# Patient Record
Sex: FEMALE | Race: WHITE | NOT HISPANIC OR LATINO | Employment: OTHER | URBAN - METROPOLITAN AREA
[De-identification: names, ages, dates, MRNs, and addresses within clinical notes are randomized per-mention and may not be internally consistent; named-entity substitution may affect disease eponyms.]

---

## 2017-07-17 ENCOUNTER — TRANSFERRED RECORDS (OUTPATIENT)
Dept: HEALTH INFORMATION MANAGEMENT | Facility: CLINIC | Age: 77
End: 2017-07-17

## 2017-07-24 ENCOUNTER — TRANSFERRED RECORDS (OUTPATIENT)
Dept: HEALTH INFORMATION MANAGEMENT | Facility: CLINIC | Age: 77
End: 2017-07-24

## 2019-11-08 ENCOUNTER — TRANSFERRED RECORDS (OUTPATIENT)
Dept: HEALTH INFORMATION MANAGEMENT | Facility: CLINIC | Age: 79
End: 2019-11-08

## 2019-12-09 ENCOUNTER — TRANSFERRED RECORDS (OUTPATIENT)
Dept: HEALTH INFORMATION MANAGEMENT | Facility: CLINIC | Age: 79
End: 2019-12-09

## 2019-12-09 ENCOUNTER — MEDICAL CORRESPONDENCE (OUTPATIENT)
Dept: HEALTH INFORMATION MANAGEMENT | Facility: CLINIC | Age: 79
End: 2019-12-09

## 2019-12-17 ENCOUNTER — TRANSFERRED RECORDS (OUTPATIENT)
Dept: HEALTH INFORMATION MANAGEMENT | Facility: CLINIC | Age: 79
End: 2019-12-17

## 2019-12-19 ENCOUNTER — MEDICAL CORRESPONDENCE (OUTPATIENT)
Dept: HEALTH INFORMATION MANAGEMENT | Facility: CLINIC | Age: 79
End: 2019-12-19

## 2020-01-07 ENCOUNTER — DOCUMENTATION ONLY (OUTPATIENT)
Dept: CARE COORDINATION | Facility: CLINIC | Age: 80
End: 2020-01-07

## 2020-02-27 ENCOUNTER — PRE VISIT (OUTPATIENT)
Dept: CARDIOLOGY | Facility: CLINIC | Age: 80
End: 2020-02-27

## 2020-02-27 NOTE — TELEPHONE ENCOUNTER
New Pulmonary Hypertension Patient Form   Patient Name: Karen Anderson   Age: 79     Referring Provider: Dr. Forde   MRN: [id number]     Date Test Epic Media/Scan Care Everywhere    12/17/19 RHC ?  ?   ?      Angio/Stress ?  ?   ?     11/8/19 Echo ?  ?   ?     12/9/19 EKG ?   ?   ?      6MWT ?   ?   ?     10/21/19 PFT ?   ?   ?      Sleep Study ?  ?   ?     4/24/17 Chest CT ?  ?   ?      V/Q Scan ?   ?   ?      Abd/Liver US ?   ?   ?      Misc:  ?   ?   ?         ?   ?   ?         ?   ?   ?      RHC  Pressures      Phase:Baseline      AO :  129.0/65.0(90.0)  @ 9:09:48 AM      RA :  a wave =v wave =mean =12  @ 9:07:22 AM      RV :  101/6/25  @ 9:06:56 AM      PA :  98/23(51)  @ 9:01:35 AM      PCW :  a wave =v wave =mean =5  @ 9:00:52 AM             a wave =v wave =mean =6  @ 9:01:08 AM

## 2020-03-01 ASSESSMENT — ENCOUNTER SYMPTOMS
SMELL DISTURBANCE: 1
LOSS OF CONSCIOUSNESS: 0
HYPERTENSION: 1
LIGHT-HEADEDNESS: 0
WHEEZING: 0
TREMORS: 0
POLYPHAGIA: 0
SPEECH CHANGE: 0
CHILLS: 1
BOWEL INCONTINENCE: 0
LEG PAIN: 1
DYSPNEA ON EXERTION: 1
FLANK PAIN: 0
DECREASED APPETITE: 0
MEMORY LOSS: 0
EYE REDNESS: 1
SHORTNESS OF BREATH: 1
EYE IRRITATION: 1
FATIGUE: 1
INCREASED ENERGY: 1
DYSURIA: 0
HEARTBURN: 0
TROUBLE SWALLOWING: 0
WEIGHT GAIN: 1
NAUSEA: 0
SINUS PAIN: 1
BLOATING: 1
SYNCOPE: 0
ORTHOPNEA: 1
JAUNDICE: 0
NIGHT SWEATS: 0
EXERCISE INTOLERANCE: 1
DIZZINESS: 0
NUMBNESS: 1
ABDOMINAL PAIN: 0
ALTERED TEMPERATURE REGULATION: 1
WEIGHT LOSS: 0
PARALYSIS: 0
BLOOD IN STOOL: 0
COUGH: 1
HEMOPTYSIS: 0
RECTAL PAIN: 0
SPUTUM PRODUCTION: 0
NECK MASS: 0
EYE PAIN: 0
HEMATURIA: 0
POSTURAL DYSPNEA: 1
FEVER: 0
SLEEP DISTURBANCES DUE TO BREATHING: 1
TASTE DISTURBANCE: 1
VOMITING: 0
CONSTIPATION: 1
SORE THROAT: 0
POLYDIPSIA: 0
DOUBLE VISION: 0
HALLUCINATIONS: 0
SEIZURES: 0
DIFFICULTY URINATING: 1
DIARRHEA: 0
EYE WATERING: 1
WEAKNESS: 1
COUGH DISTURBING SLEEP: 1
SNORES LOUDLY: 1
HOARSE VOICE: 0
SINUS CONGESTION: 1
TINGLING: 1
HYPOTENSION: 0
PALPITATIONS: 0
HEADACHES: 0
DISTURBANCES IN COORDINATION: 0

## 2020-03-01 NOTE — PROGRESS NOTES
Service Date: 2020    Shaneka Forde MD  [address]  [place] [address]    Clint Abernathy MD  [address]  [place] [address]    Noam [place] MD  [address]  [place] [address]    Carlos Carter MD  [address]  [place] [address]      RE:  Karen Anderson   MRN:  [id number]  :  1940      Dear Josemanuel Ha, Karen, and Raul:    We had the pleasure of seeing Karen Anderson at the [facility].  As you know, Ms. Anderson is a 79-year-old female with the following past medical history:    1.  Asthma.  2.  Pulmonary MAC.  3.  Breast cancer status post chemotherapy with Adriamycin, Cytoxan, Taxol, and tamoxifen and status post left mastectomy.  4.  Extensive bilateral pulmonary emboli and DVT in  thought to be secondary to tamoxifen.  5.  Chemotherapy-induced cardiomyopathy.  6.  Rheumatoid arthritis.  7.  Hypertension.  8.  Scoliosis.    She presents to establish care with us for further evaluation and management of severe pulmonary hypertension.    She notes that for a long time, possibly years, she has been having worsening dyspnea on exertion.  She thought that her dyspnea was secondary to her asthma.  Last summer, approximately 9 months ago, she noticed substantial worsening in her dyspnea.  [place] dyspnea when ambulating 20 to 30 yards.  During the summer , she was able to ambulate 20 to 30 yards without dyspnea.  Since 2018, she notes having bilateral lower extremity swelling that has been worsening despite use of low dose lasix and compression stockings.  Over the last 2 weeks, she notes that her dyspnea and overall energy level has worsened.  She notes overall feeling poorly.  She has developed a nonproductive cough.  She has had issues with lightheadedness that worsens with activity.  She has not had any loss of  consciousness or fainting.  She has shortness of breath when grocery shopping and when dressing herself.  She has been sleeping on a wedge that elevates her head for years.  Over the last 2 weeks, she notes that she has occasional episodes of PND.  She notes per her home scale, over the last week, she has gained 5 pounds.  She has not had any hospitalizations or emergency room visits.    Her prior evaluation has included a chest x-ray in September 2019 that showed no focal acute infiltrates.  She had a low-dose CT chest for follow-up of her MAC in April 2017 that showed irregular nodular opacities that were stable and no new areas of airspace disease or nodularity.  Her PFTs in October 2019 showed FEV1 of 66%, FVC 67%, FEV1 over FVC consistent with restriction, normal DLCO, normal total lung capacity, and ended up having a nonconclusive study and possibly a mixed obstructive and restrictive picture.  Her asthma is stable.  She follows with Dr. Abernathy.  She had a transthoracic echocardiogram at [facility] on 11/8/2019 that showed LVEF 60 to 65%, grade 1 diastolic dysfunction, diastolic flattening of the interventricular septum consistent with RV pressure overload, moderate RV enlargement, severely reduced RV systolic function, moderate TR, RVSP 92 mmHg, and small circumferential pericardial effusion.  She had a right heart catheterization and coronary angiogram in December 2019 which showed normal coronaries, RA 12, /25, PA 98/23/51, PCW 5, thermodilution cardiac output 2.37 L/min, PVR 19.4 and a vasodilator study was not completed.  She was also found to have an elevated BNP of 832 in September 2019.      PAST MEDICAL HISTORY:  Past Medical History:   Diagnosis Date     Asthma      Breast cancer (H)      Hypertension      Mycobacterium avium complex (H)      Pulmonary embolism (H)      Pulmonary hypertension (H)      Rheumatoid arthritis (H)      Scoliosis        PAST SURGICAL HISTORY:  Past Surgical  History:   Procedure Laterality Date     MASTECTOMY Left        FAMILY HISTORY:  Family History   Problem Relation Age of Onset     Heart Failure Mother      Kidney Disease Mother      Coronary Artery Disease Maternal Grandfather 50     LUNG DISEASE No family hx of      Clotting Disorder No family hx of        SOCIAL HISTORY:  Social History     Socioeconomic History     Marital status:      Spouse name: Not on file     Number of children: Not on file     Years of education: Not on file     Highest education level: Not on file   Occupational History     Not on file   Social Needs     Financial resource strain: Not on file     Food insecurity:     Worry: Not on file     Inability: Not on file     Transportation needs:     Medical: Not on file     Non-medical: Not on file   Tobacco Use     Smoking status: Never Smoker     Smokeless tobacco: Never Used   Substance and Sexual Activity     Alcohol use: Yes     Comment: occasionally      Drug use: Never     Sexual activity: Not on file   Lifestyle     Physical activity:     Days per week: Not on file     Minutes per session: Not on file     Stress: Not on file   Relationships     Social connections:     Talks on phone: Not on file     Gets together: Not on file     Attends Caodaism service: Not on file     Active member of club or organization: Not on file     Attends meetings of clubs or organizations: Not on file     Relationship status: Not on file     Intimate partner violence:     Fear of current or ex partner: Not on file     Emotionally abused: Not on file     Physically abused: Not on file     Forced sexual activity: Not on file   Other Topics Concern     Not on file   Social History Narrative    Is . Has 1 adult daughter who is in her 50s. Has 2 grandkids. 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       CURRENT MEDICATIONS:  Current Outpatient Medications   Medication Sig     albuterol (PROAIR HFA/PROVENTIL HFA/VENTOLIN HFA) 108  "(90 Base) MCG/ACT inhaler Inhale 2 puffs into the lungs     Ascorbic Acid (VITAMIN C) 500 MG CAPS      aspirin (ASA) 81 MG tablet      beclomethasone (QVAR) 80 MCG/ACT AERS IS A DISCONTINUED MEDICATION Inhale 1 puff into the lungs     Calcium Citrate-Vitamin D (CALCIUM CITRATE + D3) 200-250 MG-UNIT TABS Take 600 mg by mouth     cetirizine (ZYRTEC) 10 MG tablet Take 10 mg by mouth daily     furosemide (LASIX) 20 MG tablet Take 2 tablets (40 mg) by mouth daily     gabapentin (NEURONTIN) 300 MG capsule TK 1 C PO TID PRN     hydroxychloroquine (PLAQUENIL) 200 MG tablet 200 mg     lisinopril (ZESTRIL) 20 MG tablet      polyethylene glycol (MIRALAX) packet Take 17 g by mouth     Probiotic Product (PROBIOTIC ACIDOPHILUS BIOBEADS) CAPS      No current facility-administered medications for this visit.        ROS:   10 point ROS negative except as discussed in above HPI.    EXAM:  BP (!) 143/88   Pulse 83   Ht 1.473 m (4' 10\")   Wt 57.6 kg (126 lb 14.4 oz)   SpO2 91%   BMI 26.52 kg/m    General: appears comfortable, alert and articulate, has kyphosis  Head: normocephalic, atraumatic  Eyes: anicteric sclera  Neck: no adenopathy  Orophyarynx: moist mucosa, no lesions, dentition intact  Heart: regular, normal S1, loud P2, 2/6 NIKIA at LLSB, estimated JVP 20 cm, 2+ bilateral radial pulses  Lungs: clear to auscultation bilaterally, no rales or wheezing, dyspneic on exertion  Abdomen: soft, non-tender, bowel sounds present, no hepatosplenomegaly  Extremities: no clubbing, 3+ pitting bilateral lower extremity edema to proximal tibia, bilateral ulnar drift of hands  Neurological: normal speech and affect, no gross motor deficits    Labs:  Recent Results (from the past 24 hour(s))   6 minute walk test    Collection Time: 03/02/20 12:00 AM   Result Value Ref Range    6 min walk (FT) 540 ft    6 Min Walk (M) 165 m   EKG 12-lead, tracing only (Same Day)    Collection Time: 03/02/20 10:50 AM   Result Value Ref Range    Interpretation " ECG Click View Image link to view waveform and result    Iron and iron binding capacity    Collection Time: 03/02/20 12:35 PM   Result Value Ref Range    Iron 63 35 - 180 ug/dL    Iron Binding Cap 331 240 - 430 ug/dL    Iron Saturation Index 19 15 - 46 %   CRP inflammation    Collection Time: 03/02/20 12:35 PM   Result Value Ref Range    CRP Inflammation 19.4 (H) 0.0 - 8.0 mg/L   HIV Antigen Antibody Combo    Collection Time: 03/02/20 12:35 PM   Result Value Ref Range    HIV Antigen Antibody Combo Nonreactive NR^Nonreactive       Hepatitis C antibody    Collection Time: 03/02/20 12:35 PM   Result Value Ref Range    Hepatitis C Antibody Nonreactive NR^Nonreactive   Hepatitis B surface antigen    Collection Time: 03/02/20 12:35 PM   Result Value Ref Range    Hep B Surface Agn Nonreactive NR^Nonreactive   Hepatitis B Surface Antibody    Collection Time: 03/02/20 12:35 PM   Result Value Ref Range    Hepatitis B Surface Antibody 0.14 <8.00 m[IU]/mL   Hepatitis B core antibody    Collection Time: 03/02/20 12:35 PM   Result Value Ref Range    Hepatitis B Core Yaneth Nonreactive NR^Nonreactive   TSH    Collection Time: 03/02/20 12:35 PM   Result Value Ref Range    TSH 1.31 0.40 - 4.00 mU/L   N terminal pro BNP outpatient    Collection Time: 03/02/20 12:35 PM   Result Value Ref Range    N-Terminal Pro Bnp 12,874 (H) 0 - 450 pg/mL   INR    Collection Time: 03/02/20 12:35 PM   Result Value Ref Range    INR 1.07 0.86 - 1.14   CBC with platelets    Collection Time: 03/02/20 12:35 PM   Result Value Ref Range    WBC 6.2 4.0 - 11.0 10e9/L    RBC Count 4.36 3.8 - 5.2 10e12/L    Hemoglobin 14.1 11.7 - 15.7 g/dL    Hematocrit 44.4 35.0 - 47.0 %     (H) 78 - 100 fl    MCH 32.3 26.5 - 33.0 pg    MCHC 31.8 31.5 - 36.5 g/dL    RDW 13.2 10.0 - 15.0 %    Platelet Count 228 150 - 450 10e9/L   Lipid Profile    Collection Time: 03/02/20 12:35 PM   Result Value Ref Range    Cholesterol 146 <200 mg/dL    Triglycerides 83 <150 mg/dL    HDL  Cholesterol 80 >49 mg/dL    LDL Cholesterol Calculated 49 <100 mg/dL    Non HDL Cholesterol 66 <130 mg/dL   Hepatic panel    Collection Time: 03/02/20 12:35 PM   Result Value Ref Range    Bilirubin Direct 0.2 0.0 - 0.2 mg/dL    Bilirubin Total 0.6 0.2 - 1.3 mg/dL    Albumin 3.4 3.4 - 5.0 g/dL    Protein Total 7.3 6.8 - 8.8 g/dL    Alkaline Phosphatase 112 40 - 150 U/L    ALT 41 0 - 50 U/L    AST 53 (H) 0 - 45 U/L   Basic metabolic panel    Collection Time: 03/02/20 12:35 PM   Result Value Ref Range    Sodium 139 133 - 144 mmol/L    Potassium 4.0 3.4 - 5.3 mmol/L    Chloride 104 94 - 109 mmol/L    Carbon Dioxide 28 20 - 32 mmol/L    Anion Gap 7 3 - 14 mmol/L    Glucose 56 (L) 70 - 99 mg/dL    Urea Nitrogen 36 (H) 7 - 30 mg/dL    Creatinine 1.31 (H) 0.52 - 1.04 mg/dL    GFR Estimate 39 (L) >60 mL/min/[1.73_m2]    GFR Estimate If Black 45 (L) >60 mL/min/[1.73_m2]    Calcium 10.4 (H) 8.5 - 10.1 mg/dL       EKG: NSR, RAD, ST segment depression in inferior and anterolateral leads    Ech[facility] 11/8/19 at [facility]:  Interpretation Summary    * The left ventricular systolic function is normal, estimated LVEF 60-65%.    * Diastolic flattening of the interventricular septum, consistent with RV  pressure overload. Otherwise, no wall motion abnormalities present.    * Moderate right ventricular enlargement and severely reduced RV systolic  function.    * There is moderate tricuspid regurgitation.    * There is a small circumferential pericardial effusion.    * Severe pulmonary hypertension, estimated right ventricular systolic  pressure is 92 mmHg.    * Compared to prior study dated 1/21/1999, RV enlargement and pulmonary  hypertension are new. LV function remains normal.    Echocardiogram 3/2/2020 at [facility]:  Interpretation Summary  Left ventricular function, chamber size, wall motion, and wall thickness are  normal.The EF is 60-65%.  Right ventricle is moderately dilated and at least moderately dysfunctional.  Mild  RVH.  Mild to moderate tricuspid insufficiency.  Dilated main pulmonary artery.  Right ventricular systolic pressure is 97mmHg above the right atrial pressure.  IVC diameter >2.1 cm collapsing <50% with sniff suggests a high RA pressure  estimated at 15 mmHg or greater.  Severe pulmonary hypertension is present.  Small circumferential pericardial effusion (<1 cm). No hemodynamic  significance.     There is no prior study for direct comparison.     RHC 12/17/19 at [facility]:  RA: 12  RV: 101/25  PA: 98/23/51  PCWP: 5  TD CO/CI: 2.37/1.54  PVR: 19.4    6MWT 3/2/2020: Walked for 119 m and desaturated to 86% on room air.  She was initiated on 2 lpm supplemental O2 and ambulated 165 m.    Assessment and Plan:     Karen Anderson is a 79-year-old female with a history of extensive bilateral PE in 1998, asthma, rheumatoid arthritis, breast cancer status post chemotherapy and mastectomy, pulmonary MAC, and hypertension who is establishing care with us for further evaluation and management of severe precapillary pulmonary hypertension.    The etiology of her severe precapillary pulmonary hypertension is likely secondary distal small vessel pulmonary arterial hypertension in the setting of chronic thromboembolic disease given her past medical history of extensive bilateral PE.  Although she has rheumatoid arthritis, she is unlikely to have pulmonary arterial hypertension due to her late presentation.  Since her pulmonary capillary wedge pressure is normal, she is less likely to have group 2 pulmonary hypertension, otherwise known as pulmonary hypertension secondary to left heart disease.  Since her chest x-ray last fall and her CT chest in 2017 did not show any new parenchymal lung disease (other than lung nodules which were stable) and her asthma is well controlled, suspicion for pulmonary hypertension secondary to lung process is less likely.    Patient has severely elevated pulmonary pressures and moderate to  severe RV dysfunction.  She has reduced cardiac output on her right heart catheterization in December 2019.  Discussed with patient that she will need expedited evaluation due to the severity of her condition.  She will likely need IV epoprostenol and initiation of digoxin.  She appears to be in decompensated right heart failure.  Advised her to increase her home Lasix from 20 to 40 mg daily.  Offered hospitalization to expedite the evaluation process, initiate IV diuresis, and initiate therapy which she is not interested in at this time due to her concern about acquiring an infection while inpatient.    To expedite the evaluation, we obtained a transthoracic echocardiogram today and pulmonary hypertension serologic labs. Her labs were notable for elevated NT-proBNP of 12,874 and elevated Cr 1.31 which is at her baseline.  We also obtained a 6-minute walk test today where she walked for 119 m and desaturated to 86% on room air.  She was initiated on 2 lpm supplemental O2 and ambulated 165 m. She is functional class III.    We will obtain a VQ scan tomorrow and high-resolution CT chest.  We will schedule a right heart catheterization for 3/4/2020.  If her VQ scan shows pulmonary emboli, we will likely change the right heart catheterization to a pulmonary angiogram.  We will plan to admit the patient to the hospital on 3/4/2020 after her right heart catheterization or pulmonary angiogram for IV diuresis and initiation of IV prostacyclin.  Patient currently has an appointment with Dr. Abernathy tomorrow with plan for surveillance chest x-ray for her history of MAC.  We contacted Dr. Abernathy to inform him that we do not think that it is necessary for the patient to obtain a CXR since we are obtaining a high-resolution CT scan.  Since we would like to expedite her evaluation due to the severity of her PH, we discussed with Dr. Abernathy about cancelling her appointment with him tomorrow.     Follow-up will be determined  after the above evaluation and hospitalization.  In the meantime, we advised patient to increase her home Lasix from 20 to 40 mg and to not drive due to having frequent lightheadedness and concern for having a syncopal event.    It was a pleasure seeing Karen Anderson at the [facility].  Please contact us with any questions or concerns that you may have.      Patient was seen and discussed with staff attending, Dr. Turcios.      Sincerely,      Eden Mckeon MD, PhD  Cardiology Fellow    I have personally seen and examined the patient, and then discussed with Dr. Mckeon, and agree with the findings and plan in this note. I have reviewed today's vital signs, medications, labs, and imaging.     Sincerely,    Karolina Turcios MD   Center for Pulmonary Hypertension  Section of Advanced Heart Failure   Cardiovascular Division  [facility]   [phone]

## 2020-03-02 ENCOUNTER — ANCILLARY PROCEDURE (OUTPATIENT)
Dept: CARDIOLOGY | Facility: CLINIC | Age: 80
End: 2020-03-02
Attending: INTERNAL MEDICINE
Payer: COMMERCIAL

## 2020-03-02 ENCOUNTER — OFFICE VISIT (OUTPATIENT)
Dept: CARDIOLOGY | Facility: CLINIC | Age: 80
DRG: 287 | End: 2020-03-02
Attending: INTERNAL MEDICINE
Payer: COMMERCIAL

## 2020-03-02 VITALS
BODY MASS INDEX: 26.64 KG/M2 | HEART RATE: 83 BPM | HEIGHT: 58 IN | DIASTOLIC BLOOD PRESSURE: 88 MMHG | SYSTOLIC BLOOD PRESSURE: 143 MMHG | OXYGEN SATURATION: 91 % | WEIGHT: 126.9 LBS

## 2020-03-02 DIAGNOSIS — Z11.59 NEED FOR HEPATITIS B SCREENING TEST: ICD-10-CM

## 2020-03-02 DIAGNOSIS — E78.5 DYSLIPIDEMIA: ICD-10-CM

## 2020-03-02 DIAGNOSIS — R06.02 SOB (SHORTNESS OF BREATH): ICD-10-CM

## 2020-03-02 DIAGNOSIS — I27.20 PULMONARY HYPERTENSION (H): ICD-10-CM

## 2020-03-02 DIAGNOSIS — R06.09 DYSPNEA ON EXERTION: ICD-10-CM

## 2020-03-02 DIAGNOSIS — E61.1 IRON DEFICIENCY: ICD-10-CM

## 2020-03-02 DIAGNOSIS — I27.20 PULMONARY HYPERTENSION (H): Primary | ICD-10-CM

## 2020-03-02 DIAGNOSIS — R60.0 LOCALIZED EDEMA: ICD-10-CM

## 2020-03-02 LAB
6 MIN WALK (FT): 540 FT
6 MIN WALK (M): 165 M
ALBUMIN SERPL-MCNC: 3.4 G/DL (ref 3.4–5)
ALP SERPL-CCNC: 112 U/L (ref 40–150)
ALT SERPL W P-5'-P-CCNC: 41 U/L (ref 0–50)
ANION GAP SERPL CALCULATED.3IONS-SCNC: 7 MMOL/L (ref 3–14)
AST SERPL W P-5'-P-CCNC: 53 U/L (ref 0–45)
BILIRUB DIRECT SERPL-MCNC: 0.2 MG/DL (ref 0–0.2)
BILIRUB SERPL-MCNC: 0.6 MG/DL (ref 0.2–1.3)
BUN SERPL-MCNC: 36 MG/DL (ref 7–30)
CALCIUM SERPL-MCNC: 10.4 MG/DL (ref 8.5–10.1)
CHLORIDE SERPL-SCNC: 104 MMOL/L (ref 94–109)
CHOLEST SERPL-MCNC: 146 MG/DL
CO2 SERPL-SCNC: 28 MMOL/L (ref 20–32)
CREAT SERPL-MCNC: 1.31 MG/DL (ref 0.52–1.04)
CRP SERPL-MCNC: 19.4 MG/L (ref 0–8)
ERYTHROCYTE [DISTWIDTH] IN BLOOD BY AUTOMATED COUNT: 13.2 % (ref 10–15)
GFR SERPL CREATININE-BSD FRML MDRD: 39 ML/MIN/{1.73_M2}
GLUCOSE SERPL-MCNC: 56 MG/DL (ref 70–99)
HBV CORE AB SERPL QL IA: NONREACTIVE
HBV SURFACE AB SERPL IA-ACNC: 0.14 M[IU]/ML
HBV SURFACE AG SERPL QL IA: NONREACTIVE
HCT VFR BLD AUTO: 44.4 % (ref 35–47)
HCV AB SERPL QL IA: NONREACTIVE
HDLC SERPL-MCNC: 80 MG/DL
HGB BLD-MCNC: 14.1 G/DL (ref 11.7–15.7)
HIV 1+2 AB+HIV1 P24 AG SERPL QL IA: NONREACTIVE
INR PPP: 1.07 (ref 0.86–1.14)
IRON SATN MFR SERPL: 19 % (ref 15–46)
IRON SERPL-MCNC: 63 UG/DL (ref 35–180)
LDLC SERPL CALC-MCNC: 49 MG/DL
MCH RBC QN AUTO: 32.3 PG (ref 26.5–33)
MCHC RBC AUTO-ENTMCNC: 31.8 G/DL (ref 31.5–36.5)
MCV RBC AUTO: 102 FL (ref 78–100)
NONHDLC SERPL-MCNC: 66 MG/DL
NT-PROBNP SERPL-MCNC: ABNORMAL PG/ML (ref 0–450)
PLATELET # BLD AUTO: 228 10E9/L (ref 150–450)
POTASSIUM SERPL-SCNC: 4 MMOL/L (ref 3.4–5.3)
PROT SERPL-MCNC: 7.3 G/DL (ref 6.8–8.8)
RBC # BLD AUTO: 4.36 10E12/L (ref 3.8–5.2)
SODIUM SERPL-SCNC: 139 MMOL/L (ref 133–144)
TIBC SERPL-MCNC: 331 UG/DL (ref 240–430)
TRIGL SERPL-MCNC: 83 MG/DL
TSH SERPL DL<=0.005 MIU/L-ACNC: 1.31 MU/L (ref 0.4–4)
WBC # BLD AUTO: 6.2 10E9/L (ref 4–11)

## 2020-03-02 PROCEDURE — 80048 BASIC METABOLIC PNL TOTAL CA: CPT | Performed by: INTERNAL MEDICINE

## 2020-03-02 PROCEDURE — 85610 PROTHROMBIN TIME: CPT | Performed by: INTERNAL MEDICINE

## 2020-03-02 PROCEDURE — 83550 IRON BINDING TEST: CPT | Performed by: INTERNAL MEDICINE

## 2020-03-02 PROCEDURE — 80061 LIPID PANEL: CPT | Performed by: INTERNAL MEDICINE

## 2020-03-02 PROCEDURE — 85730 THROMBOPLASTIN TIME PARTIAL: CPT | Performed by: INTERNAL MEDICINE

## 2020-03-02 PROCEDURE — 84443 ASSAY THYROID STIM HORMONE: CPT | Performed by: INTERNAL MEDICINE

## 2020-03-02 PROCEDURE — 86706 HEP B SURFACE ANTIBODY: CPT | Performed by: INTERNAL MEDICINE

## 2020-03-02 PROCEDURE — 84238 ASSAY NONENDOCRINE RECEPTOR: CPT | Performed by: INTERNAL MEDICINE

## 2020-03-02 PROCEDURE — 86140 C-REACTIVE PROTEIN: CPT | Performed by: INTERNAL MEDICINE

## 2020-03-02 PROCEDURE — 86038 ANTINUCLEAR ANTIBODIES: CPT | Performed by: INTERNAL MEDICINE

## 2020-03-02 PROCEDURE — 86803 HEPATITIS C AB TEST: CPT | Performed by: INTERNAL MEDICINE

## 2020-03-02 PROCEDURE — 85027 COMPLETE CBC AUTOMATED: CPT | Performed by: INTERNAL MEDICINE

## 2020-03-02 PROCEDURE — 86704 HEP B CORE ANTIBODY TOTAL: CPT | Performed by: INTERNAL MEDICINE

## 2020-03-02 PROCEDURE — 85613 RUSSELL VIPER VENOM DILUTED: CPT | Performed by: INTERNAL MEDICINE

## 2020-03-02 PROCEDURE — 99205 OFFICE O/P NEW HI 60 MIN: CPT | Mod: 25 | Performed by: INTERNAL MEDICINE

## 2020-03-02 PROCEDURE — 80076 HEPATIC FUNCTION PANEL: CPT | Performed by: INTERNAL MEDICINE

## 2020-03-02 PROCEDURE — 83880 ASSAY OF NATRIURETIC PEPTIDE: CPT | Performed by: INTERNAL MEDICINE

## 2020-03-02 PROCEDURE — 36415 COLL VENOUS BLD VENIPUNCTURE: CPT | Performed by: INTERNAL MEDICINE

## 2020-03-02 PROCEDURE — 86431 RHEUMATOID FACTOR QUANT: CPT | Performed by: INTERNAL MEDICINE

## 2020-03-02 PROCEDURE — 83540 ASSAY OF IRON: CPT | Performed by: INTERNAL MEDICINE

## 2020-03-02 PROCEDURE — 83520 IMMUNOASSAY QUANT NOS NONAB: CPT | Performed by: INTERNAL MEDICINE

## 2020-03-02 PROCEDURE — 93010 ELECTROCARDIOGRAM REPORT: CPT | Mod: ZP | Performed by: INTERNAL MEDICINE

## 2020-03-02 PROCEDURE — G0463 HOSPITAL OUTPT CLINIC VISIT: HCPCS | Mod: ZF

## 2020-03-02 PROCEDURE — 87389 HIV-1 AG W/HIV-1&-2 AB AG IA: CPT | Performed by: INTERNAL MEDICINE

## 2020-03-02 PROCEDURE — 87340 HEPATITIS B SURFACE AG IA: CPT | Performed by: INTERNAL MEDICINE

## 2020-03-02 PROCEDURE — 00000401 ZZHCL STATISTIC THROMBIN TIME NC: Performed by: INTERNAL MEDICINE

## 2020-03-02 RX ORDER — ALBUTEROL SULFATE 90 UG/1
2 AEROSOL, METERED RESPIRATORY (INHALATION) EVERY 4 HOURS PRN
COMMUNITY
Start: 2014-07-21

## 2020-03-02 RX ORDER — FUROSEMIDE 20 MG
40 TABLET ORAL DAILY
Qty: 180 TABLET | Refills: 3 | Status: SHIPPED | OUTPATIENT
Start: 2020-03-02 | End: 2020-04-13

## 2020-03-02 RX ORDER — CETIRIZINE HYDROCHLORIDE 10 MG/1
10 TABLET ORAL DAILY
Status: ON HOLD | COMMUNITY
End: 2021-05-27

## 2020-03-02 RX ORDER — GABAPENTIN 300 MG/1
300 CAPSULE ORAL 3 TIMES DAILY
COMMUNITY
Start: 2020-01-21 | End: 2020-09-22

## 2020-03-02 RX ORDER — MULTIVIT-MIN/IRON/FOLIC ACID/K 18-600-40
500 CAPSULE ORAL EVERY MORNING
COMMUNITY
Start: 2014-07-21

## 2020-03-02 RX ORDER — POLYETHYLENE GLYCOL 3350 17 G/17G
17 POWDER, FOR SOLUTION ORAL DAILY PRN
COMMUNITY
End: 2020-09-22

## 2020-03-02 RX ORDER — HYDROXYCHLOROQUINE SULFATE 200 MG/1
200 TABLET, FILM COATED ORAL DAILY
COMMUNITY
Start: 2010-06-08

## 2020-03-02 RX ORDER — FUROSEMIDE 20 MG
TABLET ORAL
COMMUNITY
Start: 2019-07-25 | End: 2020-03-02

## 2020-03-02 RX ORDER — OMEGA-3/DHA/EPA/FISH OIL 1000 MG
1 CAPSULE ORAL 2 TIMES DAILY
COMMUNITY
Start: 2014-07-21 | End: 2021-02-10

## 2020-03-02 RX ORDER — LIDOCAINE 40 MG/G
CREAM TOPICAL
Status: CANCELLED | OUTPATIENT
Start: 2020-03-02

## 2020-03-02 RX ORDER — LISINOPRIL 20 MG/1
20 TABLET ORAL DAILY
COMMUNITY
Start: 2020-02-14 | End: 2020-09-22

## 2020-03-02 ASSESSMENT — PAIN SCALES - GENERAL: PAINLEVEL: NO PAIN (0)

## 2020-03-02 ASSESSMENT — MIFFLIN-ST. JEOR: SCORE: 950.36

## 2020-03-02 NOTE — PATIENT INSTRUCTIONS
Medication Changes:  -Increase Lasix to 40mg once daily    Patient Instructions:  1. Continue staying active and eat a heart healthy diet.    2. Please keep current list of medications with you at all times.    3. Remember to weigh yourself daily after voiding and before you consume any food or beverages and log the numbers.  If you have gained 2 pounds overnight or 5 pounds in a week contact us immediately for medication adjustments or further instructions.    4. **Please call us immediately if you have any syncope (fainting or passing out), chest pain, edema (swelling or weight gain), or decline in your functional status (general decline in how you are feeling).    Check-In  Time Check-In Location Estimated Length Procedure   Name        [facility]   waiting room 60 minutes VQ/Lung Perfusion Scan**   Procedure Preparations & Instructions     This is a non-invasive procedure and does NOT require any preparation         Check-In  Time Check-In Location Estimated Length Procedure   Name         20 minutes CT Scan**   Procedure Preparations & Instructions     This is a non-invasive procedure and does NOT require any preparation       Check-In  Time Check-In Location Estimated Length Procedure   Name         60 minutes Echocardiogram (Echo) w/Bubble study**   Procedure Preparations & Instructions     This is a non-invasive procedure and does NOT require any preparation         Check-In  Time Check-In Location Estimated Length Procedure   Name        [facility]   3rd Floor 30 minutes Six Minute Walk Test W/ Oxygen qualifier**   Procedure Preparations & Instructions     This is a non-invasive procedure and does NOT require any preparation         Check-In  Time Check-In Location Estimated Length Procedure   Name     3/4/20 @ 7am   [facility]  waiting room 60-90 minutes Right Heart Catheterization**     Procedure Preparations & Instructions     This is an invasive procedure that DOES require preparation:    - Nothing to eat for 6 hours    - You may have clear liquids up until the time of your procedure  - A ride should be arranged for you in the instance you are unable to drive home, however you should be able to function as you normally would after the procedure     Follow up Appointment Information:  *Labs today on 1st floor    *We would like you to be admitted under the cardiovascular service by the Pulmonary Hypertension Clinic at the [facility], for Heart Failure.  We would like to admit you on March 04, 2020 immediately after your RHC.      The Hospitals address is:   [address] [place] [address]    Please check in to the admissions window in the main lobby of the hospital entrance to the [facility].  The admissions counselor will have your information and should inform you what floor/unit you will be admitted to and instruct you how to proceed.      Please plan to be admitted for approximately 4-5 days under in-patient care.  You are welcome to bring personal items from home, however please do not bring items of value.      Your clinic follow up will be scheduled upon pre-discharge planning for approximately 1 week after you leave the hospital; you should be informed of this appointment date and time prior to leaving the [facility].  In the event you are not given a date and time for clinic follow up, please contact me at [phone] upon discharging from the [facility].         We are located on the third floor of the [facility] on the [facility].  Our address is     [address] [place] [address] on [address]rd [place] [address]    Thank you for allowing us to be a part of your care here at the [facility]    If you have questions or concerns please contact us at:    Elan Arnett, RN, BSN   Lexus Balbuena (Schedule,Prior Auth)  Nurse Coordinator     Clinic    Pulmonary Hypertension   Pulmonary Hypertension  [facility]  [facility]  (Phone)[phone]    (Phone) [phone]        (Fax) [phone]    ** Please note that you will NOT receive a reminder call regarding your scheduled testing, reminder calls are for provider appointments only.  If you are scheduled for testing within the [facility] system you may receive a call regarding pre-registration for billing purposes only.**     Remember to weigh yourself daily after voiding and before you consume any food or beverages and log the numbers.  If you have gained/lost 2 pounds overnight or 5 pounds in a week contact us immediately for medication adjustments or further instructions.   **Please call us immediately if you have any syncope, chest pain, edema, or decline in your functional status.    Support Group:  Pulmonary Hypertension Association  Https://www.phassociation.org/  **Look at the Events Tab** They even have Support Groups that you can call into    [facility] PH Support Group  Second Saturday of the Month from 1-3 PM   Location: [address] [place] [address]  Leader: Jessenia Moeller and Marielos Loo  Phone: [phone] or [phone]  Email: [email or web address]

## 2020-03-02 NOTE — LETTER
3/2/2020      RE: Karen Anderson  [address]  [facility] [address]       Service Date: 2020    Shaneka Forde MD  [address]  [place] [address]    Clint Abernathy MD  [address]  [place] [address]    Noam Jones MD  [address]  [facility] [place] [address]    Carlos Carter MD  [address]  [place] [address]      RE:  Karen Anderson   MRN:  [id number]  :  1940      Dear Josemanuel Ha Skemp, and Raul:    We had the pleasure of seeing Karen Anderson at the [facility].  As you know, Ms. Anderson is a 79-year-old female with the following past medical history:    1.  Asthma.  2.  Pulmonary MAC.  3.  Breast cancer status post chemotherapy with Adriamycin, Cytoxan, Taxol, and tamoxifen and status post left mastectomy.  4.  Extensive bilateral pulmonary emboli and DVT in  thought to be secondary to tamoxifen.  5.  Chemotherapy-induced cardiomyopathy.  6.  Rheumatoid arthritis.  7.  Hypertension.  8.  Scoliosis.    She presents to establish care with us for further evaluation and management of severe pulmonary hypertension.    She notes that for a long time, possibly years, she has been having worsening dyspnea on exertion.  She thought that her dyspnea was secondary to her asthma.  Last summer, approximately 9 months ago, she noticed substantial worsening in her dyspnea.  She was camping up [place] and developed dyspnea when ambulating 20 to 30 yards.  During the summer 2018, she was able to ambulate 20 to 30 yards without dyspnea.  Since 2018, she notes having bilateral lower extremity swelling that has been worsening despite use of low dose lasix and compression stockings.  Over the last 2 weeks, she notes that her dyspnea and overall energy level has worsened.  She notes overall feeling poorly.  She has developed a nonproductive cough.  She has had issues  with lightheadedness that worsens with activity.  She has not had any loss of consciousness or fainting.  She has shortness of breath when grocery shopping and when dressing herself.  She has been sleeping on a wedge that elevates her head for years.  Over the last 2 weeks, she notes that she has occasional episodes of PND.  She notes per her home scale, over the last week, she has gained 5 pounds.  She has not had any hospitalizations or emergency room visits.    Her prior evaluation has included a chest x-ray in September 2019 that showed no focal acute infiltrates.  She had a low-dose CT chest for follow-up of her MAC in April 2017 that showed irregular nodular opacities that were stable and no new areas of airspace disease or nodularity.  Her PFTs in October 2019 showed FEV1 of 66%, FVC 67%, FEV1 over FVC consistent with restriction, normal DLCO, normal total lung capacity, and ended up having a nonconclusive study and possibly a mixed obstructive and restrictive picture.  Her asthma is stable.  She follows with Dr. Abernathy.  She had a transthoracic echocardiogram at [facility] on 11/8/2019 that showed LVEF 60 to 65%, grade 1 diastolic dysfunction, diastolic flattening of the interventricular septum consistent with RV pressure overload, moderate RV enlargement, severely reduced RV systolic function, moderate TR, RVSP 92 mmHg, and small circumferential pericardial effusion.  She had a right heart catheterization and coronary angiogram in December 2019 which showed normal coronaries, RA 12, /25, PA 98/23/51, PCW 5, thermodilution cardiac output 2.37 L/min, PVR 19.4 and a vasodilator study was not completed.  She was also found to have an elevated BNP of 832 in September 2019.      PAST MEDICAL HISTORY:  Past Medical History:   Diagnosis Date     Asthma      Breast cancer (H)      Hypertension      Mycobacterium avium complex (H)      Pulmonary embolism (H)      Pulmonary hypertension (H)      Rheumatoid  arthritis (H)      Scoliosis        PAST SURGICAL HISTORY:  Past Surgical History:   Procedure Laterality Date     MASTECTOMY Left        FAMILY HISTORY:  Family History   Problem Relation Age of Onset     Heart Failure Mother      Kidney Disease Mother      Coronary Artery Disease Maternal Grandfather 50     LUNG DISEASE No family hx of      Clotting Disorder No family hx of        SOCIAL HISTORY:  Social History     Socioeconomic History     Marital status:      Spouse name: Not on file     Number of children: Not on file     Years of education: Not on file     Highest education level: Not on file   Occupational History     Not on file   Social Needs     Financial resource strain: Not on file     Food insecurity:     Worry: Not on file     Inability: Not on file     Transportation needs:     Medical: Not on file     Non-medical: Not on file   Tobacco Use     Smoking status: Never Smoker     Smokeless tobacco: Never Used   Substance and Sexual Activity     Alcohol use: Yes     Comment: occasionally      Drug use: Never     Sexual activity: Not on file   Lifestyle     Physical activity:     Days per week: Not on file     Minutes per session: Not on file     Stress: Not on file   Relationships     Social connections:     Talks on phone: Not on file     Gets together: Not on file     Attends Religion service: Not on file     Active member of club or organization: Not on file     Attends meetings of clubs or organizations: Not on file     Relationship status: Not on file     Intimate partner violence:     Fear of current or ex partner: Not on file     Emotionally abused: Not on file     Physically abused: Not on file     Forced sexual activity: Not on file   Other Topics Concern     Not on file   Social History Narrative    Is . Has 1 adult daughter who is in her 50s. Has 2 grandkids. 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       CURRENT MEDICATIONS:  Current Outpatient Medications  "  Medication Sig     albuterol (PROAIR HFA/PROVENTIL HFA/VENTOLIN HFA) 108 (90 Base) MCG/ACT inhaler Inhale 2 puffs into the lungs     Ascorbic Acid (VITAMIN C) 500 MG CAPS      aspirin (ASA) 81 MG tablet      beclomethasone (QVAR) 80 MCG/ACT AERS IS A DISCONTINUED MEDICATION Inhale 1 puff into the lungs     Calcium Citrate-Vitamin D (CALCIUM CITRATE + D3) 200-250 MG-UNIT TABS Take 600 mg by mouth     cetirizine (ZYRTEC) 10 MG tablet Take 10 mg by mouth daily     furosemide (LASIX) 20 MG tablet Take 2 tablets (40 mg) by mouth daily     gabapentin (NEURONTIN) 300 MG capsule TK 1 C PO TID PRN     hydroxychloroquine (PLAQUENIL) 200 MG tablet 200 mg     lisinopril (ZESTRIL) 20 MG tablet      polyethylene glycol (MIRALAX) packet Take 17 g by mouth     Probiotic Product (PROBIOTIC ACIDOPHILUS BIOBEADS) CAPS      No current facility-administered medications for this visit.        ROS:   10 point ROS negative except as discussed in above HPI.    EXAM:  BP (!) 143/88   Pulse 83   Ht 1.473 m (4' 10\")   Wt 57.6 kg (126 lb 14.4 oz)   SpO2 91%   BMI 26.52 kg/m    General: appears comfortable, alert and articulate, has kyphosis  Head: normocephalic, atraumatic  Eyes: anicteric sclera  Neck: no adenopathy  Orophyarynx: moist mucosa, no lesions, dentition intact  Heart: regular, normal S1, loud P2, 2/6 NIKIA at LLSB, estimated JVP 20 cm, 2+ bilateral radial pulses  Lungs: clear to auscultation bilaterally, no rales or wheezing, dyspneic on exertion  Abdomen: soft, non-tender, bowel sounds present, no hepatosplenomegaly  Extremities: no clubbing, 3+ pitting bilateral lower extremity edema to proximal tibia, bilateral ulnar drift of hands  Neurological: normal speech and affect, no gross motor deficits    Labs:  Recent Results (from the past 24 hour(s))   6 minute walk test    Collection Time: 03/02/20 12:00 AM   Result Value Ref Range    6 min walk (FT) 540 ft    6 Min Walk (M) 165 m   EKG 12-lead, tracing only (Same Day)    " Collection Time: 03/02/20 10:50 AM   Result Value Ref Range    Interpretation ECG Click View Image link to view waveform and result    Iron and iron binding capacity    Collection Time: 03/02/20 12:35 PM   Result Value Ref Range    Iron 63 35 - 180 ug/dL    Iron Binding Cap 331 240 - 430 ug/dL    Iron Saturation Index 19 15 - 46 %   CRP inflammation    Collection Time: 03/02/20 12:35 PM   Result Value Ref Range    CRP Inflammation 19.4 (H) 0.0 - 8.0 mg/L   HIV Antigen Antibody Combo    Collection Time: 03/02/20 12:35 PM   Result Value Ref Range    HIV Antigen Antibody Combo Nonreactive NR^Nonreactive       Hepatitis C antibody    Collection Time: 03/02/20 12:35 PM   Result Value Ref Range    Hepatitis C Antibody Nonreactive NR^Nonreactive   Hepatitis B surface antigen    Collection Time: 03/02/20 12:35 PM   Result Value Ref Range    Hep B Surface Agn Nonreactive NR^Nonreactive   Hepatitis B Surface Antibody    Collection Time: 03/02/20 12:35 PM   Result Value Ref Range    Hepatitis B Surface Antibody 0.14 <8.00 m[IU]/mL   Hepatitis B core antibody    Collection Time: 03/02/20 12:35 PM   Result Value Ref Range    Hepatitis B Core Yaneth Nonreactive NR^Nonreactive   TSH    Collection Time: 03/02/20 12:35 PM   Result Value Ref Range    TSH 1.31 0.40 - 4.00 mU/L   N terminal pro BNP outpatient    Collection Time: 03/02/20 12:35 PM   Result Value Ref Range    N-Terminal Pro Bnp 12,874 (H) 0 - 450 pg/mL   INR    Collection Time: 03/02/20 12:35 PM   Result Value Ref Range    INR 1.07 0.86 - 1.14   CBC with platelets    Collection Time: 03/02/20 12:35 PM   Result Value Ref Range    WBC 6.2 4.0 - 11.0 10e9/L    RBC Count 4.36 3.8 - 5.2 10e12/L    Hemoglobin 14.1 11.7 - 15.7 g/dL    Hematocrit 44.4 35.0 - 47.0 %     (H) 78 - 100 fl    MCH 32.3 26.5 - 33.0 pg    MCHC 31.8 31.5 - 36.5 g/dL    RDW 13.2 10.0 - 15.0 %    Platelet Count 228 150 - 450 10e9/L   Lipid Profile    Collection Time: 03/02/20 12:35 PM   Result Value  Ref Range    Cholesterol 146 <200 mg/dL    Triglycerides 83 <150 mg/dL    HDL Cholesterol 80 >49 mg/dL    LDL Cholesterol Calculated 49 <100 mg/dL    Non HDL Cholesterol 66 <130 mg/dL   Hepatic panel    Collection Time: 03/02/20 12:35 PM   Result Value Ref Range    Bilirubin Direct 0.2 0.0 - 0.2 mg/dL    Bilirubin Total 0.6 0.2 - 1.3 mg/dL    Albumin 3.4 3.4 - 5.0 g/dL    Protein Total 7.3 6.8 - 8.8 g/dL    Alkaline Phosphatase 112 40 - 150 U/L    ALT 41 0 - 50 U/L    AST 53 (H) 0 - 45 U/L   Basic metabolic panel    Collection Time: 03/02/20 12:35 PM   Result Value Ref Range    Sodium 139 133 - 144 mmol/L    Potassium 4.0 3.4 - 5.3 mmol/L    Chloride 104 94 - 109 mmol/L    Carbon Dioxide 28 20 - 32 mmol/L    Anion Gap 7 3 - 14 mmol/L    Glucose 56 (L) 70 - 99 mg/dL    Urea Nitrogen 36 (H) 7 - 30 mg/dL    Creatinine 1.31 (H) 0.52 - 1.04 mg/dL    GFR Estimate 39 (L) >60 mL/min/[1.73_m2]    GFR Estimate If Black 45 (L) >60 mL/min/[1.73_m2]    Calcium 10.4 (H) 8.5 - 10.1 mg/dL       EKG: NSR, RAD, ST segment depression in inferior and anterolateral leads    Echocardiogram 11/8/19 at [facility]:  Interpretation Summary    * The left ventricular systolic function is normal, estimated LVEF 60-65%.    * Diastolic flattening of the interventricular septum, consistent with RV  pressure overload. Otherwise, no wall motion abnormalities present.    * Moderate right ventricular enlargement and severely reduced RV systolic  function.    * There is moderate tricuspid regurgitation.    * There is a small circumferential pericardial effusion.    * Severe pulmonary hypertension, estimated right ventricular systolic  pressure is 92 mmHg.    * Compared to prior study dated 1/21/1999, RV enlargement and pulmonary  hypertension are new. LV function remains normal.    Ech[facility]ogram 3/2/2020 at [facility]:  Interpretation Summary  Left ventricular function, chamber size, wall motion, and wall thickness are  normal.The EF is 60-65%.  Right  ventricle is moderately dilated and at least moderately dysfunctional.  Mild RVH.  Mild to moderate tricuspid insufficiency.  Dilated main pulmonary artery.  Right ventricular systolic pressure is 97mmHg above the right atrial pressure.  IVC diameter >2.1 cm collapsing <50% with sniff suggests a high RA pressure  estimated at 15 mmHg or greater.  Severe pulmonary hypertension is present.  Small circumferential pericardial effusion (<1 cm). No hemodynamic  significance.     There is no prior study for direct comparison.     RHC 12/17/19 at [facility]:  RA: 12  RV: 101/25  PA: 98/23/51  PCWP: 5  TD CO/CI: 2.37/1.54  PVR: 19.4    6MWT 3/2/2020: Walked for 119 m and desaturated to 86% on room air.  She was initiated on 2 lpm supplemental O2 and ambulated 165 m.    Assessment and Plan:     Karen Anderson is a 79-year-old female with a history of extensive bilateral PE in 1998, asthma, rheumatoid arthritis, breast cancer status post chemotherapy and mastectomy, pulmonary MAC, and hypertension who is establishing care with us for further evaluation and management of severe precapillary pulmonary hypertension.    The etiology of her severe precapillary pulmonary hypertension is likely secondary distal small vessel pulmonary arterial hypertension in the setting of chronic thromboembolic disease given her past medical history of extensive bilateral PE.  Although she has rheumatoid arthritis, she is unlikely to have pulmonary arterial hypertension due to her late presentation.  Since her pulmonary capillary wedge pressure is normal, she is less likely to have group 2 pulmonary hypertension, otherwise known as pulmonary hypertension secondary to left heart disease.  Since her chest x-ray last fall and her CT chest in 2017 did not show any new parenchymal lung disease (other than lung nodules which were stable) and her asthma is well controlled, suspicion for pulmonary hypertension secondary to lung process is less  likely.    Patient has severely elevated pulmonary pressures and moderate to severe RV dysfunction.  She has reduced cardiac output on her right heart catheterization in December 2019.  Discussed with patient that she will need expedited evaluation due to the severity of her condition.  She will likely need IV epoprostenol and initiation of digoxin.  She appears to be in decompensated right heart failure.  Advised her to increase her home Lasix from 20 to 40 mg daily.  Offered hospitalization to expedite the evaluation process, initiate IV diuresis, and initiate therapy which she is not interested in at this time due to her concern about acquiring an infection while inpatient.    To expedite the evaluation, we obtained a transthoracic echocardiogram today and pulmonary hypertension serologic labs. Her labs were notable for elevated NT-proBNP of 12,874 and elevated Cr 1.31 which is at her baseline.  We also obtained a 6-minute walk test today where she walked for 119 m and desaturated to 86% on room air.  She was initiated on 2 lpm supplemental O2 and ambulated 165 m. She is functional class III.    We will obtain a VQ scan tomorrow and high-resolution CT chest.  We will schedule a right heart catheterization for 3/4/2020.  If her VQ scan shows pulmonary emboli, we will likely change the right heart catheterization to a pulmonary angiogram.  We will plan to admit the patient to the hospital on 3/4/2020 after her right heart catheterization or pulmonary angiogram for IV diuresis and initiation of IV prostacyclin.  Patient currently has an appointment with Dr. Abernathy tomorrow with plan for surveillance chest x-ray for her history of MAC.  We contacted Dr. Abernathy to inform him that we do not think that it is necessary for the patient to obtain a CXR since we are obtaining a high-resolution CT scan.  Since we would like to expedite her evaluation due to the severity of her PH, we discussed with Dr. Abernathy about  cancelling her appointment with him tomorrow.     Follow-up will be determined after the above evaluation and hospitalization.  In the meantime, we advised patient to increase her home Lasix from 20 to 40 mg and to not drive due to having frequent lightheadedness and concern for having a syncopal event.    It was a pleasure seeing Karen Anderson at the [facility].  Please contact us with any questions or concerns that you may have.      Patient was seen and discussed with staff attending, Dr. Turcios.      Sincerely,      Eden Mckeon MD, PhD  Cardiology Fellow    I have personally seen and examined the patient, and then discussed with Dr. Mckeon, and agree with the findings and plan in this note. I have reviewed today's vital signs, medications, labs, and imaging.     Sincerely,    Karolina Turcios MD   [facility]  Section of Advanced Heart Failure   Cardiovascular Division  [facility]   [phone]                  Karolina Turcios MD

## 2020-03-02 NOTE — LETTER
3/2/2020      RE: Karen Anderson  [address]  [facility] [address]       Dear Colleague,    Thank you for the opportunity to participate in the care of your patient, Karen Anderson, at the [facility] HEART [facility]. Please see a copy of my visit note below.    Service Date: 2020    Shaneka Forde MD  [address]  [place] [address]    Clint Abernathy MD  [address]  [place] [address]    Noam Jones MD  [address]  [place] [address]    Carlos Carter MD  [address]  [place] [address]      RE:  Karen Anderson   MRN:  [id number]  :  1940      Dear Josemanuel Ha, Karen, and Raul:    We had the pleasure of seeing Karen Anderson at the [facility].  As you know, Ms. Anderson is a 79-year-old female with the following past medical history:    1.  Asthma.  2.  Pulmonary MAC.  3.  Breast cancer status post chemotherapy with Adriamycin, Cytoxan, Taxol, and tamoxifen and status post left mastectomy.  4.  Extensive bilateral pulmonary emboli and DVT in  thought to be secondary to tamoxifen.  5.  Chemotherapy-induced cardiomyopathy.  6.  Rheumatoid arthritis.  7.  Hypertension.  8.  Scoliosis.    She presents to establish care with us for further evaluation and management of severe pulmonary hypertension.    She notes that for a long time, possibly years, she has been having worsening dyspnea on exertion.  She thought that her dyspnea was secondary to her asthma.  Last summer, approximately 9 months ago, she noticed substantial worsening in her dyspnea.  [place] dyspnea when ambulating 20 to 30 yards.  During the summer , she was able to ambulate 20 to 30 yards without dyspnea.  Since 2018, she notes having bilateral lower extremity swelling that has been worsening  despite use of low dose lasix and compression stockings.  Over the last 2 weeks, she notes that her dyspnea and overall energy level has worsened.  She notes overall feeling poorly.  She has developed a nonproductive cough.  She has had issues with lightheadedness that worsens with activity.  She has not had any loss of consciousness or fainting.  She has shortness of breath when grocery shopping and when dressing herself.  She has been sleeping on a wedge that elevates her head for years.  Over the last 2 weeks, she notes that she has occasional episodes of PND.  She notes per her home scale, over the last week, she has gained 5 pounds.  She has not had any hospitalizations or emergency room visits.    Her prior evaluation has included a chest x-ray in September 2019 that showed no focal acute infiltrates.  She had a low-dose CT chest for follow-up of her MAC in April 2017 that showed irregular nodular opacities that were stable and no new areas of airspace disease or nodularity.  Her PFTs in October 2019 showed FEV1 of 66%, FVC 67%, FEV1 over FVC consistent with restriction, normal DLCO, normal total lung capacity, and ended up having a nonconclusive study and possibly a mixed obstructive and restrictive picture.  Her asthma is stable.  She follows with Dr. Abernathy.  She had a transthoracic echocardiogram at [facility] on 11/8/2019 that showed LVEF 60 to 65%, grade 1 diastolic dysfunction, diastolic flattening of the interventricular septum consistent with RV pressure overload, moderate RV enlargement, severely reduced RV systolic function, moderate TR, RVSP 92 mmHg, and small circumferential pericardial effusion.  She had a right heart catheterization and coronary angiogram in December 2019 which showed normal coronaries, RA 12, /25, PA 98/23/51, PCW 5, thermodilution cardiac output 2.37 L/min, PVR 19.4 and a vasodilator study was not completed.  She was also found to have an elevated BNP of 832 in  September 2019.      PAST MEDICAL HISTORY:  Past Medical History:   Diagnosis Date     Asthma      Breast cancer (H)      Hypertension      Mycobacterium avium complex (H)      Pulmonary embolism (H)      Pulmonary hypertension (H)      Rheumatoid arthritis (H)      Scoliosis        PAST SURGICAL HISTORY:  Past Surgical History:   Procedure Laterality Date     MASTECTOMY Left        FAMILY HISTORY:  Family History   Problem Relation Age of Onset     Heart Failure Mother      Kidney Disease Mother      Coronary Artery Disease Maternal Grandfather 50     LUNG DISEASE No family hx of      Clotting Disorder No family hx of        SOCIAL HISTORY:  Social History     Socioeconomic History     Marital status:      Spouse name: Not on file     Number of children: Not on file     Years of education: Not on file     Highest education level: Not on file   Occupational History     Not on file   Social Needs     Financial resource strain: Not on file     Food insecurity:     Worry: Not on file     Inability: Not on file     Transportation needs:     Medical: Not on file     Non-medical: Not on file   Tobacco Use     Smoking status: Never Smoker     Smokeless tobacco: Never Used   Substance and Sexual Activity     Alcohol use: Yes     Comment: occasionally      Drug use: Never     Sexual activity: Not on file   Lifestyle     Physical activity:     Days per week: Not on file     Minutes per session: Not on file     Stress: Not on file   Relationships     Social connections:     Talks on phone: Not on file     Gets together: Not on file     Attends Cheondoism service: Not on file     Active member of club or organization: Not on file     Attends meetings of clubs or organizations: Not on file     Relationship status: Not on file     Intimate partner violence:     Fear of current or ex partner: Not on file     Emotionally abused: Not on file     Physically abused: Not on file     Forced sexual activity: Not on file   Other  "Topics Concern     Not on file   Social History Narrative    Is . Has 1 adult daughter who is in her 50s. Has 2 grandkids. 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       CURRENT MEDICATIONS:  Current Outpatient Medications   Medication Sig     albuterol (PROAIR HFA/PROVENTIL HFA/VENTOLIN HFA) 108 (90 Base) MCG/ACT inhaler Inhale 2 puffs into the lungs     Ascorbic Acid (VITAMIN C) 500 MG CAPS      aspirin (ASA) 81 MG tablet      beclomethasone (QVAR) 80 MCG/ACT AERS IS A DISCONTINUED MEDICATION Inhale 1 puff into the lungs     Calcium Citrate-Vitamin D (CALCIUM CITRATE + D3) 200-250 MG-UNIT TABS Take 600 mg by mouth     cetirizine (ZYRTEC) 10 MG tablet Take 10 mg by mouth daily     furosemide (LASIX) 20 MG tablet Take 2 tablets (40 mg) by mouth daily     gabapentin (NEURONTIN) 300 MG capsule TK 1 C PO TID PRN     hydroxychloroquine (PLAQUENIL) 200 MG tablet 200 mg     lisinopril (ZESTRIL) 20 MG tablet      polyethylene glycol (MIRALAX) packet Take 17 g by mouth     Probiotic Product (PROBIOTIC ACIDOPHILUS [employer]) CAPS      No current facility-administered medications for this visit.        ROS:   10 point ROS negative except as discussed in above HPI.    EXAM:  BP (!) 143/88   Pulse 83   Ht 1.473 m (4' 10\")   Wt 57.6 kg (126 lb 14.4 oz)   SpO2 91%   BMI 26.52 kg/m    General: appears comfortable, alert and articulate, has kyphosis  Head: normocephalic, atraumatic  Eyes: anicteric sclera  Neck: no adenopathy  Orophyarynx: moist mucosa, no lesions, dentition intact  Heart: regular, normal S1, loud P2, 2/6 NIKIA at LLSB, estimated JVP 20 cm, 2+ bilateral radial pulses  Lungs: clear to auscultation bilaterally, no rales or wheezing, dyspneic on exertion  Abdomen: soft, non-tender, bowel sounds present, no hepatosplenomegaly  Extremities: no clubbing, 3+ pitting bilateral lower extremity edema to proximal tibia, bilateral ulnar drift of hands  Neurological: normal speech and affect, no " gross motor deficits    Labs:  Recent Results (from the past 24 hour(s))   6 minute walk test    Collection Time: 03/02/20 12:00 AM   Result Value Ref Range    6 min walk (FT) 540 ft    6 Min Walk (M) 165 m   EKG 12-lead, tracing only (Same Day)    Collection Time: 03/02/20 10:50 AM   Result Value Ref Range    Interpretation ECG Click View Image link to view waveform and result    Iron and iron binding capacity    Collection Time: 03/02/20 12:35 PM   Result Value Ref Range    Iron 63 35 - 180 ug/dL    Iron Binding Cap 331 240 - 430 ug/dL    Iron Saturation Index 19 15 - 46 %   CRP inflammation    Collection Time: 03/02/20 12:35 PM   Result Value Ref Range    CRP Inflammation 19.4 (H) 0.0 - 8.0 mg/L   HIV Antigen Antibody Combo    Collection Time: 03/02/20 12:35 PM   Result Value Ref Range    HIV Antigen Antibody Combo Nonreactive NR^Nonreactive       Hepatitis C antibody    Collection Time: 03/02/20 12:35 PM   Result Value Ref Range    Hepatitis C Antibody Nonreactive NR^Nonreactive   Hepatitis B surface antigen    Collection Time: 03/02/20 12:35 PM   Result Value Ref Range    Hep B Surface Agn Nonreactive NR^Nonreactive   Hepatitis B Surface Antibody    Collection Time: 03/02/20 12:35 PM   Result Value Ref Range    Hepatitis B Surface Antibody 0.14 <8.00 m[IU]/mL   Hepatitis B core antibody    Collection Time: 03/02/20 12:35 PM   Result Value Ref Range    Hepatitis B Core Yaneth Nonreactive NR^Nonreactive   TSH    Collection Time: 03/02/20 12:35 PM   Result Value Ref Range    TSH 1.31 0.40 - 4.00 mU/L   N terminal pro BNP outpatient    Collection Time: 03/02/20 12:35 PM   Result Value Ref Range    N-Terminal Pro Bnp 12,874 (H) 0 - 450 pg/mL   INR    Collection Time: 03/02/20 12:35 PM   Result Value Ref Range    INR 1.07 0.86 - 1.14   CBC with platelets    Collection Time: 03/02/20 12:35 PM   Result Value Ref Range    WBC 6.2 4.0 - 11.0 10e9/L    RBC Count 4.36 3.8 - 5.2 10e12/L    Hemoglobin 14.1 11.7 - 15.7 g/dL     Hematocrit 44.4 35.0 - 47.0 %     (H) 78 - 100 fl    MCH 32.3 26.5 - 33.0 pg    MCHC 31.8 31.5 - 36.5 g/dL    RDW 13.2 10.0 - 15.0 %    Platelet Count 228 150 - 450 10e9/L   Lipid Profile    Collection Time: 03/02/20 12:35 PM   Result Value Ref Range    Cholesterol 146 <200 mg/dL    Triglycerides 83 <150 mg/dL    HDL Cholesterol 80 >49 mg/dL    LDL Cholesterol Calculated 49 <100 mg/dL    Non HDL Cholesterol 66 <130 mg/dL   Hepatic panel    Collection Time: 03/02/20 12:35 PM   Result Value Ref Range    Bilirubin Direct 0.2 0.0 - 0.2 mg/dL    Bilirubin Total 0.6 0.2 - 1.3 mg/dL    Albumin 3.4 3.4 - 5.0 g/dL    Protein Total 7.3 6.8 - 8.8 g/dL    Alkaline Phosphatase 112 40 - 150 U/L    ALT 41 0 - 50 U/L    AST 53 (H) 0 - 45 U/L   Basic metabolic panel    Collection Time: 03/02/20 12:35 PM   Result Value Ref Range    Sodium 139 133 - 144 mmol/L    Potassium 4.0 3.4 - 5.3 mmol/L    Chloride 104 94 - 109 mmol/L    Carbon Dioxide 28 20 - 32 mmol/L    Anion Gap 7 3 - 14 mmol/L    Glucose 56 (L) 70 - 99 mg/dL    Urea Nitrogen 36 (H) 7 - 30 mg/dL    Creatinine 1.31 (H) 0.52 - 1.04 mg/dL    GFR Estimate 39 (L) >60 mL/min/[1.73_m2]    GFR Estimate If Black 45 (L) >60 mL/min/[1.73_m2]    Calcium 10.4 (H) 8.5 - 10.1 mg/dL       EKG: NSR, RAD, ST segment depression in inferior and anterolateral leads    Echocardiogram 11/8/19 at [facility]:  Interpretation Summary    * The left ventricular systolic function is normal, estimated LVEF 60-65%.    * Diastolic flattening of the interventricular septum, consistent with RV  pressure overload. Otherwise, no wall motion abnormalities present.    * Moderate right ventricular enlargement and severely reduced RV systolic  function.    * There is moderate tricuspid regurgitation.    * There is a small circumferential pericardial effusion.    * Severe pulmonary hypertension, estimated right ventricular systolic  pressure is 92 mmHg.    * Compared to prior study dated 1/21/1999, RV  enlargement and pulmonary  hypertension are new. LV function remains normal.    Echocardiogram 3/2/2020 at [facility]:  Interpretation Summary  Left ventricular function, chamber size, wall motion, and wall thickness are  normal.The EF is 60-65%.  Right ventricle is moderately dilated and at least moderately dysfunctional.  Mild RVH.  Mild to moderate tricuspid insufficiency.  Dilated main pulmonary artery.  Right ventricular systolic pressure is 97mmHg above the right atrial pressure.  IVC diameter >2.1 cm collapsing <50% with sniff suggests a high RA pressure  estimated at 15 mmHg or greater.  Severe pulmonary hypertension is present.  Small circumferential pericardial effusion (<1 cm). No hemodynamic  significance.     There is no prior study for direct comparison.     RHC 12/17/19 at [facility]:  RA: 12  RV: 101/25  PA: 98/23/51  PCWP: 5  TD CO/CI: 2.37/1.54  PVR: 19.4    6MWT 3/2/2020: Walked for 119 m and desaturated to 86% on room air.  She was initiated on 2 lpm supplemental O2 and ambulated 165 m.    Assessment and Plan:     Karen Anderson is a 79-year-old female with a history of extensive bilateral PE in 1998, asthma, rheumatoid arthritis, breast cancer status post chemotherapy and mastectomy, pulmonary MAC, and hypertension who is establishing care with us for further evaluation and management of severe precapillary pulmonary hypertension.    The etiology of her severe precapillary pulmonary hypertension is likely secondary distal small vessel pulmonary arterial hypertension in the setting of chronic thromboembolic disease given her past medical history of extensive bilateral PE.  Although she has rheumatoid arthritis, she is unlikely to have pulmonary arterial hypertension due to her late presentation.  Since her pulmonary capillary wedge pressure is normal, she is less likely to have group 2 pulmonary hypertension, otherwise known as pulmonary hypertension secondary to left heart disease.  Since her  chest x-ray last fall and her CT chest in 2017 did not show any new parenchymal lung disease (other than lung nodules which were stable) and her asthma is well controlled, suspicion for pulmonary hypertension secondary to lung process is less likely.    Patient has severely elevated pulmonary pressures and moderate to severe RV dysfunction.  She has reduced cardiac output on her right heart catheterization in December 2019.  Discussed with patient that she will need expedited evaluation due to the severity of her condition.  She will likely need IV epoprostenol and initiation of digoxin.  She appears to be in decompensated right heart failure.  Advised her to increase her home Lasix from 20 to 40 mg daily.  Offered hospitalization to expedite the evaluation process, initiate IV diuresis, and initiate therapy which she is not interested in at this time due to her concern about acquiring an infection while inpatient.    To expedite the evaluation, we obtained a transthoracic echocardiogram today and pulmonary hypertension serologic labs. Her labs were notable for elevated NT-proBNP of 12,874 and elevated Cr 1.31 which is at her baseline.  We also obtained a 6-minute walk test today where she walked for 119 m and desaturated to 86% on room air.  She was initiated on 2 lpm supplemental O2 and ambulated 165 m. She is functional class III.    We will obtain a VQ scan tomorrow and high-resolution CT chest.  We will schedule a right heart catheterization for 3/4/2020.  If her VQ scan shows pulmonary emboli, we will likely change the right heart catheterization to a pulmonary angiogram.  We will plan to admit the patient to the hospital on 3/4/2020 after her right heart catheterization or pulmonary angiogram for IV diuresis and initiation of IV prostacyclin.  Patient currently has an appointment with Dr. Abernathy tomorrow with plan for surveillance chest x-ray for her history of MAC.  We contacted Dr. Abernathy to inform him  that we do not think that it is necessary for the patient to obtain a CXR since we are obtaining a high-resolution CT scan.  Since we would like to expedite her evaluation due to the severity of her PH, we discussed with Dr. Abernathy about cancelling her appointment with him tomorrow.     Follow-up will be determined after the above evaluation and hospitalization.  In the meantime, we advised patient to increase her home Lasix from 20 to 40 mg and to not drive due to having frequent lightheadedness and concern for having a syncopal event.    It was a pleasure seeing Karen Anderson at the [facility].  Please contact us with any questions or concerns that you may have.      Patient was seen and discussed with staff attending, Dr. Turcios.      Sincerely,      Eden Mckeon MD, PhD  Cardiology Fellow    I have personally seen and examined the patient, and then discussed with Dr. Mckeon, and agree with the findings and plan in this note. I have reviewed today's vital signs, medications, labs, and imaging.     Sincerely,    Karolina Turcios MD   Center for Pulmonary Hypertension  Section of Advanced Heart Failure   Cardiovascular Division  [facility]   [phone]

## 2020-03-02 NOTE — NURSING NOTE
Procedures and/or Testing: Patient given instructions regarding  Echocardiogram with Bubble Study,  6 minute walk test,  Chest CT, V/Q Scan,. Discussed purpose, preparation, procedure and when to expect results reported back to the patient. Patient demonstrated understanding of this information and agreed to call with further questions or concerns.    Right Heart Catheterization: Patient was instructed regarding right heart catheterization, including discussion of the procedure, preparation, intra-procedural steps, and recovery at home. Patient demonstrated understanding of this information and agreed to call with further questions or concerns.    Med Reconcile: Reviewed and verified all current medications with the patient. The updated medication list was printed and given to the patient.    Medication Change: Patient was educated regarding prescribed medication change, including discussion of the indication, administration, side effects, and when to report to MD or RN. Patient demonstrated understanding of this information and agreed to call with further questions or concerns.    Patient was instructed to return for the next laboratory testing today before she leaves.     Diet: Patient instructed regarding a heart healthy diet, including discussion of reduced fat and sodium intake. Patient demonstrated understanding of this information and agreed to call with further questions or concerns.    Return Appointment: Patient given instructions regarding scheduling next clinic visit. Patient demonstrated understanding of this information and agreed to call with further questions or concerns.    Patient stated she understood all health information given and agreed to call with further questions or concerns.    Medication Changes:  -Increase Lasix to 40mg once daily    Patient Instructions:  1. Continue staying active and eat a heart healthy diet.    2. Please keep current list of medications with you at all times.    3.  Remember to weigh yourself daily after voiding and before you consume any food or beverages and log the numbers.  If you have gained 2 pounds overnight or 5 pounds in a week contact us immediately for medication adjustments or further instructions.    4. **Please call us immediately if you have any syncope (fainting or passing out), chest pain, edema (swelling or weight gain), or decline in your functional status (general decline in how you are feeling).    Check-In  Time Check-In Location Estimated Length Procedure   Name        [facility]   waiting room 60 minutes VQ/Lung Perfusion Scan**   Procedure Preparations & Instructions     This is a non-invasive procedure and does NOT require any preparation         Check-In  Time Check-In Location Estimated Length Procedure   Name         20 minutes CT Scan**   Procedure Preparations & Instructions     This is a non-invasive procedure and does NOT require any preparation       Check-In  Time Check-In Location Estimated Length Procedure   Name         60 minutes Echocardiogram (Echo) w/Bubble study**   Procedure Preparations & Instructions     This is a non-invasive procedure and does NOT require any preparation         Check-In  Time Check-In Location Estimated Length Procedure   Name        [facility]   3rd Floor 30 minutes Six Minute Walk Test W/ Oxygen qualifier**   Procedure Preparations & Instructions     This is a non-invasive procedure and does NOT require any preparation         Check-In  Time Check-In Location Estimated Length Procedure   Name     3/4/20 @ 7am   [facility]  waiting room 60-90 minutes Right Heart Catheterization**     Procedure Preparations & Instructions     This is an invasive procedure that DOES require preparation:    - Nothing to eat for 6 hours   - You may have clear liquids up until the time of your procedure  - A ride should be arranged for you in the instance you are unable to drive home, however you should be able to function as you normally would after  the procedure     Follow up Appointment Information:  *Labs today on 1st floor    *We would like you to be admitted under the cardiovascular service by the Pulmonary Hypertension Clinic at the [facility], for Heart Failure.  We would like to admit you on March 04, 2020 immediately after your RHC.      The Hospitals address is:   [address] [place] [id number]    Please check in to the admissions window in the main lobby of the hospital entrance to the [facility].  The admissions counselor will have your information and should inform you what floor/unit you will be admitted to and instruct you how to proceed.      Please plan to be admitted for approximately 4-5 days under in-patient care.  You are welcome to bring personal items from home, however please do not bring items of value.      Your clinic follow up will be scheduled upon pre-discharge planning for approximately 1 week after you leave the hospital; you should be informed of this appointment date and time prior to leaving the [facility].  In the event you are not given a date and time for clinic follow up, please contact me at [phone] upon discharging from the [facility].         *Sent staff msg to RN's and asked Elan to  tup admission for Wednesday after RHC.

## 2020-03-02 NOTE — NURSING NOTE
Chief Complaint   Patient presents with     New Patient     New PH referral from Dr. Forde     Vitals were taken and medications were reconciled.     Kaylee Taylor RMA  8:51 AM

## 2020-03-03 ENCOUNTER — HOSPITAL ENCOUNTER (OUTPATIENT)
Dept: CT IMAGING | Facility: CLINIC | Age: 80
DRG: 287 | End: 2020-03-03
Attending: INTERNAL MEDICINE
Payer: COMMERCIAL

## 2020-03-03 ENCOUNTER — HOSPITAL ENCOUNTER (OUTPATIENT)
Dept: CT IMAGING | Facility: CLINIC | Age: 80
Discharge: HOME | DRG: 287 | End: 2020-03-03
Attending: INTERNAL MEDICINE | Admitting: INTERNAL MEDICINE
Payer: COMMERCIAL

## 2020-03-03 ENCOUNTER — HOSPITAL ENCOUNTER (OUTPATIENT)
Dept: NUCLEAR MEDICINE | Facility: CLINIC | Age: 80
Setting detail: NUCLEAR MEDICINE
Discharge: HOME | DRG: 287 | End: 2020-03-03
Attending: INTERNAL MEDICINE | Admitting: INTERNAL MEDICINE
Payer: COMMERCIAL

## 2020-03-03 DIAGNOSIS — R06.02 SOB (SHORTNESS OF BREATH): ICD-10-CM

## 2020-03-03 DIAGNOSIS — Z11.59 NEED FOR HEPATITIS B SCREENING TEST: ICD-10-CM

## 2020-03-03 DIAGNOSIS — I27.24 CTEPH (CHRONIC THROMBOEMBOLIC PULMONARY HYPERTENSION) (H): ICD-10-CM

## 2020-03-03 DIAGNOSIS — E78.5 DYSLIPIDEMIA: ICD-10-CM

## 2020-03-03 DIAGNOSIS — E61.1 IRON DEFICIENCY: ICD-10-CM

## 2020-03-03 DIAGNOSIS — I27.20 PULMONARY HYPERTENSION (H): ICD-10-CM

## 2020-03-03 DIAGNOSIS — R60.0 LOCALIZED EDEMA: ICD-10-CM

## 2020-03-03 DIAGNOSIS — I27.24 CTEPH (CHRONIC THROMBOEMBOLIC PULMONARY HYPERTENSION) (H): Primary | ICD-10-CM

## 2020-03-03 LAB
ANA SER QL IF: NEGATIVE
INTERPRETATION ECG - MUSE: NORMAL
RADIOLOGIST FLAGS: ABNORMAL
RHEUMATOID FACT SER NEPH-ACNC: <20 IU/ML (ref 0–20)

## 2020-03-03 PROCEDURE — 71250 CT THORAX DX C-: CPT

## 2020-03-03 PROCEDURE — 34300033 ZZH RX 343: Performed by: INTERNAL MEDICINE

## 2020-03-03 PROCEDURE — 78582 LUNG VENTILAT&PERFUS IMAGING: CPT

## 2020-03-03 PROCEDURE — 40000141 ZZH STATISTIC PERIPHERAL IV START W/O US GUIDANCE

## 2020-03-03 PROCEDURE — 71275 CT ANGIOGRAPHY CHEST: CPT

## 2020-03-03 PROCEDURE — 25000128 H RX IP 250 OP 636: Performed by: STUDENT IN AN ORGANIZED HEALTH CARE EDUCATION/TRAINING PROGRAM

## 2020-03-03 PROCEDURE — A9567 TECHNETIUM TC-99M AEROSOL: HCPCS | Performed by: INTERNAL MEDICINE

## 2020-03-03 PROCEDURE — A9540 TC99M MAA: HCPCS | Performed by: INTERNAL MEDICINE

## 2020-03-03 RX ORDER — IOPAMIDOL 755 MG/ML
52 INJECTION, SOLUTION INTRAVASCULAR ONCE
Status: COMPLETED | OUTPATIENT
Start: 2020-03-03 | End: 2020-03-03

## 2020-03-03 RX ADMIN — IOPAMIDOL 52 ML: 755 INJECTION, SOLUTION INTRAVENOUS at 11:56

## 2020-03-03 RX ADMIN — KIT FOR THE PREPARATION OF TECHNETIUM TC 99M ALBUMIN AGGREGATED 7 MCI.: 2.5 INJECTION, POWDER, FOR SOLUTION INTRAVENOUS at 09:15

## 2020-03-03 RX ADMIN — KIT FOR THE PREPARATION OF TECHNETIUM TC 99M PENTETATE 2 MCI.: 20 INJECTION, POWDER, LYOPHILIZED, FOR SOLUTION INTRAVENOUS; RESPIRATORY (INHALATION) at 09:15

## 2020-03-03 NOTE — PROGRESS NOTES
Dr. Turcios called me and advised me that patient just had her VQ and HR Ct finished.  They confirmed her scans were very abnormal so he would like her to stay and have a Chest CT PE Protocol with [employer] ordered now as STAT. Patient is already going to be admitted tomorrow. Verbalized understanding.    Orders placed. Fabby Seymour RN on 3/3/2020 at 10:55 AM

## 2020-03-03 NOTE — NURSING NOTE
Patient gave us a copy of all her other MD names and contact info.  I scanned this information into chart. Fabby Seymour RN on 3/3/2020 at 3:34 PM

## 2020-03-04 ENCOUNTER — APPOINTMENT (OUTPATIENT)
Dept: MEDSURG UNIT | Facility: CLINIC | Age: 80
DRG: 287 | End: 2020-03-04
Attending: INTERNAL MEDICINE
Payer: COMMERCIAL

## 2020-03-04 ENCOUNTER — TRANSFERRED RECORDS (OUTPATIENT)
Dept: HEALTH INFORMATION MANAGEMENT | Facility: CLINIC | Age: 80
End: 2020-03-04

## 2020-03-04 ENCOUNTER — APPOINTMENT (OUTPATIENT)
Dept: GENERAL RADIOLOGY | Facility: CLINIC | Age: 80
DRG: 287 | End: 2020-03-04
Attending: STUDENT IN AN ORGANIZED HEALTH CARE EDUCATION/TRAINING PROGRAM
Payer: COMMERCIAL

## 2020-03-04 ENCOUNTER — HOSPITAL ENCOUNTER (INPATIENT)
Facility: CLINIC | Age: 80
LOS: 16 days | Discharge: HOME HEALTH | DRG: 287 | End: 2020-03-20
Attending: INTERNAL MEDICINE | Admitting: INTERNAL MEDICINE
Payer: COMMERCIAL

## 2020-03-04 DIAGNOSIS — R60.0 LOCALIZED EDEMA: ICD-10-CM

## 2020-03-04 DIAGNOSIS — Z11.59 NEED FOR HEPATITIS B SCREENING TEST: ICD-10-CM

## 2020-03-04 DIAGNOSIS — I27.20 PULMONARY HYPERTENSION (H): ICD-10-CM

## 2020-03-04 DIAGNOSIS — R06.02 SOB (SHORTNESS OF BREATH): ICD-10-CM

## 2020-03-04 DIAGNOSIS — E78.5 DYSLIPIDEMIA: ICD-10-CM

## 2020-03-04 DIAGNOSIS — E61.1 IRON DEFICIENCY: ICD-10-CM

## 2020-03-04 LAB
ERYTHROCYTE [DISTWIDTH] IN BLOOD BY AUTOMATED COUNT: 13.1 % (ref 10–15)
HCT VFR BLD AUTO: 41.5 % (ref 35–47)
HGB BLD-MCNC: 13.2 G/DL (ref 11.7–15.7)
LA PPP-IMP: NEGATIVE
MCH RBC QN AUTO: 32.3 PG (ref 26.5–33)
MCHC RBC AUTO-ENTMCNC: 31.8 G/DL (ref 31.5–36.5)
MCV RBC AUTO: 102 FL (ref 78–100)
PLATELET # BLD AUTO: 244 10E9/L (ref 150–450)
RBC # BLD AUTO: 4.09 10E12/L (ref 3.8–5.2)
STFR SERPL-SCNC: 3.8 MG/L (ref 1.9–4.4)
WBC # BLD AUTO: 5.4 10E9/L (ref 4–11)

## 2020-03-04 PROCEDURE — 36569 INSJ PICC 5 YR+ W/O IMAGING: CPT

## 2020-03-04 PROCEDURE — 40000166 ZZH STATISTIC PP CARE STAGE 1

## 2020-03-04 PROCEDURE — 21400000 ZZH R&B CCU UMMC

## 2020-03-04 PROCEDURE — 25000132 ZZH RX MED GY IP 250 OP 250 PS 637: Performed by: INTERNAL MEDICINE

## 2020-03-04 PROCEDURE — 85027 COMPLETE CBC AUTOMATED: CPT | Performed by: STUDENT IN AN ORGANIZED HEALTH CARE EDUCATION/TRAINING PROGRAM

## 2020-03-04 PROCEDURE — C1751 CATH, INF, PER/CENT/MIDLINE: HCPCS

## 2020-03-04 PROCEDURE — 27211417 ZZ H KIT, VPS RHYTHM STYLET

## 2020-03-04 PROCEDURE — 99221 1ST HOSP IP/OBS SF/LOW 40: CPT | Mod: 25 | Performed by: INTERNAL MEDICINE

## 2020-03-04 PROCEDURE — 27210788 ZZH MANIFOLD CR3: Performed by: INTERNAL MEDICINE

## 2020-03-04 PROCEDURE — 25000128 H RX IP 250 OP 636: Performed by: STUDENT IN AN ORGANIZED HEALTH CARE EDUCATION/TRAINING PROGRAM

## 2020-03-04 PROCEDURE — 40000141 ZZH STATISTIC PERIPHERAL IV START W/O US GUIDANCE

## 2020-03-04 PROCEDURE — 27210794 ZZH OR GENERAL SUPPLY STERILE: Performed by: INTERNAL MEDICINE

## 2020-03-04 PROCEDURE — 93451 RIGHT HEART CATH: CPT | Performed by: INTERNAL MEDICINE

## 2020-03-04 PROCEDURE — 40000986 XR CHEST PORT 1 VW

## 2020-03-04 PROCEDURE — 25000132 ZZH RX MED GY IP 250 OP 250 PS 637: Performed by: STUDENT IN AN ORGANIZED HEALTH CARE EDUCATION/TRAINING PROGRAM

## 2020-03-04 PROCEDURE — 25000125 ZZHC RX 250: Performed by: INTERNAL MEDICINE

## 2020-03-04 PROCEDURE — 25000125 ZZHC RX 250: Performed by: STUDENT IN AN ORGANIZED HEALTH CARE EDUCATION/TRAINING PROGRAM

## 2020-03-04 PROCEDURE — 27210577 ZZ H INTRODUCER MICRO SET

## 2020-03-04 PROCEDURE — 4A023N6 MEASUREMENT OF CARDIAC SAMPLING AND PRESSURE, RIGHT HEART, PERCUTANEOUS APPROACH: ICD-10-PCS | Performed by: INTERNAL MEDICINE

## 2020-03-04 PROCEDURE — C1894 INTRO/SHEATH, NON-LASER: HCPCS | Performed by: INTERNAL MEDICINE

## 2020-03-04 RX ORDER — ASPIRIN 81 MG/1
81 TABLET, CHEWABLE ORAL DAILY
Status: DISCONTINUED | OUTPATIENT
Start: 2020-03-04 | End: 2020-03-04

## 2020-03-04 RX ORDER — POLYETHYLENE GLYCOL 3350 17 G/17G
17 POWDER, FOR SOLUTION ORAL DAILY PRN
Status: DISCONTINUED | OUTPATIENT
Start: 2020-03-04 | End: 2020-03-20 | Stop reason: HOSPADM

## 2020-03-04 RX ORDER — DIGOXIN 0.06 MG/1
62.5 TABLET ORAL DAILY
Status: DISCONTINUED | OUTPATIENT
Start: 2020-03-04 | End: 2020-03-20 | Stop reason: HOSPADM

## 2020-03-04 RX ORDER — HEPARIN SODIUM 10000 [USP'U]/100ML
0-3500 INJECTION, SOLUTION INTRAVENOUS CONTINUOUS
Status: DISCONTINUED | OUTPATIENT
Start: 2020-03-04 | End: 2020-03-14

## 2020-03-04 RX ORDER — CETIRIZINE HYDROCHLORIDE 10 MG/1
10 TABLET ORAL EVERY EVENING
Status: DISCONTINUED | OUTPATIENT
Start: 2020-03-04 | End: 2020-03-20 | Stop reason: HOSPADM

## 2020-03-04 RX ORDER — CHOLECALCIFEROL (VITAMIN D3) 50 MCG
1 TABLET ORAL DAILY
COMMUNITY
End: 2020-09-22

## 2020-03-04 RX ORDER — LIDOCAINE 40 MG/G
CREAM TOPICAL
Status: COMPLETED | OUTPATIENT
Start: 2020-03-04 | End: 2020-03-04

## 2020-03-04 RX ORDER — GRANISETRON HYDROCHLORIDE 1 MG/ML
10 INJECTION INTRAVENOUS 2 TIMES DAILY PRN
Status: DISCONTINUED | OUTPATIENT
Start: 2020-03-04 | End: 2020-03-04

## 2020-03-04 RX ORDER — ASPIRIN 81 MG/1
81 TABLET, CHEWABLE ORAL EVERY EVENING
Status: DISCONTINUED | OUTPATIENT
Start: 2020-03-04 | End: 2020-03-20 | Stop reason: HOSPADM

## 2020-03-04 RX ORDER — ACETAMINOPHEN 325 MG/1
650 TABLET ORAL EVERY 4 HOURS PRN
Status: DISCONTINUED | OUTPATIENT
Start: 2020-03-04 | End: 2020-03-04 | Stop reason: DRUGHIGH

## 2020-03-04 RX ORDER — ACETAMINOPHEN 325 MG/1
325 TABLET ORAL EVERY 4 HOURS PRN
Status: DISCONTINUED | OUTPATIENT
Start: 2020-03-04 | End: 2020-03-20 | Stop reason: HOSPADM

## 2020-03-04 RX ORDER — GRANISETRON HYDROCHLORIDE 1 MG/ML
0.5 INJECTION INTRAVENOUS EVERY 12 HOURS
Status: DISCONTINUED | OUTPATIENT
Start: 2020-03-04 | End: 2020-03-04

## 2020-03-04 RX ORDER — ALBUTEROL SULFATE 90 UG/1
2 AEROSOL, METERED RESPIRATORY (INHALATION) EVERY 4 HOURS PRN
Status: DISCONTINUED | OUTPATIENT
Start: 2020-03-04 | End: 2020-03-20 | Stop reason: HOSPADM

## 2020-03-04 RX ORDER — HYDROXYCHLOROQUINE SULFATE 200 MG/1
200 TABLET, FILM COATED ORAL DAILY
Status: DISCONTINUED | OUTPATIENT
Start: 2020-03-04 | End: 2020-03-20 | Stop reason: HOSPADM

## 2020-03-04 RX ORDER — LIDOCAINE 40 MG/G
CREAM TOPICAL
Status: DISCONTINUED | OUTPATIENT
Start: 2020-03-04 | End: 2020-03-20 | Stop reason: HOSPADM

## 2020-03-04 RX ORDER — LISINOPRIL 20 MG/1
20 TABLET ORAL DAILY
Status: DISCONTINUED | OUTPATIENT
Start: 2020-03-04 | End: 2020-03-20 | Stop reason: HOSPADM

## 2020-03-04 RX ORDER — FUROSEMIDE 10 MG/ML
40 INJECTION INTRAMUSCULAR; INTRAVENOUS
Status: DISCONTINUED | OUTPATIENT
Start: 2020-03-04 | End: 2020-03-05

## 2020-03-04 RX ORDER — IPRATROPIUM BROMIDE 42 UG/1
2 SPRAY, METERED NASAL 3 TIMES DAILY
COMMUNITY

## 2020-03-04 RX ORDER — DOCUSATE SODIUM 100 MG/1
100 CAPSULE, LIQUID FILLED ORAL 2 TIMES DAILY
Status: DISCONTINUED | OUTPATIENT
Start: 2020-03-04 | End: 2020-03-06

## 2020-03-04 RX ORDER — HEPARIN SODIUM,PORCINE 10 UNIT/ML
2-5 VIAL (ML) INTRAVENOUS
Status: DISCONTINUED | OUTPATIENT
Start: 2020-03-04 | End: 2020-03-09

## 2020-03-04 RX ORDER — LIDOCAINE 40 MG/G
CREAM TOPICAL
Status: DISCONTINUED | OUTPATIENT
Start: 2020-03-04 | End: 2020-03-09

## 2020-03-04 RX ORDER — CETIRIZINE HYDROCHLORIDE 10 MG/1
10 TABLET ORAL DAILY
Status: DISCONTINUED | OUTPATIENT
Start: 2020-03-04 | End: 2020-03-04

## 2020-03-04 RX ORDER — ACETAMINOPHEN 650 MG/1
650 SUPPOSITORY RECTAL EVERY 4 HOURS PRN
Status: DISCONTINUED | OUTPATIENT
Start: 2020-03-04 | End: 2020-03-20 | Stop reason: HOSPADM

## 2020-03-04 RX ORDER — GABAPENTIN 300 MG/1
300 CAPSULE ORAL 3 TIMES DAILY
Status: DISCONTINUED | OUTPATIENT
Start: 2020-03-04 | End: 2020-03-20 | Stop reason: HOSPADM

## 2020-03-04 RX ORDER — IPRATROPIUM BROMIDE 42 UG/1
2 SPRAY, METERED NASAL 3 TIMES DAILY
Status: DISCONTINUED | OUTPATIENT
Start: 2020-03-04 | End: 2020-03-11

## 2020-03-04 RX ADMIN — HEPARIN SODIUM 1000 UNITS/HR: 10000 INJECTION, SOLUTION INTRAVENOUS at 18:45

## 2020-03-04 RX ADMIN — GABAPENTIN 300 MG: 300 CAPSULE ORAL at 19:52

## 2020-03-04 RX ADMIN — DIGOXIN 62.5 MCG: 0.06 TABLET ORAL at 13:32

## 2020-03-04 RX ADMIN — LIDOCAINE: 40 CREAM TOPICAL at 07:31

## 2020-03-04 RX ADMIN — CETIRIZINE HYDROCHLORIDE 10 MG: 10 TABLET, FILM COATED ORAL at 19:54

## 2020-03-04 RX ADMIN — FUROSEMIDE 40 MG: 10 INJECTION, SOLUTION INTRAMUSCULAR; INTRAVENOUS at 10:52

## 2020-03-04 RX ADMIN — FUROSEMIDE 40 MG: 10 INJECTION, SOLUTION INTRAMUSCULAR; INTRAVENOUS at 16:59

## 2020-03-04 RX ADMIN — DOCUSATE SODIUM 100 MG: 100 CAPSULE, LIQUID FILLED ORAL at 10:50

## 2020-03-04 RX ADMIN — ASPIRIN 81 MG CHEWABLE TABLET 81 MG: 81 TABLET CHEWABLE at 19:53

## 2020-03-04 RX ADMIN — LIDOCAINE HYDROCHLORIDE 1 ML: 10 INJECTION, SOLUTION EPIDURAL; INFILTRATION; INTRACAUDAL; PERINEURAL at 11:37

## 2020-03-04 RX ADMIN — LISINOPRIL 20 MG: 20 TABLET ORAL at 10:50

## 2020-03-04 RX ADMIN — IPRATROPIUM BROMIDE 2 SPRAY: 42 SPRAY NASAL at 19:54

## 2020-03-04 RX ADMIN — IPRATROPIUM BROMIDE 2 SPRAY: 42 SPRAY NASAL at 17:10

## 2020-03-04 ASSESSMENT — MIFFLIN-ST. JEOR
SCORE: 922.24
SCORE: 937.66
SCORE: 943.88

## 2020-03-04 ASSESSMENT — ACTIVITIES OF DAILY LIVING (ADL)
ADLS_ACUITY_SCORE: 32
ADLS_ACUITY_SCORE: 32
ADLS_ACUITY_SCORE: 19

## 2020-03-04 ASSESSMENT — PAIN DESCRIPTION - DESCRIPTORS: DESCRIPTORS: HEADACHE

## 2020-03-04 NOTE — PHARMACY-CONSULT NOTE
Parenteral Prostacyclin Therapy Initiation     This patient has been initiated on a continuous IV  Treprostinil (Remodulin) infusion for the treatment of CTEPH.  The patient's current prostacyclin dosing parameters are as follows:    1.  Treprostinil (Remodulin) Dosing Weight = 56.3 kg.  (Please note: the prostacyclin dosing weight for therapy initiation is the most recent actual body weight).  This weight will remain the dosing weight for the duration of therapy.    2.  Prostacyclin Concentration = 4 Micrograms/mL  3.  Prostacyclin Dose = start at 2 Nanograms/kg/min and titrate by 1 Nanograms/kg/min every 12 hours to a goal of 10 Nanograms/kg/min  4. Line access for prostacyclin administration: dedicated single lumen PICC    Gaston MccarthyD

## 2020-03-04 NOTE — PROGRESS NOTES
Transfer  Transferred to: 6B  Via: Wheelchair  Reason for transfer:Pt inappropriate for  6C/D  Family: Aware of transfer,  with pt   Belongings: Sent with pt  Chart: Sent with pt  Report called to: Celina BOLDEN RN notified that pt had PICC placed earlier; however, it is not yet been verified.    Per Alexandro Cards 2 Pharmacist - the Fluticasone inhaler is a substitute for the Qvar inhaler pt brought/takes at home. 6B updated that pt  can take pt inhaler and nasal solution home.

## 2020-03-04 NOTE — PROGRESS NOTES
Arrived from home for a [facility].  Very anxious.  BP elevated due to anxiety.  Patient wants to make sure that all of her medications are double checked against her home medication list for inpatient medications...if she is admitted to hospital.   at bedside.  Resting in bed.

## 2020-03-04 NOTE — PHARMACY-ADMISSION MEDICATION HISTORY
Admission medication history interview status for the 3/4/2020 admission is complete. See Epic admission navigator for allergy information, pharmacy, prior to admission medications and immunization status.     Medication history interview sources:  patient's own med list and [employer] dispensing report    Changes made to PTA medication list (reason)  Added: Atrovent nasal spray, Citracal, vitamin D3    Deleted: None    Changed: Lasix  - Lasix dose changed from 40 mg PO daily to 20 mg PO daily PRN    Additional medication history information (including reliability of information, actions taken by pharmacist):  - Patient is a reliable historian of her medications.  - Patient expressed a desire for using her own Qvar Redi inhaler and Atrovent nasal spray while she is inpatient. If possible, may have her inhalers send to central pharmacy to be labelled as patient's own meds. Patient wondered if there is a chance that she would be accidentally charged for using her own inhalers and was assured that she shouldn't be charged for using her own inhalers.      Prior to Admission medications    Medication Sig Last Dose Taking? Auth Provider   albuterol (PROAIR HFA/PROVENTIL HFA/VENTOLIN HFA) 108 (90 Base) MCG/ACT inhaler Inhale 2 puffs into the lungs every 4 hours as needed for shortness of breath / dyspnea or wheezing  3/4/2020 at 0530 Yes Reported, Patient   Ascorbic Acid (VITAMIN C) 500 MG CAPS Take 500 mg by mouth every morning  3/4/2020 at AM Yes Reported, Patient   aspirin (ASA) 81 MG tablet Take 81 mg by mouth daily  3/3/2020 at Unknown time Yes Reported, Patient   beclomethasone (QVAR) 80 MCG/ACT AERS IS A DISCONTINUED MEDICATION Inhale 2 puffs into the lungs 2 times daily Gargle and rinse mouth with water after each dose. 3/4/2020 at Unknown time Yes Reported, Patient   calcium citrate-vitamin D (CALCIUM CITRATE + D3) 315-250 MG-UNIT TABS per tablet Take 315 mg by mouth 3 times daily  3/3/2020 at Unknown time Yes  Reported, Patient   cetirizine (ZYRTEC) 10 MG tablet Take 10 mg by mouth daily 3/3/2020 at Unknown time Yes Reported, Patient   furosemide (LASIX) 20 MG tablet Take 2 tablets (40 mg) by mouth daily  Patient taking differently: Take 20 mg by mouth daily as needed  3/3/2020 at Unknown time Yes Karolina Turcios MD   gabapentin (NEURONTIN) 300 MG capsule Take 300 mg by mouth 3 times daily  3/4/2020 at Unknown time Yes Reported, Patient   hydroxychloroquine (PLAQUENIL) 200 MG tablet Take 200 mg by mouth daily  3/4/2020 at 0530 Yes Reported, Patient   ipratropium (ATROVENT) 0.06 % nasal spray Spray 2 sprays into both nostrils 3 times daily  3/4/2020 at Unknown time Yes Unknown, Entered By History   lisinopril (ZESTRIL) 20 MG tablet Take 20 mg by mouth daily  3/3/2020 at Unknown time Yes Reported, Patient   polyethylene glycol (MIRALAX) packet Take 17 g by mouth daily as needed  3/3/2020 at Unknown time Yes Reported, Patient   Probiotic Product (PROBIOTIC ACIDOPHILUS BIOBEADS) CAPS Take 1 capsule by mouth 2 times daily  3/4/2020 at Unknown time Yes Reported, Patient   vitamin D3 (CHOLECALCIFEROL) 2000 units (50 mcg) tablet Take 1 tablet by mouth daily 3/3/2020 at Unknown time Yes Unknown, Entered By History     Medication history completed by:     Jitendra Mclean  PharmD IV student  March 4, 2020

## 2020-03-04 NOTE — PROCEDURES
[facility], [place]    Single Lumen PICC Placement  Date/Time: 3/4/2020 11:35 AM  Performed by: Xenia Elena RN  Authorized by: Ramiro Quiroz MD   Indications: vascular access    UNIVERSAL PROTOCOL   Site Marked: Yes  Prior Images Obtained and Reviewed:  Yes  Required items: Required blood products, implants, devices and special equipment available    Patient identity confirmed:  Verbally with patient, arm band, hospital-assigned identification number and provided demographic data  NA - No sedation, light sedation, or local anesthesia  Confirmation Checklist:  Patient's identity using two indicators, relevant allergies, procedure was appropriate and matched the consent or emergent situation and correct equipment/implants were available  Time out: Immediately prior to the procedure a time out was called    Universal Protocol: the Joint Commission Universal Protocol was followed    Preparation: Patient was prepped and draped in usual sterile fashion           ANESTHESIA    Anesthesia: See MAR for details  Local Anesthetic:  Lidocaine 1% without epinephrine  Anesthetic Total (mL):  4      SEDATION    Patient Sedated: No        Preparation: skin prepped with ChloraPrep  Skin prep agent: skin prep agent completely dried prior to procedure  Sterile barriers: maximum sterile barriers were used: cap, mask, sterile gown, sterile gloves, and large sterile sheet  Hand hygiene: hand hygiene performed prior to central venous catheter insertion  Type of line used: Power PICC  Catheter type: single lumen  Lumen type: valved  Catheter size: 4 Fr  Brand: other (see comment) (Frevvo)  Lot number: [id number]  Placement method: venipuncture, MST, ultrasound and tip confirmation system  Number of attempts: 1  Successful placement: yes  Orientation: right  Location: brachial vein (medial) (vein diameter - 0.33 cm)  Site rationale: Mastectomy (left)  Arm circumference: adults 10 cm  Extremity  circumference: 23.5  Visible catheter length: 2  Total catheter length: 41  Dressing and securement: chlorhexidine disc applied, securement device, site cleaned, statlock, sterile dressing applied and glue  Post procedure assessment: blood return through all ports, free fluid flow and placement verified by x-ray  PROCEDURE   Patient Tolerance:  Patient tolerated the procedure well with no immediate complications

## 2020-03-04 NOTE — H&P
Cardiology History and Physical  Karen Anderson MRN: [id number]  Age: 79 year old, : 1940  Primary care provider: Carlos Carter            Assessment and Plan:     Ms. Wilson is a 80 y/o F w/ h/o extensive bilateral PE and DVT  (thought to be 2/2 tamoxifen for breast cancer treatment) pulmonary MAC, rheumatoid arthritis presenting as an admission for initiation of CTEPH treatment.    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 Patient by exam and RHC is volume up.   Plan:  -Try an obtain single lumen PICC, then will start IV Remodulin 2 ng/kg/min  -Start digoxin 62.5 mcg PO every day  -Diuresis with lasix 40 mg IV BID; goal negative 1.5L over next 24 hrs  -Apixiban 5 mg PO BID    #Rheumatoid arthritis:  -Continue plaquenil    FEN: Low Na diet  PPX: Apixiban PO BID    Code Status: FULL CODE     Patient discussed with staff attending, Dr. Rod.    Ramiro Quiroz MD  Cardiology Fellow  Pager: [phone]            Chief Complaint:     Admission for initiation of CTEPH treatment          History of Present Illness:     Ms. Wilson is a 80 y/o F w/ h/o extensive bilateral PE and DVT  (thought to be 2/2 tamoxifen for breast cancer treatment) pulmonary MAC, rheumatoid arthritis presenting as an admission for initiation of CTEPH treatment.    Per chart review, patient has had RIVERA for many years. Over past 2-3 years she has been developing BLE edema despite diuretics. She also has lightheadedness, orthopnea, PND.       Her prior evaluation has included a chest x-ray in 2019 that showed no focal acute infiltrates.  She had a low-dose CT chest for follow-up of her MAC in 2017 that showed irregular nodular opacities that were stable and no new areas of airspace disease or nodularity.  She PFTs in 2019 showed FEV1 of 66%, FVC 67%, FEV1 over FVC consistent with restriction, normal DLCO, normal total lung capacity,  and ended up having a nonconclusive study and possibly a mixed obstructive and restrictive picture.  Her asthma is stable.  She follows with Dr. Abernathy.  She had a transthoracic echocardiogram at [facility] on 11/8/2019 that showed LVEF 60 to 65%, grade 1 diastolic dysfunction, diastolic flattening of the interventricular septum consistent with RV pressure overload, moderate RV enlargement, severely reduced RV systolic function, moderate TR, RVSP 92 mmHg, and small circumferential pericardial effusion.  She required catheterization and coronary angiogram in December 2019 which showed normal coronaries, RA 12, /25, PA 98/23/51, PCW 5, thermodilution cardiac output 2.37 L/min, PVR 19.4 and a vasodilator study was not completed.  She was also found to have an elevated BNP of 832 in September 2019.          Past Medical History:     Past Medical History:   Diagnosis Date     Asthma      Breast cancer (H)      Hypertension      Mycobacterium avium complex (H)      Pulmonary embolism (H)      Pulmonary hypertension (H)      Rheumatoid arthritis (H)      Scoliosis               Past Surgical History:      Past Surgical History:   Procedure Laterality Date     MASTECTOMY Left               Social History:     Social History     Socioeconomic History     Marital status:      Spouse name: Not on file     Number of children: Not on file     Years of education: Not on file     Highest education level: Not on file   Occupational History     Not on file   Social Needs     Financial resource strain: Not on file     Food insecurity:     Worry: Not on file     Inability: Not on file     Transportation needs:     Medical: Not on file     Non-medical: Not on file   Tobacco Use     Smoking status: Never Smoker     Smokeless tobacco: Never Used   Substance and Sexual Activity     Alcohol use: Yes     Comment: occasionally      Drug use: Never     Sexual activity: Not on file   Lifestyle     Physical activity:     Days  per week: Not on file     Minutes per session: Not on file     Stress: Not on file   Relationships     Social connections:     Talks on phone: Not on file     Gets together: Not on file     Attends Yarsani service: Not on file     Active member of club or organization: Not on file     Attends meetings of clubs or organizations: Not on file     Relationship status: Not on file     Intimate partner violence:     Fear of current or ex partner: Not on file     Emotionally abused: Not on file     Physically abused: Not on file     Forced sexual activity: Not on file   Other Topics Concern     Not on file   Social History Narrative    Is . Has 1 adult daughter who is in her 50s. Has 2 grandkids. 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              Family History:     Family History   Problem Relation Age of Onset     Heart Failure Mother      Kidney Disease Mother      Coronary Artery Disease Maternal Grandfather 50     LUNG DISEASE No family hx of      Clotting Disorder No family hx of      Family history reviewed and updated in EPIC          Allergies:     Allergies   Allergen Reactions     Sulfa Drugs Anaphylaxis and Hives              Medications:     Medications Prior to Admission   Medication Sig Dispense Refill Last Dose     albuterol (PROAIR HFA/PROVENTIL HFA/VENTOLIN HFA) 108 (90 Base) MCG/ACT inhaler Inhale 2 puffs into the lungs   3/4/2020 at 0530     Ascorbic Acid (VITAMIN C) 500 MG CAPS    3/4/2020 at Unknown time     aspirin (ASA) 81 MG tablet    3/3/2020 at Unknown time     beclomethasone (QVAR) 80 MCG/ACT AERS IS A DISCONTINUED MEDICATION Inhale 1 puff into the lungs 2 times daily    3/4/2020 at Unknown time     Calcium Citrate-Vitamin D (CALCIUM CITRATE + D3) 200-250 MG-UNIT TABS Take 600 mg by mouth 3 times daily    3/3/2020 at Unknown time     cetirizine (ZYRTEC) 10 MG tablet Take 10 mg by mouth daily   3/3/2020 at Unknown time     furosemide (LASIX) 20 MG tablet Take 2  tablets (40 mg) by mouth daily 180 tablet 3 3/3/2020 at Unknown time     gabapentin (NEURONTIN) 300 MG capsule TK 1 C PO TID PRN   3/4/2020 at Unknown time     hydroxychloroquine (PLAQUENIL) 200 MG tablet 200 mg   3/4/2020 at 0530     lisinopril (ZESTRIL) 20 MG tablet 20 mg daily    3/3/2020 at Unknown time     polyethylene glycol (MIRALAX) packet Take 17 g by mouth   3/3/2020 at Unknown time     Probiotic Product (PROBIOTIC ACIDOPHILUS BIOBEADS) CAPS    3/4/2020 at Unknown time                    Physical Exam:     B/P: 170/88, T: Data Unavailable, P: 83, R: 16    Wt Readings from Last 4 Encounters:   03/04/20 56.9 kg (125 lb 8 oz)   03/02/20 57.6 kg (126 lb 14.4 oz)       No intake or output data in the 24 hours ending 03/04/20 0936    Gen: No acute distress  HEENT: NC/AT, PERRL, EOM intact, MMM, OP without exudates  PULM/THORAX: Clear to auscultation bilaterally, no rales/rhonchi/wheezes  CV: normal rate, regular rhythm, normal S1 and S2, no murmurs or rubs. Elevated JVP  ABD: Soft, NTND, bowel sounds present, no masses  EXT: WWP. 3+ pitting edema in BLE  NEURO: CN II-XII grossly intact. A&Ox3          Data:     Labs Reviewed on Admission  Pertinent for:  No results found for: TROPI, TROPONIN, TROPR, TROPN            Most Recent Imaging:           Echo: (3/2/20)  Interpretation Summary  Left ventricular function, chamber size, wall motion, and wall thickness are  normal.The EF is 60-65%.  Right ventricle is moderately dilated and at least moderately dysfunctional.  Mild RVH.  Mild to moderate tricuspid insufficiency.  Dilated main pulmonary artery.  Right ventricular systolic pressure is 97mmHg above the right atrial pressure.  IVC diameter >2.1 cm collapsing <50% with sniff suggests a high RA pressure  estimated at 15 mmHg or greater.  Severe pulmonary hypertension is present.  Small circumferential pericardial effusion (<1 cm). No hemodynamic  significance.    Cath: (3/4/20)  RA 10  PA 98/30/52  PCWP 15  PA sat  55%  Concepcion 1.92 / 1.29  TD 2.3 / 1.54

## 2020-03-04 NOTE — PROCEDURES
[facility], [place]    Double Lumen PICC Placement  Date/Time: 3/4/2020 1:34 PM  Performed by: Mac Rogers RN  Authorized by: Dawit Duque MD   Indications: antibiotics.    UNIVERSAL PROTOCOL   Site Marked: Yes  Prior Images Obtained and Reviewed:  Yes  Required items: Required blood products, implants, devices and special equipment available    Patient identity confirmed:  Verbally with patient and arm band  NA - No sedation, light sedation, or local anesthesia  Confirmation Checklist:  Patient's identity using two indicators, relevant allergies, procedure was appropriate and matched the consent or emergent situation and correct equipment/implants were available  Time out: Immediately prior to the procedure a time out was called    Universal Protocol: the Joint Commission Universal Protocol was followed    Preparation: Patient was prepped and draped in usual sterile fashion           ANESTHESIA    Anesthesia: Local infiltration  Local Anesthetic:  Lidocaine 1% without epinephrine      SEDATION    Patient Sedated: No        Preparation: skin prepped with ChloraPrep  Skin prep agent: skin prep agent completely dried prior to procedure  Sterile barriers: maximum sterile barriers were used: cap, mask, sterile gown, sterile gloves, and large sterile sheet  Hand hygiene: hand hygiene performed prior to central venous catheter insertion  Type of line used: Power PICC  Catheter type: double lumen  Lumen type: valved  Catheter size: 5 Fr  : Bioflo.  Placement method: venipuncture, MST, ultrasound and tip confirmation system  Orientation: right  Location: brachial vein (medial) (vein diameter- 0.54cm)  Arm circumference: adults 10 cm  Extremity circumference: 31  Total catheter length: 34  Dressing and securement: chlorhexidine disc applied, site cleaned, statlock and sterile dressing applied  PROCEDURE   Patient Tolerance:  Patient tolerated the procedure well with no immediate  complications

## 2020-03-04 NOTE — PROGRESS NOTES
Admission    Admitted from: Cath lab  Via: Stretcher   Accompanied by: Pt   Belongings: With pt in room  Admission Profile: In progress  Teaching: orientation to unit, call don't fall, use of console, meal times, visiting hours, when to call for the RN (angina/sob/dizziness, etc.), and enforced importance of safety   Access: PIV  Telemetry: Placed on patient  Height/Weight: Complete    Pt arrived with BPs elevated, Cards 2 MD updated. Pt given Lisinopril and IV Lasix. Pt transported to vascular access at ~1110 for PICC placement.

## 2020-03-04 NOTE — PROGRESS NOTES
Talked with Dr. Mahendra Tobar. Verified if single lumen or double lumen is needed, he said single lumen would be fine.

## 2020-03-04 NOTE — PROGRESS NOTES
"SPIRITUAL HEALTH SERVICES  SPIRITUAL ASSESSMENT Progress Note  [facility] ([place]) 6B     REFERRAL SOURCE: Patient Request for Spiritual Care.    Karen shared her life, matty and illness narrative we me and asked for prayer.  She resources for support in her , as well as in her matty tradition.  She said, \"God is really all I have right now.\"  I prayed with Karen and spoke a blessing over her.      PLAN: I don't plan to follow up with Karen as she talked of possibly being discharged in the next few days.    Aashish Townsend  Chaplain Resident  Pager [phone]  "

## 2020-03-05 ENCOUNTER — MEDICAL CORRESPONDENCE (OUTPATIENT)
Dept: HEALTH INFORMATION MANAGEMENT | Facility: CLINIC | Age: 80
End: 2020-03-05

## 2020-03-05 LAB
ANION GAP SERPL CALCULATED.3IONS-SCNC: 5 MMOL/L (ref 3–14)
BUN SERPL-MCNC: 30 MG/DL (ref 7–30)
CALCIUM SERPL-MCNC: 9.4 MG/DL (ref 8.5–10.1)
CHLORIDE SERPL-SCNC: 97 MMOL/L (ref 94–109)
CO2 SERPL-SCNC: 31 MMOL/L (ref 20–32)
CREAT SERPL-MCNC: 1.19 MG/DL (ref 0.52–1.04)
ERYTHROCYTE [DISTWIDTH] IN BLOOD BY AUTOMATED COUNT: 12.9 % (ref 10–15)
GFR SERPL CREATININE-BSD FRML MDRD: 44 ML/MIN/{1.73_M2}
GLUCOSE SERPL-MCNC: 85 MG/DL (ref 70–99)
HCT VFR BLD AUTO: 38.5 % (ref 35–47)
HGB BLD-MCNC: 12.5 G/DL (ref 11.7–15.7)
LMWH PPP CHRO-ACNC: 0.73 IU/ML
LMWH PPP CHRO-ACNC: 0.73 IU/ML
LMWH PPP CHRO-ACNC: 0.94 IU/ML
LMWH PPP CHRO-ACNC: 0.98 IU/ML
MAGNESIUM SERPL-MCNC: 1.7 MG/DL (ref 1.6–2.3)
MCH RBC QN AUTO: 32.6 PG (ref 26.5–33)
MCHC RBC AUTO-ENTMCNC: 32.5 G/DL (ref 31.5–36.5)
MCV RBC AUTO: 101 FL (ref 78–100)
PLATELET # BLD AUTO: 229 10E9/L (ref 150–450)
POTASSIUM SERPL-SCNC: 3.5 MMOL/L (ref 3.4–5.3)
RBC # BLD AUTO: 3.83 10E12/L (ref 3.8–5.2)
SODIUM SERPL-SCNC: 133 MMOL/L (ref 133–144)
WBC # BLD AUTO: 4.7 10E9/L (ref 4–11)

## 2020-03-05 PROCEDURE — 80048 BASIC METABOLIC PNL TOTAL CA: CPT | Performed by: STUDENT IN AN ORGANIZED HEALTH CARE EDUCATION/TRAINING PROGRAM

## 2020-03-05 PROCEDURE — 85520 HEPARIN ASSAY: CPT | Performed by: INTERNAL MEDICINE

## 2020-03-05 PROCEDURE — 83735 ASSAY OF MAGNESIUM: CPT | Performed by: STUDENT IN AN ORGANIZED HEALTH CARE EDUCATION/TRAINING PROGRAM

## 2020-03-05 PROCEDURE — 25000132 ZZH RX MED GY IP 250 OP 250 PS 637: Performed by: INTERNAL MEDICINE

## 2020-03-05 PROCEDURE — 25000128 H RX IP 250 OP 636: Performed by: STUDENT IN AN ORGANIZED HEALTH CARE EDUCATION/TRAINING PROGRAM

## 2020-03-05 PROCEDURE — 25000132 ZZH RX MED GY IP 250 OP 250 PS 637: Performed by: STUDENT IN AN ORGANIZED HEALTH CARE EDUCATION/TRAINING PROGRAM

## 2020-03-05 PROCEDURE — 85520 HEPARIN ASSAY: CPT | Performed by: STUDENT IN AN ORGANIZED HEALTH CARE EDUCATION/TRAINING PROGRAM

## 2020-03-05 PROCEDURE — 36592 COLLECT BLOOD FROM PICC: CPT | Performed by: STUDENT IN AN ORGANIZED HEALTH CARE EDUCATION/TRAINING PROGRAM

## 2020-03-05 PROCEDURE — 36415 COLL VENOUS BLD VENIPUNCTURE: CPT | Performed by: INTERNAL MEDICINE

## 2020-03-05 PROCEDURE — 99232 SBSQ HOSP IP/OBS MODERATE 35: CPT | Mod: GC | Performed by: INTERNAL MEDICINE

## 2020-03-05 PROCEDURE — 21400000 ZZH R&B CCU UMMC

## 2020-03-05 PROCEDURE — 85027 COMPLETE CBC AUTOMATED: CPT | Performed by: STUDENT IN AN ORGANIZED HEALTH CARE EDUCATION/TRAINING PROGRAM

## 2020-03-05 RX ORDER — POTASSIUM CHLORIDE 750 MG/1
20-40 TABLET, EXTENDED RELEASE ORAL
Status: DISCONTINUED | OUTPATIENT
Start: 2020-03-05 | End: 2020-03-20 | Stop reason: HOSPADM

## 2020-03-05 RX ORDER — MAGNESIUM SULFATE HEPTAHYDRATE 40 MG/ML
4 INJECTION, SOLUTION INTRAVENOUS EVERY 4 HOURS PRN
Status: DISCONTINUED | OUTPATIENT
Start: 2020-03-05 | End: 2020-03-20 | Stop reason: HOSPADM

## 2020-03-05 RX ORDER — GRANISETRON HYDROCHLORIDE 1 MG/ML
10 INJECTION INTRAVENOUS 2 TIMES DAILY PRN
Status: DISCONTINUED | OUTPATIENT
Start: 2020-03-05 | End: 2020-03-05

## 2020-03-05 RX ORDER — POTASSIUM CL/LIDO/0.9 % NACL 10MEQ/0.1L
10 INTRAVENOUS SOLUTION, PIGGYBACK (ML) INTRAVENOUS
Status: DISCONTINUED | OUTPATIENT
Start: 2020-03-05 | End: 2020-03-20 | Stop reason: HOSPADM

## 2020-03-05 RX ORDER — GRANISETRON HYDROCHLORIDE 1 MG/ML
0.5 INJECTION INTRAVENOUS EVERY 12 HOURS
Status: DISCONTINUED | OUTPATIENT
Start: 2020-03-05 | End: 2020-03-12

## 2020-03-05 RX ORDER — MAGNESIUM SULFATE HEPTAHYDRATE 40 MG/ML
2 INJECTION, SOLUTION INTRAVENOUS DAILY PRN
Status: DISCONTINUED | OUTPATIENT
Start: 2020-03-05 | End: 2020-03-20 | Stop reason: HOSPADM

## 2020-03-05 RX ORDER — POTASSIUM CHLORIDE 1.5 G/1.58G
20-40 POWDER, FOR SOLUTION ORAL
Status: DISCONTINUED | OUTPATIENT
Start: 2020-03-05 | End: 2020-03-20 | Stop reason: HOSPADM

## 2020-03-05 RX ORDER — FUROSEMIDE 10 MG/ML
40 INJECTION INTRAMUSCULAR; INTRAVENOUS DAILY
Status: DISCONTINUED | OUTPATIENT
Start: 2020-03-05 | End: 2020-03-07

## 2020-03-05 RX ORDER — POTASSIUM CHLORIDE 29.8 MG/ML
20 INJECTION INTRAVENOUS
Status: DISCONTINUED | OUTPATIENT
Start: 2020-03-05 | End: 2020-03-20 | Stop reason: HOSPADM

## 2020-03-05 RX ORDER — POTASSIUM CHLORIDE 7.45 MG/ML
10 INJECTION INTRAVENOUS
Status: DISCONTINUED | OUTPATIENT
Start: 2020-03-05 | End: 2020-03-20 | Stop reason: HOSPADM

## 2020-03-05 RX ADMIN — GABAPENTIN 300 MG: 300 CAPSULE ORAL at 19:37

## 2020-03-05 RX ADMIN — HYDROXYCHLOROQUINE SULFATE 200 MG: 200 TABLET, FILM COATED ORAL at 08:11

## 2020-03-05 RX ADMIN — IPRATROPIUM BROMIDE 2 SPRAY: 42 SPRAY NASAL at 08:15

## 2020-03-05 RX ADMIN — ASPIRIN 81 MG CHEWABLE TABLET 81 MG: 81 TABLET CHEWABLE at 19:37

## 2020-03-05 RX ADMIN — DIGOXIN 62.5 MCG: 0.06 TABLET ORAL at 08:10

## 2020-03-05 RX ADMIN — HEPARIN SODIUM 700 UNITS/HR: 10000 INJECTION, SOLUTION INTRAVENOUS at 19:46

## 2020-03-05 RX ADMIN — DOCUSATE SODIUM 100 MG: 100 CAPSULE, LIQUID FILLED ORAL at 19:37

## 2020-03-05 RX ADMIN — CETIRIZINE HYDROCHLORIDE 10 MG: 10 TABLET, FILM COATED ORAL at 19:37

## 2020-03-05 RX ADMIN — ACETAMINOPHEN 325 MG: 325 TABLET, FILM COATED ORAL at 16:01

## 2020-03-05 RX ADMIN — POTASSIUM CHLORIDE 20 MEQ: 750 TABLET, EXTENDED RELEASE ORAL at 11:16

## 2020-03-05 RX ADMIN — LISINOPRIL 20 MG: 20 TABLET ORAL at 08:11

## 2020-03-05 RX ADMIN — TREPROSTINIL 2 NG/KG/MIN: 20 INJECTION, SOLUTION INTRAVENOUS; SUBCUTANEOUS at 08:51

## 2020-03-05 RX ADMIN — GRANISETRON HYDROCHLORIDE 0.5 MG: 1 INJECTION INTRAVENOUS at 08:04

## 2020-03-05 RX ADMIN — FUROSEMIDE 40 MG: 10 INJECTION, SOLUTION INTRAVENOUS at 09:03

## 2020-03-05 RX ADMIN — GRANISETRON HYDROCHLORIDE 0.5 MG: 1 INJECTION INTRAVENOUS at 19:37

## 2020-03-05 RX ADMIN — GABAPENTIN 300 MG: 300 CAPSULE ORAL at 14:00

## 2020-03-05 RX ADMIN — ACETAMINOPHEN 325 MG: 325 TABLET, FILM COATED ORAL at 22:49

## 2020-03-05 RX ADMIN — MAGNESIUM SULFATE 2 G: 2 INJECTION INTRAVENOUS at 11:16

## 2020-03-05 RX ADMIN — GABAPENTIN 300 MG: 300 CAPSULE ORAL at 08:11

## 2020-03-05 ASSESSMENT — ACTIVITIES OF DAILY LIVING (ADL)
ADLS_ACUITY_SCORE: 34

## 2020-03-05 ASSESSMENT — MIFFLIN-ST. JEOR: SCORE: 920.88

## 2020-03-05 ASSESSMENT — PAIN DESCRIPTION - DESCRIPTORS
DESCRIPTORS: HEADACHE

## 2020-03-05 NOTE — PLAN OF CARE
Transfer  Transferred from: 6B  Via: wheelchair  Reason for transfer: Pt inappropriate for unit  Family: Per pt, she will notify family of transfer  Belongings:Sent with pt  Chart:Sent with pt  Medications: Meds from bin sent with pt  Report called from: PRIYA Ritchie   ______    Pt alert and oriented x4. Pt sinus rhythm with HRs 70-80s. On RA with O2 sats >90%. Denies pain, lightheaded or dizziness this evening. Pt denies nausea. Last BM today. Pt voiding. Up with SBA in room. Hep gtt running at 1000 units/hr through R PIV. R PICC clean/dry/intact.    PLAN: Next Hep 10A check at 0100 tomorrow. Change Hep gtt as needed. Pt to start on Remodulin tomorrow. Continue to monitor and assess pt with every encounter. Notify Cards 2 with any changes or concerns.

## 2020-03-05 NOTE — PLAN OF CARE
D: Pt admitted 03/04 for admission for initiation of CTEPH treatment. History of breast CA (L mastectomy), bilateral PE and DVT (1998, thought to be 2/2 tamoxifen for breast cancer treatment) pulmonary MAC, and RA.    I/A: VSS on room air, SR on tele. Up SBA/independently in room. Remodulin gtt initiated at 2ng/kg/min (w/ orders to increase by 1ng/kg/min Q 12 hrs- goal of 10ng/kg/min). Pt tolerating, c/o HA- but changed from HA yesterday. Aromatherapy given, pt declined tylenol/other interventions. IV lasix with fair UOP, bedside commode d/t urgency per pt request. Mg and K replaced per protocol. Heparin gtt at 800 units/hr.     P: CVTS consult for assessment of possible pulm thrombectomy. Continue to titrate remodulin to goal. Notify Cards 2 with changes/concerns.

## 2020-03-05 NOTE — PROGRESS NOTES
"                              Cardiology Progress Note  Karen Anderson MRN: [id number]  Age: 79 year old, : 1940  Date: 3/5/2020      Impression:  1,. Severe pulmonary hypertension  2. Right ventricular failure  3. Rheumatoid arthiritis  4. Kyphoscoliosis  5. Initiation of prostacyclin    I personally saw and examined this patient and agree with observations and plan below.  If she has not had VQ lung or CT pulmonary angiogram would consider it.            Assessment and Plan:     Ms. Wilson is a 80 y/o F w/ h/o extensive bilateral PE and DVT  (thought to be 2/2 tamoxifen for breast cancer treatment) pulmonary MAC, rheumatoid arthritis presenting as an admission for initiation of CTEPH treatment.     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 Patient by exam and RHC is volume up.   Plan:  -Try an obtain single lumen PICC, then will start IV Remodulin 2 ng/kg/min, increase by 1 every 12 hours  -Start digoxin 62.5 mcg PO every day  -Diuresis with lasix 40 mg IV QD; goal negative 1.0L over next 24 hrs  -On heparin ggt for AC     #Rheumatoid arthritis:  -Continue plaquenil     FEN: Low Na diet  PPX: Heparin     Code Status: FULL CODE     Patient discussed with staff attending, Dr. Rivers.     Ramiro Quiroz MD  Cardiology Fellow  Pager: [phone]            Subjective/Interval Events     No acute overnight events. This AM, no nausea, vomiting, SOB, lightheadedness.          Objective     /78 (BP Location: Left leg)   Pulse 68   Temp 97.8  F (36.6  C) (Oral)   Resp 15   Ht 1.473 m (4' 10\")   Wt 54.6 kg (120 lb 6.4 oz)   SpO2 94%   BMI 25.16 kg/m    Temp:  [97.2  F (36.2  C)-98.4  F (36.9  C)] 97.8  F (36.6  C)  Pulse:  [67-84] 68  Heart Rate:  [50-83] 75  Resp:  [15-18] 15  BP: ()/(39-97) 110/78  SpO2:  [90 %-98 %] 94 %  Wt Readings from Last 2 Encounters:   20 54.6 kg (120 lb 6.4 oz)   20 57.6 kg (126 lb 14.4 oz) "     I/O last 3 completed shifts:  In: 1777.16 [P.O.:1640; I.V.:137.16]  Out: 3975 [Urine:3975]      Gen: No acute distress  HEENT: NC/AT, PERRL, EOM intact, MMM, OP without exudates  PULM/THORAX: Clear to auscultation bilaterally, no rales/rhonchi/wheezes  CV: normal rate, regular rhythm, normal S1 and S2, no murmurs or rubs. Elevated JVP  ABD: Soft, NTND, bowel sounds present, no masses  EXT: WWP. 3+ pitting edema in BLE  NEURO: CN II-XII grossly intact. A&Ox3                Data:     Recent Results (from the past 24 hour(s))   CBC with platelets    Collection Time: 03/04/20  6:30 PM   Result Value Ref Range    WBC 5.4 4.0 - 11.0 10e9/L    RBC Count 4.09 3.8 - 5.2 10e12/L    Hemoglobin 13.2 11.7 - 15.7 g/dL    Hematocrit 41.5 35.0 - 47.0 %     (H) 78 - 100 fl    MCH 32.3 26.5 - 33.0 pg    MCHC 31.8 31.5 - 36.5 g/dL    RDW 13.1 10.0 - 15.0 %    Platelet Count 244 150 - 450 10e9/L   Heparin 10a Level    Collection Time: 03/05/20 12:54 AM   Result Value Ref Range    Heparin 10A Level 0.73 IU/mL   Basic metabolic panel    Collection Time: 03/05/20  6:11 AM   Result Value Ref Range    Sodium 133 133 - 144 mmol/L    Potassium 3.5 3.4 - 5.3 mmol/L    Chloride 97 94 - 109 mmol/L    Carbon Dioxide 31 20 - 32 mmol/L    Anion Gap 5 3 - 14 mmol/L    Glucose 85 70 - 99 mg/dL    Urea Nitrogen 30 7 - 30 mg/dL    Creatinine 1.19 (H) 0.52 - 1.04 mg/dL    GFR Estimate 44 (L) >60 mL/min/[1.73_m2]    GFR Estimate If Black 51 (L) >60 mL/min/[1.73_m2]    Calcium 9.4 8.5 - 10.1 mg/dL   CBC with platelets    Collection Time: 03/05/20  6:11 AM   Result Value Ref Range    WBC 4.7 4.0 - 11.0 10e9/L    RBC Count 3.83 3.8 - 5.2 10e12/L    Hemoglobin 12.5 11.7 - 15.7 g/dL    Hematocrit 38.5 35.0 - 47.0 %     (H) 78 - 100 fl    MCH 32.6 26.5 - 33.0 pg    MCHC 32.5 31.5 - 36.5 g/dL    RDW 12.9 10.0 - 15.0 %    Platelet Count 229 150 - 450 10e9/L   Heparin 10a Level    Collection Time: 03/05/20  6:11 AM   Result Value Ref Range     Heparin 10A Level 0.94 IU/mL   Magnesium    Collection Time: 03/05/20  6:11 AM   Result Value Ref Range    Magnesium 1.7 1.6 - 2.3 mg/dL       Recent Results (from the past 24 hour(s))   XR Chest Port 1 View    Narrative    EXAM: XR CHEST PORT 1 VW  3/4/2020 4:45 PM     HISTORY:  picc       COMPARISON:  CT chest 3/3/2020    FINDINGS:   Portable semiupright view of the chest. Right upper extremity PICC  line tip projects over the right atrium. The cardiomediastinal  silhouette is mildly enlarged. Low lung volumes. No focal airspace  opacities. The previously seen right middle lobe nodule is not well  visualized. A rounded right perihilar opacity corresponds to the right  lower lobe pulmonary artery en face. No pleural effusion or  pneumothorax. Scoliotic curvature of the thoracic spine. No acute  osseous abnormalities.      Impression    IMPRESSION:   The new right arm PICC tip projects over the high right atrium. No  pneumothorax.    I have personally reviewed the examination and initial interpretation  and I agree with the findings.    MOOK SLOAN MD             Medications     Current Facility-Administered Medications   Medication     acetaminophen (TYLENOL) Suppository 650 mg     acetaminophen (TYLENOL) tablet 325 mg     albuterol (PROAIR HFA/PROVENTIL HFA/VENTOLIN HFA) 108 (90 Base) MCG/ACT inhaler 2 puff     aspirin (ASA) chewable tablet 81 mg     beclomethasone HFA (QVAR REDIHALER) 80 MCG/ACT inhaler 2 puff     cetirizine (zyrTEC) tablet 10 mg     digoxin (LANOXIN) tablet 62.5 mcg     docusate sodium (COLACE) capsule 100 mg     furosemide (LASIX) injection 40 mg     gabapentin (NEURONTIN) capsule 300 mg     granisetron (KYTRIL) injection 0.5 mg     heparin  drip 25,000 units in 0.45% NaCl 250 mL  ANTICOAGULANT  (see additional administration details for dose)     heparin bolus from infusion pump     heparin lock flush 10 UNIT/ML injection 2-5 mL     hydroxychloroquine (PLAQUENIL) tablet 200 mg      ipratropium (ATROVENT) 0.06 % spray 2 spray     lidocaine (LMX4) cream     lidocaine (LMX4) cream     lidocaine 1 % 0.1-1 mL     lidocaine 1 % 0.1-1 mL     lisinopril (ZESTRIL) tablet 20 mg     magnesium sulfate 2 g in water intermittent infusion     magnesium sulfate 4 g in 100 mL sterile water (premade)     medication instruction     medication instruction     Patient is already receiving anticoagulation with heparin, enoxaparin (LOVENOX), warfarin (COUMADIN)  or other anticoagulant medication     polyethylene glycol (MIRALAX) Packet 17 g     potassium chloride (KLOR-CON) Packet 20-40 mEq     potassium chloride 10 mEq in 100 mL intermittent infusion with 10 mg lidocaine     potassium chloride 10 mEq in 100 mL sterile water intermittent infusion (premix)     potassium chloride 20 mEq in 50 mL intermittent infusion     potassium chloride ER (KLOR-CON M) CR tablet 20-40 mEq     sodium chloride (PF) 0.9% PF flush 10 mL     sodium chloride (PF) 0.9% PF flush 10-20 mL     sodium chloride (PF) 0.9% PF flush 3 mL     sodium chloride (PF) 0.9% PF flush 3 mL     treprostinil (REMODULIN) intravenous infusion

## 2020-03-05 NOTE — PLAN OF CARE
Temp: 97.6  F (36.4  C) Temp src: Oral BP: 137/78 Pulse: 73 Heart Rate: 50 Resp: 18 SpO2: 93 % O2 Device: None (Room air) Oxygen Delivery: 2 LPM    D: Initiation of remodulin for pulmonary HTN. Hx breast CA, extensive bilateral PE and DVT 2/2 tamoxifen, pulmonary MAC, RA.    I/A: Monitored vitals and assessed pt status. A&O x4. VSS see flowsheet. SB/SR. RA-1L via NC. Denies pain, SOB, nausea. Heparin gtt at 900 units/hr for PEs, 10A recheck 0800. Voiding adequate amount up in BR. Up SBA. Sleeping between cares.    P: Plan to initiate remodulin gtt today. Continue to monitor and follow POC. Notify Cards 2 with changes.

## 2020-03-05 NOTE — PLAN OF CARE
Neuro: A&Ox4.   Cardiac: SR. HR 60-70s. SBP initially elevated 160-170, decreased to 120-130s. Digoxin started. Afebrile.   Respiratory: Sating>90% on RA.  GI/: Adequate urine output. IV lasix given x1. BM X1  Diet/appetite: Tolerating diet. Eating well.  Activity:  Assist of independent, up to chair and in halls.  Pain: At acceptable level on current regimen.   Skin: No new deficits noted.  LDA's: R PICC placed, verified by PICC RN. R PIV.    Remodulin ordered to start this evening, however, Cards 2 team decided to wait until tomorrow morning to start. Med D/Cd for now. Heparin gtt started @ 1000 units/hr, Hep 10A at 0100. Will continue to monitor.     Plan: Continue with POC. Notify primary team with changes.

## 2020-03-05 NOTE — PROGRESS NOTES
Transfer  Transferred to: 6C  Via:bed  Reason for transfer:Pt no longer appropriate for 6B- improved patient condition  Family: Aware of transfer  Belongings: Packed and sent with pt  Chart: Delivered with pt to next unit  Medications: Meds sent to new unit with pt  Report given to: Kayli POWERS  Pt status: A/O x4. VSS on RA. Heparin gtt infusing at 1000 units/hr via PIV. PICC saline locked.

## 2020-03-05 NOTE — PLAN OF CARE
Transfer  Transferred from: 6C   Via:bed  Reason for transfer: Pt appropriate for 6B  Family: Aware of transfer  Belongings: Received with pt  Chart: Received with pt  Medications: Meds received from old unit with pt  2 RN Skin Assessment Completed By: Chio DUARTE and Zoe MCGOWAN  Report received from: Mikel SHAIKH RN  Pt status: A&Ox4. RA. R heart cath site WDL.

## 2020-03-06 ENCOUNTER — TELEPHONE (OUTPATIENT)
Dept: CARDIOLOGY | Facility: CLINIC | Age: 80
End: 2020-03-06

## 2020-03-06 LAB
ANION GAP SERPL CALCULATED.3IONS-SCNC: 6 MMOL/L (ref 3–14)
BUN SERPL-MCNC: 41 MG/DL (ref 7–30)
CALCIUM SERPL-MCNC: 8.8 MG/DL (ref 8.5–10.1)
CHLORIDE SERPL-SCNC: 94 MMOL/L (ref 94–109)
CO2 SERPL-SCNC: 29 MMOL/L (ref 20–32)
CREAT SERPL-MCNC: 1.22 MG/DL (ref 0.52–1.04)
ERYTHROCYTE [DISTWIDTH] IN BLOOD BY AUTOMATED COUNT: 12.8 % (ref 10–15)
GFR SERPL CREATININE-BSD FRML MDRD: 43 ML/MIN/{1.73_M2}
GLUCOSE SERPL-MCNC: 87 MG/DL (ref 70–99)
HCT VFR BLD AUTO: 38 % (ref 35–47)
HGB BLD-MCNC: 12.2 G/DL (ref 11.7–15.7)
IL6 SERPL-MCNC: 28.39 PG/ML
LMWH PPP CHRO-ACNC: 0.36 IU/ML
MAGNESIUM SERPL-MCNC: 2.3 MG/DL (ref 1.6–2.3)
MCH RBC QN AUTO: 32 PG (ref 26.5–33)
MCHC RBC AUTO-ENTMCNC: 32.1 G/DL (ref 31.5–36.5)
MCV RBC AUTO: 100 FL (ref 78–100)
PLATELET # BLD AUTO: 228 10E9/L (ref 150–450)
POTASSIUM SERPL-SCNC: 4.4 MMOL/L (ref 3.4–5.3)
RBC # BLD AUTO: 3.81 10E12/L (ref 3.8–5.2)
SODIUM SERPL-SCNC: 128 MMOL/L (ref 133–144)
WBC # BLD AUTO: 5.3 10E9/L (ref 4–11)

## 2020-03-06 PROCEDURE — 27210995 ZZH RX 272: Performed by: INTERNAL MEDICINE

## 2020-03-06 PROCEDURE — 25000132 ZZH RX MED GY IP 250 OP 250 PS 637: Performed by: STUDENT IN AN ORGANIZED HEALTH CARE EDUCATION/TRAINING PROGRAM

## 2020-03-06 PROCEDURE — 80048 BASIC METABOLIC PNL TOTAL CA: CPT | Performed by: STUDENT IN AN ORGANIZED HEALTH CARE EDUCATION/TRAINING PROGRAM

## 2020-03-06 PROCEDURE — 85027 COMPLETE CBC AUTOMATED: CPT | Performed by: STUDENT IN AN ORGANIZED HEALTH CARE EDUCATION/TRAINING PROGRAM

## 2020-03-06 PROCEDURE — 36415 COLL VENOUS BLD VENIPUNCTURE: CPT | Performed by: STUDENT IN AN ORGANIZED HEALTH CARE EDUCATION/TRAINING PROGRAM

## 2020-03-06 PROCEDURE — 85520 HEPARIN ASSAY: CPT | Performed by: STUDENT IN AN ORGANIZED HEALTH CARE EDUCATION/TRAINING PROGRAM

## 2020-03-06 PROCEDURE — 25000132 ZZH RX MED GY IP 250 OP 250 PS 637: Performed by: INTERNAL MEDICINE

## 2020-03-06 PROCEDURE — 83735 ASSAY OF MAGNESIUM: CPT | Performed by: STUDENT IN AN ORGANIZED HEALTH CARE EDUCATION/TRAINING PROGRAM

## 2020-03-06 PROCEDURE — 99232 SBSQ HOSP IP/OBS MODERATE 35: CPT | Mod: GC | Performed by: INTERNAL MEDICINE

## 2020-03-06 PROCEDURE — 21400000 ZZH R&B CCU UMMC

## 2020-03-06 PROCEDURE — 25000128 H RX IP 250 OP 636: Performed by: INTERNAL MEDICINE

## 2020-03-06 PROCEDURE — 25000128 H RX IP 250 OP 636: Performed by: STUDENT IN AN ORGANIZED HEALTH CARE EDUCATION/TRAINING PROGRAM

## 2020-03-06 RX ORDER — AMOXICILLIN 250 MG
2 CAPSULE ORAL 2 TIMES DAILY
Status: DISCONTINUED | OUTPATIENT
Start: 2020-03-06 | End: 2020-03-20 | Stop reason: HOSPADM

## 2020-03-06 RX ADMIN — TREPROSTINIL 3 NG/KG/MIN: 20 INJECTION, SOLUTION INTRAVENOUS; SUBCUTANEOUS at 07:04

## 2020-03-06 RX ADMIN — GABAPENTIN 300 MG: 300 CAPSULE ORAL at 08:20

## 2020-03-06 RX ADMIN — LISINOPRIL 20 MG: 20 TABLET ORAL at 08:20

## 2020-03-06 RX ADMIN — CETIRIZINE HYDROCHLORIDE 10 MG: 10 TABLET, FILM COATED ORAL at 20:01

## 2020-03-06 RX ADMIN — ACETAMINOPHEN 325 MG: 325 TABLET, FILM COATED ORAL at 07:08

## 2020-03-06 RX ADMIN — GABAPENTIN 300 MG: 300 CAPSULE ORAL at 20:01

## 2020-03-06 RX ADMIN — GABAPENTIN 300 MG: 300 CAPSULE ORAL at 14:07

## 2020-03-06 RX ADMIN — HYDROXYCHLOROQUINE SULFATE 200 MG: 200 TABLET, FILM COATED ORAL at 08:20

## 2020-03-06 RX ADMIN — IPRATROPIUM BROMIDE 2 SPRAY: 42 SPRAY NASAL at 20:00

## 2020-03-06 RX ADMIN — SENNOSIDES AND DOCUSATE SODIUM 2 TABLET: 8.6; 5 TABLET ORAL at 22:16

## 2020-03-06 RX ADMIN — FUROSEMIDE 40 MG: 10 INJECTION, SOLUTION INTRAVENOUS at 09:49

## 2020-03-06 RX ADMIN — DIGOXIN 62.5 MCG: 0.06 TABLET ORAL at 08:20

## 2020-03-06 RX ADMIN — GRANISETRON HYDROCHLORIDE 0.5 MG: 1 INJECTION INTRAVENOUS at 07:44

## 2020-03-06 RX ADMIN — TREPROSTINIL 5 NG/KG/MIN: 20 INJECTION, SOLUTION INTRAVENOUS; SUBCUTANEOUS at 21:45

## 2020-03-06 RX ADMIN — DOCUSATE SODIUM 100 MG: 100 CAPSULE, LIQUID FILLED ORAL at 08:20

## 2020-03-06 RX ADMIN — ASPIRIN 81 MG CHEWABLE TABLET 81 MG: 81 TABLET CHEWABLE at 20:01

## 2020-03-06 RX ADMIN — GRANISETRON HYDROCHLORIDE 0.5 MG: 1 INJECTION INTRAVENOUS at 20:01

## 2020-03-06 RX ADMIN — DOCUSATE SODIUM 100 MG: 100 CAPSULE, LIQUID FILLED ORAL at 20:01

## 2020-03-06 ASSESSMENT — ACTIVITIES OF DAILY LIVING (ADL)
ADLS_ACUITY_SCORE: 34

## 2020-03-06 ASSESSMENT — PAIN DESCRIPTION - DESCRIPTORS: DESCRIPTORS: HEADACHE

## 2020-03-06 ASSESSMENT — MIFFLIN-ST. JEOR: SCORE: 934.04

## 2020-03-06 NOTE — PROGRESS NOTES
"                              Cardiology Progress Note  Karen Anderson MRN: [id number]  Age: 79 year old, : 1940  Date: 3/5/2020              Assessment and Plan:     Ms. Wilson is a 78 y/o F w/ h/o extensive bilateral PE and DVT  (thought to be 2/2 tamoxifen for breast cancer treatment) pulmonary MAC, rheumatoid arthritis presenting as an admission for initiation of CTEPH treatment.     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 Patient by exam and RHC is volume up.   Plan:  -IV Remodulin started at 2 ng/kg/min, increase by 1 every 12 hours (at 4 mcg/kg/min as of 3/6 AM; goal 8-12)  -Started digoxin 62.5 mcg PO every day  -Diuresis with lasix 40 mg IV QD; goal negative 1.0L over next 24 hrs  -On heparin ggt for AC; plan for warfarin prior to discharge  -NPO at 00:15 on Monday, 3/9 for Goldberg placement by IR     #Rheumatoid arthritis:  -Continue plaquenil     FEN: Low Na diet  PPX: Heparin     Code Status: FULL CODE     Patient discussed with staff attending, Dr. Turcios.     Ramiro Quiroz MD  Cardiology Fellow  Pager: [phone]            Subjective/Interval Events     No acute overnight events. This AM, no nausea, vomiting, SOB, lightheadedness.          Objective     /79 (BP Location: Left leg)   Pulse 60   Temp 97.2  F (36.2  C) (Oral)   Resp 18   Ht 1.473 m (4' 10\")   Wt 55.9 kg (123 lb 4.8 oz)   SpO2 92%   BMI 25.77 kg/m    Temp:  [97.2  F (36.2  C)-98.1  F (36.7  C)] 97.2  F (36.2  C)  Pulse:  [60-67] 60  Heart Rate:  [54-70] 67  Resp:  [16-18] 18  BP: (101-142)/(42-82) 124/79  SpO2:  [85 %-99 %] 92 %  Wt Readings from Last 2 Encounters:   20 55.9 kg (123 lb 4.8 oz)   20 57.6 kg (126 lb 14.4 oz)     I/O last 3 completed shifts:  In: 1334.31 [P.O.:1140; I.V.:194.31]  Out:  [Urine:]      Gen: No acute distress  HEENT: NC/AT, PERRL, EOM intact, MMM, OP without exudates  PULM/THORAX: Clear to auscultation " bilaterally, no rales/rhonchi/wheezes  CV: normal rate, regular rhythm, normal S1 and S2, no murmurs or rubs. Elevated JVP  ABD: Soft, NTND, bowel sounds present, no masses  EXT: WWP. 3+ pitting edema in BLE  NEURO: CN II-XII grossly intact. A&Ox3                Data:     Recent Results (from the past 24 hour(s))   Heparin 10a Level    Collection Time: 03/05/20  2:02 PM   Result Value Ref Range    Heparin 10A Level 0.98 IU/mL   Heparin 10a Level    Collection Time: 03/05/20  9:50 PM   Result Value Ref Range    Heparin 10A Level 0.73 IU/mL   Heparin Xa (10a) Level    Collection Time: 03/06/20  5:16 AM   Result Value Ref Range    Heparin 10A Level 0.36 IU/mL   Basic metabolic panel    Collection Time: 03/06/20  5:16 AM   Result Value Ref Range    Sodium 128 (L) 133 - 144 mmol/L    Potassium 4.4 3.4 - 5.3 mmol/L    Chloride 94 94 - 109 mmol/L    Carbon Dioxide 29 20 - 32 mmol/L    Anion Gap 6 3 - 14 mmol/L    Glucose 87 70 - 99 mg/dL    Urea Nitrogen 41 (H) 7 - 30 mg/dL    Creatinine 1.22 (H) 0.52 - 1.04 mg/dL    GFR Estimate 43 (L) >60 mL/min/[1.73_m2]    GFR Estimate If Black 49 (L) >60 mL/min/[1.73_m2]    Calcium 8.8 8.5 - 10.1 mg/dL   CBC with platelets    Collection Time: 03/06/20  5:16 AM   Result Value Ref Range    WBC 5.3 4.0 - 11.0 10e9/L    RBC Count 3.81 3.8 - 5.2 10e12/L    Hemoglobin 12.2 11.7 - 15.7 g/dL    Hematocrit 38.0 35.0 - 47.0 %     78 - 100 fl    MCH 32.0 26.5 - 33.0 pg    MCHC 32.1 31.5 - 36.5 g/dL    RDW 12.8 10.0 - 15.0 %    Platelet Count 228 150 - 450 10e9/L   Magnesium    Collection Time: 03/06/20  5:16 AM   Result Value Ref Range    Magnesium 2.3 1.6 - 2.3 mg/dL       Recent Results (from the past 24 hour(s))   XR Chest Port 1 View    Narrative    EXAM: XR CHEST PORT 1 VW  3/4/2020 4:45 PM     HISTORY:  picc       COMPARISON:  CT chest 3/3/2020    FINDINGS:   Portable semiupright view of the chest. Right upper extremity PICC  line tip projects over the right atrium. The  cardiomediastinal  silhouette is mildly enlarged. Low lung volumes. No focal airspace  opacities. The previously seen right middle lobe nodule is not well  visualized. A rounded right perihilar opacity corresponds to the right  lower lobe pulmonary artery en face. No pleural effusion or  pneumothorax. Scoliotic curvature of the thoracic spine. No acute  osseous abnormalities.      Impression    IMPRESSION:   The new right arm PICC tip projects over the high right atrium. No  pneumothorax.    I have personally reviewed the examination and initial interpretation  and I agree with the findings.    MOOK SLOAN MD             Medications     Current Facility-Administered Medications   Medication     acetaminophen (TYLENOL) Suppository 650 mg     acetaminophen (TYLENOL) tablet 325 mg     albuterol (PROAIR HFA/PROVENTIL HFA/VENTOLIN HFA) 108 (90 Base) MCG/ACT inhaler 2 puff     aspirin (ASA) chewable tablet 81 mg     beclomethasone HFA (QVAR REDIHALER) 80 MCG/ACT inhaler 2 puff     cetirizine (zyrTEC) tablet 10 mg     digoxin (LANOXIN) tablet 62.5 mcg     docusate sodium (COLACE) capsule 100 mg     furosemide (LASIX) injection 40 mg     gabapentin (NEURONTIN) capsule 300 mg     granisetron (KYTRIL) injection 0.5 mg     heparin  drip 25,000 units in 0.45% NaCl 250 mL  ANTICOAGULANT  (see additional administration details for dose)     heparin bolus from infusion pump     heparin lock flush 10 UNIT/ML injection 2-5 mL     hydroxychloroquine (PLAQUENIL) tablet 200 mg     ipratropium (ATROVENT) 0.06 % spray 2 spray     lidocaine (LMX4) cream     lidocaine (LMX4) cream     lidocaine 1 % 0.1-1 mL     lidocaine 1 % 0.1-1 mL     lisinopril (ZESTRIL) tablet 20 mg     magnesium sulfate 2 g in water intermittent infusion     magnesium sulfate 4 g in 100 mL sterile water (premade)     medication instruction     medication instruction     Patient is already receiving anticoagulation with heparin, enoxaparin (LOVENOX), warfarin  (COUMADIN)  or other anticoagulant medication     polyethylene glycol (MIRALAX) Packet 17 g     potassium chloride (KLOR-CON) Packet 20-40 mEq     potassium chloride 10 mEq in 100 mL intermittent infusion with 10 mg lidocaine     potassium chloride 10 mEq in 100 mL sterile water intermittent infusion (premix)     potassium chloride 20 mEq in 50 mL intermittent infusion     potassium chloride ER (KLOR-CON M) CR tablet 20-40 mEq     sodium chloride (PF) 0.9% PF flush 10 mL     sodium chloride (PF) 0.9% PF flush 10-20 mL     sodium chloride (PF) 0.9% PF flush 3 mL     sodium chloride (PF) 0.9% PF flush 3 mL     treprostinil (REMODULIN) intravenous infusion

## 2020-03-06 NOTE — PLAN OF CARE
Temp: 97.4  F (36.3  C) Temp src: Oral BP: (!) 142/82 Pulse: 67 Heart Rate: 65 Resp: 16 SpO2: 97 % O2 Device: Nasal cannula with humidification Oxygen Delivery: 7 LPM     D: Pulmonary HTN remodulin initiation. Hx breast cancer s/p left mastectomy, bilateral PE and DVT, pulmonary MAC, RA.    I/A: Monitored vitals and assessed pt status. A&O x4. VSS see flowsheet. SB/SR. RA-2L at hs. Reports headache, prn tylenol given.Remodulin gtt at 3 ng/kg/min Heparin gtt at 600 units/hr, Xa recheck thi fernando. Voiding adequate amount. x1 small BM. Up SBA. Sleeping intermittently between cares.     P: Plan for continued remodulin titration to goal of 10 ng/kg/min. Continue with titration of Remodulin, goal rate of 10 ng/kg/min. Continue to monitor and assess pt with every encounter. Notify Cards 2 with any changes or concerns.

## 2020-03-06 NOTE — TELEPHONE ENCOUNTER
PA Initiation    Medication: Remodulin  Pharmacy Filling the Rx: [facility] - [place], [place] - [address] [place] [address]  Start Date: 3/6/2020    *Remodulin referral form completed and faxed to [facility] along with right heart catheterization report, clinic note, echocardiogram report, and 6 minute walk test, for expedited review and assistance with obtaining the pt's prior authorization.    **Reached out to Tyra with [facility] to request if a pre-teach can be done on Monday, March 9. Currently awaiting response.    Lexus Balbuena  Clinic   Pulmonary Hypertension  [facility]  (P) [phone]

## 2020-03-06 NOTE — TELEPHONE ENCOUNTER
"Per [facility] @ 1:52pm:    \"Thank you for the heads up on the referral for Ms. Anderson.  I will send a note  to our team to expect it and also advise nursing is being requested for Monday.  I will try to get this confirmed with our RN  and send you a confirmation.\"    Lexus Balbuena  Clinic   Pulmonary Hypertension  [facility]  (P) [phone]  "

## 2020-03-06 NOTE — PLAN OF CARE
D: Pt admitted on 3/4/20 for initiation of CTEPH treatment (Remodulin). Hx of breast CA (L mastectomy), bilateral PE and DVT (1998, thought to be 2/2 tamoxifen for breast cancer treatment) pulmonary MAC, and RA.     I/A: Pt alert and oriented x4. Pt sinus rhythm with HRs 60-80s. On RA with O2 sats >90%. Pt reports a constant headache, PRN tylenol given 2x with some relief. Pt appetite good, denies nausea. Last BM today. Per pt, still feels constipated, scheduled colace given. Pt voiding. Up with SBA in room and hallways. Hep gtt currently running at 600 units/hr. Next Hep10A recheck with AM labs around 0400/0500 tomorrow. Remodulin increased to 3 ng/kg/min tonight. Kytril given prior to dose titration to prevent nausea.      P: Continue with titration of Remodulin, goal rate of 10 ng/kg/min. Continue to monitor and assess pt with every encounter. Notify Cards 2 with any changes or concerns.

## 2020-03-07 LAB
ANION GAP SERPL CALCULATED.3IONS-SCNC: 7 MMOL/L (ref 3–14)
BUN SERPL-MCNC: 39 MG/DL (ref 7–30)
CALCIUM SERPL-MCNC: 8.9 MG/DL (ref 8.5–10.1)
CHLORIDE SERPL-SCNC: 89 MMOL/L (ref 94–109)
CO2 SERPL-SCNC: 27 MMOL/L (ref 20–32)
CREAT SERPL-MCNC: 1.24 MG/DL (ref 0.52–1.04)
ERYTHROCYTE [DISTWIDTH] IN BLOOD BY AUTOMATED COUNT: 12.8 % (ref 10–15)
GFR SERPL CREATININE-BSD FRML MDRD: 42 ML/MIN/{1.73_M2}
GLUCOSE SERPL-MCNC: 83 MG/DL (ref 70–99)
HCT VFR BLD AUTO: 37.5 % (ref 35–47)
HGB BLD-MCNC: 12.3 G/DL (ref 11.7–15.7)
LMWH PPP CHRO-ACNC: 0.38 IU/ML
MCH RBC QN AUTO: 32.5 PG (ref 26.5–33)
MCHC RBC AUTO-ENTMCNC: 32.8 G/DL (ref 31.5–36.5)
MCV RBC AUTO: 99 FL (ref 78–100)
PLATELET # BLD AUTO: 234 10E9/L (ref 150–450)
POTASSIUM SERPL-SCNC: 4.8 MMOL/L (ref 3.4–5.3)
RBC # BLD AUTO: 3.78 10E12/L (ref 3.8–5.2)
SODIUM SERPL-SCNC: 123 MMOL/L (ref 133–144)
WBC # BLD AUTO: 5.3 10E9/L (ref 4–11)

## 2020-03-07 PROCEDURE — 27210995 ZZH RX 272: Performed by: INTERNAL MEDICINE

## 2020-03-07 PROCEDURE — 25000128 H RX IP 250 OP 636: Performed by: STUDENT IN AN ORGANIZED HEALTH CARE EDUCATION/TRAINING PROGRAM

## 2020-03-07 PROCEDURE — 25000132 ZZH RX MED GY IP 250 OP 250 PS 637: Performed by: STUDENT IN AN ORGANIZED HEALTH CARE EDUCATION/TRAINING PROGRAM

## 2020-03-07 PROCEDURE — 36415 COLL VENOUS BLD VENIPUNCTURE: CPT | Performed by: STUDENT IN AN ORGANIZED HEALTH CARE EDUCATION/TRAINING PROGRAM

## 2020-03-07 PROCEDURE — 25000132 ZZH RX MED GY IP 250 OP 250 PS 637: Performed by: INTERNAL MEDICINE

## 2020-03-07 PROCEDURE — 99232 SBSQ HOSP IP/OBS MODERATE 35: CPT | Mod: GC | Performed by: INTERNAL MEDICINE

## 2020-03-07 PROCEDURE — 85027 COMPLETE CBC AUTOMATED: CPT | Performed by: STUDENT IN AN ORGANIZED HEALTH CARE EDUCATION/TRAINING PROGRAM

## 2020-03-07 PROCEDURE — 21400000 ZZH R&B CCU UMMC

## 2020-03-07 PROCEDURE — 80048 BASIC METABOLIC PNL TOTAL CA: CPT | Performed by: STUDENT IN AN ORGANIZED HEALTH CARE EDUCATION/TRAINING PROGRAM

## 2020-03-07 PROCEDURE — 25000132 ZZH RX MED GY IP 250 OP 250 PS 637: Performed by: SURGERY

## 2020-03-07 PROCEDURE — 85520 HEPARIN ASSAY: CPT | Performed by: STUDENT IN AN ORGANIZED HEALTH CARE EDUCATION/TRAINING PROGRAM

## 2020-03-07 PROCEDURE — 27210995 ZZH RX 272: Performed by: STUDENT IN AN ORGANIZED HEALTH CARE EDUCATION/TRAINING PROGRAM

## 2020-03-07 PROCEDURE — 25000128 H RX IP 250 OP 636: Performed by: INTERNAL MEDICINE

## 2020-03-07 RX ORDER — METOCLOPRAMIDE 5 MG/1
5 TABLET ORAL ONCE
Status: COMPLETED | OUTPATIENT
Start: 2020-03-07 | End: 2020-03-07

## 2020-03-07 RX ORDER — ACETAMINOPHEN 325 MG/1
650 TABLET ORAL EVERY 6 HOURS
Status: DISCONTINUED | OUTPATIENT
Start: 2020-03-07 | End: 2020-03-20 | Stop reason: HOSPADM

## 2020-03-07 RX ORDER — ACETAMINOPHEN 325 MG/1
975 TABLET ORAL ONCE
Status: COMPLETED | OUTPATIENT
Start: 2020-03-07 | End: 2020-03-07

## 2020-03-07 RX ORDER — FUROSEMIDE 40 MG
40 TABLET ORAL DAILY
Status: DISCONTINUED | OUTPATIENT
Start: 2020-03-08 | End: 2020-03-08

## 2020-03-07 RX ORDER — LOPERAMIDE HCL 2 MG
2 CAPSULE ORAL 3 TIMES DAILY PRN
Status: DISCONTINUED | OUTPATIENT
Start: 2020-03-07 | End: 2020-03-10

## 2020-03-07 RX ADMIN — TREPROSTINIL 6 NG/KG/MIN: 20 INJECTION, SOLUTION INTRAVENOUS; SUBCUTANEOUS at 18:02

## 2020-03-07 RX ADMIN — FUROSEMIDE 40 MG: 10 INJECTION, SOLUTION INTRAVENOUS at 08:26

## 2020-03-07 RX ADMIN — METOCLOPRAMIDE 5 MG: 5 TABLET ORAL at 03:09

## 2020-03-07 RX ADMIN — GRANISETRON HYDROCHLORIDE 0.5 MG: 1 INJECTION INTRAVENOUS at 08:14

## 2020-03-07 RX ADMIN — HYDROXYCHLOROQUINE SULFATE 200 MG: 200 TABLET, FILM COATED ORAL at 08:26

## 2020-03-07 RX ADMIN — LISINOPRIL 20 MG: 20 TABLET ORAL at 08:26

## 2020-03-07 RX ADMIN — ACETAMINOPHEN 975 MG: 325 TABLET, FILM COATED ORAL at 00:21

## 2020-03-07 RX ADMIN — SALINE NASAL SPRAY 1 SPRAY: 1.5 SOLUTION NASAL at 03:13

## 2020-03-07 RX ADMIN — SENNOSIDES AND DOCUSATE SODIUM 2 TABLET: 8.6; 5 TABLET ORAL at 08:41

## 2020-03-07 RX ADMIN — ASPIRIN 81 MG CHEWABLE TABLET 81 MG: 81 TABLET CHEWABLE at 19:48

## 2020-03-07 RX ADMIN — HEPARIN SODIUM 600 UNITS/HR: 10000 INJECTION, SOLUTION INTRAVENOUS at 12:46

## 2020-03-07 RX ADMIN — TREPROSTINIL 6 NG/KG/MIN: 20 INJECTION, SOLUTION INTRAVENOUS; SUBCUTANEOUS at 08:47

## 2020-03-07 RX ADMIN — ACETAMINOPHEN 650 MG: 325 TABLET, FILM COATED ORAL at 17:18

## 2020-03-07 RX ADMIN — SALINE NASAL SPRAY 1 SPRAY: 1.5 SOLUTION NASAL at 06:40

## 2020-03-07 RX ADMIN — GABAPENTIN 300 MG: 300 CAPSULE ORAL at 08:25

## 2020-03-07 RX ADMIN — GABAPENTIN 300 MG: 300 CAPSULE ORAL at 14:19

## 2020-03-07 RX ADMIN — ACETAMINOPHEN 650 MG: 325 TABLET, FILM COATED ORAL at 23:17

## 2020-03-07 RX ADMIN — DIGOXIN 62.5 MCG: 0.06 TABLET ORAL at 08:25

## 2020-03-07 RX ADMIN — CETIRIZINE HYDROCHLORIDE 10 MG: 10 TABLET, FILM COATED ORAL at 19:48

## 2020-03-07 RX ADMIN — GRANISETRON HYDROCHLORIDE 0.5 MG: 1 INJECTION INTRAVENOUS at 19:49

## 2020-03-07 RX ADMIN — ACETAMINOPHEN 650 MG: 325 TABLET, FILM COATED ORAL at 12:26

## 2020-03-07 RX ADMIN — GABAPENTIN 300 MG: 300 CAPSULE ORAL at 19:48

## 2020-03-07 ASSESSMENT — PAIN DESCRIPTION - DESCRIPTORS
DESCRIPTORS: ACHING;HEADACHE
DESCRIPTORS: HEADACHE

## 2020-03-07 ASSESSMENT — ACTIVITIES OF DAILY LIVING (ADL)
ADLS_ACUITY_SCORE: 36

## 2020-03-07 ASSESSMENT — MIFFLIN-ST. JEOR: SCORE: 945.83

## 2020-03-07 NOTE — PROGRESS NOTES
Cardiology Progress Note  Karen Anderson MRN: [id number]  Age: 79 year old, : 1940  Date: 3/5/2020              Assessment and Plan:     Ms. Wilson is a 78 y/o F w/ h/o extensive bilateral PE and DVT  (thought to be 2/2 tamoxifen for breast cancer treatment) pulmonary MAC, rheumatoid arthritis presenting as an admission for initiation of CTEPH treatment.     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 2019 coronary angiogram and right heart catheterization showed normal coronaries, RA 12, /25, PA 98/23/51, PCW 5, thermodilution cardiac output 2.37 L/min, PVR 19.4. Echo on admission with normal EF, mild RVH, moderately dilated/dysfunctional RV, dilated pulmonary artery. RHC on admission with RA of 10, PA 98/30/52m reduce CO with high normal right sided filling pressures. CTEPH based on V/Q scan and CT pulmonary angiogram. Functional class III.  Plan:  -IV Remodulin started at 2 ng/kg/min, increase by 1 every 12 hours (at 6 mcg/kg/min as of 3/7 AM; goal 8-12)  -Digoxin 62.5 mcg PO every day  -Diuresis with lasix 40 mg PO QD  -On heparin ggt for AC; plan for warfarin prior to discharge  -NPO at 00:15 on Monday, 3/9 for Goldberg placement by IR    #hyponatremia  Likely multifactorial. Element of hypervolemia with R sided heart failure. Without significant weight gain and net negative fluids daily since admission - It is also possible that the lasix is causing natruresis.   - decreased lasix dose today from 40 mg IV to 40 mg PO.   - monitor     #Rheumatoid arthritis:  -Continue plaquenil     FEN: Low Na diet  PPX: Heparin     Code Status: FULL CODE     Patient discussed with staff attending, Dr. Turcios.     Mahendra Lopez MD  Internal Medicine, PGY-1            Subjective/Interval Events     No acute overnight events. This AM, no  "nausea, vomiting, SOB, lightheadedness.          Objective     /59 (BP Location: Left leg)   Pulse 66   Temp 95.8  F (35.4  C) (Axillary)   Resp 18   Ht 1.473 m (4' 10\")   Wt 57.1 kg (125 lb 14.4 oz)   SpO2 92%   BMI 26.31 kg/m    Temp:  [95.8  F (35.4  C)-97.8  F (36.6  C)] 95.8  F (35.4  C)  Pulse:  [63-67] 66  Heart Rate:  [62-69] 68  Resp:  [18] 18  BP: ()/(59-97) 103/59  SpO2:  [91 %-95 %] 92 %  Wt Readings from Last 2 Encounters:   03/07/20 57.1 kg (125 lb 14.4 oz)   03/02/20 57.6 kg (126 lb 14.4 oz)     I/O last 3 completed shifts:  In: 1881.12 [P.O.:1660; I.V.:221.12]  Out: 1600 [Urine:1600]      Gen: No acute distress  HEENT: NC/AT, PERRL, EOM intact, MMM, OP without exudates  PULM/THORAX: Clear to auscultation bilaterally, no rales/rhonchi/wheezes  CV: normal rate, regular rhythm, normal S1 and S2, no murmurs or rubs. Elevated JVP  ABD: Soft, NTND, bowel sounds present, no masses  EXT: WWP. 2+ pitting edema in BLE  NEURO: CN II-XII grossly intact. A&Ox3                Data:     Recent Results (from the past 24 hour(s))   Heparin Xa (10a) Level    Collection Time: 03/07/20  6:38 AM   Result Value Ref Range    Heparin 10A Level 0.38 IU/mL   Basic metabolic panel    Collection Time: 03/07/20  6:38 AM   Result Value Ref Range    Sodium 123 (L) 133 - 144 mmol/L    Potassium 4.8 3.4 - 5.3 mmol/L    Chloride 89 (L) 94 - 109 mmol/L    Carbon Dioxide 27 20 - 32 mmol/L    Anion Gap 7 3 - 14 mmol/L    Glucose 83 70 - 99 mg/dL    Urea Nitrogen 39 (H) 7 - 30 mg/dL    Creatinine 1.24 (H) 0.52 - 1.04 mg/dL    GFR Estimate 42 (L) >60 mL/min/[1.73_m2]    GFR Estimate If Black 48 (L) >60 mL/min/[1.73_m2]    Calcium 8.9 8.5 - 10.1 mg/dL   CBC with platelets    Collection Time: 03/07/20  6:38 AM   Result Value Ref Range    WBC 5.3 4.0 - 11.0 10e9/L    RBC Count 3.78 (L) 3.8 - 5.2 10e12/L    Hemoglobin 12.3 11.7 - 15.7 g/dL    Hematocrit 37.5 35.0 - 47.0 %    MCV 99 78 - 100 fl    MCH 32.5 26.5 - 33.0 pg    " MCHC 32.8 31.5 - 36.5 g/dL    RDW 12.8 10.0 - 15.0 %    Platelet Count 234 150 - 450 10e9/L       Recent Results (from the past 24 hour(s))   XR Chest Port 1 View    Narrative    EXAM: XR CHEST PORT 1 VW  3/4/2020 4:45 PM     HISTORY:  picc       COMPARISON:  CT chest 3/3/2020    FINDINGS:   Portable semiupright view of the chest. Right upper extremity PICC  line tip projects over the right atrium. The cardiomediastinal  silhouette is mildly enlarged. Low lung volumes. No focal airspace  opacities. The previously seen right middle lobe nodule is not well  visualized. A rounded right perihilar opacity corresponds to the right  lower lobe pulmonary artery en face. No pleural effusion or  pneumothorax. Scoliotic curvature of the thoracic spine. No acute  osseous abnormalities.      Impression    IMPRESSION:   The new right arm PICC tip projects over the high right atrium. No  pneumothorax.    I have personally reviewed the examination and initial interpretation  and I agree with the findings.    MOOK SLOAN MD             Medications     Current Facility-Administered Medications   Medication     acetaminophen (TYLENOL) Suppository 650 mg     acetaminophen (TYLENOL) tablet 325 mg     acetaminophen (TYLENOL) tablet 650 mg     albuterol (PROAIR HFA/PROVENTIL HFA/VENTOLIN HFA) 108 (90 Base) MCG/ACT inhaler 2 puff     aspirin (ASA) chewable tablet 81 mg     beclomethasone HFA (QVAR REDIHALER) 80 MCG/ACT inhaler 2 puff     butalbital-acetaminophen half-tablet .5 mg     cetirizine (zyrTEC) tablet 10 mg     digoxin (LANOXIN) tablet 62.5 mcg     [START ON 3/8/2020] furosemide (LASIX) tablet 40 mg     gabapentin (NEURONTIN) capsule 300 mg     granisetron (KYTRIL) injection 0.5 mg     heparin  drip 25,000 units in 0.45% NaCl 250 mL  ANTICOAGULANT  (see additional administration details for dose)     heparin bolus from infusion pump     heparin lock flush 10 UNIT/ML injection 2-5 mL     hydroxychloroquine (PLAQUENIL)  tablet 200 mg     ipratropium (ATROVENT) 0.06 % spray 2 spray     lidocaine (LMX4) cream     lidocaine (LMX4) cream     lidocaine 1 % 0.1-1 mL     lidocaine 1 % 0.1-1 mL     lisinopril (ZESTRIL) tablet 20 mg     magnesium sulfate 2 g in water intermittent infusion     magnesium sulfate 4 g in 100 mL sterile water (premade)     medication instruction     medication instruction     Patient is already receiving anticoagulation with heparin, enoxaparin (LOVENOX), warfarin (COUMADIN)  or other anticoagulant medication     polyethylene glycol (MIRALAX) Packet 17 g     potassium chloride (KLOR-CON) Packet 20-40 mEq     potassium chloride 10 mEq in 100 mL intermittent infusion with 10 mg lidocaine     potassium chloride 10 mEq in 100 mL sterile water intermittent infusion (premix)     potassium chloride 20 mEq in 50 mL intermittent infusion     potassium chloride ER (KLOR-CON M) CR tablet 20-40 mEq     senna-docusate (SENOKOT-S/PERICOLACE) 8.6-50 MG per tablet 2 tablet     sodium chloride (OCEAN) 0.65 % nasal spray 1 spray     sodium chloride (PF) 0.9% PF flush 10 mL     sodium chloride (PF) 0.9% PF flush 10-20 mL     sodium chloride (PF) 0.9% PF flush 3 mL     sodium chloride (PF) 0.9% PF flush 3 mL     treprostinil (REMODULIN) intravenous infusion

## 2020-03-07 NOTE — PLAN OF CARE
D: Pt admitted 3/4 for initiation of remodulin for CTEPH. Hx extensive bilat PE and DVT, breast CA s/p L mastectomy, pulmonary MAC, rheumatoid arthritis.  I: Monitored vitals and assessed pt status. Remodulin @ 5ng/kg/min. PRN tylenol, reglan, saline nasal spray.  A: A0x4. VSS on RA. SB/SR on tele. Afebrile. Headache pain addressed with PRN meds with slight relief; discussed with Dr. Chery cards cross-cover and additional PRN med ordered. Voiding adequately. Up SBA/I to bedside commode. Pt slept between cares, making needs known.  P: Continue to monitor and report changes/concerns to Cards 2.

## 2020-03-07 NOTE — PLAN OF CARE
"/71 (BP Location: Left leg)   Pulse 66   Temp 97.6  F (36.4  C) (Oral)   Resp 18   Ht 1.473 m (4' 10\")   Wt 57.1 kg (125 lb 14.4 oz)   SpO2 92%   BMI 26.31 kg/m      Pt tolerating increase on Remodulin, next increase at 8pm.  Diuresis and bowel regiment have been exercised today, voiding well - bilateral lower leg edema remains.   Bowel have been regular with each void and very firm, gave two senna today consider softener.    Tylenol given for headache midday, team notified and they have scheduled tylenol.  Pt feeling allergy med contributing to head ache has refused it today and opts for tissues.    VSS alert on room air,  bedside during the day.      Cards II pt continue to monitor and notify of changes.  "

## 2020-03-07 NOTE — PLAN OF CARE
D: Pt admitted 03/04 for admission for initiation of CTEPH treatment. History of breast CA (L mastectomy), bilateral PE and DVT (1998, thought to be 2/2 tamoxifen for breast cancer treatment) pulmonary MAC, and RA.     I/A: VSS on room air, does require 1-2L NC with exertion, SB/SR on tele. Up SBA/independently in room. Remodulin gtt increased to 5ng/kg/min (orders to increase by 1ng/kg/min Q 12 hrs- goal of 10ng/kg/min). Heparin gtt at 600 units/hr into R PIV. Order for senna obtained as pt is constipated. Pt given the option to try enema but wanted to wait and see if senna helped.     P: Continue to titrate remodulin to goal. Notify Cards 2 with changes/concerns. NPO Sunday PM for Goldberg placement Monday in IR (03/09) per MD note.     POC: [id number]  Mary Diaz RN on 3/6/2020 at 11:22 PM

## 2020-03-07 NOTE — PLAN OF CARE
D: Pt admitted 03/04 for admission for initiation of CTEPH treatment. History of breast CA (L mastectomy), bilateral PE and DVT (1998, thought to be 2/2 tamoxifen for breast cancer treatment) pulmonary MAC, and RA.     I/A: VSS on room air, does require 1-2L NC with exertion, SR on tele. Up SBA/independently in room. Remodulin gtt at 4ng/kg/min (w/ orders to increase by 1ng/kg/min Q 12 hrs- goal of 10ng/kg/min). Heparin gtt at 600 units/hr.      P: Continue to titrate remodulin to goal. Notify Cards 2 with changes/concerns. NPO Sunday PM for Goldberg placement Monday in IR (03/09) per MD note.

## 2020-03-08 LAB
ANION GAP SERPL CALCULATED.3IONS-SCNC: 7 MMOL/L (ref 3–14)
BUN SERPL-MCNC: 37 MG/DL (ref 7–30)
CALCIUM SERPL-MCNC: 8.6 MG/DL (ref 8.5–10.1)
CHLORIDE SERPL-SCNC: 89 MMOL/L (ref 94–109)
CO2 SERPL-SCNC: 26 MMOL/L (ref 20–32)
CREAT SERPL-MCNC: 1.08 MG/DL (ref 0.52–1.04)
ERYTHROCYTE [DISTWIDTH] IN BLOOD BY AUTOMATED COUNT: 12.7 % (ref 10–15)
GFR SERPL CREATININE-BSD FRML MDRD: 49 ML/MIN/{1.73_M2}
GLUCOSE SERPL-MCNC: 89 MG/DL (ref 70–99)
HCT VFR BLD AUTO: 37.2 % (ref 35–47)
HGB BLD-MCNC: 12.4 G/DL (ref 11.7–15.7)
LMWH PPP CHRO-ACNC: 0.14 IU/ML
LMWH PPP CHRO-ACNC: 0.84 IU/ML
LMWH PPP CHRO-ACNC: 1.98 IU/ML
MCH RBC QN AUTO: 32.9 PG (ref 26.5–33)
MCHC RBC AUTO-ENTMCNC: 33.3 G/DL (ref 31.5–36.5)
MCV RBC AUTO: 99 FL (ref 78–100)
PLATELET # BLD AUTO: 218 10E9/L (ref 150–450)
POTASSIUM SERPL-SCNC: 4 MMOL/L (ref 3.4–5.3)
RBC # BLD AUTO: 3.77 10E12/L (ref 3.8–5.2)
SODIUM SERPL-SCNC: 122 MMOL/L (ref 133–144)
WBC # BLD AUTO: 4.5 10E9/L (ref 4–11)

## 2020-03-08 PROCEDURE — 25000132 ZZH RX MED GY IP 250 OP 250 PS 637: Performed by: STUDENT IN AN ORGANIZED HEALTH CARE EDUCATION/TRAINING PROGRAM

## 2020-03-08 PROCEDURE — 27210995 ZZH RX 272: Performed by: STUDENT IN AN ORGANIZED HEALTH CARE EDUCATION/TRAINING PROGRAM

## 2020-03-08 PROCEDURE — 40000802 ZZH SITE CHECK

## 2020-03-08 PROCEDURE — 85520 HEPARIN ASSAY: CPT | Performed by: INTERNAL MEDICINE

## 2020-03-08 PROCEDURE — 21400000 ZZH R&B CCU UMMC

## 2020-03-08 PROCEDURE — 99232 SBSQ HOSP IP/OBS MODERATE 35: CPT | Mod: GC | Performed by: INTERNAL MEDICINE

## 2020-03-08 PROCEDURE — 25000128 H RX IP 250 OP 636: Performed by: STUDENT IN AN ORGANIZED HEALTH CARE EDUCATION/TRAINING PROGRAM

## 2020-03-08 PROCEDURE — 25000132 ZZH RX MED GY IP 250 OP 250 PS 637: Performed by: INTERNAL MEDICINE

## 2020-03-08 PROCEDURE — 36415 COLL VENOUS BLD VENIPUNCTURE: CPT | Performed by: STUDENT IN AN ORGANIZED HEALTH CARE EDUCATION/TRAINING PROGRAM

## 2020-03-08 PROCEDURE — 80048 BASIC METABOLIC PNL TOTAL CA: CPT | Performed by: STUDENT IN AN ORGANIZED HEALTH CARE EDUCATION/TRAINING PROGRAM

## 2020-03-08 PROCEDURE — 85520 HEPARIN ASSAY: CPT | Performed by: STUDENT IN AN ORGANIZED HEALTH CARE EDUCATION/TRAINING PROGRAM

## 2020-03-08 PROCEDURE — 36415 COLL VENOUS BLD VENIPUNCTURE: CPT

## 2020-03-08 PROCEDURE — 36415 COLL VENOUS BLD VENIPUNCTURE: CPT | Performed by: INTERNAL MEDICINE

## 2020-03-08 PROCEDURE — 85027 COMPLETE CBC AUTOMATED: CPT | Performed by: STUDENT IN AN ORGANIZED HEALTH CARE EDUCATION/TRAINING PROGRAM

## 2020-03-08 RX ORDER — METOCLOPRAMIDE HYDROCHLORIDE 5 MG/ML
5 INJECTION INTRAMUSCULAR; INTRAVENOUS ONCE
Status: COMPLETED | OUTPATIENT
Start: 2020-03-08 | End: 2020-03-08

## 2020-03-08 RX ORDER — FUROSEMIDE 20 MG
20 TABLET ORAL DAILY
Status: DISCONTINUED | OUTPATIENT
Start: 2020-03-08 | End: 2020-03-20 | Stop reason: HOSPADM

## 2020-03-08 RX ADMIN — LISINOPRIL 20 MG: 20 TABLET ORAL at 08:16

## 2020-03-08 RX ADMIN — ACETAMINOPHEN 650 MG: 325 TABLET, FILM COATED ORAL at 18:28

## 2020-03-08 RX ADMIN — DIGOXIN 62.5 MCG: 0.06 TABLET ORAL at 08:15

## 2020-03-08 RX ADMIN — GRANISETRON HYDROCHLORIDE 0.5 MG: 1 INJECTION INTRAVENOUS at 20:27

## 2020-03-08 RX ADMIN — POTASSIUM CHLORIDE 20 MEQ: 750 TABLET, EXTENDED RELEASE ORAL at 13:33

## 2020-03-08 RX ADMIN — HYDROXYCHLOROQUINE SULFATE 200 MG: 200 TABLET, FILM COATED ORAL at 08:16

## 2020-03-08 RX ADMIN — ACETAMINOPHEN 650 MG: 325 TABLET, FILM COATED ORAL at 13:13

## 2020-03-08 RX ADMIN — FUROSEMIDE 20 MG: 20 TABLET ORAL at 13:11

## 2020-03-08 RX ADMIN — ACETAMINOPHEN 650 MG: 325 TABLET, FILM COATED ORAL at 05:18

## 2020-03-08 RX ADMIN — GABAPENTIN 300 MG: 300 CAPSULE ORAL at 20:27

## 2020-03-08 RX ADMIN — TREPROSTINIL 6 NG/KG/MIN: 20 INJECTION, SOLUTION INTRAVENOUS; SUBCUTANEOUS at 13:57

## 2020-03-08 RX ADMIN — GABAPENTIN 300 MG: 300 CAPSULE ORAL at 08:15

## 2020-03-08 RX ADMIN — TREPROSTINIL 6 NG/KG/MIN: 20 INJECTION, SOLUTION INTRAVENOUS; SUBCUTANEOUS at 04:56

## 2020-03-08 RX ADMIN — HEPARIN SODIUM 900 UNITS/HR: 10000 INJECTION, SOLUTION INTRAVENOUS at 14:57

## 2020-03-08 RX ADMIN — ASPIRIN 81 MG CHEWABLE TABLET 81 MG: 81 TABLET CHEWABLE at 20:27

## 2020-03-08 RX ADMIN — TREPROSTINIL 6 NG/KG/MIN: 20 INJECTION, SOLUTION INTRAVENOUS; SUBCUTANEOUS at 23:45

## 2020-03-08 RX ADMIN — METOCLOPRAMIDE 5 MG: 5 INJECTION, SOLUTION INTRAMUSCULAR; INTRAVENOUS at 10:48

## 2020-03-08 RX ADMIN — GABAPENTIN 300 MG: 300 CAPSULE ORAL at 13:11

## 2020-03-08 RX ADMIN — METOCLOPRAMIDE 5 MG: 5 INJECTION, SOLUTION INTRAMUSCULAR; INTRAVENOUS at 22:23

## 2020-03-08 ASSESSMENT — ACTIVITIES OF DAILY LIVING (ADL)
ADLS_ACUITY_SCORE: 36

## 2020-03-08 ASSESSMENT — MIFFLIN-ST. JEOR: SCORE: 939.48

## 2020-03-08 ASSESSMENT — PAIN DESCRIPTION - DESCRIPTORS
DESCRIPTORS: HEADACHE
DESCRIPTORS: HEADACHE

## 2020-03-08 NOTE — PLAN OF CARE
D: Pt admitted 03/04 for admission for initiation of CTEPH treatment. History of breast CA (L mastectomy), bilateral PE and DVT (1998, thought to be 2/2 tamoxifen for breast cancer treatment) pulmonary MAC, and RA.     I: Monitored vitals and assessed pt status.   Changed:  - pt now experiencing diarrhea. Pt had 3 loose stools in one hour. Has since slowed.   Running: Remodulin @ 6ng/kg/min into R SL PICC and Heparin 600 units/hr     A: A0x4. VSS on RA/2L NC at night (pt was sating at 88 RA). Tele shows SB/SR. Afebrile. Urinating adequately. Up to bedside commode independently. Pt continues on titration of remodulin to goal (10ng/kg/min), now increasing by 1ng/kg/min every 24 hours. Pt continues to endorse headache and experienced diarrhea beginning of this shift. Pt states she also had some jaw pain while eating dinner yesterday evening which has since gone away and headache is improving with scheduled tylenol.      P: Continue to monitor Pt status and report changes to Cards 2. NPO at 00:15 on Monday (3/9) for Goldberg placement by IR, per MD note.         POC: [id number]  Mary Diaz RN on 3/8/2020 at 7:37 AM

## 2020-03-08 NOTE — PROGRESS NOTES
Cardiology Progress Note  Karen Anderson MRN: [id number]  Age: 79 year old, : 1940  Date: 3/5/2020              Assessment and Plan:     Ms. Wilson is a 80 y/o F w/ h/o extensive bilateral PE and DVT  (thought to be 2/2 tamoxifen for breast cancer treatment) pulmonary MAC, rheumatoid arthritis presenting as an admission for initiation of CTEPH treatment.     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 2019 coronary angiogram and right heart catheterization showed normal coronaries, RA 12, /25, PA 98/23/51, PCW 5, thermodilution cardiac output 2.37 L/min, PVR 19.4. Echo on admission with normal EF, mild RVH, moderately dilated/dysfunctional RV, dilated pulmonary artery. RHC on admission with RA of 10, PA 98/30/52m reduce CO with high normal right sided filling pressures. CTEPH based on V/Q scan and CT pulmonary angiogram. Functional class III.  Plan:  -IV Remodulin at 6 mcg/kg/min as of 3/8 AM; goal 8-12          - Hold off on increasing dose today, given patient's side effects          - Increase 1 mcg/kg/min q24h given patient's side effects, can up titrate at home if necessary.  -Digoxin 62.5 mcg PO every day  -Diuresis with lasix 20 mg PO  -On heparin ggt for AC; plan for warfarin prior to discharge  -NPO at 00:15 on Monday, 3/9 for Goldberg placement by IR  -maintain PICC for blood draws - patient has been difficult    #hyponatremia  Likely multifactorial. Element of hypervolemia with R sided heart failure. Without significant weight gain and net negative fluids daily since admission - It is also possible that the lasix is causing natruresis. She remains volume-up on exam, so we will continue gentle diuresis.   - decreased lasix dose today to 20 mg PO lasix.  - fluid restrict 1500 mL   - monitor     #Rheumatoid arthritis:  -Continue  "plaquenil     FEN: Low Na diet  PPX: Heparin     Code Status: FULL CODE     Patient discussed with staff attending, Dr. Turcios.     Mahendra Lopez MD  Internal Medicine, PGY-1            Subjective/Interval Events     No acute overnight events. This AM, no nausea, vomiting, SOB, lightheadedness. Continues to have mild HA more associated with R muna-orbital/maxillary region. She cannot characterize it, but says it is tolerable.          Objective     /48 (BP Location: Right leg)   Pulse 67   Temp 97.9  F (36.6  C) (Oral)   Resp 18   Ht 1.473 m (4' 10\")   Wt 56.5 kg (124 lb 8 oz)   SpO2 91%   BMI 26.02 kg/m    Temp:  [97.4  F (36.3  C)-97.9  F (36.6  C)] 97.9  F (36.6  C)  Pulse:  [67] 67  Heart Rate:  [49-78] 66  Resp:  [16-18] 18  BP: ()/(48-71) 123/48  SpO2:  [88 %-100 %] 91 %  Wt Readings from Last 2 Encounters:   03/08/20 56.5 kg (124 lb 8 oz)   03/02/20 57.6 kg (126 lb 14.4 oz)     I/O last 3 completed shifts:  In: 1465.69 [P.O.:1130; I.V.:335.69]  Out: 3125 [Urine:3125]      Gen: No acute distress  HEENT: NC/AT, PERRL, EOM intact, MMM, OP without exudates. Nontender maxillary sinus bilaterally.  PULM/THORAX: Clear to auscultation bilaterally, no rales/rhonchi/wheezes  CV: normal rate, regular rhythm, normal S1 and S2, no murmurs or rubs. Elevated JVP  ABD: Soft, NTND, bowel sounds present, no masses  EXT: WWP. 2+ pitting edema in BLE  NEURO: CN II-XII grossly intact. A&Ox3                Data:     Recent Results (from the past 24 hour(s))   Basic metabolic panel    Collection Time: 03/08/20  6:13 AM   Result Value Ref Range    Sodium 122 (L) 133 - 144 mmol/L    Potassium 4.0 3.4 - 5.3 mmol/L    Chloride 89 (L) 94 - 109 mmol/L    Carbon Dioxide 26 20 - 32 mmol/L    Anion Gap 7 3 - 14 mmol/L    Glucose 89 70 - 99 mg/dL    Urea Nitrogen 37 (H) 7 - 30 mg/dL    Creatinine 1.08 (H) 0.52 - 1.04 mg/dL    GFR Estimate 49 (L) >60 mL/min/[1.73_m2]    GFR Estimate If Black 57 (L) >60 " mL/min/[1.73_m2]    Calcium 8.6 8.5 - 10.1 mg/dL   CBC with platelets    Collection Time: 03/08/20  6:13 AM   Result Value Ref Range    WBC 4.5 4.0 - 11.0 10e9/L    RBC Count 3.77 (L) 3.8 - 5.2 10e12/L    Hemoglobin 12.4 11.7 - 15.7 g/dL    Hematocrit 37.2 35.0 - 47.0 %    MCV 99 78 - 100 fl    MCH 32.9 26.5 - 33.0 pg    MCHC 33.3 31.5 - 36.5 g/dL    RDW 12.7 10.0 - 15.0 %    Platelet Count 218 150 - 450 10e9/L   Heparin Xa (10a) Level    Collection Time: 03/08/20 11:48 AM   Result Value Ref Range    Heparin 10A Level 1.98 (HH) IU/mL       Recent Results (from the past 24 hour(s))   XR Chest Port 1 View    Narrative    EXAM: XR CHEST PORT 1 VW  3/4/2020 4:45 PM     HISTORY:  picc       COMPARISON:  CT chest 3/3/2020    FINDINGS:   Portable semiupright view of the chest. Right upper extremity PICC  line tip projects over the right atrium. The cardiomediastinal  silhouette is mildly enlarged. Low lung volumes. No focal airspace  opacities. The previously seen right middle lobe nodule is not well  visualized. A rounded right perihilar opacity corresponds to the right  lower lobe pulmonary artery en face. No pleural effusion or  pneumothorax. Scoliotic curvature of the thoracic spine. No acute  osseous abnormalities.      Impression    IMPRESSION:   The new right arm PICC tip projects over the high right atrium. No  pneumothorax.    I have personally reviewed the examination and initial interpretation  and I agree with the findings.    MOOK SLOAN MD             Medications     Current Facility-Administered Medications   Medication     acetaminophen (TYLENOL) Suppository 650 mg     acetaminophen (TYLENOL) tablet 325 mg     acetaminophen (TYLENOL) tablet 650 mg     albuterol (PROAIR HFA/PROVENTIL HFA/VENTOLIN HFA) 108 (90 Base) MCG/ACT inhaler 2 puff     aspirin (ASA) chewable tablet 81 mg     beclomethasone HFA (QVAR REDIHALER) 80 MCG/ACT inhaler 2 puff     butalbital-acetaminophen half-tablet .5 mg      cetirizine (zyrTEC) tablet 10 mg     digoxin (LANOXIN) tablet 62.5 mcg     furosemide (LASIX) tablet 20 mg     gabapentin (NEURONTIN) capsule 300 mg     granisetron (KYTRIL) injection 0.5 mg     heparin  drip 25,000 units in 0.45% NaCl 250 mL  ANTICOAGULANT  (see additional administration details for dose)     heparin bolus from infusion pump     heparin lock flush 10 UNIT/ML injection 2-5 mL     hydroxychloroquine (PLAQUENIL) tablet 200 mg     ipratropium (ATROVENT) 0.06 % spray 2 spray     lidocaine (LMX4) cream     lidocaine (LMX4) cream     lidocaine 1 % 0.1-1 mL     lidocaine 1 % 0.1-1 mL     lisinopril (ZESTRIL) tablet 20 mg     loperamide (IMODIUM) capsule 2 mg     magnesium sulfate 2 g in water intermittent infusion     magnesium sulfate 4 g in 100 mL sterile water (premade)     medication instruction     medication instruction     Patient is already receiving anticoagulation with heparin, enoxaparin (LOVENOX), warfarin (COUMADIN)  or other anticoagulant medication     polyethylene glycol (MIRALAX) Packet 17 g     potassium chloride (KLOR-CON) Packet 20-40 mEq     potassium chloride 10 mEq in 100 mL intermittent infusion with 10 mg lidocaine     potassium chloride 10 mEq in 100 mL sterile water intermittent infusion (premix)     potassium chloride 20 mEq in 50 mL intermittent infusion     potassium chloride ER (KLOR-CON M) CR tablet 20-40 mEq     senna-docusate (SENOKOT-S/PERICOLACE) 8.6-50 MG per tablet 2 tablet     sodium chloride (OCEAN) 0.65 % nasal spray 1 spray     sodium chloride (PF) 0.9% PF flush 10 mL     sodium chloride (PF) 0.9% PF flush 10-20 mL     sodium chloride (PF) 0.9% PF flush 3 mL     sodium chloride (PF) 0.9% PF flush 3 mL     treprostinil (REMODULIN) intravenous infusion

## 2020-03-08 NOTE — PROGRESS NOTES
VASCULAR NURSE HERE FOR LAB DRAW REQUESTED BY CENTRAL LAB. LABS DRAWN WITH US GUIDANCE ON THE FIRST ATTEMPT IN PARTNERSHIP WITH .  ARIADNE Greenfield, [facility]-BC

## 2020-03-09 ENCOUNTER — APPOINTMENT (OUTPATIENT)
Dept: OCCUPATIONAL THERAPY | Facility: CLINIC | Age: 80
DRG: 287 | End: 2020-03-09
Attending: STUDENT IN AN ORGANIZED HEALTH CARE EDUCATION/TRAINING PROGRAM
Payer: COMMERCIAL

## 2020-03-09 ENCOUNTER — APPOINTMENT (OUTPATIENT)
Dept: INTERVENTIONAL RADIOLOGY/VASCULAR | Facility: CLINIC | Age: 80
DRG: 287 | End: 2020-03-09
Attending: NURSE PRACTITIONER
Payer: COMMERCIAL

## 2020-03-09 LAB
ANION GAP SERPL CALCULATED.3IONS-SCNC: 6 MMOL/L (ref 3–14)
BUN SERPL-MCNC: 24 MG/DL (ref 7–30)
CALCIUM SERPL-MCNC: 9.1 MG/DL (ref 8.5–10.1)
CHLORIDE SERPL-SCNC: 92 MMOL/L (ref 94–109)
CO2 SERPL-SCNC: 27 MMOL/L (ref 20–32)
CREAT SERPL-MCNC: 0.92 MG/DL (ref 0.52–1.04)
ERYTHROCYTE [DISTWIDTH] IN BLOOD BY AUTOMATED COUNT: 12.6 % (ref 10–15)
GFR SERPL CREATININE-BSD FRML MDRD: 60 ML/MIN/{1.73_M2}
GLUCOSE SERPL-MCNC: 90 MG/DL (ref 70–99)
HCT VFR BLD AUTO: 39.2 % (ref 35–47)
HGB BLD-MCNC: 12.7 G/DL (ref 11.7–15.7)
LMWH PPP CHRO-ACNC: 0.52 IU/ML
LMWH PPP CHRO-ACNC: 0.54 IU/ML
MCH RBC QN AUTO: 32.2 PG (ref 26.5–33)
MCHC RBC AUTO-ENTMCNC: 32.4 G/DL (ref 31.5–36.5)
MCV RBC AUTO: 100 FL (ref 78–100)
PLATELET # BLD AUTO: 233 10E9/L (ref 150–450)
POTASSIUM SERPL-SCNC: 5 MMOL/L (ref 3.4–5.3)
RBC # BLD AUTO: 3.94 10E12/L (ref 3.8–5.2)
SODIUM SERPL-SCNC: 126 MMOL/L (ref 133–144)
WBC # BLD AUTO: 4.1 10E9/L (ref 4–11)

## 2020-03-09 PROCEDURE — 99232 SBSQ HOSP IP/OBS MODERATE 35: CPT | Mod: GC | Performed by: INTERNAL MEDICINE

## 2020-03-09 PROCEDURE — 40000802 ZZH SITE CHECK

## 2020-03-09 PROCEDURE — 0JH63XZ INSERTION OF TUNNELED VASCULAR ACCESS DEVICE INTO CHEST SUBCUTANEOUS TISSUE AND FASCIA, PERCUTANEOUS APPROACH: ICD-10-PCS | Performed by: PHYSICIAN ASSISTANT

## 2020-03-09 PROCEDURE — 27210995 ZZH RX 272: Performed by: STUDENT IN AN ORGANIZED HEALTH CARE EDUCATION/TRAINING PROGRAM

## 2020-03-09 PROCEDURE — 25000128 H RX IP 250 OP 636: Performed by: NURSE PRACTITIONER

## 2020-03-09 PROCEDURE — 27211193 ZZ H WOUND GLUE CR1

## 2020-03-09 PROCEDURE — 27210908 ZZH NEEDLE CR4

## 2020-03-09 PROCEDURE — 97530 THERAPEUTIC ACTIVITIES: CPT | Mod: GO

## 2020-03-09 PROCEDURE — 25000132 ZZH RX MED GY IP 250 OP 250 PS 637: Performed by: STUDENT IN AN ORGANIZED HEALTH CARE EDUCATION/TRAINING PROGRAM

## 2020-03-09 PROCEDURE — C1751 CATH, INF, PER/CENT/MIDLINE: HCPCS

## 2020-03-09 PROCEDURE — 27210732 ZZH ACCESSORY CR1

## 2020-03-09 PROCEDURE — 85027 COMPLETE CBC AUTOMATED: CPT | Performed by: STUDENT IN AN ORGANIZED HEALTH CARE EDUCATION/TRAINING PROGRAM

## 2020-03-09 PROCEDURE — 25000128 H RX IP 250 OP 636: Performed by: STUDENT IN AN ORGANIZED HEALTH CARE EDUCATION/TRAINING PROGRAM

## 2020-03-09 PROCEDURE — 36415 COLL VENOUS BLD VENIPUNCTURE: CPT | Performed by: INTERNAL MEDICINE

## 2020-03-09 PROCEDURE — 99152 MOD SED SAME PHYS/QHP 5/>YRS: CPT

## 2020-03-09 PROCEDURE — 85520 HEPARIN ASSAY: CPT | Performed by: STUDENT IN AN ORGANIZED HEALTH CARE EDUCATION/TRAINING PROGRAM

## 2020-03-09 PROCEDURE — 25000125 ZZHC RX 250: Performed by: STUDENT IN AN ORGANIZED HEALTH CARE EDUCATION/TRAINING PROGRAM

## 2020-03-09 PROCEDURE — 80048 BASIC METABOLIC PNL TOTAL CA: CPT | Performed by: STUDENT IN AN ORGANIZED HEALTH CARE EDUCATION/TRAINING PROGRAM

## 2020-03-09 PROCEDURE — 85520 HEPARIN ASSAY: CPT | Performed by: INTERNAL MEDICINE

## 2020-03-09 PROCEDURE — C1769 GUIDE WIRE: HCPCS

## 2020-03-09 PROCEDURE — 36415 COLL VENOUS BLD VENIPUNCTURE: CPT | Performed by: STUDENT IN AN ORGANIZED HEALTH CARE EDUCATION/TRAINING PROGRAM

## 2020-03-09 PROCEDURE — 40000893 ZZH STATISTIC PT IP EVAL DEFER

## 2020-03-09 PROCEDURE — 99153 MOD SED SAME PHYS/QHP EA: CPT

## 2020-03-09 PROCEDURE — 97110 THERAPEUTIC EXERCISES: CPT | Mod: GO

## 2020-03-09 PROCEDURE — 97165 OT EVAL LOW COMPLEX 30 MIN: CPT | Mod: GO

## 2020-03-09 PROCEDURE — 02H633Z INSERTION OF INFUSION DEVICE INTO RIGHT ATRIUM, PERCUTANEOUS APPROACH: ICD-10-PCS | Performed by: PHYSICIAN ASSISTANT

## 2020-03-09 PROCEDURE — 25000132 ZZH RX MED GY IP 250 OP 250 PS 637: Performed by: INTERNAL MEDICINE

## 2020-03-09 PROCEDURE — 77001 FLUOROGUIDE FOR VEIN DEVICE: CPT

## 2020-03-09 PROCEDURE — 76937 US GUIDE VASCULAR ACCESS: CPT

## 2020-03-09 PROCEDURE — 21400000 ZZH R&B CCU UMMC

## 2020-03-09 RX ORDER — FUROSEMIDE 10 MG/ML
20 INJECTION INTRAMUSCULAR; INTRAVENOUS ONCE
Status: COMPLETED | OUTPATIENT
Start: 2020-03-09 | End: 2020-03-09

## 2020-03-09 RX ORDER — IBUPROFEN 200 MG
400 TABLET ORAL
Status: COMPLETED | OUTPATIENT
Start: 2020-03-09 | End: 2020-03-09

## 2020-03-09 RX ORDER — METOCLOPRAMIDE HYDROCHLORIDE 5 MG/ML
5 INJECTION INTRAMUSCULAR; INTRAVENOUS EVERY 6 HOURS PRN
Status: DISCONTINUED | OUTPATIENT
Start: 2020-03-09 | End: 2020-03-20 | Stop reason: HOSPADM

## 2020-03-09 RX ORDER — HEPARIN SODIUM,PORCINE 10 UNIT/ML
5-10 VIAL (ML) INTRAVENOUS
Status: CANCELLED | OUTPATIENT
Start: 2020-03-09

## 2020-03-09 RX ORDER — WARFARIN SODIUM 5 MG/1
5 TABLET ORAL
Status: COMPLETED | OUTPATIENT
Start: 2020-03-09 | End: 2020-03-09

## 2020-03-09 RX ORDER — FENTANYL CITRATE 50 UG/ML
25-50 INJECTION, SOLUTION INTRAMUSCULAR; INTRAVENOUS EVERY 5 MIN PRN
Status: DISCONTINUED | OUTPATIENT
Start: 2020-03-09 | End: 2020-03-09 | Stop reason: HOSPADM

## 2020-03-09 RX ORDER — CEFAZOLIN SODIUM 2 G/100ML
2 INJECTION, SOLUTION INTRAVENOUS
Status: COMPLETED | OUTPATIENT
Start: 2020-03-09 | End: 2020-03-09

## 2020-03-09 RX ORDER — NALOXONE HYDROCHLORIDE 0.4 MG/ML
.1-.4 INJECTION, SOLUTION INTRAMUSCULAR; INTRAVENOUS; SUBCUTANEOUS
Status: DISCONTINUED | OUTPATIENT
Start: 2020-03-09 | End: 2020-03-09 | Stop reason: HOSPADM

## 2020-03-09 RX ORDER — FLUMAZENIL 0.1 MG/ML
0.2 INJECTION, SOLUTION INTRAVENOUS
Status: DISCONTINUED | OUTPATIENT
Start: 2020-03-09 | End: 2020-03-09 | Stop reason: HOSPADM

## 2020-03-09 RX ORDER — HEPARIN SODIUM,PORCINE 10 UNIT/ML
5 VIAL (ML) INTRAVENOUS EVERY 24 HOURS
Status: DISCONTINUED | OUTPATIENT
Start: 2020-03-09 | End: 2020-03-20 | Stop reason: HOSPADM

## 2020-03-09 RX ADMIN — ASPIRIN 81 MG CHEWABLE TABLET 81 MG: 81 TABLET CHEWABLE at 20:38

## 2020-03-09 RX ADMIN — FENTANYL CITRATE 50 MCG: 50 INJECTION, SOLUTION INTRAMUSCULAR; INTRAVENOUS at 14:32

## 2020-03-09 RX ADMIN — ACETAMINOPHEN 650 MG: 325 TABLET, FILM COATED ORAL at 11:56

## 2020-03-09 RX ADMIN — CETIRIZINE HYDROCHLORIDE 10 MG: 10 TABLET, FILM COATED ORAL at 20:38

## 2020-03-09 RX ADMIN — NALOXONE HYDROCHLORIDE 0.1 MG: 0.4 INJECTION, SOLUTION INTRAMUSCULAR; INTRAVENOUS; SUBCUTANEOUS at 14:40

## 2020-03-09 RX ADMIN — HEPARIN SODIUM 800 UNITS/HR: 10000 INJECTION, SOLUTION INTRAVENOUS at 05:23

## 2020-03-09 RX ADMIN — TREPROSTINIL 6 NG/KG/MIN: 20 INJECTION, SOLUTION INTRAVENOUS; SUBCUTANEOUS at 09:28

## 2020-03-09 RX ADMIN — LISINOPRIL 20 MG: 20 TABLET ORAL at 09:10

## 2020-03-09 RX ADMIN — HYDROXYCHLOROQUINE SULFATE 200 MG: 200 TABLET, FILM COATED ORAL at 09:10

## 2020-03-09 RX ADMIN — FUROSEMIDE 20 MG: 20 TABLET ORAL at 08:59

## 2020-03-09 RX ADMIN — METOCLOPRAMIDE 5 MG: 5 INJECTION, SOLUTION INTRAMUSCULAR; INTRAVENOUS at 15:40

## 2020-03-09 RX ADMIN — WARFARIN SODIUM 5 MG: 5 TABLET ORAL at 18:29

## 2020-03-09 RX ADMIN — GABAPENTIN 300 MG: 300 CAPSULE ORAL at 16:04

## 2020-03-09 RX ADMIN — ACETAMINOPHEN 650 MG: 325 TABLET, FILM COATED ORAL at 05:24

## 2020-03-09 RX ADMIN — ACETAMINOPHEN 650 MG: 325 TABLET, FILM COATED ORAL at 17:59

## 2020-03-09 RX ADMIN — GABAPENTIN 300 MG: 300 CAPSULE ORAL at 08:59

## 2020-03-09 RX ADMIN — TREPROSTINIL 7 NG/KG/MIN: 20 INJECTION, SOLUTION INTRAVENOUS; SUBCUTANEOUS at 18:35

## 2020-03-09 RX ADMIN — CEFAZOLIN SODIUM 2 G: 2 INJECTION, SOLUTION INTRAVENOUS at 14:15

## 2020-03-09 RX ADMIN — FUROSEMIDE 20 MG: 10 INJECTION, SOLUTION INTRAVENOUS at 11:33

## 2020-03-09 RX ADMIN — GABAPENTIN 300 MG: 300 CAPSULE ORAL at 20:38

## 2020-03-09 RX ADMIN — MIDAZOLAM 2 MG: 1 INJECTION INTRAMUSCULAR; INTRAVENOUS at 14:32

## 2020-03-09 RX ADMIN — IBUPROFEN 400 MG: 200 TABLET, FILM COATED ORAL at 21:00

## 2020-03-09 RX ADMIN — LIDOCAINE HYDROCHLORIDE 10 ML: 10 INJECTION, SOLUTION EPIDURAL; INFILTRATION; INTRACAUDAL; PERINEURAL at 14:45

## 2020-03-09 RX ADMIN — ACETAMINOPHEN 650 MG: 325 TABLET, FILM COATED ORAL at 00:33

## 2020-03-09 RX ADMIN — ACETAMINOPHEN 325 MG: 325 TABLET, FILM COATED ORAL at 16:05

## 2020-03-09 RX ADMIN — DIGOXIN 62.5 MCG: 0.06 TABLET ORAL at 08:59

## 2020-03-09 RX ADMIN — SENNOSIDES AND DOCUSATE SODIUM 1 TABLET: 8.6; 5 TABLET ORAL at 20:45

## 2020-03-09 RX ADMIN — GRANISETRON HYDROCHLORIDE 0.5 MG: 1 INJECTION INTRAVENOUS at 15:34

## 2020-03-09 ASSESSMENT — ACTIVITIES OF DAILY LIVING (ADL)
ADLS_ACUITY_SCORE: 36
ADLS_ACUITY_SCORE: 36
ADLS_ACUITY_SCORE: 17
ADLS_ACUITY_SCORE: 17
ADLS_ACUITY_SCORE: 36

## 2020-03-09 ASSESSMENT — PAIN DESCRIPTION - DESCRIPTORS
DESCRIPTORS: HEADACHE

## 2020-03-09 ASSESSMENT — MIFFLIN-ST. JEOR: SCORE: 924.06

## 2020-03-09 NOTE — TELEPHONE ENCOUNTER
"Per [facility] today @ 11:13am:    \"The initial RN teach visit is still pending to be scheduled.\"    Lexus Balbuena  Clinic   Pulmonary Hypertension  [facility]  (P) [phone]  "

## 2020-03-09 NOTE — PLAN OF CARE
6C PT deferred  Discharge Planner PT   Patient plan for discharge: Home with assist  Current status: PT orders acknowledged and appreciated. Pt demonstrates all mobility with SBA, no overt balance concerns. No acute PT needs identified as safe discharge plan in place, will complete orders.  Barriers to return to prior living situation: None per PT  Recommendations for discharge: Home with assist  Rationale for recommendations: See above       Entered by: Marichuy Tejeda 03/09/2020 2:27 PM

## 2020-03-09 NOTE — PLAN OF CARE
D/I/A: IR placed tunneled line. Flolan switched to 7 at 1530. Pt denying HA/nausea for most of shift.  P: Continue to monitor.

## 2020-03-09 NOTE — PROCEDURES
[facility], [place]    Procedure: IR Procedure Note    Date/Time: 3/9/2020 3:11 PM  Performed by: Dion Bhardwaj PA-C  Authorized by: Dion Bhardwaj PA-C     UNIVERSAL PROTOCOL   Site Marked: NA  Prior Images Obtained and Reviewed:  Yes  Required items: Required blood products, implants, devices and special equipment available    Patient identity confirmed:  Verbally with patient, arm band, provided demographic data and hospital-assigned identification number  Patient was reevaluated immediately before administering moderate or deep sedation or anesthesia  Confirmation Checklist:  Patient's identity using two indicators, relevant allergies, procedure was appropriate and matched the consent or emergent situation and correct equipment/implants were available  Time out: Immediately prior to the procedure a time out was called    Universal Protocol: the Joint Commission Universal Protocol was followed    Preparation: Patient was prepped and draped in usual sterile fashion           ANESTHESIA    Anesthesia: Local infiltration  Local Anesthetic:  Lidocaine 1% without epinephrine  Anesthetic Total (mL):  6      SEDATION    Patient Sedated: Yes    Sedation Type:  Moderate (conscious) sedation  Sedation:  Fentanyl and midazolam  Vital signs: Vital signs monitored during sedation    Fluoroscopy Time: 2 minute(s)  See dictated procedure note for full details.  Findings: Sedation started by RN at the direction of IR staff prior to the start of the procedure due to patient discomfort. Patient was given 2 mg of midazolam and 50 mcg of fentanyl and promptly desaturated requiring a rapid response, ambu-bag and 0.1 mg of narcan. Patient recovered quickly and no additional sedation was given during the procedure.    Specimens: none    Complications: None, Other respiratory / pulmonary and Hypoxia    Condition: Stable    Plan: 1 hour bed rest.     PROCEDURE Describe Procedure: Completed  image-guided placement of 5 Lithuanian, 20.5 cm single lumen tunneled central venous catheter via right IJ. Aspirates and flushes freely, heparin locked and ready for immediate use. Tip in high right atrium. Catheter priming volume is 0.42 ml.   Patient complications:  Apnea and significant or prolonged oxygen desaturation  Respiratory Interventions:  Required use of naloxone or flumazenil  Length of time physician/provider present for 1:1 monitoring during sedation: 0 (40 minutes)

## 2020-03-09 NOTE — PROGRESS NOTES
Patient Name: Karen Anderson  Medical Record Number: [id number]  Today's Date: 3/9/2020    Procedure: tunnel line placement   Proceduralist:     Sedation start time: 1432  Sedation end time: 1508  Sedation medications administered: 2mg versed 50mcg fentanyl 0.1mg narcan        Procedure Start:1432  Procedure end time:1500    Report given to: 6C RN     Pt arrived to IR room 5 from . Consent obtained/reviewed. Pt denies any questions or concerns regarding procedure. Pt positioned supine HOB upright pt c/o  and monitored per protocol. Pt tolerated procedure without any noted complications. Pt transferred back to

## 2020-03-09 NOTE — PLAN OF CARE
Discharge Planner OT   Patient plan for discharge: home with assist  Current status: Pt SBA for functional transfers and mobility. Pt ambulating ~300 ft with SBA. Pt min A for LB dressing. Pt educated on and completing UE HEP with demonstrating. Pt fatiguing easily. All VSS throughout session on RA.  Barriers to return to prior living situation: deconditioning, medical status, SOB  Recommendations for discharge: home with assist   Rationale for recommendations: Pt has good support at home for assist as needed for IADLs as needed 2/2 activity and SOB.        Entered by: Octavia Underwood 03/09/2020 9:27 AM

## 2020-03-09 NOTE — CONSULTS
Patient is on IR schedule today Monday 3/9/2020 for a Goldberg catheter placement.   -Labs WNL for procedure.    -Orders for NPO and antibiotics have been entered.  -Consent will be done prior to procedure.     Patient is on a Heparin drip which will be held 2 hours prior to procedure that will be coordinated by the IR RN charge.     Please contact the IR charge RN at [phone] for estimated time of procedure.     Ms. Wilson is a 80 y/o F w/ h/o extensive bilateral PE and DVT 1998 (thought to be 2/2 tamoxifen for breast cancer treatment) pulmonary MAC, rheumatoid arthritis presenting as an admission for initiation of CTEPH treatment. 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 Request for a Goldberg placement today.     Discussed with resident Mahendra, Cards 2 [phone] today.    [facility] CNP Interventional Radiology ([phone])

## 2020-03-09 NOTE — PROGRESS NOTES
Cardiology Progress Note  Karen Anderson MRN: [id number]  Age: 79 year old, : 1940  Date: 3/5/2020              Assessment and Plan:     Ms. Wilson is a 80 y/o F w/ h/o extensive bilateral PE and DVT  (thought to be 2/2 tamoxifen for breast cancer treatment) pulmonary MAC, rheumatoid arthritis presenting as an admission for initiation of CTEPH treatment.     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 2019 coronary angiogram and right heart catheterization showed normal coronaries, RA 12, /25, PA 98/23/51, PCW 5, thermodilution cardiac output 2.37 L/min, PVR 19.4. Echo on admission with normal EF, mild RVH, moderately dilated/dysfunctional RV, dilated pulmonary artery. RHC on admission with RA of 10, PA 98/30/52m reduce CO with high normal right sided filling pressures. CTEPH based on V/Q scan and CT pulmonary angiogram. Functional class III.  Plan:  -IV Remodulin at 7 mcg/kg/min as of 3/8 AM; goal 8-12          - Increase 1 mcg/kg/min q24h given patient's side effects, can up titrate at home if necessary.          - reglan 5 mg q6h for headache and nausea  -Digoxin 62.5 mcg PO every day  -Diuresis with lasix 20 mg PO, with additional one time dose lasix 20 mg IV  -Bridging to warfarin, on heparin gtt  -maintain PICC for blood draws - patient has been difficult to get lab draws    #hyponatremia  Likely multifactorial. Element of hypervolemia with R sided heart failure. Without significant weight gain and net negative fluids daily since admission - It is also possible that the lasix is causing natruresis. Possible patient has hepar She remains volume-up on exam, so we will continue gentle diuresis.   - additional 20 mg IV lasix  - 20 mg PO lasix.  - fluid restrict 1500 mL   - monitor     #Rheumatoid arthritis:  -Continue  "plaquenil     FEN: Low Na diet  PPX: Heparin     Code Status: FULL CODE     Patient discussed with staff attending, Dr. Luong.     Mahendra Lopez MD  Internal Medicine, PGY-1            Subjective/Interval Events     No acute overnight events. This AM, no nausea, vomiting, SOB, lightheadedness. Ambulating with PT around floor this AM, anxious about deconditioning in the hospital.           Objective     /86 (BP Location: Right leg)   Pulse 67   Temp 98  F (36.7  C) (Oral)   Resp 18   Ht 1.473 m (4' 10\")   Wt 56.5 kg (124 lb 8 oz)   SpO2 92%   BMI 26.02 kg/m    Temp:  [97.7  F (36.5  C)-98  F (36.7  C)] 98  F (36.7  C)  Pulse:  [67] 67  Heart Rate:  [67-74] 67  Resp:  [17-18] 18  BP: ()/(46-86) 119/86  SpO2:  [86 %-94 %] 92 %  Wt Readings from Last 2 Encounters:   03/08/20 56.5 kg (124 lb 8 oz)   03/02/20 57.6 kg (126 lb 14.4 oz)     I/O last 3 completed shifts:  In: 1421.93 [P.O.:1160; I.V.:261.93]  Out: 2050 [Urine:2050]      Gen: No acute distress  HEENT: NC/AT, PERRL, EOM intact, MMM, OP without exudates. Nontender maxillary sinus bilaterally.  PULM/THORAX: normal respiratory effort on RA, mild crackles at bases. Otherwise clear to auscultation.  CV: normal rate, regular rhythm, normal S1 and S2, no murmurs or rubs. JVP 6-8 cm  ABD: Soft, NTND, bowel sounds present, no masses  EXT: WWP. 1+ pitting edema in BLE  NEURO: CN II-XII grossly intact. A&Ox3                Data:     Recent Results (from the past 24 hour(s))   Heparin Xa (10a) Level    Collection Time: 03/08/20 11:48 AM   Result Value Ref Range    Heparin 10A Level 1.98 (HH) IU/mL   Heparin 10a Level    Collection Time: 03/08/20  1:57 PM   Result Value Ref Range    Heparin 10A Level 0.14 IU/mL   Heparin 10a Level    Collection Time: 03/08/20 10:35 PM   Result Value Ref Range    Heparin 10A Level 0.84 IU/mL   Basic metabolic panel    Collection Time: 03/09/20  4:56 AM   Result Value Ref Range    Sodium 126 (L) 133 - 144 mmol/L    " Potassium 5.0 3.4 - 5.3 mmol/L    Chloride 92 (L) 94 - 109 mmol/L    Carbon Dioxide 27 20 - 32 mmol/L    Anion Gap 6 3 - 14 mmol/L    Glucose 90 70 - 99 mg/dL    Urea Nitrogen 24 7 - 30 mg/dL    Creatinine 0.92 0.52 - 1.04 mg/dL    GFR Estimate 60 (L) >60 mL/min/[1.73_m2]    GFR Estimate If Black 70 >60 mL/min/[1.73_m2]    Calcium 9.1 8.5 - 10.1 mg/dL   CBC with platelets    Collection Time: 03/09/20  4:56 AM   Result Value Ref Range    WBC 4.1 4.0 - 11.0 10e9/L    RBC Count 3.94 3.8 - 5.2 10e12/L    Hemoglobin 12.7 11.7 - 15.7 g/dL    Hematocrit 39.2 35.0 - 47.0 %     78 - 100 fl    MCH 32.2 26.5 - 33.0 pg    MCHC 32.4 31.5 - 36.5 g/dL    RDW 12.6 10.0 - 15.0 %    Platelet Count 233 150 - 450 10e9/L   Heparin 10a Level    Collection Time: 03/09/20  4:56 AM   Result Value Ref Range    Heparin 10A Level 0.54 IU/mL       Recent Results (from the past 24 hour(s))   XR Chest Port 1 View    Narrative    EXAM: XR CHEST PORT 1 VW  3/4/2020 4:45 PM     HISTORY:  picc       COMPARISON:  CT chest 3/3/2020    FINDINGS:   Portable semiupright view of the chest. Right upper extremity PICC  line tip projects over the right atrium. The cardiomediastinal  silhouette is mildly enlarged. Low lung volumes. No focal airspace  opacities. The previously seen right middle lobe nodule is not well  visualized. A rounded right perihilar opacity corresponds to the right  lower lobe pulmonary artery en face. No pleural effusion or  pneumothorax. Scoliotic curvature of the thoracic spine. No acute  osseous abnormalities.      Impression    IMPRESSION:   The new right arm PICC tip projects over the high right atrium. No  pneumothorax.    I have personally reviewed the examination and initial interpretation  and I agree with the findings.    MOOK SLOAN MD             Medications     Current Facility-Administered Medications   Medication     acetaminophen (TYLENOL) Suppository 650 mg     acetaminophen (TYLENOL) tablet 325 mg      acetaminophen (TYLENOL) tablet 650 mg     albuterol (PROAIR HFA/PROVENTIL HFA/VENTOLIN HFA) 108 (90 Base) MCG/ACT inhaler 2 puff     aspirin (ASA) chewable tablet 81 mg     beclomethasone HFA (QVAR REDIHALER) 80 MCG/ACT inhaler 2 puff     butalbital-acetaminophen half-tablet .5 mg     cetirizine (zyrTEC) tablet 10 mg     digoxin (LANOXIN) tablet 62.5 mcg     furosemide (LASIX) injection 20 mg     furosemide (LASIX) tablet 20 mg     gabapentin (NEURONTIN) capsule 300 mg     granisetron (KYTRIL) injection 0.5 mg     heparin  drip 25,000 units in 0.45% NaCl 250 mL  ANTICOAGULANT  (see additional administration details for dose)     heparin bolus from infusion pump     heparin lock flush 10 UNIT/ML injection 2-5 mL     hydroxychloroquine (PLAQUENIL) tablet 200 mg     ipratropium (ATROVENT) 0.06 % spray 2 spray     lidocaine (LMX4) cream     lidocaine (LMX4) cream     lidocaine 1 % 0.1-1 mL     lidocaine 1 % 0.1-1 mL     lisinopril (ZESTRIL) tablet 20 mg     loperamide (IMODIUM) capsule 2 mg     magnesium sulfate 2 g in water intermittent infusion     magnesium sulfate 4 g in 100 mL sterile water (premade)     medication instruction     medication instruction     metoclopramide (REGLAN) injection 5 mg     Patient is already receiving anticoagulation with heparin, enoxaparin (LOVENOX), warfarin (COUMADIN)  or other anticoagulant medication     polyethylene glycol (MIRALAX) Packet 17 g     potassium chloride (KLOR-CON) Packet 20-40 mEq     potassium chloride 10 mEq in 100 mL intermittent infusion with 10 mg lidocaine     potassium chloride 10 mEq in 100 mL sterile water intermittent infusion (premix)     potassium chloride 20 mEq in 50 mL intermittent infusion     potassium chloride ER (KLOR-CON M) CR tablet 20-40 mEq     senna-docusate (SENOKOT-S/PERICOLACE) 8.6-50 MG per tablet 2 tablet     sodium chloride (OCEAN) 0.65 % nasal spray 1 spray     sodium chloride (PF) 0.9% PF flush 10 mL     sodium chloride (PF) 0.9%  PF flush 10-20 mL     sodium chloride (PF) 0.9% PF flush 3 mL     sodium chloride (PF) 0.9% PF flush 3 mL     treprostinil (REMODULIN) intravenous infusion

## 2020-03-09 NOTE — PROGRESS NOTES
03/09/20 0739   Quick Adds   Type of Visit Initial Occupational Therapy Evaluation   Living Environment   Lives With spouse   Living Arrangements house   Home Accessibility stairs within home;stairs to enter home   Number of Stairs, Main Entrance 2  (does not have to use)   Stair Railings, Main Entrance railing on right side (ascending)   Number of Stairs, Within Home, Primary   (6 stairs up, 6 stairs down - split entry)   Stair Railings, Within Home, Primary   (both sides to upper level, right side descending to lower )   Transportation Anticipated car, drives self;family or friend will provide   Living Environment Comment Pt lives with spouse in house, both walk in and tub shower present on mainlevel. Laundry downstairs.    Self-Care   Usual Activity Tolerance moderate   Regular Exercise No   Equipment Currently Used at Home walker, rolling   Functional Level   Ambulation 0-->independent   Transferring 0-->independent   Toileting 0-->independent   Bathing 0-->independent   Dressing 0-->independent   Eating 0-->independent   Communication 0-->understands/communicates without difficulty   Cognition 1 - attention or memory deficits   Fall history within last six months no   Which of the above functional risks had a recent onset or change? transferring;ambulation;bathing;toileting;cognition   Prior Functional Level Comment Pt alert and oriented, at times tangential in conversation. Will contiue to monitor cognition.   General Information   Onset of Illness/Injury or Date of Surgery - Date 03/04/20   Referring Physician Karolina Turcios MD   Patient/Family Goals Statement to increase endurance    Additional Occupational Profile Info/Pertinent History of Current Problem Ms. Wilson is a 78 y/o F w/ h/o extensive bilateral PE and DVT 1998 (thought to be 2/2 tamoxifen for breast cancer treatment) pulmonary MAC, rheumatoid arthritis presenting as an admission for initiation of CTEPH treatment.    Precautions/Limitations no known precautions/limitations   Weight-Bearing Status - LUE full weight-bearing   Weight-Bearing Status - RUE full weight-bearing   Weight-Bearing Status - LLE full weight-bearing   Weight-Bearing Status - RLE full weight-bearing   Heart Disease Risk Factors Age;Medical history;Lack of physical activity   General Observations Pt pleasant and motivated for therapy   General Info Comments Activity: ambulate   Cognitive Status Examination   Orientation orientation to person, place and time   Level of Consciousness alert   Follows Commands (Cognition) follows one step commands;75-90% accuracy;repetition of directions required   Cognitive Comment Pt alert and oriented x 4, tangential at times with conversation. Will continue to monitor cognition   Visual Perception   Visual Perception No deficits were identified   Visual Perception Comments Pt wears reading glasses   Sensory Examination   Sensory Comments Pt reports numbness/tinging and burning sensation in B feet, as well as numbness in finger tips, which pt attributes to CTS    Pain Assessment   Patient Currently in Pain No   Integumentary/Edema   Integumentary/Edema Comments swelling in b LEs   Posture   Posture forward head position   Range of Motion (ROM)   ROM Comment WFL   Strength   Strength Comments B UEs 4/5   Hand Strength   Hand Strength Comments bilateral  WNL   Instrumental Activities of Daily Living (IADL)   Previous Responsibilities medication management;housekeeping;driving;finances   IADL Comments Pt receiving assist with heavy IADLs such as carrying laundry baskets and shopping bags, sweeping, vaccuuming, etc   Activities of Daily Living Analysis   Impairments Contributing to Impaired Activities of Daily Living strength decreased;pain;fear and anxiety   General Therapy Interventions   Planned Therapy Interventions IADL retraining;cognition;home program guidelines;progressive activity/exercise;risk factor  "education;strengthening   Clinical Impression   Criteria for Skilled Therapeutic Interventions Met yes, treatment indicated   OT Diagnosis decreased IND with ADLs   Influenced by the following impairments deconditioning, SOB, pain   Assessment of Occupational Performance 3-5 Performance Deficits   Identified Performance Deficits dressing, home management, bathing, leisure   Clinical Decision Making (Complexity) Low complexity   Therapy Frequency 4x/week   Predicted Duration of Therapy Intervention (days/wks) 1 week   Anticipated Equipment Needs at Discharge   (TBD)   Anticipated Discharge Disposition Home with Assist   Risks and Benefits of Treatment have been explained. Yes   Patient, Family & other staff in agreement with plan of care Yes   [facility] TM \"6 Clicks\"   2016, Trustees of [facility], under license to [employer].  All rights reserved.   6 Clicks Short Forms Daily Activity Inpatient Short Form   [email or web address]  \"6 Clicks\" Daily Activity Inpatient Short Form   1. Putting on and taking off regular lower body clothing? 3 - A Little   2. Bathing (including washing, rinsing, drying)? 3 - A Little   3. Toileting, which includes using toilet, bedpan or urinal? 3 - A Little   4. Putting on and taking off regular upper body clothing? 4 - None   5. Taking care of personal grooming such as brushing teeth? 4 - None   6. Eating meals? 4 - None   Daily Activity Raw Score (Score out of 24.Lower scores equate to lower levels of function) 21   Total Evaluation Time   Total Evaluation Time (Minutes) 8     "

## 2020-03-09 NOTE — PRE-PROCEDURE
GENERAL PRE-PROCEDURE:   Procedure:  Tunneled central venous catheter placement     Verbal consent obtained?: Yes    Written consent obtained?: Yes    Risks and benefits: Risks, benefits and alternatives were discussed    Consent given by:  Patient  Patient states understanding of procedure being performed: Yes    Patient's understanding of procedure matches consent: Yes    Procedure consent matches procedure scheduled: Yes    Expected level of sedation:  Moderate  Appropriately NPO:  Yes  ASA Class:  Class 3- Severe systemic disease, definite functional limitations  Mallampati  :  Grade 2- soft palate, base of uvula, tonsillar pillars, and portion of posterior pharyngeal wall visible  Lungs:  Lungs clear with good breath sounds bilaterally  Heart:  Normal heart sounds and rate  History & Physical reviewed:  History and physical reviewed and no updates needed  Statement of review:  I have reviewed the lab findings, diagnostic data, medications, and the plan for sedation

## 2020-03-09 NOTE — PLAN OF CARE
D: Pt admitted for pulmonary hypertension eval and for initiation of remodulin. PMHx breast cancer (L mastectomy), bilateral PE and DVT (1998), pulm MAC, Rheumatoid Arthritis.     I/A: AVSS, on RA while awake. 2L NC on and off while sleeping. Pt desating to 84-86% while on RA but refusing to wear O2 after 0400 d/t fear of worsening partially controlled headache. Scheduled tylenol for headache-mild relief. Kytril given last evening - decrease in headache intensity. One time dose Reglan given for nausea and headache - nausea relieved and headache unchanged. Warm and cold packs also provided for pain. AOx4. Lips are purple. NPO for procedure today. Loose BMx1. Passing lots of gas. Voiding via bedside commode. PIV infusing hep gtt therapeutically at 800 units/hr. Remodulin gtt running at 6ng/kg/min. Up SBA.     P: Plan is for Goldberg placement today. Continue to monitor and follow POC. Notify team of any changes.

## 2020-03-09 NOTE — PHARMACY-ANTICOAGULATION SERVICE
Clinical Pharmacy - Warfarin Dosing Consult     Pharmacy has been consulted to manage this patient s warfarin therapy.  Indication: DVT/ PE Treatment  Therapy Goal: INR 2-3  Provider/Team: Cards 2  Warfarin Prior to Admission: No. (Pt was last on in 1998.)  Significant drug interactions: aspirin    INR   Date Value Ref Range Status   03/02/2020 1.07 0.86 - 1.14 Final       Recommend warfarin 5 mg today.  Pharmacy will monitor Karen Anderson daily and order warfarin doses to achieve specified goal.      Please contact pharmacy as soon as possible if the warfarin needs to be held for a procedure or if the warfarin goals change.

## 2020-03-10 ENCOUNTER — ANTICOAGULATION THERAPY VISIT (OUTPATIENT)
Dept: ANTICOAGULATION | Facility: CLINIC | Age: 80
End: 2020-03-10

## 2020-03-10 DIAGNOSIS — I27.20 PULMONARY HYPERTENSION (H): ICD-10-CM

## 2020-03-10 DIAGNOSIS — I27.24 CHRONIC THROMBOEMBOLIC PULMONARY HYPERTENSION (H): Primary | ICD-10-CM

## 2020-03-10 LAB
ANION GAP SERPL CALCULATED.3IONS-SCNC: 5 MMOL/L (ref 3–14)
BUN SERPL-MCNC: 24 MG/DL (ref 7–30)
CALCIUM SERPL-MCNC: 9.4 MG/DL (ref 8.5–10.1)
CHLORIDE SERPL-SCNC: 92 MMOL/L (ref 94–109)
CO2 SERPL-SCNC: 30 MMOL/L (ref 20–32)
CREAT SERPL-MCNC: 1.17 MG/DL (ref 0.52–1.04)
DIGOXIN SERPL-MCNC: 0.7 UG/L (ref 0.5–2)
ERYTHROCYTE [DISTWIDTH] IN BLOOD BY AUTOMATED COUNT: 12.9 % (ref 10–15)
GFR SERPL CREATININE-BSD FRML MDRD: 45 ML/MIN/{1.73_M2}
GLUCOSE SERPL-MCNC: 83 MG/DL (ref 70–99)
HCT VFR BLD AUTO: 37.5 % (ref 35–47)
HGB BLD-MCNC: 12.2 G/DL (ref 11.7–15.7)
INR PPP: 1.08 (ref 0.86–1.14)
LMWH PPP CHRO-ACNC: 0.67 IU/ML
MCH RBC QN AUTO: 32.6 PG (ref 26.5–33)
MCHC RBC AUTO-ENTMCNC: 32.5 G/DL (ref 31.5–36.5)
MCV RBC AUTO: 100 FL (ref 78–100)
PLATELET # BLD AUTO: 236 10E9/L (ref 150–450)
POTASSIUM SERPL-SCNC: 4.6 MMOL/L (ref 3.4–5.3)
RBC # BLD AUTO: 3.74 10E12/L (ref 3.8–5.2)
SODIUM SERPL-SCNC: 127 MMOL/L (ref 133–144)
WBC # BLD AUTO: 5.3 10E9/L (ref 4–11)

## 2020-03-10 PROCEDURE — 25000128 H RX IP 250 OP 636: Performed by: STUDENT IN AN ORGANIZED HEALTH CARE EDUCATION/TRAINING PROGRAM

## 2020-03-10 PROCEDURE — 85610 PROTHROMBIN TIME: CPT | Performed by: STUDENT IN AN ORGANIZED HEALTH CARE EDUCATION/TRAINING PROGRAM

## 2020-03-10 PROCEDURE — 80162 ASSAY OF DIGOXIN TOTAL: CPT | Performed by: STUDENT IN AN ORGANIZED HEALTH CARE EDUCATION/TRAINING PROGRAM

## 2020-03-10 PROCEDURE — 25000132 ZZH RX MED GY IP 250 OP 250 PS 637: Performed by: STUDENT IN AN ORGANIZED HEALTH CARE EDUCATION/TRAINING PROGRAM

## 2020-03-10 PROCEDURE — 99232 SBSQ HOSP IP/OBS MODERATE 35: CPT | Mod: GC | Performed by: INTERNAL MEDICINE

## 2020-03-10 PROCEDURE — 40000802 ZZH SITE CHECK

## 2020-03-10 PROCEDURE — 85520 HEPARIN ASSAY: CPT | Performed by: STUDENT IN AN ORGANIZED HEALTH CARE EDUCATION/TRAINING PROGRAM

## 2020-03-10 PROCEDURE — 21400000 ZZH R&B CCU UMMC

## 2020-03-10 PROCEDURE — 27210995 ZZH RX 272: Performed by: STUDENT IN AN ORGANIZED HEALTH CARE EDUCATION/TRAINING PROGRAM

## 2020-03-10 PROCEDURE — 85027 COMPLETE CBC AUTOMATED: CPT | Performed by: STUDENT IN AN ORGANIZED HEALTH CARE EDUCATION/TRAINING PROGRAM

## 2020-03-10 PROCEDURE — 36592 COLLECT BLOOD FROM PICC: CPT | Performed by: STUDENT IN AN ORGANIZED HEALTH CARE EDUCATION/TRAINING PROGRAM

## 2020-03-10 PROCEDURE — 80048 BASIC METABOLIC PNL TOTAL CA: CPT | Performed by: STUDENT IN AN ORGANIZED HEALTH CARE EDUCATION/TRAINING PROGRAM

## 2020-03-10 PROCEDURE — 25000132 ZZH RX MED GY IP 250 OP 250 PS 637: Performed by: INTERNAL MEDICINE

## 2020-03-10 PROCEDURE — 25000132 ZZH RX MED GY IP 250 OP 250 PS 637: Performed by: SURGERY

## 2020-03-10 RX ORDER — LANOLIN ALCOHOL/MO/W.PET/CERES
3 CREAM (GRAM) TOPICAL
Status: DISCONTINUED | OUTPATIENT
Start: 2020-03-10 | End: 2020-03-20 | Stop reason: HOSPADM

## 2020-03-10 RX ORDER — WARFARIN SODIUM 5 MG/1
5 TABLET ORAL
Status: COMPLETED | OUTPATIENT
Start: 2020-03-10 | End: 2020-03-10

## 2020-03-10 RX ADMIN — ACETAMINOPHEN 325 MG: 325 TABLET, FILM COATED ORAL at 08:40

## 2020-03-10 RX ADMIN — ACETAMINOPHEN 650 MG: 325 TABLET, FILM COATED ORAL at 17:35

## 2020-03-10 RX ADMIN — METOCLOPRAMIDE 5 MG: 5 INJECTION, SOLUTION INTRAMUSCULAR; INTRAVENOUS at 02:21

## 2020-03-10 RX ADMIN — WARFARIN SODIUM 5 MG: 5 TABLET ORAL at 17:34

## 2020-03-10 RX ADMIN — FUROSEMIDE 20 MG: 20 TABLET ORAL at 08:39

## 2020-03-10 RX ADMIN — HEPARIN SODIUM 800 UNITS/HR: 10000 INJECTION, SOLUTION INTRAVENOUS at 14:34

## 2020-03-10 RX ADMIN — TREPROSTINIL 7 NG/KG/MIN: 20 INJECTION, SOLUTION INTRAVENOUS; SUBCUTANEOUS at 02:13

## 2020-03-10 RX ADMIN — ASPIRIN 81 MG CHEWABLE TABLET 81 MG: 81 TABLET CHEWABLE at 19:51

## 2020-03-10 RX ADMIN — MELATONIN TAB 3 MG 3 MG: 3 TAB at 20:11

## 2020-03-10 RX ADMIN — GRANISETRON HYDROCHLORIDE 0.5 MG: 1 INJECTION INTRAVENOUS at 19:48

## 2020-03-10 RX ADMIN — CETIRIZINE HYDROCHLORIDE 10 MG: 10 TABLET, FILM COATED ORAL at 19:52

## 2020-03-10 RX ADMIN — DIGOXIN 62.5 MCG: 0.06 TABLET ORAL at 08:39

## 2020-03-10 RX ADMIN — GABAPENTIN 300 MG: 300 CAPSULE ORAL at 08:40

## 2020-03-10 RX ADMIN — GABAPENTIN 300 MG: 300 CAPSULE ORAL at 13:59

## 2020-03-10 RX ADMIN — METOCLOPRAMIDE 5 MG: 5 INJECTION, SOLUTION INTRAMUSCULAR; INTRAVENOUS at 20:16

## 2020-03-10 RX ADMIN — ACETAMINOPHEN 325 MG: 325 TABLET, FILM COATED ORAL at 14:20

## 2020-03-10 RX ADMIN — ACETAMINOPHEN 650 MG: 325 TABLET, FILM COATED ORAL at 12:06

## 2020-03-10 RX ADMIN — SALINE NASAL SPRAY 1 SPRAY: 1.5 SOLUTION NASAL at 20:03

## 2020-03-10 RX ADMIN — ACETAMINOPHEN 650 MG: 325 TABLET, FILM COATED ORAL at 00:18

## 2020-03-10 RX ADMIN — METOCLOPRAMIDE 5 MG: 5 INJECTION, SOLUTION INTRAMUSCULAR; INTRAVENOUS at 14:20

## 2020-03-10 RX ADMIN — SENNOSIDES AND DOCUSATE SODIUM 2 TABLET: 8.6; 5 TABLET ORAL at 08:39

## 2020-03-10 RX ADMIN — GABAPENTIN 300 MG: 300 CAPSULE ORAL at 19:51

## 2020-03-10 RX ADMIN — ACETAMINOPHEN 650 MG: 325 TABLET, FILM COATED ORAL at 06:16

## 2020-03-10 RX ADMIN — GRANISETRON HYDROCHLORIDE 0.5 MG: 1 INJECTION INTRAVENOUS at 08:41

## 2020-03-10 RX ADMIN — HYDROXYCHLOROQUINE SULFATE 200 MG: 200 TABLET, FILM COATED ORAL at 08:37

## 2020-03-10 RX ADMIN — METOCLOPRAMIDE 5 MG: 5 INJECTION, SOLUTION INTRAMUSCULAR; INTRAVENOUS at 08:45

## 2020-03-10 RX ADMIN — Medication 1 HALF-TAB: at 16:07

## 2020-03-10 RX ADMIN — LISINOPRIL 20 MG: 20 TABLET ORAL at 08:39

## 2020-03-10 RX ADMIN — TREPROSTINIL 7 NG/KG/MIN: 20 INJECTION, SOLUTION INTRAVENOUS; SUBCUTANEOUS at 10:43

## 2020-03-10 RX ADMIN — TREPROSTINIL 6 NG/KG/MIN: 20 INJECTION, SOLUTION INTRAVENOUS; SUBCUTANEOUS at 19:36

## 2020-03-10 ASSESSMENT — ACTIVITIES OF DAILY LIVING (ADL)
ADLS_ACUITY_SCORE: 17

## 2020-03-10 ASSESSMENT — PAIN DESCRIPTION - DESCRIPTORS
DESCRIPTORS: HEADACHE

## 2020-03-10 ASSESSMENT — MIFFLIN-ST. JEOR: SCORE: 921.33

## 2020-03-10 NOTE — PROGRESS NOTES
Cardiology Progress Note  Karen Anderson MRN: [id number]  Age: 79 year old, : 1940  Date: 3/10/2020              Assessment and Plan:     Ms. Wilson is a 78 y/o F w/ h/o extensive bilateral PE and DVT  (thought to be 2/2 tamoxifen for breast cancer treatment) pulmonary MAC, rheumatoid arthritis presenting as an admission for initiation of CTEPH treatment.     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 2019 coronary angiogram and right heart catheterization showed normal coronaries, RA 12, /25, PA 98/23/51, PCW 5, thermodilution cardiac output 2.37 L/min, PVR 19.4. Echo on admission with normal EF, mild RVH, moderately dilated/dysfunctional RV, dilated pulmonary artery. RHC on admission with RA of 10, PA 98/30/52m reduce CO with high normal right sided filling pressures. CTEPH based on V/Q scan and CT pulmonary angiogram. Functional class III.  Plan:  -IV Remodulin at 7 mcg/kg/min as of 03/10 AM; goal 8-12          - Given patient's side effects will decrease to 6ng/kg/min           - reglan 5 mg q6h for headache and nausea          - Will continue to monitor  -Digoxin 62.5 mcg PO every day  -Diuresis with lasix 20 mg PO, with additional one time dose lasix 20 mg IV  -Bridging to warfarin, on heparin gtt   > Goal INR: 2-3; Latest INR: 1.08 as of 03/10/2020   > Will repeat INR tomorrow  - Maintain PICC for blood draws - patient has been difficult to get lab draws  - Will receive Remodulin teaching points some time this week (scheduled by our nurse coordinator)    #Hyponatremia   > Multifactorial; main potential culprit hypervolemia    Likely multifactorial. Element of hypervolemia with R sided heart failure. Without significant weight gain and net negative fluids daily since admission - It  "is also possible that the lasix is causing natruresis. Possible patient has hepar She remains volume-up on exam, so we will continue gentle diuresis.      Most recent sodium levels showing some degree of improvement (127 as of 03/10/2020). This confirms mean mechanism of action could be hypervolemia.   - 20 mg PO lasix.  - fluid restrict 1500 mL   - Continue monitoring.      #Rheumatoid arthritis:  -Continue plaquenil     FEN: Low Na diet  PPX: Heparin     Code Status: FULL CODE     Patient discussed with staff attending, Dr. Luong.     Jake Mathew  Internal Medicine Residency, PGY-1  [facility]            Subjective/Interval Events     No acute overnight events. However, patient addressing persistent/intermittent headaches (at times excruciating) which seem to be modestly improved with the use of PRN Reglan and acetaminophen. Patient continues on Remodulin 7mng/kg/min. However, no reports of nausea/vomiting, SOB, chest pain, light headedness, orthopnea, PND or worsening lower extremity edema. Has been ambulating as able with PT.          Objective     /52 (BP Location: Right leg)   Pulse 64   Temp 98.5  F (36.9  C) (Oral)   Resp 18   Ht 1.473 m (4' 10\")   Wt 54.7 kg (120 lb 8 oz)   SpO2 (!) 89%   BMI 25.18 kg/m    Temp:  [97.6  F (36.4  C)-98.5  F (36.9  C)] 98.5  F (36.9  C)  Pulse:  [64-70] 64  Heart Rate:  [58-74] 74  Resp:  [12-18] 18  BP: ()/(37-93) 107/52  SpO2:  [89 %-98 %] 89 %  Wt Readings from Last 2 Encounters:   03/10/20 54.7 kg (120 lb 8 oz)   03/02/20 57.6 kg (126 lb 14.4 oz)     I/O last 3 completed shifts:  In: 1248.34 [P.O.:900; I.V.:348.34]  Out: 2150 [Urine:2150]      Gen: No acute distress  HEENT: NC/AT, PERRL, EOM intact, MMM, OP without exudates. Nontender maxillary sinus bilaterally.  PULM/THORAX: normal respiratory effort on RA, mild crackles at bases. Otherwise clear to auscultation.  CV: normal rate, regular rhythm, normal S1 and S2, no " murmurs or rubs. JVP 6-8 cm  ABD: Soft, NTND, bowel sounds present, no masses  EXT: WWP. 1+ pitting edema in BLE  NEURO: CN II-XII grossly intact. A&Ox3                Data:     Recent Results (from the past 24 hour(s))   Heparin 10a Level    Collection Time: 03/09/20  9:28 PM   Result Value Ref Range    Heparin 10A Level 0.52 IU/mL   Basic metabolic panel    Collection Time: 03/10/20  7:06 AM   Result Value Ref Range    Sodium 127 (L) 133 - 144 mmol/L    Potassium 4.6 3.4 - 5.3 mmol/L    Chloride 92 (L) 94 - 109 mmol/L    Carbon Dioxide 30 20 - 32 mmol/L    Anion Gap 5 3 - 14 mmol/L    Glucose 83 70 - 99 mg/dL    Urea Nitrogen 24 7 - 30 mg/dL    Creatinine 1.17 (H) 0.52 - 1.04 mg/dL    GFR Estimate 45 (L) >60 mL/min/[1.73_m2]    GFR Estimate If Black 52 (L) >60 mL/min/[1.73_m2]    Calcium 9.4 8.5 - 10.1 mg/dL   CBC with platelets    Collection Time: 03/10/20  7:06 AM   Result Value Ref Range    WBC 5.3 4.0 - 11.0 10e9/L    RBC Count 3.74 (L) 3.8 - 5.2 10e12/L    Hemoglobin 12.2 11.7 - 15.7 g/dL    Hematocrit 37.5 35.0 - 47.0 %     78 - 100 fl    MCH 32.6 26.5 - 33.0 pg    MCHC 32.5 31.5 - 36.5 g/dL    RDW 12.9 10.0 - 15.0 %    Platelet Count 236 150 - 450 10e9/L   INR    Collection Time: 03/10/20  7:06 AM   Result Value Ref Range    INR 1.08 0.86 - 1.14   Digoxin level    Collection Time: 03/10/20  7:06 AM   Result Value Ref Range    Digoxin Level 0.7 0.5 - 2.0 ug/L   Heparin 10a Level    Collection Time: 03/10/20  7:06 AM   Result Value Ref Range    Heparin 10A Level 0.67 IU/mL       Recent Results (from the past 24 hour(s))   XR Chest Port 1 View    Narrative    EXAM: XR CHEST PORT 1 VW  3/4/2020 4:45 PM     HISTORY:  picc       COMPARISON:  CT chest 3/3/2020    FINDINGS:   Portable semiupright view of the chest. Right upper extremity PICC  line tip projects over the right atrium. The cardiomediastinal  silhouette is mildly enlarged. Low lung volumes. No focal airspace  opacities. The previously seen right  middle lobe nodule is not well  visualized. A rounded right perihilar opacity corresponds to the right  lower lobe pulmonary artery en face. No pleural effusion or  pneumothorax. Scoliotic curvature of the thoracic spine. No acute  osseous abnormalities.      Impression    IMPRESSION:   The new right arm PICC tip projects over the high right atrium. No  pneumothorax.    I have personally reviewed the examination and initial interpretation  and I agree with the findings.    MOOK SLOAN MD             Medications     Current Facility-Administered Medications   Medication     acetaminophen (TYLENOL) Suppository 650 mg     acetaminophen (TYLENOL) tablet 325 mg     acetaminophen (TYLENOL) tablet 650 mg     albuterol (PROAIR HFA/PROVENTIL HFA/VENTOLIN HFA) 108 (90 Base) MCG/ACT inhaler 2 puff     aspirin (ASA) chewable tablet 81 mg     beclomethasone HFA (QVAR REDIHALER) 80 MCG/ACT inhaler 2 puff     butalbital-acetaminophen half-tablet .5 mg     cetirizine (zyrTEC) tablet 10 mg     digoxin (LANOXIN) tablet 62.5 mcg     furosemide (LASIX) tablet 20 mg     gabapentin (NEURONTIN) capsule 300 mg     granisetron (KYTRIL) injection 0.5 mg     heparin  drip 25,000 units in 0.45% NaCl 250 mL  ANTICOAGULANT  (see additional administration details for dose)     heparin bolus from infusion pump     heparin lock flush 10 UNIT/ML injection 5 mL     hydroxychloroquine (PLAQUENIL) tablet 200 mg     ipratropium (ATROVENT) 0.06 % spray 2 spray     lidocaine (LMX4) cream     lidocaine 1 % 0.1-1 mL     lisinopril (ZESTRIL) tablet 20 mg     loperamide (IMODIUM) capsule 2 mg     magnesium sulfate 2 g in water intermittent infusion     magnesium sulfate 4 g in 100 mL sterile water (premade)     medication instruction     medication instruction     metoclopramide (REGLAN) injection 5 mg     Patient is already receiving anticoagulation with heparin, enoxaparin (LOVENOX), warfarin (COUMADIN)  or other anticoagulant medication      polyethylene glycol (MIRALAX) Packet 17 g     potassium chloride (KLOR-CON) Packet 20-40 mEq     potassium chloride 10 mEq in 100 mL intermittent infusion with 10 mg lidocaine     potassium chloride 10 mEq in 100 mL sterile water intermittent infusion (premix)     potassium chloride 20 mEq in 50 mL intermittent infusion     potassium chloride ER (KLOR-CON M) CR tablet 20-40 mEq     senna-docusate (SENOKOT-S/PERICOLACE) 8.6-50 MG per tablet 2 tablet     sodium chloride (OCEAN) 0.65 % nasal spray 1 spray     sodium chloride (PF) 0.9% PF flush 10 mL     sodium chloride (PF) 0.9% PF flush 10-20 mL     sodium chloride (PF) 0.9% PF flush 10-20 mL     sodium chloride (PF) 0.9% PF flush 3 mL     sodium chloride (PF) 0.9% PF flush 3 mL     treprostinil (REMODULIN) intravenous infusion     Warfarin Therapy Reminder (Check START DATE - warfarin may be starting in the FUTURE)

## 2020-03-10 NOTE — PROGRESS NOTES
Patient is currently admitted to the hospital after a RHC on 3/4.  Pt was diagnosed with chronic thromboembolic pulmonary hypertension.  Patient is currently being bridged with Heparin to warfarin. Currently awaiting discharge.

## 2020-03-10 NOTE — PLAN OF CARE
Alert and oriented x4. VSs on RA. SB/SR. Headache managed with Tylenol and Reglan. On 2 gram Na diet with 1.5L FR. Intermittent nausea. Small hard BM overnight. Oliguric. PIV infusing Heparin at 800 units/hr. Next 10A with morning labs. Goldberg infusing Remodulin at 7ng/kg/min. Up with standby assist. Pt slept between cares. Plan for [facility] teaching this afternoon. Will continue to monitor and notify team accordingly.

## 2020-03-10 NOTE — PLAN OF CARE
D: Pt admitted on 3/4/20 for initiation of CTEPH treatment (Remodulin). Hx of breast CA (L mastectomy), bilateral PE and DVT (1998, thought to be 2/2 tamoxifen for breast cancer treatment) pulmonary MAC, and RA.     I/A: Pt alert and oriented x4. Pt sinus rhythm with HRs 60-80s. On RA with O2 sats >90%. Remodulin increased to 7 ng/kg/min this afternoon. Pt reports a constant headache; scheduled and PRN tylenol given, ibuprofen ordered 1x and given. Pt appetite poor, reports upset stomach/nausea, reglan and kytril given. Last BM today. Pt voiding. Up with SBA in room. Hep gtt continued therapeutically at 800 units/hr. Pt started on warfarin this evening, goal INR 2-3. Right Goldberg clean/dry/intact, now infusing remodulin. Right PICC clean/dry/intact.      P: Pt scheduled for Remodulin home infusion teaching tomorrow afternoon. Continue with titration of Remodulin, goal rate of 8-12 ng/kg/min. Continue to monitor and assess pt with every encounter. Notify Cards 2 with any changes or concerns.

## 2020-03-10 NOTE — PROGRESS NOTES
Care Coordinator Progress Note    Admission Date/Time:  3/4/2020  Attending MD:  Karolina Turcios MD    Data  Chart reviewed, discussed with interdisciplinary team.   Patient was admitted for:    Localized edema  SOB (shortness of breath)  Iron deficiency  Dyslipidemia  Pulmonary hypertension (H)  Need for hepatitis B screening test  Localized edema  SOB (shortness of breath)  Iron deficiency  Dyslipidemia  Pulmonary hypertension (H)  Need for hepatitis B screening test.    Concerns with insurance coverage for discharge needs: None.  Current Living Situation: Patient lives with spouse.  Support System: Supportive and Involved  Services Involved: none  Transportation at Discharge: Family or friend will provide  Transportation to Medical Appointments:   - spouse  Barriers to Discharge: medical stability, remodulin set up    Coordination of Care    Per team patient will discharge home when medically ready with NEW remodulin, NEW jade and NEW warfarin.     Spoke with PRIYA Landers Manager [facility] [phone] who confirmed teaching will start today around 2 or 3PM with patient and . Prior Auth is still pending.     Met with patient to introduce RNCC role and discuss warfarin management at discharge. She would like referral sent to [facility] medication monitoring clinic and plans to have labs drawn at [facility]. Referral sent, RNCC will schedule first lab appt once discharge date determined.     Plan  Anticipated Discharge Date:  TBD, later this week  Anticipated Discharge Plan:  Home with remodulin, warfarin    Tania Arora RN, [place]  Float Care Coordinator  Pager: [phone]    For Weekend RNCC  dial * * [phone] from [employer] phone only  enter code [phone] at prompt.

## 2020-03-11 ENCOUNTER — APPOINTMENT (OUTPATIENT)
Dept: OCCUPATIONAL THERAPY | Facility: CLINIC | Age: 80
DRG: 287 | End: 2020-03-11
Attending: INTERNAL MEDICINE
Payer: COMMERCIAL

## 2020-03-11 LAB
ANION GAP SERPL CALCULATED.3IONS-SCNC: 7 MMOL/L (ref 3–14)
BUN SERPL-MCNC: 18 MG/DL (ref 7–30)
CALCIUM SERPL-MCNC: 9.6 MG/DL (ref 8.5–10.1)
CHLORIDE SERPL-SCNC: 95 MMOL/L (ref 94–109)
CO2 SERPL-SCNC: 28 MMOL/L (ref 20–32)
CREAT SERPL-MCNC: 1.03 MG/DL (ref 0.52–1.04)
ERYTHROCYTE [DISTWIDTH] IN BLOOD BY AUTOMATED COUNT: 13 % (ref 10–15)
GFR SERPL CREATININE-BSD FRML MDRD: 52 ML/MIN/{1.73_M2}
GLUCOSE SERPL-MCNC: 80 MG/DL (ref 70–99)
HCT VFR BLD AUTO: 39.4 % (ref 35–47)
HGB BLD-MCNC: 12.8 G/DL (ref 11.7–15.7)
INR PPP: 1.35 (ref 0.86–1.14)
LMWH PPP CHRO-ACNC: 0.65 IU/ML
LMWH PPP CHRO-ACNC: 0.75 IU/ML
MAGNESIUM SERPL-MCNC: 1.9 MG/DL (ref 1.6–2.3)
MCH RBC QN AUTO: 32.7 PG (ref 26.5–33)
MCHC RBC AUTO-ENTMCNC: 32.5 G/DL (ref 31.5–36.5)
MCV RBC AUTO: 101 FL (ref 78–100)
PLATELET # BLD AUTO: 228 10E9/L (ref 150–450)
POTASSIUM SERPL-SCNC: 4.9 MMOL/L (ref 3.4–5.3)
RBC # BLD AUTO: 3.92 10E12/L (ref 3.8–5.2)
SODIUM SERPL-SCNC: 129 MMOL/L (ref 133–144)
WBC # BLD AUTO: 5.4 10E9/L (ref 4–11)

## 2020-03-11 PROCEDURE — 25000132 ZZH RX MED GY IP 250 OP 250 PS 637: Performed by: STUDENT IN AN ORGANIZED HEALTH CARE EDUCATION/TRAINING PROGRAM

## 2020-03-11 PROCEDURE — 36592 COLLECT BLOOD FROM PICC: CPT | Performed by: INTERNAL MEDICINE

## 2020-03-11 PROCEDURE — 40000802 ZZH SITE CHECK

## 2020-03-11 PROCEDURE — 36592 COLLECT BLOOD FROM PICC: CPT | Performed by: STUDENT IN AN ORGANIZED HEALTH CARE EDUCATION/TRAINING PROGRAM

## 2020-03-11 PROCEDURE — 25000128 H RX IP 250 OP 636: Performed by: STUDENT IN AN ORGANIZED HEALTH CARE EDUCATION/TRAINING PROGRAM

## 2020-03-11 PROCEDURE — 40000558 ZZH STATISTIC CVC DRESSING CHANGE

## 2020-03-11 PROCEDURE — 85027 COMPLETE CBC AUTOMATED: CPT | Performed by: STUDENT IN AN ORGANIZED HEALTH CARE EDUCATION/TRAINING PROGRAM

## 2020-03-11 PROCEDURE — 97110 THERAPEUTIC EXERCISES: CPT | Mod: GO

## 2020-03-11 PROCEDURE — 85520 HEPARIN ASSAY: CPT | Performed by: STUDENT IN AN ORGANIZED HEALTH CARE EDUCATION/TRAINING PROGRAM

## 2020-03-11 PROCEDURE — 85610 PROTHROMBIN TIME: CPT | Performed by: STUDENT IN AN ORGANIZED HEALTH CARE EDUCATION/TRAINING PROGRAM

## 2020-03-11 PROCEDURE — 21400000 ZZH R&B CCU UMMC

## 2020-03-11 PROCEDURE — 25000132 ZZH RX MED GY IP 250 OP 250 PS 637: Performed by: INTERNAL MEDICINE

## 2020-03-11 PROCEDURE — 85520 HEPARIN ASSAY: CPT | Performed by: INTERNAL MEDICINE

## 2020-03-11 PROCEDURE — 83735 ASSAY OF MAGNESIUM: CPT | Performed by: STUDENT IN AN ORGANIZED HEALTH CARE EDUCATION/TRAINING PROGRAM

## 2020-03-11 PROCEDURE — 99232 SBSQ HOSP IP/OBS MODERATE 35: CPT | Mod: GC | Performed by: INTERNAL MEDICINE

## 2020-03-11 PROCEDURE — 80048 BASIC METABOLIC PNL TOTAL CA: CPT | Performed by: STUDENT IN AN ORGANIZED HEALTH CARE EDUCATION/TRAINING PROGRAM

## 2020-03-11 PROCEDURE — 27210995 ZZH RX 272: Performed by: STUDENT IN AN ORGANIZED HEALTH CARE EDUCATION/TRAINING PROGRAM

## 2020-03-11 RX ORDER — IPRATROPIUM BROMIDE 42 UG/1
2 SPRAY, METERED NASAL 3 TIMES DAILY PRN
Status: DISCONTINUED | OUTPATIENT
Start: 2020-03-11 | End: 2020-03-20 | Stop reason: HOSPADM

## 2020-03-11 RX ORDER — WARFARIN SODIUM 2.5 MG/1
2.5 TABLET ORAL
Status: COMPLETED | OUTPATIENT
Start: 2020-03-11 | End: 2020-03-11

## 2020-03-11 RX ADMIN — GABAPENTIN 300 MG: 300 CAPSULE ORAL at 21:12

## 2020-03-11 RX ADMIN — MAGNESIUM SULFATE 2 G: 2 INJECTION INTRAVENOUS at 10:31

## 2020-03-11 RX ADMIN — FUROSEMIDE 20 MG: 20 TABLET ORAL at 10:27

## 2020-03-11 RX ADMIN — GABAPENTIN 300 MG: 300 CAPSULE ORAL at 10:27

## 2020-03-11 RX ADMIN — LISINOPRIL 20 MG: 20 TABLET ORAL at 10:30

## 2020-03-11 RX ADMIN — SALINE NASAL SPRAY 1 SPRAY: 1.5 SOLUTION NASAL at 17:40

## 2020-03-11 RX ADMIN — CETIRIZINE HYDROCHLORIDE 10 MG: 10 TABLET, FILM COATED ORAL at 21:12

## 2020-03-11 RX ADMIN — WARFARIN SODIUM 2.5 MG: 2.5 TABLET ORAL at 17:39

## 2020-03-11 RX ADMIN — METOCLOPRAMIDE 5 MG: 5 INJECTION, SOLUTION INTRAMUSCULAR; INTRAVENOUS at 14:26

## 2020-03-11 RX ADMIN — ACETAMINOPHEN 650 MG: 325 TABLET, FILM COATED ORAL at 12:03

## 2020-03-11 RX ADMIN — HEPARIN SODIUM 700 UNITS/HR: 10000 INJECTION, SOLUTION INTRAVENOUS at 23:45

## 2020-03-11 RX ADMIN — SENNOSIDES AND DOCUSATE SODIUM 2 TABLET: 8.6; 5 TABLET ORAL at 10:53

## 2020-03-11 RX ADMIN — ACETAMINOPHEN 650 MG: 325 TABLET, FILM COATED ORAL at 23:45

## 2020-03-11 RX ADMIN — GRANISETRON HYDROCHLORIDE 0.5 MG: 1 INJECTION INTRAVENOUS at 10:26

## 2020-03-11 RX ADMIN — GABAPENTIN 300 MG: 300 CAPSULE ORAL at 14:21

## 2020-03-11 RX ADMIN — METOCLOPRAMIDE 5 MG: 5 INJECTION, SOLUTION INTRAMUSCULAR; INTRAVENOUS at 21:13

## 2020-03-11 RX ADMIN — HYDROXYCHLOROQUINE SULFATE 200 MG: 200 TABLET, FILM COATED ORAL at 10:30

## 2020-03-11 RX ADMIN — ACETAMINOPHEN 650 MG: 325 TABLET, FILM COATED ORAL at 17:39

## 2020-03-11 RX ADMIN — ASPIRIN 81 MG CHEWABLE TABLET 81 MG: 81 TABLET CHEWABLE at 21:12

## 2020-03-11 RX ADMIN — ACETAMINOPHEN 650 MG: 325 TABLET, FILM COATED ORAL at 00:00

## 2020-03-11 RX ADMIN — TREPROSTINIL 6 NG/KG/MIN: 20 INJECTION, SOLUTION INTRAVENOUS; SUBCUTANEOUS at 05:56

## 2020-03-11 RX ADMIN — DIGOXIN 62.5 MCG: 0.06 TABLET ORAL at 10:29

## 2020-03-11 RX ADMIN — TREPROSTINIL 6 NG/KG/MIN: 20 INJECTION, SOLUTION INTRAVENOUS; SUBCUTANEOUS at 15:22

## 2020-03-11 RX ADMIN — ACETAMINOPHEN 650 MG: 325 TABLET, FILM COATED ORAL at 05:59

## 2020-03-11 RX ADMIN — GRANISETRON HYDROCHLORIDE 0.5 MG: 1 INJECTION INTRAVENOUS at 21:13

## 2020-03-11 ASSESSMENT — ACTIVITIES OF DAILY LIVING (ADL)
ADLS_ACUITY_SCORE: 17

## 2020-03-11 ASSESSMENT — PAIN DESCRIPTION - DESCRIPTORS
DESCRIPTORS: HEADACHE
DESCRIPTORS: HEADACHE

## 2020-03-11 ASSESSMENT — MIFFLIN-ST. JEOR: SCORE: 923.6

## 2020-03-11 NOTE — PLAN OF CARE
[id number]  D: Patient with history of bilateral PE and DVT admitted 3/4/20 for treatment of CTEPH.    I/A: Monitored vitals and assessed patient status. A0x4. VSS. Afebrile. Urinating adequately via commode. Patient reports intermittent nausea and consistent headache despite scheduled Tylenol, IV Kytril and PRN meds. Cards 2 notified and decision made to decrease Remodulin drip to 6 ng. Ambulating with SB assist in hallway.     Completed: IV infusion education with [facility]  Running: Remodulin at 6 ng/kg/min        Heparin at 800 U/hr  PRN: Reglan, Tylenol    P: Continue to monitor patient status and report changes to treatment team. Plan for Coumadin teaching with [facility] (date and time to be scheduled) and discharge when stable.

## 2020-03-11 NOTE — PLAN OF CARE
OT 6C  Discharge Planner OT   Patient plan for discharge: home   Current status: Pt tolerating ~700 feet functional mobility SBA with 1 seated rest break. Completing 3 stairs x 2 sets CGA and use of unilateral hand railing. Instructed pt in LE seated exercises and modifications and additions made to UE HEP. SpO2 upper 80s with activity but quick to recover with rest break.  Barriers to return to prior living situation: medical status   Recommendations for discharge: home with assist   Rationale for recommendations: Pt mobilizing well and anticipate no concerns for discharge home.        Entered by: Maria Moon 03/11/2020 1:44 PM

## 2020-03-11 NOTE — PROGRESS NOTES
Alert and oriented x4. VSS on RA. SB/SR. Headache managed with scheduled  Tylenol. On 2 gram Na diet with 1.5L FR. Intermittent nausea. No BM. Voiding adequate amounts. PIV infusing Heparin at 800 units/hr. Goldberg infusing Remodulin at 6ng/kg/min. Up with standby assist. Pt slept between cares. Will continue to monitor and notify team accordingly.

## 2020-03-11 NOTE — PLAN OF CARE
D/A/I:  Patient A&O x4, denied palpitations, difficulty breathing, SOB, dizziness, and nausea.  Lung sounds clear to auscultation, no abnormal heart sounds noted.  Had 1-2+ edema in legs and feet, was given scheduled furosemide.  Patient had good urine output, see I&O flowsheet.  Reported constipation, bowel sounds hypoactive.  Was given scheduled stool softener, had moderate amount of stool during shift.  In sinus rhythm with occasional to frequent PACs, HR 60s-80s, SBP 120s-160s, SaO2 92-94% on room air.  Treprostinil infusion continued at 6 ng/kg/min (5.07 ml/hr), heparin gtt decreased to 700 units/hr, 10a level therapeutic.  Magnesium replaced per protocol.  Reported a headache that was managed with scheduled acetaminophen and prn metoclopramide.  Ambulated in room with standby assist, was steady on her feet.   attended [facility] education regarding home infusion of treprostinil.    P:  Continue to monitor pain, VS, heart rhythm, skin integrity, fluid status, bowel status, cardiac and respiratory status.  Notify care team of changes in patient condition or other concerns.  Expected to discharge to home when medically appropriate.

## 2020-03-12 ENCOUNTER — APPOINTMENT (OUTPATIENT)
Dept: OCCUPATIONAL THERAPY | Facility: CLINIC | Age: 80
DRG: 287 | End: 2020-03-12
Attending: INTERNAL MEDICINE
Payer: COMMERCIAL

## 2020-03-12 LAB
ANION GAP SERPL CALCULATED.3IONS-SCNC: 5 MMOL/L (ref 3–14)
BUN SERPL-MCNC: 18 MG/DL (ref 7–30)
CALCIUM SERPL-MCNC: 9.3 MG/DL (ref 8.5–10.1)
CHLORIDE SERPL-SCNC: 96 MMOL/L (ref 94–109)
CO2 SERPL-SCNC: 30 MMOL/L (ref 20–32)
CREAT SERPL-MCNC: 0.93 MG/DL (ref 0.52–1.04)
ERYTHROCYTE [DISTWIDTH] IN BLOOD BY AUTOMATED COUNT: 13 % (ref 10–15)
GFR SERPL CREATININE-BSD FRML MDRD: 59 ML/MIN/{1.73_M2}
GLUCOSE SERPL-MCNC: 86 MG/DL (ref 70–99)
HCT VFR BLD AUTO: 37.7 % (ref 35–47)
HGB BLD-MCNC: 12.4 G/DL (ref 11.7–15.7)
INR PPP: 1.63 (ref 0.86–1.14)
LMWH PPP CHRO-ACNC: 0.5 IU/ML
MAGNESIUM SERPL-MCNC: 2.2 MG/DL (ref 1.6–2.3)
MCH RBC QN AUTO: 33.1 PG (ref 26.5–33)
MCHC RBC AUTO-ENTMCNC: 32.9 G/DL (ref 31.5–36.5)
MCV RBC AUTO: 101 FL (ref 78–100)
PLATELET # BLD AUTO: 224 10E9/L (ref 150–450)
POTASSIUM SERPL-SCNC: 4.6 MMOL/L (ref 3.4–5.3)
RBC # BLD AUTO: 3.75 10E12/L (ref 3.8–5.2)
SODIUM SERPL-SCNC: 130 MMOL/L (ref 133–144)
WBC # BLD AUTO: 5.5 10E9/L (ref 4–11)

## 2020-03-12 PROCEDURE — 25000132 ZZH RX MED GY IP 250 OP 250 PS 637: Performed by: STUDENT IN AN ORGANIZED HEALTH CARE EDUCATION/TRAINING PROGRAM

## 2020-03-12 PROCEDURE — 25000128 H RX IP 250 OP 636: Performed by: INTERNAL MEDICINE

## 2020-03-12 PROCEDURE — 25000128 H RX IP 250 OP 636: Performed by: STUDENT IN AN ORGANIZED HEALTH CARE EDUCATION/TRAINING PROGRAM

## 2020-03-12 PROCEDURE — 85520 HEPARIN ASSAY: CPT | Performed by: STUDENT IN AN ORGANIZED HEALTH CARE EDUCATION/TRAINING PROGRAM

## 2020-03-12 PROCEDURE — 21400000 ZZH R&B CCU UMMC

## 2020-03-12 PROCEDURE — 97110 THERAPEUTIC EXERCISES: CPT | Mod: GO | Performed by: OCCUPATIONAL THERAPIST

## 2020-03-12 PROCEDURE — 80048 BASIC METABOLIC PNL TOTAL CA: CPT | Performed by: STUDENT IN AN ORGANIZED HEALTH CARE EDUCATION/TRAINING PROGRAM

## 2020-03-12 PROCEDURE — 85027 COMPLETE CBC AUTOMATED: CPT | Performed by: STUDENT IN AN ORGANIZED HEALTH CARE EDUCATION/TRAINING PROGRAM

## 2020-03-12 PROCEDURE — 99232 SBSQ HOSP IP/OBS MODERATE 35: CPT | Mod: GC | Performed by: INTERNAL MEDICINE

## 2020-03-12 PROCEDURE — 83735 ASSAY OF MAGNESIUM: CPT | Performed by: STUDENT IN AN ORGANIZED HEALTH CARE EDUCATION/TRAINING PROGRAM

## 2020-03-12 PROCEDURE — 83735 ASSAY OF MAGNESIUM: CPT | Performed by: INTERNAL MEDICINE

## 2020-03-12 PROCEDURE — 36592 COLLECT BLOOD FROM PICC: CPT | Performed by: STUDENT IN AN ORGANIZED HEALTH CARE EDUCATION/TRAINING PROGRAM

## 2020-03-12 PROCEDURE — 85610 PROTHROMBIN TIME: CPT | Performed by: STUDENT IN AN ORGANIZED HEALTH CARE EDUCATION/TRAINING PROGRAM

## 2020-03-12 PROCEDURE — 40000802 ZZH SITE CHECK

## 2020-03-12 PROCEDURE — 27210995 ZZH RX 272: Performed by: INTERNAL MEDICINE

## 2020-03-12 PROCEDURE — 25000132 ZZH RX MED GY IP 250 OP 250 PS 637: Performed by: INTERNAL MEDICINE

## 2020-03-12 RX ORDER — GRANISETRON HYDROCHLORIDE 1 MG/ML
0.5 INJECTION INTRAVENOUS EVERY 12 HOURS PRN
Status: DISCONTINUED | OUTPATIENT
Start: 2020-03-12 | End: 2020-03-20 | Stop reason: HOSPADM

## 2020-03-12 RX ORDER — WARFARIN SODIUM 5 MG/1
5 TABLET ORAL
Status: COMPLETED | OUTPATIENT
Start: 2020-03-12 | End: 2020-03-12

## 2020-03-12 RX ADMIN — HYDROXYCHLOROQUINE SULFATE 200 MG: 200 TABLET, FILM COATED ORAL at 09:44

## 2020-03-12 RX ADMIN — TREPROSTINIL 6 NG/KG/MIN: 20 INJECTION, SOLUTION INTRAVENOUS; SUBCUTANEOUS at 10:08

## 2020-03-12 RX ADMIN — ACETAMINOPHEN 650 MG: 325 TABLET, FILM COATED ORAL at 12:38

## 2020-03-12 RX ADMIN — TREPROSTINIL 6 NG/KG/MIN: 20 INJECTION, SOLUTION INTRAVENOUS; SUBCUTANEOUS at 19:28

## 2020-03-12 RX ADMIN — GRANISETRON HYDROCHLORIDE 0.5 MG: 1 INJECTION INTRAVENOUS at 09:44

## 2020-03-12 RX ADMIN — GABAPENTIN 300 MG: 300 CAPSULE ORAL at 19:48

## 2020-03-12 RX ADMIN — LISINOPRIL 20 MG: 20 TABLET ORAL at 09:44

## 2020-03-12 RX ADMIN — GABAPENTIN 300 MG: 300 CAPSULE ORAL at 17:09

## 2020-03-12 RX ADMIN — DIGOXIN 62.5 MCG: 0.06 TABLET ORAL at 09:44

## 2020-03-12 RX ADMIN — GABAPENTIN 300 MG: 300 CAPSULE ORAL at 09:45

## 2020-03-12 RX ADMIN — WARFARIN SODIUM 5 MG: 5 TABLET ORAL at 18:53

## 2020-03-12 RX ADMIN — METOCLOPRAMIDE 5 MG: 5 INJECTION, SOLUTION INTRAMUSCULAR; INTRAVENOUS at 09:42

## 2020-03-12 RX ADMIN — CETIRIZINE HYDROCHLORIDE 10 MG: 10 TABLET, FILM COATED ORAL at 19:48

## 2020-03-12 RX ADMIN — ACETAMINOPHEN 650 MG: 325 TABLET, FILM COATED ORAL at 18:53

## 2020-03-12 RX ADMIN — ASPIRIN 81 MG CHEWABLE TABLET 81 MG: 81 TABLET CHEWABLE at 19:48

## 2020-03-12 RX ADMIN — FUROSEMIDE 20 MG: 20 TABLET ORAL at 09:44

## 2020-03-12 RX ADMIN — TREPROSTINIL 6 NG/KG/MIN: 20 INJECTION, SOLUTION INTRAVENOUS; SUBCUTANEOUS at 00:39

## 2020-03-12 RX ADMIN — ACETAMINOPHEN 650 MG: 325 TABLET, FILM COATED ORAL at 06:10

## 2020-03-12 ASSESSMENT — ACTIVITIES OF DAILY LIVING (ADL)
ADLS_ACUITY_SCORE: 17

## 2020-03-12 ASSESSMENT — MIFFLIN-ST. JEOR: SCORE: 920.88

## 2020-03-12 ASSESSMENT — PAIN DESCRIPTION - DESCRIPTORS
DESCRIPTORS: HEADACHE

## 2020-03-12 NOTE — PLAN OF CARE
OT/CR 6C:  Discharge Planner OT   Patient plan for discharge: Home  Current status: Pt demonstrates SBA with all STS transfers. Pt performed toileting task with SBA. Pt able to ambulate approx 350 + 250ft with SBA and NuStep exercise between bouts. Pt tolerates 15 min on NuStep. Pt BP: 190/100 after exercise. Pt asymptomatic, reporting it has been this high before. Took BP when back in the room, pt /85. RN notified. Pt anxious about mobility with IV line as she knows it is important. Educated pt on activity with IV line in place. Pt VSS on RA.   Barriers to return to prior living situation: acute medical needs, medication teaching  Recommendations for discharge: Home with assist  Rationale for recommendations: Pt mobilizing well and is motivated to perform activities with therapy. Anticipate pt will be able to return home to her PLOF with  to assist prn.        Entered by: Jacey Zelaya 03/12/2020 3:41 PM

## 2020-03-12 NOTE — PLAN OF CARE
D: Pt admitted for treatment of CTEPH. PMHx breast cancer (L mastectomy), bilateral PE and DVT (1998), pulm MAC, Rheumatoid Arthritis.      I/A: AVSS, on RA. SR. Headache managed w/ scheduled tylenol, and Reglan prn. Warm and cold packs also provided for pain. AOx4. 2g Na diet and 1.5L FR. Voiding via bedside commode. Heparin gtt at 700 units/hr. Awaiting this am hep10A results. Remodulin gtt running at 6ng/kg/min. Up SBA.      P: Continue to monitor and follow POC. Notify team of any changes.

## 2020-03-12 NOTE — PROGRESS NOTES
Cardiology Progress Note  Karen Anderson MRN: [id number]  Age: 79 year old, : 1940  Date: 3/10/2020              Assessment and Plan:     Ms. Wilson is a 78 y/o F w/ h/o extensive bilateral PE and DVT  (thought to be 2/2 tamoxifen for breast cancer treatment) pulmonary MAC, rheumatoid arthritis presenting as an admission for initiation of CTEPH treatment.     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 2019 coronary angiogram and right heart catheterization showed normal coronaries, RA 12, /25, PA 98/23/51, PCW 5, thermodilution cardiac output 2.37 L/min, PVR 19.4. Echo on admission with normal EF, mild RVH, moderately dilated/dysfunctional RV, dilated pulmonary artery. RHC on admission with RA of 10, PA 98/30/52m reduce CO with high normal right sided filling pressures. CTEPH based on V/Q scan and CT pulmonary angiogram. Functional class III.  Plan:  -IV Remodulin at 6 mcg/kg/min as of 0310 AM; assessing overall tolerance as main parameter   for successful therapeutic intervention           - reglan 5 mg q6h for headache and nausea          - Will continue to monitor  -Continue Digoxin 62.5 mcg PO every day  -Continue diuresis with lasix 20 mg PO  -Bridging to warfarin, on heparin gtt   > Goal INR: 2-3; Latest INR: 1.35 (2020)   - Maintain PICC for blood draws (patient has been difficult to get lab draws)  - Will receive Remodulin teaching points some time this week (scheduled by our nurse coordinator    > Also requires medication pre-authorization (insurance standpoint)    #Hyponatremia   > Multifactorial; main potential culprit hypervolemia    Likely multifactorial. Element of hypervolemia with R sided heart failure. Without significant weight gain and net negative fluids daily  "since admission - It is also possible that the lasix is causing natruresis. Possible patient has hepar She remains volume-up on exam, so we will continue gentle diuresis.      Most recent sodium levels showing some degree of improvement (129 as of 03/11/2020). This confirms mean mechanism of action could be hypervolemia.   - 20 mg PO lasix.  - fluid restrict 1500 mL   - Continue monitoring.      #Rheumatoid arthritis:  -Continue plaquenil     FEN: Low Na diet  PPX: Heparin     Code Status: FULL CODE     Patient discussed with staff attending, Dr. Luong.     Jake Mathew  Internal Medicine Residency, PGY-1  [facility]            Subjective/Interval Events     Reports of mild intermittent nausea overnight. However, significantly improved headaches and appetite after reduction Remodulin rate of infusion. In terms of exercise, patient felt somewhat more fatigued when engaging in PT today. Reported having to catch her breath after walking shorter distances.           Objective     /73 (BP Location: Left leg)   Pulse 75   Temp 97.7  F (36.5  C) (Oral)   Resp 16   Ht 1.473 m (4' 10\")   Wt 54.9 kg (121 lb)   SpO2 92%   BMI 25.29 kg/m    Temp:  [97.7  F (36.5  C)-98  F (36.7  C)] 97.8  F (36.6  C)  Pulse:  [75] 75  Heart Rate:  [68-84] 74  Resp:  [16] 16  BP: (102-163)/(52-90) 102/52  SpO2:  [88 %-94 %] 91 %  Wt Readings from Last 2 Encounters:   03/11/20 54.9 kg (121 lb)   03/02/20 57.6 kg (126 lb 14.4 oz)     I/O last 3 completed shifts:  In: 1152.5 [P.O.:780; I.V.:372.5]  Out: 1800 [Urine:1800]      Gen: No acute distress  HEENT: NC/AT, PERRL, EOM intact, MMM, OP without exudates. Nontender maxillary sinus bilaterally.  PULM/THORAX: normal respiratory effort on RA, mild crackles at bases. Otherwise clear to auscultation.  CV: normal rate, regular rhythm, normal S1 and S2, no murmurs or rubs. JVP 8 cm  ABD: Soft, NTND, bowel sounds present, no masses  EXT: WWP. 2+ pitting edema " in BLE  NEURO: CN II-XII grossly intact. A&Ox3                Data:     Recent Results (from the past 24 hour(s))   Basic metabolic panel    Collection Time: 03/11/20  5:40 AM   Result Value Ref Range    Sodium 129 (L) 133 - 144 mmol/L    Potassium 4.9 3.4 - 5.3 mmol/L    Chloride 95 94 - 109 mmol/L    Carbon Dioxide 28 20 - 32 mmol/L    Anion Gap 7 3 - 14 mmol/L    Glucose 80 70 - 99 mg/dL    Urea Nitrogen 18 7 - 30 mg/dL    Creatinine 1.03 0.52 - 1.04 mg/dL    GFR Estimate 52 (L) >60 mL/min/[1.73_m2]    GFR Estimate If Black 61 >60 mL/min/[1.73_m2]    Calcium 9.6 8.5 - 10.1 mg/dL   CBC with platelets    Collection Time: 03/11/20  5:40 AM   Result Value Ref Range    WBC 5.4 4.0 - 11.0 10e9/L    RBC Count 3.92 3.8 - 5.2 10e12/L    Hemoglobin 12.8 11.7 - 15.7 g/dL    Hematocrit 39.4 35.0 - 47.0 %     (H) 78 - 100 fl    MCH 32.7 26.5 - 33.0 pg    MCHC 32.5 31.5 - 36.5 g/dL    RDW 13.0 10.0 - 15.0 %    Platelet Count 228 150 - 450 10e9/L   INR    Collection Time: 03/11/20  5:40 AM   Result Value Ref Range    INR 1.35 (H) 0.86 - 1.14   Magnesium    Collection Time: 03/11/20  5:40 AM   Result Value Ref Range    Magnesium 1.9 1.6 - 2.3 mg/dL   Heparin 10a Level    Collection Time: 03/11/20  5:40 AM   Result Value Ref Range    Heparin 10A Level 0.75 IU/mL   Heparin 10a Level    Collection Time: 03/11/20  4:56 PM   Result Value Ref Range    Heparin 10A Level 0.65 IU/mL       Recent Results (from the past 24 hour(s))   XR Chest Port 1 View    Narrative    EXAM: XR CHEST PORT 1 VW  3/4/2020 4:45 PM     HISTORY:  picc       COMPARISON:  CT chest 3/3/2020    FINDINGS:   Portable semiupright view of the chest. Right upper extremity PICC  line tip projects over the right atrium. The cardiomediastinal  silhouette is mildly enlarged. Low lung volumes. No focal airspace  opacities. The previously seen right middle lobe nodule is not well  visualized. A rounded right perihilar opacity corresponds to the right  lower lobe  pulmonary artery en face. No pleural effusion or  pneumothorax. Scoliotic curvature of the thoracic spine. No acute  osseous abnormalities.      Impression    IMPRESSION:   The new right arm PICC tip projects over the high right atrium. No  pneumothorax.    I have personally reviewed the examination and initial interpretation  and I agree with the findings.    MOOK SLOAN MD             Medications     Current Facility-Administered Medications   Medication     acetaminophen (TYLENOL) Suppository 650 mg     acetaminophen (TYLENOL) tablet 325 mg     acetaminophen (TYLENOL) tablet 650 mg     albuterol (PROAIR HFA/PROVENTIL HFA/VENTOLIN HFA) 108 (90 Base) MCG/ACT inhaler 2 puff     aspirin (ASA) chewable tablet 81 mg     beclomethasone HFA (QVAR REDIHALER) 80 MCG/ACT inhaler 2 puff     cetirizine (zyrTEC) tablet 10 mg     digoxin (LANOXIN) tablet 62.5 mcg     furosemide (LASIX) tablet 20 mg     gabapentin (NEURONTIN) capsule 300 mg     granisetron (KYTRIL) injection 0.5 mg     heparin  drip 25,000 units in 0.45% NaCl 250 mL  ANTICOAGULANT  (see additional administration details for dose)     heparin bolus from infusion pump     heparin lock flush 10 UNIT/ML injection 5 mL     hydroxychloroquine (PLAQUENIL) tablet 200 mg     ipratropium (ATROVENT) 0.06 % spray 2 spray     lidocaine (LMX4) cream     lidocaine 1 % 0.1-1 mL     lisinopril (ZESTRIL) tablet 20 mg     magnesium sulfate 2 g in water intermittent infusion     magnesium sulfate 4 g in 100 mL sterile water (premade)     medication instruction     medication instruction     melatonin tablet 3 mg     metoclopramide (REGLAN) injection 5 mg     Patient is already receiving anticoagulation with heparin, enoxaparin (LOVENOX), warfarin (COUMADIN)  or other anticoagulant medication     polyethylene glycol (MIRALAX) Packet 17 g     potassium chloride (KLOR-CON) Packet 20-40 mEq     potassium chloride 10 mEq in 100 mL intermittent infusion with 10 mg lidocaine      potassium chloride 10 mEq in 100 mL sterile water intermittent infusion (premix)     potassium chloride 20 mEq in 50 mL intermittent infusion     potassium chloride ER (KLOR-CON M) CR tablet 20-40 mEq     senna-docusate (SENOKOT-S/PERICOLACE) 8.6-50 MG per tablet 2 tablet     sodium chloride (OCEAN) 0.65 % nasal spray 1 spray     sodium chloride (PF) 0.9% PF flush 10 mL     sodium chloride (PF) 0.9% PF flush 10-20 mL     sodium chloride (PF) 0.9% PF flush 10-20 mL     sodium chloride (PF) 0.9% PF flush 3 mL     sodium chloride (PF) 0.9% PF flush 3 mL     treprostinil (REMODULIN) intravenous infusion     Warfarin Therapy Reminder (Check START DATE - warfarin may be starting in the FUTURE)

## 2020-03-12 NOTE — PROGRESS NOTES
"CLINICAL NUTRITION SERVICES - ASSESSMENT NOTE     Nutrition Prescription    RECOMMENDATIONS FOR MDs/PROVIDERS TO ORDER:  If oral intake is not improving, order kcal counts.     Malnutrition Status:    Non-severe malnutrition in the context of acute illness/injury    Recommendations already ordered by Registered Dietitian (RD):  Prn snack/supplement order    Future/Additional Recommendations:  1. Continue current diet, as ordered. Continue fluid restriction as per team.  2. Order a multivitamin with minerals to help ensure micronutrient needs are met.   3. Monitor need for iron supplementation. Pt with h/o iron deficiency.   4. Consider checking folic acid and vitamin B12 labs. Supplement if low.   5. Check phos. Monitor lytes (Phos, Mg++, and K+) for refeeding syndrome. If lytes trend low, aggressively replace.    6. Consider scheduling antiemetics/antinauseants ~20 minutes prior to meals to help optimize nausea control.        REASON FOR ASSESSMENT  Karen Anderson is a/an 79 year old female assessed by the dietitian for LOS    NUTRITION/ADDITIONAL HISTORY  Pt not known to this service PTA.   Per H & P, \"H/o extensive bilateral PE and DVT 1998 (thought to be 2/2 tamoxifen for breast cancer treatment) pulmonary MAC, rheumatoid arthritis presenting as an admission for initiation of CTEPH treatment.\" Iron deficiency hx. Pt was ordered to take, noting, vitamin C, calcium/vitamin D three times daily, lasix, miralax, probiotic (acidophilus), miralax, and vitamin D3 PTA.   Unable to obtain nutrition history. Pt was busy with an RN when attempting to see.     CURRENT NUTRITION ORDERS  Diet: 2 g Sodium and 1500 mL Fluid Restriction  Intake/Tolerance: Good diet tolerance. Flowsheets indicate pt consuming 100% of meals with a good appetite 3/4-3/11. [employer] (meal ordering system) indicates food/beverages sent 3/9-3/11 totaled 608 kcals and 18 g protein daily on average. Pt is ordering one to two meals daily. Possible " "factors that may affect oral intake include headache, mild intermittent nausea, and decreased appetite with remodulin uptiration.    LABS  Labs reviewed    MEDICATIONS  Medications reviewed    ANTHROPOMETRICS  Height: 147.3 cm (4' 10\")  Most Recent Weight: 54.6 kg (120 lb 6.4 oz)    IBW: 43.2 kg  BMI: Overweight BMI 25-29.9  Weight History: 52.5 kg (6/11/2009), 59.4 kg (9/12/18), 57.2 kg (4/16/19), 56.7 kg (9/30/19), 54.6 kg (12/9/19), 54 kg (1/10/20) - Pt has lost 4.5% of her body wt over the past 11 months. Wt changes may also be due to changes in fluid status and diuresis.  Dosing Weight: 46 kg (adjusted, based on lowest wt so far this admission of 54.6 kg on 3/12)    ASSESSED NUTRITION NEEDS  Estimated Energy Needs: 6646-6195 kcals/day (25 - 30 kcals/kg)  Justification: Maintenance needs  Estimated Protein Needs: 51-64 grams protein/day (1.1 - 1.4 grams of pro/kg)  Justification: Increased needs  Estimated Fluid Needs: 1500 mL/day   Justification: On a fluid restriction    PHYSICAL FINDINGS/OTHER FINDINGS  See malnutrition section below.    MALNUTRITION  % Intake: < 75% for > 7 days (non-severe)  % Weight Loss: Difficult to assess wt changes with changes in fluid status and diuresis  Subcutaneous Fat Loss: Unable to assess. Pt was busy with an RN when attempting to see.   Muscle Loss: Unable to assess. Pt was busy with an RN when attempting to see.   Fluid Accumulation/Edema: Mild  Malnutrition Diagnosis: Non-severe malnutrition in the context of acute illness/injury    NUTRITION DIAGNOSIS  Inadequate oral intake related to headache, mild intermittent nausea, and decreased appetite with remodulin uptiration as evidenced by [employer] (meal ordering system) indicates food/beverages sent 3/9-3/11 totaled 608 kcals and 18 g protein daily on average which does not meet estimated needs of 5177-8796 kcals/day (25 - 30 kcals/kg) and 51-64 grams protein/day (1.1 - 1.4 grams of " pro/kg)    INTERVENTIONS  Implementation  Nutrition Education: Per provider order if indicated.  Medical food supplement therapy: Placed a prn snack/supplement order. Pt may request snacks/supplements prn.      Goals  Patient to consume % of nutritionally adequate meal trays TID, or the equivalent with supplements/snacks.     Monitoring/Evaluation  Progress toward goals will be monitored and evaluated per protocol.       Nutrition will continue to follow.     Mirta Stinson, MS, RD, LD, [facility]   6C Pgr: [phone]

## 2020-03-12 NOTE — PROGRESS NOTES
"                              Cardiology Progress Note  Karen Anderson MRN: [id number]  Age: 79 year old, : 1940  Date: 3/10/2020              Assessment and Plan:     80 y/o F w/CTEPH w/acute RV failure in setting of bilat PE & DVTs in  d/t breast CA tx w/tamoxifen, RA, and pulm MAC, now on stable remoduline dose awaiting therapeutic INR.    - Will need eval for home INR testing     # SEVER CTEPH c/b RV FAILURE   CTEPH based on V/Q scan and CTA. Functional class III. On admission, Echo ow/nl  EF, mild RVH, mod dilated/dysfunctional RV, dilated pulmonary artery; RHC w/RA of 10, PA 98/30/52m reduce CO with high normal right sided filling pressures. Remodulin dose limited by h/a, now stable and tolerating dose.   - Remodulin: 6 mcg/kg/min   - Reglan 5 mg q6h for headache and nausea  - Digoxin 62.5 mcg PO every day  - Diuresis with lasix 20 mg PO  - Bridging to warfarin, on heparin gtt: > Goal INR: 2-3  - Remodulin teaching & pre-authorization      # HYPONATREMIA   Likely d/t hypervolemia. Improving.   - diuresis  - fluid restrict 1500 mL         # RA  - plaquenil     FEN: Low Na diet  PPX: Heparin  COADE: FULL     Patient discussed with staff attending, Dr. Luong.      Les Carter IV, MD, MSc  Internal Medicine Resident, PGY3  [facility]   Pager [phone]             Subjective/Interval Events     NAEO  Did have some headaches this morning, better than previous, resolved           Objective     /85 (BP Location: Left leg)   Pulse 74   Temp 98  F (36.7  C) (Oral)   Resp 16   Ht 1.473 m (4' 10\")   Wt 54.6 kg (120 lb 6.4 oz)   SpO2 96%   BMI 25.16 kg/m    Temp:  [97.8  F (36.6  C)-98.3  F (36.8  C)] 98  F (36.7  C)  Pulse:  [74] 74  Heart Rate:  [64-81] 81  Resp:  [14-16] 16  BP: (102-153)/(52-85) 134/85  SpO2:  [91 %-96 %] 96 %  Wt Readings from Last 2 Encounters:   20 54.6 kg (120 lb 6.4 oz)   20 57.6 kg (126 lb 14.4 oz)     I/O last 3 completed " shifts:  In: 1242.42 [P.O.:1040; I.V.:202.42]  Out: 1400 [Urine:1400]    Gen: A&Ox3, in NAD, pleasant  HEENT: NC/AT, anicteric   PULM/: CTAB  CV: RRR, S1 and S2, no m/r/g. JVP 6-8 cm  ABD: +BS, soft, NT/ND  EXT: ww;. +1 edema in BLE3             Data:     Recent Results (from the past 24 hour(s))   Basic metabolic panel    Collection Time: 03/12/20  6:12 AM   Result Value Ref Range    Sodium 130 (L) 133 - 144 mmol/L    Potassium 4.6 3.4 - 5.3 mmol/L    Chloride 96 94 - 109 mmol/L    Carbon Dioxide 30 20 - 32 mmol/L    Anion Gap 5 3 - 14 mmol/L    Glucose 86 70 - 99 mg/dL    Urea Nitrogen 18 7 - 30 mg/dL    Creatinine 0.93 0.52 - 1.04 mg/dL    GFR Estimate 59 (L) >60 mL/min/[1.73_m2]    GFR Estimate If Black 69 >60 mL/min/[1.73_m2]    Calcium 9.3 8.5 - 10.1 mg/dL   CBC with platelets    Collection Time: 03/12/20  6:12 AM   Result Value Ref Range    WBC 5.5 4.0 - 11.0 10e9/L    RBC Count 3.75 (L) 3.8 - 5.2 10e12/L    Hemoglobin 12.4 11.7 - 15.7 g/dL    Hematocrit 37.7 35.0 - 47.0 %     (H) 78 - 100 fl    MCH 33.1 (H) 26.5 - 33.0 pg    MCHC 32.9 31.5 - 36.5 g/dL    RDW 13.0 10.0 - 15.0 %    Platelet Count 224 150 - 450 10e9/L   INR    Collection Time: 03/12/20  6:12 AM   Result Value Ref Range    INR 1.63 (H) 0.86 - 1.14   Heparin 10a Level    Collection Time: 03/12/20  6:12 AM   Result Value Ref Range    Heparin 10A Level 0.50 IU/mL   Magnesium    Collection Time: 03/12/20  6:12 AM   Result Value Ref Range    Magnesium 2.2 1.6 - 2.3 mg/dL       Recent Results (from the past 24 hour(s))   XR Chest Port 1 View    Narrative    EXAM: XR CHEST PORT 1 VW  3/4/2020 4:45 PM     HISTORY:  picc       COMPARISON:  CT chest 3/3/2020    FINDINGS:   Portable semiupright view of the chest. Right upper extremity PICC  line tip projects over the right atrium. The cardiomediastinal  silhouette is mildly enlarged. Low lung volumes. No focal airspace  opacities. The previously seen right middle lobe nodule is not well  visualized.  A rounded right perihilar opacity corresponds to the right  lower lobe pulmonary artery en face. No pleural effusion or  pneumothorax. Scoliotic curvature of the thoracic spine. No acute  osseous abnormalities.      Impression    IMPRESSION:   The new right arm PICC tip projects over the high right atrium. No  pneumothorax.    I have personally reviewed the examination and initial interpretation  and I agree with the findings.    MOOK SLOAN MD             Medications     Current Facility-Administered Medications   Medication     acetaminophen (TYLENOL) Suppository 650 mg     acetaminophen (TYLENOL) tablet 325 mg     acetaminophen (TYLENOL) tablet 650 mg     albuterol (PROAIR HFA/PROVENTIL HFA/VENTOLIN HFA) 108 (90 Base) MCG/ACT inhaler 2 puff     aspirin (ASA) chewable tablet 81 mg     beclomethasone HFA (QVAR REDIHALER) 80 MCG/ACT inhaler 2 puff     cetirizine (zyrTEC) tablet 10 mg     digoxin (LANOXIN) tablet 62.5 mcg     furosemide (LASIX) tablet 20 mg     gabapentin (NEURONTIN) capsule 300 mg     granisetron (KYTRIL) injection 0.5 mg     heparin  drip 25,000 units in 0.45% NaCl 250 mL  ANTICOAGULANT  (see additional administration details for dose)     heparin bolus from infusion pump     heparin lock flush 10 UNIT/ML injection 5 mL     hydroxychloroquine (PLAQUENIL) tablet 200 mg     ipratropium (ATROVENT) 0.06 % spray 2 spray     lidocaine (LMX4) cream     lidocaine 1 % 0.1-1 mL     lisinopril (ZESTRIL) tablet 20 mg     magnesium sulfate 2 g in water intermittent infusion     magnesium sulfate 4 g in 100 mL sterile water (premade)     medication instruction     medication instruction     melatonin tablet 3 mg     metoclopramide (REGLAN) injection 5 mg     Patient is already receiving anticoagulation with heparin, enoxaparin (LOVENOX), warfarin (COUMADIN)  or other anticoagulant medication     polyethylene glycol (MIRALAX) Packet 17 g     potassium chloride (KLOR-CON) Packet 20-40 mEq     potassium  chloride 10 mEq in 100 mL intermittent infusion with 10 mg lidocaine     potassium chloride 10 mEq in 100 mL sterile water intermittent infusion (premix)     potassium chloride 20 mEq in 50 mL intermittent infusion     potassium chloride ER (KLOR-CON M) CR tablet 20-40 mEq     senna-docusate (SENOKOT-S/PERICOLACE) 8.6-50 MG per tablet 2 tablet     sodium chloride (OCEAN) 0.65 % nasal spray 1 spray     sodium chloride (PF) 0.9% PF flush 10 mL     sodium chloride (PF) 0.9% PF flush 10-20 mL     sodium chloride (PF) 0.9% PF flush 10-20 mL     sodium chloride (PF) 0.9% PF flush 3 mL     sodium chloride (PF) 0.9% PF flush 3 mL     treprostinil (REMODULIN) intravenous infusion     warfarin ANTICOAGULANT (COUMADIN) tablet 5 mg     Warfarin Therapy Reminder (Check START DATE - warfarin may be starting in the FUTURE)

## 2020-03-13 ENCOUNTER — TELEPHONE (OUTPATIENT)
Dept: CARDIOLOGY | Facility: CLINIC | Age: 80
End: 2020-03-13

## 2020-03-13 LAB
ANION GAP SERPL CALCULATED.3IONS-SCNC: 7 MMOL/L (ref 3–14)
BUN SERPL-MCNC: 18 MG/DL (ref 7–30)
CALCIUM SERPL-MCNC: 9.3 MG/DL (ref 8.5–10.1)
CHLORIDE SERPL-SCNC: 96 MMOL/L (ref 94–109)
CO2 SERPL-SCNC: 26 MMOL/L (ref 20–32)
CREAT SERPL-MCNC: 0.94 MG/DL (ref 0.52–1.04)
ERYTHROCYTE [DISTWIDTH] IN BLOOD BY AUTOMATED COUNT: 13.2 % (ref 10–15)
GFR SERPL CREATININE-BSD FRML MDRD: 58 ML/MIN/{1.73_M2}
GLUCOSE SERPL-MCNC: 84 MG/DL (ref 70–99)
HCT VFR BLD AUTO: 37.8 % (ref 35–47)
HGB BLD-MCNC: 12.2 G/DL (ref 11.7–15.7)
INR PPP: 1.58 (ref 0.86–1.14)
LMWH PPP CHRO-ACNC: 0.43 IU/ML
MCH RBC QN AUTO: 32.5 PG (ref 26.5–33)
MCHC RBC AUTO-ENTMCNC: 32.3 G/DL (ref 31.5–36.5)
MCV RBC AUTO: 101 FL (ref 78–100)
PLATELET # BLD AUTO: 229 10E9/L (ref 150–450)
POTASSIUM SERPL-SCNC: 5 MMOL/L (ref 3.4–5.3)
RBC # BLD AUTO: 3.75 10E12/L (ref 3.8–5.2)
SODIUM SERPL-SCNC: 129 MMOL/L (ref 133–144)
WBC # BLD AUTO: 5.8 10E9/L (ref 4–11)

## 2020-03-13 PROCEDURE — 25000132 ZZH RX MED GY IP 250 OP 250 PS 637: Performed by: STUDENT IN AN ORGANIZED HEALTH CARE EDUCATION/TRAINING PROGRAM

## 2020-03-13 PROCEDURE — 25000128 H RX IP 250 OP 636: Performed by: PHYSICIAN ASSISTANT

## 2020-03-13 PROCEDURE — 85027 COMPLETE CBC AUTOMATED: CPT | Performed by: STUDENT IN AN ORGANIZED HEALTH CARE EDUCATION/TRAINING PROGRAM

## 2020-03-13 PROCEDURE — 99232 SBSQ HOSP IP/OBS MODERATE 35: CPT | Mod: GC | Performed by: INTERNAL MEDICINE

## 2020-03-13 PROCEDURE — 80048 BASIC METABOLIC PNL TOTAL CA: CPT | Performed by: STUDENT IN AN ORGANIZED HEALTH CARE EDUCATION/TRAINING PROGRAM

## 2020-03-13 PROCEDURE — 25000132 ZZH RX MED GY IP 250 OP 250 PS 637: Performed by: INTERNAL MEDICINE

## 2020-03-13 PROCEDURE — 85520 HEPARIN ASSAY: CPT | Performed by: STUDENT IN AN ORGANIZED HEALTH CARE EDUCATION/TRAINING PROGRAM

## 2020-03-13 PROCEDURE — 21400000 ZZH R&B CCU UMMC

## 2020-03-13 PROCEDURE — 25000128 H RX IP 250 OP 636: Performed by: INTERNAL MEDICINE

## 2020-03-13 PROCEDURE — 25000128 H RX IP 250 OP 636: Performed by: STUDENT IN AN ORGANIZED HEALTH CARE EDUCATION/TRAINING PROGRAM

## 2020-03-13 PROCEDURE — 27210995 ZZH RX 272: Performed by: INTERNAL MEDICINE

## 2020-03-13 PROCEDURE — 85610 PROTHROMBIN TIME: CPT | Performed by: STUDENT IN AN ORGANIZED HEALTH CARE EDUCATION/TRAINING PROGRAM

## 2020-03-13 PROCEDURE — 36415 COLL VENOUS BLD VENIPUNCTURE: CPT | Performed by: STUDENT IN AN ORGANIZED HEALTH CARE EDUCATION/TRAINING PROGRAM

## 2020-03-13 RX ORDER — WARFARIN SODIUM 5 MG/1
5 TABLET ORAL
Status: COMPLETED | OUTPATIENT
Start: 2020-03-13 | End: 2020-03-13

## 2020-03-13 RX ADMIN — GABAPENTIN 300 MG: 300 CAPSULE ORAL at 20:33

## 2020-03-13 RX ADMIN — DIGOXIN 62.5 MCG: 0.06 TABLET ORAL at 08:42

## 2020-03-13 RX ADMIN — WARFARIN SODIUM 5 MG: 5 TABLET ORAL at 17:21

## 2020-03-13 RX ADMIN — ACETAMINOPHEN 650 MG: 325 TABLET, FILM COATED ORAL at 05:25

## 2020-03-13 RX ADMIN — ACETAMINOPHEN 650 MG: 325 TABLET, FILM COATED ORAL at 17:21

## 2020-03-13 RX ADMIN — METOCLOPRAMIDE 5 MG: 5 INJECTION, SOLUTION INTRAMUSCULAR; INTRAVENOUS at 08:54

## 2020-03-13 RX ADMIN — HEPARIN SODIUM 700 UNITS/HR: 10000 INJECTION, SOLUTION INTRAVENOUS at 12:38

## 2020-03-13 RX ADMIN — ACETAMINOPHEN 650 MG: 325 TABLET, FILM COATED ORAL at 00:05

## 2020-03-13 RX ADMIN — CETIRIZINE HYDROCHLORIDE 10 MG: 10 TABLET, FILM COATED ORAL at 20:33

## 2020-03-13 RX ADMIN — GABAPENTIN 300 MG: 300 CAPSULE ORAL at 08:41

## 2020-03-13 RX ADMIN — METOCLOPRAMIDE 5 MG: 5 INJECTION, SOLUTION INTRAMUSCULAR; INTRAVENOUS at 00:05

## 2020-03-13 RX ADMIN — FUROSEMIDE 20 MG: 20 TABLET ORAL at 08:41

## 2020-03-13 RX ADMIN — METOCLOPRAMIDE 5 MG: 5 INJECTION, SOLUTION INTRAMUSCULAR; INTRAVENOUS at 17:20

## 2020-03-13 RX ADMIN — MELATONIN TAB 3 MG 3 MG: 3 TAB at 20:51

## 2020-03-13 RX ADMIN — TREPROSTINIL 6 NG/KG/MIN: 20 INJECTION, SOLUTION INTRAVENOUS; SUBCUTANEOUS at 14:52

## 2020-03-13 RX ADMIN — TREPROSTINIL 6 NG/KG/MIN: 20 INJECTION, SOLUTION INTRAVENOUS; SUBCUTANEOUS at 05:23

## 2020-03-13 RX ADMIN — IPRATROPIUM BROMIDE 2 SPRAY: 42 SPRAY, METERED NASAL at 17:08

## 2020-03-13 RX ADMIN — LISINOPRIL 20 MG: 20 TABLET ORAL at 08:41

## 2020-03-13 RX ADMIN — GABAPENTIN 300 MG: 300 CAPSULE ORAL at 14:06

## 2020-03-13 RX ADMIN — HYDROXYCHLOROQUINE SULFATE 200 MG: 200 TABLET, FILM COATED ORAL at 08:41

## 2020-03-13 RX ADMIN — ACETAMINOPHEN 650 MG: 325 TABLET, FILM COATED ORAL at 12:38

## 2020-03-13 RX ADMIN — Medication 5 ML: at 17:02

## 2020-03-13 RX ADMIN — ASPIRIN 81 MG CHEWABLE TABLET 81 MG: 81 TABLET CHEWABLE at 20:33

## 2020-03-13 ASSESSMENT — ACTIVITIES OF DAILY LIVING (ADL)
ADLS_ACUITY_SCORE: 17

## 2020-03-13 ASSESSMENT — PAIN DESCRIPTION - DESCRIPTORS: DESCRIPTORS: HEADACHE

## 2020-03-13 ASSESSMENT — MIFFLIN-ST. JEOR: SCORE: 925.87

## 2020-03-13 NOTE — PLAN OF CARE
D/A/I:  Patient A&O x4, denied palpitations, difficulty breathing, SOB, dizziness, and nausea.  Lung sounds clear to auscultation, no abnormal heart sounds noted.  Had 1-2+ edema in legs and feet, was given scheduled furosemide.  Patient had good urine output, see I&O flowsheet.  Had a loose stool yesterday, declined bowel medications, ordered foods high in fiber to aid in regular bowel movements.  In sinus rhythm with HR 70s-80s, SBP 110s-150s, SaO2 90-96% on room air.  Heparin gtt continued at 700 units/hr, 10a level therapeutic.  Treprostinil infusion continued at 6 ng/kg/min (5.07 m/hr).  Patient reported a headache that was managed with scheduled acetaminophen and prn metoclopramide.  Ambulated in hallway with standby assist, was steady on her feet.   attended [facility] education regarding home treprostinil infusion.  Patient and  notified of restricted visitor policy starting tomorrow.    P:  Continue to monitor pain, VS, heart rhythm, skin integrity, fluid status, bowel status, cardiac and respiratory status.  Notify care team of changes in patient condition or other concerns.  Expected to discharge to home when medically appropriate.

## 2020-03-13 NOTE — TELEPHONE ENCOUNTER
Received a call directly from Marielos, RN with [facility]. Marielos expressed some concerns with the patient mixing the medication. Marielos stated that her  Santana, does an excellent job, but if something were to happen to Don then the patient would be left alone to mix.     Marielos stated that the pt required a lot of direction and guidance during the mixing process. Marielos stated it took about 1 hour and 4 minutes for the pt to mix the cassette due to her arthritis. Marielos stated the pt did take a bathroom break during the mixing process. Marielos also expressed concerns with the pt's strength to complete the mixing also. Marielos further stated that the pt required a lot of direction when mixing the medication also.    Marielos stated that the pt did not want her daughter involved in her care as her daughter has her family to worry about. Pt stated she would rely on her , Santana. Marielos expressed concerns that if something were to happen to Don, then the pt would be alone. The pt stated to Marielos that they would like to stay on IV therapy as it's what is best for them.     Marielos voiced concerns for the pt's ability to stay on IV therapy should something happen to Don. Stated to Marielos that the concerns would be relayed to Dr. Turcios.  ----------------------  Marielos did state that right now the pt is currently on Heparin and the hospital is trying to get the pt's INR under control.     Lexus Balbuena  Clinic   Pulmonary Hypertension  [facility]  (P) [phone]

## 2020-03-13 NOTE — PROGRESS NOTES
Cardiology Progress Note  Karen Anderson MRN: [id number]  Age: 79 year old, : 1940  Date: 3/10/2020              Assessment and Plan:     78 y/o F w/CTEPH w/acute RV failure in setting of bilat PE & DVTs in  d/t breast CA tx w/tamoxifen, RA, and pulm MAC, now on stable remoduline dose awaiting therapeutic INR.    Today:  - Scheduled for Remodulin teaching services around 10:00am.  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 Functional class III. On admission, Echo ow/nl  EF, mild RVH, mod dilated/dysfunctional RV, dilated pulmonary artery; RHC w/RA of 10, PA 98/30/52m reduce CO with high normal right sided filling pressures. Remodulin dose limited by h/a, now stable and tolerating dose.   - Remodulin: 6 mcg/kg/min     > Will continue on this dose  - Reglan 5 mg q6h for headache and nausea  - Digoxin 62.5 mcg PO every day  - Diuresis with lasix 20 mg PO  - Bridging to warfarin, on heparin gtt: > Goal INR: 2-3  - Remodulin teaching & pre-authorization      #Hyponatremia   > Multifactorial; main potential culprit hypervolemia  Likely d/t hypervolemia. Improving.   - diuresis  - fluid restrict 1500 mL   - Continue monitoring.         # RA  - plaquenil     FEN: Low Na diet  PPX: Heparin  COADE: FULL     Patient discussed with staff attending, Brad Ahmadi MD PhD     Jake Mathew  Internal Medicine Residency, PGY-1  Pager: [phone]  [facility]              Subjective/Interval Events     No acute events overnight. Referred some lower back pain (referred to as sciatica), which she addreses having flares on occasions whenever she remains inactive for prolonged periods. Other wise no  "other pertinent findings on review of systems.          Objective     BP (!) 145/75 (BP Location: Right leg)   Pulse 74   Temp 97.5  F (36.4  C) (Oral)   Resp 16   Ht 1.473 m (4' 10\")   Wt 55.1 kg (121 lb 8 oz)   SpO2 91%   BMI 25.39 kg/m    Temp:  [97.5  F (36.4  C)-98.3  F (36.8  C)] 97.5  F (36.4  C)  Pulse:  [74] 74  Heart Rate:  [64-81] 65  Resp:  [14-16] 16  BP: (118-153)/(69-85) 145/75  SpO2:  [90 %-96 %] 91 %  Wt Readings from Last 2 Encounters:   03/13/20 55.1 kg (121 lb 8 oz)   03/02/20 57.6 kg (126 lb 14.4 oz)     I/O last 3 completed shifts:  In: 1754.17 [P.O.:1380; I.V.:374.17]  Out: 2100 [Urine:2100]    Gen: A&Ox3, in NAD, pleasant  HEENT: NC/AT, anicteric, EOMI, MOM, JVP: 6-8cm   PULM/: normal respiratory effort on RA, mild crackles at bases. Otherwise clear to auscultation  CV: RRR, S1 and S2, no m/r/g.  ABD: +BS, soft, NT/ND, no guarding  EXT: ww;. +1 edema in BLE             Data:     Recent Results (from the past 24 hour(s))   Basic metabolic panel    Collection Time: 03/13/20  5:32 AM   Result Value Ref Range    Sodium 129 (L) 133 - 144 mmol/L    Potassium 5.0 3.4 - 5.3 mmol/L    Chloride 96 94 - 109 mmol/L    Carbon Dioxide 26 20 - 32 mmol/L    Anion Gap 7 3 - 14 mmol/L    Glucose 84 70 - 99 mg/dL    Urea Nitrogen 18 7 - 30 mg/dL    Creatinine 0.94 0.52 - 1.04 mg/dL    GFR Estimate 58 (L) >60 mL/min/[1.73_m2]    GFR Estimate If Black 68 >60 mL/min/[1.73_m2]    Calcium 9.3 8.5 - 10.1 mg/dL   INR    Collection Time: 03/13/20  5:32 AM   Result Value Ref Range    INR 1.58 (H) 0.86 - 1.14   Heparin 10a Level    Collection Time: 03/13/20  5:32 AM   Result Value Ref Range    Heparin 10A Level 0.43 IU/mL   CBC with platelets    Collection Time: 03/13/20  5:32 AM   Result Value Ref Range    WBC 5.8 4.0 - 11.0 10e9/L    RBC Count 3.75 (L) 3.8 - 5.2 10e12/L    Hemoglobin 12.2 11.7 - 15.7 g/dL    Hematocrit 37.8 35.0 - 47.0 %     (H) 78 - 100 fl    MCH 32.5 26.5 - 33.0 pg    MCHC 32.3 31.5 - " 36.5 g/dL    RDW 13.2 10.0 - 15.0 %    Platelet Count 229 150 - 450 10e9/L       Recent Results (from the past 24 hour(s))   XR Chest Port 1 View    Narrative    EXAM: XR CHEST PORT 1 VW  3/4/2020 4:45 PM     HISTORY:  picc       COMPARISON:  CT chest 3/3/2020    FINDINGS:   Portable semiupright view of the chest. Right upper extremity PICC  line tip projects over the right atrium. The cardiomediastinal  silhouette is mildly enlarged. Low lung volumes. No focal airspace  opacities. The previously seen right middle lobe nodule is not well  visualized. A rounded right perihilar opacity corresponds to the right  lower lobe pulmonary artery en face. No pleural effusion or  pneumothorax. Scoliotic curvature of the thoracic spine. No acute  osseous abnormalities.      Impression    IMPRESSION:   The new right arm PICC tip projects over the high right atrium. No  pneumothorax.    I have personally reviewed the examination and initial interpretation  and I agree with the findings.    MOOK SLOAN MD             Medications     Current Facility-Administered Medications   Medication     acetaminophen (TYLENOL) Suppository 650 mg     acetaminophen (TYLENOL) tablet 325 mg     acetaminophen (TYLENOL) tablet 650 mg     albuterol (PROAIR HFA/PROVENTIL HFA/VENTOLIN HFA) 108 (90 Base) MCG/ACT inhaler 2 puff     aspirin (ASA) chewable tablet 81 mg     beclomethasone HFA (QVAR REDIHALER) 80 MCG/ACT inhaler 2 puff     cetirizine (zyrTEC) tablet 10 mg     digoxin (LANOXIN) tablet 62.5 mcg     furosemide (LASIX) tablet 20 mg     gabapentin (NEURONTIN) capsule 300 mg     granisetron (KYTRIL) injection 0.5 mg     heparin  drip 25,000 units in 0.45% NaCl 250 mL  ANTICOAGULANT  (see additional administration details for dose)     heparin bolus from infusion pump     heparin lock flush 10 UNIT/ML injection 5 mL     hydroxychloroquine (PLAQUENIL) tablet 200 mg     ipratropium (ATROVENT) 0.06 % spray 2 spray     lidocaine (LMX4) cream      lidocaine 1 % 0.1-1 mL     lisinopril (ZESTRIL) tablet 20 mg     magnesium sulfate 2 g in water intermittent infusion     magnesium sulfate 4 g in 100 mL sterile water (premade)     medication instruction     medication instruction     melatonin tablet 3 mg     metoclopramide (REGLAN) injection 5 mg     Patient is already receiving anticoagulation with heparin, enoxaparin (LOVENOX), warfarin (COUMADIN)  or other anticoagulant medication     polyethylene glycol (MIRALAX) Packet 17 g     potassium chloride (KLOR-CON) Packet 20-40 mEq     potassium chloride 10 mEq in 100 mL intermittent infusion with 10 mg lidocaine     potassium chloride 10 mEq in 100 mL sterile water intermittent infusion (premix)     potassium chloride 20 mEq in 50 mL intermittent infusion     potassium chloride ER (KLOR-CON M) CR tablet 20-40 mEq     senna-docusate (SENOKOT-S/PERICOLACE) 8.6-50 MG per tablet 2 tablet     sodium chloride (OCEAN) 0.65 % nasal spray 1 spray     sodium chloride (PF) 0.9% PF flush 10 mL     sodium chloride (PF) 0.9% PF flush 10-20 mL     sodium chloride (PF) 0.9% PF flush 10-20 mL     sodium chloride (PF) 0.9% PF flush 3 mL     sodium chloride (PF) 0.9% PF flush 3 mL     treprostinil (REMODULIN) intravenous infusion     Warfarin Therapy Reminder (Check START DATE - warfarin may be starting in the FUTURE)

## 2020-03-13 NOTE — TELEPHONE ENCOUNTER
[facility] requested a diagnosis statement be completed. Completed and faxed over diagnosis statement to [facility] along with updated signed office visit on 03/02/2020.    Lexus Balbuena  Clinic   Pulmonary Hypertension  [facility]  (P) [phone]

## 2020-03-13 NOTE — PROGRESS NOTES
"SPIRITUAL HEALTH SERVICES  SPIRITUAL ASSESSMENT Progress Note  [facility] 6C     Follow-up 6C unit  visit with pt, who was seen previously by Chaplain Resident Aashish Tonwsend during this admission. Pt welcomed  support today; she shared regarding her own health issues and requested prayer, but mostly pt wanted to talk about her gratitude for all her caregivers here (\"of course I'm grateful for the doctors and nurses, but just as much for everyone else too. For example, the lady who just came in to clean the room - what she does is so important too. I try to thank everyone for their help and what they do...\") Pt is active member of [facility] [employer] - her matty is central to coping.    PLAN: continue to follow, visiting at least weekly while pt on unit.    Daniel Harmon) Satinder Castro M.Div., [facility]  Staff   Pager [phone]      "

## 2020-03-13 NOTE — PLAN OF CARE
Temp: 97.5  F (36.4  C) Temp src: Oral BP: (!) 145/75 Pulse: 74 Heart Rate: 65 Resp: 16 SpO2: 91 % O2 Device: None (Room air)       D: Admitted with CTEPH and acute RV failure. Hx of bilat PE & DVTs in 1998, breast cancer (L masectomy), and pulm MAC    I/A: Monitored vitals and assessed status; A&O x4. Tele in place, SR. VSS on RA. Goldberg in place infusing Remodulin at 6ng/kg/min; PIV infusing heparin at 700u/hr (10a therapeutic); R PICC SL. Tylenol and PRN metoclopramide given for headache with relief. 1500ml fluid restriction in place. Up ad jamie. Slept poorly overnight    P: Continue to monitor and follow POC. Will discharge home on IV Remodulin, waiting on therapeutic INR. Notify Cards 2 with changes

## 2020-03-13 NOTE — TELEPHONE ENCOUNTER
Per Marielos Garcia RN:    Karen Anderson     44      Discharge date:  unknown.  Teaching has been initiated on 3/10/2020 and is very slow going as she has rheumatoid arthritis.      Where to ship:  Not sure yet      Date of discharge:  Not sure yet.  She is on Heparin IV and her INR today was 1.7 and needs to be between 2 to 3.  Also, she needs more training.    Dosing Wt:  56.3 kg    Dose current:  6 ng (she had side effects so went from 7 to 6).    Expected dc dose?   6 ng    Type of Catheter:   Goldberg (B[employer] Goldberg)    Diluent being used:   Not sure    Diluent for home:    Titration schedule:  not sure    Additional supplies    Date of next visit:  3/13/2020 at 10 am      Today I spent time training her  Santana.  This is his second time with hands on and he did well.  I will work with Karen tomorrow as she has issues with strength and her arthritic hands.      Marielos Garcia RN BSN  Cardiopulmonary Nurse Specialist  C: [phone]/ [email or web address]     44      Discharge date:  unknown.  Teaching has been initiated on 3/10/2020 and is very slow going as she has rheumatoid arthritis.      Where to ship:  Not sure yet      Date of discharge:  Not sure yet.  She is on Heparin IV and her INR today was 1.7 and needs to be between 2 to 3.  Also, she needs more training.    Dosing Wt:  56.3 kg    Dose current:  6 ng (she had side effects so went from 7 to 6).    Expected dc dose?   6 ng    Type of Catheter:   Goldberg (Bard Power Goldberg)    Diluent being used:   Not sure    Diluent for home:    Titration schedule:  not sure    Additional supplies    Date of next visit:  3/13/2020 at 10 am      Today I spent time training her  Santana.  This is his second time with hands on and he did well.  I will work with Karen tomorrow as she has issues with strength and her arthritic hands.      Marielos Garcia RN BSN  Cardiopulmonary Nurse  Specialist  C: [phone]/ [email or web address]

## 2020-03-14 LAB
ANION GAP SERPL CALCULATED.3IONS-SCNC: 5 MMOL/L (ref 3–14)
BUN SERPL-MCNC: 12 MG/DL (ref 7–30)
CALCIUM SERPL-MCNC: 9.3 MG/DL (ref 8.5–10.1)
CHLORIDE SERPL-SCNC: 96 MMOL/L (ref 94–109)
CO2 SERPL-SCNC: 28 MMOL/L (ref 20–32)
CREAT SERPL-MCNC: 0.88 MG/DL (ref 0.52–1.04)
ERYTHROCYTE [DISTWIDTH] IN BLOOD BY AUTOMATED COUNT: 13.2 % (ref 10–15)
GFR SERPL CREATININE-BSD FRML MDRD: 63 ML/MIN/{1.73_M2}
GLUCOSE SERPL-MCNC: 77 MG/DL (ref 70–99)
HCT VFR BLD AUTO: 35.9 % (ref 35–47)
HGB BLD-MCNC: 11.8 G/DL (ref 11.7–15.7)
INR PPP: 2.27 (ref 0.86–1.14)
LMWH PPP CHRO-ACNC: 0.66 IU/ML
MAGNESIUM SERPL-MCNC: 1.8 MG/DL (ref 1.6–2.3)
MCH RBC QN AUTO: 33.1 PG (ref 26.5–33)
MCHC RBC AUTO-ENTMCNC: 32.9 G/DL (ref 31.5–36.5)
MCV RBC AUTO: 101 FL (ref 78–100)
PLATELET # BLD AUTO: 179 10E9/L (ref 150–450)
POTASSIUM SERPL-SCNC: 5.2 MMOL/L (ref 3.4–5.3)
RBC # BLD AUTO: 3.57 10E12/L (ref 3.8–5.2)
SODIUM SERPL-SCNC: 130 MMOL/L (ref 133–144)
WBC # BLD AUTO: 5.4 10E9/L (ref 4–11)

## 2020-03-14 PROCEDURE — 25000132 ZZH RX MED GY IP 250 OP 250 PS 637: Performed by: STUDENT IN AN ORGANIZED HEALTH CARE EDUCATION/TRAINING PROGRAM

## 2020-03-14 PROCEDURE — 85027 COMPLETE CBC AUTOMATED: CPT | Performed by: STUDENT IN AN ORGANIZED HEALTH CARE EDUCATION/TRAINING PROGRAM

## 2020-03-14 PROCEDURE — 25000128 H RX IP 250 OP 636: Performed by: STUDENT IN AN ORGANIZED HEALTH CARE EDUCATION/TRAINING PROGRAM

## 2020-03-14 PROCEDURE — 83735 ASSAY OF MAGNESIUM: CPT | Performed by: STUDENT IN AN ORGANIZED HEALTH CARE EDUCATION/TRAINING PROGRAM

## 2020-03-14 PROCEDURE — 36592 COLLECT BLOOD FROM PICC: CPT | Performed by: STUDENT IN AN ORGANIZED HEALTH CARE EDUCATION/TRAINING PROGRAM

## 2020-03-14 PROCEDURE — 85520 HEPARIN ASSAY: CPT | Performed by: STUDENT IN AN ORGANIZED HEALTH CARE EDUCATION/TRAINING PROGRAM

## 2020-03-14 PROCEDURE — 80048 BASIC METABOLIC PNL TOTAL CA: CPT | Performed by: STUDENT IN AN ORGANIZED HEALTH CARE EDUCATION/TRAINING PROGRAM

## 2020-03-14 PROCEDURE — 21400000 ZZH R&B CCU UMMC

## 2020-03-14 PROCEDURE — 99232 SBSQ HOSP IP/OBS MODERATE 35: CPT | Mod: GC | Performed by: INTERNAL MEDICINE

## 2020-03-14 PROCEDURE — 25000132 ZZH RX MED GY IP 250 OP 250 PS 637: Performed by: INTERNAL MEDICINE

## 2020-03-14 PROCEDURE — 27210995 ZZH RX 272: Performed by: INTERNAL MEDICINE

## 2020-03-14 PROCEDURE — 85610 PROTHROMBIN TIME: CPT | Performed by: STUDENT IN AN ORGANIZED HEALTH CARE EDUCATION/TRAINING PROGRAM

## 2020-03-14 PROCEDURE — 25000128 H RX IP 250 OP 636: Performed by: INTERNAL MEDICINE

## 2020-03-14 RX ORDER — WARFARIN SODIUM 2.5 MG/1
2.5 TABLET ORAL
Status: COMPLETED | OUTPATIENT
Start: 2020-03-14 | End: 2020-03-14

## 2020-03-14 RX ADMIN — ACETAMINOPHEN 650 MG: 325 TABLET, FILM COATED ORAL at 00:09

## 2020-03-14 RX ADMIN — METOCLOPRAMIDE 5 MG: 5 INJECTION, SOLUTION INTRAMUSCULAR; INTRAVENOUS at 05:53

## 2020-03-14 RX ADMIN — CETIRIZINE HYDROCHLORIDE 10 MG: 10 TABLET, FILM COATED ORAL at 19:51

## 2020-03-14 RX ADMIN — TREPROSTINIL 6 NG/KG/MIN: 20 INJECTION, SOLUTION INTRAVENOUS; SUBCUTANEOUS at 00:16

## 2020-03-14 RX ADMIN — IPRATROPIUM BROMIDE 2 SPRAY: 42 SPRAY, METERED NASAL at 23:52

## 2020-03-14 RX ADMIN — GABAPENTIN 300 MG: 300 CAPSULE ORAL at 19:51

## 2020-03-14 RX ADMIN — METOCLOPRAMIDE 5 MG: 5 INJECTION, SOLUTION INTRAMUSCULAR; INTRAVENOUS at 12:04

## 2020-03-14 RX ADMIN — SENNOSIDES AND DOCUSATE SODIUM 1 TABLET: 8.6; 5 TABLET ORAL at 19:51

## 2020-03-14 RX ADMIN — FUROSEMIDE 20 MG: 20 TABLET ORAL at 08:31

## 2020-03-14 RX ADMIN — ACETAMINOPHEN 650 MG: 325 TABLET, FILM COATED ORAL at 18:19

## 2020-03-14 RX ADMIN — MAGNESIUM SULFATE 2 G: 2 INJECTION INTRAVENOUS at 12:03

## 2020-03-14 RX ADMIN — GABAPENTIN 300 MG: 300 CAPSULE ORAL at 08:33

## 2020-03-14 RX ADMIN — ACETAMINOPHEN 650 MG: 325 TABLET, FILM COATED ORAL at 23:55

## 2020-03-14 RX ADMIN — SENNOSIDES AND DOCUSATE SODIUM 2 TABLET: 8.6; 5 TABLET ORAL at 08:32

## 2020-03-14 RX ADMIN — GABAPENTIN 300 MG: 300 CAPSULE ORAL at 14:01

## 2020-03-14 RX ADMIN — IPRATROPIUM BROMIDE 2 SPRAY: 42 SPRAY, METERED NASAL at 09:10

## 2020-03-14 RX ADMIN — ASPIRIN 81 MG CHEWABLE TABLET 81 MG: 81 TABLET CHEWABLE at 19:50

## 2020-03-14 RX ADMIN — WARFARIN SODIUM 2.5 MG: 2.5 TABLET ORAL at 18:19

## 2020-03-14 RX ADMIN — MELATONIN TAB 3 MG 3 MG: 3 TAB at 22:03

## 2020-03-14 RX ADMIN — DIGOXIN 62.5 MCG: 0.06 TABLET ORAL at 08:31

## 2020-03-14 RX ADMIN — METOCLOPRAMIDE 5 MG: 5 INJECTION, SOLUTION INTRAMUSCULAR; INTRAVENOUS at 18:19

## 2020-03-14 RX ADMIN — TREPROSTINIL 6 NG/KG/MIN: 20 INJECTION, SOLUTION INTRAVENOUS; SUBCUTANEOUS at 10:24

## 2020-03-14 RX ADMIN — ACETAMINOPHEN 650 MG: 325 TABLET, FILM COATED ORAL at 12:04

## 2020-03-14 RX ADMIN — HYDROXYCHLOROQUINE SULFATE 200 MG: 200 TABLET, FILM COATED ORAL at 08:33

## 2020-03-14 RX ADMIN — METOCLOPRAMIDE 5 MG: 5 INJECTION, SOLUTION INTRAMUSCULAR; INTRAVENOUS at 00:35

## 2020-03-14 RX ADMIN — LISINOPRIL 20 MG: 20 TABLET ORAL at 08:32

## 2020-03-14 RX ADMIN — TREPROSTINIL 6 NG/KG/MIN: 20 INJECTION, SOLUTION INTRAVENOUS; SUBCUTANEOUS at 20:01

## 2020-03-14 RX ADMIN — ACETAMINOPHEN 650 MG: 325 TABLET, FILM COATED ORAL at 05:52

## 2020-03-14 RX ADMIN — METOCLOPRAMIDE 5 MG: 5 INJECTION, SOLUTION INTRAMUSCULAR; INTRAVENOUS at 23:52

## 2020-03-14 ASSESSMENT — ACTIVITIES OF DAILY LIVING (ADL)
ADLS_ACUITY_SCORE: 17

## 2020-03-14 ASSESSMENT — MIFFLIN-ST. JEOR
SCORE: 924.06
SCORE: 916.8

## 2020-03-14 NOTE — PLAN OF CARE
Pt admitted 3/4 with CTEPH tx.  SR, VSS (BP on LLE) on RA with headache and medicated with scheduled Tylenol and heat packs per pt request.  Intermittent nausea and medicated with prn Reglan.  Remodulin gtt continues at 6 ng/kg/min (5.07 ml/hr).  Hep gtt continues at 700 units/hr and awaiting on am 10a.  Warfarin goal 2-3 r/t PE and DVT hx.  Otherwise, pt slept well and up SBA.  Continue to monitor and with POC.

## 2020-03-14 NOTE — PLAN OF CARE
D: Patient admitted 3/4 for CTEPH treatment. Hx. CTEPH w/acute RV failure in setting of bilat PE & DVTs in 1998 d/t breast CA tx w/tamoxifen, RA, and pulm MAC.  I/A: A&Ox4. VSS on RA. Mild RIVERA. Afebrile. SR 80s-90s. Denies pain. Remodulin infusing at 6 ng/kg/min. Heparin gtt discontinued per orders. Sched tylenol given or HA prevention. PRN reglan for nausea prevention. Mg replaced per protocol. Adequate uop. SBA. Ambulated in [place] x3 today.  at bedside.    P: Continue to monitor and notify Cards 2 with questions/concerns.

## 2020-03-14 NOTE — PLAN OF CARE
D- CTEPH  I/A- SR 70-90 with occ PVC. RA. Remodulin gtt at 6ng/kg/min. Heparin therapeutic at 700 units/hr. Tylenol and Reglan for headache/nausea. Independent in room. Ambulated in halls with nursing staff x2.  P- Continue to monitor and notify team of changes.

## 2020-03-14 NOTE — PROGRESS NOTES
Cardiology Progress Note  Karen Anderson MRN: [id number]  Age: 79 year old, : 1940  Date: 3/14/2020              Assessment and Plan:     78 y/o F w/CTEPH w/acute RV failure in setting of bilat PE & DVTs in  d/t breast CA tx w/tamoxifen, RA, and pulm MAC, now on stable remoduline dose awaiting therapeutic INR.    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 Functional class III. On admission, Echo ow/nl  EF, mild RVH, mod dilated/dysfunctional RV, dilated pulmonary artery; RHC w/RA of 10, PA 98/30/52m reduce CO with high normal right sided filling pressures. Remodulin dose limited by h/a, now stable and tolerating dose.   - Remodulin: 6 mcg/kg/min     > Will continue on this dose  - Reglan 5 mg q6h for headache and nausea  - Digoxin 62.5 mcg PO every day  - Diuresis with lasix 20 mg PO  - Bridging to warfarin, on heparin gtt: > Goal INR: 2-3    > Discontinued Heparin gtt (2020)      >> As of 2020: INR: 2.27  - Remodulin teaching & pre-authorization      #Hyponatremia   > Multifactorial; main potential culprit hypervolemia  Likely d/t hypervolemia. Improving.   - diuresis  - fluid restrict 1500 mL   - Continue monitoring.         # RA  - Continue PTA Plaquenil     FEN: Low Na diet  PPX: Heparin  COADE: FULL     Patient discussed with staff attending, Brad Ahmadi MD PhD     Jake Mathew  Internal Medicine Residency, PGY-1  Pager: [phone]  [facility]              Subjective/Interval Events     No acute events overnight.           Objective     /61 (BP Location: Right arm)   Pulse 73   Temp 97.7  F (36.5  C)  "(Oral)   Resp 16   Ht 1.473 m (4' 10\")   Wt 54.9 kg (121 lb 1.6 oz)   SpO2 91%   BMI 25.31 kg/m    Temp:  [97.6  F (36.4  C)-98.7  F (37.1  C)] 97.7  F (36.5  C)  Pulse:  [73-85] 73  Heart Rate:  [61-80] 80  Resp:  [16-18] 16  BP: (127-153)/(61-89) 127/61  SpO2:  [91 %-96 %] 91 %  Wt Readings from Last 2 Encounters:   03/14/20 54.9 kg (121 lb 1.6 oz)   03/02/20 57.6 kg (126 lb 14.4 oz)     I/O last 3 completed shifts:  In: 621.68 [P.O.:500; I.V.:121.68]  Out: 2225 [Urine:2225]    Gen: A&Ox3, in NAD, pleasant  HEENT: NC/AT, anicteric, EOMI, MOM, JVP: 6-8cm   PULM/: normal respiratory effort on RA, mild crackles at bases. Otherwise clear to auscultation  CV: RRR, S1 and S2, no m/r/g.  ABD: +BS, soft, NT/ND, no guarding  EXT: ww;. +1 edema in BLE             Data:     Recent Results (from the past 24 hour(s))   Basic metabolic panel    Collection Time: 03/14/20  7:00 AM   Result Value Ref Range    Sodium 130 (L) 133 - 144 mmol/L    Potassium 5.2 3.4 - 5.3 mmol/L    Chloride 96 94 - 109 mmol/L    Carbon Dioxide 28 20 - 32 mmol/L    Anion Gap 5 3 - 14 mmol/L    Glucose 77 70 - 99 mg/dL    Urea Nitrogen 12 7 - 30 mg/dL    Creatinine 0.88 0.52 - 1.04 mg/dL    GFR Estimate 63 >60 mL/min/[1.73_m2]    GFR Estimate If Black 73 >60 mL/min/[1.73_m2]    Calcium 9.3 8.5 - 10.1 mg/dL   INR    Collection Time: 03/14/20  7:00 AM   Result Value Ref Range    INR 2.27 (H) 0.86 - 1.14   CBC with platelets    Collection Time: 03/14/20  7:00 AM   Result Value Ref Range    WBC 5.4 4.0 - 11.0 10e9/L    RBC Count 3.57 (L) 3.8 - 5.2 10e12/L    Hemoglobin 11.8 11.7 - 15.7 g/dL    Hematocrit 35.9 35.0 - 47.0 %     (H) 78 - 100 fl    MCH 33.1 (H) 26.5 - 33.0 pg    MCHC 32.9 31.5 - 36.5 g/dL    RDW 13.2 10.0 - 15.0 %    Platelet Count 179 150 - 450 10e9/L   Magnesium    Collection Time: 03/14/20  7:00 AM   Result Value Ref Range    Magnesium 1.8 1.6 - 2.3 mg/dL   Heparin 10a Level    Collection Time: 03/14/20  7:00 AM   Result Value " Ref Range    Heparin 10A Level 0.66 IU/mL       Recent Results (from the past 24 hour(s))   XR Chest Port 1 View    Narrative    EXAM: XR CHEST PORT 1 VW  3/4/2020 4:45 PM     HISTORY:  picc       COMPARISON:  CT chest 3/3/2020    FINDINGS:   Portable semiupright view of the chest. Right upper extremity PICC  line tip projects over the right atrium. The cardiomediastinal  silhouette is mildly enlarged. Low lung volumes. No focal airspace  opacities. The previously seen right middle lobe nodule is not well  visualized. A rounded right perihilar opacity corresponds to the right  lower lobe pulmonary artery en face. No pleural effusion or  pneumothorax. Scoliotic curvature of the thoracic spine. No acute  osseous abnormalities.      Impression    IMPRESSION:   The new right arm PICC tip projects over the high right atrium. No  pneumothorax.    I have personally reviewed the examination and initial interpretation  and I agree with the findings.    MOOK SLOAN MD             Medications     Current Facility-Administered Medications   Medication     acetaminophen (TYLENOL) Suppository 650 mg     acetaminophen (TYLENOL) tablet 325 mg     acetaminophen (TYLENOL) tablet 650 mg     albuterol (PROAIR HFA/PROVENTIL HFA/VENTOLIN HFA) 108 (90 Base) MCG/ACT inhaler 2 puff     aspirin (ASA) chewable tablet 81 mg     beclomethasone HFA (QVAR REDIHALER) 80 MCG/ACT inhaler 2 puff     cetirizine (zyrTEC) tablet 10 mg     digoxin (LANOXIN) tablet 62.5 mcg     furosemide (LASIX) tablet 20 mg     gabapentin (NEURONTIN) capsule 300 mg     granisetron (KYTRIL) injection 0.5 mg     heparin  drip 25,000 units in 0.45% NaCl 250 mL  ANTICOAGULANT  (see additional administration details for dose)     heparin bolus from infusion pump     heparin lock flush 10 UNIT/ML injection 5 mL     hydroxychloroquine (PLAQUENIL) tablet 200 mg     ipratropium (ATROVENT) 0.06 % spray 2 spray     lidocaine (LMX4) cream     lidocaine 1 % 0.1-1 mL      lisinopril (ZESTRIL) tablet 20 mg     magnesium sulfate 2 g in water intermittent infusion     magnesium sulfate 4 g in 100 mL sterile water (premade)     medication instruction     medication instruction     melatonin tablet 3 mg     metoclopramide (REGLAN) injection 5 mg     Patient is already receiving anticoagulation with heparin, enoxaparin (LOVENOX), warfarin (COUMADIN)  or other anticoagulant medication     polyethylene glycol (MIRALAX) Packet 17 g     potassium chloride (KLOR-CON) Packet 20-40 mEq     potassium chloride 10 mEq in 100 mL intermittent infusion with 10 mg lidocaine     potassium chloride 10 mEq in 100 mL sterile water intermittent infusion (premix)     potassium chloride 20 mEq in 50 mL intermittent infusion     potassium chloride ER (KLOR-CON M) CR tablet 20-40 mEq     senna-docusate (SENOKOT-S/PERICOLACE) 8.6-50 MG per tablet 2 tablet     sodium chloride (OCEAN) 0.65 % nasal spray 1 spray     sodium chloride (PF) 0.9% PF flush 10 mL     sodium chloride (PF) 0.9% PF flush 10-20 mL     sodium chloride (PF) 0.9% PF flush 10-20 mL     sodium chloride (PF) 0.9% PF flush 3 mL     sodium chloride (PF) 0.9% PF flush 3 mL     treprostinil (REMODULIN) intravenous infusion     warfarin ANTICOAGULANT (COUMADIN) tablet 2.5 mg     Warfarin Therapy Reminder (Check START DATE - warfarin may be starting in the FUTURE)

## 2020-03-15 LAB
ANION GAP SERPL CALCULATED.3IONS-SCNC: 6 MMOL/L (ref 3–14)
BUN SERPL-MCNC: 15 MG/DL (ref 7–30)
CALCIUM SERPL-MCNC: 9.4 MG/DL (ref 8.5–10.1)
CHLORIDE SERPL-SCNC: 96 MMOL/L (ref 94–109)
CO2 SERPL-SCNC: 28 MMOL/L (ref 20–32)
CREAT SERPL-MCNC: 0.95 MG/DL (ref 0.52–1.04)
DIGOXIN SERPL-MCNC: 0.8 UG/L (ref 0.5–2)
ERYTHROCYTE [DISTWIDTH] IN BLOOD BY AUTOMATED COUNT: 13.2 % (ref 10–15)
GFR SERPL CREATININE-BSD FRML MDRD: 57 ML/MIN/{1.73_M2}
GLUCOSE SERPL-MCNC: 76 MG/DL (ref 70–99)
HCT VFR BLD AUTO: 36.9 % (ref 35–47)
HGB BLD-MCNC: 11.6 G/DL (ref 11.7–15.7)
INR PPP: 1.96 (ref 0.86–1.14)
MAGNESIUM SERPL-MCNC: 2.1 MG/DL (ref 1.6–2.3)
MCH RBC QN AUTO: 32 PG (ref 26.5–33)
MCHC RBC AUTO-ENTMCNC: 31.4 G/DL (ref 31.5–36.5)
MCV RBC AUTO: 102 FL (ref 78–100)
PLATELET # BLD AUTO: 309 10E9/L (ref 150–450)
POTASSIUM SERPL-SCNC: 5 MMOL/L (ref 3.4–5.3)
RBC # BLD AUTO: 3.63 10E12/L (ref 3.8–5.2)
SODIUM SERPL-SCNC: 130 MMOL/L (ref 133–144)
WBC # BLD AUTO: 5.2 10E9/L (ref 4–11)

## 2020-03-15 PROCEDURE — 99232 SBSQ HOSP IP/OBS MODERATE 35: CPT | Mod: GC | Performed by: INTERNAL MEDICINE

## 2020-03-15 PROCEDURE — 25000132 ZZH RX MED GY IP 250 OP 250 PS 637: Performed by: STUDENT IN AN ORGANIZED HEALTH CARE EDUCATION/TRAINING PROGRAM

## 2020-03-15 PROCEDURE — 85027 COMPLETE CBC AUTOMATED: CPT | Performed by: STUDENT IN AN ORGANIZED HEALTH CARE EDUCATION/TRAINING PROGRAM

## 2020-03-15 PROCEDURE — 21400000 ZZH R&B CCU UMMC

## 2020-03-15 PROCEDURE — 25000128 H RX IP 250 OP 636: Performed by: STUDENT IN AN ORGANIZED HEALTH CARE EDUCATION/TRAINING PROGRAM

## 2020-03-15 PROCEDURE — 25000128 H RX IP 250 OP 636: Performed by: PHYSICIAN ASSISTANT

## 2020-03-15 PROCEDURE — 36592 COLLECT BLOOD FROM PICC: CPT | Performed by: STUDENT IN AN ORGANIZED HEALTH CARE EDUCATION/TRAINING PROGRAM

## 2020-03-15 PROCEDURE — 25000132 ZZH RX MED GY IP 250 OP 250 PS 637: Performed by: INTERNAL MEDICINE

## 2020-03-15 PROCEDURE — 80162 ASSAY OF DIGOXIN TOTAL: CPT | Performed by: STUDENT IN AN ORGANIZED HEALTH CARE EDUCATION/TRAINING PROGRAM

## 2020-03-15 PROCEDURE — 83735 ASSAY OF MAGNESIUM: CPT | Performed by: STUDENT IN AN ORGANIZED HEALTH CARE EDUCATION/TRAINING PROGRAM

## 2020-03-15 PROCEDURE — 27210995 ZZH RX 272: Performed by: INTERNAL MEDICINE

## 2020-03-15 PROCEDURE — 85610 PROTHROMBIN TIME: CPT | Performed by: STUDENT IN AN ORGANIZED HEALTH CARE EDUCATION/TRAINING PROGRAM

## 2020-03-15 PROCEDURE — 25000128 H RX IP 250 OP 636: Performed by: INTERNAL MEDICINE

## 2020-03-15 PROCEDURE — 80048 BASIC METABOLIC PNL TOTAL CA: CPT | Performed by: STUDENT IN AN ORGANIZED HEALTH CARE EDUCATION/TRAINING PROGRAM

## 2020-03-15 RX ORDER — WARFARIN SODIUM 4 MG/1
4 TABLET ORAL
Status: COMPLETED | OUTPATIENT
Start: 2020-03-15 | End: 2020-03-15

## 2020-03-15 RX ADMIN — CETIRIZINE HYDROCHLORIDE 10 MG: 10 TABLET, FILM COATED ORAL at 19:45

## 2020-03-15 RX ADMIN — METOCLOPRAMIDE 5 MG: 5 INJECTION, SOLUTION INTRAMUSCULAR; INTRAVENOUS at 12:05

## 2020-03-15 RX ADMIN — SENNOSIDES AND DOCUSATE SODIUM 1 TABLET: 8.6; 5 TABLET ORAL at 19:45

## 2020-03-15 RX ADMIN — METOCLOPRAMIDE 5 MG: 5 INJECTION, SOLUTION INTRAMUSCULAR; INTRAVENOUS at 18:15

## 2020-03-15 RX ADMIN — GABAPENTIN 300 MG: 300 CAPSULE ORAL at 19:45

## 2020-03-15 RX ADMIN — TREPROSTINIL 6 NG/KG/MIN: 20 INJECTION, SOLUTION INTRAVENOUS; SUBCUTANEOUS at 15:48

## 2020-03-15 RX ADMIN — ACETAMINOPHEN 650 MG: 325 TABLET, FILM COATED ORAL at 12:05

## 2020-03-15 RX ADMIN — IPRATROPIUM BROMIDE 2 SPRAY: 42 SPRAY, METERED NASAL at 08:35

## 2020-03-15 RX ADMIN — WARFARIN SODIUM 4 MG: 4 TABLET ORAL at 18:15

## 2020-03-15 RX ADMIN — MELATONIN TAB 3 MG 3 MG: 3 TAB at 21:32

## 2020-03-15 RX ADMIN — FUROSEMIDE 20 MG: 20 TABLET ORAL at 08:36

## 2020-03-15 RX ADMIN — GABAPENTIN 300 MG: 300 CAPSULE ORAL at 13:16

## 2020-03-15 RX ADMIN — SENNOSIDES AND DOCUSATE SODIUM 2 TABLET: 8.6; 5 TABLET ORAL at 08:37

## 2020-03-15 RX ADMIN — TREPROSTINIL 6 NG/KG/MIN: 20 INJECTION, SOLUTION INTRAVENOUS; SUBCUTANEOUS at 05:52

## 2020-03-15 RX ADMIN — LISINOPRIL 20 MG: 20 TABLET ORAL at 08:36

## 2020-03-15 RX ADMIN — METOCLOPRAMIDE 5 MG: 5 INJECTION, SOLUTION INTRAMUSCULAR; INTRAVENOUS at 05:41

## 2020-03-15 RX ADMIN — ACETAMINOPHEN 650 MG: 325 TABLET, FILM COATED ORAL at 18:15

## 2020-03-15 RX ADMIN — GABAPENTIN 300 MG: 300 CAPSULE ORAL at 08:36

## 2020-03-15 RX ADMIN — DIGOXIN 62.5 MCG: 0.06 TABLET ORAL at 08:36

## 2020-03-15 RX ADMIN — HYDROXYCHLOROQUINE SULFATE 200 MG: 200 TABLET, FILM COATED ORAL at 08:36

## 2020-03-15 RX ADMIN — ACETAMINOPHEN 650 MG: 325 TABLET, FILM COATED ORAL at 05:41

## 2020-03-15 RX ADMIN — ASPIRIN 81 MG CHEWABLE TABLET 81 MG: 81 TABLET CHEWABLE at 19:45

## 2020-03-15 RX ADMIN — Medication 5 ML: at 18:14

## 2020-03-15 ASSESSMENT — ACTIVITIES OF DAILY LIVING (ADL)
ADLS_ACUITY_SCORE: 17

## 2020-03-15 ASSESSMENT — MIFFLIN-ST. JEOR: SCORE: 916.8

## 2020-03-15 NOTE — PLAN OF CARE
D: Patient admitted 3/4 for CTEPH treatment. Hx. CTEPH w/acute RV failure in setting of bilat PE & DVTs in 1998 d/t breast CA tx w/tamoxifen, RA, and pulm MAC.  I/A: A&Ox4. VSS on RA. Mild RIVERA. Afebrile. SR 80s-90s. Denies pain. Remodulin infusing at 6 ng/kg/min. Sched tylenol given for HA prevention. Intermittent nausea relieved by PRN reglan. Adequate uop. SBA. Ambulated in [place] x2 today.  at bedside practicing home IV med set up.  P: Continue to monitor and notify Cards 2 with questions/concerns.

## 2020-03-15 NOTE — PROGRESS NOTES
"                              Cardiology Progress Note  Karen Anderson MRN: [id number]  Age: 79 year old, : 1940  Date: 3/14/2020              Assessment and Plan:     78 y/o F w/CTEPH w/acute RV failure in setting of bilat PE & DVTs in  d/t breast CA tx w/tamoxifen, RA, and pulm MAC, now on stable remoduline dose awaiting therapeutic INR.      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 Functional class III. On admission, Echo ow/nl  EF, mild RVH, mod dilated/dysfunctional RV, dilated pulmonary artery; RHC w/RA of 10, PA 98/30/52m reduce CO with high normal right sided filling pressures. Remodulin dose limited by h/a, now stable and tolerating dose.   - Remodulin: 6 mcg/kg/min     > Will continue on this dose  - Reglan 5 mg q6h for headache and nausea  - Digoxin 62.5 mcg PO every day  - Diuresis with lasix 20 mg PO  - Bridging to warfarin, on heparin gtt: > Goal INR: 2-3    > Discontinued Heparin gtt (2020)      >> As of 03/15/2020: INR: 1.81  - Remodulin teaching & pre-authorization    > Teaching was received on 2020.  was present.     #Hyponatremia   > Multifactorial; main potential culprit hypervolemia  Likely d/t hypervolemia. Improving.   - diuresis  - fluid restrict 1500 mL   - Continue monitoring.         # RA  - Continue PTA Plaquenil     FEN: Low Na diet  PPX: Heparin  COADE: FULL     Patient discussed with staff attending, Brad Ahmadi MD PhD     Jake Mathew  Internal Medicine Residency, PGY-1  Pager: [phone]  [facility]              Subjective/Interval Events     No acute events overnight.           Objective     /65 (BP Location: Right leg)   Pulse 74   Temp 98  F (36.7  C) (Oral)   Resp 16   Ht 1.473 m (4' 10\")   Wt 54.2 kg (119 lb 8 oz)   SpO2 92%   BMI 24.98 kg/m    Temp:  [97.5  F (36.4  C)-98.2  F (36.8  C)] 98  F " (36.7  C)  Pulse:  [74-81] 74  Heart Rate:  [70-79] 79  Resp:  [16-18] 16  BP: (106-151)/(63-96) 119/65  SpO2:  [90 %-98 %] 92 %  Wt Readings from Last 2 Encounters:   03/15/20 54.2 kg (119 lb 8 oz)   03/02/20 57.6 kg (126 lb 14.4 oz)     I/O last 3 completed shifts:  In: 1476.39 [P.O.:1300; I.V.:176.39]  Out: 2125 [Urine:2125]    Gen: A&Ox3, in NAD, pleasant  HEENT: NC/AT, anicteric, EOMI, MOM, JVP: 6-8cm   PULM/: normal respiratory effort on RA, mild crackles at bases. Otherwise clear to auscultation  CV: RRR, S1 and S2, no m/r/g.  ABD: +BS, soft, NT/ND, no guarding  EXT: ww;. +1 edema in BLE             Data:     Recent Results (from the past 24 hour(s))   Basic metabolic panel    Collection Time: 03/15/20  7:03 AM   Result Value Ref Range    Sodium 130 (L) 133 - 144 mmol/L    Potassium 5.0 3.4 - 5.3 mmol/L    Chloride 96 94 - 109 mmol/L    Carbon Dioxide 28 20 - 32 mmol/L    Anion Gap 6 3 - 14 mmol/L    Glucose 76 70 - 99 mg/dL    Urea Nitrogen 15 7 - 30 mg/dL    Creatinine 0.95 0.52 - 1.04 mg/dL    GFR Estimate 57 (L) >60 mL/min/[1.73_m2]    GFR Estimate If Black 67 >60 mL/min/[1.73_m2]    Calcium 9.4 8.5 - 10.1 mg/dL   INR    Collection Time: 03/15/20  7:03 AM   Result Value Ref Range    INR 1.96 (H) 0.86 - 1.14   Digoxin level    Collection Time: 03/15/20  7:03 AM   Result Value Ref Range    Digoxin Level 0.8 0.5 - 2.0 ug/L   CBC with platelets    Collection Time: 03/15/20  7:03 AM   Result Value Ref Range    WBC 5.2 4.0 - 11.0 10e9/L    RBC Count 3.63 (L) 3.8 - 5.2 10e12/L    Hemoglobin 11.6 (L) 11.7 - 15.7 g/dL    Hematocrit 36.9 35.0 - 47.0 %     (H) 78 - 100 fl    MCH 32.0 26.5 - 33.0 pg    MCHC 31.4 (L) 31.5 - 36.5 g/dL    RDW 13.2 10.0 - 15.0 %    Platelet Count 309 150 - 450 10e9/L   Magnesium    Collection Time: 03/15/20  7:03 AM   Result Value Ref Range    Magnesium 2.1 1.6 - 2.3 mg/dL       Recent Results (from the past 24 hour(s))   XR Chest Port 1 View    Narrative    EXAM: XR CHEST PORT 1  VW  3/4/2020 4:45 PM     HISTORY:  picc       COMPARISON:  CT chest 3/3/2020    FINDINGS:   Portable semiupright view of the chest. Right upper extremity PICC  line tip projects over the right atrium. The cardiomediastinal  silhouette is mildly enlarged. Low lung volumes. No focal airspace  opacities. The previously seen right middle lobe nodule is not well  visualized. A rounded right perihilar opacity corresponds to the right  lower lobe pulmonary artery en face. No pleural effusion or  pneumothorax. Scoliotic curvature of the thoracic spine. No acute  osseous abnormalities.      Impression    IMPRESSION:   The new right arm PICC tip projects over the high right atrium. No  pneumothorax.    I have personally reviewed the examination and initial interpretation  and I agree with the findings.    MOOK SLOAN MD             Medications     Current Facility-Administered Medications   Medication     acetaminophen (TYLENOL) Suppository 650 mg     acetaminophen (TYLENOL) tablet 325 mg     acetaminophen (TYLENOL) tablet 650 mg     albuterol (PROAIR HFA/PROVENTIL HFA/VENTOLIN HFA) 108 (90 Base) MCG/ACT inhaler 2 puff     aspirin (ASA) chewable tablet 81 mg     beclomethasone HFA (QVAR REDIHALER) 80 MCG/ACT inhaler 2 puff     cetirizine (zyrTEC) tablet 10 mg     digoxin (LANOXIN) tablet 62.5 mcg     furosemide (LASIX) tablet 20 mg     gabapentin (NEURONTIN) capsule 300 mg     granisetron (KYTRIL) injection 0.5 mg     heparin lock flush 10 UNIT/ML injection 5 mL     hydroxychloroquine (PLAQUENIL) tablet 200 mg     ipratropium (ATROVENT) 0.06 % spray 2 spray     lidocaine (LMX4) cream     lidocaine 1 % 0.1-1 mL     lisinopril (ZESTRIL) tablet 20 mg     magnesium sulfate 2 g in water intermittent infusion     magnesium sulfate 4 g in 100 mL sterile water (premade)     medication instruction     medication instruction     melatonin tablet 3 mg     metoclopramide (REGLAN) injection 5 mg     Patient is already receiving  anticoagulation with heparin, enoxaparin (LOVENOX), warfarin (COUMADIN)  or other anticoagulant medication     polyethylene glycol (MIRALAX) Packet 17 g     potassium chloride (KLOR-CON) Packet 20-40 mEq     potassium chloride 10 mEq in 100 mL intermittent infusion with 10 mg lidocaine     potassium chloride 10 mEq in 100 mL sterile water intermittent infusion (premix)     potassium chloride 20 mEq in 50 mL intermittent infusion     potassium chloride ER (KLOR-CON M) CR tablet 20-40 mEq     senna-docusate (SENOKOT-S/PERICOLACE) 8.6-50 MG per tablet 2 tablet     sodium chloride (OCEAN) 0.65 % nasal spray 1 spray     sodium chloride (PF) 0.9% PF flush 10 mL     sodium chloride (PF) 0.9% PF flush 10-20 mL     sodium chloride (PF) 0.9% PF flush 10-20 mL     sodium chloride (PF) 0.9% PF flush 3 mL     sodium chloride (PF) 0.9% PF flush 3 mL     treprostinil (REMODULIN) intravenous infusion     warfarin ANTICOAGULANT (COUMADIN) tablet 4 mg     Warfarin Therapy Reminder (Check START DATE - warfarin may be starting in the FUTURE)

## 2020-03-15 NOTE — PLAN OF CARE
Pt admitted 3/4 with CTEPH tx.  SR, VS'S (BP running higher and to be taken on b/l LE) on RA with headache and medicated with scheduled Tylenol and heat packs per pt request.  Intermittent nausea and medicated with prn Reglan.  Remodulin gtt continues at 6 ng/kg/min (5.07 ml/hr).   Warfarin goal 2-3 r/t PE and DVT hx.  Pt worried about masses in legs and could be clots.  Otherwise, pt slept well and up SBA.  Continue to monitor and with POC.

## 2020-03-16 ENCOUNTER — APPOINTMENT (OUTPATIENT)
Dept: EDUCATION SERVICES | Facility: CLINIC | Age: 80
End: 2020-03-16
Attending: INTERNAL MEDICINE
Payer: COMMERCIAL

## 2020-03-16 ENCOUNTER — AMBULATORY - HEALTHEAST (OUTPATIENT)
Dept: OTHER | Facility: CLINIC | Age: 80
End: 2020-03-16

## 2020-03-16 ENCOUNTER — DOCUMENTATION ONLY (OUTPATIENT)
Dept: OTHER | Facility: CLINIC | Age: 80
End: 2020-03-16

## 2020-03-16 LAB
ANION GAP SERPL CALCULATED.3IONS-SCNC: 5 MMOL/L (ref 3–14)
BUN SERPL-MCNC: 18 MG/DL (ref 7–30)
CALCIUM SERPL-MCNC: 9.1 MG/DL (ref 8.5–10.1)
CHLORIDE SERPL-SCNC: 95 MMOL/L (ref 94–109)
CO2 SERPL-SCNC: 29 MMOL/L (ref 20–32)
CREAT SERPL-MCNC: 0.97 MG/DL (ref 0.52–1.04)
ERYTHROCYTE [DISTWIDTH] IN BLOOD BY AUTOMATED COUNT: 13.2 % (ref 10–15)
ERYTHROCYTE [DISTWIDTH] IN BLOOD BY AUTOMATED COUNT: 13.2 % (ref 10–15)
GFR SERPL CREATININE-BSD FRML MDRD: 56 ML/MIN/{1.73_M2}
GLUCOSE SERPL-MCNC: 77 MG/DL (ref 70–99)
HCT VFR BLD AUTO: 37.8 % (ref 35–47)
HCT VFR BLD AUTO: 40.3 % (ref 35–47)
HGB BLD-MCNC: 12.2 G/DL (ref 11.7–15.7)
HGB BLD-MCNC: 13 G/DL (ref 11.7–15.7)
INR PPP: 1.75 (ref 0.86–1.14)
MAGNESIUM SERPL-MCNC: 1.8 MG/DL (ref 1.6–2.3)
MCH RBC QN AUTO: 32.2 PG (ref 26.5–33)
MCH RBC QN AUTO: 32.3 PG (ref 26.5–33)
MCHC RBC AUTO-ENTMCNC: 32.3 G/DL (ref 31.5–36.5)
MCHC RBC AUTO-ENTMCNC: 32.3 G/DL (ref 31.5–36.5)
MCV RBC AUTO: 100 FL (ref 78–100)
MCV RBC AUTO: 100 FL (ref 78–100)
PLATELET # BLD AUTO: 231 10E9/L (ref 150–450)
PLATELET # BLD AUTO: 266 10E9/L (ref 150–450)
POTASSIUM SERPL-SCNC: 4.8 MMOL/L (ref 3.4–5.3)
RBC # BLD AUTO: 3.79 10E12/L (ref 3.8–5.2)
RBC # BLD AUTO: 4.02 10E12/L (ref 3.8–5.2)
SODIUM SERPL-SCNC: 129 MMOL/L (ref 133–144)
WBC # BLD AUTO: 5.3 10E9/L (ref 4–11)
WBC # BLD AUTO: 7.3 10E9/L (ref 4–11)

## 2020-03-16 PROCEDURE — 21400000 ZZH R&B CCU UMMC

## 2020-03-16 PROCEDURE — 25000128 H RX IP 250 OP 636: Performed by: STUDENT IN AN ORGANIZED HEALTH CARE EDUCATION/TRAINING PROGRAM

## 2020-03-16 PROCEDURE — 25000132 ZZH RX MED GY IP 250 OP 250 PS 637: Performed by: STUDENT IN AN ORGANIZED HEALTH CARE EDUCATION/TRAINING PROGRAM

## 2020-03-16 PROCEDURE — 85027 COMPLETE CBC AUTOMATED: CPT | Performed by: STUDENT IN AN ORGANIZED HEALTH CARE EDUCATION/TRAINING PROGRAM

## 2020-03-16 PROCEDURE — 25000132 ZZH RX MED GY IP 250 OP 250 PS 637: Performed by: INTERNAL MEDICINE

## 2020-03-16 PROCEDURE — 80048 BASIC METABOLIC PNL TOTAL CA: CPT | Performed by: STUDENT IN AN ORGANIZED HEALTH CARE EDUCATION/TRAINING PROGRAM

## 2020-03-16 PROCEDURE — 83735 ASSAY OF MAGNESIUM: CPT | Performed by: STUDENT IN AN ORGANIZED HEALTH CARE EDUCATION/TRAINING PROGRAM

## 2020-03-16 PROCEDURE — 25000128 H RX IP 250 OP 636: Performed by: INTERNAL MEDICINE

## 2020-03-16 PROCEDURE — 36592 COLLECT BLOOD FROM PICC: CPT | Performed by: STUDENT IN AN ORGANIZED HEALTH CARE EDUCATION/TRAINING PROGRAM

## 2020-03-16 PROCEDURE — 40000556 ZZH STATISTIC PERIPHERAL IV START W US GUIDANCE

## 2020-03-16 PROCEDURE — 99232 SBSQ HOSP IP/OBS MODERATE 35: CPT | Mod: GC | Performed by: INTERNAL MEDICINE

## 2020-03-16 PROCEDURE — 27210995 ZZH RX 272: Performed by: INTERNAL MEDICINE

## 2020-03-16 PROCEDURE — 85610 PROTHROMBIN TIME: CPT | Performed by: STUDENT IN AN ORGANIZED HEALTH CARE EDUCATION/TRAINING PROGRAM

## 2020-03-16 RX ORDER — WARFARIN SODIUM 3 MG/1
6 TABLET ORAL
Status: COMPLETED | OUTPATIENT
Start: 2020-03-16 | End: 2020-03-16

## 2020-03-16 RX ORDER — WARFARIN SODIUM 4 MG/1
4 TABLET ORAL
Status: DISCONTINUED | OUTPATIENT
Start: 2020-03-16 | End: 2020-03-16

## 2020-03-16 RX ORDER — HEPARIN SODIUM 10000 [USP'U]/100ML
0-3500 INJECTION, SOLUTION INTRAVENOUS CONTINUOUS
Status: DISCONTINUED | OUTPATIENT
Start: 2020-03-16 | End: 2020-03-18

## 2020-03-16 RX ADMIN — LISINOPRIL 20 MG: 20 TABLET ORAL at 08:02

## 2020-03-16 RX ADMIN — DIGOXIN 62.5 MCG: 0.06 TABLET ORAL at 08:02

## 2020-03-16 RX ADMIN — ACETAMINOPHEN 650 MG: 325 TABLET, FILM COATED ORAL at 09:25

## 2020-03-16 RX ADMIN — METOCLOPRAMIDE 5 MG: 5 INJECTION, SOLUTION INTRAMUSCULAR; INTRAVENOUS at 09:26

## 2020-03-16 RX ADMIN — WARFARIN SODIUM 6 MG: 3 TABLET ORAL at 17:16

## 2020-03-16 RX ADMIN — METOCLOPRAMIDE 5 MG: 5 INJECTION, SOLUTION INTRAMUSCULAR; INTRAVENOUS at 02:23

## 2020-03-16 RX ADMIN — HYDROXYCHLOROQUINE SULFATE 200 MG: 200 TABLET, FILM COATED ORAL at 08:02

## 2020-03-16 RX ADMIN — SENNOSIDES AND DOCUSATE SODIUM 1 TABLET: 8.6; 5 TABLET ORAL at 20:00

## 2020-03-16 RX ADMIN — SENNOSIDES AND DOCUSATE SODIUM 1 TABLET: 8.6; 5 TABLET ORAL at 08:02

## 2020-03-16 RX ADMIN — HEPARIN SODIUM 650 UNITS/HR: 10000 INJECTION, SOLUTION INTRAVENOUS at 18:06

## 2020-03-16 RX ADMIN — MAGNESIUM SULFATE 2 G: 2 INJECTION INTRAVENOUS at 12:03

## 2020-03-16 RX ADMIN — TREPROSTINIL 6 NG/KG/MIN: 20 INJECTION, SOLUTION INTRAVENOUS; SUBCUTANEOUS at 12:10

## 2020-03-16 RX ADMIN — GABAPENTIN 300 MG: 300 CAPSULE ORAL at 14:43

## 2020-03-16 RX ADMIN — TREPROSTINIL 6 NG/KG/MIN: 20 INJECTION, SOLUTION INTRAVENOUS; SUBCUTANEOUS at 21:50

## 2020-03-16 RX ADMIN — ACETAMINOPHEN 650 MG: 325 TABLET, FILM COATED ORAL at 01:52

## 2020-03-16 RX ADMIN — ACETAMINOPHEN 650 MG: 325 TABLET, FILM COATED ORAL at 19:59

## 2020-03-16 RX ADMIN — ASPIRIN 81 MG CHEWABLE TABLET 81 MG: 81 TABLET CHEWABLE at 19:59

## 2020-03-16 RX ADMIN — ACETAMINOPHEN 650 MG: 325 TABLET, FILM COATED ORAL at 14:43

## 2020-03-16 RX ADMIN — IPRATROPIUM BROMIDE 2 SPRAY: 42 SPRAY, METERED NASAL at 14:38

## 2020-03-16 RX ADMIN — GABAPENTIN 300 MG: 300 CAPSULE ORAL at 19:59

## 2020-03-16 RX ADMIN — GABAPENTIN 300 MG: 300 CAPSULE ORAL at 08:02

## 2020-03-16 RX ADMIN — CETIRIZINE HYDROCHLORIDE 10 MG: 10 TABLET, FILM COATED ORAL at 19:59

## 2020-03-16 RX ADMIN — FUROSEMIDE 20 MG: 20 TABLET ORAL at 08:02

## 2020-03-16 RX ADMIN — TREPROSTINIL 6 NG/KG/MIN: 20 INJECTION, SOLUTION INTRAVENOUS; SUBCUTANEOUS at 01:46

## 2020-03-16 ASSESSMENT — ACTIVITIES OF DAILY LIVING (ADL)
ADLS_ACUITY_SCORE: 17

## 2020-03-16 ASSESSMENT — MIFFLIN-ST. JEOR: SCORE: 910.9

## 2020-03-16 ASSESSMENT — PAIN DESCRIPTION - DESCRIPTORS: DESCRIPTORS: HEADACHE

## 2020-03-16 NOTE — PLAN OF CARE
D: Patient admitted 3/4 for CTEPH treatment. Hx. CTEPH w/acute RV failure in setting of bilat PE & DVTs in 1998 d/t breast CA tx w/tamoxifen, RA, and pulm MAC.    I: Monitored vitals and assessed pt status.   Running: Remodulin gtt @ 6ng/kg/min (5.07ml/hr) (Ashlyn), PICC SL, PIV SL  PRN: reglan    A: A0x4. VSS. Afebrile. NSR. C/o mild headache- treating with scheduled tylenol. Intermittent nausea- PRN reglan. 2g Na diet, 1.5L FR- eating fair. Urinating adequately. Small BM this evening, but per pt still feels constipated- may need miralax in the AM. Pt up ind in room, otherwise SBA. Pt very pleasant, cooperative.     P: Pt and  still yet to complete education/training on home remodulin. Continue to monitor Pt status and report changes to treatment team.

## 2020-03-16 NOTE — CONSULTS
Met with Karen and her  Don at bedside for Warfarin education. Patient and family were able to teach back why she is taking Warfarin, how it works, importance of blood tests, problems to watch for, consistent vit K intake, and other drugs interations with Warfarin.       Literature given: Guide to Warfarin Therapy and Warfarin Therapy Calendar

## 2020-03-16 NOTE — PROGRESS NOTES
Cardiology Progress Note  Karen Anderson MRN: [id number]  Age: 79 year old, : 1940  Date: 3/16/2020              Assessment and Plan:     78 y/o F w/CTEPH w/acute RV failure in setting of bilat PE & DVTs in  d/t breast CA tx w/tamoxifen, RA, and pulm MAC, now on stable remoduline dose awaiting therapeutic INR.      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 Functional class III. On admission, Echo ow/nl  EF, mild RVH, mod dilated/dysfunctional RV, dilated pulmonary artery; RHC w/RA of 10, PA 98/30/52m reduce CO with high normal right sided filling pressures. Remodulin dose limited by h/a, now stable and tolerating dose.   - Remodulin: 6 mcg/kg/min     > Will continue on this dose  - Reglan 5 mg q6h for headache and nausea  - Digoxin 62.5 mcg PO every day  - Diuresis with lasix 20 mg PO  - Bridging to warfarin, on heparin gtt: > Goal INR: 2-3    > Heparin gtt had been discontinued on (2020) but reinitiated on       2020 due to subsequent sub-therapeutic INR's        >> As of 2020: INR: 1.75  - Remodulin teaching & pre-authorization    > Teaching was received on 2020.  was present    > Pending pre-authorization    #Hyponatremia   > Multifactorial; main potential culprit hypervolemia  Likely d/t hypervolemia. Improving.   - diuresis  - fluid restrict 1500 mL   - Continue monitoring.         # RA  - Continue PTA Plaquenil     FEN: Low Na diet  PPX: Heparin  CODE: FULL     Patient discussed with staff attending, Brad Ahmadi MD PhD     Jake Mathew  Internal Medicine Residency, PGY-1  Pager: [phone]  [facility]              Subjective/Interval Events     No acute events overnight.           Objective     /76 (BP Location: Left leg)   Pulse 77   Temp 98.2  F (36.8  C) (Oral)   Resp 18   Ht 1.473 m (4'  "10\")   Wt 53.6 kg (118 lb 3.2 oz)   SpO2 95%   BMI 24.70 kg/m    Temp:  [98  F (36.7  C)-98.5  F (36.9  C)] 98.2  F (36.8  C)  Pulse:  [65-77] 77  Heart Rate:  [74] 74  Resp:  [16-18] 18  BP: (119-132)/(44-76) 124/76  SpO2:  [92 %-97 %] 95 %  Wt Readings from Last 2 Encounters:   03/16/20 53.6 kg (118 lb 3.2 oz)   03/02/20 57.6 kg (126 lb 14.4 oz)     I/O last 3 completed shifts:  In: 1051.68 [P.O.:930; I.V.:121.68]  Out: 2375 [Urine:2375]    Gen: A&Ox3, in NAD, pleasant  HEENT: NC/AT, anicteric, EOMI, MOM, JVP: 6-8cm   PULM/: normal respiratory effort on RA, mild crackles at bases. Otherwise clear to auscultation  CV: RRR, S1 and S2, no m/r/g.  ABD: +BS, soft, NT/ND, no guarding  EXT: ww;. +1 edema in BLE             Data:     Recent Results (from the past 24 hour(s))   Basic metabolic panel    Collection Time: 03/16/20  5:24 AM   Result Value Ref Range    Sodium 129 (L) 133 - 144 mmol/L    Potassium 4.8 3.4 - 5.3 mmol/L    Chloride 95 94 - 109 mmol/L    Carbon Dioxide 29 20 - 32 mmol/L    Anion Gap 5 3 - 14 mmol/L    Glucose 77 70 - 99 mg/dL    Urea Nitrogen 18 7 - 30 mg/dL    Creatinine 0.97 0.52 - 1.04 mg/dL    GFR Estimate 56 (L) >60 mL/min/[1.73_m2]    GFR Estimate If Black 65 >60 mL/min/[1.73_m2]    Calcium 9.1 8.5 - 10.1 mg/dL   INR    Collection Time: 03/16/20  5:24 AM   Result Value Ref Range    INR 1.75 (H) 0.86 - 1.14   CBC with platelets    Collection Time: 03/16/20  5:24 AM   Result Value Ref Range    WBC 5.3 4.0 - 11.0 10e9/L    RBC Count 3.79 (L) 3.8 - 5.2 10e12/L    Hemoglobin 12.2 11.7 - 15.7 g/dL    Hematocrit 37.8 35.0 - 47.0 %     78 - 100 fl    MCH 32.2 26.5 - 33.0 pg    MCHC 32.3 31.5 - 36.5 g/dL    RDW 13.2 10.0 - 15.0 %    Platelet Count 231 150 - 450 10e9/L   Magnesium    Collection Time: 03/16/20  5:24 AM   Result Value Ref Range    Magnesium 1.8 1.6 - 2.3 mg/dL       Recent Results (from the past 24 hour(s))   XR Chest Port 1 View    Narrative    EXAM: XR CHEST PORT 1 VW  " 3/4/2020 4:45 PM     HISTORY:  picc       COMPARISON:  CT chest 3/3/2020    FINDINGS:   Portable semiupright view of the chest. Right upper extremity PICC  line tip projects over the right atrium. The cardiomediastinal  silhouette is mildly enlarged. Low lung volumes. No focal airspace  opacities. The previously seen right middle lobe nodule is not well  visualized. A rounded right perihilar opacity corresponds to the right  lower lobe pulmonary artery en face. No pleural effusion or  pneumothorax. Scoliotic curvature of the thoracic spine. No acute  osseous abnormalities.      Impression    IMPRESSION:   The new right arm PICC tip projects over the high right atrium. No  pneumothorax.    I have personally reviewed the examination and initial interpretation  and I agree with the findings.    MOOK SLOAN MD             Medications     Current Facility-Administered Medications   Medication     acetaminophen (TYLENOL) Suppository 650 mg     acetaminophen (TYLENOL) tablet 325 mg     acetaminophen (TYLENOL) tablet 650 mg     albuterol (PROAIR HFA/PROVENTIL HFA/VENTOLIN HFA) 108 (90 Base) MCG/ACT inhaler 2 puff     aspirin (ASA) chewable tablet 81 mg     beclomethasone HFA (QVAR REDIHALER) 80 MCG/ACT inhaler 2 puff     cetirizine (zyrTEC) tablet 10 mg     digoxin (LANOXIN) tablet 62.5 mcg     furosemide (LASIX) tablet 20 mg     gabapentin (NEURONTIN) capsule 300 mg     granisetron (KYTRIL) injection 0.5 mg     heparin lock flush 10 UNIT/ML injection 5 mL     hydroxychloroquine (PLAQUENIL) tablet 200 mg     ipratropium (ATROVENT) 0.06 % spray 2 spray     lidocaine (LMX4) cream     lidocaine 1 % 0.1-1 mL     lisinopril (ZESTRIL) tablet 20 mg     magnesium sulfate 2 g in water intermittent infusion     magnesium sulfate 4 g in 100 mL sterile water (premade)     medication instruction     medication instruction     melatonin tablet 3 mg     metoclopramide (REGLAN) injection 5 mg     Patient is already receiving  anticoagulation with heparin, enoxaparin (LOVENOX), warfarin (COUMADIN)  or other anticoagulant medication     polyethylene glycol (MIRALAX) Packet 17 g     potassium chloride (KLOR-CON) Packet 20-40 mEq     potassium chloride 10 mEq in 100 mL intermittent infusion with 10 mg lidocaine     potassium chloride 10 mEq in 100 mL sterile water intermittent infusion (premix)     potassium chloride 20 mEq in 50 mL intermittent infusion     potassium chloride ER (KLOR-CON M) CR tablet 20-40 mEq     senna-docusate (SENOKOT-S/PERICOLACE) 8.6-50 MG per tablet 2 tablet     sodium chloride (OCEAN) 0.65 % nasal spray 1 spray     sodium chloride (PF) 0.9% PF flush 10 mL     sodium chloride (PF) 0.9% PF flush 10-20 mL     sodium chloride (PF) 0.9% PF flush 10-20 mL     sodium chloride (PF) 0.9% PF flush 3 mL     sodium chloride (PF) 0.9% PF flush 3 mL     treprostinil (REMODULIN) intravenous infusion     warfarin ANTICOAGULANT (COUMADIN) tablet 6 mg     Warfarin Therapy Reminder (Check START DATE - warfarin may be starting in the FUTURE)

## 2020-03-16 NOTE — TELEPHONE ENCOUNTER
"Per [facility]:    \"We are reaching out to patient to clear and get consent her for therapy.  She has 80/20 coverage so we will likely need to send to PAP for assistance.\"    Information was relayed to 6C.     Tyra acosta/ [facility] did confirm that pt has been approved for Brand.   ------------------------------  Contacted pt's , Santana, to see if [facility] has reached out to the pt. Santana stated they have, and discussed co-pay assistance options with Santana. Santana stated that they have already maxed out their deductible, but [facility] still requested they complete a PAP just in case the medication is not covered. Santana stated he provided them his tax return information, and the benefits rep with [facility], Maggie, stated they will probably qualify for assistance. Stated to pt that if they are not approved for PAP and [facility] cannot provide any co-pay assistance options, to contact the office. Santana verbalized understanding and had no further questions.    Santana did state that anything pertaining to her care should be directed to him as the pt will provide consent over the phone and have the call transferred to Santana.     Lexus Balbuena  Clinic   Pulmonary Hypertension  [facility]  (P) [phone]  "

## 2020-03-16 NOTE — PLAN OF CARE
D: Patient admitted 3/4 for CTEPH treatment. Hx. CTEPH w/acute RV failure in setting of bilat PE & DVTs,1998 d/t breast CA tx w/tamoxifen, RA, and pulm MAC.     I: Monitored vitals and assessed pt status.   Running: Remodulin gtt @ 6ng/kg/min (5.07ml/hr) (Ashlyn), PICC SL, PIV SL  PRN: reglan     A: A0x4. VSS. Afebrile. NSR. C/o mild headache- treating with scheduled tylenol. Intermittent nausea- PRN reglan. 2g Na diet, 1.5L FR- eating fair. Urinating adequately. Small BM this evening, but per pt still feels constipated- may need miralax in the AM. Pt up ind in room, otherwise SBA. Pt very pleasant, cooperative.      P: Pt and  still yet to complete education/training on home remodulin. Continue to monitor Pt status and report changes to treatment team.

## 2020-03-16 NOTE — PLAN OF CARE
Pt admit 3/4 for CTEPH treatment. VSS. NSR. RA. Taking scheduled Tylenol - pt also requesting PRN Reglan with Tylenol this morning. Remodulin gtt continues @ 6ng/kg/min. Several BMs. Adequate UOP. Mg (1.8) replaced per protocol. Up independently to commode. Makes needs known. Pt  supportive at bedside - pt/ currently completing Coumadin teaching. Continue with plan of care and report changes to treatment team. Plan for pt to have Remodulin teaching tomorrow (3/17).

## 2020-03-16 NOTE — PROGRESS NOTES
D: Patient admitted 3/4 for CTEPH treatment. Hx. CTEPH w/acute RV failure in setting of bilat PE & DVTs in 1998 d/t breast CA tx w/tamoxifen, RA, and pulm MAC    I: Monitored vitals and assessed pt status.   Changed:  -restarted heparin gtt  -pt continues to practice remodulin syringe changes with . Expresses anxiety. Questions answered, pt reassured.  Running: Remodulin gtt @ 6 ng/kg/min into Goldberg; Heparin gtt @ *650 units/hr into RPIV. R PICC remains for lab draws.    A: A0x4. VSS on RA. Afebrile. SR on tele. Urinating adequately into bedside commode.     P: Continue to monitor Pt status and report changes to Cards 2 team.    Geovanna Mcdermott RN on 3/16/2020 at 7:15 PM  POC: [phone]

## 2020-03-16 NOTE — PROGRESS NOTES
Care Coordinator - Discharge Planning    Admission Date/Time:  3/4/2020  Attending MD:  Karolina Turcios MD   Data  Date of initial CC assessment:  3/10/2020  Chart reviewed, discussed with interdisciplinary team.   Patient was admitted for:   1. Localized edema    2. SOB (shortness of breath)    3. Iron deficiency    4. Dyslipidemia    5. Pulmonary hypertension (H)    6. Need for hepatitis B screening test    7. Localized edema    8. SOB (shortness of breath)    9. Iron deficiency    10. Dyslipidemia    11. Pulmonary hypertension (H)    12. Need for hepatitis B screening test    Assessment   Full assessment completed in previous note   Concerns with insurance coverage for discharge needs: None.  Current Living Situation: Patient lives with spouse.  Support System: Supportive and Involved  Services Involved: none  Transportation at Discharge: Family or friend will provide  Transportation to Medical Appointments:   - spouse  Barriers to Discharge: medical stability, remodulin set up    Coordination of Care and Referrals: Previous RN CC provided patient/family with options for home infusion and new Warfarin. Referral sent to [facility] medication monitoring clinic and plans to have labs drawn at [facility].       This writer spoke with Deisy Landers Mgr (Ph: [phone] ext. [phone]) this morning and was told that their RN's from [facility] have called in sick today so there are no RN's available to do a teaching session with pt and pt's roommate today. But Marielos GARLAND RN (cell:[phone]) will be available tomorrow to provide pt with a teaching session.  Also, awaiting insurance coverage approval.   Intervention:   Arrangements made with [facility] (Ph: [phone] Fax: [phone]) for Remodulin drip and line care.     Plan  Anticipated Discharge Date:  TBD  Anticipated Discharge Plan:  Discharge to home with home infusion.     JR COLLADO RN BSN  Care Coordinator Unit 6C  [phone]

## 2020-03-17 ENCOUNTER — APPOINTMENT (OUTPATIENT)
Dept: OCCUPATIONAL THERAPY | Facility: CLINIC | Age: 80
DRG: 287 | End: 2020-03-17
Attending: INTERNAL MEDICINE
Payer: COMMERCIAL

## 2020-03-17 LAB
ANION GAP SERPL CALCULATED.3IONS-SCNC: 5 MMOL/L (ref 3–14)
BUN SERPL-MCNC: 18 MG/DL (ref 7–30)
CALCIUM SERPL-MCNC: 9.3 MG/DL (ref 8.5–10.1)
CHLORIDE SERPL-SCNC: 94 MMOL/L (ref 94–109)
CO2 SERPL-SCNC: 28 MMOL/L (ref 20–32)
CREAT SERPL-MCNC: 0.99 MG/DL (ref 0.52–1.04)
ERYTHROCYTE [DISTWIDTH] IN BLOOD BY AUTOMATED COUNT: 13.2 % (ref 10–15)
GFR SERPL CREATININE-BSD FRML MDRD: 55 ML/MIN/{1.73_M2}
GLUCOSE SERPL-MCNC: 78 MG/DL (ref 70–99)
HCT VFR BLD AUTO: 40.2 % (ref 35–47)
HGB BLD-MCNC: 13 G/DL (ref 11.7–15.7)
INR PPP: 1.85 (ref 0.86–1.14)
INR PPP: NORMAL (ref 0.86–1.14)
LMWH PPP CHRO-ACNC: 0.11 IU/ML
LMWH PPP CHRO-ACNC: 1.58 IU/ML
LMWH PPP CHRO-ACNC: <0.1 IU/ML
LMWH PPP CHRO-ACNC: NORMAL IU/ML
LMWH PPP CHRO-ACNC: NORMAL IU/ML
MAGNESIUM SERPL-MCNC: 2 MG/DL (ref 1.6–2.3)
MCH RBC QN AUTO: 32.4 PG (ref 26.5–33)
MCHC RBC AUTO-ENTMCNC: 32.3 G/DL (ref 31.5–36.5)
MCV RBC AUTO: 100 FL (ref 78–100)
PLATELET # BLD AUTO: 256 10E9/L (ref 150–450)
POTASSIUM SERPL-SCNC: 4.6 MMOL/L (ref 3.4–5.3)
RBC # BLD AUTO: 4.01 10E12/L (ref 3.8–5.2)
SODIUM SERPL-SCNC: 127 MMOL/L (ref 133–144)
WBC # BLD AUTO: 5.4 10E9/L (ref 4–11)

## 2020-03-17 PROCEDURE — 85520 HEPARIN ASSAY: CPT | Performed by: INTERNAL MEDICINE

## 2020-03-17 PROCEDURE — 40000802 ZZH SITE CHECK

## 2020-03-17 PROCEDURE — 36592 COLLECT BLOOD FROM PICC: CPT | Performed by: STUDENT IN AN ORGANIZED HEALTH CARE EDUCATION/TRAINING PROGRAM

## 2020-03-17 PROCEDURE — 25000132 ZZH RX MED GY IP 250 OP 250 PS 637: Performed by: STUDENT IN AN ORGANIZED HEALTH CARE EDUCATION/TRAINING PROGRAM

## 2020-03-17 PROCEDURE — 85610 PROTHROMBIN TIME: CPT | Performed by: INTERNAL MEDICINE

## 2020-03-17 PROCEDURE — 25000132 ZZH RX MED GY IP 250 OP 250 PS 637: Performed by: INTERNAL MEDICINE

## 2020-03-17 PROCEDURE — 21400000 ZZH R&B CCU UMMC

## 2020-03-17 PROCEDURE — 83735 ASSAY OF MAGNESIUM: CPT | Performed by: STUDENT IN AN ORGANIZED HEALTH CARE EDUCATION/TRAINING PROGRAM

## 2020-03-17 PROCEDURE — 97110 THERAPEUTIC EXERCISES: CPT | Mod: GO | Performed by: OCCUPATIONAL THERAPIST

## 2020-03-17 PROCEDURE — 36592 COLLECT BLOOD FROM PICC: CPT | Performed by: INTERNAL MEDICINE

## 2020-03-17 PROCEDURE — 99232 SBSQ HOSP IP/OBS MODERATE 35: CPT | Mod: GC | Performed by: INTERNAL MEDICINE

## 2020-03-17 PROCEDURE — 25000128 H RX IP 250 OP 636: Performed by: INTERNAL MEDICINE

## 2020-03-17 PROCEDURE — 25000128 H RX IP 250 OP 636: Performed by: STUDENT IN AN ORGANIZED HEALTH CARE EDUCATION/TRAINING PROGRAM

## 2020-03-17 PROCEDURE — 36415 COLL VENOUS BLD VENIPUNCTURE: CPT | Performed by: INTERNAL MEDICINE

## 2020-03-17 PROCEDURE — 27210995 ZZH RX 272: Performed by: INTERNAL MEDICINE

## 2020-03-17 PROCEDURE — 85027 COMPLETE CBC AUTOMATED: CPT | Performed by: STUDENT IN AN ORGANIZED HEALTH CARE EDUCATION/TRAINING PROGRAM

## 2020-03-17 PROCEDURE — 40000141 ZZH STATISTIC PERIPHERAL IV START W/O US GUIDANCE

## 2020-03-17 PROCEDURE — 80048 BASIC METABOLIC PNL TOTAL CA: CPT | Performed by: STUDENT IN AN ORGANIZED HEALTH CARE EDUCATION/TRAINING PROGRAM

## 2020-03-17 RX ORDER — WARFARIN SODIUM 4 MG/1
4 TABLET ORAL
Status: COMPLETED | OUTPATIENT
Start: 2020-03-17 | End: 2020-03-17

## 2020-03-17 RX ADMIN — TREPROSTINIL 6 NG/KG/MIN: 20 INJECTION, SOLUTION INTRAVENOUS; SUBCUTANEOUS at 17:53

## 2020-03-17 RX ADMIN — HYDROXYCHLOROQUINE SULFATE 200 MG: 200 TABLET, FILM COATED ORAL at 08:14

## 2020-03-17 RX ADMIN — ACETAMINOPHEN 650 MG: 325 TABLET, FILM COATED ORAL at 14:10

## 2020-03-17 RX ADMIN — ASPIRIN 81 MG CHEWABLE TABLET 81 MG: 81 TABLET CHEWABLE at 20:18

## 2020-03-17 RX ADMIN — TREPROSTINIL 6 NG/KG/MIN: 20 INJECTION, SOLUTION INTRAVENOUS; SUBCUTANEOUS at 07:54

## 2020-03-17 RX ADMIN — DIGOXIN 62.5 MCG: 0.06 TABLET ORAL at 08:13

## 2020-03-17 RX ADMIN — FUROSEMIDE 20 MG: 20 TABLET ORAL at 08:13

## 2020-03-17 RX ADMIN — CETIRIZINE HYDROCHLORIDE 10 MG: 10 TABLET, FILM COATED ORAL at 20:19

## 2020-03-17 RX ADMIN — HEPARIN SODIUM 750 UNITS/HR: 10000 INJECTION, SOLUTION INTRAVENOUS at 20:45

## 2020-03-17 RX ADMIN — WARFARIN SODIUM 4 MG: 4 TABLET ORAL at 18:15

## 2020-03-17 RX ADMIN — ACETAMINOPHEN 650 MG: 325 TABLET, FILM COATED ORAL at 20:18

## 2020-03-17 RX ADMIN — SENNOSIDES AND DOCUSATE SODIUM 2 TABLET: 8.6; 5 TABLET ORAL at 08:14

## 2020-03-17 RX ADMIN — ACETAMINOPHEN 650 MG: 325 TABLET, FILM COATED ORAL at 01:34

## 2020-03-17 RX ADMIN — GABAPENTIN 300 MG: 300 CAPSULE ORAL at 20:19

## 2020-03-17 RX ADMIN — GABAPENTIN 300 MG: 300 CAPSULE ORAL at 14:09

## 2020-03-17 RX ADMIN — GABAPENTIN 300 MG: 300 CAPSULE ORAL at 08:15

## 2020-03-17 RX ADMIN — SENNOSIDES AND DOCUSATE SODIUM 1 TABLET: 8.6; 5 TABLET ORAL at 20:20

## 2020-03-17 RX ADMIN — ACETAMINOPHEN 650 MG: 325 TABLET, FILM COATED ORAL at 08:13

## 2020-03-17 RX ADMIN — LISINOPRIL 20 MG: 20 TABLET ORAL at 08:13

## 2020-03-17 RX ADMIN — IPRATROPIUM BROMIDE 2 SPRAY: 42 SPRAY, METERED NASAL at 20:23

## 2020-03-17 ASSESSMENT — ACTIVITIES OF DAILY LIVING (ADL)
ADLS_ACUITY_SCORE: 15

## 2020-03-17 ASSESSMENT — MIFFLIN-ST. JEOR: SCORE: 910.9

## 2020-03-17 NOTE — PLAN OF CARE
OT/CR 6C: Discharge Planner OT   Patient plan for discharge: Home with assist  Current status: Pt motivated to progress activity.  Able to ambulate >700ft with SBA and no UE support, mild SOB though VSS on RA, issued /pinch strengthening exercises for improved manipulation of home medical supplies.  Barriers to return to prior living situation: SOB, deconditioning, medication management/acute medical needs  Recommendations for discharge: Home with assist  Rationale for recommendations: Pt is mobilizing safely and has good support at home.  Pt will benefit from daily independent exercise to maximize functional capacity and strength for daily activity.         Entered by: Chandrika Dang 03/17/2020 4:15 PM

## 2020-03-17 NOTE — PROGRESS NOTES
Cardiology Progress Note  Karen Anderson MRN: [id number]  Age: 79 year old, : 1940  Date: 3/16/2020              Assessment and Plan:     80 y/o F w/CTEPH w/acute RV failure in setting of bilat PE & DVTs in  d/t breast CA tx w/tamoxifen, RA, and pulm MAC, now on stable remoduline dose awaiting therapeutic INR.      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 Functional class III. On admission, Echo ow/nl  EF, mild RVH, mod dilated/dysfunctional RV, dilated pulmonary artery; RHC w/RA of 10, PA 98/30/52m reduce CO with high normal right sided filling pressures. Remodulin dose limited by h/a, now stable and tolerating dose.   - Remodulin: 6 mcg/kg/min     > Will continue on this dose  - Reglan 5 mg q6h for headache and nausea  - Digoxin 62.5 mcg PO every day  - Diuresis with lasix 20 mg PO  - Bridging to warfarin, on heparin gtt: > Goal INR: 2-3    > Heparin gtt had been discontinued on (2020) but reinitiated on       2020 due to subsequent sub-therapeutic INR's        >> As of 2020: INR: 1.85  - Remodulin teaching & pre-authorization    > Teaching was received on 2020.  was present       > Requires additional session prior to discharge to assess for independence/competency of               cares    > Financial authorization approve; family deciding wether assume co-pay.     #Hyponatremia   > Multifactorial; main potential culprit hypervolemia  Likely d/t hypervolemia. Improving.   - diuresis  - fluid restrict 1500 mL   - Continue monitoring.         # RA  - Continue PTA Plaquenil     FEN: Low Na diet  PPX: Heparin  CODE: FULL     Patient discussed with staff attending, Brad Ahmadi MD PhD     Jake Mathew  Internal Medicine Residency, PGY-1  Pager: [phone]  [facility]              Subjective/Interval Events  "    Patient referred no acute medical complaints overnight. Concerned about requiring further teaching on behalf of [facility] for IV dressing changes/cares.    Continues on Heparin gtt bridging therapy to Warfarin  for INR goal 2-3. Latest INR: 1.85          Objective     BP (!) 142/63 (BP Location: Left arm)   Pulse 77   Temp 97.9  F (36.6  C) (Oral)   Resp 20   Ht 1.473 m (4' 10\")   Wt 53.6 kg (118 lb 3.2 oz)   SpO2 97%   BMI 24.70 kg/m    Temp:  [97.8  F (36.6  C)-98.3  F (36.8  C)] 97.9  F (36.6  C)  Heart Rate:  [60-82] 82  Resp:  [16-20] 20  BP: (112-145)/(58-84) 142/63  SpO2:  [91 %-97 %] 97 %  Wt Readings from Last 2 Encounters:   03/17/20 53.6 kg (118 lb 3.2 oz)   03/02/20 57.6 kg (126 lb 14.4 oz)     I/O last 3 completed shifts:  In: 790.1 [P.O.:540; I.V.:250.1]  Out: 1575 [Urine:1575]    Gen: A&Ox3, in NAD, pleasant  HEENT: NC/AT, anicteric, EOMI, MOM, JVP: 6-8cm   PULM/: normal respiratory effort on RA, mild crackles at bases. Otherwise clear to auscultation  CV: RRR, S1 and S2, no m/r/g.  ABD: +BS, soft, NT/ND, no guarding  EXT: ww;. +1 edema in BLE             Data:     Recent Results (from the past 24 hour(s))   CBC with platelets    Collection Time: 03/16/20  6:26 PM   Result Value Ref Range    WBC 7.3 4.0 - 11.0 10e9/L    RBC Count 4.02 3.8 - 5.2 10e12/L    Hemoglobin 13.0 11.7 - 15.7 g/dL    Hematocrit 40.3 35.0 - 47.0 %     78 - 100 fl    MCH 32.3 26.5 - 33.0 pg    MCHC 32.3 31.5 - 36.5 g/dL    RDW 13.2 10.0 - 15.0 %    Platelet Count 266 150 - 450 10e9/L   Heparin 10a Level    Collection Time: 03/17/20 12:14 AM   Result Value Ref Range    Heparin 10A Level 0.11 IU/mL   Basic metabolic panel    Collection Time: 03/17/20  7:42 AM   Result Value Ref Range    Sodium 127 (L) 133 - 144 mmol/L    Potassium 4.6 3.4 - 5.3 mmol/L    Chloride 94 94 - 109 mmol/L    Carbon Dioxide 28 20 - 32 mmol/L    Anion Gap 5 3 - 14 mmol/L    Glucose 78 70 - 99 mg/dL    Urea Nitrogen 18 7 - 30 mg/dL    " Creatinine 0.99 0.52 - 1.04 mg/dL    GFR Estimate 55 (L) >60 mL/min/[1.73_m2]    GFR Estimate If Black 63 >60 mL/min/[1.73_m2]    Calcium 9.3 8.5 - 10.1 mg/dL   INR    Collection Time: 03/17/20  7:42 AM   Result Value Ref Range    INR Canceled, Test credited 0.86 - 1.14   Heparin Xa (10a) Level    Collection Time: 03/17/20  7:42 AM   Result Value Ref Range    Heparin 10A Level Canceled, Test credited IU/mL   CBC with platelets    Collection Time: 03/17/20  7:42 AM   Result Value Ref Range    WBC 5.4 4.0 - 11.0 10e9/L    RBC Count 4.01 3.8 - 5.2 10e12/L    Hemoglobin 13.0 11.7 - 15.7 g/dL    Hematocrit 40.2 35.0 - 47.0 %     78 - 100 fl    MCH 32.4 26.5 - 33.0 pg    MCHC 32.3 31.5 - 36.5 g/dL    RDW 13.2 10.0 - 15.0 %    Platelet Count 256 150 - 450 10e9/L   Magnesium    Collection Time: 03/17/20  7:42 AM   Result Value Ref Range    Magnesium 2.0 1.6 - 2.3 mg/dL   Heparin 10a Level    Collection Time: 03/17/20  8:35 AM   Result Value Ref Range    Heparin 10A Level <0.10 IU/mL   INR    Collection Time: 03/17/20  8:35 AM   Result Value Ref Range    INR 1.85 (H) 0.86 - 1.14   Heparin 10a Level    Collection Time: 03/17/20  4:07 PM   Result Value Ref Range    Heparin 10A Level Canceled, Test credited IU/mL       Recent Results (from the past 24 hour(s))   XR Chest Port 1 View    Narrative    EXAM: XR CHEST PORT 1 VW  3/4/2020 4:45 PM     HISTORY:  picc       COMPARISON:  CT chest 3/3/2020    FINDINGS:   Portable semiupright view of the chest. Right upper extremity PICC  line tip projects over the right atrium. The cardiomediastinal  silhouette is mildly enlarged. Low lung volumes. No focal airspace  opacities. The previously seen right middle lobe nodule is not well  visualized. A rounded right perihilar opacity corresponds to the right  lower lobe pulmonary artery en face. No pleural effusion or  pneumothorax. Scoliotic curvature of the thoracic spine. No acute  osseous abnormalities.      Impression     IMPRESSION:   The new right arm PICC tip projects over the high right atrium. No  pneumothorax.    I have personally reviewed the examination and initial interpretation  and I agree with the findings.    MOOK SLOAN MD             Medications     Current Facility-Administered Medications   Medication     acetaminophen (TYLENOL) Suppository 650 mg     acetaminophen (TYLENOL) tablet 325 mg     acetaminophen (TYLENOL) tablet 650 mg     albuterol (PROAIR HFA/PROVENTIL HFA/VENTOLIN HFA) 108 (90 Base) MCG/ACT inhaler 2 puff     aspirin (ASA) chewable tablet 81 mg     beclomethasone HFA (QVAR REDIHALER) 80 MCG/ACT inhaler 2 puff     cetirizine (zyrTEC) tablet 10 mg     digoxin (LANOXIN) tablet 62.5 mcg     furosemide (LASIX) tablet 20 mg     gabapentin (NEURONTIN) capsule 300 mg     granisetron (KYTRIL) injection 0.5 mg     heparin  drip 25,000 units in 0.45% NaCl 250 mL  ANTICOAGULANT  (see additional administration details for dose)     heparin bolus from infusion pump     heparin lock flush 10 UNIT/ML injection 5 mL     hydroxychloroquine (PLAQUENIL) tablet 200 mg     ipratropium (ATROVENT) 0.06 % spray 2 spray     lidocaine (LMX4) cream     lidocaine 1 % 0.1-1 mL     lisinopril (ZESTRIL) tablet 20 mg     magnesium sulfate 2 g in water intermittent infusion     magnesium sulfate 4 g in 100 mL sterile water (premade)     medication instruction     medication instruction     melatonin tablet 3 mg     metoclopramide (REGLAN) injection 5 mg     Patient is already receiving anticoagulation with heparin, enoxaparin (LOVENOX), warfarin (COUMADIN)  or other anticoagulant medication     polyethylene glycol (MIRALAX) Packet 17 g     potassium chloride (KLOR-CON) Packet 20-40 mEq     potassium chloride 10 mEq in 100 mL intermittent infusion with 10 mg lidocaine     potassium chloride 10 mEq in 100 mL sterile water intermittent infusion (premix)     potassium chloride 20 mEq in 50 mL intermittent infusion     potassium chloride  ER (KLOR-CON M) CR tablet 20-40 mEq     senna-docusate (SENOKOT-S/PERICOLACE) 8.6-50 MG per tablet 2 tablet     sodium chloride (OCEAN) 0.65 % nasal spray 1 spray     sodium chloride (PF) 0.9% PF flush 10 mL     sodium chloride (PF) 0.9% PF flush 10-20 mL     sodium chloride (PF) 0.9% PF flush 10-20 mL     sodium chloride (PF) 0.9% PF flush 3 mL     sodium chloride (PF) 0.9% PF flush 3 mL     treprostinil (REMODULIN) intravenous infusion     warfarin ANTICOAGULANT (COUMADIN) tablet 4 mg     Warfarin Therapy Reminder (Check START DATE - warfarin may be starting in the FUTURE)

## 2020-03-17 NOTE — PROGRESS NOTES
Status: Admit 3/4 for CTEPH treatment  VS: VSS on RA  Neuros: A/Ox4   Cardiovascular: SR  GI/: Voiding w/ frequency in commode; no BM overnight  IV: R. PICC SL. R PIV running heparin @ 800 units/hr. 10A recheck at 0730. Goldberg running remodulin @ 6ng/kg/min  Activity: SBA, independent in room  Pain: Scheduled tylenol for headache + heat packs  Diet: 2g Na diet, 1.5L FR  Plan of Care: Notify Cards 2 w/ changes concerns.

## 2020-03-17 NOTE — PROGRESS NOTES
SPIRITUAL HEALTH SERVICES  SPIRITUAL ASSESSMENT Progress Note  [facility] 6C     Supportive visit with pt, who talked especially about the challenge of being educated regarding her self-care at home after discharge. We reflected on the different ways pt gains strength and wisdom from her matty in God - prayer was welcomed.  PLAN: continue to follow, visiting again this week if pt still on unit.    Daniel Harmon) Satinder Castro M.Div., [facility]  Staff   Pager [phone]

## 2020-03-18 ENCOUNTER — APPOINTMENT (OUTPATIENT)
Dept: OCCUPATIONAL THERAPY | Facility: CLINIC | Age: 80
DRG: 287 | End: 2020-03-18
Attending: INTERNAL MEDICINE
Payer: COMMERCIAL

## 2020-03-18 LAB
ANION GAP SERPL CALCULATED.3IONS-SCNC: 6 MMOL/L (ref 3–14)
BUN SERPL-MCNC: 18 MG/DL (ref 7–30)
CALCIUM SERPL-MCNC: 8.8 MG/DL (ref 8.5–10.1)
CHLORIDE SERPL-SCNC: 98 MMOL/L (ref 94–109)
CO2 SERPL-SCNC: 27 MMOL/L (ref 20–32)
CREAT SERPL-MCNC: 0.86 MG/DL (ref 0.52–1.04)
ERYTHROCYTE [DISTWIDTH] IN BLOOD BY AUTOMATED COUNT: 13.2 % (ref 10–15)
GFR SERPL CREATININE-BSD FRML MDRD: 65 ML/MIN/{1.73_M2}
GLUCOSE SERPL-MCNC: 82 MG/DL (ref 70–99)
HCT VFR BLD AUTO: 36.6 % (ref 35–47)
HGB BLD-MCNC: 11.6 G/DL (ref 11.7–15.7)
INR PPP: 2.19 (ref 0.86–1.14)
LMWH PPP CHRO-ACNC: 0.38 IU/ML
LMWH PPP CHRO-ACNC: 0.45 IU/ML
LMWH PPP CHRO-ACNC: 0.95 IU/ML
MAGNESIUM SERPL-MCNC: 1.9 MG/DL (ref 1.6–2.3)
MCH RBC QN AUTO: 32.1 PG (ref 26.5–33)
MCHC RBC AUTO-ENTMCNC: 31.7 G/DL (ref 31.5–36.5)
MCV RBC AUTO: 101 FL (ref 78–100)
PLATELET # BLD AUTO: 247 10E9/L (ref 150–450)
POTASSIUM SERPL-SCNC: 4.7 MMOL/L (ref 3.4–5.3)
RBC # BLD AUTO: 3.61 10E12/L (ref 3.8–5.2)
SODIUM SERPL-SCNC: 131 MMOL/L (ref 133–144)
WBC # BLD AUTO: 4.9 10E9/L (ref 4–11)

## 2020-03-18 PROCEDURE — 25000132 ZZH RX MED GY IP 250 OP 250 PS 637: Performed by: STUDENT IN AN ORGANIZED HEALTH CARE EDUCATION/TRAINING PROGRAM

## 2020-03-18 PROCEDURE — 85520 HEPARIN ASSAY: CPT | Performed by: INTERNAL MEDICINE

## 2020-03-18 PROCEDURE — 40000558 ZZH STATISTIC CVC DRESSING CHANGE

## 2020-03-18 PROCEDURE — 97110 THERAPEUTIC EXERCISES: CPT | Mod: GO | Performed by: OCCUPATIONAL THERAPIST

## 2020-03-18 PROCEDURE — 97535 SELF CARE MNGMENT TRAINING: CPT | Mod: GO | Performed by: OCCUPATIONAL THERAPIST

## 2020-03-18 PROCEDURE — 36592 COLLECT BLOOD FROM PICC: CPT | Performed by: INTERNAL MEDICINE

## 2020-03-18 PROCEDURE — 36415 COLL VENOUS BLD VENIPUNCTURE: CPT | Performed by: INTERNAL MEDICINE

## 2020-03-18 PROCEDURE — 27210995 ZZH RX 272: Performed by: INTERNAL MEDICINE

## 2020-03-18 PROCEDURE — 40000802 ZZH SITE CHECK

## 2020-03-18 PROCEDURE — 99232 SBSQ HOSP IP/OBS MODERATE 35: CPT | Mod: GC | Performed by: INTERNAL MEDICINE

## 2020-03-18 PROCEDURE — 80048 BASIC METABOLIC PNL TOTAL CA: CPT | Performed by: STUDENT IN AN ORGANIZED HEALTH CARE EDUCATION/TRAINING PROGRAM

## 2020-03-18 PROCEDURE — 85610 PROTHROMBIN TIME: CPT | Performed by: STUDENT IN AN ORGANIZED HEALTH CARE EDUCATION/TRAINING PROGRAM

## 2020-03-18 PROCEDURE — 21400000 ZZH R&B CCU UMMC

## 2020-03-18 PROCEDURE — 36415 COLL VENOUS BLD VENIPUNCTURE: CPT | Performed by: STUDENT IN AN ORGANIZED HEALTH CARE EDUCATION/TRAINING PROGRAM

## 2020-03-18 PROCEDURE — 25000128 H RX IP 250 OP 636: Performed by: INTERNAL MEDICINE

## 2020-03-18 PROCEDURE — 25000132 ZZH RX MED GY IP 250 OP 250 PS 637: Performed by: INTERNAL MEDICINE

## 2020-03-18 PROCEDURE — 83735 ASSAY OF MAGNESIUM: CPT | Performed by: STUDENT IN AN ORGANIZED HEALTH CARE EDUCATION/TRAINING PROGRAM

## 2020-03-18 PROCEDURE — 85027 COMPLETE CBC AUTOMATED: CPT | Performed by: STUDENT IN AN ORGANIZED HEALTH CARE EDUCATION/TRAINING PROGRAM

## 2020-03-18 PROCEDURE — 85520 HEPARIN ASSAY: CPT | Performed by: STUDENT IN AN ORGANIZED HEALTH CARE EDUCATION/TRAINING PROGRAM

## 2020-03-18 RX ORDER — WARFARIN SODIUM 5 MG/1
5 TABLET ORAL
Status: COMPLETED | OUTPATIENT
Start: 2020-03-18 | End: 2020-03-18

## 2020-03-18 RX ADMIN — LISINOPRIL 20 MG: 20 TABLET ORAL at 08:57

## 2020-03-18 RX ADMIN — SENNOSIDES AND DOCUSATE SODIUM 2 TABLET: 8.6; 5 TABLET ORAL at 20:05

## 2020-03-18 RX ADMIN — GABAPENTIN 300 MG: 300 CAPSULE ORAL at 20:05

## 2020-03-18 RX ADMIN — TREPROSTINIL 6 NG/KG/MIN: 20 INJECTION, SOLUTION INTRAVENOUS; SUBCUTANEOUS at 03:38

## 2020-03-18 RX ADMIN — ACETAMINOPHEN 650 MG: 325 TABLET, FILM COATED ORAL at 20:05

## 2020-03-18 RX ADMIN — HYDROXYCHLOROQUINE SULFATE 200 MG: 200 TABLET, FILM COATED ORAL at 08:56

## 2020-03-18 RX ADMIN — GABAPENTIN 300 MG: 300 CAPSULE ORAL at 14:51

## 2020-03-18 RX ADMIN — TREPROSTINIL 6 NG/KG/MIN: 20 INJECTION, SOLUTION INTRAVENOUS; SUBCUTANEOUS at 12:54

## 2020-03-18 RX ADMIN — SENNOSIDES AND DOCUSATE SODIUM 1 TABLET: 8.6; 5 TABLET ORAL at 08:57

## 2020-03-18 RX ADMIN — ACETAMINOPHEN 650 MG: 325 TABLET, FILM COATED ORAL at 08:56

## 2020-03-18 RX ADMIN — IPRATROPIUM BROMIDE 2 SPRAY: 42 SPRAY, METERED NASAL at 09:04

## 2020-03-18 RX ADMIN — GABAPENTIN 300 MG: 300 CAPSULE ORAL at 08:57

## 2020-03-18 RX ADMIN — TREPROSTINIL 6 NG/KG/MIN: 20 INJECTION, SOLUTION INTRAVENOUS; SUBCUTANEOUS at 22:37

## 2020-03-18 RX ADMIN — FUROSEMIDE 20 MG: 20 TABLET ORAL at 08:56

## 2020-03-18 RX ADMIN — CETIRIZINE HYDROCHLORIDE 10 MG: 10 TABLET, FILM COATED ORAL at 20:05

## 2020-03-18 RX ADMIN — DIGOXIN 62.5 MCG: 0.06 TABLET ORAL at 08:56

## 2020-03-18 RX ADMIN — ACETAMINOPHEN 650 MG: 325 TABLET, FILM COATED ORAL at 14:51

## 2020-03-18 RX ADMIN — ASPIRIN 81 MG CHEWABLE TABLET 81 MG: 81 TABLET CHEWABLE at 20:05

## 2020-03-18 RX ADMIN — WARFARIN SODIUM 5 MG: 5 TABLET ORAL at 19:16

## 2020-03-18 RX ADMIN — IPRATROPIUM BROMIDE 2 SPRAY: 42 SPRAY, METERED NASAL at 20:04

## 2020-03-18 ASSESSMENT — ACTIVITIES OF DAILY LIVING (ADL)
ADLS_ACUITY_SCORE: 15
ADLS_ACUITY_SCORE: 16
ADLS_ACUITY_SCORE: 15

## 2020-03-18 ASSESSMENT — MIFFLIN-ST. JEOR: SCORE: 944.75

## 2020-03-18 NOTE — PROGRESS NOTES
Care Coordinator - Discharge Planning    Admission Date/Time:  3/4/2020  Attending MD:  Karolina Turcios MD   Data  Date of initial CC assessment:  3/10/2020, 316/2020  Chart reviewed, discussed with interdisciplinary team.   Patient was admitted for:   1. Localized edema    2. SOB (shortness of breath)    3. Iron deficiency    4. Dyslipidemia    5. Pulmonary hypertension (H)    6. Need for hepatitis B screening test    7. Localized edema    8. SOB (shortness of breath)    9. Iron deficiency    10. Dyslipidemia    11. Pulmonary hypertension (H)    12. Need for hepatitis B screening test    Assessment   Full assessment completed in previous note  Concerns with insurance coverage for discharge needs: None.  Current Living Situation: Patient lives with spouse.  Support System: Supportive and Involved  Services Involved: none  Transportation at Discharge: Family or friend will provide  Transportation to Medical Appointments:   - spouse  Barriers to Discharge: medical stability, remodulin set up    Coordination of Care and Referrals: Previous RN CC provided patient/family with options for home infusion and new Warfarin. Referral sent to [facility] medication monitoring clinic and plans to have labs drawn at [facility].     Marielos GARLAND RN from [facility] (cell: [phone]) here today to provide teaching to pt and pt's , Santana.   Intervention:      Arrangements made with [facility] (Ph: [phone] Fax: [phone]) for Remodulin drip and line care.  Financial authorization approved; awaiting patient assistance program approval for co-pay.      Arrangements made with the [facility] tracx Monitoring Clinic (Ph: [phone] Fax: [phone]) for INR and Warfarin monitoring (Goal=2-3). Indication for Anticoagulation: pHTN. Dr. Osiris Luong to follow. Appointment made at the [facility] on 3/20/2020 at 10 AM for INR lab draw; with results to the [facility] [employer] Monitoring [facility].    Plan  Anticipated Discharge Date:  TBD  Anticipated Discharge Plan:  Discharge to home with home infusion.     JR COLLADO RN BSN  Care Coordinator Unit   [phone]

## 2020-03-18 NOTE — PLAN OF CARE
D: Admitted 3/4 for CTEPH treatment. Hx of acute RV failure in setting of bilat PE & DVTs in 1998 d/t breast CA tx w/tamoxifen, RA, and pulm MAC.    I/A: Pt A/Ox4. VSS on RA. Soft BPs overnight. SR/SB. Denied pain. R. PICC SL for lab draws. R PIV running heparin @ 450 units/hr. 10A recheck at 0800. Goldberg running remodulin @ 6ng/kg/min. 2g Na diet, 1.5L FR. Voiding adequately in commode. SBA, independent in room.    P: Continue monitoring & notify Cards 2 with changes/concerns.

## 2020-03-18 NOTE — PLAN OF CARE
78 y/o F w/CTEPH w/acute RV failure in setting of bilat PE & DVTs in 1998 d/t breast CA tx w/tamoxifen, RA, and pulm MAC, now on stable remoduline dose awaiting therapeutic INR. Pt continues on a heparin drip at 750 unit/hr, recheck xa at 00:30 AM. Remodulin drip at 6 mcgs/kg/min, goal dose. Frequent voids on the commode.VSS, SR 60s-70s. Sats high 90s on RA, c/o some RIVERA. Expressing some anxiety.

## 2020-03-18 NOTE — TELEPHONE ENCOUNTER
"Per [facility] today:    \"[facility] is still needing to speak with patient and  Don.  When they called Karen yesterday, she wasn t able to help them.  I just got off the  phone with Santana Anderson.  Verified his call back number for today and we are providing that to [facility] to connect with him.  So at this time, PAP is pending.\"    Lexus Balbuena  Clinic   Pulmonary Hypertension  [facility]  (P) [phone]  "

## 2020-03-18 NOTE — PROGRESS NOTES
Cardiology Progress Note  Kraen Anderson MRN: [id number]  Age: 79 year old, : 1940  Date: 3/16/2020              Assessment and Plan:     78 y/o F w/CTEPH w/acute RV failure in setting of bilat PE & DVTs in  d/t breast CA tx w/tamoxifen, RA, and pulm MAC, now on stable remoduline dose awaiting therapeutic INR.      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 Functional class III. On admission, Echo ow/nl  EF, mild RVH, mod dilated/dysfunctional RV, dilated pulmonary artery; RHC w/RA of 10, PA 98/30/52m reduce CO with high normal right sided filling pressures. Remodulin dose limited by h/a, now stable and tolerating dose.   - Remodulin: 6 mcg/kg/min     > Will continue on this dose  - Reglan 5 mg q6h for headache and nausea  - Digoxin 62.5 mcg PO every day  - Diuresis with lasix 20 mg PO  - Bridging to warfarin, on heparin gtt: > Goal INR: 2-3    > Heparin gtt discontinued 3/18 after patient was successfully bridged to warfarin  - Remodulin teaching & pre-authorization    > Teaching was received on 2020.  was present   > Patient to be discharged after her last teaching session, which is scheduled for 3/18/2020    #Hyponatremia   > Multifactorial; main potential culprit hypervolemia  Likely d/t hypervolemia. Improving.   - diuresis  - fluid restrict 1500 mL   - Continue monitoring.         # RA  - Continue PTA Plaquenil     FEN: Low Na diet  PPX: warfarin  CODE: FULL     Patient discussed with staff attending, Brad Ahmadi MD PhD     Santhosh Merida MD  IM PGY-2  Pager: [phone]  [facility]              Subjective/Interval Events     Patient referred no acute medical complaints overnight. Waiting for [facility] teachings with her caregiver later this afternoon.     No fever, chills, CP, SOB, cough, abd pain, n/v/d, dysuria, hematuria, numbness,  "tingling or HA.          Objective     /64   Pulse 67   Temp 98  F (36.7  C) (Oral)   Resp 18   Ht 1.473 m (4' 10\")   Wt 57 kg (125 lb 10.6 oz)   SpO2 92%   BMI 26.26 kg/m    Temp:  [97.5  F (36.4  C)-98.1  F (36.7  C)] 98  F (36.7  C)  Pulse:  [62-67] 67  Heart Rate:  [57-82] 57  Resp:  [16-20] 18  BP: ()/(54-72) 123/64  SpO2:  [92 %-97 %] 92 %  Wt Readings from Last 2 Encounters:   03/18/20 57 kg (125 lb 10.6 oz)   03/02/20 57.6 kg (126 lb 14.4 oz)     I/O last 3 completed shifts:  In: 860.38 [P.O.:480; I.V.:380.38]  Out: 2400 [Urine:2400]    Gen: A&Ox3, in NAD, pleasant  HEENT: NC/AT, anicteric, EOMI, MOM, JVP: 6-8cm   PULM/: normal respiratory effort on RA, mild crackles at bases. Otherwise clear to auscultation  CV: RRR, S1 and S2, no m/r/g.  ABD: +BS, soft, NT/ND, no guarding  EXT: ww;. +1 edema in BLE             Data:     Recent Results (from the past 24 hour(s))   Heparin 10a Level    Collection Time: 03/17/20  4:07 PM   Result Value Ref Range    Heparin 10A Level Canceled, Test credited IU/mL   Heparin 10a Level    Collection Time: 03/17/20  5:50 PM   Result Value Ref Range    Heparin 10A Level 1.58 (HH) IU/mL   Heparin 10a Level    Collection Time: 03/18/20 12:44 AM   Result Value Ref Range    Heparin 10A Level 0.95 IU/mL   Basic metabolic panel    Collection Time: 03/18/20  6:12 AM   Result Value Ref Range    Sodium 131 (L) 133 - 144 mmol/L    Potassium 4.7 3.4 - 5.3 mmol/L    Chloride 98 94 - 109 mmol/L    Carbon Dioxide 27 20 - 32 mmol/L    Anion Gap 6 3 - 14 mmol/L    Glucose 82 70 - 99 mg/dL    Urea Nitrogen 18 7 - 30 mg/dL    Creatinine 0.86 0.52 - 1.04 mg/dL    GFR Estimate 65 >60 mL/min/[1.73_m2]    GFR Estimate If Black 75 >60 mL/min/[1.73_m2]    Calcium 8.8 8.5 - 10.1 mg/dL   INR    Collection Time: 03/18/20  6:12 AM   Result Value Ref Range    INR 2.19 (H) 0.86 - 1.14   Heparin Xa (10a) Level    Collection Time: 03/18/20  6:12 AM   Result Value Ref Range    Heparin 10A Level " 0.38 IU/mL   CBC with platelets    Collection Time: 03/18/20  6:12 AM   Result Value Ref Range    WBC 4.9 4.0 - 11.0 10e9/L    RBC Count 3.61 (L) 3.8 - 5.2 10e12/L    Hemoglobin 11.6 (L) 11.7 - 15.7 g/dL    Hematocrit 36.6 35.0 - 47.0 %     (H) 78 - 100 fl    MCH 32.1 26.5 - 33.0 pg    MCHC 31.7 31.5 - 36.5 g/dL    RDW 13.2 10.0 - 15.0 %    Platelet Count 247 150 - 450 10e9/L   Magnesium    Collection Time: 03/18/20  6:12 AM   Result Value Ref Range    Magnesium 1.9 1.6 - 2.3 mg/dL   Heparin 10a Level    Collection Time: 03/18/20  6:12 AM   Result Value Ref Range    Heparin 10A Level 0.45 IU/mL       Recent Results (from the past 24 hour(s))   XR Chest Port 1 View    Narrative    EXAM: XR CHEST PORT 1 VW  3/4/2020 4:45 PM     HISTORY:  picc       COMPARISON:  CT chest 3/3/2020    FINDINGS:   Portable semiupright view of the chest. Right upper extremity PICC  line tip projects over the right atrium. The cardiomediastinal  silhouette is mildly enlarged. Low lung volumes. No focal airspace  opacities. The previously seen right middle lobe nodule is not well  visualized. A rounded right perihilar opacity corresponds to the right  lower lobe pulmonary artery en face. No pleural effusion or  pneumothorax. Scoliotic curvature of the thoracic spine. No acute  osseous abnormalities.      Impression    IMPRESSION:   The new right arm PICC tip projects over the high right atrium. No  pneumothorax.    I have personally reviewed the examination and initial interpretation  and I agree with the findings.    MOOK SLOAN MD             Medications     Current Facility-Administered Medications   Medication     acetaminophen (TYLENOL) Suppository 650 mg     acetaminophen (TYLENOL) tablet 325 mg     acetaminophen (TYLENOL) tablet 650 mg     albuterol (PROAIR HFA/PROVENTIL HFA/VENTOLIN HFA) 108 (90 Base) MCG/ACT inhaler 2 puff     aspirin (ASA) chewable tablet 81 mg     beclomethasone HFA (QVAR REDIHALER) 80 MCG/ACT inhaler 2  puff     cetirizine (zyrTEC) tablet 10 mg     digoxin (LANOXIN) tablet 62.5 mcg     furosemide (LASIX) tablet 20 mg     gabapentin (NEURONTIN) capsule 300 mg     granisetron (KYTRIL) injection 0.5 mg     heparin lock flush 10 UNIT/ML injection 5 mL     hydroxychloroquine (PLAQUENIL) tablet 200 mg     ipratropium (ATROVENT) 0.06 % spray 2 spray     lidocaine (LMX4) cream     lidocaine 1 % 0.1-1 mL     lisinopril (ZESTRIL) tablet 20 mg     magnesium sulfate 2 g in water intermittent infusion     magnesium sulfate 4 g in 100 mL sterile water (premade)     medication instruction     medication instruction     melatonin tablet 3 mg     metoclopramide (REGLAN) injection 5 mg     Patient is already receiving anticoagulation with heparin, enoxaparin (LOVENOX), warfarin (COUMADIN)  or other anticoagulant medication     polyethylene glycol (MIRALAX) Packet 17 g     potassium chloride (KLOR-CON) Packet 20-40 mEq     potassium chloride 10 mEq in 100 mL intermittent infusion with 10 mg lidocaine     potassium chloride 10 mEq in 100 mL sterile water intermittent infusion (premix)     potassium chloride 20 mEq in 50 mL intermittent infusion     potassium chloride ER (KLOR-CON M) CR tablet 20-40 mEq     senna-docusate (SENOKOT-S/PERICOLACE) 8.6-50 MG per tablet 2 tablet     sodium chloride (OCEAN) 0.65 % nasal spray 1 spray     sodium chloride (PF) 0.9% PF flush 10 mL     sodium chloride (PF) 0.9% PF flush 10-20 mL     sodium chloride (PF) 0.9% PF flush 10-20 mL     sodium chloride (PF) 0.9% PF flush 3 mL     sodium chloride (PF) 0.9% PF flush 3 mL     treprostinil (REMODULIN) intravenous infusion     Warfarin Therapy Reminder (Check START DATE - warfarin may be starting in the FUTURE)

## 2020-03-18 NOTE — PLAN OF CARE
OT/CR 6C: Discharge Planner OT   Patient plan for discharge: Home with assist  Current status: Pt demonstrates safe, independent ambulation (approx 2 x 250ft), able to tolerate 16 minutes on Nustep.  VSS with exception of elevated BP to 170s systolic, RN notified  Barriers to return to prior living situation: acute medical needs, medication management, impaired FMC and /pinch strength  Recommendations for discharge: Home with assist  Rationale for recommendations: Pt progressing well with therapy, will be safe to discharge to home once medically appropriate.         Entered by: Chandrika Dang 03/18/2020 2:02 PM

## 2020-03-18 NOTE — TELEPHONE ENCOUNTER
Completed and faxed [employer] Patient Assistance Program request forms for Remodulin to [employer] for review.    Lexus Balbuena  Clinic   Pulmonary Hypertension  [facility]  (P) [phone]

## 2020-03-18 NOTE — DISCHARGE SUMMARY
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  Indication for Anticoagulation: Pulmonary Hypertension.   Dr. Osiris Luong to follow.     Appointment made at the [facility] on 3/23/2020 at 10:15 AM for INR lab draw; with results to the [facility].   ______________________________________________________    Unresulted Labs Ordered in the Past 30 Days of this Admission     No orders found from 2/3/2020 to 3/5/2020.      These results will be followed up by  and at the [facility].     Discharge Disposition   Discharged to home  Condition at discharge: Stable    Hospital Course   Karen Anderson was admitted on 3/4/2020 for acute on chronic right heart failure exacerbation in the setting of worsening severe Pulmonary Hypertension (WHO Class IV in the context of CTEPH).  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 Functional class III. On admission, Echo ow/nl  EF, mild RVH, mod dilated/dysfunctional RV, dilated pulmonary artery; RHC w/RA of 10, PA  98/30/52m reduce CO with high normal right sided filling pressures. Patient was started on IV infusion therapy with Prostacyclin analogue (Remodulin). Initial goal was to titrate medication to 8-12ng/kg/min. However, major limitations during hospitalization included severe/excruciating headaches and flushing for which patient was placed on a 6ng/kg/min dose. She remained stable over the last days on such.         As part of her treatment, she was initiated on Heparin drip bridging therapy to Warfarin for INR goal 2-3. Prior to discharge her latest INR was 2.27 (03/20/2020). Patient was discharge on Warfarin 6mg tablets (to take 6mg on 03/20, 3mg on 03/21 and 6mg on 03/22) for an INR check on 03/23 and potential dose modification.         Additional medications included initiation of Digoxin 62.5mcg per oral daily and home dose of furosemide (20mg per oral daily) to aid with right sided heart load.        As part of the discharge disposition, patient received PLC sessions for both Warfarin therapy and Remodulin (including IV dressing/changes and cares). Due to limited competencies and questionable independence,  was actively involved in the process of home care disposition to further aid with therapeutic plans.       # Hyponatremia:     > Multifactorial; main potential culprit hypervolemia        Most likely related to volume overload which responded to diuresis and volume restriction. Patient remained on home dose furosemide and daily volume limit of 1.5L.       # Rheumatoid arthritis:    Remained stable during hospitalization. Patient was kept on her PTA Plaquenil dose (200mg daily).    Consultations This Hospital Stay   VASCULAR ACCESS ADULT IP CONSULT  MEDICATION HISTORY IP PHARMACY CONSULT  PHARMACY IP CONSULT  PHARMACY IP CONSULT  CARDIOTHORACIC SURGERY ADULT IP CONSULT  PHYSICAL THERAPY ADULT IP CONSULT  OCCUPATIONAL THERAPY ADULT IP CONSULT  INTERVENTIONAL RADIOLOGY ADULT/PEDS IP CONSULT  PHARMACY TO  DOSE WARFARIN  PATIENT LEARNING CENTER IP CONSULT  VASCULAR ACCESS CARE ADULT IP CONSULT  VASCULAR ACCESS CARE ADULT IP CONSULT  VASCULAR ACCESS CARE ADULT IP CONSULT  VASCULAR ACCESS CARE ADULT IP CONSULT  SMOKING CESSATION PROGRAM IP CONSULT    Code Status   Full Code       Case was discussed with attending physician, Brad Ahmadi MD PhD    Jake Muller MD  Internal Medicine Service  [facility], [place]  Pager: [phone]  ______________________________________________________________________    Physical Exam   Vital Signs: Temp: 98.1  F (36.7  C) Temp src: Oral BP: (!) 142/63   Heart Rate: 82 Resp: 20 SpO2: 97 % O2 Device: None (Room air)    Weight: 118 lbs 3.2 oz  Gen: A&Ox3, in NAD, pleasant  HEENT: NC/AT, anicteric, EOMI, MOM, JVP: 6-8cm   PULM/: normal respiratory effort on RA, mild crackles at bases. Otherwise clear to auscultation  CV: RRR, S1 and S2, no m/r/g.  ABD: +BS, soft, NT/ND, no guarding  EXT: ww;. +1 edema in BLE      Primary Care Physician   JEANIE MCKEON    Discharge Orders      INR follow-up on 03/23/2020.      Significant Results and Procedures   Most Recent 3 CBC's:  Recent Labs   Lab Test 03/18/20  0612 03/17/20  0742 03/16/20  1826   WBC 4.9 5.4 7.3   HGB 11.6* 13.0 13.0   * 100 100    256 266     Most Recent 3 BMP's:  Recent Labs   Lab Test 03/20/20  0514 03/19/20  0533 03/18/20  0612   * 127* 131*   POTASSIUM 5.0 4.7 4.7   CHLORIDE 96 96 98   CO2 28 25 27   BUN 20 18 18   CR 0.87 0.88 0.86   ANIONGAP 5 7 6   ESTEFANÍA 9.0 9.0 8.8   GLC 82 73 82     Most Recent 3 INR's:  Recent Labs   Lab Test 03/20/20  0514 03/19/20  0533 03/18/20  0612   INR 2.27* 2.02* 2.19*   ,   Results for orders placed or performed during the hospital encounter of 03/04/20   XR Chest Port 1 View    Narrative    EXAM: XR CHEST PORT 1 VW  3/4/2020 4:45 PM     HISTORY:  picc       COMPARISON:  CT chest 3/3/2020    FINDINGS:   Portable semiupright view of the  chest. Right upper extremity PICC  line tip projects over the right atrium. The cardiomediastinal  silhouette is mildly enlarged. Low lung volumes. No focal airspace  opacities. The previously seen right middle lobe nodule is not well  visualized. A rounded right perihilar opacity corresponds to the right  lower lobe pulmonary artery en face. No pleural effusion or  pneumothorax. Scoliotic curvature of the thoracic spine. No acute  osseous abnormalities.      Impression    IMPRESSION:   The new right arm PICC tip projects over the high right atrium. No  pneumothorax.    I have personally reviewed the examination and initial interpretation  and I agree with the findings.    MOOK SLOAN MD   IR CVC Tunnel Placement > 5 Yrs of Age    Narrative    PRE-PROCEDURE DIAGNOSIS: Pulmonary hypertension    POST-PROCEDURE DIAGNOSIS: Same    PROCEDURE: Tunneled central venous catheter placement    Impression    IMPRESSION: Completed image-guided placement of 5 Lao, 20.5 cm  single lumen tunneled central venous catheter via right internal  jugular vein. 5 Lao catheter has a priming volume of 0.42 mL.  Catheter tip in right atrium. Aspirates and flushes freely, heparin  locked and ready for immediate use. Patient had a significant amount  of pain upon arrival to the room and transferred to the procedure.  Sedation was started at the direction of an IR provider to help  alleviate patient's discomfort. Patient was given 2 mg of Versed and  50 mcg of fentanyl and promptly had an episode of apnea, oxygen  desaturation and became unresponsive. Patient's airway was stabilized  and Ambu bag was utilized. Patient was given 0.1 mg of Narcan and  quickly became responsive and began breathing on her own and oxygen  saturations improved.    ----------    CLINICAL HISTORY: Patient requires central venous access for therapy.  Tunneled central venous catheter placement requested.    PERFORMED BY: Dion Bhardwaj PA-C    CONSENT: Written  informed consent was obtained and is documented in  the patient record.    SEDATION CONSENT: It was explained to the patient that medications  used for sedation and pain relief may be administered, if needed, to  reduce anxiety and discomfort that may be associated with the  procedure. Serious side effects from the medications are rare but may  include prolonged drowsiness and difficulty breathing, possibly  requiring placement of a breathing tube to ventilate the lungs.    MEDICATIONS: Intravenous sedation was administered with 2 mg midazolam  and 50 mcg fentanyl. Patient had an episode of apnea and oxygen  desaturation after administration of sedation. 0.1 mg of Narcan were  given to reverse the effects of fentanyl. A 10:1 volume mixture of 1%  lidocaine without epinephrine buffered with 8.4% bicarbonate solution  was available for local anesthesia. The catheter was heparin locked  upon completion of placement.    NURSING: The patient was placed on continuous vital signs monitoring.  Vital signs and sedation were monitored by nursing staff under IR  physician staff supervision.      SEDATION TIME: 40 minutes face-to-face    FLUOROSCOPY TIME: 2.1 minutes    DESCRIPTION: The right neck and upper chest were prepped and draped in  the usual sterile fashion.      Under ultrasound guidance, the right internal jugular vein was  identified and the overlying skin was anesthetized and skin  dermatotomy was made. Under ultrasound guidance, right internal  jugular venipuncture was made with needle. Image saved documenting  venipuncture and patency.    Needle was exchanged over guidewire for a dilator under fluoroscopic  guidance. Length to right atrium was measured with guidewire.  Guidewire and inner dilator were removed. Wire was advanced into  inferior vena cava under fluoroscopic guidance and secured.     The anterior chest skin was anesthetized and incision was made after  measurements were taken. A cuffed catheter was  subcutaneously tunneled  from the anterior chest incision to the internal jugular venipuncture  site after path of tunnel was anesthetized. The dilator was exchanged  over guidewire for a peel-away sheath. Guidewire was removed. Under  fluoroscopic guidance, the catheter was placed through the peel-away  sheath. Peel-away sheath was removed.      Final catheter position saved. Both catheter lumens adequately  aspirated and flushed. Each lumen was heparin locked. A catheter  retaining suture and sterile dressing were applied. The skin  dermatotomy site overlying the internal jugular venipuncture was  closed with topical adhesive.    COMPLICATIONS: Patient had an episode of apnea and oxygen desaturation  prior the start of the procedure after sedation was administered.  Patient recovered quickly after a dose of 0.1 mg of Narcan.     ESTIMATED BLOOD LOSS: Minimal    SPECIMENS: None    MELINA LEE PA-C   Cardiac Catheterization    Narrative      Severe Pre-capillary PH    Severely reduced cardiac output with high normal right sided filling   pressures    Normal left sided filling pressures    CTEPH based on abnormal V/Q scan and CT pulmonary angiogram            Discharge Medications   Current Discharge Medication List      START taking these medications    Details   digoxin (LANOXIN) 62.5 MCG tablet Take 1 tablet (62.5 mcg) by mouth daily  Qty: 30 tablet, Refills: 1    Associated Diagnoses: Pulmonary hypertension (H)      granisetron (KYTRIL) 1 MG/ML injection Inject 0.5 mLs (0.5 mg) into the vein every 12 hours as needed for nausea  Qty: 1 mL, Refills: 1    Associated Diagnoses: Pulmonary hypertension (H)      lidocaine (LMX4) 4 % external cream Apply topically every hour as needed for moderate pain (with VAD insertion or accessing implanted port)  Qty: 45 g, Refills: 1    Associated Diagnoses: Pulmonary hypertension (H)      treprostinil (REMODULIN) 4 mcg/mL in glycine diluent 50 mL infusion Inject 337.8 ng/min  into the vein continuous  Qty: 30 days, Refills: 0    Associated Diagnoses: Pulmonary hypertension (H)      warfarin ANTICOAGULANT (COUMADIN) 6 MG tablet Take 1 tablet (6 mg) by mouth daily Please take 6mg today (03/20). Then take 3mg on (03/21), and take an additional 6mg on (03/22)  Qty: 5 tablet, Refills: 0    Comments: Please dispense 3mg tablets. Patient should take 6mg on 03/20/2020, 3mg on 03/21/2020, and 6mg on 03/22/2020.  Associated Diagnoses: Pulmonary hypertension (H)         CONTINUE these medications which have NOT CHANGED    Details   albuterol (PROAIR HFA/PROVENTIL HFA/VENTOLIN HFA) 108 (90 Base) MCG/ACT inhaler Inhale 2 puffs into the lungs every 4 hours as needed for shortness of breath / dyspnea or wheezing     Comments: Pharmacy may dispense brand covered by insurance (Proair, or proventil or ventolin or generic albuterol inhaler)      Ascorbic Acid (VITAMIN C) 500 MG CAPS Take 500 mg by mouth every morning       aspirin (ASA) 81 MG tablet Take 81 mg by mouth daily       beclomethasone (QVAR) 80 MCG/ACT AERS IS A DISCONTINUED MEDICATION Inhale 2 puffs into the lungs 2 times daily Gargle and rinse mouth with water after each dose.      calcium citrate-vitamin D (CALCIUM CITRATE + D3) 315-250 MG-UNIT TABS per tablet Take 315 mg by mouth 3 times daily       cetirizine (ZYRTEC) 10 MG tablet Take 10 mg by mouth daily      furosemide (LASIX) 20 MG tablet Take 2 tablets (40 mg) by mouth daily  Qty: 180 tablet, Refills: 3    Comments: Dose increase  Associated Diagnoses: Localized edema      gabapentin (NEURONTIN) 300 MG capsule Take 300 mg by mouth 3 times daily       hydroxychloroquine (PLAQUENIL) 200 MG tablet Take 200 mg by mouth daily       ipratropium (ATROVENT) 0.06 % nasal spray Spray 2 sprays into both nostrils 3 times daily       lisinopril (ZESTRIL) 20 MG tablet Take 20 mg by mouth daily       polyethylene glycol (MIRALAX) packet Take 17 g by mouth daily as needed       Probiotic Product  (PROBIOTIC ACIDOPHILUS BIOBEADS) CAPS Take 1 capsule by mouth 2 times daily       vitamin D3 (CHOLECALCIFEROL) 2000 units (50 mcg) tablet Take 1 tablet by mouth daily           Allergies   Allergies   Allergen Reactions     Sulfa Drugs Anaphylaxis and Hives

## 2020-03-18 NOTE — PLAN OF CARE
NSR. RA. VSS. Heparin gtt stopped, INR 2.19. Remodulin gtt at 6ng/kg/min. 1.5L FR Tylenol scheduled for headache, pt denies n/v today, declines need for Reglan today. Independent in room. Ambulated in halls x1 with RN, tolerated well. Home infusion working with patient and  on home Remodulin.

## 2020-03-19 LAB
ANION GAP SERPL CALCULATED.3IONS-SCNC: 7 MMOL/L (ref 3–14)
BUN SERPL-MCNC: 18 MG/DL (ref 7–30)
CALCIUM SERPL-MCNC: 9 MG/DL (ref 8.5–10.1)
CHLORIDE SERPL-SCNC: 96 MMOL/L (ref 94–109)
CO2 SERPL-SCNC: 25 MMOL/L (ref 20–32)
CREAT SERPL-MCNC: 0.88 MG/DL (ref 0.52–1.04)
GFR SERPL CREATININE-BSD FRML MDRD: 63 ML/MIN/{1.73_M2}
GLUCOSE SERPL-MCNC: 73 MG/DL (ref 70–99)
INR PPP: 2.02 (ref 0.86–1.14)
POTASSIUM SERPL-SCNC: 4.7 MMOL/L (ref 3.4–5.3)
SODIUM SERPL-SCNC: 127 MMOL/L (ref 133–144)

## 2020-03-19 PROCEDURE — 25000132 ZZH RX MED GY IP 250 OP 250 PS 637: Performed by: STUDENT IN AN ORGANIZED HEALTH CARE EDUCATION/TRAINING PROGRAM

## 2020-03-19 PROCEDURE — 85610 PROTHROMBIN TIME: CPT | Performed by: STUDENT IN AN ORGANIZED HEALTH CARE EDUCATION/TRAINING PROGRAM

## 2020-03-19 PROCEDURE — 36415 COLL VENOUS BLD VENIPUNCTURE: CPT | Performed by: STUDENT IN AN ORGANIZED HEALTH CARE EDUCATION/TRAINING PROGRAM

## 2020-03-19 PROCEDURE — 25000132 ZZH RX MED GY IP 250 OP 250 PS 637: Performed by: INTERNAL MEDICINE

## 2020-03-19 PROCEDURE — 99232 SBSQ HOSP IP/OBS MODERATE 35: CPT | Mod: GC | Performed by: INTERNAL MEDICINE

## 2020-03-19 PROCEDURE — 21400000 ZZH R&B CCU UMMC

## 2020-03-19 PROCEDURE — 27210995 ZZH RX 272: Performed by: INTERNAL MEDICINE

## 2020-03-19 PROCEDURE — 25000128 H RX IP 250 OP 636: Performed by: INTERNAL MEDICINE

## 2020-03-19 PROCEDURE — 80048 BASIC METABOLIC PNL TOTAL CA: CPT | Performed by: STUDENT IN AN ORGANIZED HEALTH CARE EDUCATION/TRAINING PROGRAM

## 2020-03-19 RX ORDER — WARFARIN SODIUM 3 MG/1
6 TABLET ORAL
Status: COMPLETED | OUTPATIENT
Start: 2020-03-19 | End: 2020-03-19

## 2020-03-19 RX ADMIN — ASPIRIN 81 MG CHEWABLE TABLET 81 MG: 81 TABLET CHEWABLE at 20:02

## 2020-03-19 RX ADMIN — FUROSEMIDE 20 MG: 20 TABLET ORAL at 08:38

## 2020-03-19 RX ADMIN — LISINOPRIL 20 MG: 20 TABLET ORAL at 08:37

## 2020-03-19 RX ADMIN — DIGOXIN 62.5 MCG: 0.06 TABLET ORAL at 08:37

## 2020-03-19 RX ADMIN — GABAPENTIN 300 MG: 300 CAPSULE ORAL at 14:33

## 2020-03-19 RX ADMIN — ACETAMINOPHEN 650 MG: 325 TABLET, FILM COATED ORAL at 11:56

## 2020-03-19 RX ADMIN — TREPROSTINIL 6 NG/KG/MIN: 20 INJECTION, SOLUTION INTRAVENOUS; SUBCUTANEOUS at 17:21

## 2020-03-19 RX ADMIN — SENNOSIDES AND DOCUSATE SODIUM 1 TABLET: 8.6; 5 TABLET ORAL at 20:05

## 2020-03-19 RX ADMIN — ACETAMINOPHEN 650 MG: 325 TABLET, FILM COATED ORAL at 03:59

## 2020-03-19 RX ADMIN — ACETAMINOPHEN 650 MG: 325 TABLET, FILM COATED ORAL at 23:28

## 2020-03-19 RX ADMIN — WARFARIN SODIUM 6 MG: 3 TABLET ORAL at 17:20

## 2020-03-19 RX ADMIN — HYDROXYCHLOROQUINE SULFATE 200 MG: 200 TABLET, FILM COATED ORAL at 08:37

## 2020-03-19 RX ADMIN — TREPROSTINIL 6 NG/KG/MIN: 20 INJECTION, SOLUTION INTRAVENOUS; SUBCUTANEOUS at 08:27

## 2020-03-19 RX ADMIN — IPRATROPIUM BROMIDE 2 SPRAY: 42 SPRAY, METERED NASAL at 08:37

## 2020-03-19 RX ADMIN — SENNOSIDES AND DOCUSATE SODIUM 1 TABLET: 8.6; 5 TABLET ORAL at 20:03

## 2020-03-19 RX ADMIN — GABAPENTIN 300 MG: 300 CAPSULE ORAL at 20:02

## 2020-03-19 RX ADMIN — ACETAMINOPHEN 650 MG: 325 TABLET, FILM COATED ORAL at 17:20

## 2020-03-19 RX ADMIN — GABAPENTIN 300 MG: 300 CAPSULE ORAL at 08:37

## 2020-03-19 RX ADMIN — CETIRIZINE HYDROCHLORIDE 10 MG: 10 TABLET, FILM COATED ORAL at 20:03

## 2020-03-19 RX ADMIN — SENNOSIDES AND DOCUSATE SODIUM 1 TABLET: 8.6; 5 TABLET ORAL at 08:38

## 2020-03-19 RX ADMIN — IPRATROPIUM BROMIDE 2 SPRAY: 42 SPRAY, METERED NASAL at 17:29

## 2020-03-19 ASSESSMENT — ACTIVITIES OF DAILY LIVING (ADL)
ADLS_ACUITY_SCORE: 16

## 2020-03-19 ASSESSMENT — MIFFLIN-ST. JEOR: SCORE: 914.53

## 2020-03-19 NOTE — TELEPHONE ENCOUNTER
"Per [facility] @ 10:59am:    \"Ms. Karen Anderson ( 44) is now cleared for her Remodulin and PAP in place.  I will update our clinical team here as well.\"    Stated to [facility] that pt is expecting to discharge today.    Relayed the information to inpatient team regarding the approval of the PAP and to confirm discharge. Dionna is going to follow-up with [facility] regarding the nursing visit.     No further follow-up completed.    Lexus Balbuena  Clinic   Pulmonary Hypertension  [facility]  (P) [phone]  "

## 2020-03-19 NOTE — PLAN OF CARE
D: Patient admitted 3/4 for CTEPH treatment. Hx. CTEPH w/acute RV failure in setting of bilat PE & DVTs in 1998 d/t breast CA tx w/tamoxifen, RA, and pulm MAC.     I: Monitored vitals and assessed pt status.   Running: Remodulin gtt @ 6ng/kg/min (5.07ml/hr) (Ashlyn), PICC SL, PIV SL  PRN:      A: A0x4. VSS. BP check on R leg. Afebrile. NSR. Scheduled tylenol for mild headache. 2g Na diet, 1.5L FR- eating fair. Urinating adequately. BM x2 today. Pt up ind in room, otherwise SBA, ambulating in halls. Goldberg dressing changed today. Pt very pleasant, cooperative.      P: INR check in AM. [facility] will come tomorrow for discharge with remodulin. Continue to monitor Pt status and report changes to treatment team.

## 2020-03-19 NOTE — PLAN OF CARE
Pt with hx of CTEPH w/acute RV failure in setting of bilat PE & DVTs in 1998 d/t breast CA tx w/tamoxifen, RA, and pulm MAC, now on stable remoduline dose awaiting therapeutic INR. She continues on remodulin at 6 ng/kg/min, heparin drip discontinued. [facility] did some home remodulin teaching and her  was able to come to the bedside and learn with her. Discharge to home soon if INR is within a good range. Pt ambulated well around the [place] with SBA.

## 2020-03-19 NOTE — PLAN OF CARE
Temp: 97.5  F (36.4  C) Temp src: Oral BP: 109/62 Pulse: 61 Heart Rate: 61 Resp: 16 SpO2: 91 % O2 Device: None (Room air)       D: Admitted with CTEPH and acute RV failure. Hx of bilat PE & DVTs in 1998, breast cancer (L masectomy), and pulm MAC     I/A: Monitored vitals and assessed status; A&O x4. Tele in place, SR; had 11 beats of SVT (HR 140s) at 0350, MD notified. VSS on RA. Goldberg in place infusing Remodulin at 6ng/kg/min; PIV and R PICC SL. Pain controlled with scheduled Tylenol. 1500ml fluid restriction in place. Up ad jamie. Slept intermittently overnight     P: Continue to monitor and follow POC. Will discharge home on IV Remodulin, waiting on goal INR. Notify Cards 2 with changes

## 2020-03-19 NOTE — PROGRESS NOTES
Cardiology Progress Note  Karen Anderson MRN: [id number]  Age: 79 year old, : 1940  Date: 3/16/2020              Assessment and Plan:     78 y/o F w/CTEPH w/acute RV failure in setting of bilat PE & DVTs in  d/t breast CA tx w/tamoxifen, RA, and pulm MAC, now on stable remoduline dose awaiting therapeutic INR.      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 Functional class III. On admission, Echo ow/nl  EF, mild RVH, mod dilated/dysfunctional RV, dilated pulmonary artery; RHC w/RA of 10, PA 98/30/52m reduce CO with high normal right sided filling pressures. Remodulin dose limited by h/a, now stable and tolerating dose.   - Remodulin: 6 mcg/kg/min     > Will continue on this dose  - Reglan 5 mg q6h for headache and nausea  - Digoxin 62.5 mcg PO every day  - Diuresis with lasix 20 mg PO  - Bridging to warfarin, on heparin gtt: > Goal INR: 2-3    > Heparin gtt discontinued 3/18 after patient was successfully bridged to warfarin  - Remodulin teaching & pre-authorization    > Teaching was received on 2020.  was present   > Patient to be discharged after her last teaching session, which is scheduled for 3/18/2020    #Hyponatremia   > Multifactorial; main potential culprit hypervolemia  Likely d/t hypervolemia. Improving.   - diuresis  - fluid restrict 1500 mL   - Continue monitoring.         # RA  - Continue PTA Plaquenil     FEN: Low Na diet  PPX: warfarin  CODE: FULL     Patient discussed with staff attending, Brad Ahmadi MD PhD     Jake Mathew  Internal Medicine Residency, PGY-1  [facility]              Subjective/Interval Events     Patient referred no acute medical complaints overnight. Final teaching by [facility] will be done on  prior to discharge. Patient's  was updated by our care coordinator (Radha  "Good).    No fever, chills, CP, SOB, cough, abd pain, n/v/d, dysuria, hematuria, numbness, tingling or HA.          Objective     /63   Pulse 61   Temp 98.3  F (36.8  C) (Oral)   Resp 16   Ht 1.473 m (4' 10\")   Wt 54 kg (119 lb)   SpO2 94%   BMI 24.87 kg/m    Temp:  [97.5  F (36.4  C)-98.3  F (36.8  C)] 98.3  F (36.8  C)  Pulse:  [61-76] 61  Heart Rate:  [60-80] 80  Resp:  [16] 16  BP: (104-159)/(62-88) 129/63  SpO2:  [91 %-98 %] 94 %  Wt Readings from Last 2 Encounters:   03/19/20 54 kg (119 lb)   03/02/20 57.6 kg (126 lb 14.4 oz)     I/O last 3 completed shifts:  In: 596.69 [P.O.:480; I.V.:116.69]  Out: 2400 [Urine:2400]    Gen: A&Ox3, in NAD, pleasant  HEENT: NC/AT, anicteric, EOMI, MOM, JVP: 6-8cm   PULM/: normal respiratory effort on RA, mild crackles at bases. Otherwise clear to auscultation  CV: RRR, S1 and S2, no m/r/g.  ABD: +BS, soft, NT/ND, no guarding  EXT: ww;. +1 edema in BLE             Data:     Recent Results (from the past 24 hour(s))   Basic metabolic panel    Collection Time: 03/19/20  5:33 AM   Result Value Ref Range    Sodium 127 (L) 133 - 144 mmol/L    Potassium 4.7 3.4 - 5.3 mmol/L    Chloride 96 94 - 109 mmol/L    Carbon Dioxide 25 20 - 32 mmol/L    Anion Gap 7 3 - 14 mmol/L    Glucose 73 70 - 99 mg/dL    Urea Nitrogen 18 7 - 30 mg/dL    Creatinine 0.88 0.52 - 1.04 mg/dL    GFR Estimate 63 >60 mL/min/[1.73_m2]    GFR Estimate If Black 73 >60 mL/min/[1.73_m2]    Calcium 9.0 8.5 - 10.1 mg/dL   INR    Collection Time: 03/19/20  5:33 AM   Result Value Ref Range    INR 2.02 (H) 0.86 - 1.14       Recent Results (from the past 24 hour(s))   XR Chest Port 1 View    Narrative    EXAM: XR CHEST PORT 1 VW  3/4/2020 4:45 PM     HISTORY:  picc       COMPARISON:  CT chest 3/3/2020    FINDINGS:   Portable semiupright view of the chest. Right upper extremity PICC  line tip projects over the right atrium. The cardiomediastinal  silhouette is mildly enlarged. Low lung volumes. No focal " airspace  opacities. The previously seen right middle lobe nodule is not well  visualized. A rounded right perihilar opacity corresponds to the right  lower lobe pulmonary artery en face. No pleural effusion or  pneumothorax. Scoliotic curvature of the thoracic spine. No acute  osseous abnormalities.      Impression    IMPRESSION:   The new right arm PICC tip projects over the high right atrium. No  pneumothorax.    I have personally reviewed the examination and initial interpretation  and I agree with the findings.    MOOK SLOAN MD             Medications     Current Facility-Administered Medications   Medication     acetaminophen (TYLENOL) Suppository 650 mg     acetaminophen (TYLENOL) tablet 325 mg     acetaminophen (TYLENOL) tablet 650 mg     albuterol (PROAIR HFA/PROVENTIL HFA/VENTOLIN HFA) 108 (90 Base) MCG/ACT inhaler 2 puff     aspirin (ASA) chewable tablet 81 mg     beclomethasone HFA (QVAR REDIHALER) 80 MCG/ACT inhaler 2 puff     cetirizine (zyrTEC) tablet 10 mg     digoxin (LANOXIN) tablet 62.5 mcg     furosemide (LASIX) tablet 20 mg     gabapentin (NEURONTIN) capsule 300 mg     granisetron (KYTRIL) injection 0.5 mg     heparin lock flush 10 UNIT/ML injection 5 mL     hydroxychloroquine (PLAQUENIL) tablet 200 mg     ipratropium (ATROVENT) 0.06 % spray 2 spray     lidocaine (LMX4) cream     lidocaine 1 % 0.1-1 mL     lisinopril (ZESTRIL) tablet 20 mg     magnesium sulfate 2 g in water intermittent infusion     magnesium sulfate 4 g in 100 mL sterile water (premade)     medication instruction     medication instruction     melatonin tablet 3 mg     metoclopramide (REGLAN) injection 5 mg     Patient is already receiving anticoagulation with heparin, enoxaparin (LOVENOX), warfarin (COUMADIN)  or other anticoagulant medication     polyethylene glycol (MIRALAX) Packet 17 g     potassium chloride (KLOR-CON) Packet 20-40 mEq     potassium chloride 10 mEq in 100 mL intermittent infusion with 10 mg lidocaine      potassium chloride 10 mEq in 100 mL sterile water intermittent infusion (premix)     potassium chloride 20 mEq in 50 mL intermittent infusion     potassium chloride ER (KLOR-CON M) CR tablet 20-40 mEq     senna-docusate (SENOKOT-S/PERICOLACE) 8.6-50 MG per tablet 2 tablet     sodium chloride (OCEAN) 0.65 % nasal spray 1 spray     sodium chloride (PF) 0.9% PF flush 10 mL     sodium chloride (PF) 0.9% PF flush 10-20 mL     sodium chloride (PF) 0.9% PF flush 10-20 mL     sodium chloride (PF) 0.9% PF flush 3 mL     sodium chloride (PF) 0.9% PF flush 3 mL     treprostinil (REMODULIN) intravenous infusion     Warfarin Therapy Reminder (Check START DATE - warfarin may be starting in the FUTURE)

## 2020-03-19 NOTE — PROGRESS NOTES
Care Coordinator - Discharge Planning    Admission Date/Time:  3/4/2020  Attending MD:  Karolina Turcios MD     Data  Chart reviewed, discussed with interdisciplinary team.   Patient with history of bilateral PE and DVT, pulmonary MAC and rheumatoid arthritis admitted for initiation of CTEPH treatment.      Assessment   Full assessment completed in previous note  Concerns with insurance coverage for discharge needs: None.  Current Living Situation: Patient lives with spouse.  Support System: Supportive and Involved  Services Involved: none  Transportation at Discharge: Family or friend will provide  Transportation to Medical Appointments:   - spouse  Barriers to Discharge: medical stability, remodulin set up     Coordination of Care and Referrals: Previous RN CC provided patient/family with options for home infusion and new Warfarin. Referral sent to [facility] medication monitoring clinic and plans to have labs drawn at [facility].      Marielos GARLAND RN from [email or web address] (cell: [phone]) here today to provide teaching to pt and pt's , Santana.   Intervention:   Arrangements made with [email or web address] (Ph: [phone] Fax: [phone]) for Remodulin drip and line care.  Financial authorization approved; .   Arrangements made with the [facility] (Ph: [phone] Fax: [phone]) for INR and Warfarin monitoring (Goal=2-3). Indication for Anticoagulation: pHTN. Dr. Osiris Luong to follow. Appointment made at the [facility] on  for INR lab draw; with results to the [facility]. Rescheduled for 3/23/2020 at 10:15am.       Plan  Anticipated Discharge Date: 3/20/2020  Anticipated Discharge Plan: Discharge to home with home infusion   CC will continue to monitor patient's medical condition and progress towards discharge.  Radha Templeton RN BSN  6C Unit Care Coordinator  Phone number: [phone]  Pager: [phone]    Addendum 3/19 at 1309:  This  writer received update from Leesa at [facility] that pt's patient assistance program was approved. Leesa stated that they do not have a nurse available today to do the final teaching and remodulin delivery, but Marielos S their teaching RN can do the discharge tomorrow, 3/20 in the early afternoon. Pt and spouse updated.   INR lab draw rescheduled for 3/23/2020 at 10:15am.

## 2020-03-19 NOTE — PROGRESS NOTES
CLINICAL NUTRITION SERVICES - REASSESSMENT NOTE     Nutrition Prescription    RECOMMENDATIONS FOR MDs/PROVIDERS TO ORDER:  None at this time.     Malnutrition Status:    Patient does not meet two of the established criteria necessary for diagnosing malnutrition    Recommendations already ordered by Registered Dietitian (RD):  None additionally at this time.     Future/Additional Recommendations:  1. Continue current diet and fluid restriction, as ordered.   2. If oral intake decreases, then rec order a multivitamin with minerals to help ensure micronutrient needs are met.   3. Monitor need for iron supplementation. Pt with h/o iron deficiency.   4. Consider checking folic acid and vitamin B12 labs. Rec to supplement if low.   5. Check phos lab.      EVALUATION OF THE PROGRESS TOWARD GOALS   Diet: 2 g Sodium and 1500 mL Fluid Restriction. Has a prn snack/supplement order.   Intake: Good diet tolerance and consuming 100% of meals with a good appetite. However, pt did consume 50% of a meal on 3/18. Per pt, eating adequately. States she is not allowed to order certain foods, and is a little surprised as she does not have the sodium nutritionals for the menu. Potential factors affecting oral intake include restrictive diet order and previously decreased oral intake. Per provider on 3/18, no N/V, diarrhea, or constipation.       NEW FINDINGS   N/A    MALNUTRITION  % Intake: Not meeting this criteria.     % Weight Loss: Difficult to assess wt changes with changes in fluid status and diuresis  Subcutaneous Fat Loss: None observed per visualization.  Muscle Loss: Age-related muscle loss per visualization. Does not meet this criteria.   Fluid Accumulation/Edema: Does not meet criteria  Malnutrition Diagnosis: Patient does not meet two of the established criteria necessary for diagnosing malnutrition    Previous Goals   Patient to consume % of nutritionally adequate meal trays TID, or the equivalent with  supplements/snacks.  Evaluation: Meeting.     Previous Nutrition Diagnosis  Inadequate oral intake related to headache, mild intermittent nausea, and decreased appetite with remodulin uptiration as evidenced by [employer] (meal ordering system) indicates food/beverages sent 3/9-3/11 totaled 608 kcals and 18 g protein daily on average which does not meet estimated needs of 4784-3330 kcals/day (25 - 30 kcals/kg) and 51-64 grams protein/day (1.1 - 1.4 grams of pro/kg)  Evaluation: Improved. Updated below.     CURRENT NUTRITION DIAGNOSIS  Predicted inadequate nutrient intake (protein-energy) related to restrictive diet order and previously decreased oral intake.     INTERVENTIONS  Implementation  Nutrition education for nutrition relationship to health/disease: Encouraged adequate oral intake. Provided verbal instruction on low-sodium meal planning. Gave recs of foods/beverages to choose and those to limit. Discussed current diet and fluid restriction. Pt states she reads labels at home. Answered questions on low-sodium vs high-sodium parameters. Provided the following handouts: How to Read Nutrition Labels, Low-Sodium Foods and Drinks, Tips for a Low-Sodium Diet, Seasoning Your Foods Without Adding Salt, and Managing Fluid Restriction. Gave room service handout depicting amounts of sodium in various foods/beverages.     Goals  Patient to consume % of nutritionally adequate meal trays TID, or the equivalent with supplements/snacks.    Monitoring/Evaluation  Progress toward goals will be monitored and evaluated per protocol.     Nutrition will continue to follow.     Mirta Stinson, MS, RD, LD, [facility]   6C Pgr: [phone]

## 2020-03-20 ENCOUNTER — APPOINTMENT (OUTPATIENT)
Dept: OCCUPATIONAL THERAPY | Facility: CLINIC | Age: 80
DRG: 287 | End: 2020-03-20
Attending: INTERNAL MEDICINE
Payer: COMMERCIAL

## 2020-03-20 VITALS
TEMPERATURE: 98.1 F | RESPIRATION RATE: 16 BRPM | BODY MASS INDEX: 24.92 KG/M2 | WEIGHT: 118.7 LBS | HEIGHT: 58 IN | HEART RATE: 70 BPM | OXYGEN SATURATION: 97 % | SYSTOLIC BLOOD PRESSURE: 118 MMHG | DIASTOLIC BLOOD PRESSURE: 45 MMHG

## 2020-03-20 LAB
ANION GAP SERPL CALCULATED.3IONS-SCNC: 5 MMOL/L (ref 3–14)
BUN SERPL-MCNC: 20 MG/DL (ref 7–30)
CALCIUM SERPL-MCNC: 9 MG/DL (ref 8.5–10.1)
CHLORIDE SERPL-SCNC: 96 MMOL/L (ref 94–109)
CO2 SERPL-SCNC: 28 MMOL/L (ref 20–32)
CREAT SERPL-MCNC: 0.87 MG/DL (ref 0.52–1.04)
GFR SERPL CREATININE-BSD FRML MDRD: 64 ML/MIN/{1.73_M2}
GLUCOSE SERPL-MCNC: 82 MG/DL (ref 70–99)
INR PPP: 2.27 (ref 0.86–1.14)
POTASSIUM SERPL-SCNC: 5 MMOL/L (ref 3.4–5.3)
SODIUM SERPL-SCNC: 130 MMOL/L (ref 133–144)

## 2020-03-20 PROCEDURE — 25000132 ZZH RX MED GY IP 250 OP 250 PS 637: Performed by: STUDENT IN AN ORGANIZED HEALTH CARE EDUCATION/TRAINING PROGRAM

## 2020-03-20 PROCEDURE — 25000128 H RX IP 250 OP 636: Performed by: INTERNAL MEDICINE

## 2020-03-20 PROCEDURE — 99239 HOSP IP/OBS DSCHRG MGMT >30: CPT | Mod: GC | Performed by: INTERNAL MEDICINE

## 2020-03-20 PROCEDURE — 27210995 ZZH RX 272: Performed by: INTERNAL MEDICINE

## 2020-03-20 PROCEDURE — 85610 PROTHROMBIN TIME: CPT | Performed by: STUDENT IN AN ORGANIZED HEALTH CARE EDUCATION/TRAINING PROGRAM

## 2020-03-20 PROCEDURE — 36415 COLL VENOUS BLD VENIPUNCTURE: CPT | Performed by: STUDENT IN AN ORGANIZED HEALTH CARE EDUCATION/TRAINING PROGRAM

## 2020-03-20 PROCEDURE — 97110 THERAPEUTIC EXERCISES: CPT | Mod: GO

## 2020-03-20 PROCEDURE — 25000132 ZZH RX MED GY IP 250 OP 250 PS 637: Performed by: INTERNAL MEDICINE

## 2020-03-20 PROCEDURE — 80048 BASIC METABOLIC PNL TOTAL CA: CPT | Performed by: STUDENT IN AN ORGANIZED HEALTH CARE EDUCATION/TRAINING PROGRAM

## 2020-03-20 RX ORDER — LIDOCAINE 40 MG/G
CREAM TOPICAL
Qty: 45 G | Refills: 1 | Status: SHIPPED | OUTPATIENT
Start: 2020-03-20 | End: 2020-03-20

## 2020-03-20 RX ORDER — WARFARIN SODIUM 6 MG/1
6 TABLET ORAL DAILY
Qty: 5 TABLET | Refills: 0 | Status: ON HOLD | OUTPATIENT
Start: 2020-03-20 | End: 2020-09-30

## 2020-03-20 RX ORDER — GRANISETRON HYDROCHLORIDE 1 MG/ML
0.5 INJECTION INTRAVENOUS EVERY 12 HOURS PRN
Qty: 1 ML | Refills: 1 | Status: SHIPPED | OUTPATIENT
Start: 2020-03-20 | End: 2020-03-20

## 2020-03-20 RX ORDER — WARFARIN SODIUM 3 MG/1
6 TABLET ORAL
Status: COMPLETED | OUTPATIENT
Start: 2020-03-20 | End: 2020-03-20

## 2020-03-20 RX ORDER — DIGOXIN 0.06 MG/1
62.5 TABLET ORAL DAILY
Qty: 30 TABLET | Refills: 1 | Status: SHIPPED | OUTPATIENT
Start: 2020-03-20 | End: 2020-04-13

## 2020-03-20 RX ADMIN — GABAPENTIN 300 MG: 300 CAPSULE ORAL at 08:08

## 2020-03-20 RX ADMIN — DIGOXIN 62.5 MCG: 0.06 TABLET ORAL at 08:08

## 2020-03-20 RX ADMIN — FUROSEMIDE 20 MG: 20 TABLET ORAL at 08:07

## 2020-03-20 RX ADMIN — ACETAMINOPHEN 650 MG: 325 TABLET, FILM COATED ORAL at 12:07

## 2020-03-20 RX ADMIN — LISINOPRIL 20 MG: 20 TABLET ORAL at 08:07

## 2020-03-20 RX ADMIN — SENNOSIDES AND DOCUSATE SODIUM 1 TABLET: 8.6; 5 TABLET ORAL at 08:08

## 2020-03-20 RX ADMIN — HYDROXYCHLOROQUINE SULFATE 200 MG: 200 TABLET, FILM COATED ORAL at 08:08

## 2020-03-20 RX ADMIN — ACETAMINOPHEN 650 MG: 325 TABLET, FILM COATED ORAL at 17:16

## 2020-03-20 RX ADMIN — WARFARIN SODIUM 6 MG: 3 TABLET ORAL at 17:16

## 2020-03-20 RX ADMIN — TREPROSTINIL 6 NG/KG/MIN: 20 INJECTION, SOLUTION INTRAVENOUS; SUBCUTANEOUS at 03:06

## 2020-03-20 RX ADMIN — ACETAMINOPHEN 650 MG: 325 TABLET, FILM COATED ORAL at 05:08

## 2020-03-20 RX ADMIN — TREPROSTINIL 6 NG/KG/MIN: 20 INJECTION, SOLUTION INTRAVENOUS; SUBCUTANEOUS at 13:07

## 2020-03-20 RX ADMIN — IPRATROPIUM BROMIDE 2 SPRAY: 42 SPRAY, METERED NASAL at 17:30

## 2020-03-20 RX ADMIN — GABAPENTIN 300 MG: 300 CAPSULE ORAL at 15:29

## 2020-03-20 ASSESSMENT — ACTIVITIES OF DAILY LIVING (ADL)
ADLS_ACUITY_SCORE: 16

## 2020-03-20 ASSESSMENT — MIFFLIN-ST. JEOR: SCORE: 913.17

## 2020-03-20 NOTE — PLAN OF CARE
OT 6C  Discharge Planner OT   Patient plan for discharge: home   Current status: Pt instructed in FMC, standing LE exercises, and  and strength strengthening. Pt tolerating well; no concerns for discharge home. HR 100s with activity and post /75  Barriers to return to prior living situation: medical needs   Recommendations for discharge: home with HEP  Rationale for recommendations: Pt mobilizing well and no concerns from activity standpoint. Has good family support       Entered by: Maria Moon 03/20/2020 9:51 AM

## 2020-03-20 NOTE — PLAN OF CARE
Temp: 97.9  F (36.6  C) Temp src: Oral BP: 127/65 Pulse: 65 Heart Rate: 61 Resp: 14 SpO2: 92 % O2 Device: None (Room air)       D: Admitted with CTEPH and acute RV failure. Hx of bilat PE & DVTs in 1998, breast cancer (L masectomy), and pulm MAC     I/A: Monitored vitals and assessed status; A&O x4. Tele in place, SR. VSS on RA. Goldberg in place infusing Remodulin at 6ng/kg/min; PIV and R PICC SL. Pain controlled with scheduled Tylenol. 1500ml fluid restriction in place. Up ad jamie. Slept intermittently overnight     P: Continue to monitor and follow POC. Will discharge home on IV Remodulin, waiting on goal INR. Notify Cards 2 with changes

## 2020-03-20 NOTE — PLAN OF CARE
Neuro: ALOx4. Pain free.   Cardiac: SR.   Respiratory: on RA.   GI/: Good appetite.  +BM today  Activity: Up ad jamie in room/commode  Pain: Denied  Skin: Intact   LDA's: PICC. Goldberg infusing Remodulin at 6ng/kg/min.   Plan:  Possible discharge to home tomorrow.

## 2020-03-20 NOTE — PLAN OF CARE
DISCHARGE                         3/20/2020  6:11 PM  ----------------------------------------------------------------------------  Discharged to: Home  Via: Automobile  Accompanied by: Spouse  Discharge Instructions: diet, activity, medications, follow up appointments, when to call the MD, aftercare instructions, and what to watchout for (i.e. s/s of infection, increasing SOB, palpitations, chest pain,)  Prescriptions: To be filled by [facility] [place] [facility] per pt's request; medication list reviewed & sent with pt  Follow Up Appointments: arranged; information given  Belongings: All sent with pt  IV: out  Telemetry: off  Pt exhibits understanding of above discharge instructions; all questions answered.    Discharge Paperwork: Signed, copied, and sent home with patient.

## 2020-03-22 ENCOUNTER — MEDICAL CORRESPONDENCE (OUTPATIENT)
Dept: HEALTH INFORMATION MANAGEMENT | Facility: CLINIC | Age: 80
End: 2020-03-22

## 2020-03-23 ENCOUNTER — TELEPHONE (OUTPATIENT)
Dept: CARDIOLOGY | Facility: CLINIC | Age: 80
End: 2020-03-23

## 2020-03-23 ENCOUNTER — PATIENT OUTREACH (OUTPATIENT)
Dept: CARE COORDINATION | Facility: CLINIC | Age: 80
End: 2020-03-23

## 2020-03-23 ENCOUNTER — ANTICOAGULATION THERAPY VISIT (OUTPATIENT)
Dept: ANTICOAGULATION | Facility: CLINIC | Age: 80
End: 2020-03-23

## 2020-03-23 DIAGNOSIS — I27.20 PULMONARY HYPERTENSION (H): ICD-10-CM

## 2020-03-23 LAB
CAPILLARY BLOOD COLLECTION: NORMAL
INR PPP: 3.7 (ref 0.86–1.14)

## 2020-03-23 PROCEDURE — 36416 COLLJ CAPILLARY BLOOD SPEC: CPT | Performed by: STUDENT IN AN ORGANIZED HEALTH CARE EDUCATION/TRAINING PROGRAM

## 2020-03-23 PROCEDURE — 85610 PROTHROMBIN TIME: CPT | Performed by: STUDENT IN AN ORGANIZED HEALTH CARE EDUCATION/TRAINING PROGRAM

## 2020-03-23 RX ORDER — WARFARIN SODIUM 3 MG/1
TABLET ORAL
Qty: 170 TABLET | Refills: 1 | Status: SHIPPED | OUTPATIENT
Start: 2020-03-23 | End: 2020-11-30

## 2020-03-23 NOTE — PROGRESS NOTES
ANTICOAGULATION FOLLOW-UP CLINIC VISIT    Patient Name:  Karen Anderson  Date:  3/23/2020  Contact Type:  Telephone    SUBJECTIVE:  Patient Findings     Comments:   Spoke with Karen.  She does not have any signs of bleeding.         Clinical Outcomes     Comments:   Spoke with Karen.  She does not have any signs of bleeding.            OBJECTIVE    INR   Date Value Ref Range Status   03/23/2020 3.70 (H) 0.86 - 1.14 Final     Comment:     This test is intended for monitoring Coumadin therapy.  Results are not   accurate in patients with prolonged INR due to factor deficiency.         ASSESSMENT / PLAN  INR assessment SUPRA    Recheck INR In: 3 DAYS    INR Location Clinic      Anticoagulation Summary  As of 3/23/2020    INR goal:   2.0-3.0   TTR:   --   INR used for dosing:   3.70! (3/23/2020)   Warfarin maintenance plan:   No maintenance plan   Full warfarin instructions:   3/23: 3 mg; 3/24: 3 mg; 3/25: 4.5 mg; Otherwise No maintenance plan   Next INR check:   3/26/2020   Priority:   High   Target end date:   Indefinite    Indications    Pulmonary hypertension (H) [I27.20]             Anticoagulation Episode Summary     INR check location:       Preferred lab:       Send INR reminders to:   [facility]    Comments:   call home first, ok to call cell.  OK to leave message on either phone.  OK to speak with spouse, Don.  will use [facility]  3/23/2020: HIPPA form sent      Anticoagulation Care Providers     Provider Role Specialty Phone number    Osiris Luong MD Referring Cardiology [phone]            See the Encounter Report to view Anticoagulation Flowsheet and Dosing Calendar (Go to Encounters tab in chart review, and find the Anticoagulation Therapy Visit)    Spoke with Karen.  Introduced the [facility] to her and gave her the direct phone number to clinic.  Answered her questions--she has been on coumadin in the past.  HIPPA form and educational packet mailed to patient.    RX for  warfarin 3 mg tablets sent to [facility].  Advised Karen to call the [facility] with any questions.  Discussed with Pharmacist Pedrito [facility] for patient's new Anticoagulation recommendation.      Laure Enriquez RN

## 2020-03-23 NOTE — TELEPHONE ENCOUNTER
"Reason for Admission  PAH    How are you feeling since you got home?  \"Very good.\" Since being discharged, patient doesn't notice as much SOB when walking up and down the stairs. She does notice some fluid retention today but has not taken her water pill this AM. Advised patient to lay down for 30 minutes and elevate her legs after taking her diuretic to help move the fluid off.     And questions about medication? (I.E. Taking, received, new medication)  No    Do you have any questions about the discharge instructions?  No    Do you have any other questions?  No    Has a follow up appointment been made?   YES - Dr. Turcios, Tuesday March 31st (Telephone Visit)  " 33 y/o F presents to ED c/o cough and subjective fever x 1 day.  Pt's reports phlegm as well. Had sick contact at work. Pt is on vitamins and aspirin. Pt is 16 weeks pregnant. Denies N/V/D, smoking, chills, SOB, CP, difficulty breathing, HA, diaphoresis, leg swelling, blurry vision or abd pain.   GYN: Kavon 35 y/o F presents to ED c/o cough and subjective fever x 1 day.  Pt's reports phlegm as well. Had sick contact at work. Pt is on vitamins and aspirin. Pt is 16 weeks pregnant. Denies N/V/D, smoking, chills, SOB, CP, difficulty breathing, HA, diaphoresis, leg swelling, blurry vision or abd pain.   GYN: Ladonna

## 2020-03-23 NOTE — PLAN OF CARE
Occupational Therapy Discharge Summary    Reason for therapy discharge:    Discharged to home.    Progress towards therapy goal(s). See goals on Care Plan in [facility] electronic health record for goal details.  Goals partially met.  Barriers to achieving goals:   discharge from facility.    Therapy recommendation(s):    Continue home exercise program.

## 2020-03-23 NOTE — PROGRESS NOTES
ANTICOAGULATION  MANAGEMENT: Discharge Review    Karen Anderson chart reviewed for anticoagulation continuity of care    Hospital Admission on 3/4 to 3/20/20 for Pulmonary Hypertension.    Discharge disposition: Home    Results:    Recent Labs   Lab Test 20  0514 20  0533 20  0612   INR 2.27* 2.02* 2.19*       Anticoagulation inpatient management:     New Start    Anticoagulation discharge instructions:     Warfarin dosinmgs daily   Bridging: No   INR goal change: No      Medication changes affecting anticoagulation: Yes: Aspirin daily,Plaquenil    Additional factors affecting anticoagulation: No    Plan     Agree with discharge plan for follow up on 3/23/20    Patient not contacted    Anticoagulation Calendar updated     [facility] [phone]    Itz Edwards RP

## 2020-03-23 NOTE — TELEPHONE ENCOUNTER
Sam Arceo RN:    Karen Anderson    : date of birth 1940    Karen was discharged Friday for home.  I met with her and her  Nando 3/22 at her home to watch her prepare Remodulin IV.  Her  is independent but he wants her to learn.  She is doing much better but she still has problems understanding steps and the flow, but [facility] has great directions to follow.  I will see them again this week in the hopes they will be independent by week end.    Temp:  96.6, blood pressure 97/73, hr 78, rr 16, her lungs are clear.  Wt 117.5 #.

## 2020-03-26 ENCOUNTER — ANTICOAGULATION THERAPY VISIT (OUTPATIENT)
Dept: ANTICOAGULATION | Facility: CLINIC | Age: 80
End: 2020-03-26

## 2020-03-26 DIAGNOSIS — I27.24 CHRONIC THROMBOEMBOLIC PULMONARY HYPERTENSION (H): ICD-10-CM

## 2020-03-26 DIAGNOSIS — I27.20 PULMONARY HYPERTENSION (H): ICD-10-CM

## 2020-03-26 LAB
CAPILLARY BLOOD COLLECTION: NORMAL
INR PPP: 3.3 (ref 0.86–1.14)

## 2020-03-26 PROCEDURE — 85610 PROTHROMBIN TIME: CPT | Performed by: INTERNAL MEDICINE

## 2020-03-26 PROCEDURE — 36416 COLLJ CAPILLARY BLOOD SPEC: CPT | Performed by: INTERNAL MEDICINE

## 2020-03-26 NOTE — PROGRESS NOTES
"ANTICOAGULATION FOLLOW-UP CLINIC VISIT    Patient Name:  Karen Anderson  Date:  3/26/2020  Contact Type:  Telephone    SUBJECTIVE:  Patient Findings     Positives:   Signs/symptoms of bleeding (At venipuncture site, stated she used \"Kwik clot\" to stop the bleeding.)    Comments:   Patient stated she took a lot of Senna for constipation in the hospital, and today had loose stool.  Adjusted one time today, 3mg of Coumadin, then 4.5mg all other days.  This writer made an appt for lab draw in one week.         Clinical Outcomes     Comments:   Patient stated she took a lot of Senna for constipation in the hospital, and today had loose stool.  Adjusted one time today, 3mg of Coumadin, then 4.5mg all other days.  This writer made an appt for lab draw in one week.            OBJECTIVE    INR   Date Value Ref Range Status   03/26/2020 3.30 (H) 0.86 - 1.14 Final     Comment:     This test is intended for monitoring Coumadin therapy.  Results are not   accurate in patients with prolonged INR due to factor deficiency.         ASSESSMENT / PLAN  INR assessment SUPRA    Recheck INR In: 1 WEEK    INR Location Clinic      Anticoagulation Summary  As of 3/26/2020    INR goal:   2.0-3.0   TTR:   0.0 % (2 d)   INR used for dosing:   3.30! (3/26/2020)   Warfarin maintenance plan:   No maintenance plan   Full warfarin instructions:   3/26: 3 mg; 3/27: 4.5 mg; 3/28: 4.5 mg; 3/29: 4.5 mg; 3/30: 4.5 mg; 3/31: 4.5 mg; 4/1: 4.5 mg; Otherwise No maintenance plan   Next INR check:   4/2/2020   Priority:   High   Target end date:   Indefinite    Indications    Pulmonary hypertension (H) [I27.20]             Anticoagulation Episode Summary     INR check location:       Preferred lab:       Send INR reminders to:   [facility]    Comments:   call home first, ok to call cell.  OK to leave message on either phone.  OK to speak with spouse, Don.  Will use [facility]- call [phone] to make appt for pt.  3/23/2020: HIPPA form " sent      Anticoagulation Care Providers     Provider Role Specialty Phone number    AgOsiris hayes MD Referring Cardiology [phone]            See the Encounter Report to view Anticoagulation Flowsheet and Dosing Calendar (Go to Encounters tab in chart review, and find the Anticoagulation Therapy Visit)    INR/CFX/F2 RESULT:INR result is 3.30    ASSESSMENT:See patient findings.  Bleeding at site of blood draw today, patient stated resolved.  Karen spoke of diarrhea today, seems to be explained due to recent medication for constipation.    DOSING ADJUSTMENT: Recommended 3mg on Thurs, 4.5mg all other days.  Will adjust, may suggest as maintenance dose.    NEXT INR/FACTOR X OR FACTOR II: 4/2    PROTOCOL FOLLOWED: goal 2-3    Jaylan Jacobo RN

## 2020-03-27 ENCOUNTER — TELEPHONE (OUTPATIENT)
Dept: CARDIOLOGY | Facility: CLINIC | Age: 80
End: 2020-03-27

## 2020-03-27 NOTE — TELEPHONE ENCOUNTER
Called to clarify that appointment is for Monday at noon. Asked how patient was feeling and she stated that overall she's ok but is experiencing some diarrhea. I asked if she has tried Imodium or would like a Rx for it and she refused at this time. I asked that she call me over the weekend if it gets worse and I will reach out. Patient verbalized understanding and did not have any further questions. Kezia Arnett RN on 3/27/2020 at 1:44 PM

## 2020-03-30 ENCOUNTER — VIRTUAL VISIT (OUTPATIENT)
Dept: CARDIOLOGY | Facility: CLINIC | Age: 80
End: 2020-03-30
Attending: INTERNAL MEDICINE
Payer: COMMERCIAL

## 2020-03-30 ENCOUNTER — TELEPHONE (OUTPATIENT)
Dept: CARDIOLOGY | Facility: CLINIC | Age: 80
End: 2020-03-30

## 2020-03-30 DIAGNOSIS — R06.02 SOB (SHORTNESS OF BREATH): Primary | ICD-10-CM

## 2020-03-30 DIAGNOSIS — I27.20 PULMONARY HYPERTENSION (H): ICD-10-CM

## 2020-03-30 DIAGNOSIS — I27.24 CTEPH (CHRONIC THROMBOEMBOLIC PULMONARY HYPERTENSION) (H): Primary | ICD-10-CM

## 2020-03-30 PROCEDURE — 99214 OFFICE O/P EST MOD 30 MIN: CPT | Mod: GT | Performed by: INTERNAL MEDICINE

## 2020-03-30 NOTE — PROGRESS NOTES
"Karen Anderson is a 79 year old female who is being evaluated via a billable video visit.      The patient has been notified of following:     \"This video visit will be conducted via a call between you and your physician/provider. We have found that certain health care needs can be provided without the need for an in-person physical exam.  This service lets us provide the care you need with a video conversation.  If a prescription is necessary we can send it directly to your pharmacy.  If lab work is needed we can place an order for that and you can then stop by our lab to have the test done at a later time.    If during the course of the call the physician/provider feels a video visit is not appropriate, you will not be charged for this service.\"     Physician has received verbal consent for a Video Visit from the patient? Yes    Patient would like the video invitation sent by: Send to e-mail at: [email or web address]    Video Start Time: 2.20 Pm    Karen Anderson complains of    Chief Complaint   Patient presents with     RECHECK     follow up        I have reviewed and updated the patient's Past Medical History, Social History, Family History and Medication List.    ALLERGIES  Sulfa drugs    Additional provider notes: See Dictated Notes    Assessment/Plan:  See Dictated Notes      Video-Visit Details    Type of service:  Video Visit    Video End Time (time video stopped): 2.47 pm    Originating Location (pt. Location): [id number] - [place]    Distant Location (provider location):  [facility]     Mode of Communication:  Video Conference via Shy Turcios MD   [facility]  Heart Failure, Transplant, and Mechanical Circulatory Support Cardiology   Cardiovascular Division  [facility]   [phone]          "

## 2020-03-30 NOTE — LETTER
Service Date: 2020    Shaneka Forde MD  [address]  [place] [address]    Clint Abernathy MD  [address]  [place] [address]    Noam Jones MD  [address]  [place] [address]    Carlos Carter MD  [address]  [place] [address]      RE:  Karen Anderson   MRN:  [id number]  :  1940      Dear Josemanuel Ha, Karen, and Raul:    We had the pleasure of seeing Karen Anderson at the [facility] for follow-up via video virtual visit..  We could not see her in person due to the ongoing COVID-19 pandemic.  As you know, Ms. Anderson is a 79-year-old female with the following past medical history:    1.  Asthma.  2.  Pulmonary MAC.  3.  Breast cancer status post chemotherapy with Adriamycin, Cytoxan, Taxol, and tamoxifen and status post left mastectomy.  4.  Extensive bilateral pulmonary emboli and DVT in  thought to be secondary to tamoxifen.  5.  Chemotherapy-induced cardiomyopathy.  6.  Rheumatoid arthritis.  7.  Hypertension.  8.  Scoliosis.    She was referred to us in early March for further evaluation and treatment of pulmonary hypertension.  After a complete and thorough work-up she was diagnosed to have chronic thromboembolic pulmonary hypertension.     On the most recent labs her VQ scan showed multiple segmental areas of ventilation/perfusion mismatch in the right lung including basal and apical segments suggestive of high probability for pulmonary hypertension.    She had a CT pulmonary angiogram that showed partially occlusive pulmonary embolus in the right lower lobe lobar pulmonary artery and branch segmental pulmonary artery and several smaller chronic appearing thrombus in the left lower lobe with right middle and upper lobe arteries.  She also had a right lower lobe half [place] hump consistent with focal pulmonary infarct.    High resolution CT  scan of the chest showed no evidence of parenchymal lung disease.  She had evidence of groundglass opacities consistent with chronic thromboembolic disease.    Her repeat echocardiogram showed moderately dilated right ventricle with moderately reduced function.  Her interventricular septum was flattened consistent with right ventricular pressure and volume overload.  She had moderate tricuspid regurgitation with an estimated right ventricular systolic pressure of 97 mmHg.  Her right atrium was severely enlarged.  Her IVC was dilated and collapsing less than 50% suggestive of elevated right-sided filling pressure.  Her left ventricle was small and underfilled.  Left atrium was normal.  She had no other significant valvular abnormalities.  She had small circumferential pericardial effusion.    He had a 6-minute walk test where she walked only 164 m.  On 2 L of oxygen she dropped to 77%.    Right heart catheterization revealed an RA pressure of 10 mmHg, RV of 98 over 11 mmHg, PA of 98/30 with a mean of 52 mmHg, pulmonary capillary wedge pressure of 15 mmHg, PA saturation of 54.7%, thermodilution cardiac output of 2.3 with an index of 1.4, and PVR 16.08 MARIN.    Her LEXA was negative.  Rheumatoid factor was negative.  Her hepatitis and HIV serology were negative.  Her NT proBNP was significantly elevated at 12,000 874.  She had renal dysfunction with a GFR of 39 and a creatinine of 1.31.    Although she had proximal disease that is amenable for surgical pulmonary thromboendarterectomy, she was considered a high risk for thromboendarterectomy given her age, frailty, and D severity of her pulmonary hypertension.  In light of this, given her severely right ventricular dysfunction with severely reduced cardiac output, we admitted her to the hospital and started her on IV prostacyclin therapy.  She was also started on long-term anticoagulation with Coumadin.  She underwent intravenous diuresis.  Although she had initial  difficulties in preparing her parenteral prostacyclin due to rheumatoid arthritis, she was slowly able to do it during the hospital stay.  Her  was very professional and was able to help her.    She has been doing okay since hospital discharge.  She is currently on 6 ng/kg/min of IV Remodulin.  She was not able to uptitrated any further in the hospital due to significant side effects.  She is currently not on supplemental oxygen therapy.  She states that her lower extremity swelling has improved but she continues to still have some.  Her shortness of breath has improved but still she is limited.  She has no exertional presyncope or syncope.  No chest pain or chest pressure.  She denies having any site pain, redness or discharge.    PAST MEDICAL HISTORY:  Past Medical History:   Diagnosis Date     Asthma      Breast cancer (H)      Hypertension      Mycobacterium avium complex (H)      Pulmonary embolism (H)      Pulmonary hypertension (H)      Rheumatoid arthritis (H)      Scoliosis        PAST SURGICAL HISTORY:  Past Surgical History:   Procedure Laterality Date     CV RIGHT HEART CATH N/A 3/4/2020     IR CVC TUNNEL PLACEMENT > 5 YRS OF AGE  3/9/2020     MASTECTOMY Left      PICC SINGLE LUMEN PLACEMENT Right 03/04/2020       CURRENT MEDICATIONS:  Current Outpatient Medications   Medication Sig     albuterol (PROAIR HFA/PROVENTIL HFA/VENTOLIN HFA) 108 (90 Base) MCG/ACT inhaler Inhale 2 puffs into the lungs every 4 hours as needed for shortness of breath / dyspnea or wheezing      Ascorbic Acid (VITAMIN C) 500 MG CAPS Take 500 mg by mouth every morning      aspirin (ASA) 81 MG tablet Take 81 mg by mouth daily      beclomethasone (QVAR) 80 MCG/ACT AERS IS A DISCONTINUED MEDICATION Inhale 2 puffs into the lungs 2 times daily Gargle and rinse mouth with water after each dose.     calcium citrate-vitamin D (CALCIUM CITRATE + D3) 315-250 MG-UNIT TABS per tablet Take 315 mg by mouth 3 times daily      cetirizine  (ZYRTEC) 10 MG tablet Take 10 mg by mouth daily     digoxin (LANOXIN) 62.5 MCG tablet Take 1 tablet (62.5 mcg) by mouth daily     furosemide (LASIX) 20 MG tablet Take 2 tablets (40 mg) by mouth daily (Patient taking differently: Take 20 mg by mouth daily as needed )     gabapentin (NEURONTIN) 300 MG capsule Take 300 mg by mouth 3 times daily      hydroxychloroquine (PLAQUENIL) 200 MG tablet Take 200 mg by mouth daily      ipratropium (ATROVENT) 0.06 % nasal spray Spray 2 sprays into both nostrils 3 times daily      lisinopril (ZESTRIL) 20 MG tablet Take 20 mg by mouth daily      polyethylene glycol (MIRALAX) packet Take 17 g by mouth daily as needed      Probiotic Product (PROBIOTIC ACIDOPHILUS [place]) CAPS Take 1 capsule by mouth 2 times daily      treprostinil (REMODULIN) 4 mcg/mL in glycine diluent 50 mL infusion Inject 337.8 ng/min into the vein continuous (Patient taking differently: Inject 6 ng/kg/min into the vein continuous Increase by 1 ng/kg/min twice a week to a new goal of 30 ng/kg/min)     vitamin D3 (CHOLECALCIFEROL) 2000 units (50 mcg) tablet Take 1 tablet by mouth daily     warfarin ANTICOAGULANT (COUMADIN) 3 MG tablet Take 1 to 2 tablets daily as directed by the Anticoagulation Clinic.     warfarin ANTICOAGULANT (COUMADIN) 6 MG tablet Take 1 tablet (6 mg) by mouth daily Please take 6mg today (03/20). Then take 3mg on (03/21), and take an additional 6mg on (03/22)     No current facility-administered medications for this visit.        ROS:   10 point ROS negative except as discussed in above HPI.    EXAM:  There were no vitals taken for this visit.  General: appears comfortable, alert and articulate, has kyphosis  Head: normocephalic, atraumatic  Eyes: anicteric sclera  GEN: well appearing, no acute distress   HEENT: No pallor, cyanosis, or jaundice.   NECK: appears supple. No obvious JVD.  CV: appears warm and well-perfused   PULM: easy work of breathing   NEURO: alert, conversant   PSYCH: affect  normal  SKIN: no rash or lesion on visualized skin       Labs:  CBC RESULTS:   Recent Labs   Lab Test 03/18/20  0612   WBC 4.9   RBC 3.61*   HGB 11.6*   HCT 36.6   *   MCH 32.1   MCHC 31.7   RDW 13.2        Recent Labs   Lab Test 03/20/20  0514 03/19/20  0533   * 127*   POTASSIUM 5.0 4.7   CHLORIDE 96 96   CO2 28 25   ANIONGAP 5 7   GLC 82 73   BUN 20 18   CR 0.87 0.88   ESTEFANÍA 9.0 9.0     No results found for: NTBNPI  Lab Results   Component Value Date    NTBNP 12,874 (H) 03/02/2020     Assessment and Plan:     Ms. Karen Anderson is a 79-year-old female with chronic thromboembolic pulmonary hypertension who was recently started on IV Remodulin therapy.  She returns today for follow-up.    Although she has proximal thromboembolic disease that is amenable for surgical thromboendarterectomy, she is not a good candidate for pulmonary thromboendarterectomy.  This is because of her age, comorbidity, frailty, and severely elevated PVR.  In general, PVR greater than 1000 dynes/cm-5/sec is associated with high perioperative mortality due to coexisting small distal disease.  Thus, we recommended medical therapy with parenteral prostacyclin given her severe RV dysfunction with severely reduced cardiac output.      He is currently on 6 ng/kg/min of IV Remodulin therapy.  I have recommended her now to gradually uptitrated by 1 ng/kg/min twice a week.  We will treat her symptomatically for side effect with Tylenol, Imodium and Zofran for headache, diarrhea and nausea and vomiting respectively.  We will try to achieve a target goal of 30 ng/kg/min.  She is on Lasix 40 mg daily.  Her lower extremity edema has improved but she still continues to have some.  We will increase her Lasix dose if her repeat labs are stable.  We will repeat her labs with the next INR visit.  She is on digoxin for right ventricular inotropic support as well.  We have stopped her beta-blockers during this hospitalization.  She still  continues to remind on lisinopril for her systemic hypertension.  If her blood pressure back to drop, we will consider stopping her lisinopril.      She is on long-term anticoagulation with Coumadin.  We will maintain an INR goal of 2-3.  She has been weaned off supplemental oxygen during this hospitalization.  Unfortunately, I am not able to assess her O2 needs now with a video visit.  We will repeat a 6-minute walk test once we are able to see her in person.      I have recommended her to return to clinic in 2 weeks to make sure that she is tolerating the up titration of prostacyclin without any issues.  She will call us in the interim if you have any further worsening symptoms.    It was a pleasure seeing Karen Anderson at the [facility].  Please contact us with any questions or concerns that you may have.    Sincerely,    Karolina Turcios MD   Center for Pulmonary Hypertension  Section of Advanced Heart Failure   Cardiovascular Division  [facility]   [phone]

## 2020-03-30 NOTE — TELEPHONE ENCOUNTER
[facility] called and updated on new goal dose. Orders placed for labs. Kezia Arnett RN on 3/30/2020 at 2:58 PM  __________________________________________    ----- Message from Karolina Turcios MD sent at 3/30/2020  2:47 PM CDT -----  Team:    1. Increase Remodulin by 1 ng/kg/min twice a week - goal of 30 ng/kg/min. Please call [facility]  2. Ask her to take Tylenol 350 mg before the dose increase. Imodium prn for diarrhea. Give her a prescription for Zofran prn if she has nausea/vomiting.  3. Labs - CBC, BMP, NT proBNP with her next INR check.   4. Virtual visit with me in 2 weeks.     Please call with questions.     Thank you    TT

## 2020-03-31 ENCOUNTER — TELEPHONE (OUTPATIENT)
Dept: CARDIOLOGY | Facility: CLINIC | Age: 80
End: 2020-03-31

## 2020-03-31 NOTE — PROGRESS NOTES
Service Date: 2020    Shaneka Forde MD  [address]  [place] [address]    Clint Abernathy MD  [address]  [place] [address]    Noam Jones MD  [address]  [place] [address]    Carlos Carter MD  [address]  [place] [address]      RE:  Karen Anderson   MRN:  [id number]  :  1940      Dear Josemanuel Ha, Karen, and Raul:    We had the pleasure of seeing Karen Anderson at the [facility] for follow-up via video virtual visit..  We could not see her in person due to the ongoing COVID-19 pandemic.  As you know, Ms. Anderson is a 79-year-old female with the following past medical history:    1.  Asthma.  2.  Pulmonary MAC.  3.  Breast cancer status post chemotherapy with Adriamycin, Cytoxan, Taxol, and tamoxifen and status post left mastectomy.  4.  Extensive bilateral pulmonary emboli and DVT in  thought to be secondary to tamoxifen.  5.  Chemotherapy-induced cardiomyopathy.  6.  Rheumatoid arthritis.  7.  Hypertension.  8.  Scoliosis.    She was referred to us in early March for further evaluation and treatment of pulmonary hypertension.  After a complete and thorough work-up she was diagnosed to have chronic thromboembolic pulmonary hypertension.     On the most recent labs her VQ scan showed multiple segmental areas of ventilation/perfusion mismatch in the right lung including basal and apical segments suggestive of high probability for pulmonary hypertension.    She had a CT pulmonary angiogram that showed partially occlusive pulmonary embolus in the right lower lobe lobar pulmonary artery and branch segmental pulmonary artery and several smaller chronic appearing thrombus in the left lower lobe with right middle and upper lobe arteries.  She also had a right lower lobe half [place] hump consistent with focal pulmonary infarct.    High resolution CT  scan of the chest showed no evidence of parenchymal lung disease.  She had evidence of groundglass opacities consistent with chronic thromboembolic disease.    Her repeat echocardiogram showed moderately dilated right ventricle with moderately reduced function.  Her interventricular septum was flattened consistent with right ventricular pressure and volume overload.  She had moderate tricuspid regurgitation with an estimated right ventricular systolic pressure of 97 mmHg.  Her right atrium was severely enlarged.  Her IVC was dilated and collapsing less than 50% suggestive of elevated right-sided filling pressure.  Her left ventricle was small and underfilled.  Left atrium was normal.  She had no other significant valvular abnormalities.  She had small circumferential pericardial effusion.    He had a 6-minute walk test where she walked only 164 m.  On 2 L of oxygen she dropped to 77%.    Right heart catheterization revealed an RA pressure of 10 mmHg, RV of 98 over 11 mmHg, PA of 98/30 with a mean of 52 mmHg, pulmonary capillary wedge pressure of 15 mmHg, PA saturation of 54.7%, thermodilution cardiac output of 2.3 with an index of 1.4, and PVR 16.08 MARIN.    Her LEXA was negative.  Rheumatoid factor was negative.  Her hepatitis and HIV serology were negative.  Her NT proBNP was significantly elevated at 12,000 874.  She had renal dysfunction with a GFR of 39 and a creatinine of 1.31.    Although she had proximal disease that is amenable for surgical pulmonary thromboendarterectomy, she was considered a high risk for thromboendarterectomy given her age, frailty, and D severity of her pulmonary hypertension.  In light of this, given her severely right ventricular dysfunction with severely reduced cardiac output, we admitted her to the hospital and started her on IV prostacyclin therapy.  She was also started on long-term anticoagulation with Coumadin.  She underwent intravenous diuresis.  Although she had initial  difficulties in preparing her parenteral prostacyclin due to rheumatoid arthritis, she was slowly able to do it during the hospital stay.  Her  was very professional and was able to help her.    She has been doing okay since hospital discharge.  She is currently on 6 ng/kg/min of IV Remodulin.  She was not able to uptitrated any further in the hospital due to significant side effects.  She is currently not on supplemental oxygen therapy.  She states that her lower extremity swelling has improved but she continues to still have some.  Her shortness of breath has improved but still she is limited.  She has no exertional presyncope or syncope.  No chest pain or chest pressure.  She denies having any site pain, redness or discharge.    PAST MEDICAL HISTORY:  Past Medical History:   Diagnosis Date     Asthma      Breast cancer (H)      Hypertension      Mycobacterium avium complex (H)      Pulmonary embolism (H)      Pulmonary hypertension (H)      Rheumatoid arthritis (H)      Scoliosis        PAST SURGICAL HISTORY:  Past Surgical History:   Procedure Laterality Date     CV RIGHT HEART CATH N/A 3/4/2020     IR CVC TUNNEL PLACEMENT > 5 YRS OF AGE  3/9/2020     MASTECTOMY Left      PICC SINGLE LUMEN PLACEMENT Right 03/04/2020       CURRENT MEDICATIONS:  Current Outpatient Medications   Medication Sig     albuterol (PROAIR HFA/PROVENTIL HFA/VENTOLIN HFA) 108 (90 Base) MCG/ACT inhaler Inhale 2 puffs into the lungs every 4 hours as needed for shortness of breath / dyspnea or wheezing      Ascorbic Acid (VITAMIN C) 500 MG CAPS Take 500 mg by mouth every morning      aspirin (ASA) 81 MG tablet Take 81 mg by mouth daily      beclomethasone (QVAR) 80 MCG/ACT AERS IS A DISCONTINUED MEDICATION Inhale 2 puffs into the lungs 2 times daily Gargle and rinse mouth with water after each dose.     calcium citrate-vitamin D (CALCIUM CITRATE + D3) 315-250 MG-UNIT TABS per tablet Take 315 mg by mouth 3 times daily      cetirizine  (ZYRTEC) 10 MG tablet Take 10 mg by mouth daily     digoxin (LANOXIN) 62.5 MCG tablet Take 1 tablet (62.5 mcg) by mouth daily     furosemide (LASIX) 20 MG tablet Take 2 tablets (40 mg) by mouth daily (Patient taking differently: Take 20 mg by mouth daily as needed )     gabapentin (NEURONTIN) 300 MG capsule Take 300 mg by mouth 3 times daily      hydroxychloroquine (PLAQUENIL) 200 MG tablet Take 200 mg by mouth daily      ipratropium (ATROVENT) 0.06 % nasal spray Spray 2 sprays into both nostrils 3 times daily      lisinopril (ZESTRIL) 20 MG tablet Take 20 mg by mouth daily      polyethylene glycol (MIRALAX) packet Take 17 g by mouth daily as needed      Probiotic Product (PROBIOTIC ACIDOPHILUS [email or web address]) CAPS Take 1 capsule by mouth 2 times daily      treprostinil (REMODULIN) 4 mcg/mL in glycine diluent 50 mL infusion Inject 337.8 ng/min into the vein continuous (Patient taking differently: Inject 6 ng/kg/min into the vein continuous Increase by 1 ng/kg/min twice a week to a new goal of 30 ng/kg/min)     vitamin D3 (CHOLECALCIFEROL) 2000 units (50 mcg) tablet Take 1 tablet by mouth daily     warfarin ANTICOAGULANT (COUMADIN) 3 MG tablet Take 1 to 2 tablets daily as directed by the Anticoagulation Clinic.     warfarin ANTICOAGULANT (COUMADIN) 6 MG tablet Take 1 tablet (6 mg) by mouth daily Please take 6mg today (03/20). Then take 3mg on (03/21), and take an additional 6mg on (03/22)     No current facility-administered medications for this visit.        ROS:   10 point ROS negative except as discussed in above HPI.    EXAM:  There were no vitals taken for this visit.  General: appears comfortable, alert and articulate, has kyphosis  Head: normocephalic, atraumatic  Eyes: anicteric sclera  GEN: well appearing, no acute distress   HEENT: No pallor, cyanosis, or jaundice.   NECK: appears supple. No obvious JVD.  CV: appears warm and well-perfused   PULM: easy work of breathing   NEURO: alert, conversant   PSYCH: affect  normal  SKIN: no rash or lesion on visualized skin       Labs:  CBC RESULTS:   Recent Labs   Lab Test 03/18/20  0612   WBC 4.9   RBC 3.61*   HGB 11.6*   HCT 36.6   *   MCH 32.1   MCHC 31.7   RDW 13.2        Recent Labs   Lab Test 03/20/20  0514 03/19/20  0533   * 127*   POTASSIUM 5.0 4.7   CHLORIDE 96 96   CO2 28 25   ANIONGAP 5 7   GLC 82 73   BUN 20 18   CR 0.87 0.88   ESTEFANÍA 9.0 9.0     No results found for: NTBNPI  Lab Results   Component Value Date    NTBNP 12,874 (H) 03/02/2020     Assessment and Plan:     Ms. Karen Anderson is a 79-year-old female with chronic thromboembolic pulmonary hypertension who was recently started on IV Remodulin therapy.  She returns today for follow-up.    Although she has proximal thromboembolic disease that is amenable for surgical thromboendarterectomy, she is not a good candidate for pulmonary thromboendarterectomy.  This is because of her age, comorbidity, frailty, and severely elevated PVR.  In general, PVR greater than 1000 dynes/cm-5/sec is associated with high perioperative mortality due to coexisting small distal disease.  Thus, we recommended medical therapy with parenteral prostacyclin given her severe RV dysfunction with severely reduced cardiac output.      He is currently on 6 ng/kg/min of IV Remodulin therapy.  I have recommended her now to gradually uptitrated by 1 ng/kg/min twice a week.  We will treat her symptomatically for side effect with Tylenol, Imodium and Zofran for headache, diarrhea and nausea and vomiting respectively.  We will try to achieve a target goal of 30 ng/kg/min.  She is on Lasix 40 mg daily.  Her lower extremity edema has improved but she still continues to have some.  We will increase her Lasix dose if her repeat labs are stable.  We will repeat her labs with the next INR visit.  She is on digoxin for right ventricular inotropic support as well.  We have stopped her beta-blockers during this hospitalization.  She still  continues to remind on lisinopril for her systemic hypertension.  If her blood pressure back to drop, we will consider stopping her lisinopril.      She is on long-term anticoagulation with Coumadin.  We will maintain an INR goal of 2-3.  She has been weaned off supplemental oxygen during this hospitalization.  Unfortunately, I am not able to assess her O2 needs now with a video visit.  We will repeat a 6-minute walk test once we are able to see her in person.      I have recommended her to return to clinic in 2 weeks to make sure that she is tolerating the up titration of prostacyclin without any issues.  She will call us in the interim if you have any further worsening symptoms.    It was a pleasure seeing Karen Anderson at the [facility].  Please contact us with any questions or concerns that you may have.    Sincerely,    Karolina Turcios MD   Center for Pulmonary Hypertension  Section of Advanced Heart Failure   Cardiovascular Division  [facility]   [phone]

## 2020-03-31 NOTE — TELEPHONE ENCOUNTER
Called and spoke with patient regarding her medication changes and follow up appointment. Advised that Marielos will be coming out Wednesday to go over the titration schedule and for the patient to take a Tylenol before each titration. Patient verbalized understanding and stated she would call me right away if her migraines are too much to handle. Patient will have INR drawn 4/2. Follow up virtual visit for April 13th; Lexus messaged to schedule. Kezia Arnett RN on 3/31/2020 at 10:02 AM  _________________________________________    ----- Message from Missy Perez RN sent at 3/30/2020  3:07 PM CDT -----    ----- Message -----  From: Karolina Turcios MD  Sent: 3/30/2020   2:47 PM CDT  To: Lexus Balbuena, Cardiology Ph Nurse-    Team:    1. Increase Remodulin by 1 ng/kg/min twice a week - goal of 30 ng/kg/min. Please call [facility]  2. Ask her to take Tylenol 350 mg before the dose increase. Imodium prn for diarrhea. Give her a prescription for Zofran prn if she has nausea/vomiting.  3. Labs - CBC, BMP, NT proBNP with her next INR check.   4. Virtual visit with me in 2 weeks.     Please call with questions.     Thank you    TT

## 2020-04-02 ENCOUNTER — ANTICOAGULATION THERAPY VISIT (OUTPATIENT)
Dept: ANTICOAGULATION | Facility: CLINIC | Age: 80
End: 2020-04-02

## 2020-04-02 ENCOUNTER — TELEPHONE (OUTPATIENT)
Dept: CARDIOLOGY | Facility: CLINIC | Age: 80
End: 2020-04-02

## 2020-04-02 DIAGNOSIS — R06.02 SOB (SHORTNESS OF BREATH): ICD-10-CM

## 2020-04-02 DIAGNOSIS — I27.24 CHRONIC THROMBOEMBOLIC PULMONARY HYPERTENSION (H): ICD-10-CM

## 2020-04-02 DIAGNOSIS — I27.20 PULMONARY HYPERTENSION (H): ICD-10-CM

## 2020-04-02 LAB
ERYTHROCYTE [DISTWIDTH] IN BLOOD BY AUTOMATED COUNT: 13.6 % (ref 10–15)
HCT VFR BLD AUTO: 43.2 % (ref 35–47)
HGB BLD-MCNC: 13.9 G/DL (ref 11.7–15.7)
INR PPP: 3.2 (ref 0.86–1.14)
MCH RBC QN AUTO: 32.3 PG (ref 26.5–33)
MCHC RBC AUTO-ENTMCNC: 32.2 G/DL (ref 31.5–36.5)
MCV RBC AUTO: 100 FL (ref 78–100)
NT-PROBNP SERPL-MCNC: 4652 PG/ML (ref 0–450)
PLATELET # BLD AUTO: 268 10E9/L (ref 150–450)
RBC # BLD AUTO: 4.31 10E12/L (ref 3.8–5.2)
WBC # BLD AUTO: 4.4 10E9/L (ref 4–11)

## 2020-04-02 PROCEDURE — 85610 PROTHROMBIN TIME: CPT | Performed by: INTERNAL MEDICINE

## 2020-04-02 PROCEDURE — 85027 COMPLETE CBC AUTOMATED: CPT | Performed by: INTERNAL MEDICINE

## 2020-04-02 PROCEDURE — 80053 COMPREHEN METABOLIC PANEL: CPT | Performed by: INTERNAL MEDICINE

## 2020-04-02 PROCEDURE — 83880 ASSAY OF NATRIURETIC PEPTIDE: CPT | Performed by: INTERNAL MEDICINE

## 2020-04-02 PROCEDURE — 36415 COLL VENOUS BLD VENIPUNCTURE: CPT | Performed by: INTERNAL MEDICINE

## 2020-04-02 NOTE — PROGRESS NOTES
ANTICOAGULATION FOLLOW-UP CLINIC VISIT    Patient Name:  Karen Anderson  Date:  4/2/2020  Contact Type:  Telephone    SUBJECTIVE:         OBJECTIVE    INR   Date Value Ref Range Status   04/02/2020 3.20 (H) 0.86 - 1.14 Final     Comment:     This test is intended for monitoring Coumadin therapy.  Results are not   accurate in patients with prolonged INR due to factor deficiency.         ASSESSMENT / PLAN  INR assessment SUPRA    Recheck INR In: 1 WEEK    INR Location Clinic      Anticoagulation Summary  As of 4/2/2020    INR goal:   2.0-3.0   TTR:   0.0 % (1.4 wk)   INR used for dosing:   3.20! (4/2/2020)   Warfarin maintenance plan:   No maintenance plan   Full warfarin instructions:   4/2: 3 mg; 4/3: 4.5 mg; 4/4: 4.5 mg; 4/5: 4.5 mg; 4/6: 4.5 mg; 4/7: 3 mg; 4/8: 4.5 mg; Otherwise No maintenance plan   Next INR check:   4/9/2020   Priority:   High   Target end date:   Indefinite    Indications    Pulmonary hypertension (H) [I27.20]             Anticoagulation Episode Summary     INR check location:       Preferred lab:       Send INR reminders to:   [facility]    Comments:   call home first, ok to call cell.  OK to leave message on either phone.  OK to speak with spouse, Don.  Will use [facility]- call [phone] to make appt for pt.  3/23/2020: HIPPA form sent      Anticoagulation Care Providers     Provider Role Specialty Phone number    AgOsiris hayes MD Referring Cardiology [phone]            See the Encounter Report to view Anticoagulation Flowsheet and Dosing Calendar (Go to Encounters tab in chart review, and find the Anticoagulation Therapy Visit)    Left message for patient with results and dosing recommendations. Asked patient to call back to report any missed doses, falls, signs and symptoms of bleeding or clotting, any changes in health, medication, or diet. Asked patient to call back with any questions or concerns.     Tanisha Kimbrough RN

## 2020-04-02 NOTE — TELEPHONE ENCOUNTER
Karen Anderson  1940    I saw Karen and her  Santana today .  I watched Karen prepare her Remodulin.  Santana is independent but Karen still needs some direction but is mixing on her own with Santana's help.  Today Karen demonstrated Care of her central line.  Because of her arthritis this can be difficult but Santana can assist.    Karen increased to 7 ng per kg per minute today.  I will see her again next Tuesday to watch her CVC care again and help with another dose increase.    Temperature 96.8, bp 123/73, hr 73, rr 16.  Lungs are clear.    Please call with any questions!      Emerita Garcia RN BSN  Cardiopulmonary Nurse Specialist  C: [phone]/ [email or web address]  Sent with Black[employer] Work  [email or web address]>)

## 2020-04-03 ENCOUNTER — DOCUMENTATION ONLY (OUTPATIENT)
Dept: OTHER | Facility: CLINIC | Age: 80
End: 2020-04-03

## 2020-04-03 LAB
ALBUMIN SERPL-MCNC: 3.5 G/DL (ref 3.4–5)
ALP SERPL-CCNC: 103 U/L (ref 40–150)
ALT SERPL W P-5'-P-CCNC: 26 U/L (ref 0–50)
ANION GAP SERPL CALCULATED.3IONS-SCNC: 6 MMOL/L (ref 3–14)
AST SERPL W P-5'-P-CCNC: 39 U/L (ref 0–45)
BILIRUB SERPL-MCNC: 0.5 MG/DL (ref 0.2–1.3)
BUN SERPL-MCNC: 40 MG/DL (ref 7–30)
CALCIUM SERPL-MCNC: 10.1 MG/DL (ref 8.5–10.1)
CHLORIDE SERPL-SCNC: 100 MMOL/L (ref 94–109)
CO2 SERPL-SCNC: 29 MMOL/L (ref 20–32)
CREAT SERPL-MCNC: 1.05 MG/DL (ref 0.52–1.04)
GFR SERPL CREATININE-BSD FRML MDRD: 51 ML/MIN/{1.73_M2}
GLUCOSE SERPL-MCNC: 78 MG/DL (ref 70–99)
POTASSIUM SERPL-SCNC: 4.1 MMOL/L (ref 3.4–5.3)
PROT SERPL-MCNC: 7.5 G/DL (ref 6.8–8.8)
SODIUM SERPL-SCNC: 135 MMOL/L (ref 133–144)

## 2020-04-09 ENCOUNTER — ANTICOAGULATION THERAPY VISIT (OUTPATIENT)
Dept: ANTICOAGULATION | Facility: CLINIC | Age: 80
End: 2020-04-09

## 2020-04-09 DIAGNOSIS — I27.24 CHRONIC THROMBOEMBOLIC PULMONARY HYPERTENSION (H): ICD-10-CM

## 2020-04-09 DIAGNOSIS — I27.20 PULMONARY HYPERTENSION (H): ICD-10-CM

## 2020-04-09 LAB
CAPILLARY BLOOD COLLECTION: NORMAL
INR PPP: 4 (ref 0.86–1.14)

## 2020-04-09 PROCEDURE — 36416 COLLJ CAPILLARY BLOOD SPEC: CPT | Performed by: FAMILY MEDICINE

## 2020-04-09 PROCEDURE — 85610 PROTHROMBIN TIME: CPT | Performed by: FAMILY MEDICINE

## 2020-04-09 NOTE — PROGRESS NOTES
ANTICOAGULATION FOLLOW-UP CLINIC VISIT    Patient Name:  Karen Anderson  Date:  2020  Contact Type:  Telephone    SUBJECTIVE:  Patient Findings     Positives:   Change in diet/appetite    Comments:   Pt reports that she eats leaf lettuce and writer explained that this doesn't have a lot of Vitamin K in it, but pt still thinks that this lettuce should be helping decrease her INr. Pt also reports eating some mojgan greens, but is out of this. Writer tried to educate pt on the dark leafy greens, broccoli, cabbage has the high amounts of Vitamin K in it and to choose this to help with her high INR's. Pt reports she will make some coleslaw for tonight. Consulted with Pedrito Edwards RPH with dosing.         Clinical Outcomes     Comments:   Pt reports that she eats leaf lettuce and writer explained that this doesn't have a lot of Vitamin K in it, but pt still thinks that this lettuce should be helping decrease her INr. Pt also reports eating some mojgan greens, but is out of this. Writer tried to educate pt on the dark leafy greens, broccoli, cabbage has the high amounts of Vitamin K in it and to choose this to help with her high INR's. Pt reports she will make some coleslaw for tonight. Consulted with Pedrito Edwards RPH with dosing.            OBJECTIVE    INR   Date Value Ref Range Status   2020 4.00 (H) 0.86 - 1.14 Final       ASSESSMENT / PLAN  INR assessment SUPRA    Recheck INR In: 1 WEEK    INR Location Clinic      Anticoagulation Summary  As of 2020    INR goal:   2.0-3.0   TTR:   0.0 % (2.3 wk)   INR used for dosin.00! (2020)   Warfarin maintenance plan:   No maintenance plan   Full warfarin instructions:   : Hold; 4/10: 4.5 mg; : 3 mg; : 4.5 mg; : 4.5 mg; : 3 mg; 4/15: 4.5 mg; Otherwise No maintenance plan   Next INR check:   2020   Priority:   High   Target end date:   Indefinite    Indications    Pulmonary hypertension (H) [I27.20]              Anticoagulation Episode Summary     INR check location:       Preferred lab:       Send INR reminders to:   [facility]    Comments:   call home first, ok to call cell.  OK to leave message on either phone.  OK to speak with spouse, Don.  Will use [facility]- call [phone] to make appt for pt. SPEAK IN TABLETS (Has 3mg Tabs)  3/23/2020: HIPPA form sent      Anticoagulation Care Providers     Provider Role Specialty Phone number    Osiris Luong MD Referring Cardiology [phone]            See the Encounter Report to view Anticoagulation Flowsheet and Dosing Calendar (Go to Encounters tab in chart review, and find the Anticoagulation Therapy Visit)    Spoke with patient. Gave them their lab results and new warfarin recommendation.  No changes in health, medication, or diet. No missed doses, no falls. No signs or symptoms of bleed or clotting.     Tanisha Kimbrough RN

## 2020-04-12 NOTE — PROGRESS NOTES
"Karen Anderson is a 79 year old female who is being evaluated via a billable video visit.      The patient has been notified of following:     \"This video visit will be conducted via a call between you and your physician/provider. We have found that certain health care needs can be provided without the need for an in-person physical exam.  This service lets us provide the care you need with a video conversation.  If a prescription is necessary we can send it directly to your pharmacy.  If lab work is needed we can place an order for that and you can then stop by our lab to have the test done at a later time.    Video visits are billed at different rates depending on your insurance coverage.  Please reach out to your insurance provider with any questions.    If during the course of the call the physician/provider feels a video visit is not appropriate, you will not be charged for this service.\"    Patient has given verbal consent for Video visit? Yes    How would you like to obtain your AVS? [id number]    Patient would like the video invitation sent by: Send to e-mail at: [email or web address]      Video Start Time: 12.15 pm    Additional provider notes: see dictated notes below      Video-Visit Details    Type of service:  Video Visit    Video End Time (time video stopped): 12.51    Originating Location (pt. Location): Home    Distant Location (provider location):  [facility] HEART CARE     Mode of Communication:  Video Conference via Shy Turcios MD   [facility]  Heart Failure, Transplant, and Mechanical Circulatory Support Cardiology   Cardiovascular Division  [facility]   [phone]      Service Date: April 13th, 2020    Shaneka Forde MD  [address]  [place] [address]    Clint Abernathy MD  [address]  [place] [address]    Noam Jones MD  [address]  [place] " [id number]    Carlos Carter MD  [address]  [place] [address]      RE:  Karen Anderson   MRN:  [id number]  :  1940      Dear Josemanuel Ha, Karen, and Raul:    We had the pleasure of seeing Karen Anderson at the [facility] for follow-up via video virtual visit..  We could not see her in person due to the ongoing COVID-19 pandemic.  As you know, Ms. Anderson is a 79-year-old female with the following past medical history:    1.  Asthma.  2.  Pulmonary MAC.  3.  Breast cancer status post chemotherapy with Adriamycin, Cytoxan, Taxol, and tamoxifen and status post left mastectomy.  4.  Extensive bilateral pulmonary emboli and DVT in  thought to be secondary to tamoxifen.  5.  Chemotherapy-induced cardiomyopathy.  6.  Rheumatoid arthritis.  7.  Hypertension.  8.  Scoliosis.    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 Although she had proximal disease, she was too high risk for surgical PTE. She is currently on IV Remodulin therapy.     She was referred to us in early March for further evaluation and treatment of pulmonary hypertension.  After a complete and thorough work-up she was diagnosed to have chronic thromboembolic pulmonary hypertension.    Although she had proximal disease that is amenable for surgical pulmonary thromboendarterectomy, she was considered a high risk for thromboendarterectomy given her age, frailty, and D severity of her pulmonary hypertension.  In light of this, given her severely right ventricular dysfunction with severely reduced cardiac output, we admitted her to the hospital and started her on IV prostacyclin therapy.  She was also started on long-term anticoagulation with Coumadin.  She underwent intravenous diuresis.  Although she had initial difficulties in preparing her parenteral prostacyclin due to rheumatoid arthritis, she was slowly able to do it during the hospital  stay.  Her  was very professional and was able to help her.    She is doing okay.  She is tolerating the slow up titration of Remodulin.  She has no significant side effects.  She has occasional headache, diarrhea that responds to loperamide, and occasional leg cramps.  She is currently at 8 ng/kg/min.  Her shortness of breath has improved.  She is independent for activities of daily living.  She is functional class III.  She has not had any further exertional presyncope or syncope.  No exertional chest pain or chest pressure.  Her lower extremity swelling has improved but still has still some fullness in her ankle.  Her catheter site is clean and dry with no pain, erythema or discharge.    PAST MEDICAL HISTORY:  Past Medical History:   Diagnosis Date     Asthma      Breast cancer (H)      Hypertension      Mycobacterium avium complex (H)      Pulmonary embolism (H)      Pulmonary hypertension (H)      Rheumatoid arthritis (H)      Scoliosis        PAST SURGICAL HISTORY:  Past Surgical History:   Procedure Laterality Date     CV RIGHT HEART CATH N/A 3/4/2020     IR CVC TUNNEL PLACEMENT > 5 YRS OF AGE  3/9/2020     MASTECTOMY Left      PICC SINGLE LUMEN PLACEMENT Right 03/04/2020       CURRENT MEDICATIONS:  Current Outpatient Medications   Medication Sig     albuterol (PROAIR HFA/PROVENTIL HFA/VENTOLIN HFA) 108 (90 Base) MCG/ACT inhaler Inhale 2 puffs into the lungs every 4 hours as needed for shortness of breath / dyspnea or wheezing      Ascorbic Acid (VITAMIN C) 500 MG CAPS Take 500 mg by mouth every morning      aspirin (ASA) 81 MG tablet Take 81 mg by mouth daily      beclomethasone (QVAR) 80 MCG/ACT AERS IS A DISCONTINUED MEDICATION Inhale 2 puffs into the lungs 2 times daily Gargle and rinse mouth with water after each dose.     calcium citrate-vitamin D (CALCIUM CITRATE + D3) 315-250 MG-UNIT TABS per tablet Take 315 mg by mouth 3 times daily      cetirizine (ZYRTEC) 10 MG tablet Take 10 mg by mouth  daily     digoxin (LANOXIN) 62.5 MCG tablet Take 1 tablet (62.5 mcg) by mouth daily     furosemide (LASIX) 20 MG tablet Take 2 tablets (40 mg) by mouth daily (Patient taking differently: Take 20 mg by mouth daily as needed )     gabapentin (NEURONTIN) 300 MG capsule Take 300 mg by mouth 3 times daily      hydroxychloroquine (PLAQUENIL) 200 MG tablet Take 200 mg by mouth daily      ipratropium (ATROVENT) 0.06 % nasal spray Spray 2 sprays into both nostrils 3 times daily      lisinopril (ZESTRIL) 20 MG tablet Take 20 mg by mouth daily      polyethylene glycol (MIRALAX) packet Take 17 g by mouth daily as needed      Probiotic Product (PROBIOTIC ACIDOPHILUS BIOBLiberator Medical Supply) CAPS Take 1 capsule by mouth 2 times daily      treprostinil (REMODULIN) 4 mcg/mL in glycine diluent 50 mL infusion Inject 337.8 ng/min into the vein continuous (Patient taking differently: Inject 6 ng/kg/min into the vein continuous Increase by 1 ng/kg/min twice a week to a new goal of 30 ng/kg/min)     vitamin D3 (CHOLECALCIFEROL) 2000 units (50 mcg) tablet Take 1 tablet by mouth daily     warfarin ANTICOAGULANT (COUMADIN) 3 MG tablet Take 1 to 2 tablets daily as directed by the Anticoagulation Clinic.     warfarin ANTICOAGULANT (COUMADIN) 6 MG tablet Take 1 tablet (6 mg) by mouth daily Please take 6mg today (03/20). Then take 3mg on (03/21), and take an additional 6mg on (03/22)     No current facility-administered medications for this visit.        ROS:   10 point ROS negative except as discussed in above HPI.    EXAM:  She was in no apparent distress.  She was comfortable.    She was awake, alert, oriented x3.  She had no pallor, cyanosis or jaundice.  Her Goldberg catheter site was clean and dry.  Her legs showed minimal to trace edema in her ankles.  She was in no respiratory distress.  She was breathing normal.  She was warm and well-perfused.  Skin had no rashes.  Her mood was normal.       Labs:  CBC RESULTS:   Recent Labs   Lab Test 04/02/20  1019    WBC 4.4   RBC 4.31   HGB 13.9   HCT 43.2      MCH 32.3   MCHC 32.2   RDW 13.6          Recent Labs   Lab Test 04/02/20  1019 03/20/20  0514    130*   POTASSIUM 4.1 5.0   CHLORIDE 100 96   CO2 29 28   ANIONGAP 6 5   GLC 78 82   BUN 40* 20   CR 1.05* 0.87   ESTEFANÍA 10.1 9.0       No results found for: NTBNPI  Lab Results   Component Value Date    NTBNP 4,652 (H) 04/02/2020       On the most recent labs her VQ scan showed multiple segmental areas of ventilation/perfusion mismatch in the right lung including basal and apical segments suggestive of high probability for pulmonary hypertension.    She had a CT pulmonary angiogram that showed partially occlusive pulmonary embolus in the right lower lobe lobar pulmonary artery and branch segmental pulmonary artery and several smaller chronic appearing thrombus in the left lower lobe with right middle and upper lobe arteries.  She also had a right lower lobe half [place] hump consistent with focal pulmonary infarct.    High resolution CT scan of the chest showed no evidence of parenchymal lung disease.  She had evidence of groundglass opacities consistent with chronic thromboembolic disease.    Echo (03/2020)  Her repeat echocardiogram showed moderately dilated right ventricle with moderately reduced function.  Her interventricular septum was flattened consistent with right ventricular pressure and volume overload.  She had moderate tricuspid regurgitation with an estimated right ventricular systolic pressure of 97 mmHg.  Her right atrium was severely enlarged.  Her IVC was dilated and collapsing less than 50% suggestive of elevated right-sided filling pressure.  Her left ventricle was small and underfilled.  Left atrium was normal.  She had no other significant valvular abnormalities.  She had small circumferential pericardial effusion.    He had a 6-minute walk test where she walked only 164 m.  On 2 L of oxygen she dropped to 77%.    RHC (03/2020)  Right  heart catheterization revealed an RA pressure of 10 mmHg, RV of 98 over 11 mmHg, PA of 98/30 with a mean of 52 mmHg, pulmonary capillary wedge pressure of 15 mmHg, PA saturation of 54.7%, thermodilution cardiac output of 2.3 with an index of 1.4, and PVR 16.08 MARIN.      Assessment and Plan:     Ms. Karen Anderson is a 79-year-old female with chronic thromboembolic pulmonary hypertension who is currently on IV Remodulin therapy.  She returns today for follow-up.    She is currently on 8 ng/kg/min of IV Remodulin therapy.  Unfortunately, she has not been increasing it twice a week due to communication.  I have recommended her now to continue to increase it by 1 ng/kg/min twice a week.  I recommend her to call me if she were to have any side effects with the twice a week up titration.  Her BUN and creatinine were elevated on her most recent chemistry.  Although she has some minimal edema in her ankles overall her weight has been stable and her lower extremity swelling has improved.  Thus I have recommended her to decrease her Lasix to 20 mg daily.  She will also continue on digoxin for her right ventricle inotropic support.  She is on Coumadin for long-term anticoagulation therapy.  Her INR is being managed with Coumadin clinic.    We will repeat chemistry to follow-up on her renal function with the next INR check this week.    She will return to clinic in 2 weeks to make sure that she she is tolerating the up titration of Remodulin okay.  She will call us in the interim if you have any further worsening symptoms.    It was a pleasure seeing Karen Anderson at the [facility].  Please contact us with any questions or concerns that you may have.    Sincerely,    Karolina Turcios MD   [facility] for Pulmonary [facility]pertension  Section of Advanced Heart Failure   Cardiovascular Division  [facility]   [phone]

## 2020-04-13 ENCOUNTER — TELEPHONE (OUTPATIENT)
Dept: CARDIOLOGY | Facility: CLINIC | Age: 80
End: 2020-04-13

## 2020-04-13 ENCOUNTER — VIRTUAL VISIT (OUTPATIENT)
Dept: CARDIOLOGY | Facility: CLINIC | Age: 80
End: 2020-04-13
Attending: INTERNAL MEDICINE
Payer: COMMERCIAL

## 2020-04-13 DIAGNOSIS — I27.24 CTEPH (CHRONIC THROMBOEMBOLIC PULMONARY HYPERTENSION) (H): Primary | ICD-10-CM

## 2020-04-13 DIAGNOSIS — R60.0 LOCALIZED EDEMA: ICD-10-CM

## 2020-04-13 DIAGNOSIS — I27.20 PULMONARY HYPERTENSION (H): ICD-10-CM

## 2020-04-13 DIAGNOSIS — E61.1 IRON DEFICIENCY: Primary | ICD-10-CM

## 2020-04-13 PROCEDURE — 99215 OFFICE O/P EST HI 40 MIN: CPT | Mod: 95 | Performed by: INTERNAL MEDICINE

## 2020-04-13 RX ORDER — PNV NO.95/FERROUS FUM/FOLIC AC 28MG-0.8MG
1 TABLET ORAL 2 TIMES DAILY
Qty: 180 TABLET | Refills: 3 | Status: SHIPPED | OUTPATIENT
Start: 2020-04-13 | End: 2020-05-04 | Stop reason: SINTOL

## 2020-04-13 RX ORDER — FUROSEMIDE 20 MG
20 TABLET ORAL DAILY
Qty: 90 TABLET | Refills: 3 | Status: SHIPPED | OUTPATIENT
Start: 2020-04-13 | End: 2020-07-07

## 2020-04-13 RX ORDER — DIGOXIN 0.06 MG/1
62.5 TABLET ORAL DAILY
Qty: 90 TABLET | Refills: 3 | Status: SHIPPED | OUTPATIENT
Start: 2020-04-13 | End: 2021-01-21

## 2020-04-13 NOTE — TELEPHONE ENCOUNTER
Prescriptions ordered and sent to local pharmacy. BMP recheck ordered and appt notes changed. Staff message sent to Lexus to schedule F/U visit. Kezia Arnett RN on 4/13/2020 at 1:00 PM    ----- Message from Kezia Arnett RN sent at 4/13/2020 12:53 PM CDT -----    ----- Message -----  From: Karolina Turcios MD  Sent: 4/13/2020  12:42 PM CDT  To: Lexus Balbuena, Cardiology Ph Nurse-      Plan:    1. Increase Remodulin by 1 ng twice a week - she has been doing only once a week.   2. Decrease lasix to 20 mg daily  3. FESO4 325 mg twice a day - please send in a prescription  4. Labs - BMP this week when she goes for her INR check to follow up on her renal function  5. Change digoxin prescription to 90 days  6. RTC in 2 weeks - phone visit is fine    Thank you    Tt

## 2020-04-16 ENCOUNTER — ANTICOAGULATION THERAPY VISIT (OUTPATIENT)
Dept: ANTICOAGULATION | Facility: CLINIC | Age: 80
End: 2020-04-16

## 2020-04-16 DIAGNOSIS — I27.20 PULMONARY HYPERTENSION (H): ICD-10-CM

## 2020-04-16 DIAGNOSIS — I27.24 CHRONIC THROMBOEMBOLIC PULMONARY HYPERTENSION (H): ICD-10-CM

## 2020-04-16 DIAGNOSIS — R60.0 LOCALIZED EDEMA: ICD-10-CM

## 2020-04-16 LAB
ANION GAP SERPL CALCULATED.3IONS-SCNC: 4 MMOL/L (ref 3–14)
BUN SERPL-MCNC: 25 MG/DL (ref 7–30)
CALCIUM SERPL-MCNC: 9.7 MG/DL (ref 8.5–10.1)
CAPILLARY BLOOD COLLECTION: NORMAL
CHLORIDE SERPL-SCNC: 106 MMOL/L (ref 94–109)
CO2 SERPL-SCNC: 29 MMOL/L (ref 20–32)
CREAT SERPL-MCNC: 0.98 MG/DL (ref 0.52–1.04)
GFR SERPL CREATININE-BSD FRML MDRD: 55 ML/MIN/{1.73_M2}
GLUCOSE SERPL-MCNC: 78 MG/DL (ref 70–99)
INR PPP: 2.3 (ref 0.86–1.14)
POTASSIUM SERPL-SCNC: 4.3 MMOL/L (ref 3.4–5.3)
SODIUM SERPL-SCNC: 139 MMOL/L (ref 133–144)

## 2020-04-16 PROCEDURE — 36415 COLL VENOUS BLD VENIPUNCTURE: CPT | Performed by: INTERNAL MEDICINE

## 2020-04-16 PROCEDURE — 80048 BASIC METABOLIC PNL TOTAL CA: CPT | Performed by: INTERNAL MEDICINE

## 2020-04-16 PROCEDURE — 85610 PROTHROMBIN TIME: CPT | Performed by: INTERNAL MEDICINE

## 2020-04-16 NOTE — PROGRESS NOTES
ANTICOAGULATION FOLLOW-UP CLINIC VISIT    Patient Name:  Karen Anderson  Date:  2020  Contact Type:  Telephone    SUBJECTIVE:  Patient Findings     Positives:   Change in medications (Will call if Lasix dose changes, 20mg daily currently)    Comments:   Spoke to Karen.        Clinical Outcomes     Comments:   Spoke to Karen.           OBJECTIVE    INR   Date Value Ref Range Status   2020 2.30 (H) 0.86 - 1.14 Final     Comment:     INRFD       ASSESSMENT / PLAN  INR assessment THER    Recheck INR In: 1 WEEK    INR Location Clinic      Anticoagulation Summary  As of 2020    INR goal:   2.0-3.0   TTR:   12.0 % (3.4 wk)   INR used for dosin.30 (2020)   Warfarin maintenance plan:   3 mg (3 mg x 1) every Tue, Sat; 4.5 mg (3 mg x 1.5) all other days   Full warfarin instructions:   3 mg every Tue, Sat; 4.5 mg all other days   Weekly warfarin total:   28.5 mg   Plan last modified:   Jaylan Jacobo RN (2020)   Next INR check:   2020   Priority:   High   Target end date:   Indefinite    Indications    Pulmonary hypertension (H) [I27.20]             Anticoagulation Episode Summary     INR check location:       Preferred lab:       Send INR reminders to:   [facility]    Comments:   call home first, ok to call cell.  OK to leave message on either phone.  OK to speak with spouse, Don.  Will use [facility]- call [phone] to make appt for pt. SPEAK IN TABLETS (Has 3mg Tabs)  3/23/2020: HIPPA form sent      Anticoagulation Care Providers     Provider Role Specialty Phone number    Osiris Luong MD Referring Cardiology [phone]            See the Encounter Report to view Anticoagulation Flowsheet and Dosing Calendar (Go to Encounters tab in chart review, and find the Anticoagulation Therapy Visit)    INR/CFX/F2 RESULT: INR result is 2.30    ASSESSMENT:No Problems found. No Changes in Health, Medications, or diet. No Signs of bruising, bleeding or  clotting.    DOSING ADJUSTMENT: Started new maintenance dose today.  3mg Tu, Sat, 4.5mg all other days    NEXT INR/FACTOR X OR FACTOR II:4/23 at [facility]    PROTOCOL FOLLOWED:goal 2-3    Jaylan Jacobo RN

## 2020-04-23 ENCOUNTER — ANTICOAGULATION THERAPY VISIT (OUTPATIENT)
Dept: ANTICOAGULATION | Facility: CLINIC | Age: 80
End: 2020-04-23

## 2020-04-23 DIAGNOSIS — I27.20 PULMONARY HYPERTENSION (H): ICD-10-CM

## 2020-04-23 DIAGNOSIS — I27.24 CHRONIC THROMBOEMBOLIC PULMONARY HYPERTENSION (H): ICD-10-CM

## 2020-04-23 LAB
CAPILLARY BLOOD COLLECTION: NORMAL
INR PPP: 2.5 (ref 0.86–1.14)

## 2020-04-23 PROCEDURE — 85610 PROTHROMBIN TIME: CPT | Performed by: FAMILY MEDICINE

## 2020-04-23 PROCEDURE — 36416 COLLJ CAPILLARY BLOOD SPEC: CPT | Performed by: FAMILY MEDICINE

## 2020-04-23 NOTE — PROGRESS NOTES
ANTICOAGULATION FOLLOW-UP CLINIC VISIT    Patient Name:  Karen Anderson  Date:  2020  Contact Type:  Telephone    SUBJECTIVE:         OBJECTIVE    INR   Date Value Ref Range Status   2020 2.50 (H) 0.86 - 1.14 Final     Comment:     This test is intended for monitoring Coumadin therapy.  Results are not   accurate in patients with prolonged INR due to factor deficiency.         ASSESSMENT / PLAN  No question data found.  Anticoagulation Summary  As of 2020    INR goal:   2.0-3.0   TTR:   31.9 % (1 mo)   INR used for dosin.50 (2020)   Warfarin maintenance plan:   3 mg (3 mg x 1) every Tue, Sat; 4.5 mg (3 mg x 1.5) all other days   Full warfarin instructions:   3 mg every Tue, Sat; 4.5 mg all other days   Weekly warfarin total:   28.5 mg   No change documented:   Brittany Kirk RN   Plan last modified:   Jaylan Jacobo RN (2020)   Next INR check:   2020   Priority:   High   Target end date:   Indefinite    Indications    Pulmonary hypertension (H) [I27.20]             Anticoagulation Episode Summary     INR check location:       Preferred lab:       Send INR reminders to:   [facility]    Comments:   call home first, ok to call cell.  OK to leave message on either phone.  OK to speak with spouse, Don.  Will use [facility]- call [phone] to make appt for pt. SPEAK IN TABLETS (Has 3mg Tabs)  3/23/2020: HIPPA form sent      Anticoagulation Care Providers     Provider Role Specialty Phone number    Osiris Luong MD Referring Cardiology [phone]            See the Encounter Report to view Anticoagulation Flowsheet and Dosing Calendar (Go to Encounters tab in chart review, and find the Anticoagulation Therapy Visit)    Spoke with Karen.     Brittany Kirk RN

## 2020-04-30 ENCOUNTER — TELEPHONE (OUTPATIENT)
Dept: CARDIOLOGY | Facility: CLINIC | Age: 80
End: 2020-04-30

## 2020-04-30 NOTE — TELEPHONE ENCOUNTER
Patient stated she's been having frequent loose stool with iron supplements. Has been taking Imodium PRN multiple times a day without much relief. Tuesday and Wednesday has only taken 1 oral iron with some relief. Today she held her doses and hasn't had a BM. I advised patient continue to hold over the weekend and address her GI symptoms with Dr. Turcios Monday. Suggested we may have to start IV iron. Patient verbalized understanding and did not have any further questions. Kezia Arnett RN on 4/30/2020 at 3:59 PM

## 2020-05-04 ENCOUNTER — VIRTUAL VISIT (OUTPATIENT)
Dept: CARDIOLOGY | Facility: CLINIC | Age: 80
End: 2020-05-04
Attending: INTERNAL MEDICINE
Payer: COMMERCIAL

## 2020-05-04 ENCOUNTER — TELEPHONE (OUTPATIENT)
Dept: CARDIOLOGY | Facility: CLINIC | Age: 80
End: 2020-05-04

## 2020-05-04 DIAGNOSIS — I27.20 PULMONARY HYPERTENSION (H): Primary | ICD-10-CM

## 2020-05-04 DIAGNOSIS — R06.02 SOB (SHORTNESS OF BREATH): ICD-10-CM

## 2020-05-04 DIAGNOSIS — I27.24 CTEPH (CHRONIC THROMBOEMBOLIC PULMONARY HYPERTENSION) (H): Primary | ICD-10-CM

## 2020-05-04 PROCEDURE — 99215 OFFICE O/P EST HI 40 MIN: CPT | Mod: 95 | Performed by: INTERNAL MEDICINE

## 2020-05-04 NOTE — PROGRESS NOTES
"Karen Anderson is a 79 year old female who is being evaluated via a billable video visit.      The patient has been notified of following:     \"This video visit will be conducted via a call between you and your physician/provider. We have found that certain health care needs can be provided without the need for an in-person physical exam.  This service lets us provide the care you need with a video conversation.  If a prescription is necessary we can send it directly to your pharmacy.  If lab work is needed we can place an order for that and you can then stop by our lab to have the test done at a later time.    Video visits are billed at different rates depending on your insurance coverage.  Please reach out to your insurance provider with any questions.    If during the course of the call the physician/provider feels a video visit is not appropriate, you will not be charged for this service.\"    Patient has given verbal consent for Video visit? Yes    How would you like to obtain your AVS?     Patient would like the video invitation sent by: Send to e-mail at: [email or web address]    Will anyone else be joining your video visit?         Video-Visit Details    Type of service:  Video Visit    Video Start Time: 12.01 PM  Video End Time: 12.38 PM    Originating Location (pt. Location): Home    Distant Location (provider location):  [facility]     Platform used for Video Visit: Maggie Turcios MD   [facility]  Heart Failure, Transplant, and Mechanical Circulatory Support Cardiology   Cardiovascular Division  [facility]   [phone]            "

## 2020-05-04 NOTE — TELEPHONE ENCOUNTER
Orders placed for lab draw and appt notes updated.     ----- Message from Kezia Arnett RN sent at 5/4/2020 12:41 PM CDT -----    ----- Message -----  From: Karolina Turcios MD  Sent: 5/4/2020  12:32 PM CDT  To: Lexus Balbuena, Cardiology Ph Nurse-    Team:    1. Continue to increase Remodulin by 1 ng/kg/min twice a week to a goal of 30 ng/kg/min  2. Ok to stop oral iron supplementation  3. Check CBC and BMP on May 7th along with her INR checks.   4. RTC in a month with me or Shawna ROUSE

## 2020-05-04 NOTE — LETTER
"5/4/2020      RE: Karen Anderson  [address]  [facility] [address]       Dear Colleague,    Thank you for the opportunity to participate in the care of your patient, Karen Anderson, at the [facility] at [facility]. Please see a copy of my visit note below.    Karen Anderson is a 79 year old female who is being evaluated via a billable video visit.      The patient has been notified of following:     \"This video visit will be conducted via a call between you and your physician/provider. We have found that certain health care needs can be provided without the need for an in-person physical exam.  This service lets us provide the care you need with a video conversation.  If a prescription is necessary we can send it directly to your pharmacy.  If lab work is needed we can place an order for that and you can then stop by our lab to have the test done at a later time.    Video visits are billed at different rates depending on your insurance coverage.  Please reach out to your insurance provider with any questions.    If during the course of the call the physician/provider feels a video visit is not appropriate, you will not be charged for this service.\"    Patient has given verbal consent for Video visit? Yes    How would you like to obtain your AVS?     Patient would like the video invitation sent by: Send to e-mail at: [email or web address]    Will anyone else be joining your video visit?         Video-Visit Details    Type of service:  Video Visit    Video Start Time: 12.01 PM  Video End Time: 12.38 PM    Originating Location (pt. Location): Home    Distant Location (provider location):  [facility]     Platform used for Video Visit: Maggie Turcios MD   [facility]  Heart Failure, Transplant, and Mechanical Circulatory Support Cardiology   Cardiovascular Division  [facility]   [phone]              Service Date: May 04 th, 2020    Shaneka Forde MD  [address]  [place] [address]    Clint Abernathy MD  [address]  [place] [address]    Noam Jones MD  [address]  [facility] [place] [address]    Carlos Carter MD  [address]  [place] [address]      RE:  Karen Anderson   MRN:  [id number]  :  1940      Dear Josemanuel Ha, Karen, and Raul:    We had the pleasure of seeing Karen Anderson at the [facility] for follow-up via video virtual visit..  We could not see her in person due to the ongoing COVID-19 pandemic.  As you know, Ms. Anderson is a 79-year-old female with the following past medical history:    1.  Asthma.  2.  Pulmonary MAC.  3.  Breast cancer status post chemotherapy with Adriamycin, Cytoxan, Taxol, and tamoxifen and status post left mastectomy.  4.  Extensive bilateral pulmonary emboli and DVT in  thought to be secondary to tamoxifen.  5.  Chemotherapy-induced cardiomyopathy.  6.  Rheumatoid arthritis.  7.  Hypertension.  8.  Scoliosis.    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 Although she had proximal disease, she was too high risk for surgical PTE. She is currently on IV Remodulin therapy.     She is doing okay.  She is tolerating the slow up titration of Remodulin.  She has no significant side effects.  She has occasional headache, diarrhea that responds to loperamide, and occasional leg cramps.  She is currently at 14 ng/kg/min.  Her shortness of breath has improved.  She is independent for activities of daily living.  She is functional class III.  She has not had any further exertional presyncope or syncope.  No exertional chest pain or chest pressure.  Her lower extremity swelling has improved but still has still some fullness in her ankle.  She has noticed some crust around her IV  catheter site but denies having pain, redness, purulent discharge, fever or chills.    PAST MEDICAL HISTORY:  Past Medical History:   Diagnosis Date     Asthma      Breast cancer (H)      Hypertension      Mycobacterium avium complex (H)      Pulmonary embolism (H)      Pulmonary hypertension (H)      Rheumatoid arthritis (H)      Scoliosis        PAST SURGICAL HISTORY:  Past Surgical History:   Procedure Laterality Date     CV RIGHT HEART CATH N/A 3/4/2020     IR CVC TUNNEL PLACEMENT > 5 YRS OF AGE  3/9/2020     MASTECTOMY Left      PICC SINGLE LUMEN PLACEMENT Right 03/04/2020       CURRENT MEDICATIONS:  Current Outpatient Medications   Medication Sig     albuterol (PROAIR HFA/PROVENTIL HFA/VENTOLIN HFA) 108 (90 Base) MCG/ACT inhaler Inhale 2 puffs into the lungs every 4 hours as needed for shortness of breath / dyspnea or wheezing      Ascorbic Acid (VITAMIN C) 500 MG CAPS Take 500 mg by mouth every morning      aspirin (ASA) 81 MG tablet Take 81 mg by mouth daily      beclomethasone (QVAR) 80 MCG/ACT AERS IS A DISCONTINUED MEDICATION Inhale 2 puffs into the lungs 2 times daily Gargle and rinse mouth with water after each dose.     calcium citrate-vitamin D (CALCIUM CITRATE + D3) 315-250 MG-UNIT TABS per tablet Take 315 mg by mouth 3 times daily      cetirizine (ZYRTEC) 10 MG tablet Take 10 mg by mouth daily     digoxin (LANOXIN) 62.5 MCG tablet Take 1 tablet (62.5 mcg) by mouth daily     furosemide (LASIX) 20 MG tablet Take 1 tablet (20 mg) by mouth daily     gabapentin (NEURONTIN) 300 MG capsule Take 300 mg by mouth 3 times daily      hydroxychloroquine (PLAQUENIL) 200 MG tablet Take 200 mg by mouth daily      ipratropium (ATROVENT) 0.06 % nasal spray Spray 2 sprays into both nostrils 3 times daily      lisinopril (ZESTRIL) 20 MG tablet Take 20 mg by mouth daily      polyethylene glycol (MIRALAX) packet Take 17 g by mouth daily as needed      Probiotic Product (PROBIOTIC ACIDOPHILUS BIOBEASwingShot) CAPS Take 1 capsule  by mouth 2 times daily      vitamin D3 (CHOLECALCIFEROL) 2000 units (50 mcg) tablet Take 1 tablet by mouth daily     warfarin ANTICOAGULANT (COUMADIN) 3 MG tablet Take 1 to 2 tablets daily as directed by the Anticoagulation Clinic.     warfarin ANTICOAGULANT (COUMADIN) 6 MG tablet Take 1 tablet (6 mg) by mouth daily Please take 6mg today (03/20). Then take 3mg on (03/21), and take an additional 6mg on (03/22)     Ferrous Sulfate (IRON) 325 (65 Fe) MG tablet Take 1 tablet by mouth 2 times daily (Patient not taking: Reported on 5/4/2020)     No current facility-administered medications for this visit.        ROS:   10 point ROS negative except as discussed in above HPI.    EXAM:  She was in no apparent distress.  She was comfortable.    She was awake, alert, oriented x3.  She had no pallor, cyanosis or jaundice.  Her Goldberg catheter site was clean and dry.  Her legs showed minimal to trace edema in her ankles.  She was in no respiratory distress.  She was breathing normal.  She was warm and well-perfused.  Skin had no rashes.  Her mood was normal.       Labs:  CBC RESULTS:   Recent Labs   Lab Test 05/07/20  1026   WBC 5.7   RBC 4.07   HGB 13.0   HCT 41.1   *   MCH 31.9   MCHC 31.6   RDW 14.2        Recent Labs   Lab Test 05/07/20  1026 04/16/20  1042    139   POTASSIUM Unsatisfactory specimen - hemolyzed 4.3   CHLORIDE 106 106   CO2 26 29   ANIONGAP 4 4   GLC 76 78   BUN 27 25   CR 0.94 0.98   ESTEFANÍA 9.7 9.7     Liver Function Studies -   Recent Labs   Lab Test 05/07/20  1026   PROTTOTAL 7.4   ALBUMIN 3.3*   BILITOTAL 0.7   ALKPHOS 93   AST Unsatisfactory specimen - hemolyzed   ALT 42     No results found for: NTBNPI  Lab Results   Component Value Date    NTBNP 3,320 (H) 05/07/2020       No results found for: NTBNPI  Lab Results   Component Value Date    NTBNP 4,652 (H) 04/02/2020       On the most recent labs her VQ scan showed multiple segmental areas of ventilation/perfusion mismatch in the right  lung including basal and apical segments suggestive of high probability for pulmonary hypertension.    She had a CT pulmonary angiogram that showed partially occlusive pulmonary embolus in the right lower lobe lobar pulmonary artery and branch segmental pulmonary artery and several smaller chronic appearing thrombus in the left lower lobe with right middle and upper lobe arteries.  She also had a right lower lobe half Hamptons hump consistent with focal pulmonary infarct.    High resolution CT scan of the chest showed no evidence of parenchymal lung disease.  She had evidence of groundglass opacities consistent with chronic thromboembolic disease.    Echo (03/2020)  Her repeat echocardiogram showed moderately dilated right ventricle with moderately reduced function.  Her interventricular septum was flattened consistent with right ventricular pressure and volume overload.  She had moderate tricuspid regurgitation with an estimated right ventricular systolic pressure of 97 mmHg.  Her right atrium was severely enlarged.  Her IVC was dilated and collapsing less than 50% suggestive of elevated right-sided filling pressure.  Her left ventricle was small and underfilled.  Left atrium was normal.  She had no other significant valvular abnormalities.  She had small circumferential pericardial effusion.    He had a 6-minute walk test where she walked only 164 m.  On 2 L of oxygen she dropped to 77%.    RHC (03/2020)  Right heart catheterization revealed an RA pressure of 10 mmHg, RV of 98 over 11 mmHg, PA of 98/30 with a mean of 52 mmHg, pulmonary capillary wedge pressure of 15 mmHg, PA saturation of 54.7%, thermodilution cardiac output of 2.3 with an index of 1.4, and PVR 16.08 MARIN.      Assessment and Plan:     Ms. Karen Anderson is a 79-year-old female with chronic thromboembolic pulmonary hypertension who is currently on IV Remodulin therapy.  She returns today for follow-up. She is currently on 14 ng/kg/min of IV  Remodulin therapy.      She is tolerating the slow up titration of Remodulin okay.  She has not had any significant remodeling related side effects.  We will continue to uptitrate her slowly at 1 ng twice a week to a goal of 30 ng/kg/min.  Her renal function has improved on the lower dose of Lasix 20 mg once daily which she will continue.  She is on digoxin for right ventricular support.  She is currently not requiring supplemental oxygen therapy.  She is on Coumadin for long-term anticoagulation and has been tolerating it without any significant bleeding.      Her catheter site related crest does not appear to be too concerning for infection.  I have recommended her to watch this closely and call us if she were to have worsening discharge, pain or redness around the site.  She has no leukocytosis on her CBC which is encouraging.      We will plan to repeat right heart catheterization, echocardiogram and 6-minute walk test when she is at goal of 30 ng/kg/min.  I recommended her to return to clinic in a month.    She will call us in the interim to have any further worsening symptoms.  It was a pleasure seeing Karen Anderson at the [facility].  Please contact us with any questions or concerns that you may have.    Sincerely,    Karolina Turcios MD   [facility]  Section of Advanced Heart Failure   Cardiovascular Division  [facility]   [phone]                Please do not hesitate to contact me if you have any questions/concerns.     Sincerely,     Karolina Turcios MD

## 2020-05-04 NOTE — PROGRESS NOTES
Service Date: May 04 th, 2020    Shaneka Forde MD  [address]  [place] [address]    Clint Abernathy MD  [address]  [place] [address]    Noam Jones MD  [address]  [place] [address]    Carlos Carter MD  [address]  [place] [address]      RE:  Karen Anderson   MRN:  [id number]  :  1940      Dear Josemanuel Ha, Karen, and Raul:    We had the pleasure of seeing Karen Anderson at the [facility] for follow-up via video virtual visit..  We could not see her in person due to the ongoing COVID-19 pandemic.  As you know, Ms. Anderson is a 79-year-old female with the following past medical history:    1.  Asthma.  2.  Pulmonary MAC.  3.  Breast cancer status post chemotherapy with Adriamycin, Cytoxan, Taxol, and tamoxifen and status post left mastectomy.  4.  Extensive bilateral pulmonary emboli and DVT in  thought to be secondary to tamoxifen.  5.  Chemotherapy-induced cardiomyopathy.  6.  Rheumatoid arthritis.  7.  Hypertension.  8.  Scoliosis.    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 Although she had proximal disease, she was too high risk for surgical PTE. She is currently on IV Remodulin therapy.     She is doing okay.  She is tolerating the slow up titration of Remodulin.  She has no significant side effects.  She has occasional headache, diarrhea that responds to loperamide, and occasional leg cramps.  She is currently at 14 ng/kg/min.  Her shortness of breath has improved.  She is independent for activities of daily living.  She is functional class III.  She has not had any further exertional presyncope or syncope.  No exertional chest pain or chest pressure.  Her lower extremity swelling has improved but still has still some fullness in her ankle.  She has noticed some crust around her IV catheter site but denies having pain, redness,  purulent discharge, fever or chills.    PAST MEDICAL HISTORY:  Past Medical History:   Diagnosis Date     Asthma      Breast cancer (H)      Hypertension      Mycobacterium avium complex (H)      Pulmonary embolism (H)      Pulmonary hypertension (H)      Rheumatoid arthritis (H)      Scoliosis        PAST SURGICAL HISTORY:  Past Surgical History:   Procedure Laterality Date     CV RIGHT HEART CATH N/A 3/4/2020     IR CVC TUNNEL PLACEMENT > 5 YRS OF AGE  3/9/2020     MASTECTOMY Left      PICC SINGLE LUMEN PLACEMENT Right 03/04/2020       CURRENT MEDICATIONS:  Current Outpatient Medications   Medication Sig     albuterol (PROAIR HFA/PROVENTIL HFA/VENTOLIN HFA) 108 (90 Base) MCG/ACT inhaler Inhale 2 puffs into the lungs every 4 hours as needed for shortness of breath / dyspnea or wheezing      Ascorbic Acid (VITAMIN C) 500 MG CAPS Take 500 mg by mouth every morning      aspirin (ASA) 81 MG tablet Take 81 mg by mouth daily      beclomethasone (QVAR) 80 MCG/ACT AERS IS A DISCONTINUED MEDICATION Inhale 2 puffs into the lungs 2 times daily Gargle and rinse mouth with water after each dose.     calcium citrate-vitamin D (CALCIUM CITRATE + D3) 315-250 MG-UNIT TABS per tablet Take 315 mg by mouth 3 times daily      cetirizine (ZYRTEC) 10 MG tablet Take 10 mg by mouth daily     digoxin (LANOXIN) 62.5 MCG tablet Take 1 tablet (62.5 mcg) by mouth daily     furosemide (LASIX) 20 MG tablet Take 1 tablet (20 mg) by mouth daily     gabapentin (NEURONTIN) 300 MG capsule Take 300 mg by mouth 3 times daily      hydroxychloroquine (PLAQUENIL) 200 MG tablet Take 200 mg by mouth daily      ipratropium (ATROVENT) 0.06 % nasal spray Spray 2 sprays into both nostrils 3 times daily      lisinopril (ZESTRIL) 20 MG tablet Take 20 mg by mouth daily      polyethylene glycol (MIRALAX) packet Take 17 g by mouth daily as needed      Probiotic Product (PROBIOTIC ACIDOPHILUS BIOBEADS) CAPS Take 1 capsule by mouth 2 times daily      vitamin D3  (CHOLECALCIFEROL) 2000 units (50 mcg) tablet Take 1 tablet by mouth daily     warfarin ANTICOAGULANT (COUMADIN) 3 MG tablet Take 1 to 2 tablets daily as directed by the Anticoagulation Clinic.     warfarin ANTICOAGULANT (COUMADIN) 6 MG tablet Take 1 tablet (6 mg) by mouth daily Please take 6mg today (03/20). Then take 3mg on (03/21), and take an additional 6mg on (03/22)     Ferrous Sulfate (IRON) 325 (65 Fe) MG tablet Take 1 tablet by mouth 2 times daily (Patient not taking: Reported on 5/4/2020)     No current facility-administered medications for this visit.        ROS:   10 point ROS negative except as discussed in above HPI.    EXAM:  She was in no apparent distress.  She was comfortable.    She was awake, alert, oriented x3.  She had no pallor, cyanosis or jaundice.  Her Goldberg catheter site was clean and dry.  Her legs showed minimal to trace edema in her ankles.  She was in no respiratory distress.  She was breathing normal.  She was warm and well-perfused.  Skin had no rashes.  Her mood was normal.       Labs:  CBC RESULTS:   Recent Labs   Lab Test 05/07/20  1026   WBC 5.7   RBC 4.07   HGB 13.0   HCT 41.1   *   MCH 31.9   MCHC 31.6   RDW 14.2        Recent Labs   Lab Test 05/07/20  1026 04/16/20  1042    139   POTASSIUM Unsatisfactory specimen - hemolyzed 4.3   CHLORIDE 106 106   CO2 26 29   ANIONGAP 4 4   GLC 76 78   BUN 27 25   CR 0.94 0.98   ESTEFANÍA 9.7 9.7     Liver Function Studies -   Recent Labs   Lab Test 05/07/20  1026   PROTTOTAL 7.4   ALBUMIN 3.3*   BILITOTAL 0.7   ALKPHOS 93   AST Unsatisfactory specimen - hemolyzed   ALT 42     No results found for: NTBNPI  Lab Results   Component Value Date    NTBNP 3,320 (H) 05/07/2020       No results found for: NTBNPI  Lab Results   Component Value Date    NTBNP 4,652 (H) 04/02/2020       On the most recent labs her VQ scan showed multiple segmental areas of ventilation/perfusion mismatch in the right lung including basal and apical  segments suggestive of high probability for pulmonary hypertension.    She had a CT pulmonary angiogram that showed partially occlusive pulmonary embolus in the right lower lobe lobar pulmonary artery and branch segmental pulmonary artery and several smaller chronic appearing thrombus in the left lower lobe with right middle and upper lobe arteries.  She also had a right lower lobe half Hamptons hump consistent with focal pulmonary infarct.    High resolution CT scan of the chest showed no evidence of parenchymal lung disease.  She had evidence of groundglass opacities consistent with chronic thromboembolic disease.    Echo (03/2020)  Her repeat echocardiogram showed moderately dilated right ventricle with moderately reduced function.  Her interventricular septum was flattened consistent with right ventricular pressure and volume overload.  She had moderate tricuspid regurgitation with an estimated right ventricular systolic pressure of 97 mmHg.  Her right atrium was severely enlarged.  Her IVC was dilated and collapsing less than 50% suggestive of elevated right-sided filling pressure.  Her left ventricle was small and underfilled.  Left atrium was normal.  She had no other significant valvular abnormalities.  She had small circumferential pericardial effusion.    He had a 6-minute walk test where she walked only 164 m.  On 2 L of oxygen she dropped to 77%.    RHC (03/2020)  Right heart catheterization revealed an RA pressure of 10 mmHg, RV of 98 over 11 mmHg, PA of 98/30 with a mean of 52 mmHg, pulmonary capillary wedge pressure of 15 mmHg, PA saturation of 54.7%, thermodilution cardiac output of 2.3 with an index of 1.4, and PVR 16.08 MARIN.      Assessment and Plan:     Ms. Karen Anderson is a 79-year-old female with chronic thromboembolic pulmonary hypertension who is currently on IV Remodulin therapy.  She returns today for follow-up. She is currently on 14 ng/kg/min of IV Remodulin therapy.      She is  tolerating the slow up titration of Remodulin okay.  She has not had any significant remodeling related side effects.  We will continue to uptitrate her slowly at 1 ng twice a week to a goal of 30 ng/kg/min.  Her renal function has improved on the lower dose of Lasix 20 mg once daily which she will continue.  She is on digoxin for right ventricular support.  She is currently not requiring supplemental oxygen therapy.  She is on Coumadin for long-term anticoagulation and has been tolerating it without any significant bleeding.      Her catheter site related crest does not appear to be too concerning for infection.  I have recommended her to watch this closely and call us if she were to have worsening discharge, pain or redness around the site.  She has no leukocytosis on her CBC which is encouraging.      We will plan to repeat right heart catheterization, echocardiogram and 6-minute walk test when she is at goal of 30 ng/kg/min.  I recommended her to return to clinic in a month.    She will call us in the interim to have any further worsening symptoms.  It was a pleasure seeing Karen Anderson at the [facility].  Please contact us with any questions or concerns that you may have.    Sincerely,    Karolina Turcios MD   Center for Pulmonary Hypertension  Section of Advanced Heart Failure   Cardiovascular Division  [facility]   [phone]

## 2020-05-07 ENCOUNTER — ANTICOAGULATION THERAPY VISIT (OUTPATIENT)
Dept: ANTICOAGULATION | Facility: CLINIC | Age: 80
End: 2020-05-07

## 2020-05-07 DIAGNOSIS — I27.24 CHRONIC THROMBOEMBOLIC PULMONARY HYPERTENSION (H): ICD-10-CM

## 2020-05-07 DIAGNOSIS — I27.20 PULMONARY HYPERTENSION (H): ICD-10-CM

## 2020-05-07 DIAGNOSIS — R06.02 SOB (SHORTNESS OF BREATH): ICD-10-CM

## 2020-05-07 LAB
ALBUMIN SERPL-MCNC: 3.3 G/DL (ref 3.4–5)
ALP SERPL-CCNC: 93 U/L (ref 40–150)
ALT SERPL W P-5'-P-CCNC: 42 U/L (ref 0–50)
ANION GAP SERPL CALCULATED.3IONS-SCNC: 4 MMOL/L (ref 3–14)
AST SERPL W P-5'-P-CCNC: ABNORMAL U/L (ref 0–45)
BILIRUB SERPL-MCNC: 0.7 MG/DL (ref 0.2–1.3)
BUN SERPL-MCNC: 27 MG/DL (ref 7–30)
CALCIUM SERPL-MCNC: 9.7 MG/DL (ref 8.5–10.1)
CHLORIDE SERPL-SCNC: 106 MMOL/L (ref 94–109)
CO2 SERPL-SCNC: 26 MMOL/L (ref 20–32)
CREAT SERPL-MCNC: 0.94 MG/DL (ref 0.52–1.04)
ERYTHROCYTE [DISTWIDTH] IN BLOOD BY AUTOMATED COUNT: 14.2 % (ref 10–15)
GFR SERPL CREATININE-BSD FRML MDRD: 58 ML/MIN/{1.73_M2}
GLUCOSE SERPL-MCNC: 76 MG/DL (ref 70–99)
HCT VFR BLD AUTO: 41.1 % (ref 35–47)
HGB BLD-MCNC: 13 G/DL (ref 11.7–15.7)
INR PPP: 2.4 (ref 0.86–1.14)
MCH RBC QN AUTO: 31.9 PG (ref 26.5–33)
MCHC RBC AUTO-ENTMCNC: 31.6 G/DL (ref 31.5–36.5)
MCV RBC AUTO: 101 FL (ref 78–100)
NT-PROBNP SERPL-MCNC: 3320 PG/ML (ref 0–450)
PLATELET # BLD AUTO: 231 10E9/L (ref 150–450)
POTASSIUM SERPL-SCNC: ABNORMAL MMOL/L (ref 3.4–5.3)
PROT SERPL-MCNC: 7.4 G/DL (ref 6.8–8.8)
RBC # BLD AUTO: 4.07 10E12/L (ref 3.8–5.2)
SODIUM SERPL-SCNC: 136 MMOL/L (ref 133–144)
WBC # BLD AUTO: 5.7 10E9/L (ref 4–11)

## 2020-05-07 PROCEDURE — 36415 COLL VENOUS BLD VENIPUNCTURE: CPT | Performed by: INTERNAL MEDICINE

## 2020-05-07 PROCEDURE — 80053 COMPREHEN METABOLIC PANEL: CPT | Performed by: INTERNAL MEDICINE

## 2020-05-07 PROCEDURE — 85027 COMPLETE CBC AUTOMATED: CPT | Performed by: INTERNAL MEDICINE

## 2020-05-07 PROCEDURE — 85610 PROTHROMBIN TIME: CPT | Performed by: INTERNAL MEDICINE

## 2020-05-07 PROCEDURE — 83880 ASSAY OF NATRIURETIC PEPTIDE: CPT | Performed by: INTERNAL MEDICINE

## 2020-05-07 NOTE — PROGRESS NOTES
ANTICOAGULATION FOLLOW-UP CLINIC VISIT    Patient Name:  Karen Anderson  Date:  2020  Contact Type:  Telephone    SUBJECTIVE:  Patient Findings     Positives:   Change in health, Change in medications    Comments:   Pt reports she stopped takin goral Iron due to this upsetting her GI system, but her Hemoglobin is pending right now and pt doesn't want to get an infusion so she is thinking maybe it might be better to take oral iron. Pt will let us know if this restarts.        Clinical Outcomes     Comments:   Pt reports she stopped takin goral Iron due to this upsetting her GI system, but her Hemoglobin is pending right now and pt doesn't want to get an infusion so she is thinking maybe it might be better to take oral iron. Pt will let us know if this restarts.           OBJECTIVE    INR   Date Value Ref Range Status   2020 2.40 (H) 0.86 - 1.14 Final       ASSESSMENT / PLAN  INR assessment THER    Recheck INR In: 2 WEEKS    INR Location Clinic      Anticoagulation Summary  As of 2020    INR goal:   2.0-3.0   TTR:   53.1 % (1.5 mo)   INR used for dosin.40 (2020)   Warfarin maintenance plan:   3 mg (3 mg x 1) every Tue, Sat; 4.5 mg (3 mg x 1.5) all other days   Full warfarin instructions:   3 mg every Tue, Sat; 4.5 mg all other days   Weekly warfarin total:   28.5 mg   Plan last modified:   Jaylan Jacobo RN (2020)   Next INR check:   2020   Priority:   High   Target end date:   Indefinite    Indications    Pulmonary hypertension (H) [I27.20]             Anticoagulation Episode Summary     INR check location:       Preferred lab:       Send INR reminders to:   [facility]    Comments:   call home first, ok to call cell.  OK to leave message on either phone.  OK to speak with spouse, Don.  Will use [facility]- call [phone] to make appt for pt. SPEAK IN TABLETS (Has 3mg Tabs)  3/23/2020: HIPPA form sent      Anticoagulation Care Providers     Provider Role  Specialty Phone number    Osiris Luong MD Referring Cardiology [phone]            See the Encounter Report to view Anticoagulation Flowsheet and Dosing Calendar (Go to Encounters tab in chart review, and find the Anticoagulation Therapy Visit)    Spoke with patient. Gave them their lab results and new warfarin recommendation.  No changes in health, medication, or diet. No missed doses, no falls. No signs or symptoms of bleed or clotting.     Tanisha Kimbrough RN

## 2020-05-21 ENCOUNTER — ANTICOAGULATION THERAPY VISIT (OUTPATIENT)
Dept: ANTICOAGULATION | Facility: CLINIC | Age: 80
End: 2020-05-21

## 2020-05-21 DIAGNOSIS — I27.20 PULMONARY HYPERTENSION (H): ICD-10-CM

## 2020-05-21 DIAGNOSIS — I27.24 CHRONIC THROMBOEMBOLIC PULMONARY HYPERTENSION (H): ICD-10-CM

## 2020-05-21 LAB
CAPILLARY BLOOD COLLECTION: NORMAL
INR PPP: 2.1 (ref 0.86–1.14)

## 2020-05-21 PROCEDURE — 36416 COLLJ CAPILLARY BLOOD SPEC: CPT | Performed by: INTERNAL MEDICINE

## 2020-05-21 PROCEDURE — 85610 PROTHROMBIN TIME: CPT | Performed by: INTERNAL MEDICINE

## 2020-05-21 NOTE — PROGRESS NOTES
Addendum 5/28/20 Pt called reporting the week of 6/1 pt will be in [place], [place] and there is a clinic she can go get labs drawn, but pt will be back on 6/8. Writer is ok with pushing out next INR until 6/11. Pt reports that there will be times that she may be up in [place], [place] for long periods of time and if that happens we can call [facility] [phone] to fax orders for her to get an INR done there. Tanisha Kimbrough RN

## 2020-05-21 NOTE — PROGRESS NOTES
ANTICOAGULATION FOLLOW-UP CLINIC VISIT    Patient Name:  Karen Anderson  Date:  2020  Contact Type:  Telephone    SUBJECTIVE:  Patient Findings         Clinical Outcomes     Negatives:   Major bleeding event, Thromboembolic event, Anticoagulation-related hospital admission, Anticoagulation-related ED visit, Anticoagulation-related fatality           OBJECTIVE    Recent labs: (last 7 days)     20  1027   INR 2.10*       ASSESSMENT / PLAN  INR assessment THER    Recheck INR In: 2 WEEKS    INR Location Clinic      Anticoagulation Summary  As of 2020    INR goal:   2.0-3.0   TTR:   64.2 % (2 mo)   INR used for dosin.10 (2020)   Warfarin maintenance plan:   3 mg (3 mg x 1) every Tue, Sat; 4.5 mg (3 mg x 1.5) all other days   Full warfarin instructions:   3 mg every Tue, Sat; 4.5 mg all other days   Weekly warfarin total:   28.5 mg   No change documented:   Tanisha Kimbrough, RN   Plan last modified:   Jaylan Jacobo RN (2020)   Next INR check:   2020   Priority:   Maintenance   Target end date:   Indefinite    Indications    Pulmonary hypertension (H) [I27.20]             Anticoagulation Episode Summary     INR check location:       Preferred lab:       Send INR reminders to:   [facility]    Comments:   call home first, ok to call cell.  OK to leave message on either phone.  OK to speak with spouse, Don.  Will use [facility]- call [phone] to make appt for pt. SPEAK IN TABLETS (Has 3mg Tabs)  3/23/2020: HIPPA form sent      Anticoagulation Care Providers     Provider Role Specialty Phone number    Osiris Luong MD Referring Cardiology [phone]            See the Encounter Report to view Anticoagulation Flowsheet and Dosing Calendar (Go to Encounters tab in chart review, and find the Anticoagulation Therapy Visit)    Spoke with patient. Gave them their lab results and new warfarin recommendation.  No changes in health, medication, or diet. No missed  doses, no falls. No signs or symptoms of bleed or clotting.     Tanisha Kimbrough, RN

## 2020-06-03 NOTE — PROGRESS NOTES
CARDIOLOGY CLINIC VIDEO VISIT    Karen Anderson is a 79 year old female who is being evaluated via a billable video visit.      The patient has been notified of following:     This video visit will be conducted via a call between you and your physician/provider. We have found that certain health care needs can be provided without the need for an in-person physical exam.  This service lets us provide the care you need with a video conversation.  If a prescription is necessary we can send it directly to your pharmacy.  If lab work is needed we can place an order for that and you can then stop by our lab to have the test done at a later time.    Virtual visits are billed at different rates depending on your insurance coverage. During this emergency period, for some insurers they may be billed the same as an in-person visit.  Please reach out to your insurance provider with any questions.    If during the course of the call the physician/provider feels a video visit is not appropriate, you will not be charged for this service.      Physician has received verbal consent for a Video Visit from the patient? Yes    Patient would like the video invitation sent by: Text to cell phone: [phone]       I have reviewed and updated the patient's Past Medical History, Social History, Family History and Medication List.    MEDICATIONS:  Current Outpatient Medications   Medication Sig Dispense Refill     albuterol (PROAIR HFA/PROVENTIL HFA/VENTOLIN HFA) 108 (90 Base) MCG/ACT inhaler Inhale 2 puffs into the lungs every 4 hours as needed for shortness of breath / dyspnea or wheezing        Ascorbic Acid (VITAMIN C) 500 MG CAPS Take 500 mg by mouth every morning        aspirin (ASA) 81 MG tablet Take 81 mg by mouth daily        beclomethasone (QVAR) 80 MCG/ACT AERS IS A DISCONTINUED MEDICATION Inhale 2 puffs into the lungs 2 times daily Gargle and rinse mouth with water after each dose.       calcium citrate-vitamin D (CALCIUM  "CITRATE + D3) 315-250 MG-UNIT TABS per tablet Take 315 mg by mouth 3 times daily        cetirizine (ZYRTEC) 10 MG tablet Take 10 mg by mouth daily       digoxin (LANOXIN) 62.5 MCG tablet Take 1 tablet (62.5 mcg) by mouth daily 90 tablet 3     furosemide (LASIX) 20 MG tablet Take 1 tablet (20 mg) by mouth daily 90 tablet 3     gabapentin (NEURONTIN) 300 MG capsule Take 300 mg by mouth 3 times daily        hydroxychloroquine (PLAQUENIL) 200 MG tablet Take 200 mg by mouth daily        ipratropium (ATROVENT) 0.06 % nasal spray Spray 2 sprays into both nostrils 3 times daily        lisinopril (ZESTRIL) 20 MG tablet Take 20 mg by mouth daily        polyethylene glycol (MIRALAX) packet Take 17 g by mouth daily as needed        Probiotic Product (PROBIOTIC ACIDOPHILUS BIOBEADS) CAPS Take 1 capsule by mouth 2 times daily        vitamin D3 (CHOLECALCIFEROL) 2000 units (50 mcg) tablet Take 1 tablet by mouth daily       warfarin ANTICOAGULANT (COUMADIN) 3 MG tablet Take 1 to 2 tablets daily as directed by the Anticoagulation Clinic. 170 tablet 1     warfarin ANTICOAGULANT (COUMADIN) 6 MG tablet Take 1 tablet (6 mg) by mouth daily Please take 6mg today (03/20). Then take 3mg on (03/21), and take an additional 6mg on (03/22) 5 tablet 0       ALLERGIES  Sulfa drugs    Review Of Systems:   Respiratory: Denies worsening RIVERA, cough.  Cardiovascular: Denies chest pain. Has occasional palpitation, but no more than usual.   Gastrointestinal: Pos for diarrhea. Neg N/V.    Self reported vitals:  Weight: 118 lb  BP not reported--[place] cuff [place]    Brief physical exam:  General: In no acute distress, upright and calm.  Eyes: No apparent redness or discharge.   Chest: No labored breathing, no cough during exam or audible wheezing.   Neuro: No obvious focal defects or tremors.   Psych: Alert and oriented. Does not appear anxious.     The rest of a comprehensive physical examination is deferred due to public health " emergency video visit restrictions.     Most recent labs:   Results for ELAINA ANDERSON (MRN [id number]) as of 6/4/2020 14:32   Ref. Range 5/7/2020 10:26   Sodium Latest Ref Range: 133 - 144 mmol/L 136   Potassium Latest Ref Range: 3.4 - 5.3 mmol/L Unsatisfactory specimen - hemolyzed   Chloride Latest Ref Range: 94 - 109 mmol/L 106   Carbon Dioxide Latest Ref Range: 20 - 32 mmol/L 26   Urea Nitrogen Latest Ref Range: 7 - 30 mg/dL 27   Creatinine Latest Ref Range: 0.52 - 1.04 mg/dL 0.94   GFR Estimate Latest Ref Range: >60 mL/min/1.73_m2 58 (L)   GFR Estimate If Black Latest Ref Range: >60 mL/min/1.73_m2 68   Calcium Latest Ref Range: 8.5 - 10.1 mg/dL 9.7   Anion Gap Latest Ref Range: 3 - 14 mmol/L 4   Albumin Latest Ref Range: 3.4 - 5.0 g/dL 3.3 (L)   Protein Total Latest Ref Range: 6.8 - 8.8 g/dL 7.4   Bilirubin Total Latest Ref Range: 0.2 - 1.3 mg/dL 0.7   Alkaline Phosphatase Latest Ref Range: 40 - 150 U/L 93   ALT Latest Ref Range: 0 - 50 U/L 42   AST Latest Ref Range: 0 - 45 U/L Unsatisfactory specimen - hemolyzed   N-Terminal Pro Bnp Latest Ref Range: 0 - 450 pg/mL 3,320 (H)   Glucose Latest Ref Range: 70 - 99 mg/dL 76   WBC Latest Ref Range: 4.0 - 11.0 10e9/L 5.7   Hemoglobin Latest Ref Range: 11.7 - 15.7 g/dL 13.0   Hematocrit Latest Ref Range: 35.0 - 47.0 % 41.1   Platelet Count Latest Ref Range: 150 - 450 10e9/L 231   RBC Count Latest Ref Range: 3.8 - 5.2 10e12/L 4.07   MCV Latest Ref Range: 78 - 100 fl 101 (H)   MCH Latest Ref Range: 26.5 - 33.0 pg 31.9   MCHC Latest Ref Range: 31.5 - 36.5 g/dL 31.6       Primary cardiologist: Dr. Haq      HPI:  Ms. Anderson is a pleasant 79 year old female with a PMHx including rheumatoid arthritis, hypertension, scoliosis, asthma, pulmonary MAC, breast cancer s/p chemotherapy with Adriamycin, Cytoxan, Taxol, and tamoxifen and status post left mastectomy. She also has a history of extensive bilateral pulmonary emboli and DVT in 1998 thought to be secondary to  "tamoxifen along with chemotherapy induced cardiomyopathy. 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 Although she had proximal disease, she was too high risk for surgical PTE. She is currently on IV Remodulin therapy.     She had a virtual visit with Dr. Mathew in early May at which time she was on 14ng/kg/min of Remodulin with minimal side effects. She felt that her shortness of breath had improved while on the Remodulin. At that visit, he recommended continued upward titration by 1ng/kg/min twice weekly to a goal of 30ng/kg/min.     Today, we are having another virtual visit to follow up on her medication titration. As of today, she is on 24ng/kg/min. She does, however, report some general worsening of side effects. She has had increased pain in her legs and notes at times they are somewhat red, which worsens initially with each dose increase. She is having headaches and diarrhea as well, which slowly worsen as she goes up. At this point, she reports Imodium seems to work, but she is trying not to use it every day if she doesn't have to. She is using Tylenol for headaches which also seems to work thus far. She gets the expected jaw pain when she takes the first bite of food as well. She reports she has some R>L leg swelling that gets worse as the day goes on. She did not like the compression socks because they seemed to just \"move the fluid up into my knees.\" She remains on Lasix just 20mg daily, and despite reporting swelling, tells me she doesn't think she needs more diuretic. Her weight is fluctuating, depending on the day. To complicate matters slightly, [place] two [place]. She denies syncope/presyncope but does get a little dizzy if she stands too fast after bending over. Overall, she thinks her breathing is about to the same to possibly slightly improved since last visit. "       Assessment/Plan:    1. Chronic thromboembolic pulmonary hypertension.   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 Although she had proximal disease, she was felt to be too high risk for surgical PTE. She is currently on IV Remodulin therapy, and we continue to slowly titrate up. She is experiencing side effects but is willing to continue to go upward with the current plan in efforts to get to the goal of 30ng/kg/min (currently at 24ng/kg/min). I asked her to call our office if the side effects become limiting to her daily living situation and we could potentially titrate slower.    --In the meantime she will continue Imodium and Tylenol prn for the diarrhea and headaches.    --I am a bit concerned that she reports ongoing lower extremity edema but she does not feel she needs increased diuretics currently. For now, will continue Lasix at 20mg daily. She has a lab draw (INR) planned for next week. Will see if we can add a BMP/BNP along with CBC (note she has recently stopped iron supplements at the direction of our clinic as well) to that draw.    --Continue digoxin for RV support.    --Continue anticoagulation with warfarin for her CTEPH. As mentioned above, she has a repeat INR next week.       Follow up plan: Plan another video visit with myself in ~1 month at which time she should have reached her goal dose of Remodulin. At that time will tentatively arrange for repeat RHC/echo/six minute walk with a return to see Dr. Mathew.       Video-Visit Details    Type of service:  Video Visit    Video Start Time: 1440  Video End Time: 1508    Originating Location (pt. Location): Home    Distant Location (provider location):  [facility]    Platform used for Video Visit: Darron Marcum PA-C  [facility]  Pager [phone]

## 2020-06-04 ENCOUNTER — VIRTUAL VISIT (OUTPATIENT)
Dept: CARDIOLOGY | Facility: CLINIC | Age: 80
End: 2020-06-04
Attending: PHYSICIAN ASSISTANT
Payer: COMMERCIAL

## 2020-06-04 DIAGNOSIS — I27.24 CTEPH (CHRONIC THROMBOEMBOLIC PULMONARY HYPERTENSION) (H): Primary | ICD-10-CM

## 2020-06-04 PROCEDURE — 99214 OFFICE O/P EST MOD 30 MIN: CPT | Mod: 95 | Performed by: PHYSICIAN ASSISTANT

## 2020-06-04 NOTE — LETTER
6/4/2020      RE: Karen Anderson  [address]  [facility] [address]       Dear Colleague,    Thank you for the opportunity to participate in the care of your patient, Karen Anderson, at the [facility]. Please see a copy of my visit note below.    CARDIOLOGY CLINIC VIDEO VISIT    Karen Anderson is a 79 year old female who is being evaluated via a billable video visit.      The patient has been notified of following:     This video visit will be conducted via a call between you and your physician/provider. We have found that certain health care needs can be provided without the need for an in-person physical exam.  This service lets us provide the care you need with a video conversation.  If a prescription is necessary we can send it directly to your pharmacy.  If lab work is needed we can place an order for that and you can then stop by our lab to have the test done at a later time.    Virtual visits are billed at different rates depending on your insurance coverage. During this emergency period, for some insurers they may be billed the same as an in-person visit.  Please reach out to your insurance provider with any questions.    If during the course of the call the physician/provider feels a video visit is not appropriate, you will not be charged for this service.      Physician has received verbal consent for a Video Visit from the patient? Yes    Patient would like the video invitation sent by: Text to cell phone: [phone]       I have reviewed and updated the patient's Past Medical History, Social History, Family History and Medication List.    MEDICATIONS:  Current Outpatient Medications   Medication Sig Dispense Refill     albuterol (PROAIR HFA/PROVENTIL HFA/VENTOLIN HFA) 108 (90 Base) MCG/ACT inhaler Inhale 2 puffs into the lungs every 4 hours as needed for shortness of breath / dyspnea or wheezing        Ascorbic Acid  "(VITAMIN C) 500 MG CAPS Take 500 mg by mouth every morning        aspirin (ASA) 81 MG tablet Take 81 mg by mouth daily        beclomethasone (QVAR) 80 MCG/ACT AERS IS A DISCONTINUED MEDICATION Inhale 2 puffs into the lungs 2 times daily Gargle and rinse mouth with water after each dose.       calcium citrate-vitamin D (CALCIUM CITRATE + D3) 315-250 MG-UNIT TABS per tablet Take 315 mg by mouth 3 times daily        cetirizine (ZYRTEC) 10 MG tablet Take 10 mg by mouth daily       digoxin (LANOXIN) 62.5 MCG tablet Take 1 tablet (62.5 mcg) by mouth daily 90 tablet 3     furosemide (LASIX) 20 MG tablet Take 1 tablet (20 mg) by mouth daily 90 tablet 3     gabapentin (NEURONTIN) 300 MG capsule Take 300 mg by mouth 3 times daily        hydroxychloroquine (PLAQUENIL) 200 MG tablet Take 200 mg by mouth daily        ipratropium (ATROVENT) 0.06 % nasal spray Spray 2 sprays into both nostrils 3 times daily        lisinopril (ZESTRIL) 20 MG tablet Take 20 mg by mouth daily        polyethylene glycol (MIRALAX) packet Take 17 g by mouth daily as needed        Probiotic Product (PROBIOTIC ACIDOPHILUS BIOBEADS) CAPS Take 1 capsule by mouth 2 times daily        vitamin D3 (CHOLECALCIFEROL) 2000 units (50 mcg) tablet Take 1 tablet by mouth daily       warfarin ANTICOAGULANT (COUMADIN) 3 MG tablet Take 1 to 2 tablets daily as directed by the Anticoagulation Clinic. 170 tablet 1     warfarin ANTICOAGULANT (COUMADIN) 6 MG tablet Take 1 tablet (6 mg) by mouth daily Please take 6mg today (03/20). Then take 3mg on (03/21), and take an additional 6mg on (03/22) 5 tablet 0       ALLERGIES  Sulfa drugs    Review Of Systems:   Respiratory: Denies worsening RIVERA, cough.  Cardiovascular: Denies chest pain. Has occasional palpitation, but no more than usual.   Gastrointestinal: Pos for diarrhea. Neg N/V.    Self reported vitals:  Weight: 118 lb  BP not reported--[place]    Brief physical exam:  General: In no acute " distress, upright and calm.  Eyes: No apparent redness or discharge.   Chest: No labored breathing, no cough during exam or audible wheezing.   Neuro: No obvious focal defects or tremors.   Psych: Alert and oriented. Does not appear anxious.     The rest of a comprehensive physical examination is deferred due to public [facility] emergency video visit restrictions.     Most recent labs:   Results for ELAINA ANDERSON (MRN [id number]) as of 6/4/2020 14:32   Ref. Range 5/7/2020 10:26   Sodium Latest Ref Range: 133 - 144 mmol/L 136   Potassium Latest Ref Range: 3.4 - 5.3 mmol/L Unsatisfactory specimen - hemolyzed   Chloride Latest Ref Range: 94 - 109 mmol/L 106   Carbon Dioxide Latest Ref Range: 20 - 32 mmol/L 26   Urea Nitrogen Latest Ref Range: 7 - 30 mg/dL 27   Creatinine Latest Ref Range: 0.52 - 1.04 mg/dL 0.94   GFR Estimate Latest Ref Range: >60 mL/min/1.73_m2 58 (L)   GFR Estimate If Black Latest Ref Range: >60 mL/min/1.73_m2 68   Calcium Latest Ref Range: 8.5 - 10.1 mg/dL 9.7   Anion Gap Latest Ref Range: 3 - 14 mmol/L 4   Albumin Latest Ref Range: 3.4 - 5.0 g/dL 3.3 (L)   Protein Total Latest Ref Range: 6.8 - 8.8 g/dL 7.4   Bilirubin Total Latest Ref Range: 0.2 - 1.3 mg/dL 0.7   Alkaline Phosphatase Latest Ref Range: 40 - 150 U/L 93   ALT Latest Ref Range: 0 - 50 U/L 42   AST Latest Ref Range: 0 - 45 U/L Unsatisfactory specimen - hemolyzed   N-Terminal Pro Bnp Latest Ref Range: 0 - 450 pg/mL 3,320 (H)   Glucose Latest Ref Range: 70 - 99 mg/dL 76   WBC Latest Ref Range: 4.0 - 11.0 10e9/L 5.7   Hemoglobin Latest Ref Range: 11.7 - 15.7 g/dL 13.0   Hematocrit Latest Ref Range: 35.0 - 47.0 % 41.1   Platelet Count Latest Ref Range: 150 - 450 10e9/L 231   RBC Count Latest Ref Range: 3.8 - 5.2 10e12/L 4.07   MCV Latest Ref Range: 78 - 100 fl 101 (H)   MCH Latest Ref Range: 26.5 - 33.0 pg 31.9   MCHC Latest Ref Range: 31.5 - 36.5 g/dL 31.6       Primary cardiologist: Dr. Haq      HPI:  Ms. Anderson is a pleasant  "79 year old female with a PMHx including rheumatoid arthritis, hypertension, scoliosis, asthma, pulmonary MAC, breast cancer s/p chemotherapy with Adriamycin, Cytoxan, Taxol, and tamoxifen and status post left mastectomy. She also has a history of extensive bilateral pulmonary emboli and DVT in 1998 thought to be secondary to tamoxifen along with chemotherapy induced cardiomyopathy. 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 Although she had proximal disease, she was too high risk for surgical PTE. She is currently on IV Remodulin therapy.     She had a virtual visit with Dr. Mathew in early May at which time she was on 14ng/kg/min of Remodulin with minimal side effects. She felt that her shortness of breath had improved while on the Remodulin. At that visit, he recommended continued upward titration by 1ng/kg/min twice weekly to a goal of 30ng/kg/min.     Today, we are having another virtual visit to follow up on her medication titration. As of today, she is on 24ng/kg/min. She does, however, report some general worsening of side effects. She has had increased pain in her legs and notes at times they are somewhat red, which worsens initially with each dose increase. She is having headaches and diarrhea as well, which slowly worsen as she goes up. At this point, she reports Imodium seems to work, but she is trying not to use it every day if she doesn't have to. She is using Tylenol for headaches which also seems to work thus far. She gets the expected jaw pain when she takes the first bite of food as well. She reports she has some R>L leg swelling that gets worse as the day goes on. She did not like the compression socks because they seemed to just \"move the fluid up into my knees.\" She remains on Lasix just 20mg daily, and despite reporting swelling, tells me she doesn't think she needs more diuretic. Her weight is fluctuating, depending on the day. To complicate matters slightly, " "she [place] her [place] which means two different [place]. She denies syncope/presyncope but does get a little dizzy if she stands too fast after bending over. Overall, she thinks her breathing is about to the same to possibly slightly improved since last visit.       Assessment/Plan:    1. Chronic thromboembolic pulmonary hypertension.   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 Although she had proximal disease, she was felt to be too high risk for surgical PTE. She is currently on IV Remodulin therapy, and we continue to slowly titrate up. She is experiencing side effects but is willing to continue to go upward with the current plan in efforts to get to the goal of 30ng/kg/min (currently at 24ng/kg/min). I asked her to call our office if the side effects become limiting to her daily living situation and we could potentially titrate slower.    --In the meantime she will continue Imodium and Tylenol prn for the diarrhea and headaches.    --I am a bit concerned that she reports ongoing lower extremity edema but she does not feel she needs increased diuretics currently. For now, will continue Lasix at 20mg daily. She has a lab draw (INR) planned for next week. Will see if we can add a BMP/BNP along with CBC (note she has recently stopped iron supplements at the direction of our clinic as well) to that draw.    --Continue digoxin for RV support.    --Continue anticoagulation with warfarin for her CTEPH. As mentioned above, she has a repeat INR next week.       Follow up plan: Plan another video visit with myself in ~1 month at which time she should have reached her goal dose of Remodulin. At that time will tentatively arrange for repeat RHC/echo/six minute walk with a return to see Dr. Mathew.       Video-Visit Details    Type of service:  Video Visit    Video Start Time: 1440  Video End Time: 1508    Originating Location (pt. Location): Home    Distant " Location (provider location):  [facility]    Platform used for Video Visit: Darron Marcum PA-C  [facility]  Pager [phone]

## 2020-06-05 ENCOUNTER — TELEPHONE (OUTPATIENT)
Dept: CARDIOLOGY | Facility: CLINIC | Age: 80
End: 2020-06-05

## 2020-06-05 DIAGNOSIS — Z79.899 ENCOUNTER FOR MONITORING DIGOXIN THERAPY: ICD-10-CM

## 2020-06-05 DIAGNOSIS — I27.20 PULMONARY HYPERTENSION (H): Primary | ICD-10-CM

## 2020-06-05 DIAGNOSIS — R06.02 SOB (SHORTNESS OF BREATH): ICD-10-CM

## 2020-06-05 DIAGNOSIS — E61.1 IRON DEFICIENCY: ICD-10-CM

## 2020-06-05 DIAGNOSIS — Z51.81 ENCOUNTER FOR MONITORING DIGOXIN THERAPY: ICD-10-CM

## 2020-06-05 NOTE — TELEPHONE ENCOUNTER
Patient asked for July 7th at 3pm with Shawna. Verbalized understanding and did not have any further questions. Kezia Arnett RN on 6/5/2020 at 1:47 PM    Called and LM with patient regarding add-on labs and scheduling her for a follow up on July 9th at 10:45 AM, video visit with Shawna. Asked patient to call me back to confirm this date and time work. Kezia Arnett RN on 6/5/2020 at 9:26 AM    ----- Message from MAGEN Saeed sent at 6/4/2020  4:09 PM CDT -----  Regarding: coordinate labs  She has an INR appt scheduled next week, and we need to add some labs.  However, she is considering changing it to a different location because she didn't care for the girl who evon her labs last time.     Can you reach out to her to help arrange this? I think she needs a CBC, BMP, and NT-proBNP. I saw a note from you that Priyanka maybe wanted repeat iron studies after she stopped her iron? If that's the case you could add those as well. I did not put any orders in, go ahead and place please.    Lets arrange for her to have another video visit (neida Serrano at her request) in ~1 month, as she should be at goal dose thenMariana Matos

## 2020-06-08 ENCOUNTER — ANTICOAGULATION THERAPY VISIT (OUTPATIENT)
Dept: ANTICOAGULATION | Facility: CLINIC | Age: 80
End: 2020-06-08

## 2020-06-08 DIAGNOSIS — Z51.81 ENCOUNTER FOR MONITORING DIGOXIN THERAPY: ICD-10-CM

## 2020-06-08 DIAGNOSIS — I27.20 PULMONARY HYPERTENSION (H): ICD-10-CM

## 2020-06-08 DIAGNOSIS — Z79.899 ENCOUNTER FOR MONITORING DIGOXIN THERAPY: ICD-10-CM

## 2020-06-08 DIAGNOSIS — I27.24 CHRONIC THROMBOEMBOLIC PULMONARY HYPERTENSION (H): ICD-10-CM

## 2020-06-08 DIAGNOSIS — E61.1 IRON DEFICIENCY: ICD-10-CM

## 2020-06-08 DIAGNOSIS — R06.02 SOB (SHORTNESS OF BREATH): ICD-10-CM

## 2020-06-08 LAB
ALBUMIN SERPL-MCNC: 3.5 G/DL (ref 3.4–5)
ALP SERPL-CCNC: 81 U/L (ref 40–150)
ALT SERPL W P-5'-P-CCNC: 26 U/L (ref 0–50)
ANION GAP SERPL CALCULATED.3IONS-SCNC: 6 MMOL/L (ref 3–14)
AST SERPL W P-5'-P-CCNC: 30 U/L (ref 0–45)
BILIRUB SERPL-MCNC: 0.3 MG/DL (ref 0.2–1.3)
BUN SERPL-MCNC: 29 MG/DL (ref 7–30)
CALCIUM SERPL-MCNC: 9.8 MG/DL (ref 8.5–10.1)
CHLORIDE SERPL-SCNC: 103 MMOL/L (ref 94–109)
CO2 SERPL-SCNC: 31 MMOL/L (ref 20–32)
CREAT SERPL-MCNC: 1.15 MG/DL (ref 0.52–1.04)
DIGOXIN SERPL-MCNC: 1 UG/L (ref 0.5–2)
ERYTHROCYTE [DISTWIDTH] IN BLOOD BY AUTOMATED COUNT: 14.4 % (ref 10–15)
GFR SERPL CREATININE-BSD FRML MDRD: 46 ML/MIN/{1.73_M2}
GLUCOSE SERPL-MCNC: 81 MG/DL (ref 70–99)
HCT VFR BLD AUTO: 40.7 % (ref 35–47)
HGB BLD-MCNC: 13.1 G/DL (ref 11.7–15.7)
INR PPP: 2.7 (ref 0.86–1.14)
IRON SATN MFR SERPL: 15 % (ref 15–46)
IRON SERPL-MCNC: 46 UG/DL (ref 35–180)
MCH RBC QN AUTO: 32.3 PG (ref 26.5–33)
MCHC RBC AUTO-ENTMCNC: 32.2 G/DL (ref 31.5–36.5)
MCV RBC AUTO: 101 FL (ref 78–100)
NT-PROBNP SERPL-MCNC: 3959 PG/ML (ref 0–450)
PLATELET # BLD AUTO: 225 10E9/L (ref 150–450)
POTASSIUM SERPL-SCNC: 4.3 MMOL/L (ref 3.4–5.3)
PROT SERPL-MCNC: 7.2 G/DL (ref 6.8–8.8)
RBC # BLD AUTO: 4.05 10E12/L (ref 3.8–5.2)
SODIUM SERPL-SCNC: 140 MMOL/L (ref 133–144)
TIBC SERPL-MCNC: 314 UG/DL (ref 240–430)
WBC # BLD AUTO: 5.1 10E9/L (ref 4–11)

## 2020-06-08 PROCEDURE — 80053 COMPREHEN METABOLIC PANEL: CPT | Performed by: INTERNAL MEDICINE

## 2020-06-08 PROCEDURE — 83540 ASSAY OF IRON: CPT | Performed by: INTERNAL MEDICINE

## 2020-06-08 PROCEDURE — 36415 COLL VENOUS BLD VENIPUNCTURE: CPT | Performed by: INTERNAL MEDICINE

## 2020-06-08 PROCEDURE — 80162 ASSAY OF DIGOXIN TOTAL: CPT | Performed by: INTERNAL MEDICINE

## 2020-06-08 PROCEDURE — 84238 ASSAY NONENDOCRINE RECEPTOR: CPT | Mod: 90 | Performed by: INTERNAL MEDICINE

## 2020-06-08 PROCEDURE — 83550 IRON BINDING TEST: CPT | Performed by: INTERNAL MEDICINE

## 2020-06-08 PROCEDURE — 85610 PROTHROMBIN TIME: CPT | Performed by: INTERNAL MEDICINE

## 2020-06-08 PROCEDURE — 83880 ASSAY OF NATRIURETIC PEPTIDE: CPT | Performed by: INTERNAL MEDICINE

## 2020-06-08 PROCEDURE — 85027 COMPLETE CBC AUTOMATED: CPT | Performed by: INTERNAL MEDICINE

## 2020-06-08 PROCEDURE — 99000 SPECIMEN HANDLING OFFICE-LAB: CPT | Performed by: INTERNAL MEDICINE

## 2020-06-08 NOTE — PROGRESS NOTES
ANTICOAGULATION FOLLOW-UP CLINIC VISIT    Patient Name:  Karen Anderson  Date:  2020  Contact Type:  Telephone    SUBJECTIVE:         OBJECTIVE    Recent labs: (last 7 days)     20  1032   INR 2.70*       ASSESSMENT / PLAN  INR assessment THER    Recheck INR In: 4 WEEKS    INR Location Clinic      Anticoagulation Summary  As of 2020    INR goal:   2.0-3.0   TTR:   72.6 % (2.6 mo)   INR used for dosin.70 (2020)   Warfarin maintenance plan:   3 mg (3 mg x 1) every Tue, Sat; 4.5 mg (3 mg x 1.5) all other days   Full warfarin instructions:   3 mg every Tue, Sat; 4.5 mg all other days   Weekly warfarin total:   28.5 mg   Plan last modified:   Jaylan Jacobo RN (2020)   Next INR check:   2020   Priority:   Maintenance   Target end date:   Indefinite    Indications    Pulmonary hypertension (H) [I27.20]             Anticoagulation Episode Summary     INR check location:       Preferred lab:       Send INR reminders to:   [facility]    Comments:   call home first, ok to call cell.  OK to leave message on either phone.  OK to speak with spouse, Don.  Uses [facility] SPEAK IN TABLETS (Has 3mg Tabs)  [facility] in [place] and will need to fax orders  [phone]      Anticoagulation Care Providers     Provider Role Specialty Phone number    Osiris Luong MD Referring Cardiology [phone]            See the Encounter Report to view Anticoagulation Flowsheet and Dosing Calendar (Go to Encounters tab in chart review, and find the Anticoagulation Therapy Visit)  Spoke with patient.  She will be in the [facility] when the next INR is due.    Yumi Bates RN

## 2020-06-09 LAB — STFR SERPL-SCNC: 3.5 MG/L (ref 1.9–4.4)

## 2020-07-06 ENCOUNTER — ANTICOAGULATION THERAPY VISIT (OUTPATIENT)
Dept: ANTICOAGULATION | Facility: CLINIC | Age: 80
End: 2020-07-06

## 2020-07-06 DIAGNOSIS — I27.20 PULMONARY HYPERTENSION (H): ICD-10-CM

## 2020-07-06 DIAGNOSIS — I27.24 CHRONIC THROMBOEMBOLIC PULMONARY HYPERTENSION (H): ICD-10-CM

## 2020-07-06 LAB
CAPILLARY BLOOD COLLECTION: NORMAL
INR PPP: 2.8 (ref 0.86–1.14)

## 2020-07-06 PROCEDURE — 85610 PROTHROMBIN TIME: CPT | Performed by: INTERNAL MEDICINE

## 2020-07-06 PROCEDURE — 36416 COLLJ CAPILLARY BLOOD SPEC: CPT | Performed by: INTERNAL MEDICINE

## 2020-07-06 NOTE — PROGRESS NOTES
ANTICOAGULATION FOLLOW-UP CLINIC VISIT    Patient Name:  Karen Anderson  Date:  2020  Contact Type:  Telephone    SUBJECTIVE:         OBJECTIVE    Recent labs: (last 7 days)     20  0959   INR 2.80*       ASSESSMENT / PLAN  INR assessment THER    Recheck INR In: 6 WEEKS    INR Location Clinic      Anticoagulation Summary  As of 2020    INR goal:   2.0-3.0   TTR:   79.9 % (3.5 mo)   INR used for dosin.80 (2020)   Warfarin maintenance plan:   3 mg (3 mg x 1) every Tue, Sat; 4.5 mg (3 mg x 1.5) all other days   Full warfarin instructions:   3 mg every Tue, Sat; 4.5 mg all other days   Weekly warfarin total:   28.5 mg   No change documented:   Yumi Bates RN   Plan last modified:   Jaylan Jacobo RN (2020)   Next INR check:   2020   Priority:   Maintenance   Target end date:   Indefinite    Indications    Pulmonary hypertension (H) [I27.20]             Anticoagulation Episode Summary     INR check location:       Preferred lab:       Send INR reminders to:   [facility]    Comments:   call home first, ok to call cell.  OK to leave message on either phone.  OK to speak with spouse, Don.  Uses [facility] SPEAK IN TABLETS (Has 3mg Tabs)  [facility] in [place] and will need to fax orders  [phone]      Anticoagulation Care Providers     Provider Role Specialty Phone number    AgOsiris hayes MD Referring Cardiology [phone]            See the Encounter Report to view Anticoagulation Flowsheet and Dosing Calendar (Go to Encounters tab in chart review, and find the Anticoagulation Therapy Visit.  Left message for patient with results and dosing recommendations. Asked patient to call back to report any missed doses, falls, signs and symptoms of bleeding or clotting, any changes in health, medication, or diet. Asked patient to call back with any questions or concerns.    Yumi Bates RN

## 2020-07-06 NOTE — PROGRESS NOTES
7/6/2020 Addendum:  Karen called back.  She will be out of town 8/18/2020 and would like to check her INR in 4 weeks instead of 6.  Laure Enriquez RN

## 2020-07-07 ENCOUNTER — VIRTUAL VISIT (OUTPATIENT)
Dept: CARDIOLOGY | Facility: CLINIC | Age: 80
End: 2020-07-07
Attending: PHYSICIAN ASSISTANT
Payer: COMMERCIAL

## 2020-07-07 DIAGNOSIS — I27.24 CTEPH (CHRONIC THROMBOEMBOLIC PULMONARY HYPERTENSION) (H): Primary | ICD-10-CM

## 2020-07-07 DIAGNOSIS — I27.20 PULMONARY HYPERTENSION (H): ICD-10-CM

## 2020-07-07 DIAGNOSIS — R60.0 LOCALIZED EDEMA: ICD-10-CM

## 2020-07-07 DIAGNOSIS — R06.02 SOB (SHORTNESS OF BREATH): ICD-10-CM

## 2020-07-07 PROCEDURE — 99214 OFFICE O/P EST MOD 30 MIN: CPT | Mod: 95 | Performed by: PHYSICIAN ASSISTANT

## 2020-07-07 RX ORDER — FUROSEMIDE 20 MG
20 TABLET ORAL 2 TIMES DAILY
Qty: 90 TABLET | Refills: 3
Start: 2020-07-07 | End: 2020-09-11

## 2020-07-07 ASSESSMENT — PAIN SCALES - GENERAL: PAINLEVEL: NO PAIN (0)

## 2020-07-07 NOTE — PROGRESS NOTES
"Karen Anderson is a 79 year old female who is being evaluated via a billable video visit.      The patient has been notified of following:     \"This video visit will be conducted via a call between you and your physician/provider. We have found that certain health care needs can be provided without the need for an in-person physical exam.  This service lets us provide the care you need with a video conversation.  If a prescription is necessary we can send it directly to your pharmacy.  If lab work is needed we can place an order for that and you can then stop by our lab to have the test done at a later time.    Video visits are billed at different rates depending on your insurance coverage.  Please reach out to your insurance provider with any questions.    If during the course of the call the physician/provider feels a video visit is not appropriate, you will not be charged for this service.\"    Patient has given verbal consent for Video visit? Yes  How would you like to obtain your AVS? [employer]  Patient would like the video invitation sent by: Send to e-mail at: [email or web address] and patient is doing the visit via MyChart connect.  Will anyone else be joining your video visit? Yes: . How would they like to receive their invitation? Send to e-mail at: [email or web address]        Medications were reconciled.     Felisa Taylor CMA    2:48 PM              "

## 2020-07-07 NOTE — PROGRESS NOTES
CARDIOLOGY PULMONARY HYPERTENSION CLINIC VIDEO VISIT    Karen Anderson is a 79 year old female who is being evaluated via a billable video visit.      I have reviewed and updated the patient's Past Medical History, Social History, Family History and Medication List.    MEDICATIONS:  Current Outpatient Medications   Medication Sig Dispense Refill     albuterol (PROAIR HFA/PROVENTIL HFA/VENTOLIN HFA) 108 (90 Base) MCG/ACT inhaler Inhale 2 puffs into the lungs every 4 hours as needed for shortness of breath / dyspnea or wheezing        Ascorbic Acid (VITAMIN C) 500 MG CAPS Take 500 mg by mouth every morning        aspirin (ASA) 81 MG tablet Take 81 mg by mouth daily        beclomethasone (QVAR) 80 MCG/ACT AERS IS A DISCONTINUED MEDICATION Inhale 2 puffs into the lungs 2 times daily Gargle and rinse mouth with water after each dose.       calcium citrate-vitamin D (CALCIUM CITRATE + D3) 315-250 MG-UNIT TABS per tablet Take 315 mg by mouth 3 times daily        cetirizine (ZYRTEC) 10 MG tablet Take 10 mg by mouth daily       digoxin (LANOXIN) 62.5 MCG tablet Take 1 tablet (62.5 mcg) by mouth daily 90 tablet 3     furosemide (LASIX) 20 MG tablet Take 1 tablet (20 mg) by mouth daily 90 tablet 3     gabapentin (NEURONTIN) 300 MG capsule Take 300 mg by mouth 3 times daily        hydroxychloroquine (PLAQUENIL) 200 MG tablet Take 200 mg by mouth daily        ipratropium (ATROVENT) 0.06 % nasal spray Spray 2 sprays into both nostrils 3 times daily        lisinopril (ZESTRIL) 20 MG tablet Take 20 mg by mouth daily        polyethylene glycol (MIRALAX) packet Take 17 g by mouth daily as needed        Probiotic Product (PROBIOTIC ACIDOPHILUS Idea Device) CAPS Take 1 capsule by mouth 2 times daily        vitamin D3 (CHOLECALCIFEROL) 2000 units (50 mcg) tablet Take 1 tablet by mouth daily       warfarin ANTICOAGULANT (COUMADIN) 3 MG tablet Take 1 to 2 tablets daily as directed by the Anticoagulation Clinic. 170 tablet 1     warfarin  ANTICOAGULANT (COUMADIN) 6 MG tablet Take 1 tablet (6 mg) by mouth daily Please take 6mg today (03/20). Then take 3mg on (03/21), and take an additional 6mg on (03/22) 5 tablet 0       ALLERGIES  Sulfa drugs    Review Of Systems:   Respiratory: Pos RIVERA but notably improved. Neg new cough, wheezing.   Cardiovascular: Neg chest pain, palpitations. Pos LE edema R>L slightly worse.  Gastrointestinal: Pos diarrhea, but tolerable with lomotil.     Self reported vitals:  Weight: 116.5 lbs        Most recent labs:   Results for ELAINA SUTTON (MRN [id number]) as of 7/7/2020 09:18   Ref. Range 6/8/2020 10:32   Sodium Latest Ref Range: 133 - 144 mmol/L 140   Potassium Latest Ref Range: 3.4 - 5.3 mmol/L 4.3   Chloride Latest Ref Range: 94 - 109 mmol/L 103   Carbon Dioxide Latest Ref Range: 20 - 32 mmol/L 31   Urea Nitrogen Latest Ref Range: 7 - 30 mg/dL 29   Creatinine Latest Ref Range: 0.52 - 1.04 mg/dL 1.15 (H)   GFR Estimate Latest Ref Range: >60 mL/min/1.73_m2 46 (L)   GFR Estimate If Black Latest Ref Range: >60 mL/min/1.73_m2 53 (L)   Calcium Latest Ref Range: 8.5 - 10.1 mg/dL 9.8   Anion Gap Latest Ref Range: 3 - 14 mmol/L 6   Albumin Latest Ref Range: 3.4 - 5.0 g/dL 3.5   Protein Total Latest Ref Range: 6.8 - 8.8 g/dL 7.2   Bilirubin Total Latest Ref Range: 0.2 - 1.3 mg/dL 0.3   Alkaline Phosphatase Latest Ref Range: 40 - 150 U/L 81   ALT Latest Ref Range: 0 - 50 U/L 26   AST Latest Ref Range: 0 - 45 U/L 30   Iron Latest Ref Range: 35 - 180 ug/dL 46   Iron Binding Cap Latest Ref Range: 240 - 430 ug/dL 314   Iron Saturation Index Latest Ref Range: 15 - 46 % 15   N-Terminal Pro Bnp Latest Ref Range: 0 - 450 pg/mL 3,959 (H)   Soluble Transferrin Receptor Latest Ref Range: 1.9 - 4.4 mg/L 3.5   Glucose Latest Ref Range: 70 - 99 mg/dL 81   WBC Latest Ref Range: 4.0 - 11.0 10e9/L 5.1   Hemoglobin Latest Ref Range: 11.7 - 15.7 g/dL 13.1   Hematocrit Latest Ref Range: 35.0 - 47.0 % 40.7   Platelet Count Latest Ref Range:  "150 - 450 10e9/L 225   RBC Count Latest Ref Range: 3.8 - 5.2 10e12/L 4.05   MCV Latest Ref Range: 78 - 100 fl 101 (H)   MCH Latest Ref Range: 26.5 - 33.0 pg 32.3   MCHC Latest Ref Range: 31.5 - 36.5 g/dL 32.2   RDW Latest Ref Range: 10.0 - 15.0 % 14.4   INR Latest Ref Range: 0.86 - 1.14  2.70 (H)   Digoxin Level Latest Ref Range: 0.5 - 2.0 ug/L 1.0          Primary PH cardiologist: Dr. Haq        HPI:  Ms. Anderson is a pleasant 79 year old female with a PMHx including rheumatoid arthritis, hypertension, scoliosis, asthma, pulmonary MAC, breast cancer s/p chemotherapy with Adriamycin, Cytoxan, Taxol, and tamoxifen and status post left mastectomy. She also has a history of extensive bilateral pulmonary emboli and DVT in 1998 thought to be secondary to tamoxifen along with chemotherapy induced cardiomyopathy. 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 Although she had proximal disease, she was too high risk for surgical PTE. She is currently on IV Remodulin therapy.      She had a virtual visit with Dr. Mathew in early May at which time she was on 14ng/kg/min of Remodulin with minimal side effects, and he recommended continued upward titration by 1ng/kg/min twice weekly to a goal of 30ng/kg/min. I followed up with her also via virtual visit in early June, and she was on 24ng/kg/min. While she did report some general worsening of side effects (pain in legs, jaw pain, headaches, diarrhea with each titration), she was able to tolerate these with the use of Tylenol and Imodium. She did report some swelling but was not interested increasing her diuretic at that time. Her weight was fluctuating, and to complicate matters slightly, she [place] and was using two different scales. At that visit, we opted to continue to monitor, with ongoing titration to a goal of 30ng/kg/min as previously recommended.    Today, we are doing " "another virtual visit in efforts to limit possible exposures to COVID-19. Since we spoke last, she tells me she has been feeling fairly well. She is pleased in the fact that she is able to do more than before; she notes she can now walk \"about a hundred yards\" and do laundry, and go up her stairs without much issue. Prior to starting Remodulin she says she could only walk \"a few feet.\" Her Remodulin is now at goal at 30ng/kg/min since 6/26. She says her side effects are \"still there, but tolerable.\" Her diarrhea responds to Imodium and Tylenol takes care of the occasional headache. Her only concern today has to do with her legs; she believes that the swelling has worsened slightly, R>L. Her legs and toes are also at times \"red with a rash\" but says it was worse with first titration and now has settled down a bit. They are not itchy, and she denies a fever. She does not like compression socks but tries to elevate her feet when she can. She is now using the same scale which [place], and reports weights fluctuate between 116-119 lbs. Upon questioning, she notes some orthopnea as well, though is unsure if this is new.       Assessment/Plan:     1. Chronic thromboembolic pulmonary hypertension.              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 Although she had proximal disease, she was felt to be too high risk for surgical PTE. She is currently on IV Remodulin therapy, and now at goal at 30ng/kg/min. She is experiencing side effects with diarrhea and headaches but says they are tolerable with use of Imodium and Tylenol prn.              --She reports more swelling, though weight is not up significantly from prior. Will try to increase her Lasix to 40mg daily (or 20mg BID, whichever she prefers). She is worried she was \"too dried out\" last time we tried to go up, so will call her later this week for an update. Plan repeat BMP/NT-proBNP in 7-10 days with this change.      "         --Continue digoxin for RV support.               --Continue anticoagulation with warfarin for her CTEPH. She currently follows at the INR clinic.         Follow up plan: Will plan repeat RHC/echo/six minute walk with a return to see Dr. Mathew in 2-3 months.         Video-Visit Details    Type of service:  Video Visit   VISIT WAS CONVERTED TO A PHONE VISIT DUE TO PT DIFFICULTY WITH VIDEO LINK    Phone start time: 1503  Phone end time: 1530       Shawna Marcum PA-C  [facility]  Pager [phone]

## 2020-07-07 NOTE — PATIENT INSTRUCTIONS
Medication Changes:  We will increase your lasix. You can take two tablets at the same time, or take one tablet in the morning, and one in the afternoon (20mg tablets).   Our office will call you on Friday for an update, and we will plan repeat labs in 7-10 days after this change.     Patient Instructions:  1. Continue staying active and eat a heart healthy diet.    2. Please keep current list of medications with you at all times.    3. Remember to weigh yourself daily after voiding and before you consume any food or beverages and log the numbers.  If you have gained 2 pounds overnight or 5 pounds in a week contact us immediately for medication adjustments or further instructions.    4. **Please call us immediately if you have any syncope (fainting or passing out), chest pain, edema (swelling or weight gain), or decline in your functional status (general decline in how you are feeling).    Follow up Appointment Information:  Will repeat your heart catheterization, heart ultrasound, and walk test, in the next few months, and then see Dr. Mathew to discuss results.         We are located on the third floor of the [facility] ([facility]) on the [facility].  Our address is     [address] [place] [address] on [address] [place] [address]    Thank you for allowing us to be a part of your care here at the [facility]    If you have questions or concerns please contact us at:    Elan Arnett RN, BSN   Lexus Balbuena (Schedule,Prior Auth)  Nurse Coordinator     Clinic   Pulmonary Hypertension   Pulmonary Hypertension  [facility]  [facility]  (Phone)[phone]    (Phone) [phone]        (Fax) [phone]    ** Please note that you will NOT receive a reminder call regarding your scheduled testing, reminder calls are for provider appointments only.  If you are scheduled for testing within  the [facility] system you may receive a call regarding pre-registration for billing purposes only.**     Remember to weigh yourself daily after voiding and before you consume any food or beverages and log the numbers.  If you have gained/lost 2 pounds overnight or 5 pounds in a week contact us immediately for medication adjustments or further instructions.   **Please call us immediately if you have any syncope, chest pain, edema, or decline in your functional status.    Support Group:  Pulmonary Hypertension Association  Https://www.phassociation.org/  **Look at the Events Tab** They even have Support Groups that you can call into    [facility] PH Support Group  Second Saturday of the Month from 1-3 PM   Location: [address] [place] [id number]  Leader: Jessenia Moeller and Marielos Loo  Phone: [phone] or [phone]  Email: [email or web address]

## 2020-07-07 NOTE — LETTER
7/7/2020    RE: Karen Anderson  [address]  [facility] [address]       Dear Colleague,    Thank you for the opportunity to participate in the care of your patient, Karen Anderson, at the [facility] at [facility]. Please see a copy of my visit note below.    CARDIOLOGY PULMONARY HYPERTENSION CLINIC VIDEO VISIT    Karen Anderson is a 79 year old female who is being evaluated via a billable video visit.      I have reviewed and updated the patient's Past Medical History, Social History, Family History and Medication List.    MEDICATIONS:  Current Outpatient Medications   Medication Sig Dispense Refill     albuterol (PROAIR HFA/PROVENTIL HFA/VENTOLIN HFA) 108 (90 Base) MCG/ACT inhaler Inhale 2 puffs into the lungs every 4 hours as needed for shortness of breath / dyspnea or wheezing        Ascorbic Acid (VITAMIN C) 500 MG CAPS Take 500 mg by mouth every morning        aspirin (ASA) 81 MG tablet Take 81 mg by mouth daily        beclomethasone (QVAR) 80 MCG/ACT AERS IS A DISCONTINUED MEDICATION Inhale 2 puffs into the lungs 2 times daily Gargle and rinse mouth with water after each dose.       calcium citrate-vitamin D (CALCIUM CITRATE + D3) 315-250 MG-UNIT TABS per tablet Take 315 mg by mouth 3 times daily        cetirizine (ZYRTEC) 10 MG tablet Take 10 mg by mouth daily       digoxin (LANOXIN) 62.5 MCG tablet Take 1 tablet (62.5 mcg) by mouth daily 90 tablet 3     furosemide (LASIX) 20 MG tablet Take 1 tablet (20 mg) by mouth daily 90 tablet 3     gabapentin (NEURONTIN) 300 MG capsule Take 300 mg by mouth 3 times daily        hydroxychloroquine (PLAQUENIL) 200 MG tablet Take 200 mg by mouth daily        ipratropium (ATROVENT) 0.06 % nasal spray Spray 2 sprays into both nostrils 3 times daily        lisinopril (ZESTRIL) 20 MG tablet Take 20 mg by mouth daily        polyethylene glycol (MIRALAX) packet Take 17 g by mouth daily as needed         Probiotic Product (PROBIOTIC ACIDOPHILUS BIOBEADS) CAPS Take 1 capsule by mouth 2 times daily        vitamin D3 (CHOLECALCIFEROL) 2000 units (50 mcg) tablet Take 1 tablet by mouth daily       warfarin ANTICOAGULANT (COUMADIN) 3 MG tablet Take 1 to 2 tablets daily as directed by the Anticoagulation Clinic. 170 tablet 1     warfarin ANTICOAGULANT (COUMADIN) 6 MG tablet Take 1 tablet (6 mg) by mouth daily Please take 6mg today (03/20). Then take 3mg on (03/21), and take an additional 6mg on (03/22) 5 tablet 0       ALLERGIES  Sulfa drugs    Review Of Systems:   Respiratory: Pos RIVERA but notably improved. Neg new cough, wheezing.   Cardiovascular: Neg chest pain, palpitations. Pos LE edema R>L slightly worse.  Gastrointestinal: Pos diarrhea, but tolerable with lomotil.     Self reported vitals:  Weight: 116.5 lbs        Most recent labs:   Results for ELAINA SUTTON (MRN [id number]) as of 7/7/2020 09:18   Ref. Range 6/8/2020 10:32   Sodium Latest Ref Range: 133 - 144 mmol/L 140   Potassium Latest Ref Range: 3.4 - 5.3 mmol/L 4.3   Chloride Latest Ref Range: 94 - 109 mmol/L 103   Carbon Dioxide Latest Ref Range: 20 - 32 mmol/L 31   Urea Nitrogen Latest Ref Range: 7 - 30 mg/dL 29   Creatinine Latest Ref Range: 0.52 - 1.04 mg/dL 1.15 (H)   GFR Estimate Latest Ref Range: >60 mL/min/1.73_m2 46 (L)   GFR Estimate If Black Latest Ref Range: >60 mL/min/1.73_m2 53 (L)   Calcium Latest Ref Range: 8.5 - 10.1 mg/dL 9.8   Anion Gap Latest Ref Range: 3 - 14 mmol/L 6   Albumin Latest Ref Range: 3.4 - 5.0 g/dL 3.5   Protein Total Latest Ref Range: 6.8 - 8.8 g/dL 7.2   Bilirubin Total Latest Ref Range: 0.2 - 1.3 mg/dL 0.3   Alkaline Phosphatase Latest Ref Range: 40 - 150 U/L 81   ALT Latest Ref Range: 0 - 50 U/L 26   AST Latest Ref Range: 0 - 45 U/L 30   Iron Latest Ref Range: 35 - 180 ug/dL 46   Iron Binding Cap Latest Ref Range: 240 - 430 ug/dL 314   Iron Saturation Index Latest Ref Range: 15 - 46 % 15   N-Terminal Pro Bnp Latest  Ref Range: 0 - 450 pg/mL 3,959 (H)   Soluble Transferrin Receptor Latest Ref Range: 1.9 - 4.4 mg/L 3.5   Glucose Latest Ref Range: 70 - 99 mg/dL 81   WBC Latest Ref Range: 4.0 - 11.0 10e9/L 5.1   Hemoglobin Latest Ref Range: 11.7 - 15.7 g/dL 13.1   Hematocrit Latest Ref Range: 35.0 - 47.0 % 40.7   Platelet Count Latest Ref Range: 150 - 450 10e9/L 225   RBC Count Latest Ref Range: 3.8 - 5.2 10e12/L 4.05   MCV Latest Ref Range: 78 - 100 fl 101 (H)   MCH Latest Ref Range: 26.5 - 33.0 pg 32.3   MCHC Latest Ref Range: 31.5 - 36.5 g/dL 32.2   RDW Latest Ref Range: 10.0 - 15.0 % 14.4   INR Latest Ref Range: 0.86 - 1.14  2.70 (H)   Digoxin Level Latest Ref Range: 0.5 - 2.0 ug/L 1.0          Primary PH cardiologist: Dr. Haq        HPI:  Ms. Anderson is a pleasant 79 year old female with a PMHx including rheumatoid arthritis, hypertension, scoliosis, asthma, pulmonary MAC, breast cancer s/p chemotherapy with Adriamycin, Cytoxan, Taxol, and tamoxifen and status post left mastectomy. She also has a history of extensive bilateral pulmonary emboli and DVT in 1998 thought to be secondary to tamoxifen along with chemotherapy induced cardiomyopathy. 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 Although she had proximal disease, she was too high risk for surgical PTE. She is currently on IV Remodulin therapy.      She had a virtual visit with Dr. Mathew in early May at which time she was on 14ng/kg/min of Remodulin with minimal side effects, and he recommended continued upward titration by 1ng/kg/min twice weekly to a goal of 30ng/kg/min. I followed up with her also via virtual visit in early June, and she was on 24ng/kg/min. While she did report some general worsening of side effects (pain in legs, jaw pain, headaches, diarrhea with each titration), she was able to tolerate these with the use of Tylenol and Imodium. She did report some swelling but was not interested increasing her diuretic at  "that time. Her weight was fluctuating, and to complicate matters slightly, she [place] her [place] and was using two different scales. At that visit, we opted to continue to monitor, with ongoing titration to a goal of 30ng/kg/min as previously recommended.    Today, we are doing another virtual visit in efforts to limit possible exposures to COVID-19. Since we spoke last, she tells me she has been feeling fairly well. She is pleased in the fact that she is able to do more than before; she notes she can now walk \"about a hundred yards\" and do laundry, and go up her stairs without much issue. Prior to starting Remodulin she says she could only walk \"a few feet.\" Her Remodulin is now at goal at 30ng/kg/min since 6/26. She says her side effects are \"still there, but tolerable.\" Her diarrhea responds to Imodium and Tylenol takes care of the occasional headache. Her only concern today has to do with her legs; she believes that the swelling has worsened slightly, R>L. Her legs and toes are also at times \"red with a rash\" but says it was worse with first titration and now has settled down a bit. They are not itchy, and she denies a fever. She does not like compression socks but tries to elevate her feet when she can. She is now using the same scale which [place], and reports weights fluctuate between 116-119 lbs. Upon questioning, she notes some orthopnea as well, though is unsure if this is new.       Assessment/Plan:     1. Chronic thromboembolic pulmonary hypertension.              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 Although she had proximal disease, she was felt to be too high risk for surgical PTE. She is currently on IV Remodulin therapy, and now at goal at 30ng/kg/min. She is experiencing side effects with diarrhea and headaches but says they are tolerable with use of Imodium and Tylenol prn.              --She reports " "more swelling, though weight is not up significantly from prior. Will try to increase her Lasix to 40mg daily (or 20mg BID, whichever she prefers). She is worried she was \"too dried out\" last time we tried to go up, so will call her later this week for an update. Plan repeat BMP/NT-proBNP in 7-10 days with this change.              --Continue digoxin for RV support.               --Continue anticoagulation with warfarin for her CTEPH. She currently follows at the INR clinic.         Follow up plan: Will plan repeat RHC/echo/six minute walk with a return to see Dr. Mathew in 2-3 months.         Video-Visit Details  Type of service:  Video Visit   VISIT WAS CONVERTED TO A PHONE VISIT DUE TO PT DIFFICULTY WITH VIDEO LINK  Phone start time: 1503  Phone end time: 1530       Shawna Marcum PA-C  [facility]  Pager [phone]    Karen Anderson is a 79 year old female who is being evaluated via a billable video visit.      Medications were reconciled.   Felisa Taylor CMA  2:48 PM    "

## 2020-07-08 ENCOUNTER — TELEPHONE (OUTPATIENT)
Dept: CARDIOLOGY | Facility: CLINIC | Age: 80
End: 2020-07-08

## 2020-07-08 RX ORDER — LIDOCAINE 40 MG/G
CREAM TOPICAL
Status: CANCELLED | OUTPATIENT
Start: 2020-07-08

## 2020-07-08 NOTE — TELEPHONE ENCOUNTER
"Patient called asking if she has a RHC tomorrow; signed on to [facility] to see it \"scheduled.\" Advised that I had placed the orders and it auto-populates for the next day but is not really scheduled. I stated that I will follow up with the patient on Friday and get her scheduled then. Patient verbalized understanding and did not have any further questions. Kezia Arnett RN on 7/8/2020 at 9:25 AM    "

## 2020-07-10 ENCOUNTER — TELEPHONE (OUTPATIENT)
Dept: CARDIOLOGY | Facility: CLINIC | Age: 80
End: 2020-07-10

## 2020-07-10 NOTE — TELEPHONE ENCOUNTER
"Called and spoke with patient regarding medication changes. Patient verbalized that she was on the road yesterday and did not take her increased Lasix dose as prescribed. Patient states she is feeling well but will start taking the increased doses today; patient asked me to follow up with her on Tuesday. Kezia Arnett RN on 7/10/2020 at 9:33 AM    Called and LM with patient regarding weight and increased lasix dose. Asked patient to call me back when able. Kezia Arnett RN on 7/14/2020 at 10:38 AM    Called and spoke with patient regarding increased Lasix dose; patient advised that she had just started the higher dose yesterday and has not noticed much of a difference yet. She advised that the past 2 days, she was splitting the doses but will start taking Lasix 40 mg in the AM. Patient asked lab orders to be faxed to [facility] in [place] at (f) [phone]. Patient will get them drawn next Thursday or Friday. Kezia Arnett RN on 7/15/2020 at 1:20 PM    RHC all of September available     ----- Message from MAGEN Saeed sent at 7/7/2020  3:37 PM CDT -----  Regarding: med changes, testing  Sounds like she is doing ok overall, up to 30ng/kg/min goal dose on her remodulin.    She reported more swelling even though weight is stable. I'm going to try to have her take her lasix 40mg daily (was on 20) but told her she could either take them both at the same time 40 qday or split to 20mg BID. Please call her Friday and get an update to see if it helped.   After the change, see if you can help arrange repeat BMP/BNP in 7-10 days please? It might be a [place] there.    She will then need a repeat RHC, echo, and six min walk, but I will message Dr garrett separately (and CC you) to find out exact timing.    Thanks!  Shawna       "

## 2020-07-23 ENCOUNTER — TRANSFERRED RECORDS (OUTPATIENT)
Dept: HEALTH INFORMATION MANAGEMENT | Facility: CLINIC | Age: 80
End: 2020-07-23

## 2020-07-24 ENCOUNTER — TELEPHONE (OUTPATIENT)
Dept: CARDIOLOGY | Facility: CLINIC | Age: 80
End: 2020-07-24

## 2020-07-24 NOTE — TELEPHONE ENCOUNTER
Fax with results received. Forwarded to Shawna Marcum and awaiting further instructions Kezia Arnett RN on 7/28/2020 at 8:38 AM    ----- Message from Kezia Arnett RN sent at 7/23/2020  9:58 AM CDT -----  Regarding: Lab F/U  Good morning ladies!    Can you please follow up on the patient's labs? She was going to have them drawn late yesterday and should be in Care Everywhere this morning.     Thank you!Elan  ----------------------------------  I am unable to see any recent lab results.  LM for patient that I was following up on the labs that were being drawn today.  Asked her to call us back and let us know when and where she had them drawn. Left my direct dial number for call back.  Fabby Seymour RN on 7/24/2020 at 2:36 PM  -------------------------------

## 2020-07-29 ENCOUNTER — TELEPHONE (OUTPATIENT)
Dept: CARDIOLOGY | Facility: CLINIC | Age: 80
End: 2020-07-29

## 2020-07-29 NOTE — TELEPHONE ENCOUNTER
"Called and LM on both home and cell phone asking patient to call me back when able. Kezia Arnett RN on 7/29/2020 at 10:11 AM    Spoke with patient who states that she is feeling well on the current dose of Lasix; she has not noticed any increased swelling or water retention. Patient advised that she spaces the doses around and seems to be \"working as best as it can.\" Advised patient to call me immediately if she notices a weight gain of 2 lb overnight or 5 lb in a week. Patient verbalized understanding and did not have any further questions. Kezia Arnett RN on 7/29/2020 at 11:13 AM    ----- Message from MAGEN Saeed sent at 7/29/2020  7:58 AM CDT -----  Regarding: labs  Just saw the email with her labs.  Can you call and get an update if she notes any clinical improvement with the increase in diuretics?  Her BUN/Scr are up a little from prior but if she's having any clinical improvement I may keep as is.  If she notes no better or any worse in how she feels, then return back to 20mg daily on the Lasix.    Pelon Matos      "

## 2020-08-04 ENCOUNTER — ANTICOAGULATION THERAPY VISIT (OUTPATIENT)
Dept: ANTICOAGULATION | Facility: CLINIC | Age: 80
End: 2020-08-04

## 2020-08-04 DIAGNOSIS — I27.24 CHRONIC THROMBOEMBOLIC PULMONARY HYPERTENSION (H): ICD-10-CM

## 2020-08-04 DIAGNOSIS — I27.20 PULMONARY HYPERTENSION (H): ICD-10-CM

## 2020-08-04 LAB
CAPILLARY BLOOD COLLECTION: NORMAL
INR PPP: 2 (ref 0.86–1.14)

## 2020-08-04 PROCEDURE — 36416 COLLJ CAPILLARY BLOOD SPEC: CPT | Performed by: INTERNAL MEDICINE

## 2020-08-04 PROCEDURE — 85610 PROTHROMBIN TIME: CPT | Performed by: INTERNAL MEDICINE

## 2020-08-04 NOTE — PROGRESS NOTES
ANTICOAGULATION FOLLOW-UP CLINIC VISIT    Patient Name:  Karen Anderson  Date:  2020  Contact Type:  Telephone    SUBJECTIVE:         OBJECTIVE    Recent labs: (last 7 days)     20  0945   INR 2.00*       ASSESSMENT / PLAN  INR assessment THER    Recheck INR In: 8 WEEKS    INR Location Clinic      Anticoagulation Summary  As of 2020    INR goal:   2.0-3.0   TTR:   84.2 % (4.4 mo)   INR used for dosin.00 (2020)   Warfarin maintenance plan:   3 mg (3 mg x 1) every Tue, Sat; 4.5 mg (3 mg x 1.5) all other days   Full warfarin instructions:   3 mg every Tue, Sat; 4.5 mg all other days   Weekly warfarin total:   28.5 mg   No change documented:   Yumi Bates RN   Plan last modified:   Jaylan Jacobo RN (2020)   Next INR check:   2020   Priority:   Maintenance   Target end date:   Indefinite    Indications    Pulmonary hypertension (H) [I27.20]             Anticoagulation Episode Summary     INR check location:       Preferred lab:       Send INR reminders to:   [facility]    Comments:   call home first, ok to call cell.  OK to leave message on either phone.  OK to speak with spouse, Don.  Uses [facility] SPEAK IN TABLETS (Has 3mg Tabs)  Summer's in [place] and will need to fax orders  [phone]      Anticoagulation Care Providers     Provider Role Specialty Phone number    Osiris Luong MD Referring Cardiology [phone]            See the Encounter Report to view Anticoagulation Flowsheet and Dosing Calendar (Go to Encounters tab in chart review, and find the Anticoagulation Therapy Visit)  Spoke with patient.    Yumi Bates, RN

## 2020-08-19 ENCOUNTER — TELEPHONE (OUTPATIENT)
Dept: CARDIOLOGY | Facility: CLINIC | Age: 80
End: 2020-08-19

## 2020-08-19 RX ORDER — ALENDRONATE SODIUM 70 MG/1
70 TABLET ORAL WEEKLY
COMMUNITY
Start: 2020-08-03

## 2020-08-19 NOTE — TELEPHONE ENCOUNTER
Patient called and advised me she has been started on alendronate 70 mg weekly for osteopetrosis, per her rheumatologist. She stated that her first bite jaw pain has been exacerbated after starting the Flosmax; she states she takes Tylenol to help with the pain but it is definitely worse after starting this new medication. I advised that she contact her rheumatologist to see if there is an alternative medication he can put her on or if she can stop it for a few weeks, to see if the pain improves. Patient verbalized understanding and did not have any further questions. Kezia Arnett RN on 8/19/2020 at 2:26 PM

## 2020-09-01 ENCOUNTER — TELEPHONE (OUTPATIENT)
Dept: ANTICOAGULATION | Facility: CLINIC | Age: 80
End: 2020-09-01

## 2020-09-01 DIAGNOSIS — I27.20 PULMONARY HYPERTENSION (H): ICD-10-CM

## 2020-09-01 NOTE — TELEPHONE ENCOUNTER
Pt called requesting that we send INR standing lab orders to [facility] Fax#[phone]. Writer faxed orders

## 2020-09-10 ENCOUNTER — TRANSFERRED RECORDS (OUTPATIENT)
Dept: HEALTH INFORMATION MANAGEMENT | Facility: CLINIC | Age: 80
End: 2020-09-10

## 2020-09-11 ENCOUNTER — TELEPHONE (OUTPATIENT)
Dept: CARDIOLOGY | Facility: CLINIC | Age: 80
End: 2020-09-11

## 2020-09-11 ENCOUNTER — ANTICOAGULATION THERAPY VISIT (OUTPATIENT)
Dept: ANTICOAGULATION | Facility: CLINIC | Age: 80
End: 2020-09-11

## 2020-09-11 DIAGNOSIS — I27.20 PULMONARY HYPERTENSION (H): ICD-10-CM

## 2020-09-11 DIAGNOSIS — R60.0 LOCALIZED EDEMA: ICD-10-CM

## 2020-09-11 LAB
ALBUMIN SERPL-MCNC: 3.5 G/DL
ALP SERPL-CCNC: 76 U/L
ALT SERPL-CCNC: 18 U/L
ANION GAP SERPL CALCULATED.3IONS-SCNC: 10 MMOL/L
AST SERPL-CCNC: 28 U/L
BILIRUB SERPL-MCNC: 0.4 MG/DL
BUN SERPL-MCNC: 34 MG/DL
CALCIUM SERPL-MCNC: 9.3 MG/DL
CHLORIDE SERPLBLD-SCNC: 103 MMOL/L
CO2 SERPL-SCNC: 27 MMOL/L
CREAT SERPL-MCNC: 1.43 MG/DL
GFR SERPL CREATININE-BSD FRML MDRD: 35 ML/MIN/1.73M2
GLUCOSE SERPL-MCNC: 100 MG/DL (ref 70–99)
INR PPP: 2.2 (ref 0.9–1.1)
POTASSIUM SERPL-SCNC: 4.9 MMOL/L
PROT SERPL-MCNC: 6.5 G/DL
SODIUM SERPL-SCNC: 140 MMOL/L

## 2020-09-11 RX ORDER — FUROSEMIDE 20 MG
20 TABLET ORAL DAILY
Qty: 90 TABLET | Refills: 3 | Status: ON HOLD | OUTPATIENT
Start: 2020-09-11 | End: 2020-10-06

## 2020-09-11 NOTE — TELEPHONE ENCOUNTER
Spoke with Karen and her  Santana, regarding a recent injury. Patient injured her L knee; US showed the Achilles tendon torn but without surgery. Hematoma behind knee cap. Patient has a follow up Monday F/U to see if it needs to be drained. Patient advised that she received my message regarding her Lasix dosing and did decrease back down to 20 mg daily. Patient states she will be back at the clinic Friday and Monday, and if labs are drawn, they will be faxed to our clinic. Kezia Arnett RN on 9/17/2020 at 10:46 AM    Called and LM with patient requesting a call back today. Kezia Arnett RN on 9/15/2020 at 9:20 AM    Called and was unable to reach patient; asked patient to call me when able. Kezia Arnett RN on 9/14/2020 at 9:44 AM    Called and LM with patient regarding recent lab draw. Advised that I spoke with Shawna and she would like the pt to go back down to Lasix 20 mg daily. Asked patient to call me back when able. Kezia Arnett RN on 9/11/2020 at 1:27 PM    ----- Message from MAGEN Saeed sent at 9/11/2020 12:25 PM CDT -----  Regarding: lasix  After looking at her chart, yes go ahead and have her come back down to 20mg once daily on the Lasix.     Thx  Shawna

## 2020-09-11 NOTE — PROGRESS NOTES
ANTICOAGULATION FOLLOW-UP CLINIC VISIT    Patient Name:  Karen Anderson  Date:  2020  Contact Type:  Telephone    SUBJECTIVE:         OBJECTIVE    Recent labs: (last 7 days)     09/10/20   INR 2.2*       ASSESSMENT / PLAN  INR assessment THER    Recheck INR In: 8 WEEKS    INR Location Outside lab      Anticoagulation Summary  As of 2020    INR goal:   2.0-3.0   TTR:   87.6 % (5.7 mo)   INR used for dosin.2 (9/10/2020)   Warfarin maintenance plan:   3 mg (3 mg x 1) every Tue, Sat; 4.5 mg (3 mg x 1.5) all other days   Full warfarin instructions:   3 mg every Tue, Sat; 4.5 mg all other days   Weekly warfarin total:   28.5 mg   Plan last modified:   Jaylan Jacobo, RN (2020)   Next INR check:   2020   Priority:   Maintenance   Target end date:   Indefinite    Indications    Pulmonary hypertension (H) [I27.20]             Anticoagulation Episode Summary     INR check location:       Preferred lab:       Send INR reminders to:   [facility]    Comments:   call home first, ok to call cell.  OK to leave message on either phone.  OK to speak with spouse, Don.  Uses [facility] SPEAK IN TABLETS (Has 3mg Tabs)  Summer's in [place] and will need to fax orders P [phone] F [phone]      Anticoagulation Care Providers     Provider Role Specialty Phone number    Osiris Luong MD Referring Cardiology [phone]            See the Encounter Report to view Anticoagulation Flowsheet and Dosing Calendar (Go to Encounters tab in chart review, and find the Anticoagulation Therapy Visit)    Left message for patient with results and dosing recommendations. Asked patient to call back to report any missed doses, falls, signs and symptoms of bleeding or clotting, any changes in health, medication, or diet. Asked patient to call back with any questions or concerns.      Itz Edwards [facility]

## 2020-09-15 ENCOUNTER — MEDICAL CORRESPONDENCE (OUTPATIENT)
Dept: HEALTH INFORMATION MANAGEMENT | Facility: CLINIC | Age: 80
End: 2020-09-15

## 2020-09-17 ENCOUNTER — TELEPHONE (OUTPATIENT)
Dept: CARDIOLOGY | Facility: CLINIC | Age: 80
End: 2020-09-17

## 2020-09-17 NOTE — TELEPHONE ENCOUNTER
Completed and faxed [employer] request forms for Remodulin to Assist for review.    Lexus Balbuena  Clinic   Pulmonary Hypertension  [facility]  (P) [phone]

## 2020-09-18 ENCOUNTER — TELEPHONE (OUTPATIENT)
Dept: CARDIOLOGY | Facility: CLINIC | Age: 80
End: 2020-09-18

## 2020-09-18 ENCOUNTER — TRANSFERRED RECORDS (OUTPATIENT)
Dept: HEALTH INFORMATION MANAGEMENT | Facility: CLINIC | Age: 80
End: 2020-09-18

## 2020-09-18 NOTE — TELEPHONE ENCOUNTER
CMP orders faxed to [facility] lab at (F) . Called patient to schedule lab draw for Monday with her follow up knee appt. Kezia Arnett RN on 9/18/2020 at 8:22 AM    ----- Message from MAGEN Saeed sent at 9/17/2020 12:24 PM CDT -----  Regarding: RE: labs/diuretics?  Ok, thanks.Can we repeat another BMP in a week or so then to make sure it doesn't keep going up?  ----- Message -----  From: Kezia Arnett RN  Sent: 9/17/2020  10:46 AM CDT  To: MAGEN Saeed  Subject: RE: labs/diuretics?                              Yes, so that was before she decreased on her Lasix dose. I actually just finished speaking with her and her , the poor woman tripped over a tent stake last week and injured her L knee. She was taken to the clinic and that's why labs were drawn/faxed over to us.  She sees a specialty MD next week to determine if the hematoma that has developed behind her knee needs to be drained.    She decreased her dosing on Monday and is now taking Lasix 20 mg daily.  ----- Message -----  From: Shawna Marcum PA  Sent: 9/17/2020  10:34 AM CDT  To: Kezia Arnett RN  Subject: labs/diuretics?                                  Her SCr was up on labs, I thought I sent a message a few days ago but maybe not?Do you know if this was BEFORE we came back down on the diuretics?

## 2020-09-21 ENCOUNTER — TELEPHONE (OUTPATIENT)
Dept: CARDIOLOGY | Facility: CLINIC | Age: 80
End: 2020-09-21

## 2020-09-21 ENCOUNTER — TRANSFERRED RECORDS (OUTPATIENT)
Dept: HEALTH INFORMATION MANAGEMENT | Facility: CLINIC | Age: 80
End: 2020-09-21

## 2020-09-21 LAB
ALBUMIN SERPL-MCNC: 3.3 G/DL
ALP SERPL-CCNC: 0.7 U/L
ALT SERPL-CCNC: 19 U/L
ANION GAP SERPL CALCULATED.3IONS-SCNC: 9 MMOL/L
AST SERPL-CCNC: 30 U/L
BILIRUB SERPL-MCNC: 0 MG/DL
BUN SERPL-MCNC: 41 MG/DL
CALCIUM SERPL-MCNC: 9.6 MG/DL
CHLORIDE SERPLBLD-SCNC: 104 MMOL/L
CO2 SERPL-SCNC: 24 MMOL/L
CREAT SERPL-MCNC: 1.46 MG/DL
GFR SERPL CREATININE-BSD FRML MDRD: 35 ML/MIN/1.73M2
GLUCOSE SERPL-MCNC: 78 MG/DL (ref 70–99)
POTASSIUM SERPL-SCNC: 5.2 MMOL/L
PROT SERPL-MCNC: 6.5 G/DL
SODIUM SERPL-SCNC: 137 MMOL/L

## 2020-09-21 NOTE — TELEPHONE ENCOUNTER
Called and spoke with patient and  about elevated Cr and K. Stated that Shawna would like to do a follow up visit tomorrow; Don verbalized that a virtual visit at 2:30 PM would work. Staff message sent to Lexus to schedule. Kezia Arnett RN on 9/21/2020 at 12:10 PM    ----- Message from MAEGN Saeed sent at 9/21/2020 10:18 AM CDT -----  Regarding: RE: Lab Recheck  I was literally just looking at this when your message popped up.   Her SCr is up farther and now she is hyperkalemic? That is a bit concerning.    Can we maybe set her up with a virtual visit with me tomorrow or Thursday to clarify things? (how her diarrhea is, confirm back down on lasix, weight, BP, symptoms etc)  If she is unable to do that then please see if you can get a detailed phone update and we can try to adjust things that way.    Thanks  Shawna  ----- Message -----  From: Kezia Arnett RN  Sent: 9/21/2020  10:17 AM CDT  To: MAGEN Saeed  Subject: Lab Recheck                                      Lab recheck came back after Lasix was decreased to once daily. I have also sent you an email with the results as well.    Let me know what you want to do. Thank you!Elan

## 2020-09-22 ENCOUNTER — VIRTUAL VISIT (OUTPATIENT)
Dept: CARDIOLOGY | Facility: CLINIC | Age: 80
End: 2020-09-22
Attending: PHYSICIAN ASSISTANT
Payer: COMMERCIAL

## 2020-09-22 DIAGNOSIS — R06.09 DYSPNEA ON EXERTION: ICD-10-CM

## 2020-09-22 DIAGNOSIS — I27.20 PULMONARY HYPERTENSION (H): Primary | ICD-10-CM

## 2020-09-22 PROCEDURE — 99214 OFFICE O/P EST MOD 30 MIN: CPT | Mod: 95 | Performed by: PHYSICIAN ASSISTANT

## 2020-09-22 RX ORDER — HYDROCODONE BITARTRATE AND ACETAMINOPHEN 5; 325 MG/1; MG/1
TABLET ORAL PRN
Status: ON HOLD | COMMUNITY
Start: 2020-09-11 | End: 2020-09-30

## 2020-09-22 RX ORDER — GABAPENTIN 300 MG/1
300 CAPSULE ORAL 3 TIMES DAILY
COMMUNITY
Start: 2020-09-22

## 2020-09-22 RX ORDER — CEPHALEXIN 500 MG/1
500 CAPSULE ORAL 4 TIMES DAILY
Status: ON HOLD | COMMUNITY
End: 2020-09-30

## 2020-09-22 RX ORDER — LISINOPRIL 20 MG/1
10 TABLET ORAL DAILY
Start: 2020-09-22 | End: 2020-11-09

## 2020-09-22 RX ORDER — LOPERAMIDE HCL 2 MG
2 CAPSULE ORAL PRN
COMMUNITY
End: 2020-12-14

## 2020-09-22 ASSESSMENT — PAIN SCALES - GENERAL: PAINLEVEL: NO PAIN (0)

## 2020-09-22 NOTE — LETTER
9/22/2020      RE: Karen Anderson  [address]  [place] [address]       Dear Colleague,    Thank you for the opportunity to participate in the care of your patient, Karen Anderson, at the [facility]. Please see a copy of my visit note below.    CARDIOLOGY  CLINIC VIDEO VISIT    Karen Anderson is a 80 year old female who is being evaluated via a billable video visit.      The patient has been notified of following:     This video visit will be conducted via a call between you and your physician/provider. We have found that certain health care needs can be provided without the need for an in-person physical exam.  This service lets us provide the care you need with a video conversation.  If a prescription is necessary we can send it directly to your pharmacy.  If lab work is needed we can place an order for that and you can then stop by our lab to have the test done at a later time.    Virtual visits are billed at different rates depending on your insurance coverage. During this emergency period, for some insurers they may be billed the same as an in-person visit.  Please reach out to your insurance provider with any questions.    If during the course of the call the physician/provider feels a video visit is not appropriate, you will not be charged for this service.      Physician has received verbal consent for a Video Visit from the patient? Yes    Patient would like the video invitation sent by: Send to e-mail at: [email or web address]      I have reviewed and updated the patient's Past Medical History, Social History, Family History and Medication List.    MEDICATIONS:  Current Outpatient Medications   Medication Sig Dispense Refill     ACETAMINOPHEN ER PO Take by mouth as needed       albuterol (PROAIR HFA/PROVENTIL HFA/VENTOLIN HFA) 108 (90 Base) MCG/ACT inhaler Inhale 2 puffs into the lungs every 4 hours as needed for  shortness of breath / dyspnea or wheezing        alendronate (FOSAMAX) 70 MG tablet Take 70 mg by mouth once a week       Ascorbic Acid (VITAMIN C) 500 MG CAPS Take 500 mg by mouth every morning        aspirin (ASA) 81 MG tablet Take 81 mg by mouth daily        beclomethasone (QVAR) 80 MCG/ACT AERS IS A DISCONTINUED MEDICATION Inhale 2 puffs into the lungs 2 times daily Gargle and rinse mouth with water after each dose.       calcium citrate-vitamin D (CALCIUM CITRATE + D3) 315-250 MG-UNIT TABS per tablet Take 315 mg by mouth 3 times daily        cephALEXin (KEFLEX) 500 MG capsule Take 500 mg by mouth 4 times daily       cetirizine (ZYRTEC) 10 MG tablet Take 10 mg by mouth daily       digoxin (LANOXIN) 62.5 MCG tablet Take 1 tablet (62.5 mcg) by mouth daily 90 tablet 3     furosemide (LASIX) 20 MG tablet Take 1 tablet (20 mg) by mouth daily 90 tablet 3     gabapentin (NEURONTIN) 300 MG capsule Take 300 mg by mouth 3 times daily        HYDROcodone-acetaminophen (NORCO) 5-325 MG tablet as needed       hydroxychloroquine (PLAQUENIL) 200 MG tablet Take 200 mg by mouth daily        ipratropium (ATROVENT) 0.06 % nasal spray Spray 2 sprays into both nostrils 3 times daily        lisinopril (ZESTRIL) 20 MG tablet Take 20 mg by mouth daily        loperamide (IMODIUM) 2 MG capsule Take 2 mg by mouth as needed for diarrhea       Probiotic Product (PROBIOTIC ACIDOPHILUS BIOBEADS) CAPS Take 1 capsule by mouth 2 times daily        Treprostinil (REMODULIN IJ) Inject 6 mLs as directed       warfarin ANTICOAGULANT (COUMADIN) 3 MG tablet Take 1 to 2 tablets daily as directed by the Anticoagulation Clinic. 170 tablet 1     warfarin ANTICOAGULANT (COUMADIN) 6 MG tablet Take 1 tablet (6 mg) by mouth daily Please take 6mg today (03/20). Then take 3mg on (03/21), and take an additional 6mg on (03/22) 5 tablet 0       ALLERGIES  Sulfa drugs      Self reported vitals:  Weight: has not weighed last few days  /65 (recent MD  "visit)    Brief physical exam:  General: In no acute distress, upright and calm.  Eyes: No apparent redness or discharge.   Chest: No labored breathing, no cough during exam or audible wheezing.   Neuro: No obvious focal defects or tremors.   Psych: Alert and oriented. Does not appear anxious.    The rest of a comprehensive physical examination is deferred due to public health emergency video visit restrictions.       Most recent labs:   Outside labs:  9/10/2020  9/19/2020  Na 140   137  K 4.9   5.2  BUN 34  41  Cr 1.43  1.46      Primary PH cardiologist: Dr. Eun Haq      HPI:  Ms. Anderson is a pleasant 79 year old female with a PMHx including rheumatoid arthritis, hypertension, scoliosis, asthma, pulmonary MAC, breast cancer s/p chemotherapy with Adriamycin, Cytoxan, Taxol, and tamoxifen and status post left mastectomy. She also has a history of extensive bilateral pulmonary emboli and DVT in 1998 thought to be secondary to tamoxifen along with chemotherapy induced cardiomyopathy. 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 Although she had proximal disease, she was too high risk for surgical PTE. She is now on IV Remodulin therapy.      She had a virtual visit with Dr. Mathew in early May at which time she was on 14ng/kg/min of Remodulin with minimal side effects, and he recommended continued upward titration to a goal of 30ng/kg/min. I followed up with her also via virtual visit in early June, and she was on 24ng/kg/min. While she did report some general worsening of side effects (pain in legs, jaw pain, headaches, diarrhea with each titration), she was able to tolerate these with the use of Tylenol and Imodium. She did report some swelling but was not interested increasing her diuretic at that time. Her weight was fluctuating, and to complicate matters slightly, she [place] and was using two different scales. " At that visit, we opted to continue to monitor, with ongoing titration to a goal of 30ng/kg/min as previously recommended.    When we saw each other last (also virtually) in July, she noted some improvement in her overall breathing, and was at goal dose of 30ng/kg/min. Her diarrhea responds to Imodium and Tylenol takes care of the occasional headache. Her only concern was that she felt the swelling had worsened slightly. Upon questioning, she also noted some orthopnea, though was unsure if this was new.Thus, I asked her to try increasing her Lasix to 40mg daily. Unfortunately, follow up labs showed some worsening. We opted to decrease her lasix back down to 20mg daily. Interestingly, despite reduction in her diuretic, her SCr went up a bit further, and she is now hyperkalemic at 5.2.     Today we are doing another virtual visit in efforts to sort this out further. Unfortunately, Karen tripped and fell last week and injured her left knee. She tells me that she tore a tendon, and this is now very swollen. It is quite painful and she also tells me that due to the coumadin she has significant bruising throughout the leg as well. She is following with a provider locally for this. She received some hydrocodone/apap but says she only took 2 doses and it made her dizzy so she stopped. Her neurontin was increased to 300/300/600mg but this was only yesterday. The labs were drawn a few days prior. She also tells me that she was now placed on Keflex earlier today for what may be a developing cellulitis. She is having difficulty ambulating and isn't moving around much. Thus, she denies any worsening RIVERA. Her Remodulin is still at 30ng/kg/min unchanged.       CURRENT PULMONARY HYPERTENSION REGIMEN:    PAH Rx: Remodulin 30ng/kg/min    Diuretics: Lasix 20mg daily     Oxygen: None    Anticoagulation: warfarin  Indication: CTEPH      Assessment/Plan:     1. Chronic thromboembolic pulmonary hypertension.              --Ms. Anderson  has CTEPH with severe RV dysfunction. Although she had proximal disease, she was felt to be too high risk for surgical PTE. She is currently on IV Remodulin therapy, and remains at goal at 30ng/kg/min. Her diarrhea and headaches now appear to be under reasonable control with use of Imodium and Tylenol prn.              --More recent concerns revolve around diuretics and renal function. We tried to increase Lasix to 40mg daily due to some edema a few weeks back. However, despite reducing back to 20mg, her SCr has trended up and she is now mildly hyperkalemic. I am hesitant to reduce her diuretics further. As she reports a bit lower BP than usual at her recent doctor visits, will reduce her lisinopril to 10mg once daily. Recheck BMP in 1 week. If renal issues worsen, we may consider nephrology referral.               --Continue digoxin for RV support.               --Continue anticoagulation with warfarin for her CTEPH, though continue to monitor with her recent fall/leg injury. She currently follows at the INR clinic.        Follow up plan: Labs in 1 week, plan return to see Dr. Mathew in ~8 weeks with repeat RHC, echo, and labs. She will be unlikely to be able to do a 6 min walk with her leg injury.       Video-Visit Details    Type of service:  Video Visit    Video Start Time: 1408  Video End Time: 1439    Originating Location (pt. Location): Home    Distant Location (provider location):  [facility]    Platform used for Video Visit: Darron Marcum PA-C  [facility]  Pager [phone]      Please do not hesitate to contact me if you have any questions/concerns.     Sincerely,     MAGEN Saeed

## 2020-09-22 NOTE — PATIENT INSTRUCTIONS
Thank you for visiting the Pulmonary Hypertension Clinic today.      Today we discussed:   Your kidney function has not returned to baseline despite going back down on Lasix to 20mg.   Today we will DECREASE your lisinopril to 10mg (half tablet) once daily. If you have trouble splitting them please call and we will send in a new prescription for smaller dose.    We will plan to recheck labs in ~1 week locally.       Additional Instructions:    1. Continue staying active and eat a heart healthy, low sodium diet.     2. Please keep current list of medications with you at all times.     3. Remember to weigh yourself daily after voiding and write it down on a log. If you have gained/lost 2 pounds overnight or 5 pounds in a week contact us for medication adjustments or further instructions.    4. Please call us immediately if you have syncope (fainting or passing out), chest pain, worsening edema (swelling or weight gain), or general worsening in how you are feeling.       Follow up Appointment Information:  Return to see Dr. Mathew in 8 weeks with repeat echo, labs, and heart catheterization.     --------------------------------------------------------------------------------------------------------------    If you have questions or concerns please contact us at:    Elan Arnett, RN, BSN   Lexus Balbuena (Schedule,Prior Auth)  Nurse Coordinator     Clinic   Pulmonary Hypertension   Pulmonary Hypertension  [facility]  [facility]  (Phone)[phone]    (Phone) [phone]        (Fax) [phone]      ** Please note that you will NOT receive a reminder call regarding your scheduled testing, reminder calls are for provider appointments only.  If you are scheduled for testing within the [employer] system you may receive a call regarding pre-registration for billing purposes only.**      --------------------------------------------------------------------------------------------------------------    Interested in joining a support group?    Pulmonary Hypertension Association  Https://www.phassociation.org/  **Look at the Events Tab** They even have Support Groups that you can call into    [facility] Support Group  Second Saturday of the Month from 1-3 PM   Location: [address] [place] [address]  Leader: Jessenia Loo  Phone: [phone] or [phone]  Email: [email or web address]

## 2020-09-22 NOTE — PROGRESS NOTES
CARDIOLOGY PH CLINIC VIDEO VISIT    Karen Anderson is a 80 year old female who is being evaluated via a billable video visit.      The patient has been notified of following:     This video visit will be conducted via a call between you and your physician/provider. We have found that certain health care needs can be provided without the need for an in-person physical exam.  This service lets us provide the care you need with a video conversation.  If a prescription is necessary we can send it directly to your pharmacy.  If lab work is needed we can place an order for that and you can then stop by our lab to have the test done at a later time.    Virtual visits are billed at different rates depending on your insurance coverage. During this emergency period, for some insurers they may be billed the same as an in-person visit.  Please reach out to your insurance provider with any questions.    If during the course of the call the physician/provider feels a video visit is not appropriate, you will not be charged for this service.      Physician has received verbal consent for a Video Visit from the patient? Yes    Patient would like the video invitation sent by: Send to e-mail at: [email or web address]      I have reviewed and updated the patient's Past Medical History, Social History, Family History and Medication List.    MEDICATIONS:  Current Outpatient Medications   Medication Sig Dispense Refill     ACETAMINOPHEN ER PO Take by mouth as needed       albuterol (PROAIR HFA/PROVENTIL HFA/VENTOLIN HFA) 108 (90 Base) MCG/ACT inhaler Inhale 2 puffs into the lungs every 4 hours as needed for shortness of breath / dyspnea or wheezing        alendronate (FOSAMAX) 70 MG tablet Take 70 mg by mouth once a week       Ascorbic Acid (VITAMIN C) 500 MG CAPS Take 500 mg by mouth every morning        aspirin (ASA) 81 MG tablet Take 81 mg by mouth daily        beclomethasone (QVAR) 80 MCG/ACT AERS IS A DISCONTINUED  MEDICATION Inhale 2 puffs into the lungs 2 times daily Gargle and rinse mouth with water after each dose.       calcium citrate-vitamin D (CALCIUM CITRATE + D3) 315-250 MG-UNIT TABS per tablet Take 315 mg by mouth 3 times daily        cephALEXin (KEFLEX) 500 MG capsule Take 500 mg by mouth 4 times daily       cetirizine (ZYRTEC) 10 MG tablet Take 10 mg by mouth daily       digoxin (LANOXIN) 62.5 MCG tablet Take 1 tablet (62.5 mcg) by mouth daily 90 tablet 3     furosemide (LASIX) 20 MG tablet Take 1 tablet (20 mg) by mouth daily 90 tablet 3     gabapentin (NEURONTIN) 300 MG capsule Take 300 mg by mouth 3 times daily        HYDROcodone-acetaminophen (NORCO) 5-325 MG tablet as needed       hydroxychloroquine (PLAQUENIL) 200 MG tablet Take 200 mg by mouth daily        ipratropium (ATROVENT) 0.06 % nasal spray Spray 2 sprays into both nostrils 3 times daily        lisinopril (ZESTRIL) 20 MG tablet Take 20 mg by mouth daily        loperamide (IMODIUM) 2 MG capsule Take 2 mg by mouth as needed for diarrhea       Probiotic Product (PROBIOTIC ACIDOPHILUS [employer]) CAPS Take 1 capsule by mouth 2 times daily        Treprostinil (REMODULIN IJ) Inject 6 mLs as directed       warfarin ANTICOAGULANT (COUMADIN) 3 MG tablet Take 1 to 2 tablets daily as directed by the Anticoagulation Clinic. 170 tablet 1     warfarin ANTICOAGULANT (COUMADIN) 6 MG tablet Take 1 tablet (6 mg) by mouth daily Please take 6mg today (03/20). Then take 3mg on (03/21), and take an additional 6mg on (03/22) 5 tablet 0       ALLERGIES  Sulfa drugs      Self reported vitals:  Weight: has not weighed last few days  /65 (recent MD visit)    Brief physical exam:  General: In no acute distress, upright and calm.  Eyes: No apparent redness or discharge.   Chest: No labored breathing, no cough during exam or audible wheezing.   Neuro: No obvious focal defects or tremors.   Psych: Alert and oriented. Does not appear anxious.    The rest of a comprehensive  "physical examination is deferred due to public health emergency video visit restrictions.       Most recent labs:   Outside labs:  9/10/2020  9/19/2020  Na 140   137  K 4.9   5.2  BUN 34  41  Cr 1.43  1.46      Primary PH cardiologist: Dr. Eun Haq      HPI:  Ms. Anderson is a pleasant 79 year old female with a PMHx including rheumatoid arthritis, hypertension, scoliosis, asthma, pulmonary MAC, breast cancer s/p chemotherapy with Adriamycin, Cytoxan, Taxol, and tamoxifen and status post left mastectomy. She also has a history of extensive bilateral pulmonary emboli and DVT in 1998 thought to be secondary to tamoxifen along with chemotherapy induced cardiomyopathy. 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 Although she had proximal disease, she was too high risk for surgical PTE. She is now on IV Remodulin therapy.      She had a virtual visit with Dr. Mathew in early May at which time she was on 14ng/kg/min of Remodulin with minimal side effects, and he recommended continued upward titration to a goal of 30ng/kg/min. I followed up with her also via virtual visit in early June, and she was on 24ng/kg/min. While she did report some general worsening of side effects (pain in legs, jaw pain, headaches, diarrhea with each titration), she was able to tolerate these with the use of Tylenol and Imodium. She did report some swelling but was not interested increasing her diuretic at that time. Her weight was fluctuating, and to complicate matters slightly, [place] and was using two different scales. At that visit, we opted to continue to monitor, with ongoing titration to a goal of 30ng/kg/min as previously recommended.    When we saw each other last (also virtually) in July, she noted some improvement in her overall breathing, and was at goal dose of 30ng/kg/min. Her diarrhea responds to Imodium and Tylenol takes care " of the occasional headache. Her only concern was that she felt the swelling had worsened slightly. Upon questioning, she also noted some orthopnea, though was unsure if this was new.Thus, I asked her to try increasing her Lasix to 40mg daily. Unfortunately, follow up labs showed some worsening. We opted to decrease her lasix back down to 20mg daily. Interestingly, despite reduction in her diuretic, her SCr went up a bit further, and she is now hyperkalemic at 5.2.     Today we are doing another virtual visit in efforts to sort this out further. Unfortunately, Karen tripped and fell last week and injured her left knee. She tells me that she tore a tendon, and this is now very swollen. It is quite painful and she also tells me that due to the coumadin she has significant bruising throughout the leg as well. She is following with a provider locally for this. She received some hydrocodone/apap but says she only took 2 doses and it made her dizzy so she stopped. Her neurontin was increased to 300/300/600mg but this was only yesterday. The labs were drawn a few days prior. She also tells me that she was now placed on Keflex earlier today for what may be a developing cellulitis. She is having difficulty ambulating and isn't moving around much. Thus, she denies any worsening RIVERA. Her Remodulin is still at 30ng/kg/min unchanged.       CURRENT PULMONARY HYPERTENSION REGIMEN:    PAH Rx: Remodulin 30ng/kg/min    Diuretics: Lasix 20mg daily     Oxygen: None    Anticoagulation: warfarin  Indication: CTEPH      Assessment/Plan:     1. Chronic thromboembolic pulmonary hypertension.              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 Although she had proximal disease, she was felt to be too high risk for surgical PTE. She is currently on IV Remodulin therapy, and remains at goal at 30ng/kg/min. Her diarrhea and headaches now appear to be under reasonable control with use of Imodium and Tylenol  prn.              --More recent concerns revolve around diuretics and renal function. We tried to increase Lasix to 40mg daily due to some edema a few weeks back. However, despite reducing back to 20mg, her SCr has trended up and she is now mildly hyperkalemic. I am hesitant to reduce her diuretics further. As she reports a bit lower BP than usual at her recent doctor visits, will reduce her lisinopril to 10mg once daily. Recheck BMP in 1 week. If renal issues worsen, we may consider nephrology referral.               --Continue digoxin for RV support.               --Continue anticoagulation with warfarin for her CTEPH, though continue to monitor with her recent fall/leg injury. She currently follows at the [facility].        Follow up plan: Labs in 1 week, plan return to see Dr. Mathew in ~8 weeks with repeat RHC, echo, and labs. She will be unlikely to be able to do a 6 min walk with her leg injury.       Video-Visit Details    Type of service:  Video Visit    Video Start Time: 1408  Video End Time: 1439    Originating Location (pt. Location): Home    Distant Location (provider location):  [facility]    Platform used for Video Visit: Darron Marcum PA-C  [facility]  Pager [phone]

## 2020-09-23 NOTE — NURSING NOTE
Orders placed for BMP recheck and faxed to [facility] at (f) [phone]. LM with patient, advising patient to schedule lab appt for next week. We will follow up with the results then. Kezia Arnett RN on 9/23/2020 at 9:10 AM

## 2020-09-28 ENCOUNTER — TELEPHONE (OUTPATIENT)
Dept: CARDIOLOGY | Facility: CLINIC | Age: 80
End: 2020-09-28

## 2020-09-28 ENCOUNTER — TRANSFERRED RECORDS (OUTPATIENT)
Dept: HEALTH INFORMATION MANAGEMENT | Facility: CLINIC | Age: 80
End: 2020-09-28

## 2020-09-28 NOTE — TELEPHONE ENCOUNTER
Returned Don's call, unable to reach but LM. Patient advised that Karen had gained 20 lbs in the matter of 3-4 days. I advised patient needs to go to the ED immediately because this cannot be addressed with PO diuretics. Asked patient to call me back immediately. Kezia Arnett RN on 9/28/2020 at 3:46 PM    Called and spoke with Santana, letting him know the patient got to . [place], I advised he call the hospital for visitor policies regarding his arrival. Patient verbalized understanding and did not have any further questions. Kezia Arnett RN on 9/29/2020 at 11:18 AM

## 2020-09-29 ENCOUNTER — APPOINTMENT (OUTPATIENT)
Dept: GENERAL RADIOLOGY | Facility: CLINIC | Age: 80
DRG: 286 | End: 2020-09-29
Attending: INTERNAL MEDICINE
Payer: COMMERCIAL

## 2020-09-29 ENCOUNTER — APPOINTMENT (OUTPATIENT)
Dept: CARDIOLOGY | Facility: CLINIC | Age: 80
DRG: 286 | End: 2020-09-29
Attending: INTERNAL MEDICINE
Payer: COMMERCIAL

## 2020-09-29 ENCOUNTER — HOSPITAL ENCOUNTER (INPATIENT)
Facility: CLINIC | Age: 80
LOS: 7 days | Discharge: HOME | DRG: 286 | End: 2020-10-06
Attending: INTERNAL MEDICINE | Admitting: INTERNAL MEDICINE
Payer: COMMERCIAL

## 2020-09-29 ENCOUNTER — APPOINTMENT (OUTPATIENT)
Dept: ULTRASOUND IMAGING | Facility: CLINIC | Age: 80
DRG: 286 | End: 2020-09-29
Attending: STUDENT IN AN ORGANIZED HEALTH CARE EDUCATION/TRAINING PROGRAM
Payer: COMMERCIAL

## 2020-09-29 DIAGNOSIS — R60.0 LOCALIZED EDEMA: ICD-10-CM

## 2020-09-29 DIAGNOSIS — I27.24 CTEPH (CHRONIC THROMBOEMBOLIC PULMONARY HYPERTENSION) (H): ICD-10-CM

## 2020-09-29 DIAGNOSIS — I50.810 RVF (RIGHT VENTRICULAR FAILURE) (H): Primary | ICD-10-CM

## 2020-09-29 DIAGNOSIS — S80.02XA HEMATOMA OF LEFT KNEE REGION: ICD-10-CM

## 2020-09-29 DIAGNOSIS — I27.20 PULMONARY HYPERTENSION (H): ICD-10-CM

## 2020-09-29 LAB
ALBUMIN SERPL-MCNC: 2.7 G/DL (ref 3.4–5)
ALP SERPL-CCNC: 93 U/L (ref 40–150)
ALT SERPL W P-5'-P-CCNC: 21 U/L (ref 0–50)
ANION GAP SERPL CALCULATED.3IONS-SCNC: 3 MMOL/L (ref 3–14)
ANION GAP SERPL CALCULATED.3IONS-SCNC: 6 MMOL/L (ref 3–14)
ANION GAP SERPL CALCULATED.3IONS-SCNC: 6 MMOL/L (ref 3–14)
AST SERPL W P-5'-P-CCNC: 30 U/L (ref 0–45)
BASOPHILS # BLD AUTO: 0 10E9/L (ref 0–0.2)
BASOPHILS NFR BLD AUTO: 0.8 %
BILIRUB SERPL-MCNC: 0.5 MG/DL (ref 0.2–1.3)
BUN SERPL-MCNC: 39 MG/DL (ref 7–30)
BUN SERPL-MCNC: 40 MG/DL (ref 7–30)
BUN SERPL-MCNC: 40 MG/DL (ref 7–30)
CALCIUM SERPL-MCNC: 8.7 MG/DL (ref 8.5–10.1)
CALCIUM SERPL-MCNC: 8.8 MG/DL (ref 8.5–10.1)
CALCIUM SERPL-MCNC: 9 MG/DL (ref 8.5–10.1)
CHLORIDE SERPL-SCNC: 105 MMOL/L (ref 94–109)
CHLORIDE SERPL-SCNC: 106 MMOL/L (ref 94–109)
CHLORIDE SERPL-SCNC: 106 MMOL/L (ref 94–109)
CO2 SERPL-SCNC: 25 MMOL/L (ref 20–32)
CO2 SERPL-SCNC: 27 MMOL/L (ref 20–32)
CO2 SERPL-SCNC: 28 MMOL/L (ref 20–32)
CREAT SERPL-MCNC: 1.32 MG/DL (ref 0.52–1.04)
CREAT SERPL-MCNC: 1.42 MG/DL (ref 0.52–1.04)
CREAT SERPL-MCNC: 1.42 MG/DL (ref 0.52–1.04)
CRP SERPL-MCNC: 62 MG/L (ref 0–8)
DIFFERENTIAL METHOD BLD: ABNORMAL
DIGOXIN SERPL-MCNC: 0.8 UG/L (ref 0.5–2)
EOSINOPHIL # BLD AUTO: 0.2 10E9/L (ref 0–0.7)
EOSINOPHIL NFR BLD AUTO: 4.3 %
ERYTHROCYTE [DISTWIDTH] IN BLOOD BY AUTOMATED COUNT: 14.5 % (ref 10–15)
GFR SERPL CREATININE-BSD FRML MDRD: 35 ML/MIN/{1.73_M2}
GFR SERPL CREATININE-BSD FRML MDRD: 35 ML/MIN/{1.73_M2}
GFR SERPL CREATININE-BSD FRML MDRD: 39 ML/MIN/{1.73_M2}
GLUCOSE SERPL-MCNC: 144 MG/DL (ref 70–99)
GLUCOSE SERPL-MCNC: 73 MG/DL (ref 70–99)
GLUCOSE SERPL-MCNC: 74 MG/DL (ref 70–99)
HCT VFR BLD AUTO: 31.5 % (ref 35–47)
HGB BLD-MCNC: 10.1 G/DL (ref 11.7–15.7)
IMM GRANULOCYTES # BLD: 0 10E9/L (ref 0–0.4)
IMM GRANULOCYTES NFR BLD: 0.4 %
INR PPP: 2.5 (ref 0.86–1.14)
LYMPHOCYTES # BLD AUTO: 0.5 10E9/L (ref 0.8–5.3)
LYMPHOCYTES NFR BLD AUTO: 9.8 %
MAGNESIUM SERPL-MCNC: 2 MG/DL (ref 1.6–2.3)
MAGNESIUM SERPL-MCNC: 2 MG/DL (ref 1.6–2.3)
MAGNESIUM SERPL-MCNC: 2.8 MG/DL (ref 1.6–2.3)
MCH RBC QN AUTO: 31.6 PG (ref 26.5–33)
MCHC RBC AUTO-ENTMCNC: 32.1 G/DL (ref 31.5–36.5)
MCV RBC AUTO: 98 FL (ref 78–100)
MONOCYTES # BLD AUTO: 0.7 10E9/L (ref 0–1.3)
MONOCYTES NFR BLD AUTO: 14.5 %
NEUTROPHILS # BLD AUTO: 3.6 10E9/L (ref 1.6–8.3)
NEUTROPHILS NFR BLD AUTO: 70.2 %
NRBC # BLD AUTO: 0 10*3/UL
NRBC BLD AUTO-RTO: 0 /100
PLATELET # BLD AUTO: 326 10E9/L (ref 150–450)
POTASSIUM SERPL-SCNC: 4 MMOL/L (ref 3.4–5.3)
POTASSIUM SERPL-SCNC: 4 MMOL/L (ref 3.4–5.3)
POTASSIUM SERPL-SCNC: 4.1 MMOL/L (ref 3.4–5.3)
PROCALCITONIN SERPL-MCNC: 0.05 NG/ML
PROT SERPL-MCNC: 6.6 G/DL (ref 6.8–8.8)
RBC # BLD AUTO: 3.2 10E12/L (ref 3.8–5.2)
SODIUM SERPL-SCNC: 137 MMOL/L (ref 133–144)
SODIUM SERPL-SCNC: 137 MMOL/L (ref 133–144)
SODIUM SERPL-SCNC: 138 MMOL/L (ref 133–144)
URATE SERPL-MCNC: 10.3 MG/DL (ref 2.6–6)
WBC # BLD AUTO: 5.1 10E9/L (ref 4–11)

## 2020-09-29 PROCEDURE — 71045 X-RAY EXAM CHEST 1 VIEW: CPT

## 2020-09-29 PROCEDURE — 36415 COLL VENOUS BLD VENIPUNCTURE: CPT | Performed by: INTERNAL MEDICINE

## 2020-09-29 PROCEDURE — 85610 PROTHROMBIN TIME: CPT | Performed by: INTERNAL MEDICINE

## 2020-09-29 PROCEDURE — 80048 BASIC METABOLIC PNL TOTAL CA: CPT | Performed by: STUDENT IN AN ORGANIZED HEALTH CARE EDUCATION/TRAINING PROGRAM

## 2020-09-29 PROCEDURE — 25000128 H RX IP 250 OP 636: Performed by: STUDENT IN AN ORGANIZED HEALTH CARE EDUCATION/TRAINING PROGRAM

## 2020-09-29 PROCEDURE — 93970 EXTREMITY STUDY: CPT

## 2020-09-29 PROCEDURE — 93010 ELECTROCARDIOGRAM REPORT: CPT | Performed by: INTERNAL MEDICINE

## 2020-09-29 PROCEDURE — 84550 ASSAY OF BLOOD/URIC ACID: CPT | Performed by: STUDENT IN AN ORGANIZED HEALTH CARE EDUCATION/TRAINING PROGRAM

## 2020-09-29 PROCEDURE — 27210995 ZZH RX 272: Performed by: INTERNAL MEDICINE

## 2020-09-29 PROCEDURE — 80162 ASSAY OF DIGOXIN TOTAL: CPT | Performed by: STUDENT IN AN ORGANIZED HEALTH CARE EDUCATION/TRAINING PROGRAM

## 2020-09-29 PROCEDURE — 36415 COLL VENOUS BLD VENIPUNCTURE: CPT | Performed by: STUDENT IN AN ORGANIZED HEALTH CARE EDUCATION/TRAINING PROGRAM

## 2020-09-29 PROCEDURE — 93005 ELECTROCARDIOGRAM TRACING: CPT

## 2020-09-29 PROCEDURE — 86140 C-REACTIVE PROTEIN: CPT | Performed by: STUDENT IN AN ORGANIZED HEALTH CARE EDUCATION/TRAINING PROGRAM

## 2020-09-29 PROCEDURE — 25000132 ZZH RX MED GY IP 250 OP 250 PS 637: Performed by: STUDENT IN AN ORGANIZED HEALTH CARE EDUCATION/TRAINING PROGRAM

## 2020-09-29 PROCEDURE — 99223 1ST HOSP IP/OBS HIGH 75: CPT | Mod: 25 | Performed by: INTERNAL MEDICINE

## 2020-09-29 PROCEDURE — 83735 ASSAY OF MAGNESIUM: CPT | Performed by: INTERNAL MEDICINE

## 2020-09-29 PROCEDURE — 21400000 ZZH R&B CCU UMMC

## 2020-09-29 PROCEDURE — 93306 TTE W/DOPPLER COMPLETE: CPT | Mod: 26 | Performed by: INTERNAL MEDICINE

## 2020-09-29 PROCEDURE — 93306 TTE W/DOPPLER COMPLETE: CPT

## 2020-09-29 PROCEDURE — 25000128 H RX IP 250 OP 636: Performed by: INTERNAL MEDICINE

## 2020-09-29 PROCEDURE — 85025 COMPLETE CBC W/AUTO DIFF WBC: CPT | Performed by: INTERNAL MEDICINE

## 2020-09-29 PROCEDURE — 84145 PROCALCITONIN (PCT): CPT | Performed by: STUDENT IN AN ORGANIZED HEALTH CARE EDUCATION/TRAINING PROGRAM

## 2020-09-29 PROCEDURE — 80053 COMPREHEN METABOLIC PANEL: CPT | Performed by: INTERNAL MEDICINE

## 2020-09-29 PROCEDURE — 25000132 ZZH RX MED GY IP 250 OP 250 PS 637: Performed by: INTERNAL MEDICINE

## 2020-09-29 PROCEDURE — 83735 ASSAY OF MAGNESIUM: CPT | Performed by: STUDENT IN AN ORGANIZED HEALTH CARE EDUCATION/TRAINING PROGRAM

## 2020-09-29 RX ORDER — POLYETHYLENE GLYCOL 3350 17 G/17G
17 POWDER, FOR SOLUTION ORAL DAILY PRN
Status: DISCONTINUED | OUTPATIENT
Start: 2020-09-29 | End: 2020-10-06 | Stop reason: HOSPADM

## 2020-09-29 RX ORDER — MAGNESIUM SULFATE HEPTAHYDRATE 40 MG/ML
4 INJECTION, SOLUTION INTRAVENOUS EVERY 4 HOURS PRN
Status: DISCONTINUED | OUTPATIENT
Start: 2020-09-29 | End: 2020-10-06 | Stop reason: HOSPADM

## 2020-09-29 RX ORDER — POTASSIUM CHLORIDE 750 MG/1
20-40 TABLET, EXTENDED RELEASE ORAL
Status: DISCONTINUED | OUTPATIENT
Start: 2020-09-29 | End: 2020-10-06 | Stop reason: HOSPADM

## 2020-09-29 RX ORDER — LIDOCAINE 40 MG/G
CREAM TOPICAL
Status: CANCELLED | OUTPATIENT
Start: 2020-09-29

## 2020-09-29 RX ORDER — POLYETHYLENE GLYCOL 3350 17 G/17G
17 POWDER, FOR SOLUTION ORAL DAILY PRN
Status: CANCELLED | OUTPATIENT
Start: 2020-09-29

## 2020-09-29 RX ORDER — ASCORBIC ACID 500 MG
500 TABLET ORAL EVERY MORNING
Status: DISCONTINUED | OUTPATIENT
Start: 2020-09-30 | End: 2020-10-06 | Stop reason: HOSPADM

## 2020-09-29 RX ORDER — ALBUTEROL SULFATE 90 UG/1
2 AEROSOL, METERED RESPIRATORY (INHALATION) EVERY 4 HOURS PRN
Status: DISCONTINUED | OUTPATIENT
Start: 2020-09-29 | End: 2020-10-06 | Stop reason: HOSPADM

## 2020-09-29 RX ORDER — MULTIVIT-MIN/IRON/FOLIC ACID/K 18-600-40
500 CAPSULE ORAL EVERY MORNING
Status: CANCELLED | OUTPATIENT
Start: 2020-09-29

## 2020-09-29 RX ORDER — IPRATROPIUM BROMIDE 42 UG/1
2 SPRAY, METERED NASAL 3 TIMES DAILY
Status: CANCELLED | OUTPATIENT
Start: 2020-09-29

## 2020-09-29 RX ORDER — IPRATROPIUM BROMIDE 42 UG/1
2 SPRAY, METERED NASAL 3 TIMES DAILY
Status: DISCONTINUED | OUTPATIENT
Start: 2020-09-29 | End: 2020-10-06 | Stop reason: HOSPADM

## 2020-09-29 RX ORDER — GABAPENTIN 300 MG/1
300 CAPSULE ORAL 3 TIMES DAILY
Status: DISCONTINUED | OUTPATIENT
Start: 2020-09-29 | End: 2020-10-06 | Stop reason: HOSPADM

## 2020-09-29 RX ORDER — ALBUTEROL SULFATE 90 UG/1
2 AEROSOL, METERED RESPIRATORY (INHALATION) EVERY 4 HOURS PRN
Status: CANCELLED | OUTPATIENT
Start: 2020-09-29

## 2020-09-29 RX ORDER — ACETAMINOPHEN 325 MG/1
650 TABLET ORAL EVERY 4 HOURS PRN
Status: CANCELLED | OUTPATIENT
Start: 2020-09-29

## 2020-09-29 RX ORDER — POTASSIUM CL/LIDO/0.9 % NACL 10MEQ/0.1L
10 INTRAVENOUS SOLUTION, PIGGYBACK (ML) INTRAVENOUS
Status: DISCONTINUED | OUTPATIENT
Start: 2020-09-29 | End: 2020-10-06 | Stop reason: HOSPADM

## 2020-09-29 RX ORDER — CETIRIZINE HYDROCHLORIDE 10 MG/1
10 TABLET ORAL DAILY
Status: CANCELLED | OUTPATIENT
Start: 2020-09-29

## 2020-09-29 RX ORDER — LOPERAMIDE HCL 2 MG
2 CAPSULE ORAL 4 TIMES DAILY PRN
Status: DISCONTINUED | OUTPATIENT
Start: 2020-09-29 | End: 2020-10-06 | Stop reason: HOSPADM

## 2020-09-29 RX ORDER — POTASSIUM CHLORIDE 29.8 MG/ML
20 INJECTION INTRAVENOUS
Status: DISCONTINUED | OUTPATIENT
Start: 2020-09-29 | End: 2020-10-06 | Stop reason: HOSPADM

## 2020-09-29 RX ORDER — POTASSIUM CHLORIDE 1.5 G/1.58G
20-40 POWDER, FOR SOLUTION ORAL
Status: DISCONTINUED | OUTPATIENT
Start: 2020-09-29 | End: 2020-10-06 | Stop reason: HOSPADM

## 2020-09-29 RX ORDER — CETIRIZINE HYDROCHLORIDE 5 MG/1
5 TABLET ORAL DAILY
Status: DISCONTINUED | OUTPATIENT
Start: 2020-09-30 | End: 2020-10-06 | Stop reason: HOSPADM

## 2020-09-29 RX ORDER — NALOXONE HYDROCHLORIDE 0.4 MG/ML
.1-.4 INJECTION, SOLUTION INTRAMUSCULAR; INTRAVENOUS; SUBCUTANEOUS
Status: CANCELLED | OUTPATIENT
Start: 2020-09-29

## 2020-09-29 RX ORDER — WARFARIN SODIUM 3 MG/1
3 TABLET ORAL
Status: COMPLETED | OUTPATIENT
Start: 2020-09-29 | End: 2020-09-29

## 2020-09-29 RX ORDER — HYDROXYCHLOROQUINE SULFATE 200 MG/1
200 TABLET, FILM COATED ORAL DAILY
Status: CANCELLED | OUTPATIENT
Start: 2020-09-29

## 2020-09-29 RX ORDER — DIGOXIN 0.06 MG/1
62.5 TABLET ORAL DAILY
Status: DISCONTINUED | OUTPATIENT
Start: 2020-09-29 | End: 2020-10-06 | Stop reason: HOSPADM

## 2020-09-29 RX ORDER — FUROSEMIDE 10 MG/ML
80 INJECTION INTRAMUSCULAR; INTRAVENOUS ONCE
Status: COMPLETED | OUTPATIENT
Start: 2020-09-29 | End: 2020-09-29

## 2020-09-29 RX ORDER — MAGNESIUM SULFATE HEPTAHYDRATE 40 MG/ML
2 INJECTION, SOLUTION INTRAVENOUS DAILY PRN
Status: DISCONTINUED | OUTPATIENT
Start: 2020-09-29 | End: 2020-10-06 | Stop reason: HOSPADM

## 2020-09-29 RX ORDER — MULTIVIT-MIN/IRON/FOLIC ACID/K 18-600-40
500 CAPSULE ORAL EVERY MORNING
Status: DISCONTINUED | OUTPATIENT
Start: 2020-09-30 | End: 2020-09-29

## 2020-09-29 RX ORDER — DIGOXIN 0.06 MG/1
62.5 TABLET ORAL DAILY
Status: CANCELLED | OUTPATIENT
Start: 2020-09-29

## 2020-09-29 RX ORDER — HYDROXYCHLOROQUINE SULFATE 200 MG/1
200 TABLET, FILM COATED ORAL DAILY
Status: DISCONTINUED | OUTPATIENT
Start: 2020-09-29 | End: 2020-10-06 | Stop reason: HOSPADM

## 2020-09-29 RX ORDER — POTASSIUM CHLORIDE 7.45 MG/ML
10 INJECTION INTRAVENOUS
Status: DISCONTINUED | OUTPATIENT
Start: 2020-09-29 | End: 2020-10-06 | Stop reason: HOSPADM

## 2020-09-29 RX ADMIN — MAGNESIUM SULFATE 2 G: 2 INJECTION INTRAVENOUS at 14:47

## 2020-09-29 RX ADMIN — WARFARIN SODIUM 3 MG: 3 TABLET ORAL at 21:08

## 2020-09-29 RX ADMIN — IPRATROPIUM BROMIDE 2 SPRAY: 42 SPRAY NASAL at 21:08

## 2020-09-29 RX ADMIN — POTASSIUM CHLORIDE 20 MEQ: 1500 TABLET, EXTENDED RELEASE ORAL at 14:46

## 2020-09-29 RX ADMIN — FLUTICASONE FUROATE 1 PUFF: 100 POWDER RESPIRATORY (INHALATION) at 21:08

## 2020-09-29 RX ADMIN — DIGOXIN 62.5 MCG: 0.06 TABLET ORAL at 17:21

## 2020-09-29 RX ADMIN — FUROSEMIDE 80 MG: 10 INJECTION, SOLUTION INTRAVENOUS at 17:20

## 2020-09-29 RX ADMIN — HYDROXYCHLOROQUINE SULFATE 200 MG: 200 TABLET, FILM COATED ORAL at 17:21

## 2020-09-29 RX ADMIN — TREPROSTINIL 30 NG/KG/MIN: 20 INJECTION, SOLUTION INTRAVENOUS; SUBCUTANEOUS at 18:11

## 2020-09-29 RX ADMIN — GABAPENTIN 300 MG: 300 CAPSULE ORAL at 21:07

## 2020-09-29 ASSESSMENT — PAIN DESCRIPTION - DESCRIPTORS
DESCRIPTORS: SHARP;SORE
DESCRIPTORS: SORE
DESCRIPTORS: SORE

## 2020-09-29 ASSESSMENT — ACTIVITIES OF DAILY LIVING (ADL)
ADLS_ACUITY_SCORE: 15

## 2020-09-29 NOTE — PROGRESS NOTES
Admission   Diagnosis: pulmonary hypertension  Admitted from: Located within [facility]   Via: stretcher   Accompanied by: self   Belongings: Placed in closet  Admission paperwork: complete   Teaching: Call don't fall, use of console, meal times, visiting hours, orientation to unit, when to call for the RN (angina/sob/dizzyness, etc.), and the importance of safety.   Access: PIV in R hand, Goldberg in R chest infusing remodulin  Telemetry:Placed on pt   Ht./Wt.: complete     2 RN skin assessment performed by Jaimie Marlow and Deborah Montano.  All skin issues noted in PCS.

## 2020-09-29 NOTE — SUMMARY OF CARE
Pt has a blue bag filled with her personal items; Hugie wipes, disinfect wipes, make up removal pads, cell phone w/ . Bilateral hearing aids. Eye glasses,black  Medical emergency bag for pain pump. Light blue jacket. White tennis shoe and shoe boot for left foot.

## 2020-09-29 NOTE — H&P
Cardiology History and Physical - Cards 2         Date of Admission: 09/29/20    Assessment & Plan: [facility]    Karen Anderson is a 80 y.o. F w/ history of CTEPH, severe RV dysfunction, hx DVT on warfarin, pulmonary MAC, CKD, breast cancer s/p chemotherapy, hormone therapy, and mastectomy, HTN, RA, and asthma who presents as a transfer from outside hospital for shortness of breath concerning for acute heart failure exacerbation.    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 Recently had poorly tolerated Lasix increase due to worsening renal function, so it was decreased back down to 20 mg daily. Would also consider PE with her extensive history of this; however she is therapeutic on warfarin currently with INR 2.2 (goal 2-3). Received 40 mg IV Lasix at OSH ED prior to transfer.  - diuresis plan: IV lasix 80 mg daily.  - daily standing weights  - strict intake/output monitoring  - 2L fluid,2g Na restriction    #Chronic thromboembolic pulmonary hypertension (CTEPH)  #Severe RV dysfunction  #Hx DVT 1998  Hx extensive b/l pulmonary emboli and DVT in 1998, thought to be 2/2 tamoxifen for breast CA. Deemed too high risk to undergo surgical PTE. 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 RHC (March 2020) showed PA of 98/30. Echo (also March 2020) showed severe hypertension and RV dilation and dysfunction with normal LVEF 60-65%. Mild RVH with mild-moderate TI also seen. Uptitrated Remodulin to goal of 30 ng/kg/min in June/July 2020. Side effects include headaches, leg pain, jaw pain and diarrhea with titrations up on Remodulin. See diuretic plan as above. Plan is to return to see Dr. Mathew in ~7 weeks for repeat RHC and echo.  - continuous Remodulin  at 30 ng/kg/min  - pharmacy to dose warfarin  - ASA 81 mg qday  - continue PTA digoxin 62.5 mcg qday      #CKD  #Hyperkalemia  Baseline in April-May 2020 was Cr 0.9-1.0. Elevated to 1.6 at outside hospital. K also 5.4, likely secondary to poor renal function.  - daily BMP  - Lasix as above  - holding pta lisinopril    #HTN  - hold PTA lisinopril considering worsening renal function    #RA  - hydroxychloroquine 200 mg qday    #Breast CA  S/p adriamycin, Cytoxan, Taxol and tamoxifen. S/p mastectomy    #Asthma  - albuterol 2 puffs QID PRN  - beclomethasone 2 puff BID PRN     #Recent fall  #Vastus medialis oblique tear?  Had painful hematoma near the L knee. Took 2 doses of Norco but stopped 2/2 dizziness.  - consider Norco 5-325 mg q4h PRN    #Diarrhea  2/2 Remodulin, worse when increasing dose.  - Imodium PRN diarrhea  - Miralax PRN constipation    #Scoliosis  - hold PTA alendronate 70 mg tablet q7d, per patient, last dose yesterday.  - c/t Vit C 500 mg qday  - c/t Calcium carbonate-VitD 1200-100 mg qday  - c/t Vit D3 1000U qday  - c/t cetirizine 5 mg qday  - gabapentin 300mg am, 300mg noon, 600 mg evening      #Rhinorhea  - ipratropium nasal spray BID  - ocean spray nasal spray    Labs at OSH:  - UA wnl  - CMP: Na 133, K 5.4, CO2 24, BUN 41, Cr 1.58, LFTs wnl  - Trop 0.045 (limit of normal is < 0.028)  - CBC: WBC 5.9, Hgb 10.1, MCV 99.7, Plt 310  - INR 2.2         Diet: 2 gram sodium diet  DVT Prophylaxis: Warfarin  Dominguez Catheter: in place, indication: Strict 1-2 Hour I&O  Code Status: Full code.  Fluids: none  Lines: PIV     Disposition Plan   Expected discharge: 4 - 7 days, recommended to prior living arrangement once fluid volume status optimized on oral medication.    Entered: Saad Huerta MD 09/29/2020, 2:02 PM     Patient staffed with Dr. Osiris Luogn.    Saad Huerta MD   PGY-1, Internal Medicine Resident  [facility]  ______________________________________________________________________    Chief Complaint   Shortness of breath    History is obtained from the patient    History of Present  "Illness   Karen Anderson is a 80 y.o. F w/ history of CTEPH, severe RV dysfunction, hx DVT on warfarin, pulmonary MAC, CKD, breast cancer s/p chemotherapy, hormone therapy, and mastectomy, HTN, RA, and asthma who presents as a transfer from outside hospital for CHF.    Patient states that she has had progressively worsening RIVERA over the past 3 weeks, she denies cough or chest pain but endorses occasional \"flutters\". She endorses occasional episodes of lightheadedness, she denies syncopal episodes. Prior to worsening of her RIVERA she reports having a fall after tripping on a \"tied-down\", she subsequently developed swelling of her left knee w/ associated sharp pain, which she states radiates down her leg. She reports reduced mobility d/t knee pain prior to when she noticed the worsening RIVERA. She saw sports medicine and had \"removal of colored fluid from her knee\", she was also started on antibiotics for possible cellulitis. She endorses severe left leg pain for which she takes Norco.    She presented to outside ED yesterday with tachypnea to 24 resps/minute and tachycardia to 101 beats/minute. /54, SpO2 94%. Labs significant for K 5.4, Cr 1.58, trop 0.045, Hgb 10.1. WBC normal. INR 2.2. Gave 2 doses of 40 mg IV Lasix, last dose at 5:00 am. Per chart, total UO in ED 1600 ml.    Review of Systems    The 10 point Review of Systems is negative other than noted in the HPI or here.    Past Medical History    I have reviewed this patient's medical history and updated it with pertinent information if needed.   Past Medical History:   Diagnosis Date     Asthma      Breast cancer (H)      Hypertension      Mycobacterium avium complex (H)      Pulmonary embolism (H)      Pulmonary hypertension (H)      Rheumatoid arthritis (H)      Scoliosis        Past Surgical History   I have reviewed this patient's surgical history and updated it with pertinent information if needed.  Past Surgical History:   Procedure Laterality " Date     CV RIGHT HEART CATH N/A 3/4/2020    Procedure: CV RIGHT HEART CATH;  Surgeon: Karolina Turcios MD;  Location:  HEART CARDIAC CATH LAB     IR CVC TUNNEL PLACEMENT > 5 YRS OF AGE  3/9/2020     MASTECTOMY Left      PICC SINGLE LUMEN PLACEMENT Right 03/04/2020    4Fr - 41cm, Medial brachial vein. Left mastectomy       Social History   I have reviewed this patient's social history and updated it with pertinent information if needed.  Social History     Tobacco Use     Smoking status: Never Smoker     Smokeless tobacco: Never Used   Substance Use Topics     Alcohol use: Yes     Comment: occasionally      Drug use: Never     Family History   I have reviewed this patient's family history and updated it with pertinent information if needed.   I have reviewed this patient's family history and updated it with pertinent information if needed.  Family History   Problem Relation Age of Onset     Heart Failure Mother      Kidney Disease Mother      Coronary Artery Disease Maternal Grandfather 50     LUNG DISEASE No family hx of      Clotting Disorder No family hx of        Prior to Admission Medications   Prior to Admission Medications   Prescriptions Last Dose Informant Patient Reported? Taking?   ACETAMINOPHEN ER PO   Yes No   Sig: Take by mouth as needed   Ascorbic Acid (VITAMIN C) 500 MG CAPS 9/28/2020 at 0800  Yes Yes   Sig: Take 500 mg by mouth every morning    HYDROcodone-acetaminophen (NORCO) 5-325 MG tablet 9/28/2020 at 1335  Yes Yes   Sig: as needed   Probiotic Product (PROBIOTIC ACIDOPHILUS BIOBEADS) CAPS 9/28/2020 at 1530  Yes Yes   Sig: Take 1 capsule by mouth 2 times daily    Treprostinil (REMODULIN IJ)   Yes No   Sig: Inject 6 mLs as directed   albuterol (PROAIR HFA/PROVENTIL HFA/VENTOLIN HFA) 108 (90 Base) MCG/ACT inhaler   Yes No   Sig: Inhale 2 puffs into the lungs every 4 hours as needed for shortness of breath / dyspnea or wheezing    alendronate (FOSAMAX) 70 MG tablet   Yes No   Sig: Take 70 mg  by mouth once a week   aspirin (ASA) 81 MG tablet 2020 at   Yes Yes   Sig: Take 81 mg by mouth daily    beclomethasone (QVAR) 80 MCG/ACT AERS IS A DISCONTINUED MEDICATION 2020 at   Yes Yes   Sig: Inhale 2 puffs into the lungs 2 times daily Gargle and rinse mouth with water after each dose.   calcium citrate-vitamin D (CALCIUM CITRATE + D3) 315-250 MG-UNIT TABS per tablet 2020 at 0800  Yes Yes   Sig: Take 315 mg by mouth 3 times daily    cephALEXin (KEFLEX) 500 MG capsule   Yes No   Sig: Take 500 mg by mouth 4 times daily   cetirizine (ZYRTEC) 10 MG tablet 2020 at   Yes Yes   Sig: Take 10 mg by mouth daily   digoxin (LANOXIN) 62.5 MCG tablet 2020 at 0800  No Yes   Sig: Take 1 tablet (62.5 mcg) by mouth daily   furosemide (LASIX) 20 MG tablet 2020 at 0800  No Yes   Sig: Take 1 tablet (20 mg) by mouth daily   gabapentin (NEURONTIN) 300 MG capsule 2020 at   Yes Yes   Simg Am, 300mg noon, 600mg evening   hydroxychloroquine (PLAQUENIL) 200 MG tablet 2020 at 0800  Yes Yes   Sig: Take 200 mg by mouth daily    ipratropium (ATROVENT) 0.06 % nasal spray 2020 at 2000  Yes Yes   Sig: Spray 2 sprays into both nostrils 3 times daily    lisinopril (ZESTRIL) 20 MG tablet 2020 at   No Yes   Sig: Take 0.5 tablets (10 mg) by mouth daily   loperamide (IMODIUM) 2 MG capsule 2020 at 1900  Yes Yes   Sig: Take 2 mg by mouth as needed for diarrhea   warfarin ANTICOAGULANT (COUMADIN) 3 MG tablet 2020 at 1840  No Yes   Sig: Take 1 to 2 tablets daily as directed by the Anticoagulation Clinic.   warfarin ANTICOAGULANT (COUMADIN) 6 MG tablet   No No   Sig: Take 1 tablet (6 mg) by mouth daily Please take 6mg today (). Then take 3mg on (), and take an additional 6mg on ()      Facility-Administered Medications: None     Allergies   Allergies   Allergen Reactions     Sulfa Drugs Anaphylaxis and Hives       Physical Exam   Vital Signs: Temp: 97.6  F  (36.4  C) Temp src: Oral BP: 139/80 Pulse: 103   Resp: 18 SpO2: 93 % O2 Device: None (Room air)    Weight: 127 lbs 1.6 oz    Physical Exam  Constitutional:       General: She is awake. She is not in acute distress.     Interventions: Nasal cannula in place.   HENT:      Head: Normocephalic and atraumatic.      Mouth/Throat:      Mouth: Mucous membranes are moist.      Pharynx: No oropharyngeal exudate or posterior oropharyngeal erythema.   Eyes:      General: No scleral icterus.     Extraocular Movements: Extraocular movements intact.      Conjunctiva/sclera:      Left eye: Hemorrhage present.      Pupils: Pupils are equal, round, and reactive to light.      Comments: Left eye : subconjunctival hemorrhage.   Neck:      Vascular: JVD present.   Cardiovascular:      Rate and Rhythm: Normal rate and regular rhythm.      Pulses: Normal pulses.      Heart sounds: Normal heart sounds. No murmur. No gallop.       Comments: JVP ~15 cm  Pulmonary:      Effort: Pulmonary effort is normal. No respiratory distress.      Breath sounds: Normal breath sounds. No wheezing, rhonchi or rales.   Abdominal:      Palpations: Abdomen is soft.      Tenderness: There is no abdominal tenderness. There is no guarding.   Musculoskeletal:      Right lower leg: Edema present.      Left lower leg: Edema present.      Comments: Edema up to the thighs.  Left knee swollen, tender ++   Skin:     Findings: Bruising present.   Neurological:      General: No focal deficit present.      Mental Status: She is alert and oriented to person, place, and time.   Psychiatric:         Mood and Affect: Mood normal.         Behavior: Behavior normal. Behavior is cooperative.         Thought Content: Thought content normal.           Data   Data reviewed today: I reviewed all medications, new labs and imaging results over the last 24 hours. I personally reviewed Cardiology specific data review: the EKG tracing showing sinus rhythm.     Recent Labs   Lab  20  1239 20  1127   WBC  --  5.1   HGB  --  10.1*   MCV  --  98   PLT  --  326   INR  --  2.50*    137   POTASSIUM 4.0 4.0   CHLORIDE 106 106   CO2 27 25   BUN 40* 40*   CR 1.42* 1.42*   ANIONGAP 6 6   ESTEFANÍA 8.7 9.0   GLC 74 73   ALBUMIN  --  2.7*   PROTTOTAL  --  6.6*   BILITOTAL  --  0.5   ALKPHOS  --  93   ALT  --  21   AST  --  30       Recent Results (from the past 24 hour(s))   Echocardiogram Complete    Narrative    [id number]  [id number]  [id number]  [id number]^NHI^FRANKLIN^D           [facility],[place]  Echocardiography Laboratory  [address] [place] [id number]     Name: ELAINA SUTTON  MRN: [id number]  : 1940  Study Date: 2020 11:24 AM  Age: 80 yrs  Gender: Female  [place] [facility] [place] For Study: CHF  Ordering Physician: FRANKLIN HANKINS  Referring Physician: NICO SAMSON  Performed By: Steven Gutierres     BSA: 1.5 m2  Height: 58 in  Weight: 127 lb  HR: 88  BP: 139/80 mmHg  _____________________________________________________________________________  __        Procedure  Echocardiogram with two-dimensional, color and spectral Doppler performed.  _____________________________________________________________________________  __        Interpretation Summary  Global and regional left ventricular function is normal with an EF of 60-65%.  Moderate right ventricular dilation is present.  Global right ventricular function is moderately reduced.  Right ventricular systolic pressure is 82mmHg above the right atrial pressure.  Ascending aorta 3.8 cm.  Ascending aortic index 25.3mm/m2,severe dilation for BSA of 1.5m2.  IVC diameter >2.1 cm collapsing <50% with sniff suggests a high RA pressure  estimated at 15 mmHg or greater.  Small circumferential pericardial effusion is present without any hemodynamic  significance.  There has been no change.  _____________________________________________________________________________  __        Left  Ventricle  Global and regional left ventricular function is normal with an EF of 60-65%.  Left ventricular wall thickness is normal. Left ventricular size is normal.  Thickening of the anterobasal septum is present. Left ventricular diastolic  function is indeterminate. Diastolic Doppler findings (E/E' ratio and/or other  parameters) suggest left ventricular filling pressures are normal. Paradoxical  septal motion consistent with right ventricular pressure and volume overload  is present.     Right Ventricle  Moderate right ventricular dilation is present. Global right ventricular  function is moderately reduced.     Atria  The left atrium appears normal. Severe right atrial enlargement is present.     Mitral Valve  The mitral valve is normal.        Aortic Valve  Aortic valve is normal in structure and function.     Tricuspid Valve  Mild to moderate tricuspid insufficiency is present. Right ventricular  systolic pressure is 82mmHg above the right atrial pressure.     Pulmonic Valve  The pulmonic valve is normal.     Vessels  The pulmonary artery and bifurcation cannot be assessed. Sinuses of Valsalva  3.4 cm. Ascending aorta 3.8 cm. Ascending aortic index 25.3mm/m2,severe  dilation for BSA of 1.5m2. IVC diameter >2.1 cm collapsing <50% with sniff  suggests a high RA pressure estimated at 15 mmHg or greater.     Pericardium  Small circumferential pericardial effusion is present without any hemodynamic  significance.        Compared to Previous Study  There has been no change.  _____________________________________________________________________________  __  MMode/2D Measurements & Calculations  IVSd: 1.2 cm     LVIDd: 4.8 cm  LVIDs: 2.9 cm  LVPWd: 0.69 cm  FS: 39.4 %  LV mass(C)d: 151.7 grams  LV mass(C)dI: 101.0 grams/m2  Ao root diam: 3.4 cm  asc Aorta Diam: 3.8 cm  LVOT diam: 2.1 cm  LVOT area: 3.5 cm2  RWT: 0.29        Doppler Measurements & Calculations  MV E max yola: 66.1 cm/sec  MV A max yola: 111.0  cm/sec  MV E/A: 0.60  Ao V2 max: 156.0 cm/sec  Ao max PG: 10.0 mmHg  Ao V2 mean: 113.0 cm/sec  Ao mean P.0 mmHg  Ao V2 VTI: 31.0 cm  LAURA(I,D): 2.6 cm2  LAURA(V,D): 2.5 cm2  LV V1 max P.2 mmHg  LV V1 max: 114.0 cm/sec  LV V1 VTI: 23.4 cm  SV(LVOT): 81.0 ml  SI(LVOT): 54.0 ml/m2  PA V2 max: 95.6 cm/sec  PA max PG: 3.7 mmHg  PA acc time: 0.03 sec  PI end-d ivan: 149.0 cm/sec  TR max ivan: 453.5 cm/sec  TR max P.3 mmHg     AV Ivan Ratio (DI): 0.73  LAURA Index (cm2/m2): 1.7  E/E' avg: 10.7  Lateral E/e': 8.4  Medial E/e': 12.9     _____________________________________________________________________________  __           Report approved by: Aramis Stacy 2020 12:23 PM           CXR at [facility], 20:  IMPRESSION:   Patchy right-sided airspace opacifications. May reflect atelectasis versus early infiltrates.    Echocardiogram 3/2/20:  Interpretation Summary  Left ventricular function, chamber size, wall motion, and wall thickness are  normal.The EF is 60-65%.  Right ventricle is moderately dilated and at least moderately dysfunctional.  Mild RVH.  Mild to moderate tricuspid insufficiency.  Dilated main pulmonary artery.  Right ventricular systolic pressure is 97mmHg above the right atrial pressure.  IVC diameter >2.1 cm collapsing <50% with sniff suggests a high RA pressure  estimated at 15 mmHg or greater.  Severe pulmonary hypertension is present.  Small circumferential pericardial effusion (<1 cm). No hemodynamic  significance.     There is no prior study for direct comparison.    Echocardiogram 2020:  Interpretation Summary  Global and regional left ventricular function is normal with an EF of 60-65%.  Moderate right ventricular dilation is present.  Global right ventricular function is moderately reduced.  Right ventricular systolic pressure is 82mmHg above the right atrial pressure.  Ascending aorta 3.8 cm.  Ascending aortic index 25.3mm/m2,severe dilation for BSA of 1.5m2.  IVC  diameter >2.1 cm collapsing <50% with sniff suggests a high RA pressure  estimated at 15 mmHg or greater.  Small circumferential pericardial effusion is present without any hemodynamic  significance.  There has been no change    RHC 3/4/20:    Severe Pre-capillary PH    Severely reduced cardiac output with high normal right sided filling pressures    Normal left sided filling pressures    CTEPH based on abnormal V/Q scan and CT pulmonary angiogram

## 2020-09-29 NOTE — PROGRESS NOTES
"CLINICAL NUTRITION SERVICES    Reason for Assessment:  Low-sodium (2 g/day) nutrition education, received consult.     Diet History:  Pt reports receiving low-sodium nutrition education in the past.    Per RD note 3/19/20: \"Provided verbal instruction on low-sodium meal planning. Gave recs of foods/beverages to choose and those to limit. Discussed current diet and fluid restriction. Pt states she reads labels at home. Answered questions on low-sodium vs high-sodium parameters. Provided the following handouts: How to Read Nutrition Labels, Low-Sodium Foods and Drinks, Tips for a Low-Sodium Diet, Seasoning Your Foods Without Adding Salt, and Managing Fluid Restriction. Gave room service handout depicting amounts of sodium in various foods/beverages.\"     Nutrition Diagnosis:  No nutrition education dx.    Nutrition Prescription/Recs:  Continue low-sodium diet and fluid restriction as per team.      Interventions:  Nutrition Education: Offered to provide low-sodium meal planning. Pt declined need for nutrition education as she has received nutrition education in the past. Pt was aware of her low-sodium diet restriction but explained her fluid restriction. Provided the following handouts on first attempt when pt was busy: How to Read Nutrition Labels, Managing Fluid Restriction, and Nutrition Care Manual handout on Low Sodium Nutrition Therapy.      Goals:    Pt will verbalize at least five high sodium foods and the importance of avoiding added salt to foods for cooking or seasoning foods.     Follow-up:   Patient to ask any further nutrition-related questions before discharge. In addition, pt may request outpatient RD appointment.    Mirta Stinson, MS, RD, LD, [facility]   6C Pgr: [phone]   "

## 2020-09-29 NOTE — PHARMACY-PHARMACOTHERAPY NOTE
Parenteral Prostacyclin Therapy Continuation     This patient has been admitted to the hospital while receiving outpatient Treprostinil (Remodulin) therapy for the treatment of pulmonary hypertension.      [employer] has been contacted at [phone] to verify outpatient prostacyclin regimen. Based on the latest records, the following applies to this patient's  prostacyclin therapy.    1. Type of ambulatory pump used:  CADD-Legacy (100 mL cassette)  2.  Treprostinil (Remodulin)  Dosing Weight= 56.3 kg  3.  Prostacyclin Concentration= 60,000 nanograms/mL if CADD Legacy  4.  Prostacyclin Dose = 30 nanograms/kg/min  5. Ambulatory Pump Rate = 41 mL/day if CADD Legacy    Plan:  The patient will be switched over to our hospital syringe pump for the duration of their admission.  We have entered the order into [facility] based on the patient s most recent dose.      Maria Tubbs, Pharmacy Resident

## 2020-09-30 ENCOUNTER — APPOINTMENT (OUTPATIENT)
Dept: OCCUPATIONAL THERAPY | Facility: CLINIC | Age: 80
DRG: 286 | End: 2020-09-30
Attending: INTERNAL MEDICINE
Payer: COMMERCIAL

## 2020-09-30 LAB
ALBUMIN SERPL-MCNC: 2.3 G/DL (ref 3.4–5)
ALP SERPL-CCNC: 89 U/L (ref 40–150)
ALT SERPL W P-5'-P-CCNC: 20 U/L (ref 0–50)
ANION GAP SERPL CALCULATED.3IONS-SCNC: 4 MMOL/L (ref 3–14)
ANION GAP SERPL CALCULATED.3IONS-SCNC: 4 MMOL/L (ref 3–14)
AST SERPL W P-5'-P-CCNC: 30 U/L (ref 0–45)
BASOPHILS # BLD AUTO: 0 10E9/L (ref 0–0.2)
BASOPHILS NFR BLD AUTO: 0.7 %
BILIRUB SERPL-MCNC: 0.5 MG/DL (ref 0.2–1.3)
BUN SERPL-MCNC: 30 MG/DL (ref 7–30)
BUN SERPL-MCNC: 34 MG/DL (ref 7–30)
CALCIUM SERPL-MCNC: 8.5 MG/DL (ref 8.5–10.1)
CALCIUM SERPL-MCNC: 8.9 MG/DL (ref 8.5–10.1)
CHLORIDE SERPL-SCNC: 102 MMOL/L (ref 94–109)
CHLORIDE SERPL-SCNC: 104 MMOL/L (ref 94–109)
CHOLEST SERPL-MCNC: 117 MG/DL
CO2 SERPL-SCNC: 29 MMOL/L (ref 20–32)
CO2 SERPL-SCNC: 30 MMOL/L (ref 20–32)
CREAT SERPL-MCNC: 1.14 MG/DL (ref 0.52–1.04)
CREAT SERPL-MCNC: 1.3 MG/DL (ref 0.52–1.04)
DIFFERENTIAL METHOD BLD: ABNORMAL
EOSINOPHIL # BLD AUTO: 0.2 10E9/L (ref 0–0.7)
EOSINOPHIL NFR BLD AUTO: 3.9 %
ERYTHROCYTE [DISTWIDTH] IN BLOOD BY AUTOMATED COUNT: 14.5 % (ref 10–15)
GFR SERPL CREATININE-BSD FRML MDRD: 39 ML/MIN/{1.73_M2}
GFR SERPL CREATININE-BSD FRML MDRD: 46 ML/MIN/{1.73_M2}
GLUCOSE SERPL-MCNC: 72 MG/DL (ref 70–99)
GLUCOSE SERPL-MCNC: 92 MG/DL (ref 70–99)
HCT VFR BLD AUTO: 30 % (ref 35–47)
HDLC SERPL-MCNC: 60 MG/DL
HGB BLD-MCNC: 9.4 G/DL (ref 11.7–15.7)
IMM GRANULOCYTES # BLD: 0 10E9/L (ref 0–0.4)
IMM GRANULOCYTES NFR BLD: 0.4 %
INR PPP: 2.8 (ref 0.86–1.14)
INTERPRETATION ECG - MUSE: NORMAL
LDLC SERPL CALC-MCNC: 47 MG/DL
LYMPHOCYTES # BLD AUTO: 0.6 10E9/L (ref 0.8–5.3)
LYMPHOCYTES NFR BLD AUTO: 13.9 %
MAGNESIUM SERPL-MCNC: 2.2 MG/DL (ref 1.6–2.3)
MAGNESIUM SERPL-MCNC: 2.2 MG/DL (ref 1.6–2.3)
MCH RBC QN AUTO: 31.4 PG (ref 26.5–33)
MCHC RBC AUTO-ENTMCNC: 31.3 G/DL (ref 31.5–36.5)
MCV RBC AUTO: 100 FL (ref 78–100)
MONOCYTES # BLD AUTO: 0.7 10E9/L (ref 0–1.3)
MONOCYTES NFR BLD AUTO: 14.4 %
NEUTROPHILS # BLD AUTO: 3.1 10E9/L (ref 1.6–8.3)
NEUTROPHILS NFR BLD AUTO: 66.7 %
NONHDLC SERPL-MCNC: 57 MG/DL
NRBC # BLD AUTO: 0 10*3/UL
NRBC BLD AUTO-RTO: 0 /100
PLATELET # BLD AUTO: 278 10E9/L (ref 150–450)
POTASSIUM SERPL-SCNC: 4.3 MMOL/L (ref 3.4–5.3)
POTASSIUM SERPL-SCNC: 4.4 MMOL/L (ref 3.4–5.3)
PROT SERPL-MCNC: 6.1 G/DL (ref 6.8–8.8)
RBC # BLD AUTO: 2.99 10E12/L (ref 3.8–5.2)
SODIUM SERPL-SCNC: 135 MMOL/L (ref 133–144)
SODIUM SERPL-SCNC: 137 MMOL/L (ref 133–144)
TRIGL SERPL-MCNC: 48 MG/DL
WBC # BLD AUTO: 4.6 10E9/L (ref 4–11)

## 2020-09-30 PROCEDURE — 80061 LIPID PANEL: CPT | Performed by: INTERNAL MEDICINE

## 2020-09-30 PROCEDURE — 25000132 ZZH RX MED GY IP 250 OP 250 PS 637: Performed by: STUDENT IN AN ORGANIZED HEALTH CARE EDUCATION/TRAINING PROGRAM

## 2020-09-30 PROCEDURE — 99233 SBSQ HOSP IP/OBS HIGH 50: CPT | Mod: GC | Performed by: INTERNAL MEDICINE

## 2020-09-30 PROCEDURE — 36415 COLL VENOUS BLD VENIPUNCTURE: CPT | Performed by: STUDENT IN AN ORGANIZED HEALTH CARE EDUCATION/TRAINING PROGRAM

## 2020-09-30 PROCEDURE — 80053 COMPREHEN METABOLIC PANEL: CPT | Performed by: INTERNAL MEDICINE

## 2020-09-30 PROCEDURE — 25000128 H RX IP 250 OP 636: Performed by: STUDENT IN AN ORGANIZED HEALTH CARE EDUCATION/TRAINING PROGRAM

## 2020-09-30 PROCEDURE — 83735 ASSAY OF MAGNESIUM: CPT | Performed by: INTERNAL MEDICINE

## 2020-09-30 PROCEDURE — 97535 SELF CARE MNGMENT TRAINING: CPT | Mod: GO | Performed by: OCCUPATIONAL THERAPIST

## 2020-09-30 PROCEDURE — 25000132 ZZH RX MED GY IP 250 OP 250 PS 637: Performed by: INTERNAL MEDICINE

## 2020-09-30 PROCEDURE — 85025 COMPLETE CBC W/AUTO DIFF WBC: CPT | Performed by: INTERNAL MEDICINE

## 2020-09-30 PROCEDURE — 36415 COLL VENOUS BLD VENIPUNCTURE: CPT | Performed by: INTERNAL MEDICINE

## 2020-09-30 PROCEDURE — 27210995 ZZH RX 272: Performed by: INTERNAL MEDICINE

## 2020-09-30 PROCEDURE — 80048 BASIC METABOLIC PNL TOTAL CA: CPT | Performed by: STUDENT IN AN ORGANIZED HEALTH CARE EDUCATION/TRAINING PROGRAM

## 2020-09-30 PROCEDURE — 97530 THERAPEUTIC ACTIVITIES: CPT | Mod: GO | Performed by: OCCUPATIONAL THERAPIST

## 2020-09-30 PROCEDURE — 21400000 ZZH R&B CCU UMMC

## 2020-09-30 PROCEDURE — 85610 PROTHROMBIN TIME: CPT | Performed by: INTERNAL MEDICINE

## 2020-09-30 PROCEDURE — 25000128 H RX IP 250 OP 636: Performed by: INTERNAL MEDICINE

## 2020-09-30 PROCEDURE — 97165 OT EVAL LOW COMPLEX 30 MIN: CPT | Mod: GO | Performed by: OCCUPATIONAL THERAPIST

## 2020-09-30 PROCEDURE — 83735 ASSAY OF MAGNESIUM: CPT | Performed by: STUDENT IN AN ORGANIZED HEALTH CARE EDUCATION/TRAINING PROGRAM

## 2020-09-30 RX ORDER — NALOXONE HYDROCHLORIDE 0.4 MG/ML
.1-.4 INJECTION, SOLUTION INTRAMUSCULAR; INTRAVENOUS; SUBCUTANEOUS
Status: DISCONTINUED | OUTPATIENT
Start: 2020-09-30 | End: 2020-10-06 | Stop reason: HOSPADM

## 2020-09-30 RX ORDER — ACETAMINOPHEN 325 MG/1
975 TABLET ORAL EVERY 8 HOURS PRN
Status: DISCONTINUED | OUTPATIENT
Start: 2020-09-30 | End: 2020-09-30

## 2020-09-30 RX ORDER — FUROSEMIDE 10 MG/ML
80 INJECTION INTRAMUSCULAR; INTRAVENOUS ONCE
Status: COMPLETED | OUTPATIENT
Start: 2020-09-30 | End: 2020-09-30

## 2020-09-30 RX ORDER — ACETAMINOPHEN 325 MG/1
325 TABLET ORAL EVERY 6 HOURS PRN
COMMUNITY

## 2020-09-30 RX ORDER — WARFARIN SODIUM 3 MG/1
3 TABLET ORAL
Status: COMPLETED | OUTPATIENT
Start: 2020-09-30 | End: 2020-09-30

## 2020-09-30 RX ORDER — ACETAMINOPHEN 325 MG/1
650 TABLET ORAL EVERY 4 HOURS PRN
Status: DISCONTINUED | OUTPATIENT
Start: 2020-09-30 | End: 2020-10-06 | Stop reason: HOSPADM

## 2020-09-30 RX ORDER — FAMOTIDINE 20 MG/1
20 TABLET, FILM COATED ORAL DAILY
Status: DISCONTINUED | OUTPATIENT
Start: 2020-10-01 | End: 2020-10-06 | Stop reason: HOSPADM

## 2020-09-30 RX ORDER — OXYCODONE HYDROCHLORIDE 5 MG/1
5 TABLET ORAL EVERY 4 HOURS PRN
Status: DISCONTINUED | OUTPATIENT
Start: 2020-09-30 | End: 2020-09-30

## 2020-09-30 RX ADMIN — Medication 2 TABLET: at 08:44

## 2020-09-30 RX ADMIN — CETIRIZINE HYDROCHLORIDE 5 MG: 5 TABLET ORAL at 08:45

## 2020-09-30 RX ADMIN — IPRATROPIUM BROMIDE 2 SPRAY: 42 SPRAY NASAL at 08:45

## 2020-09-30 RX ADMIN — GABAPENTIN 300 MG: 300 CAPSULE ORAL at 20:52

## 2020-09-30 RX ADMIN — DIGOXIN 62.5 MCG: 0.06 TABLET ORAL at 08:44

## 2020-09-30 RX ADMIN — ACETAMINOPHEN 975 MG: 325 TABLET, FILM COATED ORAL at 00:51

## 2020-09-30 RX ADMIN — Medication 2.5 MG: at 18:02

## 2020-09-30 RX ADMIN — OXYCODONE HYDROCHLORIDE 5 MG: 5 TABLET ORAL at 05:13

## 2020-09-30 RX ADMIN — FLUTICASONE FUROATE 1 PUFF: 100 POWDER RESPIRATORY (INHALATION) at 08:45

## 2020-09-30 RX ADMIN — GABAPENTIN 300 MG: 300 CAPSULE ORAL at 14:38

## 2020-09-30 RX ADMIN — OXYCODONE HYDROCHLORIDE AND ACETAMINOPHEN 500 MG: 500 TABLET ORAL at 08:45

## 2020-09-30 RX ADMIN — FUROSEMIDE 80 MG: 10 INJECTION, SOLUTION INTRAVENOUS at 14:38

## 2020-09-30 RX ADMIN — HYDROXYCHLOROQUINE SULFATE 200 MG: 200 TABLET, FILM COATED ORAL at 08:45

## 2020-09-30 RX ADMIN — ACETAMINOPHEN 650 MG: 325 TABLET, FILM COATED ORAL at 23:25

## 2020-09-30 RX ADMIN — ACETAMINOPHEN 650 MG: 325 TABLET, FILM COATED ORAL at 19:06

## 2020-09-30 RX ADMIN — TREPROSTINIL 30 NG/KG/MIN: 20 INJECTION, SOLUTION INTRAVENOUS; SUBCUTANEOUS at 22:43

## 2020-09-30 RX ADMIN — WARFARIN SODIUM 3 MG: 3 TABLET ORAL at 18:02

## 2020-09-30 RX ADMIN — IPRATROPIUM BROMIDE 2 SPRAY: 42 SPRAY NASAL at 20:51

## 2020-09-30 RX ADMIN — Medication 2.5 MG: at 00:51

## 2020-09-30 RX ADMIN — GABAPENTIN 300 MG: 300 CAPSULE ORAL at 08:44

## 2020-09-30 RX ADMIN — ACETAMINOPHEN 650 MG: 325 TABLET, FILM COATED ORAL at 08:51

## 2020-09-30 RX ADMIN — Medication 2.5 MG: at 08:51

## 2020-09-30 ASSESSMENT — PAIN DESCRIPTION - DESCRIPTORS
DESCRIPTORS: SORE
DESCRIPTORS: DISCOMFORT
DESCRIPTORS: DISCOMFORT;SHOOTING
DESCRIPTORS: SORE
DESCRIPTORS: DISCOMFORT
DESCRIPTORS: SHOOTING
DESCRIPTORS: SORE;ACHING;SHARP

## 2020-09-30 ASSESSMENT — ACTIVITIES OF DAILY LIVING (ADL)
PREVIOUS_RESPONSIBILITIES: MEAL PREP;HOUSEKEEPING;LAUNDRY;SHOPPING
ADLS_ACUITY_SCORE: 17
ADLS_ACUITY_SCORE: 16
ADLS_ACUITY_SCORE: 15
ADLS_ACUITY_SCORE: 17
ADLS_ACUITY_SCORE: 17
ADLS_ACUITY_SCORE: 16

## 2020-09-30 ASSESSMENT — MIFFLIN-ST. JEOR: SCORE: 951.27

## 2020-09-30 NOTE — PLAN OF CARE
D/A/I:  Patient A&O x4, denied palpitations, dizziness, and nausea.  Had orthopnea, fine crackles auscultated in lung bases, split S1 noted.  Had 2-3+ edema in legs and feet, was given scheduled IV furosemide.  Patient had good urine output via wilkins catheter, see I&O flowsheet.  Left medial knee hematoma marked to measure swelling/spread of ecchymosis.  In sinus rhythm with intermittent tachycardia, HR 80s-100s, SBP 110s-130s, SaO2 93-98% on 1L O2 via nasal cannula.  Treprostinil infusion continued at 30 ng/kg/min (1.69 ml/hr).  Potassium and magnesium replaced per protocol.  Reported bilateral lower leg tenderness with palpation, legs elevated on pillow.  Echocardiogram, EKG, bilateral leg US, and chest X-ray performed during shift, see Chart Review for results.  Transferred to bedside commode with 1-person assist.    P:  Continue to monitor pain, VS, heart rhythm, LVAD function, drive line site integrity, fluid status, bowel status, cardiac and respiratory status.  Notify care team of changes in patient condition or other concerns.  Continue diuresis.

## 2020-09-30 NOTE — PLAN OF CARE
Discharge Planner OT   Patient plan for discharge: Home  Current status: Eval completed. Pt completing bed mobility with SBA. Pt sitting at EOB with good sitting balance. Pt requiring max A to don shoes. Pt completing STS with min A and FWW. Pt ambulating ~5 steps forward. Pt turning to sit on bedside commode, sitting with FWW, grab bars and CGA. Pt completing STS from bedside commode with FWW, grab bars and CGA. Pt ambulating ~25ft in room with FWW and CGA. Pt completing stand to EOB with CGA. Pt doffing shoes IND. Pt completing EOB to supine with VC for positioning in bed.  Barriers to return to prior living situation: Pain, deconditioning  Recommendations for discharge: Home and assist and OP CR  Rationale for recommendations: Pt will likely benefit from continuing skilled CR services to increase endurance and strength for increased independence in ADL and IADL.       Entered by: Migdalia Miles 09/30/2020 3:08 PM

## 2020-09-30 NOTE — PROGRESS NOTES
Care Coordinator Progress Note    Admission Date/Time:  9/29/2020  Attending MD:  Osiris Luong MD    Data  Chart reviewed, discussed with interdisciplinary team.   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    Assessment  Concerns with insurance coverage for discharge needs: None.  Current Living Situation: Patient lives with spouse, Don. Pt states she is independent with her cares.   Support System: Supportive and Involved spouse  Services Involved: [facility] (home IV remodulin), [facility].   Transportation at Discharge: Family or friend will provide  Transportation to Medical Appointments: Spouse or self  Barriers to Discharge: Medical plan of care, IV diuresis    Coordination of Care and Referrals: Provided patient/family with options for resumption of home infusion.  Pt states she lives at her fifth wheel in [place], [place] during the summer and then at their home in [place] for the winter. Pt stated that her  is in the process of transitioning to home now. Pt confirmed that her home IV remodulin is supplied through [employer] and that her  just arranged new shipment of medication/supplies and that he will be able to bring medication/pump when pt ready for discharge.  Pt currently on oxygen inpatient, but states she is not on home O2 at this time.   OT eval pending. Pt states that her breathing has been more difficult in the past few weeks with activity and is hoping that with diuresis that her mobility will improve. Pt also had a recent fall and has L knee pain.     Intervention  Orders placed for [facility] (Ph: [phone], F: [phone]) for resumption of home IV remodulin and line care supplies.     Plan  Anticipated Discharge Date: TBD  Anticipated Discharge Plan: Discharge to home with  resumption of home infusion  CC will continue to monitor patient's medical condition and progress towards discharge.  Radha Templeton RN BSN  6C Unit Care Coordinator  Phone number: [phone]  Pager: [phone]

## 2020-09-30 NOTE — PROGRESS NOTES
09/30/20 1300   Quick Adds   Type of Visit Initial Occupational Therapy Evaluation   Living Environment   Lives With spouse   Living Arrangements house   Home Accessibility stairs within home;stairs to enter home   Number of Stairs, Main Entrance 7   Stair Railings, Main Entrance railings safe and in good condition   Number of Stairs, Within Home, Primary 7   Stair Railings, Within Home, Primary railings safe and in good condition   Transportation Anticipated family or friend will provide   Living Environment Comment Pt reports having flight of stairs into house, has split level, both walk in and tub shower, laundry downstairs   Self-Care   Usual Activity Tolerance moderate   Current Activity Tolerance poor   Regular Exercise No   Equipment Currently Used at Home cane, straight;shower chair;walker, rolling   Activity/Exercise/Self-Care Comment Pt reports being active but limited recently due to fall and knee paiin, has equipment at home if needed from family   Functional Level   Ambulation 0-->independent   Transferring 0-->independent   Toileting 0-->independent   Bathing 0-->independent   Dressing 0-->independent   Eating 0-->independent   Communication 0-->understands/communicates without difficulty   Swallowing 0-->swallows foods/liquids without difficulty   Cognition 0 - no cognition issues reported   Fall history within last six months yes   Number of times patient has fallen within last six months 1   Which of the above functional risks had a recent onset or change? ambulation;transferring;toileting;bathing;dressing   General Information   Onset of Illness/Injury or Date of Surgery - Date 09/29/20   Referring Physician Caden Weeks   Patient/Family Goals Statement To get home, move better   Additional Occupational Profile Info/Pertinent History of Current Problem Karen Anderson is a 80 y.o. F w/ history of CTEPH, severe RV dysfunction, hx DVT on warfarin, pulmonary MAC, CKD, breast cancer s/p  chemotherapy, hormone therapy, and mastectomy, HTN, RA, and asthma who presents as a transfer from outside hospital for shortness of breath concerning for acute heart failure exacerbation.   Precautions/Limitations no known precautions/limitations   Weight-Bearing Status - LUE full weight-bearing   Weight-Bearing Status - RUE full weight-bearing   Weight-Bearing Status - LLE weight-bearing as tolerated   Weight-Bearing Status - RLE full weight-bearing   Visual Perception   Visual Perception No deficits were identified   Pain Assessment   Patient Currently in Pain Yes, see Vital Sign flowsheet   Transfer Skill: Toilet Transfer   Level of [place]: Toilet minimum assist (75% patients effort)   Physical Assist/Nonphysical Assist: Toilet verbal cues;1 person assist   Assistive Device standard walker;grab bars   Instrumental Activities of Daily Living (IADL)   Previous Responsibilities meal prep;housekeeping;laundry;shopping   Activities of Daily Living Analysis   Impairments Contributing to Impaired Activities of Daily Living pain;strength decreased   ADL Comments Pt IND in ADL at baseline   General Therapy Interventions   Planned Therapy Interventions ADL retraining;IADL retraining;ROM;stretching;strengthening;transfer training;home program guidelines;progressive activity/exercise   Clinical Impression   Criteria for Skilled Therapeutic Interventions Met yes, treatment indicated   OT Diagnosis Decreased ADL independence   Influenced by the following impairments Pain, deconditioning   Assessment of Occupational Performance 3-5 Performance Deficits   Identified Performance Deficits Dressing, bathing, g/h, toileting   Clinical Decision Making (Complexity) Moderate complexity   Therapy Frequency 5x/week   Predicted Duration of Therapy Intervention (days/wks) 1 week   Anticipated Discharge Disposition Home with Assist;Home with Outpatient Therapy   Risks and Benefits of Treatment have been explained. Yes   Patient,  "Family & other staff in agreement with plan of care Yes   Clinical Impression Comments Pt presents with deconditioning and L knee pain. Pt will likely benefit from skilled OT/CR services to increase activity tolerance,  functional endurance and strength.   [facility] AM-PAC  \"6 Clicks\" Daily Activity Inpatient Short Form   1. Putting on and taking off regular lower body clothing? 3 - A Little   2. Bathing (including washing, rinsing, drying)? 3 - A Little   3. Toileting, which includes using toilet, bedpan or urinal? 3 - A Little   4. Putting on and taking off regular upper body clothing? 4 - None   5. Taking care of personal grooming such as brushing teeth? 4 - None   6. Eating meals? 4 - None   Daily Activity Raw Score (Score out of 24.Lower scores equate to lower levels of function) 21   Total Evaluation Time   Total Evaluation Time (Minutes) 10     "

## 2020-09-30 NOTE — PROGRESS NOTES
"SPIRITUAL HEALTH SERVICES  SPIRITUAL ASSESSMENT Progress Note  [facility] ([place] 6C   ON-CALL VISIT    REFERRAL SOURCE: admission request     Pt did not recall asking for SHS at admission, but welcomed my visit. Pt shared that she relies on prayer to help her cope with own illness  -- \"what else is there?\" and welcomed me praying with her. Asked for prayer for her own hospital stay, family, friends, and \"the whole world.\" After I closed my prayer, she prayed for me and my ministry here at the hospital.    PLAN: Notify unit  of care provided. SHS remains available for patient/family/staff care.    Negin Brizuela  Chaplain Resident  Pager: [phone]    "

## 2020-09-30 NOTE — PLAN OF CARE
D: Pt admit 8/29/20 from OSH for SOB c/f acute HF exacerbation. 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   VS: Pt BP's soft this AM 87/50, oral intake of fluids encouraged, Pt asymptomatic, MD notified. BP WNL on recheck. Will continue to monitor.1L NC.   Tele: SR  Pain: Pt had inadequate pain control throughout most of night. Pain located in pt L knee following fall. Pt very hesitant/unsure about taking medications for pain control. Pt had PRN tylenol x1, PRN oxycodone x2 w/some relief. Frequent repositioning provided. Heat/Ice packs offered but declined.   GI/: Urinating adequately into wilkins. No BM this shift.  Diet: 2g Na w/2L FR  IV/Drips: R CVC single lumen internal jugular infusing remodulin gtt 30 ng/hg/min. R PIV SL.   Activity: Ax1 w/gait belt and walker. Pt must be wearing shoes before OOB.   Skin/drains: large hematoma on L knee s/p fall. Pt turned q2hr or as allowed by pt for skin protection.     P: Plan to Continue to monitor pt status and report changes to Cards 2.     Simona Medina RN

## 2020-09-30 NOTE — PHARMACY-ADMISSION MEDICATION HISTORY
Admission medication history interview status for the 9/29/2020 admission is complete. See Epic admission navigator for allergy information, pharmacy, prior to admission medications and immunization status.     Medication history interview sources    Patient, fill history and anticoagulation note    Changes made to PTA medication list (reason)  Added: None  Deleted:   Cephalexin (patient finished 6 day course on 9/28/20)   Hydrocodone-acetaminophen (old rx per patient)  Changed:   Gabapentin from 300 mg morning, 300 mg noon and 600 evening to 300 mg three times daily (per patient and fill history)   Treprostinil dosing added (per patient and [facility])    Additional medication history information  Patient is a good historian and knows her medications and doses well. She completed a 6 day course of cephalexin 500 mg four times daily from 9/22/20 to 9/28/20. Anticoagulation note from 9/11/2020 stated patient is taking warfarin 3 mg on Tuesdays and Saturdays, 4.5 mg all other days and patient also endorsed this. Patient has up to date fills for all of her daily prescription medications per [facility].    Prior to Admission medications    Medication Sig Last Dose Taking? Auth Provider   acetaminophen (TYLENOL) 325 MG tablet Take 325 mg by mouth every 6 hours as needed for mild pain Past Week at Unknown time Yes Unknown, Entered By History   albuterol (PROAIR HFA/PROVENTIL HFA/VENTOLIN HFA) 108 (90 Base) MCG/ACT inhaler Inhale 2 puffs into the lungs every 4 hours as needed for shortness of breath / dyspnea or wheezing  Past Month at Unknown time Yes Reported, Patient   alendronate (FOSAMAX) 70 MG tablet Take 70 mg by mouth once a week 9/28/2020 Yes Reported, Patient   Ascorbic Acid (VITAMIN C) 500 MG CAPS Take 500 mg by mouth every morning  9/28/2020 at 0800 Yes Reported, Patient   aspirin (ASA) 81 MG tablet Take 81 mg by mouth daily  9/28/2020 at 2000 Yes Reported, Patient   beclomethasone HFA (QVAR REDIHALER) 80  MCG/ACT inhaler Inhale 2 puffs into the lungs 2 times daily 9/28/2020 Yes Unknown, Entered By History   calcium citrate-vitamin D (CALCIUM CITRATE + D3) 315-250 MG-UNIT TABS per tablet Take 2 tablets by mouth daily  9/28/2020 at 0800 Yes Reported, Patient   cetirizine (ZYRTEC) 10 MG tablet Take 10 mg by mouth daily 9/28/2020 at 2000 Yes Reported, Patient   digoxin (LANOXIN) 62.5 MCG tablet Take 1 tablet (62.5 mcg) by mouth daily 9/28/2020 at 0800 Yes Karolina Turcios MD   furosemide (LASIX) 20 MG tablet Take 1 tablet (20 mg) by mouth daily 9/28/2020 at 0800 Yes Shawna Marcum PA   gabapentin (NEURONTIN) 300 MG capsule Take 300 mg by mouth 3 times daily  9/28/2020 at 2000 Yes Shawna Marcum PA   hydroxychloroquine (PLAQUENIL) 200 MG tablet Take 200 mg by mouth daily  9/28/2020 at 0800 Yes Reported, Patient   ipratropium (ATROVENT) 0.06 % nasal spray Spray 2 sprays into both nostrils 3 times daily  9/28/2020 at 2000 Yes Unknown, Entered By History   lisinopril (ZESTRIL) 20 MG tablet Take 0.5 tablets (10 mg) by mouth daily 9/28/2020 at 2000 Yes Shawna Marcum PA   loperamide (IMODIUM) 2 MG capsule Take 2 mg by mouth as needed for diarrhea 9/28/2020 at 1900 Yes Reported, Patient   Probiotic Product (PROBIOTIC ACIDOPHILUS BIOBEADS) CAPS Take 1 capsule by mouth 2 times daily  9/28/2020 at 1530 Yes Reported, Patient   Treprostinil (REMODULIN IJ) Dose: 30 ng/kg/min  Vial concentration: 1 mg/mL  Final concentration: 60,000 ng/mL  Dosing weight: 56.3 kg   Pump rate: 41 mL/day  Per patient on 9/29/2020 9/29/2020 at Unknown time Yes Reported, Patient   warfarin ANTICOAGULANT (COUMADIN) 3 MG tablet Take 1 to 2 tablets daily as directed by the Anticoagulation Clinic.  Patient taking differently: Take 3 mg PO on Tues and Sat, and 4.5 mg all other days of the week 9/28/2020 at 1840 Yes Osiris Luong MD     Medication history completed by: Asha Heard, PharmD Student

## 2020-09-30 NOTE — PHARMACY-ANTICOAGULATION SERVICE
Clinical Pharmacy - Warfarin Dosing Consult     Pharmacy has been consulted to manage this patient s warfarin therapy.  Indication: Other - specify in comments(pulmonary HTN)  Therapy Goal: INR 2-3  Provider/Team: Adryan Durant Clinic: [facility]  Warfarin Prior to Admission: Yes  Warfarin PTA Regimen: 3 mg TUE, SAT and 4.5 mg ROW  Significant drug interactions: aspirin    INR   Date Value Ref Range Status   09/29/2020 2.50 (H) 0.86 - 1.14 Final   09/10/2020 2.2 (A) 0.90 - 1.10 Final       Recommend warfarin 3 mg today.  Pharmacy will monitor Karen JACKY Moralescharbel daily and order warfarin doses to achieve specified goal.      Please contact pharmacy as soon as possible if the warfarin needs to be held for a procedure or if the warfarin goals change.

## 2020-10-01 ENCOUNTER — APPOINTMENT (OUTPATIENT)
Dept: OCCUPATIONAL THERAPY | Facility: CLINIC | Age: 80
DRG: 286 | End: 2020-10-01
Attending: INTERNAL MEDICINE
Payer: COMMERCIAL

## 2020-10-01 ENCOUNTER — APPOINTMENT (OUTPATIENT)
Dept: PHYSICAL THERAPY | Facility: CLINIC | Age: 80
DRG: 286 | End: 2020-10-01
Attending: STUDENT IN AN ORGANIZED HEALTH CARE EDUCATION/TRAINING PROGRAM
Payer: COMMERCIAL

## 2020-10-01 LAB
ANION GAP SERPL CALCULATED.3IONS-SCNC: 4 MMOL/L (ref 3–14)
ANION GAP SERPL CALCULATED.3IONS-SCNC: 4 MMOL/L (ref 3–14)
BASOPHILS # BLD AUTO: 0 10E9/L (ref 0–0.2)
BASOPHILS NFR BLD AUTO: 0.7 %
BUN SERPL-MCNC: 24 MG/DL (ref 7–30)
BUN SERPL-MCNC: 28 MG/DL (ref 7–30)
CALCIUM SERPL-MCNC: 8.8 MG/DL (ref 8.5–10.1)
CALCIUM SERPL-MCNC: 9.1 MG/DL (ref 8.5–10.1)
CHLORIDE SERPL-SCNC: 100 MMOL/L (ref 94–109)
CHLORIDE SERPL-SCNC: 101 MMOL/L (ref 94–109)
CO2 SERPL-SCNC: 30 MMOL/L (ref 20–32)
CO2 SERPL-SCNC: 32 MMOL/L (ref 20–32)
CREAT SERPL-MCNC: 0.97 MG/DL (ref 0.52–1.04)
CREAT SERPL-MCNC: 1.02 MG/DL (ref 0.52–1.04)
DIFFERENTIAL METHOD BLD: ABNORMAL
EOSINOPHIL # BLD AUTO: 0.2 10E9/L (ref 0–0.7)
EOSINOPHIL NFR BLD AUTO: 5.2 %
ERYTHROCYTE [DISTWIDTH] IN BLOOD BY AUTOMATED COUNT: 14.2 % (ref 10–15)
GFR SERPL CREATININE-BSD FRML MDRD: 53 ML/MIN/{1.73_M2}
GFR SERPL CREATININE-BSD FRML MDRD: 56 ML/MIN/{1.73_M2}
GLUCOSE SERPL-MCNC: 88 MG/DL (ref 70–99)
GLUCOSE SERPL-MCNC: 91 MG/DL (ref 70–99)
HCT VFR BLD AUTO: 34.8 % (ref 35–47)
HGB BLD-MCNC: 10.6 G/DL (ref 11.7–15.7)
IMM GRANULOCYTES # BLD: 0 10E9/L (ref 0–0.4)
IMM GRANULOCYTES NFR BLD: 0.5 %
INR PPP: 3.13 (ref 0.86–1.14)
LYMPHOCYTES # BLD AUTO: 0.6 10E9/L (ref 0.8–5.3)
LYMPHOCYTES NFR BLD AUTO: 13.3 %
MAGNESIUM SERPL-MCNC: 2 MG/DL (ref 1.6–2.3)
MAGNESIUM SERPL-MCNC: 2.5 MG/DL (ref 1.6–2.3)
MCH RBC QN AUTO: 30.8 PG (ref 26.5–33)
MCHC RBC AUTO-ENTMCNC: 30.5 G/DL (ref 31.5–36.5)
MCV RBC AUTO: 101 FL (ref 78–100)
MONOCYTES # BLD AUTO: 0.5 10E9/L (ref 0–1.3)
MONOCYTES NFR BLD AUTO: 12.6 %
NEUTROPHILS # BLD AUTO: 2.9 10E9/L (ref 1.6–8.3)
NEUTROPHILS NFR BLD AUTO: 67.7 %
NRBC # BLD AUTO: 0 10*3/UL
NRBC BLD AUTO-RTO: 0 /100
PLATELET # BLD AUTO: 292 10E9/L (ref 150–450)
POTASSIUM SERPL-SCNC: 4.1 MMOL/L (ref 3.4–5.3)
POTASSIUM SERPL-SCNC: 4.3 MMOL/L (ref 3.4–5.3)
RBC # BLD AUTO: 3.44 10E12/L (ref 3.8–5.2)
SODIUM SERPL-SCNC: 134 MMOL/L (ref 133–144)
SODIUM SERPL-SCNC: 137 MMOL/L (ref 133–144)
WBC # BLD AUTO: 4.3 10E9/L (ref 4–11)

## 2020-10-01 PROCEDURE — 97110 THERAPEUTIC EXERCISES: CPT | Mod: GO | Performed by: OCCUPATIONAL THERAPIST

## 2020-10-01 PROCEDURE — 80048 BASIC METABOLIC PNL TOTAL CA: CPT | Performed by: INTERNAL MEDICINE

## 2020-10-01 PROCEDURE — 85610 PROTHROMBIN TIME: CPT | Performed by: INTERNAL MEDICINE

## 2020-10-01 PROCEDURE — 250N000013 HC RX MED GY IP 250 OP 250 PS 637: Performed by: STUDENT IN AN ORGANIZED HEALTH CARE EDUCATION/TRAINING PROGRAM

## 2020-10-01 PROCEDURE — 250N000011 HC RX IP 250 OP 636: Performed by: STUDENT IN AN ORGANIZED HEALTH CARE EDUCATION/TRAINING PROGRAM

## 2020-10-01 PROCEDURE — 99233 SBSQ HOSP IP/OBS HIGH 50: CPT | Mod: GC | Performed by: INTERNAL MEDICINE

## 2020-10-01 PROCEDURE — 97535 SELF CARE MNGMENT TRAINING: CPT | Mod: GO | Performed by: OCCUPATIONAL THERAPIST

## 2020-10-01 PROCEDURE — 85025 COMPLETE CBC W/AUTO DIFF WBC: CPT | Performed by: INTERNAL MEDICINE

## 2020-10-01 PROCEDURE — 83735 ASSAY OF MAGNESIUM: CPT | Performed by: INTERNAL MEDICINE

## 2020-10-01 PROCEDURE — 214N000001 HC R&B CCU UMMC

## 2020-10-01 PROCEDURE — 83735 ASSAY OF MAGNESIUM: CPT | Performed by: STUDENT IN AN ORGANIZED HEALTH CARE EDUCATION/TRAINING PROGRAM

## 2020-10-01 PROCEDURE — 36415 COLL VENOUS BLD VENIPUNCTURE: CPT | Performed by: INTERNAL MEDICINE

## 2020-10-01 PROCEDURE — 97162 PT EVAL MOD COMPLEX 30 MIN: CPT | Mod: GP

## 2020-10-01 PROCEDURE — 258N000003 HC RX IP 258 OP 636: Performed by: INTERNAL MEDICINE

## 2020-10-01 PROCEDURE — 36415 COLL VENOUS BLD VENIPUNCTURE: CPT | Performed by: STUDENT IN AN ORGANIZED HEALTH CARE EDUCATION/TRAINING PROGRAM

## 2020-10-01 PROCEDURE — 999N000128 HC STATISTIC PERIPHERAL IV START W/O US GUIDANCE

## 2020-10-01 PROCEDURE — 80048 BASIC METABOLIC PNL TOTAL CA: CPT | Performed by: STUDENT IN AN ORGANIZED HEALTH CARE EDUCATION/TRAINING PROGRAM

## 2020-10-01 PROCEDURE — 97116 GAIT TRAINING THERAPY: CPT | Mod: GP

## 2020-10-01 PROCEDURE — 97530 THERAPEUTIC ACTIVITIES: CPT | Mod: GP

## 2020-10-01 PROCEDURE — 250N000011 HC RX IP 250 OP 636: Performed by: INTERNAL MEDICINE

## 2020-10-01 RX ORDER — FUROSEMIDE 10 MG/ML
80 INJECTION INTRAMUSCULAR; INTRAVENOUS ONCE
Status: COMPLETED | OUTPATIENT
Start: 2020-10-01 | End: 2020-10-01

## 2020-10-01 RX ORDER — OXYCODONE HYDROCHLORIDE 5 MG/1
5 TABLET ORAL EVERY 6 HOURS PRN
Status: DISCONTINUED | OUTPATIENT
Start: 2020-10-01 | End: 2020-10-06 | Stop reason: HOSPADM

## 2020-10-01 RX ORDER — HEPARIN SODIUM,PORCINE 10 UNIT/ML
2-5 VIAL (ML) INTRAVENOUS
Status: ACTIVE | OUTPATIENT
Start: 2020-10-01 | End: 2020-10-04

## 2020-10-01 RX ORDER — LIDOCAINE 40 MG/G
CREAM TOPICAL
Status: ACTIVE | OUTPATIENT
Start: 2020-10-01 | End: 2020-10-04

## 2020-10-01 RX ADMIN — OXYCODONE HYDROCHLORIDE AND ACETAMINOPHEN 500 MG: 500 TABLET ORAL at 09:47

## 2020-10-01 RX ADMIN — OXYCODONE HYDROCHLORIDE 5 MG: 5 TABLET ORAL at 18:49

## 2020-10-01 RX ADMIN — FUROSEMIDE 80 MG: 10 INJECTION, SOLUTION INTRAVENOUS at 18:28

## 2020-10-01 RX ADMIN — GABAPENTIN 300 MG: 300 CAPSULE ORAL at 20:16

## 2020-10-01 RX ADMIN — Medication 2.5 MG: at 05:40

## 2020-10-01 RX ADMIN — ACETAMINOPHEN 650 MG: 325 TABLET, FILM COATED ORAL at 15:49

## 2020-10-01 RX ADMIN — FAMOTIDINE 20 MG: 20 TABLET ORAL at 09:47

## 2020-10-01 RX ADMIN — CETIRIZINE HYDROCHLORIDE 5 MG: 5 TABLET ORAL at 09:48

## 2020-10-01 RX ADMIN — ACETAMINOPHEN 650 MG: 325 TABLET, FILM COATED ORAL at 10:08

## 2020-10-01 RX ADMIN — IPRATROPIUM BROMIDE 2 SPRAY: 42 SPRAY NASAL at 09:46

## 2020-10-01 RX ADMIN — Medication 2 TABLET: at 09:47

## 2020-10-01 RX ADMIN — GABAPENTIN 300 MG: 300 CAPSULE ORAL at 09:47

## 2020-10-01 RX ADMIN — DIGOXIN 62.5 MCG: 0.06 TABLET ORAL at 09:47

## 2020-10-01 RX ADMIN — ACETAMINOPHEN 650 MG: 325 TABLET, FILM COATED ORAL at 23:03

## 2020-10-01 RX ADMIN — HYDROXYCHLOROQUINE SULFATE 200 MG: 200 TABLET, FILM COATED ORAL at 09:48

## 2020-10-01 RX ADMIN — IPRATROPIUM BROMIDE 2 SPRAY: 42 SPRAY NASAL at 20:16

## 2020-10-01 RX ADMIN — FUROSEMIDE 10 MG/HR: 10 INJECTION, SOLUTION INTRAVENOUS at 18:58

## 2020-10-01 RX ADMIN — GABAPENTIN 300 MG: 300 CAPSULE ORAL at 15:49

## 2020-10-01 RX ADMIN — ACETAMINOPHEN 650 MG: 325 TABLET, FILM COATED ORAL at 04:58

## 2020-10-01 RX ADMIN — FLUTICASONE FUROATE 1 PUFF: 100 POWDER RESPIRATORY (INHALATION) at 09:46

## 2020-10-01 RX ADMIN — MAGNESIUM SULFATE 2 G: 2 INJECTION INTRAVENOUS at 09:45

## 2020-10-01 RX ADMIN — Medication 2.5 MG: at 11:36

## 2020-10-01 ASSESSMENT — PAIN DESCRIPTION - DESCRIPTORS
DESCRIPTORS: SHARP
DESCRIPTORS: DISCOMFORT
DESCRIPTORS: SHARP
DESCRIPTORS: DISCOMFORT

## 2020-10-01 ASSESSMENT — ACTIVITIES OF DAILY LIVING (ADL)
ADLS_ACUITY_SCORE: 16

## 2020-10-01 ASSESSMENT — MIFFLIN-ST. JEOR: SCORE: 940.38

## 2020-10-01 NOTE — PROGRESS NOTES
10/01/20 1100   Quick Adds   Type of Visit Initial PT Evaluation   Living Environment   People in home spouse   Current Living Arrangements house   Home Accessibility stairs within home   Number of Stairs, Main Entrance none   Number of Stairs, Within Home, Primary other (see comments)  (7+7)   Stair Railings, Within Home, Primary railing on left side (ascending);railings on both sides of stairs  (7 steps (with L HR) + landing + 7 ([place] HR))   Transportation Anticipated family or friend will provide;car, drives self   Self-Care   Usual Activity Tolerance good   Current Activity Tolerance fair   Regular Exercise No   Equipment Currently Used at Home cane, straight;crutches  (owns 4WW but does not use)   Activity/Exercise/Self-Care Comment Patient is IND at baseline, has had more difficulty moving around since fall on 9/6, now has pain in LLE, using crutches for mobility.   Disability/Function   Hearing Difficulty or Deaf yes   Use of hearing assistive devices bilateral hearing aids   Wear Glasses or Blind yes   Vision Management glasses   Walking or Climbing Stairs Difficulty yes   Walking or Climbing Stairs ambulation difficulty, requires equipment;stair climbing difficulty, requires equipment   Fall history within last six months yes   Number of times patient has fallen within last six months 1   General Information   Onset of Illness/Injury or Date of Surgery 09/29/20   Referring Physician Nicholas Flores MD   Patient/Family Therapy Goals Statement (PT) pain management, to return home   Pertinent History of Current Problem (include personal factors and/or comorbidities that impact the POC) Karen Anderson is a 80 y.o. F w/ history of CTEPH, severe RV dysfunction, hx DVT on warfarin, pulmonary MAC, CKD, breast cancer s/p chemotherapy, hormone therapy, and mastectomy, HTN, RA, and asthma who presents as a transfer from outside hospital for shortness of breath concerning for acute heart failure exacerbation.    Existing Precautions/Restrictions fall;brace worn when out of bed  (orthotic shoe on Left)   Cognition   Orientation Status (Cognition) oriented x 4   Pain Assessment   Patient Currently in Pain Yes, see Vital Sign flowsheet   Integumentary/Edema   Integumentary/Edema Comments BLE edema, bruising/edema to L knee, skin discoloration from L knee distally   Posture    Posture Kyphosis;Scoliosis;Forward head position;Protracted shoulders   Range of Motion (ROM)   ROM Quick Adds ROM deficits secondary to swelling;ROM deficits secondary to pain;Knee, Left;Ankle, Left   Strength   Manual Muscle Testing Quick Adds Strength WFL   Strength Comments generalized weakness due to deconditioning/pain,    Bed Mobility   Bed Mobility supine-sit   Supine-Sit [place] (Bed Mobility) contact guard   Bed Mobility Limitations decreased ability to use legs for bridging/pushing   Impairments Contributing to Impaired Bed Mobility pain;decreased ROM  (left LE)   Assistive Device (Bed Mobility) bed rails   Transfers   Transfers sit-stand transfer   Impairments Contributing to Impaired Transfers pain;decreased ROM;decreased strength  (Left LE)   Sit-Stand Transfer   Sit-Stand [place] (Transfers) contact guard   Assistive Device (Sit-Stand Transfers) walker, front-wheeled   Gait/Stairs (Locomotion)   [place] Level (Gait) contact guard   Assistive Device (Gait) walker, front-wheeled   Pattern (Gait) step-to   Deviations/Abnormal Patterns (Gait) antalgic;av decreased   Balance   Balance Comments Requires FWW for stability with ambulation   Clinical Impression   Criteria for Skilled Therapeutic Intervention yes, treatment indicated   PT Diagnosis (PT) impaired functional mobility   Influenced by the following impairments strength, balance, pain, ROM, activity tolerance   Functional limitations due to impairments bed mobility, transfers, gait, stairs, functional endurance   Clinical Presentation Evolving/Changing   Clinical  "Presentation Rationale PMH/comorbidities, clinical judgement, personal factors   Clinical Decision Making (Complexity) moderate complexity   Therapy Frequency (PT) 6x/week   Predicted Duration of Therapy Intervention (days/wks) 1 week   Planned Therapy Interventions (PT) gait training;bed mobility training;balance training;home exercise program;patient/family education;stair training;strengthening;transfer training   Risk & Benefits of therapy have been explained evaluation/treatment results reviewed;care plan/treatment goals reviewed;risks/benefits reviewed;current/potential barriers reviewed;participants voiced agreement with care plan;participants included;patient   PT Discharge Planning    PT Discharge Recommendation (DC Rec) home with assist   PT Rationale for DC Rec Patient is below baseline for mobility but able to transfer/ambulate with AD, primarily limited by pain/edema in LLE. Will have assist from family at home as needed.   [facility] [employer]-PAC TM \"6 Clicks\"   2016, Trustees of [facility], under license to [employer].  All rights reserved.   6 Clicks Short Forms Basic Mobility Inpatient Short Form   [facility] AM-PAC  \"6 Clicks\" V.2 Basic Mobility Inpatient Short Form   1. Turning from your back to your side while in a flat bed without using bedrails? 4 - None   2. Moving from lying on your back to sitting on the side of a flat bed without using bedrails? 4 - None   3. Moving to and from a bed to a chair (including a wheelchair)? 4 - None   4. Standing up from a chair using your arms (e.g., wheelchair, or bedside chair)? 4 - None   5. To walk in hospital room? 4 - None   6. Climbing 3-5 steps with a railing? 3 - A Little   Basic Mobility Raw Score (Score out of 24.Lower scores equate to lower levels of function) 23   Plan of Care Review   Plan of Care Reviewed With patient   Total Evaluation Time   Total Evaluation Time (Minutes) 6     "

## 2020-10-01 NOTE — PLAN OF CARE
D: Pt admit 8/29/20 from OSH for SOB c/f acute HF exacerbation. PMH CTEPH, severe RV dysfunction, DVT on warfarin, pulmonary MAC, CKD, breast cancer s/p chemotherapy, hormone therapy, mastectomy (do not use pt L arm for BP's), HTN, RA, asthma     I/A:   Neuro: A&Ox4. Pt very pleasant.  VS: VSS. 2L NC w/humidification   Tele: SR  Pain: pain in BLE somewhat controlled w/PRN tylenol x2 and oxycodone x1.  GI/: Urinating adequately into wilkins. No BM this shift.  Diet: 2g Na w/2L FR  IV/Drips: R CVC single lumen internal jugular infusing remodulin gtt 30 ng/hg/min. R PIV SL.   Activity: Ax1 w/gait belt and walker. Pt must be wearing shoes before OOB.   Skin/drains: large hematoma on L knee s/p fall. Pt turned q2hr or as allowed by pt for skin protection.      P: Plan to Continue to monitor pt status and report changes to Cards 2.      Simona Medina RN

## 2020-10-01 NOTE — PLAN OF CARE
D/A/I:  Patient A&O x4, denied palpitations, dizziness, and nausea.  Had RIVERA and orthopnea, lung sounds diminished in bases.  Had 2-3+ edema in legs and feet, was given one-time dose of IV furosemide.  Patient had significant urine output, see I&O flowsheet.  In sinus rhythm with HR 60s, SBP 90s-100s, SaO2 94-98% on 1L O2 via nasal cannula.  Treprostinil infusion continued at 30 ng/kg/min (1.69 ml/hr).  Reported leg pain that was poorly managed with prn oxycodone and acetaminophen.  Reminded patient to notify staff early in pain process to ensure effective pain coverage/management.  Ambulated in room with 1-person assist and use of walker.    P:  Continue to monitor pain, VS, heart rhythm, skin integrity, fluid status, bowel status, cardiac and respiratory status.  Notify care team of changes in patient condition or other concerns.  Continue diuresis.

## 2020-10-01 NOTE — PROGRESS NOTES
"                              Cardiology Progress Note  Karen Anderson MRN: [id number]  Age: 80 year old, : 1940  Date: 2020             Subjective     No overnight events. Continues to have left knee pain around hematoma site. Still with shortness of breath on minimal exertion. Denies additional symptoms.           Objective     Vital signs:  Temp: 98.5  F (36.9  C) Temp src: Oral BP: 100/54 Pulse: 93   Resp: 16 SpO2: 96 % O2 Device: None (Room air) Oxygen Delivery: 1 LPM Height: 149.9 cm (4' 11\") Weight: 56.6 kg (124 lb 11.2 oz)  Estimated body mass index is 25.19 kg/m  as calculated from the following:    Height as of this encounter: 1.499 m (4' 11\").    Weight as of this encounter: 56.6 kg (124 lb 11.2 oz).    Date 20 0700 - 10/01/20 0659   Shift 8725-0520 5982-7180 4919-9962 24 Hour Total   INTAKE   P.O. 360 860  1220   I.V.  20.31  20.31   Shift Total(mL/kg) 360(6.36) 880.31(15.56)  1240.31(21.93)   OUTPUT   Urine 225 2000   Shift Total(mL/kg) 225(3.98) 2000(35.36)  2225(39.34)   Weight (kg) 56.56 56.56 56.56 56.56       Gen: AA&Ox3, no acute distress  HEENT:AT/ NC, PERRL b/l, EOM grossly intact, mucous membranes pink, moist without plaque or exudate  BACK: no CVA tenderness, no midline bony tenderness  PULM/THORAX: Clear to auscultation bilaterally, no rales/stridor/wheezes  CV:RRR, S1 and S2 appreciated, no extra heart sounds, murmurs or rub auscultated. + JVD ~ 13  ABD: soft, nontender, nondistended. Normoactive bowel sounds x 4, no HSM appreciated  EXT: +2 pitting edema in both lower legs, receding left knee hematoma          Data:       Results for orders placed or performed during the hospital encounter of 20 (from the past 24 hour(s))   INR   Result Value Ref Range    INR 2.80 (H) 0.86 - 1.14   Lipid panel   Result Value Ref Range    Cholesterol 117 <200 mg/dL    Triglycerides 48 <150 mg/dL    HDL Cholesterol 60 >49 mg/dL    LDL Cholesterol Calculated 47 <100 mg/dL "    Non HDL Cholesterol 57 <130 mg/dL   Comprehensive metabolic panel   Result Value Ref Range    Sodium 137 133 - 144 mmol/L    Potassium 4.3 3.4 - 5.3 mmol/L    Chloride 104 94 - 109 mmol/L    Carbon Dioxide 29 20 - 32 mmol/L    Anion Gap 4 3 - 14 mmol/L    Glucose 72 70 - 99 mg/dL    Urea Nitrogen 34 (H) 7 - 30 mg/dL    Creatinine 1.30 (H) 0.52 - 1.04 mg/dL    GFR Estimate 39 (L) >60 mL/min/[1.73_m2]    GFR Estimate If Black 45 (L) >60 mL/min/[1.73_m2]    Calcium 8.5 8.5 - 10.1 mg/dL    Bilirubin Total 0.5 0.2 - 1.3 mg/dL    Albumin 2.3 (L) 3.4 - 5.0 g/dL    Protein Total 6.1 (L) 6.8 - 8.8 g/dL    Alkaline Phosphatase 89 40 - 150 U/L    ALT 20 0 - 50 U/L    AST 30 0 - 45 U/L   CBC with platelets differential   Result Value Ref Range    WBC 4.6 4.0 - 11.0 10e9/L    RBC Count 2.99 (L) 3.8 - 5.2 10e12/L    Hemoglobin 9.4 (L) 11.7 - 15.7 g/dL    Hematocrit 30.0 (L) 35.0 - 47.0 %     78 - 100 fl    MCH 31.4 26.5 - 33.0 pg    MCHC 31.3 (L) 31.5 - 36.5 g/dL    RDW 14.5 10.0 - 15.0 %    Platelet Count 278 150 - 450 10e9/L    Diff Method Automated Method     % Neutrophils 66.7 %    % Lymphocytes 13.9 %    % Monocytes 14.4 %    % Eosinophils 3.9 %    % Basophils 0.7 %    % Immature Granulocytes 0.4 %    Nucleated RBCs 0 0 /100    Absolute Neutrophil 3.1 1.6 - 8.3 10e9/L    Absolute Lymphocytes 0.6 (L) 0.8 - 5.3 10e9/L    Absolute Monocytes 0.7 0.0 - 1.3 10e9/L    Absolute Eosinophils 0.2 0.0 - 0.7 10e9/L    Absolute Basophils 0.0 0.0 - 0.2 10e9/L    Abs Immature Granulocytes 0.0 0 - 0.4 10e9/L    Absolute Nucleated RBC 0.0    Magnesium   Result Value Ref Range    Magnesium 2.2 1.6 - 2.3 mg/dL   Basic metabolic panel   Result Value Ref Range    Sodium 135 133 - 144 mmol/L    Potassium 4.4 3.4 - 5.3 mmol/L    Chloride 102 94 - 109 mmol/L    Carbon Dioxide 30 20 - 32 mmol/L    Anion Gap 4 3 - 14 mmol/L    Glucose 92 70 - 99 mg/dL    Urea Nitrogen 30 7 - 30 mg/dL    Creatinine 1.14 (H) 0.52 - 1.04 mg/dL    GFR Estimate  46 (L) >60 mL/min/[1.73_m2]    GFR Estimate If Black 53 (L) >60 mL/min/[1.73_m2]    Calcium 8.9 8.5 - 10.1 mg/dL   Magnesium   Result Value Ref Range    Magnesium 2.2 1.6 - 2.3 mg/dL           Medications     Current Facility-Administered Medications   Medication     ACE Inhibitor/ARB/ARNI not indicated according to guidelines     acetaminophen (TYLENOL) tablet 650 mg     albuterol (PROAIR HFA/PROVENTIL HFA/VENTOLIN HFA) 108 (90 Base) MCG/ACT inhaler 2 puff     calcium citrate-vitamin D (CITRACAL) 315-250 MG-UNIT per tablet 2 tablet     cetirizine (zyrTEC) tablet 5 mg     digoxin (LANOXIN) tablet 62.5 mcg     fluticasone (ARNUITY ELLIPTA) 100 MCG/ACT inhaler 1 puff     gabapentin (NEURONTIN) capsule 300 mg     HOLD nitroGLYcerin IF     hydroxychloroquine (PLAQUENIL) tablet 200 mg     ipratropium (ATROVENT) 0.06 % spray 2 spray     loperamide (IMODIUM) capsule 2 mg     magnesium sulfate 2 g in water intermittent infusion     magnesium sulfate 4 g in 100 mL sterile water (premade)     medication instruction     naloxone (NARCAN) injection 0.1-0.4 mg     oxyCODONE IR (ROXICODONE) half-tab 2.5 mg     polyethylene glycol (MIRALAX) Packet 17 g     potassium chloride (KLOR-CON) Packet 20-40 mEq     potassium chloride 10 mEq in 100 mL intermittent infusion with 10 mg lidocaine     potassium chloride 10 mEq in 100 mL sterile water intermittent infusion (premix)     potassium chloride 20 mEq in 50 mL intermittent infusion     potassium chloride ER (KLOR-CON M) CR tablet 20-40 mEq     Reason beta blocker not prescribed     treprostinil (REMODULIN) intravenous infusion (LESS than or EQUAL to 100 mcg/mL)     vitamin C (ASCORBIC ACID) tablet 500 mg     Warfarin Therapy Reminder (Check START DATE - warfarin may be starting in the FUTURE)             Assessment and Plan:          Karen Anderson is a 80 y.o. F w/ history of CTEPH, severe RV dysfunction, hx DVT on warfarin, pulmonary MAC, CKD, breast cancer s/p chemotherapy,  hormone therapy, and mastectomy, HTN, RA, and asthma who presents as a transfer from outside hospital for shortness of breath concerning for acute heart failure exacerbation.     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 Recently had poorly tolerated Lasix increase due to worsening renal function, so it was decreased back down to 20 mg daily. Would also consider PE with her extensive history of this; however she is therapeutic on warfarin currently with INR 2.2 (goal 2-3). Received 40 mg IV Lasix at OSH ED prior to transfer.  - diuresis plan: IV lasix 80 mg daily.  - daily standing weights  - strict intake/output monitoring  - 2L fluid,2g Na restriction  - possible RHC once volume optimized     #Chronic thromboembolic pulmonary hypertension (CTEPH)  #Severe RV dysfunction  #Hx DVT 1998  Hx extensive b/l pulmonary emboli and DVT in 1998, thought to be 2/2 tamoxifen for breast CA. Deemed too high risk to undergo surgical PTE. 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 RHC (March 2020) showed PA of 98/30. Echo (also March 2020) showed severe hypertension and RV dilation and dysfunction with normal LVEF 60-65%. Mild RVH with mild-moderate TI also seen. Uptitrated Remodulin to goal of 30 ng/kg/min in June/July 2020. Side effects include headaches, leg pain, jaw pain and diarrhea with titrations up on Remodulin. See diuretic plan as above. Plan is to return to see Dr. Mathew in ~7 weeks for repeat RHC and echo. US of lower extremities on this admission negative for DVT.   - continuous Remodulin  at 30 ng/kg/min  - Holding warfarin, once INR <2 will start heparin drip  - ASA 81 mg qday  - continue PTA digoxin 62.5 mcg qday      #CKD  #Hyperkalemia  Baseline in April-May 2020 was Cr 0.9-1.0. Elevated to 1.6 at outside hospital. K also 5.4, likely secondary to poor renal function.  - daily  BMP  - Lasix as above  - holding pta lisinopril     #HTN  - hold PTA lisinopril considering worsening renal function     #RA  - hydroxychloroquine 200 mg qday     #Breast CA  S/p adriamycin, Cytoxan, Taxol and tamoxifen. S/p mastectomy     #Asthma  - albuterol 2 puffs QID PRN  - beclomethasone 2 puff BID PRN      #Recent fall  #Vastus medialis oblique tear?  Had painful hematoma near the L knee. Took 2 doses of Norco but stopped 2/2 dizziness. US without evidence of expanding hematoma; of note also negative for DVT.   - Low dose narcotics available PRN along with acetaminophen     #Diarrhea  2/2 Remodulin, worse when increasing dose but now stable.   - Imodium PRN diarrhea  - Miralax PRN constipation     #Scoliosis  - hold PTA alendronate 70 mg tablet q7d, per patient, last dose yesterday.  - c/t Vit C 500 mg qday  - c/t Calcium carbonate-VitD 1200-100 mg qday  - c/t Vit D3 1000U qday  - c/t cetirizine 5 mg qday  - gabapentin 300mg am, 300mg noon, 600 mg evening        #Rhinorhea  - ipratropium nasal spray BID  - ocean spray nasal spray    PPX: DVT on warfarin/heparin; GI PPI PO  CODE: FULL  Dispo: pending diuresis and optimization of volume    Patient evaluated and discussed with Dr. Luong.     Nicholas Flores MD PhD  Cardiology Fellow

## 2020-10-02 ENCOUNTER — APPOINTMENT (OUTPATIENT)
Dept: GENERAL RADIOLOGY | Facility: CLINIC | Age: 80
DRG: 286 | End: 2020-10-02
Attending: INTERNAL MEDICINE
Payer: COMMERCIAL

## 2020-10-02 ENCOUNTER — APPOINTMENT (OUTPATIENT)
Dept: PHYSICAL THERAPY | Facility: CLINIC | Age: 80
DRG: 286 | End: 2020-10-02
Attending: INTERNAL MEDICINE
Payer: COMMERCIAL

## 2020-10-02 ENCOUNTER — APPOINTMENT (OUTPATIENT)
Dept: OCCUPATIONAL THERAPY | Facility: CLINIC | Age: 80
DRG: 286 | End: 2020-10-02
Attending: INTERNAL MEDICINE
Payer: COMMERCIAL

## 2020-10-02 LAB
ANION GAP SERPL CALCULATED.3IONS-SCNC: 2 MMOL/L (ref 3–14)
ANION GAP SERPL CALCULATED.3IONS-SCNC: 4 MMOL/L (ref 3–14)
BASOPHILS # BLD AUTO: 0 10E9/L (ref 0–0.2)
BASOPHILS NFR BLD AUTO: 0.8 %
BUN SERPL-MCNC: 24 MG/DL (ref 7–30)
BUN SERPL-MCNC: 24 MG/DL (ref 7–30)
CALCIUM SERPL-MCNC: 9.2 MG/DL (ref 8.5–10.1)
CALCIUM SERPL-MCNC: 9.6 MG/DL (ref 8.5–10.1)
CHLORIDE SERPL-SCNC: 92 MMOL/L (ref 94–109)
CHLORIDE SERPL-SCNC: 93 MMOL/L (ref 94–109)
CO2 SERPL-SCNC: 36 MMOL/L (ref 20–32)
CO2 SERPL-SCNC: 38 MMOL/L (ref 20–32)
CREAT SERPL-MCNC: 1.04 MG/DL (ref 0.52–1.04)
CREAT SERPL-MCNC: 1.05 MG/DL (ref 0.52–1.04)
DIFFERENTIAL METHOD BLD: ABNORMAL
EOSINOPHIL # BLD AUTO: 0.2 10E9/L (ref 0–0.7)
EOSINOPHIL NFR BLD AUTO: 3.1 %
ERYTHROCYTE [DISTWIDTH] IN BLOOD BY AUTOMATED COUNT: 14 % (ref 10–15)
GFR SERPL CREATININE-BSD FRML MDRD: 51 ML/MIN/{1.73_M2}
GFR SERPL CREATININE-BSD FRML MDRD: 51 ML/MIN/{1.73_M2}
GLUCOSE SERPL-MCNC: 148 MG/DL (ref 70–99)
GLUCOSE SERPL-MCNC: 91 MG/DL (ref 70–99)
HCT VFR BLD AUTO: 33.8 % (ref 35–47)
HGB BLD-MCNC: 10.3 G/DL (ref 11.7–15.7)
IMM GRANULOCYTES # BLD: 0 10E9/L (ref 0–0.4)
IMM GRANULOCYTES NFR BLD: 0.4 %
INR PPP: 2.41 (ref 0.86–1.14)
LYMPHOCYTES # BLD AUTO: 0.4 10E9/L (ref 0.8–5.3)
LYMPHOCYTES NFR BLD AUTO: 8.1 %
MAGNESIUM SERPL-MCNC: 1.8 MG/DL (ref 1.6–2.3)
MAGNESIUM SERPL-MCNC: 1.9 MG/DL (ref 1.6–2.3)
MCH RBC QN AUTO: 30.5 PG (ref 26.5–33)
MCHC RBC AUTO-ENTMCNC: 30.5 G/DL (ref 31.5–36.5)
MCV RBC AUTO: 100 FL (ref 78–100)
MONOCYTES # BLD AUTO: 0.6 10E9/L (ref 0–1.3)
MONOCYTES NFR BLD AUTO: 11.1 %
NEUTROPHILS # BLD AUTO: 4 10E9/L (ref 1.6–8.3)
NEUTROPHILS NFR BLD AUTO: 76.5 %
NRBC # BLD AUTO: 0 10*3/UL
NRBC BLD AUTO-RTO: 0 /100
PLATELET # BLD AUTO: 333 10E9/L (ref 150–450)
POTASSIUM SERPL-SCNC: 3.7 MMOL/L (ref 3.4–5.3)
POTASSIUM SERPL-SCNC: 3.8 MMOL/L (ref 3.4–5.3)
RBC # BLD AUTO: 3.38 10E12/L (ref 3.8–5.2)
SODIUM SERPL-SCNC: 132 MMOL/L (ref 133–144)
SODIUM SERPL-SCNC: 133 MMOL/L (ref 133–144)
WBC # BLD AUTO: 5.2 10E9/L (ref 4–11)

## 2020-10-02 PROCEDURE — 272N000201 ZZ HC ADHESIVE SKIN CLOSURE, DERMABOND

## 2020-10-02 PROCEDURE — 999N000065 XR CHEST PORT 1 VW

## 2020-10-02 PROCEDURE — 97116 GAIT TRAINING THERAPY: CPT | Mod: GP

## 2020-10-02 PROCEDURE — 250N000011 HC RX IP 250 OP 636: Performed by: INTERNAL MEDICINE

## 2020-10-02 PROCEDURE — 71045 X-RAY EXAM CHEST 1 VIEW: CPT | Mod: 26 | Performed by: RADIOLOGY

## 2020-10-02 PROCEDURE — 99233 SBSQ HOSP IP/OBS HIGH 50: CPT | Mod: GC | Performed by: INTERNAL MEDICINE

## 2020-10-02 PROCEDURE — 250N000013 HC RX MED GY IP 250 OP 250 PS 637: Performed by: STUDENT IN AN ORGANIZED HEALTH CARE EDUCATION/TRAINING PROGRAM

## 2020-10-02 PROCEDURE — 36592 COLLECT BLOOD FROM PICC: CPT | Performed by: STUDENT IN AN ORGANIZED HEALTH CARE EDUCATION/TRAINING PROGRAM

## 2020-10-02 PROCEDURE — 80048 BASIC METABOLIC PNL TOTAL CA: CPT | Performed by: INTERNAL MEDICINE

## 2020-10-02 PROCEDURE — 214N000001 HC R&B CCU UMMC

## 2020-10-02 PROCEDURE — 83735 ASSAY OF MAGNESIUM: CPT | Performed by: INTERNAL MEDICINE

## 2020-10-02 PROCEDURE — 83735 ASSAY OF MAGNESIUM: CPT | Performed by: STUDENT IN AN ORGANIZED HEALTH CARE EDUCATION/TRAINING PROGRAM

## 2020-10-02 PROCEDURE — 250N000011 HC RX IP 250 OP 636: Performed by: STUDENT IN AN ORGANIZED HEALTH CARE EDUCATION/TRAINING PROGRAM

## 2020-10-02 PROCEDURE — 97110 THERAPEUTIC EXERCISES: CPT | Mod: GO | Performed by: OCCUPATIONAL THERAPIST

## 2020-10-02 PROCEDURE — 250N000009 HC RX 250: Performed by: INTERNAL MEDICINE

## 2020-10-02 PROCEDURE — 272N000004 HC RX 272: Performed by: INTERNAL MEDICINE

## 2020-10-02 PROCEDURE — 85610 PROTHROMBIN TIME: CPT | Performed by: INTERNAL MEDICINE

## 2020-10-02 PROCEDURE — 36592 COLLECT BLOOD FROM PICC: CPT | Performed by: INTERNAL MEDICINE

## 2020-10-02 PROCEDURE — 258N000003 HC RX IP 258 OP 636: Performed by: INTERNAL MEDICINE

## 2020-10-02 PROCEDURE — 250N000013 HC RX MED GY IP 250 OP 250 PS 637: Performed by: INTERNAL MEDICINE

## 2020-10-02 PROCEDURE — 97110 THERAPEUTIC EXERCISES: CPT | Mod: GP

## 2020-10-02 PROCEDURE — 272N000459 ZZ HC KIT, 6 FR TL BIOFLO OPEN ENDED PICC

## 2020-10-02 PROCEDURE — 36569 INSJ PICC 5 YR+ W/O IMAGING: CPT

## 2020-10-02 PROCEDURE — 80048 BASIC METABOLIC PNL TOTAL CA: CPT | Performed by: STUDENT IN AN ORGANIZED HEALTH CARE EDUCATION/TRAINING PROGRAM

## 2020-10-02 PROCEDURE — 85025 COMPLETE CBC W/AUTO DIFF WBC: CPT | Performed by: INTERNAL MEDICINE

## 2020-10-02 RX ORDER — HEPARIN SODIUM,PORCINE 10 UNIT/ML
5-10 VIAL (ML) INTRAVENOUS EVERY 24 HOURS
Status: DISCONTINUED | OUTPATIENT
Start: 2020-10-02 | End: 2020-10-06 | Stop reason: HOSPADM

## 2020-10-02 RX ORDER — WARFARIN SODIUM 1 MG/1
2 TABLET ORAL
Status: DISCONTINUED | OUTPATIENT
Start: 2020-10-02 | End: 2020-10-02

## 2020-10-02 RX ORDER — HEPARIN SODIUM,PORCINE 10 UNIT/ML
5-10 VIAL (ML) INTRAVENOUS
Status: DISCONTINUED | OUTPATIENT
Start: 2020-10-02 | End: 2020-10-06 | Stop reason: HOSPADM

## 2020-10-02 RX ORDER — WARFARIN SODIUM 1 MG/1
2 TABLET ORAL
Status: COMPLETED | OUTPATIENT
Start: 2020-10-02 | End: 2020-10-02

## 2020-10-02 RX ADMIN — HYDROXYCHLOROQUINE SULFATE 200 MG: 200 TABLET, FILM COATED ORAL at 09:20

## 2020-10-02 RX ADMIN — LIDOCAINE HYDROCHLORIDE 1 ML: 10 INJECTION, SOLUTION EPIDURAL; INFILTRATION; INTRACAUDAL; PERINEURAL at 09:16

## 2020-10-02 RX ADMIN — DIGOXIN 62.5 MCG: 0.06 TABLET ORAL at 09:23

## 2020-10-02 RX ADMIN — GABAPENTIN 300 MG: 300 CAPSULE ORAL at 21:05

## 2020-10-02 RX ADMIN — Medication 5 ML: at 17:10

## 2020-10-02 RX ADMIN — FUROSEMIDE 10 MG/HR: 10 INJECTION, SOLUTION INTRAVENOUS at 13:18

## 2020-10-02 RX ADMIN — WARFARIN SODIUM 2 MG: 1 TABLET ORAL at 18:21

## 2020-10-02 RX ADMIN — GABAPENTIN 300 MG: 300 CAPSULE ORAL at 14:16

## 2020-10-02 RX ADMIN — OXYCODONE HYDROCHLORIDE 5 MG: 5 TABLET ORAL at 08:01

## 2020-10-02 RX ADMIN — POTASSIUM CHLORIDE 20 MEQ: 1500 TABLET, EXTENDED RELEASE ORAL at 21:06

## 2020-10-02 RX ADMIN — IPRATROPIUM BROMIDE 2 SPRAY: 42 SPRAY NASAL at 09:24

## 2020-10-02 RX ADMIN — OXYCODONE HYDROCHLORIDE 5 MG: 5 TABLET ORAL at 01:26

## 2020-10-02 RX ADMIN — FLUTICASONE FUROATE 1 PUFF: 100 POWDER RESPIRATORY (INHALATION) at 09:19

## 2020-10-02 RX ADMIN — ACETAMINOPHEN 650 MG: 325 TABLET, FILM COATED ORAL at 06:07

## 2020-10-02 RX ADMIN — Medication 2 TABLET: at 09:20

## 2020-10-02 RX ADMIN — Medication 5 ML: at 10:56

## 2020-10-02 RX ADMIN — OXYCODONE HYDROCHLORIDE 5 MG: 5 TABLET ORAL at 14:19

## 2020-10-02 RX ADMIN — TREPROSTINIL 30 NG/KG/MIN: 20 INJECTION, SOLUTION INTRAVENOUS; SUBCUTANEOUS at 03:48

## 2020-10-02 RX ADMIN — OXYCODONE HYDROCHLORIDE AND ACETAMINOPHEN 500 MG: 500 TABLET ORAL at 09:23

## 2020-10-02 RX ADMIN — MAGNESIUM SULFATE 2 G: 2 INJECTION INTRAVENOUS at 21:06

## 2020-10-02 RX ADMIN — FUROSEMIDE 10 MG/HR: 10 INJECTION, SOLUTION INTRAVENOUS at 23:24

## 2020-10-02 RX ADMIN — POTASSIUM CHLORIDE 20 MEQ: 1500 TABLET, EXTENDED RELEASE ORAL at 12:44

## 2020-10-02 RX ADMIN — ACETAMINOPHEN 650 MG: 325 TABLET, FILM COATED ORAL at 12:44

## 2020-10-02 RX ADMIN — FUROSEMIDE 10 MG/HR: 10 INJECTION, SOLUTION INTRAVENOUS at 03:35

## 2020-10-02 RX ADMIN — FAMOTIDINE 20 MG: 20 TABLET ORAL at 09:21

## 2020-10-02 RX ADMIN — CETIRIZINE HYDROCHLORIDE 5 MG: 5 TABLET ORAL at 09:20

## 2020-10-02 RX ADMIN — GABAPENTIN 300 MG: 300 CAPSULE ORAL at 09:27

## 2020-10-02 ASSESSMENT — ACTIVITIES OF DAILY LIVING (ADL)
ADLS_ACUITY_SCORE: 16
ADLS_ACUITY_SCORE: 17
ADLS_ACUITY_SCORE: 16
ADLS_ACUITY_SCORE: 17

## 2020-10-02 ASSESSMENT — PAIN DESCRIPTION - DESCRIPTORS
DESCRIPTORS: DISCOMFORT
DESCRIPTORS: DISCOMFORT

## 2020-10-02 ASSESSMENT — MIFFLIN-ST. JEOR: SCORE: 911.8

## 2020-10-02 NOTE — PROGRESS NOTES
"                              Cardiology Progress Note  Karen Anderson MRN: [id number]  Age: 80 year old, : 1940  Date: 10/02/2020             Subjective     No overnight events.   C/o right sided headaches.          Objective     Vital signs:  Temp: 98.3  F (36.8  C) Temp src: Oral BP: 118/59(Map 79) Pulse: 70   Resp: 18 SpO2: 99 % O2 Device: Nasal cannula Oxygen Delivery: 1 LPM Height: 149.9 cm (4' 11\") Weight: 52.6 kg (116 lb)  Estimated body mass index is 23.43 kg/m  as calculated from the following:    Height as of this encounter: 1.499 m (4' 11\").    Weight as of this encounter: 52.6 kg (116 lb).    Date 20 0700 - 10/01/20 0659   Shift 7516-8688 2739-0665 3589-1360 24 Hour Total   INTAKE   P.O. 360 860  1220   I.V.  20.31  20.31   Shift Total(mL/kg) 360(6.36) 880.31(15.56)  1240.31(21.93)   OUTPUT   Urine 225   2225   Shift Total(mL/kg) 225(3.98) 2000(35.36)  2225(39.34)   Weight (kg) 56.56 56.56 56.56 56.56       Gen: AA&Ox3, no acute distress  HEENT:AT/ NC, PERRL b/l, EOM grossly intact, mucous membranes pink, moist without plaque or exudate.  BACK: no CVA tenderness, no midline bony tenderness  PULM/THORAX: Clear to auscultation bilaterally, no rales/stridor/wheezes  CV:RRR, S1 and S2 appreciated, no extra heart sounds, murmurs or rub auscultated. + JVD ~ 15cm  ABD: soft, nontender, nondistended. Normoactive bowel sounds x 4, no HSM appreciated  EXT: +2 pitting edema in both lower legs, receding left knee hematoma  Neuro: No focal neuro deficit on limited exam.          Data:       Results for orders placed or performed during the hospital encounter of 20 (from the past 24 hour(s))   Triple Lumen PICC Placement    Narrative    Mac Rogers RN     10/2/2020  3:33 PM  [facility]     Triple Lumen PICC Placement    Date/Time: 10/2/2020 9:09 AM  Performed by: Mac Rogers RN  Authorized by: Chaudhry, Mohsan, MD "   Indications: vascular access    UNIVERSAL PROTOCOL   Site Marked: Yes  Prior Images Obtained and Reviewed:  Yes  Required items: Required blood products, implants, devices and special   equipment available    Patient identity confirmed:  Verbally with patient, arm band, provided   demographic data and hospital-assigned identification number  NA - No sedation, light sedation, or local anesthesia  Confirmation Checklist:  Patient's identity using two indicators, relevant   allergies, procedure was appropriate and matched the consent or emergent   situation and correct equipment/implants were available  Time out: Immediately prior to the procedure a time out was called    Universal Protocol: the [facility] Universal Protocol was followed    Preparation: Patient was prepped and draped in usual sterile fashion           ANESTHESIA    Anesthesia: See MAR for details  Local Anesthetic:  Lidocaine 1% without epinephrine  Anesthetic Total (mL):  1      SEDATION    Patient Sedated: No        Preparation: skin prepped with ChloraPrep  Skin prep agent: skin prep agent completely dried prior to procedure  Sterile barriers: maximum sterile barriers were used: cap, mask, sterile   gown, sterile gloves, and large sterile sheet  Hand hygiene: hand hygiene performed prior to central venous catheter   insertion  Type of line used: Power PICC  Catheter type: triple lumen  Lumen type: non-valved  Catheter size: 5 Fr  Brand: HipSwap  Lot number: [id number]  Placement method: venipuncture, MST, ultrasound and tip confirmation   system  Number of attempts: 1  Successful placement: yes  Orientation: right  Location: basilic vein ( vein diameter - 0.35 cm)  Site rationale: left mastetctomy, s/p lymph node removal  Arm circumference: adults 10 cm  Extremity circumference: 22  Visible catheter length: 4  Total catheter length: 43  Dressing and securement: chlorhexidine disc applied, securement device,   site cleaned, statlock, sterile  dressing applied and glue  Post procedure assessment: blood return through all ports and placement   verified by x-ray  PROCEDURE   Patient Tolerance:  Patient tolerated the procedure well with no immediate   complications  Describe Procedure: PICC is OK to use.   XR Chest Port 1 View    Narrative    EXAM: XR CHEST PORT 1 VW  10/2/2020 10:33 AM     HISTORY:  picc position check       COMPARISON:  Chest x-ray 9/29/2020    FINDINGS:   Semiupright AP view of the chest. Right chest wall central catheter  tip is in the mid to low right atrium. Right upper extremity PICC tip  is in the low right atrium and curves toward the midline.    Stable position of the trachea. Stable cardiomegaly. Trace bilateral  pleural effusions. No acute consolidation. No pneumothorax.      Impression    IMPRESSION:   1. Placement of right upper extremity PICC with tip in the low right  atrium approaching the expected location of the tricuspid valve.  Consider repositioning.  2. Stable cardiomegaly and mild pulmonary edema. Trace bilateral  pleural effusions.    I have personally reviewed the examination and initial interpretation  and I agree with the findings.    FAIZAN COHEN MD   INR   Result Value Ref Range    INR 2.41 (H) 0.86 - 1.14   CBC with platelets differential   Result Value Ref Range    WBC 5.2 4.0 - 11.0 10e9/L    RBC Count 3.38 (L) 3.8 - 5.2 10e12/L    Hemoglobin 10.3 (L) 11.7 - 15.7 g/dL    Hematocrit 33.8 (L) 35.0 - 47.0 %     78 - 100 fl    MCH 30.5 26.5 - 33.0 pg    MCHC 30.5 (L) 31.5 - 36.5 g/dL    RDW 14.0 10.0 - 15.0 %    Platelet Count 333 150 - 450 10e9/L    Diff Method Automated Method     % Neutrophils 76.5 %    % Lymphocytes 8.1 %    % Monocytes 11.1 %    % Eosinophils 3.1 %    % Basophils 0.8 %    % Immature Granulocytes 0.4 %    Nucleated RBCs 0 0 /100    Absolute Neutrophil 4.0 1.6 - 8.3 10e9/L    Absolute Lymphocytes 0.4 (L) 0.8 - 5.3 10e9/L    Absolute Monocytes 0.6 0.0 - 1.3 10e9/L    Absolute  Eosinophils 0.2 0.0 - 0.7 10e9/L    Absolute Basophils 0.0 0.0 - 0.2 10e9/L    Abs Immature Granulocytes 0.0 0 - 0.4 10e9/L    Absolute Nucleated RBC 0.0    Basic metabolic panel   Result Value Ref Range    Sodium 133 133 - 144 mmol/L    Potassium 3.7 3.4 - 5.3 mmol/L    Chloride 93 (L) 94 - 109 mmol/L    Carbon Dioxide 38 (H) 20 - 32 mmol/L    Anion Gap 2 (L) 3 - 14 mmol/L    Glucose 148 (H) 70 - 99 mg/dL    Urea Nitrogen 24 7 - 30 mg/dL    Creatinine 1.05 (H) 0.52 - 1.04 mg/dL    GFR Estimate 51 (L) >60 mL/min/[1.73_m2]    GFR Estimate If Black 59 (L) >60 mL/min/[1.73_m2]    Calcium 9.2 8.5 - 10.1 mg/dL   Magnesium   Result Value Ref Range    Magnesium 1.9 1.6 - 2.3 mg/dL   XR Chest Port 1 View    Narrative    EXAM: XR CHEST PORT 1 VW  10/2/2020 3:00 PM     HISTORY:  picc position check       COMPARISON:  Chest x-ray 10/2/2020    FINDINGS:   Semiupright AP view of the chest. Right chest central catheter tip is  in the mid to low right atrium. Right upper extremity PICC tip is in  the right atrium.    The trachea is midline. There is marked cardiomegaly. Low lung  volumes. No focal consolidative opacity, pleural effusion, or  appreciable pneumothorax.      Impression    IMPRESSION:   1. Right upper extremity PICC has been retracted with tip now in  appropriate position. Right chest central catheter is in grossly  stable position.  2. No focal consolidation or pneumothorax.    I have personally reviewed the examination and initial interpretation  and I agree with the findings.    SUKUMAR ANDREWS MD   Basic metabolic panel   Result Value Ref Range    Sodium 132 (L) 133 - 144 mmol/L    Potassium 3.8 3.4 - 5.3 mmol/L    Chloride 92 (L) 94 - 109 mmol/L    Carbon Dioxide 36 (H) 20 - 32 mmol/L    Anion Gap 4 3 - 14 mmol/L    Glucose 91 70 - 99 mg/dL    Urea Nitrogen 24 7 - 30 mg/dL    Creatinine 1.04 0.52 - 1.04 mg/dL    GFR Estimate 51 (L) >60 mL/min/[1.73_m2]    GFR Estimate If Black 60 (L) >60 mL/min/[1.73_m2]     Calcium 9.6 8.5 - 10.1 mg/dL   Magnesium   Result Value Ref Range    Magnesium 1.8 1.6 - 2.3 mg/dL           Medications     Current Facility-Administered Medications   Medication     ACE Inhibitor/ARB/ARNI not indicated according to guidelines     acetaminophen (TYLENOL) tablet 650 mg     albuterol (PROAIR HFA/PROVENTIL HFA/VENTOLIN HFA) 108 (90 Base) MCG/ACT inhaler 2 puff     calcium citrate-vitamin D (CITRACAL) 315-250 MG-UNIT per tablet 2 tablet     cetirizine (zyrTEC) tablet 5 mg     digoxin (LANOXIN) tablet 62.5 mcg     famotidine (PEPCID) tablet 20 mg     fluticasone (ARNUITY ELLIPTA) 100 MCG/ACT inhaler 1 puff     furosemide (LASIX) 100 mg in sodium chloride 0.9 % 100 mL infusion     gabapentin (NEURONTIN) capsule 300 mg     heparin lock flush 10 UNIT/ML injection 2-5 mL     heparin lock flush 10 UNIT/ML injection 5-10 mL     heparin lock flush 10 UNIT/ML injection 5-10 mL     hydroxychloroquine (PLAQUENIL) tablet 200 mg     ipratropium (ATROVENT) 0.06 % spray 2 spray     lidocaine (LMX4) cream     lidocaine 1 % 0.1-5 mL     loperamide (IMODIUM) capsule 2 mg     magnesium sulfate 2 g in water intermittent infusion     magnesium sulfate 4 g in 100 mL sterile water (premade)     medication instruction     naloxone (NARCAN) injection 0.1-0.4 mg     oxyCODONE (ROXICODONE) tablet 5 mg     polyethylene glycol (MIRALAX) Packet 17 g     potassium chloride (KLOR-CON) Packet 20-40 mEq     potassium chloride 10 mEq in 100 mL intermittent infusion with 10 mg lidocaine     potassium chloride 10 mEq in 100 mL sterile water intermittent infusion (premix)     potassium chloride 20 mEq in 50 mL intermittent infusion     potassium chloride ER (KLOR-CON M) CR tablet 20-40 mEq     Reason beta blocker not prescribed     sodium chloride (PF) 0.9% PF flush 10-20 mL     sodium chloride (PF) 0.9% PF flush 5-50 mL     treprostinil (REMODULIN) intravenous infusion (LESS than or EQUAL to 100 mcg/mL)     vitamin C (ASCORBIC ACID)  tablet 500 mg     Warfarin Therapy Reminder (Check START DATE - warfarin may be starting in the FUTURE)             Assessment and Plan:          Karen Anderson is a 80 y.o. F w/ history of CTEPH, severe RV dysfunction, hx DVT on warfarin, pulmonary MAC, CKD, breast cancer s/p chemotherapy, hormone therapy, and mastectomy, HTN, RA, and asthma who presents as a transfer from outside hospital for shortness of breath concerning for acute heart failure exacerbation.     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 Recently had poorly tolerated Lasix increase due to worsening renal function, so it was decreased back down to 20 mg daily. Would also consider PE with her extensive history of this; however she is therapeutic on warfarin currently with INR 2.2 (goal 2-3). Received 40 mg IV Lasix at OSH ED prior to transfer.  - diuresis plan:  Lasix drip  - daily standing weights  - strict intake/output monitoring  - 2L fluid, 2g Na restriction  - possible RHC once volume optimized  - PICC inserted.     #Chronic thromboembolic pulmonary hypertension (CTEPH)  #Severe RV dysfunction  #Hx DVT 1998  Hx extensive b/l pulmonary emboli and DVT in 1998, thought to be 2/2 tamoxifen for breast CA. Deemed too high risk to undergo surgical PTE. 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 RHC (March 2020) showed PA of 98/30. Echo (also March 2020) showed severe hypertension and RV dilation and dysfunction with normal LVEF 60-65%. Mild RVH with mild-moderate TI also seen. Uptitrated Remodulin to goal of 30 ng/kg/min in June/July 2020. Side effects include headaches, leg pain, jaw pain and diarrhea with titrations up on Remodulin. See diuretic plan as above. Plan is to return to see Dr. Mathew in ~7 weeks for repeat RHC and echo. US of lower extremities on this admission negative for DVT.   - continuous  Remodulin  at 30 ng/kg/min  - Holding warfarin, once INR </= 2 will start heparin drip  - ASA 81 mg qday  - continue PTA digoxin 62.5 mcg qday      #CKD  #Hyperkalemia  Baseline in April-May 2020 was Cr 0.9-1.0. Elevated to 1.6 at outside hospital. K also 5.4, likely secondary to poor renal function. Scr 1.05 (10/02/2020).  - Twice daily BMP  - Lasix as above  - holding pta lisinopril     #HTN  - hold PTA lisinopril d/t impaired renal function at admission.     #RA  - hydroxychloroquine 200 mg qday     #Breast CA  S/p adriamycin, Cytoxan, Taxol and tamoxifen. S/p mastectomy     #Asthma  - albuterol 2 puffs QID PRN  - beclomethasone 2 puff BID PRN      #Recent fall  #Vastus medialis oblique tear?  Had painful hematoma near the L knee. Took 2 doses of Norco but stopped 2/2 dizziness. US without evidence of expanding hematoma; of note also negative for DVT.   - Low dose narcotics available PRN along with acetaminophen     #Headaches  -PRN Tylenol    #Diarrhea  2/2 Remodulin, worse when increasing dose but now stable.   - Imodium PRN diarrhea  - Miralax PRN constipation     #Scoliosis  - hold PTA alendronate 70 mg tablet q7d, per patient, last dose yesterday.  - c/t Vit C 500 mg qday  - c/t Calcium carbonate-VitD 1200-100 mg qday  - c/t Vit D3 1000U qday  - c/t cetirizine 5 mg qday  - gabapentin 300mg am, 300mg noon, 600 mg evening        #Rhinorhea  - ipratropium nasal spray BID  - ocean spray nasal spray    PPX: DVT on warfarin/heparin; GI PPI PO  CODE: FULL  Dispo: pending diuresis and optimization of volume    Patient evaluated and discussed with Dr. Luong.      Saad Huerta MD  Internal Medicine Resident, PGY-1  Pager:*[phone]

## 2020-10-02 NOTE — PLAN OF CARE
D: Pt admit 8/29/20 from OSH for SOB c/f acute HF exacerbation. PMH CTEPH, severe RV dysfunction, DVT on warfarin, pulmonary MAC, CKD, breast cancer s/p chemotherapy, hormone therapy, mastectomy (do not use pt L arm for BP's), HTN, RA, asthma     I/A:   Neuro: A&Ox4. Pt refused labs this AM. Awaiting PICC placement.   VS: VSS. 1L NC w/humidification   Tele: SR  Pain: pain in BLE somewhat controlled w/PRN tylenol x2 and oxycodone x1.  GI/: Urinating adequately into wilkins. BMx1  Diet: 2g Na w/2L FR  IV/Drips: R CVC single lumen internal jugular infusing remodulin gtt 30 ng/hg/min. R PIV infusing lasix gtt 10 mg/hr  Activity: Ax1 w/gait belt and walker. Pt must be wearing shoes before OOB.   Skin/drains: large hematoma on L knee s/p fall. Pt turned q2hr or as allowed by pt for skin protection.      P: Plan for PICC placement today.  Continue to monitor pt status and report changes to Cards 2.      Simona Medina RN

## 2020-10-02 NOTE — PROVIDER NOTIFICATION
Vascular Access Services Notes:    PICC tip in the RA after insertion. Catheter pulled back 3 cm (now 7 cm external length). CXR order released.        LUCÍA HenryN, RN [facility]

## 2020-10-02 NOTE — PLAN OF CARE
PT6C: pt would greatly benefit from increased hallway ambulation 3x daily with staff. Rec up with Ax1 and FWW.

## 2020-10-02 NOTE — PHARMACY-ANTICOAGULATION SERVICE
Clinical Pharmacy - Warfarin Dosing Consult     Pharmacy has been consulted to manage this patient s warfarin therapy.  Indication: Atrial Fibrillation  Therapy Goal: (2-2.5) Goal to keep INR as close to 2 as possible.   Provider/Team: Adryan BLANTON Anticoag Clinic: [facility]  Warfarin Prior to Admission: Yes  Warfarin PTA Regimen: 3 mg TU/SA and 4.5 mg ROW  Significant drug interactions: aspirin    INR   Date Value Ref Range Status   10/02/2020 2.41 (H) 0.86 - 1.14 Final   10/01/2020 3.13 (H) 0.86 - 1.14 Final       Recommend warfarin 2 mg today.  Pharmacy will monitor Karen JACKY Moralescharbel daily and order warfarin doses to achieve specified goal.      Please contact pharmacy as soon as possible if the warfarin needs to be held for a procedure or if the warfarin goals change.

## 2020-10-02 NOTE — PLAN OF CARE
D: pt admitted on 09/29/2020 forsrtness of breath concerning for acute heart failure exacerbation. PMH of  CTEPH, severe RV dysfunction, hx DVT on warfarin, pulmonary MAC, CKD, breast cancer s/p chemotherapy, hormone therapy, and mastectomy, HTN, RA, and asthma who presents as a transfer from outside hospital for shortness of breath concerning for acute heart failure exacerbation.     I: Monitored vitals and assessed pt status.   Changed:   Right Triple lumen PICC placed       Running:   Laxis 10mg/hr   Remodulin 30ng/kg/min    PRN: tylenol and oxycodone x2        Vitals:  Temp: 98.3  F (36.8  C) Temp src: Oral BP: 118/59(Map 79) Pulse: 70   Resp: 18 SpO2: 99 % O2 Device: Nasal cannula Oxygen Delivery: 1 LPM      A:   Neuro: A&O x 4. Neurologically intact, denies Headache and dizziness. numbness on BLE. calls appropriately   Cardiac: SR/ST. Denies chest pain   Respiratory: sating >92% on 1L NC. RIVERA   Diet/appetite: 2g Na diet, 2L FR. Good appetite  Endocrine: n/a  GI/: no BM this shift. Denies abdominal pain and nausea. Dominguez catheter in place. Good urine output.   Activity: assist of 1 with walker and gait belt  Pain: headache and bilateral leg pain. Pain managed with PRN tylenol and oxycodone   Skin: no new deficit noted. Hematoma on left knee   LDAs: Triple lumen PICC, PIV, and singe lumen CVC   Labs:  Potassium replaced per protocol      P: Continue to monitor Pt status and report changes to treatment team.

## 2020-10-02 NOTE — PROVIDER NOTIFICATION
10/02/20 0909   PICC Triple Lumen 10/02/20 Right Basilic Access   Placement Date/Time: 10/02/20 (c) 0909   Catheter Brand: Bard  Size (Fr): 5 Fr  Lot #: [id number]  Full barrier precautions done: Yes, hand hygiene, sterile gown, sterile gloves, mask, cap, full body drape, chlorhexidine scrub  Consent Signed: Yes  Time...   Site Assessment WDL   External Cath Length (cm) 4 cm   Extremity Circumference (cm) 22 cm   Dressing Intervention Chlorhexidine patch;Transparent;Securing device;New dressing;Other (Comment)  (Dermabond)   Dressing Change Due 10/09/20   PICC Comment PICC inserted   Lumen A - Color GRAY   Lumen A - Status blood return noted;saline locked   Lumen A - Cap Change Due 10/06/20   Lumen B - Color RED   Lumen B - Status blood return noted;saline locked   Lumen B - Cap Change Due 10/06/20   Lumen C - Color WHITE   Lumen C - Status blood return noted;saline locked   Lumen C - Cap Change Due 10/06/20   Extravasation? No   Line Necessity Yes, meets criteria

## 2020-10-02 NOTE — PROGRESS NOTES
"                              Cardiology Progress Note  Karen Anderson MRN: [id number]  Age: 80 year old, : 1940  Date: 10/01/2020             Subjective     No overnight events. Continues to have left knee pain around hematoma site. Still with shortness of breath on minimal exertion. Denies additional symptoms.           Objective     Vital signs:  Temp: 98.3  F (36.8  C) Temp src: Oral BP: 114/67 Pulse: 78   Resp: 16 SpO2: 94 % O2 Device: Nasal cannula Oxygen Delivery: 1 LPM Height: 149.9 cm (4' 11\") Weight: 55.5 kg (122 lb 4.8 oz)  Estimated body mass index is 24.7 kg/m  as calculated from the following:    Height as of this encounter: 1.499 m (4' 11\").    Weight as of this encounter: 55.5 kg (122 lb 4.8 oz).    Date 20 0700 - 10/01/20 0659   Shift 6768-5903 0547-9516 8091-5882 24 Hour Total   INTAKE   P.O. 360 860  1220   I.V.  20.31  20.31   Shift Total(mL/kg) 360(6.36) 880.31(15.56)  1240.31(21.93)   OUTPUT   Urine 225 2000   Shift Total(mL/kg) 225(3.98) 2000(35.36)  2225(39.34)   Weight (kg) 56.56 56.56 56.56 56.56       Gen: AA&Ox3, no acute distress  HEENT:AT/ NC, PERRL b/l, EOM grossly intact, mucous membranes pink, moist without plaque or exudate  BACK: no CVA tenderness, no midline bony tenderness  PULM/THORAX: Clear to auscultation bilaterally, no rales/stridor/wheezes  CV:RRR, S1 and S2 appreciated, no extra heart sounds, murmurs or rub auscultated. + JVD ~ 15  ABD: soft, nontender, nondistended. Normoactive bowel sounds x 4, no HSM appreciated  EXT: +2 pitting edema in both lower legs, receding left knee hematoma          Data:       Results for orders placed or performed during the hospital encounter of 20 (from the past 24 hour(s))   INR   Result Value Ref Range    INR 3.13 (H) 0.86 - 1.14   CBC with platelets differential   Result Value Ref Range    WBC 4.3 4.0 - 11.0 10e9/L    RBC Count 3.44 (L) 3.8 - 5.2 10e12/L    Hemoglobin 10.6 (L) 11.7 - 15.7 g/dL    Hematocrit " 34.8 (L) 35.0 - 47.0 %     (H) 78 - 100 fl    MCH 30.8 26.5 - 33.0 pg    MCHC 30.5 (L) 31.5 - 36.5 g/dL    RDW 14.2 10.0 - 15.0 %    Platelet Count 292 150 - 450 10e9/L    Diff Method Automated Method     % Neutrophils 67.7 %    % Lymphocytes 13.3 %    % Monocytes 12.6 %    % Eosinophils 5.2 %    % Basophils 0.7 %    % Immature Granulocytes 0.5 %    Nucleated RBCs 0 0 /100    Absolute Neutrophil 2.9 1.6 - 8.3 10e9/L    Absolute Lymphocytes 0.6 (L) 0.8 - 5.3 10e9/L    Absolute Monocytes 0.5 0.0 - 1.3 10e9/L    Absolute Eosinophils 0.2 0.0 - 0.7 10e9/L    Absolute Basophils 0.0 0.0 - 0.2 10e9/L    Abs Immature Granulocytes 0.0 0 - 0.4 10e9/L    Absolute Nucleated RBC 0.0    Basic metabolic panel   Result Value Ref Range    Sodium 137 133 - 144 mmol/L    Potassium 4.1 3.4 - 5.3 mmol/L    Chloride 101 94 - 109 mmol/L    Carbon Dioxide 32 20 - 32 mmol/L    Anion Gap 4 3 - 14 mmol/L    Glucose 91 70 - 99 mg/dL    Urea Nitrogen 28 7 - 30 mg/dL    Creatinine 1.02 0.52 - 1.04 mg/dL    GFR Estimate 53 (L) >60 mL/min/[1.73_m2]    GFR Estimate If Black 61 >60 mL/min/[1.73_m2]    Calcium 8.8 8.5 - 10.1 mg/dL   Magnesium   Result Value Ref Range    Magnesium 2.0 1.6 - 2.3 mg/dL   Basic metabolic panel   Result Value Ref Range    Sodium 134 133 - 144 mmol/L    Potassium 4.3 3.4 - 5.3 mmol/L    Chloride 100 94 - 109 mmol/L    Carbon Dioxide 30 20 - 32 mmol/L    Anion Gap 4 3 - 14 mmol/L    Glucose 88 70 - 99 mg/dL    Urea Nitrogen 24 7 - 30 mg/dL    Creatinine 0.97 0.52 - 1.04 mg/dL    GFR Estimate 56 (L) >60 mL/min/[1.73_m2]    GFR Estimate If Black 64 >60 mL/min/[1.73_m2]    Calcium 9.1 8.5 - 10.1 mg/dL   Magnesium   Result Value Ref Range    Magnesium 2.5 (H) 1.6 - 2.3 mg/dL           Medications     Current Facility-Administered Medications   Medication     ACE Inhibitor/ARB/ARNI not indicated according to guidelines     acetaminophen (TYLENOL) tablet 650 mg     albuterol (PROAIR HFA/PROVENTIL HFA/VENTOLIN HFA) 108 (90  Base) MCG/ACT inhaler 2 puff     calcium citrate-vitamin D (CITRACAL) 315-250 MG-UNIT per tablet 2 tablet     cetirizine (zyrTEC) tablet 5 mg     digoxin (LANOXIN) tablet 62.5 mcg     famotidine (PEPCID) tablet 20 mg     fluticasone (ARNUITY ELLIPTA) 100 MCG/ACT inhaler 1 puff     furosemide (LASIX) 100 mg in sodium chloride 0.9 % 100 mL infusion     gabapentin (NEURONTIN) capsule 300 mg     heparin lock flush 10 UNIT/ML injection 2-5 mL     hydroxychloroquine (PLAQUENIL) tablet 200 mg     ipratropium (ATROVENT) 0.06 % spray 2 spray     lidocaine (LMX4) cream     lidocaine 1 % 0.1-5 mL     loperamide (IMODIUM) capsule 2 mg     magnesium sulfate 2 g in water intermittent infusion     magnesium sulfate 4 g in 100 mL sterile water (premade)     medication instruction     naloxone (NARCAN) injection 0.1-0.4 mg     oxyCODONE (ROXICODONE) tablet 5 mg     polyethylene glycol (MIRALAX) Packet 17 g     potassium chloride (KLOR-CON) Packet 20-40 mEq     potassium chloride 10 mEq in 100 mL intermittent infusion with 10 mg lidocaine     potassium chloride 10 mEq in 100 mL sterile water intermittent infusion (premix)     potassium chloride 20 mEq in 50 mL intermittent infusion     potassium chloride ER (KLOR-CON M) CR tablet 20-40 mEq     Reason beta blocker not prescribed     sodium chloride (PF) 0.9% PF flush 5-50 mL     treprostinil (REMODULIN) intravenous infusion (LESS than or EQUAL to 100 mcg/mL)     vitamin C (ASCORBIC ACID) tablet 500 mg             Assessment and Plan:          Karen Anderson is a 80 y.o. F w/ history of CTEPH, severe RV dysfunction, hx DVT on warfarin, pulmonary MAC, CKD, breast cancer s/p chemotherapy, hormone therapy, and mastectomy, HTN, RA, and asthma who presents as a transfer from outside hospital for shortness of breath concerning for acute heart failure exacerbation.     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 Recently had poorly tolerated Lasix increase due to worsening renal function, so it was decreased back down to 20 mg daily. Would also consider PE with her extensive history of this; however she is therapeutic on warfarin currently with INR 2.2 (goal 2-3). Received 40 mg IV Lasix at OSH ED prior to transfer.  - diuresis plan: IV lasix 80 mg daily.  -Lasix drip  - daily standing weights  - strict intake/output monitoring  - 2L fluid,2g Na restriction  - possible RHC once volume optimized  - PICC insertion     #Chronic thromboembolic pulmonary hypertension (CTEPH)  #Severe RV dysfunction  #Hx DVT 1998  Hx extensive b/l pulmonary emboli and DVT in 1998, thought to be 2/2 tamoxifen for breast CA. Deemed too high risk to undergo surgical PTE. 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 RHC (March 2020) showed PA of 98/30. Echo (also March 2020) showed severe hypertension and RV dilation and dysfunction with normal LVEF 60-65%. Mild RVH with mild-moderate TI also seen. Uptitrated Remodulin to goal of 30 ng/kg/min in June/July 2020. Side effects include headaches, leg pain, jaw pain and diarrhea with titrations up on Remodulin. See diuretic plan as above. Plan is to return to see Dr. Mathwe in ~7 weeks for repeat RHC and echo. US of lower extremities on this admission negative for DVT.   - continuous Remodulin  at 30 ng/kg/min  - Holding warfarin, once INR <2 will start heparin drip  - ASA 81 mg qday  - continue PTA digoxin 62.5 mcg qday      #CKD  #Hyperkalemia  Baseline in April-May 2020 was Cr 0.9-1.0. Elevated to 1.6 at outside hospital. K also 5.4, likely secondary to poor renal function.  - daily BMP  - Lasix as above  - holding pta lisinopril     #HTN  - hold PTA lisinopril considering worsening renal function     #RA  - hydroxychloroquine 200 mg qday     #Breast CA  S/p adriamycin, Cytoxan, Taxol and tamoxifen. S/p mastectomy     #Asthma  - albuterol 2 puffs  QID PRN  - beclomethasone 2 puff BID PRN      #Recent fall  #Vastus medialis oblique tear?  Had painful hematoma near the L knee. Took 2 doses of Norco but stopped 2/2 dizziness. US without evidence of expanding hematoma; of note also negative for DVT.   - Low dose narcotics available PRN along with acetaminophen     #Diarrhea  2/2 Remodulin, worse when increasing dose but now stable.   - Imodium PRN diarrhea  - Miralax PRN constipation     #Scoliosis  - hold PTA alendronate 70 mg tablet q7d, per patient, last dose yesterday.  - c/t Vit C 500 mg qday  - c/t Calcium carbonate-VitD 1200-100 mg qday  - c/t Vit D3 1000U qday  - c/t cetirizine 5 mg qday  - gabapentin 300mg am, 300mg noon, 600 mg evening        #Rhinorhea  - ipratropium nasal spray BID  - ocean spray nasal spray    PPX: DVT on warfarin/heparin; GI PPI PO  CODE: FULL  Dispo: pending diuresis and optimization of volume    Patient evaluated and discussed with Dr. Luong.      Saad Huerta MD  Internal Medicine Resident, PGY-1  Pager:*[phone]

## 2020-10-02 NOTE — PROCEDURES
[facility]     Triple Lumen PICC Placement    Date/Time: 10/2/2020 9:09 AM  Performed by: Mac Rogers RN  Authorized by: Chaudhry, Mohsan, MD   Indications: vascular access    UNIVERSAL PROTOCOL   Site Marked: Yes  Prior Images Obtained and Reviewed:  Yes  Required items: Required blood products, implants, devices and special equipment available    Patient identity confirmed:  Verbally with patient, arm band, provided demographic data and hospital-assigned identification number  NA - No sedation, light sedation, or local anesthesia  Confirmation Checklist:  Patient's identity using two indicators, relevant allergies, procedure was appropriate and matched the consent or emergent situation and correct equipment/implants were available  Time out: Immediately prior to the procedure a time out was called    Universal Protocol: the Joint Commission Universal Protocol was followed    Preparation: Patient was prepped and draped in usual sterile fashion           ANESTHESIA    Anesthesia: See MAR for details  Local Anesthetic:  Lidocaine 1% without epinephrine  Anesthetic Total (mL):  1      SEDATION    Patient Sedated: No        Preparation: skin prepped with ChloraPrep  Skin prep agent: skin prep agent completely dried prior to procedure  Sterile barriers: maximum sterile barriers were used: cap, mask, sterile gown, sterile gloves, and large sterile sheet  Hand hygiene: hand hygiene performed prior to central venous catheter insertion  Type of line used: Power PICC  Catheter type: triple lumen  Lumen type: non-valved  Catheter size: 5 Fr  Brand: [id number]  Lot number: [id number]  Placement method: venipuncture, MST, ultrasound and tip confirmation system  Number of attempts: 1  Successful placement: yes  Orientation: right  Location: basilic vein ( vein diameter - 0.35 cm)  Site rationale: left mastetctomy, s/p lymph node removal  Arm circumference: adults 10 cm  Extremity  circumference: 22  Visible catheter length: 4  Total catheter length: 43  Dressing and securement: chlorhexidine disc applied, securement device, site cleaned, statlock, sterile dressing applied and glue  Post procedure assessment: blood return through all ports and placement verified by x-ray  PROCEDURE   Patient Tolerance:  Patient tolerated the procedure well with no immediate complications  Describe Procedure: PICC is OK to use.

## 2020-10-03 ENCOUNTER — APPOINTMENT (OUTPATIENT)
Dept: PHYSICAL THERAPY | Facility: CLINIC | Age: 80
DRG: 286 | End: 2020-10-03
Attending: INTERNAL MEDICINE
Payer: COMMERCIAL

## 2020-10-03 LAB
ANION GAP SERPL CALCULATED.3IONS-SCNC: 3 MMOL/L (ref 3–14)
ANION GAP SERPL CALCULATED.3IONS-SCNC: 3 MMOL/L (ref 3–14)
BASOPHILS # BLD AUTO: 0 10E9/L (ref 0–0.2)
BASOPHILS NFR BLD AUTO: 0.7 %
BUN SERPL-MCNC: 22 MG/DL (ref 7–30)
BUN SERPL-MCNC: 23 MG/DL (ref 7–30)
CALCIUM SERPL-MCNC: 9.7 MG/DL (ref 8.5–10.1)
CALCIUM SERPL-MCNC: 9.9 MG/DL (ref 8.5–10.1)
CHLORIDE SERPL-SCNC: 88 MMOL/L (ref 94–109)
CHLORIDE SERPL-SCNC: 91 MMOL/L (ref 94–109)
CO2 SERPL-SCNC: 40 MMOL/L (ref 20–32)
CO2 SERPL-SCNC: 41 MMOL/L (ref 20–32)
CREAT SERPL-MCNC: 1.05 MG/DL (ref 0.52–1.04)
CREAT SERPL-MCNC: 1.13 MG/DL (ref 0.52–1.04)
DIFFERENTIAL METHOD BLD: ABNORMAL
EOSINOPHIL # BLD AUTO: 0.2 10E9/L (ref 0–0.7)
EOSINOPHIL NFR BLD AUTO: 4.3 %
ERYTHROCYTE [DISTWIDTH] IN BLOOD BY AUTOMATED COUNT: 13.7 % (ref 10–15)
GFR SERPL CREATININE-BSD FRML MDRD: 46 ML/MIN/{1.73_M2}
GFR SERPL CREATININE-BSD FRML MDRD: 51 ML/MIN/{1.73_M2}
GLUCOSE SERPL-MCNC: 85 MG/DL (ref 70–99)
GLUCOSE SERPL-MCNC: 89 MG/DL (ref 70–99)
HCT VFR BLD AUTO: 33.7 % (ref 35–47)
HGB BLD-MCNC: 10.3 G/DL (ref 11.7–15.7)
IMM GRANULOCYTES # BLD: 0 10E9/L (ref 0–0.4)
IMM GRANULOCYTES NFR BLD: 0.2 %
INR PPP: 2.04 (ref 0.86–1.14)
LYMPHOCYTES # BLD AUTO: 0.6 10E9/L (ref 0.8–5.3)
LYMPHOCYTES NFR BLD AUTO: 12.8 %
MAGNESIUM SERPL-MCNC: 1.8 MG/DL (ref 1.6–2.3)
MAGNESIUM SERPL-MCNC: 2.1 MG/DL (ref 1.6–2.3)
MCH RBC QN AUTO: 30.5 PG (ref 26.5–33)
MCHC RBC AUTO-ENTMCNC: 30.6 G/DL (ref 31.5–36.5)
MCV RBC AUTO: 100 FL (ref 78–100)
MONOCYTES # BLD AUTO: 0.7 10E9/L (ref 0–1.3)
MONOCYTES NFR BLD AUTO: 15.6 %
NEUTROPHILS # BLD AUTO: 2.9 10E9/L (ref 1.6–8.3)
NEUTROPHILS NFR BLD AUTO: 66.4 %
NRBC # BLD AUTO: 0 10*3/UL
NRBC BLD AUTO-RTO: 0 /100
PLATELET # BLD AUTO: 336 10E9/L (ref 150–450)
POTASSIUM SERPL-SCNC: 3.6 MMOL/L (ref 3.4–5.3)
POTASSIUM SERPL-SCNC: 3.9 MMOL/L (ref 3.4–5.3)
RBC # BLD AUTO: 3.38 10E12/L (ref 3.8–5.2)
SODIUM SERPL-SCNC: 132 MMOL/L (ref 133–144)
SODIUM SERPL-SCNC: 134 MMOL/L (ref 133–144)
WBC # BLD AUTO: 4.4 10E9/L (ref 4–11)

## 2020-10-03 PROCEDURE — 83735 ASSAY OF MAGNESIUM: CPT | Performed by: STUDENT IN AN ORGANIZED HEALTH CARE EDUCATION/TRAINING PROGRAM

## 2020-10-03 PROCEDURE — 250N000011 HC RX IP 250 OP 636: Performed by: INTERNAL MEDICINE

## 2020-10-03 PROCEDURE — 250N000013 HC RX MED GY IP 250 OP 250 PS 637: Performed by: STUDENT IN AN ORGANIZED HEALTH CARE EDUCATION/TRAINING PROGRAM

## 2020-10-03 PROCEDURE — 83735 ASSAY OF MAGNESIUM: CPT | Performed by: INTERNAL MEDICINE

## 2020-10-03 PROCEDURE — 85025 COMPLETE CBC W/AUTO DIFF WBC: CPT | Performed by: INTERNAL MEDICINE

## 2020-10-03 PROCEDURE — 250N000013 HC RX MED GY IP 250 OP 250 PS 637: Performed by: INTERNAL MEDICINE

## 2020-10-03 PROCEDURE — 258N000003 HC RX IP 258 OP 636: Performed by: INTERNAL MEDICINE

## 2020-10-03 PROCEDURE — 272N000004 HC RX 272: Performed by: INTERNAL MEDICINE

## 2020-10-03 PROCEDURE — 80048 BASIC METABOLIC PNL TOTAL CA: CPT | Performed by: INTERNAL MEDICINE

## 2020-10-03 PROCEDURE — 214N000001 HC R&B CCU UMMC

## 2020-10-03 PROCEDURE — 85610 PROTHROMBIN TIME: CPT | Performed by: INTERNAL MEDICINE

## 2020-10-03 PROCEDURE — 99232 SBSQ HOSP IP/OBS MODERATE 35: CPT | Performed by: INTERNAL MEDICINE

## 2020-10-03 PROCEDURE — 250N000011 HC RX IP 250 OP 636: Performed by: STUDENT IN AN ORGANIZED HEALTH CARE EDUCATION/TRAINING PROGRAM

## 2020-10-03 PROCEDURE — 36592 COLLECT BLOOD FROM PICC: CPT | Performed by: STUDENT IN AN ORGANIZED HEALTH CARE EDUCATION/TRAINING PROGRAM

## 2020-10-03 PROCEDURE — 80048 BASIC METABOLIC PNL TOTAL CA: CPT | Performed by: STUDENT IN AN ORGANIZED HEALTH CARE EDUCATION/TRAINING PROGRAM

## 2020-10-03 PROCEDURE — 36592 COLLECT BLOOD FROM PICC: CPT | Performed by: INTERNAL MEDICINE

## 2020-10-03 PROCEDURE — 97116 GAIT TRAINING THERAPY: CPT | Mod: GP | Performed by: PHYSICAL THERAPIST

## 2020-10-03 RX ORDER — WARFARIN SODIUM 3 MG/1
3 TABLET ORAL
Status: COMPLETED | OUTPATIENT
Start: 2020-10-03 | End: 2020-10-03

## 2020-10-03 RX ADMIN — GABAPENTIN 300 MG: 300 CAPSULE ORAL at 13:30

## 2020-10-03 RX ADMIN — IPRATROPIUM BROMIDE 2 SPRAY: 42 SPRAY NASAL at 13:40

## 2020-10-03 RX ADMIN — IPRATROPIUM BROMIDE 2 SPRAY: 42 SPRAY NASAL at 19:42

## 2020-10-03 RX ADMIN — DIGOXIN 62.5 MCG: 0.06 TABLET ORAL at 09:03

## 2020-10-03 RX ADMIN — GABAPENTIN 300 MG: 300 CAPSULE ORAL at 19:42

## 2020-10-03 RX ADMIN — POTASSIUM CHLORIDE 20 MEQ: 1500 TABLET, EXTENDED RELEASE ORAL at 19:42

## 2020-10-03 RX ADMIN — ACETAMINOPHEN 650 MG: 325 TABLET, FILM COATED ORAL at 13:30

## 2020-10-03 RX ADMIN — OXYCODONE HYDROCHLORIDE 5 MG: 5 TABLET ORAL at 19:42

## 2020-10-03 RX ADMIN — OXYCODONE HYDROCHLORIDE AND ACETAMINOPHEN 500 MG: 500 TABLET ORAL at 09:04

## 2020-10-03 RX ADMIN — FUROSEMIDE 10 MG/HR: 10 INJECTION, SOLUTION INTRAVENOUS at 09:09

## 2020-10-03 RX ADMIN — HYDROXYCHLOROQUINE SULFATE 200 MG: 200 TABLET, FILM COATED ORAL at 09:04

## 2020-10-03 RX ADMIN — POTASSIUM CHLORIDE 20 MEQ: 1500 TABLET, EXTENDED RELEASE ORAL at 09:15

## 2020-10-03 RX ADMIN — CETIRIZINE HYDROCHLORIDE 5 MG: 5 TABLET ORAL at 09:04

## 2020-10-03 RX ADMIN — GABAPENTIN 300 MG: 300 CAPSULE ORAL at 09:04

## 2020-10-03 RX ADMIN — FAMOTIDINE 20 MG: 20 TABLET ORAL at 09:03

## 2020-10-03 RX ADMIN — MAGNESIUM SULFATE 2 G: 2 INJECTION INTRAVENOUS at 19:42

## 2020-10-03 RX ADMIN — FUROSEMIDE 10 MG/HR: 10 INJECTION, SOLUTION INTRAVENOUS at 18:55

## 2020-10-03 RX ADMIN — Medication 2 TABLET: at 09:04

## 2020-10-03 RX ADMIN — FLUTICASONE FUROATE 1 PUFF: 100 POWDER RESPIRATORY (INHALATION) at 09:04

## 2020-10-03 RX ADMIN — Medication 15 ML: at 09:16

## 2020-10-03 RX ADMIN — OXYCODONE HYDROCHLORIDE 5 MG: 5 TABLET ORAL at 10:23

## 2020-10-03 RX ADMIN — WARFARIN SODIUM 3 MG: 3 TABLET ORAL at 17:55

## 2020-10-03 RX ADMIN — TREPROSTINIL 30 NG/KG/MIN: 20 INJECTION, SOLUTION INTRAVENOUS; SUBCUTANEOUS at 05:36

## 2020-10-03 RX ADMIN — ACETAMINOPHEN 650 MG: 325 TABLET, FILM COATED ORAL at 23:25

## 2020-10-03 ASSESSMENT — PAIN DESCRIPTION - DESCRIPTORS
DESCRIPTORS: ACHING
DESCRIPTORS: ACHING;SORE

## 2020-10-03 ASSESSMENT — ACTIVITIES OF DAILY LIVING (ADL)
ADLS_ACUITY_SCORE: 16
ADLS_ACUITY_SCORE: 17
ADLS_ACUITY_SCORE: 16
ADLS_ACUITY_SCORE: 16

## 2020-10-03 NOTE — PROGRESS NOTES
[facility]   Cardiology II Service / Advanced Heart Failure  Daily Progress Note  Date of Service: 10/3/2020      Patient: Karen Anderson  MRN: [id number]  Admission Date: 9/29/2020  Hospital Day # 4    Assessment and Plan: Karen Anderson is a 80 year old female with a PMH of CTEPH, Severe RV dysfunction, DVT on Coumadin, CKD Stage III, Breast CA (s/p chemo, hormone therapy, and mastectomy), HTN, RA, and Asthma. She presents per OSH for acute SOB in setting of heart failure exacerbation.     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 Echo 9/29/20 with EF 60-65%, moderate RV dilation, and small effusion. RHC 3/4/20 consistent with mRA-10, RV-98/11, mPA-52, mPCWP-15, LUIZ CO-1.92, and LUIZ CI-1.29.   - Diuresing well with Lasix gtt at 10 mg/hr. BID BMP.   - Continue Digoxin, level 0.8.  - Consider RHC at euvolemic state.     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 Thought to be secondary to Tamoxifen secondary to breast CA. Not a surgical candidate for PTE. RHC 3/4/20 consistent with mRA-10, RV-98/11, mPA-52, mPCWP-15, LUIZ CO-1.92, and LUIZ CI-1.29. Remodulin initiated in 3/20 with symptomatic relief.   - continue Remodulin at 30 ng/kg/min  - Coumadin per pharmacy, Goal INR-2-2.5. INR-2.04.  - Plan for RHC at euvolemic state.     CKD Stage II.   - Lisinopril held on admission. Stable BP off.   - Cr-1.05.    HTN.   - Lisinopril held with stable BP.     Chronic Medical Conditions:  RA. Continue Plaquenil  Breast CA.   Asthma. Continue Beclomethasone 2 puffs BID, Albuterol QID prn.  Left Vastus Medialis Oblique Tear s/p Fall. Occurred in early September and improving. Oxycodone prn pain.    Diarrhea. Imodium prn.     FEN: 2 gram sodium diet   PROPHY: Coumadin    LINES:  PIV and   DISPO:  TBD. PT/OT consult   CODE STATUS:  Full Code   ================================================================  Interval History/ROS: She complains of abdominal distention and RIVERA. She denies fever, chills, chest pain,  "palpitations, cough, nausea, vomiting, diarrhea, and LE edema. She is tolerating oral intake.     Last 24 hr care team notes reviewed.   ROS:  4 point ROS including Respiratory, CV, GI and , other than that noted in the HPI, is negative.     Medications: Reviewed in EPIC.     Physical Exam:   /49 (BP Location: Right leg)   Pulse 80   Temp 99.1  F (37.3  C) (Oral)   Resp 16   Ht 1.499 m (4' 11\")   Wt 52.6 kg (116 lb)   SpO2 91%   BMI 23.43 kg/m    GENERAL: Appears alert and oriented times three.   HEENT: Eye symmetrical and free of discharge bilaterally. Mucous membranes moist and without lesions.  NECK: Supple and without lymphadenopathy. JVD to jaw  CV: RRR, S1S2 present without murmur, rub, or gallop.   RESPIRATORY: Respirations regular, even, and unlabored. Lungs CTA throughout.   GI: Soft and distended with normoactive bowel sounds present in all quadrants. No tenderness, rebound, guarding. No organomegaly.   EXTREMITIES: Trace bilateral LE peripheral edema. 2+ bilateral pedal pulses.   NEUROLOGIC: Alert and orientated x 3. CN II-XII grossly intact. No focal deficits.   MUSCULOSKELETAL: No joint swelling or tenderness.   SKIN: No jaundice. No rashes or lesions.     Data:  CMP  Recent Labs   Lab 10/03/20  0525 10/02/20  1710 10/02/20  1104 10/01/20  1655 09/30/20  0542 09/30/20  0542 09/29/20  1127 09/29/20  1127    132* 133 134   < > 137   < > 137   POTASSIUM 3.6 3.8 3.7 4.3   < > 4.3   < > 4.0   CHLORIDE 91* 92* 93* 100   < > 104   < > 106   CO2 40* 36* 38* 30   < > 29   < > 25   ANIONGAP 3 4 2* 4   < > 4   < > 6   GLC 85 91 148* 88   < > 72   < > 73   BUN 22 24 24 24   < > 34*   < > 40*   CR 1.05* 1.04 1.05* 0.97   < > 1.30*   < > 1.42*   GFRESTIMATED 51* 51* 51* 56*   < > 39*   < > 35*   GFRESTBLACK 59* 60* 59* 64   < > 45*   < > 41*   ESTEFANÍA 9.7 9.6 9.2 9.1   < > 8.5   < > 9.0   MAG 2.1 1.8 1.9 2.5*   < > 2.2   < > 2.0   PROTTOTAL  --   --   --   --   --  6.1*  --  6.6*   ALBUMIN  --   --   " --   --   --  2.3*  --  2.7*   BILITOTAL  --   --   --   --   --  0.5  --  0.5   ALKPHOS  --   --   --   --   --  89  --  93   AST  --   --   --   --   --  30  --  30   ALT  --   --   --   --   --  20  --  21    < > = values in this interval not displayed.     CBC  Recent Labs   Lab 10/03/20  0525 10/02/20  1104 10/01/20  0526 09/30/20  0542   WBC 4.4 5.2 4.3 4.6   RBC 3.38* 3.38* 3.44* 2.99*   HGB 10.3* 10.3* 10.6* 9.4*   HCT 33.7* 33.8* 34.8* 30.0*    100 101* 100   MCH 30.5 30.5 30.8 31.4   MCHC 30.6* 30.5* 30.5* 31.3*   RDW 13.7 14.0 14.2 14.5    333 292 278     INR  Recent Labs   Lab 10/03/20  0525 10/02/20  1104 10/01/20  0526 09/30/20  0542   INR 2.04* 2.41* 3.13* 2.80*       Patient seen and discussed with Dr. Alma Randall [facility]  10/3/2020

## 2020-10-03 NOTE — PLAN OF CARE
D: Pt admitted 9/29 for SOB and HF exacerbation. PMHx includes: DVT, pulm HTN, breast cx (L mastectomy), HTN, rheum arthritis and asthma.      I: Monitored vitals and assessed pt status.   Running:Lasix gtt at 10mg/hr; Remodulin at 30ng/kg  PRN: Potassium and magnesium replaced      A: A0x4. VSS. SR, 1L NC.  Afebrile. Urinating adequately with wilkins. Pt refused to walk into bathroom along with position changes. Left knee hematoma, outlined and appears to be improving.      P: Continue to monitor Pt status and report changes to treatment team.        Jay Jay POWERS POC [phone]

## 2020-10-04 LAB
ANION GAP SERPL CALCULATED.3IONS-SCNC: <1 MMOL/L (ref 3–14)
ANION GAP SERPL CALCULATED.3IONS-SCNC: ABNORMAL MMOL/L (ref 3–14)
BASE EXCESS BLDV CALC-SCNC: 19.1 MMOL/L
BASOPHILS # BLD AUTO: 0.1 10E9/L (ref 0–0.2)
BASOPHILS NFR BLD AUTO: 1 %
BUN SERPL-MCNC: 27 MG/DL (ref 7–30)
BUN SERPL-MCNC: 28 MG/DL (ref 7–30)
CALCIUM SERPL-MCNC: 9.4 MG/DL (ref 8.5–10.1)
CALCIUM SERPL-MCNC: 9.8 MG/DL (ref 8.5–10.1)
CHLORIDE SERPL-SCNC: 87 MMOL/L (ref 94–109)
CHLORIDE SERPL-SCNC: 87 MMOL/L (ref 94–109)
CO2 SERPL-SCNC: 41 MMOL/L (ref 20–32)
CO2 SERPL-SCNC: >45 MMOL/L (ref 20–32)
CREAT SERPL-MCNC: 1.07 MG/DL (ref 0.52–1.04)
CREAT SERPL-MCNC: 1.11 MG/DL (ref 0.52–1.04)
DIFFERENTIAL METHOD BLD: ABNORMAL
EOSINOPHIL # BLD AUTO: 0.2 10E9/L (ref 0–0.7)
EOSINOPHIL NFR BLD AUTO: 3.2 %
ERYTHROCYTE [DISTWIDTH] IN BLOOD BY AUTOMATED COUNT: 13.6 % (ref 10–15)
GFR SERPL CREATININE-BSD FRML MDRD: 47 ML/MIN/{1.73_M2}
GFR SERPL CREATININE-BSD FRML MDRD: 50 ML/MIN/{1.73_M2}
GLUCOSE SERPL-MCNC: 82 MG/DL (ref 70–99)
GLUCOSE SERPL-MCNC: 99 MG/DL (ref 70–99)
HCO3 BLDV-SCNC: 46 MMOL/L (ref 21–28)
HCT VFR BLD AUTO: 32 % (ref 35–47)
HGB BLD-MCNC: 9.9 G/DL (ref 11.7–15.7)
IMM GRANULOCYTES # BLD: 0 10E9/L (ref 0–0.4)
IMM GRANULOCYTES NFR BLD: 0.4 %
INR PPP: 1.85 (ref 0.86–1.14)
LYMPHOCYTES # BLD AUTO: 0.7 10E9/L (ref 0.8–5.3)
LYMPHOCYTES NFR BLD AUTO: 14.5 %
MAGNESIUM SERPL-MCNC: 1.9 MG/DL (ref 1.6–2.3)
MAGNESIUM SERPL-MCNC: 2.2 MG/DL (ref 1.6–2.3)
MCH RBC QN AUTO: 30.7 PG (ref 26.5–33)
MCHC RBC AUTO-ENTMCNC: 30.9 G/DL (ref 31.5–36.5)
MCV RBC AUTO: 99 FL (ref 78–100)
MONOCYTES # BLD AUTO: 0.7 10E9/L (ref 0–1.3)
MONOCYTES NFR BLD AUTO: 14.9 %
NEUTROPHILS # BLD AUTO: 3.3 10E9/L (ref 1.6–8.3)
NEUTROPHILS NFR BLD AUTO: 66 %
NRBC # BLD AUTO: 0 10*3/UL
NRBC BLD AUTO-RTO: 0 /100
O2/TOTAL GAS SETTING VFR VENT: ABNORMAL %
PCO2 BLDV: 66 MM HG (ref 40–50)
PH BLDV: 7.45 PH (ref 7.32–7.43)
PLATELET # BLD AUTO: 299 10E9/L (ref 150–450)
PO2 BLDV: 34 MM HG (ref 25–47)
POTASSIUM SERPL-SCNC: 3.7 MMOL/L (ref 3.4–5.3)
POTASSIUM SERPL-SCNC: 3.7 MMOL/L (ref 3.4–5.3)
RBC # BLD AUTO: 3.23 10E12/L (ref 3.8–5.2)
SODIUM SERPL-SCNC: 128 MMOL/L (ref 133–144)
SODIUM SERPL-SCNC: 133 MMOL/L (ref 133–144)
WBC # BLD AUTO: 5 10E9/L (ref 4–11)

## 2020-10-04 PROCEDURE — 250N000011 HC RX IP 250 OP 636: Performed by: INTERNAL MEDICINE

## 2020-10-04 PROCEDURE — 250N000013 HC RX MED GY IP 250 OP 250 PS 637: Performed by: INTERNAL MEDICINE

## 2020-10-04 PROCEDURE — 250N000011 HC RX IP 250 OP 636: Performed by: STUDENT IN AN ORGANIZED HEALTH CARE EDUCATION/TRAINING PROGRAM

## 2020-10-04 PROCEDURE — 85025 COMPLETE CBC W/AUTO DIFF WBC: CPT | Performed by: INTERNAL MEDICINE

## 2020-10-04 PROCEDURE — 83735 ASSAY OF MAGNESIUM: CPT | Performed by: INTERNAL MEDICINE

## 2020-10-04 PROCEDURE — 80048 BASIC METABOLIC PNL TOTAL CA: CPT | Performed by: NURSE PRACTITIONER

## 2020-10-04 PROCEDURE — 36592 COLLECT BLOOD FROM PICC: CPT | Performed by: NURSE PRACTITIONER

## 2020-10-04 PROCEDURE — 83735 ASSAY OF MAGNESIUM: CPT | Performed by: NURSE PRACTITIONER

## 2020-10-04 PROCEDURE — 250N000013 HC RX MED GY IP 250 OP 250 PS 637: Performed by: STUDENT IN AN ORGANIZED HEALTH CARE EDUCATION/TRAINING PROGRAM

## 2020-10-04 PROCEDURE — 85610 PROTHROMBIN TIME: CPT | Performed by: INTERNAL MEDICINE

## 2020-10-04 PROCEDURE — 80048 BASIC METABOLIC PNL TOTAL CA: CPT | Performed by: INTERNAL MEDICINE

## 2020-10-04 PROCEDURE — 82803 BLOOD GASES ANY COMBINATION: CPT | Performed by: NURSE PRACTITIONER

## 2020-10-04 PROCEDURE — 272N000004 HC RX 272: Performed by: INTERNAL MEDICINE

## 2020-10-04 PROCEDURE — 214N000001 HC R&B CCU UMMC

## 2020-10-04 PROCEDURE — 36592 COLLECT BLOOD FROM PICC: CPT | Performed by: INTERNAL MEDICINE

## 2020-10-04 PROCEDURE — 258N000003 HC RX IP 258 OP 636: Performed by: INTERNAL MEDICINE

## 2020-10-04 PROCEDURE — 250N000013 HC RX MED GY IP 250 OP 250 PS 637: Performed by: NURSE PRACTITIONER

## 2020-10-04 RX ORDER — WARFARIN SODIUM 5 MG/1
5 TABLET ORAL
Status: COMPLETED | OUTPATIENT
Start: 2020-10-04 | End: 2020-10-04

## 2020-10-04 RX ORDER — FUROSEMIDE 10 MG/ML
60 INJECTION INTRAMUSCULAR; INTRAVENOUS 2 TIMES DAILY
Status: DISCONTINUED | OUTPATIENT
Start: 2020-10-05 | End: 2020-10-05

## 2020-10-04 RX ORDER — ACETAZOLAMIDE 250 MG/1
250 TABLET ORAL ONCE
Status: COMPLETED | OUTPATIENT
Start: 2020-10-04 | End: 2020-10-04

## 2020-10-04 RX ADMIN — DIGOXIN 62.5 MCG: 0.06 TABLET ORAL at 08:45

## 2020-10-04 RX ADMIN — WARFARIN SODIUM 5 MG: 5 TABLET ORAL at 18:26

## 2020-10-04 RX ADMIN — FUROSEMIDE 10 MG/HR: 10 INJECTION, SOLUTION INTRAVENOUS at 06:03

## 2020-10-04 RX ADMIN — Medication 5 ML: at 05:04

## 2020-10-04 RX ADMIN — POTASSIUM CHLORIDE 20 MEQ: 1500 TABLET, EXTENDED RELEASE ORAL at 09:04

## 2020-10-04 RX ADMIN — ACETAZOLAMIDE 250 MG: 250 TABLET ORAL at 11:21

## 2020-10-04 RX ADMIN — POTASSIUM CHLORIDE 20 MEQ: 1500 TABLET, EXTENDED RELEASE ORAL at 18:26

## 2020-10-04 RX ADMIN — Medication 2 TABLET: at 08:44

## 2020-10-04 RX ADMIN — FAMOTIDINE 20 MG: 20 TABLET ORAL at 08:44

## 2020-10-04 RX ADMIN — ACETAMINOPHEN 650 MG: 325 TABLET, FILM COATED ORAL at 13:38

## 2020-10-04 RX ADMIN — CETIRIZINE HYDROCHLORIDE 5 MG: 5 TABLET ORAL at 08:44

## 2020-10-04 RX ADMIN — GABAPENTIN 300 MG: 300 CAPSULE ORAL at 15:08

## 2020-10-04 RX ADMIN — ACETAMINOPHEN 650 MG: 325 TABLET, FILM COATED ORAL at 21:31

## 2020-10-04 RX ADMIN — GABAPENTIN 300 MG: 300 CAPSULE ORAL at 21:31

## 2020-10-04 RX ADMIN — MAGNESIUM SULFATE 2 G: 2 INJECTION INTRAVENOUS at 18:27

## 2020-10-04 RX ADMIN — ACETAMINOPHEN 650 MG: 325 TABLET, FILM COATED ORAL at 05:37

## 2020-10-04 RX ADMIN — ACETAMINOPHEN 650 MG: 325 TABLET, FILM COATED ORAL at 09:09

## 2020-10-04 RX ADMIN — SALINE NASAL SPRAY 1 SPRAY: 1.5 SOLUTION NASAL at 16:44

## 2020-10-04 RX ADMIN — OXYCODONE HYDROCHLORIDE 5 MG: 5 TABLET ORAL at 16:44

## 2020-10-04 RX ADMIN — GABAPENTIN 300 MG: 300 CAPSULE ORAL at 08:44

## 2020-10-04 RX ADMIN — SALINE NASAL SPRAY 1 SPRAY: 1.5 SOLUTION NASAL at 13:46

## 2020-10-04 RX ADMIN — FLUTICASONE FUROATE 1 PUFF: 100 POWDER RESPIRATORY (INHALATION) at 08:59

## 2020-10-04 RX ADMIN — OXYCODONE HYDROCHLORIDE AND ACETAMINOPHEN 500 MG: 500 TABLET ORAL at 08:44

## 2020-10-04 RX ADMIN — HYDROXYCHLOROQUINE SULFATE 200 MG: 200 TABLET, FILM COATED ORAL at 08:44

## 2020-10-04 RX ADMIN — Medication 10 ML: at 11:21

## 2020-10-04 RX ADMIN — TREPROSTINIL 30 NG/KG/MIN: 20 INJECTION, SOLUTION INTRAVENOUS; SUBCUTANEOUS at 06:01

## 2020-10-04 ASSESSMENT — PAIN DESCRIPTION - DESCRIPTORS
DESCRIPTORS: DISCOMFORT
DESCRIPTORS: DISCOMFORT
DESCRIPTORS: DISCOMFORT;SORE

## 2020-10-04 ASSESSMENT — ACTIVITIES OF DAILY LIVING (ADL)
ADLS_ACUITY_SCORE: 16

## 2020-10-04 ASSESSMENT — MIFFLIN-ST. JEOR: SCORE: 907.27

## 2020-10-04 NOTE — PLAN OF CARE
D: Pt admitted 9/29 for SOB and HF exacerbation. PMHx includes: DVT, pulm HTN, breast cx (L mastectomy), HTN, rheum arthritis and asthma.      I: Monitored vitals and assessed pt status.   Running:Lasix gtt at 10mg/hr; Remodulin at 30ng/kg  PRN: Potassium and magnesium replaced. 5mg oxy x1; tylenol x2 for knee pain.      A: A0x4. VSS. SR, 2L NC.  Afebrile. Urinating adequately with wilkins. Left knee hematoma, outlined and appears to be improving. Pt ambulating well to bathroom, mepilex on sacrum.      P: Possible RHC in future.. Continue to monitor Pt status and report changes to treatment team.        Jay Jay POWERS POC [phone]

## 2020-10-04 NOTE — PLAN OF CARE
Pt A&Ox4, VSS, Ax2.  Pt is displaying some concerns with becoming too dried out. K replaced with 20 meq this shift (3.6).  Rhythm: SR 80's.  Pt satting high 90's on 1LNC, but did not tolerate an attempt at weaning off O2.  Pain controlled with PRN tylenol & oxycodone, scheduled gabapentin and   Remodulin gtt @ 30ng/kg/min; Lasix gtt @ 10mg/hr with adequate UO via wilkins catheter.  Plan: Continue diuresis and work with PT.  Continue to monitor and contact Cards 1 with concerns.

## 2020-10-04 NOTE — PROGRESS NOTES
[facility]   [facility] 1/General Cardiology  Daily Progress Note  Date of Service: 10/4/2020      Patient: Karen Anderson  MRN: [id number]  Admission Date: 9/29/2020  Hospital Day # 5    Assessment and Plan: Karen Anderson is a 80 year old female with a PMH of CTEPH, Severe RV dysfunction, DVT on Coumadin, CKD Stage III, Breast CA (s/p chemo, hormone therapy, and mastectomy), HTN, RA, and Asthma. She presents per OSH for acute SOB in setting of heart failure exacerbation.      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 Echo 9/29/20 with EF 60-65%, moderate RV dilation, and small effusion. RHC 3/4/20 consistent with mRA-10, RV-98/11, mPA-52, mPCWP-15, LUIZ CO-1.92, and LUIZ CI-1.29.   - Discontinue Lasix gtt in setting of rising Bicarb with high output. Change to Lasix 60 mg IV BID, start in AM. VBG notes compensation. Diamox 250 mg times one.   - Continue Digoxin, level 0.8.  - Consider RHC at euvolemic state.      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 Thought to be secondary to Tamoxifen secondary to breast CA. Not a surgical candidate for PTE. RHC 3/4/20 consistent with mRA-10, RV-98/11, mPA-52, mPCWP-15, LUIZ CO-1.92, and LUIZ CI-1.29. Remodulin initiated in 3/20 with symptomatic relief.   - continue Remodulin at 30 ng/kg/min  - Coumadin per pharmacy, Goal INR-2-2.5. INR-1.85, defer heparin bridge due to left knee hematoma. If INR < 2 in AM may need to consider gtt, higher dose of Coumadin this HS.   - Plan for RHC at euvolemic state.   - Oxygen to keep sats > 88%.      CKD Stage II.   - Lisinopril held on admission. Stable BP off.   - Cr-1.11.      HTN.   - Lisinopril held with stable BP.      Chronic Medical Conditions:  RA. Continue Plaquenil  Breast CA.   Asthma. Continue Beclomethasone 2 puffs BID, Albuterol QID prn.  Left Vastus Medialis Oblique Tear s/p Fall. Occurred in early September and improving. Oxycodone prn pain.    Diarrhea. Imodium prn.      FEN: 2 gram sodium diet   PROPHY: Coumadin  "   LINES:  PIV and   DISPO:  TBD. PT/OT consult   CODE STATUS:  Full Code   ================================================================    Interval History/ROS: She complains of dry nose and headache due to oxygen. She notes persistent abdominal distention and RIVERA, but improving. She denies fever, chills, chest pain, palpitations, cough, nausea, vomiting, diarrhea, and LE edema. She is tolerating oral intake and tolerated ambulation yesterday.     Last 24 hr care team notes reviewed.   ROS:  4 point ROS including Respiratory, CV, GI and , other than that noted in the HPI, is negative.     Medications: Reviewed in EPIC.     Physical Exam:   /54   Pulse 65   Temp 98.8  F (37.1  C) (Oral)   Resp 16   Ht 1.499 m (4' 11\")   Wt 52.2 kg (115 lb)   SpO2 99%   BMI 23.23 kg/m    GENERAL: Appears alert and oriented times three.   HEENT: Eye symmetrical and free of discharge bilaterally. Mucous membranes moist and without lesions.  NECK: Supple and without lymphadenopathy. JVD mid neck upright.   CV: RRR, S1S2 present without murmur, rub, or gallop.   RESPIRATORY: Respirations regular, even, and unlabored. Lungs CTA throughout.   GI: Soft and mildly distended with normoactive bowel sounds present in all quadrants. No tenderness, rebound, guarding. No organomegaly.   EXTREMITIES: Trace bilateral LE peripheral edema. 2+ bilateral pedal pulses.   NEUROLOGIC: Alert and orientated x 3. CN II-XII grossly intact. No focal deficits.   MUSCULOSKELETAL: No joint swelling or tenderness.   SKIN: No jaundice. No rashes or lesions. Right Goldberg CDI    Data:  CMP  Recent Labs   Lab 10/04/20  0511 10/03/20  1731 10/03/20  0525 10/02/20  1710 09/30/20  0542 09/30/20  0542 09/29/20  1127 09/29/20  1127    132* 134 132*   < > 137   < > 137   POTASSIUM 3.7 3.9 3.6 3.8   < > 4.3   < > 4.0   CHLORIDE 87* 88* 91* 92*   < > 104   < > 106   CO2 >45* 41* 40* 36*   < > 29   < > 25   ANIONGAP Not Calculated 3 3 4   < > 4   < > 6 "   GLC 82 89 85 91   < > 72   < > 73   BUN 28 23 22 24   < > 34*   < > 40*   CR 1.11* 1.13* 1.05* 1.04   < > 1.30*   < > 1.42*   GFRESTIMATED 47* 46* 51* 51*   < > 39*   < > 35*   GFRESTBLACK 55* 54* 59* 60*   < > 45*   < > 41*   ESTEFANÍA 9.8 9.9 9.7 9.6   < > 8.5   < > 9.0   MAG 2.2 1.8 2.1 1.8   < > 2.2   < > 2.0   PROTTOTAL  --   --   --   --   --  6.1*  --  6.6*   ALBUMIN  --   --   --   --   --  2.3*  --  2.7*   BILITOTAL  --   --   --   --   --  0.5  --  0.5   ALKPHOS  --   --   --   --   --  89  --  93   AST  --   --   --   --   --  30  --  30   ALT  --   --   --   --   --  20  --  21    < > = values in this interval not displayed.     CBC  Recent Labs   Lab 10/04/20  0511 10/03/20  0525 10/02/20  1104 10/01/20  0526   WBC 5.0 4.4 5.2 4.3   RBC 3.23* 3.38* 3.38* 3.44*   HGB 9.9* 10.3* 10.3* 10.6*   HCT 32.0* 33.7* 33.8* 34.8*   MCV 99 100 100 101*   MCH 30.7 30.5 30.5 30.8   MCHC 30.9* 30.6* 30.5* 30.5*   RDW 13.6 13.7 14.0 14.2    336 333 292     INR  Recent Labs   Lab 10/04/20  0511 10/03/20  0525 10/02/20  1104 10/01/20  0526   INR 1.85* 2.04* 2.41* 3.13*       Patient seen and discussed with Dr. Marquez.      [facility]  10/4/2020

## 2020-10-04 NOTE — PLAN OF CARE
D: Admit 9/29 with SOB, HF exacerbation.   PMH: DVT, Pulm HTN, breast cancer (s/p mastectomy), HTN, RA, asthma.     I/A: A&O4. VSS. O2 sats variable; on RA at mid 90s this am, but when spot checked around 1100, dropped to low 80s. Did assess O2 using both ear probe and finger probe. Oxygen titrated to keep sats > 88%. Lasix gtt discontinued this am. Good UO with wilkins in place. Goldberg port running remodulin. 3 lumen PICC in R; hep locked. R PIV SL. Up to commode in room. Ambulated around unit x2 with gait belt/walker, with plans to ambulate more this evening. Acetaminophen x2 for L knee pain (hematoma). Potassium replaced per protocol.     Gtts:   Remodulin 30 ng/kg/min, cont. (Goldberg port)    P: Continue to wean oxygen, encourage activity. Monitor weight/UO. Update CARDS 2 NP if questions/concerns.     Carmen Owusu RN

## 2020-10-05 ENCOUNTER — APPOINTMENT (OUTPATIENT)
Dept: OCCUPATIONAL THERAPY | Facility: CLINIC | Age: 80
DRG: 286 | End: 2020-10-05
Attending: INTERNAL MEDICINE
Payer: COMMERCIAL

## 2020-10-05 LAB
ANION GAP SERPL CALCULATED.3IONS-SCNC: 4 MMOL/L (ref 3–14)
BASOPHILS # BLD AUTO: 0 10E9/L (ref 0–0.2)
BASOPHILS NFR BLD AUTO: 0.8 %
BUN SERPL-MCNC: 29 MG/DL (ref 7–30)
CALCIUM SERPL-MCNC: 9.5 MG/DL (ref 8.5–10.1)
CHLORIDE SERPL-SCNC: 87 MMOL/L (ref 94–109)
CO2 SERPL-SCNC: 37 MMOL/L (ref 20–32)
CREAT SERPL-MCNC: 0.99 MG/DL (ref 0.52–1.04)
DIFFERENTIAL METHOD BLD: ABNORMAL
EOSINOPHIL # BLD AUTO: 0.2 10E9/L (ref 0–0.7)
EOSINOPHIL NFR BLD AUTO: 3.5 %
ERYTHROCYTE [DISTWIDTH] IN BLOOD BY AUTOMATED COUNT: 13.7 % (ref 10–15)
GFR SERPL CREATININE-BSD FRML MDRD: 54 ML/MIN/{1.73_M2}
GLUCOSE SERPL-MCNC: 89 MG/DL (ref 70–99)
HCT VFR BLD AUTO: 30.3 % (ref 35–47)
HGB BLD-MCNC: 9.5 G/DL (ref 11.7–15.7)
IMM GRANULOCYTES # BLD: 0 10E9/L (ref 0–0.4)
IMM GRANULOCYTES NFR BLD: 0.4 %
INR PPP: 1.71 (ref 0.86–1.14)
LYMPHOCYTES # BLD AUTO: 0.6 10E9/L (ref 0.8–5.3)
LYMPHOCYTES NFR BLD AUTO: 12.2 %
MAGNESIUM SERPL-MCNC: 2.5 MG/DL (ref 1.6–2.3)
MCH RBC QN AUTO: 30.9 PG (ref 26.5–33)
MCHC RBC AUTO-ENTMCNC: 31.4 G/DL (ref 31.5–36.5)
MCV RBC AUTO: 99 FL (ref 78–100)
MONOCYTES # BLD AUTO: 0.6 10E9/L (ref 0–1.3)
MONOCYTES NFR BLD AUTO: 12.6 %
NEUTROPHILS # BLD AUTO: 3.5 10E9/L (ref 1.6–8.3)
NEUTROPHILS NFR BLD AUTO: 70.5 %
NRBC # BLD AUTO: 0 10*3/UL
NRBC BLD AUTO-RTO: 0 /100
PLATELET # BLD AUTO: 265 10E9/L (ref 150–450)
POTASSIUM SERPL-SCNC: 3.3 MMOL/L (ref 3.4–5.3)
POTASSIUM SERPL-SCNC: 3.5 MMOL/L (ref 3.4–5.3)
RBC # BLD AUTO: 3.07 10E12/L (ref 3.8–5.2)
SODIUM SERPL-SCNC: 128 MMOL/L (ref 133–144)
WBC # BLD AUTO: 4.9 10E9/L (ref 4–11)

## 2020-10-05 PROCEDURE — 99232 SBSQ HOSP IP/OBS MODERATE 35: CPT | Mod: 25 | Performed by: INTERNAL MEDICINE

## 2020-10-05 PROCEDURE — 80048 BASIC METABOLIC PNL TOTAL CA: CPT | Performed by: INTERNAL MEDICINE

## 2020-10-05 PROCEDURE — 97530 THERAPEUTIC ACTIVITIES: CPT | Mod: GO

## 2020-10-05 PROCEDURE — 250N000011 HC RX IP 250 OP 636: Performed by: INTERNAL MEDICINE

## 2020-10-05 PROCEDURE — 93451 RIGHT HEART CATH: CPT | Mod: 26 | Performed by: INTERNAL MEDICINE

## 2020-10-05 PROCEDURE — 97110 THERAPEUTIC EXERCISES: CPT | Mod: GO

## 2020-10-05 PROCEDURE — 250N000011 HC RX IP 250 OP 636: Performed by: NURSE PRACTITIONER

## 2020-10-05 PROCEDURE — 272N000004 HC RX 272: Performed by: INTERNAL MEDICINE

## 2020-10-05 PROCEDURE — 85610 PROTHROMBIN TIME: CPT | Performed by: INTERNAL MEDICINE

## 2020-10-05 PROCEDURE — 36592 COLLECT BLOOD FROM PICC: CPT | Performed by: INTERNAL MEDICINE

## 2020-10-05 PROCEDURE — 250N000013 HC RX MED GY IP 250 OP 250 PS 637: Performed by: STUDENT IN AN ORGANIZED HEALTH CARE EDUCATION/TRAINING PROGRAM

## 2020-10-05 PROCEDURE — 84132 ASSAY OF SERUM POTASSIUM: CPT | Performed by: INTERNAL MEDICINE

## 2020-10-05 PROCEDURE — 4A023N6 MEASUREMENT OF CARDIAC SAMPLING AND PRESSURE, RIGHT HEART, PERCUTANEOUS APPROACH: ICD-10-PCS | Performed by: INTERNAL MEDICINE

## 2020-10-05 PROCEDURE — 250N000009 HC RX 250: Performed by: INTERNAL MEDICINE

## 2020-10-05 PROCEDURE — 93568 NJX CAR CTH NSLC P-ART ANGRP: CPT | Performed by: INTERNAL MEDICINE

## 2020-10-05 PROCEDURE — 214N000001 HC R&B CCU UMMC

## 2020-10-05 PROCEDURE — 85025 COMPLETE CBC W/AUTO DIFF WBC: CPT | Performed by: INTERNAL MEDICINE

## 2020-10-05 PROCEDURE — 83735 ASSAY OF MAGNESIUM: CPT | Performed by: INTERNAL MEDICINE

## 2020-10-05 PROCEDURE — 272N000001 HC OR GENERAL SUPPLY STERILE: Performed by: INTERNAL MEDICINE

## 2020-10-05 PROCEDURE — 93451 RIGHT HEART CATH: CPT | Performed by: INTERNAL MEDICINE

## 2020-10-05 PROCEDURE — 250N000013 HC RX MED GY IP 250 OP 250 PS 637: Performed by: INTERNAL MEDICINE

## 2020-10-05 RX ORDER — ALENDRONATE SODIUM 70 MG/1
70 TABLET ORAL WEEKLY
Status: DISCONTINUED | OUTPATIENT
Start: 2020-10-05 | End: 2020-10-05

## 2020-10-05 RX ORDER — WARFARIN SODIUM 5 MG/1
5 TABLET ORAL
Status: COMPLETED | OUTPATIENT
Start: 2020-10-05 | End: 2020-10-05

## 2020-10-05 RX ORDER — FUROSEMIDE 20 MG
20 TABLET ORAL
Status: DISCONTINUED | OUTPATIENT
Start: 2020-10-06 | End: 2020-10-06 | Stop reason: HOSPADM

## 2020-10-05 RX ORDER — IOPAMIDOL 755 MG/ML
INJECTION, SOLUTION INTRAVASCULAR
Status: DISCONTINUED | OUTPATIENT
Start: 2020-10-05 | End: 2020-10-05 | Stop reason: HOSPADM

## 2020-10-05 RX ADMIN — OXYCODONE HYDROCHLORIDE AND ACETAMINOPHEN 500 MG: 500 TABLET ORAL at 08:38

## 2020-10-05 RX ADMIN — ACETAMINOPHEN 650 MG: 325 TABLET, FILM COATED ORAL at 21:19

## 2020-10-05 RX ADMIN — Medication 2 TABLET: at 08:38

## 2020-10-05 RX ADMIN — GABAPENTIN 300 MG: 300 CAPSULE ORAL at 19:54

## 2020-10-05 RX ADMIN — SALINE NASAL SPRAY 1 SPRAY: 1.5 SOLUTION NASAL at 19:54

## 2020-10-05 RX ADMIN — WARFARIN SODIUM 5 MG: 5 TABLET ORAL at 17:54

## 2020-10-05 RX ADMIN — DIGOXIN 62.5 MCG: 0.06 TABLET ORAL at 08:38

## 2020-10-05 RX ADMIN — POTASSIUM CHLORIDE 20 MEQ: 1500 TABLET, EXTENDED RELEASE ORAL at 16:34

## 2020-10-05 RX ADMIN — FAMOTIDINE 20 MG: 20 TABLET ORAL at 08:38

## 2020-10-05 RX ADMIN — POTASSIUM CHLORIDE 40 MEQ: 1500 TABLET, EXTENDED RELEASE ORAL at 07:02

## 2020-10-05 RX ADMIN — FUROSEMIDE 60 MG: 10 INJECTION, SOLUTION INTRAVENOUS at 08:39

## 2020-10-05 RX ADMIN — Medication 5 ML: at 05:15

## 2020-10-05 RX ADMIN — FLUTICASONE FUROATE 1 PUFF: 100 POWDER RESPIRATORY (INHALATION) at 08:37

## 2020-10-05 RX ADMIN — ACETAMINOPHEN 650 MG: 325 TABLET, FILM COATED ORAL at 16:34

## 2020-10-05 RX ADMIN — CETIRIZINE HYDROCHLORIDE 5 MG: 5 TABLET ORAL at 08:38

## 2020-10-05 RX ADMIN — ACETAMINOPHEN 650 MG: 325 TABLET, FILM COATED ORAL at 03:26

## 2020-10-05 RX ADMIN — OXYCODONE HYDROCHLORIDE 5 MG: 5 TABLET ORAL at 11:24

## 2020-10-05 RX ADMIN — ACETAMINOPHEN 650 MG: 325 TABLET, FILM COATED ORAL at 08:46

## 2020-10-05 RX ADMIN — Medication 10 ML: at 13:38

## 2020-10-05 RX ADMIN — GABAPENTIN 300 MG: 300 CAPSULE ORAL at 13:38

## 2020-10-05 RX ADMIN — Medication 5 ML: at 13:24

## 2020-10-05 RX ADMIN — HYDROXYCHLOROQUINE SULFATE 200 MG: 200 TABLET, FILM COATED ORAL at 08:39

## 2020-10-05 RX ADMIN — ACETAMINOPHEN 650 MG: 325 TABLET, FILM COATED ORAL at 13:38

## 2020-10-05 RX ADMIN — GABAPENTIN 300 MG: 300 CAPSULE ORAL at 08:38

## 2020-10-05 RX ADMIN — OXYCODONE HYDROCHLORIDE 5 MG: 5 TABLET ORAL at 19:57

## 2020-10-05 RX ADMIN — IPRATROPIUM BROMIDE 1 SPRAY: 42 SPRAY NASAL at 11:30

## 2020-10-05 RX ADMIN — TREPROSTINIL 30 NG/KG/MIN: 20 INJECTION, SOLUTION INTRAVENOUS; SUBCUTANEOUS at 06:43

## 2020-10-05 ASSESSMENT — ACTIVITIES OF DAILY LIVING (ADL)
ADLS_ACUITY_SCORE: 16

## 2020-10-05 ASSESSMENT — PAIN DESCRIPTION - DESCRIPTORS
DESCRIPTORS: SORE
DESCRIPTORS: ACHING;DISCOMFORT;CONSTANT
DESCRIPTORS: SORE
DESCRIPTORS: NAGGING
DESCRIPTORS: SORE

## 2020-10-05 NOTE — PROGRESS NOTES
D: Admit 9/29 with SOB, HF exacerbation.   PMH: DVT, Pulm HTN, breast cancer (s/p mastectomy), HTN, RA, asthma.      I/A: A&O4. VSS. O2 sats variable with 0-2L O2 via NC. Spirometry placed in room and encouraged use.  Good UO with IV lasix, wilkins in place for strict I/Os. Goldberg port running remodulin. 3 lumen PICC in R; hep locked. R PIV SL. Up to commode in room. Ambulated around unit with gait belt/walker. Acetaminophen x2 for L knee pain (hematoma); encouraged ice for addn pain management. Potassium replaced per protocol. Potassium recheck requires second replacement; next shift to replace per protocol. Clear liquid diet today for RHC and pulm angio. Left for cath lab approx 1330. Report called from cath lab at 1515.      Gtts:   Remodulin 30 ng/kg/min, cont. (Goldberg port)     P: Continue to wean oxygen, encourage activity. Monitor weight/UO. Update CARDS NP if questions/concerns.      Carmen Owusu RN

## 2020-10-05 NOTE — PROGRESS NOTES
[facility]   Cardiology   Progress Note     ASSESSMENT/PLAN:  Karen Anderson is a 80 year old year old female with PMH of of CTEPH, Severe RV dysfunction, DVT on Coumadin, CKD Stage III, Breast CA (s/p chemo, hormone therapy, and mastectomy), HTN, RA, and Asthma. She presents per OSH for acute SOB in setting of heart failure exacerbation.      Acute on Chronic Diastolic Heart Failure  Severe RV Dysfunction   Patient admitted with several days worsening SOB, weight gain (about 12# above EDW). Started on IV Lasix gtt. Echo 9/29/20 with EF 60-65%, moderate RV dilation, and small effusion. RHC 3/4/20 consistent with mRA-10, RV-98/11, mPA-52, mPCWP-15, LUIZ CO-1.92, and LUIZ CI-1.29. Patient diuresed well with Lasix gtt, now transitioned to BID bolus dosing. Patient reports feeling close to baseline this am, EDW around 118#, weight today 115#.     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   Group IV, Class IV. Thought to be secondary to Tamoxifen secondary to breast CA. Not a surgical candidate for PTE. RHC 3/4/20 consistent with mRA-10, RV-98/11, mPA-52, mPCWP-15, LUIZ CO-1.92, and LUIZ CI-1.29. Remodulin initiated in 3/20 with symptomatic relief.     - continue Remodulin at 30 ng/kg/min  - Coumadin per pharmacy, Goal INR-2-2.5. INR-1.85, defer heparin bridge due to left knee hematoma.   - Plan for RHC/ pulmonary angiogram this afternoon  - Oxygen to keep sats > 88%.   - Encourage use of IS     JOSE G on CKD Stage II, improving  2/2 Cardiorenal. Cr improving with diuresis. Cr stable today at 0.99  - Lisinopril held on admission. Stable BP off.   - Daily BMP     HTN.   - Lisinopril held with stable BP.     Left Knee Hematoma  Patient reports several days ago hit knee against car hitch/trailer. Was seen by sports medicine who recommended medical/non invasive  therapy. Area marked, no expansion seen. Patient reports continues to be very tender to touch, tolerating pain with PO  - Continue prn Tylenol for mild-mod pain  - Continue prn Oxycodone for severe pain  - Encourage use of ice packs  - Continue to monitor      Chronic Medical Conditions:  RA. Continue Plaquenil  Breast CA.   Asthma. Continue Beclomethasone 2 puffs BID, Albuterol QID prn.  Left Vastus Medialis Oblique Tear s/p Fall. Occurred in early September and improving. Oxycodone prn pain.    Diarrhea. Imodium prn.     FEN: Clear Liquid Diet (for procedure)  Code status: Full  Prophylaxis:  PO Warfarin  Isolation: None  Disposition: Possible discharge home in 2-3 days pending volume status, RHC, PT/OT recs    Patient seen and discussed with Dr. Atkinson, who agrees with above plan.    Saray HARTMAN, CNP  [facility] Cardiology Team  [phone]      Interval History:  - No acute events overnight  - Patient remains on IV Lasix 60 mg IV BID, net negative 2.3 L yesterday, weight continues to come down, Cr continues to improve, 0.99 today  - Patient remains on 2L NC, she denies SOB, per patient and RN, at rest pt O2 sats 100% on RA, when off NC, O2 sats in mid-80's, pt denies any SOB on RA  - She continues to endorse right knee pain (prior hematoma), area marked, with no expansion from marked area    Physical Exam:  Temp:  [97.5  F (36.4  C)-98.3  F (36.8  C)] 97.5  F (36.4  C)  Pulse:  [60-85] 75  Resp:  [16-18] 18  BP: (117-130)/(46-61) 130/59  SpO2:  [84 %-100 %] 100 %    I/O:  Intake/Output Summary (Last 24 hours) at 10/5/2020 1138  Last data filed at 10/5/2020 1100  Gross per 24 hour   Intake 362.08 ml   Output 2725 ml   Net -2362.92 ml       Wt:   Wt Readings from Last 5 Encounters:   10/04/20 52.2 kg (115 lb)   03/20/20 53.8 kg (118 lb 11.2 oz)   03/02/20 57.6 kg (126 lb 14.4 oz)       General: NAD  HEENT:  PERRLA, EOMI.   CV: RRR. No murmur appreciated. No rubs or gallops. Peripheral radial pulse intact.  Resp: No  increased work of breathing or use of accessory muscles, breathing comfortably on 2L NC  Lung sounds clear bilat upper lobes, diminished in bases  Abdomen:  Normal active bowel sounds.  Abdomen is soft. No distension, non-tender to palpation.    Extremities: Warm. Capillary refill less than 3 sec. 2/4 radial pulses bilaterally.  2/4 pedal pulses bilaterally. No pre-tibial edema. No cyanosis or clubbing.  Skin:  Warm and dry. Left Knee hematoma, tender to touch, bruising, area marked. No erythema, rashes, ulceration or diaphoresis.  Neuro: Alert and oriented x3.      Medications:    calcium citrate-vitamin D  2 tablet Oral Daily     cetirizine  5 mg Oral Daily     digoxin  62.5 mcg Oral Daily     famotidine  20 mg Oral Daily     fluticasone  1 puff Inhalation Daily     furosemide  60 mg Intravenous BID     gabapentin  300 mg Oral TID     heparin lock flush  5-10 mL Intracatheter Q24H     hydroxychloroquine  200 mg Oral Daily     ipratropium  2 spray Both Nostrils TID     vitamin C  500 mg Oral QAM       - MEDICATION INSTRUCTIONS -       - MEDICATION INSTRUCTIONS -       BETA BLOCKER NOT PRESCRIBED       treprostinil (REMODULIN) intravenous infusion (LESS than or EQUAL to 100 mcg/mL) 30 ng/kg/min (10/05/20 1107)     Warfarin Therapy Reminder         Labs:   [facility]  Recent Labs   Lab 10/05/20  0519 10/04/20  1538 10/04/20  0511 10/03/20  1731 09/30/20  0542 09/30/20  0542 09/29/20  1127 09/29/20  1127   * 128* 133 132*   < > 137   < > 137   POTASSIUM 3.3* 3.7 3.7 3.9   < > 4.3   < > 4.0   CHLORIDE 87* 87* 87* 88*   < > 104   < > 106   CO2 37* 41* >45* 41*   < > 29   < > 25   ANIONGAP 4 <1* Not Calculated 3   < > 4   < > 6   GLC 89 99 82 89   < > 72   < > 73   BUN 29 27 28 23   < > 34*   < > 40*   CR 0.99 1.07* 1.11* 1.13*   < > 1.30*   < > 1.42*   GFRESTIMATED 54* 50* 47* 46*   < > 39*   < > 35*   GFRESTBLACK 63 58* 55* 54*   < > 45*   < > 41*   ESTEFANÍA 9.5 9.4 9.8 9.9   < > 8.5   < > 9.0   MAG 2.5* 1.9 2.2 1.8   < >  2.2   < > 2.0   PROTTOTAL  --   --   --   --   --  6.1*  --  6.6*   ALBUMIN  --   --   --   --   --  2.3*  --  2.7*   BILITOTAL  --   --   --   --   --  0.5  --  0.5   ALKPHOS  --   --   --   --   --  89  --  93   AST  --   --   --   --   --  30  --  30   ALT  --   --   --   --   --  20  --  21    < > = values in this interval not displayed.     CBC  Recent Labs   Lab 10/05/20  0519 10/04/20  0511 10/03/20  0525 10/02/20  1104   WBC 4.9 5.0 4.4 5.2   RBC 3.07* 3.23* 3.38* 3.38*   HGB 9.5* 9.9* 10.3* 10.3*   HCT 30.3* 32.0* 33.7* 33.8*   MCV 99 99 100 100   MCH 30.9 30.7 30.5 30.5   MCHC 31.4* 30.9* 30.6* 30.5*   RDW 13.7 13.6 13.7 14.0    299 336 333     INR  Recent Labs   Lab 10/05/20  0519 10/04/20  0511 10/03/20  0525 10/02/20  1104   INR 1.71* 1.85* 2.04* 2.41*     Arterial Blood Gas  Recent Labs   Lab 10/04/20  0819   O2PER 2L       Diagnostics:  ECG 9/29/2020: NSR HR 76, baseline TWI, ST dep      Echo 9/29/2020:  Interpretation Summary  Global and regional left ventricular function is normal with an EF of 60-65%.  Moderate right ventricular dilation is present.  Global right ventricular function is moderately reduced.  Right ventricular systolic pressure is 82mmHg above the right atrial pressure.  Ascending aorta 3.8 cm.  Ascending aortic index 25.3mm/m2,severe dilation for BSA of 1.5m2.  IVC diameter >2.1 cm collapsing <50% with sniff suggests a high RA pressure  estimated at 15 mmHg or greater.  Small circumferential pericardial effusion is present without any hemodynamic  significance.  There has been no change.    RHC 3/4/2020:  Conclusion    Severe Pre-capillary PH    Severely reduced cardiac output with high normal right sided filling pressures    Normal left sided filling pressures    CTEPH based on abnormal V/Q scan and CT pulmonary angiogram         Pressures(don't display values >20,000) Phase: Baseline   Time Systolic (mmHg) Diastolic (mmHg) Mean (mmHg) A Wave (mmHg) V Wave (mmHg) EDP (mmHg)  Max dp/dt (mmHg/sec) HR (bpm)   RA Pressures  9:09 AM   10    10    17      65      RV Pressures  8:43 AM       912         9:09 AM 98        11     65      PA Pressures  9:10 AM 98    30    52        65      PCW Pressures  9:14 AM   15    18    18      66            No results found for this or any previous visit (from the past 24 hour(s)).

## 2020-10-05 NOTE — PLAN OF CARE
PT 6C: Cancel- Patient worked with OT this morning, about to leave for RHC upon attempt. Will reschedule.

## 2020-10-05 NOTE — PRE-PROCEDURE
[facility]   Interventional Cardiology        Consenting/Education for Balloon Pulmonary Angiogram and Right Heart Catheterization     Patient understands due to their history of CTEPH and HFpEF with RV dysfunction we would like to perform a Balloon Pulmonary Angiogram and Right Heart Catheterization.  This procedure will be performed by Dr. Castañeda    Patient understands during the portion for right heart catheterization a fine tube (catheter) is put into the vein of the groin/neck.  It is carefully passed along until it reaches the heart and then goes up into the blood vessels of the lungs. This is done to measure a variety of pressures in your heart and can tell us how well the heart is filling and emptying, as well as monitor fluid status.   During the portion of the  pulmonary angiogram, the physician will inject contrast dye to see the blood flow in the lungs.  At the end of the procedure the artery may be closed with a special plug, Angio Seal, to stop the bleeding.     Patient also understands risks and complications of the procedure which include, but are not limited to bruising/swelling around the incision site, infection, bleeding, allergic reaction to local anesthetic, air embolism, arterial puncture, dissection, cough/hemoptysis. The patient understands this procedure does not always require sedation, and can be performed with Lidocaine only    Patient verbalized understanding of risks and benefits of the pulmonary angiogram and right heart catheterization and has elected to proceed with the procedure.       Saray HARTMAN, RICK  [facility] Cardiology

## 2020-10-05 NOTE — PLAN OF CARE
D: Pt admitted 9/29 for SOB and HF exacerbation. PMHx includes: DVT, pulm HTN, breast cx (L mastectomy), HTN, rheum arthritis and asthma.      I: Monitored vitals and assessed pt status.   Running: Remodulin at 30ng/kg  PRN: tylenol x2 for knee pain.      A: A0x4. VSS. SR, 0.5-2 L NC, pt desats to 80s on room air.  Afebrile. Urinating adequately with wilkins. Left knee hematoma, outlined and appears to be improving. Pt ambulated for about 15 minutes with RN in [place], mepilex on sacrum.      P: Possible RHC in future.. Continue to monitor Pt status and report changes to treatment team.        Jay Jay POWERS POC [phone]

## 2020-10-06 ENCOUNTER — PATIENT OUTREACH (OUTPATIENT)
Dept: CARE COORDINATION | Facility: CLINIC | Age: 80
End: 2020-10-06

## 2020-10-06 ENCOUNTER — TELEPHONE (OUTPATIENT)
Dept: CARDIOLOGY | Facility: CLINIC | Age: 80
End: 2020-10-06

## 2020-10-06 ENCOUNTER — APPOINTMENT (OUTPATIENT)
Dept: OCCUPATIONAL THERAPY | Facility: CLINIC | Age: 80
DRG: 286 | End: 2020-10-06
Attending: INTERNAL MEDICINE
Payer: COMMERCIAL

## 2020-10-06 ENCOUNTER — DOCUMENTATION ONLY (OUTPATIENT)
Dept: ANTICOAGULATION | Facility: CLINIC | Age: 80
End: 2020-10-06

## 2020-10-06 VITALS
HEIGHT: 59 IN | HEART RATE: 88 BPM | OXYGEN SATURATION: 88 % | DIASTOLIC BLOOD PRESSURE: 57 MMHG | WEIGHT: 115 LBS | RESPIRATION RATE: 18 BRPM | BODY MASS INDEX: 23.18 KG/M2 | SYSTOLIC BLOOD PRESSURE: 128 MMHG | TEMPERATURE: 98.5 F

## 2020-10-06 DIAGNOSIS — I27.20 PULMONARY HYPERTENSION (H): ICD-10-CM

## 2020-10-06 LAB
ANION GAP SERPL CALCULATED.3IONS-SCNC: 4 MMOL/L (ref 3–14)
BASOPHILS # BLD AUTO: 0.1 10E9/L (ref 0–0.2)
BASOPHILS NFR BLD AUTO: 1 %
BUN SERPL-MCNC: 27 MG/DL (ref 7–30)
CALCIUM SERPL-MCNC: 9.6 MG/DL (ref 8.5–10.1)
CHLORIDE SERPL-SCNC: 92 MMOL/L (ref 94–109)
CO2 SERPL-SCNC: 34 MMOL/L (ref 20–32)
CREAT SERPL-MCNC: 0.86 MG/DL (ref 0.52–1.04)
DIFFERENTIAL METHOD BLD: ABNORMAL
EOSINOPHIL # BLD AUTO: 0.2 10E9/L (ref 0–0.7)
EOSINOPHIL NFR BLD AUTO: 3.3 %
ERYTHROCYTE [DISTWIDTH] IN BLOOD BY AUTOMATED COUNT: 13.4 % (ref 10–15)
GFR SERPL CREATININE-BSD FRML MDRD: 65 ML/MIN/{1.73_M2}
GLUCOSE SERPL-MCNC: 75 MG/DL (ref 70–99)
HCT VFR BLD AUTO: 32 % (ref 35–47)
HGB BLD-MCNC: 9.8 G/DL (ref 11.7–15.7)
IMM GRANULOCYTES # BLD: 0 10E9/L (ref 0–0.4)
IMM GRANULOCYTES NFR BLD: 0.4 %
INR PPP: 1.79 (ref 0.86–1.14)
LYMPHOCYTES # BLD AUTO: 0.7 10E9/L (ref 0.8–5.3)
LYMPHOCYTES NFR BLD AUTO: 13.2 %
MAGNESIUM SERPL-MCNC: 2.1 MG/DL (ref 1.6–2.3)
MCH RBC QN AUTO: 30.2 PG (ref 26.5–33)
MCHC RBC AUTO-ENTMCNC: 30.6 G/DL (ref 31.5–36.5)
MCV RBC AUTO: 99 FL (ref 78–100)
MONOCYTES # BLD AUTO: 0.6 10E9/L (ref 0–1.3)
MONOCYTES NFR BLD AUTO: 11.2 %
NEUTROPHILS # BLD AUTO: 3.5 10E9/L (ref 1.6–8.3)
NEUTROPHILS NFR BLD AUTO: 70.9 %
NRBC # BLD AUTO: 0 10*3/UL
NRBC BLD AUTO-RTO: 0 /100
PLATELET # BLD AUTO: 266 10E9/L (ref 150–450)
POTASSIUM SERPL-SCNC: 3.6 MMOL/L (ref 3.4–5.3)
RBC # BLD AUTO: 3.24 10E12/L (ref 3.8–5.2)
SODIUM SERPL-SCNC: 131 MMOL/L (ref 133–144)
WBC # BLD AUTO: 4.9 10E9/L (ref 4–11)

## 2020-10-06 PROCEDURE — 250N000013 HC RX MED GY IP 250 OP 250 PS 637: Performed by: STUDENT IN AN ORGANIZED HEALTH CARE EDUCATION/TRAINING PROGRAM

## 2020-10-06 PROCEDURE — 272N000004 HC RX 272: Performed by: INTERNAL MEDICINE

## 2020-10-06 PROCEDURE — 250N000013 HC RX MED GY IP 250 OP 250 PS 637: Performed by: NURSE PRACTITIONER

## 2020-10-06 PROCEDURE — 250N000011 HC RX IP 250 OP 636: Performed by: INTERNAL MEDICINE

## 2020-10-06 PROCEDURE — 85025 COMPLETE CBC W/AUTO DIFF WBC: CPT | Performed by: INTERNAL MEDICINE

## 2020-10-06 PROCEDURE — 99238 HOSP IP/OBS DSCHRG MGMT 30/<: CPT | Performed by: INTERNAL MEDICINE

## 2020-10-06 PROCEDURE — 80048 BASIC METABOLIC PNL TOTAL CA: CPT | Performed by: INTERNAL MEDICINE

## 2020-10-06 PROCEDURE — 85610 PROTHROMBIN TIME: CPT | Performed by: INTERNAL MEDICINE

## 2020-10-06 PROCEDURE — 36592 COLLECT BLOOD FROM PICC: CPT | Performed by: INTERNAL MEDICINE

## 2020-10-06 PROCEDURE — 83735 ASSAY OF MAGNESIUM: CPT | Performed by: INTERNAL MEDICINE

## 2020-10-06 PROCEDURE — 97535 SELF CARE MNGMENT TRAINING: CPT | Mod: GO

## 2020-10-06 RX ORDER — FUROSEMIDE 20 MG
20 TABLET ORAL 2 TIMES DAILY
Qty: 90 TABLET | Refills: 3 | Status: SHIPPED | OUTPATIENT
Start: 2020-10-06 | End: 2020-10-27

## 2020-10-06 RX ORDER — WARFARIN SODIUM 5 MG/1
5 TABLET ORAL
Status: DISCONTINUED | OUTPATIENT
Start: 2020-10-06 | End: 2020-10-06 | Stop reason: HOSPADM

## 2020-10-06 RX ORDER — OXYCODONE HYDROCHLORIDE 5 MG/1
5 TABLET ORAL EVERY 6 HOURS PRN
Qty: 20 TABLET | Refills: 0 | Status: SHIPPED | OUTPATIENT
Start: 2020-10-06 | End: 2020-10-15

## 2020-10-06 RX ORDER — LISINOPRIL 10 MG/1
10 TABLET ORAL DAILY
Status: DISCONTINUED | OUTPATIENT
Start: 2020-10-06 | End: 2020-10-06 | Stop reason: HOSPADM

## 2020-10-06 RX ADMIN — LISINOPRIL 10 MG: 10 TABLET ORAL at 11:19

## 2020-10-06 RX ADMIN — DIGOXIN 62.5 MCG: 0.06 TABLET ORAL at 08:50

## 2020-10-06 RX ADMIN — CETIRIZINE HYDROCHLORIDE 5 MG: 5 TABLET ORAL at 08:50

## 2020-10-06 RX ADMIN — OXYCODONE HYDROCHLORIDE AND ACETAMINOPHEN 500 MG: 500 TABLET ORAL at 08:51

## 2020-10-06 RX ADMIN — ACETAMINOPHEN 650 MG: 325 TABLET, FILM COATED ORAL at 07:42

## 2020-10-06 RX ADMIN — Medication 2 TABLET: at 08:50

## 2020-10-06 RX ADMIN — GABAPENTIN 300 MG: 300 CAPSULE ORAL at 15:07

## 2020-10-06 RX ADMIN — Medication 5 ML: at 05:43

## 2020-10-06 RX ADMIN — POTASSIUM CHLORIDE 20 MEQ: 1500 TABLET, EXTENDED RELEASE ORAL at 11:19

## 2020-10-06 RX ADMIN — FAMOTIDINE 20 MG: 20 TABLET ORAL at 08:50

## 2020-10-06 RX ADMIN — GABAPENTIN 300 MG: 300 CAPSULE ORAL at 08:51

## 2020-10-06 RX ADMIN — TREPROSTINIL 30 NG/KG/MIN: 20 INJECTION, SOLUTION INTRAVENOUS; SUBCUTANEOUS at 11:29

## 2020-10-06 RX ADMIN — OXYCODONE HYDROCHLORIDE 5 MG: 5 TABLET ORAL at 11:28

## 2020-10-06 RX ADMIN — IPRATROPIUM BROMIDE 2 SPRAY: 42 SPRAY NASAL at 08:51

## 2020-10-06 RX ADMIN — FUROSEMIDE 20 MG: 20 TABLET ORAL at 08:50

## 2020-10-06 RX ADMIN — HYDROXYCHLOROQUINE SULFATE 200 MG: 200 TABLET, FILM COATED ORAL at 08:51

## 2020-10-06 RX ADMIN — FLUTICASONE FUROATE 1 PUFF: 100 POWDER RESPIRATORY (INHALATION) at 08:51

## 2020-10-06 ASSESSMENT — ACTIVITIES OF DAILY LIVING (ADL)
ADLS_ACUITY_SCORE: 17

## 2020-10-06 ASSESSMENT — PAIN DESCRIPTION - DESCRIPTORS: DESCRIPTORS: DISCOMFORT

## 2020-10-06 NOTE — PROGRESS NOTES
DISCHARGE     10/6/2020 ~3:20PM  ----------------------------------------------------------------------------  Discharged to: Home  Via: Automobile  Accompanied by: Getting picked up by pt spouse  Discharge Instructions: diet, activity, medications, follow up appointments, when to call the MD, aftercare instructions, and what to watchout for (i.e. as described in discharge paperwork)  Prescriptions: To be filled by [facility] pharmacy per pt request; medication list reviewed & sent with pt; paper script of Oxycodone sent with pt   Follow Up Appointments: arranged; information given  Belongings: Sent with pt  PIV and PICC: out, Ashlyn left in for Remodulin gtt  Telemetry: off  Pt exhibits understanding of above discharge instructions; all questions answered.    Pt switched herself over to home Remodulin pump prior to discharge.     Pt declined having writer review part of paperwork regarding going home with an opioid (pt stated she will read).    Discharge Paperwork: Signed, copied, and sent home with patient.

## 2020-10-06 NOTE — TELEPHONE ENCOUNTER
Called and confirmed with Don that we have availability with [facility] next week Thursday. He advised that they will be able to make the appt at 2:45 PM with labs prior. Kezia Arnett RN on 10/6/2020 at 1:12 PM    LM and apologized that we needed to move her appt to 12:45 labs and 1:15 with Shawna. Asked patient to call me back if she had any concerns with the change. Kezia Arnett RN on 10/6/2020 at 3:31 PM    [facility] Call Center    Phone Message    May a detailed message be left on voicemail: yes     Reason for Call: Other: Patient will be discharging from [facility] - instructed to follow up with Dr. Turcios within 1-2 weeks - no availability until November     Action Taken: Message routed to:  [facility] Cardio    Travel Screening: Not Applicable

## 2020-10-06 NOTE — DISCHARGE INSTRUCTIONS
ANTICOAGULATION INSTRUCTIONS:    Your INR today, 10/6, was 1.79. Please take 4.5mg Coumadin today, 10/6, and tomorrow, 10/7, and have your INR rechecked 10/8/2020

## 2020-10-06 NOTE — PROGRESS NOTES
SPIRITUAL HEALTH SERVICES  SPIRITUAL ASSESSMENT Progress Note  [facility] 6C     Follow-up visit with pt; offered spiritual support, prayer. Pt welcomes ongoing  support.    PLAN: continue to follow, visiting pt at least weekly while pt on unit.    Daniel Castro M.Div. ([facility]), [facility]  Staff   Pager [phone]

## 2020-10-06 NOTE — PLAN OF CARE
.Physical Therapy Discharge Summary    Reason for therapy discharge:    Discharged to home with home therapy.    Progress towards therapy goal(s). See goals on Care Plan in [facility] electronic health record for goal details.  Goals partially met.  Barriers to achieving goals:   discharge from facility.    Therapy recommendation(s):    Continued therapy is recommended.  Rationale/Recommendations:  Pt will benefit from continued therapy in order to address mobility deficits and maximize IND with ADLs.

## 2020-10-06 NOTE — PLAN OF CARE
Occupational Therapy Discharge Summary    Reason for therapy discharge:    Discharged to home with outpatient therapy.    Progress towards therapy goal(s). See goals on Care Plan in [facility] electronic health record for goal details.  Goals partially met.  Barriers to achieving goals:   discharge from facility.    Therapy recommendation(s):    Continued therapy is recommended.  Rationale/Recommendations:  Patient would benefit from ongoing therapies to promote increased endurance/strength for maximized ADL independence. Assist from family prn for ADLs d/t increased L knee pain.

## 2020-10-06 NOTE — DISCHARGE SUMMARY
[address] [address]  p: [phone]    Discharge Summary: Cardiology Service    Karen Anderson MRN# [id number]   YOB: 1940 Age: 80 year old       Admission Date: 9/29/2020  Discharge Date: 10/06/2020    Discharge Diagnoses:  1. Acute on Chronic Diastolic Heart Failure  2. Severe RV Dysfunction  3. 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 JOSE G (resolved) on CKD II  5. Left Knee Hematoma 2/2 fall    Brief HPI:  Karen Anderson is a 80 year old year old female with PMH of of CTEPH, Severe RV dysfunction, DVT on Coumadin, CKD Stage III, Breast CA (s/p chemo, hormone therapy, and mastectomy), HTN, RA, and Asthma. She presents per OSH for acute SOB in setting of heart failure exacerbation    Hospital Course by Diagnosis:  Acute on Chronic Diastolic Heart Failure  Severe RV Dysfunction   Patient admitted with several days worsening SOB, weight gain (about 12# above EDW). Started on IV Lasix gtt. Echo 9/29/20 with EF 60-65%, moderate RV dilation, and small effusion.  Patient diuresed well with IV Lasix, tolerating transition to PO Lasix. Patient reports feeling back to baseline this am, EDW around 118#, weight today 115#.      - Diuresis: Lasix 20 mg BID (up from PTA dose 20 mg daily)  - Continue PTA Digoxin 62.5 mg daily     PH secondary to CTEPH.   Group IV, Class IV. Thought to be secondary to Tamoxifen secondary to breast CA. Not a surgical candidate for PTE. RHC 3/4/20 consistent with mRA-10, RV-98/11, mPA-52, mPCWP-15, LUIZ CO-1.92, and LUIZ CI-1.29. Remodulin initiated in 3/20 with symptomatic relief. RHC 10/5 with improvement--mRA 5, RV 90/8, mPA 42, LUIZ 2.55/1.76     - continue Remodulin at 30 ng/kg/min  - Continue PTA Coumadin; pt to take 4.5 mg 10/6, 10/7, with INR check 10/8  - Goal INR-2-2.5. INR today 1.79, defer Lovenox bridge due to left knee hematoma.   - Follow up with Dr. Turcios in [facility] in 1-2 weeks     JOSE G on CKD Stage II,  resolved  2/2 Cardiorenal. Cr improving with diuresis. Cr stable today at 0.8  - Repeat BMP 1 week     HTN.   Held while diuresing 2/2 JOSE G.  - Resume PTA Lisinopril     Left Knee Hematoma  Patient reports several days ago hit knee against car hitch/trailer. Was seen by sports medicine who recommended medical/non invasive therapy. Area marked, no expansion seen. Patient reports continues to be very tender to touch, tolerating pain with PO  - Continue prn Tylenol for mild-mod pain; pt educated to take no more than 4,000mg a day  - Continue prn Oxycodone for severe pain  - Encourage use of ice packs     Chronic Medical Conditions:  RA. Continue Plaquenil  Breast CA.   Asthma. Continue Beclomethasone 2 puffs BID, Albuterol QID prn.  Left Vastus Medialis Oblique Tear s/p Fall. Occurred in early September and improving. Oxycodone prn pain.    Diarrhea. Imodium prn.     Pertinent Procedures:  1. RHC/ Pulmonary Angiogram    Consults:  PT/OT    Medication Changes:  MODIFY Lasix 20 mg BID  PRN Oxycodone 5 mg tablet q6h for severe pain    Discharge medications:   Current Discharge Medication List      START taking these medications    Details   oxyCODONE (ROXICODONE) 5 MG tablet Take 1 tablet (5 mg) by mouth every 6 hours as needed for moderate to severe pain  Qty: 20 tablet, Refills: 0    Associated Diagnoses: Hematoma of left knee region         CONTINUE these medications which have CHANGED    Details   furosemide (LASIX) 20 MG tablet Take 1 tablet (20 mg) by mouth 2 times daily  Qty: 90 tablet, Refills: 3    Associated Diagnoses: Localized edema         CONTINUE these medications which have NOT CHANGED    Details   acetaminophen (TYLENOL) 325 MG tablet Take 325 mg by mouth every 6 hours as needed for mild pain      albuterol (PROAIR HFA/PROVENTIL HFA/VENTOLIN HFA) 108 (90 Base) MCG/ACT inhaler Inhale 2 puffs into the lungs every 4 hours as needed for shortness of breath / dyspnea or wheezing     Comments: Pharmacy may  dispense brand covered by insurance (Proair, or proventil or ventolin or generic albuterol inhaler)      alendronate (FOSAMAX) 70 MG tablet Take 70 mg by mouth once a week      Ascorbic Acid (VITAMIN C) 500 MG CAPS Take 500 mg by mouth every morning       aspirin (ASA) 81 MG tablet Take 81 mg by mouth daily       beclomethasone HFA (QVAR REDIHALER) 80 MCG/ACT inhaler Inhale 2 puffs into the lungs 2 times daily      calcium citrate-vitamin D (CALCIUM CITRATE + D3) 315-250 MG-UNIT TABS per tablet Take 2 tablets by mouth daily       cetirizine (ZYRTEC) 10 MG tablet Take 10 mg by mouth daily      digoxin (LANOXIN) 62.5 MCG tablet Take 1 tablet (62.5 mcg) by mouth daily  Qty: 90 tablet, Refills: 3    Associated Diagnoses: Pulmonary hypertension (H)      gabapentin (NEURONTIN) 300 MG capsule Take 300 mg by mouth 3 times daily       hydroxychloroquine (PLAQUENIL) 200 MG tablet Take 200 mg by mouth daily       ipratropium (ATROVENT) 0.06 % nasal spray Spray 2 sprays into both nostrils 3 times daily       lisinopril (ZESTRIL) 20 MG tablet Take 0.5 tablets (10 mg) by mouth daily    Associated Diagnoses: Pulmonary hypertension (H)      loperamide (IMODIUM) 2 MG capsule Take 2 mg by mouth as needed for diarrhea      Probiotic Product (PROBIOTIC ACIDOPHILUS BIOBEADS) CAPS Take 1 capsule by mouth 2 times daily       Treprostinil (REMODULIN IJ) Dose: 30 ng/kg/min  Vial concentration: 1 mg/mL  Final concentration: 60,000 ng/mL  Dosing weight: 56.3 kg   Pump rate: 41 mL/day  Per patient on 9/29/2020      warfarin ANTICOAGULANT (COUMADIN) 3 MG tablet Take 1 to 2 tablets daily as directed by the Anticoagulation Clinic.  Qty: 170 tablet, Refills: 1    Comments: Dr. Luong/[facility] per collaborative agreement with Anticoagulation Clinic.  Associated Diagnoses: Pulmonary hypertension (H)             Follow-up:  - PCP in 7-10 days for post hospitalization visit  - PH Clinic/Dr Turcios in 1-2 weeks for PH follow up, consideration of  BPA    Labs or imaging requiring follow-up after discharge:  INR 10/8  1 week BMP    Code status:  Full    Condition on discharge  Temp:  [97.6  F (36.4  C)-98.5  F (36.9  C)] 98.3  F (36.8  C)  Pulse:  [58-73] 73  Resp:  [16-18] 16  BP: (110-140)/(40-70) 125/51  SpO2:  [81 %-100 %] 93 %  General: Alert, interactive, NAD  Eyes: sclera anicteric, EOMI  Neck: JVP flat, carotid 2+ bilaterally  Cardiovascular: regular rate and rhythm, normal S1 and S2, no murmurs, gallops, or rubs  Resp: clear to auscultation bilaterally, no rales, wheezes, or rhonchi  GI: Soft, nontender, nondistended. +BS.  No HSM or masses, no rebound or guarding.  Extremities: no edema, no cyanosis or clubbing, dorsalis pedis and posterior tibialis pulses 2+ bilaterally; right knee hematoma, tender to touch, stable  Skin: Warm and dry, no jaundice or rash  Neuro: CN 2-12 intact, moves all extremities equally  Psych: Alert & oriented x 3    Imaging with results:  Echocardiogram 9/29/2020:  Interpretation Summary  Global and regional left ventricular function is normal with an EF of 60-65%.  Moderate right ventricular dilation is present.  Global right ventricular function is moderately reduced.  Right ventricular systolic pressure is 82mmHg above the right atrial pressure.  Ascending aorta 3.8 cm.  Ascending aortic index 25.3mm/m2,severe dilation for BSA of 1.5m2.  IVC diameter >2.1 cm collapsing <50% with sniff suggests a high RA pressure  estimated at 15 mmHg or greater.  Small circumferential pericardial effusion is present without any hemodynamic  significance.  There has been no change.    RHC/Pulmonary Angiogram 10/5/2020:  Pre Procedure Diagnosis  pulmonary hypertensionheart condition         Conclusion       Right sided filling pressures are normal.    Moderately elevated pulmonary artery hypertension.    Left sided filling pressures are normal.    Reduced cardiac output level.    Hemodynamic data has been modified in Epic per physician  review.      of the distal right A2, proximal A7 segment.   Severe stenosis of the right A8 segment with slow filling distally.  Possible stenosis of the proximal and mid right A10 segment.  No significant angiographic disease of the left lung segments.         Hemodynamics  RA 11/6/5  RV 90/8  PA 90/20/42  PCWP --/--/11  LUIZ 2.55/1.76  TD 3/2.07  PVR 10 (TD)   Right sided filling pressures are normal. Left sided filling pressures are normal. Moderately elevated pulmonary artery hypertension. Reduced cardiac output level. Hemodynamic data has been modified in Epic per physician review.     Other imaging studies:  EKG 12 Lead 9/29/2020: NSR, HR 76, baseline ST dep t/o      Patient Care Team:  Carlos Carter MD as PCP - General (Family Practice)  Kezia Arnett, RN as Specialty Care Coordinator (Cardiology)  Fabby Seymour, RN as Specialty Care Coordinator (Cardiology)  Missy Perez, RN as Specialty Care Coordinator (Cardiology)    Saray HARTMAN, CNP  [facility] Cardiology  [phone]

## 2020-10-06 NOTE — PROGRESS NOTES
ANTICOAGULATION  MANAGEMENT: Discharge Review    Karen Anderson chart reviewed for anticoagulation continuity of care    Hospital Admission on 9/29-10/6 for SOB, wt increase of 12#, and HF exacerbation..    Discharge disposition: Home    Results:    Recent labs: (last 7 days)     09/30/20  0542 10/01/20  0526 10/02/20  1104 10/03/20  0525 10/04/20  0511 10/05/20  0519 10/06/20  0549   INR 2.80* 3.13* 2.41* 2.04* 1.85* 1.71* 1.79*     Anticoagulation inpatient management:     see calendar    Anticoagulation discharge instructions:     Warfarin dosing: home regimen continued   Bridging: No   INR goal change: No      Medication changes affecting anticoagulation: Yes: Started oxycodone, and dose change on Lasix.    Additional factors affecting anticoagulation: No    Plan     Agree with dosing adjustment on discharge    Patient not contacted    Anticoagulation Calendar updated    Yumi Bates RN

## 2020-10-06 NOTE — PLAN OF CARE
D-Admitted from OSH on 09/29/20 with acute heart failure exacerbation. PMH includes CTEPH, severe RV dysfunction, DVT, CKD, breast CA, s/p mastectomy, HTN, RA and asthma. L knee hematoma after hitting knee on car trailer hitch several days ago. INR 1.71. Creatinine 1.71. Potassium 3.5.  I-Treprostinil infusing at 30ng/kg/min. R heart cath and balloon pulmonary angiogram today. Tylenol, oxycodone and cool packs for knee pain. 5 mg coumadin per order. Potassium replaced per protocol.  A-R internal jugular site C/D/I.Right heart cath and balloon pulmonary angiogram showed normal right and left filling pressures, moderately elevated pulmonary artery hypertension and reduced cardiac output. Knee pain partially controlled.   P-Continue with current poc. Monitor labs and fluid status.

## 2020-10-07 ENCOUNTER — TELEPHONE (OUTPATIENT)
Dept: CARDIOLOGY | Facility: CLINIC | Age: 80
End: 2020-10-07

## 2020-10-07 DIAGNOSIS — I27.0 PRIMARY PULMONARY HYPERTENSION (H): Primary | ICD-10-CM

## 2020-10-07 DIAGNOSIS — R06.02 SOB (SHORTNESS OF BREATH): ICD-10-CM

## 2020-10-07 NOTE — PROGRESS NOTES
[facility]: Post-Discharge Note  SITUATION                                                      Admission:    Admission Date: 09/29/20   Reason for Admission: Acute on Chronic Diastolic Heart Failure  Discharge:   Discharge Date: 10/06/20  Discharge Diagnosis: Acute on Chronic Diastolic Heart Failure    BACKGROUND                                                      Karen Anderson is a 80 year old year old female with PMH of of CTEPH, Severe RV dysfunction, DVT on Coumadin, CKD Stage III, Breast CA (s/p chemo, hormone therapy, and mastectomy), HTN, RA, and Asthma. She presents per OSH for acute SOB in setting of heart failure exacerbation    ASSESSMENT      Discharge Assessment  Patient reports symptoms are: Unchanged  Does the patient have all of their medications?: Yes  Does patient know what their new medications are for?: Yes  Does patient have a follow-up appointment scheduled?: No  Does patient have any other questions or concerns?: No    Post-op  Did the patient have surgery or a procedure: No  Fever: No  Chills: No  Eating & Drinking: unable to tolerate solid foods  PO Intake: full liquids  Bowel Function: normal  Urinary Status: voiding without complaint/concerns    Follow-up:  - PCP in 7-10 days for post hospitalization visit  - PH Clinic/Dr Turcios in 1-2 weeks for PH follow up, consideration of BPA     Labs or imaging requiring follow-up after discharge:  INR 10/8  1 week BMP    PLAN                                                      Outpatient Plan:      Future Appointments   Date Time Provider Department Center   10/15/2020 12:45 PM  [facility]   10/15/2020  1:15 PM Shawna Marcum [facility]           Amelie Blackmon CMA

## 2020-10-07 NOTE — TELEPHONE ENCOUNTER
PA Initiation    Medication: Adempas 0.5mg  Insurance Company: [employer] - Phone [phone] Fax [phone]  Pharmacy Filling the Rx: [facility] [address]  Start Date: 10/7/2020    *Prior authorization completed and submitted through CoverMyMeds for expedited review along with right heart catheterization report.

## 2020-10-07 NOTE — TELEPHONE ENCOUNTER
----- Message from Kezia Arnett RN sent at 10/7/2020 10:02 AM CDT -----  Sticky note update.  ----- Message -----  From: Karolina Turcios MD  Sent: 10/7/2020   9:39 AM CDT  To: Lexus Balbuena, Cardiology Ph Nurse-    Team:  She was discharged yesterday from the hospital. She had a repeat echo and RHC. Her PH and RV are better but still not great .    She prefers to start Adempas as opposed to increasing remodulin. I am going to talk to her about BPA as well when I see her back in clinic.     Plan:    1. Please start her on Adempas 0.5 mg tid and increase it every 2 weeks as tolerated  2. Keep Remodulin at 30 ng/kg/min  3. Lasix 40 mg daily    Follow up with me in clinic in 4-6 weeks    Thank you    TT

## 2020-10-08 ENCOUNTER — ANTICOAGULATION THERAPY VISIT (OUTPATIENT)
Dept: ANTICOAGULATION | Facility: CLINIC | Age: 80
End: 2020-10-08

## 2020-10-08 DIAGNOSIS — I27.20 PULMONARY HYPERTENSION (H): ICD-10-CM

## 2020-10-08 DIAGNOSIS — I27.24 CHRONIC THROMBOEMBOLIC PULMONARY HYPERTENSION (H): ICD-10-CM

## 2020-10-08 LAB
CAPILLARY BLOOD COLLECTION: NORMAL
INR PPP: 1.6 (ref 0.86–1.14)

## 2020-10-08 PROCEDURE — 36416 COLLJ CAPILLARY BLOOD SPEC: CPT | Performed by: FAMILY MEDICINE

## 2020-10-08 PROCEDURE — 85610 PROTHROMBIN TIME: CPT | Performed by: FAMILY MEDICINE

## 2020-10-08 NOTE — PROGRESS NOTES
ANTICOAGULATION FOLLOW-UP CLINIC VISIT    Patient Name:  Karen Anderson  Date:  10/8/2020  Contact Type:  Telephone    SUBJECTIVE:  Patient Findings     Comments:  Patient was recently hospitalized for HF.  See nelli for dosing while hospitalized.         Clinical Outcomes     Comments:  Patient was recently hospitalized for HF.  See nelli for dosing while hospitalized.            OBJECTIVE    Recent labs: (last 7 days)     10/08/20  1417   INR 1.60*       ASSESSMENT / PLAN  No question data found.  Anticoagulation Summary  As of 10/8/2020    INR goal:  2.0-3.0   TTR:  87.6 % (5.7 mo)   INR used for dosin.60 (10/8/2020)   Warfarin maintenance plan:  3 mg (3 mg x 1) every Tue, Sat; 4.5 mg (3 mg x 1.5) all other days   Full warfarin instructions:  10/8: 6 mg; Otherwise 3 mg every Tue, Sat; 4.5 mg all other days   Weekly warfarin total:  28.5 mg   Plan last modified:  Jaylan Jacobo RN (2020)   Next INR check:  10/15/2020   Priority:  Maintenance   Target end date:  Indefinite    Indications    Pulmonary hypertension (H) [I27.20]             Anticoagulation Episode Summary     INR check location:      Preferred lab:      Send INR reminders to:  [facility]    Comments:  call home first, ok to call cell.  OK to leave message on either phone.  OK to speak with spouse, Don.  Uses [facility] (Has 3mg Tabs)  [facility] in [place] and will need to fax orders P [phone] F [phone]      Anticoagulation Care Providers     Provider Role Specialty Phone number    Osiris Luong MD Referring Cardiology [phone]            See the Encounter Report to view Anticoagulation Flowsheet and Dosing Calendar (Go to Encounters tab in chart review, and find the Anticoagulation Therapy Visit)    Left message for patient with results and dosing recommendations. Asked patient to call back to report any missed doses, falls, signs and symptoms of bleeding or clotting, any  changes in health, medication, or diet. Asked patient to call back with any questions or concerns.      Brittany Kirk RN

## 2020-10-12 ASSESSMENT — ENCOUNTER SYMPTOMS
LIGHT-HEADEDNESS: 1
NAIL CHANGES: 0
JOINT SWELLING: 1
HEMOPTYSIS: 0
DIARRHEA: 1
BOWEL INCONTINENCE: 1
HYPERTENSION: 1
BLOATING: 1
EYE WATERING: 1
FLANK PAIN: 0
SLEEP DISTURBANCES DUE TO BREATHING: 0
CONSTIPATION: 1
MYALGIAS: 1
NIGHT SWEATS: 0
STIFFNESS: 1
HEARTBURN: 1
ARTHRALGIAS: 1
POSTURAL DYSPNEA: 1
JAUNDICE: 0
NAUSEA: 1
CHILLS: 1
HYPOTENSION: 0
POOR WOUND HEALING: 1
BLOOD IN STOOL: 0
PALPITATIONS: 0
ALTERED TEMPERATURE REGULATION: 1
EYE PAIN: 0
SPUTUM PRODUCTION: 0
FATIGUE: 1
HEMATURIA: 0
ABDOMINAL PAIN: 0
EYE IRRITATION: 1
DYSURIA: 0
DOUBLE VISION: 0
VOMITING: 0
EXERCISE INTOLERANCE: 1
INCREASED ENERGY: 1
WHEEZING: 0
SHORTNESS OF BREATH: 1
WEIGHT LOSS: 0
DIFFICULTY URINATING: 1
SKIN CHANGES: 0
POLYDIPSIA: 0
DYSPNEA ON EXERTION: 1
COUGH DISTURBING SLEEP: 0
ORTHOPNEA: 1
COUGH: 0
SNORES LOUDLY: 1
MUSCLE CRAMPS: 0
FEVER: 0
EYE REDNESS: 1
BACK PAIN: 1
LEG PAIN: 1
DECREASED APPETITE: 0
NECK PAIN: 0
MUSCLE WEAKNESS: 0
RECTAL PAIN: 0
BRUISES/BLEEDS EASILY: 1
HALLUCINATIONS: 0
SYNCOPE: 0
SWOLLEN GLANDS: 0
WEIGHT GAIN: 1
POLYPHAGIA: 0

## 2020-10-15 ENCOUNTER — OFFICE VISIT (OUTPATIENT)
Dept: CARDIOLOGY | Facility: CLINIC | Age: 80
End: 2020-10-15
Attending: PHYSICIAN ASSISTANT
Payer: COMMERCIAL

## 2020-10-15 ENCOUNTER — ANTICOAGULATION THERAPY VISIT (OUTPATIENT)
Dept: ANTICOAGULATION | Facility: CLINIC | Age: 80
End: 2020-10-15

## 2020-10-15 VITALS
HEIGHT: 58 IN | OXYGEN SATURATION: 99 % | WEIGHT: 115 LBS | SYSTOLIC BLOOD PRESSURE: 119 MMHG | HEART RATE: 72 BPM | DIASTOLIC BLOOD PRESSURE: 71 MMHG | BODY MASS INDEX: 24.14 KG/M2

## 2020-10-15 DIAGNOSIS — I27.20 PULMONARY HYPERTENSION (H): ICD-10-CM

## 2020-10-15 DIAGNOSIS — I27.24 CTEPH (CHRONIC THROMBOEMBOLIC PULMONARY HYPERTENSION) (H): Primary | ICD-10-CM

## 2020-10-15 DIAGNOSIS — I27.24 CTEPH (CHRONIC THROMBOEMBOLIC PULMONARY HYPERTENSION) (H): ICD-10-CM

## 2020-10-15 DIAGNOSIS — R06.02 SOB (SHORTNESS OF BREATH): ICD-10-CM

## 2020-10-15 DIAGNOSIS — R06.09 DYSPNEA ON EXERTION: ICD-10-CM

## 2020-10-15 DIAGNOSIS — I27.24 CHRONIC THROMBOEMBOLIC PULMONARY HYPERTENSION (H): ICD-10-CM

## 2020-10-15 LAB
ALBUMIN SERPL-MCNC: 3.1 G/DL (ref 3.4–5)
ALP SERPL-CCNC: 111 U/L (ref 40–150)
ALT SERPL W P-5'-P-CCNC: 24 U/L (ref 0–50)
ANION GAP SERPL CALCULATED.3IONS-SCNC: 6 MMOL/L (ref 3–14)
AST SERPL W P-5'-P-CCNC: 35 U/L (ref 0–45)
BILIRUB SERPL-MCNC: 0.5 MG/DL (ref 0.2–1.3)
BUN SERPL-MCNC: 36 MG/DL (ref 7–30)
CALCIUM SERPL-MCNC: 9.5 MG/DL (ref 8.5–10.1)
CHLORIDE SERPL-SCNC: 104 MMOL/L (ref 94–109)
CO2 SERPL-SCNC: 26 MMOL/L (ref 20–32)
CREAT SERPL-MCNC: 1.1 MG/DL (ref 0.52–1.04)
GFR SERPL CREATININE-BSD FRML MDRD: 48 ML/MIN/{1.73_M2}
GLUCOSE SERPL-MCNC: 80 MG/DL (ref 70–99)
INR PPP: 2.12 (ref 0.86–1.14)
NT-PROBNP SERPL-MCNC: 5613 PG/ML (ref 0–450)
POTASSIUM SERPL-SCNC: 4.1 MMOL/L (ref 3.4–5.3)
PROT SERPL-MCNC: 7.4 G/DL (ref 6.8–8.8)
SODIUM SERPL-SCNC: 137 MMOL/L (ref 133–144)

## 2020-10-15 PROCEDURE — 83880 ASSAY OF NATRIURETIC PEPTIDE: CPT | Performed by: PATHOLOGY

## 2020-10-15 PROCEDURE — G0463 HOSPITAL OUTPT CLINIC VISIT: HCPCS

## 2020-10-15 PROCEDURE — 85610 PROTHROMBIN TIME: CPT | Performed by: PATHOLOGY

## 2020-10-15 PROCEDURE — 99215 OFFICE O/P EST HI 40 MIN: CPT | Performed by: PHYSICIAN ASSISTANT

## 2020-10-15 PROCEDURE — 36415 COLL VENOUS BLD VENIPUNCTURE: CPT | Performed by: PATHOLOGY

## 2020-10-15 PROCEDURE — 80053 COMPREHEN METABOLIC PANEL: CPT | Performed by: PATHOLOGY

## 2020-10-15 ASSESSMENT — PAIN SCALES - GENERAL: PAINLEVEL: NO PAIN (0)

## 2020-10-15 ASSESSMENT — MIFFLIN-ST. JEOR: SCORE: 891.39

## 2020-10-15 NOTE — NURSING NOTE
Met with patient and reviewed Adempas enrollment forms and had patient sign.  patient understood medication could take a couple weeks to complete the PA.  Patient verbalized understanding, agreed with plan and denied any further questions. Fabby Seymour RN on 10/15/2020 at 2:41 PM

## 2020-10-15 NOTE — PATIENT INSTRUCTIONS
Thank you for visiting the Pulmonary Hypertension Clinic today.      Today we discussed:   We will start the Adempas as discussed. Our office will call you regarding prior authorization.  1.This medication needs to be approved by your insurance company and may take up to 2-3 weeks for approval.  You will not be able to pick it up from your pharmacy today.  2.  This medication can be expensive. If your co-pay is high, call us before you pick it up.  Also, express financial need to your Pharmacy by stating (this medication is to expensive, I cannot afford this.)  3. Please call us when you start medication so we can schedule you for your follow up appointments.      IF the copay is high here are the assistance programs available to you (please still call us and let us know that it is high):   [employer] Assistance:    [employer] Foundation (PAF):  760.591.1513  https://www.[employer].org/diseases/pulmonary-hypertension    Patient Access Network (PAN):  235.458.6630  https://panfoundation.org/index.php/en/patients/assistance-programs/pulmonary-hypertension    The Assistance Fund (TAF):  (436) 525-6016  https://[employer].org/program-listing/    Good Days:  323.377.1460  https://www.mygooddays.org/patients/diseases-covered/pulmonary-arterial-hypertension       Patient Assistance:    [employer] Patient Assistance (Adempas):  1-917-453-8776        Results:   Component      Latest Ref Rng & Units 10/15/2020   Sodium      133 - 144 mmol/L 137   Potassium      3.4 - 5.3 mmol/L 4.1   Chloride      94 - 109 mmol/L 104   Carbon Dioxide      20 - 32 mmol/L 26   Anion Gap      3 - 14 mmol/L 6   Glucose      70 - 99 mg/dL 80   Urea Nitrogen      7 - 30 mg/dL 36 (H)   Creatinine      0.52 - 1.04 mg/dL 1.10 (H)   GFR Estimate      >60 mL/min/1.73:m2 48 (L)   GFR Estimate If Black      >60 mL/min/1.73:m2 56 (L)   Calcium      8.5 - 10.1 mg/dL 9.5   Bilirubin Total      0.2 - 1.3 mg/dL 0.5   Albumin      3.4 - 5.0 g/dL  3.1 (L)   Protein Total      6.8 - 8.8 g/dL 7.4   Alkaline Phosphatase      40 - 150 U/L 111   ALT      0 - 50 U/L 24   AST      0 - 45 U/L 35   N-Terminal Pro Bnp      0 - 450 pg/mL 5,613 (H)   INR      0.86 - 1.14 2.12 (H)         Additional Instructions:    1. Continue staying active and eat a heart healthy, low sodium diet.     2. Please keep current list of medications with you at all times.     3. Remember to weigh yourself daily after voiding and write it down on a log. If you have gained/lost 2 pounds overnight or 5 pounds in a week contact us for medication adjustments or further instructions.    4. Please call us immediately if you have syncope (fainting or passing out), chest pain, worsening edema (swelling or weight gain), or general worsening in how you are feeling.       Follow up Appointment Information:  Return to see Dr. Turcios as scheduled.     --------------------------------------------------------------------------------------------------------------    If you have questions or concerns please contact us at:    Elan Arnett RN, BSN   Lexus Balbuena (Schedule,Prior Auth)  Nurse Coordinator     Clinic   Pulmonary Hypertension   Pulmonary Hypertension  [facility]  [facility]  (Phone)[phone]    (Phone) [phone]        (Fax) [phone]      ** Please note that you will NOT receive a reminder call regarding your scheduled testing, reminder calls are for provider appointments only.  If you are scheduled for testing within the [employer] system you may receive a call regarding pre-registration for billing purposes only.**     --------------------------------------------------------------------------------------------------------------    Interested in joining a support group?    Pulmonary Hypertension Association  Https://www.phassociation.org/  **Look at the Events Tab** They even have Support Groups that you can call  into    [facility] PH Support Group  Second Saturday of the Month from 1-3 PM   Location: [address] [place] [id number]  Leader: Jessenia Moeller and Marielos Loo  Phone: [phone] or [phone]  Email: [email or web address]

## 2020-10-15 NOTE — LETTER
"10/15/2020      RE: Karen Anderson  [address]  [facility] [address]       Dear Colleague,    Thank you for the opportunity to participate in the care of your patient, Karen Anderson, at the [facility] [place] [facility]. Please see a copy of my visit note below.    October 15, 2020  [facility]      Primary PH cardiologist: Dr. Karolina Turcios      HPI:  Ms. Anderson is a pleasant 80 year old female with a PMHx including rheumatoid arthritis, hypertension, scoliosis, asthma, pulmonary MAC, breast cancer s/p chemotherapy with Adriamycin, Cytoxan, Taxol, and tamoxifen and status post left mastectomy. She also has a history of extensive bilateral pulmonary emboli and DVT in 1998 thought to be secondary to tamoxifen along with chemotherapy induced cardiomyopathy. 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 Although she had proximal disease, she was too high risk for surgical PTE. She is now on IV Remodulin therapy.      She had a virtual visit with Dr. Mathew in early May at which time she was on 14ng/kg/min of Remodulin with minimal side effects, and he recommended continued upward titration to a goal of 30ng/kg/min. I followed up with her also via virtual visit in early June, and she was on 24ng/kg/min. While she did report some general worsening of side effects (pain in legs, jaw pain, headaches, diarrhea with each titration), she was able to tolerate these with the use of Tylenol and Imodium. She did report some swelling but was not interested increasing her diuretic at that time. Her weight was fluctuating, and to complicate matters slightly, she [place] and was using two different scales. At that visit, we opted to continue to monitor, with ongoing titration to a goal of 30ng/kg/min as " previously recommended.     When we saw each other last (also virtually) in July, she noted some improvement in her breathing, and was at goal dose of 30ng/kg/min. Her side effects were manageable at that time. Her only concern was that she felt the swelling had worsened slightly. Upon questioning, she also noted some orthopnea, though was unsure if this was new. We tried to increase her Lasix to 40mg but ultimately this caused some renal concerns. When we reduced it back down, interestingly, her SCr went up a bit further, and she was hyperkalemic at 5.2. She relayed that she had recently fallen and tore a tendon in her knee which was quite painful for her. There was extensive swelling due to her being on anticoagulation. She received some hydrocodone/apap but says she only took 2 doses and it made her dizzy so she stopped. Her neurontin was increased to 300/300/600mg the day before our visit, and she was also placed on Keflex earlier that day for what may be a developing cellulitis. At that visit due to some BP concerns along with the renal issues,  I opted to reduce her lisinopril to 10mg (from 20mg) once daily.     The following week, Karen called to report a weight gain of over almost 20 lbs and was directed to the ED. She was hospitalized from 9/29 to 10/6 for acute on chronic diastolic heart failure. She received an IV lasix gtt. Repeat echo showed EF 60-65%, moderate RV dilation, and small effusion. She also had a repeat RHC (RA 5, RV 90/8, mPA 42, LUIZ 2.55/1.76) which showed some mild improvement from March of this year. Pulmonary angiogram showed  of the distal right A2, proximal A7 segment, severe stenosis of the right A8 segment with slow filling distally. There was possible stenosis of the proximal and mid right A10 segment, and no significant angiographic disease of the left lung segments. She was told that BPA may be a consideration in the future. Her PTA lasix was increased to 20mg BID, and her  digoxin was continued. Her stay was complicated by JOSE G on CKD but renal function improved with diuresis. Her Remodulin dose remained at 30ng/kg/min. This was reviewed with Dr. Turcios who recommended increasing Remodulin vs adding Adempas.     Today, we are doing an in clinic visit to follow up on her hospital stay. We had an extensive visit today. She wanted to discuss Adempas further and review her testing, which we did. I informed her that her while her RHC did show some improvement in her RV function and slight reduction in PA pressures, that we needed to continue to work on getting her pressures down further. The majority of her concerns today continue to revolve around her left leg pain/swelling after her fall as it seems to not be healing very quickly. She is frustrated because she said her breathing improved quite a bit on the Remodulin, but now she can do nearly nothing due to the leg pain. In regard to her swelling in her right leg, she thinks this is under control but does fluctuate. Her home weight has varied between 114-117 lbs at home. She is taking the Lasix 20mg BID but dislikes the PM dose. She has occasional lightheadedness when stands but denies presyncope/syncope and says it is short lived.     Labs done prior to our visit today showed BUN/SCr 36/1.10, K is 4.1. NT-proBNP has gone up further to 5613.        CURRENT PULMONARY HYPERTENSION REGIMEN:     PAH Rx: Remodulin 30ng/kg/min     Diuretics: Lasix 20mg BID     Oxygen: None     Anticoagulation: warfarin  Indication: CTEPH        Assessment/Plan:     1. Chronic thromboembolic pulmonary hypertension.              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 Although she had proximal disease, she was felt to be too high risk for surgical PTE. She is currently on IV Remodulin therapy, and is at her current goal at 30ng/kg/min. She had a RHC with her recent hospitalization for heart failure. mPAP and RV function were slightly improved but  she still has significant pulmonary hypertension. Dr. Turcios has recommended the addition of Adempas which she is now agreeable to. Will start at 0.5mg TID and slow titrate to a max goal of 2.5mg TID. Will keep her Remodulin as is for now, but she is aware that ultimately we may need to increase this in the future as well.   --On exam she appears overall euvolemic. She dislikes the PM dose of Lasix so I asked her to take just 40mg once daily and see how this goes for her. I asked her to be sure to weigh daily at home and continue to call with an upward trend. I reminded her to elevate her feet when she can.    --I asked her to continue the lisinopril at 10mg daily at home. Her BP has been under good control on the lower dose, with the exception of one single higher reading at home in the 140s. Continue to monitor renal function.   --Continue digoxin for RV support.               --Continue anticoagulation with warfarin for her CTEPH, though continue to monitor with her recent fall/leg injury. She currently follows at the INR clinic, today INR therapeutic at 2.12.    --Dr. Turcios will discuss with her at upcoming visit with BPA is an option for her.       Follow up plan: Return in early Nov to see Dr. Turcios as already scheduled.       Orders this Visit:  No orders of the defined types were placed in this encounter.    No orders of the defined types were placed in this encounter.    Medications Discontinued During This Encounter   Medication Reason     oxyCODONE (ROXICODONE) 5 MG tablet Therapy completed         PAST MEDICAL HISTORY:  Past Medical History:   Diagnosis Date     Asthma      Breast cancer (H)      Hypertension      Mycobacterium avium complex (H)      Pulmonary embolism (H)      Pulmonary hypertension (H)      Rheumatoid arthritis (H)      Scoliosis          FAMILY HISTORY:  Family History   Problem Relation Age of Onset     Heart Failure Mother      Kidney Disease Mother      Coronary Artery  Disease Maternal Grandfather 50     LUNG DISEASE No family hx of      Clotting Disorder No family hx of          SOCIAL HISTORY:  Social History     Tobacco Use     Smoking status: Never Smoker     Smokeless tobacco: Never Used   Substance Use Topics     Alcohol use: Yes     Comment: occasionally      Drug use: Never         CURRENT MEDICATIONS:  Current Outpatient Medications   Medication Sig Dispense Refill     acetaminophen (TYLENOL) 325 MG tablet Take 325 mg by mouth every 6 hours as needed for mild pain       albuterol (PROAIR HFA/PROVENTIL HFA/VENTOLIN HFA) 108 (90 Base) MCG/ACT inhaler Inhale 2 puffs into the lungs every 4 hours as needed for shortness of breath / dyspnea or wheezing        alendronate (FOSAMAX) 70 MG tablet Take 70 mg by mouth once a week       Ascorbic Acid (VITAMIN C) 500 MG CAPS Take 500 mg by mouth every morning        aspirin (ASA) 81 MG tablet Take 81 mg by mouth daily        beclomethasone HFA (QVAR REDIHALER) 80 MCG/ACT inhaler Inhale 2 puffs into the lungs 2 times daily       calcium citrate-vitamin D (CALCIUM CITRATE + D3) 315-250 MG-UNIT TABS per tablet Take 2 tablets by mouth daily        cetirizine (ZYRTEC) 10 MG tablet Take 10 mg by mouth daily       digoxin (LANOXIN) 62.5 MCG tablet Take 1 tablet (62.5 mcg) by mouth daily 90 tablet 3     furosemide (LASIX) 20 MG tablet Take 1 tablet (20 mg) by mouth 2 times daily 90 tablet 3     gabapentin (NEURONTIN) 300 MG capsule Take 300 mg by mouth 3 times daily        hydroxychloroquine (PLAQUENIL) 200 MG tablet Take 200 mg by mouth daily        ipratropium (ATROVENT) 0.06 % nasal spray Spray 2 sprays into both nostrils 3 times daily        lisinopril (ZESTRIL) 20 MG tablet Take 0.5 tablets (10 mg) by mouth daily       loperamide (IMODIUM) 2 MG capsule Take 2 mg by mouth as needed for diarrhea       Probiotic Product (PROBIOTIC ACIDOPHILUS BIOBEADS) CAPS Take 1 capsule by mouth 2 times daily        riociguat (ADEMPAS) 0.5 MG tablet Take  by mouth 3 times daily Please make sure you are completing your monthly mandatory labs.     Goal of 2.5 mg three times a day, with dose increase of 0.5 mg three times a day every 2 weeks (SBP must be >95 mmHg and no signs or symptoms of hypotension to increase)    PA in Process, RX to be sent upon approval       Treprostinil (REMODULIN IJ) Dose: 30 ng/kg/min  Vial concentration: 1 mg/mL  Final concentration: 60,000 ng/mL  Dosing weight: 56.3 kg   Pump rate: 41 mL/day  Per patient on 9/29/2020       warfarin ANTICOAGULANT (COUMADIN) 3 MG tablet Take 1 to 2 tablets daily as directed by the Anticoagulation Clinic. (Patient taking differently: Take 3 mg PO on Tues and Sat, and 4.5 mg all other days of the week) 170 tablet 1         ROS:   10 point ROS of systems including Constitutional, Eyes, Respiratory, Cardiovascular, Gastroenterology, Genitourinary, Integumentary, Muscularskeletal, Psychiatric were  negative except for pertinent positives noted in my HPI.      EXAM:  GEN:  In general, this is a well nourished  female in no acute distress on room air.  Patient in a wheelchair, accompanied by her .  NECK: Supple, no masses appreciated. Trachea midline. No JVD while upright.   CHEST: Right chest jade site clean and dry. No erythema or drainage.  C/V:  Regular rate and rhythm, no murmur, rub or gallop. No S3 or RV heave.   RESP: Respirations are unlabored. No use of accessory muscles. Clear to auscultation bilaterally without wheezing, rales, or rhonchi.  GI: Abdomen soft, nontender, nondistended. No significant HSM appreciated.   EXTREM: LLE with dark skin changes and knee swollen, she states much improved. RLE with some 1+ foot edema. No PT edema.   NEURO: Alert and oriented, cooperative. Gait not assessed. No obvious focal deficits.       Weight  Wt Readings from Last 10 Encounters:   10/15/20 52.2 kg (115 lb)   10/04/20 52.2 kg (115 lb)   03/20/20 53.8 kg (118 lb 11.2 oz)   03/02/20 57.6 kg (126  lb 14.4 oz)         Labs:    CBC RESULTS:  Lab Results   Component Value Date    WBC 4.9 10/06/2020    RBC 3.24 (L) 10/06/2020    HGB 9.8 (L) 10/06/2020    HCT 32.0 (L) 10/06/2020    MCV 99 10/06/2020    MCH 30.2 10/06/2020    MCHC 30.6 (L) 10/06/2020    RDW 13.4 10/06/2020     10/06/2020       CMP RESULTS:  Lab Results   Component Value Date     10/15/2020    POTASSIUM 4.1 10/15/2020    CHLORIDE 104 10/15/2020    CO2 26 10/15/2020    ANIONGAP 6 10/15/2020    GLC 80 10/15/2020    BUN 36 (H) 10/15/2020    CR 1.10 (H) 10/15/2020    GFRESTIMATED 48 (L) 10/15/2020    GFRESTBLACK 56 (L) 10/15/2020    ESTEFANÍA 9.5 10/15/2020    BILITOTAL 0.5 10/15/2020    ALBUMIN 3.1 (L) 10/15/2020    ALKPHOS 111 10/15/2020    ALT 24 10/15/2020    AST 35 10/15/2020      Results for ELAINA SUTTON (MRN [id number]) as of 10/15/2020 13:29   Ref. Range 3/2/2020 12:35 4/2/2020 10:19 5/7/2020 10:26 6/8/2020 10:32 10/15/2020 13:06   N-Terminal Pro Bnp Latest Ref Range: 0 - 450 pg/mL 12,874 (H) 4,652 (H) 3,320 (H) 3,959 (H) 5,613 (H)       Most recent testing:      Echocardiogram  9/29/2020     Interpretation Summary  Global and regional left ventricular function is normal with an EF of 60-65%.  Moderate right ventricular dilation is present.  Global right ventricular function is moderately reduced.  Right ventricular systolic pressure is 82mmHg above the right atrial pressure.  Ascending aorta 3.8 cm.  Ascending aortic index 25.3mm/m2,severe dilation for BSA of 1.5m2.  IVC diameter >2.1 cm collapsing <50% with sniff suggests a high RA pressure  estimated at 15 mmHg or greater.  Small circumferential pericardial effusion is present without any hemodynamic  significance.  There has been no change.    RHC  10/5/2020  RA 11/6/5  RV 90/8  PA 90/20/42  PCWP --/--/11  LUIZ 2.55/1.76  TD 3/2.07  PVR 10 (TD)   Right sided filling pressures are normal. Left sided filling pressures are normal. Moderately elevated pulmonary artery  hypertension. Reduced cardiac output level. Hemodynamic data has been modified in Epic per physician review.      NYHA Functional Class:  Functional class 3    1--No limitation of activity  2--Slight limitation, ordinary activities may cause symptoms  3--Marked limitation, less than ordinary activities cause symptoms  4--Severe limitation, minimal activity causes symptoms, or symptoms at rest      50 minutes of one on one time was spent with the patient during which greater than 50 percent of that time was spent in counseling and coordination of care.       Shawna Marcum PA-C  [facility]  Pager [phone]        Please do not hesitate to contact me if you have any questions/concerns.     Sincerely,     MAGEN Saeed

## 2020-10-15 NOTE — NURSING NOTE
Chief Complaint   Patient presents with     Follow Up     1 Week FU       Vitals were taken and medications were reconciled.     Carmelita Serrano  1:24 PM

## 2020-10-15 NOTE — PROGRESS NOTES
ANTICOAGULATION FOLLOW-UP CLINIC VISIT    Patient Name:  Karen Anderson  Date:  10/15/2020  Contact Type:  Telephone    SUBJECTIVE:         OBJECTIVE    Recent labs: (last 7 days)     10/15/20  1306   INR 2.12*       ASSESSMENT / PLAN  INR assessment THER    Recheck INR In: 1 WEEK    INR Location Clinic      Anticoagulation Summary  As of 10/15/2020    INR goal:  2.0-3.0   TTR:  85.1 % (5.9 mo)   INR used for dosin.12 (10/15/2020)   Warfarin maintenance plan:  3 mg (3 mg x 1) every Tue, Sat; 4.5 mg (3 mg x 1.5) all other days   Full warfarin instructions:  3 mg every Tue, Sat; 4.5 mg all other days   Weekly warfarin total:  28.5 mg   No change documented:  Laure Enriquez RN   Plan last modified:  Jaylan Jacobo RN (2020)   Next INR check:  10/22/2020   Priority:  Maintenance   Target end date:  Indefinite    Indications    Pulmonary hypertension (H) [I27.20]             Anticoagulation Episode Summary     INR check location:      Preferred lab:      Send INR reminders to:  [facility]    Comments:  call home first, ok to call cell.  OK to leave message on either phone.  OK to speak with spouse, Don.  Uses [facility] SPE[facility] IN TABLETS (Has 3mg Tabs)  [facility] in [place] and will need to fax orders P [phone] F [phone]      Anticoagulation Care Providers     Provider Role Specialty Phone number    Osiris Luong MD Referring Cardiology [phone]            See the Encounter Report to view Anticoagulation Flowsheet and Dosing Calendar (Go to Encounters tab in chart review, and find the Anticoagulation Therapy Visit)    Left message for patient with results and dosing recommendations. Asked patient to call back to report any missed doses, falls, signs and symptoms of bleeding or clotting, any changes in health, medication, or diet. Asked patient to call back with any questions or concerns.      Laure Enriquez, RN

## 2020-10-15 NOTE — PROGRESS NOTES
"October 15, 2020  [facility]      Primary PH cardiologist: Dr. Karolina Turcios      HPI:  Ms. Anderson is a pleasant 80 year old female with a PMHx including rheumatoid arthritis, hypertension, scoliosis, asthma, pulmonary MAC, breast cancer s/p chemotherapy with Adriamycin, Cytoxan, Taxol, and tamoxifen and status post left mastectomy. She also has a history of extensive bilateral pulmonary emboli and DVT in 1998 thought to be secondary to tamoxifen along with chemotherapy induced cardiomyopathy. 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 Although she had proximal disease, she was too high risk for surgical PTE. She is now on IV Remodulin therapy.      She had a virtual visit with Dr. Mathew in early May at which time she was on 14ng/kg/min of Remodulin with minimal side effects, and he recommended continued upward titration to a goal of 30ng/kg/min. I followed up with her also via virtual visit in early June, and she was on 24ng/kg/min. While she did report some general worsening of side effects (pain in legs, jaw pain, headaches, diarrhea with each titration), she was able to tolerate these with the use of Tylenol and Imodium. She did report some swelling but was not interested increasing her diuretic at that time. Her weight was fluctuating, and to complicate matters slightly, she [place] and was using two different scales. At that visit, we opted to continue to monitor, with ongoing titration to a goal of 30ng/kg/min as previously recommended.     When we saw each other last (also virtually) in July, she noted some improvement in her breathing, and was at goal dose of 30ng/kg/min. Her side effects were manageable at that time. Her only concern was that she felt the swelling had worsened slightly. Upon questioning, she also noted some orthopnea, though was unsure " if this was new. We tried to increase her Lasix to 40mg but ultimately this caused some renal concerns. When we reduced it back down, interestingly, her SCr went up a bit further, and she was hyperkalemic at 5.2. She relayed that she had recently fallen and tore a tendon in her knee which was quite painful for her. There was extensive swelling due to her being on anticoagulation. She received some hydrocodone/apap but says she only took 2 doses and it made her dizzy so she stopped. Her neurontin was increased to 300/300/600mg the day before our visit, and she was also placed on Keflex earlier that day for what may be a developing cellulitis. At that visit due to some BP concerns along with the renal issues,  I opted to reduce her lisinopril to 10mg (from 20mg) once daily.     The following week, Karen called to report a weight gain of over almost 20 lbs and was directed to the ED. She was hospitalized from 9/29 to 10/6 for acute on chronic diastolic heart failure. She received an IV lasix gtt. Repeat echo showed EF 60-65%, moderate RV dilation, and small effusion. She also had a repeat RHC (RA 5, RV 90/8, mPA 42, LUIZ 2.55/1.76) which showed some mild improvement from March of this year. Pulmonary angiogram showed  of the distal right A2, proximal A7 segment, severe stenosis of the right A8 segment with slow filling distally. There was possible stenosis of the proximal and mid right A10 segment, and no significant angiographic disease of the left lung segments. She was told that BPA may be a consideration in the future. Her PTA lasix was increased to 20mg BID, and her digoxin was continued. Her stay was complicated by JOSE G on CKD but renal function improved with diuresis. Her Remodulin dose remained at 30ng/kg/min. This was reviewed with Dr. Turcios who recommended increasing Remodulin vs adding Adempas.     Today, we are doing an in clinic visit to follow up on her hospital stay. We had an extensive visit  today. She wanted to discuss Adempas further and review her testing, which we did. I informed her that her while her RHC did show some improvement in her RV function and slight reduction in PA pressures, that we needed to continue to work on getting her pressures down further. The majority of her concerns today continue to revolve around her left leg pain/swelling after her fall as it seems to not be healing very quickly. She is frustrated because she said her breathing improved quite a bit on the Remodulin, but now she can do nearly nothing due to the leg pain. In regard to her swelling in her right leg, she thinks this is under control but does fluctuate. Her home weight has varied between 114-117 lbs at home. She is taking the Lasix 20mg BID but dislikes the PM dose. She has occasional lightheadedness when stands but denies presyncope/syncope and says it is short lived.     Labs done prior to our visit today showed BUN/SCr 36/1.10, K is 4.1. NT-proBNP has gone up further to 5613.        CURRENT PULMONARY HYPERTENSION REGIMEN:     PAH Rx: Remodulin 30ng/kg/min     Diuretics: Lasix 20mg BID     Oxygen: None     Anticoagulation: warfarin  Indication: CTEPH        Assessment/Plan:     1. Chronic thromboembolic pulmonary hypertension.              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 Although she had proximal disease, she was felt to be too high risk for surgical PTE. She is currently on IV Remodulin therapy, and is at her current goal at 30ng/kg/min. She had a RHC with her recent hospitalization for heart failure. mPAP and RV function were slightly improved but she still has significant pulmonary hypertension. Dr. Turcios has recommended the addition of Adempas which she is now agreeable to. Will start at 0.5mg TID and slow titrate to a max goal of 2.5mg TID. Will keep her Remodulin as is for now, but she is aware that ultimately we may need to increase this in the future as well.   --On exam she  appears overall euvolemic. She dislikes the PM dose of Lasix so I asked her to take just 40mg once daily and see how this goes for her. I asked her to be sure to weigh daily at home and continue to call with an upward trend. I reminded her to elevate her feet when she can.    --I asked her to continue the lisinopril at 10mg daily at home. Her BP has been under good control on the lower dose, with the exception of one single higher reading at home in the 140s. Continue to monitor renal function.   --Continue digoxin for RV support.               --Continue anticoagulation with warfarin for her CTEPH, though continue to monitor with her recent fall/leg injury. She currently follows at the INR clinic, today INR therapeutic at 2.12.    --Dr. Turcios will discuss with her at upcoming visit with BPA is an option for her.       Follow up plan: Return in early Nov to see Dr. Turcios as already scheduled.       Orders this Visit:  No orders of the defined types were placed in this encounter.    No orders of the defined types were placed in this encounter.    Medications Discontinued During This Encounter   Medication Reason     oxyCODONE (ROXICODONE) 5 MG tablet Therapy completed         PAST MEDICAL HISTORY:  Past Medical History:   Diagnosis Date     Asthma      Breast cancer (H)      Hypertension      Mycobacterium avium complex (H)      Pulmonary embolism (H)      Pulmonary hypertension (H)      Rheumatoid arthritis (H)      Scoliosis          FAMILY HISTORY:  Family History   Problem Relation Age of Onset     Heart Failure Mother      Kidney Disease Mother      Coronary Artery Disease Maternal Grandfather 50     LUNG DISEASE No family hx of      Clotting Disorder No family hx of          SOCIAL HISTORY:  Social History     Tobacco Use     Smoking status: Never Smoker     Smokeless tobacco: Never Used   Substance Use Topics     Alcohol use: Yes     Comment: occasionally      Drug use: Never         CURRENT  MEDICATIONS:  Current Outpatient Medications   Medication Sig Dispense Refill     acetaminophen (TYLENOL) 325 MG tablet Take 325 mg by mouth every 6 hours as needed for mild pain       albuterol (PROAIR HFA/PROVENTIL HFA/VENTOLIN HFA) 108 (90 Base) MCG/ACT inhaler Inhale 2 puffs into the lungs every 4 hours as needed for shortness of breath / dyspnea or wheezing        alendronate (FOSAMAX) 70 MG tablet Take 70 mg by mouth once a week       Ascorbic Acid (VITAMIN C) 500 MG CAPS Take 500 mg by mouth every morning        aspirin (ASA) 81 MG tablet Take 81 mg by mouth daily        beclomethasone HFA (QVAR REDIHALER) 80 MCG/ACT inhaler Inhale 2 puffs into the lungs 2 times daily       calcium citrate-vitamin D (CALCIUM CITRATE + D3) 315-250 MG-UNIT TABS per tablet Take 2 tablets by mouth daily        cetirizine (ZYRTEC) 10 MG tablet Take 10 mg by mouth daily       digoxin (LANOXIN) 62.5 MCG tablet Take 1 tablet (62.5 mcg) by mouth daily 90 tablet 3     furosemide (LASIX) 20 MG tablet Take 1 tablet (20 mg) by mouth 2 times daily 90 tablet 3     gabapentin (NEURONTIN) 300 MG capsule Take 300 mg by mouth 3 times daily        hydroxychloroquine (PLAQUENIL) 200 MG tablet Take 200 mg by mouth daily        ipratropium (ATROVENT) 0.06 % nasal spray Spray 2 sprays into both nostrils 3 times daily        lisinopril (ZESTRIL) 20 MG tablet Take 0.5 tablets (10 mg) by mouth daily       loperamide (IMODIUM) 2 MG capsule Take 2 mg by mouth as needed for diarrhea       Probiotic Product (PROBIOTIC ACIDOPHILUS BIOBEADS) CAPS Take 1 capsule by mouth 2 times daily        riociguat (ADEMPAS) 0.5 MG tablet Take by mouth 3 times daily Please make sure you are completing your monthly mandatory labs.     Goal of 2.5 mg three times a day, with dose increase of 0.5 mg three times a day every 2 weeks (SBP must be >95 mmHg and no signs or symptoms of hypotension to increase)    PA in Process, RX to be sent upon approval       Treprostinil  (REMODULIN IJ) Dose: 30 ng/kg/min  Vial concentration: 1 mg/mL  Final concentration: 60,000 ng/mL  Dosing weight: 56.3 kg   Pump rate: 41 mL/day  Per patient on 9/29/2020       warfarin ANTICOAGULANT (COUMADIN) 3 MG tablet Take 1 to 2 tablets daily as directed by the Anticoagulation Clinic. (Patient taking differently: Take 3 mg PO on Tues and Sat, and 4.5 mg all other days of the week) 170 tablet 1         ROS:   10 point ROS of systems including Constitutional, Eyes, Respiratory, Cardiovascular, Gastroenterology, Genitourinary, Integumentary, Muscularskeletal, Psychiatric were  negative except for pertinent positives noted in my HPI.      EXAM:  GEN:  In general, this is a well nourished  female in no acute distress on room air.  Patient in a wheelchair, accompanied by her .  NECK: Supple, no masses appreciated. Trachea midline. No JVD while upright.   CHEST: Right chest jade site clean and dry. No erythema or drainage.  C/V:  Regular rate and rhythm, no murmur, rub or gallop. No S3 or RV heave.   RESP: Respirations are unlabored. No use of accessory muscles. Clear to auscultation bilaterally without wheezing, rales, or rhonchi.  GI: Abdomen soft, nontender, nondistended. No significant HSM appreciated.   EXTREM: LLE with dark skin changes and knee swollen, she states much improved. RLE with some 1+ foot edema. No PT edema.   NEURO: Alert and oriented, cooperative. Gait not assessed. No obvious focal deficits.       Weight  Wt Readings from Last 10 Encounters:   10/15/20 52.2 kg (115 lb)   10/04/20 52.2 kg (115 lb)   03/20/20 53.8 kg (118 lb 11.2 oz)   03/02/20 57.6 kg (126 lb 14.4 oz)         Labs:    CBC RESULTS:  Lab Results   Component Value Date    WBC 4.9 10/06/2020    RBC 3.24 (L) 10/06/2020    HGB 9.8 (L) 10/06/2020    HCT 32.0 (L) 10/06/2020    MCV 99 10/06/2020    MCH 30.2 10/06/2020    MCHC 30.6 (L) 10/06/2020    RDW 13.4 10/06/2020     10/06/2020       CMP RESULTS:  Lab  Results   Component Value Date     10/15/2020    POTASSIUM 4.1 10/15/2020    CHLORIDE 104 10/15/2020    CO2 26 10/15/2020    ANIONGAP 6 10/15/2020    GLC 80 10/15/2020    BUN 36 (H) 10/15/2020    CR 1.10 (H) 10/15/2020    GFRESTIMATED 48 (L) 10/15/2020    GFRESTBLACK 56 (L) 10/15/2020    ESTEFANÍA 9.5 10/15/2020    BILITOTAL 0.5 10/15/2020    ALBUMIN 3.1 (L) 10/15/2020    ALKPHOS 111 10/15/2020    ALT 24 10/15/2020    AST 35 10/15/2020      Results for ELAINA SUTTON (MRN [id number]) as of 10/15/2020 13:29   Ref. Range 3/2/2020 12:35 4/2/2020 10:19 5/7/2020 10:26 6/8/2020 10:32 10/15/2020 13:06   N-Terminal Pro Bnp Latest Ref Range: 0 - 450 pg/mL 12,874 (H) 4,652 (H) 3,320 (H) 3,959 (H) 5,613 (H)       Most recent testing:      Echocardiogram  9/29/2020     Interpretation Summary  Global and regional left ventricular function is normal with an EF of 60-65%.  Moderate right ventricular dilation is present.  Global right ventricular function is moderately reduced.  Right ventricular systolic pressure is 82mmHg above the right atrial pressure.  Ascending aorta 3.8 cm.  Ascending aortic index 25.3mm/m2,severe dilation for BSA of 1.5m2.  IVC diameter >2.1 cm collapsing <50% with sniff suggests a high RA pressure  estimated at 15 mmHg or greater.  Small circumferential pericardial effusion is present without any hemodynamic  significance.  There has been no change.    RHC  10/5/2020  RA 11/6/5  RV 90/8  PA 90/20/42  PCWP --/--/11  LUIZ 2.55/1.76  TD 3/2.07  PVR 10 (TD)   Right sided filling pressures are normal. Left sided filling pressures are normal. Moderately elevated pulmonary artery hypertension. Reduced cardiac output level. Hemodynamic data has been modified in Epic per physician review.      NYHA Functional Class:  Functional class 3    1--No limitation of activity  2--Slight limitation, ordinary activities may cause symptoms  3--Marked limitation, less than ordinary activities cause symptoms  4--Severe  limitation, minimal activity causes symptoms, or symptoms at rest      50 minutes of one on one time was spent with the patient during which greater than 50 percent of that time was spent in counseling and coordination of care.       Shawna Marcum PA-C  [facility]  Pager [phone]

## 2020-10-19 ENCOUNTER — MEDICAL CORRESPONDENCE (OUTPATIENT)
Dept: HEALTH INFORMATION MANAGEMENT | Facility: CLINIC | Age: 80
End: 2020-10-19

## 2020-10-20 NOTE — TELEPHONE ENCOUNTER
*[facility] enrollment and REMS form completed and faxed to [facility] REMS hub for expedited review along with prior authorization approval on 10/19/2020.    [facility]  Clinic   Pulmonary Hypertension  [facility]  (P) [phone]

## 2020-10-20 NOTE — TELEPHONE ENCOUNTER
Prior Authorization Approval    Authorization Effective Date: 9/7/2020  Authorization Expiration Date: 10/7/2023  Medication: Adempas 0.5mg - Approved  Approved Dose/Quantity: 90 tablets/30 days  Reference #: [id number]   Insurance Company: [employer] - Phone [phone] Fax [phone]    Which [facility] is filling the prescription (Not needed for infusion[facility]cl[facility] administered[facility] [address]  ----------------------------  Insurance: [employer]  BIN: [id number]  SAMANTHAN: MD  ID#: [id number]  GRP#: [id number]

## 2020-10-23 ENCOUNTER — TELEPHONE (OUTPATIENT)
Dept: CARDIOLOGY | Facility: CLINIC | Age: 80
End: 2020-10-23

## 2020-10-23 ENCOUNTER — ANTICOAGULATION THERAPY VISIT (OUTPATIENT)
Dept: ANTICOAGULATION | Facility: CLINIC | Age: 80
End: 2020-10-23

## 2020-10-23 DIAGNOSIS — I27.20 PULMONARY HYPERTENSION (H): ICD-10-CM

## 2020-10-23 DIAGNOSIS — I27.24 CTEPH (CHRONIC THROMBOEMBOLIC PULMONARY HYPERTENSION) (H): Primary | ICD-10-CM

## 2020-10-23 DIAGNOSIS — Z79.01 LONG TERM CURRENT USE OF ANTICOAGULANT THERAPY: ICD-10-CM

## 2020-10-23 DIAGNOSIS — I27.24 CHRONIC THROMBOEMBOLIC PULMONARY HYPERTENSION (H): ICD-10-CM

## 2020-10-23 LAB
CAPILLARY BLOOD COLLECTION: NORMAL
INR PPP: 2.8 (ref 0.86–1.14)

## 2020-10-23 PROCEDURE — 85610 PROTHROMBIN TIME: CPT | Performed by: INTERNAL MEDICINE

## 2020-10-23 PROCEDURE — 36416 COLLJ CAPILLARY BLOOD SPEC: CPT | Performed by: INTERNAL MEDICINE

## 2020-10-23 NOTE — PROGRESS NOTES
ANTICOAGULATION FOLLOW-UP CLINIC VISIT    Patient Name:  Karen Anderson  Date:  10/23/2020  Contact Type:  Telephone    SUBJECTIVE:  Patient Findings     Comments:  Spoke with Karen.  In her hospital discharge, it states her INR goal range is 2-2.5.  It had been 2 - 3.  Sent message to Dr. Turcios to verify goal range.  He responded to go with the 2-2.5.  Karen has a large bruise on her knee and per Karen, she has several hematomas. New INR clinic referral sent to Dr. Turcios to sign.           Clinical Outcomes     Comments:  Spoke with Karen.  In her hospital discharge, it states her INR goal range is 2-2.5.  It had been 2 - 3.  Sent message to Dr. Turcios to verify goal range.  He responded to go with the 2-2.5.  Karen has a large bruise on her knee and per Karen, she has several hematomas. New INR clinic referral sent to Dr. Turcios to sign.              OBJECTIVE    Recent labs: (last 7 days)     10/23/20  0945   INR 2.80*       ASSESSMENT / PLAN  INR assessment SUPRA    Recheck INR In: 1 WEEK    INR Location Clinic      Anticoagulation Summary  As of 10/23/2020    INR goal:  2.0-2.5   TTR:  85.5 % (6.2 mo)   Prior goal:  2.0-3.0   INR used for dosin.80 (10/23/2020)   Warfarin maintenance plan:  3 mg (3 mg x 1) every Tue, Sat; 4.5 mg (3 mg x 1.5) all other days   Full warfarin instructions:  10/23: 3 mg; Otherwise 3 mg every Tue, Sat; 4.5 mg all other days   Weekly warfarin total:  28.5 mg   Plan last modified:  Jaylan Jacobo RN (2020)   Next INR check:  10/30/2020   Priority:  High   Target end date:  Indefinite    Indications    Pulmonary hypertension (H) [I27.20]             Anticoagulation Episode Summary     INR check location:      Preferred lab:      Send INR reminders to:   [facility]    Comments:  call home first, OK to leave message on either phone. OK to speak with spouse, Don.  [facility] SPEAK IN TABLETS (Has 3mg Tabs) 10/23/20:  new goal range is  2 - 2.5  [facility] in [place] and will need to fax orders P [phone] F [phone]      Anticoagulation Care Providers     Provider Role Specialty Phone number    Osiris Luong MD Referring Cardiology [phone]            See the Encounter Report to view Anticoagulation Flowsheet and Dosing Calendar (Go to Encounters tab in chart review, and find the Anticoagulation Therapy Visit)    Spoke with Karen.  She is hesitant to get her INR checked in one week because the clinic was so full of people today.  Made her an INR appointment early in AM, so hopefully will not be busy. She will continue to eat spinach and recommended decreasing her warfarin dose slightly.  Per Dr. Turcios, INR goal range is 2 - 2.5 due to large bruise/hematoma.      Laure Enriquez RN

## 2020-10-23 NOTE — TELEPHONE ENCOUNTER
----- Message from Karolina Turcios MD sent at 10/23/2020  2:26 PM CDT -----  Regarding: RE: INR goal range  Yes. She had a big bruise on her knee after the fall.    Thank you    TT    ----- Message -----  From: Kezia Arnett RN  Sent: 10/23/2020  10:50 AM CDT  To: Laure Enriquez RN, Karolina Turcios MD  Subject: RE: INR goal range                               Dr. Turcios,    The patient was discharged from the hospital on 10/6 with an INR goal of 2-2.5. Are you okay maintaining this new range (originally on 2-3)?    Thank you,  Elan  ----- Message -----  From: Laure Enriquez RN  Sent: 10/23/2020  10:39 AM CDT  To: Kezia Arnett RN  Subject: INR goal range                                   Hi,    Can you please verify what Karen's INR goal range should be?  On the order we have, it says 2 - 3.  On her hospital discharge summary it says 2 - 2.5.       Thanks,  Laure Durant RN  [phone]

## 2020-10-23 NOTE — TELEPHONE ENCOUNTER
"Patient informed me that she has seen a slight swelling increase in her R foot and leg in the past few days. She taking 40 mg lasix daily but admits to not taking it consistently at the same time d/t the numerous appts patient has been going to lately. Currently weight 119.5 lb. Kezia Arnett RN on 10/23/2020 at 10:56 AM    Since last Friday weight has steadily increased from 114 lb to 115.5, 117.5, 120.5, and 119.5, 119.5 lb today. Patient has not \"really been monitoring\" how much she drinks and \"drinks when she's thirsty.\" Advised that patient should wear her compression stocking and take an extra 20 mg Lasix today and tomorrow, and if weight is not around the 115-116 range Sunday, to take an extra 20 mg Sunday as well. Advised I would call on Monday to follow up. Patient verbalized understanding and did not have any further questions. Kezia Arnett RN on 10/23/2020 at 11:09 AM    LM with patient to follow up on weight. Kezia Arnett RN on 10/26/2020 at 9:46 AM    Patient called and advised that she has not noticed any improvement in her weight over the weekend. Remains at 119.5 lb. Has used her compression stocking. Advised that we will need to schedule for a virtual visit with Shawna tomorrow to adjust lasix dosing. Patient agreed and staff message sent to Lexus to schedule. Kezia Arnett RN on 10/26/2020 at 11:17 AM       "

## 2020-10-26 NOTE — PROGRESS NOTES
CARDIOLOGY PH CLINIC VIDEO VISIT    Karen Anderson is a 80 year old female who is being evaluated via a billable video visit.      The patient has been notified of following:     This video visit will be conducted via a call between you and your physician/provider. We have found that certain health care needs can be provided without the need for an in-person physical exam.  This service lets us provide the care you need with a video conversation.  If a prescription is necessary we can send it directly to your pharmacy.  If lab work is needed we can place an order for that and you can then stop by our lab to have the test done at a later time.    Virtual visits are billed at different rates depending on your insurance coverage. During this emergency period, for some insurers they may be billed the same as an in-person visit.  Please reach out to your insurance provider with any questions.    If during the course of the call the physician/provider feels a video visit is not appropriate, you will not be charged for this service.      Physician has received verbal consent for a Video Visit from the patient? Yes    Patient would like the video invitation sent by: Send to e-mail at: [email or web address] Pt will attempt to do visit on mychart first       I have reviewed and updated the patient's Past Medical History, Social History, Family History and Medication List.    MEDICATIONS:  Current Outpatient Medications   Medication Sig Dispense Refill     acetaminophen (TYLENOL) 325 MG tablet Take 325 mg by mouth every 6 hours as needed for mild pain       albuterol (PROAIR HFA/PROVENTIL HFA/VENTOLIN HFA) 108 (90 Base) MCG/ACT inhaler Inhale 2 puffs into the lungs every 4 hours as needed for shortness of breath / dyspnea or wheezing        alendronate (FOSAMAX) 70 MG tablet Take 70 mg by mouth once a week       Ascorbic Acid (VITAMIN C) 500 MG CAPS Take 500 mg by mouth every morning        aspirin (ASA) 81 MG tablet  Take 81 mg by mouth daily        beclomethasone HFA (QVAR REDIHALER) 80 MCG/ACT inhaler Inhale 2 puffs into the lungs 2 times daily       calcium citrate-vitamin D (CALCIUM CITRATE + D3) 315-250 MG-UNIT TABS per tablet Take 2 tablets by mouth daily        cetirizine (ZYRTEC) 10 MG tablet Take 10 mg by mouth daily       digoxin (LANOXIN) 62.5 MCG tablet Take 1 tablet (62.5 mcg) by mouth daily 90 tablet 3     furosemide (LASIX) 20 MG tablet Take 1 tablet (20 mg) by mouth 2 times daily 90 tablet 3     gabapentin (NEURONTIN) 300 MG capsule Take 300 mg by mouth 3 times daily        hydroxychloroquine (PLAQUENIL) 200 MG tablet Take 200 mg by mouth daily        ipratropium (ATROVENT) 0.06 % nasal spray Spray 2 sprays into both nostrils 3 times daily        lisinopril (ZESTRIL) 20 MG tablet Take 0.5 tablets (10 mg) by mouth daily       loperamide (IMODIUM) 2 MG capsule Take 2 mg by mouth as needed for diarrhea       Probiotic Product (PROBIOTIC ACIDOPHILUS BIOBEADS) CAPS Take 1 capsule by mouth 2 times daily        riociguat (ADEMPAS) 0.5 MG tablet Take by mouth 3 times daily   Goal of 2.5 mg three times a day, with dose increase of 0.5 mg three times a day every 2 weeks (SBP must be >95 mmHg and no signs or symptoms of hypotension to increase)    PA in Process, RX to be sent upon approval       Treprostinil (REMODULIN IJ) Dose: 30 ng/kg/min  Vial concentration: 1 mg/mL  Final concentration: 60,000 ng/mL  Dosing weight: 56.3 kg   Pump rate: 41 mL/day  Per patient on 9/29/2020       warfarin ANTICOAGULANT (COUMADIN) 3 MG tablet Take 1 to 2 tablets daily as directed by the Anticoagulation Clinic. (Patient taking differently: Take 3 mg PO on Tues and Sat, and 4.5 mg all other days of the week) 170 tablet 1       ALLERGIES  Sulfa drugs      Self reported vitals:  Weight 118 lb  Vitals - Patient Reported  Systolic (Patient Reported): 127  Diastolic (Patient Reported): 67  Pulse (Patient Reported): 71  Pain Score: No Pain  (0)      Brief physical exam:  General: In no acute distress, upright and calm.  Eyes: No apparent redness or discharge.   Chest: No labored breathing, no cough during exam or audible wheezing.   Neuro: No obvious focal defects or tremors.   Psych: Alert and oriented. Does not appear anxious.     The rest of a comprehensive physical examination is deferred due to public [facility] emergency video visit restrictions.       Most recent labs:   Results for ELAINA ANDERSON (MRN [id number]) as of 10/27/2020 09:18   Ref. Range 10/15/2020 13:06   Sodium Latest Ref Range: 133 - 144 mmol/L 137   Potassium Latest Ref Range: 3.4 - 5.3 mmol/L 4.1   Chloride Latest Ref Range: 94 - 109 mmol/L 104   Carbon Dioxide Latest Ref Range: 20 - 32 mmol/L 26   Urea Nitrogen Latest Ref Range: 7 - 30 mg/dL 36 (H)   Creatinine Latest Ref Range: 0.52 - 1.04 mg/dL 1.10 (H)   GFR Estimate Latest Ref Range: >60 mL/min/1.73_m2 48 (L)   GFR Estimate If Black Latest Ref Range: >60 mL/min/1.73_m2 56 (L)   Calcium Latest Ref Range: 8.5 - 10.1 mg/dL 9.5   Anion Gap Latest Ref Range: 3 - 14 mmol/L 6   Albumin Latest Ref Range: 3.4 - 5.0 g/dL 3.1 (L)   Protein Total Latest Ref Range: 6.8 - 8.8 g/dL 7.4   Bilirubin Total Latest Ref Range: 0.2 - 1.3 mg/dL 0.5   Alkaline Phosphatase Latest Ref Range: 40 - 150 U/L 111   ALT Latest Ref Range: 0 - 50 U/L 24   AST Latest Ref Range: 0 - 45 U/L 35   N-Terminal Pro Bnp Latest Ref Range: 0 - 450 pg/mL 5,613 (H)           Primary PH cardiologist: Dr. Karolina Turcios      HPI:  Ms. Anderson is a pleasant 80 year old female with a PMHx including rheumatoid arthritis, hypertension, scoliosis, asthma, pulmonary MAC, breast cancer s/p chemotherapy with Adriamycin, Cytoxan, Taxol, and tamoxifen and status post left mastectomy. She also has a history of extensive bilateral pulmonary emboli and DVT in 1998 thought to be secondary to tamoxifen along with chemotherapy induced cardiomyopathy. 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 Although she had proximal disease, she was too high risk for surgical PTE. She is now on IV Remodulin therapy.     We have since been able to titrate her up to a goal of 30ng/kg/min complicated by transient side effects of leg pain, jaw pain, diarrhea and headaches. However, she reported overall improvement in her breathing.  Her weight has fluctuated, and to complicate matters slightly, she [place] her [place] and sometimes uses two different scales. Also, last month, she fell and tore a tendon in her knee which has been painful and swelling, due to her being on anticoagulation. Due to some renal concerns and lower BP, I recently lowered her lisinopril to 10mg (from 20mg) once daily.     In late Sept, she called our office to report a rapid weight gain of over almost 20 lbs and was directed to the ED. She was hospitalized from 9/29 to 10/6 for acute on chronic diastolic heart failure. She received an IV lasix gtt. Repeat echo showed EF 60-65%, moderate RV dilation, and small effusion. She also had a repeat RHC (RA 5, RV 90/8, mPA 42, LUIZ 2.55/1.76) which showed some mild improvement from March of this year. Pulmonary angiogram showed  of the distal right A2, proximal A7 segment, severe stenosis of the right A8 segment with slow filling distally. There was possible stenosis of the proximal and mid right A10 segment, and no significant angiographic disease of the left lung segments. She was told that BPA may be a consideration in the future. Her PTA lasix was increased to 20mg BID, and her digoxin was continued. Her stay was complicated by JOSE G on CKD but renal function improved with diuresis. Her Remodulin dose remained at 30ng/kg/min. This was reviewed with Dr. Turcios who recommended increasing Remodulin vs adding Adempas. We had a clinic visit about 1.5 weeks ago, to discuss this further. Ultimately, she " opted to trial addition of Adempas. In addition, due to some weight gain and swelling, along with increased NT-proBNP, we increased her lasix slightly to 40mg Am, 20mg PM.     Today, we are doing a virtual visit to follow up on her changes. She thinks overall she is pleased that her weight is down 1.5 lbs. No change in breathing overall, as she admits she is not very active due to the knee issues. She is supposed to start PT next week which she is looking forward to. BP is under good control at home (she reports systolic 120-130s mostly, with diastolics in the high 60s). No new dizziness or presyncope. She relays to me today that she was told Medicare has denied her Adempas, but is awaiting a formal letter with further information.      There were no new labs prior to today's visit.       CURRENT PULMONARY HYPERTENSION REGIMEN:     PAH Rx: Remodulin 30ng/kg/min, (awaiting Adempas approval)     Diuretics: Lasix 40mg AM, 20mg PM     Oxygen: None     Anticoagulation: warfarin  Indication: CTEPH        Assessment/Plan:     1. Chronic thromboembolic pulmonary hypertension.              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 Although she had proximal disease, she was felt to be too high risk for surgical PTE. She is currently on IV Remodulin therapy, and is at her current goal at 30ng/kg/min. She had a RHC with her recent hospitalization for heart failure. mPAP and RV function were slightly improved but she still has significant pulmonary hypertension. Dr. Turcios has recommended the addition of Adempas; she recently received a call this has been denied through medicare. Will await denial letter and send a formal appeal. Will keep her Remodulin as is for now, but she is aware that ultimately we may need to increase this in the future as well.              --She reports some mild improvement with increase in Lasix last visit. Will keep her on 40mg Am, 20mg PM for now, and I asked her to continue to weigh  daily at home. I reminded her to elevate her feet when she can. She has a repeat BMP planned in early November.               --I asked her to continue the lisinopril at 10mg daily at home. Her BP has been under good control on the lower dose. Continue to monitor renal function.              --Continue digoxin for RV support.               --Continue anticoagulation with warfarin for her CTEPH, though continue to monitor with her recent fall/leg injury. She currently follows at the INR clinic; INR goal has been lowered slightly to 2-2.5.               --Dr. Turcios will discuss with her at upcoming visit with BPA is an option for her.        Follow up plan: In early November with Dr. Turcios with repeat labs as already scheduled.         Video-Visit Details    Type of service:  Video Visit    Video Start Time: 0915  Video End Time: 0939    Originating Location (pt. Location): Home    Distant Location (provider location):  [facility]ART [facility]    Platform used for Video Visit: Darron Mracum PA-C  [facility]  Pager [phone]

## 2020-10-27 ENCOUNTER — VIRTUAL VISIT (OUTPATIENT)
Dept: CARDIOLOGY | Facility: CLINIC | Age: 80
End: 2020-10-27
Attending: PHYSICIAN ASSISTANT
Payer: COMMERCIAL

## 2020-10-27 DIAGNOSIS — R06.09 DYSPNEA ON EXERTION: ICD-10-CM

## 2020-10-27 DIAGNOSIS — R60.0 LOCALIZED EDEMA: ICD-10-CM

## 2020-10-27 DIAGNOSIS — R06.02 SOB (SHORTNESS OF BREATH): ICD-10-CM

## 2020-10-27 DIAGNOSIS — I27.24 CTEPH (CHRONIC THROMBOEMBOLIC PULMONARY HYPERTENSION) (H): Primary | ICD-10-CM

## 2020-10-27 DIAGNOSIS — I50.810 RVF (RIGHT VENTRICULAR FAILURE) (H): ICD-10-CM

## 2020-10-27 PROCEDURE — 99214 OFFICE O/P EST MOD 30 MIN: CPT | Mod: 95 | Performed by: PHYSICIAN ASSISTANT

## 2020-10-27 RX ORDER — FUROSEMIDE 20 MG
TABLET ORAL
Qty: 90 TABLET | Refills: 3 | COMMUNITY
Start: 2020-10-27 | End: 2020-11-09

## 2020-10-27 NOTE — LETTER
10/27/2020      RE: Karen Anderson  [address]  [place] [facility] [place] [address]       Dear Colleague,    Thank you for the opportunity to participate in the care of your patient, Karen Anderson, at the [facility] [place] [facility]. Please see a copy of my visit note below.    CARDIOLOGY  CLINIC VIDEO VISIT    Karen Anderson is a 80 year old female who is being evaluated via a billable video visit.      The patient has been notified of following:     This video visit will be conducted via a call between you and your physician/provider. We have found that certain health care needs can be provided without the need for an in-person physical exam.  This service lets us provide the care you need with a video conversation.  If a prescription is necessary we can send it directly to your pharmacy.  If lab work is needed we can place an order for that and you can then stop by our lab to have the test done at a later time.    Virtual visits are billed at different rates depending on your insurance coverage. During this emergency period, for some insurers they may be billed the same as an in-person visit.  Please reach out to your insurance provider with any questions.    If during the course of the call the physician/provider feels a video visit is not appropriate, you will not be charged for this service.      Physician has received verbal consent for a Video Visit from the patient? Yes    Patient would like the video invitation sent by: Send to e-mail at: [email or web address] Pt will [facility] do [facility]       I have reviewed and updated the patient's Past Medical History, Social History, Family History and Medication List.    MEDICATIONS:  Current Outpatient Medications   Medication Sig Dispense Refill     acetaminophen (TYLENOL) 325 MG tablet Take 325 mg by mouth every 6 hours as needed for mild pain       albuterol (PROAIR  HFA/PROVENTIL HFA/VENTOLIN HFA) 108 (90 Base) MCG/ACT inhaler Inhale 2 puffs into the lungs every 4 hours as needed for shortness of breath / dyspnea or wheezing        alendronate (FOSAMAX) 70 MG tablet Take 70 mg by mouth once a week       Ascorbic Acid (VITAMIN C) 500 MG CAPS Take 500 mg by mouth every morning        aspirin (ASA) 81 MG tablet Take 81 mg by mouth daily        beclomethasone HFA (QVAR REDIHALER) 80 MCG/ACT inhaler Inhale 2 puffs into the lungs 2 times daily       calcium citrate-vitamin D (CALCIUM CITRATE + D3) 315-250 MG-UNIT TABS per tablet Take 2 tablets by mouth daily        cetirizine (ZYRTEC) 10 MG tablet Take 10 mg by mouth daily       digoxin (LANOXIN) 62.5 MCG tablet Take 1 tablet (62.5 mcg) by mouth daily 90 tablet 3     furosemide (LASIX) 20 MG tablet Take 1 tablet (20 mg) by mouth 2 times daily 90 tablet 3     gabapentin (NEURONTIN) 300 MG capsule Take 300 mg by mouth 3 times daily        hydroxychloroquine (PLAQUENIL) 200 MG tablet Take 200 mg by mouth daily        ipratropium (ATROVENT) 0.06 % nasal spray Spray 2 sprays into both nostrils 3 times daily        lisinopril (ZESTRIL) 20 MG tablet Take 0.5 tablets (10 mg) by mouth daily       loperamide (IMODIUM) 2 MG capsule Take 2 mg by mouth as needed for diarrhea       Probiotic Product (PROBIOTIC ACIDOPHILUS BIOBEADS) CAPS Take 1 capsule by mouth 2 times daily        riociguat (ADEMPAS) 0.5 MG tablet Take by mouth 3 times daily   Goal of 2.5 mg three times a day, with dose increase of 0.5 mg three times a day every 2 weeks (SBP must be >95 mmHg and no signs or symptoms of hypotension to increase)    PA in Process, RX to be sent upon approval       Treprostinil (REMODULIN IJ) Dose: 30 ng/kg/min  Vial concentration: 1 mg/mL  Final concentration: 60,000 ng/mL  Dosing weight: 56.3 kg   Pump rate: 41 mL/day  Per patient on 9/29/2020       warfarin ANTICOAGULANT (COUMADIN) 3 MG tablet Take 1 to 2 tablets daily as directed by the  Anticoagulation Clinic. (Patient taking differently: Take 3 mg PO on Tues and Sat, and 4.5 mg all other days of the week) 170 tablet 1       ALLERGIES  Sulfa drugs      Self reported vitals:  Weight 118 lb  Vitals - Patient Reported  Systolic (Patient Reported): 127  Diastolic (Patient Reported): 67  Pulse (Patient Reported): 71  Pain Score: No Pain (0)      Brief physical exam:  General: In no acute distress, upright and calm.  Eyes: No apparent redness or discharge.   Chest: No labored breathing, no cough during exam or audible wheezing.   Neuro: No obvious focal defects or tremors.   Psych: Alert and oriented. Does not appear anxious.     The rest of a comprehensive physical examination is deferred due to public [facility] emergency video visit restrictions.       Most recent labs:   Results for ELAINA ANDERSON (MRN [id number]) as of 10/27/2020 09:18   Ref. Range 10/15/2020 13:06   Sodium Latest Ref Range: 133 - 144 mmol/L 137   Potassium Latest Ref Range: 3.4 - 5.3 mmol/L 4.1   Chloride Latest Ref Range: 94 - 109 mmol/L 104   Carbon Dioxide Latest Ref Range: 20 - 32 mmol/L 26   Urea Nitrogen Latest Ref Range: 7 - 30 mg/dL 36 (H)   Creatinine Latest Ref Range: 0.52 - 1.04 mg/dL 1.10 (H)   GFR Estimate Latest Ref Range: >60 mL/min/1.73_m2 48 (L)   GFR Estimate If Black Latest Ref Range: >60 mL/min/1.73_m2 56 (L)   Calcium Latest Ref Range: 8.5 - 10.1 mg/dL 9.5   Anion Gap Latest Ref Range: 3 - 14 mmol/L 6   Albumin Latest Ref Range: 3.4 - 5.0 g/dL 3.1 (L)   Protein Total Latest Ref Range: 6.8 - 8.8 g/dL 7.4   Bilirubin Total Latest Ref Range: 0.2 - 1.3 mg/dL 0.5   Alkaline Phosphatase Latest Ref Range: 40 - 150 U/L 111   ALT Latest Ref Range: 0 - 50 U/L 24   AST Latest Ref Range: 0 - 45 U/L 35   N-Terminal Pro Bnp Latest Ref Range: 0 - 450 pg/mL 5,613 (H)           Primary PH cardiologist: Dr. Karolina Turcios      HPI:  Ms. Anderson is a pleasant 80 year old female with a PMHx including rheumatoid arthritis,  "hypertension, scoliosis, asthma, pulmonary MAC, breast cancer s/p chemotherapy with Adriamycin, Cytoxan, Taxol, and tamoxifen and status post left mastectomy. She also has a history of extensive bilateral pulmonary emboli and DVT in 1998 thought to be secondary to tamoxifen along with chemotherapy induced cardiomyopathy. 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 Although she had proximal disease, she was too high risk for surgical PTE. She is now on IV Remodulin therapy.     We have since been able to titrate her up to a goal of 30ng/kg/min complicated by transient side effects of leg pain, jaw pain, diarrhea and headaches. However, she reported overall improvement in her breathing.  Her weight has fluctuated, and to complicate matters slightly, she [place] her time [place] and sometimes uses two different scales. Also, last month, she fell and tore a tendon in her knee which has been painful and swelling, due to her being on anticoagulation. Due to some renal concerns and lower BP, I recently lowered her lisinopril to 10mg (from 20mg) once daily.     In late Sept, she called our office to report a rapid weight gain of over almost 20 lbs and was directed to the ED. She was hospitalized from 9/29 to 10/6 for acute on chronic diastolic heart failure. She received an IV lasix gtt. Repeat echo showed EF 60-65%, moderate RV dilation, and small effusion. She also had a repeat RHC (RA 5, RV 90/8, mPA 42, LUIZ 2.55/1.76) which showed some mild improvement from March of this year. Pulmonary angiogram showed  of the distal right A2, proximal A7 segment, severe stenosis of the right A8 segment with slow filling distally. There was possible stenosis of the proximal and mid right A10 segment, and no significant angiographic disease of the left lung segments. She was told that BPA may be a consideration in the future. Her PTA lasix was " increased to 20mg BID, and her digoxin was continued. Her stay was complicated by JOSE G on CKD but renal function improved with diuresis. Her Remodulin dose remained at 30ng/kg/min. This was reviewed with Dr. Turcios who recommended increasing Remodulin vs adding Adempas. We had a clinic visit about 1.5 weeks ago, to discuss this further. Ultimately, she opted to trial addition of Adempas. In addition, due to some weight gain and swelling, along with increased NT-proBNP, we increased her lasix slightly to 40mg Am, 20mg PM.     Today, we are doing a virtual visit to follow up on her changes. She thinks overall she is pleased that her weight is down 1.5 lbs. No change in breathing overall, as she admits she is not very active due to the knee issues. She is supposed to start PT next week which she is looking forward to. BP is under good control at home (she reports systolic 120-130s mostly, with diastolics in the high 60s). No new dizziness or presyncope. She relays to me today that she was told Medicare has denied her Adempas, but is awaiting a formal letter with further information.      There were no new labs prior to today's visit.       CURRENT PULMONARY HYPERTENSION REGIMEN:     PAH Rx: Remodulin 30ng/kg/min, (awaiting Adempas approval)     Diuretics: Lasix 40mg AM, 20mg PM     Oxygen: None     Anticoagulation: warfarin  Indication: CTEPH        Assessment/Plan:     1. Chronic thromboembolic pulmonary hypertension.              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 Although she had proximal disease, she was felt to be too high risk for surgical PTE. She is currently on IV Remodulin therapy, and is at her current goal at 30ng/kg/min. She had a RHC with her recent hospitalization for heart failure. mPAP and RV function were slightly improved but she still has significant pulmonary hypertension. Dr. Turcios has recommended the addition of Adempas; she recently received a call this has been denied  through medicare. Will await denial letter and send a formal appeal. Will keep her Remodulin as is for now, but she is aware that ultimately we may need to increase this in the future as well.              --She reports some mild improvement with increase in Lasix last visit. Will keep her on 40mg Am, 20mg PM for now, and I asked her to continue to weigh daily at home. I reminded her to elevate her feet when she can. She has a repeat BMP planned in early November.               --I asked her to continue the lisinopril at 10mg daily at home. Her BP has been under good control on the lower dose. Continue to monitor renal function.              --Continue digoxin for RV support.               --Continue anticoagulation with warfarin for her CTEPH, though continue to monitor with her recent fall/leg injury. She currently follows at the INR clinic; INR goal has been lowered slightly to 2-2.5.               --Dr. Turcios will discuss with her at upcoming visit with BPA is an option for her.        Follow up plan: In early November with Dr. Turcios with repeat labs as already scheduled.         Video-Visit Details    Type of service:  Video Visit    Video Start Time: 0915  Video End Time: 0939    Originating Location (pt. Location): Home    Distant Location (provider location):  [facility]    Platform used for Video Visit: Darron Marcum PA-C  [facility]  Pager [phone]        Please do not hesitate to contact me if you have any questions/concerns.     Sincerely,     MGAEN Saeed

## 2020-10-27 NOTE — PATIENT INSTRUCTIONS
Thank you for visiting the Pulmonary Hypertension Clinic today.      Today we discussed:   No medication changes today.      Follow up Appointment Information:  With Dr. Turcios in November, with labs prior, as already scheduled.         Additional Instructions:    1. Continue staying active and eat a heart healthy, low sodium diet.     2. Please keep current list of medications with you at all times.     3. Remember to weigh yourself daily after voiding and write it down on a log. If you have gained/lost 2 pounds overnight or 5 pounds in a week contact us for medication adjustments or further instructions.    4. Please call us immediately if you have syncope (fainting or passing out), chest pain, worsening edema (swelling or weight gain), or general worsening in how you are feeling.     --------------------------------------------------------------------------------------------------------------    If you have questions or concerns please contact us at:    Elan Arnett RN, BSN   Lexus Balbuena (Schedule,Prior Auth)  Nurse Coordinator     Clinic   Pulmonary Hypertension   Pulmonary Hypertension  [facility]  [facility]  (Phone)[phone]    (Phone) [phone]        (Fax) [phone]      ** Please note that you will NOT receive a reminder call regarding your scheduled testing, reminder calls are for provider appointments only.  If you are scheduled for testing within the [employer] system you may receive a call regarding pre-registration for billing purposes only.**     --------------------------------------------------------------------------------------------------------------    Interested in joining a support group?    Pulmonary Hypertension Association  [email or web address].phassociation.org/  **Look at the Events Tab** They even have Support Groups that you can call into    [facility] Support Group  Second Saturday of the Month from 1-3  PM   Location: [address] [place] [address]  Leader: Jessenia Loo  Phone: [phone] or [phone]  Email: [email or web address]

## 2020-10-27 NOTE — TELEPHONE ENCOUNTER
Contacted pt who transferred the call to her , Santana. Santana stated that he was on the phone with [employer] and they were denied by [employer] due to their high income. Santana stated that they were told by [employer] so apply for assistance through [facility] [facility] Patient Assistance. Stated to pt that office will reach out to [employer] representative and request that someone reach out to the patient to discuss the [employer] Patient Assistance Program. Santana agreed to plan and had no further questions to this regard.    Santana stated that they were informed by Medicare that something was denied, but unsure of what. Stated that the Adempas was not denied and was approved by pt's part D. Stated to Don that the denial is probably for pt's Remodulin. If it is, requested Don send the document to the office for review. Santana stated he will fax it when received. Santana stated that they received the [employer] patient assistance application in the mail, completed it and returned it back to [facility] in [place].     **Sent a message to Louise at [facility] requesting that someone from the [employer] Patient Assistance program reach out to the patient.    Lexus Balbuena  Clinic   Pulmonary Hypertension  [facility]  (P) [phone]

## 2020-10-28 ENCOUNTER — TELEPHONE (OUTPATIENT)
Dept: SCHEDULING | Facility: CLINIC | Age: 80
End: 2020-10-28

## 2020-10-30 ENCOUNTER — MEDICAL CORRESPONDENCE (OUTPATIENT)
Dept: HEALTH INFORMATION MANAGEMENT | Facility: CLINIC | Age: 80
End: 2020-10-30

## 2020-10-30 ENCOUNTER — ANTICOAGULATION THERAPY VISIT (OUTPATIENT)
Dept: ANTICOAGULATION | Facility: CLINIC | Age: 80
End: 2020-10-30

## 2020-10-30 DIAGNOSIS — I27.24 CTEPH (CHRONIC THROMBOEMBOLIC PULMONARY HYPERTENSION) (H): ICD-10-CM

## 2020-10-30 DIAGNOSIS — I27.20 PULMONARY HYPERTENSION (H): ICD-10-CM

## 2020-10-30 DIAGNOSIS — Z79.01 LONG TERM CURRENT USE OF ANTICOAGULANT THERAPY: ICD-10-CM

## 2020-10-30 LAB
CAPILLARY BLOOD COLLECTION: NORMAL
INR PPP: 2.9 (ref 0.86–1.14)

## 2020-10-30 PROCEDURE — 85610 PROTHROMBIN TIME: CPT | Performed by: INTERNAL MEDICINE

## 2020-10-30 PROCEDURE — 36416 COLLJ CAPILLARY BLOOD SPEC: CPT | Performed by: INTERNAL MEDICINE

## 2020-10-30 NOTE — TELEPHONE ENCOUNTER
Completed and faxed [employer] prescriber portion form for Adempas to [employer] [facility] for review. Requested that the received patient portion be sent directly to patient to be signed and returned.    Lexus Balbuena  Clinic   Pulmonary Hypertension  [facility]  (P) [phone]

## 2020-10-30 NOTE — PROGRESS NOTES
ANTICOAGULATION FOLLOW-UP CLINIC VISIT    Patient Name:  Karen Anderson  Date:  10/30/2020  Contact Type:  Telephone    SUBJECTIVE:  Patient Findings     Comments:  Consulted with Safia Adorno RPH with dosing.        Clinical Outcomes     Negatives:  Major bleeding event, Thromboembolic event, Anticoagulation-related hospital admission, Anticoagulation-related ED visit, Anticoagulation-related fatality    Comments:  Consulted with Safia Adorno RPH with dosing.           OBJECTIVE    Recent labs: (last 7 days)     10/30/20  1129   INR 2.90*       ASSESSMENT / PLAN  INR assessment SUPRA    Recheck INR In: 10 DAYS    INR Location Clinic      Anticoagulation Summary  As of 10/30/2020    INR goal:  2.0-2.5   TTR:  82.4 % (6.4 mo)   INR used for dosin.90 (10/30/2020)   Warfarin maintenance plan:  4.5 mg (3 mg x 1.5) every Sun, Tue, Thu; 3 mg (3 mg x 1) all other days   Full warfarin instructions:  4.5 mg every Sun, Tue, Thu; 3 mg all other days   Weekly warfarin total:  25.5 mg   Plan last modified:  Tanisha Kimbrough, RN (10/30/2020)   Next INR check:  2020   Priority:  High   Target end date:  Indefinite    Indications    Pulmonary hypertension (H) [I27.20]  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 OK to speak with spouse, Don.  [facility] SPEAK IN TABLETS (Has 3mg Tabs) 10/23/20:  new goal range is 2 - 2.5  [facility] in [place] and will need to fax orders P [phone] F [phone]      Anticoagulation Care Providers     Provider Role Specialty Phone number    Osiris Luong MD Referring Cardiovascular Disease [phone]    Karolina Turcios MD Referring Cardiovascular Disease [phone]            See  the Encounter Report to view Anticoagulation Flowsheet and Dosing Calendar (Go to Encounters tab in chart review, and find the Anticoagulation Therapy Visit)    Spoke with patient. Gave them their lab results and new warfarin recommendation.  No changes in health, medication, or diet. No missed doses, no falls. No signs or symptoms of bleed or clotting.     Tanisha Kimbrough RN

## 2020-11-02 ENCOUNTER — TELEPHONE (OUTPATIENT)
Dept: CARDIOLOGY | Facility: CLINIC | Age: 80
End: 2020-11-02

## 2020-11-02 NOTE — TELEPHONE ENCOUNTER
"Per Louise acosta/ Gary:    \"I spoke with Karen this morning who also advised they d received the patient enrollment application for [employer] PAP from our office today. I requested they complete in its entirety and return.\"    Lexus Balbuena  Clinic   Pulmonary Hypertension  [facility]  (P) [phone]  "

## 2020-11-02 NOTE — TELEPHONE ENCOUNTER
Patient called asking if her follow up visit next Monday will include a Hgb. Advised that those labs will be drawn and that I can fax over the results to whatever clinic is requesting the information. Patient verbalized understanding and did not have any further questions. Kezia Arnett RN on 11/2/2020 at 2:48 PM

## 2020-11-05 ENCOUNTER — TELEPHONE (OUTPATIENT)
Dept: CARDIOLOGY | Facility: CLINIC | Age: 80
End: 2020-11-05

## 2020-11-05 ASSESSMENT — ENCOUNTER SYMPTOMS
HEMOPTYSIS: 0
POSTURAL DYSPNEA: 0
MUSCLE WEAKNESS: 1
DIZZINESS: 0
NECK PAIN: 0
JOINT SWELLING: 1
SEIZURES: 0
DYSPNEA ON EXERTION: 1
WEAKNESS: 1
SNORES LOUDLY: 0
TREMORS: 0
RECTAL PAIN: 0
BACK PAIN: 0
POOR WOUND HEALING: 1
WHEEZING: 0
SINUS PAIN: 0
LEG PAIN: 1
EXERCISE INTOLERANCE: 1
HEADACHES: 0
LIGHT-HEADEDNESS: 1
NECK MASS: 0
SHORTNESS OF BREATH: 1
TASTE DISTURBANCE: 0
TINGLING: 1
HOARSE VOICE: 0
HEARTBURN: 0
SPEECH CHANGE: 0
LOSS OF CONSCIOUSNESS: 0
MYALGIAS: 1
BLOATING: 1
SMELL DISTURBANCE: 0
ARTHRALGIAS: 1
COUGH DISTURBING SLEEP: 0
CONSTIPATION: 0
BOWEL INCONTINENCE: 0
SWOLLEN GLANDS: 0
BLOOD IN STOOL: 0
SORE THROAT: 0
SKIN CHANGES: 0
BRUISES/BLEEDS EASILY: 1
NAUSEA: 0
JAUNDICE: 0
SPUTUM PRODUCTION: 0
PARALYSIS: 0
MUSCLE CRAMPS: 0
MEMORY LOSS: 0
VOMITING: 0
COUGH: 0
NAIL CHANGES: 0
ORTHOPNEA: 0
SYNCOPE: 0
SLEEP DISTURBANCES DUE TO BREATHING: 0
NUMBNESS: 1
HYPERTENSION: 1
TROUBLE SWALLOWING: 0
SINUS CONGESTION: 1
HYPOTENSION: 0
DISTURBANCES IN COORDINATION: 0
DIARRHEA: 1
PALPITATIONS: 0
ABDOMINAL PAIN: 0
STIFFNESS: 1

## 2020-11-05 NOTE — TELEPHONE ENCOUNTER
Jaimie, OT and lymph therapist, called inquiring about current lymphedema therapy patient is receiving for her hematoma on L leg. Technique includes manual massage to remove fluid. Jaimie would like the okay for MD to preform this drainage technique next week.     LM with Jaimie, advising those techniques are okay to preform on patient. Left my direct number for questions or concerns. Kezia Arnett RN on 11/5/2020 at 9:45 AM

## 2020-11-05 NOTE — TELEPHONE ENCOUNTER
Received confirmation from [employer] that pt has been approved for Adempas from 11/3/2020 to 12/30/2020.     Lexus Balbuena  Clinic   Pulmonary Hypertension  [facility]  (P) [phone]

## 2020-11-09 ENCOUNTER — OFFICE VISIT (OUTPATIENT)
Dept: CARDIOLOGY | Facility: CLINIC | Age: 80
End: 2020-11-09
Attending: INTERNAL MEDICINE
Payer: COMMERCIAL

## 2020-11-09 ENCOUNTER — ANTICOAGULATION THERAPY VISIT (OUTPATIENT)
Dept: ANTICOAGULATION | Facility: CLINIC | Age: 80
End: 2020-11-09

## 2020-11-09 VITALS
BODY MASS INDEX: 23.69 KG/M2 | SYSTOLIC BLOOD PRESSURE: 116 MMHG | WEIGHT: 117.5 LBS | OXYGEN SATURATION: 92 % | DIASTOLIC BLOOD PRESSURE: 71 MMHG | HEART RATE: 68 BPM | HEIGHT: 59 IN

## 2020-11-09 DIAGNOSIS — I27.20 PULMONARY HYPERTENSION (H): ICD-10-CM

## 2020-11-09 DIAGNOSIS — Z79.01 LONG TERM CURRENT USE OF ANTICOAGULANT THERAPY: ICD-10-CM

## 2020-11-09 DIAGNOSIS — R60.0 LOCALIZED EDEMA: ICD-10-CM

## 2020-11-09 DIAGNOSIS — I27.0 PRIMARY PULMONARY HYPERTENSION (H): ICD-10-CM

## 2020-11-09 DIAGNOSIS — I27.24 CTEPH (CHRONIC THROMBOEMBOLIC PULMONARY HYPERTENSION) (H): ICD-10-CM

## 2020-11-09 DIAGNOSIS — I50.89 OTHER HEART FAILURE (H): ICD-10-CM

## 2020-11-09 DIAGNOSIS — I27.20 PULMONARY HYPERTENSION (H): Primary | ICD-10-CM

## 2020-11-09 DIAGNOSIS — R06.02 SOB (SHORTNESS OF BREATH): ICD-10-CM

## 2020-11-09 LAB
ALBUMIN SERPL-MCNC: 3.1 G/DL (ref 3.4–5)
ALP SERPL-CCNC: 108 U/L (ref 40–150)
ALT SERPL W P-5'-P-CCNC: 24 U/L (ref 0–50)
ANION GAP SERPL CALCULATED.3IONS-SCNC: 5 MMOL/L (ref 3–14)
AST SERPL W P-5'-P-CCNC: 25 U/L (ref 0–45)
BILIRUB SERPL-MCNC: 0.5 MG/DL (ref 0.2–1.3)
BUN SERPL-MCNC: 39 MG/DL (ref 7–30)
CALCIUM SERPL-MCNC: 9 MG/DL (ref 8.5–10.1)
CHLORIDE SERPL-SCNC: 106 MMOL/L (ref 94–109)
CO2 SERPL-SCNC: 28 MMOL/L (ref 20–32)
CREAT SERPL-MCNC: 1.43 MG/DL (ref 0.52–1.04)
ERYTHROCYTE [DISTWIDTH] IN BLOOD BY AUTOMATED COUNT: 15.5 % (ref 10–15)
GFR SERPL CREATININE-BSD FRML MDRD: 35 ML/MIN/{1.73_M2}
GLUCOSE SERPL-MCNC: 82 MG/DL (ref 70–99)
HCT VFR BLD AUTO: 34.3 % (ref 35–47)
HGB BLD-MCNC: 10.3 G/DL (ref 11.7–15.7)
INR PPP: 2.62 (ref 0.86–1.14)
MCH RBC QN AUTO: 29.7 PG (ref 26.5–33)
MCHC RBC AUTO-ENTMCNC: 30 G/DL (ref 31.5–36.5)
MCV RBC AUTO: 99 FL (ref 78–100)
NT-PROBNP SERPL-MCNC: 6581 PG/ML (ref 0–450)
PLATELET # BLD AUTO: 226 10E9/L (ref 150–450)
POTASSIUM SERPL-SCNC: 4.2 MMOL/L (ref 3.4–5.3)
PROT SERPL-MCNC: 6.9 G/DL (ref 6.8–8.8)
RBC # BLD AUTO: 3.47 10E12/L (ref 3.8–5.2)
SODIUM SERPL-SCNC: 140 MMOL/L (ref 133–144)
WBC # BLD AUTO: 4.2 10E9/L (ref 4–11)

## 2020-11-09 PROCEDURE — 36415 COLL VENOUS BLD VENIPUNCTURE: CPT | Performed by: PATHOLOGY

## 2020-11-09 PROCEDURE — 80053 COMPREHEN METABOLIC PANEL: CPT | Performed by: PATHOLOGY

## 2020-11-09 PROCEDURE — 99215 OFFICE O/P EST HI 40 MIN: CPT | Performed by: INTERNAL MEDICINE

## 2020-11-09 PROCEDURE — 85027 COMPLETE CBC AUTOMATED: CPT | Performed by: PATHOLOGY

## 2020-11-09 PROCEDURE — G0463 HOSPITAL OUTPT CLINIC VISIT: HCPCS

## 2020-11-09 PROCEDURE — 83880 ASSAY OF NATRIURETIC PEPTIDE: CPT | Performed by: PATHOLOGY

## 2020-11-09 PROCEDURE — 85610 PROTHROMBIN TIME: CPT | Performed by: PATHOLOGY

## 2020-11-09 RX ORDER — FUROSEMIDE 20 MG
40 TABLET ORAL 2 TIMES DAILY
Qty: 360 TABLET | Refills: 3 | Status: SHIPPED | OUTPATIENT
Start: 2020-11-09 | End: 2020-12-08

## 2020-11-09 ASSESSMENT — PAIN SCALES - GENERAL: PAINLEVEL: NO PAIN (0)

## 2020-11-09 ASSESSMENT — MIFFLIN-ST. JEOR: SCORE: 918.61

## 2020-11-09 NOTE — PROGRESS NOTES
ANTICOAGULATION FOLLOW-UP CLINIC VISIT    Patient Name:  Karen Anderson  Date:  2020  Contact Type:  Telephone    SUBJECTIVE:  Patient Findings     Comments:  Left message for patient on home voicemail.        Clinical Outcomes     Comments:  Left message for patient on home voicemail.           OBJECTIVE    Recent labs: (last 7 days)     20  0930   INR 2.62*       ASSESSMENT / PLAN  INR assessment SUPRA    Recheck INR In: 2 WEEKS    INR Location Clinic      Anticoagulation Summary  As of 2020    INR goal:  2.0-2.5   TTR:  78.3 % (6.7 mo)   INR used for dosin.62 (2020)   Warfarin maintenance plan:  4.5 mg (3 mg x 1.5) every Sun, Tue, Thu; 3 mg (3 mg x 1) all other days   Full warfarin instructions:  4.5 mg every Sun, Tue, Thu; 3 mg all other days   Weekly warfarin total:  25.5 mg   No change documented:  Jaylan Jacobo RN   Plan last modified:  Tnaisha Kimbrough RN (10/30/2020)   Next INR check:  2020   Priority:  High   Target end date:  Indefinite    Indications    Pulmonary hypertension (H) [I27.20]  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 OK to speak with spouse, Don.  [facility] SPEAK IN TABLETS (Has 3mg Tabs) 10/23/20:  new goal range is 2 - 2.5  Summer's in [place] and will need to fax orders P [phone] F [phone]      Anticoagulation Care Providers     Provider Role Specialty Phone number    Osiris Luong MD Referring Cardiovascular Disease [phone]    Karolina Turcios MD Referring Cardiovascular Disease [phone]            See the Encounter Report to view Anticoagulation Flowsheet and Dosing Calendar (Go to Encounters tab in chart review, and find the  Anticoagulation Therapy Visit)    INR/CFX/F2 RESULT:INR result is 2.62    ASSESSMENT:Left message for patient with results and dosing recommendations. Asked patient to call back to report any missed doses, falls, signs and symptoms of bleeding or clotting, any changes in health, medication, or diet. Asked patient to call back with any questions or concerns.    DOSING ADJUSTMENT:continue maintenance dose    NEXT INR/FACTOR X OR FACTOR II:11/23    PROTOCOL FOLLOWED:goal 2-2.5    Jaylan Jacobo RN

## 2020-11-09 NOTE — LETTER
2020      RE: Karen Anderson  [address]  [facility] [address]       Dear Colleague,    Thank you for the opportunity to participate in the care of your patient, Karen Anderson, at the [facility] [place] [facility]. Please see a copy of my visit note below.    Service Date: 2020    Shaneka Forde MD  [address]  [place] [address]    Clint Abernathy MD  [address]  [place] [address]    Noam Jones MD  [address]  [place] [address]    Carlos Carter MD  [address]  [place] [address]      RE:  Karen Anderson   MRN:  [id number]  :  1940      Dear Josemanuel Ha Skemp, and Raul:    We had the pleasure of seeing Karen Anderson at the [facility]pert[facility] for follow-up. As you know, Ms. Anderson is a 79-year-old female with the following past medical history:    1.  Asthma.  2.  Pulmonary MAC.  3.  Breast cancer status post chemotherapy with Adriamycin, Cytoxan, Taxol, and tamoxifen and status post left mastectomy.  4.  Extensive bilateral pulmonary emboli and DVT in  thought to be secondary to tamoxifen.  5.  Chemotherapy-induced cardiomyopathy.  6.  Rheumatoid arthritis.  7.  Hypertension.  8.  Scoliosis.    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 Although she had proximal disease, she was too high risk for surgical PTE. She is currently on IV Remodulin therapy at 30 ng/kg/min.    She was hospitalized in early October after she had a mechanical fall.  She had significant hematoma involving her left knee, upper thigh and her leg.  She also was significantly volume overloaded has her diuretics dose were decreased in the setting of her fall and hematoma.  She underwent aggressive intravenous diuresis.  After diuresis, she had repeat right heart catheterization and  echocardiogram while as an inpatient  Her repeat right heart catheterization showed improvement in her cardiac output and PVR with no significant change in her mean PA pressure.  She continued to have moderate RV dysfunction and dilatation.    We discussed the option of increasing her Remodulin versus starting Adempas.  Given side effects of the Remodulin she chose to start her Adempas therapy.  Unfortunately this has not been started yet due to her high co-pay.  She just got a jean-claude approved for her co-pay.  She is expecting shipment of Adempas and this week.    Overall she has been having mild worsening of her shortness of breath.  She gets winded when she climbs 1 flight of stairs.  I would currently characterize as functional class III.  She is more ambulatory now after her fall.  She is working with a physical therapist twice a day as an outpatient.  Her left knee hematoma has improved but still there.  She continues to have lower extremity swelling bilaterally.  She has no exertional presyncope or syncope.  No exertional chest pain or chest pressure.  No significant IV remodeling related side effects.  She has not had any further hospitalizations or ER visits.  No bleeding complications.    PAST MEDICAL HISTORY:  Past Medical History:   Diagnosis Date     Asthma      Breast cancer (H)      Hypertension      Mycobacterium avium complex (H)      Pulmonary embolism (H)      Pulmonary hypertension (H)      Rheumatoid arthritis (H)      Scoliosis        PAST SURGICAL HISTORY:  Past Surgical History:   Procedure Laterality Date     CV PULMONARY ANGIOGRAM N/A 10/5/2020     CV RIGHT HEART CATH N/A 03/04/2020     CV RIGHT HEART CATH N/A 10/5/2020     IR CVC TUNNEL PLACEMENT > 5 YRS OF AGE  03/09/2020     MASTECTOMY Left      PICC SINGLE LUMEN PLACEMENT Right 03/04/2020     PICC TRIPLE LUMEN PLACEMENT Right 10/02/2020       CURRENT MEDICATIONS:  Current Outpatient Medications   Medication Sig     acetaminophen (TYLENOL)  325 MG tablet Take 325 mg by mouth every 6 hours as needed for mild pain     albuterol (PROAIR HFA/PROVENTIL HFA/VENTOLIN HFA) 108 (90 Base) MCG/ACT inhaler Inhale 2 puffs into the lungs every 4 hours as needed for shortness of breath / dyspnea or wheezing      alendronate (FOSAMAX) 70 MG tablet Take 70 mg by mouth once a week     Ascorbic Acid (VITAMIN C) 500 MG CAPS Take 500 mg by mouth every morning      aspirin (ASA) 81 MG tablet Take 81 mg by mouth daily      beclomethasone HFA (QVAR REDIHALER) 80 MCG/ACT inhaler Inhale 2 puffs into the lungs 2 times daily     calcium citrate-vitamin D (CALCIUM CITRATE + D3) 315-250 MG-UNIT TABS per tablet Take 2 tablets by mouth daily      cetirizine (ZYRTEC) 10 MG tablet Take 10 mg by mouth daily     digoxin (LANOXIN) 62.5 MCG tablet Take 1 tablet (62.5 mcg) by mouth daily     furosemide (LASIX) 20 MG tablet Take 2 tablets (40 mg) by mouth every morning AND 1 tablet (20 mg) daily (with lunch).     gabapentin (NEURONTIN) 300 MG capsule Take 300 mg by mouth 3 times daily      hydroxychloroquine (PLAQUENIL) 200 MG tablet Take 200 mg by mouth daily      ipratropium (ATROVENT) 0.06 % nasal spray Spray 2 sprays into both nostrils 3 times daily      lisinopril (ZESTRIL) 20 MG tablet Take 0.5 tablets (10 mg) by mouth daily     loperamide (IMODIUM) 2 MG capsule Take 2 mg by mouth as needed for diarrhea     Probiotic Product (PROBIOTIC ACIDOPHILUS [employer]) CAPS Take 1 capsule by mouth 2 times daily      Treprostinil (REMODULIN IJ) Dose: 30 ng/kg/min  Vial concentration: 1 mg/mL  Final concentration: 60,000 ng/mL  Dosing weight: 56.3 kg   Pump rate: 41 mL/day  Per patient on 9/29/2020     warfarin ANTICOAGULANT (COUMADIN) 3 MG tablet Take 1 to 2 tablets daily as directed by the Anticoagulation Clinic. (Patient taking differently: Take 3 mg PO on Tues and Sat, and 4.5 mg all other days of the week)     riociguat (ADEMPAS) 0.5 MG tablet Take by mouth 3 times daily Please make sure you  "are completing your monthly mandatory labs.     Goal of 2.5 mg three times a day, with dose increase of 0.5 mg three times a day every 2 weeks (SBP must be >95 mmHg and no signs or symptoms of hypotension to increase)    PA in Process, RX to be sent upon approval     No current facility-administered medications for this visit.        ROS:   10 point ROS negative except as discussed in above HPI.    EXAM:  /71 (BP Location: Right arm, Patient Position: Sitting, Cuff Size: Adult Regular)   Pulse 68   Ht 1.499 m (4' 11\")   Wt 53.3 kg (117 lb 8 oz)   SpO2 92%   BMI 23.73 kg/m    She was in no apparent distress.  She was comfortable.    She was awake, alert, oriented x3.  She had no pallor, cyanosis or jaundice.  Her Goldberg catheter site was clean and dry.  Her neck exam revealed elevated jugular venous distention at 10 cm above manubrium sternal angle.  Her carotids were 1+ bilaterally.  Cardiac auscultation revealed normal S1 and loud P2 with no murmur rub or gallop.  Auscultation of her lungs revealed equal air entry on both sides with no added sounds.  Her abdomen was soft with no wounds and no tenderness no rigidity no guarding.  She had 1+ lower extremity edema bilaterally.  Her left knee hematoma appears to be much smaller in size.    Labs:  CBC RESULTS:   Recent Labs   Lab Test 11/09/20  0930   WBC 4.2   RBC 3.47*   HGB 10.3*   HCT 34.3*   MCV 99   MCH 29.7   MCHC 30.0*   RDW 15.5*        Recent Labs   Lab Test 11/09/20 0930 10/15/20  1306    137   POTASSIUM 4.2 4.1   CHLORIDE 106 104   CO2 28 26   ANIONGAP 5 6   GLC 82 80   BUN 39* 36*   CR 1.43* 1.10*   ESTEFANÍA 9.0 9.5     Liver Function Studies -   Recent Labs   Lab Test 11/09/20  0930   PROTTOTAL 6.9   ALBUMIN 3.1*   BILITOTAL 0.5   ALKPHOS 108   AST 25   ALT 24         No results found for: NTBNPI  Lab Results   Component Value Date    NTBNP 6,581 (H) 11/09/2020       No results found for: NTBNPI  Lab Results   Component Value Date    " NTBNP 6,581 (H) 11/09/2020       Echo (09/2020)  Global and regional left ventricular function is normal with an EF of 60-65%.  Moderate right ventricular dilation is present.  Global right ventricular function is moderately reduced.  Right ventricular systolic pressure is 82mmHg above the right atrial pressure.  Ascending aorta 3.8 cm.  Ascending aortic index 25.3mm/m2,severe dilation for BSA of 1.5m2.  IVC diameter >2.1 cm collapsing <50% with sniff suggests a high RA pressure estimated at 15 mmHg or greater.  Small circumferential pericardial effusion is present without any hemodynamic significance.  There has been no change.    6MWT (03/2020  He had a 6-minute walk test where she walked only 164 m.  On 2 L of oxygen she dropped to 77%.    RHC (03/2020)  Right heart catheterization revealed an RA pressure of 10 mmHg, RV of 98 over 11 mmHg, PA of 98/30 with a mean of 52 mmHg, pulmonary capillary wedge pressure of 15 mmHg, PA saturation of 54.7%, thermodilution cardiac output of 2.3 with an index of 1.4, and PVR 16.08 MARIN.    RHC (09/2020)  RA 5  RV 90/8  PA 90/20 (42)  PCWP 11  PA% 59%  CO/CI 3/2.1  PVR 10 MARIN      Assessment and Plan:     Ms. Karen Anderson is a 80-year-old female with chronic thromboembolic pulmonary hypertension who is currently on IV Remodulin therapy.  She returns today for follow-up. She is currently on 30 ng/kg/min of IV Remodulin therapy.    She is currently functional class III.  She is mildly volume overloaded.  Her renal function is slightly worse in the setting of volume overload..    She will start Adempas this week.  We will start her at 0.5 mg 3 times a day and increase it every 2 weeks at her maximum goal of 2.5 mg 3 times a day.  We will continue her on IV treprostinil at 30 ng/kg/min.  I took the liberty and increase her Lasix to 40 mg twice a day.  I hope you are renal function improved with diuresis.  Her serum potassium level is normal.  We will continue to maintain a low  INR goal of 2-2.5 given her recent left knee hematoma.  She is on digoxin for right ventricular support.    I discussed the option of pulmonary balloon angioplasty.  Her most recent pulmonary angiogram showed lesions that can be treated with pulmonary balloon angioplasty especially in her right lower lobe.  We discussed 1% risk of fatal complication including mechanical reperfusion edema requiring mechanical ventilation and death.  We also discussed 5 to 10% risk of hemoptysis.  Her risk is slightly higher given her age and high PA pressure.  She would like to try pulmonary balloon angioplasty if needed after maximizing Adempas therapy which I think is reasonable.      We also discussed the option of increasing her remodel however she would like to hold off on this given the side effects.    I have recommended her to return to clinic next week for a virtual visit with our nurse practitioner Dieter Marcum with repeat labs.  She will return to see me in 4 weeks.  She will call us in the interim should any further worsening symptoms.    It was a pleasure seeing Karen Anderson at the [facility] Pulmonary Hypertension Clinic.  Please contact us with any questions or concerns that you may have.    Sincerely,    Karolina Turcios MD   Center for Pulmonary Hypertension  Section of Advanced Heart Failure   Cardiovascular Division  [facility]   [phone]

## 2020-11-09 NOTE — NURSING NOTE
Chief Complaint   Patient presents with     Follow Up     1 Month Follow Up       Vitals were taken and medications were reconciled.     Carmelita Serrano  9:55 AM]

## 2020-11-09 NOTE — LETTER
2020      RE: Karen Anderson  [address]  [facility] [address]       Service Date: 2020    Shaneka Forde MD  [address]  [place] [address]    Clint Abernathy MD  [address]  [place] [address]    Noam Jones MD  [address]  [facility] [place] [address]    Carlos Carter MD  [address]  [place] [address]      RE:  Karen Anderson   MRN:  [id number]  :  1940      Dear Josemanuel Ha Skemp, and Raul:    We had the pleasure of seeing Karen Anderson at the [facility] for follow-up. As you know, Ms. Anderson is a 79-year-old female with the following past medical history:    1.  Asthma.  2.  Pulmonary MAC.  3.  Breast cancer status post chemotherapy with Adriamycin, Cytoxan, Taxol, and tamoxifen and status post left mastectomy.  4.  Extensive bilateral pulmonary emboli and DVT in  thought to be secondary to tamoxifen.  5.  Chemotherapy-induced cardiomyopathy.  6.  Rheumatoid arthritis.  7.  Hypertension.  8.  Scoliosis.    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 Although she had proximal disease, she was too high risk for surgical PTE. She is currently on IV Remodulin therapy at 30 ng/kg/min.    She was hospitalized in early October after she had a mechanical fall.  She had significant hematoma involving her left knee, upper thigh and her leg.  She also was significantly volume overloaded has her diuretics dose were decreased in the setting of her fall and hematoma.  She underwent aggressive intravenous diuresis.  After diuresis, she had repeat right heart catheterization and echocardiogram while as an inpatient  Her repeat right heart catheterization showed improvement in her cardiac output and PVR with no significant change in her mean PA pressure.  She continued to have moderate RV dysfunction and dilatation.    We  discussed the option of increasing her Remodulin versus starting Adempas.  Given side effects of the Remodulin she chose to start her Adempas therapy.  Unfortunately this has not been started yet due to her high co-pay.  She just got a jean-claude approved for her co-pay.  She is expecting shipment of Adempas and this week.    Overall she has been having mild worsening of her shortness of breath.  She gets winded when she climbs 1 flight of stairs.  I would currently characterize as functional class III.  She is more ambulatory now after her fall.  She is working with a physical therapist twice a day as an outpatient.  Her left knee hematoma has improved but still there.  She continues to have lower extremity swelling bilaterally.  She has no exertional presyncope or syncope.  No exertional chest pain or chest pressure.  No significant IV remodeling related side effects.  She has not had any further hospitalizations or ER visits.  No bleeding complications.    PAST MEDICAL HISTORY:  Past Medical History:   Diagnosis Date     Asthma      Breast cancer (H)      Hypertension      Mycobacterium avium complex (H)      Pulmonary embolism (H)      Pulmonary hypertension (H)      Rheumatoid arthritis (H)      Scoliosis        PAST SURGICAL HISTORY:  Past Surgical History:   Procedure Laterality Date     CV PULMONARY ANGIOGRAM N/A 10/5/2020     CV RIGHT HEART CATH N/A 03/04/2020     CV RIGHT HEART CATH N/A 10/5/2020     IR CVC TUNNEL PLACEMENT > 5 YRS OF AGE  03/09/2020     MASTECTOMY Left      PICC SINGLE LUMEN PLACEMENT Right 03/04/2020     PICC TRIPLE LUMEN PLACEMENT Right 10/02/2020       CURRENT MEDICATIONS:  Current Outpatient Medications   Medication Sig     acetaminophen (TYLENOL) 325 MG tablet Take 325 mg by mouth every 6 hours as needed for mild pain     albuterol (PROAIR HFA/PROVENTIL HFA/VENTOLIN HFA) 108 (90 Base) MCG/ACT inhaler Inhale 2 puffs into the lungs every 4 hours as needed for shortness of breath / dyspnea  or wheezing      alendronate (FOSAMAX) 70 MG tablet Take 70 mg by mouth once a week     Ascorbic Acid (VITAMIN C) 500 MG CAPS Take 500 mg by mouth every morning      aspirin (ASA) 81 MG tablet Take 81 mg by mouth daily      beclomethasone HFA (QVAR REDIHALER) 80 MCG/ACT inhaler Inhale 2 puffs into the lungs 2 times daily     calcium citrate-vitamin D (CALCIUM CITRATE + D3) 315-250 MG-UNIT TABS per tablet Take 2 tablets by mouth daily      cetirizine (ZYRTEC) 10 MG tablet Take 10 mg by mouth daily     digoxin (LANOXIN) 62.5 MCG tablet Take 1 tablet (62.5 mcg) by mouth daily     furosemide (LASIX) 20 MG tablet Take 2 tablets (40 mg) by mouth every morning AND 1 tablet (20 mg) daily (with lunch).     gabapentin (NEURONTIN) 300 MG capsule Take 300 mg by mouth 3 times daily      hydroxychloroquine (PLAQUENIL) 200 MG tablet Take 200 mg by mouth daily      ipratropium (ATROVENT) 0.06 % nasal spray Spray 2 sprays into both nostrils 3 times daily      lisinopril (ZESTRIL) 20 MG tablet Take 0.5 tablets (10 mg) by mouth daily     loperamide (IMODIUM) 2 MG capsule Take 2 mg by mouth as needed for diarrhea     Probiotic Product (PROBIOTIC ACIDOPHILUS Replenish) CAPS Take 1 capsule by mouth 2 times daily      Treprostinil (REMODULIN IJ) Dose: 30 ng/kg/min  Vial concentration: 1 mg/mL  Final concentration: 60,000 ng/mL  Dosing weight: 56.3 kg   Pump rate: 41 mL/day  Per patient on 9/29/2020     warfarin ANTICOAGULANT (COUMADIN) 3 MG tablet Take 1 to 2 tablets daily as directed by the Anticoagulation Clinic. (Patient taking differently: Take 3 mg PO on Tues and Sat, and 4.5 mg all other days of the week)     riociguat (ADEMPAS) 0.5 MG tablet Take by mouth 3 times daily Please make sure you are completing your monthly mandatory labs. ***    Goal of 2.5 mg three times a day, with dose increase of 0.5 mg three times a day every 2 weeks (SBP must be >95 mmHg and no signs or symptoms of hypotension to increase)    PA in Process, RX to be  "sent upon approval     No current facility-administered medications for this visit.        ROS:   10 point ROS negative except as discussed in above HPI.    EXAM:  /71 (BP Location: Right arm, Patient Position: Sitting, Cuff Size: Adult Regular)   Pulse 68   Ht 1.499 m (4' 11\")   Wt 53.3 kg (117 lb 8 oz)   SpO2 92%   BMI 23.73 kg/m    She was in no apparent distress.  She was comfortable.    She was awake, alert, oriented x3.  She had no pallor, cyanosis or jaundice.  Her Goldberg catheter site was clean and dry.  Her neck exam revealed elevated jugular venous distention at 10 cm above manubrium sternal angle.  Her carotids were 1+ bilaterally.  Cardiac auscultation revealed normal S1 and loud P2 with no murmur rub or gallop.  Auscultation of her lungs revealed equal air entry on both sides with no added sounds.  Her abdomen was soft with no wounds and no tenderness no rigidity no guarding.  She had 1+ lower extremity edema bilaterally.  Her left knee hematoma appears to be much smaller in size.    Labs:  CBC RESULTS:   Recent Labs   Lab Test 11/09/20  0930   WBC 4.2   RBC 3.47*   HGB 10.3*   HCT 34.3*   MCV 99   MCH 29.7   MCHC 30.0*   RDW 15.5*        Recent Labs   Lab Test 11/09/20  0930 10/15/20  1306    137   POTASSIUM 4.2 4.1   CHLORIDE 106 104   CO2 28 26   ANIONGAP 5 6   GLC 82 80   BUN 39* 36*   CR 1.43* 1.10*   ESTEFANÍA 9.0 9.5     Liver Function Studies -   Recent Labs   Lab Test 11/09/20  0930   PROTTOTAL 6.9   ALBUMIN 3.1*   BILITOTAL 0.5   ALKPHOS 108   AST 25   ALT 24         No results found for: NTBNPI  Lab Results   Component Value Date    NTBNP 6,581 (H) 11/09/2020       No results found for: NTBNPI  Lab Results   Component Value Date    NTBNP 6,581 (H) 11/09/2020       Echo (09/2020)  Global and regional left ventricular function is normal with an EF of 60-65%.  Moderate right ventricular dilation is present.  Global right ventricular function is moderately reduced.  Right " ventricular systolic pressure is 82mmHg above the right atrial pressure.  Ascending aorta 3.8 cm.  Ascending aortic index 25.3mm/m2,severe dilation for BSA of 1.5m2.  IVC diameter >2.1 cm collapsing <50% with sniff suggests a high RA pressure estimated at 15 mmHg or greater.  Small circumferential pericardial effusion is present without any hemodynamic significance.  There has been no change.    6MWT (03/2020  He had a 6-minute walk test where she walked only 164 m.  On 2 L of oxygen she dropped to 77%.    RHC (03/2020)  Right heart catheterization revealed an RA pressure of 10 mmHg, RV of 98 over 11 mmHg, PA of 98/30 with a mean of 52 mmHg, pulmonary capillary wedge pressure of 15 mmHg, PA saturation of 54.7%, thermodilution cardiac output of 2.3 with an index of 1.4, and PVR 16.08 MARIN.    RHC (09/2020)  RA 5  RV 90/8  PA 90/20 (42)  PCWP 11  PA% 59%  CO/CI 3/2.1  PVR 10 MARIN      Assessment and Plan:     Ms. Karen Anderson is a 80-year-old female with chronic thromboembolic pulmonary hypertension who is currently on IV Remodulin therapy.  She returns today for follow-up. She is currently on 30 ng/kg/min of IV Remodulin therapy.    She is currently functional class III.  She is mildly volume overloaded.  Her renal function is slightly worse in the setting of volume overload..    She will start Adempas this week.  We will start her at 0.5 mg 3 times a day and increase it every 2 weeks at her maximum goal of 2.5 mg 3 times a day.  We will continue her on IV treprostinil at 30 ng/kg/min.  I took the liberty and increase her Lasix to 40 mg twice a day.  I hope you are renal function improved with diuresis.  Her serum potassium level is normal.  We will continue to maintain a low INR goal of 2-2.5 given her recent left knee hematoma.  She is on digoxin for right ventricular support.    I discussed the option of pulmonary balloon angioplasty.  Her most recent pulmonary angiogram showed lesions that can be treated with  pulmonary balloon angioplasty especially in her right lower lobe.  We discussed 1% risk of fatal complication including mechanical reperfusion edema requiring mechanical ventilation and death.  We also discussed 5 to 10% risk of hemoptysis.  Her risk is slightly higher given her age and high PA pressure.  She would like to try pulmonary balloon angioplasty if needed after maximizing Adempas therapy which I think is reasonable.      We also discussed the option of increasing her remodel however she would like to hold off on this given the side effects.    I have recommended her to return to clinic next week for a virtual visit with our nurse practitioner Dieter Marcum with repeat labs.  She will return to see me in 4 weeks.  She will call us in the interim should any further worsening symptoms.    It was a pleasure seeing Karen Anderson at the [facility].  Please contact us with any questions or concerns that you may have.    Sincerely,    Karolina Turcios MD   Center for Pulmonary Hypertension  Section of Advanced Heart Failure   Cardiovascular Division  [facility]   [phone]                  Karolina Turcios MD

## 2020-11-09 NOTE — PATIENT INSTRUCTIONS
Medication Changes:  - STOP lisinopril  - START Adempas 0.5 mg three times a day. Increase by 0.5 mg every 2 weeks to a goal dose of 2.5 mg three times a day  - INCREASE Lasix to 40 mg twice a day    Patient Instructions:  1. Continue staying active and eat a heart healthy diet.    2. Please keep current list of medications with you at all times.    3. Remember to weigh yourself daily after voiding and before you consume any food or beverages and log the numbers.  If you have gained 2 pounds overnight or 5 pounds in a week contact us immediately for medication adjustments or further instructions.    4. **Please call us immediately if you have any syncope (fainting or passing out), chest pain, edema (swelling or weight gain), or decline in your functional status (general decline in how you are feeling).    Follow up Appointment Information:  - 1 week virtual visit follow up with Shawna Marcum. Please have labs drawn locally prior to this appointment to follow up on kidney and electrolyte levels  - 1 month clinic visit follow up with Dr. Turcios, labs prior    Results:  Component      Latest Ref Rng & Units 11/9/2020   Sodium      133 - 144 mmol/L 140   Potassium      3.4 - 5.3 mmol/L 4.2   Chloride      94 - 109 mmol/L 106   Carbon Dioxide      20 - 32 mmol/L 28   Anion Gap      3 - 14 mmol/L 5   Glucose      70 - 99 mg/dL 82   Urea Nitrogen      7 - 30 mg/dL 39 (H)   Creatinine      0.52 - 1.04 mg/dL 1.43 (H)   GFR Estimate      >60 mL/min/1.73:m2 35 (L)   GFR Estimate If Black      >60 mL/min/1.73:m2 41 (L)   Calcium      8.5 - 10.1 mg/dL 9.0   Bilirubin Total      0.2 - 1.3 mg/dL 0.5   Albumin      3.4 - 5.0 g/dL 3.1 (L)   Protein Total      6.8 - 8.8 g/dL 6.9   Alkaline Phosphatase      40 - 150 U/L 108   ALT      0 - 50 U/L 24   AST      0 - 45 U/L 25   WBC      4.0 - 11.0 10e9/L 4.2   RBC Count      3.8 - 5.2 10e12/L 3.47 (L)   Hemoglobin      11.7 - 15.7 g/dL 10.3 (L)   Hematocrit      35.0 - 47.0 % 34.3 (L)    MCV      78 - 100 fl 99   MCH      26.5 - 33.0 pg 29.7   MCHC      31.5 - 36.5 g/dL 30.0 (L)   RDW      10.0 - 15.0 % 15.5 (H)   Platelet Count      150 - 450 10e9/L 226   INR      0.86 - 1.14 2.62 (H)   N-Terminal Pro Bnp      0 - 450 pg/mL 6,581 (H)     We are located on the third floor [facility] on the [facility].  Our address is     [address] [place] [address] on [address] [place] [address]    Thank you for allowing us to be a part of your care here at the [facility]    If you have questions or concerns please contact us at:    Elan Arnett RN, BSN   Lexus Balbuena (Schedule,Prior Auth)  Nurse Coordinator     Clinic   Pulmonary Hypertension   Pulmonary Hypertension  [facility]  [facility]  (Phone)[phone]    (Phone) [phone]        (Fax) [phone]    ** Please note that you will NOT receive a reminder call regarding your scheduled testing, reminder calls are for provider appointments only.  If you are scheduled for testing within the [employer] system you may receive a call regarding pre-registration for billing purposes only.**     Remember to weigh yourself daily after voiding and before you consume any food or beverages and log the numbers.  If you have gained/lost 2 pounds overnight or 5 pounds in a week contact us immediately for medication adjustments or further instructions.   **Please call us immediately if you have any syncope, chest pain, edema, or decline in your functional status.    Support Group:  Pulmonary Hypertension Association  Https://www.phassociation.org/  **Look at the Events Tab** They even have Support Groups that you can call into    [facility] PH Support Group  Second Saturday of the Month from 1-3 PM   Location: [address] [place] [address]  Leader: Jessenia Moeller and Marileos Loo  Phone: [phone] or  [phone]  Email: [email or web address]

## 2020-11-09 NOTE — PROGRESS NOTES
Service Date: 2020    Shaneka Forde MD  [address]  [place] [address]    Clint Abernathy MD  [address]  [place] [address]    Noam Jones MD  [address]  [place] [address]    Carlos Carter MD  [address]  [place] [address]      RE:  Karen Anderson   MRN:  [id number]  :  1940      Dear Josemanuel Ha, Karen, and Raul:    We had the pleasure of seeing Karen Anderson at the [facility] for follow-up. As you know, Ms. Anderson is a 79-year-old female with the following past medical history:    1.  Asthma.  2.  Pulmonary MAC.  3.  Breast cancer status post chemotherapy with Adriamycin, Cytoxan, Taxol, and tamoxifen and status post left mastectomy.  4.  Extensive bilateral pulmonary emboli and DVT in  thought to be secondary to tamoxifen.  5.  Chemotherapy-induced cardiomyopathy.  6.  Rheumatoid arthritis.  7.  Hypertension.  8.  Scoliosis.    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 Although she had proximal disease, she was too high risk for surgical PTE. She is currently on IV Remodulin therapy at 30 ng/kg/min.    She was hospitalized in early October after she had a mechanical fall.  She had significant hematoma involving her left knee, upper thigh and her leg.  She also was significantly volume overloaded has her diuretics dose were decreased in the setting of her fall and hematoma.  She underwent aggressive intravenous diuresis.  After diuresis, she had repeat right heart catheterization and echocardiogram while as an inpatient  Her repeat right heart catheterization showed improvement in her cardiac output and PVR with no significant change in her mean PA pressure.  She continued to have moderate RV dysfunction and dilatation.    We discussed the option of increasing her Remodulin versus starting Adempas.  Given side effects of  the Remodulin she chose to start her Adempas therapy.  Unfortunately this has not been started yet due to her high co-pay.  She just got a jean-claude approved for her co-pay.  She is expecting shipment of Adempas and this week.    Overall she has been having mild worsening of her shortness of breath.  She gets winded when she climbs 1 flight of stairs.  I would currently characterize as functional class III.  She is more ambulatory now after her fall.  She is working with a physical therapist twice a day as an outpatient.  Her left knee hematoma has improved but still there.  She continues to have lower extremity swelling bilaterally.  She has no exertional presyncope or syncope.  No exertional chest pain or chest pressure.  No significant IV remodeling related side effects.  She has not had any further hospitalizations or ER visits.  No bleeding complications.    PAST MEDICAL HISTORY:  Past Medical History:   Diagnosis Date     Asthma      Breast cancer (H)      Hypertension      Mycobacterium avium complex (H)      Pulmonary embolism (H)      Pulmonary hypertension (H)      Rheumatoid arthritis (H)      Scoliosis        PAST SURGICAL HISTORY:  Past Surgical History:   Procedure Laterality Date     CV PULMONARY ANGIOGRAM N/A 10/5/2020     CV RIGHT HEART CATH N/A 03/04/2020     CV RIGHT HEART CATH N/A 10/5/2020     IR CVC TUNNEL PLACEMENT > 5 YRS OF AGE  03/09/2020     MASTECTOMY Left      PICC SINGLE LUMEN PLACEMENT Right 03/04/2020     PICC TRIPLE LUMEN PLACEMENT Right 10/02/2020       CURRENT MEDICATIONS:  Current Outpatient Medications   Medication Sig     acetaminophen (TYLENOL) 325 MG tablet Take 325 mg by mouth every 6 hours as needed for mild pain     albuterol (PROAIR HFA/PROVENTIL HFA/VENTOLIN HFA) 108 (90 Base) MCG/ACT inhaler Inhale 2 puffs into the lungs every 4 hours as needed for shortness of breath / dyspnea or wheezing      alendronate (FOSAMAX) 70 MG tablet Take 70 mg by mouth once a week     Ascorbic  Acid (VITAMIN C) 500 MG CAPS Take 500 mg by mouth every morning      aspirin (ASA) 81 MG tablet Take 81 mg by mouth daily      beclomethasone HFA (QVAR REDIHALER) 80 MCG/ACT inhaler Inhale 2 puffs into the lungs 2 times daily     calcium citrate-vitamin D (CALCIUM CITRATE + D3) 315-250 MG-UNIT TABS per tablet Take 2 tablets by mouth daily      cetirizine (ZYRTEC) 10 MG tablet Take 10 mg by mouth daily     digoxin (LANOXIN) 62.5 MCG tablet Take 1 tablet (62.5 mcg) by mouth daily     furosemide (LASIX) 20 MG tablet Take 2 tablets (40 mg) by mouth every morning AND 1 tablet (20 mg) daily (with lunch).     gabapentin (NEURONTIN) 300 MG capsule Take 300 mg by mouth 3 times daily      hydroxychloroquine (PLAQUENIL) 200 MG tablet Take 200 mg by mouth daily      ipratropium (ATROVENT) 0.06 % nasal spray Spray 2 sprays into both nostrils 3 times daily      lisinopril (ZESTRIL) 20 MG tablet Take 0.5 tablets (10 mg) by mouth daily     loperamide (IMODIUM) 2 MG capsule Take 2 mg by mouth as needed for diarrhea     Probiotic Product (PROBIOTIC ACIDOPHILUS [place]) CAPS Take 1 capsule by mouth 2 times daily      Treprostinil (REMODULIN IJ) Dose: 30 ng/kg/min  Vial concentration: 1 mg/mL  Final concentration: 60,000 ng/mL  Dosing weight: 56.3 kg   Pump rate: 41 mL/day  Per patient on 9/29/2020     warfarin ANTICOAGULANT (COUMADIN) 3 MG tablet Take 1 to 2 tablets daily as directed by the Anticoagulation Clinic. (Patient taking differently: Take 3 mg PO on Tues and Sat, and 4.5 mg all other days of the week)     riociguat (ADEMPAS) 0.5 MG tablet Take by mouth 3 times daily Please make sure you are completing your monthly mandatory labs.     Goal of 2.5 mg three times a day, with dose increase of 0.5 mg three times a day every 2 weeks (SBP must be >95 mmHg and no signs or symptoms of hypotension to increase)    PA in Process, RX to be sent upon approval     No current facility-administered medications for this visit.        ROS:  "  10 point ROS negative except as discussed in above HPI.    EXAM:  /71 (BP Location: Right arm, Patient Position: Sitting, Cuff Size: Adult Regular)   Pulse 68   Ht 1.499 m (4' 11\")   Wt 53.3 kg (117 lb 8 oz)   SpO2 92%   BMI 23.73 kg/m    She was in no apparent distress.  She was comfortable.    She was awake, alert, oriented x3.  She had no pallor, cyanosis or jaundice.  Her Goldberg catheter site was clean and dry.  Her neck exam revealed elevated jugular venous distention at 10 cm above manubrium sternal angle.  Her carotids were 1+ bilaterally.  Cardiac auscultation revealed normal S1 and loud P2 with no murmur rub or gallop.  Auscultation of her lungs revealed equal air entry on both sides with no added sounds.  Her abdomen was soft with no wounds and no tenderness no rigidity no guarding.  She had 1+ lower extremity edema bilaterally.  Her left knee hematoma appears to be much smaller in size.    Labs:  CBC RESULTS:   Recent Labs   Lab Test 11/09/20  0930   WBC 4.2   RBC 3.47*   HGB 10.3*   HCT 34.3*   MCV 99   MCH 29.7   MCHC 30.0*   RDW 15.5*        Recent Labs   Lab Test 11/09/20  0930 10/15/20  1306    137   POTASSIUM 4.2 4.1   CHLORIDE 106 104   CO2 28 26   ANIONGAP 5 6   GLC 82 80   BUN 39* 36*   CR 1.43* 1.10*   ESTEFANÍA 9.0 9.5     Liver Function Studies -   Recent Labs   Lab Test 11/09/20  0930   PROTTOTAL 6.9   ALBUMIN 3.1*   BILITOTAL 0.5   ALKPHOS 108   AST 25   ALT 24         No results found for: NTBNPI  Lab Results   Component Value Date    NTBNP 6,581 (H) 11/09/2020       No results found for: NTBNPI  Lab Results   Component Value Date    NTBNP 6,581 (H) 11/09/2020       Echo (09/2020)  Global and regional left ventricular function is normal with an EF of 60-65%.  Moderate right ventricular dilation is present.  Global right ventricular function is moderately reduced.  Right ventricular systolic pressure is 82mmHg above the right atrial pressure.  Ascending aorta 3.8 " cm.  Ascending aortic index 25.3mm/m2,severe dilation for BSA of 1.5m2.  IVC diameter >2.1 cm collapsing <50% with sniff suggests a high RA pressure estimated at 15 mmHg or greater.  Small circumferential pericardial effusion is present without any hemodynamic significance.  There has been no change.    6MWT (03/2020  He had a 6-minute walk test where she walked only 164 m.  On 2 L of oxygen she dropped to 77%.    RHC (03/2020)  Right heart catheterization revealed an RA pressure of 10 mmHg, RV of 98 over 11 mmHg, PA of 98/30 with a mean of 52 mmHg, pulmonary capillary wedge pressure of 15 mmHg, PA saturation of 54.7%, thermodilution cardiac output of 2.3 with an index of 1.4, and PVR 16.08 MARIN.    RHC (09/2020)  RA 5  RV 90/8  PA 90/20 (42)  PCWP 11  PA% 59%  CO/CI 3/2.1  PVR 10 MARIN      Assessment and Plan:     Ms. Karen Anderson is a 80-year-old female with chronic thromboembolic pulmonary hypertension who is currently on IV Remodulin therapy.  She returns today for follow-up. She is currently on 30 ng/kg/min of IV Remodulin therapy.    She is currently functional class III.  She is mildly volume overloaded.  Her renal function is slightly worse in the setting of volume overload..    She will start Adempas this week.  We will start her at 0.5 mg 3 times a day and increase it every 2 weeks at her maximum goal of 2.5 mg 3 times a day.  We will continue her on IV treprostinil at 30 ng/kg/min.  I took the liberty and increase her Lasix to 40 mg twice a day.  I hope you are renal function improved with diuresis.  Her serum potassium level is normal.  We will continue to maintain a low INR goal of 2-2.5 given her recent left knee hematoma.  She is on digoxin for right ventricular support.    I discussed the option of pulmonary balloon angioplasty.  Her most recent pulmonary angiogram showed lesions that can be treated with pulmonary balloon angioplasty especially in her right lower lobe.  We discussed 1% risk of  fatal complication including mechanical reperfusion edema requiring mechanical ventilation and death.  We also discussed 5 to 10% risk of hemoptysis.  Her risk is slightly higher given her age and high PA pressure.  She would like to try pulmonary balloon angioplasty if needed after maximizing Adempas therapy which I think is reasonable.      We also discussed the option of increasing her remodel however she would like to hold off on this given the side effects.    I have recommended her to return to clinic next week for a virtual visit with our nurse practitioner Dieter Marcum with repeat labs.  She will return to see me in 4 weeks.  She will call us in the interim should any further worsening symptoms.    It was a pleasure seeing Karen Anderson at the [facility].  Please contact us with any questions or concerns that you may have.    Sincerely,    Karolina Turcios MD   Center for Pulmonary Hypertension  Section of Advanced Heart Failure   Cardiovascular Division  [facility]   [phone]

## 2020-11-09 NOTE — LETTER
2020      RE: Karen Anderson  [address]  [facility] [address]       Service Date: 2020    Shaneka Forde MD  [address]  [place] [address]    Clint Abernathy MD  [address]  [place] [address]    Noam Jones MD  [address]  [facility] [place] [address]    Carlso Carter MD  [address]  [place] [address]      RE:  Karen Anderson   MRN:  [id number]  :  1940      Dear Josemanuel Ha Skemp, and Raul:    We had the pleasure of seeing Karen Anderson at the [facility] for follow-up. As you know, Ms. Anderson is a 79-year-old female with the following past medical history:    1.  Asthma.  2.  Pulmonary MAC.  3.  Breast cancer status post chemotherapy with Adriamycin, Cytoxan, Taxol, and tamoxifen and status post left mastectomy.  4.  Extensive bilateral pulmonary emboli and DVT in  thought to be secondary to tamoxifen.  5.  Chemotherapy-induced cardiomyopathy.  6.  Rheumatoid arthritis.  7.  Hypertension.  8.  Scoliosis.    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 Although she had proximal disease, she was too high risk for surgical PTE. She is currently on IV Remodulin therapy at 30 ng/kg/min.    She was hospitalized in early October after she had a mechanical fall.  She had significant hematoma involving her left knee, upper thigh and her leg.  She also was significantly volume overloaded has her diuretics dose were decreased in the setting of her fall and hematoma.  She underwent aggressive intravenous diuresis.  After diuresis, she had repeat right heart catheterization and echocardiogram while as an inpatient  Her repeat right heart catheterization showed improvement in her cardiac output and PVR with no significant change in her mean PA pressure.  She continued to have moderate RV dysfunction and dilatation.    We  discussed the option of increasing her Remodulin versus starting Adempas.  Given side effects of the Remodulin she chose to start her Adempas therapy.  Unfortunately this has not been started yet due to her high co-pay.  She just got a jean-claude approved for her co-pay.  She is expecting shipment of Adempas and this week.    Overall she has been having mild worsening of her shortness of breath.  She gets winded when she climbs 1 flight of stairs.  I would currently characterize as functional class III.  She is more ambulatory now after her fall.  She is working with a physical therapist twice a day as an outpatient.  Her left knee hematoma has improved but still there.  She continues to have lower extremity swelling bilaterally.  She has no exertional presyncope or syncope.  No exertional chest pain or chest pressure.  No significant IV remodeling related side effects.  She has not had any further hospitalizations or ER visits.  No bleeding complications.    PAST MEDICAL HISTORY:  Past Medical History:   Diagnosis Date     Asthma      Breast cancer (H)      Hypertension      Mycobacterium avium complex (H)      Pulmonary embolism (H)      Pulmonary hypertension (H)      Rheumatoid arthritis (H)      Scoliosis        PAST SURGICAL HISTORY:  Past Surgical History:   Procedure Laterality Date     CV PULMONARY ANGIOGRAM N/A 10/5/2020     CV RIGHT HEART CATH N/A 03/04/2020     CV RIGHT HEART CATH N/A 10/5/2020     IR CVC TUNNEL PLACEMENT > 5 YRS OF AGE  03/09/2020     MASTECTOMY Left      PICC SINGLE LUMEN PLACEMENT Right 03/04/2020     PICC TRIPLE LUMEN PLACEMENT Right 10/02/2020       CURRENT MEDICATIONS:  Current Outpatient Medications   Medication Sig     acetaminophen (TYLENOL) 325 MG tablet Take 325 mg by mouth every 6 hours as needed for mild pain     albuterol (PROAIR HFA/PROVENTIL HFA/VENTOLIN HFA) 108 (90 Base) MCG/ACT inhaler Inhale 2 puffs into the lungs every 4 hours as needed for shortness of breath / dyspnea  or wheezing      alendronate (FOSAMAX) 70 MG tablet Take 70 mg by mouth once a week     Ascorbic Acid (VITAMIN C) 500 MG CAPS Take 500 mg by mouth every morning      aspirin (ASA) 81 MG tablet Take 81 mg by mouth daily      beclomethasone HFA (QVAR REDIHALER) 80 MCG/ACT inhaler Inhale 2 puffs into the lungs 2 times daily     calcium citrate-vitamin D (CALCIUM CITRATE + D3) 315-250 MG-UNIT TABS per tablet Take 2 tablets by mouth daily      cetirizine (ZYRTEC) 10 MG tablet Take 10 mg by mouth daily     digoxin (LANOXIN) 62.5 MCG tablet Take 1 tablet (62.5 mcg) by mouth daily     furosemide (LASIX) 20 MG tablet Take 2 tablets (40 mg) by mouth every morning AND 1 tablet (20 mg) daily (with lunch).     gabapentin (NEURONTIN) 300 MG capsule Take 300 mg by mouth 3 times daily      hydroxychloroquine (PLAQUENIL) 200 MG tablet Take 200 mg by mouth daily      ipratropium (ATROVENT) 0.06 % nasal spray Spray 2 sprays into both nostrils 3 times daily      lisinopril (ZESTRIL) 20 MG tablet Take 0.5 tablets (10 mg) by mouth daily     loperamide (IMODIUM) 2 MG capsule Take 2 mg by mouth as needed for diarrhea     Probiotic Product (PROBIOTIC ACIDOPHILUS [employer]) CAPS Take 1 capsule by mouth 2 times daily      Treprostinil (REMODULIN IJ) Dose: 30 ng/kg/min  Vial concentration: 1 mg/mL  Final concentration: 60,000 ng/mL  Dosing weight: 56.3 kg   Pump rate: 41 mL/day  Per patient on 9/29/2020     warfarin ANTICOAGULANT (COUMADIN) 3 MG tablet Take 1 to 2 tablets daily as directed by the Anticoagulation Clinic. (Patient taking differently: Take 3 mg PO on Tues and Sat, and 4.5 mg all other days of the week)     riociguat (ADEMPAS) 0.5 MG tablet Take by mouth 3 times daily Please make sure you are completing your monthly mandatory labs.     Goal of 2.5 mg three times a day, with dose increase of 0.5 mg three times a day every 2 weeks (SBP must be >95 mmHg and no signs or symptoms of hypotension to increase)    PA in Process, RX to be  "sent upon approval     No current facility-administered medications for this visit.        ROS:   10 point ROS negative except as discussed in above HPI.    EXAM:  /71 (BP Location: Right arm, Patient Position: Sitting, Cuff Size: Adult Regular)   Pulse 68   Ht 1.499 m (4' 11\")   Wt 53.3 kg (117 lb 8 oz)   SpO2 92%   BMI 23.73 kg/m    She was in no apparent distress.  She was comfortable.    She was awake, alert, oriented x3.  She had no pallor, cyanosis or jaundice.  Her Goldberg catheter site was clean and dry.  Her neck exam revealed elevated jugular venous distention at 10 cm above manubrium sternal angle.  Her carotids were 1+ bilaterally.  Cardiac auscultation revealed normal S1 and loud P2 with no murmur rub or gallop.  Auscultation of her lungs revealed equal air entry on both sides with no added sounds.  Her abdomen was soft with no wounds and no tenderness no rigidity no guarding.  She had 1+ lower extremity edema bilaterally.  Her left knee hematoma appears to be much smaller in size.    Labs:  CBC RESULTS:   Recent Labs   Lab Test 11/09/20  0930   WBC 4.2   RBC 3.47*   HGB 10.3*   HCT 34.3*   MCV 99   MCH 29.7   MCHC 30.0*   RDW 15.5*        Recent Labs   Lab Test 11/09/20  0930 10/15/20  1306    137   POTASSIUM 4.2 4.1   CHLORIDE 106 104   CO2 28 26   ANIONGAP 5 6   GLC 82 80   BUN 39* 36*   CR 1.43* 1.10*   ESTEFANÍA 9.0 9.5     Liver Function Studies -   Recent Labs   Lab Test 11/09/20  0930   PROTTOTAL 6.9   ALBUMIN 3.1*   BILITOTAL 0.5   ALKPHOS 108   AST 25   ALT 24         No results found for: NTBNPI  Lab Results   Component Value Date    NTBNP 6,581 (H) 11/09/2020       No results found for: NTBNPI  Lab Results   Component Value Date    NTBNP 6,581 (H) 11/09/2020       Echo (09/2020)  Global and regional left ventricular function is normal with an EF of 60-65%.  Moderate right ventricular dilation is present.  Global right ventricular function is moderately reduced.  Right " ventricular systolic pressure is 82mmHg above the right atrial pressure.  Ascending aorta 3.8 cm.  Ascending aortic index 25.3mm/m2,severe dilation for BSA of 1.5m2.  IVC diameter >2.1 cm collapsing <50% with sniff suggests a high RA pressure estimated at 15 mmHg or greater.  Small circumferential pericardial effusion is present without any hemodynamic significance.  There has been no change.    6MWT (03/2020  He had a 6-minute walk test where she walked only 164 m.  On 2 L of oxygen she dropped to 77%.    RHC (03/2020)  Right heart catheterization revealed an RA pressure of 10 mmHg, RV of 98 over 11 mmHg, PA of 98/30 with a mean of 52 mmHg, pulmonary capillary wedge pressure of 15 mmHg, PA saturation of 54.7%, thermodilution cardiac output of 2.3 with an index of 1.4, and PVR 16.08 MARIN.    RHC (09/2020)  RA 5  RV 90/8  PA 90/20 (42)  PCWP 11  PA% 59%  CO/CI 3/2.1  PVR 10 MARIN      Assessment and Plan:     Ms. Karen Anderson is a 80-year-old female with chronic thromboembolic pulmonary hypertension who is currently on IV Remodulin therapy.  She returns today for follow-up. She is currently on 30 ng/kg/min of IV Remodulin therapy.    She is currently functional class III.  She is mildly volume overloaded.  Her renal function is slightly worse in the setting of volume overload..    She will start Adempas this week.  We will start her at 0.5 mg 3 times a day and increase it every 2 weeks at her maximum goal of 2.5 mg 3 times a day.  We will continue her on IV treprostinil at 30 ng/kg/min.  I took the liberty and increase her Lasix to 40 mg twice a day.  I hope you are renal function improved with diuresis.  Her serum potassium level is normal.  We will continue to maintain a low INR goal of 2-2.5 given her recent left knee hematoma.  She is on digoxin for right ventricular support.    I discussed the option of pulmonary balloon angioplasty.  Her most recent pulmonary angiogram showed lesions that can be treated with  pulmonary balloon angioplasty especially in her right lower lobe.  We discussed 1% risk of fatal complication including mechanical reperfusion edema requiring mechanical ventilation and death.  We also discussed 5 to 10% risk of hemoptysis.  Her risk is slightly higher given her age and high PA pressure.  She would like to try pulmonary balloon angioplasty if needed after maximizing Adempas therapy which I think is reasonable.      We also discussed the option of increasing her remodel however she would like to hold off on this given the side effects.    I have recommended her to return to clinic next week for a virtual visit with our nurse practitioner Dieter Marcum with repeat labs.  She will return to see me in 4 weeks.  She will call us in the interim should any further worsening symptoms.    It was a pleasure seeing Karen Anderson at the [facility].  Please contact us with any questions or concerns that you may have.    Sincerely,    Karolina Turcios MD   Center for Pulmonary Hypertension  Section of Advanced Heart Failure   Cardiovascular Division  [facility]   [phone]                  Karolina Turcios MD

## 2020-11-09 NOTE — LETTER
2020      RE: Karen Anderson  [address]  [facility] [address]       Service Date: 2020    Shaneka Forde MD  [address]  [place] [address]    Clint Abernathy MD  [address]  [place] [address]    Noam Jones MD  [address]  [facility] [place] [address]    Carlos Carter MD  [address]  [place] [address]      RE:  Karen Anderson   MRN:  [id number]  :  1940      Dear Josemanuel Ha Skemp, and Raul:    We had the pleasure of seeing Karen Anderson at the [facility] for follow-up. As you know, Ms. Anderson is a 79-year-old female with the following past medical history:    1.  Asthma.  2.  Pulmonary MAC.  3.  Breast cancer status post chemotherapy with Adriamycin, Cytoxan, Taxol, and tamoxifen and status post left mastectomy.  4.  Extensive bilateral pulmonary emboli and DVT in  thought to be secondary to tamoxifen.  5.  Chemotherapy-induced cardiomyopathy.  6.  Rheumatoid arthritis.  7.  Hypertension.  8.  Scoliosis.    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 Although she had proximal disease, she was too high risk for surgical PTE. She is currently on IV Remodulin therapy at 30 ng/kg/min.    She was hospitalized in early October after she had a mechanical fall.  She had significant hematoma involving her left knee, upper thigh and her leg.  She also was significantly volume overloaded has her diuretics dose were decreased in the setting of her fall and hematoma.  She underwent aggressive intravenous diuresis.  After diuresis, she had repeat right heart catheterization and echocardiogram while as an inpatient  Her repeat right heart catheterization showed improvement in her cardiac output and PVR with no significant change in her mean PA pressure.  She continued to have moderate RV dysfunction and dilatation.    We  discussed the option of increasing her Remodulin versus starting Adempas.  Given side effects of the Remodulin she chose to start her Adempas therapy.  Unfortunately this has not been started yet due to her high co-pay.  She just got a jean-claude approved for her co-pay.  She is expecting shipment of Adempas and this week.    Overall she has been having mild worsening of her shortness of breath.  She gets winded when she climbs 1 flight of stairs.  I would currently characterize as functional class III.  She is more ambulatory now after her fall.  She is working with a physical therapist twice a day as an outpatient.  Her left knee hematoma has improved but still there.  She continues to have lower extremity swelling bilaterally.  She has no exertional presyncope or syncope.  No exertional chest pain or chest pressure.  No significant IV remodeling related side effects.  She has not had any further hospitalizations or ER visits.  No bleeding complications.    PAST MEDICAL HISTORY:  Past Medical History:   Diagnosis Date     Asthma      Breast cancer (H)      Hypertension      Mycobacterium avium complex (H)      Pulmonary embolism (H)      Pulmonary hypertension (H)      Rheumatoid arthritis (H)      Scoliosis        PAST SURGICAL HISTORY:  Past Surgical History:   Procedure Laterality Date     CV PULMONARY ANGIOGRAM N/A 10/5/2020     CV RIGHT HEART CATH N/A 03/04/2020     CV RIGHT HEART CATH N/A 10/5/2020     IR CVC TUNNEL PLACEMENT > 5 YRS OF AGE  03/09/2020     MASTECTOMY Left      PICC SINGLE LUMEN PLACEMENT Right 03/04/2020     PICC TRIPLE LUMEN PLACEMENT Right 10/02/2020       CURRENT MEDICATIONS:  Current Outpatient Medications   Medication Sig     acetaminophen (TYLENOL) 325 MG tablet Take 325 mg by mouth every 6 hours as needed for mild pain     albuterol (PROAIR HFA/PROVENTIL HFA/VENTOLIN HFA) 108 (90 Base) MCG/ACT inhaler Inhale 2 puffs into the lungs every 4 hours as needed for shortness of breath / dyspnea  or wheezing      alendronate (FOSAMAX) 70 MG tablet Take 70 mg by mouth once a week     Ascorbic Acid (VITAMIN C) 500 MG CAPS Take 500 mg by mouth every morning      aspirin (ASA) 81 MG tablet Take 81 mg by mouth daily      beclomethasone HFA (QVAR REDIHALER) 80 MCG/ACT inhaler Inhale 2 puffs into the lungs 2 times daily     calcium citrate-vitamin D (CALCIUM CITRATE + D3) 315-250 MG-UNIT TABS per tablet Take 2 tablets by mouth daily      cetirizine (ZYRTEC) 10 MG tablet Take 10 mg by mouth daily     digoxin (LANOXIN) 62.5 MCG tablet Take 1 tablet (62.5 mcg) by mouth daily     furosemide (LASIX) 20 MG tablet Take 2 tablets (40 mg) by mouth every morning AND 1 tablet (20 mg) daily (with lunch).     gabapentin (NEURONTIN) 300 MG capsule Take 300 mg by mouth 3 times daily      hydroxychloroquine (PLAQUENIL) 200 MG tablet Take 200 mg by mouth daily      ipratropium (ATROVENT) 0.06 % nasal spray Spray 2 sprays into both nostrils 3 times daily      lisinopril (ZESTRIL) 20 MG tablet Take 0.5 tablets (10 mg) by mouth daily     loperamide (IMODIUM) 2 MG capsule Take 2 mg by mouth as needed for diarrhea     Probiotic Product (PROBIOTIC ACIDOPHILUS rapt.fm) CAPS Take 1 capsule by mouth 2 times daily      Treprostinil (REMODULIN IJ) Dose: 30 ng/kg/min  Vial concentration: 1 mg/mL  Final concentration: 60,000 ng/mL  Dosing weight: 56.3 kg   Pump rate: 41 mL/day  Per patient on 9/29/2020     warfarin ANTICOAGULANT (COUMADIN) 3 MG tablet Take 1 to 2 tablets daily as directed by the Anticoagulation Clinic. (Patient taking differently: Take 3 mg PO on Tues and Sat, and 4.5 mg all other days of the week)     riociguat (ADEMPAS) 0.5 MG tablet Take by mouth 3 times daily Please make sure you are completing your monthly mandatory labs. ***    Goal of 2.5 mg three times a day, with dose increase of 0.5 mg three times a day every 2 weeks (SBP must be >95 mmHg and no signs or symptoms of hypotension to increase)    PA in Process, RX to be  "sent upon approval     No current facility-administered medications for this visit.        ROS:   10 point ROS negative except as discussed in above HPI.    EXAM:  /71 (BP Location: Right arm, Patient Position: Sitting, Cuff Size: Adult Regular)   Pulse 68   Ht 1.499 m (4' 11\")   Wt 53.3 kg (117 lb 8 oz)   SpO2 92%   BMI 23.73 kg/m    She was in no apparent distress.  She was comfortable.    She was awake, alert, oriented x3.  She had no pallor, cyanosis or jaundice.  Her Goldberg catheter site was clean and dry.  Her neck exam revealed elevated jugular venous distention at 10 cm above manubrium sternal angle.  Her carotids were 1+ bilaterally.  Cardiac auscultation revealed normal S1 and loud P2 with no murmur rub or gallop.  Auscultation of her lungs revealed equal air entry on both sides with no added sounds.  Her abdomen was soft with no wounds and no tenderness no rigidity no guarding.  She had 1+ lower extremity edema bilaterally.  Her left knee hematoma appears to be much smaller in size.    Labs:  CBC RESULTS:   Recent Labs   Lab Test 11/09/20  0930   WBC 4.2   RBC 3.47*   HGB 10.3*   HCT 34.3*   MCV 99   MCH 29.7   MCHC 30.0*   RDW 15.5*        Recent Labs   Lab Test 11/09/20  0930 10/15/20  1306    137   POTASSIUM 4.2 4.1   CHLORIDE 106 104   CO2 28 26   ANIONGAP 5 6   GLC 82 80   BUN 39* 36*   CR 1.43* 1.10*   ESTEFANÍA 9.0 9.5     Liver Function Studies -   Recent Labs   Lab Test 11/09/20  0930   PROTTOTAL 6.9   ALBUMIN 3.1*   BILITOTAL 0.5   ALKPHOS 108   AST 25   ALT 24         No results found for: NTBNPI  Lab Results   Component Value Date    NTBNP 6,581 (H) 11/09/2020       No results found for: NTBNPI  Lab Results   Component Value Date    NTBNP 6,581 (H) 11/09/2020       Echo (09/2020)  Global and regional left ventricular function is normal with an EF of 60-65%.  Moderate right ventricular dilation is present.  Global right ventricular function is moderately reduced.  Right " ventricular systolic pressure is 82mmHg above the right atrial pressure.  Ascending aorta 3.8 cm.  Ascending aortic index 25.3mm/m2,severe dilation for BSA of 1.5m2.  IVC diameter >2.1 cm collapsing <50% with sniff suggests a high RA pressure estimated at 15 mmHg or greater.  Small circumferential pericardial effusion is present without any hemodynamic significance.  There has been no change.    6MWT (03/2020  He had a 6-minute walk test where she walked only 164 m.  On 2 L of oxygen she dropped to 77%.    RHC (03/2020)  Right heart catheterization revealed an RA pressure of 10 mmHg, RV of 98 over 11 mmHg, PA of 98/30 with a mean of 52 mmHg, pulmonary capillary wedge pressure of 15 mmHg, PA saturation of 54.7%, thermodilution cardiac output of 2.3 with an index of 1.4, and PVR 16.08 MARIN.    RHC (09/2020)  RA 5  RV 90/8  PA 90/20 (42)  PCWP 11  PA% 59%  CO/CI 3/2.1  PVR 10 MARIN      Assessment and Plan:     Ms. Karen Anderson is a 80-year-old female with chronic thromboembolic pulmonary hypertension who is currently on IV Remodulin therapy.  She returns today for follow-up. She is currently on 30 ng/kg/min of IV Remodulin therapy.    She is currently functional class III.  She is mildly volume overloaded.  Her renal function is slightly worse in the setting of volume overload..    She will start Adempas this week.  We will start her at 0.5 mg 3 times a day and increase it every 2 weeks at her maximum goal of 2.5 mg 3 times a day.  We will continue her on IV treprostinil at 30 ng/kg/min.  I took the liberty and increase her Lasix to 40 mg twice a day.  I hope you are renal function improved with diuresis.  Her serum potassium level is normal.  We will continue to maintain a low INR goal of 2-2.5 given her recent left knee hematoma.  She is on digoxin for right ventricular support.    I discussed the option of pulmonary balloon angioplasty.  Her most recent pulmonary angiogram showed lesions that can be treated with  pulmonary balloon angioplasty especially in her right lower lobe.  We discussed 1% risk of fatal complication including mechanical reperfusion edema requiring mechanical ventilation and death.  We also discussed 5 to 10% risk of hemoptysis.  Her risk is slightly higher given her age and high PA pressure.  She would like to try pulmonary balloon angioplasty if needed after maximizing Adempas therapy which I think is reasonable.      We also discussed the option of increasing her remodel however she would like to hold off on this given the side effects.    I have recommended her to return to clinic next week for a virtual visit with our nurse practitioner Dieter Marcum with repeat labs.  She will return to see me in 4 weeks.  She will call us in the interim should any further worsening symptoms.    It was a pleasure seeing Karen Anderson at the [facility].  Please contact us with any questions or concerns that you may have.    Sincerely,    Karolina Turcios MD   Center for Pulmonary [facility]pertension  Section of Advanced Heart Failure   Cardiovascular Division  [facility]   [phone]

## 2020-11-09 NOTE — NURSING NOTE
Med Reconcile: Reviewed and verified all current medications with the patient. The updated medication list was printed and given to the patient.  New Medication: Patient was educated regarding newly prescribed medication, including discussion of  the indication, administration, side effects, and when to report to MD or RN. Patient demonstrated understanding of this information and agreed to call with further questions or concerns.  Medication Change: Patient was educated regarding prescribed medication change, including discussion of the indication, administration, side effects, and when to report to MD or RN. Patient demonstrated understanding of this information and agreed to call with further questions or concerns.  Labs: Patient was given results of the laboratory testing obtained today. Patient demonstrated understanding of this information and agreed to call with further questions or concerns.   Diet: Patient instructed regarding a heart healthy diet, including discussion of reduced fat and sodium intake. Patient demonstrated understanding of this information and agreed to call with further questions or concerns.  Return Appointment: Patient given instructions regarding scheduling next clinic visit. Patient demonstrated understanding of this information and agreed to call with further questions or concerns.  Patient stated she understood all health information given and agreed to call with further questions or concerns.    Orders placed and patient escorted to Pods to schedule follow up appts. Kezia Arnett RN on 11/9/2020 at 11:20 AM

## 2020-11-12 ENCOUNTER — MEDICAL CORRESPONDENCE (OUTPATIENT)
Dept: HEALTH INFORMATION MANAGEMENT | Facility: CLINIC | Age: 80
End: 2020-11-12

## 2020-11-12 ENCOUNTER — TELEPHONE (OUTPATIENT)
Dept: CARDIOLOGY | Facility: CLINIC | Age: 80
End: 2020-11-12

## 2020-11-12 NOTE — TELEPHONE ENCOUNTER
Called [facility] and requested a 30 day supply of Adempas 0.5 mg TID to be shipped to patient. Kezia Arnett RN on 11/12/2020 at 2:29 PM

## 2020-11-14 ENCOUNTER — HEALTH MAINTENANCE LETTER (OUTPATIENT)
Age: 80
End: 2020-11-14

## 2020-11-16 ENCOUNTER — ANTICOAGULATION THERAPY VISIT (OUTPATIENT)
Dept: ANTICOAGULATION | Facility: CLINIC | Age: 80
End: 2020-11-16

## 2020-11-16 DIAGNOSIS — Z79.01 LONG TERM CURRENT USE OF ANTICOAGULANT THERAPY: ICD-10-CM

## 2020-11-16 DIAGNOSIS — I27.20 PULMONARY HYPERTENSION (H): ICD-10-CM

## 2020-11-16 DIAGNOSIS — I50.89 OTHER HEART FAILURE (H): ICD-10-CM

## 2020-11-16 DIAGNOSIS — I27.24 CTEPH (CHRONIC THROMBOEMBOLIC PULMONARY HYPERTENSION) (H): ICD-10-CM

## 2020-11-16 DIAGNOSIS — R06.02 SOB (SHORTNESS OF BREATH): ICD-10-CM

## 2020-11-16 LAB
DIGOXIN SERPL-MCNC: 0.8 UG/L (ref 0.5–2)
ERYTHROCYTE [DISTWIDTH] IN BLOOD BY AUTOMATED COUNT: 15.7 % (ref 10–15)
HCT VFR BLD AUTO: 39.3 % (ref 35–47)
HGB BLD-MCNC: 12 G/DL (ref 11.7–15.7)
INR PPP: 2.2 (ref 0.86–1.14)
MCH RBC QN AUTO: 29.9 PG (ref 26.5–33)
MCHC RBC AUTO-ENTMCNC: 30.5 G/DL (ref 31.5–36.5)
MCV RBC AUTO: 98 FL (ref 78–100)
NT-PROBNP SERPL-MCNC: 5238 PG/ML (ref 0–450)
PLATELET # BLD AUTO: 241 10E9/L (ref 150–450)
RBC # BLD AUTO: 4.02 10E12/L (ref 3.8–5.2)
WBC # BLD AUTO: 5.2 10E9/L (ref 4–11)

## 2020-11-16 PROCEDURE — 85027 COMPLETE CBC AUTOMATED: CPT | Performed by: INTERNAL MEDICINE

## 2020-11-16 PROCEDURE — 83880 ASSAY OF NATRIURETIC PEPTIDE: CPT | Performed by: INTERNAL MEDICINE

## 2020-11-16 PROCEDURE — 80048 BASIC METABOLIC PNL TOTAL CA: CPT | Performed by: INTERNAL MEDICINE

## 2020-11-16 PROCEDURE — 80162 ASSAY OF DIGOXIN TOTAL: CPT | Performed by: INTERNAL MEDICINE

## 2020-11-16 PROCEDURE — 85610 PROTHROMBIN TIME: CPT | Performed by: INTERNAL MEDICINE

## 2020-11-16 PROCEDURE — 36415 COLL VENOUS BLD VENIPUNCTURE: CPT | Performed by: INTERNAL MEDICINE

## 2020-11-16 NOTE — PROGRESS NOTES
20    Spoke with patient today. Due to the concerns of the patient and need for safety of our patient.The time between next lab tests for Anticoagulation Therapy will be extended. Patient will contact the clinic and seek medical advise and care if they have  any signs or symptoms of Bleeding or Clotting. Asked the patient to notify the clinic of any changes in Health, Medications, or Diet.      Jaylan Santana RN  ANTICOAGULATION FOLLOW-UP CLINIC VISIT    Patient Name:  Karen Anderson  Date:  2020  Contact Type:  Telephone    SUBJECTIVE:  Patient Findings     Positives:  Change in medications (on Riociguat (no effect in literature))    Comments:  Spoke to Karen.        Clinical Outcomes     Comments:  Spoke to Karen.           OBJECTIVE    Recent labs: (last 7 days)     20  1051   INR 2.20*       ASSESSMENT / PLAN  INR assessment THER    Recheck INR In: 4 WEEKS    INR Location Clinic      Anticoagulation Summary  As of 2020    INR goal:  2.0-2.5   TTR:  78.1 % (7 mo)   INR used for dosin.20 (2020)   Warfarin maintenance plan:  4.5 mg (3 mg x 1.5) every Sun, Tue, Thu; 3 mg (3 mg x 1) all other days   Full warfarin instructions:  4.5 mg every Sun, Tue, Thu; 3 mg all other days   Weekly warfarin total:  25.5 mg   No change documented:  Jaylan Jacobo RN   Plan last modified:  Tanisha Kimbrough RN (10/30/2020)   Next INR check:  2020   Priority:  High   Target end date:  Indefinite    Indications    Pulmonary hypertension (H) [I27.20]  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 OK to speak with spouse, Don.  [facility] SPEAK IN TABLETS (Has 3mg Tabs) 10/23/20:  new goal range is 2 -  2.5  [facility] in [place] and will need to fax orders P [phone] F [phone]      Anticoagulation Care Providers     Provider Role Specialty Phone number    Osiris Luong MD Referring Cardiovascular Disease [phone]    Karolina Turcios MD Referring Cardiovascular Disease [phone]            See the Encounter Report to view Anticoagulation Flowsheet and Dosing Calendar (Go to Encounters tab in chart review, and find the Anticoagulation Therapy Visit)    INR/CFX/F2 RESULT:INR result is 2.20    ASSESSMENT:Spoke with patient. Gave them their lab results and new warfarin recommendation.  No changes in health, medication, or diet. No missed doses, no falls. No signs or symptoms of bleed or clotting.    DOSING ADJUSTMENT:continue pattern of dosing    NEXT INR/FACTOR X OR FACTOR II:12/8 per patient for comfort, next labs drawn then    PROTOCOL FOLLOWED:goal 2.0-2.5    Jaylan Jacobo, RN

## 2020-11-17 ENCOUNTER — VIRTUAL VISIT (OUTPATIENT)
Dept: CARDIOLOGY | Facility: CLINIC | Age: 80
End: 2020-11-17
Attending: PHYSICIAN ASSISTANT
Payer: COMMERCIAL

## 2020-11-17 ENCOUNTER — TELEPHONE (OUTPATIENT)
Dept: CARDIOLOGY | Facility: CLINIC | Age: 80
End: 2020-11-17

## 2020-11-17 DIAGNOSIS — R06.02 SOB (SHORTNESS OF BREATH): ICD-10-CM

## 2020-11-17 DIAGNOSIS — I27.20 PULMONARY HYPERTENSION (H): ICD-10-CM

## 2020-11-17 LAB
ANION GAP SERPL CALCULATED.3IONS-SCNC: 4 MMOL/L (ref 3–14)
BUN SERPL-MCNC: 38 MG/DL (ref 7–30)
CALCIUM SERPL-MCNC: 9.9 MG/DL (ref 8.5–10.1)
CHLORIDE SERPL-SCNC: 99 MMOL/L (ref 94–109)
CO2 SERPL-SCNC: 31 MMOL/L (ref 20–32)
CREAT SERPL-MCNC: 1.09 MG/DL (ref 0.52–1.04)
GFR SERPL CREATININE-BSD FRML MDRD: 49 ML/MIN/{1.73_M2}
GLUCOSE SERPL-MCNC: 104 MG/DL (ref 70–99)
POTASSIUM SERPL-SCNC: 3.5 MMOL/L (ref 3.4–5.3)
SODIUM SERPL-SCNC: 134 MMOL/L (ref 133–144)

## 2020-11-17 PROCEDURE — 99214 OFFICE O/P EST MOD 30 MIN: CPT | Mod: 95 | Performed by: PHYSICIAN ASSISTANT

## 2020-11-17 NOTE — PATIENT INSTRUCTIONS
Thank you for visiting the Pulmonary Hypertension Clinic today.      Today we discussed:   We will continue to titrate your Adempas. Please keep in touch with the specialty pharmacy and they will continue to give you instructions on when to increase your dose.    We will stay on the Lasix 40mg twice daily (for a total of 80mg daily). Please call if your weight starts to trend back upward or you note more swelling again.         Follow up Appointment Information:  Return in December to see Dr. Haq as already scheduled.       Additional Instructions:    1. Continue staying active and eat a heart healthy, low sodium diet.     2. Please keep current list of medications with you at all times.     3. Remember to weigh yourself daily after voiding and write it down on a log. If you have gained/lost 2 pounds overnight or 5 pounds in a week contact us for medication adjustments or further instructions.    4. Please call us immediately if you have syncope (fainting or passing out), chest pain, worsening edema (swelling or weight gain), or general worsening in how you are feeling.     --------------------------------------------------------------------------------------------------------------    If you have questions or concerns please contact us at:    Elan Arnett, RN, BSN   Lexus Balbuena (Schedule,Prior Auth)  Nurse Coordinator     Clinic   Pulmonary Hypertension   Pulmonary Hypertension  [facility]  [facility]  (Phone)[phone]    (Phone) [phone]        (Fax) [phone]      ** Please note that you will NOT receive a reminder call regarding your scheduled testing, reminder calls are for provider appointments only.  If you are scheduled for testing within the [employer] system you may receive a call regarding pre-registration for billing purposes only.**      --------------------------------------------------------------------------------------------------------------    Interested in joining a support group?    Pulmonary Hypertension Association  Https://www.phassociation.org/  **Look at the Events Tab** They even have Support Groups that you can call into    [facility] Support Group  Second Saturday of the Month from 1-3 PM   Location: [address] [place] [address]  Leader: Jessenia Loo  Phone: [phone] or [phone]  Email: [email or web address]

## 2020-11-17 NOTE — TELEPHONE ENCOUNTER
----- Message from MAGEN Saeed sent at 11/17/2020  4:14 PM CST -----  Regarding: visit  Overall she's doing a little better. Weight down a bit and renal function better on higher diuretics.  Tolerating adempas thus far, 0.5mg dose. Continue titration as outlined by TT.     No changes today, she already has an appt with TT in Dec which we will keep.    Thx  Shawna

## 2020-11-17 NOTE — LETTER
11/17/2020      RE: Karen Anderson  [address]  [facility] [address]       Dear Colleague,    Thank you for the opportunity to participate in the care of your patient, Karen Anderson, at the [facility] [place] at [facility]. Please see a copy of my visit note below.    CARDIOLOGY  CLINIC VIDEO VISIT    Karen Anderson is a 80 year old female who is being evaluated via a billable video visit.      The patient has been notified of following:     This video visit will be conducted via a call between you and your physician/provider. We have found that certain health care needs can be provided without the need for an in-person physical exam.  This service lets us provide the care you need with a video conversation.  If a prescription is necessary we can send it directly to your pharmacy.  If lab work is needed we can place an order for that and you can then stop by our lab to have the test done at a later time.    Virtual visits are billed at different rates depending on your insurance coverage. During this emergency period, for some insurers they may be billed the same as an in-person visit.  Please reach out to your insurance provider with any questions.    If during the course of the call the physician/provider feels a video visit is not appropriate, you will not be charged for this service.      Physician has received verbal consent for a Video Visit from the patient? Yes    Patient would like the video invitation sent by: Text to cell phone: [phone] [employer]    I have reviewed and updated the patient's Past Medical History, Social History, Family History and Medication List.    MEDICATIONS:  Current Outpatient Medications   Medication Sig Dispense Refill     acetaminophen (TYLENOL) 325 MG tablet Take 325 mg by mouth every 6 hours as needed for mild pain       albuterol (PROAIR HFA/PROVENTIL HFA/VENTOLIN HFA) 108 (90 Base) MCG/ACT  inhaler Inhale 2 puffs into the lungs every 4 hours as needed for shortness of breath / dyspnea or wheezing        alendronate (FOSAMAX) 70 MG tablet Take 70 mg by mouth once a week       Ascorbic Acid (VITAMIN C) 500 MG CAPS Take 500 mg by mouth every morning        aspirin (ASA) 81 MG tablet Take 81 mg by mouth daily        beclomethasone HFA (QVAR REDIHALER) 80 MCG/ACT inhaler Inhale 2 puffs into the lungs 2 times daily       calcium citrate-vitamin D (CALCIUM CITRATE + D3) 315-250 MG-UNIT TABS per tablet Take 2 tablets by mouth daily        cetirizine (ZYRTEC) 10 MG tablet Take 10 mg by mouth daily       digoxin (LANOXIN) 62.5 MCG tablet Take 1 tablet (62.5 mcg) by mouth daily 90 tablet 3     furosemide (LASIX) 20 MG tablet Take 2 tablets (40 mg) by mouth 2 times daily 360 tablet 3     gabapentin (NEURONTIN) 300 MG capsule Take 300 mg by mouth 3 times daily        hydroxychloroquine (PLAQUENIL) 200 MG tablet Take 200 mg by mouth daily        ipratropium (ATROVENT) 0.06 % nasal spray Spray 2 sprays into both nostrils 3 times daily        loperamide (IMODIUM) 2 MG capsule Take 2 mg by mouth as needed for diarrhea       Probiotic Product (PROBIOTIC ACIDOPHILUS BIOBEADS) CAPS Take 1 capsule by mouth 2 times daily        riociguat (ADEMPAS) 0.5 MG tablet Take by mouth 3 times daily Please make sure you are completing your monthly mandatory labs.     Goal of 2.5 mg three times a day, with dose increase of 0.5 mg three times a day every 2 weeks (SBP must be >95 mmHg and no signs or symptoms of hypotension to increase)    PA in Process, RX to be sent upon approval       Treprostinil (REMODULIN IJ) Dose: 30 ng/kg/min  Vial concentration: 1 mg/mL  Final concentration: 60,000 ng/mL  Dosing weight: 56.3 kg   Pump rate: 41 mL/day  Per patient on 9/29/2020       warfarin ANTICOAGULANT (COUMADIN) 3 MG tablet Take 1 to 2 tablets daily as directed by the Anticoagulation Clinic. (Patient taking differently: Take 3 mg PO on Tues  and Sat, and 4.5 mg all other days of the week) 170 tablet 1       ALLERGIES  Sulfa drugs      Self reported vitals:  Weight: 113 lbs (was as low as 110 lb last week); wt in clinic 11/9 117 lbs  /70  Pulse 75    Brief physical exam:  General: In no acute distress, upright and calm.  Eyes: No apparent redness or discharge.   Chest: No labored breathing, no cough during exam or audible wheezing.   Neuro: No obvious focal defects or tremors.    Psych: Alert and oriented. Does not appear anxious.     The rest of a comprehensive physical examination is deferred due to public health emergency video visit restrictions.       Most recent labs:   Results for ELAINA SUTTON (MRN [id number]) as of 11/17/2020 15:44   Ref. Range 11/9/2020 09:30 11/16/2020 10:51   Sodium Latest Ref Range: 133 - 144 mmol/L 140 134   Potassium Latest Ref Range: 3.4 - 5.3 mmol/L 4.2 3.5   Chloride Latest Ref Range: 94 - 109 mmol/L 106 99   Carbon Dioxide Latest Ref Range: 20 - 32 mmol/L 28 31   Urea Nitrogen Latest Ref Range: 7 - 30 mg/dL 39 (H) 38 (H)   Creatinine Latest Ref Range: 0.52 - 1.04 mg/dL 1.43 (H) 1.09 (H)   GFR Estimate Latest Ref Range: >60 mL/min/1.73_m2 35 (L) 49 (L)   GFR Estimate If Black Latest Ref Range: >60 mL/min/1.73_m2 41 (L) 56 (L)   Calcium Latest Ref Range: 8.5 - 10.1 mg/dL 9.0 9.9   Anion Gap Latest Ref Range: 3 - 14 mmol/L 5 4   Albumin Latest Ref Range: 3.4 - 5.0 g/dL 3.1 (L)    Protein Total Latest Ref Range: 6.8 - 8.8 g/dL 6.9    Bilirubin Total Latest Ref Range: 0.2 - 1.3 mg/dL 0.5    Alkaline Phosphatase Latest Ref Range: 40 - 150 U/L 108    ALT Latest Ref Range: 0 - 50 U/L 24    AST Latest Ref Range: 0 - 45 U/L 25    N-Terminal Pro Bnp Latest Ref Range: 0 - 450 pg/mL 6,581 (H) 5,238 (H)   Glucose Latest Ref Range: 70 - 99 mg/dL 82 104 (H)   WBC Latest Ref Range: 4.0 - 11.0 10e9/L 4.2 5.2   Hemoglobin Latest Ref Range: 11.7 - 15.7 g/dL 10.3 (L) 12.0   Hematocrit Latest Ref Range: 35.0 - 47.0 % 34.3 (L)  "39.3   Platelet Count Latest Ref Range: 150 - 450 10e9/L 226 241   RBC Count Latest Ref Range: 3.8 - 5.2 10e12/L 3.47 (L) 4.02   MCV Latest Ref Range: 78 - 100 fl 99 98   MCH Latest Ref Range: 26.5 - 33.0 pg 29.7 29.9   MCHC Latest Ref Range: 31.5 - 36.5 g/dL 30.0 (L) 30.5 (L)   RDW Latest Ref Range: 10.0 - 15.0 % 15.5 (H) 15.7 (H)         Primary PH cardiologist: Dr. Karolina Turcios      HPI:  Ms. Anderson is a pleasant 80 year old female with a PMHx including rheumatoid arthritis, hypertension, scoliosis, asthma, pulmonary MAC, breast cancer s/p chemotherapy with Adriamycin, Cytoxan, Taxol, and tamoxifen and status post left mastectomy. She also has a history of extensive bilateral pulmonary emboli and DVT in 1998 thought to be secondary to tamoxifen along with chemotherapy induced cardiomyopathy. 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 Although she had proximal disease, she was too high risk for surgical PTE. She is now on IV Remodulin therapy.      We have since been able to titrate her up to a goal of 30ng/kg/min complicated by transient side effects of leg pain, jaw pain, diarrhea and headaches. However, she reported overall improvement in her breathing.  Her weight has fluctuated, and to complicate matters slightly, this summer she she [place] her [place] and sometimes uses two different scales. Also, early this fall, she fell and tore a tendon in her knee which has been painful and swelling, due to her being on anticoagulation. Due to some renal concerns and lower BP, I recently lowered her lisinopril to 10mg (from 20mg) once daily.      In late Sept, she called our office to report a rapid weight gain of over almost 20 lbs and was directed to the ED. She was hospitalized from 9/29 to 10/6 for acute on chronic diastolic heart failure. She received an IV lasix gtt. Repeat echo showed EF 60-65%, moderate RV " dilation, and small effusion. She also had a repeat RHC (RA 5, RV 90/8, mPA 42, LUIZ 2.55/1.76) which showed some mild improvement from March of this year. Pulmonary angiogram showed  of the distal right A2, proximal A7 segment, severe stenosis of the right A8 segment with slow filling distally. There was possible stenosis of the proximal and mid right A10 segment, and no significant angiographic disease of the left lung segments. She was told that BPA may be a consideration in the future. Her PTA lasix was increased to 20mg BID, and her digoxin was continued. Her stay was complicated by JOSE G on CKD but renal function improved with diuresis. Dr. Turcios recommended increasing her  Remodulin dose vs adding Adempas. After further discussion, she chose the latter.     I saw Karen virtually in late October at which time we continued to increase her diuretics slightly. She had not yet received Adempas due to issues with high copay. She returned to see Dr. Turcios in clinic earlier this month. She was still awaiting Adempas shipment, and her diuretics were increased further to 40mg BID.     Today, we are doing a virtual visit for continued close follow up. She started her Adempas at 0.5mg TID last Thursday, so has been on this less than a week. She had some mild headaches initially, but says they were tolerable and Tylenol helped. She is pleased with the fluid she has lost with increase in Lasix last visit. Her weight went down as low as 110 lbs at one point, though today she reports 113 lbs. The swelling is improving, with this as well as leg wraps. She thinks her breathing is slowly improving as well. She denies any new dizziness or presyncope.     Labs done yesterday show improvement in renal function with escalation of diuretics. NT-proBNP remains elevated at 5,238, but this is down from 6,581 the week prior.        CURRENT PULMONARY HYPERTENSION REGIMEN:     PAH Rx: Remodulin 30ng/kg/min, Adempas 0.5mg TID       Diuretics: Lasix 40mg BID     Oxygen: None     Anticoagulation: warfarin  Indication: CTEPH        Assessment/Plan:     1. Chronic thromboembolic pulmonary hypertension.              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 Although she had proximal disease, she was felt to be too high risk for surgical PTE. She is currently on IV Remodulin therapy, at 30ng/kg/min. We have now added Adempas and thus far she is tolerating 0.5mg TID dose. Will continue to increase every 2 weeks to a goal of 2.5 mg 3 times a day.     --Weight is down ~5 lbs with increase in lasix last visit to 40mg BID which we will continue. Her renal function has improved with escalation of diuretics. NT-proBNP has trended down slightly. We may be able to push this further, but as she reports overall feeling better and concern of being on a high dose, I made no changes today.              --Continue lisinopril at 10mg daily at home. Her BP has been under good control on the lower dose. Continue to monitor renal function.              --Continue digoxin for RV support.               --Continue anticoagulation with warfarin for her CTEPH, though continue to monitor with her recent fall/leg injury. She currently follows at the INR clinic; INR goal has been lowered slightly to 2-2.5.               --Dr. Turcios did discuss possible balloon angioplasty with her last visit. She would like to try pulmonary balloon angioplasty if needed after maximizing Adempas therapy which he felt was reasonable.       Follow up plan: Return in December to see Dr. Turcios as already scheduled.         Video-Visit Details    Type of service:  Video Visit    Video Start Time: 1545  Video End Time: 1601    Originating Location (pt. Location): Home    Distant Location (provider location):  [facility]-HOME    Platform used for Video Visit: Sterling Marcum PA-C  [facility]  Pager [phone]        Please do not  hesitate to contact me if you have any questions/concerns.     Sincerely,     MAGEN Saeed

## 2020-11-17 NOTE — PROGRESS NOTES
CARDIOLOGY PH CLINIC VIDEO VISIT    Karen Anderson is a 80 year old female who is being evaluated via a billable video visit.      The patient has been notified of following:     This video visit will be conducted via a call between you and your physician/provider. We have found that certain health care needs can be provided without the need for an in-person physical exam.  This service lets us provide the care you need with a video conversation.  If a prescription is necessary we can send it directly to your pharmacy.  If lab work is needed we can place an order for that and you can then stop by our lab to have the test done at a later time.    Virtual visits are billed at different rates depending on your insurance coverage. During this emergency period, for some insurers they may be billed the same as an in-person visit.  Please reach out to your insurance provider with any questions.    If during the course of the call the physician/provider feels a video visit is not appropriate, you will not be charged for this service.      Physician has received verbal consent for a Video Visit from the patient? Yes    Patient would like the video invitation sent by: Text to cell phone: [phone] [facility]    I have reviewed and updated the patient's Past Medical History, Social History, Family History and Medication List.    MEDICATIONS:  Current Outpatient Medications   Medication Sig Dispense Refill     acetaminophen (TYLENOL) 325 MG tablet Take 325 mg by mouth every 6 hours as needed for mild pain       albuterol (PROAIR HFA/PROVENTIL HFA/VENTOLIN HFA) 108 (90 Base) MCG/ACT inhaler Inhale 2 puffs into the lungs every 4 hours as needed for shortness of breath / dyspnea or wheezing        alendronate (FOSAMAX) 70 MG tablet Take 70 mg by mouth once a week       Ascorbic Acid (VITAMIN C) 500 MG CAPS Take 500 mg by mouth every morning        aspirin (ASA) 81 MG tablet Take 81 mg by mouth daily        beclomethasone HFA  (QVAR REDIHALER) 80 MCG/ACT inhaler Inhale 2 puffs into the lungs 2 times daily       calcium citrate-vitamin D (CALCIUM CITRATE + D3) 315-250 MG-UNIT TABS per tablet Take 2 tablets by mouth daily        cetirizine (ZYRTEC) 10 MG tablet Take 10 mg by mouth daily       digoxin (LANOXIN) 62.5 MCG tablet Take 1 tablet (62.5 mcg) by mouth daily 90 tablet 3     furosemide (LASIX) 20 MG tablet Take 2 tablets (40 mg) by mouth 2 times daily 360 tablet 3     gabapentin (NEURONTIN) 300 MG capsule Take 300 mg by mouth 3 times daily        hydroxychloroquine (PLAQUENIL) 200 MG tablet Take 200 mg by mouth daily        ipratropium (ATROVENT) 0.06 % nasal spray Spray 2 sprays into both nostrils 3 times daily        loperamide (IMODIUM) 2 MG capsule Take 2 mg by mouth as needed for diarrhea       Probiotic Product (PROBIOTIC ACIDOPHILUS BIOBEADS) CAPS Take 1 capsule by mouth 2 times daily        riociguat (ADEMPAS) 0.5 MG tablet Take by mouth 3 times daily Please make sure you are completing your monthly mandatory labs.     Goal of 2.5 mg three times a day, with dose increase of 0.5 mg three times a day every 2 weeks (SBP must be >95 mmHg and no signs or symptoms of hypotension to increase)    PA in Process, RX to be sent upon approval       Treprostinil (REMODULIN IJ) Dose: 30 ng/kg/min  Vial concentration: 1 mg/mL  Final concentration: 60,000 ng/mL  Dosing weight: 56.3 kg   Pump rate: 41 mL/day  Per patient on 9/29/2020       warfarin ANTICOAGULANT (COUMADIN) 3 MG tablet Take 1 to 2 tablets daily as directed by the Anticoagulation Clinic. (Patient taking differently: Take 3 mg PO on Tues and Sat, and 4.5 mg all other days of the week) 170 tablet 1       ALLERGIES  Sulfa drugs      Self reported vitals:  Weight: 113 lbs (was as low as 110 lb last week); wt in clinic 11/9 117 lbs  /70  Pulse 75    Brief physical exam:  General: In no acute distress, upright and calm.  Eyes: No apparent redness or discharge.   Chest: No  labored breathing, no cough during exam or audible wheezing.   Neuro: No obvious focal defects or tremors.    Psych: Alert and oriented. Does not appear anxious.     The rest of a comprehensive physical examination is deferred due to public health emergency video visit restrictions.       Most recent labs:   Results for ELAINA SUTTON (MRN [id number]) as of 11/17/2020 15:44   Ref. Range 11/9/2020 09:30 11/16/2020 10:51   Sodium Latest Ref Range: 133 - 144 mmol/L 140 134   Potassium Latest Ref Range: 3.4 - 5.3 mmol/L 4.2 3.5   Chloride Latest Ref Range: 94 - 109 mmol/L 106 99   Carbon Dioxide Latest Ref Range: 20 - 32 mmol/L 28 31   Urea Nitrogen Latest Ref Range: 7 - 30 mg/dL 39 (H) 38 (H)   Creatinine Latest Ref Range: 0.52 - 1.04 mg/dL 1.43 (H) 1.09 (H)   GFR Estimate Latest Ref Range: >60 mL/min/1.73_m2 35 (L) 49 (L)   GFR Estimate If Black Latest Ref Range: >60 mL/min/1.73_m2 41 (L) 56 (L)   Calcium Latest Ref Range: 8.5 - 10.1 mg/dL 9.0 9.9   Anion Gap Latest Ref Range: 3 - 14 mmol/L 5 4   Albumin Latest Ref Range: 3.4 - 5.0 g/dL 3.1 (L)    Protein Total Latest Ref Range: 6.8 - 8.8 g/dL 6.9    Bilirubin Total Latest Ref Range: 0.2 - 1.3 mg/dL 0.5    Alkaline Phosphatase Latest Ref Range: 40 - 150 U/L 108    ALT Latest Ref Range: 0 - 50 U/L 24    AST Latest Ref Range: 0 - 45 U/L 25    N-Terminal Pro Bnp Latest Ref Range: 0 - 450 pg/mL 6,581 (H) 5,238 (H)   Glucose Latest Ref Range: 70 - 99 mg/dL 82 104 (H)   WBC Latest Ref Range: 4.0 - 11.0 10e9/L 4.2 5.2   Hemoglobin Latest Ref Range: 11.7 - 15.7 g/dL 10.3 (L) 12.0   Hematocrit Latest Ref Range: 35.0 - 47.0 % 34.3 (L) 39.3   Platelet Count Latest Ref Range: 150 - 450 10e9/L 226 241   RBC Count Latest Ref Range: 3.8 - 5.2 10e12/L 3.47 (L) 4.02   MCV Latest Ref Range: 78 - 100 fl 99 98   MCH Latest Ref Range: 26.5 - 33.0 pg 29.7 29.9   MCHC Latest Ref Range: 31.5 - 36.5 g/dL 30.0 (L) 30.5 (L)   RDW Latest Ref Range: 10.0 - 15.0 % 15.5 (H) 15.7 (H)  "        Primary PH cardiologist: Dr. Karolina Turcios      HPI:  Ms. Anderson is a pleasant 80 year old female with a PMHx including rheumatoid arthritis, hypertension, scoliosis, asthma, pulmonary MAC, breast cancer s/p chemotherapy with Adriamycin, Cytoxan, Taxol, and tamoxifen and status post left mastectomy. She also has a history of extensive bilateral pulmonary emboli and DVT in 1998 thought to be secondary to tamoxifen along with chemotherapy induced cardiomyopathy. 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 Although she had proximal disease, she was too high risk for surgical PTE. She is now on IV Remodulin therapy.      We have since been able to titrate her up to a goal of 30ng/kg/min complicated by transient side effects of leg pain, jaw pain, diarrhea and headaches. However, she reported overall improvement in her breathing.  Her weight has fluctuated, and to complicate matters slightly, this summer she she [place] her time [place] and sometimes uses two different scales. Also, early this fall, she fell and tore a tendon in her knee which has been painful and swelling, due to her being on anticoagulation. Due to some renal concerns and lower BP, I recently lowered her lisinopril to 10mg (from 20mg) once daily.      In late Sept, she called our office to report a rapid weight gain of over almost 20 lbs and was directed to the ED. She was hospitalized from 9/29 to 10/6 for acute on chronic diastolic heart failure. She received an IV lasix gtt. Repeat echo showed EF 60-65%, moderate RV dilation, and small effusion. She also had a repeat RHC (RA 5, RV 90/8, mPA 42, LUIZ 2.55/1.76) which showed some mild improvement from March of this year. Pulmonary angiogram showed  of the distal right A2, proximal A7 segment, severe stenosis of the right A8 segment with slow filling distally. There was possible stenosis of the " proximal and mid right A10 segment, and no significant angiographic disease of the left lung segments. She was told that BPA may be a consideration in the future. Her PTA lasix was increased to 20mg BID, and her digoxin was continued. Her stay was complicated by JOSE G on CKD but renal function improved with diuresis. Dr. Turcios recommended increasing her  Remodulin dose vs adding Adempas. After further discussion, she chose the latter.     I saw Karen virtually in late October at which time we continued to increase her diuretics slightly. She had not yet received Adempas due to issues with high copay. She returned to see Dr. Turcios in clinic earlier this month. She was still awaiting Adempas shipment, and her diuretics were increased further to 40mg BID.     Today, we are doing a virtual visit for continued close follow up. She started her Adempas at 0.5mg TID last Thursday, so has been on this less than a week. She had some mild headaches initially, but says they were tolerable and Tylenol helped. She is pleased with the fluid she has lost with increase in Lasix last visit. Her weight went down as low as 110 lbs at one point, though today she reports 113 lbs. The swelling is improving, with this as well as leg wraps. She thinks her breathing is slowly improving as well. She denies any new dizziness or presyncope.     Labs done yesterday show improvement in renal function with escalation of diuretics. NT-proBNP remains elevated at 5,238, but this is down from 6,581 the week prior.        CURRENT PULMONARY HYPERTENSION REGIMEN:     PAH Rx: Remodulin 30ng/kg/min, Adempas 0.5mg TID      Diuretics: Lasix 40mg BID     Oxygen: None     Anticoagulation: warfarin  Indication: CTEPH        Assessment/Plan:     1. Chronic thromboembolic pulmonary hypertension.              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 Although she had proximal disease, she was felt to be too high risk for surgical PTE. She is  currently on IV Remodulin therapy, at 30ng/kg/min. We have now added Adempas and thus far she is tolerating 0.5mg TID dose. Will continue to increase every 2 weeks to a goal of 2.5 mg 3 times a day.     --Weight is down ~5 lbs with increase in lasix last visit to 40mg BID which we will continue. Her renal function has improved with escalation of diuretics. NT-proBNP has trended down slightly. We may be able to push this further, but as she reports overall feeling better and concern of being on a high dose, I made no changes today.              --Continue lisinopril at 10mg daily at home. Her BP has been under good control on the lower dose. Continue to monitor renal function.              --Continue digoxin for RV support.               --Continue anticoagulation with warfarin for her CTEPH, though continue to monitor with her recent fall/leg injury. She currently follows at the INR clinic; INR goal has been lowered slightly to 2-2.5.               --Dr. Turcios did discuss possible balloon angioplasty with her last visit. She would like to try pulmonary balloon angioplasty if needed after maximizing Adempas therapy which he felt was reasonable.       Follow up plan: Return in December to see Dr. Turcios as already scheduled.         Video-Visit Details    Type of service:  Video Visit    Video Start Time: 1545  Video End Time: 1601    Originating Location (pt. Location): Home    Distant Location (provider location):  [facility]-HOME    Platform used for Video Visit: Sterling Marcum PA-C  [facility]  Pager [phone]

## 2020-11-20 ENCOUNTER — TELEPHONE (OUTPATIENT)
Dept: CARDIOLOGY | Facility: CLINIC | Age: 80
End: 2020-11-20

## 2020-11-20 DIAGNOSIS — I27.20 PULMONARY HYPERTENSION (H): Primary | ICD-10-CM

## 2020-11-20 NOTE — TELEPHONE ENCOUNTER
"Received Vm from PRIYA Arceo who spoke with patient today to assess if she should increase her Adempas dose.  patient is currently at 0.5mg TID, and based on patient's /62, 116lbs Pulse WNL she can increase dose to 1.0mg on 11/26/20, but she would like to discuss patient's side effects.  She also needs me to call [facility] to order new 1.0mg tabs,  ------------------------  Called and spoke with RN who states patient c/o being queezy, but not nauseous, exhausted, not sleeping, has some ankle edema and \"just doesn't feel well\", but is willing to continue.    Advised RN that patient had recent virtual visit with Shawna Marcum PA-C and it was patient continue to increase her dose.  RN asked that I call in a 30 day supply of the 1.0mg tabs, due to holidays things are due to be delayed and then she won't run out.  Agreed with plan and she provided me with [facility] phone number of [phone] for new Rx. Fabby Seymour RN on 11/20/2020 at 4:09 PM  ----------------------------  Left Message on [facility] pharmacist VM for Adempas 1.0mg TID qty:30 days R:1 per Dr. Turcios. Fabby Seymour RN on 11/20/2020 at 4:16 PM  "

## 2020-11-23 NOTE — TELEPHONE ENCOUNTER
Called and LM with [facility] line for a an increase on patient's Adempas to 1 mg TID. Kezia Arnett RN on 11/23/2020 at 10:01 AM

## 2020-11-25 ENCOUNTER — MEDICAL CORRESPONDENCE (OUTPATIENT)
Dept: HEALTH INFORMATION MANAGEMENT | Facility: CLINIC | Age: 80
End: 2020-11-25

## 2020-11-30 DIAGNOSIS — I27.20 PULMONARY HYPERTENSION (H): ICD-10-CM

## 2020-11-30 RX ORDER — WARFARIN SODIUM 3 MG/1
TABLET ORAL
Qty: 170 TABLET | Refills: 3 | Status: ON HOLD | OUTPATIENT
Start: 2020-11-30 | End: 2021-06-10

## 2020-12-03 ENCOUNTER — TELEPHONE (OUTPATIENT)
Dept: CARDIOLOGY | Facility: CLINIC | Age: 80
End: 2020-12-03

## 2020-12-03 DIAGNOSIS — R06.02 SOB (SHORTNESS OF BREATH): ICD-10-CM

## 2020-12-03 DIAGNOSIS — I27.20 PULMONARY HYPERTENSION (H): Primary | ICD-10-CM

## 2020-12-03 NOTE — TELEPHONE ENCOUNTER
Patient stated that her weight has steadily been increasing. Patient was 114 lb after talking with Shawna on 11/17. The day before Thanksgiving weight was up to 119 lb and now is at 120.5 lb. Advised that patient should take 60 mg Lasix this evening and 60 mg BID Friday and Saturday. If her weight starts to go down, she can go back to 40 mg BID on Sunday. If her weight has not decreased, to take 60 mg BID on Sunday. Patient will have labs drawn Monday and I will follow up on her weight. Patient verbalized understanding and did not have any further questions. Kezia Arnett RN on 12/3/2020 at 3:14 PM    BMP recheck labs faxed to (f) [phone], per patient's request. Will follow up Monday on weight and results. Kezia Arnett RN on 12/4/2020 at 7:38 AM

## 2020-12-04 ENCOUNTER — TELEPHONE (OUTPATIENT)
Dept: CARDIOLOGY | Facility: CLINIC | Age: 80
End: 2020-12-04

## 2020-12-04 NOTE — TELEPHONE ENCOUNTER
"Received call from PRIYA Arceo regarding patient's Adempas titration and some side effects the patient is experiencing on 1 mg TID. Advised that patient is having more episodes of SOB and weight gain. Up to 121 lbs today. Patient states it is harder for her to walk up the stairs without getting winded and her eyes having been \"hurting\" and watering a lot. /63 HR 83 RR 24. Patient wants to stay on 1 mg TID for longer. Kezia Arnett RN on 12/4/2020 at 1:15 PM    Spoke with Dr. Turcios who advised patient should go up to Lasix 80 mg BID until Monday. Okay to hold at Adempas 1 mg TID until next Friday when Marielos talks to the patient again. Kezia Arnett RN on 12/4/2020 at 1:41 PM    Confirmed plan with patient; will increase Lasix to 80 mg BID until Monday and has a lab appt scheduled for 12 PM. Patient did state that the side effects are not \"extreme\" as they were with her Remodulin titration, so she does not want to back off but just let her body adjust. Advised that I spoke with Marielos and she will call next Friday 12/11 to F/U and see if titrations can resume. Patient verbalized understanding and did not have any further questions. Kezia Arnett RN on 12/4/2020 at 1:51 PM    "

## 2020-12-04 NOTE — TELEPHONE ENCOUNTER
I spoke to Moris RITCHIE RN for Dr Haq regarding this message and she has already contacted the pt. I will forward this message to the PH team for any further review.     Edward FISHMAN

## 2020-12-04 NOTE — TELEPHONE ENCOUNTER
"See telephone encounter from 12/4 \"Adempas Titration F/U\" for more details. Kezia Arnett RN on 12/4/2020 at 1:52 PM    [facility]    Phone Message    May a detailed message be left on voicemail: yes     Reason for Call: Other: rafat, home care, is calling to update care team that pt had a pulse of 88, and BP of 112/61, weight has gone from 114 to 121, she is complaning of difficulty breathing with activity, edema going into her genitals, light headache in the morning, eyes have been hurting, should they titrate lasiks up to the next dose? please call rafat thanks     Action Taken: Message routed to:  [facility] heart    Travel Screening: Not Applicable                                                                      "

## 2020-12-07 ENCOUNTER — ANTICOAGULATION THERAPY VISIT (OUTPATIENT)
Dept: ANTICOAGULATION | Facility: CLINIC | Age: 80
End: 2020-12-07

## 2020-12-07 DIAGNOSIS — I27.20 PULMONARY HYPERTENSION (H): ICD-10-CM

## 2020-12-07 DIAGNOSIS — Z79.01 LONG TERM CURRENT USE OF ANTICOAGULANT THERAPY: ICD-10-CM

## 2020-12-07 DIAGNOSIS — I27.24 CTEPH (CHRONIC THROMBOEMBOLIC PULMONARY HYPERTENSION) (H): ICD-10-CM

## 2020-12-07 DIAGNOSIS — R06.02 SOB (SHORTNESS OF BREATH): ICD-10-CM

## 2020-12-07 LAB
ERYTHROCYTE [DISTWIDTH] IN BLOOD BY AUTOMATED COUNT: 16.1 % (ref 10–15)
HCT VFR BLD AUTO: 38.2 % (ref 35–47)
HGB BLD-MCNC: 11.5 G/DL (ref 11.7–15.7)
INR PPP: 2.1 (ref 0.86–1.14)
MCH RBC QN AUTO: 29.2 PG (ref 26.5–33)
MCHC RBC AUTO-ENTMCNC: 30.1 G/DL (ref 31.5–36.5)
MCV RBC AUTO: 97 FL (ref 78–100)
NT-PROBNP SERPL-MCNC: 7780 PG/ML (ref 0–450)
PLATELET # BLD AUTO: 205 10E9/L (ref 150–450)
RBC # BLD AUTO: 3.94 10E12/L (ref 3.8–5.2)
WBC # BLD AUTO: 4.6 10E9/L (ref 4–11)

## 2020-12-07 PROCEDURE — 83880 ASSAY OF NATRIURETIC PEPTIDE: CPT | Performed by: INTERNAL MEDICINE

## 2020-12-07 PROCEDURE — 36415 COLL VENOUS BLD VENIPUNCTURE: CPT | Performed by: INTERNAL MEDICINE

## 2020-12-07 PROCEDURE — 80053 COMPREHEN METABOLIC PANEL: CPT | Performed by: INTERNAL MEDICINE

## 2020-12-07 PROCEDURE — 85610 PROTHROMBIN TIME: CPT | Performed by: INTERNAL MEDICINE

## 2020-12-07 PROCEDURE — 85027 COMPLETE CBC AUTOMATED: CPT | Performed by: INTERNAL MEDICINE

## 2020-12-07 NOTE — TELEPHONE ENCOUNTER
Spoke with patient and she states that her current weight is 119.5 lb. Says that it is coming down steadily and will continue to stay on the higher dose until her labs result. Patient does complain about some dizziness when she is walking around; advised that this could be a side effect of Adempas. Patient does take her BP daily have not dropped below SBP of 100. Asked that she take her time when she changes positions and to monitor O2 saturations when exerting herself. Patient verbalized understanding and will readdress these concerns with Dr. Turcios next week. Kezia Arnett RN on 12/7/2020 at 4:05 PM

## 2020-12-07 NOTE — PROGRESS NOTES
ANTICOAGULATION FOLLOW-UP CLINIC VISIT    Patient Name:  Karen Anderson  Date:  2020  Contact Type:  Telephone    SUBJECTIVE:         OBJECTIVE    Recent labs: (last 7 days)     20  1201   INR 2.10*       ASSESSMENT / PLAN  No question data found.  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 OK to speak with spouse, Don.  [facility] SPEAK IN TABLETS (Has 3mg Tabs) 10/23/20:  new goal range is 2 - 2.5  [facility] in [place] and will need to fax orders P [phone] F [phone]      Anticoagulation Care Providers     Provider Role Specialty Phone number    Osiris Luong MD Referring Cardiovascular Disease [phone]    Karolina Turcios MD Referring Cardiovascular Disease [phone]            See the Encounter Report to view Anticoagulation Flowsheet and Dosing Calendar (Go to Encounters tab in chart review, and find the Anticoagulation Therapy Visit)    Spoke with patient.     Brittany Kirk RN

## 2020-12-08 ENCOUNTER — TELEPHONE (OUTPATIENT)
Dept: CARDIOLOGY | Facility: CLINIC | Age: 80
End: 2020-12-08

## 2020-12-08 DIAGNOSIS — I27.20 PULMONARY HYPERTENSION (H): Primary | ICD-10-CM

## 2020-12-08 DIAGNOSIS — R06.02 SOB (SHORTNESS OF BREATH): ICD-10-CM

## 2020-12-08 DIAGNOSIS — R60.0 LOCALIZED EDEMA: ICD-10-CM

## 2020-12-08 LAB
ALBUMIN SERPL-MCNC: 3.4 G/DL (ref 3.4–5)
ALP SERPL-CCNC: 147 U/L (ref 40–150)
ALT SERPL W P-5'-P-CCNC: 27 U/L (ref 0–50)
ANION GAP SERPL CALCULATED.3IONS-SCNC: 7 MMOL/L (ref 3–14)
AST SERPL W P-5'-P-CCNC: 32 U/L (ref 0–45)
BILIRUB SERPL-MCNC: 0.6 MG/DL (ref 0.2–1.3)
BUN SERPL-MCNC: 29 MG/DL (ref 7–30)
CALCIUM SERPL-MCNC: 9.4 MG/DL (ref 8.5–10.1)
CHLORIDE SERPL-SCNC: 102 MMOL/L (ref 94–109)
CO2 SERPL-SCNC: 31 MMOL/L (ref 20–32)
CREAT SERPL-MCNC: 1.11 MG/DL (ref 0.52–1.04)
GFR SERPL CREATININE-BSD FRML MDRD: 47 ML/MIN/{1.73_M2}
GLUCOSE SERPL-MCNC: 96 MG/DL (ref 70–99)
POTASSIUM SERPL-SCNC: 3 MMOL/L (ref 3.4–5.3)
PROT SERPL-MCNC: 7.8 G/DL (ref 6.8–8.8)
SODIUM SERPL-SCNC: 140 MMOL/L (ref 133–144)

## 2020-12-08 RX ORDER — FUROSEMIDE 20 MG
80 TABLET ORAL 2 TIMES DAILY
Qty: 360 TABLET | Refills: 3 | COMMUNITY
Start: 2020-12-08 | End: 2020-12-16

## 2020-12-08 RX ORDER — POTASSIUM CHLORIDE 1500 MG/1
20 TABLET, EXTENDED RELEASE ORAL DAILY
Qty: 90 TABLET | Refills: 3 | Status: ON HOLD | OUTPATIENT
Start: 2020-12-08 | End: 2021-01-14

## 2020-12-08 NOTE — TELEPHONE ENCOUNTER
Date: 12/8/2020    Time of Call: 12:50 PM     Diagnosis: Hypokalemia     [ TORB ] Ordering provider: Dr. Turcios  Order: K-dur 20 mEq daily     Order received by: Elan Arnett RN     Follow-up/additional notes: Patient will have labs redrawn 12/14 when in clinic    Spoke with patient regarding weight and lab results. Advised that we will start her on a potassium supplement of 20 mEq (1 tab) daily. Current weight 123 lb; patient did admit she had chinese take out yesterday. Asked patient to cut out salt from her diet/no take out until we see her next Monday, and will follow up on Friday regarding weight again. Patient verbalized understanding and did not have any further questions. Kezia Arnett RN on 12/8/2020 at 1:04 PM

## 2020-12-11 ENCOUNTER — TELEPHONE (OUTPATIENT)
Dept: CARDIOLOGY | Facility: CLINIC | Age: 80
End: 2020-12-11

## 2020-12-11 NOTE — TELEPHONE ENCOUNTER
"Spoke with Marielos regarding patient's GI concerns. Patient's VSS but has been having issues of diarrhea and bloating. Patient uses Imodium occasionally. Advised I will reach out to the patient to follow up on her symptoms, but will call in a prescription for Adempas 1.5 mg TID. Marielos will follow up on December 23rd. Kezia Arnett RN on 12/11/2020 at 1:44 PM    Patient is having issues of abdominal distention and \"balls\" of stool x3 today. Patient's weight stale at 120 lb today. Patient is concerned about dehydration and wants to go down to Lasix 60 mg BID over the weekend. She states that if she does not continue to take Imodium, she has episodes of incontinence and would not be able to leave her home/come to clinic. Advised that she can decrease her Lasix dose and we will reevaluate on Monday in clinic. Kezia Arnett RN on 12/11/2020 at 2:53 PM     Health Call Center    Phone Message    May a detailed message be left on voicemail: no     Reason for Call: Other: Marielos bill would like a call back back to discuss pt vital as well as health update as she is apart of the program pt is in and would like to discuss . PLease reach out asap     Action Taken: Message routed to:  [facility] Cardio    Travel Screening: Not Applicable                                                                      "

## 2020-12-14 ENCOUNTER — VIRTUAL VISIT (OUTPATIENT)
Dept: CARDIOLOGY | Facility: CLINIC | Age: 80
End: 2020-12-14
Attending: INTERNAL MEDICINE
Payer: COMMERCIAL

## 2020-12-14 DIAGNOSIS — R06.02 SOB (SHORTNESS OF BREATH): ICD-10-CM

## 2020-12-14 DIAGNOSIS — R19.7 DIARRHEA, UNSPECIFIED TYPE: Primary | ICD-10-CM

## 2020-12-14 DIAGNOSIS — I27.20 PULMONARY HYPERTENSION (H): Primary | ICD-10-CM

## 2020-12-14 DIAGNOSIS — I27.20 PULMONARY HYPERTENSION (H): ICD-10-CM

## 2020-12-14 PROCEDURE — 99215 OFFICE O/P EST HI 40 MIN: CPT | Mod: 95 | Performed by: INTERNAL MEDICINE

## 2020-12-14 RX ORDER — LOPERAMIDE HCL 2 MG
2 CAPSULE ORAL DAILY
Qty: 90 CAPSULE | Refills: 3 | Status: SHIPPED | OUTPATIENT
Start: 2020-12-14 | End: 2021-02-05

## 2020-12-14 NOTE — PROGRESS NOTES
"Karen Anderson is a 80 year old female who is being evaluated via a billable telephone visit.      The patient has been notified of following:     \"This telephone visit will be conducted via a call between you and your physician/provider. We have found that certain health care needs can be provided without the need for a physical exam.  This service lets us provide the care you need with a short phone conversation.  If a prescription is necessary we can send it directly to your pharmacy.  If lab work is needed we can place an order for that and you can then stop by our lab to have the test done at a later time.    Telephone visits are billed at different rates depending on your insurance coverage. During this emergency period, for some insurers they may be billed the same as an in-person visit.  Please reach out to your insurance provider with any questions.    If during the course of the call the physician/provider feels a telephone visit is not appropriate, you will not be charged for this service.\"    Patient has given verbal consent for Telephone visit?  Yes    What phone number would you like to be contacted at? [phone]     How would you like to obtain your AVS? Alayna    Vitals - Patient Reported  Weight (Patient Reported): 54.9 kg (121 lb)  Height (Patient Reported): 147.3 cm (4' 10\")  BMI (Based on Pt Reported Ht/Wt): 25.29  Pain Score: No Pain (0)(No SOB)    Vitals were taken and medication reconciled.    DEO Macario  12:49 PM    Service Date: 2020    Shaneka Forde MD  [address]  [place] [address]    Clint Abernathy MD  [address]  [place] [address]    Noam Jones MD  [address]  [place] [address]    Carlos Carter MD  [address]  [place] [address]      RE:  Karen Anderson   MRN:  [id number]  :  1940      Dear Josemanuel Ha, Karen, and Raul:    We had the pleasure of seeing " Karen Anderson at the [facility] for follow-up. As you know, Ms. Anderson is a 80-year-old female with chronic thromboembolic pulmonary hypertension who is currently on combination therapy with IV treprostinil at 30 ng/kg/min and Adempas 1 mg 3 times a day.  She returns today for follow-up.    Ms. Kwon has been having diarrhea for the last 2 days after she stopped taking the Imodium.  She was requested by her clinic to stop taking the Imodium as she had hemorrhoids and also had questionable constipation.  She has been having 10-15 stools in the last 24 hours.  She has mucus but no blood.  She has no abdominal pain.  She has taken 2 doses of Imodium in the last 12 hours and the frequency seems to be decreasing.    She has no shortness of breath at rest.  No significant change in her exertional shortness of breath.  She continues to have lower extremity swelling.  A good weight for her is around 115 to 117 pounds.  She is currently 121 pounds.  She has been on Lasix 80 mg twice daily except over the weekend she has taken only 60 mg twice a day.  Her dose was decreased over the weekend with a concern for dehydration.  She has no chest pain or chest pressure.  She has not had any exertional presyncope or syncope.    Her lower extremity/knee hematoma has improved significantly.  She has a physical therapist who comes and helps her with wrapping her legs as well as home therapy.  She is able to ambulate by herself.  She has not used any cane or walker in the recent past.      PAST MEDICAL HISTORY:  Past Medical History:   Diagnosis Date     Asthma      Breast cancer (H)      Hypertension      Mycobacterium avium complex (H)      Pulmonary embolism (H)      Pulmonary hypertension (H)      Rheumatoid arthritis (H)      Scoliosis        PAST SURGICAL HISTORY:  Past Surgical History:   Procedure Laterality Date     CV PULMONARY ANGIOGRAM N/A 10/5/2020     CV RIGHT HEART  CATH N/A 03/04/2020     CV RIGHT HEART CATH N/A 10/5/2020     IR CVC TUNNEL PLACEMENT > 5 YRS OF AGE  03/09/2020     MASTECTOMY Left      PICC SINGLE LUMEN PLACEMENT Right 03/04/2020     PICC TRIPLE LUMEN PLACEMENT Right 10/02/2020       CURRENT MEDICATIONS:  Current Outpatient Medications   Medication Sig     acetaminophen (TYLENOL) 325 MG tablet Take 325 mg by mouth every 6 hours as needed for mild pain     albuterol (PROAIR HFA/PROVENTIL HFA/VENTOLIN HFA) 108 (90 Base) MCG/ACT inhaler Inhale 2 puffs into the lungs every 4 hours as needed for shortness of breath / dyspnea or wheezing      alendronate (FOSAMAX) 70 MG tablet Take 70 mg by mouth once a week     Ascorbic Acid (VITAMIN C) 500 MG CAPS Take 500 mg by mouth every morning      aspirin (ASA) 81 MG tablet Take 81 mg by mouth daily      beclomethasone HFA (QVAR REDIHALER) 80 MCG/ACT inhaler Inhale 2 puffs into the lungs 2 times daily     calcium citrate-vitamin D (CALCIUM CITRATE + D3) 315-250 MG-UNIT TABS per tablet Take 2 tablets by mouth daily      cetirizine (ZYRTEC) 10 MG tablet Take 10 mg by mouth daily     digoxin (LANOXIN) 62.5 MCG tablet Take 1 tablet (62.5 mcg) by mouth daily     furosemide (LASIX) 20 MG tablet Take 4 tablets (80 mg) by mouth 2 times daily     gabapentin (NEURONTIN) 300 MG capsule Take 300 mg by mouth 3 times daily      hydroxychloroquine (PLAQUENIL) 200 MG tablet Take 200 mg by mouth daily      ipratropium (ATROVENT) 0.06 % nasal spray Spray 2 sprays into both nostrils 3 times daily      loperamide (IMODIUM) 2 MG capsule Take 2 mg by mouth as needed for diarrhea     potassium chloride ER (KLOR-CON M) 20 MEQ CR tablet Take 1 tablet (20 mEq) by mouth daily     Probiotic Product (PROBIOTIC ACIDOPHILUS BIOBEADS) CAPS Take 1 capsule by mouth 2 times daily      riociguat (ADEMPAS) 0.5 MG tablet Take 2 tablets (1 mg) by mouth 3 times daily Goal of 2.5 mg three times a day, with dose increase of 0.5 mg three times a day every 2 weeks (SBP  must be >95 mmHg and no signs or symptoms of hypotension to increase)     Treprostinil (REMODULIN IJ) Dose: 30 ng/kg/min  Vial concentration: 1 mg/mL  Final concentration: 60,000 ng/mL  Dosing weight: 56.3 kg   Pump rate: 41 mL/day  Per patient on 9/29/2020     warfarin ANTICOAGULANT (COUMADIN) 3 MG tablet Take 3 mg PO on Mon, Wed, Fri and Sat, and 4.5 mg all other days of the week     No current facility-administered medications for this visit.      ROS:   10 point ROS negative except as discussed in above HPI.    EXAM:  Exam could not be done due to the telephone nature of the visit.    Labs:  CBC RESULTS:   Recent Labs   Lab Test 12/07/20  1201   WBC 4.6   RBC 3.94   HGB 11.5*   HCT 38.2   MCV 97   MCH 29.2   MCHC 30.1*   RDW 16.1*        Recent Labs   Lab Test 12/07/20  1201 11/16/20  1051    134   POTASSIUM 3.0* 3.5   CHLORIDE 102 99   CO2 31 31   ANIONGAP 7 4   GLC 96 104*   BUN 29 38*   CR 1.11* 1.09*   ESTEFANÍA 9.4 9.9     Liver Function Studies -   Recent Labs   Lab Test 12/07/20  1201   PROTTOTAL 7.8   ALBUMIN 3.4   BILITOTAL 0.6   ALKPHOS 147   AST 32   ALT 27     No results found for: NTBNPI  Lab Results   Component Value Date    NTBNP 7,780 (H) 12/07/2020     Echo (09/2020)  Global and regional left ventricular function is normal with an EF of 60-65%.  Moderate right ventricular dilation is present.  Global right ventricular function is moderately reduced.  Right ventricular systolic pressure is 82mmHg above the right atrial pressure.  Ascending aorta 3.8 cm.  Ascending aortic index 25.3mm/m2,severe dilation for BSA of 1.5m2.  IVC diameter >2.1 cm collapsing <50% with sniff suggests a high RA pressure estimated at 15 mmHg or greater.  Small circumferential pericardial effusion is present without any hemodynamic significance.  There has been no change.    6MWT (03/2020  He had a 6-minute walk test where she walked only 164 m.  On 2 L of oxygen she dropped to 77%.    RHC (03/2020)  Right heart  catheterization revealed an RA pressure of 10 mmHg, RV of 98 over 11 mmHg, PA of 98/30 with a mean of 52 mmHg, pulmonary capillary wedge pressure of 15 mmHg, PA saturation of 54.7%, thermodilution cardiac output of 2.3 with an index of 1.4, and PVR 16.08 MARIN.    RHC (09/2020)  RA 5  RV 90/8  PA 90/20 (42)  PCWP 11  PA% 59%  CO/CI 3/2.1  PVR 10 MARIN      Assessment and Plan:     Ms. Karen Anderson is a 80-year-old female with chronic thromboembolic pulmonary hypertension who is currently on IV Remodulin therapy.  She returns today for follow-up. She is currently on 30 ng/kg/min of IV Remodulin therapy and Adempas 1.5 mg tid.    1. Chronic thromboembolic pulmonary hypertension -while she has operable disease, she is not  a surgical candidate due to comorbidities including age and frailty.  Currently being managed with medical therapy      She is currently on IV treprostinil 30 ng/kg/min.      Up titration of her Adempas has been slow due to borderline low blood pressure.  She is currently at 1 mg 3 times a day.  She is scheduled to increase it to 1.5 mg 3 times a day this week.  Her blood pressure seems to be better now.  She is off of her lisinopril.  I recommend her to continue to increase it every 2 weeks to maximum 2.5 g 3 times a day.      She continues to struggle with volume overload from right heart failure.  I recommend her to increase her Lasix back to 80 mg twice a day.    We will continue to maintain a low INR goal of 2-2.5 given her recent left knee hematoma.  She is on digoxin for right ventricular support.    We will also consider starting her on macitentan after she reaches a maximum tolerated dose of Adempas.    We also discussed the option of increasing her Remodulin, however she would like to hold off on this given the side effects.    We will consider balloon pulmonary angioplasty after she maximizes on Adempas.    I discussed the option of pulmonary balloon angioplasty.  Her most recent  pulmonary angiogram showed lesions that can be treated with pulmonary balloon angioplasty especially in her right lower lobe.  We discussed 1% risk of fatal complication including mechanical reperfusion edema requiring mechanical ventilation and death.  We also discussed 5 to 10% risk of hemoptysis.  Her risk is slightly higher given her age and high PA pressure.  She would like to try pulmonary balloon angioplasty if needed after maximizing Adempas therapy which I think is reasonable.     2.  Diarrhea    It is likely related to remodeling.  She was feeling reasonably better on daily Imodium.  I recommend her to take Imodium 2 mg every 6 hours as needed for the next 48 hours to get the diarrhea under control and then take one 2 mg tablets daily for for maintenance.  We will also check stool for ova parasite and C. difficile to make sure that there is no infectious etiology.    I recommend her to return to see our nurse practitioner next week.  We will also check a chemistry to follow-up on her renal function.    It was a pleasure seeing Karen Anderson at the [facility].  Please contact us with any questions or concerns that you may have.    Sincerely,    Karolina Turcios MD   [facility]  Section of Advanced Heart Failure   Cardiovascular Division  [facility]   [phone]

## 2020-12-14 NOTE — LETTER
"12/14/2020      RE: Karen Anderson  [address]  [facility] [address]       Dear Colleague,    Thank you for the opportunity to participate in the care of your patient, Karen Anderson, at the [facility]. Please see a copy of my visit note below.    Karen Anderson is a 80 year old female who is being evaluated via a billable telephone visit.      The patient has been notified of following:     \"This telephone visit will be conducted via a call between you and your physician/provider. We have found that certain health care needs can be provided without the need for a physical exam.  This service lets us provide the care you need with a short phone conversation.  If a prescription is necessary we can send it directly to your pharmacy.  If lab work is needed we can place an order for that and you can then stop by our lab to have the test done at a later time.    Telephone visits are billed at different rates depending on your insurance coverage. During this emergency period, for some insurers they may be billed the same as an in-person visit.  Please reach out to your insurance provider with any questions.    If during the course of the call the physician/provider feels a telephone visit is not appropriate, you will not be charged for this service.\"    Patient has given verbal consent for Telephone visit?  Yes    What phone number would you like to be contacted at? [phone]     How would you like to obtain your AVS? MyChart    Vitals - Patient Reported  Weight (Patient Reported): 54.9 kg (121 lb)  Height (Patient Reported): 147.3 cm (4' 10\")  BMI (Based on Pt Reported Ht/Wt): 25.29  Pain Score: No Pain (0)(No SOB)    Vitals were taken and medication reconciled.    DEO Macario  12:49 PM    Service Date: December 14th, 2020    Shaneka Forde MD  [address]  [place] [address]    Clint Abernathy MD  [address]  [place] [address]    Noam Jones MD  [address]  [place] [address]    Carlos Carter MD  [address]  [place] [address]      RE:  Karen Anderson   MRN:  [id number]  :  1940      Dear Josemanuel Ha, Karen, and Raul:    We had the pleasure of seeing Karen Anderson at the [facility] for follow-up. As you know, Ms. Anderson is a 80-year-old female with chronic thromboembolic pulmonary hypertension who is currently on combination therapy with IV treprostinil at 30 ng/kg/min and Adempas 1 mg 3 times a day.  She returns today for follow-up.    Ms. Kwon has been having diarrhea for the last 2 days after she stopped taking the Imodium.  She was requested by her clinic to stop taking the Imodium as she had hemorrhoids and also had questionable constipation.  She has been having 10-15 stools in the last 24 hours.  She has mucus but no blood.  She has no abdominal pain.  She has taken 2 doses of Imodium in the last 12 hours and the frequency seems to be decreasing.    She has no shortness of breath at rest.  No significant change in her exertional shortness of breath.  She continues to have lower extremity swelling.  A good weight for her is around 115 to 117 pounds.  She is currently 121 pounds.  She has been on Lasix 80 mg twice daily except over the weekend she has taken only 60 mg twice a day.  Her dose was decreased over the weekend with a concern for dehydration.  She has no chest pain or chest pressure.  She has not had any exertional presyncope or syncope.    Her lower extremity/knee hematoma has improved significantly.  She has a physical therapist who comes and helps her with wrapping her legs as well as home therapy.  She is able to ambulate by herself.  She has not used any cane or walker in the recent past.      PAST MEDICAL HISTORY:  Past Medical History:   Diagnosis  Date     Asthma      Breast cancer (H)      Hypertension      Mycobacterium avium complex (H)      Pulmonary embolism (H)      Pulmonary hypertension (H)      Rheumatoid arthritis (H)      Scoliosis        PAST SURGICAL HISTORY:  Past Surgical History:   Procedure Laterality Date     CV PULMONARY ANGIOGRAM N/A 10/5/2020     CV RIGHT HEART CATH N/A 03/04/2020     CV RIGHT HEART CATH N/A 10/5/2020     IR CVC TUNNEL PLACEMENT > 5 YRS OF AGE  03/09/2020     MASTECTOMY Left      PICC SINGLE LUMEN PLACEMENT Right 03/04/2020     PICC TRIPLE LUMEN PLACEMENT Right 10/02/2020       CURRENT MEDICATIONS:  Current Outpatient Medications   Medication Sig     acetaminophen (TYLENOL) 325 MG tablet Take 325 mg by mouth every 6 hours as needed for mild pain     albuterol (PROAIR HFA/PROVENTIL HFA/VENTOLIN HFA) 108 (90 Base) MCG/ACT inhaler Inhale 2 puffs into the lungs every 4 hours as needed for shortness of breath / dyspnea or wheezing      alendronate (FOSAMAX) 70 MG tablet Take 70 mg by mouth once a week     Ascorbic Acid (VITAMIN C) 500 MG CAPS Take 500 mg by mouth every morning      aspirin (ASA) 81 MG tablet Take 81 mg by mouth daily      beclomethasone HFA (QVAR REDIHALER) 80 MCG/ACT inhaler Inhale 2 puffs into the lungs 2 times daily     calcium citrate-vitamin D (CALCIUM CITRATE + D3) 315-250 MG-UNIT TABS per tablet Take 2 tablets by mouth daily      cetirizine (ZYRTEC) 10 MG tablet Take 10 mg by mouth daily     digoxin (LANOXIN) 62.5 MCG tablet Take 1 tablet (62.5 mcg) by mouth daily     furosemide (LASIX) 20 MG tablet Take 4 tablets (80 mg) by mouth 2 times daily     gabapentin (NEURONTIN) 300 MG capsule Take 300 mg by mouth 3 times daily      hydroxychloroquine (PLAQUENIL) 200 MG tablet Take 200 mg by mouth daily      ipratropium (ATROVENT) 0.06 % nasal spray Spray 2 sprays into both nostrils 3 times daily      loperamide (IMODIUM) 2 MG capsule Take 2 mg by mouth as needed for diarrhea     potassium chloride ER (KLOR-CON  M) 20 MEQ CR tablet Take 1 tablet (20 mEq) by mouth daily     Probiotic Product (PROBIOTIC ACIDOPHILUS BIOBEADS) CAPS Take 1 capsule by mouth 2 times daily      riociguat (ADEMPAS) 0.5 MG tablet Take 2 tablets (1 mg) by mouth 3 times daily Goal of 2.5 mg three times a day, with dose increase of 0.5 mg three times a day every 2 weeks (SBP must be >95 mmHg and no signs or symptoms of hypotension to increase)     Treprostinil (REMODULIN IJ) Dose: 30 ng/kg/min  Vial concentration: 1 mg/mL  Final concentration: 60,000 ng/mL  Dosing weight: 56.3 kg   Pump rate: 41 mL/day  Per patient on 9/29/2020     warfarin ANTICOAGULANT (COUMADIN) 3 MG tablet Take 3 mg PO on Mon, Wed, Fri and Sat, and 4.5 mg all other days of the week     No current facility-administered medications for this visit.      ROS:   10 point ROS negative except as discussed in above HPI.    EXAM:  Exam could not be done due to the telephone nature of the visit.    Labs:  CBC RESULTS:   Recent Labs   Lab Test 12/07/20  1201   WBC 4.6   RBC 3.94   HGB 11.5*   HCT 38.2   MCV 97   MCH 29.2   MCHC 30.1*   RDW 16.1*        Recent Labs   Lab Test 12/07/20  1201 11/16/20  1051    134   POTASSIUM 3.0* 3.5   CHLORIDE 102 99   CO2 31 31   ANIONGAP 7 4   GLC 96 104*   BUN 29 38*   CR 1.11* 1.09*   ESTEFANÍA 9.4 9.9     Liver Function Studies -   Recent Labs   Lab Test 12/07/20  1201   PROTTOTAL 7.8   ALBUMIN 3.4   BILITOTAL 0.6   ALKPHOS 147   AST 32   ALT 27     No results found for: NTBNPI  Lab Results   Component Value Date    NTBNP 7,780 (H) 12/07/2020     Echo (09/2020)  Global and regional left ventricular function is normal with an EF of 60-65%.  Moderate right ventricular dilation is present.  Global right ventricular function is moderately reduced.  Right ventricular systolic pressure is 82mmHg above the right atrial pressure.  Ascending aorta 3.8 cm.  Ascending aortic index 25.3mm/m2,severe dilation for BSA of 1.5m2.  IVC diameter >2.1 cm collapsing  <50% with sniff suggests a high RA pressure estimated at 15 mmHg or greater.  Small circumferential pericardial effusion is present without any hemodynamic significance.  There has been no change.    6MWT (03/2020  He had a 6-minute walk test where she walked only 164 m.  On 2 L of oxygen she dropped to 77%.    RHC (03/2020)  Right heart catheterization revealed an RA pressure of 10 mmHg, RV of 98 over 11 mmHg, PA of 98/30 with a mean of 52 mmHg, pulmonary capillary wedge pressure of 15 mmHg, PA saturation of 54.7%, thermodilution cardiac output of 2.3 with an index of 1.4, and PVR 16.08 MARIN.    RHC (09/2020)  RA 5  RV 90/8  PA 90/20 (42)  PCWP 11  PA% 59%  CO/CI 3/2.1  PVR 10 MARIN      Assessment and Plan:     Ms. Karen Anderson is a 80-year-old female with chronic thromboembolic pulmonary hypertension who is currently on IV Remodulin therapy.  She returns today for follow-up. She is currently on 30 ng/kg/min of IV Remodulin therapy and Adempas 1.5 mg tid.    1. Chronic thromboembolic pulmonary hypertension -while she has operable disease, she is not  a surgical candidate due to comorbidities including age and frailty.  Currently being managed with medical therapy      She is currently on IV treprostinil 30 ng/kg/min.      Up titration of her Adempas has been slow due to borderline low blood pressure.  She is currently at 1 mg 3 times a day.  She is scheduled to increase it to 1.5 mg 3 times a day this week.  Her blood pressure seems to be better now.  She is off of her lisinopril.  I recommend her to continue to increase it every 2 weeks to maximum 2.5 g 3 times a day.      She continues to struggle with volume overload from right heart failure.  I recommend her to increase her Lasix back to 80 mg twice a day.    We will continue to maintain a low INR goal of 2-2.5 given her recent left knee hematoma.  She is on digoxin for right ventricular support.    We will also consider starting her on macitentan after she  reaches a maximum tolerated dose of Adempas.    We also discussed the option of increasing her Remodulin, however she would like to hold off on this given the side effects.    We will consider balloon pulmonary angioplasty after she maximizes on Adempas.    I discussed the option of pulmonary balloon angioplasty.  Her most recent pulmonary angiogram showed lesions that can be treated with pulmonary balloon angioplasty especially in her right lower lobe.  We discussed 1% risk of fatal complication including mechanical reperfusion edema requiring mechanical ventilation and death.  We also discussed 5 to 10% risk of hemoptysis.  Her risk is slightly higher given her age and high PA pressure.  She would like to try pulmonary balloon angioplasty if needed after maximizing Adempas therapy which I think is reasonable.     2.  Diarrhea    It is likely related to remodeling.  She was feeling reasonably better on daily Imodium.  I recommend her to take Imodium 2 mg every 6 hours as needed for the next 48 hours to get the diarrhea under control and then take one 2 mg tablets daily for for maintenance.  We will also check stool for ova parasite and C. difficile to make sure that there is no infectious etiology.    I recommend her to return to see our nurse practitioner next week.  We will also check a chemistry to follow-up on her renal function.    It was a pleasure seeing Karen Anderson at the [facility].  Please contact us with any questions or concerns that you may have.    Sincerely,    Karolina Turcios MD   Center for Pulmonary Hypertension  Section of Advanced Heart Failure   Cardiovascular Division  [facility]   [phone]

## 2020-12-14 NOTE — PATIENT INSTRUCTIONS
Medication Changes:  - Take up to 4 tabs of Imodium in 24 hours over the next 2 days  - Start taking Imodium tablet daily; you can take an extra 1 tablet in the evening as needed     Patient Instructions:  1. Continue staying active and eat a heart healthy diet.    2. Please keep current list of medications with you at all times.    3. Remember to weigh yourself daily after voiding and before you consume any food or beverages and log the numbers.  If you have gained 2 pounds overnight or 5 pounds in a week contact us immediately for medication adjustments or further instructions.    4. **Please call us immediately if you have any syncope (fainting or passing out), chest pain, edema (swelling or weight gain), or decline in your functional status (general decline in how you are feeling).    Follow up Appointment Information:  - We will keep you on Lasix 80 mg twice a day  - Please schedule a lab appointment to check for C.diff (in your stool) and to assess your kindey function on the increased dose of Lasix  - 1 week follow up with MAGEN Saeed      We are located on the third floor [facility] the [facility].  Our address is     [address] [place] [address] [place] [address] [place] [address]    Thank you for allowing us to be a part of your care here at the [facility]    If you have questions or concerns please contact us at:    Elan Arnett RN, BSN   Lexus Balbuena (Schedule,Prior Auth)  Nurse Coordinator     Clinic   Pulmonary Hypertension   Pulmonary Hypertension  [facility]  [facility]  (Phone)[phone]    (Phone) [phone]        (Fax) [phone]    ** Please note that you will NOT receive a reminder call regarding your scheduled testing, reminder calls are for provider appointments only.  If you are scheduled for testing within the [facility] system  you may receive a call regarding pre-registration for billing purposes only.**     Remember to weigh yourself daily after voiding and before you consume any food or beverages and log the numbers.  If you have gained/lost 2 pounds overnight or 5 pounds in a week contact us immediately for medication adjustments or further instructions.   **Please call us immediately if you have any syncope, chest pain, edema, or decline in your functional status.    Support Group:  Pulmonary Hypertension Association  Https://www.phassociation.org/  **Look at the Events Tab** They even have Support Groups that you can call into    [facility]  Second Saturday of the Month from 1-3 PM   Location: [address] [place] [address]  Leader: Jessenia Moeller and Marielos Loo  Phone: [phone] or [phone]  Email: [email or web address]

## 2020-12-14 NOTE — LETTER
"12/14/2020      RE: Karen Anderson  [address]  [facility] [address]       Dear Colleague,    Thank you for the opportunity to participate in the care of your patient, Karen Anderson, at the [facility] acute hospital. Please see a copy of my visit note below.    Karen Anderson is a 80 year old female who is being evaluated via a billable telephone visit.      The patient has been notified of following:     \"This telephone visit will be conducted via a call between you and your physician/provider. We have found that certain health care needs can be provided without the need for a physical exam.  This service lets us provide the care you need with a short phone conversation.  If a prescription is necessary we can send it directly to your pharmacy.  If lab work is needed we can place an order for that and you can then stop by our lab to have the test done at a later time.    Telephone visits are billed at different rates depending on your insurance coverage. During this emergency period, for some insurers they may be billed the same as an in-person visit.  Please reach out to your insurance provider with any questions.    If during the course of the call the physician/provider feels a telephone visit is not appropriate, you will not be charged for this service.\"    Patient has given verbal consent for Telephone visit?  Yes    What phone number would you like to be contacted at? [phone]     How would you like to obtain your AVS? MyChart    Vitals - Patient Reported  Weight (Patient Reported): 54.9 kg (121 lb)  Height (Patient Reported): 147.3 cm (4' 10\")  BMI (Based on Pt Reported Ht/Wt): 25.29  Pain Score: No Pain (0)(No SOB)    Vitals were taken and medication reconciled.    DEO Macario  12:49 PM    Service Date: December 14th, 2020    Shaneka Forde MD  [address]  [place] [address]    Clint Abernathy MD  [address]  [place] [address]    Noma Jones MD  [address]  [place] [address]    Carlos Carter MD  [address]  [place] [address]      RE:  Karen Anderson   MRN:  [id number]  :  1940      Dear Josemanuel Ha, Karen, and Raul:    We had the pleasure of seeing Karen Anderson at the [facility] for follow-up. As you know, Ms. Anderson is a 80-year-old female with chronic thromboembolic pulmonary hypertension who is currently on combination therapy with IV treprostinil at 30 ng/kg/min and Adempas 1 mg 3 times a day.  She returns today for follow-up.    Ms. Kwon has been having diarrhea for the last 2 days after she stopped taking the Imodium.  She was requested by her clinic to stop taking the Imodium as she had hemorrhoids and also had questionable constipation.  She has been having 10-15 stools in the last 24 hours.  She has mucus but no blood.  She has no abdominal pain.  She has taken 2 doses of Imodium in the last 12 hours and the frequency seems to be decreasing.    She has no shortness of breath at rest.  No significant change in her exertional shortness of breath.  She continues to have lower extremity swelling.  A good weight for her is around 115 to 117 pounds.  She is currently 121 pounds.  She has been on Lasix 80 mg twice daily except over the weekend she has taken only 60 mg twice a day.  Her dose was decreased over the weekend with a concern for dehydration.  She has no chest pain or chest pressure.  She has not had any exertional presyncope or syncope.    Her lower extremity/knee hematoma has improved significantly.  She has a physical therapist who comes and helps her with wrapping her legs as well as home therapy.  She is able to ambulate by herself.  She has not used any cane or walker in the recent past.      PAST MEDICAL HISTORY:  Past Medical History:   Diagnosis  Date     Asthma      Breast cancer (H)      Hypertension      Mycobacterium avium complex (H)      Pulmonary embolism (H)      Pulmonary hypertension (H)      Rheumatoid arthritis (H)      Scoliosis        PAST SURGICAL HISTORY:  Past Surgical History:   Procedure Laterality Date     CV PULMONARY ANGIOGRAM N/A 10/5/2020     CV RIGHT HEART CATH N/A 03/04/2020     CV RIGHT HEART CATH N/A 10/5/2020     IR CVC TUNNEL PLACEMENT > 5 YRS OF AGE  03/09/2020     MASTECTOMY Left      PICC SINGLE LUMEN PLACEMENT Right 03/04/2020     PICC TRIPLE LUMEN PLACEMENT Right 10/02/2020       CURRENT MEDICATIONS:  Current Outpatient Medications   Medication Sig     acetaminophen (TYLENOL) 325 MG tablet Take 325 mg by mouth every 6 hours as needed for mild pain     albuterol (PROAIR HFA/PROVENTIL HFA/VENTOLIN HFA) 108 (90 Base) MCG/ACT inhaler Inhale 2 puffs into the lungs every 4 hours as needed for shortness of breath / dyspnea or wheezing      alendronate (FOSAMAX) 70 MG tablet Take 70 mg by mouth once a week     Ascorbic Acid (VITAMIN C) 500 MG CAPS Take 500 mg by mouth every morning      aspirin (ASA) 81 MG tablet Take 81 mg by mouth daily      beclomethasone HFA (QVAR REDIHALER) 80 MCG/ACT inhaler Inhale 2 puffs into the lungs 2 times daily     calcium citrate-vitamin D (CALCIUM CITRATE + D3) 315-250 MG-UNIT TABS per tablet Take 2 tablets by mouth daily      cetirizine (ZYRTEC) 10 MG tablet Take 10 mg by mouth daily     digoxin (LANOXIN) 62.5 MCG tablet Take 1 tablet (62.5 mcg) by mouth daily     furosemide (LASIX) 20 MG tablet Take 4 tablets (80 mg) by mouth 2 times daily     gabapentin (NEURONTIN) 300 MG capsule Take 300 mg by mouth 3 times daily      hydroxychloroquine (PLAQUENIL) 200 MG tablet Take 200 mg by mouth daily      ipratropium (ATROVENT) 0.06 % nasal spray Spray 2 sprays into both nostrils 3 times daily      loperamide (IMODIUM) 2 MG capsule Take 2 mg by mouth as needed for diarrhea     potassium chloride ER (KLOR-CON  M) 20 MEQ CR tablet Take 1 tablet (20 mEq) by mouth daily     Probiotic Product (PROBIOTIC ACIDOPHILUS BIOBEADS) CAPS Take 1 capsule by mouth 2 times daily      riociguat (ADEMPAS) 0.5 MG tablet Take 2 tablets (1 mg) by mouth 3 times daily Goal of 2.5 mg three times a day, with dose increase of 0.5 mg three times a day every 2 weeks (SBP must be >95 mmHg and no signs or symptoms of hypotension to increase)     Treprostinil (REMODULIN IJ) Dose: 30 ng/kg/min  Vial concentration: 1 mg/mL  Final concentration: 60,000 ng/mL  Dosing weight: 56.3 kg   Pump rate: 41 mL/day  Per patient on 9/29/2020     warfarin ANTICOAGULANT (COUMADIN) 3 MG tablet Take 3 mg PO on Mon, Wed, Fri and Sat, and 4.5 mg all other days of the week     No current facility-administered medications for this visit.      ROS:   10 point ROS negative except as discussed in above HPI.    EXAM:  Exam could not be done due to the telephone nature of the visit.    Labs:  CBC RESULTS:   Recent Labs   Lab Test 12/07/20  1201   WBC 4.6   RBC 3.94   HGB 11.5*   HCT 38.2   MCV 97   MCH 29.2   MCHC 30.1*   RDW 16.1*        Recent Labs   Lab Test 12/07/20  1201 11/16/20  1051    134   POTASSIUM 3.0* 3.5   CHLORIDE 102 99   CO2 31 31   ANIONGAP 7 4   GLC 96 104*   BUN 29 38*   CR 1.11* 1.09*   ESTEFANÍA 9.4 9.9     Liver Function Studies -   Recent Labs   Lab Test 12/07/20  1201   PROTTOTAL 7.8   ALBUMIN 3.4   BILITOTAL 0.6   ALKPHOS 147   AST 32   ALT 27     No results found for: NTBNPI  Lab Results   Component Value Date    NTBNP 7,780 (H) 12/07/2020     Echo (09/2020)  Global and regional left ventricular function is normal with an EF of 60-65%.  Moderate right ventricular dilation is present.  Global right ventricular function is moderately reduced.  Right ventricular systolic pressure is 82mmHg above the right atrial pressure.  Ascending aorta 3.8 cm.  Ascending aortic index 25.3mm/m2,severe dilation for BSA of 1.5m2.  IVC diameter >2.1 cm collapsing  <50% with sniff suggests a high RA pressure estimated at 15 mmHg or greater.  Small circumferential pericardial effusion is present without any hemodynamic significance.  There has been no change.    6MWT (03/2020  He had a 6-minute walk test where she walked only 164 m.  On 2 L of oxygen she dropped to 77%.    RHC (03/2020)  Right heart catheterization revealed an RA pressure of 10 mmHg, RV of 98 over 11 mmHg, PA of 98/30 with a mean of 52 mmHg, pulmonary capillary wedge pressure of 15 mmHg, PA saturation of 54.7%, thermodilution cardiac output of 2.3 with an index of 1.4, and PVR 16.08 MARIN.    RHC (09/2020)  RA 5  RV 90/8  PA 90/20 (42)  PCWP 11  PA% 59%  CO/CI 3/2.1  PVR 10 MARIN      Assessment and Plan:     Ms. Karen Anderson is a 80-year-old female with chronic thromboembolic pulmonary hypertension who is currently on IV Remodulin therapy.  She returns today for follow-up. She is currently on 30 ng/kg/min of IV Remodulin therapy and Adempas 1.5 mg tid.    1. Chronic thromboembolic pulmonary hypertension -while she has operable disease, she is not  a surgical candidate due to comorbidities including age and frailty.  Currently being managed with medical therapy      She is currently on IV treprostinil 30 ng/kg/min.      Up titration of her Adempas has been slow due to borderline low blood pressure.  She is currently at 1 mg 3 times a day.  She is scheduled to increase it to 1.5 mg 3 times a day this week.  Her blood pressure seems to be better now.  She is off of her lisinopril.  I recommend her to continue to increase it every 2 weeks to maximum 2.5 g 3 times a day.      She continues to struggle with volume overload from right heart failure.  I recommend her to increase her Lasix back to 80 mg twice a day.    We will continue to maintain a low INR goal of 2-2.5 given her recent left knee hematoma.  She is on digoxin for right ventricular support.    We will also consider starting her on macitentan after she  reaches a maximum tolerated dose of Adempas.    We also discussed the option of increasing her Remodulin, however she would like to hold off on this given the side effects.    We will consider balloon pulmonary angioplasty after she maximizes on Adempas.    I discussed the option of pulmonary balloon angioplasty.  Her most recent pulmonary angiogram showed lesions that can be treated with pulmonary balloon angioplasty especially in her right lower lobe.  We discussed 1% risk of fatal complication including mechanical reperfusion edema requiring mechanical ventilation and death.  We also discussed 5 to 10% risk of hemoptysis.  Her risk is slightly higher given her age and high PA pressure.  She would like to try pulmonary balloon angioplasty if needed after maximizing Adempas therapy which I think is reasonable.     2.  Diarrhea    It is likely related to remodeling.  She was feeling reasonably better on daily Imodium.  I recommend her to take Imodium 2 mg every 6 hours as needed for the next 48 hours to get the diarrhea under control and then take one 2 mg tablets daily for for maintenance.  We will also check stool for ova parasite and C. difficile to make sure that there is no infectious etiology.    I recommend her to return to see our nurse practitioner next week.  We will also check a chemistry to follow-up on her renal function.    It was a pleasure seeing Karen Anderson at the [facility].  Please contact us with any questions or concerns that you may have.    Sincerely,    Karolina Turcios MD   [facility]  Section of Advanced Heart Failure   Cardiovascular Division  [facility]   [phone]                  Please do not hesitate to contact me if you have any questions/concerns.     Sincerely,     Karolina Turcios MD

## 2020-12-16 ENCOUNTER — MYC REFILL (OUTPATIENT)
Dept: OTHER | Age: 80
End: 2020-12-16

## 2020-12-16 DIAGNOSIS — R60.0 LOCALIZED EDEMA: ICD-10-CM

## 2020-12-17 RX ORDER — FUROSEMIDE 20 MG
80 TABLET ORAL 2 TIMES DAILY
Qty: 360 TABLET | Refills: 3 | Status: ON HOLD | OUTPATIENT
Start: 2020-12-17 | End: 2021-01-14

## 2020-12-21 ENCOUNTER — TELEPHONE (OUTPATIENT)
Dept: CARDIOLOGY | Facility: CLINIC | Age: 80
End: 2020-12-21

## 2020-12-21 ENCOUNTER — ANTICOAGULATION THERAPY VISIT (OUTPATIENT)
Dept: ANTICOAGULATION | Facility: CLINIC | Age: 80
End: 2020-12-21

## 2020-12-21 DIAGNOSIS — I27.24 CTEPH (CHRONIC THROMBOEMBOLIC PULMONARY HYPERTENSION) (H): ICD-10-CM

## 2020-12-21 DIAGNOSIS — I27.20 PULMONARY HYPERTENSION (H): ICD-10-CM

## 2020-12-21 DIAGNOSIS — Z79.01 LONG TERM CURRENT USE OF ANTICOAGULANT THERAPY: ICD-10-CM

## 2020-12-21 DIAGNOSIS — R06.02 SOB (SHORTNESS OF BREATH): ICD-10-CM

## 2020-12-21 LAB
ERYTHROCYTE [DISTWIDTH] IN BLOOD BY AUTOMATED COUNT: 16 % (ref 10–15)
HCT VFR BLD AUTO: 35.8 % (ref 35–47)
HGB BLD-MCNC: 10.9 G/DL (ref 11.7–15.7)
INR PPP: 2.5 (ref 0.86–1.14)
MCH RBC QN AUTO: 28.7 PG (ref 26.5–33)
MCHC RBC AUTO-ENTMCNC: 30.4 G/DL (ref 31.5–36.5)
MCV RBC AUTO: 94 FL (ref 78–100)
NT-PROBNP SERPL-MCNC: 8941 PG/ML (ref 0–450)
PLATELET # BLD AUTO: 184 10E9/L (ref 150–450)
RBC # BLD AUTO: 3.8 10E12/L (ref 3.8–5.2)
WBC # BLD AUTO: 4.8 10E9/L (ref 4–11)

## 2020-12-21 PROCEDURE — 85610 PROTHROMBIN TIME: CPT | Performed by: INTERNAL MEDICINE

## 2020-12-21 PROCEDURE — 36415 COLL VENOUS BLD VENIPUNCTURE: CPT | Performed by: INTERNAL MEDICINE

## 2020-12-21 PROCEDURE — 83880 ASSAY OF NATRIURETIC PEPTIDE: CPT | Performed by: INTERNAL MEDICINE

## 2020-12-21 PROCEDURE — 80053 COMPREHEN METABOLIC PANEL: CPT | Performed by: INTERNAL MEDICINE

## 2020-12-21 PROCEDURE — 85027 COMPLETE CBC AUTOMATED: CPT | Performed by: INTERNAL MEDICINE

## 2020-12-21 NOTE — PROGRESS NOTES
ANTICOAGULATION FOLLOW-UP CLINIC VISIT    Patient Name:  Karen Anderson  Date:  2020  Contact Type:  Telephone    SUBJECTIVE:  Patient Findings     Comments:  Spoke with Karen.  She reports she has not had changes in health, diet, medications.  She has an appointment tomorrow, 20 and will call the [facility] if she has any changes in medications.        Clinical Outcomes     Comments:  Spoke with Karen.  She reports she has not had changes in health, diet, medications.  She has an appointment tomorrow, 20 and will call the [facility] if she has any changes in medications.           OBJECTIVE    Recent labs: (last 7 days)     20  0856   INR 2.50*       ASSESSMENT / PLAN  INR assessment THER    Recheck INR In: 3 WEEKS    INR Location Clinic      Anticoagulation Summary  As of 2020    INR goal:  2.0-2.5   TTR:  81.2 % (8.1 mo)   INR used for dosin.50 (2020)   Warfarin maintenance plan:  4.5 mg (3 mg x 1.5) every Sun, Tue, Thu; 3 mg (3 mg x 1) all other days   Full warfarin instructions:  4.5 mg every Sun, Tue, Thu; 3 mg all other days   Weekly warfarin total:  25.5 mg   No change documented:  Laure Enriquez RN   Plan last modified:  Tanisha Kimbrough, RN (10/30/2020)   Next INR check:  2021   Priority:  Maintenance   Target end date:  Indefinite    Indications    Pulmonary hypertension (H) [I27.20]  CTEPH (chronic thromboembolic pulmonary hypertension) (H) [I27.24]  Long term current use of anticoagulant therapy [Z79.01]             Anticoagulation Episode Summary     INR check location:      Preferred lab:      Send INR reminders to:  [facility]    Comments:  call home first, OK to leave message on either phone. OK to speak with spouse, Don.  [facility] SPEAK IN TABLETS (Has 3mg Tabs) 10/23/20:  new goal range is 2 - 2.5  [facility] in [place] and will need to fax orders P [phone] F [phone]      Anticoagulation Care Providers     Provider Role  Specialty Phone number    Osiris Luong MD Referring Cardiovascular Disease [phone]    Karolina Turcios MD Referring Cardiovascular Disease [phone]            See the Encounter Report to view Anticoagulation Flowsheet and Dosing Calendar (Go to Encounters tab in chart review, and find the Anticoagulation Therapy Visit)    Spoke with Karen.      Laure Enriquez RN

## 2020-12-21 NOTE — PROGRESS NOTES
CARDIOLOGY PH CLINIC TELEPHONE VISIT    Karen Anderson is a 80 year old female who is being evaluated via a billable telephone visit.        MEDICATIONS:  Current Outpatient Medications   Medication Sig Dispense Refill     acetaminophen (TYLENOL) 325 MG tablet Take 325 mg by mouth every 6 hours as needed for mild pain       albuterol (PROAIR HFA/PROVENTIL HFA/VENTOLIN HFA) 108 (90 Base) MCG/ACT inhaler Inhale 2 puffs into the lungs every 4 hours as needed for shortness of breath / dyspnea or wheezing        alendronate (FOSAMAX) 70 MG tablet Take 70 mg by mouth once a week       Ascorbic Acid (VITAMIN C) 500 MG CAPS Take 500 mg by mouth every morning        aspirin (ASA) 81 MG tablet Take 81 mg by mouth daily        beclomethasone HFA (QVAR REDIHALER) 80 MCG/ACT inhaler Inhale 2 puffs into the lungs 2 times daily       calcium citrate-vitamin D (CALCIUM CITRATE + D3) 315-250 MG-UNIT TABS per tablet Take 2 tablets by mouth daily        cetirizine (ZYRTEC) 10 MG tablet Take 10 mg by mouth daily       digoxin (LANOXIN) 62.5 MCG tablet Take 1 tablet (62.5 mcg) by mouth daily 90 tablet 3     furosemide (LASIX) 20 MG tablet Take 4 tablets (80 mg) by mouth 2 times daily 360 tablet 3     gabapentin (NEURONTIN) 300 MG capsule Take 300 mg by mouth 3 times daily        hydroxychloroquine (PLAQUENIL) 200 MG tablet Take 200 mg by mouth daily        ipratropium (ATROVENT) 0.06 % nasal spray Spray 2 sprays into both nostrils 3 times daily        loperamide (IMODIUM) 2 MG capsule Take 1 capsule (2 mg) by mouth daily Can take an additional 2 mg in the evening PRN 90 capsule 3     potassium chloride ER (KLOR-CON M) 20 MEQ CR tablet Take 1 tablet (20 mEq) by mouth daily 90 tablet 3     Probiotic Product (PROBIOTIC ACIDOPHILUS BIOBEADS) CAPS Take 1 capsule by mouth 2 times daily        riociguat (ADEMPAS) 0.5 MG tablet Take 3 tablets (1.5 mg) by mouth 3 times daily Goal of 2.5 mg three times a day, with dose increase of 0.5 mg  three times a day every 2 weeks (SBP must be >95 mmHg and no signs or symptoms of hypotension to increase) 90 tablet 11     Treprostinil (REMODULIN IJ) Dose: 30 ng/kg/min  Vial concentration: 1 mg/mL  Final concentration: 60,000 ng/mL  Dosing weight: 56.3 kg   Pump rate: 41 mL/day  Per patient on 9/29/2020       warfarin ANTICOAGULANT (COUMADIN) 3 MG tablet Take 3 mg PO on Mon, Wed, Fri and Sat, and 4.5 mg all other days of the week 170 tablet 3       ALLERGIES  Sulfa drugs      Self reported vitals:   BP: 91/54 (120/65 recheck a few minutes after)  Weight (Patient Reported): 57.2 kg (126 lb)  Pulse (Patient Reported): 81(79)    Other recent BP: 110/61, 110/65, 117/65    Most recent labs:   Results for CHANTALSHARON ELAINA R (MRN [id number]) as of 12/22/2020 08:48   Ref. Range 12/21/2020 08:56   N-Terminal Pro Bnp Latest Ref Range: 0 - 450 pg/mL 8,941 (H)   WBC Latest Ref Range: 4.0 - 11.0 10e9/L 4.8   Hemoglobin Latest Ref Range: 11.7 - 15.7 g/dL 10.9 (L)   Hematocrit Latest Ref Range: 35.0 - 47.0 % 35.8   Platelet Count Latest Ref Range: 150 - 450 10e9/L 184   RBC Count Latest Ref Range: 3.8 - 5.2 10e12/L 3.80   MCV Latest Ref Range: 78 - 100 fl 94   MCH Latest Ref Range: 26.5 - 33.0 pg 28.7   MCHC Latest Ref Range: 31.5 - 36.5 g/dL 30.4 (L)   RDW Latest Ref Range: 10.0 - 15.0 % 16.0 (H)     Results for URIBRITTANY ELAINA R (MRN [id number]) as of 12/22/2020 10:17   Ref. Range 12/21/2020 08:56   Sodium Latest Ref Range: 133 - 144 mmol/L 135   Potassium Latest Ref Range: 3.4 - 5.3 mmol/L 3.4   Chloride Latest Ref Range: 94 - 109 mmol/L 98   Carbon Dioxide Latest Ref Range: 20 - 32 mmol/L 30   Urea Nitrogen Latest Ref Range: 7 - 30 mg/dL 36 (H)   Creatinine Latest Ref Range: 0.52 - 1.04 mg/dL 1.24 (H)   GFR Estimate Latest Ref Range: >60 mL/min/1.73_m2 41 (L)   GFR Estimate If Black Latest Ref Range: >60 mL/min/1.73_m2 48 (L)   Calcium Latest Ref Range: 8.5 - 10.1 mg/dL 9.6   Anion Gap Latest Ref Range: 3 - 14 mmol/L 7    Albumin Latest Ref Range: 3.4 - 5.0 g/dL 3.3 (L)   Protein Total Latest Ref Range: 6.8 - 8.8 g/dL 7.9   Bilirubin Total Latest Ref Range: 0.2 - 1.3 mg/dL 0.6   Alkaline Phosphatase Latest Ref Range: 40 - 150 U/L 165 (H)   ALT Latest Ref Range: 0 - 50 U/L 21   AST Latest Ref Range: 0 - 45 U/L 30   N-Terminal Pro Bnp Latest Ref Range: 0 - 450 pg/mL 8,941 (H)       Primary PH cardiologist: Dr. Turcios       HPI:  Ms. Anderson is a pleasant 80 year old female with a PMHx including rheumatoid arthritis, hypertension, scoliosis, asthma, pulmonary MAC, breast cancer s/p chemotherapy with Adriamycin, Cytoxan, Taxol, and tamoxifen and status post left mastectomy. She also has a history of extensive bilateral pulmonary emboli and DVT in 1998 thought to be secondary to tamoxifen along with chemotherapy induced cardiomyopathy. 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 Although she had proximal disease, she was too high risk for surgical PTE. She is now on IV Remodulin therapy.      We have since been able to titrate her up to a goal of 30ng/kg/min complicated by transient side effects of leg pain, jaw pain, diarrhea and headaches. However, she reported overall improvement in her breathing. Early this fall, she also fell and tore a tendon in her knee which has been painful and swelling, due to her being on anticoagulation. In late Sept, she called our office to report a rapid weight gain of over almost 20 lbs and was directed to the ED. She was hospitalized from 9/29 to 10/6 for acute on chronic diastolic heart failure, requiring a lasix gtt. Repeat echo showed EF 60-65%, moderate RV dilation, and small effusion. She also had a repeat RHC (RA 5, RV 90/8, mPA 42, LUIZ 2.55/1.76) which showed some mild improvement from March of this year. Pulmonary angiogram showed  of the distal right A2, proximal A7 segment, severe stenosis of the right A8 segment with slow filling  distally. There was possible stenosis of the proximal and mid right A10 segment, and no significant angiographic disease of the left lung segments. Her PTA lasix was increased to 20mg BID, and stay was complicated by JOSE G on CKD but renal function improved with diuresis. Dr. Turcios recommended increasing her Remodulin dose vs adding Adempas. After further discussion, she chose the latter.     When I saw Karen akers in mid November, she had been able to start the Adempas and was up to 0.5mg TID at that time. She had some headaches but was able to tolerate this. She felt that overall her swelling was improving; weight was as low as 110 lbs at home, but she was 113 lbs at the time of our visit. I made no changes at that time.     Over the last month or so she has had trouble with swelling/weight gain, and RIVERA once again requiring continued escalation of diuretics. She had a virtual visit with Dr. Turcios last week, and he felt she was volume overloaded (weight up to 121 lbs at home) and increased her Lasix further to 80mg BID. She also reported significant diarrhea after stopping her Imodium. He recommended she resume this 2 mg every 6 hours as needed for the next 48 hours to get the diarrhea under control and then take one 2 mg tablet daily for for maintenance.  Notably, her lisinopril was recently discontinued as well, due to some lower BP while titrating her Adempas. He recommended continued titration of Adempas up to 1.5mg TID.     Today, we are doing a one week virtual follow up. She tells me that over the last week she has been feeling poorly. She called our office yesterday to report significant dizziness, and we reduced her Adempas back down to 1mg TID. She said she had difficulty getting to and from lab to have bloodwork drawn yesterday due to her dizziness. Despite this, BP remains reasonable, mostly 110-120 systolic with diastolics in the 60s. She is having more edema and she reported a home weight up  to 126 lbs this morning. She is short of breath with minimal activity. Her diarrhea, however, is improved with restarting the Imodium.     Labs done yesterday showed BUN/SCr up to 36/1.24, and K was low normal at 3.4.  Her NT-proBNP remains elevated at 8,941, and in fact, has trended up further from previous. Her H&H was 10/9/35.8.        CURRENT PULMONARY HYPERTENSION REGIMEN:     PAH Rx: Remodulin 30ng/kg/min, Adempas 1mg TID     Diuretics: Lasix 80mg BID     Oxygen: None     Anticoagulation: warfarin  Indication: CTEPH        Assessment/Plan:     1. Chronic thromboembolic pulmonary hypertension.              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 Although she had proximal disease, she was felt to be too high risk for surgical PTE. She is currently on IV Remodulin therapy, at 30ng/kg/min. We have now added Adempas, but is feeling poorly with continued titration. Yesterday we reduced her dose back down to 1mg TID.    --She continues to report significant dizziness along with progressive RIVERA and weight gain. I suspect she is in decompensated heart failure despite escalation of Lasix to 80mg BID. Weight today is 126 lbs at home (dry weight likely 112-113 lbs). We discussed consideration of change to Bumex as an outpatient, but given her frailty and the long holiday weekend approaching, I don't feel this is a safe option for her. Thus, I recommended hospitalization for IV diuresis. I spoke with Dr. Turcios who agrees with this plan as well. We will arrange for direct admission.   --Continue digoxin for RV support. BP at home seems reasonable despite significant dizziness. Will keep her off the lisinopril.    -Continue anticoagulation with warfarin for her CTEPH, though we have been monitoring with a lower INR goal of 2-2.5 given her recent fall and leg injury.     --Dr. Turcios is considering possible balloon angioplasty, as most recent pulmonary angiogram showed lesions that may be possible to  treat, especially in the right lower lobe. He had already discussed 1% risk of fatal complication including mechanical reperfusion edema requiring mechanical ventilation and death, and 5 to 10% risk of hemoptysis.  Her risk is also sightly higher given her age and high PA pressure.       Follow up plan: To be arranged post hospitalization for close follow up.       I have reviewed the note as documented above.  This accurately captures the substance of my conversation with the patient.      Phone call contact time  Call Started at [phone]  Call Ended at [phone]  An additional 20 minutes was spent with coordination of care for admission.     Shawna Marcum PA-C  [facility]  Pager [phone]

## 2020-12-21 NOTE — TELEPHONE ENCOUNTER
"Patient LM regarding dizziness with increased Adempas dose 1.5 mg TID. Kezia Arnett RN on 12/21/2020 at 4:06 PM    Spoke with patient regarding symptoms; /68 HR 85. Weight 125 lb. States that she is feeling increased dizziness with the increased dose of Adempas. Complains that she has a feeling of \"fullness in her ears\" like if there was water in them. Patient expressed concern that this dizziness is occurring too frequently and wants to go back down to 1 mg TID; stated this would be fine and to address these concerns with Shawna tomorrow during her VV. Patient also concerned about her elevated BNP. Patient's weight is up to 125 lb and acknowledges BLE edema R>L. Patient does not want to increase Lasix dose at this time. Patient will address these concerns with Shawna tomorrow and did not have any further questions. Kezia Arnett RN on 12/21/2020 at 4:24 PM         "

## 2020-12-22 ENCOUNTER — TELEPHONE (OUTPATIENT)
Dept: CARDIOLOGY | Facility: CLINIC | Age: 80
End: 2020-12-22

## 2020-12-22 ENCOUNTER — VIRTUAL VISIT (OUTPATIENT)
Dept: CARDIOLOGY | Facility: CLINIC | Age: 80
End: 2020-12-22
Attending: PHYSICIAN ASSISTANT
Payer: COMMERCIAL

## 2020-12-22 DIAGNOSIS — R06.02 SOB (SHORTNESS OF BREATH): ICD-10-CM

## 2020-12-22 DIAGNOSIS — I27.20 PULMONARY HYPERTENSION (H): ICD-10-CM

## 2020-12-22 LAB
ALBUMIN SERPL-MCNC: 3.3 G/DL (ref 3.4–5)
ALP SERPL-CCNC: 165 U/L (ref 40–150)
ALT SERPL W P-5'-P-CCNC: 21 U/L (ref 0–50)
ANION GAP SERPL CALCULATED.3IONS-SCNC: 7 MMOL/L (ref 3–14)
AST SERPL W P-5'-P-CCNC: 30 U/L (ref 0–45)
BILIRUB SERPL-MCNC: 0.6 MG/DL (ref 0.2–1.3)
BUN SERPL-MCNC: 36 MG/DL (ref 7–30)
CALCIUM SERPL-MCNC: 9.6 MG/DL (ref 8.5–10.1)
CHLORIDE SERPL-SCNC: 98 MMOL/L (ref 94–109)
CO2 SERPL-SCNC: 30 MMOL/L (ref 20–32)
CREAT SERPL-MCNC: 1.24 MG/DL (ref 0.52–1.04)
GFR SERPL CREATININE-BSD FRML MDRD: 41 ML/MIN/{1.73_M2}
GLUCOSE SERPL-MCNC: 95 MG/DL (ref 70–99)
POTASSIUM SERPL-SCNC: 3.4 MMOL/L (ref 3.4–5.3)
PROT SERPL-MCNC: 7.9 G/DL (ref 6.8–8.8)
SODIUM SERPL-SCNC: 135 MMOL/L (ref 133–144)

## 2020-12-22 PROCEDURE — 99215 OFFICE O/P EST HI 40 MIN: CPT | Mod: 95 | Performed by: PHYSICIAN ASSISTANT

## 2020-12-22 NOTE — PROGRESS NOTES
"Karen Anderson is a 80 year old female who is being evaluated via a billable telephone visit.      The patient has been notified of following:     \"This telephone visit will be conducted via a call between you and your physician/provider. We have found that certain health care needs can be provided without the need for a physical exam.  This service lets us provide the care you need with a short phone conversation.  If a prescription is necessary we can send it directly to your pharmacy.  If lab work is needed we can place an order for that and you can then stop by our lab to have the test done at a later time.    Telephone visits are billed at different rates depending on your insurance coverage. During this emergency period, for some insurers they may be billed the same as an in-person visit.  Please reach out to your insurance provider with any questions.    If during the course of the call the physician/provider feels a telephone visit is not appropriate, you will not be charged for this service.\"    Patient has given verbal consent for Telephone visit?  Yes    What phone number would you like to be contacted at? [phone]     How would you like to obtain your AVS? Alayna    Vitals - Patient Reported  Systolic (Patient Reported): 91(120/65 minutes after)  Diastolic (Patient Reported): 54  Weight (Patient Reported): 57.2 kg (126 lb)  Height (Patient Reported): 149.9 cm (4' 11\")  BMI (Based on Pt Reported Ht/Wt): 25.45  Pulse (Patient Reported): 81(79)  Pain Score: No Pain (0)(Has SOB)    Vitals were taken and medication reconciled.    DEO Macario  9:46 AM    "

## 2020-12-22 NOTE — TELEPHONE ENCOUNTER
Patient scheduled for direct admission to hospital for progressive RIVERA, weight gain, and significant dizziness for December 23 rd at 2 PM. [id number] Kezia Arnett RN on 12/22/2020 at 10:54 AM    Spoke with patient regarding direct admission. Provided information regarding check in and advised she speak with the floor nurse about visitor policies. Patient verbalized understanding and did not have any further questions. Kezia Arnett RN on 12/22/2020 at 11:05 AM    We would like you to be admitted under the cardiovascular service by the Pulmonary Hypertension Clinic at the [facility], for increased dyspnea on exertion, weight gain, and significant dizziness.  We would like to admit you on December 23rd, 2020 at 2 PM.    The Hospitals address is:   [address] [place] [id number]    Please check in to the admissions window in the main lobby of the hospital entrance to the [facility].  The admissions counselor will have your information and should inform you what floor/unit you will be admitted to and instruct you how to proceed.      Please plan to be admitted for approximately 4-5 days under in-patient care.  You are welcome to bring personal items from home, however please do not bring items of value.      Your clinic follow up will be scheduled upon pre-discharge planning for approximately 1 week after you leave the hospital; you should be informed of this appointment date and time prior to leaving the [facility].  In the event you are not given a date and time for clinic follow up, please contact me at [phone] upon discharging from the [facility].

## 2020-12-22 NOTE — PATIENT INSTRUCTIONS
Thank you for visiting the Pulmonary Hypertension Clinic today.      Today we discussed:   We recommend hospital admission as we discussed.  Elan will contact you to discuss details.     --------------------------------------------------------------------------------------------------------------    If you have questions or concerns please contact us at:    Elan Arnett RN, BSN   Lexus Balbuena (Schedule,Prior Auth)  Nurse Coordinator     Clinic   Pulmonary Hypertension   Pulmonary Hypertension  [facility]  [facility]  (Phone)[phone]    (Phone) [phone]        (Fax) [phone]      ** Please note that you will NOT receive a reminder call regarding your scheduled testing, reminder calls are for provider appointments only.  If you are scheduled for testing within the [employer] system you may receive a call regarding pre-registration for billing purposes only.**     --------------------------------------------------------------------------------------------------------------    Interested in joining a support group?    Pulmonary Hypertension Association  Https://www.phassociation.org/  **Look at the Events Tab** They even have Support Groups that you can call into    [facility] Support Group  Second Saturday of the Month from 1-3 PM   Location: [address] [place] [address]  Leader: Jessenia Loo  Phone: [phone] or [phone]  Email: [email or web address].Secrette

## 2020-12-22 NOTE — LETTER
12/22/2020      RE: Karen Anderson  [address]  [facility] [address]       Dear Colleague,    Thank you for the opportunity to participate in the care of your patient, Karen Anderson, at the [facility] [place] at [facility]. Please see a copy of my visit note below.    CARDIOLOGY  CLINIC TELEPHONE VISIT    Karen Anderson is a 80 year old female who is being evaluated via a billable telephone visit.        MEDICATIONS:  Current Outpatient Medications   Medication Sig Dispense Refill     acetaminophen (TYLENOL) 325 MG tablet Take 325 mg by mouth every 6 hours as needed for mild pain       albuterol (PROAIR HFA/PROVENTIL HFA/VENTOLIN HFA) 108 (90 Base) MCG/ACT inhaler Inhale 2 puffs into the lungs every 4 hours as needed for shortness of breath / dyspnea or wheezing        alendronate (FOSAMAX) 70 MG tablet Take 70 mg by mouth once a week       Ascorbic Acid (VITAMIN C) 500 MG CAPS Take 500 mg by mouth every morning        aspirin (ASA) 81 MG tablet Take 81 mg by mouth daily        beclomethasone HFA (QVAR REDIHALER) 80 MCG/ACT inhaler Inhale 2 puffs into the lungs 2 times daily       calcium citrate-vitamin D (CALCIUM CITRATE + D3) 315-250 MG-UNIT TABS per tablet Take 2 tablets by mouth daily        cetirizine (ZYRTEC) 10 MG tablet Take 10 mg by mouth daily       digoxin (LANOXIN) 62.5 MCG tablet Take 1 tablet (62.5 mcg) by mouth daily 90 tablet 3     furosemide (LASIX) 20 MG tablet Take 4 tablets (80 mg) by mouth 2 times daily 360 tablet 3     gabapentin (NEURONTIN) 300 MG capsule Take 300 mg by mouth 3 times daily        hydroxychloroquine (PLAQUENIL) 200 MG tablet Take 200 mg by mouth daily        ipratropium (ATROVENT) 0.06 % nasal spray Spray 2 sprays into both nostrils 3 times daily        loperamide (IMODIUM) 2 MG capsule Take 1 capsule (2 mg) by mouth daily Can take an additional 2 mg in the evening PRN 90 capsule 3      potassium chloride ER (KLOR-CON M) 20 MEQ CR tablet Take 1 tablet (20 mEq) by mouth daily 90 tablet 3     Probiotic Product (PROBIOTIC ACIDOPHILUS BIOBEADS) CAPS Take 1 capsule by mouth 2 times daily        riociguat (ADEMPAS) 0.5 MG tablet Take 3 tablets (1.5 mg) by mouth 3 times daily Goal of 2.5 mg three times a day, with dose increase of 0.5 mg three times a day every 2 weeks (SBP must be >95 mmHg and no signs or symptoms of hypotension to increase) 90 tablet 11     Treprostinil (REMODULIN IJ) Dose: 30 ng/kg/min  Vial concentration: 1 mg/mL  Final concentration: 60,000 ng/mL  Dosing weight: 56.3 kg   Pump rate: 41 mL/day  Per patient on 9/29/2020       warfarin ANTICOAGULANT (COUMADIN) 3 MG tablet Take 3 mg PO on Mon, Wed, Fri and Sat, and 4.5 mg all other days of the week 170 tablet 3       ALLERGIES  Sulfa drugs      Self reported vitals:   BP: 91/54 (120/65 recheck a few minutes after)  Weight (Patient Reported): 57.2 kg (126 lb)  Pulse (Patient Reported): 81(79)    Other recent BP: 110/61, 110/65, 117/65    Most recent labs:   Results for ELAINA SUTTON (MRN [id number]) as of 12/22/2020 08:48   Ref. Range 12/21/2020 08:56   N-Terminal Pro Bnp Latest Ref Range: 0 - 450 pg/mL 8,941 (H)   WBC Latest Ref Range: 4.0 - 11.0 10e9/L 4.8   Hemoglobin Latest Ref Range: 11.7 - 15.7 g/dL 10.9 (L)   Hematocrit Latest Ref Range: 35.0 - 47.0 % 35.8   Platelet Count Latest Ref Range: 150 - 450 10e9/L 184   RBC Count Latest Ref Range: 3.8 - 5.2 10e12/L 3.80   MCV Latest Ref Range: 78 - 100 fl 94   MCH Latest Ref Range: 26.5 - 33.0 pg 28.7   MCHC Latest Ref Range: 31.5 - 36.5 g/dL 30.4 (L)   RDW Latest Ref Range: 10.0 - 15.0 % 16.0 (H)     Results for ELAINA SUTTON (MRN [id number]) as of 12/22/2020 10:17   Ref. Range 12/21/2020 08:56   Sodium Latest Ref Range: 133 - 144 mmol/L 135   Potassium Latest Ref Range: 3.4 - 5.3 mmol/L 3.4   Chloride Latest Ref Range: 94 - 109 mmol/L 98   Carbon Dioxide Latest Ref Range:  20 - 32 mmol/L 30   Urea Nitrogen Latest Ref Range: 7 - 30 mg/dL 36 (H)   Creatinine Latest Ref Range: 0.52 - 1.04 mg/dL 1.24 (H)   GFR Estimate Latest Ref Range: >60 mL/min/1.73_m2 41 (L)   GFR Estimate If Black Latest Ref Range: >60 mL/min/1.73_m2 48 (L)   Calcium Latest Ref Range: 8.5 - 10.1 mg/dL 9.6   Anion Gap Latest Ref Range: 3 - 14 mmol/L 7   Albumin Latest Ref Range: 3.4 - 5.0 g/dL 3.3 (L)   Protein Total Latest Ref Range: 6.8 - 8.8 g/dL 7.9   Bilirubin Total Latest Ref Range: 0.2 - 1.3 mg/dL 0.6   Alkaline Phosphatase Latest Ref Range: 40 - 150 U/L 165 (H)   ALT Latest Ref Range: 0 - 50 U/L 21   AST Latest Ref Range: 0 - 45 U/L 30   N-Terminal Pro Bnp Latest Ref Range: 0 - 450 pg/mL 8,941 (H)       Primary PH cardiologist: Dr. Turcios       HPI:  Ms. Anderson is a pleasant 80 year old female with a PMHx including rheumatoid arthritis, hypertension, scoliosis, asthma, pulmonary MAC, breast cancer s/p chemotherapy with Adriamycin, Cytoxan, Taxol, and tamoxifen and status post left mastectomy. She also has a history of extensive bilateral pulmonary emboli and DVT in 1998 thought to be secondary to tamoxifen along with chemotherapy induced cardiomyopathy. 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 Although she had proximal disease, she was too high risk for surgical PTE. She is now on IV Remodulin therapy.      We have since been able to titrate her up to a goal of 30ng/kg/min complicated by transient side effects of leg pain, jaw pain, diarrhea and headaches. However, she reported overall improvement in her breathing. Early this fall, she also fell and tore a tendon in her knee which has been painful and swelling, due to her being on anticoagulation. In late Sept, she called our office to report a rapid weight gain of over almost 20 lbs and was directed to the ED. She was hospitalized from 9/29 to 10/6 for acute on chronic diastolic heart failure, requiring a  lasix gtt. Repeat echo showed EF 60-65%, moderate RV dilation, and small effusion. She also had a repeat RHC (RA 5, RV 90/8, mPA 42, LUIZ 2.55/1.76) which showed some mild improvement from March of this year. Pulmonary angiogram showed  of the distal right A2, proximal A7 segment, severe stenosis of the right A8 segment with slow filling distally. There was possible stenosis of the proximal and mid right A10 segment, and no significant angiographic disease of the left lung segments. Her PTA lasix was increased to 20mg BID, and stay was complicated by JOSE G on CKD but renal function improved with diuresis. Dr. Turcios recommended increasing her Remodulin dose vs adding Adempas. After further discussion, she chose the latter.     When I saw Karen akers in mid November, she had been able to start the Adempas and was up to 0.5mg TID at that time. She had some headaches but was able to tolerate this. She felt that overall her swelling was improving; weight was as low as 110 lbs at home, but she was 113 lbs at the time of our visit. I made no changes at that time.     Over the last month or so she has had trouble with swelling/weight gain, and RIVERA once again requiring continued escalation of diuretics. She had a virtual visit with Dr. Turcios last week, and he felt she was volume overloaded (weight up to 121 lbs at home) and increased her Lasix further to 80mg BID. She also reported significant diarrhea after stopping her Imodium. He recommended she resume this 2 mg every 6 hours as needed for the next 48 hours to get the diarrhea under control and then take one 2 mg tablet daily for for maintenance.  Notably, her lisinopril was recently discontinued as well, due to some lower BP while titrating her Adempas. He recommended continued titration of Adempas up to 1.5mg TID.     Today, we are doing a one week virtual follow up. She tells me that over the last week she has been feeling poorly. She called our office  yesterday to report significant dizziness, and we reduced her Adempas back down to 1mg TID. She said she had difficulty getting to and from lab to have bloodwork drawn yesterday due to her dizziness. Despite this, BP remains reasonable, mostly 110-120 systolic with diastolics in the 60s. She is having more edema and she reported a home weight up to 126 lbs this morning. She is short of breath with minimal activity. Her diarrhea, however, is improved with restarting the Imodium.     Labs done yesterday showed BUN/SCr up to 36/1.24, and K was low normal at 3.4.  Her NT-proBNP remains elevated at 8,941, and in fact, has trended up further from previous. Her H&H was 10/9/35.8.        CURRENT PULMONARY HYPERTENSION REGIMEN:     PAH Rx: Remodulin 30ng/kg/min, Adempas 1mg TID     Diuretics: Lasix 80mg BID     Oxygen: None     Anticoagulation: warfarin  Indication: CTEPH        Assessment/Plan:     1. Chronic thromboembolic pulmonary hypertension.              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 Although she had proximal disease, she was felt to be too high risk for surgical PTE. She is currently on IV Remodulin therapy, at 30ng/kg/min. We have now added Adempas, but is feeling poorly with continued titration. Yesterday we reduced her dose back down to 1mg TID.    --She continues to report significant dizziness along with progressive RIVERA and weight gain. I suspect she is in decompensated heart failure despite escalation of Lasix to 80mg BID. Weight today is 126 lbs at home (dry weight likely 112-113 lbs). We discussed consideration of change to Bumex as an outpatient, but given her frailty and the long holiday weekend approaching, I don't feel this is a safe option for her. Thus, I recommended hospitalization for IV diuresis. I spoke with Dr. Turcios who agrees with this plan as well. We will arrange for direct admission.   --Continue digoxin for RV support. BP at home seems reasonable despite  "significant dizziness. Will keep her off the lisinopril.    -Continue anticoagulation with warfarin for her CTEPH, though we have been monitoring with a lower INR goal of 2-2.5 given her recent fall and leg injury.     --Dr. Turcios is considering possible balloon angioplasty, as most recent pulmonary angiogram showed lesions that may be possible to treat, especially in the right lower lobe. He had already discussed 1% risk of fatal complication including mechanical reperfusion edema requiring mechanical ventilation and death, and 5 to 10% risk of hemoptysis.  Her risk is also sightly higher given her age and high PA pressure.       Follow up plan: To be arranged post hospitalization for close follow up.       I have reviewed the note as documented above.  This accurately captures the substance of my conversation with the patient.      Phone call contact time  Call Started at 0942  Call Ended at 1013  An additional 20 minutes was spent with coordination of care for admission.     Shawna Marcum PA-C  [facility]  Pager [phone]      Karen Anderson is a 80 year old female who is being evaluated via a billable telephone visit.      The patient has been notified of following:     \"This telephone visit will be conducted via a call between you and your physician/provider. We have found that certain health care needs can be provided without the need for a physical exam.  This service lets us provide the care you need with a short phone conversation.  If a prescription is necessary we can send it directly to your pharmacy.  If lab work is needed we can place an order for that and you can then stop by our lab to have the test done at a later time.    Telephone visits are billed at different rates depending on your insurance coverage. During this emergency period, for some insurers they may be billed the same as an in-person visit.  Please reach out to your insurance provider with any questions.    If during the " "course of the call the physician/provider feels a telephone visit is not appropriate, you will not be charged for this service.\"    Patient has given verbal consent for Telephone visit?  Yes    What phone number would you like to be contacted at? [phone]     How would you like to obtain your AVS? MyChart    Vitals - Patient Reported  Systolic (Patient Reported): 91(120/65 minutes after)  Diastolic (Patient Reported): 54  Weight (Patient Reported): 57.2 kg (126 lb)  Height (Patient Reported): 149.9 cm (4' 11\")  BMI (Based on Pt Reported Ht/Wt): 25.45  Pulse (Patient Reported): 81(79)  Pain Score: No Pain (0)(Has SOB)    Vitals were taken and medication reconciled.    DEO Macario  9:46 AM        Please do not hesitate to contact me if you have any questions/concerns.     Sincerely,     MAGEN Saeed    "

## 2020-12-23 ENCOUNTER — APPOINTMENT (OUTPATIENT)
Dept: ULTRASOUND IMAGING | Facility: CLINIC | Age: 80
DRG: 252 | End: 2020-12-23
Attending: INTERNAL MEDICINE
Payer: COMMERCIAL

## 2020-12-23 ENCOUNTER — APPOINTMENT (OUTPATIENT)
Dept: GENERAL RADIOLOGY | Facility: CLINIC | Age: 80
DRG: 252 | End: 2020-12-23
Attending: INTERNAL MEDICINE
Payer: COMMERCIAL

## 2020-12-23 ENCOUNTER — HOSPITAL ENCOUNTER (INPATIENT)
Facility: CLINIC | Age: 80
LOS: 22 days | Discharge: HOME HEALTH | DRG: 252 | End: 2021-01-14
Attending: INTERNAL MEDICINE | Admitting: INTERNAL MEDICINE
Payer: COMMERCIAL

## 2020-12-23 DIAGNOSIS — R06.02 SOB (SHORTNESS OF BREATH): ICD-10-CM

## 2020-12-23 DIAGNOSIS — Z79.01 LONG TERM CURRENT USE OF ANTICOAGULANT THERAPY: ICD-10-CM

## 2020-12-23 DIAGNOSIS — I50.810 RVF (RIGHT VENTRICULAR FAILURE) (H): ICD-10-CM

## 2020-12-23 DIAGNOSIS — I27.24 CTEPH (CHRONIC THROMBOEMBOLIC PULMONARY HYPERTENSION) (H): ICD-10-CM

## 2020-12-23 DIAGNOSIS — I27.20 PULMONARY HYPERTENSION (H): Primary | ICD-10-CM

## 2020-12-23 LAB
ALBUMIN SERPL-MCNC: 3.3 G/DL (ref 3.4–5)
ALP SERPL-CCNC: 162 U/L (ref 40–150)
ALT SERPL W P-5'-P-CCNC: 26 U/L (ref 0–50)
ANION GAP SERPL CALCULATED.3IONS-SCNC: 4 MMOL/L (ref 3–14)
AST SERPL W P-5'-P-CCNC: 39 U/L (ref 0–45)
BASOPHILS # BLD AUTO: 0.1 10E9/L (ref 0–0.2)
BASOPHILS NFR BLD AUTO: 1 %
BILIRUB DIRECT SERPL-MCNC: 0.2 MG/DL (ref 0–0.2)
BILIRUB SERPL-MCNC: 0.6 MG/DL (ref 0.2–1.3)
BUN SERPL-MCNC: 46 MG/DL (ref 7–30)
CALCIUM SERPL-MCNC: 10.2 MG/DL (ref 8.5–10.1)
CHLORIDE SERPL-SCNC: 98 MMOL/L (ref 94–109)
CO2 SERPL-SCNC: 34 MMOL/L (ref 20–32)
CREAT SERPL-MCNC: 1.41 MG/DL (ref 0.52–1.04)
DIFFERENTIAL METHOD BLD: ABNORMAL
DIGOXIN SERPL-MCNC: 1 UG/L (ref 0.5–2)
EOSINOPHIL # BLD AUTO: 0.1 10E9/L (ref 0–0.7)
EOSINOPHIL NFR BLD AUTO: 1.4 %
ERYTHROCYTE [DISTWIDTH] IN BLOOD BY AUTOMATED COUNT: 15.4 % (ref 10–15)
GFR SERPL CREATININE-BSD FRML MDRD: 36 ML/MIN/{1.73_M2}
GLUCOSE SERPL-MCNC: 92 MG/DL (ref 70–99)
HCT VFR BLD AUTO: 33 % (ref 35–47)
HGB BLD-MCNC: 9.9 G/DL (ref 11.7–15.7)
IMM GRANULOCYTES # BLD: 0 10E9/L (ref 0–0.4)
IMM GRANULOCYTES NFR BLD: 0.4 %
INR PPP: 2.57 (ref 0.86–1.14)
LABORATORY COMMENT REPORT: NORMAL
LYMPHOCYTES # BLD AUTO: 0.3 10E9/L (ref 0.8–5.3)
LYMPHOCYTES NFR BLD AUTO: 6.6 %
MAGNESIUM SERPL-MCNC: 2.4 MG/DL (ref 1.6–2.3)
MCH RBC QN AUTO: 27.6 PG (ref 26.5–33)
MCHC RBC AUTO-ENTMCNC: 30 G/DL (ref 31.5–36.5)
MCV RBC AUTO: 92 FL (ref 78–100)
MONOCYTES # BLD AUTO: 0.7 10E9/L (ref 0–1.3)
MONOCYTES NFR BLD AUTO: 12.8 %
NEUTROPHILS # BLD AUTO: 4 10E9/L (ref 1.6–8.3)
NEUTROPHILS NFR BLD AUTO: 77.8 %
NRBC # BLD AUTO: 0 10*3/UL
NRBC BLD AUTO-RTO: 0 /100
NT-PROBNP SERPL-MCNC: ABNORMAL PG/ML (ref 0–1800)
PLATELET # BLD AUTO: 174 10E9/L (ref 150–450)
POTASSIUM SERPL-SCNC: 3.7 MMOL/L (ref 3.4–5.3)
POTASSIUM SERPL-SCNC: 3.7 MMOL/L (ref 3.4–5.3)
PROT SERPL-MCNC: 7.7 G/DL (ref 6.8–8.8)
RBC # BLD AUTO: 3.59 10E12/L (ref 3.8–5.2)
SARS-COV-2 RNA SPEC QL NAA+PROBE: NEGATIVE
SODIUM SERPL-SCNC: 135 MMOL/L (ref 133–144)
SPECIMEN SOURCE: NORMAL
WBC # BLD AUTO: 5.2 10E9/L (ref 4–11)

## 2020-12-23 PROCEDURE — 85025 COMPLETE CBC W/AUTO DIFF WBC: CPT | Performed by: INTERNAL MEDICINE

## 2020-12-23 PROCEDURE — 250N000013 HC RX MED GY IP 250 OP 250 PS 637: Performed by: INTERNAL MEDICINE

## 2020-12-23 PROCEDURE — 999N000127 HC STATISTIC PERIPHERAL IV START W US GUIDANCE

## 2020-12-23 PROCEDURE — 250N000011 HC RX IP 250 OP 636: Performed by: INTERNAL MEDICINE

## 2020-12-23 PROCEDURE — 36415 COLL VENOUS BLD VENIPUNCTURE: CPT | Performed by: INTERNAL MEDICINE

## 2020-12-23 PROCEDURE — 93970 EXTREMITY STUDY: CPT | Mod: 26 | Performed by: RADIOLOGY

## 2020-12-23 PROCEDURE — 250N000013 HC RX MED GY IP 250 OP 250 PS 637: Performed by: STUDENT IN AN ORGANIZED HEALTH CARE EDUCATION/TRAINING PROGRAM

## 2020-12-23 PROCEDURE — 83880 ASSAY OF NATRIURETIC PEPTIDE: CPT | Performed by: INTERNAL MEDICINE

## 2020-12-23 PROCEDURE — 85610 PROTHROMBIN TIME: CPT | Performed by: INTERNAL MEDICINE

## 2020-12-23 PROCEDURE — 71045 X-RAY EXAM CHEST 1 VIEW: CPT

## 2020-12-23 PROCEDURE — 80048 BASIC METABOLIC PNL TOTAL CA: CPT | Performed by: INTERNAL MEDICINE

## 2020-12-23 PROCEDURE — 80076 HEPATIC FUNCTION PANEL: CPT | Performed by: INTERNAL MEDICINE

## 2020-12-23 PROCEDURE — 71045 X-RAY EXAM CHEST 1 VIEW: CPT | Mod: 26 | Performed by: RADIOLOGY

## 2020-12-23 PROCEDURE — 99221 1ST HOSP IP/OBS SF/LOW 40: CPT | Mod: GC | Performed by: INTERNAL MEDICINE

## 2020-12-23 PROCEDURE — U0003 INFECTIOUS AGENT DETECTION BY NUCLEIC ACID (DNA OR RNA); SEVERE ACUTE RESPIRATORY SYNDROME CORONAVIRUS 2 (SARS-COV-2) (CORONAVIRUS DISEASE [COVID-19]), AMPLIFIED PROBE TECHNIQUE, MAKING USE OF HIGH THROUGHPUT TECHNOLOGIES AS DESCRIBED BY CMS-2020-01-R: HCPCS | Performed by: INTERNAL MEDICINE

## 2020-12-23 PROCEDURE — 83735 ASSAY OF MAGNESIUM: CPT | Performed by: INTERNAL MEDICINE

## 2020-12-23 PROCEDURE — 84132 ASSAY OF SERUM POTASSIUM: CPT | Performed by: INTERNAL MEDICINE

## 2020-12-23 PROCEDURE — 93970 EXTREMITY STUDY: CPT

## 2020-12-23 PROCEDURE — 93010 ELECTROCARDIOGRAM REPORT: CPT | Performed by: INTERNAL MEDICINE

## 2020-12-23 PROCEDURE — 93005 ELECTROCARDIOGRAM TRACING: CPT

## 2020-12-23 PROCEDURE — 214N000001 HC R&B CCU UMMC

## 2020-12-23 PROCEDURE — 80162 ASSAY OF DIGOXIN TOTAL: CPT | Performed by: INTERNAL MEDICINE

## 2020-12-23 RX ORDER — AMOXICILLIN 250 MG
2 CAPSULE ORAL 2 TIMES DAILY
Status: DISCONTINUED | OUTPATIENT
Start: 2020-12-23 | End: 2021-01-14 | Stop reason: HOSPADM

## 2020-12-23 RX ORDER — ACETAMINOPHEN 325 MG/1
325 TABLET ORAL EVERY 6 HOURS PRN
Status: DISCONTINUED | OUTPATIENT
Start: 2020-12-23 | End: 2020-12-26

## 2020-12-23 RX ORDER — BUMETANIDE 0.25 MG/ML
4 INJECTION INTRAMUSCULAR; INTRAVENOUS ONCE
Status: COMPLETED | OUTPATIENT
Start: 2020-12-23 | End: 2020-12-23

## 2020-12-23 RX ORDER — ASPIRIN 81 MG/1
81 TABLET, CHEWABLE ORAL EVERY EVENING
Status: DISCONTINUED | OUTPATIENT
Start: 2020-12-23 | End: 2021-01-14 | Stop reason: HOSPADM

## 2020-12-23 RX ORDER — CETIRIZINE HYDROCHLORIDE 10 MG/1
10 TABLET ORAL EVERY EVENING
Status: DISCONTINUED | OUTPATIENT
Start: 2020-12-23 | End: 2021-01-14 | Stop reason: HOSPADM

## 2020-12-23 RX ORDER — ALBUTEROL SULFATE 90 UG/1
2 AEROSOL, METERED RESPIRATORY (INHALATION) EVERY 4 HOURS PRN
Status: DISCONTINUED | OUTPATIENT
Start: 2020-12-23 | End: 2021-01-14 | Stop reason: HOSPADM

## 2020-12-23 RX ORDER — GABAPENTIN 300 MG/1
300 CAPSULE ORAL 3 TIMES DAILY
Status: DISCONTINUED | OUTPATIENT
Start: 2020-12-23 | End: 2021-01-14 | Stop reason: HOSPADM

## 2020-12-23 RX ORDER — DIGOXIN 0.06 MG/1
62.5 TABLET ORAL DAILY
Status: DISCONTINUED | OUTPATIENT
Start: 2020-12-23 | End: 2020-12-23

## 2020-12-23 RX ORDER — LIDOCAINE 40 MG/G
CREAM TOPICAL
Status: DISCONTINUED | OUTPATIENT
Start: 2020-12-23 | End: 2021-01-14 | Stop reason: HOSPADM

## 2020-12-23 RX ORDER — WARFARIN SODIUM 3 MG/1
3 TABLET ORAL
Status: COMPLETED | OUTPATIENT
Start: 2020-12-23 | End: 2020-12-23

## 2020-12-23 RX ORDER — NITROGLYCERIN 0.4 MG/1
0.4 TABLET SUBLINGUAL EVERY 5 MIN PRN
Status: DISCONTINUED | OUTPATIENT
Start: 2020-12-23 | End: 2020-12-23

## 2020-12-23 RX ORDER — DIGOXIN 0.06 MG/1
62.5 TABLET ORAL DAILY
Status: DISCONTINUED | OUTPATIENT
Start: 2020-12-24 | End: 2021-01-01

## 2020-12-23 RX ORDER — MAGNESIUM HYDROXIDE/ALUMINUM HYDROXICE/SIMETHICONE 120; 1200; 1200 MG/30ML; MG/30ML; MG/30ML
30 SUSPENSION ORAL EVERY 4 HOURS PRN
Status: DISCONTINUED | OUTPATIENT
Start: 2020-12-23 | End: 2021-01-14 | Stop reason: HOSPADM

## 2020-12-23 RX ORDER — IPRATROPIUM BROMIDE 42 UG/1
2 SPRAY, METERED NASAL 3 TIMES DAILY
Status: DISCONTINUED | OUTPATIENT
Start: 2020-12-23 | End: 2021-01-14 | Stop reason: HOSPADM

## 2020-12-23 RX ORDER — LOPERAMIDE HCL 2 MG
2 CAPSULE ORAL 2 TIMES DAILY PRN
Status: DISCONTINUED | OUTPATIENT
Start: 2020-12-23 | End: 2020-12-30

## 2020-12-23 RX ORDER — HYDROXYCHLOROQUINE SULFATE 200 MG/1
200 TABLET, FILM COATED ORAL DAILY
Status: DISCONTINUED | OUTPATIENT
Start: 2020-12-23 | End: 2020-12-23

## 2020-12-23 RX ORDER — HYDROXYCHLOROQUINE SULFATE 200 MG/1
200 TABLET, FILM COATED ORAL DAILY
Status: DISCONTINUED | OUTPATIENT
Start: 2020-12-24 | End: 2021-01-14 | Stop reason: HOSPADM

## 2020-12-23 RX ORDER — AMOXICILLIN 250 MG
1 CAPSULE ORAL 2 TIMES DAILY
Status: DISCONTINUED | OUTPATIENT
Start: 2020-12-23 | End: 2021-01-14 | Stop reason: HOSPADM

## 2020-12-23 RX ORDER — ASCORBIC ACID 500 MG
500 TABLET ORAL EVERY MORNING
Status: DISCONTINUED | OUTPATIENT
Start: 2020-12-24 | End: 2021-01-14 | Stop reason: HOSPADM

## 2020-12-23 RX ORDER — POLYETHYLENE GLYCOL 3350 17 G/17G
17 POWDER, FOR SOLUTION ORAL DAILY
Status: DISCONTINUED | OUTPATIENT
Start: 2020-12-23 | End: 2021-01-02

## 2020-12-23 RX ADMIN — CETIRIZINE HYDROCHLORIDE 10 MG: 10 TABLET, FILM COATED ORAL at 20:31

## 2020-12-23 RX ADMIN — IPRATROPIUM BROMIDE 2 SPRAY: 42 SPRAY NASAL at 20:32

## 2020-12-23 RX ADMIN — GABAPENTIN 300 MG: 300 CAPSULE ORAL at 20:36

## 2020-12-23 RX ADMIN — LOPERAMIDE HYDROCHLORIDE 2 MG: 2 CAPSULE ORAL at 23:50

## 2020-12-23 RX ADMIN — BUMETANIDE 4 MG: 0.25 INJECTION INTRAMUSCULAR; INTRAVENOUS at 18:01

## 2020-12-23 RX ADMIN — RIOCIGUAT 1 MG: 1 TABLET, FILM COATED ORAL at 20:33

## 2020-12-23 RX ADMIN — WARFARIN SODIUM 3 MG: 3 TABLET ORAL at 18:01

## 2020-12-23 RX ADMIN — ASPIRIN 81 MG CHEWABLE TABLET 81 MG: 81 TABLET CHEWABLE at 20:31

## 2020-12-23 ASSESSMENT — ACTIVITIES OF DAILY LIVING (ADL)
WALKING_OR_CLIMBING_STAIRS_DIFFICULTY: NO
DOING_ERRANDS_INDEPENDENTLY_DIFFICULTY: NO
CONCENTRATING,_REMEMBERING_OR_MAKING_DECISIONS_DIFFICULTY: NO
ADLS_ACUITY_SCORE: 15
HEARING_DIFFICULTY_OR_DEAF: NO
DRESSING/BATHING_DIFFICULTY: NO
TOILETING_ISSUES: NO
WEAR_GLASSES_OR_BLIND: NO
PATIENT_/_FAMILY_COMMUNICATION_STYLE: SPOKEN LANGUAGE (ENGLISH OR BILINGUAL)
DIFFICULTY_EATING/SWALLOWING: NO
FALL_HISTORY_WITHIN_LAST_SIX_MONTHS: YES
WHICH_OF_THE_ABOVE_FUNCTIONAL_RISKS_HAD_A_RECENT_ONSET_OR_CHANGE?: TRANSFERRING;TOILETING
DIFFICULTY_COMMUNICATING: NO
NUMBER_OF_TIMES_PATIENT_HAS_FALLEN_WITHIN_LAST_SIX_MONTHS: 1
ADLS_ACUITY_SCORE: 15

## 2020-12-23 ASSESSMENT — MIFFLIN-ST. JEOR: SCORE: 944.46

## 2020-12-23 NOTE — PHARMACY-ANTICOAGULATION SERVICE
Clinical Pharmacy - Warfarin Dosing Consult     Pharmacy has been consulted to manage this patient s warfarin therapy.  Indication: CTEPH  Therapy Goal: INR 2-2.5  Warfarin Prior to Admission: Yes  Warfarin PTA Regimen: 4.5mg  Sun, Tu, Th; 3mg ROW, last dose 12/22  Significant drug interactions: aspirin    INR   Date Value Ref Range Status   12/23/2020 2.57 (H) 0.86 - 1.14 Final   12/21/2020 2.50 (H) 0.86 - 1.14 Final     Comment:     This test is intended for monitoring Coumadin therapy.  Results are not   accurate in patients with prolonged INR due to factor deficiency.         Recommend warfarin 3 mg today as per home regimen (INR only slightly supra therapeutic).  Pharmacy will monitor Karen Anderson daily and order warfarin doses to achieve specified goal.      Please contact pharmacy as soon as possible if the warfarin needs to be held for a procedure or if the warfarin goals change.      Anastasia Melo, PharmD  Pager: [phone]

## 2020-12-23 NOTE — Clinical Note
The first balloon was inserted into the pulmonary artery.Max pressure = 6 alma. Total duration = 15 seconds.     Max pressure = 7 alma. Total duration = 30 seconds.    Balloon reinflated a second time: Max pressure = 7 alma. Total duration = 30 seconds.

## 2020-12-23 NOTE — PROGRESS NOTES
Admission     -----------------------------------------------------------  Diagnosis: Pulmonary hypertension & edema     Admitted from: home  For: diuresis  Family Aware of Admission: yes  Medications: pt has home remodulin infusing  Belongings: with patient  Teaching: pt educated on use of call light and plan of care  Access: right port  Telemetry: placed on pt  Ht./Wt.: complete  2 RN Skin Check: completed with GILL Huang RN. Skin intact, with exception of bruising.

## 2020-12-23 NOTE — Clinical Note
dry, intact, no bleeding and no hematoma. 7 Fr RIJV sheath removed, manual pressure held until hemostasis. No bleeding, oozing, or hematoma.

## 2020-12-23 NOTE — H&P
Cardiology History and Physical  Karen Anderson MRN: [id number]  Age: 80 year old, : 1940  Primary care provider: Carlos Carter            Assessment and Plan:     80 year old female with pHTN 2/2 CTEPH, RA was directly admitted for IV diuresis. Patient reports increasing shortness of breath, dizziness, and weight gain for the last couple of weeks.    # Acute on chronic right heart failure  - On lasix 80 mg BID at home.  - On Digoxin 62.5 mcg daily for RV support.  - Will diurese with Bumex 4 mg IV and reassess for further diuresis in the evening.    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  - Continue Riociguat 1 mg TID and Treprostinil 30 ng/kg/min.  - Continue Warfarin.    # JOSE G  - Likely cardiorenal  - Diuresis as above. Avoid nephrotoxic agents. Strict i/o's.   - Continue to monitor kidney fxn.    # RLE Swelling  - Will get Duplex US    # RA  - Continue Hydroxychloroquine 200 mg daily.      PPX: On Warfain.    Code Status: Full CODE     Patient discussed with staff attending, Dr. Ahmadi.    James Valerio MD  Cardiology Fellow            Chief Complaint:     Shortness of breath          History of Present Illness:     80 year old female with pHTN 2/2 CTEPH, RA was directly admitted for IV diuresis. Patient reports increasing shortness of breath, dizziness, and weight gain for the last couple of weeks.  Patient states, she had increased shortness of breath with minimal exertion. She has also noticed increased swelling in her lower extremities. Other associated symptoms were dizziness. No chest pain. No syncope. No nausea, vomiting, or diarrhea.  Of note, patient Riociguat was recently increased to 1.5 mg TID. Since the increase patient has noticed increased dizziness. The dose was decreased to 1 mg TID.    10 point review of systems negative, unless stated above.           Past Medical History:     Past Medical History:   Diagnosis Date     Asthma      Breast cancer (H)      Hypertension      Mycobacterium avium complex (H)      Pulmonary embolism (H)      Pulmonary hypertension (H)      Rheumatoid arthritis (H)      Scoliosis               Past Surgical History:      Past Surgical History:   Procedure Laterality Date     CV PULMONARY ANGIOGRAM N/A 10/5/2020    Procedure: Pulmonary Angiogram;  Surgeon: Lorenzo Sullivan MD;  [facility]     CV RIGHT HEART CATH N/A 03/04/2020    Procedure: CV RIGHT HEART CATH;  Surgeon: Karolina Turcios MD;  [facility]     CV RIGHT HEART CATH N/A 10/5/2020    Procedure: Right Heart Cath; balloon pulmonary angiogram;  Surgeon: Lorenzo Sullivan MD;  Location:  [facility]     IR CVC TUNNEL PLACEMENT > 5 YRS OF AGE  03/09/2020     MASTECTOMY Left      PICC SINGLE LUMEN PLACEMENT Right 03/04/2020    4Fr - 41cm, Medial brachial vein. Left mastectomy     PICC TRIPLE LUMEN PLACEMENT Right 10/02/2020    5Fr - 43cm (7cm external), Basilic vein, high RA              Social History:     Social History     Socioeconomic History     Marital status:      Spouse name: Not on file     Number of children: Not on file     Years of education: Not on file     Highest education level: Not on file   Occupational History     Not on file   Social Needs     Financial resource strain: Not on file     Food insecurity     Worry: Not on file     Inability: Not on file     Transportation needs     Medical: Not on file     Non-medical: Not on file   Tobacco Use     Smoking status: Never Smoker     Smokeless tobacco: Never Used   Substance and Sexual Activity     Alcohol use: Yes     Comment: occasionally      Drug use: Never     Sexual activity: Not on file   Lifestyle     Physical activity     Days per week: Not on file     Minutes per session: Not on file     Stress: Not on file    Relationships     Social connections     Talks on phone: Not on file     Gets together: Not on file     Attends Rastafari service: Not on file     Active member of club or organization: Not on file     Attends meetings of clubs or organizations: Not on file     Relationship status: Not on file     Intimate partner violence     Fear of current or ex partner: Not on file     Emotionally abused: Not on file     Physically abused: Not on file     Forced sexual activity: Not on file   Other Topics Concern     Not on file   Social History Narrative    Is . Has 1 adult daughter who is in her 50s. Has 2 grandkids. 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              Family History:     Family History   Problem Relation Age of Onset     Heart Failure Mother      Kidney Disease Mother      Coronary Artery Disease Maternal Grandfather 50     LUNG DISEASE No family hx of      Clotting Disorder No family hx of      Family history reviewed and updated in EPIC          Allergies:     Allergies   Allergen Reactions     Sulfa Drugs Anaphylaxis and Hives              Medications:     Medications Prior to Admission   Medication Sig Dispense Refill Last Dose     acetaminophen (TYLENOL) 325 MG tablet Take 325 mg by mouth every 6 hours as needed for mild pain        albuterol (PROAIR HFA/PROVENTIL HFA/VENTOLIN HFA) 108 (90 Base) MCG/ACT inhaler Inhale 2 puffs into the lungs every 4 hours as needed for shortness of breath / dyspnea or wheezing         alendronate (FOSAMAX) 70 MG tablet Take 70 mg by mouth once a week        Ascorbic Acid (VITAMIN C) 500 MG CAPS Take 500 mg by mouth every morning         aspirin (ASA) 81 MG tablet Take 81 mg by mouth daily         beclomethasone HFA (QVAR REDIHALER) 80 MCG/ACT inhaler Inhale 2 puffs into the lungs 2 times daily        calcium citrate-vitamin D (CALCIUM CITRATE + D3) 315-250 MG-UNIT TABS per tablet Take 2 tablets by mouth daily         cetirizine (ZYRTEC) 10 MG  tablet Take 10 mg by mouth daily        digoxin (LANOXIN) 62.5 MCG tablet Take 1 tablet (62.5 mcg) by mouth daily 90 tablet 3      furosemide (LASIX) 20 MG tablet Take 4 tablets (80 mg) by mouth 2 times daily 360 tablet 3      gabapentin (NEURONTIN) 300 MG capsule Take 300 mg by mouth 3 times daily         hydroxychloroquine (PLAQUENIL) 200 MG tablet Take 200 mg by mouth daily         ipratropium (ATROVENT) 0.06 % nasal spray Spray 2 sprays into both nostrils 3 times daily         loperamide (IMODIUM) 2 MG capsule Take 1 capsule (2 mg) by mouth daily Can take an additional 2 mg in the evening PRN 90 capsule 3      potassium chloride ER (KLOR-CON M) 20 MEQ CR tablet Take 1 tablet (20 mEq) by mouth daily 90 tablet 3      Probiotic Product (PROBIOTIC ACIDOPHILUS BIOBEADS) CAPS Take 1 capsule by mouth 2 times daily         riociguat (ADEMPAS) 0.5 MG tablet Take 2 tablets (1 mg) by mouth 3 times daily Goal of 2.5 mg three times a day, with dose increase of 0.5 mg three times a day every 2 weeks (SBP must be >95 mmHg and no signs or symptoms of hypotension to increase) 90 tablet 11      Treprostinil (REMODULIN IJ) Dose: 30 ng/kg/min  Vial concentration: 1 mg/mL  Final concentration: 60,000 ng/mL  Dosing weight: 56.3 kg   Pump rate: 41 mL/day  Per patient on 9/29/2020        warfarin ANTICOAGULANT (COUMADIN) 3 MG tablet Take 3 mg PO on Mon, Wed, Fri and Sat, and 4.5 mg all other days of the week 170 tablet 3                     Physical Exam:     B/P: 130/72, T: 98.5, P: 99, R: 20    Wt Readings from Last 4 Encounters:   12/23/20 57.5 kg (126 lb 11.2 oz)   11/09/20 53.3 kg (117 lb 8 oz)   10/15/20 52.2 kg (115 lb)   10/04/20 52.2 kg (115 lb)       No intake or output data in the 24 hours ending 12/23/20 7283    Gen: AA&Ox3, no acute distress  HEENT: Neck supple.   PULM/THORAX: Non labored respirations. Clear to auscultation bilaterally.  CV: RRR, S1 and loud P2 appreciated, no murmurs. JVD +16 cm H2O.  ABD: Soft,  nontender, nondistended. Normoactive bowel sounds.  EXT: Significant LE edema (R Leg > L leg).  NEURO: Non focal deficits, strength 5/5 throughout.    Lines: Goldberg catheter    Drips: Remodulin

## 2020-12-23 NOTE — LETTER
Transition Communication Hand-off for Care Transitions to Next Level of Care Provider    Name: Karen Anderson  : 1940  MRN #: [id number]  Primary Care Provider: JEANIE MCKEON     Primary Clinic: [facility] [address]  [address]  [facility] [address]     Reason for Hospitalization:  Pulmonary Hypertension  Fluid volum overload  Pulmonary hypertension (H)  Admit Date/Time: 2020  2:19 PM  Discharge Date: 2021  Payor Source: Payor: UCARE / Plan: UCARE MEDICARE NON [employer] / Product Type: HMO /              Reason for Communication Hand-off Referral: Other FOL-Pulm htn    Discharge Plan: [employer] coverage denied--will get a vouncher for 30 days       Concern for non-adherence with plan of care:   Y/N N  Discharge Needs Assessment:  Needs      Most Recent Value   Equipment Currently Used at Home  cane, straight, grab bar, tub/shower, raised toilet seat, shower chair   # of Referrals Placed by [facility]  External Care Coordination, Home Infusion            Follow-up plan:  No future appointments.    Any outstanding tests or procedures:        Referrals     Future Labs/Procedures    Home infusion referral     Comments:    Your provider has referred you to: OTHER PROVIDERS:[facility] (Ph: [phone], F: [phone])    Resumption of home IV remodulin and supplies    Labs:  May draw labs from Venous Catheter: No  Home Infusion Pharmacist to order labs based on therapy type and clinical assessments: Yes  Call/Fax Lab Results to: [facility]    Agency Staff to assess nursing needs for Infusion Therapy.    Access Device Management:  IV Access Type: Goldberg  Flush with Heparin and Normal Saline IVP PRN and routine site care (per agency protocol) to maintain access device? Yes            Key Recommendations:      Odette Sellers RN    AVS/Discharge Summary is the source of truth; this is a helpful guide for improved communication of patient story

## 2020-12-23 NOTE — PHARMACY-CONSULT NOTE
Parenteral Prostacyclin Therapy Continuation     This patient has been admitted to the hospital while receiving outpatient Treprostinil (Remodulin) therapy for the treatment of pulmonary hypertension.      [employer] has been contacted at [phone] to verify outpatient prostacyclin regimen. Based on the latest records, the following applies to this patient's  prostacyclin therapy.    1. Type of ambulatory pump used:  CADD-Legacy (100 mL cassette)  2.  Treprostinil (Remodulin)  Dosing Weight= 56.3kg  3.  Prostacyclin Concentration= 60,000 Nanograms/mL (60mcg/mL)  4.  Prostacyclin Dose = 30 Nanograms/kg/min  5. Ambulatory Pump Rate = 41 mL/day if CADD Legacy     Plan:  The patient will be switched over to our hospital syringe pump for the duration of their admission.  We have entered the order into [employer] based on the patient s most recent dose.      Patient refilled her home pump this afternoon just prior to admission. On admission patient had 89.9mL in her pump. Patient will run her CADD pump overnight and transition to hospital pump at 1000 tomorrow. All orders have been entered and timed in EPIC.       Anastasia Melo, Shannan  Pager: [phone]

## 2020-12-24 LAB
ANION GAP SERPL CALCULATED.3IONS-SCNC: 6 MMOL/L (ref 3–14)
ANION GAP SERPL CALCULATED.3IONS-SCNC: 6 MMOL/L (ref 3–14)
BASOPHILS # BLD AUTO: 0 10E9/L (ref 0–0.2)
BASOPHILS NFR BLD AUTO: 0.9 %
BUN SERPL-MCNC: 42 MG/DL (ref 7–30)
BUN SERPL-MCNC: 44 MG/DL (ref 7–30)
CALCIUM SERPL-MCNC: 9.4 MG/DL (ref 8.5–10.1)
CALCIUM SERPL-MCNC: 9.5 MG/DL (ref 8.5–10.1)
CHLORIDE SERPL-SCNC: 100 MMOL/L (ref 94–109)
CHLORIDE SERPL-SCNC: 99 MMOL/L (ref 94–109)
CO2 SERPL-SCNC: 31 MMOL/L (ref 20–32)
CO2 SERPL-SCNC: 31 MMOL/L (ref 20–32)
CREAT SERPL-MCNC: 1.31 MG/DL (ref 0.52–1.04)
CREAT SERPL-MCNC: 1.34 MG/DL (ref 0.52–1.04)
DIFFERENTIAL METHOD BLD: ABNORMAL
EOSINOPHIL # BLD AUTO: 0.1 10E9/L (ref 0–0.7)
EOSINOPHIL NFR BLD AUTO: 2.3 %
ERYTHROCYTE [DISTWIDTH] IN BLOOD BY AUTOMATED COUNT: 15.5 % (ref 10–15)
GFR SERPL CREATININE-BSD FRML MDRD: 38 ML/MIN/{1.73_M2}
GFR SERPL CREATININE-BSD FRML MDRD: 39 ML/MIN/{1.73_M2}
GLUCOSE SERPL-MCNC: 132 MG/DL (ref 70–99)
GLUCOSE SERPL-MCNC: 84 MG/DL (ref 70–99)
HCT VFR BLD AUTO: 31.3 % (ref 35–47)
HGB BLD-MCNC: 9.6 G/DL (ref 11.7–15.7)
IMM GRANULOCYTES # BLD: 0 10E9/L (ref 0–0.4)
IMM GRANULOCYTES NFR BLD: 0.2 %
INR PPP: 2.97 (ref 0.86–1.14)
INTERPRETATION ECG - MUSE: NORMAL
LYMPHOCYTES # BLD AUTO: 0.5 10E9/L (ref 0.8–5.3)
LYMPHOCYTES NFR BLD AUTO: 11.3 %
MCH RBC QN AUTO: 28.4 PG (ref 26.5–33)
MCHC RBC AUTO-ENTMCNC: 30.7 G/DL (ref 31.5–36.5)
MCV RBC AUTO: 93 FL (ref 78–100)
MONOCYTES # BLD AUTO: 0.7 10E9/L (ref 0–1.3)
MONOCYTES NFR BLD AUTO: 16.1 %
NEUTROPHILS # BLD AUTO: 3 10E9/L (ref 1.6–8.3)
NEUTROPHILS NFR BLD AUTO: 69.2 %
NRBC # BLD AUTO: 0 10*3/UL
NRBC BLD AUTO-RTO: 0 /100
PLATELET # BLD AUTO: 152 10E9/L (ref 150–450)
POTASSIUM SERPL-SCNC: 3.2 MMOL/L (ref 3.4–5.3)
POTASSIUM SERPL-SCNC: 3.3 MMOL/L (ref 3.4–5.3)
RBC # BLD AUTO: 3.38 10E12/L (ref 3.8–5.2)
SODIUM SERPL-SCNC: 136 MMOL/L (ref 133–144)
SODIUM SERPL-SCNC: 137 MMOL/L (ref 133–144)
WBC # BLD AUTO: 4.3 10E9/L (ref 4–11)

## 2020-12-24 PROCEDURE — 250N000011 HC RX IP 250 OP 636: Performed by: STUDENT IN AN ORGANIZED HEALTH CARE EDUCATION/TRAINING PROGRAM

## 2020-12-24 PROCEDURE — 80048 BASIC METABOLIC PNL TOTAL CA: CPT | Performed by: INTERNAL MEDICINE

## 2020-12-24 PROCEDURE — 3E043XZ INTRODUCTION OF VASOPRESSOR INTO CENTRAL VEIN, PERCUTANEOUS APPROACH: ICD-10-PCS | Performed by: INTERNAL MEDICINE

## 2020-12-24 PROCEDURE — 99291 CRITICAL CARE FIRST HOUR: CPT | Mod: GC | Performed by: INTERNAL MEDICINE

## 2020-12-24 PROCEDURE — 250N000013 HC RX MED GY IP 250 OP 250 PS 637

## 2020-12-24 PROCEDURE — 36415 COLL VENOUS BLD VENIPUNCTURE: CPT | Performed by: INTERNAL MEDICINE

## 2020-12-24 PROCEDURE — 250N000013 HC RX MED GY IP 250 OP 250 PS 637: Performed by: INTERNAL MEDICINE

## 2020-12-24 PROCEDURE — 250N000009 HC RX 250: Performed by: INTERNAL MEDICINE

## 2020-12-24 PROCEDURE — 250N000011 HC RX IP 250 OP 636: Performed by: INTERNAL MEDICINE

## 2020-12-24 PROCEDURE — 999N000127 HC STATISTIC PERIPHERAL IV START W US GUIDANCE

## 2020-12-24 PROCEDURE — 999N000128 HC STATISTIC PERIPHERAL IV START W/O US GUIDANCE

## 2020-12-24 PROCEDURE — 85610 PROTHROMBIN TIME: CPT | Performed by: INTERNAL MEDICINE

## 2020-12-24 PROCEDURE — 272N000004 HC RX 272: Performed by: INTERNAL MEDICINE

## 2020-12-24 PROCEDURE — 250N000013 HC RX MED GY IP 250 OP 250 PS 637: Performed by: STUDENT IN AN ORGANIZED HEALTH CARE EDUCATION/TRAINING PROGRAM

## 2020-12-24 PROCEDURE — 200N000002 HC R&B ICU UMMC

## 2020-12-24 PROCEDURE — 258N000003 HC RX IP 258 OP 636: Performed by: INTERNAL MEDICINE

## 2020-12-24 PROCEDURE — 85025 COMPLETE CBC W/AUTO DIFF WBC: CPT | Performed by: INTERNAL MEDICINE

## 2020-12-24 RX ORDER — PHENYLEPHRINE HCL IN 0.9% NACL 50MG/250ML
.5-1.25 PLASTIC BAG, INJECTION (ML) INTRAVENOUS CONTINUOUS
Status: DISCONTINUED | OUTPATIENT
Start: 2020-12-24 | End: 2020-12-24

## 2020-12-24 RX ORDER — WARFARIN SODIUM 1 MG/1
1 TABLET ORAL
Status: COMPLETED | OUTPATIENT
Start: 2020-12-24 | End: 2020-12-24

## 2020-12-24 RX ORDER — ONDANSETRON 2 MG/ML
4 INJECTION INTRAMUSCULAR; INTRAVENOUS EVERY 6 HOURS PRN
Status: DISCONTINUED | OUTPATIENT
Start: 2020-12-24 | End: 2021-01-14 | Stop reason: HOSPADM

## 2020-12-24 RX ORDER — POTASSIUM CHLORIDE 20MEQ/15ML
40 LIQUID (ML) ORAL 2 TIMES DAILY
Status: DISCONTINUED | OUTPATIENT
Start: 2020-12-24 | End: 2020-12-25

## 2020-12-24 RX ORDER — POTASSIUM CHLORIDE 750 MG/1
40 TABLET, EXTENDED RELEASE ORAL 2 TIMES DAILY
Status: DISCONTINUED | OUTPATIENT
Start: 2020-12-24 | End: 2020-12-24

## 2020-12-24 RX ORDER — HYDROMORPHONE HCL IN WATER/PF 6 MG/30 ML
0.1 PATIENT CONTROLLED ANALGESIA SYRINGE INTRAVENOUS ONCE
Status: COMPLETED | OUTPATIENT
Start: 2020-12-24 | End: 2020-12-24

## 2020-12-24 RX ORDER — FENTANYL CITRATE-0.9 % NACL/PF 10 MCG/ML
5 PLASTIC BAG, INJECTION (ML) INTRAVENOUS ONCE
Status: DISCONTINUED | OUTPATIENT
Start: 2020-12-24 | End: 2020-12-24

## 2020-12-24 RX ORDER — BUMETANIDE 0.25 MG/ML
4 INJECTION INTRAMUSCULAR; INTRAVENOUS ONCE
Status: COMPLETED | OUTPATIENT
Start: 2020-12-24 | End: 2020-12-24

## 2020-12-24 RX ORDER — PHENYLEPHRINE HCL IN 0.9% NACL 50MG/250ML
.5-6 PLASTIC BAG, INJECTION (ML) INTRAVENOUS CONTINUOUS
Status: DISCONTINUED | OUTPATIENT
Start: 2020-12-24 | End: 2020-12-24

## 2020-12-24 RX ORDER — ONDANSETRON 2 MG/ML
4 INJECTION INTRAMUSCULAR; INTRAVENOUS EVERY 6 HOURS PRN
Status: DISCONTINUED | OUTPATIENT
Start: 2020-12-24 | End: 2020-12-24

## 2020-12-24 RX ORDER — NOREPINEPHRINE BITARTRATE 0.06 MG/ML
.03-.4 INJECTION, SOLUTION INTRAVENOUS CONTINUOUS
Status: DISCONTINUED | OUTPATIENT
Start: 2020-12-24 | End: 2020-12-25

## 2020-12-24 RX ADMIN — POTASSIUM CHLORIDE 40 MEQ: 40 SOLUTION ORAL at 21:11

## 2020-12-24 RX ADMIN — HYDROMORPHONE HYDROCHLORIDE 0.1 MG: 0.2 INJECTION, SOLUTION INTRAMUSCULAR; INTRAVENOUS; SUBCUTANEOUS at 13:39

## 2020-12-24 RX ADMIN — POTASSIUM CHLORIDE 40 MEQ: 750 TABLET, EXTENDED RELEASE ORAL at 08:21

## 2020-12-24 RX ADMIN — HYDROMORPHONE HYDROCHLORIDE 0.1 MG: 0.2 INJECTION, SOLUTION INTRAMUSCULAR; INTRAVENOUS; SUBCUTANEOUS at 11:50

## 2020-12-24 RX ADMIN — CETIRIZINE HYDROCHLORIDE 10 MG: 10 TABLET, FILM COATED ORAL at 21:12

## 2020-12-24 RX ADMIN — ONDANSETRON 4 MG: 2 INJECTION INTRAMUSCULAR; INTRAVENOUS at 11:07

## 2020-12-24 RX ADMIN — BUMETANIDE 4 MG: 0.25 INJECTION INTRAMUSCULAR; INTRAVENOUS at 08:46

## 2020-12-24 RX ADMIN — POTASSIUM CHLORIDE 40 MEQ: 40 SOLUTION ORAL at 13:38

## 2020-12-24 RX ADMIN — Medication 2 TABLET: at 08:17

## 2020-12-24 RX ADMIN — WARFARIN SODIUM 1 MG: 1 TABLET ORAL at 17:31

## 2020-12-24 RX ADMIN — DIGOXIN 62.5 MCG: 0.06 TABLET ORAL at 08:21

## 2020-12-24 RX ADMIN — TREPROSTINIL 30 NG/KG/MIN: 20 INJECTION, SOLUTION INTRAVENOUS; SUBCUTANEOUS at 14:39

## 2020-12-24 RX ADMIN — HYDROXYCHLOROQUINE SULFATE 200 MG: 200 TABLET, FILM COATED ORAL at 08:21

## 2020-12-24 RX ADMIN — IPRATROPIUM BROMIDE 2 SPRAY: 42 SPRAY NASAL at 08:16

## 2020-12-24 RX ADMIN — RIOCIGUAT 1 MG: 1 TABLET, FILM COATED ORAL at 21:11

## 2020-12-24 RX ADMIN — Medication 1 MG: at 21:09

## 2020-12-24 RX ADMIN — RIOCIGUAT 1 MG: 1 TABLET, FILM COATED ORAL at 13:46

## 2020-12-24 RX ADMIN — BUMETANIDE 4 MG: 0.25 INJECTION, SOLUTION INTRAMUSCULAR; INTRAVENOUS at 13:31

## 2020-12-24 RX ADMIN — GABAPENTIN 300 MG: 300 CAPSULE ORAL at 08:21

## 2020-12-24 RX ADMIN — LOPERAMIDE HYDROCHLORIDE 2 MG: 2 CAPSULE ORAL at 08:29

## 2020-12-24 RX ADMIN — RIOCIGUAT 1 MG: 1 TABLET, FILM COATED ORAL at 08:17

## 2020-12-24 RX ADMIN — GABAPENTIN 300 MG: 300 CAPSULE ORAL at 13:39

## 2020-12-24 RX ADMIN — ONDANSETRON 4 MG: 2 INJECTION INTRAMUSCULAR; INTRAVENOUS at 17:31

## 2020-12-24 RX ADMIN — ASPIRIN 81 MG CHEWABLE TABLET 81 MG: 81 TABLET CHEWABLE at 21:09

## 2020-12-24 RX ADMIN — GABAPENTIN 300 MG: 300 CAPSULE ORAL at 21:09

## 2020-12-24 RX ADMIN — NOREPINEPHRINE BITARTRATE 0.05 MCG/KG/MIN: 1 INJECTION, SOLUTION, CONCENTRATE INTRAVENOUS at 11:56

## 2020-12-24 RX ADMIN — OXYCODONE HYDROCHLORIDE AND ACETAMINOPHEN 500 MG: 500 TABLET ORAL at 08:21

## 2020-12-24 RX ADMIN — CHLOROTHIAZIDE SODIUM 500 MG: 500 INJECTION, POWDER, LYOPHILIZED, FOR SOLUTION INTRAVENOUS at 10:00

## 2020-12-24 RX ADMIN — TREPROSTINIL 30 NG/KG/MIN: 20 INJECTION, SOLUTION INTRAVENOUS; SUBCUTANEOUS at 10:24

## 2020-12-24 ASSESSMENT — ACTIVITIES OF DAILY LIVING (ADL)
ADLS_ACUITY_SCORE: 14
ADLS_ACUITY_SCORE: 13
ADLS_ACUITY_SCORE: 12
ADLS_ACUITY_SCORE: 12

## 2020-12-24 ASSESSMENT — MIFFLIN-ST. JEOR: SCORE: 943.55

## 2020-12-24 NOTE — PLAN OF CARE
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    /55 (BP Location: Left leg)   Pulse 79   Temp 98.3  F (36.8  C) (Oral)   Resp 18   Ht 1.473 m (4' 10\")   Wt 57.4 kg (126 lb 8 oz)   SpO2 (!) 85%   BMI 26.44 kg/m      Intake/Output Summary (Last 24 hours) at 12/24/2020 1135  Last data filed at 12/24/2020 1035  Gross per 24 hour   Intake 831.18 ml   Output 1400 ml   Net -568.82 ml      Neuro: A&Ox4, calls appropriately. Neuropathy present BLE at baseline.  Cardiac/Tele: SR on tele, rates in the 80s. Denies CP, palpitations, lightheaded/dizziness, or other cardiac concern.  Respiratory: Sats 85-91% on RA, 78-83% when lying flat. Denies SOB. Is clear in her desire to not wear supplemental oxygen so long as she is not symptomatic. PC order obtained from MD verifying that it is ok to leave pt off supplemental O2 as long as she remains asymptomatic. WOB unlabored, lungs CTA, but with occasional dry cough.  GI/: Voids w/o complications. LBM 12/23. Imodium given x1. Denies nausea or abdominal discomfort.  Diet/Appetite: 2g Na diet.  Skin: 2+ edema BLE.   LDAs: Goldberg R chest infusing Remodulin at 30 ng/kg/min (home CADD pump & medication supply with pharmacy approval). R arm PIV, SL.  Activity: Up ad jamie, independent to BR.   Pain: Denies.    Continues on Warfarin therapy, INR goal 2-2.5. This morning at 1000 will change to hospital-supplied infusion pump & Remodulin syringe. Plan to diurese, received 1x dose 4 mg IV bumex yesterday. Continue to monitor and report changes to Cards 2.    Becky Galvan RN  7:47 AM    Addendum for hours of care [id number]:  Additional 4 mg IV bumex, 500 mL diuril given this AM. Switched from home infusion pump to hospital supplied pump & remodulin syringe, infusing at 30 ng/kg/min. R PIV " edematous/red at site, cold pack applied & removed. Vascular access order placed for new PIV insertion. After switching pt to hospital infusion pump & remodulin syringe, pt became hypotensive (60-70s/30-40s), nauseated with x1 episode of emesis, and dizzy. Repositioned pt in bed and called Cards 2 provider to the bedside to evaluate pt. Gave 4 mg IV Zofran, new PIV inserted R arm. Transferred to  for higher level care, report given to PRIYA Jones.    Becky Galvan RN  11:34 AM

## 2020-12-24 NOTE — PHARMACY-ADMISSION MEDICATION HISTORY
Admission medication history interview status for the 12/23/2020 admission is complete. See Epic admission navigator for allergy information, pharmacy, prior to admission medications and immunization status.     Medication history interview sources:  patient. Patient's medication list    Changes made to PTA medication list (reason)  Added:  Saline nasal spray    Deleted: none    Changed:   - updated Remodulin entry  - updated gabapentin to include 300mg at bedtime prn    Additional medication history information (including reliability of information, actions taken by pharmacist):  - warfarin dose: 4.5mg Benoit, Tu, Th and 3mg ROW, last dose 12/22      Prior to Admission medications    Medication Sig Last Dose Taking? Auth Provider   Ascorbic Acid (VITAMIN C) 500 MG CAPS Take 500 mg by mouth every morning  12/23/2020 at 0900 Yes Reported, Patient   beclomethasone HFA (QVAR REDIHALER) 80 MCG/ACT inhaler Inhale 2 puffs into the lungs 2 times daily 12/23/2020 at 0900 Yes Unknown, Entered By History   cetirizine (ZYRTEC) 10 MG tablet Take 10 mg by mouth daily 12/22/2020 at 2300 Yes Reported, Patient   digoxin (LANOXIN) 62.5 MCG tablet Take 1 tablet (62.5 mcg) by mouth daily 12/23/2020 at 0900 Yes Karolina Turcios MD   furosemide (LASIX) 20 MG tablet Take 4 tablets (80 mg) by mouth 2 times daily 12/23/2020 at 0900 Yes Karolina Turcios MD   gabapentin (NEURONTIN) 300 MG capsule Take 300 mg by mouth 3 times daily Can take an additional 300mg at bedtime as needed 12/23/2020 at 0900 Yes Shawna Marcum PA   hydroxychloroquine (PLAQUENIL) 200 MG tablet Take 200 mg by mouth daily  12/23/2020 at 0900 Yes Reported, Patient   ipratropium (ATROVENT) 0.06 % nasal spray Spray 2 sprays into both nostrils 3 times daily  12/23/2020 at 0900 Yes Unknown, Entered By History   loperamide (IMODIUM) 2 MG capsule Take 1 capsule (2 mg) by mouth daily Can take an additional 2 mg in the evening PRN  Patient taking differently: Take 2 mg by  mouth 2 times daily Can take an additional 2 mg in the evening PRN 12/23/2020 at 0800 Yes Karolina Turcios MD   potassium chloride ER (KLOR-CON M) 20 MEQ CR tablet Take 1 tablet (20 mEq) by mouth daily 12/22/2020 at 2300 Yes Karolina Turcios MD   Probiotic Product (PROBIOTIC ACIDOPHILUS BIOBEADS) CAPS Take 1 capsule by mouth 2 times daily  12/23/2020 at 0900 Yes Reported, Patient   riociguat (ADEMPAS) 0.5 MG tablet Take 2 tablets (1 mg) by mouth 3 times daily Goal of 2.5 mg three times a day, with dose increase of 0.5 mg three times a day every 2 weeks (SBP must be >95 mmHg and no signs or symptoms of hypotension to increase) 12/23/2020 at 0900 Yes Shawna Marcum PA   sodium chloride (OCEAN) 0.65 % nasal spray Spray 1 spray into both nostrils daily as needed for congestion Past Week at Unknown time Yes Unknown, Entered By History   Treprostinil (REMODULIN IJ) Dose: 30 ng/kg/min  Vial concentration: 1 mg/mL  Final concentration: 60,000 ng/mL  Dosing weight: 56.3 kg   Pump rate: 41 mL/day  Per patient and [facility] on 12/23/2020 12/23/2020 at Unknown time Yes Reported, Patient   warfarin ANTICOAGULANT (COUMADIN) 3 MG tablet Take 3 mg PO on Mon, Wed, Fri and Sat, and 4.5 mg all other days of the week 12/22/2020 at 1700 Yes Karolina Turcios MD   acetaminophen (TYLENOL) 325 MG tablet Take 325 mg by mouth every 6 hours as needed for mild pain 12/12/2020  Unknown, Entered By History   albuterol (PROAIR HFA/PROVENTIL HFA/VENTOLIN HFA) 108 (90 Base) MCG/ACT inhaler Inhale 2 puffs into the lungs every 4 hours as needed for shortness of breath / dyspnea or wheezing  Unknown at Unknown time  Reported, Patient   alendronate (FOSAMAX) 70 MG tablet Take 70 mg by mouth once a week 12/21/2020 at 0800  Reported, Patient   aspirin (ASA) 81 MG tablet Take 81 mg by mouth daily  12/22/2020 at 2300  Reported, Patient   calcium citrate-vitamin D (CALCIUM CITRATE + D3) 315-250 MG-UNIT TABS per tablet Take 2 tablets by mouth  daily  12/23/2020 at 0900  Reported, Patient         Medication history completed by:   Anastasia Melo, PharmD  Pager: [phone]

## 2020-12-24 NOTE — PLAN OF CARE
D/Pharmacy and I talked and she is alert and may be here less than 24 hours, and just changed Remodulin before admission, so the plan is to let her keep it on her pump until tomorrow am. Then, we will changes to our supplies and our pump. I discussed this with the charge RN on this evening.   A.she runs sats in high 80's. I was in the room and talked to her for about 15 minutes while she ate and I pushed her Bumex. She is able to speak in sentences, concentrates well and is not SOB. She does NOT want to be on oxygen at this time. She is compensating at this time to lower sats  P/monitor for symptoms which would overrule her preference  A/she has firm edema in lower extremities and says she has edema below the waist. She denies SOB. 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 She is eating supper and sucks on ice chips to limit her fluids. She is up and about in the room

## 2020-12-24 NOTE — PROGRESS NOTES
Cards 2 Progress Note  Karen Anderson MRN: [id number]  Age: 80 year old, : 1940  Primary care provider: Carlos Carter            Assessment and Plan:     80 year old female with 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 She was admitted for IV diuresis.    # Acute on chronic right heart failure  - On lasix 80 mg BID at home.  - On Digoxin 62.5 mcg daily for RV support.  - Will continue with IV diuresis (Bumex 4 mg IV and Diuril 500 mg IV).  - Will assess need for further diuresis in the evening.    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  - Continue Riociguat 1 mg TID and Treprostinil 30 ng/kg/min.  - Continue Warfarin. Dosed by pharmacy.    # JOSE G  - Likely cardiorenal  - Diuresis as above. Avoid nephrotoxic agents. Strict i/o's.   - Continue to monitor kidney fxn.    # RLE Swelling  - No DVT in both lower extremities.    # RA  - Continue Hydroxychloroquine 200 mg daily.      PPX: On Warfain.    Code Status: Full CODE     Patient discussed with staff attending, Dr. Ahmadi.    James Valerio MD  Cardiology Fellow       Chief Complaint:     Shortness of breath     Subjective:     No acute events overnight. Patient states she has not had good urine output with the IV diuresis as she has hoped. SOB is stable. No chest pain. No nausea or vomiting.    10 point review of systems negative, unless stated above.            Physical Exam:     B/P: 130/72, T: 98.5, P: 99, R: 20    Wt Readings from Last 4 Encounters:   20 57.5 kg (126 lb 11.2 oz)   20 53.3 kg (117 lb 8 oz)   10/15/20 52.2 kg (115 lb)   10/04/20 52.2 kg (115 lb)       No intake or output data in the 24 hours ending 20 1533    Gen: AA&Ox3, no acute distress  HEENT: Neck supple.   PULM/THORAX: Non labored respirations. Clear to auscultation  bilaterally.  CV: RRR, S1 and loud P2 appreciated, no murmurs. JVD +16 cm H2O.  ABD: Soft, nontender, nondistended. Normoactive bowel sounds.  EXT: Significant LE edema (R Leg > L leg).  NEURO: Non focal deficits, strength 5/5 throughout.    Lines: Goldberg catheter    Drips: Remodulin     Orders Only on 12/21/2020   Component Date Value Ref Range Status     INR 12/21/2020 2.50* 0.86 - 1.14 Final    Comment: This test is intended for monitoring Coumadin therapy.  Results are not   accurate in patients with prolonged INR due to factor deficiency.       Sodium 12/21/2020 135  133 - 144 mmol/L Final     Potassium 12/21/2020 3.4  3.4 - 5.3 mmol/L Final     Chloride 12/21/2020 98  94 - 109 mmol/L Final     Carbon Dioxide 12/21/2020 30  20 - 32 mmol/L Final     Anion Gap 12/21/2020 7  3 - 14 mmol/L Final     Glucose 12/21/2020 95  70 - 99 mg/dL Final     Urea Nitrogen 12/21/2020 36* 7 - 30 mg/dL Final     Creatinine 12/21/2020 1.24* 0.52 - 1.04 mg/dL Final     GFR Estimate 12/21/2020 41* >60 mL/min/[1.73_m2] Final    Comment: Non  GFR Calc  Starting 12/18/2018, serum creatinine based estimated GFR (eGFR) will be   calculated using the Chronic Kidney Disease Epidemiology Collaboration   (CKD-EPI) equation.       GFR Estimate If Black 12/21/2020 48* >60 mL/min/[1.73_m2] Final    Comment:  GFR Calc  Starting 12/18/2018, serum creatinine based estimated GFR (eGFR) will be   calculated using the Chronic Kidney Disease Epidemiology Collaboration   (CKD-EPI) equation.       Calcium 12/21/2020 9.6  8.5 - 10.1 mg/dL Final     Bilirubin Total 12/21/2020 0.6  0.2 - 1.3 mg/dL Final     Albumin 12/21/2020 3.3* 3.4 - 5.0 g/dL Final     Protein Total 12/21/2020 7.9  6.8 - 8.8 g/dL Final     Alkaline Phosphatase 12/21/2020 165* 40 - 150 U/L Final     ALT 12/21/2020 21  0 - 50 U/L Final     AST 12/21/2020 30  0 - 45 U/L Final     N-Terminal Pro Bnp 12/21/2020 8,941* 0 - 450 pg/mL Final    Comment:     Reference range shown and results flagged as abnormal are for the outpatient,   non acute settings. Establishing a baseline value for each individual patient   is useful for follow-up.  Suggested inpatient cut points for confirming diagnosis of CHF in an acute   setting are:   >450 pg/mL (age 18 to less than 50)   >900 pg/mL (age 50 to less than 75)   >1800 pg/mL (75 yrs and older)  An inpatient or emergency department NT-proPBNP <300 pg/mL effectively rules   out acute CHF, with 99% negative predictive value.        WBC 12/21/2020 4.8  4.0 - 11.0 10e9/L Final     RBC Count 12/21/2020 3.80  3.8 - 5.2 10e12/L Final     Hemoglobin 12/21/2020 10.9* 11.7 - 15.7 g/dL Final     Hematocrit 12/21/2020 35.8  35.0 - 47.0 % Final     MCV 12/21/2020 94  78 - 100 fl Final     MCH 12/21/2020 28.7  26.5 - 33.0 pg Final     MCHC 12/21/2020 30.4* 31.5 - 36.5 g/dL Final     RDW 12/21/2020 16.0* 10.0 - 15.0 % Final     Platelet Count 12/21/2020 184  150 - 450 10e9/L Final

## 2020-12-24 NOTE — PROGRESS NOTES
Event Note:    At around 10:24 AM, patient had her Remodulin infusion syringe exchanged to a new syringe. Unfortunately, there was a programming error in terms of her infusion rate. Instead of her Remodulin (30 ng/kg/min) running at 1.69 ml/hr, she was programmed at 60 ml/hr. After about 15 min at the higher infusion rate, patient complained of severe dizziness, blurry vision, and shortness of breath. She was also noted to have MAPs in the 40s - 50s. At this time, cards 2 was notified. Patient was noted to be hypoxic with oxygen saturation around 85%. She was put on 10L of oxygen to maintain saturation about 90%. The Remodulin infusion was stopped. She was given 250 ml of fluids. Peripheral levophed was started at 0.05 mcg/kg/min. Patients blood pressure and symptoms improved. She was transferred to the ICU for a higher level of care.    Plan:  - Stop the Remodulin infusion for 3 hours. Re-start Remodulin (30 ng/kg/min) at 1.69 ml/hr at around 2:30 PM.  - Continue peripheral levophed at 0.05 mcg/kg/min. If patient has increased pressor requirement, we will place a central line at bedside.  - We will give her another dose of Bumex 4mg IV one time.  - Zofran for nausea. Dilaudid for pain.  - Continue to monitor in the ICU for now.    Dr. Ahmadi was at bedside for the above events.     James Valerio MD   Cardiology fellow

## 2020-12-25 LAB
ANION GAP SERPL CALCULATED.3IONS-SCNC: 3 MMOL/L (ref 3–14)
ANION GAP SERPL CALCULATED.3IONS-SCNC: 3 MMOL/L (ref 3–14)
BUN SERPL-MCNC: 42 MG/DL (ref 7–30)
BUN SERPL-MCNC: 43 MG/DL (ref 7–30)
CALCIUM SERPL-MCNC: 9.1 MG/DL (ref 8.5–10.1)
CALCIUM SERPL-MCNC: 9.2 MG/DL (ref 8.5–10.1)
CHLORIDE SERPL-SCNC: 101 MMOL/L (ref 94–109)
CHLORIDE SERPL-SCNC: 94 MMOL/L (ref 94–109)
CO2 SERPL-SCNC: 30 MMOL/L (ref 20–32)
CO2 SERPL-SCNC: 36 MMOL/L (ref 20–32)
CREAT SERPL-MCNC: 1.41 MG/DL (ref 0.52–1.04)
CREAT SERPL-MCNC: 1.42 MG/DL (ref 0.52–1.04)
ERYTHROCYTE [DISTWIDTH] IN BLOOD BY AUTOMATED COUNT: 15.6 % (ref 10–15)
GFR SERPL CREATININE-BSD FRML MDRD: 35 ML/MIN/{1.73_M2}
GFR SERPL CREATININE-BSD FRML MDRD: 36 ML/MIN/{1.73_M2}
GLUCOSE SERPL-MCNC: 123 MG/DL (ref 70–99)
GLUCOSE SERPL-MCNC: 89 MG/DL (ref 70–99)
HCT VFR BLD AUTO: 33.1 % (ref 35–47)
HGB BLD-MCNC: 9.6 G/DL (ref 11.7–15.7)
INR PPP: 2.71 (ref 0.86–1.14)
MAGNESIUM SERPL-MCNC: 2.2 MG/DL (ref 1.6–2.3)
MCH RBC QN AUTO: 27.6 PG (ref 26.5–33)
MCHC RBC AUTO-ENTMCNC: 29 G/DL (ref 31.5–36.5)
MCV RBC AUTO: 95 FL (ref 78–100)
PHOSPHATE SERPL-MCNC: 3.7 MG/DL (ref 2.5–4.5)
PLATELET # BLD AUTO: 151 10E9/L (ref 150–450)
POTASSIUM SERPL-SCNC: 3.6 MMOL/L (ref 3.4–5.3)
POTASSIUM SERPL-SCNC: 4.7 MMOL/L (ref 3.4–5.3)
RBC # BLD AUTO: 3.48 10E12/L (ref 3.8–5.2)
SODIUM SERPL-SCNC: 134 MMOL/L (ref 133–144)
SODIUM SERPL-SCNC: 135 MMOL/L (ref 133–144)
WBC # BLD AUTO: 4.4 10E9/L (ref 4–11)

## 2020-12-25 PROCEDURE — 80048 BASIC METABOLIC PNL TOTAL CA: CPT | Performed by: INTERNAL MEDICINE

## 2020-12-25 PROCEDURE — 272N000004 HC RX 272: Performed by: INTERNAL MEDICINE

## 2020-12-25 PROCEDURE — 36415 COLL VENOUS BLD VENIPUNCTURE: CPT | Performed by: INTERNAL MEDICINE

## 2020-12-25 PROCEDURE — 250N000013 HC RX MED GY IP 250 OP 250 PS 637: Performed by: INTERNAL MEDICINE

## 2020-12-25 PROCEDURE — 250N000011 HC RX IP 250 OP 636: Performed by: INTERNAL MEDICINE

## 2020-12-25 PROCEDURE — 999N000128 HC STATISTIC PERIPHERAL IV START W/O US GUIDANCE

## 2020-12-25 PROCEDURE — 85610 PROTHROMBIN TIME: CPT | Performed by: INTERNAL MEDICINE

## 2020-12-25 PROCEDURE — 99233 SBSQ HOSP IP/OBS HIGH 50: CPT | Mod: GC | Performed by: INTERNAL MEDICINE

## 2020-12-25 PROCEDURE — 214N000001 HC R&B CCU UMMC

## 2020-12-25 PROCEDURE — 84100 ASSAY OF PHOSPHORUS: CPT | Performed by: INTERNAL MEDICINE

## 2020-12-25 PROCEDURE — 83735 ASSAY OF MAGNESIUM: CPT | Performed by: INTERNAL MEDICINE

## 2020-12-25 PROCEDURE — 85027 COMPLETE CBC AUTOMATED: CPT | Performed by: INTERNAL MEDICINE

## 2020-12-25 RX ORDER — POTASSIUM CHLORIDE 750 MG/1
40 TABLET, EXTENDED RELEASE ORAL 2 TIMES DAILY
Status: DISCONTINUED | OUTPATIENT
Start: 2020-12-25 | End: 2020-12-27

## 2020-12-25 RX ORDER — BUMETANIDE 0.25 MG/ML
4 INJECTION INTRAMUSCULAR; INTRAVENOUS ONCE
Status: COMPLETED | OUTPATIENT
Start: 2020-12-25 | End: 2020-12-25

## 2020-12-25 RX ADMIN — GABAPENTIN 300 MG: 300 CAPSULE ORAL at 15:48

## 2020-12-25 RX ADMIN — GABAPENTIN 300 MG: 300 CAPSULE ORAL at 08:09

## 2020-12-25 RX ADMIN — RIOCIGUAT 1 MG: 1 TABLET, FILM COATED ORAL at 20:31

## 2020-12-25 RX ADMIN — BUMETANIDE 4 MG: 0.25 INJECTION, SOLUTION INTRAMUSCULAR; INTRAVENOUS at 08:09

## 2020-12-25 RX ADMIN — HYDROXYCHLOROQUINE SULFATE 200 MG: 200 TABLET, FILM COATED ORAL at 08:09

## 2020-12-25 RX ADMIN — RIOCIGUAT 1 MG: 1 TABLET, FILM COATED ORAL at 08:12

## 2020-12-25 RX ADMIN — CETIRIZINE HYDROCHLORIDE 10 MG: 10 TABLET, FILM COATED ORAL at 20:30

## 2020-12-25 RX ADMIN — BUMETANIDE 4 MG: 0.25 INJECTION INTRAMUSCULAR; INTRAVENOUS at 18:57

## 2020-12-25 RX ADMIN — TREPROSTINIL 30 NG/KG/MIN: 20 INJECTION, SOLUTION INTRAVENOUS; SUBCUTANEOUS at 20:20

## 2020-12-25 RX ADMIN — CHLOROTHIAZIDE SODIUM 500 MG: 500 INJECTION, POWDER, LYOPHILIZED, FOR SOLUTION INTRAVENOUS at 20:40

## 2020-12-25 RX ADMIN — POTASSIUM CHLORIDE 40 MEQ: 40 SOLUTION ORAL at 08:11

## 2020-12-25 RX ADMIN — Medication 2 TABLET: at 08:12

## 2020-12-25 RX ADMIN — ASPIRIN 81 MG CHEWABLE TABLET 81 MG: 81 TABLET CHEWABLE at 20:30

## 2020-12-25 RX ADMIN — CHLOROTHIAZIDE SODIUM 500 MG: 500 INJECTION, POWDER, LYOPHILIZED, FOR SOLUTION INTRAVENOUS at 08:09

## 2020-12-25 RX ADMIN — TREPROSTINIL 30 NG/KG/MIN: 20 INJECTION, SOLUTION INTRAVENOUS; SUBCUTANEOUS at 08:17

## 2020-12-25 RX ADMIN — RIOCIGUAT 1 MG: 1 TABLET, FILM COATED ORAL at 15:49

## 2020-12-25 RX ADMIN — GABAPENTIN 300 MG: 300 CAPSULE ORAL at 20:29

## 2020-12-25 RX ADMIN — DIGOXIN 62.5 MCG: 0.06 TABLET ORAL at 08:11

## 2020-12-25 RX ADMIN — OXYCODONE HYDROCHLORIDE AND ACETAMINOPHEN 500 MG: 500 TABLET ORAL at 08:09

## 2020-12-25 RX ADMIN — POTASSIUM CHLORIDE 40 MEQ: 750 TABLET, EXTENDED RELEASE ORAL at 20:39

## 2020-12-25 ASSESSMENT — ACTIVITIES OF DAILY LIVING (ADL)
ADLS_ACUITY_SCORE: 12
ADLS_ACUITY_SCORE: 15
ADLS_ACUITY_SCORE: 12
ADLS_ACUITY_SCORE: 15
ADLS_ACUITY_SCORE: 12
ADLS_ACUITY_SCORE: 14

## 2020-12-25 ASSESSMENT — MIFFLIN-ST. JEOR
SCORE: 944.75
SCORE: 944.91

## 2020-12-25 NOTE — PLAN OF CARE
Major Shift Events:  HR in NSR, MAPs stable. On 2 LPM NC, no SOB noted. Bumex and diurel given, good UO. Pt up in chair and eating. Remodulin continues @ 30 ng/kg/min.     Plan: Transfer to  when bed available     For vital signs and complete assessments, please see documentation flowsheets.

## 2020-12-25 NOTE — PLAN OF CARE
D: Patient is a 80 year old female admitted on 12/23/2020 for HF exacerbation.  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    I: Monitored vitals and assessed pt status.   Changed:  -transferred up from 4E @1440  -pt appears to have large infiltration site (painful, red/pink) on R arm from previous IV per pt report. Treating with cold compress as pt reports this feels better. Jimi Cards 2 notified.   -BLE edema. R>L. MDs aware. Previous US showed no DVT. L leg ecchymotic from previous fall in July (on warfarin). Has athletic tape in place per pt.  -2 RN skin assessment completed with:  Nalini WILSON RN    Running: Remodulin running @ 30 ng/kg/min (1.69 mL/hr). New syringe ordered--current one expires at 2000.  R PIV SL; other R PIV removed d/t not flushing and painful.  PRN:    A: A0x4. VSS on 2L NC. SR on tele. Afebrile. Urinating adequately. Up SBA. 2g Na, no caffeine diet.    P: Continue to monitor Pt status and report changes to treatment team.    Geovanna Mcdermott RN on 12/25/2020 at 5:13 PM  POC [id number]

## 2020-12-25 NOTE — PLAN OF CARE
"Major Shift Events:       Transferred to  from  @ 11:45 r/t adverse reaction to inadverant overdose of Remodulin reportedly over ~ 15 min. See MD note. Upon arrival ,pt awake and alert, c/o increased headache, leg pain, and lightheadedness. Arrived w/ low dose Nor Epi gtt infusing for hypotension and O2 @ 12L per NC.  B/P stable upon arrival.          Partial relief of headache and leg pain w/ Dilaudid 0.1mg IV x2. Nor-Epi weaned off @ 13:15.       Remodulin 30ng/kg/min restarted @ 14:45 per MD order. Tolerating well. O2 sats 91-95% on 2L NC.       Dominguez cath placed , aspt unable to void completely on bedpan. Good urine response to Bumex 4mg IV x1. Mod edema feet -> abd, w/ Rt leg > Lt.       Feeling better this pm. Pre-med w/ Zofran prior to supper for c/o \"nausea feeling in throat\".              Plan:      Continue to monitor closely. Wean O2 as leobardo. Plan transfer back to  tomorrow if remains stable.   For vital signs and complete assessments, please see documentation flowsheets.     "

## 2020-12-25 NOTE — PLAN OF CARE
Pt brought with her one pair of pants, one underwear, sweater,  One black bag full of things, one pair of shoes and phone .

## 2020-12-25 NOTE — PLAN OF CARE
Major Shift Events:  VSS. Dominguez removed. Dilaudid PO given for pain. Pt slept well overnight.   Plan: Plan continue to monitor.   For vital signs and complete assessments, please see documentation flowsheets.

## 2020-12-25 NOTE — PROGRESS NOTES
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 She was admitted for IV diuresis.    # Acute on chronic right heart failure  - On lasix 80 mg BID at home.  - On Digoxin 62.5 mcg daily for RV support.  - Will continue with IV diuresis (Bumex 4 mg IV and Diuril 500 mg IV).  - Will assess need for further diuresis in the evening.    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  - Continue Riociguat 1 mg TID and Treprostinil 30 ng/kg/min.  - Continue Warfarin. Dosed by pharmacy.    # JOSE G  - Likely cardiorenal  - Diuresis as above. Avoid nephrotoxic agents. Strict i/o's.   - Continue to monitor kidney fxn.    # RLE Swelling  - No DVT in both lower extremities.    # RA  - Continue Hydroxychloroquine 200 mg daily.      PPX: On Warfain.    Code Status: Full CODE     Patient discussed with staff attending, Dr. Ahmadi.    James Valerio MD  Cardiology Fellow       Chief Complaint:     Shortness of breath     Subjective:     Please review event notes from yesterday. Patient's sob is stable. No chest pain. No nausea or vomiting. She was net neg 1.7 L yesterday.     10 point review of systems negative, unless stated above.            Physical Exam:     B/P: 130/72, T: 98.5, P: 99, R: 20    Wt Readings from Last 4 Encounters:   20 57.5 kg (126 lb 12.2 oz)   20 53.3 kg (117 lb 8 oz)   10/15/20 52.2 kg (115 lb)   10/04/20 52.2 kg (115 lb)       No intake or output data in the 24 hours ending 20 1533    Gen: AA&Ox3, no acute distress  HEENT: Neck supple.   PULM/THORAX: Non labored respirations. Clear to auscultation bilaterally.  CV: RRR, S1 and  loud P2 appreciated, no murmurs. JVD +16 cm H2O.  ABD: Soft, nontender, nondistended. Normoactive bowel sounds.  EXT: Significant LE edema (R Leg > L leg).  NEURO: Non focal deficits, strength 5/5 throughout.    Lines: Goldberg catheter    Drips: Remodulin     Orders Only on 12/21/2020   Component Date Value Ref Range Status     INR 12/21/2020 2.50* 0.86 - 1.14 Final    Comment: This test is intended for monitoring Coumadin therapy.  Results are not   accurate in patients with prolonged INR due to factor deficiency.       Sodium 12/21/2020 135  133 - 144 mmol/L Final     Potassium 12/21/2020 3.4  3.4 - 5.3 mmol/L Final     Chloride 12/21/2020 98  94 - 109 mmol/L Final     Carbon Dioxide 12/21/2020 30  20 - 32 mmol/L Final     Anion Gap 12/21/2020 7  3 - 14 mmol/L Final     Glucose 12/21/2020 95  70 - 99 mg/dL Final     Urea Nitrogen 12/21/2020 36* 7 - 30 mg/dL Final     Creatinine 12/21/2020 1.24* 0.52 - 1.04 mg/dL Final     GFR Estimate 12/21/2020 41* >60 mL/min/[1.73_m2] Final    Comment: Non  GFR Calc  Starting 12/18/2018, serum creatinine based estimated GFR (eGFR) will be   calculated using the Chronic Kidney Disease Epidemiology Collaboration   (CKD-EPI) equation.       GFR Estimate If Black 12/21/2020 48* >60 mL/min/[1.73_m2] Final    Comment:  GFR Calc  Starting 12/18/2018, serum creatinine based estimated GFR (eGFR) will be   calculated using the Chronic Kidney Disease Epidemiology Collaboration   (CKD-EPI) equation.       Calcium 12/21/2020 9.6  8.5 - 10.1 mg/dL Final     Bilirubin Total 12/21/2020 0.6  0.2 - 1.3 mg/dL Final     Albumin 12/21/2020 3.3* 3.4 - 5.0 g/dL Final     Protein Total 12/21/2020 7.9  6.8 - 8.8 g/dL Final     Alkaline Phosphatase 12/21/2020 165* 40 - 150 U/L Final     ALT 12/21/2020 21  0 - 50 U/L Final     AST 12/21/2020 30  0 - 45 U/L Final     N-Terminal Pro Bnp 12/21/2020 8,941* 0 - 450 pg/mL Final    Comment:    Reference range shown and results  flagged as abnormal are for the outpatient,   non acute settings. Establishing a baseline value for each individual patient   is useful for follow-up.  Suggested inpatient cut points for confirming diagnosis of CHF in an acute   setting are:   >450 pg/mL (age 18 to less than 50)   >900 pg/mL (age 50 to less than 75)   >1800 pg/mL (75 yrs and older)  An inpatient or emergency department NT-proPBNP <300 pg/mL effectively rules   out acute CHF, with 99% negative predictive value.        WBC 12/21/2020 4.8  4.0 - 11.0 10e9/L Final     RBC Count 12/21/2020 3.80  3.8 - 5.2 10e12/L Final     Hemoglobin 12/21/2020 10.9* 11.7 - 15.7 g/dL Final     Hematocrit 12/21/2020 35.8  35.0 - 47.0 % Final     MCV 12/21/2020 94  78 - 100 fl Final     MCH 12/21/2020 28.7  26.5 - 33.0 pg Final     MCHC 12/21/2020 30.4* 31.5 - 36.5 g/dL Final     RDW 12/21/2020 16.0* 10.0 - 15.0 % Final     Platelet Count 12/21/2020 184  150 - 450 10e9/L Final

## 2020-12-25 NOTE — PROGRESS NOTES
Transfer        1440 PM  ---------------------------------------------------------------------  Transferred to/from: from 4E to 6C  Via: Bed  Reason for transfer: appropriate for floor  Family: Aware of transfer (pt called )  Belongings: Sent with pt. See additional NST note  Chart: Sent with pt  Medications: Meds from bin sent with pt. Pt has (3) medications that are home supplied, labeled in med room bin.  Report called to:  Yari VALENTINO RN    Temp:  [97.2  F (36.2  C)-98.3  F (36.8  C)] 98.3  F (36.8  C)  Pulse:  [64-86] 77  Resp:  [16-20] 18  BP: ()/(52-81) 112/63  SpO2:  [91 %-96 %] 96 %

## 2020-12-26 LAB
ANION GAP SERPL CALCULATED.3IONS-SCNC: 3 MMOL/L (ref 3–14)
ANION GAP SERPL CALCULATED.3IONS-SCNC: 5 MMOL/L (ref 3–14)
BUN SERPL-MCNC: 42 MG/DL (ref 7–30)
BUN SERPL-MCNC: 42 MG/DL (ref 7–30)
CALCIUM SERPL-MCNC: 9 MG/DL (ref 8.5–10.1)
CALCIUM SERPL-MCNC: 9.3 MG/DL (ref 8.5–10.1)
CHLORIDE SERPL-SCNC: 94 MMOL/L (ref 94–109)
CHLORIDE SERPL-SCNC: 98 MMOL/L (ref 94–109)
CO2 SERPL-SCNC: 35 MMOL/L (ref 20–32)
CO2 SERPL-SCNC: 36 MMOL/L (ref 20–32)
CREAT SERPL-MCNC: 1.27 MG/DL (ref 0.52–1.04)
CREAT SERPL-MCNC: 1.28 MG/DL (ref 0.52–1.04)
GFR SERPL CREATININE-BSD FRML MDRD: 40 ML/MIN/{1.73_M2}
GFR SERPL CREATININE-BSD FRML MDRD: 40 ML/MIN/{1.73_M2}
GLUCOSE SERPL-MCNC: 164 MG/DL (ref 70–99)
GLUCOSE SERPL-MCNC: 78 MG/DL (ref 70–99)
INR PPP: 2.46 (ref 0.86–1.14)
MAGNESIUM SERPL-MCNC: 2 MG/DL (ref 1.6–2.3)
MAGNESIUM SERPL-MCNC: 2.1 MG/DL (ref 1.6–2.3)
POTASSIUM SERPL-SCNC: 3.4 MMOL/L (ref 3.4–5.3)
POTASSIUM SERPL-SCNC: 3.6 MMOL/L (ref 3.4–5.3)
SODIUM SERPL-SCNC: 134 MMOL/L (ref 133–144)
SODIUM SERPL-SCNC: 136 MMOL/L (ref 133–144)

## 2020-12-26 PROCEDURE — 250N000013 HC RX MED GY IP 250 OP 250 PS 637: Performed by: INTERNAL MEDICINE

## 2020-12-26 PROCEDURE — 83735 ASSAY OF MAGNESIUM: CPT | Performed by: INTERNAL MEDICINE

## 2020-12-26 PROCEDURE — 83735 ASSAY OF MAGNESIUM: CPT | Performed by: STUDENT IN AN ORGANIZED HEALTH CARE EDUCATION/TRAINING PROGRAM

## 2020-12-26 PROCEDURE — 80048 BASIC METABOLIC PNL TOTAL CA: CPT | Performed by: INTERNAL MEDICINE

## 2020-12-26 PROCEDURE — 99233 SBSQ HOSP IP/OBS HIGH 50: CPT | Mod: GC | Performed by: INTERNAL MEDICINE

## 2020-12-26 PROCEDURE — 214N000001 HC R&B CCU UMMC

## 2020-12-26 PROCEDURE — 80048 BASIC METABOLIC PNL TOTAL CA: CPT | Performed by: STUDENT IN AN ORGANIZED HEALTH CARE EDUCATION/TRAINING PROGRAM

## 2020-12-26 PROCEDURE — 85610 PROTHROMBIN TIME: CPT | Performed by: INTERNAL MEDICINE

## 2020-12-26 PROCEDURE — 250N000011 HC RX IP 250 OP 636: Performed by: STUDENT IN AN ORGANIZED HEALTH CARE EDUCATION/TRAINING PROGRAM

## 2020-12-26 PROCEDURE — 250N000013 HC RX MED GY IP 250 OP 250 PS 637: Performed by: STUDENT IN AN ORGANIZED HEALTH CARE EDUCATION/TRAINING PROGRAM

## 2020-12-26 PROCEDURE — 36415 COLL VENOUS BLD VENIPUNCTURE: CPT | Performed by: INTERNAL MEDICINE

## 2020-12-26 PROCEDURE — 36415 COLL VENOUS BLD VENIPUNCTURE: CPT | Performed by: STUDENT IN AN ORGANIZED HEALTH CARE EDUCATION/TRAINING PROGRAM

## 2020-12-26 RX ORDER — WARFARIN SODIUM 1 MG/1
2 TABLET ORAL
Status: COMPLETED | OUTPATIENT
Start: 2020-12-26 | End: 2020-12-26

## 2020-12-26 RX ORDER — CLINDAMYCIN HCL 300 MG
300 CAPSULE ORAL EVERY 6 HOURS SCHEDULED
Status: COMPLETED | OUTPATIENT
Start: 2020-12-26 | End: 2020-12-30

## 2020-12-26 RX ORDER — ACETAMINOPHEN 325 MG/1
325 TABLET ORAL ONCE
Status: COMPLETED | OUTPATIENT
Start: 2020-12-26 | End: 2020-12-26

## 2020-12-26 RX ORDER — BUMETANIDE 0.25 MG/ML
4 INJECTION INTRAMUSCULAR; INTRAVENOUS ONCE
Status: COMPLETED | OUTPATIENT
Start: 2020-12-26 | End: 2020-12-26

## 2020-12-26 RX ORDER — POTASSIUM CHLORIDE 750 MG/1
20 TABLET, EXTENDED RELEASE ORAL ONCE
Status: COMPLETED | OUTPATIENT
Start: 2020-12-26 | End: 2020-12-26

## 2020-12-26 RX ORDER — ACETAMINOPHEN 325 MG/1
325-650 TABLET ORAL EVERY 6 HOURS PRN
Status: DISCONTINUED | OUTPATIENT
Start: 2020-12-26 | End: 2021-01-14 | Stop reason: HOSPADM

## 2020-12-26 RX ADMIN — BUMETANIDE 4 MG: 0.25 INJECTION INTRAMUSCULAR; INTRAVENOUS at 20:06

## 2020-12-26 RX ADMIN — CETIRIZINE HYDROCHLORIDE 10 MG: 10 TABLET, FILM COATED ORAL at 20:03

## 2020-12-26 RX ADMIN — RIOCIGUAT 1 MG: 1 TABLET, FILM COATED ORAL at 20:03

## 2020-12-26 RX ADMIN — HYDROXYCHLOROQUINE SULFATE 200 MG: 200 TABLET, FILM COATED ORAL at 08:44

## 2020-12-26 RX ADMIN — GABAPENTIN 300 MG: 300 CAPSULE ORAL at 20:03

## 2020-12-26 RX ADMIN — ACETAMINOPHEN 325 MG: 325 TABLET, FILM COATED ORAL at 15:19

## 2020-12-26 RX ADMIN — OXYCODONE HYDROCHLORIDE AND ACETAMINOPHEN 500 MG: 500 TABLET ORAL at 08:44

## 2020-12-26 RX ADMIN — CLINDAMYCIN HYDROCHLORIDE 300 MG: 300 CAPSULE ORAL at 17:59

## 2020-12-26 RX ADMIN — ACETAMINOPHEN 325 MG: 325 TABLET, FILM COATED ORAL at 00:07

## 2020-12-26 RX ADMIN — CLINDAMYCIN HYDROCHLORIDE 300 MG: 300 CAPSULE ORAL at 14:56

## 2020-12-26 RX ADMIN — GABAPENTIN 300 MG: 300 CAPSULE ORAL at 08:44

## 2020-12-26 RX ADMIN — RIOCIGUAT 1 MG: 1 TABLET, FILM COATED ORAL at 08:45

## 2020-12-26 RX ADMIN — BUMETANIDE 4 MG: 0.25 INJECTION INTRAMUSCULAR; INTRAVENOUS at 09:22

## 2020-12-26 RX ADMIN — Medication 2 TABLET: at 08:44

## 2020-12-26 RX ADMIN — ACETAMINOPHEN 325 MG: 325 TABLET, FILM COATED ORAL at 18:00

## 2020-12-26 RX ADMIN — POTASSIUM CHLORIDE 20 MEQ: 750 TABLET, EXTENDED RELEASE ORAL at 22:17

## 2020-12-26 RX ADMIN — CHLOROTHIAZIDE SODIUM 500 MG: 500 INJECTION, POWDER, LYOPHILIZED, FOR SOLUTION INTRAVENOUS at 10:19

## 2020-12-26 RX ADMIN — CHLOROTHIAZIDE SODIUM 500 MG: 500 INJECTION, POWDER, LYOPHILIZED, FOR SOLUTION INTRAVENOUS at 20:07

## 2020-12-26 RX ADMIN — GABAPENTIN 300 MG: 300 CAPSULE ORAL at 14:08

## 2020-12-26 RX ADMIN — DIGOXIN 62.5 MCG: 0.06 TABLET ORAL at 08:45

## 2020-12-26 RX ADMIN — POTASSIUM CHLORIDE 40 MEQ: 750 TABLET, EXTENDED RELEASE ORAL at 08:44

## 2020-12-26 RX ADMIN — WARFARIN SODIUM 2 MG: 1 TABLET ORAL at 18:00

## 2020-12-26 RX ADMIN — POTASSIUM CHLORIDE 40 MEQ: 750 TABLET, EXTENDED RELEASE ORAL at 20:04

## 2020-12-26 RX ADMIN — ASPIRIN 81 MG CHEWABLE TABLET 81 MG: 81 TABLET CHEWABLE at 20:03

## 2020-12-26 RX ADMIN — RIOCIGUAT 1 MG: 1 TABLET, FILM COATED ORAL at 14:08

## 2020-12-26 ASSESSMENT — ACTIVITIES OF DAILY LIVING (ADL)
ADLS_ACUITY_SCORE: 14

## 2020-12-26 ASSESSMENT — MIFFLIN-ST. JEOR: SCORE: 934.93

## 2020-12-26 NOTE — PROGRESS NOTES
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    Acute on chronic right-sided heart failure  Presented with increasing SOB, dizziness, and weight gain (dry weight ~112 lbs) for the last couple of weeks, increased RIVERA. Has RLE swelling but US DVT negative (12/23/2020). Patient still hypervolemic with IVC diameter 2.8 cm with no respiratory variation.  - Last TTE (09/2020): Normal LVEF 60-65%. Moderately dilated and redued function of RV. Right ventricular systolic pressure is 82mmHg above the right atrial pressure.  - Last RHC (09/2020): RA 11/6/5 RV 90/8 PA 90/20/42 PCWP --/--/11 LUIZ 2.55/1.76 TD 3/2.07 PVR 10 (TD) (Moderately elevated pulmonary artery hypertension, Right sided and Left sided filling pressures are normal)  - Will continue with IV diuresis (Bumex 4 mg IV and Diuril 500 mg IV). On lasix 80 mg PO BID at home.  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 Follow-up with , outpatient plan is to consider balloon angioplasty, starting macitentan after she reaches a maximum tolerated dose of Adempas, or increasing Remodulin.  - Continue Riociguat 1 mg TID and Treprostinil 30 ng/kg/min  - Continue Warfarin. Dosed by pharmacy. Target INR 2-2.5.    Cellulitis RLE  Worsening swollen, redness, and pain on RLE. No signs of systemic infection but this raises concerns for cellulitis.  - Clindamycin 300 mg q6h    Asthma  Allergic rhinitis  - Continue beclomethasone BID and albuterol inhaler PRN  - Continue cetirizine and  ipratropium nasal spray    RA  - Continue Hydroxychloroquine 200 mg daily    Diet: Na restriction diet  DVT Prophylaxis: Warfarin  Dominguez Catheter: not present  Code Status: FULL code        Disposition Plan   Expected discharge: 2 - 3 days, recommended to prior living arrangement once fluid volume status optimized on oral medication.    Entered: Napoleon Maher MD 12/26/2020, 6:39 AM    The patient's care was discussed with the Attending Physician, Dr. Rod.    Napoleon Maher MD  Internal Medicine PGY-2  P: [phone]  [facility]   Please see sign in/sign out for up to date coverage information  ______________________________________________________________________    Interval History   Notes reviewed, no acute event overnight.    Patient states that breathing is fine today. Still has some pain in R arm but is improving. R leg swollen looks worse, states that it gets worse during the day when she is not raising her leg.    Data reviewed today: I reviewed all medications, new labs and imaging results over the last 24 hours and discussed as pertinent in assessment and plan.    Physical Exam   Vital Signs: Temp: 98.5  F (36.9  C) Temp src: Oral BP: 99/56 Pulse: 74   Resp: 16 SpO2: 91 % O2 Device: Nasal cannula Oxygen Delivery: 2 LPM  Weight: 124 lbs 9.6 oz  Constitutional: awake, alert, cooperative, no apparent distress, and appears stated age  Neck: CVP ~14 cm  Respiratory: No increased work of breathing, fine crackles at both bases. No wheezing.  Cardiovascular: Regular rate and rhythm, normal S1 and S2, and no murmur noted  GI: Normal bowel sounds, soft, non-distended, non-tender  Musculoskeletal: 1+ to 2+ lower extremity pitting edema present. Erythema, warmth, and tenderness on RLE below knee.  Neurologic: Awake, alert, oriented to name, place and time. No focal neurological deficit.    Data   Recent Labs   Lab 12/26/20  0648 12/25/20  6694  12/25/20  0641 12/24/20  0613 12/24/20  0613 12/23/20  1533 12/21/20  0856   WBC  --   --  4.4  --  4.3 5.2 4.8   HGB  --   --  9.6*  --  9.6* 9.9* 10.9*   MCV  --   --  95  --  93 92 94   PLT  --   --  151  --  152 174 184   INR 2.46*  --  2.71*  --  2.97* 2.57* 2.50*    134 135   < > 137 135 135   POTASSIUM 3.6 3.6 4.7   < > 3.2* 3.7  3.7 3.4   CHLORIDE 98 94 101   < > 100 98 98   CO2 35* 36* 30   < > 31 34* 30   BUN 42* 42* 43*   < > 44* 46* 36*   CR 1.28* 1.41* 1.42*   < > 1.31* 1.41* 1.24*   ANIONGAP 3 3 3   < > 6 4 7   ESTEFANÍA 9.0 9.2 9.1   < > 9.4 10.2* 9.6   GLC 78 123* 89   < > 84 92 95   ALBUMIN  --   --   --   --   --  3.3* 3.3*   PROTTOTAL  --   --   --   --   --  7.7 7.9   BILITOTAL  --   --   --   --   --  0.6 0.6   ALKPHOS  --   --   --   --   --  162* 165*   ALT  --   --   --   --   --  26 21   AST  --   --   --   --   --  39 30    < > = values in this interval not displayed.     Medications     - MEDICATION INSTRUCTIONS -       - MEDICATION INSTRUCTIONS -       - MEDICATION INSTRUCTIONS -       treprostinil (REMODULIN) intravenous infusion (LESS than or EQUAL to 100 mcg/mL) 30 ng/kg/min (12/26/20 0728)     Warfarin Therapy Reminder         aspirin  81 mg Oral QPM     beclomethasone HFA  2 puff Inhalation BID     calcium citrate and vitamin D  2 tablet Oral Daily     cetirizine  10 mg Oral QPM     digoxin  62.5 mcg Oral Daily     gabapentin  300 mg Oral TID     hydroxychloroquine  200 mg Oral Daily     ipratropium  2 spray Both Nostrils TID     polyethylene glycol  17 g Oral Daily     potassium chloride  40 mEq Oral BID     riociguat  1 mg Oral TID     senna-docusate  1 tablet Oral BID    Or     senna-docusate  2 tablet Oral BID     sodium chloride (PF)  3 mL Intracatheter Q8H     vitamin C  500 mg Oral QAM

## 2020-12-26 NOTE — PLAN OF CARE
D: pt admitted on 12/23/2020 for HF exacerbation. PMH of  Chronic right heart failure, Moderate pHTN 2/2  CTEPH, JOSE G, and rheumatoid arthritis, HTN , scoliosis, asthma, breast cancer s/p chemotherapy and left Mastectomy, bilateral pulmonary emboli and DVT in 1998, chemotherapy induced cardiomyopathy       I: Monitored vitals and assessed pt status.   Changed:   Started clindamycin 300 mg Capsule Q6hrs       Running: Remodulin 30ng/Kg/min     PRN: Tylenol      Vitals:  Temp: 97  F (36.1  C) Temp src: Oral BP: 111/61 Pulse: 75   Resp: 18 SpO2: 96 % O2 Device: Nasal cannula with humidification Oxygen Delivery: 2 LPM      A:   Neuro: A&O x 4. Neurologically intact, denies Headache, dizziness, and lightheadedness. Report numbness and tingling (baseline). calls appropriately   Cardiac: SR, denies chest pain.   Respiratory: sating > 92% on 2L NC with humidifier . RIVERA. LS clear, diminished at bases   Diet/appetite: 2g Na diet, good appetite   Endocrine: n/a  GI/: BM X1. Denies abdominal pain and nausea. Good urine output, voids without difficultly   Activity: Stand by assist  Pain: reported headache, PRN tylenol given   Skin: no new deficit noted. +2 edema on Left LE and +3  Edema on Right LE  LDAs: Goldberg and PIV x1    P: Continue to monitor Pt status and report changes to treatment team.

## 2020-12-26 NOTE — CONSULTS
Care Management Initial Consult    General Information  Assessment completed with: Patient, VM-chart review,    Type of CM/SW Visit: Initial Assessment    Primary Care Provider verified and updated as needed: Yes   Readmission within the last 30 days:        Reason for Consult: care coordination/care conference, discharge planning  Advance Care Planning:            Communication Assessment  Patient's communication style: spoken language (English or Bilingual)    Hearing Difficulty or Deaf: no   Wear Glasses or Blind: no    Cognitive  Cognitive/Neuro/Behavioral: WDL  Level of Consciousness: alert  Arousal Level: opens eyes spontaneously  Orientation: oriented x 4  Mood/Behavior: calm, cooperative, behavior appropriate to situation  Best Language: 0 - No aphasia  Speech: spontaneous, clear, logical    Living Environment:   People in home: spouse     Current living Arrangements: house      Able to return to prior arrangements: yes       Family/Social Support:  Care provided by: self  Provides care for: no one  Marital Status:             Description of Support System: Supportive, Involved    Support Assessment: Adequate family and caregiver support    Current Resources:   Skilled Home Care Services:  None  Community Resources: Home Infusion  Equipment currently used at home: none  Supplies currently used at home: None          Financial Concerns: No concerns identified           Lifestyle & Psychosocial Needs:        Socioeconomic History     Marital status:      Spouse name: Not on file     Number of children: Not on file     Years of education: Not on file     Highest education level: Not on file     Tobacco Use     Smoking status: Never Smoker     Smokeless tobacco: Never Used   Substance and Sexual Activity     Alcohol use: Yes     Comment: occasionally      Drug use: Never       Functional Status:  Prior to admission patient needed assistance: Independent with her own care needs prior to  hospitalization          Additional Information:  Pt admitted for IV diuresis and RLE swelling.  Pt with a medical history significant for Pulmonary HTN,.   Per chart review pt open to [facility] home infusion for IV remodulin.  Pt is on chronic coumadin which is managed by [facility].    Spoke with pt via phone.  Introduced RNCC role.    Pt goal is to be able to return home.  Pt confirmed she has all her infusion supplies for discharge.    Pt notes no concerns or questions at this time.     Resumption of home infusion services initiated.     Carol Martinez RN

## 2020-12-26 NOTE — PLAN OF CARE
"Time of care 5534-0960  D: Patient is a 80 year old female admitted on 12/23/2020 for HF exacerbation.  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     I: Monitored vitals and assessed pt status.      Running:   -Remodulin running @ 30 ng/kg/min (1.69 mL/hr).   -R PIV SL       A: A0x4. VSS on 2L NC. SR on tele. Afebrile. Urinating adequately. Up SBA. 2g Na, no caffeine diet. Edema in lower extremities R>L. Skin with generalized bruising, kaylee. Appeared to sleep well between cares.      P: Continue to monitor Pt status and report changes to treatment team.     BP 99/56   Pulse 74   Temp 98.5  F (36.9  C) (Oral)   Resp 16   Ht 1.473 m (4' 10\")   Wt 56.5 kg (124 lb 9.6 oz)   SpO2 91%   BMI 26.04 kg/m      "

## 2020-12-27 ENCOUNTER — APPOINTMENT (OUTPATIENT)
Dept: OCCUPATIONAL THERAPY | Facility: CLINIC | Age: 80
DRG: 252 | End: 2020-12-27
Attending: STUDENT IN AN ORGANIZED HEALTH CARE EDUCATION/TRAINING PROGRAM
Payer: COMMERCIAL

## 2020-12-27 LAB
ANION GAP SERPL CALCULATED.3IONS-SCNC: 2 MMOL/L (ref 3–14)
ANION GAP SERPL CALCULATED.3IONS-SCNC: 6 MMOL/L (ref 3–14)
BUN SERPL-MCNC: 39 MG/DL (ref 7–30)
BUN SERPL-MCNC: 43 MG/DL (ref 7–30)
CALCIUM SERPL-MCNC: 9.3 MG/DL (ref 8.5–10.1)
CALCIUM SERPL-MCNC: 9.7 MG/DL (ref 8.5–10.1)
CHLORIDE SERPL-SCNC: 94 MMOL/L (ref 94–109)
CHLORIDE SERPL-SCNC: 96 MMOL/L (ref 94–109)
CO2 SERPL-SCNC: 34 MMOL/L (ref 20–32)
CO2 SERPL-SCNC: 38 MMOL/L (ref 20–32)
CREAT SERPL-MCNC: 1.18 MG/DL (ref 0.52–1.04)
CREAT SERPL-MCNC: 1.23 MG/DL (ref 0.52–1.04)
ERYTHROCYTE [DISTWIDTH] IN BLOOD BY AUTOMATED COUNT: 15.1 % (ref 10–15)
GFR SERPL CREATININE-BSD FRML MDRD: 42 ML/MIN/{1.73_M2}
GFR SERPL CREATININE-BSD FRML MDRD: 44 ML/MIN/{1.73_M2}
GLUCOSE SERPL-MCNC: 120 MG/DL (ref 70–99)
GLUCOSE SERPL-MCNC: 86 MG/DL (ref 70–99)
HCT VFR BLD AUTO: 30.3 % (ref 35–47)
HGB BLD-MCNC: 9 G/DL (ref 11.7–15.7)
INR PPP: 2.38 (ref 0.86–1.14)
MAGNESIUM SERPL-MCNC: 2 MG/DL (ref 1.6–2.3)
MAGNESIUM SERPL-MCNC: 2.1 MG/DL (ref 1.6–2.3)
MCH RBC QN AUTO: 27.4 PG (ref 26.5–33)
MCHC RBC AUTO-ENTMCNC: 29.7 G/DL (ref 31.5–36.5)
MCV RBC AUTO: 92 FL (ref 78–100)
PLATELET # BLD AUTO: 141 10E9/L (ref 150–450)
POTASSIUM SERPL-SCNC: 3.2 MMOL/L (ref 3.4–5.3)
POTASSIUM SERPL-SCNC: 3.4 MMOL/L (ref 3.4–5.3)
RBC # BLD AUTO: 3.29 10E12/L (ref 3.8–5.2)
SODIUM SERPL-SCNC: 135 MMOL/L (ref 133–144)
SODIUM SERPL-SCNC: 136 MMOL/L (ref 133–144)
WBC # BLD AUTO: 5.1 10E9/L (ref 4–11)

## 2020-12-27 PROCEDURE — 85610 PROTHROMBIN TIME: CPT | Performed by: STUDENT IN AN ORGANIZED HEALTH CARE EDUCATION/TRAINING PROGRAM

## 2020-12-27 PROCEDURE — 250N000013 HC RX MED GY IP 250 OP 250 PS 637: Performed by: STUDENT IN AN ORGANIZED HEALTH CARE EDUCATION/TRAINING PROGRAM

## 2020-12-27 PROCEDURE — 272N000004 HC RX 272: Performed by: INTERNAL MEDICINE

## 2020-12-27 PROCEDURE — 85027 COMPLETE CBC AUTOMATED: CPT | Performed by: STUDENT IN AN ORGANIZED HEALTH CARE EDUCATION/TRAINING PROGRAM

## 2020-12-27 PROCEDURE — 97165 OT EVAL LOW COMPLEX 30 MIN: CPT | Mod: GO

## 2020-12-27 PROCEDURE — 83735 ASSAY OF MAGNESIUM: CPT | Performed by: STUDENT IN AN ORGANIZED HEALTH CARE EDUCATION/TRAINING PROGRAM

## 2020-12-27 PROCEDURE — 250N000013 HC RX MED GY IP 250 OP 250 PS 637: Performed by: INTERNAL MEDICINE

## 2020-12-27 PROCEDURE — 97530 THERAPEUTIC ACTIVITIES: CPT | Mod: GO

## 2020-12-27 PROCEDURE — 250N000011 HC RX IP 250 OP 636: Performed by: STUDENT IN AN ORGANIZED HEALTH CARE EDUCATION/TRAINING PROGRAM

## 2020-12-27 PROCEDURE — 250N000011 HC RX IP 250 OP 636: Performed by: INTERNAL MEDICINE

## 2020-12-27 PROCEDURE — 36415 COLL VENOUS BLD VENIPUNCTURE: CPT | Performed by: STUDENT IN AN ORGANIZED HEALTH CARE EDUCATION/TRAINING PROGRAM

## 2020-12-27 PROCEDURE — 99233 SBSQ HOSP IP/OBS HIGH 50: CPT | Mod: GC | Performed by: INTERNAL MEDICINE

## 2020-12-27 PROCEDURE — 214N000001 HC R&B CCU UMMC

## 2020-12-27 PROCEDURE — 80048 BASIC METABOLIC PNL TOTAL CA: CPT | Performed by: STUDENT IN AN ORGANIZED HEALTH CARE EDUCATION/TRAINING PROGRAM

## 2020-12-27 RX ORDER — POTASSIUM CHLORIDE 750 MG/1
40 TABLET, EXTENDED RELEASE ORAL 2 TIMES DAILY
Status: DISCONTINUED | OUTPATIENT
Start: 2020-12-27 | End: 2020-12-27

## 2020-12-27 RX ORDER — POTASSIUM CHLORIDE 1.5 G/1.58G
20 POWDER, FOR SOLUTION ORAL ONCE
Status: DISCONTINUED | OUTPATIENT
Start: 2020-12-27 | End: 2020-12-27

## 2020-12-27 RX ORDER — POTASSIUM CHLORIDE 750 MG/1
20 TABLET, EXTENDED RELEASE ORAL ONCE
Status: DISCONTINUED | OUTPATIENT
Start: 2020-12-27 | End: 2020-12-27

## 2020-12-27 RX ORDER — WARFARIN SODIUM 3 MG/1
3 TABLET ORAL
Status: COMPLETED | OUTPATIENT
Start: 2020-12-27 | End: 2020-12-27

## 2020-12-27 RX ORDER — NALOXONE HYDROCHLORIDE 0.4 MG/ML
0.2 INJECTION, SOLUTION INTRAMUSCULAR; INTRAVENOUS; SUBCUTANEOUS
Status: DISCONTINUED | OUTPATIENT
Start: 2020-12-27 | End: 2021-01-14 | Stop reason: HOSPADM

## 2020-12-27 RX ORDER — POTASSIUM CHLORIDE 750 MG/1
20 TABLET, EXTENDED RELEASE ORAL ONCE
Status: COMPLETED | OUTPATIENT
Start: 2020-12-27 | End: 2020-12-27

## 2020-12-27 RX ORDER — POTASSIUM CHLORIDE 1.5 G/1.58G
40 POWDER, FOR SOLUTION ORAL ONCE
Status: COMPLETED | OUTPATIENT
Start: 2020-12-27 | End: 2020-12-27

## 2020-12-27 RX ORDER — NALOXONE HYDROCHLORIDE 0.4 MG/ML
0.4 INJECTION, SOLUTION INTRAMUSCULAR; INTRAVENOUS; SUBCUTANEOUS
Status: DISCONTINUED | OUTPATIENT
Start: 2020-12-27 | End: 2021-01-14 | Stop reason: HOSPADM

## 2020-12-27 RX ORDER — BUMETANIDE 0.25 MG/ML
5 INJECTION INTRAMUSCULAR; INTRAVENOUS ONCE
Status: COMPLETED | OUTPATIENT
Start: 2020-12-27 | End: 2020-12-27

## 2020-12-27 RX ORDER — POTASSIUM CHLORIDE 750 MG/1
40 TABLET, EXTENDED RELEASE ORAL 2 TIMES DAILY
Status: DISCONTINUED | OUTPATIENT
Start: 2020-12-27 | End: 2020-12-31

## 2020-12-27 RX ADMIN — CLINDAMYCIN HYDROCHLORIDE 300 MG: 300 CAPSULE ORAL at 05:25

## 2020-12-27 RX ADMIN — CETIRIZINE HYDROCHLORIDE 10 MG: 10 TABLET, FILM COATED ORAL at 20:49

## 2020-12-27 RX ADMIN — CLINDAMYCIN HYDROCHLORIDE 300 MG: 300 CAPSULE ORAL at 18:23

## 2020-12-27 RX ADMIN — RIOCIGUAT 1 MG: 1 TABLET, FILM COATED ORAL at 09:03

## 2020-12-27 RX ADMIN — CLINDAMYCIN HYDROCHLORIDE 300 MG: 300 CAPSULE ORAL at 00:19

## 2020-12-27 RX ADMIN — POTASSIUM CHLORIDE 40 MEQ: 750 TABLET, EXTENDED RELEASE ORAL at 20:47

## 2020-12-27 RX ADMIN — CHLOROTHIAZIDE SODIUM 1000 MG: 500 INJECTION, POWDER, LYOPHILIZED, FOR SOLUTION INTRAVENOUS at 09:03

## 2020-12-27 RX ADMIN — BUMETANIDE 5 MG: 0.25 INJECTION INTRAMUSCULAR; INTRAVENOUS at 18:23

## 2020-12-27 RX ADMIN — DIGOXIN 62.5 MCG: 0.06 TABLET ORAL at 09:05

## 2020-12-27 RX ADMIN — HYDROXYCHLOROQUINE SULFATE 200 MG: 200 TABLET, FILM COATED ORAL at 09:05

## 2020-12-27 RX ADMIN — ASPIRIN 81 MG CHEWABLE TABLET 81 MG: 81 TABLET CHEWABLE at 20:49

## 2020-12-27 RX ADMIN — GABAPENTIN 300 MG: 300 CAPSULE ORAL at 14:28

## 2020-12-27 RX ADMIN — OXYCODONE HYDROCHLORIDE AND ACETAMINOPHEN 500 MG: 500 TABLET ORAL at 09:05

## 2020-12-27 RX ADMIN — GABAPENTIN 300 MG: 300 CAPSULE ORAL at 20:49

## 2020-12-27 RX ADMIN — ACETAMINOPHEN 650 MG: 325 TABLET, FILM COATED ORAL at 23:46

## 2020-12-27 RX ADMIN — CLINDAMYCIN HYDROCHLORIDE 300 MG: 300 CAPSULE ORAL at 23:46

## 2020-12-27 RX ADMIN — BUMETANIDE 5 MG: 0.25 INJECTION INTRAMUSCULAR; INTRAVENOUS at 09:03

## 2020-12-27 RX ADMIN — POTASSIUM CHLORIDE 40 MEQ: 750 TABLET, EXTENDED RELEASE ORAL at 11:53

## 2020-12-27 RX ADMIN — IPRATROPIUM BROMIDE 2 SPRAY: 42 SPRAY NASAL at 18:23

## 2020-12-27 RX ADMIN — POTASSIUM CHLORIDE 20 MEQ: 750 TABLET, EXTENDED RELEASE ORAL at 20:47

## 2020-12-27 RX ADMIN — GABAPENTIN 300 MG: 300 CAPSULE ORAL at 09:05

## 2020-12-27 RX ADMIN — POTASSIUM CHLORIDE 20 MEQ: 750 TABLET, EXTENDED RELEASE ORAL at 11:52

## 2020-12-27 RX ADMIN — POTASSIUM CHLORIDE 40 MEQ: 1.5 POWDER, FOR SOLUTION ORAL at 09:04

## 2020-12-27 RX ADMIN — CHLOROTHIAZIDE SODIUM 1000 MG: 500 INJECTION, POWDER, LYOPHILIZED, FOR SOLUTION INTRAVENOUS at 18:22

## 2020-12-27 RX ADMIN — CLINDAMYCIN HYDROCHLORIDE 300 MG: 300 CAPSULE ORAL at 11:53

## 2020-12-27 RX ADMIN — RIOCIGUAT 1 MG: 1 TABLET, FILM COATED ORAL at 20:50

## 2020-12-27 RX ADMIN — RIOCIGUAT 1 MG: 1 TABLET, FILM COATED ORAL at 14:28

## 2020-12-27 RX ADMIN — Medication 2 TABLET: at 09:05

## 2020-12-27 RX ADMIN — WARFARIN SODIUM 3 MG: 3 TABLET ORAL at 18:23

## 2020-12-27 RX ADMIN — ACETAMINOPHEN 325 MG: 325 TABLET, FILM COATED ORAL at 02:29

## 2020-12-27 RX ADMIN — TREPROSTINIL 30 NG/KG/MIN: 20 INJECTION, SOLUTION INTRAVENOUS; SUBCUTANEOUS at 00:20

## 2020-12-27 ASSESSMENT — ACTIVITIES OF DAILY LIVING (ADL)
ADLS_ACUITY_SCORE: 14
PREVIOUS_RESPONSIBILITIES: HOUSEKEEPING;LAUNDRY;MEDICATION MANAGEMENT;FINANCES
ADLS_ACUITY_SCORE: 14
ADLS_ACUITY_SCORE: 15
ADLS_ACUITY_SCORE: 14
ADLS_ACUITY_SCORE: 14
ADLS_ACUITY_SCORE: 15

## 2020-12-27 ASSESSMENT — MIFFLIN-ST. JEOR: SCORE: 932.66

## 2020-12-27 NOTE — PROGRESS NOTES
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    Acute on chronic right-sided heart failure  Presented with increasing SOB, dizziness, and weight gain (dry weight ~112 lbs) for the last couple of weeks, increased RIVERA. Has RLE swelling but US DVT negative (12/23/2020). Patient still hypervolemic with IVC diameter 2.8 cm with no respiratory variation.  - Last TTE (09/2020): Normal LVEF 60-65%. Moderately dilated and redued function of RV. Right ventricular systolic pressure is 82mmHg above the right atrial pressure.  - Last RHC (09/2020): RA 11/6/5 RV 90/8 PA 90/20/42 PCWP --/--/11 LUIZ 2.55/1.76 TD 3/2.07 PVR 10 (TD) (Moderately elevated pulmonary artery hypertension, Right sided and Left sided filling pressures are normal)  - Try wean-off O2, keep SpO2 > 92%  - Will continue with IV diuresis (Bumex 5 mg IV and Diuril 1000 mg IV). On lasix 80 mg PO BID at home.  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 Follow-up with , outpatient plan is to consider balloon angioplasty, starting macitentan after she reaches a maximum tolerated dose of Adempas, or increasing Remodulin.  - Continue Riociguat 1 mg TID and Treprostinil 30 ng/kg/min  - Continue Warfarin. Dosed by pharmacy. Target INR 2-2.5.    Cellulitis RLE  Worsening swollen, redness, and pain on RLE. No signs of systemic infection but this raises concerns for cellulitis, clinically improving.  - Clindamycin 300 mg q6h, plan for 5 days course    Asthma  Allergic rhinitis  -  Continue beclomethasone BID and albuterol inhaler PRN  - Continue cetirizine and ipratropium nasal spray    RA  - Continue Hydroxychloroquine 200 mg daily    Diet: Na restriction diet  DVT Prophylaxis: Warfarin  Dominguez Catheter: not present  Code Status: FULL code        Disposition Plan   Expected discharge: 2 - 3 days, recommended to prior living arrangement once fluid volume status optimized on oral medication.    Entered: Napoleon Maher MD 12/27/2020, 6:37 AM    The patient's care was discussed with the Attending Physician, Dr. Rod.    Napoleon Maher MD  Internal Medicine PGY-2  P: [phone]  [facility]   Please see sign in/sign out for up to date coverage information  ______________________________________________________________________    Interval History   Notes reviewed, no acute event overnight.    Patient states that breathing is fine today. Still has some pain in R arm but is improving. R leg swollen looks better. Would like to move around more and looking forward to work with PT.    Data reviewed today: I reviewed all medications, new labs and imaging results over the last 24 hours and discussed as pertinent in assessment and plan.    Physical Exam   Vital Signs: Temp: 98.1  F (36.7  C) Temp src: Oral BP: 112/59 Pulse: 76   Resp: 18 SpO2: 92 % O2 Device: Nasal cannula Oxygen Delivery: 2 LPM  Weight: 124 lbs 1.6 oz  Constitutional: awake, alert, cooperative, no apparent distress, and appears stated age  Neck: CVP ~12 cm  Respiratory: No increased work of breathing, fine crackles at both bases. No wheezing.  Cardiovascular: Regular rate and rhythm, normal S1 and S2, and no murmur noted  GI: Normal bowel sounds, soft, non-distended, non-tender  Musculoskeletal: 1+ to 2+ lower extremity pitting edema present. Erythema, warmth, and tenderness on RLE below knee.  Neurologic: Awake, alert, oriented to name, place and time. No focal neurological  deficit.    Data   Recent Labs   Lab 12/27/20  0600 12/26/20  1823 12/26/20  0648 12/25/20  1803 12/25/20  0641 12/24/20  0613 12/24/20  0613 12/23/20  1533 12/21/20  0856   WBC 5.1  --   --   --  4.4  --  4.3 5.2 4.8   HGB 9.0*  --   --   --  9.6*  --  9.6* 9.9* 10.9*   MCV 92  --   --   --  95  --  93 92 94   *  --   --   --  151  --  152 174 184   INR  --   --  2.46*  --  2.71*  --  2.97* 2.57* 2.50*   NA  --  134 136 134 135   < > 137 135 135   POTASSIUM  --  3.4 3.6 3.6 4.7   < > 3.2* 3.7  3.7 3.4   CHLORIDE  --  94 98 94 101   < > 100 98 98   CO2  --  36* 35* 36* 30   < > 31 34* 30   BUN  --  42* 42* 42* 43*   < > 44* 46* 36*   CR  --  1.27* 1.28* 1.41* 1.42*   < > 1.31* 1.41* 1.24*   ANIONGAP  --  5 3 3 3   < > 6 4 7   ESTEFANÍA  --  9.3 9.0 9.2 9.1   < > 9.4 10.2* 9.6   GLC  --  164* 78 123* 89   < > 84 92 95   ALBUMIN  --   --   --   --   --   --   --  3.3* 3.3*   PROTTOTAL  --   --   --   --   --   --   --  7.7 7.9   BILITOTAL  --   --   --   --   --   --   --  0.6 0.6   ALKPHOS  --   --   --   --   --   --   --  162* 165*   ALT  --   --   --   --   --   --   --  26 21   AST  --   --   --   --   --   --   --  39 30    < > = values in this interval not displayed.     Medications     - MEDICATION INSTRUCTIONS -       - MEDICATION INSTRUCTIONS -       - MEDICATION INSTRUCTIONS -       treprostinil (REMODULIN) intravenous infusion (LESS than or EQUAL to 100 mcg/mL) 30 ng/kg/min (12/27/20 0020)     Warfarin Therapy Reminder         aspirin  81 mg Oral QPM     beclomethasone HFA  2 puff Inhalation BID     calcium citrate and vitamin D  2 tablet Oral Daily     cetirizine  10 mg Oral QPM     clindamycin  300 mg Oral Q6H REINIER     digoxin  62.5 mcg Oral Daily     gabapentin  300 mg Oral TID     hydroxychloroquine  200 mg Oral Daily     ipratropium  2 spray Both Nostrils TID     polyethylene glycol  17 g Oral Daily     potassium chloride  40 mEq Oral BID     riociguat  1 mg Oral TID     senna-docusate  1 tablet Oral  BID    Or     senna-docusate  2 tablet Oral BID     sodium chloride (PF)  3 mL Intracatheter Q8H     vitamin C  500 mg Oral QAM

## 2020-12-27 NOTE — PLAN OF CARE
Time of care [id number]  D: Admitted on 12/23/2020 for HF exacerbation.  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     I: Monitored vitals and assessed pt status.   -diuresed with 500mg diuril dose and 4mg IV bumex given per orders  -K of 3.4 replaced per protocol  Running:   -Remodulin running @ 30 ng/kg/min (1.69 mL/hr).   -R PIV SL     A: A0x4. VSS on 2L NC. SR on tele. Afebrile. Voiding well in response to diuresis. Up SBA. 2g Na diet. Edema in lower extremities R>L. Skin with generalized bruising, kaylee. Appeared to sleep well between cares. Weight slightly down today.      P: Continue to monitor Pt status and report changes to treatment team.

## 2020-12-27 NOTE — PROGRESS NOTES
12/27/20 1300   Quick Adds   Type of Visit Initial Occupational Therapy Evaluation       Present no   Language English   Living Environment   People in home spouse   Current Living Arrangements house  (split level)   Home Accessibility stairs to enter home;stairs within home   Number of Stairs, Main Entrance other (see comments)  (1 + 1 from garage )   Stair Railings, Main Entrance railings on both sides of stairs   Number of Stairs, Within Home, Primary other (see comments)  (7 + 7 to basement for laundry )   Transportation Anticipated family or friend will provide   Living Environment Comments Pt lives in split-level entry house w/. Uses tub w/grab bars and cane for AM 2/2 stiffness.    Self-Care   Usual Activity Tolerance good   Current Activity Tolerance fair  (2L of O2 via NC during evaluation, usually on RA)   Regular Exercise Other (see comments)  ( PT prior to hospitalization )   Equipment Currently Used at Home cane, straight;grab bar, tub/shower;raised toilet seat;shower chair   Activity/Exercise/Self-Care Comment Pt reports IND w/ADLs at baseline    Disability/Function   Hearing Difficulty or Deaf yes   Patient's preferred means of communication English speaker with hearing loss, no speech problems.   Use of hearing assistive devices bilateral hearing aids   Wear Glasses or Blind no   Concentrating, Remembering or Making Decisions Difficulty no   Difficulty Communicating no   Walking or Climbing Stairs Difficulty no   Dressing/Bathing Difficulty no   Toileting issues no   Doing Errands Independently Difficulty (such as shopping) no   Fall history within last six months yes   Number of times patient has fallen within last six months 1  (tripped over tent outside )   Change in Functional Status Since Onset of Current Illness/Injury no   General Information   Onset of Illness/Injury or Date of Surgery 12/23/20   Referring Physician Brad Ahmadi MD   Patient/Family Therapy  "Goal Statement (OT) \"To go home\"   Additional Occupational Profile Info/Pertinent History of Current Problem Per chart: 80 year old female with 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 She was admitted for IV diuresis   Existing Precautions/Restrictions fall   Limitations/Impairments hearing   Left Upper Extremity (Weight-bearing Status) full weight-bearing (FWB)   Right Upper Extremity (Weight-bearing Status) full weight-bearing (FWB)   Left Lower Extremity (Weight-bearing Status) full weight-bearing (FWB)   Right Lower Extremity (Weight-bearing Status) full weight-bearing (FWB)   General Observations and Info Activity: Ambulate   Cognitive Status Examination   Orientation Status person;place  (Pt knew month/year, disoriented to date )   Affect/Mental Status (Cognitive) WFL   Cognitive Status Comments Pt has calendar log book to record date and vitals, reports \"not doing it here in the hospital because it's my vacation here.\"   Visual Perception   Visual Impairment/Limitations WFL   Sensory   Sensory Comments 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   Pain Assessment   Patient Currently in Pain No   Integumentary/Edema   Integumentary/Edema Comments RLE edematous   Posture   Posture kyphosis   Range of Motion Comprehensive   Comment, General Range of Motion BUE grossly WFL   Strength Comprehensive (MMT)   Comment, General Manual Muscle Testing (MMT) Assessment alvin hand  strength 4/5   Bed Mobility   Comment (Bed Mobility) SBA   Transfers   Transfers toilet transfer   Toilet Transfer   Type (Toilet Transfer) sit-stand   [place] Level (Toilet Transfer) supervision   Assistive Device (Toilet Transfer) commode chair   Balance   Balance Comments no LOB noted    Instrumental Activities of Daily Living (IADL)   Previous Responsibilities housekeeping;laundry;medication management;finances   IADL Comments Pt reports not cooking lately 2/2 LE " pain    Clinical Impression   Criteria for Skilled Therapeutic Interventions Met (OT) yes;skilled treatment is necessary   OT Diagnosis decreased act leobardo/IND in ADLs   OT Problem List-Impairments impacting ADL activity tolerance impaired;strength   Assessment of Occupational Performance 1-3 Performance Deficits   Identified Performance Deficits bathing, home mgmt    Planned Therapy Interventions (OT) ADL retraining;IADL retraining;strengthening;progressive activity/exercise;risk factor education   Clinical Decision Making Complexity (OT) low complexity   Therapy Frequency (OT) 5x/week   Predicted Duration of Therapy 1 week   Anticipated Equipment Needs Upon Discharge (OT)   (TBD)   Risks and Benefits of Treatment have been explained. Yes   Patient, Family & other staff in agreement with plan of care Yes   Comment-Clinical Impression Pt to benefit from skilled OT to address above deficits to maximize IND in ADLs/IADLs   OT Discharge Planning    OT Discharge Recommendation (DC Rec) home;home with home care occupational therapy   OT Rationale for DC Rec Pt close to baseline, would benefit from skilled OT to progress functional endurance/strength in prep for ADLs/IADLs    Total Evaluation Time (Minutes)   Total Evaluation Time (Minutes) 5   Skin WDL   Skin WDL X   Skin Color/Characteristics redness blanchable;maroon/purple

## 2020-12-27 NOTE — PLAN OF CARE
D: pt admitted on 12/23/2020 for HF exacerbation. 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     Changed:   1000mg diuril  and 5mg IV bumex given 2x    Running: Remodulin 30 ng/Kg/min        Vitals:  Temp: 97.5  F (36.4  C) Temp src: Oral BP: 102/57 Pulse: 77   Resp: 18 SpO2: 91 % O2 Device: Nasal cannula with humidification Oxygen Delivery: 3 LPM      A:   Neuro: A&O x 4. Neurologically intact, denies Headache , dizziness and lightheadedness. Report numbness and tingling (baseline).  calls appropriately   Cardiac: SR, denies chest pain  Respiratory: sating > 90% on  2L NC with humidification. RIVERA. LS clear, fine crackles at bases   Diet/appetite: regular diet, good appetite   GI/: BM X1, denies abdominal pain and nausea. Good urine output, voids without difficulty  Activity: stand by assist   Pain: Bilateral LE pain, pt refuse Medication  Skin: no new deficit noted. 2 edema on Left LE and +3  Edema on Right LE  LDAs: Goldberg and PIV   Labs:  K 3.2, replaced per protocol      P: Continue to monitor Pt status and report changes to treatment team.

## 2020-12-27 NOTE — PLAN OF CARE
6C PT hold: per discussion with OT, patient mobilizing well at this time. PT to hold with plan for OT to further screen for PT needs in next session.

## 2020-12-28 ENCOUNTER — APPOINTMENT (OUTPATIENT)
Dept: OCCUPATIONAL THERAPY | Facility: CLINIC | Age: 80
DRG: 252 | End: 2020-12-28
Attending: INTERNAL MEDICINE
Payer: COMMERCIAL

## 2020-12-28 ENCOUNTER — TELEPHONE (OUTPATIENT)
Dept: CARDIOLOGY | Facility: CLINIC | Age: 80
End: 2020-12-28

## 2020-12-28 LAB
ANION GAP SERPL CALCULATED.3IONS-SCNC: 4 MMOL/L (ref 3–14)
ANION GAP SERPL CALCULATED.3IONS-SCNC: 5 MMOL/L (ref 3–14)
BUN SERPL-MCNC: 40 MG/DL (ref 7–30)
BUN SERPL-MCNC: 41 MG/DL (ref 7–30)
CALCIUM SERPL-MCNC: 9.3 MG/DL (ref 8.5–10.1)
CALCIUM SERPL-MCNC: 9.6 MG/DL (ref 8.5–10.1)
CHLORIDE SERPL-SCNC: 91 MMOL/L (ref 94–109)
CHLORIDE SERPL-SCNC: 93 MMOL/L (ref 94–109)
CO2 SERPL-SCNC: 37 MMOL/L (ref 20–32)
CO2 SERPL-SCNC: 39 MMOL/L (ref 20–32)
CREAT SERPL-MCNC: 1.24 MG/DL (ref 0.52–1.04)
CREAT SERPL-MCNC: 1.28 MG/DL (ref 0.52–1.04)
GFR SERPL CREATININE-BSD FRML MDRD: 40 ML/MIN/{1.73_M2}
GFR SERPL CREATININE-BSD FRML MDRD: 41 ML/MIN/{1.73_M2}
GLUCOSE SERPL-MCNC: 87 MG/DL (ref 70–99)
GLUCOSE SERPL-MCNC: 91 MG/DL (ref 70–99)
INR PPP: 2.5 (ref 0.86–1.14)
MAGNESIUM SERPL-MCNC: 2 MG/DL (ref 1.6–2.3)
MAGNESIUM SERPL-MCNC: 2 MG/DL (ref 1.6–2.3)
POTASSIUM SERPL-SCNC: 3.3 MMOL/L (ref 3.4–5.3)
POTASSIUM SERPL-SCNC: 3.4 MMOL/L (ref 3.4–5.3)
POTASSIUM SERPL-SCNC: 3.6 MMOL/L (ref 3.4–5.3)
SODIUM SERPL-SCNC: 134 MMOL/L (ref 133–144)
SODIUM SERPL-SCNC: 135 MMOL/L (ref 133–144)

## 2020-12-28 PROCEDURE — 36415 COLL VENOUS BLD VENIPUNCTURE: CPT | Performed by: INTERNAL MEDICINE

## 2020-12-28 PROCEDURE — 83735 ASSAY OF MAGNESIUM: CPT | Performed by: INTERNAL MEDICINE

## 2020-12-28 PROCEDURE — 250N000013 HC RX MED GY IP 250 OP 250 PS 637: Performed by: INTERNAL MEDICINE

## 2020-12-28 PROCEDURE — 214N000001 HC R&B CCU UMMC

## 2020-12-28 PROCEDURE — 80048 BASIC METABOLIC PNL TOTAL CA: CPT | Performed by: INTERNAL MEDICINE

## 2020-12-28 PROCEDURE — 250N000011 HC RX IP 250 OP 636: Performed by: INTERNAL MEDICINE

## 2020-12-28 PROCEDURE — 97530 THERAPEUTIC ACTIVITIES: CPT | Mod: GO

## 2020-12-28 PROCEDURE — 250N000013 HC RX MED GY IP 250 OP 250 PS 637: Performed by: STUDENT IN AN ORGANIZED HEALTH CARE EDUCATION/TRAINING PROGRAM

## 2020-12-28 PROCEDURE — 272N000004 HC RX 272: Performed by: INTERNAL MEDICINE

## 2020-12-28 PROCEDURE — 99233 SBSQ HOSP IP/OBS HIGH 50: CPT | Mod: GC | Performed by: INTERNAL MEDICINE

## 2020-12-28 PROCEDURE — 85610 PROTHROMBIN TIME: CPT | Performed by: INTERNAL MEDICINE

## 2020-12-28 PROCEDURE — 97535 SELF CARE MNGMENT TRAINING: CPT | Mod: GO

## 2020-12-28 PROCEDURE — 84132 ASSAY OF SERUM POTASSIUM: CPT | Performed by: INTERNAL MEDICINE

## 2020-12-28 RX ORDER — POTASSIUM CHLORIDE 750 MG/1
20 TABLET, EXTENDED RELEASE ORAL ONCE
Status: COMPLETED | OUTPATIENT
Start: 2020-12-28 | End: 2020-12-28

## 2020-12-28 RX ORDER — BUMETANIDE 0.25 MG/ML
5 INJECTION INTRAMUSCULAR; INTRAVENOUS ONCE
Status: COMPLETED | OUTPATIENT
Start: 2020-12-28 | End: 2020-12-28

## 2020-12-28 RX ORDER — POTASSIUM CHLORIDE 750 MG/1
40 TABLET, EXTENDED RELEASE ORAL ONCE
Status: COMPLETED | OUTPATIENT
Start: 2020-12-28 | End: 2020-12-28

## 2020-12-28 RX ORDER — WARFARIN SODIUM 3 MG/1
3 TABLET ORAL
Status: DISCONTINUED | OUTPATIENT
Start: 2020-12-28 | End: 2020-12-28

## 2020-12-28 RX ORDER — POTASSIUM CHLORIDE 1.5 G/1.58G
60 POWDER, FOR SOLUTION ORAL ONCE
Status: DISCONTINUED | OUTPATIENT
Start: 2020-12-28 | End: 2020-12-28

## 2020-12-28 RX ORDER — POTASSIUM CHLORIDE 750 MG/1
10 TABLET, EXTENDED RELEASE ORAL ONCE
Status: COMPLETED | OUTPATIENT
Start: 2020-12-28 | End: 2020-12-28

## 2020-12-28 RX ORDER — WARFARIN SODIUM 1 MG/1
2 TABLET ORAL
Status: COMPLETED | OUTPATIENT
Start: 2020-12-28 | End: 2020-12-28

## 2020-12-28 RX ADMIN — WARFARIN SODIUM 2 MG: 1 TABLET ORAL at 17:49

## 2020-12-28 RX ADMIN — CLINDAMYCIN HYDROCHLORIDE 300 MG: 300 CAPSULE ORAL at 17:49

## 2020-12-28 RX ADMIN — BUMETANIDE 5 MG: 0.25 INJECTION INTRAMUSCULAR; INTRAVENOUS at 17:54

## 2020-12-28 RX ADMIN — HYDROXYCHLOROQUINE SULFATE 200 MG: 200 TABLET, FILM COATED ORAL at 08:14

## 2020-12-28 RX ADMIN — POTASSIUM CHLORIDE 20 MEQ: 750 TABLET, EXTENDED RELEASE ORAL at 18:13

## 2020-12-28 RX ADMIN — GABAPENTIN 300 MG: 300 CAPSULE ORAL at 13:29

## 2020-12-28 RX ADMIN — ASPIRIN 81 MG CHEWABLE TABLET 81 MG: 81 TABLET CHEWABLE at 20:35

## 2020-12-28 RX ADMIN — POTASSIUM CHLORIDE 40 MEQ: 750 TABLET, EXTENDED RELEASE ORAL at 08:15

## 2020-12-28 RX ADMIN — BUMETANIDE 5 MG: 0.25 INJECTION INTRAMUSCULAR; INTRAVENOUS at 08:17

## 2020-12-28 RX ADMIN — RIOCIGUAT 1 MG: 1 TABLET, FILM COATED ORAL at 20:36

## 2020-12-28 RX ADMIN — DIGOXIN 62.5 MCG: 0.06 TABLET ORAL at 08:14

## 2020-12-28 RX ADMIN — POTASSIUM CHLORIDE 20 MEQ: 750 TABLET, EXTENDED RELEASE ORAL at 23:14

## 2020-12-28 RX ADMIN — RIOCIGUAT 1 MG: 1 TABLET, FILM COATED ORAL at 08:23

## 2020-12-28 RX ADMIN — POTASSIUM CHLORIDE 40 MEQ: 750 TABLET, EXTENDED RELEASE ORAL at 20:35

## 2020-12-28 RX ADMIN — TREPROSTINIL 30 NG/KG/MIN: 20 INJECTION, SOLUTION INTRAVENOUS; SUBCUTANEOUS at 05:25

## 2020-12-28 RX ADMIN — CHLOROTHIAZIDE SODIUM 1000 MG: 500 INJECTION, POWDER, LYOPHILIZED, FOR SOLUTION INTRAVENOUS at 08:17

## 2020-12-28 RX ADMIN — ACETAMINOPHEN 325 MG: 325 TABLET, FILM COATED ORAL at 15:40

## 2020-12-28 RX ADMIN — Medication 2 TABLET: at 08:22

## 2020-12-28 RX ADMIN — POTASSIUM CHLORIDE 40 MEQ: 750 TABLET, EXTENDED RELEASE ORAL at 23:14

## 2020-12-28 RX ADMIN — RIOCIGUAT 1 MG: 1 TABLET, FILM COATED ORAL at 13:29

## 2020-12-28 RX ADMIN — GABAPENTIN 300 MG: 300 CAPSULE ORAL at 20:37

## 2020-12-28 RX ADMIN — DOCUSATE SODIUM AND SENNOSIDES 1 TABLET: 8.6; 5 TABLET, FILM COATED ORAL at 11:02

## 2020-12-28 RX ADMIN — IPRATROPIUM BROMIDE 2 SPRAY: 42 SPRAY NASAL at 20:38

## 2020-12-28 RX ADMIN — OXYCODONE HYDROCHLORIDE AND ACETAMINOPHEN 500 MG: 500 TABLET ORAL at 08:15

## 2020-12-28 RX ADMIN — POTASSIUM CHLORIDE 10 MEQ: 750 TABLET, EXTENDED RELEASE ORAL at 08:15

## 2020-12-28 RX ADMIN — CLINDAMYCIN HYDROCHLORIDE 300 MG: 300 CAPSULE ORAL at 05:34

## 2020-12-28 RX ADMIN — CETIRIZINE HYDROCHLORIDE 10 MG: 10 TABLET, FILM COATED ORAL at 20:36

## 2020-12-28 RX ADMIN — CLINDAMYCIN HYDROCHLORIDE 300 MG: 300 CAPSULE ORAL at 11:02

## 2020-12-28 RX ADMIN — GABAPENTIN 300 MG: 300 CAPSULE ORAL at 08:14

## 2020-12-28 RX ADMIN — MACITENTAN 10 MG: 10 TABLET, FILM COATED ORAL at 13:30

## 2020-12-28 ASSESSMENT — MIFFLIN-ST. JEOR: SCORE: 917.24

## 2020-12-28 ASSESSMENT — ACTIVITIES OF DAILY LIVING (ADL)
ADLS_ACUITY_SCORE: 15
ADLS_ACUITY_SCORE: 16
ADLS_ACUITY_SCORE: 15
ADLS_ACUITY_SCORE: 15

## 2020-12-28 NOTE — PLAN OF CARE
"D: Admitted on 12/23/2020 for HF exacerbation.  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     I: Monitored vitals and assessed pt status.   -K of 3.4 replaced per protocol  Running:   -Remodulin running @ 30 ng/kg/min (1.69 mL/hr).   -R PIV SL     A: A0x4. VSS on 2L NC. SR on tele. Afebrile. Voiding well in response to diuresis. BM+. Up SBA. 2g Na diet. Edema in lower extremities R>L. Skin with generalized bruising, kaylee. Slept intermittently between cares. Weight slightly down today.      P: Continue to monitor Pt status and report changes to treatment team.     /62 (BP Location: Right arm)   Pulse 68   Temp 98.8  F (37.1  C) (Oral)   Resp 16   Ht 1.473 m (4' 10\")   Wt 54.7 kg (120 lb 11.2 oz)   SpO2 92%   BMI 25.23 kg/m      "

## 2020-12-28 NOTE — PROGRESS NOTES
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    Acute on chronic right-sided heart failure  Presented with increasing SOB, dizziness, and weight gain (dry weight ~112 lbs) for the last couple of weeks, increased RIVERA. Has RLE swelling but US DVT negative (12/23/2020). Patient still hypervolemic with IVC diameter 2.8 cm with no respiratory variation.    Patient has continued negative volume status with diuresis. We will continue IV diuresis while approaching euvolemia. On Adempas and remodulin for CTEPH therapy. Continuing these at current dosage and adding Opsumit for triple therapy. Plan for diuresis and eventual RHC as outpatient. Planning a few more days before ready to transition home.     - Last TTE (09/2020): Normal LVEF 60-65%. Moderately dilated and redued function of RV. Right ventricular systolic pressure is 82mmHg above the right atrial pressure.  - Last RHC (09/2020): RA 11/6/5 RV 90/8 PA 90/20/42 PCWP --/--/11 LUIZ 2.55/1.76 TD 3/2.07 PVR 10 (TD) (Moderately elevated pulmonary artery hypertension, Right sided and Left sided filling pressures are normal)  - Try wean-off O2, keep SpO2 > 92%  - Will continue with IV diuresis (Bumex 5 mg IV and Diuril 1000 mg IV) this AM. Redosing bumex 5 mg in the PM  - On Digoxin 62.5 mcg daily for RV support  -Started Opsumit 10 mg today after discussion with Dr. 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 Follow-up with , outpatient plan is to consider balloon angioplasty, starting macitentan after she  reaches a maximum tolerated dose of Adempas, or increasing Remodulin.  - Continue Riociguat 1 mg TID and Treprostinil 30 ng/kg/min  - Continue Warfarin. Dosed by pharmacy. Target INR 2-2.5.    Cellulitis RLE  - Clindamycin 300 mg q6h, plan for 5 days course. Improving after 24 hours of therapy     Asthma  Allergic rhinitis  - Continue beclomethasone BID and albuterol inhaler PRN  - Continue cetirizine and ipratropium nasal spray    RA  - Continue Hydroxychloroquine 200 mg daily    Diet: Na restriction diet  DVT Prophylaxis: Warfarin  Dominguez Catheter: not present  Code Status: FULL code        Disposition Plan   Expected discharge: 2 - 3 days, recommended to prior living arrangement once fluid volume status optimized on oral medication.    The patient's care was discussed with the Attending Physician, Dr. Rod.    Sacha Mooney  PGY 4 Cardiology fellow   ______________________________________________________________________    Interval History   Overall with improved symptom relief. Losing weight on I/O     Data reviewed today: I reviewed all medications, new labs and imaging results over the last 24 hours and discussed as pertinent in assessment and plan.    Physical Exam   Vital Signs: Temp: 97.8  F (36.6  C) Temp src: Oral BP: 127/64 Pulse: 75   Resp: 16 SpO2: 92 % O2 Device: Nasal cannula Oxygen Delivery: 2 LPM  Weight: 120 lbs 11.2 oz     Constitutional: awake, alert, cooperative, no apparent distress, and appears stated age  Neck: CVP ~12 cm  Respiratory: No increased work of breathing, fine crackles at both bases. No wheezing.  Cardiovascular: Regular rate and rhythm, normal S1 and S2, and no murmur noted  GI: Normal bowel sounds, soft, non-distended, non-tender  Musculoskeletal: 1+ to 2+ lower extremity pitting edema present. Erythema, warmth, and tenderness on RLE below knee.  Neurologic: Awake, alert, oriented to name, place and time. No focal neurological deficit.    Data   Recent Labs   Lab  12/28/20  0502 12/27/20  1712 12/27/20  0600 12/26/20  0648 12/26/20  0648 12/25/20  0641 12/25/20  0641 12/24/20  0613 12/24/20  0613 12/23/20  1533   WBC  --   --  5.1  --   --   --  4.4  --  4.3 5.2   HGB  --   --  9.0*  --   --   --  9.6*  --  9.6* 9.9*   MCV  --   --  92  --   --   --  95  --  93 92   PLT  --   --  141*  --   --   --  151  --  152 174   INR 2.50*  --  2.38*  --  2.46*  --  2.71*  --  2.97* 2.57*    135 136   < > 136   < > 135   < > 137 135   POTASSIUM 3.6 3.4 3.2*   < > 3.6   < > 4.7   < > 3.2* 3.7  3.7   CHLORIDE 93* 94 96   < > 98   < > 101   < > 100 98   CO2 37* 38* 34*   < > 35*   < > 30   < > 31 34*   BUN 41* 39* 43*   < > 42*   < > 43*   < > 44* 46*   CR 1.28* 1.18* 1.23*   < > 1.28*   < > 1.42*   < > 1.31* 1.41*   ANIONGAP 5 2* 6   < > 3   < > 3   < > 6 4   ESTEFANÍA 9.3 9.7 9.3   < > 9.0   < > 9.1   < > 9.4 10.2*   GLC 87 120* 86   < > 78   < > 89   < > 84 92   ALBUMIN  --   --   --   --   --   --   --   --   --  3.3*   PROTTOTAL  --   --   --   --   --   --   --   --   --  7.7   BILITOTAL  --   --   --   --   --   --   --   --   --  0.6   ALKPHOS  --   --   --   --   --   --   --   --   --  162*   ALT  --   --   --   --   --   --   --   --   --  26   AST  --   --   --   --   --   --   --   --   --  39    < > = values in this interval not displayed.     Medications     - MEDICATION INSTRUCTIONS -       - MEDICATION INSTRUCTIONS -       - MEDICATION INSTRUCTIONS -       treprostinil (REMODULIN) intravenous infusion (LESS than or EQUAL to 100 mcg/mL) 30 ng/kg/min (12/28/20 2163)     Warfarin Therapy Reminder         aspirin  81 mg Oral QPM     beclomethasone HFA  2 puff Inhalation BID     bumetanide  5 mg Intravenous Once     calcium citrate and vitamin D  2 tablet Oral Daily     cetirizine  10 mg Oral QPM     clindamycin  300 mg Oral Q6H REINIER     digoxin  62.5 mcg Oral Daily     gabapentin  300 mg Oral TID     hydroxychloroquine  200 mg Oral Daily     ipratropium  2 spray Both Nostrils  TID     macitentan  10 mg Oral Daily     polyethylene glycol  17 g Oral Daily     potassium chloride  40 mEq Oral BID     riociguat  1 mg Oral TID     senna-docusate  1 tablet Oral BID    Or     senna-docusate  2 tablet Oral BID     sodium chloride (PF)  3 mL Intracatheter Q8H     vitamin C  500 mg Oral QAM     warfarin ANTICOAGULANT  2 mg Oral ONCE at 18:00

## 2020-12-28 NOTE — PLAN OF CARE
PT: DEFER. Per OT, pt is up SBA without balance concerns, is close to baseline mobility status. Will complete orders as no skilled PT needs identified; OT to follow while inpatient.

## 2020-12-28 NOTE — PLAN OF CARE
VSS on 2L NC. Denies pain. A&Ox4. Remodulin infusing 30 ng/kg/min via Hickmann. IV bumex and diuril x1. Up frequently to bedside commode, refer 1/Os. Johnathon x1 d/t difficulty. Ambulated halls with therapy. Opsumit added to medications. Will continue POC.

## 2020-12-28 NOTE — TELEPHONE ENCOUNTER
PA Initiation    Medication: Opsumit 10mg  Insurance Company: [employer] - Phone [phone] Fax [phone]  Pharmacy Filling the Rx: [facility] [address]  Start Date: 12/28/2020    *Prior authorization completed and submitted through CoverMyMeds for expedited review along with right heart catheterization report.

## 2020-12-29 ENCOUNTER — APPOINTMENT (OUTPATIENT)
Dept: GENERAL RADIOLOGY | Facility: CLINIC | Age: 80
DRG: 252 | End: 2020-12-29
Attending: INTERNAL MEDICINE
Payer: COMMERCIAL

## 2020-12-29 ENCOUNTER — APPOINTMENT (OUTPATIENT)
Dept: OCCUPATIONAL THERAPY | Facility: CLINIC | Age: 80
DRG: 252 | End: 2020-12-29
Attending: INTERNAL MEDICINE
Payer: COMMERCIAL

## 2020-12-29 LAB
ANION GAP SERPL CALCULATED.3IONS-SCNC: 3 MMOL/L (ref 3–14)
ANION GAP SERPL CALCULATED.3IONS-SCNC: 5 MMOL/L (ref 3–14)
ANION GAP SERPL CALCULATED.3IONS-SCNC: 5 MMOL/L (ref 3–14)
BUN SERPL-MCNC: 38 MG/DL (ref 7–30)
BUN SERPL-MCNC: 38 MG/DL (ref 7–30)
BUN SERPL-MCNC: 40 MG/DL (ref 7–30)
CALCIUM SERPL-MCNC: 9.3 MG/DL (ref 8.5–10.1)
CALCIUM SERPL-MCNC: 9.4 MG/DL (ref 8.5–10.1)
CALCIUM SERPL-MCNC: 9.6 MG/DL (ref 8.5–10.1)
CHLORIDE SERPL-SCNC: 95 MMOL/L (ref 94–109)
CHLORIDE SERPL-SCNC: 95 MMOL/L (ref 94–109)
CHLORIDE SERPL-SCNC: 96 MMOL/L (ref 94–109)
CO2 SERPL-SCNC: 35 MMOL/L (ref 20–32)
CO2 SERPL-SCNC: 36 MMOL/L (ref 20–32)
CO2 SERPL-SCNC: 37 MMOL/L (ref 20–32)
CREAT SERPL-MCNC: 1.25 MG/DL (ref 0.52–1.04)
CREAT SERPL-MCNC: 1.28 MG/DL (ref 0.52–1.04)
CREAT SERPL-MCNC: 1.46 MG/DL (ref 0.52–1.04)
GFR SERPL CREATININE-BSD FRML MDRD: 34 ML/MIN/{1.73_M2}
GFR SERPL CREATININE-BSD FRML MDRD: 40 ML/MIN/{1.73_M2}
GFR SERPL CREATININE-BSD FRML MDRD: 41 ML/MIN/{1.73_M2}
GLUCOSE SERPL-MCNC: 105 MG/DL (ref 70–99)
GLUCOSE SERPL-MCNC: 152 MG/DL (ref 70–99)
GLUCOSE SERPL-MCNC: 74 MG/DL (ref 70–99)
INR PPP: 2.51 (ref 0.86–1.14)
MAGNESIUM SERPL-MCNC: 2 MG/DL (ref 1.6–2.3)
MAGNESIUM SERPL-MCNC: 2.1 MG/DL (ref 1.6–2.3)
MAGNESIUM SERPL-MCNC: 2.2 MG/DL (ref 1.6–2.3)
POTASSIUM SERPL-SCNC: 3.7 MMOL/L (ref 3.4–5.3)
POTASSIUM SERPL-SCNC: 3.9 MMOL/L (ref 3.4–5.3)
POTASSIUM SERPL-SCNC: 4.4 MMOL/L (ref 3.4–5.3)
SODIUM SERPL-SCNC: 135 MMOL/L (ref 133–144)
SODIUM SERPL-SCNC: 136 MMOL/L (ref 133–144)
SODIUM SERPL-SCNC: 136 MMOL/L (ref 133–144)

## 2020-12-29 PROCEDURE — 85610 PROTHROMBIN TIME: CPT | Performed by: INTERNAL MEDICINE

## 2020-12-29 PROCEDURE — 999N000127 HC STATISTIC PERIPHERAL IV START W US GUIDANCE

## 2020-12-29 PROCEDURE — 250N000009 HC RX 250: Performed by: STUDENT IN AN ORGANIZED HEALTH CARE EDUCATION/TRAINING PROGRAM

## 2020-12-29 PROCEDURE — 71045 X-RAY EXAM CHEST 1 VIEW: CPT | Mod: 26 | Performed by: RADIOLOGY

## 2020-12-29 PROCEDURE — 97140 MANUAL THERAPY 1/> REGIONS: CPT | Mod: GO

## 2020-12-29 PROCEDURE — 250N000013 HC RX MED GY IP 250 OP 250 PS 637: Performed by: STUDENT IN AN ORGANIZED HEALTH CARE EDUCATION/TRAINING PROGRAM

## 2020-12-29 PROCEDURE — 83735 ASSAY OF MAGNESIUM: CPT | Performed by: INTERNAL MEDICINE

## 2020-12-29 PROCEDURE — 36415 COLL VENOUS BLD VENIPUNCTURE: CPT | Performed by: INTERNAL MEDICINE

## 2020-12-29 PROCEDURE — 250N000011 HC RX IP 250 OP 636: Performed by: INTERNAL MEDICINE

## 2020-12-29 PROCEDURE — 250N000011 HC RX IP 250 OP 636: Performed by: STUDENT IN AN ORGANIZED HEALTH CARE EDUCATION/TRAINING PROGRAM

## 2020-12-29 PROCEDURE — 250N000013 HC RX MED GY IP 250 OP 250 PS 637: Performed by: INTERNAL MEDICINE

## 2020-12-29 PROCEDURE — 272N000004 HC RX 272: Performed by: INTERNAL MEDICINE

## 2020-12-29 PROCEDURE — 71045 X-RAY EXAM CHEST 1 VIEW: CPT

## 2020-12-29 PROCEDURE — 80048 BASIC METABOLIC PNL TOTAL CA: CPT | Performed by: INTERNAL MEDICINE

## 2020-12-29 PROCEDURE — 99232 SBSQ HOSP IP/OBS MODERATE 35: CPT | Mod: GC | Performed by: INTERNAL MEDICINE

## 2020-12-29 PROCEDURE — 214N000001 HC R&B CCU UMMC

## 2020-12-29 PROCEDURE — 97530 THERAPEUTIC ACTIVITIES: CPT | Mod: GO

## 2020-12-29 RX ORDER — POTASSIUM CHLORIDE 750 MG/1
40 TABLET, EXTENDED RELEASE ORAL ONCE
Status: COMPLETED | OUTPATIENT
Start: 2020-12-29 | End: 2020-12-29

## 2020-12-29 RX ORDER — BUMETANIDE 0.25 MG/ML
5 INJECTION INTRAMUSCULAR; INTRAVENOUS ONCE
Status: COMPLETED | OUTPATIENT
Start: 2020-12-29 | End: 2020-12-29

## 2020-12-29 RX ORDER — BUMETANIDE 0.25 MG/ML
4 INJECTION INTRAMUSCULAR; INTRAVENOUS ONCE
Status: COMPLETED | OUTPATIENT
Start: 2020-12-29 | End: 2020-12-29

## 2020-12-29 RX ORDER — WARFARIN SODIUM 1 MG/1
2 TABLET ORAL
Status: COMPLETED | OUTPATIENT
Start: 2020-12-29 | End: 2020-12-29

## 2020-12-29 RX ORDER — POTASSIUM CHLORIDE 1.5 G/1.58G
40 POWDER, FOR SOLUTION ORAL ONCE
Status: DISCONTINUED | OUTPATIENT
Start: 2020-12-29 | End: 2020-12-29

## 2020-12-29 RX ADMIN — ACETAMINOPHEN 325 MG: 325 TABLET, FILM COATED ORAL at 15:47

## 2020-12-29 RX ADMIN — CETIRIZINE HYDROCHLORIDE 10 MG: 10 TABLET, FILM COATED ORAL at 20:11

## 2020-12-29 RX ADMIN — MACITENTAN 10 MG: 10 TABLET, FILM COATED ORAL at 07:36

## 2020-12-29 RX ADMIN — RIOCIGUAT 1 MG: 1 TABLET, FILM COATED ORAL at 20:10

## 2020-12-29 RX ADMIN — WARFARIN SODIUM 2 MG: 1 TABLET ORAL at 18:04

## 2020-12-29 RX ADMIN — OXYCODONE HYDROCHLORIDE AND ACETAMINOPHEN 500 MG: 500 TABLET ORAL at 07:35

## 2020-12-29 RX ADMIN — LOPERAMIDE HYDROCHLORIDE 2 MG: 2 CAPSULE ORAL at 19:04

## 2020-12-29 RX ADMIN — POTASSIUM CHLORIDE 40 MEQ: 750 TABLET, EXTENDED RELEASE ORAL at 20:11

## 2020-12-29 RX ADMIN — GABAPENTIN 300 MG: 300 CAPSULE ORAL at 20:11

## 2020-12-29 RX ADMIN — POTASSIUM CHLORIDE 40 MEQ: 750 TABLET, EXTENDED RELEASE ORAL at 15:47

## 2020-12-29 RX ADMIN — RIOCIGUAT 1 MG: 1 TABLET, FILM COATED ORAL at 07:36

## 2020-12-29 RX ADMIN — BUMETANIDE 0.5 MG/HR: 0.25 INJECTION INTRAMUSCULAR; INTRAVENOUS at 17:02

## 2020-12-29 RX ADMIN — GABAPENTIN 300 MG: 300 CAPSULE ORAL at 07:34

## 2020-12-29 RX ADMIN — CLINDAMYCIN HYDROCHLORIDE 300 MG: 300 CAPSULE ORAL at 00:14

## 2020-12-29 RX ADMIN — ACETAMINOPHEN 650 MG: 325 TABLET, FILM COATED ORAL at 22:32

## 2020-12-29 RX ADMIN — IPRATROPIUM BROMIDE 2 SPRAY: 42 SPRAY NASAL at 20:10

## 2020-12-29 RX ADMIN — TREPROSTINIL 30 NG/KG/MIN: 20 INJECTION, SOLUTION INTRAVENOUS; SUBCUTANEOUS at 09:59

## 2020-12-29 RX ADMIN — CLINDAMYCIN HYDROCHLORIDE 300 MG: 300 CAPSULE ORAL at 18:04

## 2020-12-29 RX ADMIN — BUMETANIDE 5 MG: 0.25 INJECTION, SOLUTION INTRAMUSCULAR; INTRAVENOUS at 08:31

## 2020-12-29 RX ADMIN — Medication 2 TABLET: at 07:35

## 2020-12-29 RX ADMIN — BUMETANIDE 4 MG: 0.25 INJECTION INTRAMUSCULAR; INTRAVENOUS at 15:48

## 2020-12-29 RX ADMIN — RIOCIGUAT 1 MG: 1 TABLET, FILM COATED ORAL at 14:09

## 2020-12-29 RX ADMIN — CLINDAMYCIN HYDROCHLORIDE 300 MG: 300 CAPSULE ORAL at 05:05

## 2020-12-29 RX ADMIN — ASPIRIN 81 MG CHEWABLE TABLET 81 MG: 81 TABLET CHEWABLE at 20:16

## 2020-12-29 RX ADMIN — GABAPENTIN 300 MG: 300 CAPSULE ORAL at 14:09

## 2020-12-29 RX ADMIN — DIGOXIN 62.5 MCG: 0.06 TABLET ORAL at 07:35

## 2020-12-29 RX ADMIN — ACETAMINOPHEN 325 MG: 325 TABLET, FILM COATED ORAL at 00:14

## 2020-12-29 RX ADMIN — CHLOROTHIAZIDE SODIUM 1000 MG: 500 INJECTION, POWDER, LYOPHILIZED, FOR SOLUTION INTRAVENOUS at 09:55

## 2020-12-29 RX ADMIN — LOPERAMIDE HYDROCHLORIDE 2 MG: 2 CAPSULE ORAL at 12:28

## 2020-12-29 RX ADMIN — CLINDAMYCIN HYDROCHLORIDE 300 MG: 300 CAPSULE ORAL at 12:31

## 2020-12-29 RX ADMIN — HYDROXYCHLOROQUINE SULFATE 200 MG: 200 TABLET, FILM COATED ORAL at 07:35

## 2020-12-29 RX ADMIN — POTASSIUM CHLORIDE 40 MEQ: 750 TABLET, EXTENDED RELEASE ORAL at 07:36

## 2020-12-29 ASSESSMENT — MIFFLIN-ST. JEOR: SCORE: 914.52

## 2020-12-29 ASSESSMENT — ACTIVITIES OF DAILY LIVING (ADL)
ADLS_ACUITY_SCORE: 15

## 2020-12-29 NOTE — PROVIDER NOTIFICATION
Afternoon potassium level 3.7. MD placed order for replacement. Discussed with Cards 2 provider. Verified not to give additional potassium per nurse electrolyte protocol.

## 2020-12-29 NOTE — PROGRESS NOTES
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    changes today 12/29  - obtain new IV access  - start bumex drip, keep negative Is/Os.  - optimize Pulmonary HTN medications: Adempas, Remodulin, and Opsumit   - continue to wean off oxygen requirement if able, keep sat >92%  - will complete 5-day clindamycin tomorrow 12/30    Acute on chronic right-sided heart failure  Presented with increasing SOB, dizziness, and weight gain (dry weight ~112 lbs) for the last couple of weeks, increased RIVERA. Has RLE swelling but US DVT negative (12/23/2020). Patient still hypervolemic with IVC diameter 2.8 cm with no respiratory variation.    Patient has continued negative volume status with diuresis. We will continue IV diuresis while approaching euvolemia. On Adempas and remodulin for CTEPH therapy. Continuing these at current dosage and adding Opsumit for triple therapy 12/28. Plan for diuresis and eventual RHC as outpatient. Planning a few more days before ready to transition home.     - Last TTE (09/2020): Normal LVEF 60-65%. Moderately dilated and redued function of RV. Right ventricular systolic pressure is 82mmHg above the right atrial pressure.  - Last RHC (09/2020): RA 11/6/5 RV 90/8 PA 90/20/42 PCWP --/--/11 LUIZ 2.55/1.76 TD 3/2.07 PVR 10 (TD) (Moderately elevated pulmonary artery hypertension, Right sided and Left sided filling pressures are normal)  - continue with IV diuresis (Bumex 5 mg IV and Diuril 1000 mg IV) this AM. Will consider redosing after BMP in the PM.  - continue Opsumit 10 mg( started 12/28 after discussion with Dr. Garcia) & monitor for response  - On  Digoxin 62.5 mcg daily for RV support  - Try wean-off O2, keep SpO2 > 92%; currently on 3 LPM satting ~93-95%, on 1LPM ~88-90%.    #Hypokalemia  K+ 3.4. Most likely from given diuretics.   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 Follow-up with , outpatient plan is to consider balloon angioplasty, starting macitentan after she reaches a maximum tolerated dose of Adempas, or increasing Remodulin.  - Continue Riociguat 1 mg TID, Treprostinil 30 ng/kg/min, and Opsumit  - Will follow with [facility] RN 12/30 regarding insurance coverage for opsumit  - Continue Warfarin. Dosed by pharmacy. Target INR 2-2.5.    Cellulitis RLE  LE edema R>L on initial presentation with some tenderness. LE ultrasound 12/23 showed no DVT. Improved after 24 hours of given Clindamycin.   - continue Clindamycin 300 mg q6h, plan for 5 days course(12/30)    Asthma  Allergic rhinitis  - Continue beclomethasone BID and albuterol inhaler PRN  - Continue cetirizine and ipratropium nasal spray    RA  - Continue Hydroxychloroquine 200 mg daily    Diet: Na restriction diet  DVT Prophylaxis: Warfarin  Dominguez Catheter: not present  Code Status: FULL code        Disposition Plan   Expected discharge: 2 - 3 days, recommended to prior living arrangement once fluid volume status optimized on oral medication.    The patient's care was discussed with the Attending Physician, Dr. Rod.    Emmett Schultz MD  PGY-1, Internal Medicine  ______________________________________________________________________    Interval History   Overall with improved symptom relief. Losing weight on I/O     Weight still stable at 120 lbs. Was able to walk around the [place] yesterday. Did have SOB on exertion. Denies any chest pain, dizziness, and lightheadedness.    Data reviewed today: I reviewed all medications, new labs and imaging results over the last 24  hours and discussed as pertinent in assessment and plan.    Physical Exam   Vital Signs: Temp: 98.3  F (36.8  C) Temp src: Oral BP: 97/53 Pulse: 64   Resp: 16 SpO2: 92 % O2 Device: Nasal cannula Oxygen Delivery: 3 LPM  Weight: 120 lbs 1.6 oz     Constitutional: awake, alert, cooperative, no apparent distress, and appears stated age  Neck: CVP ~12 cm  Respiratory: No increased work of breathing, fine crackles at both bases. No wheezing.  Cardiovascular: Regular rate and rhythm, normal S1 and S2, and no murmur noted  GI: Normal bowel sounds, soft, non-distended, non-tender  Musculoskeletal: 1+ to 2+ lower extremity pitting edema present R>L. Erythema, warmth, and tenderness on RLE below knee.  Neurologic: Awake, alert, oriented to name, place and time. No focal neurological deficit.    Data   Recent Labs   Lab 12/29/20  0445 12/28/20  2159 12/28/20  1644 12/28/20  0502 12/27/20  0600 12/27/20  0600 12/25/20  0641 12/25/20  0641 12/24/20  0613 12/24/20  0613 12/23/20  1533   WBC  --   --   --   --   --  5.1  --  4.4  --  4.3 5.2   HGB  --   --   --   --   --  9.0*  --  9.6*  --  9.6* 9.9*   MCV  --   --   --   --   --  92  --  95  --  93 92   PLT  --   --   --   --   --  141*  --  151  --  152 174   INR 2.51*  --   --  2.50*  --  2.38*   < > 2.71*  --  2.97* 2.57*     --  134 135   < > 136   < > 135   < > 137 135   POTASSIUM 4.4 3.4 3.3* 3.6   < > 3.2*   < > 4.7   < > 3.2* 3.7  3.7   CHLORIDE 96  --  91* 93*   < > 96   < > 101   < > 100 98   CO2 37*  --  39* 37*   < > 34*   < > 30   < > 31 34*   BUN 38*  --  40* 41*   < > 43*   < > 43*   < > 44* 46*   CR 1.25*  --  1.24* 1.28*   < > 1.23*   < > 1.42*   < > 1.31* 1.41*   ANIONGAP 3  --  4 5   < > 6   < > 3   < > 6 4   ESTEFANÍA 9.3  --  9.6 9.3   < > 9.3   < > 9.1   < > 9.4 10.2*   GLC 74  --  91 87   < > 86   < > 89   < > 84 92   ALBUMIN  --   --   --   --   --   --   --   --   --   --  3.3*   PROTTOTAL  --   --   --   --   --   --   --   --   --   --  7.7   BILITOTAL   --   --   --   --   --   --   --   --   --   --  0.6   ALKPHOS  --   --   --   --   --   --   --   --   --   --  162*   ALT  --   --   --   --   --   --   --   --   --   --  26   AST  --   --   --   --   --   --   --   --   --   --  39    < > = values in this interval not displayed.     Medications     - MEDICATION INSTRUCTIONS -       - MEDICATION INSTRUCTIONS -       - MEDICATION INSTRUCTIONS -       treprostinil (REMODULIN) intravenous infusion (LESS than or EQUAL to 100 mcg/mL) 30 ng/kg/min (12/29/20 0010)     Warfarin Therapy Reminder         aspirin  81 mg Oral QPM     beclomethasone HFA  2 puff Inhalation BID     bumetanide  5 mg Intravenous Once     calcium citrate and vitamin D  2 tablet Oral Daily     cetirizine  10 mg Oral QPM     clindamycin  300 mg Oral Q6H REINIER     digoxin  62.5 mcg Oral Daily     gabapentin  300 mg Oral TID     hydroxychloroquine  200 mg Oral Daily     ipratropium  2 spray Both Nostrils TID     macitentan  10 mg Oral Daily     polyethylene glycol  17 g Oral Daily     potassium chloride  40 mEq Oral BID     riociguat  1 mg Oral TID     senna-docusate  1 tablet Oral BID    Or     senna-docusate  2 tablet Oral BID     sodium chloride (PF)  3 mL Intracatheter Q8H     vitamin C  500 mg Oral QAM

## 2020-12-29 NOTE — PLAN OF CARE
VSS on 3L NC, unable to wean to 2L and keep 02>92%. Denies pain. Remodulin infusing via Hickmann 30ng/kg/min. IV bumex 5mg and diuril this morning. Frequently up to commode. Imodium given. Chest Xray performed. New peripheral IV placed. Afternoon labs drawn, awaiting results, plan to initiate bumex gtt, will continue POC.

## 2020-12-29 NOTE — PLAN OF CARE
D: Admitted on 12/23/2020 for HF exacerbation.  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     I: Monitored vitals and assessed pt status.   Running:   -Remodulin running @ 30 ng/kg/min (1.69 mL/hr).   -R PIV SL     A: A0x4. VSS on 3L NC. SR on tele. Afebrile. Endorses some aching in legs, improved with PRN tylenol. Denies shortness of breath. Voiding adequately. BM+. Up SBA, gets up to bedside commode independently frequently. 2g Na diet. Edema in lower extremities R>L. Compression stocking on right leg. Skin with generalized bruising, kaylee. Slept intermittently between cares.      P: Continue to monitor Pt status and report changes to treatment team.

## 2020-12-29 NOTE — PLAN OF CARE
D-Admitted with acute on chronic heart failure on 12/23/20. PMH includes pHTN 2/2 CTEPH, RA, JOSE G, breast Ca, s/p mastectomy. INR 2.50. Afternoon potassium level 3.3.  I- Trepostinal infusing at 30ng.kg/min. Received 5 mg IV bumex this evening. 2 mg of coumadin this evening per order.Received call from cardiology fellow to wean O2. Pt received additional 20 meq KCL.   A-Pt has voided 425 ml since receiving bumex. Unable to wean O2, pt desaturated to 87 % on 1 l. Goal is 92% or greater.  I-Increased O2 to 3L NC to maintain O2 saturations at 92% or greater. Cardiology fellow updated.  P-Continue with current poc. Monitor fluid status.Recheck potassium level ordered for 2215.

## 2020-12-30 ENCOUNTER — MEDICAL CORRESPONDENCE (OUTPATIENT)
Dept: HEALTH INFORMATION MANAGEMENT | Facility: CLINIC | Age: 80
End: 2020-12-30

## 2020-12-30 ENCOUNTER — APPOINTMENT (OUTPATIENT)
Dept: OCCUPATIONAL THERAPY | Facility: CLINIC | Age: 80
DRG: 252 | End: 2020-12-30
Attending: INTERNAL MEDICINE
Payer: COMMERCIAL

## 2020-12-30 LAB
ANION GAP SERPL CALCULATED.3IONS-SCNC: 5 MMOL/L (ref 3–14)
BUN SERPL-MCNC: 39 MG/DL (ref 7–30)
CALCIUM SERPL-MCNC: 9.6 MG/DL (ref 8.5–10.1)
CHLORIDE SERPL-SCNC: 94 MMOL/L (ref 94–109)
CO2 SERPL-SCNC: 36 MMOL/L (ref 20–32)
CREAT SERPL-MCNC: 1.51 MG/DL (ref 0.52–1.04)
GFR SERPL CREATININE-BSD FRML MDRD: 33 ML/MIN/{1.73_M2}
GLUCOSE SERPL-MCNC: 84 MG/DL (ref 70–99)
INR PPP: 2.05 (ref 0.86–1.14)
MAGNESIUM SERPL-MCNC: 2.1 MG/DL (ref 1.6–2.3)
POTASSIUM SERPL-SCNC: 4.4 MMOL/L (ref 3.4–5.3)
SODIUM SERPL-SCNC: 134 MMOL/L (ref 133–144)

## 2020-12-30 PROCEDURE — 99233 SBSQ HOSP IP/OBS HIGH 50: CPT | Mod: 25 | Performed by: INTERNAL MEDICINE

## 2020-12-30 PROCEDURE — 272N000002 HC OR SUPPLY OTHER OPNP: Performed by: INTERNAL MEDICINE

## 2020-12-30 PROCEDURE — 250N000013 HC RX MED GY IP 250 OP 250 PS 637: Performed by: STUDENT IN AN ORGANIZED HEALTH CARE EDUCATION/TRAINING PROGRAM

## 2020-12-30 PROCEDURE — 250N000011 HC RX IP 250 OP 636: Performed by: INTERNAL MEDICINE

## 2020-12-30 PROCEDURE — 83735 ASSAY OF MAGNESIUM: CPT | Performed by: INTERNAL MEDICINE

## 2020-12-30 PROCEDURE — 93451 RIGHT HEART CATH: CPT | Mod: 26 | Performed by: INTERNAL MEDICINE

## 2020-12-30 PROCEDURE — 36415 COLL VENOUS BLD VENIPUNCTURE: CPT | Performed by: INTERNAL MEDICINE

## 2020-12-30 PROCEDURE — C1894 INTRO/SHEATH, NON-LASER: HCPCS | Performed by: INTERNAL MEDICINE

## 2020-12-30 PROCEDURE — 97110 THERAPEUTIC EXERCISES: CPT | Mod: GO

## 2020-12-30 PROCEDURE — 83735 ASSAY OF MAGNESIUM: CPT | Performed by: STUDENT IN AN ORGANIZED HEALTH CARE EDUCATION/TRAINING PROGRAM

## 2020-12-30 PROCEDURE — 4A023N6 MEASUREMENT OF CARDIAC SAMPLING AND PRESSURE, RIGHT HEART, PERCUTANEOUS APPROACH: ICD-10-PCS | Performed by: INTERNAL MEDICINE

## 2020-12-30 PROCEDURE — 85610 PROTHROMBIN TIME: CPT | Performed by: INTERNAL MEDICINE

## 2020-12-30 PROCEDURE — 80048 BASIC METABOLIC PNL TOTAL CA: CPT | Performed by: INTERNAL MEDICINE

## 2020-12-30 PROCEDURE — 250N000013 HC RX MED GY IP 250 OP 250 PS 637: Performed by: INTERNAL MEDICINE

## 2020-12-30 PROCEDURE — 93451 RIGHT HEART CATH: CPT | Performed by: INTERNAL MEDICINE

## 2020-12-30 PROCEDURE — 272N000004 HC RX 272: Performed by: INTERNAL MEDICINE

## 2020-12-30 PROCEDURE — 272N000001 HC OR GENERAL SUPPLY STERILE: Performed by: INTERNAL MEDICINE

## 2020-12-30 PROCEDURE — 214N000001 HC R&B CCU UMMC

## 2020-12-30 PROCEDURE — 250N000009 HC RX 250: Performed by: INTERNAL MEDICINE

## 2020-12-30 PROCEDURE — 97140 MANUAL THERAPY 1/> REGIONS: CPT | Mod: GO

## 2020-12-30 RX ORDER — HEPARIN SODIUM,PORCINE 10 UNIT/ML
2-5 VIAL (ML) INTRAVENOUS
Status: ACTIVE | OUTPATIENT
Start: 2020-12-30 | End: 2021-01-02

## 2020-12-30 RX ORDER — LOPERAMIDE HCL 2 MG
2 CAPSULE ORAL 4 TIMES DAILY PRN
Status: DISCONTINUED | OUTPATIENT
Start: 2020-12-30 | End: 2021-01-02

## 2020-12-30 RX ORDER — BUMETANIDE 0.25 MG/ML
5 INJECTION INTRAMUSCULAR; INTRAVENOUS ONCE
Status: COMPLETED | OUTPATIENT
Start: 2020-12-30 | End: 2020-12-30

## 2020-12-30 RX ORDER — LIDOCAINE 40 MG/G
CREAM TOPICAL
Status: ACTIVE | OUTPATIENT
Start: 2020-12-30 | End: 2021-01-02

## 2020-12-30 RX ORDER — DOBUTAMINE HYDROCHLORIDE 200 MG/100ML
2.5 INJECTION INTRAVENOUS CONTINUOUS
Status: CANCELLED | OUTPATIENT
Start: 2020-12-30

## 2020-12-30 RX ORDER — WARFARIN SODIUM 4 MG/1
4 TABLET ORAL
Status: COMPLETED | OUTPATIENT
Start: 2020-12-30 | End: 2020-12-30

## 2020-12-30 RX ADMIN — RIOCIGUAT 1 MG: 1 TABLET, FILM COATED ORAL at 08:07

## 2020-12-30 RX ADMIN — MACITENTAN 10 MG: 10 TABLET, FILM COATED ORAL at 08:08

## 2020-12-30 RX ADMIN — POTASSIUM CHLORIDE 40 MEQ: 750 TABLET, EXTENDED RELEASE ORAL at 19:36

## 2020-12-30 RX ADMIN — CLINDAMYCIN HYDROCHLORIDE 300 MG: 300 CAPSULE ORAL at 11:39

## 2020-12-30 RX ADMIN — CETIRIZINE HYDROCHLORIDE 10 MG: 10 TABLET, FILM COATED ORAL at 19:36

## 2020-12-30 RX ADMIN — GABAPENTIN 300 MG: 300 CAPSULE ORAL at 14:10

## 2020-12-30 RX ADMIN — LOPERAMIDE HYDROCHLORIDE 2 MG: 2 CAPSULE ORAL at 08:08

## 2020-12-30 RX ADMIN — LOPERAMIDE HYDROCHLORIDE 2 MG: 2 CAPSULE ORAL at 14:13

## 2020-12-30 RX ADMIN — ASPIRIN 81 MG CHEWABLE TABLET 81 MG: 81 TABLET CHEWABLE at 19:36

## 2020-12-30 RX ADMIN — WARFARIN SODIUM 4 MG: 4 TABLET ORAL at 18:26

## 2020-12-30 RX ADMIN — IPRATROPIUM BROMIDE 2 SPRAY: 42 SPRAY NASAL at 08:12

## 2020-12-30 RX ADMIN — Medication 2 TABLET: at 08:07

## 2020-12-30 RX ADMIN — HYDROXYCHLOROQUINE SULFATE 200 MG: 200 TABLET, FILM COATED ORAL at 08:07

## 2020-12-30 RX ADMIN — GABAPENTIN 300 MG: 300 CAPSULE ORAL at 08:08

## 2020-12-30 RX ADMIN — GABAPENTIN 300 MG: 300 CAPSULE ORAL at 19:36

## 2020-12-30 RX ADMIN — CLINDAMYCIN HYDROCHLORIDE 300 MG: 300 CAPSULE ORAL at 05:47

## 2020-12-30 RX ADMIN — OXYCODONE HYDROCHLORIDE AND ACETAMINOPHEN 500 MG: 500 TABLET ORAL at 08:07

## 2020-12-30 RX ADMIN — RIOCIGUAT 1 MG: 1 TABLET, FILM COATED ORAL at 14:10

## 2020-12-30 RX ADMIN — POTASSIUM CHLORIDE 40 MEQ: 750 TABLET, EXTENDED RELEASE ORAL at 08:07

## 2020-12-30 RX ADMIN — CLINDAMYCIN HYDROCHLORIDE 300 MG: 300 CAPSULE ORAL at 00:09

## 2020-12-30 RX ADMIN — BUMETANIDE 5 MG: 0.25 INJECTION INTRAMUSCULAR; INTRAVENOUS at 18:26

## 2020-12-30 RX ADMIN — TREPROSTINIL 30 NG/KG/MIN: 20 INJECTION, SOLUTION INTRAVENOUS; SUBCUTANEOUS at 11:41

## 2020-12-30 RX ADMIN — DIGOXIN 62.5 MCG: 0.06 TABLET ORAL at 08:07

## 2020-12-30 RX ADMIN — IPRATROPIUM BROMIDE 2 SPRAY: 42 SPRAY NASAL at 14:10

## 2020-12-30 RX ADMIN — CLINDAMYCIN HYDROCHLORIDE 300 MG: 300 CAPSULE ORAL at 18:26

## 2020-12-30 RX ADMIN — RIOCIGUAT 1 MG: 1 TABLET, FILM COATED ORAL at 19:37

## 2020-12-30 ASSESSMENT — ACTIVITIES OF DAILY LIVING (ADL)
ADLS_ACUITY_SCORE: 15

## 2020-12-30 ASSESSMENT — MIFFLIN-ST. JEOR: SCORE: 919.96

## 2020-12-30 NOTE — PROGRESS NOTES
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 Currently having rising creatinine with slightly increase oxygen demand and high CVP from exam despite aggressive diuresis and three medications for CTEPH. Will get RHC today to reevaluate.    Changes today 12/30  - will do RHC today  - hold of diuresis   - continue Pulmonary HTN medications: Adempas, Remodulin, and Opsumit(call PAH RN today)  - continue to wean off oxygen requirement if able, keep sat >92%--> now 4 LPM satting 91-95%  - will complete 5-day clindamycin today 12/30, has been having frequent small stools, some discomfort, no pain, no fever. Defer C.dif workup at this moment. Low threshold if additional concerns.    #Acute on chronic right-sided heart failure  #Acute on chronic respiratory failure with hypoxia  Presented with increasing SOB, dizziness, and weight gain(dry weight ~112 lbs) for the last couple of weeks, increased RIVERA. Has RLE swelling but US DVT negative (12/23/2020). Patient was hypervolemic with IVC diameter 2.8 cm with no respiratory variation on admission.   Currently still has persistent oxygen requirement(4 LPM to keep sat 91-95%, not on oxygen at home) with high CVP and stable weight 121lbs despite aggressive diuresis while on Adempas, remodulin, and opsumit(started on 12/28). Her Is/Os shows small net negative Is/Os in the past 24 hours with an increase in creatinine from 1.28->1.5. Concern for decrease in cardiac output status.    Last RHC (09/2020): RA 11/6/5 RV 90/8 PA 90/20/42 PCWP --/--/11 LUIZ 2.55/1.76 TD 3/2.07 PVR 10 (TD) (Moderately  elevated pulmonary artery hypertension, Right sided and Left sided filling pressures are normal)  Last TTE (09/2020): Normal LVEF 60-65%. Moderately dilated and reduced function of RV. Right ventricular systolic pressure is 82mmHg above the right atrial pressure.    - NPO  - will repeat RHC today.  - Hold Bumex drip given rising in her creatinine.   - continue Opsumit 10 mg( started 12/28 after discussion with Dr. Garcia) & monitor for response  - On Digoxin 62.5 mcg daily for RV support  - Try wean-off O2, keep SpO2 > 92%; currently on 4 LPM satting ~91-95%    #JOSE G on CKD  Started on bumex drip yesterday to try achieving optimum diuresis while maintaining adequate preload. Creatinine has climbed up with persistent hypoxia and high CVP from physical exam. Ddx cardiorenal, prerenal.   - hold diuresis   - RHC as mentioned    #Pulmonary HTN secondary to CTEPH (WHO group IV) with severe RV dysfunction  High-risk for surgical PTE despite proximal disease. Follow-up with , outpatient plan is to consider balloon angioplasty. Currently on Adempas, Remodulin, and Opsumit.  - Continue Adempas 1 mg TID, Remodulin 30 ng/kg/min, and Opsumit  - Will follow with PAH RN 12/30 regarding insurance coverage for opsumit  - Continue Warfarin. Dosed by pharmacy. Target INR 2-2.5.    #Hypokalemia  K+ 3.4. Most likely from given diuretics.   - replace prn    #Cellulitis RLE  LE edema R>L on initial presentation with some tenderness. LE ultrasound 12/23 showed no DVT. Improved after 24 hours of given Clindamycin.   - Clindamycin will complete 5 ay course today 12/30    #Frequent loose stool  Having frequent loose bowel movement without abdominal pain, fever.  - CTM  - low threshold to send C.diff if there's any concerns.    #Asthma  #Allergic rhinitis  - Continue beclomethasone BID and albuterol inhaler PRN  - Continue cetirizine and ipratropium nasal spray    #RA  - Continue Hydroxychloroquine 200 mg daily    Diet: Na  restriction diet, NPO for RHC  DVT Prophylaxis: Warfarin  Dominguez Catheter: not present  Code Status: FULL code        Disposition Plan   Expected discharge: 2 - 3 days, recommended to prior living arrangement once fluid status and medications for CTEPH are optimized and respiratory status and oxygen requirement are improved.    The patient's care was discussed with the Attending Physician, Dr. Rod.    Emmett Schultz MD  Internal Medicine PGY-1  ______________________________________________________________________    Interval History   Overall with improved symptom relief. Losing weight on I/O.     She didn't urinate much overnight despite started bumex drip yesterday. Requires increase oxygen to maintain saturation > 92%. Her weight is still stable at 121 lbs. Denies any chest pain, SOB, dizziness, and lightheadedness. C/o frequent loose bowel movement overnight ~6 times, small amount/time with associated abdominal discomfort. Denies abdominal pain or fever.      Data reviewed today: I reviewed all medications, new labs and imaging results over the last 24 hours and discussed as pertinent in assessment and plan.    Physical Exam   Vital Signs: Temp: 99  F (37.2  C) Temp src: Oral BP: 100/56 Pulse: 68   Resp: 18 SpO2: 92 % O2 Device: Nasal cannula Oxygen Delivery: 4 LPM  Weight: 121 lbs 4.8 oz     Constitutional: awake, alert, cooperative, no apparent distress, and appears stated age  Neck: CVP ~12 cm  Respiratory: No increased work of breathing, fine crackles at both bases. No wheezing.  Cardiovascular: Regular rate and rhythm, normal S1S2, and no murmur noted  GI: Normal bowel sounds, soft, non-distended, non-tender  Musculoskeletal: 1+ to 2+ lower extremity pitting edema present R>L. Erythema, warmth, and tenderness on RLE below knee.  Neurologic: Awake, alert, oriented to name, place and time. No focal neurological deficit.    Data   Recent Labs   Lab 12/30/20  0542 12/29/20  2215 12/29/20  1431  12/29/20  0445 12/28/20  0502 12/28/20  0502 12/27/20  0600 12/27/20  0600 12/25/20  0641 12/25/20  0641 12/24/20  0613 12/24/20  0613 12/23/20  1533   WBC  --   --   --   --   --   --   --  5.1  --  4.4  --  4.3 5.2   HGB  --   --   --   --   --   --   --  9.0*  --  9.6*  --  9.6* 9.9*   MCV  --   --   --   --   --   --   --  92  --  95  --  93 92   PLT  --   --   --   --   --   --   --  141*  --  151  --  152 174   INR 2.05*  --   --  2.51*  --  2.50*  --  2.38*   < > 2.71*  --  2.97* 2.57*    136 135 136   < > 135   < > 136   < > 135   < > 137 135   POTASSIUM 4.4 3.9 3.7 4.4   < > 3.6   < > 3.2*   < > 4.7   < > 3.2* 3.7  3.7   CHLORIDE 94 95 95 96   < > 93*   < > 96   < > 101   < > 100 98   CO2 36* 36* 35* 37*   < > 37*   < > 34*   < > 30   < > 31 34*   BUN 39* 40* 38* 38*   < > 41*   < > 43*   < > 43*   < > 44* 46*   CR 1.51* 1.46* 1.28* 1.25*   < > 1.28*   < > 1.23*   < > 1.42*   < > 1.31* 1.41*   ANIONGAP 5 5 5 3   < > 5   < > 6   < > 3   < > 6 4   ESTEFANÍA 9.6 9.4 9.6 9.3   < > 9.3   < > 9.3   < > 9.1   < > 9.4 10.2*   GLC 84 152* 105* 74   < > 87   < > 86   < > 89   < > 84 92   ALBUMIN  --   --   --   --   --   --   --   --   --   --   --   --  3.3*   PROTTOTAL  --   --   --   --   --   --   --   --   --   --   --   --  7.7   BILITOTAL  --   --   --   --   --   --   --   --   --   --   --   --  0.6   ALKPHOS  --   --   --   --   --   --   --   --   --   --   --   --  162*   ALT  --   --   --   --   --   --   --   --   --   --   --   --  26   AST  --   --   --   --   --   --   --   --   --   --   --   --  39    < > = values in this interval not displayed.     Medications     bumetanide 0.5 mg/hr (12/30/20 0400)     - MEDICATION INSTRUCTIONS -       - MEDICATION INSTRUCTIONS -       - MEDICATION INSTRUCTIONS -       treprostinil (REMODULIN) intravenous infusion (LESS than or EQUAL to 100 mcg/mL) 30 ng/kg/min (12/30/20 0400)     Warfarin Therapy Reminder         aspirin  81 mg Oral QPM     beclomethasone HFA   2 puff Inhalation BID     calcium citrate and vitamin D  2 tablet Oral Daily     cetirizine  10 mg Oral QPM     clindamycin  300 mg Oral Q6H REINIER     digoxin  62.5 mcg Oral Daily     gabapentin  300 mg Oral TID     hydroxychloroquine  200 mg Oral Daily     ipratropium  2 spray Both Nostrils TID     macitentan  10 mg Oral Daily     polyethylene glycol  17 g Oral Daily     potassium chloride  40 mEq Oral BID     riociguat  1 mg Oral TID     senna-docusate  1 tablet Oral BID    Or     senna-docusate  2 tablet Oral BID     sodium chloride (PF)  3 mL Intracatheter Q8H     vitamin C  500 mg Oral QAM

## 2020-12-30 NOTE — PROGRESS NOTES
CLINICAL NUTRITION SERVICES    Reviewed nutrition risk factors due to LOS. Pt is tolerating diet and eating adequately per nursing documentation. Per discussion with RN, reports good/adequate oral intake. Reviewed wt hx: 54.6 kg (12/9/19), 53.8 kg (3/20/20), 53.3 kg (11/9/20), 57.5 kg (12/23/20, admit), 55 kg (12/30/20) - No significant/severe wt loss PTA and note, diuresis this admission.      Follow Up / Monitoring:   Will continue to follow pt via rounds.    Mirta Stinson, MS, RD, LD, CNSC   6C Pgr: [phone]

## 2020-12-30 NOTE — TELEPHONE ENCOUNTER
*Contacted [facility] and spoke with Marilu. Requested that Marilu urgent expedite the pt's referral to the specialty pharmacy as pt is expecting to discharge in 2-4 days. Provided Marilu with the date of approval, approval date and expiration, authorization number and pt's member ID. Marilu stated she [facility] and over to the specialty pharmacy.    Lexus Balbuena  Clinic   Pulmonary Hypertension  [facility]  (P) [phone]

## 2020-12-30 NOTE — PLAN OF CARE
D: pt admitted on 12/23/2020 for HF exacerbation. 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     Changed:   - RHC,  Right internal jugular access site. Puncture site covered with Band-Aid.    - restart Bumex gtt 1mg/hr  -one time order 5 mg IV Bumex given     Running:   Remodulin 30ng/kg/min   -Bumex 1mg/hr     PRN:   Imodium      Vitals: Temp: 97.5  F (36.4  C) Temp src: Oral BP: 118/60 Pulse: 69   Resp: 18 SpO2: 93 % O2 Device: Nasal cannula Oxygen Delivery: 4 LPM  A:   Neuro: A&O x 4. Neurologically intact, denies Headache, dizziness, and  Lightheadedness. Report  numbness and tingling (baseline).  calls appropriately.   Cardiac: SR, denies chest pain   Respiratory: sating > 92% on 2- 4L NC w/ humidification. LS clear, fine crackles at bases   Diet/appetite: 2g Na diet, good appetite.   GI/:  Soft BM X2 , small amount. PRN imodium given per pt request.  Reported abdominal discomfort. Denies abdominal pain and nausea. Good urine output, voids without difficulty.   Activity: SBA   Pain: denies pain   Skin: no new deficit noted  LDAs: Goldberg and PIV        P: Double lumen PICC placement on 12/31/2020 Continue to monitor Pt status and report changes to treatment team.

## 2020-12-30 NOTE — TELEPHONE ENCOUNTER
Prior Authorization Approval    Authorization Effective Date: 11/28/2020  Authorization Expiration Date: 12/28/2023  Medication: Opsumit 10mg - Approved  Approved Dose/Quantity: 30 tablets/30 days  Reference #: [id number]   Insurance Company: [employer] - Phone [phone] Fax [phone]   Which [facility] is filling the prescription (Not needed for infusion/clinic administered[facility] [address]  ----------------------------  Insurance: [employer] ([facility])  BIN: [id number]  ANTHONY: MD  ID#: [id number]  GRP#: [id number]

## 2020-12-30 NOTE — PLAN OF CARE
D-Admitted on 12/23/20 with Acute on chronic R sided failure. PMH includes pHtn 2/2 CTEPH, RA, severe RV dysfunction, HTN and asthma. O2 at 3l via NC. Attempted to wean O2 yesterday, but pt desaturates to mid 80's on 1l O2. LE edema. Weight decreased by 0.2 kg from yesterday.  I-Trepostinal infusing at 30ng/kg/min via tunneled catheter. Bumex gtt initiated at 0.5 mg/hr at 1700 per order. Also received 4 mg  bumex bolus prior to starting gtt.  A-Net I/O -163.78 so far today.  P-Continue with current po. Monitor fluid status. Monitor labs. 2200 magnesium level pending.

## 2020-12-30 NOTE — PLAN OF CARE
Admitted for HF exacerbation. PMH of Chronic right heart failure, Moderate pHTN 2/2 CTEPH, rheumatoid arthritis, HTN, breast cancer s/p chemotherapy and left Mastectomy.    Neruro: A&O x 4.  Cardiac: VSS. SR.  Respiratory: 4L NC with humidification to maintain O2 sats > 92%. RIVERA.    Diet: 2g Na diet.  GI/: Multiple BMs 12/29. Voids without difficulty.  Activity: stand by assist. Independent to bedside commode.  Pain: Denies.  Skin: [place]. LE edema, right worse than left. Lymphedema wraps on.  LDAs: Goldberg and PIV.    Plan: Remodulin infusing at 30ng/kg/min, Bumex gtt at 0.5mg/hr. Continue to monitor fluid status.

## 2020-12-30 NOTE — TELEPHONE ENCOUNTER
**Discussed the enrollment form with the pt over the phone. Pt verbalized understanding of the information provided and agreed to plan on 12/29/2020 @ 11:46am.    *Opsumit enrollment and REMS form completed and submitted through [employer] portal for review. Prior authorization sent separately by fax to [employer].    Opsumit Voucher Program was not requested.    Lexus Balbuena  Clinic   Pulmonary Hypertension  [facility]  (P) [phone]

## 2020-12-31 ENCOUNTER — APPOINTMENT (OUTPATIENT)
Dept: OCCUPATIONAL THERAPY | Facility: CLINIC | Age: 80
DRG: 252 | End: 2020-12-31
Attending: INTERNAL MEDICINE
Payer: COMMERCIAL

## 2020-12-31 ENCOUNTER — APPOINTMENT (OUTPATIENT)
Dept: GENERAL RADIOLOGY | Facility: CLINIC | Age: 80
DRG: 252 | End: 2020-12-31
Attending: INTERNAL MEDICINE
Payer: COMMERCIAL

## 2020-12-31 LAB
ANION GAP SERPL CALCULATED.3IONS-SCNC: 6 MMOL/L (ref 3–14)
ANION GAP SERPL CALCULATED.3IONS-SCNC: 6 MMOL/L (ref 3–14)
BUN SERPL-MCNC: 38 MG/DL (ref 7–30)
BUN SERPL-MCNC: 38 MG/DL (ref 7–30)
CALCIUM SERPL-MCNC: 9 MG/DL (ref 8.5–10.1)
CALCIUM SERPL-MCNC: 9.3 MG/DL (ref 8.5–10.1)
CHLORIDE SERPL-SCNC: 95 MMOL/L (ref 94–109)
CHLORIDE SERPL-SCNC: 97 MMOL/L (ref 94–109)
CO2 SERPL-SCNC: 32 MMOL/L (ref 20–32)
CO2 SERPL-SCNC: 34 MMOL/L (ref 20–32)
CREAT SERPL-MCNC: 1.48 MG/DL (ref 0.52–1.04)
CREAT SERPL-MCNC: 1.54 MG/DL (ref 0.52–1.04)
ERYTHROCYTE [DISTWIDTH] IN BLOOD BY AUTOMATED COUNT: 15.5 % (ref 10–15)
GFR SERPL CREATININE-BSD FRML MDRD: 32 ML/MIN/{1.73_M2}
GFR SERPL CREATININE-BSD FRML MDRD: 33 ML/MIN/{1.73_M2}
GLUCOSE SERPL-MCNC: 125 MG/DL (ref 70–99)
GLUCOSE SERPL-MCNC: 79 MG/DL (ref 70–99)
HCT VFR BLD AUTO: 27.9 % (ref 35–47)
HGB BLD-MCNC: 8.3 G/DL (ref 11.7–15.7)
INR PPP: 1.96 (ref 0.86–1.14)
MAGNESIUM SERPL-MCNC: 2.1 MG/DL (ref 1.6–2.3)
MAGNESIUM SERPL-MCNC: 2.1 MG/DL (ref 1.6–2.3)
MCH RBC QN AUTO: 27 PG (ref 26.5–33)
MCHC RBC AUTO-ENTMCNC: 29.7 G/DL (ref 31.5–36.5)
MCV RBC AUTO: 91 FL (ref 78–100)
PLATELET # BLD AUTO: 143 10E9/L (ref 150–450)
POTASSIUM SERPL-SCNC: 4 MMOL/L (ref 3.4–5.3)
POTASSIUM SERPL-SCNC: 4.9 MMOL/L (ref 3.4–5.3)
RBC # BLD AUTO: 3.07 10E12/L (ref 3.8–5.2)
SODIUM SERPL-SCNC: 133 MMOL/L (ref 133–144)
SODIUM SERPL-SCNC: 137 MMOL/L (ref 133–144)
WBC # BLD AUTO: 3.9 10E9/L (ref 4–11)

## 2020-12-31 PROCEDURE — 80048 BASIC METABOLIC PNL TOTAL CA: CPT | Performed by: INTERNAL MEDICINE

## 2020-12-31 PROCEDURE — 250N000009 HC RX 250: Performed by: STUDENT IN AN ORGANIZED HEALTH CARE EDUCATION/TRAINING PROGRAM

## 2020-12-31 PROCEDURE — 85027 COMPLETE CBC AUTOMATED: CPT | Performed by: INTERNAL MEDICINE

## 2020-12-31 PROCEDURE — 250N000013 HC RX MED GY IP 250 OP 250 PS 637: Performed by: STUDENT IN AN ORGANIZED HEALTH CARE EDUCATION/TRAINING PROGRAM

## 2020-12-31 PROCEDURE — 83735 ASSAY OF MAGNESIUM: CPT | Performed by: INTERNAL MEDICINE

## 2020-12-31 PROCEDURE — 272N000103 HC INTRODUCER MICRO SET

## 2020-12-31 PROCEDURE — 250N000013 HC RX MED GY IP 250 OP 250 PS 637: Performed by: INTERNAL MEDICINE

## 2020-12-31 PROCEDURE — 999N000065 XR CHEST PORT 1 VW

## 2020-12-31 PROCEDURE — 71045 X-RAY EXAM CHEST 1 VIEW: CPT | Mod: 26 | Performed by: RADIOLOGY

## 2020-12-31 PROCEDURE — 80048 BASIC METABOLIC PNL TOTAL CA: CPT | Performed by: STUDENT IN AN ORGANIZED HEALTH CARE EDUCATION/TRAINING PROGRAM

## 2020-12-31 PROCEDURE — 272N000004 HC RX 272: Performed by: INTERNAL MEDICINE

## 2020-12-31 PROCEDURE — 272N000201 ZZ HC ADHESIVE SKIN CLOSURE, DERMABOND

## 2020-12-31 PROCEDURE — 272N000278 HC DEVICE 5FR SECURACATH

## 2020-12-31 PROCEDURE — 272N000458 ZZ HC KIT, 5 FR DL BIOFLO OPEN ENDED PICC

## 2020-12-31 PROCEDURE — 36415 COLL VENOUS BLD VENIPUNCTURE: CPT | Performed by: INTERNAL MEDICINE

## 2020-12-31 PROCEDURE — 250N000011 HC RX IP 250 OP 636: Performed by: INTERNAL MEDICINE

## 2020-12-31 PROCEDURE — 250N000009 HC RX 250: Performed by: INTERNAL MEDICINE

## 2020-12-31 PROCEDURE — 71045 X-RAY EXAM CHEST 1 VIEW: CPT

## 2020-12-31 PROCEDURE — 36568 INSJ PICC <5 YR W/O IMAGING: CPT

## 2020-12-31 PROCEDURE — 214N000001 HC R&B CCU UMMC

## 2020-12-31 PROCEDURE — 85610 PROTHROMBIN TIME: CPT | Performed by: INTERNAL MEDICINE

## 2020-12-31 PROCEDURE — 97140 MANUAL THERAPY 1/> REGIONS: CPT | Mod: GO

## 2020-12-31 PROCEDURE — 99232 SBSQ HOSP IP/OBS MODERATE 35: CPT | Mod: GC | Performed by: INTERNAL MEDICINE

## 2020-12-31 PROCEDURE — 97110 THERAPEUTIC EXERCISES: CPT | Mod: GO

## 2020-12-31 RX ORDER — POTASSIUM CHLORIDE 750 MG/1
40 TABLET, EXTENDED RELEASE ORAL DAILY
Status: DISCONTINUED | OUTPATIENT
Start: 2021-01-01 | End: 2021-01-01

## 2020-12-31 RX ORDER — DOBUTAMINE HYDROCHLORIDE 200 MG/100ML
2.5 INJECTION INTRAVENOUS CONTINUOUS
Status: DISCONTINUED | OUTPATIENT
Start: 2020-12-31 | End: 2021-01-11

## 2020-12-31 RX ORDER — WARFARIN SODIUM 4 MG/1
4 TABLET ORAL
Status: COMPLETED | OUTPATIENT
Start: 2020-12-31 | End: 2020-12-31

## 2020-12-31 RX ADMIN — RIOCIGUAT 1 MG: 1 TABLET, FILM COATED ORAL at 14:31

## 2020-12-31 RX ADMIN — MACITENTAN 10 MG: 10 TABLET, FILM COATED ORAL at 07:44

## 2020-12-31 RX ADMIN — IPRATROPIUM BROMIDE 2 SPRAY: 42 SPRAY NASAL at 14:32

## 2020-12-31 RX ADMIN — DIGOXIN 62.5 MCG: 0.06 TABLET ORAL at 07:44

## 2020-12-31 RX ADMIN — GABAPENTIN 300 MG: 300 CAPSULE ORAL at 14:31

## 2020-12-31 RX ADMIN — LOPERAMIDE HYDROCHLORIDE 2 MG: 2 CAPSULE ORAL at 20:49

## 2020-12-31 RX ADMIN — GABAPENTIN 300 MG: 300 CAPSULE ORAL at 07:44

## 2020-12-31 RX ADMIN — RIOCIGUAT 1 MG: 1 TABLET, FILM COATED ORAL at 20:51

## 2020-12-31 RX ADMIN — LOPERAMIDE HYDROCHLORIDE 2 MG: 2 CAPSULE ORAL at 11:56

## 2020-12-31 RX ADMIN — ACETAMINOPHEN 650 MG: 325 TABLET, FILM COATED ORAL at 02:06

## 2020-12-31 RX ADMIN — OXYCODONE HYDROCHLORIDE AND ACETAMINOPHEN 500 MG: 500 TABLET ORAL at 07:44

## 2020-12-31 RX ADMIN — ASPIRIN 81 MG CHEWABLE TABLET 81 MG: 81 TABLET CHEWABLE at 20:49

## 2020-12-31 RX ADMIN — POTASSIUM CHLORIDE 40 MEQ: 750 TABLET, EXTENDED RELEASE ORAL at 07:43

## 2020-12-31 RX ADMIN — CETIRIZINE HYDROCHLORIDE 10 MG: 10 TABLET, FILM COATED ORAL at 20:49

## 2020-12-31 RX ADMIN — GABAPENTIN 300 MG: 300 CAPSULE ORAL at 20:49

## 2020-12-31 RX ADMIN — TREPROSTINIL 30 NG/KG/MIN: 20 INJECTION, SOLUTION INTRAVENOUS; SUBCUTANEOUS at 14:10

## 2020-12-31 RX ADMIN — IPRATROPIUM BROMIDE 2 SPRAY: 42 SPRAY NASAL at 20:47

## 2020-12-31 RX ADMIN — DOBUTAMINE HYDROCHLORIDE 2.5 MCG/KG/MIN: 200 INJECTION INTRAVENOUS at 14:08

## 2020-12-31 RX ADMIN — BUMETANIDE 1.5 MG/HR: 0.25 INJECTION INTRAMUSCULAR; INTRAVENOUS at 22:01

## 2020-12-31 RX ADMIN — WARFARIN SODIUM 4 MG: 4 TABLET ORAL at 17:43

## 2020-12-31 RX ADMIN — RIOCIGUAT 1 MG: 1 TABLET, FILM COATED ORAL at 07:44

## 2020-12-31 RX ADMIN — BUMETANIDE 1 MG/HR: 0.25 INJECTION INTRAMUSCULAR; INTRAVENOUS at 02:07

## 2020-12-31 RX ADMIN — LIDOCAINE HYDROCHLORIDE ANHYDROUS 5 ML: 10 INJECTION, SOLUTION INFILTRATION at 09:35

## 2020-12-31 RX ADMIN — Medication 2 TABLET: at 07:43

## 2020-12-31 RX ADMIN — HYDROXYCHLOROQUINE SULFATE 200 MG: 200 TABLET, FILM COATED ORAL at 07:44

## 2020-12-31 ASSESSMENT — ACTIVITIES OF DAILY LIVING (ADL)
ADLS_ACUITY_SCORE: 13
ADLS_ACUITY_SCORE: 13
ADLS_ACUITY_SCORE: 15
ADLS_ACUITY_SCORE: 13

## 2020-12-31 ASSESSMENT — MIFFLIN-ST. JEOR: SCORE: 927.67

## 2020-12-31 NOTE — PLAN OF CARE
AVSS.  A&OX4.  Remodulin continues 30ng/kg/min.  Bumex at 1mg/hr.  Denies SOB.  O2 sat low 90's on 4L per NC. Bilat lymph wraps on. Up to commode independently to void(see I&O for UOP).  Tylenol for generalized discomfort.  Refused pm labs.  Agrees to lab draw in am. PICC placement planned for today.  Pt stable.  Resting between cares/assessments in NAD.  SR. Continue to monitor response to diuretics/labs.   Notify Cards team with issues/concerns.

## 2020-12-31 NOTE — PROCEDURES
[facility]     Double Lumen PICC Placement    Date/Time: 12/31/2020 12:45 PM  Performed by: Hailey Bird RN  Authorized by: Brad Ahmadi MD   Indications: vascular access    UNIVERSAL PROTOCOL   Site Marked: Yes  Prior Images Obtained and Reviewed:  Yes  Required items: Required blood products, implants, devices and special equipment available    Patient identity confirmed:  Verbally with patient and arm band  NA - No sedation, light sedation, or local anesthesia  Confirmation Checklist:  Patient's identity using two indicators, relevant allergies, procedure was appropriate and matched the consent or emergent situation and correct equipment/implants were available  Time out: Immediately prior to the procedure a time out was called    Universal Protocol: the Joint Commission Universal Protocol was followed    Preparation: Patient was prepped and draped in usual sterile fashion           ANESTHESIA    Anesthesia: Local infiltration  Local Anesthetic:  Lidocaine 1% without epinephrine  Anesthetic Total (mL):  5      SEDATION    Patient Sedated: No        Preparation: skin prepped with ChloraPrep  Skin prep agent: skin prep agent completely dried prior to procedure  Sterile barriers: maximum sterile barriers were used: cap, mask, sterile gown, sterile gloves, and large sterile sheet  Hand hygiene: hand hygiene performed prior to central venous catheter insertion  Type of line used: PICC and Power PICC  Catheter type: double lumen  Lumen type: non-valved  Catheter size: 5 Fr  Brand: Bard  Lot number: [id number]  Placement method: venipuncture, MST, ultrasound and tip confirmation system  Number of attempts: 1  Successful placement: yes  Orientation: right  Location: brachial vein (medial) (vd 0.51 cm)  Arm circumference: adults 10 cm  Extremity circumference: 22  Visible catheter length: 8  Total catheter length: 41  Dressing and securement: blood cleaned  with CHG, dressing applied, glue, chlorhexidine disc applied, line secured, site cleaned, sterile dressing applied and securement device  Post procedure assessment: blood return through all ports, free fluid flow and placement verified by x-ray  PROCEDURE   Patient Tolerance:  Patient tolerated the procedure well with no immediate complications  Describe Procedure: PICC ok to use

## 2020-12-31 NOTE — PROGRESS NOTES
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 Currently having rising creatinine with slightly increase oxygen demand and high CVP from exam despite aggressive diuresis and three medications for CTEPH. Will get RHC today to reevaluate.    Changes today 12/31  - PICC placed  - start Dobutamine drip   - continue diuresis with Bumex drip  - keep negative Is/Os  - continue CTEPH meds: Adempas, Remodulin, and Opsumit(call PAH RN today)  - continue to wean off oxygen requirement if able, keep sat >92%--> now 4 LPM satting 91-95%    #Acute on chronic right-sided heart failure  #Acute on chronic respiratory failure with hypoxia  Progressive worsening of right heart function. This is her second admission; previous admission in 10/2020.  This admission presented with increasing SOB, dizziness, and weight gain(dry weight ~112 lbs) for the last couple of weeks, increased RIVERA. Has RLE swelling but US DVT negative (12/23/2020). Patient was hypervolemic with IVC diameter 2.8 cm with no respiratory variation on admission.     RHC 12/30 came back with severely elevated RV& PA pressure, PCWP 17, and CI 2. Started Bumex drip with good response, net -1.1L from midnight and this AM. However, she still hypoxic requiring 4 LPM of oxygen to maintain oxygen saturation 91-95% with elevated CVP ~20 from exam and slightly elevated weight 121-->123 lbs. Will continue with diuresis and start dobutamine drip.      RHC (09/2020): RA 11/6/5 RV 90/8 PA 90/20/42 PCWP --/--/11 LUIZ 2.55/1.76 TD 3/2.07 PVR 10 (TD) (Moderately elevated pulmonary artery  hypertension, Right sided and Left sided filling pressures are normal)  RHC 12/23/2020: RA 19/24/22 RV 80/21 PA 80/26/42 PCWP 17 LUIZ 3/2.04 TD 3.07/2.09 (severely elevated right-sided filling pressure and pulmonary hypertension, reduced cardiac output)    Last TTE (09/2020): Normal LVEF 60-65%. Moderately dilated and reduced function of RV. Right ventricular systolic pressure is 82mmHg above the right atrial pressure.    - PICC placed  - start dobutamine drip 2.5 mg/kg/min  - continue diuresis with Bumex drip  - continue Opsumit 10 mg( started 12/28 after discussion with Dr. Garcia)  - continue Digoxin 62.5 mcg daily for RV support  - Try wean-off O2, keep SpO2 > 92%; currently on 4 LPM satting ~91-95%    #JOSE G on CKD  Creatinine has climbed up after started on bumex drip 12/29 along with persistent hypoxia and high CVP from physical exam. Ddx cardiorenal given low output picture. RHC 12/30 showed increase RV&PA pressure with CI 2. Improved urine output overnight with Bumex drip. Creatinine stable at 1.48.  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 Follow-up with , outpatient plan is to consider balloon angioplasty. Currently on Adempas, Remodulin, and Opsumit.  - Continue Adempas 1 mg TID, Remodulin 30 ng/kg/min, and Opsumit  - Opsumit is already approved by insurance, will have to wait in the next day or two before the med could be sent to the patient  - Continue Warfarin. Dosed by pharmacy. Target INR 2-2.5.    #Hypokalemia, improved  - currently on KCL 40 mEq BID   - replace prn    #Cellulitis RLE  LE edema R>L on initial presentation with some tenderness. LE ultrasound 12/23 showed no DVT. Improved after 24 hours of given Clindamycin.   - completed Clindamycin    #Frequent loose stool, improved  Having frequent loose bowel movement without abdominal pain, fever.   - Improved  with Imodium  - contiue to monitor    #Asthma  #Allergic rhinitis  No acute concerns.  - Continue beclomethasone BID and albuterol inhaler PRN  - Continue cetirizine and ipratropium nasal spray    #RA  No acute concerns.  - Continue Hydroxychloroquine 200 mg daily    Diet: Na restriction diet  DVT Prophylaxis: Warfarin  Dominguez Catheter: not present  Code Status: FULL code        Disposition Plan   Expected discharge: 2 - 3 days, recommended to prior living arrangement once fluid status and medications for CTEPH are optimized and respiratory status and oxygen requirement are improved.    The patient's care was discussed with the Attending Physician, Dr. Rod.    Emmett Schultz MD  Internal Medicine PGY-1  Pager: [phone]  ______________________________________________________________________    Interval History   Was able to urinate after started her on bumex drip yesterday. Still requires oxygen 4LPM to maintain saturation > 92%. Her weight is slightly up from 121-->123 lbs. Reports some lightheadedness when she changes position and flushing yesterday which lasted for a few minutes. Otherwise, denies any chest pain, dizziness. Has bloated abdomen without pain. Denies frequent bowel movement.     Data reviewed today: I reviewed all medications, new labs and imaging results over the last 24 hours and discussed as pertinent in assessment and plan.    Physical Exam   Vital Signs: Temp: 98.7  F (37.1  C) Temp src: Oral BP: 107/55 Pulse: 63   Resp: 16 SpO2: 91 % O2 Device: None (Room air) Oxygen Delivery: 4 LPM  Weight: 123 lbs 0 oz     Constitutional: awake, alert, cooperative, looks fatigue, and mildly dyspneic while talking  Neck: CVP ~20  Respiratory: No increased work of breathing, crackles at both lower lungs, no wheezing  Cardiovascular: Regular rate and rhythm, normal S1S2, and no murmur noted  GI: Normal bowel sounds, soft, non-distended, non-tender  Musculoskeletal: 2+ lower extremity pitting  edema present R>L. Erythema, warmth, and tenderness on RLE below knee.  Neurologic: Awake, alert, oriented to name, place and time. No focal neurological deficit.    Data   Recent Labs   Lab 12/31/20 0441 12/30/20 0542 12/29/20  2215 12/29/20 0445 12/29/20 0445 12/27/20  0600 12/27/20  0600 12/25/20  0641 12/25/20  0641   WBC 3.9*  --   --   --   --   --  5.1  --  4.4   HGB 8.3*  --   --   --   --   --  9.0*  --  9.6*   MCV 91  --   --   --   --   --  92  --  95   *  --   --   --   --   --  141*  --  151   INR 1.96* 2.05*  --   --  2.51*   < > 2.38*   < > 2.71*    134 136   < > 136   < > 136   < > 135   POTASSIUM 4.9 4.4 3.9   < > 4.4   < > 3.2*   < > 4.7   CHLORIDE 95 94 95   < > 96   < > 96   < > 101   CO2 32 36* 36*   < > 37*   < > 34*   < > 30   BUN 38* 39* 40*   < > 38*   < > 43*   < > 43*   CR 1.48* 1.51* 1.46*   < > 1.25*   < > 1.23*   < > 1.42*   ANIONGAP 6 5 5   < > 3   < > 6   < > 3   ESTEFANÍA 9.0 9.6 9.4   < > 9.3   < > 9.3   < > 9.1   GLC 79 84 152*   < > 74   < > 86   < > 89    < > = values in this interval not displayed.     Medications     bumetanide 1 mg/hr (12/31/20 0545)     - MEDICATION INSTRUCTIONS -       - MEDICATION INSTRUCTIONS -       - MEDICATION INSTRUCTIONS -       treprostinil (REMODULIN) intravenous infusion (LESS than or EQUAL to 100 mcg/mL) 30 ng/kg/min (12/31/20 0545)     Warfarin Therapy Reminder         aspirin  81 mg Oral QPM     beclomethasone HFA  2 puff Inhalation BID     calcium citrate and vitamin D  2 tablet Oral Daily     cetirizine  10 mg Oral QPM     digoxin  62.5 mcg Oral Daily     gabapentin  300 mg Oral TID     hydroxychloroquine  200 mg Oral Daily     ipratropium  2 spray Both Nostrils TID     macitentan  10 mg Oral Daily     polyethylene glycol  17 g Oral Daily     potassium chloride  40 mEq Oral BID     riociguat  1 mg Oral TID     senna-docusate  1 tablet Oral BID    Or     senna-docusate  2 tablet Oral BID     sodium chloride (PF)  3 mL Intracatheter  Q8H     vitamin C  500 mg Oral QAM

## 2020-12-31 NOTE — PROGRESS NOTES
D: Pt admitted on 12/23/2020 for HF exacerbation. 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  I/A : Monitored vitals and assessed pt status. A0x4. VSS on 4 L NC. NSR. Episodes of p wave inversion noted starting around 2:30 PM, Cards 2 notified, KS interval consistent at 0.16. Afebrile. Complaint of pain in BLE, patient reports due to edema, patient declines medication intervention at this time, lymph wrap in place right leg and compression stocking to left leg. On Remodulin 30 ng/kg/min infusing via jade and Bumex 1 mg/hr infusing through PIV. PICC placed today, dobutamine gtt started at 2:10 PM.  Increased bumex gtt to 1.5 mg/hr . PRN imodium x1 given for a diarrheal episode with good effect. Denies nausea, dizziness, SOB but does have RIVERA. Did complain of stinging around PICC insertion sight, site WDL except dried bloody drainage on dressing, heat pack applied with some relief.   ?  P: Continue to monitor Pt status and report changes to treatment team.

## 2021-01-01 LAB
ANION GAP SERPL CALCULATED.3IONS-SCNC: 5 MMOL/L (ref 3–14)
ANION GAP SERPL CALCULATED.3IONS-SCNC: 6 MMOL/L (ref 3–14)
ANION GAP SERPL CALCULATED.3IONS-SCNC: 7 MMOL/L (ref 3–14)
BUN SERPL-MCNC: 30 MG/DL (ref 7–30)
BUN SERPL-MCNC: 31 MG/DL (ref 7–30)
BUN SERPL-MCNC: 33 MG/DL (ref 7–30)
CALCIUM SERPL-MCNC: 8.7 MG/DL (ref 8.5–10.1)
CALCIUM SERPL-MCNC: 9 MG/DL (ref 8.5–10.1)
CALCIUM SERPL-MCNC: 9.2 MG/DL (ref 8.5–10.1)
CHLORIDE SERPL-SCNC: 97 MMOL/L (ref 94–109)
CO2 SERPL-SCNC: 34 MMOL/L (ref 20–32)
CO2 SERPL-SCNC: 35 MMOL/L (ref 20–32)
CO2 SERPL-SCNC: 35 MMOL/L (ref 20–32)
CREAT SERPL-MCNC: 1.04 MG/DL (ref 0.52–1.04)
CREAT SERPL-MCNC: 1.18 MG/DL (ref 0.52–1.04)
CREAT SERPL-MCNC: 1.24 MG/DL (ref 0.52–1.04)
GFR SERPL CREATININE-BSD FRML MDRD: 41 ML/MIN/{1.73_M2}
GFR SERPL CREATININE-BSD FRML MDRD: 44 ML/MIN/{1.73_M2}
GFR SERPL CREATININE-BSD FRML MDRD: 51 ML/MIN/{1.73_M2}
GLUCOSE SERPL-MCNC: 107 MG/DL (ref 70–99)
GLUCOSE SERPL-MCNC: 132 MG/DL (ref 70–99)
GLUCOSE SERPL-MCNC: 77 MG/DL (ref 70–99)
INR PPP: 1.85 (ref 0.86–1.14)
MAGNESIUM SERPL-MCNC: 1.9 MG/DL (ref 1.6–2.3)
POTASSIUM SERPL-SCNC: 2.7 MMOL/L (ref 3.4–5.3)
POTASSIUM SERPL-SCNC: 3.7 MMOL/L (ref 3.4–5.3)
POTASSIUM SERPL-SCNC: 3.9 MMOL/L (ref 3.4–5.3)
SODIUM SERPL-SCNC: 138 MMOL/L (ref 133–144)

## 2021-01-01 PROCEDURE — 80048 BASIC METABOLIC PNL TOTAL CA: CPT | Performed by: STUDENT IN AN ORGANIZED HEALTH CARE EDUCATION/TRAINING PROGRAM

## 2021-01-01 PROCEDURE — 250N000013 HC RX MED GY IP 250 OP 250 PS 637: Performed by: STUDENT IN AN ORGANIZED HEALTH CARE EDUCATION/TRAINING PROGRAM

## 2021-01-01 PROCEDURE — 83735 ASSAY OF MAGNESIUM: CPT | Performed by: INTERNAL MEDICINE

## 2021-01-01 PROCEDURE — 250N000013 HC RX MED GY IP 250 OP 250 PS 637: Performed by: INTERNAL MEDICINE

## 2021-01-01 PROCEDURE — 250N000009 HC RX 250: Performed by: STUDENT IN AN ORGANIZED HEALTH CARE EDUCATION/TRAINING PROGRAM

## 2021-01-01 PROCEDURE — 85610 PROTHROMBIN TIME: CPT | Performed by: INTERNAL MEDICINE

## 2021-01-01 PROCEDURE — 214N000001 HC R&B CCU UMMC

## 2021-01-01 PROCEDURE — 99233 SBSQ HOSP IP/OBS HIGH 50: CPT | Mod: GC | Performed by: INTERNAL MEDICINE

## 2021-01-01 PROCEDURE — 83735 ASSAY OF MAGNESIUM: CPT | Performed by: STUDENT IN AN ORGANIZED HEALTH CARE EDUCATION/TRAINING PROGRAM

## 2021-01-01 PROCEDURE — 250N000011 HC RX IP 250 OP 636: Performed by: STUDENT IN AN ORGANIZED HEALTH CARE EDUCATION/TRAINING PROGRAM

## 2021-01-01 PROCEDURE — 250N000011 HC RX IP 250 OP 636: Performed by: INTERNAL MEDICINE

## 2021-01-01 PROCEDURE — 80048 BASIC METABOLIC PNL TOTAL CA: CPT | Performed by: INTERNAL MEDICINE

## 2021-01-01 PROCEDURE — 272N000004 HC RX 272: Performed by: INTERNAL MEDICINE

## 2021-01-01 RX ORDER — POTASSIUM CHLORIDE 29.8 MG/ML
20 INJECTION INTRAVENOUS
Status: COMPLETED | OUTPATIENT
Start: 2021-01-01 | End: 2021-01-01

## 2021-01-01 RX ORDER — DIGOXIN 0.06 MG/1
62.5 TABLET ORAL DAILY
Status: DISCONTINUED | OUTPATIENT
Start: 2021-01-02 | End: 2021-01-14 | Stop reason: HOSPADM

## 2021-01-01 RX ORDER — POTASSIUM CHLORIDE 750 MG/1
10 TABLET, EXTENDED RELEASE ORAL ONCE
Status: COMPLETED | OUTPATIENT
Start: 2021-01-01 | End: 2021-01-01

## 2021-01-01 RX ORDER — POTASSIUM CHLORIDE 750 MG/1
20 TABLET, EXTENDED RELEASE ORAL ONCE
Status: COMPLETED | OUTPATIENT
Start: 2021-01-01 | End: 2021-01-01

## 2021-01-01 RX ORDER — SIMETHICONE 80 MG
80 TABLET,CHEWABLE ORAL EVERY 6 HOURS PRN
Status: DISCONTINUED | OUTPATIENT
Start: 2021-01-01 | End: 2021-01-14 | Stop reason: HOSPADM

## 2021-01-01 RX ORDER — POTASSIUM CHLORIDE 750 MG/1
40 TABLET, EXTENDED RELEASE ORAL 2 TIMES DAILY
Status: DISCONTINUED | OUTPATIENT
Start: 2021-01-01 | End: 2021-01-05

## 2021-01-01 RX ORDER — POTASSIUM CHLORIDE 20MEQ/15ML
80 LIQUID (ML) ORAL ONCE
Status: DISCONTINUED | OUTPATIENT
Start: 2021-01-01 | End: 2021-01-01

## 2021-01-01 RX ORDER — WARFARIN SODIUM 5 MG/1
5 TABLET ORAL
Status: COMPLETED | OUTPATIENT
Start: 2021-01-01 | End: 2021-01-01

## 2021-01-01 RX ORDER — POTASSIUM CHLORIDE 750 MG/1
40 TABLET, EXTENDED RELEASE ORAL ONCE
Status: COMPLETED | OUTPATIENT
Start: 2021-01-01 | End: 2021-01-01

## 2021-01-01 RX ORDER — POTASSIUM CHLORIDE 750 MG/1
40 TABLET, EXTENDED RELEASE ORAL ONCE
Status: DISCONTINUED | OUTPATIENT
Start: 2021-01-01 | End: 2021-01-03

## 2021-01-01 RX ADMIN — SIMETHICONE 80 MG: 80 TABLET, CHEWABLE ORAL at 13:55

## 2021-01-01 RX ADMIN — RIOCIGUAT 1 MG: 1 TABLET, FILM COATED ORAL at 07:51

## 2021-01-01 RX ADMIN — HYDROXYCHLOROQUINE SULFATE 200 MG: 200 TABLET, FILM COATED ORAL at 07:50

## 2021-01-01 RX ADMIN — SIMETHICONE 80 MG: 80 TABLET, CHEWABLE ORAL at 22:42

## 2021-01-01 RX ADMIN — POTASSIUM CHLORIDE 20 MEQ: 29.8 INJECTION, SOLUTION INTRAVENOUS at 09:23

## 2021-01-01 RX ADMIN — MACITENTAN 10 MG: 10 TABLET, FILM COATED ORAL at 07:51

## 2021-01-01 RX ADMIN — RIOCIGUAT 1 MG: 1 TABLET, FILM COATED ORAL at 20:33

## 2021-01-01 RX ADMIN — TREPROSTINIL 30 NG/KG/MIN: 20 INJECTION, SOLUTION INTRAVENOUS; SUBCUTANEOUS at 19:07

## 2021-01-01 RX ADMIN — POTASSIUM CHLORIDE 20 MEQ: 29.8 INJECTION, SOLUTION INTRAVENOUS at 07:11

## 2021-01-01 RX ADMIN — DIGOXIN 62.5 MCG: 0.06 TABLET ORAL at 07:50

## 2021-01-01 RX ADMIN — OXYCODONE HYDROCHLORIDE AND ACETAMINOPHEN 500 MG: 500 TABLET ORAL at 07:50

## 2021-01-01 RX ADMIN — LOPERAMIDE HYDROCHLORIDE 2 MG: 2 CAPSULE ORAL at 16:03

## 2021-01-01 RX ADMIN — GABAPENTIN 300 MG: 300 CAPSULE ORAL at 07:51

## 2021-01-01 RX ADMIN — POTASSIUM CHLORIDE 10 MEQ: 750 TABLET, EXTENDED RELEASE ORAL at 20:34

## 2021-01-01 RX ADMIN — RIOCIGUAT 1 MG: 1 TABLET, FILM COATED ORAL at 13:55

## 2021-01-01 RX ADMIN — GABAPENTIN 300 MG: 300 CAPSULE ORAL at 13:55

## 2021-01-01 RX ADMIN — WARFARIN SODIUM 5 MG: 5 TABLET ORAL at 17:27

## 2021-01-01 RX ADMIN — GABAPENTIN 300 MG: 300 CAPSULE ORAL at 20:34

## 2021-01-01 RX ADMIN — BUMETANIDE 1.5 MG/HR: 0.25 INJECTION INTRAMUSCULAR; INTRAVENOUS at 13:55

## 2021-01-01 RX ADMIN — POTASSIUM CHLORIDE 40 MEQ: 750 TABLET, EXTENDED RELEASE ORAL at 07:50

## 2021-01-01 RX ADMIN — LOPERAMIDE HYDROCHLORIDE 2 MG: 2 CAPSULE ORAL at 21:40

## 2021-01-01 RX ADMIN — ASPIRIN 81 MG CHEWABLE TABLET 81 MG: 81 TABLET CHEWABLE at 20:35

## 2021-01-01 RX ADMIN — Medication 2 TABLET: at 07:50

## 2021-01-01 RX ADMIN — POTASSIUM CHLORIDE 20 MEQ: 750 TABLET, EXTENDED RELEASE ORAL at 13:55

## 2021-01-01 RX ADMIN — CETIRIZINE HYDROCHLORIDE 10 MG: 10 TABLET, FILM COATED ORAL at 20:35

## 2021-01-01 RX ADMIN — POTASSIUM CHLORIDE 40 MEQ: 750 TABLET, EXTENDED RELEASE ORAL at 20:34

## 2021-01-01 RX ADMIN — POTASSIUM CHLORIDE 40 MEQ: 750 TABLET, EXTENDED RELEASE ORAL at 09:22

## 2021-01-01 ASSESSMENT — ACTIVITIES OF DAILY LIVING (ADL)
ADLS_ACUITY_SCORE: 13

## 2021-01-01 ASSESSMENT — MIFFLIN-ST. JEOR: SCORE: 925.41

## 2021-01-01 NOTE — PROGRESS NOTES
D: Pt admitted on 12/23/2020 for HF exacerbation. 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  I/A : Monitored vitals and assessed pt status. A0x4. VSS on 4 L NC, RIVERA, reports abdominal swelling makes it hard to take deep breath, encouraged to continue deep breathing exercises. NSR. Afebrile. Urinating adequately. Continues to complain of pain/tenderness in bilateral lower extremities and at PICC site, declined medication intervention, using hot and cold therapy over PICC. Lymph wrap in place over right LE and compression left LE. Dobutamine, bumex and remodulin gtt continue. Denies dizziness, nausea. PRN simethicone given for bloating and abdominal fullness, but patient had diarrheal epsiode after administration, PRN imodium given. Right upper forearm observed to be swollen this afternoon, PIV on lower forearm flushed and good blood return observed. Site not tender to palpitation. Swelling diminished with removal of stockinette around PIV site and application of cold compress. Will continue to monitor.   ?  P: Continue to monitor Pt status and report changes to treatment team. Plan to get digoxin level AM 1/2.

## 2021-01-01 NOTE — PLAN OF CARE
D: Admitted 12/23 for acute on chronic HF     I/A: A/Ox4. VSS on 4L NC. RIVERA. SR on tele. Endorses PICC tenderness managed with hot and cold packs. Remodulin gtt @30ng/kg/min via Goldberg. Bumex gtt @1.5mg/hr via R PIV. Dobutamine gtt @2.5mcg/kg/min via R PICC. PCU collect. Tolerating 2gNa diet. Compression stockings on, wrap on R leg. Voiding adequately. BM+ today. Up SBA/I.     P: Optimize fluid and respiratory status. Continue to monitor and notify Cards 2 with changes/concerns.

## 2021-01-01 NOTE — PROGRESS NOTES
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 Responds well to bumex drip and dobutamine drip with decrease in creatinine. Discontinue Opsumit. Continue gentle diuresis. Goal weight 115 lbs.    Changes today 1/1  - discontinue opsumit  - continue CTEPH meds: Adempas, Remodulin; can increase adempas dose tomorrow 1/2 if BP is stable  - continue Dobutamine drip to increase CO until achieving optimum diuresis; goal weight ~115lbs  - continue gentle diuresis with Bumex drip   - keep negative Is/Os  - follow digoxin level tomorrow  - change digoxin time to 10 AM.  - replace K, Mg prn  - continue to wean off oxygen requirement if able, keep sat >92%--> now 4 LPM satting 91-95%    #Acute on chronic right-sided heart failure  Progressive worsening of right heart function. This is her second admission; previous admission in 10/2020.  This admission presented with increasing SOB, dizziness, and weight gain(dry weight ~112 lbs) for the last couple of weeks, increased RIVERA. Has RLE swelling but US DVT negative (12/23/2020). Patient was hypervolemic with IVC diameter 2.8 cm with no respiratory variation on admission.     Responds well to Bumex drip with ~2L net total output with slight weight reduction from 123-->122 lbs. However, still hypervolemic & hypoxic. From RHC with PCWP 17, likely side effect from opsumit. Will continue dobutamine to support her output and slowly diurese with Bumex drip. Goal weight ~ 115 lbs. And consider balloon angioplasty when clinical status is stabilized.    RHC (09/2020): RA 11/6/5 RV 90/8 PA 90/20/42 PCWP  --/--/11 LUIZ 2.55/1.76 TD 3/2.07 PVR 10 (TD) (Moderately elevated pulmonary artery hypertension, Right sided and Left sided filling pressures are normal)  [facility] 12/23/2020: RA 19/24/22 RV 80/21 PA 80/26/42 PCWP 17 LUIZ 3/2.04 TD 3.07/2.09 PVR 8(severely elevated right-sided filling pressure and pulmonary hypertension, reduced cardiac output)  Last TTE (09/2020): Normal LVEF 60-65%. Moderately dilated and reduced function of RV. Right ventricular systolic pressure is 82mmHg above the right atrial pressure.    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 Follow-up with , outpatient plan is to consider balloon angioplasty. Currently on Adempas, Remodulin, and Opsumit.  - discontinue Opsumit as above  - Continue Adempas 1 mg TID, Remodulin 30 ng/kg/min  - Continue Warfarin. Dosed by pharmacy. Target INR 2-2.5.  - touch base with pharmacy regarding her subtherapeutic INR    #Acute on chronic respiratory failure with hypoxia  Likely from pulmonary edema.    #JOSE G on CKD, improved  Creatinine downtrending 1.5-->1.2 after started on dobutamine and continued with bumex drip.  - continue diuresis  - trend BMP, Mg    #Hypokalemia  K+ this AM 2.7 --> s/p KCL 20 mEq IV*2, KCL 80 mEq oral*1--> K+ 3.9.   - add another KCL 20 mEq oral*1  - reschedule KCL 40 mEq BID   - replace prn    #Cellulitis RLE, improved  LE edema R>L on initial presentation with some tenderness. LE ultrasound 12/23 showed no DVT. Improved after 24 hours of given Clindamycin.   - completed Clindamycin    #Frequent loose stool, improved  #Bloated  abdomen  Having frequent loose bowel movement without abdominal pain, fever. Loose stool has improved with imodium. However still has bloated abdomen without N/V; good bowel sound, tympanic on percussion without tenderness on exams.   - Imodium, simethicone prn  - contiue to monitor    #Asthma  #Allergic rhinitis  No acute concerns.  - Continue beclomethasone BID and albuterol inhaler PRN  - Continue cetirizine and ipratropium nasal spray    #RA  No acute concerns.  - Continue Hydroxychloroquine 200 mg daily    Diet: Na restriction diet  DVT Prophylaxis: Warfarin  Dominguez Catheter: not present  Code Status: FULL code        Disposition Plan   Expected discharge: 2 - 3 days, recommended to prior living arrangement once fluid status and medications for CTEPH are optimized and respiratory status and oxygen requirement are improved.    The patient's care was discussed with the Attending Physician, Dr. Turcios.    Emmett Schultz MD  Internal Medicine PGY-1  Pager: [phone]  ______________________________________________________________________    Interval History   Still requires oxygen 4LPM to maintain saturation > 92%. Her weight is slightly down from 123-->122 lbs. Report still feeling bloated but denies any eating problems. Still has loose bowel movement which is controlled with imodium. Still has leg swelling and improved in tenderness. Otherwise, denies any chest pain or dizziness.     Data reviewed today: I reviewed all medications, new labs and imaging results over the last 24 hours and discussed as pertinent in assessment and plan.    Physical Exam   Vital Signs: Temp: 97.7  F (36.5  C) Temp src: Oral BP: (!) 151/80 Pulse: 82   Resp: 16 SpO2: 90 % O2 Device: Nasal cannula Oxygen Delivery: 4 LPM  Weight: 122 lbs 8 oz     Constitutional: awake, alert, cooperative, looks fatigue, and mildly dyspneic while talking  Neck: CVP ~20  Respiratory: No increased work of breathing, crackles at both lower  lungs, no wheezing  Cardiovascular: Regular rate and rhythm, normal S1S2, and no murmur noted  GI: Normal bowel sounds, soft, non-distended, non-tender  Musculoskeletal: 2+ lower extremity pitting edema present R>L. Erythema, warmth, and tenderness on RLE below knee.  Neurologic: Awake, alert, oriented to name, place and time. No focal neurological deficit.    Data   Recent Labs   Lab 01/01/21  0610 12/31/20  1640 12/31/20  0441 12/30/20  0542 12/27/20  0600 12/27/20  0600   WBC  --   --  3.9*  --   --  5.1   HGB  --   --  8.3*  --   --  9.0*   MCV  --   --  91  --   --  92   PLT  --   --  143*  --   --  141*   INR 1.85*  --  1.96* 2.05*   < > 2.38*    137 133 134   < > 136   POTASSIUM 2.7* 4.0 4.9 4.4   < > 3.2*   CHLORIDE 97 97 95 94   < > 96   CO2 35* 34* 32 36*   < > 34*   BUN 33* 38* 38* 39*   < > 43*   CR 1.24* 1.54* 1.48* 1.51*   < > 1.23*   ANIONGAP 5 6 6 5   < > 6   ESTEFANÍA 8.7 9.3 9.0 9.6   < > 9.3   GLC 77 125* 79 84   < > 86    < > = values in this interval not displayed.     Medications     bumetanide 1.5 mg/hr (01/01/21 0251)     DOBUTamine 2.5 mcg/kg/min (01/01/21 0251)     - MEDICATION INSTRUCTIONS -       - MEDICATION INSTRUCTIONS -       - MEDICATION INSTRUCTIONS -       treprostinil (REMODULIN) intravenous infusion (LESS than or EQUAL to 100 mcg/mL) 30 ng/kg/min (01/01/21 0251)     Warfarin Therapy Reminder         aspirin  81 mg Oral QPM     beclomethasone HFA  2 puff Inhalation BID     calcium citrate and vitamin D  2 tablet Oral Daily     cetirizine  10 mg Oral QPM     digoxin  62.5 mcg Oral Daily     gabapentin  300 mg Oral TID     hydroxychloroquine  200 mg Oral Daily     ipratropium  2 spray Both Nostrils TID     macitentan  10 mg Oral Daily     polyethylene glycol  17 g Oral Daily     potassium chloride  20 mEq Intravenous Q1H     potassium chloride  40 mEq Oral Once     potassium chloride  40 mEq Oral Once     potassium chloride  40 mEq Oral Daily     riociguat  1 mg Oral TID      senna-docusate  1 tablet Oral BID    Or     senna-docusate  2 tablet Oral BID     sodium chloride (PF)  3 mL Intracatheter Q8H     vitamin C  500 mg Oral QAM

## 2021-01-01 NOTE — PLAN OF CARE
"Time of care 1900 - 2300  Neuro:  AOx4  Cardiac:  Sinus rhythm. BP WNL.  Respiratory: 4lpm nc.   GI/: Voiding without difficulty. BM+ this shift.   Diet/appetite:   Activity: SBA. Up independently to commode in room.   Pain: Pain at PICC insertion site managed with cold and hot packs.   Skin: No new changes noted. Compression stockings in place. Wrap present on right leg.   Lines:   -Goldberg infusing with remodulin at 30 ng/kg/min  -PIV infusing with bumex at 1.5 mg/hr  -PICC infusing with dobutamine at 2.5 mcg/kg/min  PRN: Imodium x1    /60 (BP Location: Left leg)   Pulse 86   Temp 97.3  F (36.3  C) (Oral)   Resp 20   Ht 1.473 m (4' 10\")   Wt 55.8 kg (123 lb)   SpO2 91%   BMI 25.71 kg/m      "

## 2021-01-02 ENCOUNTER — APPOINTMENT (OUTPATIENT)
Dept: GENERAL RADIOLOGY | Facility: CLINIC | Age: 81
DRG: 252 | End: 2021-01-02
Attending: STUDENT IN AN ORGANIZED HEALTH CARE EDUCATION/TRAINING PROGRAM
Payer: COMMERCIAL

## 2021-01-02 LAB
ANION GAP SERPL CALCULATED.3IONS-SCNC: 4 MMOL/L (ref 3–14)
ANION GAP SERPL CALCULATED.3IONS-SCNC: 6 MMOL/L (ref 3–14)
BUN SERPL-MCNC: 26 MG/DL (ref 7–30)
BUN SERPL-MCNC: 32 MG/DL (ref 7–30)
CALCIUM SERPL-MCNC: 8.9 MG/DL (ref 8.5–10.1)
CALCIUM SERPL-MCNC: 9 MG/DL (ref 8.5–10.1)
CHLORIDE SERPL-SCNC: 100 MMOL/L (ref 94–109)
CHLORIDE SERPL-SCNC: 96 MMOL/L (ref 94–109)
CO2 SERPL-SCNC: 35 MMOL/L (ref 20–32)
CO2 SERPL-SCNC: 35 MMOL/L (ref 20–32)
CREAT SERPL-MCNC: 1.04 MG/DL (ref 0.52–1.04)
CREAT SERPL-MCNC: 1.22 MG/DL (ref 0.52–1.04)
DIGOXIN SERPL-MCNC: 0.9 UG/L (ref 0.5–2)
GFR SERPL CREATININE-BSD FRML MDRD: 42 ML/MIN/{1.73_M2}
GFR SERPL CREATININE-BSD FRML MDRD: 51 ML/MIN/{1.73_M2}
GLUCOSE SERPL-MCNC: 126 MG/DL (ref 70–99)
GLUCOSE SERPL-MCNC: 81 MG/DL (ref 70–99)
INR PPP: 1.8 (ref 0.86–1.14)
MAGNESIUM SERPL-MCNC: 1.8 MG/DL (ref 1.6–2.3)
MAGNESIUM SERPL-MCNC: 1.9 MG/DL (ref 1.6–2.3)
POTASSIUM SERPL-SCNC: 3.1 MMOL/L (ref 3.4–5.3)
POTASSIUM SERPL-SCNC: 3.9 MMOL/L (ref 3.4–5.3)
SODIUM SERPL-SCNC: 137 MMOL/L (ref 133–144)
SODIUM SERPL-SCNC: 139 MMOL/L (ref 133–144)

## 2021-01-02 PROCEDURE — 80048 BASIC METABOLIC PNL TOTAL CA: CPT | Performed by: STUDENT IN AN ORGANIZED HEALTH CARE EDUCATION/TRAINING PROGRAM

## 2021-01-02 PROCEDURE — 80162 ASSAY OF DIGOXIN TOTAL: CPT | Performed by: INTERNAL MEDICINE

## 2021-01-02 PROCEDURE — 214N000001 HC R&B CCU UMMC

## 2021-01-02 PROCEDURE — 250N000013 HC RX MED GY IP 250 OP 250 PS 637: Performed by: STUDENT IN AN ORGANIZED HEALTH CARE EDUCATION/TRAINING PROGRAM

## 2021-01-02 PROCEDURE — 74018 RADEX ABDOMEN 1 VIEW: CPT | Mod: 26 | Performed by: RADIOLOGY

## 2021-01-02 PROCEDURE — 83735 ASSAY OF MAGNESIUM: CPT | Performed by: INTERNAL MEDICINE

## 2021-01-02 PROCEDURE — 80048 BASIC METABOLIC PNL TOTAL CA: CPT | Performed by: INTERNAL MEDICINE

## 2021-01-02 PROCEDURE — 85610 PROTHROMBIN TIME: CPT | Performed by: INTERNAL MEDICINE

## 2021-01-02 PROCEDURE — 74018 RADEX ABDOMEN 1 VIEW: CPT

## 2021-01-02 PROCEDURE — 99233 SBSQ HOSP IP/OBS HIGH 50: CPT | Mod: GC | Performed by: INTERNAL MEDICINE

## 2021-01-02 PROCEDURE — 83735 ASSAY OF MAGNESIUM: CPT | Performed by: STUDENT IN AN ORGANIZED HEALTH CARE EDUCATION/TRAINING PROGRAM

## 2021-01-02 PROCEDURE — 250N000009 HC RX 250: Performed by: STUDENT IN AN ORGANIZED HEALTH CARE EDUCATION/TRAINING PROGRAM

## 2021-01-02 PROCEDURE — 250N000011 HC RX IP 250 OP 636: Performed by: INTERNAL MEDICINE

## 2021-01-02 PROCEDURE — 272N000004 HC RX 272: Performed by: INTERNAL MEDICINE

## 2021-01-02 PROCEDURE — 250N000013 HC RX MED GY IP 250 OP 250 PS 637: Performed by: INTERNAL MEDICINE

## 2021-01-02 RX ORDER — LOPERAMIDE HCL 2 MG
2 CAPSULE ORAL 2 TIMES DAILY
Status: DISCONTINUED | OUTPATIENT
Start: 2021-01-02 | End: 2021-01-02

## 2021-01-02 RX ORDER — POLYETHYLENE GLYCOL 3350 17 G/17G
17 POWDER, FOR SOLUTION ORAL DAILY
Status: DISCONTINUED | OUTPATIENT
Start: 2021-01-02 | End: 2021-01-14 | Stop reason: HOSPADM

## 2021-01-02 RX ORDER — LACTULOSE 10 G/15ML
10 SOLUTION ORAL ONCE
Status: COMPLETED | OUTPATIENT
Start: 2021-01-02 | End: 2021-01-02

## 2021-01-02 RX ORDER — WARFARIN SODIUM 4 MG/1
4 TABLET ORAL
Status: COMPLETED | OUTPATIENT
Start: 2021-01-02 | End: 2021-01-02

## 2021-01-02 RX ORDER — POTASSIUM CHLORIDE 750 MG/1
20 TABLET, EXTENDED RELEASE ORAL ONCE
Status: COMPLETED | OUTPATIENT
Start: 2021-01-02 | End: 2021-01-02

## 2021-01-02 RX ORDER — LOPERAMIDE HCL 2 MG
2 CAPSULE ORAL 2 TIMES DAILY PRN
Status: DISCONTINUED | OUTPATIENT
Start: 2021-01-02 | End: 2021-01-02

## 2021-01-02 RX ADMIN — RIOCIGUAT 1.5 MG: 0.5 TABLET, FILM COATED ORAL at 19:53

## 2021-01-02 RX ADMIN — POTASSIUM CHLORIDE 40 MEQ: 750 TABLET, EXTENDED RELEASE ORAL at 19:54

## 2021-01-02 RX ADMIN — GABAPENTIN 300 MG: 300 CAPSULE ORAL at 19:55

## 2021-01-02 RX ADMIN — DOCUSATE SODIUM AND SENNOSIDES 1 TABLET: 8.6; 5 TABLET, FILM COATED ORAL at 19:55

## 2021-01-02 RX ADMIN — TREPROSTINIL 30 NG/KG/MIN: 20 INJECTION, SOLUTION INTRAVENOUS; SUBCUTANEOUS at 22:48

## 2021-01-02 RX ADMIN — ASPIRIN 81 MG CHEWABLE TABLET 81 MG: 81 TABLET CHEWABLE at 19:54

## 2021-01-02 RX ADMIN — ACETAMINOPHEN 650 MG: 325 TABLET, FILM COATED ORAL at 00:41

## 2021-01-02 RX ADMIN — RIOCIGUAT 1.5 MG: 0.5 TABLET, FILM COATED ORAL at 14:27

## 2021-01-02 RX ADMIN — DIGOXIN 62.5 MCG: 0.06 TABLET ORAL at 10:15

## 2021-01-02 RX ADMIN — POTASSIUM CHLORIDE 40 MEQ: 750 TABLET, EXTENDED RELEASE ORAL at 07:59

## 2021-01-02 RX ADMIN — Medication 2 TABLET: at 10:14

## 2021-01-02 RX ADMIN — RIOCIGUAT 1 MG: 1 TABLET, FILM COATED ORAL at 08:06

## 2021-01-02 RX ADMIN — IPRATROPIUM BROMIDE 2 SPRAY: 42 SPRAY NASAL at 14:28

## 2021-01-02 RX ADMIN — WARFARIN SODIUM 4 MG: 4 TABLET ORAL at 17:57

## 2021-01-02 RX ADMIN — HYDROXYCHLOROQUINE SULFATE 200 MG: 200 TABLET, FILM COATED ORAL at 07:59

## 2021-01-02 RX ADMIN — BUMETANIDE 1.5 MG/HR: 0.25 INJECTION INTRAMUSCULAR; INTRAVENOUS at 06:50

## 2021-01-02 RX ADMIN — IPRATROPIUM BROMIDE 2 SPRAY: 42 SPRAY NASAL at 19:52

## 2021-01-02 RX ADMIN — CETIRIZINE HYDROCHLORIDE 10 MG: 10 TABLET, FILM COATED ORAL at 19:55

## 2021-01-02 RX ADMIN — OXYCODONE HYDROCHLORIDE AND ACETAMINOPHEN 500 MG: 500 TABLET ORAL at 07:59

## 2021-01-02 RX ADMIN — POLYETHYLENE GLYCOL 3350 17 G: 17 POWDER, FOR SOLUTION ORAL at 14:28

## 2021-01-02 RX ADMIN — POTASSIUM CHLORIDE 20 MEQ: 750 TABLET, EXTENDED RELEASE ORAL at 19:54

## 2021-01-02 RX ADMIN — GABAPENTIN 300 MG: 300 CAPSULE ORAL at 07:59

## 2021-01-02 RX ADMIN — LOPERAMIDE HYDROCHLORIDE 2 MG: 2 CAPSULE ORAL at 10:15

## 2021-01-02 RX ADMIN — BUMETANIDE 1.5 MG/HR: 0.25 INJECTION INTRAMUSCULAR; INTRAVENOUS at 22:50

## 2021-01-02 RX ADMIN — LACTULOSE 10 G: 20 SOLUTION ORAL at 14:27

## 2021-01-02 RX ADMIN — IPRATROPIUM BROMIDE 2 SPRAY: 42 SPRAY NASAL at 08:00

## 2021-01-02 RX ADMIN — GABAPENTIN 300 MG: 300 CAPSULE ORAL at 14:27

## 2021-01-02 ASSESSMENT — ACTIVITIES OF DAILY LIVING (ADL)
ADLS_ACUITY_SCORE: 13

## 2021-01-02 ASSESSMENT — MIFFLIN-ST. JEOR: SCORE: 919.96

## 2021-01-02 NOTE — PLAN OF CARE
D: Pt admitted on 12/23/2020 for HF exacerbation. 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   Running:  - Dobutamine @ 2.5 mcg/kg/min  - Bumex @ 1.5 mg/hr  - Remodulin @ 30 ng/kg/min  PRN: Imodium, Tylenol, Simethicone  Tele: NSR  O2: 4L NC  Mobility: up ad jamie     A: A0x4. VSS. Afebrile. RIVERA. LE edema. Lymph wraps taken off overnight d/t getting soiled. Urinating adequately. Soft/loose stool. LBM 1/1. C/o abdominal fullness/discomfort causing pt to feel SOB. Denies pain. Able to make needs known. PCU collect.     P: Continue to monitor Pt status and report changes to Cards 2.

## 2021-01-02 NOTE — PROGRESS NOTES
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 Responds well to bumex drip and dobutamine drip with decrease in creatinine. Discontinue Opsumit. Continue gentle diuresis. Goal weight 115 lbs.    Plan today  - Adempas 1mg TID-->1.5 mg TID  - Abdominal xray to assess stool burden   - Continue bumex and dobutamine gtts    #Acute on chronic right-sided heart failure  Progressive worsening of right heart function. This is her second admission; previous admission in 10/2020.  This admission presented with increasing SOB, dizziness, and weight gain(dry weight ~112 lbs) for the last couple of weeks, increased RIVERA. Has RLE swelling but US DVT negative (12/23/2020). Patient was hypervolemic with IVC diameter 2.8 cm with no respiratory variation on admission.     Continues to respond well to Bumex drip with ~2L net total output. Weight 122-->121 lbs. However, still hypervolemic & hypoxic. From RHC with PCWP 17, likely side effect from opsumit. Will continue dobutamine to support her output and slowly diurese with Bumex drip. Goal weight ~ 115 lbs. And consider balloon angioplasty when clinical status is stabilized.    RHC (09/2020): RA 11/6/5 RV 90/8 PA 90/20/42 PCWP --/--/11 LUIZ 2.55/1.76 TD 3/2.07 PVR 10 (TD) (Moderately elevated pulmonary artery hypertension, Right sided and Left sided filling pressures are normal)  RHC 12/23/2020: RA 19/24/22 RV 80/21 PA 80/26/42 PCWP 17 LUIZ 3/2.04 TD 3.07/2.09 PVR 8(severely elevated right-sided filling pressure and pulmonary hypertension, reduced cardiac output)  Last TTE (09/2020): Normal LVEF  60-65%. Moderately dilated and reduced function of RV. Right ventricular systolic pressure is 82mmHg above the right atrial pressure.    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 Follow-up with , outpatient plan is to consider balloon angioplasty.   - Discontinued Opsumit on 01/01 as above. Will consider restarting on 01/03 if fluid status continues to improve   - Increase dose of Adempas from 1 mg TID to 1.5 mg TID   - Continue Remodulin 30 ng/kg/min  - Continue Warfarin. Dosed by pharmacy. Target INR 2-2.5.    #Acute on chronic respiratory failure with hypoxia  Likely from pulmonary edema.    #JOSE G on CKD   Creatinine downtrending 1.5-->1.2 after started on dobutamine and continued with bumex drip.  - continue diuresis  - trend BMP, Mg    #Hypokalemia  - KCL 40 mEq BID   - trend and replace prn    #Cellulitis RLE, improved  LE edema R>L on initial presentation with some tenderness. LE ultrasound 12/23 showed no DVT. Improved after 24 hours of given Clindamycin.   - completed Clindamycin    #Frequent loose stool, improved  #Bloated abdomen  Having frequent loose bowel movement without abdominal pain, fever. Loose stool has improved with imodium. However still has bloated abdomen without N/V; good bowel sound, tympanic on percussion without tenderness on exams.   - Abdominal xray to assess stool burden. It's possible that she's actually constipated and having overflow diarrhea.  - Imodium, simethicone prn  - contiue to monitor    #Asthma  #Allergic rhinitis  No acute concerns.  - Continue  "beclomethasone BID and albuterol inhaler PRN  - Continue cetirizine and ipratropium nasal spray    #RA  No acute concerns.  - Continue Hydroxychloroquine 200 mg daily    Diet: Na restriction diet  DVT Prophylaxis: Warfarin  Dominguez Catheter: not present  Code Status: FULL code        Disposition Plan   Expected discharge: 4 - 7 days, recommended to prior living arrangement once fluid status and medications for CTEPH are optimized and respiratory status and oxygen requirement are improved.    The patient's care was discussed with the Attending Physician, Dr. Turcios.    Sarah Wilkerson MD  Internal Medicine PGY-3  Pager: [phone]  ______________________________________________________________________    Interval History   - No acute events overnight  - Main complaint this AM is abdominal discomfort, which makes it difficult for her to take deep breaths. Hesitates to call it pain per se. Last BM was yesterday PM, states it was loose but not watery. She reports that stools prior to admission alternated between \"hard sarkis\" and loose/watery prior to admission.   - Breathing is about the same as it was yesterday. Denies chest pain, nausea or lightheadedness.     Data reviewed today: I reviewed all medications, new labs and imaging results over the last 24 hours and discussed as pertinent in assessment and plan.    Physical Exam   Vital Signs: Temp: 98.5  F (36.9  C) Temp src: Oral BP: 128/51 Pulse: 89   Resp: 16 SpO2: 91 % O2 Device: Nasal cannula Oxygen Delivery: 4 LPM  Weight: 121 lbs 4.8 oz     Constitutional: awake, alert, cooperative, looks fatigue, and mildly dyspneic while talking  Neck: JVD 3/4 right neck   Respiratory: No increased work of breathing, crackles at both lower lungs, no wheezing  Cardiovascular: Regular rate and rhythm, normal S1S2, and no murmur noted  GI: Normal bowel sounds, moderately distended but soft, non-tender  Musculoskeletal: 2+ lower extremity pitting edema present R>L. Erythema, " warmth, and tenderness on RLE below knee.  Neurologic: Awake, alert. No focal neurological deficit.    Data   Recent Labs   Lab 01/02/21  0515 01/01/21  1710 01/01/21  1130 01/01/21  0610 12/31/20  0441 12/31/20  0441 12/27/20  0600 12/27/20  0600   WBC  --   --   --   --   --  3.9*  --  5.1   HGB  --   --   --   --   --  8.3*  --  9.0*   MCV  --   --   --   --   --  91  --  92   PLT  --   --   --   --   --  143*  --  141*   INR 1.80*  --   --  1.85*  --  1.96*   < > 2.38*    138 138 138   < > 133   < > 136   POTASSIUM 3.9 3.7 3.9 2.7*   < > 4.9   < > 3.2*   CHLORIDE 100 97 97 97   < > 95   < > 96   CO2 35* 34* 35* 35*   < > 32   < > 34*   BUN 32* 30 31* 33*   < > 38*   < > 43*   CR 1.22* 1.04 1.18* 1.24*   < > 1.48*   < > 1.23*   ANIONGAP 4 7 6 5   < > 6   < > 6   ESTEFANÍA 8.9 9.2 9.0 8.7   < > 9.0   < > 9.3   GLC 81 107* 132* 77   < > 79   < > 86    < > = values in this interval not displayed.     Medications     bumetanide 1.5 mg/hr (01/02/21 0650)     DOBUTamine 2.5 mcg/kg/min (01/01/21 2030)     - MEDICATION INSTRUCTIONS -       - MEDICATION INSTRUCTIONS -       - MEDICATION INSTRUCTIONS -       treprostinil (REMODULIN) intravenous infusion (LESS than or EQUAL to 100 mcg/mL) 30 ng/kg/min (01/01/21 2031)     Warfarin Therapy Reminder         aspirin  81 mg Oral QPM     beclomethasone HFA  2 puff Inhalation BID     calcium citrate and vitamin D  2 tablet Oral Daily     cetirizine  10 mg Oral QPM     digoxin  62.5 mcg Oral Daily     gabapentin  300 mg Oral TID     hydroxychloroquine  200 mg Oral Daily     ipratropium  2 spray Both Nostrils TID     polyethylene glycol  17 g Oral Daily     potassium chloride  40 mEq Oral Once     potassium chloride  40 mEq Oral BID     riociguat  1 mg Oral TID     senna-docusate  1 tablet Oral BID    Or     senna-docusate  2 tablet Oral BID     sodium chloride (PF)  3 mL Intracatheter Q8H     vitamin C  500 mg Oral QAM

## 2021-01-03 LAB
ANION GAP SERPL CALCULATED.3IONS-SCNC: 5 MMOL/L (ref 3–14)
ANION GAP SERPL CALCULATED.3IONS-SCNC: 5 MMOL/L (ref 3–14)
BASE EXCESS BLDV CALC-SCNC: 10.8 MMOL/L
BUN SERPL-MCNC: 25 MG/DL (ref 7–30)
BUN SERPL-MCNC: 26 MG/DL (ref 7–30)
CALCIUM SERPL-MCNC: 8.9 MG/DL (ref 8.5–10.1)
CALCIUM SERPL-MCNC: 9 MG/DL (ref 8.5–10.1)
CHLORIDE SERPL-SCNC: 96 MMOL/L (ref 94–109)
CHLORIDE SERPL-SCNC: 97 MMOL/L (ref 94–109)
CO2 SERPL-SCNC: 35 MMOL/L (ref 20–32)
CO2 SERPL-SCNC: 35 MMOL/L (ref 20–32)
CREAT SERPL-MCNC: 1.07 MG/DL (ref 0.52–1.04)
CREAT SERPL-MCNC: 1.09 MG/DL (ref 0.52–1.04)
GFR SERPL CREATININE-BSD FRML MDRD: 48 ML/MIN/{1.73_M2}
GFR SERPL CREATININE-BSD FRML MDRD: 50 ML/MIN/{1.73_M2}
GLUCOSE SERPL-MCNC: 112 MG/DL (ref 70–99)
GLUCOSE SERPL-MCNC: 83 MG/DL (ref 70–99)
HCO3 BLDV-SCNC: 36 MMOL/L (ref 21–28)
INR PPP: 1.82 (ref 0.86–1.14)
MAGNESIUM SERPL-MCNC: 1.9 MG/DL (ref 1.6–2.3)
MAGNESIUM SERPL-MCNC: 1.9 MG/DL (ref 1.6–2.3)
O2/TOTAL GAS SETTING VFR VENT: ABNORMAL %
OXYHGB MFR BLDV: 56 %
PCO2 BLDV: 49 MM HG (ref 40–50)
PH BLDV: 7.48 PH (ref 7.32–7.43)
PO2 BLDV: 30 MM HG (ref 25–47)
POTASSIUM SERPL-SCNC: 3.5 MMOL/L (ref 3.4–5.3)
POTASSIUM SERPL-SCNC: 3.6 MMOL/L (ref 3.4–5.3)
POTASSIUM SERPL-SCNC: 3.8 MMOL/L (ref 3.4–5.3)
SODIUM SERPL-SCNC: 135 MMOL/L (ref 133–144)
SODIUM SERPL-SCNC: 136 MMOL/L (ref 133–144)

## 2021-01-03 PROCEDURE — 250N000013 HC RX MED GY IP 250 OP 250 PS 637: Performed by: STUDENT IN AN ORGANIZED HEALTH CARE EDUCATION/TRAINING PROGRAM

## 2021-01-03 PROCEDURE — 82805 BLOOD GASES W/O2 SATURATION: CPT | Performed by: INTERNAL MEDICINE

## 2021-01-03 PROCEDURE — 83735 ASSAY OF MAGNESIUM: CPT | Performed by: INTERNAL MEDICINE

## 2021-01-03 PROCEDURE — 99233 SBSQ HOSP IP/OBS HIGH 50: CPT | Mod: GC | Performed by: INTERNAL MEDICINE

## 2021-01-03 PROCEDURE — 83735 ASSAY OF MAGNESIUM: CPT | Performed by: STUDENT IN AN ORGANIZED HEALTH CARE EDUCATION/TRAINING PROGRAM

## 2021-01-03 PROCEDURE — 85610 PROTHROMBIN TIME: CPT | Performed by: INTERNAL MEDICINE

## 2021-01-03 PROCEDURE — 250N000009 HC RX 250: Performed by: STUDENT IN AN ORGANIZED HEALTH CARE EDUCATION/TRAINING PROGRAM

## 2021-01-03 PROCEDURE — 250N000011 HC RX IP 250 OP 636: Performed by: INTERNAL MEDICINE

## 2021-01-03 PROCEDURE — 250N000011 HC RX IP 250 OP 636: Performed by: STUDENT IN AN ORGANIZED HEALTH CARE EDUCATION/TRAINING PROGRAM

## 2021-01-03 PROCEDURE — 250N000013 HC RX MED GY IP 250 OP 250 PS 637: Performed by: INTERNAL MEDICINE

## 2021-01-03 PROCEDURE — 80048 BASIC METABOLIC PNL TOTAL CA: CPT | Performed by: STUDENT IN AN ORGANIZED HEALTH CARE EDUCATION/TRAINING PROGRAM

## 2021-01-03 PROCEDURE — 84132 ASSAY OF SERUM POTASSIUM: CPT | Performed by: INTERNAL MEDICINE

## 2021-01-03 PROCEDURE — 80048 BASIC METABOLIC PNL TOTAL CA: CPT | Performed by: INTERNAL MEDICINE

## 2021-01-03 PROCEDURE — 214N000001 HC R&B CCU UMMC

## 2021-01-03 RX ORDER — POTASSIUM CHLORIDE 750 MG/1
10 TABLET, EXTENDED RELEASE ORAL ONCE
Status: DISCONTINUED | OUTPATIENT
Start: 2021-01-03 | End: 2021-01-03

## 2021-01-03 RX ORDER — HYDROCORTISONE 2.5 %
CREAM (GRAM) TOPICAL 4 TIMES DAILY PRN
Status: DISCONTINUED | OUTPATIENT
Start: 2021-01-03 | End: 2021-01-03 | Stop reason: CLARIF

## 2021-01-03 RX ORDER — POTASSIUM CHLORIDE 750 MG/1
20 TABLET, EXTENDED RELEASE ORAL ONCE
Status: COMPLETED | OUTPATIENT
Start: 2021-01-03 | End: 2021-01-03

## 2021-01-03 RX ORDER — HYDROCORTISONE 25 MG/G
CREAM TOPICAL 4 TIMES DAILY PRN
Status: DISCONTINUED | OUTPATIENT
Start: 2021-01-03 | End: 2021-01-14 | Stop reason: HOSPADM

## 2021-01-03 RX ORDER — MAGNESIUM SULFATE HEPTAHYDRATE 40 MG/ML
2 INJECTION, SOLUTION INTRAVENOUS ONCE
Status: COMPLETED | OUTPATIENT
Start: 2021-01-03 | End: 2021-01-03

## 2021-01-03 RX ORDER — LACTULOSE 10 G/15ML
10 SOLUTION ORAL 2 TIMES DAILY
Status: DISCONTINUED | OUTPATIENT
Start: 2021-01-03 | End: 2021-01-03

## 2021-01-03 RX ORDER — DIBUCAINE 1 G/100G
OINTMENT TOPICAL 3 TIMES DAILY PRN
Status: DISCONTINUED | OUTPATIENT
Start: 2021-01-03 | End: 2021-01-03

## 2021-01-03 RX ORDER — LACTULOSE 10 G/15ML
10 SOLUTION ORAL 2 TIMES DAILY
Status: DISCONTINUED | OUTPATIENT
Start: 2021-01-03 | End: 2021-01-04

## 2021-01-03 RX ORDER — POTASSIUM CHLORIDE 750 MG/1
10 TABLET, EXTENDED RELEASE ORAL ONCE
Status: COMPLETED | OUTPATIENT
Start: 2021-01-03 | End: 2021-01-03

## 2021-01-03 RX ADMIN — IPRATROPIUM BROMIDE 2 SPRAY: 42 SPRAY NASAL at 09:13

## 2021-01-03 RX ADMIN — LACTULOSE 10 G: 20 SOLUTION ORAL at 11:57

## 2021-01-03 RX ADMIN — RIOCIGUAT 1.5 MG: 0.5 TABLET, FILM COATED ORAL at 14:38

## 2021-01-03 RX ADMIN — IPRATROPIUM BROMIDE 2 SPRAY: 42 SPRAY NASAL at 20:00

## 2021-01-03 RX ADMIN — OXYCODONE HYDROCHLORIDE AND ACETAMINOPHEN 500 MG: 500 TABLET ORAL at 09:12

## 2021-01-03 RX ADMIN — MACITENTAN 10 MG: 10 TABLET, FILM COATED ORAL at 11:58

## 2021-01-03 RX ADMIN — WARFARIN SODIUM 4.5 MG: 2.5 TABLET ORAL at 17:20

## 2021-01-03 RX ADMIN — MAGNESIUM SULFATE IN WATER 2 G: 40 INJECTION, SOLUTION INTRAVENOUS at 19:19

## 2021-01-03 RX ADMIN — ACETAMINOPHEN 325 MG: 325 TABLET, FILM COATED ORAL at 12:23

## 2021-01-03 RX ADMIN — LACTULOSE 10 G: 20 SOLUTION ORAL at 20:02

## 2021-01-03 RX ADMIN — DIGOXIN 62.5 MCG: 0.06 TABLET ORAL at 09:13

## 2021-01-03 RX ADMIN — GABAPENTIN 300 MG: 300 CAPSULE ORAL at 20:01

## 2021-01-03 RX ADMIN — GABAPENTIN 300 MG: 300 CAPSULE ORAL at 09:12

## 2021-01-03 RX ADMIN — POLYETHYLENE GLYCOL 3350 17 G: 17 POWDER, FOR SOLUTION ORAL at 09:14

## 2021-01-03 RX ADMIN — Medication 2 TABLET: at 09:12

## 2021-01-03 RX ADMIN — POTASSIUM CHLORIDE 40 MEQ: 750 TABLET, EXTENDED RELEASE ORAL at 20:01

## 2021-01-03 RX ADMIN — HYDROXYCHLOROQUINE SULFATE 200 MG: 200 TABLET, FILM COATED ORAL at 09:12

## 2021-01-03 RX ADMIN — ASPIRIN 81 MG CHEWABLE TABLET 81 MG: 81 TABLET CHEWABLE at 20:10

## 2021-01-03 RX ADMIN — CETIRIZINE HYDROCHLORIDE 10 MG: 10 TABLET, FILM COATED ORAL at 20:01

## 2021-01-03 RX ADMIN — BUMETANIDE 1.5 MG/HR: 0.25 INJECTION INTRAMUSCULAR; INTRAVENOUS at 17:09

## 2021-01-03 RX ADMIN — RIOCIGUAT 1.5 MG: 0.5 TABLET, FILM COATED ORAL at 20:01

## 2021-01-03 RX ADMIN — Medication 25 MG: at 14:38

## 2021-01-03 RX ADMIN — POTASSIUM CHLORIDE 40 MEQ: 750 TABLET, EXTENDED RELEASE ORAL at 09:13

## 2021-01-03 RX ADMIN — ACETAMINOPHEN 325 MG: 325 TABLET, FILM COATED ORAL at 00:01

## 2021-01-03 RX ADMIN — HYDROCORTISONE: 25 CREAM TOPICAL at 04:39

## 2021-01-03 RX ADMIN — DOCUSATE SODIUM AND SENNOSIDES 1 TABLET: 8.6; 5 TABLET, FILM COATED ORAL at 20:02

## 2021-01-03 RX ADMIN — IPRATROPIUM BROMIDE 2 SPRAY: 42 SPRAY NASAL at 14:38

## 2021-01-03 RX ADMIN — POTASSIUM CHLORIDE 20 MEQ: 750 TABLET, EXTENDED RELEASE ORAL at 19:19

## 2021-01-03 RX ADMIN — DOCUSATE SODIUM AND SENNOSIDES 1 TABLET: 8.6; 5 TABLET, FILM COATED ORAL at 11:58

## 2021-01-03 RX ADMIN — RIOCIGUAT 1.5 MG: 0.5 TABLET, FILM COATED ORAL at 09:12

## 2021-01-03 RX ADMIN — HYDROCORTISONE: 25 CREAM TOPICAL at 20:00

## 2021-01-03 RX ADMIN — POTASSIUM CHLORIDE 10 MEQ: 750 TABLET, EXTENDED RELEASE ORAL at 09:13

## 2021-01-03 RX ADMIN — GABAPENTIN 300 MG: 300 CAPSULE ORAL at 14:38

## 2021-01-03 RX ADMIN — DOBUTAMINE HYDROCHLORIDE 2.5 MCG/KG/MIN: 200 INJECTION INTRAVENOUS at 04:11

## 2021-01-03 ASSESSMENT — ACTIVITIES OF DAILY LIVING (ADL)
ADLS_ACUITY_SCORE: 13

## 2021-01-03 ASSESSMENT — MIFFLIN-ST. JEOR: SCORE: 913.61

## 2021-01-03 NOTE — PROGRESS NOTES
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 Responds well to bumex drip and dobutamine drip with downtrending weight to 119 lbs(goal 115) and improvement of kidney function but still hypoxic. Continue gentle diuresis. Will restart opsumit     Plan 1/3/21  - continue adempas 1.5 TID  - restart Opsumit today and then every other day  - increase lactulose to BID  - continue bumex and dobutamine gtts    #Acute on chronic right-sided heart failure  Progressive worsening of right heart function. This is her second admission; previous admission in 10/2020.  This admission presented with increasing SOB, dizziness, and weight gain(dry weight ~112 lbs) for the last couple of weeks, increased RIVERA. Has RLE swelling but US DVT negative (12/23/2020). Patient was hypervolemic with IVC diameter 2.8 cm with no respiratory variation on admission.     Continues to respond well to Bumex drip with ~2L net total output. Weight downtrending from 121 --> 119 lbs. Goal weight ~ 115 lbs. However, still hypervolemic & hypoxic. From RHC with PCWP 17, likely side effect from opsumit. Will continue dobutamine to support her output and slowly diurese with Bumex drip. SVO2 about baseline. Restart Opsumit given improved fluid status. Consider balloon angioplasty when clinical status is stabilized.    RHC (09/2020): RA 11/6/5 RV 90/8 PA 90/20/42 PCWP --/--/11 LUIZ 2.55/1.76 TD 3/2.07 PVR 10 (TD) (Moderately elevated pulmonary artery hypertension, Right sided and Left sided filling pressures are normal)  RHC 12/23/2020: RA 19/24/22 RV 80/21 PA 80/26/42 PCWP 17  LUIZ 3/2.04 TD 3.07/2.09 PVR 8(severely elevated right-sided filling pressure and pulmonary hypertension, reduced cardiac output)  Last TTE (09/2020): Normal LVEF 60-65%. Moderately dilated and reduced function of RV. Right ventricular systolic pressure is 82mmHg above the right atrial pressure.    - restart opsumit on 01/04 given hypoxic respiratory failure  - continue dobutamine drip 2.5 mg/kg/min  - continue diuresis with Bumex drip  - continue Digoxin 62.5 mcg daily for RV support  - Try wean-off O2, keep SpO2 > 92%; currently on 4 LPM satting ~91-95%    #Pulmonary HTN secondary to CTEPH (WHO group IV) with severe RV dysfunction  #Subtherapeutic INR  High-risk for surgical PTE despite proximal disease. Follow-up with , outpatient plan is to consider balloon angioplasty.   - restart opsumit and then every other day  - continue Adempas 1.5 mg TID   - Continue Remodulin 30 ng/kg/min  - Continue Warfarin. Dosed by pharmacy. Target INR 2-2.5.    #Acute on chronic respiratory failure with hypoxia  Likely from pulmonary edema.  - continue to diurese and monitor    #JOSE G on CKD, improved  Creatinine downtrending to 1.09 after started on dobutamine and continued with bumex drip.  - continue diuresis  - trend BMP, Mg    #Hypokalemia  - KCL 40 mEq BID   - trend and replace prn    #Cellulitis RLE, improved  LE edema R>L on initial presentation with some tenderness. LE ultrasound 12/23 showed no DVT. Improved after 24 hours of given Clindamycin.   - completed Clindamycin    #Frequent loose stool, improved  #Bloated abdomen  Having frequent loose bowel movement without abdominal pain, fever. Loose stool has improved with imodium. However still has bloated abdomen without N/V; good bowel sound, tympanic on percussion without tenderness on exams.   - Imodium, simethicone prn  - continue lactulose, senna BID    #Asthma  #Allergic rhinitis  No acute concerns.  - Continue beclomethasone BID and albuterol inhaler PRN  -  Continue cetirizine and ipratropium nasal spray    #RA  No acute concerns.  - Continue Hydroxychloroquine 200 mg daily    Diet: Na restriction diet  DVT Prophylaxis: Warfarin  Dominguez Catheter: not present  Code Status: FULL code        Disposition Plan   Expected discharge: 4 - 7 days, recommended to prior living arrangement once fluid status and medications for CTEPH are optimized and respiratory status and oxygen requirement are improved.    The patient's care was discussed with the Attending Physician, Dr. Turcios.    Emmett Schultz MD  Internal Medicine, PGY-1  ______________________________________________________________________    Interval History   - One formed stool. Multiple small loose stools overnight.   - Still has some abdominal discomfort but no pain  - Breathing is about the same. Denies chest pain, nausea or lightheadedness.     Data reviewed today: I reviewed all medications, new labs and imaging results over the last 24 hours and discussed as pertinent in assessment and plan.    Physical Exam   Vital Signs: Temp: 98.6  F (37  C) Temp src: Oral BP: 104/47 Pulse: 88   Resp: 16 SpO2: 93 % O2 Device: Nasal cannula with humidification Oxygen Delivery: 4 LPM  Weight: 119 lbs 14.4 oz     Constitutional: awake, alert, cooperative, looks fatigue, and mildly dyspneic while talking  Neck: JVD 1/2 right neck   Respiratory: No increased work of breathing, crackles at both lower lungs, no wheezing  Cardiovascular: Regular rate and rhythm, normal S1S2, and no murmur noted  GI: Normal bowel sounds, moderately distended but soft, non-tender  Musculoskeletal: 2+ lower extremity pitting edema present R>L. Erythema, warmth, and tenderness on posterior RLE below knee.  Neurologic: Awake, alert. No focal neurological deficit.    Data   Recent Labs   Lab 01/03/21  0420 01/03/21  0000 01/02/21  1730 01/02/21  0515 01/01/21  0610 01/01/21  0610 12/31/20  0441 12/31/20  0441   WBC  --   --   --   --   --   --    --  3.9*   HGB  --   --   --   --   --   --   --  8.3*   MCV  --   --   --   --   --   --   --  91   PLT  --   --   --   --   --   --   --  143*   INR 1.82*  --   --  1.80*  --  1.85*  --  1.96*     --  137 139   < > 138   < > 133   POTASSIUM 3.5 3.8 3.1* 3.9   < > 2.7*   < > 4.9   CHLORIDE 96  --  96 100   < > 97   < > 95   CO2 35*  --  35* 35*   < > 35*   < > 32   BUN 26  --  26 32*   < > 33*   < > 38*   CR 1.09*  --  1.04 1.22*   < > 1.24*   < > 1.48*   ANIONGAP 5  --  6 4   < > 5   < > 6   ESTEFANÍA 8.9  --  9.0 8.9   < > 8.7   < > 9.0   GLC 83  --  126* 81   < > 77   < > 79    < > = values in this interval not displayed.     Medications     bumetanide 1.5 mg/hr (01/02/21 2250)     DOBUTamine 2.5 mcg/kg/min (01/03/21 8513)     - MEDICATION INSTRUCTIONS -       - MEDICATION INSTRUCTIONS -       - MEDICATION INSTRUCTIONS -       treprostinil (REMODULIN) intravenous infusion (LESS than or EQUAL to 100 mcg/mL) 30 ng/kg/min (01/02/21 9396)     Warfarin Therapy Reminder         aspirin  81 mg Oral QPM     beclomethasone HFA  2 puff Inhalation BID     calcium citrate and vitamin D  2 tablet Oral Daily     cetirizine  10 mg Oral QPM     digoxin  62.5 mcg Oral Daily     gabapentin  300 mg Oral TID     hydroxychloroquine  200 mg Oral Daily     ipratropium  2 spray Both Nostrils TID     polyethylene glycol  17 g Oral Daily     potassium chloride  10 mEq Oral Once     potassium chloride  40 mEq Oral BID     riociguat  1.5 mg Oral TID     senna-docusate  1 tablet Oral BID    Or     senna-docusate  2 tablet Oral BID     sodium chloride (PF)  3 mL Intracatheter Q8H     vitamin C  500 mg Oral QAM

## 2021-01-03 NOTE — PLAN OF CARE
Pt admitted 12/23 with acute on chronic RHF with acute hypoxia.  SR, VS'S on 4 L HNC with b/l feet pain and medicated with prn Tylenol.  Remodulin gtt continues at 30 ng/kg/min (1.69 ml/hr).  Dobutamine gtt continues at 2.5 mcg/kg/min (4.2 ml/hr).  Bumex gtt continues at 1.5 mg/hr (6 ml/hr).  Evening K 3.1 and replaced with 20 mEq of K along with scheduled dose.  Midnight K 3.8 (pt asleep) and was too late to give 10 mEq.  Awaiting am K results.  New prn Hemorrhoid cream given.  Several small loose stools overnight.  2+ LLE and 3+ RLE edema.  Otherwise, pt slept well and up SBA.  Continue to monitor and with POC.

## 2021-01-03 NOTE — PLAN OF CARE
D: Pt admitted on 12/23/2020 for HF exacerbation. Hx of  Chronic right heart failure, Moderate pHTN, JOSE G, rheumatoid arthritis, HTN , scoliosis, asthma, breast cancer s/p chemotherapy and left Mastectomy.    I: Monitored vitals and assessed pt status.   Running: Bumex 1.5 mg/hr, dobutamine 2.5 mcg/kg/min, and remodulin 30 ng/kg/min.    A: A0x4. VSS on 4-5 L. Afebrile. Urinating adequately. Pt having several very small loose stools and reporting abdominal pain. Xray showing small areas of constipation, pt was given miralax and lactulose.      P: Continue to monitor Pt status and report changes to cards 2.

## 2021-01-04 ENCOUNTER — APPOINTMENT (OUTPATIENT)
Dept: OCCUPATIONAL THERAPY | Facility: CLINIC | Age: 81
DRG: 252 | End: 2021-01-04
Attending: INTERNAL MEDICINE
Payer: COMMERCIAL

## 2021-01-04 ENCOUNTER — APPOINTMENT (OUTPATIENT)
Dept: GENERAL RADIOLOGY | Facility: CLINIC | Age: 81
DRG: 252 | End: 2021-01-04
Attending: INTERNAL MEDICINE
Payer: COMMERCIAL

## 2021-01-04 LAB
ANION GAP SERPL CALCULATED.3IONS-SCNC: 3 MMOL/L (ref 3–14)
ANION GAP SERPL CALCULATED.3IONS-SCNC: 5 MMOL/L (ref 3–14)
BUN SERPL-MCNC: 24 MG/DL (ref 7–30)
BUN SERPL-MCNC: 24 MG/DL (ref 7–30)
CALCIUM SERPL-MCNC: 8.9 MG/DL (ref 8.5–10.1)
CALCIUM SERPL-MCNC: 9.4 MG/DL (ref 8.5–10.1)
CHLORIDE SERPL-SCNC: 95 MMOL/L (ref 94–109)
CHLORIDE SERPL-SCNC: 98 MMOL/L (ref 94–109)
CO2 SERPL-SCNC: 34 MMOL/L (ref 20–32)
CO2 SERPL-SCNC: 36 MMOL/L (ref 20–32)
CREAT SERPL-MCNC: 1.08 MG/DL (ref 0.52–1.04)
CREAT SERPL-MCNC: 1.11 MG/DL (ref 0.52–1.04)
GFR SERPL CREATININE-BSD FRML MDRD: 47 ML/MIN/{1.73_M2}
GFR SERPL CREATININE-BSD FRML MDRD: 49 ML/MIN/{1.73_M2}
GLUCOSE SERPL-MCNC: 105 MG/DL (ref 70–99)
GLUCOSE SERPL-MCNC: 85 MG/DL (ref 70–99)
INR PPP: 2.02 (ref 0.86–1.14)
MAGNESIUM SERPL-MCNC: 2.5 MG/DL (ref 1.6–2.3)
MAGNESIUM SERPL-MCNC: 2.6 MG/DL (ref 1.6–2.3)
POTASSIUM SERPL-SCNC: 2.8 MMOL/L (ref 3.4–5.3)
POTASSIUM SERPL-SCNC: 3.8 MMOL/L (ref 3.4–5.3)
SODIUM SERPL-SCNC: 135 MMOL/L (ref 133–144)
SODIUM SERPL-SCNC: 137 MMOL/L (ref 133–144)

## 2021-01-04 PROCEDURE — 85610 PROTHROMBIN TIME: CPT | Performed by: INTERNAL MEDICINE

## 2021-01-04 PROCEDURE — 250N000013 HC RX MED GY IP 250 OP 250 PS 637: Performed by: INTERNAL MEDICINE

## 2021-01-04 PROCEDURE — 250N000013 HC RX MED GY IP 250 OP 250 PS 637: Performed by: STUDENT IN AN ORGANIZED HEALTH CARE EDUCATION/TRAINING PROGRAM

## 2021-01-04 PROCEDURE — 80048 BASIC METABOLIC PNL TOTAL CA: CPT | Performed by: STUDENT IN AN ORGANIZED HEALTH CARE EDUCATION/TRAINING PROGRAM

## 2021-01-04 PROCEDURE — 80048 BASIC METABOLIC PNL TOTAL CA: CPT | Performed by: INTERNAL MEDICINE

## 2021-01-04 PROCEDURE — 214N000001 HC R&B CCU UMMC

## 2021-01-04 PROCEDURE — 250N000011 HC RX IP 250 OP 636: Performed by: INTERNAL MEDICINE

## 2021-01-04 PROCEDURE — 83735 ASSAY OF MAGNESIUM: CPT | Performed by: INTERNAL MEDICINE

## 2021-01-04 PROCEDURE — 71045 X-RAY EXAM CHEST 1 VIEW: CPT

## 2021-01-04 PROCEDURE — 250N000011 HC RX IP 250 OP 636: Performed by: STUDENT IN AN ORGANIZED HEALTH CARE EDUCATION/TRAINING PROGRAM

## 2021-01-04 PROCEDURE — 99233 SBSQ HOSP IP/OBS HIGH 50: CPT | Mod: GC | Performed by: INTERNAL MEDICINE

## 2021-01-04 PROCEDURE — 250N000009 HC RX 250: Performed by: STUDENT IN AN ORGANIZED HEALTH CARE EDUCATION/TRAINING PROGRAM

## 2021-01-04 PROCEDURE — 71045 X-RAY EXAM CHEST 1 VIEW: CPT | Mod: 26 | Performed by: RADIOLOGY

## 2021-01-04 PROCEDURE — 97530 THERAPEUTIC ACTIVITIES: CPT | Mod: GO

## 2021-01-04 PROCEDURE — 272N000004 HC RX 272: Performed by: INTERNAL MEDICINE

## 2021-01-04 RX ORDER — POTASSIUM CHLORIDE 1500 MG/1
60 TABLET, EXTENDED RELEASE ORAL ONCE
Status: COMPLETED | OUTPATIENT
Start: 2021-01-05 | End: 2021-01-05

## 2021-01-04 RX ORDER — POTASSIUM CHLORIDE 750 MG/1
10 TABLET, EXTENDED RELEASE ORAL ONCE
Status: COMPLETED | OUTPATIENT
Start: 2021-01-04 | End: 2021-01-04

## 2021-01-04 RX ORDER — POTASSIUM CHLORIDE 750 MG/1
20 TABLET, EXTENDED RELEASE ORAL ONCE
Status: COMPLETED | OUTPATIENT
Start: 2021-01-04 | End: 2021-01-04

## 2021-01-04 RX ORDER — BISACODYL 10 MG
10 SUPPOSITORY, RECTAL RECTAL ONCE
Status: DISCONTINUED | OUTPATIENT
Start: 2021-01-04 | End: 2021-01-04

## 2021-01-04 RX ORDER — LACTULOSE 10 G/15ML
10 SOLUTION ORAL 2 TIMES DAILY PRN
Status: DISCONTINUED | OUTPATIENT
Start: 2021-01-04 | End: 2021-01-14 | Stop reason: HOSPADM

## 2021-01-04 RX ORDER — MAGNESIUM CARB/ALUMINUM HYDROX 105-160MG
296 TABLET,CHEWABLE ORAL ONCE
Status: COMPLETED | OUTPATIENT
Start: 2021-01-04 | End: 2021-01-04

## 2021-01-04 RX ADMIN — LACTULOSE 10 G: 20 SOLUTION ORAL at 08:34

## 2021-01-04 RX ADMIN — RIOCIGUAT 1.5 MG: 0.5 TABLET, FILM COATED ORAL at 08:35

## 2021-01-04 RX ADMIN — RIOCIGUAT 1.5 MG: 0.5 TABLET, FILM COATED ORAL at 20:46

## 2021-01-04 RX ADMIN — POLYETHYLENE GLYCOL 3350 17 G: 17 POWDER, FOR SOLUTION ORAL at 08:34

## 2021-01-04 RX ADMIN — TREPROSTINIL 30 NG/KG/MIN: 20 INJECTION, SOLUTION INTRAVENOUS; SUBCUTANEOUS at 03:27

## 2021-01-04 RX ADMIN — IPRATROPIUM BROMIDE 2 SPRAY: 42 SPRAY NASAL at 08:33

## 2021-01-04 RX ADMIN — ASPIRIN 81 MG CHEWABLE TABLET 81 MG: 81 TABLET CHEWABLE at 20:35

## 2021-01-04 RX ADMIN — MAGNESIUM CITRATE 296 ML: 1.75 LIQUID ORAL at 13:43

## 2021-01-04 RX ADMIN — OXYCODONE HYDROCHLORIDE AND ACETAMINOPHEN 500 MG: 500 TABLET ORAL at 08:34

## 2021-01-04 RX ADMIN — DOCUSATE SODIUM AND SENNOSIDES 2 TABLET: 8.6; 5 TABLET, FILM COATED ORAL at 20:35

## 2021-01-04 RX ADMIN — CETIRIZINE HYDROCHLORIDE 10 MG: 10 TABLET, FILM COATED ORAL at 20:36

## 2021-01-04 RX ADMIN — POTASSIUM CHLORIDE 40 MEQ: 750 TABLET, EXTENDED RELEASE ORAL at 08:34

## 2021-01-04 RX ADMIN — GABAPENTIN 300 MG: 300 CAPSULE ORAL at 13:43

## 2021-01-04 RX ADMIN — WARFARIN SODIUM 4.5 MG: 2.5 TABLET ORAL at 17:37

## 2021-01-04 RX ADMIN — DOCUSATE SODIUM 286 ML: 50 LIQUID ORAL at 17:37

## 2021-01-04 RX ADMIN — RIOCIGUAT 1.5 MG: 0.5 TABLET, FILM COATED ORAL at 13:43

## 2021-01-04 RX ADMIN — GABAPENTIN 300 MG: 300 CAPSULE ORAL at 08:34

## 2021-01-04 RX ADMIN — POTASSIUM CHLORIDE 10 MEQ: 750 TABLET, EXTENDED RELEASE ORAL at 08:42

## 2021-01-04 RX ADMIN — POTASSIUM CHLORIDE 40 MEQ: 750 TABLET, EXTENDED RELEASE ORAL at 20:35

## 2021-01-04 RX ADMIN — DIGOXIN 62.5 MCG: 0.06 TABLET ORAL at 10:18

## 2021-01-04 RX ADMIN — Medication 2 TABLET: at 08:34

## 2021-01-04 RX ADMIN — BUMETANIDE 1.5 MG/HR: 0.25 INJECTION INTRAMUSCULAR; INTRAVENOUS at 06:59

## 2021-01-04 RX ADMIN — HYDROXYCHLOROQUINE SULFATE 200 MG: 200 TABLET, FILM COATED ORAL at 08:34

## 2021-01-04 RX ADMIN — DOCUSATE SODIUM AND SENNOSIDES 2 TABLET: 8.6; 5 TABLET, FILM COATED ORAL at 08:34

## 2021-01-04 RX ADMIN — CHLOROTHIAZIDE SODIUM 1000 MG: 500 INJECTION, POWDER, LYOPHILIZED, FOR SOLUTION INTRAVENOUS at 10:18

## 2021-01-04 RX ADMIN — GABAPENTIN 300 MG: 300 CAPSULE ORAL at 20:35

## 2021-01-04 RX ADMIN — POTASSIUM CHLORIDE 20 MEQ: 750 TABLET, EXTENDED RELEASE ORAL at 20:36

## 2021-01-04 RX ADMIN — Medication 25 MG: at 13:55

## 2021-01-04 RX ADMIN — BUMETANIDE 1.5 MG/HR: 0.25 INJECTION INTRAMUSCULAR; INTRAVENOUS at 22:08

## 2021-01-04 ASSESSMENT — ACTIVITIES OF DAILY LIVING (ADL)
ADLS_ACUITY_SCORE: 13

## 2021-01-04 ASSESSMENT — MIFFLIN-ST. JEOR
SCORE: 922.23
SCORE: 915.43

## 2021-01-04 NOTE — PLAN OF CARE
D- acute on chronic HF  I/A- NSR 70-90, long QT, 0-3 PAC. 4L O2 via NC. See MAR for drips. K+ 3.8, replaced. Mag citrate and pink lady enema given for stool burden, multiple medium loose/watery BMs. Pt still feels bloated despite multiple stools. Diuril 1000 mg iv given x1, difficulty measuring urine d/t stool and urine mixed with toileting. Tramadol more effective for BLE pain compared to Tylenol.   P- Continue to monitor and notify team of changes.

## 2021-01-04 NOTE — PLAN OF CARE
D- acute on chronic HF  I/A- VSS. NSR. A/O x 4. Mobility: SBA. Remodulin 30ng, Dobutamine 2.5, Bumex 1.5mg hr. Voiding adequately. Multple soft to loose BMs this shift. Complains of abdominal discomfort. Encouraged patient to increase mobility to help bowel motility. Able to make needs known.   P: Cards 2.  Encourage patient to walk. Monitor I/O, weight, wean off O2

## 2021-01-04 NOTE — PLAN OF CARE
D- acute on chronic HF  I/A- NSR, 4L O2 via NC. See MAR for drips. K+ 3.5, replaced. Evening K+ 3.6, Mag 1.9, will replaced per MD orders. Started on Lactulose BID, BM x4 this shift. Tramadol more effective for BLE pain compared to Tylenol.   P- Continue to monitor and notify team of changes.

## 2021-01-04 NOTE — PROGRESS NOTES
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 Continue to diurese with bumex and diuril, output support with dobutamine, and other supportive treatment.     Plan 1/4/21  - another dose of diuril 1 gm this AM  - continue Bumex 1.5 mg/hr, dobutamine 2.5 mcg/kg/min  - continue adempas 1.5 TID  - Opsumit every other day  - pink lady edema + Mg citrate to promote bowel movement  - increase lactulose to BID prn  - continue bumex and dobutamine gtts    #Acute on chronic right-sided heart failure  Progressive worsening of right heart function. This is her second admission; previous admission in 10/2020.  This admission presented with increasing SOB, dizziness, and weight gain to 126lbs (dry weight ~112 lbs) for the last couple of weeks, increased RIVERA. Has RLE swelling but US DVT negative (12/23/2020). Patient was hypervolemic with IVC diameter 2.8 cm with no respiratory variation on admission.     Net balanced volume urine output overnight (1/3-4) despite same dose bumex drip and dobutamine. Weight slightly up from 119 --> 120 lbs. Goal weight ~ 115 lbs. Still hypervolemic & hypoxic with CVP~18. Restarted Opsumit yesterday 1/3 with anticipation to continue q48h; will reevaluate tomorrow, if causing worsening fluid retention, will discontinue Opsumit. Current plan is to diurese with bumex drip with intermittent diuril and support output with dobutamine. Consider balloon angioplasty when clinical status is stabilized.    RHC (09/2020): RA 11/6/5 RV 90/8 PA 90/20/42 PCWP --/--/11 LUIZ 2.55/1.76 TD 3/2.07 PVR 10 (TD) (Moderately elevated  pulmonary artery hypertension, Right sided and Left sided filling pressures are normal)  [facility] 12/23/2020: RA 19/24/22 RV 80/21 PA 80/26/42 PCWP 17 LUIZ 3/2.04 TD 3.07/2.09 PVR 8(severely elevated right-sided filling pressure and pulmonary hypertension, reduced cardiac output)  Last TTE (09/2020): Normal LVEF 60-65%. Moderately dilated and reduced function of RV. Right ventricular systolic pressure is 82mmHg above the right atrial pressure.    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 Follow-up with , outpatient plan is to consider balloon angioplasty.   - restart opsumit and then every other day  - continue Adempas 1.5 mg TID   - Continue Remodulin 30 ng/kg/min  - Continue Warfarin. Dosed by pharmacy. Target INR 2-2.5.    #Acute on chronic respiratory failure with hypoxia  Unchanged. Likely from pulmonary edema.  - continue to diurese   - Try wean-off O2, keep SpO2 > 92%; currently on 4 LPM satting ~91-95%    #JOSE G on CKD, improved  Creatinine downtrending to 1.09 after started on dobutamine and continued with bumex drip.  - continue diuresis  - trend BMP, Mg    #Concern for fecal impaction: Moderate stool burden with bloated abdomen  Causing abdominal comfort which interferes with breathing.   - add Mg citrate and pink lady enema to promote bowel movement  - simethicone prn  - continue senna BID & lactulose BID prn    #Hypokalemia  - KCL 40 mEq BID   - trend and replace prn    #Cellulitis RLE, improved  LE edema R>L on initial presentation with some tenderness. LE ultrasound 12/23 showed no DVT. Improved after 24 hours of given Clindamycin.   - completed Clindamycin    #Asthma  #Allergic rhinitis  No acute  concerns.  - Continue beclomethasone BID and albuterol inhaler PRN  - Continue cetirizine and ipratropium nasal spray    #RA  No acute concerns.  - Continue Hydroxychloroquine 200 mg daily    Diet: Na restriction diet  DVT Prophylaxis: Warfarin  Dominguez Catheter: not present  Code Status: FULL code        Disposition Plan   Expected discharge: 4 - 7 days, recommended to prior living arrangement once fluid status and medications for CTEPH are optimized and respiratory status and oxygen requirement are improved.    The patient's care was discussed with the Attending Physician, Dr. Turcios.    Emmett Schultz MD  Internal Medicine, PGY-1  ______________________________________________________________________    Interval History   Multiple soft to loose nonbloody BMs overnight. She has been trying to eat soft food. Complains of over all pain and sensitive in her body, especially the edematous area. Breathing is about the same. Reports some slight lightheadedness after exertion. Denies any chest pain, palpitation, nausea, or vomiting.    Data reviewed today: I reviewed all medications, new labs and imaging results over the last 24 hours and discussed as pertinent in assessment and plan.    Physical Exam   Vital Signs: Temp: 98.4  F (36.9  C) Temp src: Oral BP: 123/58 Pulse: 80   Resp: 18 SpO2: 91 % O2 Device: Nasal cannula Oxygen Delivery: 4 LPM  Weight: 120 lbs 4.8 oz     Constitutional: awake, alert, cooperative, looks fatigue, and mildly dyspneic while talking  Neck: CVP~18   Respiratory: No increased work of breathing, crackles at both lower lungs, no wheezing  Cardiovascular: Regular rate and rhythm, normal S1S2, and no murmur noted  GI: Normal bowel sounds, moderately distended but soft, non-tender  Musculoskeletal: 2+ lower extremity pitting edema present R>L. Erythema, warmth, and tenderness on posterior RLE below knee.  Skin: mostly tenderness on lower extremities; R>L.  Neurologic: Awake, alert. No  focal neurological deficit.    Data   Recent Labs   Lab 01/04/21  0518 01/03/21  1715 01/03/21  0420 01/02/21  0515 01/02/21  0515 12/31/20 0441 12/31/20 0441   WBC  --   --   --   --   --   --  3.9*   HGB  --   --   --   --   --   --  8.3*   MCV  --   --   --   --   --   --  91   PLT  --   --   --   --   --   --  143*   INR 2.02*  --  1.82*  --  1.80*   < > 1.96*    136 135   < > 139   < > 133   POTASSIUM 3.8 3.6 3.5   < > 3.9   < > 4.9   CHLORIDE 98 97 96   < > 100   < > 95   CO2 34* 35* 35*   < > 35*   < > 32   BUN 24 25 26   < > 32*   < > 38*   CR 1.11* 1.07* 1.09*   < > 1.22*   < > 1.48*   ANIONGAP 5 5 5   < > 4   < > 6   ESTEFANÍA 8.9 9.0 8.9   < > 8.9   < > 9.0   GLC 85 112* 83   < > 81   < > 79    < > = values in this interval not displayed.     Medications     bumetanide 1.5 mg/hr (01/04/21 0052)     DOBUTamine 2.5 mcg/kg/min (01/04/21 0052)     - MEDICATION INSTRUCTIONS -       - MEDICATION INSTRUCTIONS -       - MEDICATION INSTRUCTIONS -       treprostinil (REMODULIN) intravenous infusion (LESS than or EQUAL to 100 mcg/mL) 30 ng/kg/min (01/04/21 0327)     Warfarin Therapy Reminder         aspirin  81 mg Oral QPM     beclomethasone HFA  2 puff Inhalation BID     calcium citrate and vitamin D  2 tablet Oral Daily     cetirizine  10 mg Oral QPM     digoxin  62.5 mcg Oral Daily     gabapentin  300 mg Oral TID     hydroxychloroquine  200 mg Oral Daily     ipratropium  2 spray Both Nostrils TID     lactulose  10 g Oral BID     macitentan  10 mg Oral Q48H     polyethylene glycol  17 g Oral Daily     potassium chloride  40 mEq Oral BID     riociguat  1.5 mg Oral TID     senna-docusate  1 tablet Oral BID    Or     senna-docusate  2 tablet Oral BID     sodium chloride (PF)  3 mL Intracatheter Q8H     vitamin C  500 mg Oral QAM

## 2021-01-04 NOTE — PLAN OF CARE
Pt admitted 12/23 with acute on chronic RHF with acute hypoxia.  SR, VS'S on 4 L HNC and denied pain.  Remodulin gtt continues at 30 ng/kg/min (1.69 ml/hr).  Dobutamine gtt continues at 2.5 mcg/kg/min (4.2 ml/hr).  Bumex gtt continues at 1.5 mg/hr (6 ml/hr).  Electrolyte rechecks in the morning.  Started Opsumit today and pt reported pain after taking on days.  Stools larger and more formed, gave lactulose.  2+ LLE and 3+ RLE edema.  Otherwise, pt slept well and up SBA.  Continue to monitor and with POC.    Only took care of pt b/t [id number].

## 2021-01-05 ENCOUNTER — APPOINTMENT (OUTPATIENT)
Dept: OCCUPATIONAL THERAPY | Facility: CLINIC | Age: 81
DRG: 252 | End: 2021-01-05
Attending: INTERNAL MEDICINE
Payer: COMMERCIAL

## 2021-01-05 LAB
ANION GAP SERPL CALCULATED.3IONS-SCNC: 6 MMOL/L (ref 3–14)
ANION GAP SERPL CALCULATED.3IONS-SCNC: 6 MMOL/L (ref 3–14)
BUN SERPL-MCNC: 23 MG/DL (ref 7–30)
BUN SERPL-MCNC: 25 MG/DL (ref 7–30)
CALCIUM SERPL-MCNC: 9.3 MG/DL (ref 8.5–10.1)
CALCIUM SERPL-MCNC: 9.5 MG/DL (ref 8.5–10.1)
CHLORIDE SERPL-SCNC: 100 MMOL/L (ref 94–109)
CHLORIDE SERPL-SCNC: 96 MMOL/L (ref 94–109)
CO2 SERPL-SCNC: 34 MMOL/L (ref 20–32)
CO2 SERPL-SCNC: 34 MMOL/L (ref 20–32)
CREAT SERPL-MCNC: 1.06 MG/DL (ref 0.52–1.04)
CREAT SERPL-MCNC: 1.16 MG/DL (ref 0.52–1.04)
GFR SERPL CREATININE-BSD FRML MDRD: 45 ML/MIN/{1.73_M2}
GFR SERPL CREATININE-BSD FRML MDRD: 50 ML/MIN/{1.73_M2}
GLUCOSE SERPL-MCNC: 151 MG/DL (ref 70–99)
GLUCOSE SERPL-MCNC: 73 MG/DL (ref 70–99)
INR PPP: 2.22 (ref 0.86–1.14)
MAGNESIUM SERPL-MCNC: 2.3 MG/DL (ref 1.6–2.3)
MAGNESIUM SERPL-MCNC: 2.4 MG/DL (ref 1.6–2.3)
POTASSIUM SERPL-SCNC: 3.3 MMOL/L (ref 3.4–5.3)
POTASSIUM SERPL-SCNC: 3.7 MMOL/L (ref 3.4–5.3)
SODIUM SERPL-SCNC: 137 MMOL/L (ref 133–144)
SODIUM SERPL-SCNC: 139 MMOL/L (ref 133–144)

## 2021-01-05 PROCEDURE — 80048 BASIC METABOLIC PNL TOTAL CA: CPT | Performed by: INTERNAL MEDICINE

## 2021-01-05 PROCEDURE — 250N000013 HC RX MED GY IP 250 OP 250 PS 637: Performed by: INTERNAL MEDICINE

## 2021-01-05 PROCEDURE — 250N000013 HC RX MED GY IP 250 OP 250 PS 637: Performed by: STUDENT IN AN ORGANIZED HEALTH CARE EDUCATION/TRAINING PROGRAM

## 2021-01-05 PROCEDURE — 250N000011 HC RX IP 250 OP 636: Performed by: INTERNAL MEDICINE

## 2021-01-05 PROCEDURE — 250N000009 HC RX 250: Performed by: STUDENT IN AN ORGANIZED HEALTH CARE EDUCATION/TRAINING PROGRAM

## 2021-01-05 PROCEDURE — 83735 ASSAY OF MAGNESIUM: CPT | Performed by: STUDENT IN AN ORGANIZED HEALTH CARE EDUCATION/TRAINING PROGRAM

## 2021-01-05 PROCEDURE — 97140 MANUAL THERAPY 1/> REGIONS: CPT | Mod: GO | Performed by: OCCUPATIONAL THERAPIST

## 2021-01-05 PROCEDURE — 99233 SBSQ HOSP IP/OBS HIGH 50: CPT | Mod: GC | Performed by: INTERNAL MEDICINE

## 2021-01-05 PROCEDURE — 214N000001 HC R&B CCU UMMC

## 2021-01-05 PROCEDURE — 272N000004 HC RX 272: Performed by: INTERNAL MEDICINE

## 2021-01-05 PROCEDURE — 80048 BASIC METABOLIC PNL TOTAL CA: CPT | Performed by: STUDENT IN AN ORGANIZED HEALTH CARE EDUCATION/TRAINING PROGRAM

## 2021-01-05 PROCEDURE — 250N000011 HC RX IP 250 OP 636: Performed by: STUDENT IN AN ORGANIZED HEALTH CARE EDUCATION/TRAINING PROGRAM

## 2021-01-05 PROCEDURE — 83735 ASSAY OF MAGNESIUM: CPT | Performed by: INTERNAL MEDICINE

## 2021-01-05 PROCEDURE — 97530 THERAPEUTIC ACTIVITIES: CPT | Mod: GO | Performed by: OCCUPATIONAL THERAPIST

## 2021-01-05 PROCEDURE — 85610 PROTHROMBIN TIME: CPT | Performed by: INTERNAL MEDICINE

## 2021-01-05 RX ORDER — POTASSIUM CHLORIDE 750 MG/1
20 TABLET, EXTENDED RELEASE ORAL ONCE
Status: COMPLETED | OUTPATIENT
Start: 2021-01-05 | End: 2021-01-05

## 2021-01-05 RX ORDER — WARFARIN SODIUM 4 MG/1
4 TABLET ORAL
Status: COMPLETED | OUTPATIENT
Start: 2021-01-05 | End: 2021-01-05

## 2021-01-05 RX ORDER — POTASSIUM CHLORIDE 1500 MG/1
60 TABLET, EXTENDED RELEASE ORAL 2 TIMES DAILY
Status: DISCONTINUED | OUTPATIENT
Start: 2021-01-05 | End: 2021-01-13

## 2021-01-05 RX ORDER — POTASSIUM CHLORIDE 750 MG/1
10 TABLET, EXTENDED RELEASE ORAL ONCE
Status: COMPLETED | OUTPATIENT
Start: 2021-01-05 | End: 2021-01-05

## 2021-01-05 RX ADMIN — GABAPENTIN 300 MG: 300 CAPSULE ORAL at 19:23

## 2021-01-05 RX ADMIN — ACETAMINOPHEN 650 MG: 325 TABLET, FILM COATED ORAL at 02:49

## 2021-01-05 RX ADMIN — DOCUSATE SODIUM AND SENNOSIDES 2 TABLET: 8.6; 5 TABLET, FILM COATED ORAL at 11:29

## 2021-01-05 RX ADMIN — WARFARIN SODIUM 4 MG: 4 TABLET ORAL at 18:21

## 2021-01-05 RX ADMIN — CETIRIZINE HYDROCHLORIDE 10 MG: 10 TABLET, FILM COATED ORAL at 19:23

## 2021-01-05 RX ADMIN — POLYETHYLENE GLYCOL 3350 17 G: 17 POWDER, FOR SOLUTION ORAL at 09:11

## 2021-01-05 RX ADMIN — POTASSIUM CHLORIDE 60 MEQ: 1500 TABLET, EXTENDED RELEASE ORAL at 00:39

## 2021-01-05 RX ADMIN — DOCUSATE SODIUM AND SENNOSIDES 2 TABLET: 8.6; 5 TABLET, FILM COATED ORAL at 19:23

## 2021-01-05 RX ADMIN — IPRATROPIUM BROMIDE 2 SPRAY: 42 SPRAY NASAL at 13:52

## 2021-01-05 RX ADMIN — POTASSIUM CHLORIDE 20 MEQ: 750 TABLET, EXTENDED RELEASE ORAL at 07:56

## 2021-01-05 RX ADMIN — DIGOXIN 62.5 MCG: 0.06 TABLET ORAL at 10:20

## 2021-01-05 RX ADMIN — ASPIRIN 81 MG CHEWABLE TABLET 81 MG: 81 TABLET CHEWABLE at 19:23

## 2021-01-05 RX ADMIN — TREPROSTINIL 30 NG/KG/MIN: 20 INJECTION, SOLUTION INTRAVENOUS; SUBCUTANEOUS at 06:25

## 2021-01-05 RX ADMIN — DOBUTAMINE HYDROCHLORIDE 2.5 MCG/KG/MIN: 200 INJECTION INTRAVENOUS at 13:52

## 2021-01-05 RX ADMIN — POTASSIUM CHLORIDE 60 MEQ: 1500 TABLET, EXTENDED RELEASE ORAL at 19:23

## 2021-01-05 RX ADMIN — ACETAMINOPHEN 650 MG: 325 TABLET, FILM COATED ORAL at 16:17

## 2021-01-05 RX ADMIN — MACITENTAN 10 MG: 10 TABLET, FILM COATED ORAL at 11:29

## 2021-01-05 RX ADMIN — RIOCIGUAT 1.5 MG: 0.5 TABLET, FILM COATED ORAL at 19:23

## 2021-01-05 RX ADMIN — GABAPENTIN 300 MG: 300 CAPSULE ORAL at 07:56

## 2021-01-05 RX ADMIN — OXYCODONE HYDROCHLORIDE AND ACETAMINOPHEN 500 MG: 500 TABLET ORAL at 07:56

## 2021-01-05 RX ADMIN — IPRATROPIUM BROMIDE 2 SPRAY: 42 SPRAY NASAL at 07:56

## 2021-01-05 RX ADMIN — RIOCIGUAT 1.5 MG: 0.5 TABLET, FILM COATED ORAL at 13:52

## 2021-01-05 RX ADMIN — RIOCIGUAT 1.5 MG: 0.5 TABLET, FILM COATED ORAL at 07:56

## 2021-01-05 RX ADMIN — CHLOROTHIAZIDE SODIUM 1000 MG: 500 INJECTION, POWDER, LYOPHILIZED, FOR SOLUTION INTRAVENOUS at 09:39

## 2021-01-05 RX ADMIN — POTASSIUM CHLORIDE 10 MEQ: 750 TABLET, EXTENDED RELEASE ORAL at 18:21

## 2021-01-05 RX ADMIN — Medication 25 MG: at 15:06

## 2021-01-05 RX ADMIN — Medication 2 TABLET: at 07:56

## 2021-01-05 RX ADMIN — POTASSIUM CHLORIDE 40 MEQ: 750 TABLET, EXTENDED RELEASE ORAL at 07:56

## 2021-01-05 RX ADMIN — BUMETANIDE 1.5 MG/HR: 0.25 INJECTION INTRAMUSCULAR; INTRAVENOUS at 16:18

## 2021-01-05 RX ADMIN — HYDROXYCHLOROQUINE SULFATE 200 MG: 200 TABLET, FILM COATED ORAL at 07:56

## 2021-01-05 RX ADMIN — GABAPENTIN 300 MG: 300 CAPSULE ORAL at 13:52

## 2021-01-05 ASSESSMENT — ACTIVITIES OF DAILY LIVING (ADL)
ADLS_ACUITY_SCORE: 13

## 2021-01-05 ASSESSMENT — MIFFLIN-ST. JEOR: SCORE: 897.74

## 2021-01-05 NOTE — PLAN OF CARE
Pt continues to diurese on bumex gtt. Given additional 1000mg IV diuril dose x1 as well. Plan to continue with bumex and reassess fluid status in a.m. Cr stable at 1.06.  Continues on dobutamine and remodulin infusions.  Pt had had significant constipation over many days and was given multiple meds to empty bowels last sharon. Pt has been stooling ever since and Cards 2 team wants to keep bowel meds going today. Pt up to BCS independently.   Wraps placed to LEs; edema persists-especially in RLE.   Continues on adempas and opsumit. Pt reports she can have light-headedness with opsumit along with some muscle pains but declined need for pain meds at this time. Has used tramadol in past for leg pain with good relief.   K 3.3 this morning-replaced with 60mEq po. Plan to recheck labs at 1600.

## 2021-01-05 NOTE — PROGRESS NOTES
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 Continue to diurese with bumex and diuril, output support with dobutamine, and other supportive treatment.     Plan 1/5/21  - another dose of 1 gm Diuril given this AM  - continue Bumex 1.5 mg/hr, dobutamine 2.5 mcg/kg/min  - continue adempas 1.5 TID, remodulin drip   - Opsumit today and then q48h, will reevaluate volume status tomorrow    #Acute on chronic right-sided heart failure  Progressive worsening of right heart function. This is her second admission; previous admission in 10/2020.  This admission presented with increasing SOB, dizziness, and weight gain to 126lbs (dry weight ~112 lbs) for the last couple of weeks, increased RIVERA. Has RLE swelling but US DVT negative (12/23/2020). Patient was hypervolemic with IVC diameter 2.8 cm with no respiratory variation on admission.     Responds well to diuril 1 gm yesterday and same dose bumex drip and dobutamine. Feels better Weight downtrending from 119 to 116 lbs in the setting of multiple bowel movements. CVP improved to ~ 13, however, she's still hypervolemic from physical exam. Will receive the next dose of opsumit today; will reevaluate for fluid retention tomorrow. Current plan is to continue to diurese with bumex drip and maintain output with dobutamine and RV support with digoxin; will add another 1 gm diuril today. Consider balloon angioplasty when clinical status is stabilized.    RHC (09/2020): RA 11/6/5 RV 90/8 PA 90/20/42 PCWP --/--/11 LUIZ 2.55/1.76 TD 3/2.07 PVR 10 (TD) (Moderately elevated pulmonary artery  hypertension, Right sided and Left sided filling pressures are normal)  [facility] 12/23/2020: RA 19/24/22 RV 80/21 PA 80/26/42 PCWP 17 LUIZ 3/2.04 TD 3.07/2.09 PVR 8(severely elevated right-sided filling pressure and pulmonary hypertension, reduced cardiac output)  Last TTE (09/2020): Normal LVEF 60-65%. Moderately dilated and reduced function of RV. Right ventricular systolic pressure is 82mmHg above the right atrial pressure.    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 Follow-up with , outpatient plan is to consider balloon angioplasty.   - contiue Opsumit q48h, will reevaluate tomorrow 1/6  - continue Adempas 1.5 mg TID   - continue Remodulin 30 ng/kg/min  - continue Warfarin. Dosed by pharmacy.   - consider balloon angioplasty when clinical status is stabilized.    #Acute on chronic respiratory failure with hypoxia  Mildly improved, likely from pulmonary edema. Continue to observe and gradually wean off his oxygen.  - continue to diurese   - Try wean-off O2, keep SpO2 > 92%; currently on 4 LPM satting ~94-98%    #JOSE G on CKD, improved  Creatinine downtrending to 1.09 after started on dobutamine and continued with bumex drip.  - continue diuresis  - trend BMP, Mg    #Concern for fecal impaction: Moderate stool burden with bloated abdomen  Causing abdominal comfort which interferes with breathing. Responding well to mg citrate and enema.  - simethicone prn  - continue senna BID & lactulose BID prn    #Hypokalemia  - KCL 40 mEq BID   - trend and replace prn    #Cellulitis RLE, improved  LE edema R>L on initial presentation with some tenderness. LE ultrasound 12/23 showed no DVT. Improved after 24 hours of given Clindamycin.   -  completed Clindamycin    #Asthma  #Allergic rhinitis  No acute concerns.  - Continue beclomethasone BID and albuterol inhaler PRN  - Continue cetirizine and ipratropium nasal spray    #RA  No acute concerns.  - Continue Hydroxychloroquine 200 mg daily    Diet: Na restriction diet  DVT Prophylaxis: Warfarin  Dominugez Catheter: not present  Code Status: FULL code        Disposition Plan   Expected discharge: 4 - 7 days, recommended to prior living arrangement once fluid status and medications for CTEPH are optimized and respiratory status and oxygen requirement are improved.    The patient's care was discussed with the Attending Physician, Dr. Turcios.    Emmett Schultz MD  Internal Medicine, PGY-1  ______________________________________________________________________    Interval History    K was 2.8 overnight, replaced. Weight down from 120 to 116 lbs today. Having 10 stools last night.     Feels generally better today. Multiple soft to loose nonbloody BMs overnight. Feels less bloated. Breathing is about the same. Denies any lightheadedness this AM. Reports having less pain in her extremities. Denies any chest pain, palpitation, nausea, or vomiting.    Data reviewed today: I reviewed all medications, new labs and imaging results over the last 24 hours and discussed as pertinent in assessment and plan.    Physical Exam   Vital Signs: Temp: 98.3  F (36.8  C) Temp src: Oral BP: 123/47 Pulse: 82   Resp: 20 SpO2: 97 % O2 Device: Nasal cannula with humidification Oxygen Delivery: 4 LPM  Weight: 116 lbs 6.4 oz     Constitutional: awake, alert, cooperative, looks fatigue, and mildly dyspneic while talking  Neck: CVP~13  Respiratory: No increased work of breathing, decreased crackles at both lower lungs, no wheezing  Cardiovascular: Regular rate and rhythm, normal S1S2, and no murmur noted  GI: distended(improved), normal bowel sounds, soft, non-tender  Musculoskeletal: 2+ lower extremity pitting edema present  R>L(mildly improved). Erythema, warmth, and tenderness on posterior RLE below knee.  Skin: mostly tenderness on lower extremities; R>L.  Neurologic: Awake, alert. No focal neurological deficit.    Data   Recent Labs   Lab 01/05/21  0600 01/04/21  1915 01/04/21 0518 01/03/21 0420 01/03/21  0420 12/31/20 0441 12/31/20 0441   WBC  --   --   --   --   --   --  3.9*   HGB  --   --   --   --   --   --  8.3*   MCV  --   --   --   --   --   --  91   PLT  --   --   --   --   --   --  143*   INR 2.22*  --  2.02*  --  1.82*   < > 1.96*    135 137   < > 135   < > 133   POTASSIUM 3.3* 2.8* 3.8   < > 3.5   < > 4.9   CHLORIDE 100 95 98   < > 96   < > 95   CO2 34* 36* 34*   < > 35*   < > 32   BUN 23 24 24   < > 26   < > 38*   CR 1.06* 1.08* 1.11*   < > 1.09*   < > 1.48*   ANIONGAP 6 3 5   < > 5   < > 6   ESTEFANÍA 9.3 9.4 8.9   < > 8.9   < > 9.0   GLC 73 105* 85   < > 83   < > 79    < > = values in this interval not displayed.     Medications     bumetanide 1.5 mg/hr (01/04/21 2208)     DOBUTamine 2.5 mcg/kg/min (01/04/21 2041)     - MEDICATION INSTRUCTIONS -       - MEDICATION INSTRUCTIONS -       - MEDICATION INSTRUCTIONS -       treprostinil (REMODULIN) intravenous infusion (LESS than or EQUAL to 100 mcg/mL) 30 ng/kg/min (01/05/21 0625)     Warfarin Therapy Reminder         aspirin  81 mg Oral QPM     beclomethasone HFA  2 puff Inhalation BID     calcium citrate and vitamin D  2 tablet Oral Daily     cetirizine  10 mg Oral QPM     digoxin  62.5 mcg Oral Daily     gabapentin  300 mg Oral TID     hydroxychloroquine  200 mg Oral Daily     ipratropium  2 spray Both Nostrils TID     macitentan  10 mg Oral Q48H     polyethylene glycol  17 g Oral Daily     potassium chloride  40 mEq Oral BID     riociguat  1.5 mg Oral TID     senna-docusate  1 tablet Oral BID    Or     senna-docusate  2 tablet Oral BID     sodium chloride (PF)  3 mL Intracatheter Q8H     vitamin C  500 mg Oral QAM

## 2021-01-05 NOTE — PLAN OF CARE
Temp: 98.3  F (36.8  C) Temp src: Oral BP: 123/47 Pulse: 82   Resp: 20 SpO2: 97 % O2 Device: Nasal cannula with humidification Oxygen Delivery: 4 LPM    PRN: Tylenol. K+ replaced  Running: Bumex 6mL/hr, Dobutamine 2.5 mcg/kg/mL, Remodulin 30 ng/kg/min     A:   Neuro: A/Ox4. Calls approprietly.    Cardiac/Tele:  VVS. SR. Denies chest pain.   Respiratory: 4L NC. Tolerating well  GI/: Continent. Loose BM x3. Urinating adequately   Diet/Appetite: 2 gram Na+ diet  Skin: No new deficits noted  LDAs: R PIV, R CVC, R PICC  Activity: Standby assist.  Pain: Reported leg pain. PRN given.      P: Continue to monitor and notify team with changes.

## 2021-01-06 LAB
ANION GAP SERPL CALCULATED.3IONS-SCNC: 4 MMOL/L (ref 3–14)
ANION GAP SERPL CALCULATED.3IONS-SCNC: 6 MMOL/L (ref 3–14)
ANION GAP SERPL CALCULATED.3IONS-SCNC: 7 MMOL/L (ref 3–14)
BUN SERPL-MCNC: 24 MG/DL (ref 7–30)
BUN SERPL-MCNC: 24 MG/DL (ref 7–30)
BUN SERPL-MCNC: 25 MG/DL (ref 7–30)
CALCIUM SERPL-MCNC: 9.2 MG/DL (ref 8.5–10.1)
CALCIUM SERPL-MCNC: 9.4 MG/DL (ref 8.5–10.1)
CALCIUM SERPL-MCNC: 9.4 MG/DL (ref 8.5–10.1)
CHLORIDE SERPL-SCNC: 95 MMOL/L (ref 94–109)
CHLORIDE SERPL-SCNC: 98 MMOL/L (ref 94–109)
CHLORIDE SERPL-SCNC: 98 MMOL/L (ref 94–109)
CO2 SERPL-SCNC: 35 MMOL/L (ref 20–32)
CO2 SERPL-SCNC: 35 MMOL/L (ref 20–32)
CO2 SERPL-SCNC: 37 MMOL/L (ref 20–32)
CREAT SERPL-MCNC: 1.06 MG/DL (ref 0.52–1.04)
CREAT SERPL-MCNC: 1.1 MG/DL (ref 0.52–1.04)
CREAT SERPL-MCNC: 1.11 MG/DL (ref 0.52–1.04)
GFR SERPL CREATININE-BSD FRML MDRD: 47 ML/MIN/{1.73_M2}
GFR SERPL CREATININE-BSD FRML MDRD: 48 ML/MIN/{1.73_M2}
GFR SERPL CREATININE-BSD FRML MDRD: 50 ML/MIN/{1.73_M2}
GLUCOSE SERPL-MCNC: 124 MG/DL (ref 70–99)
GLUCOSE SERPL-MCNC: 149 MG/DL (ref 70–99)
GLUCOSE SERPL-MCNC: 84 MG/DL (ref 70–99)
INR PPP: 2.75 (ref 0.86–1.14)
MAGNESIUM SERPL-MCNC: 2 MG/DL (ref 1.6–2.3)
MAGNESIUM SERPL-MCNC: 2.1 MG/DL (ref 1.6–2.3)
POTASSIUM SERPL-SCNC: 3 MMOL/L (ref 3.4–5.3)
POTASSIUM SERPL-SCNC: 3.2 MMOL/L (ref 3.4–5.3)
POTASSIUM SERPL-SCNC: 3.5 MMOL/L (ref 3.4–5.3)
SODIUM SERPL-SCNC: 136 MMOL/L (ref 133–144)
SODIUM SERPL-SCNC: 139 MMOL/L (ref 133–144)
SODIUM SERPL-SCNC: 139 MMOL/L (ref 133–144)

## 2021-01-06 PROCEDURE — 250N000011 HC RX IP 250 OP 636: Performed by: INTERNAL MEDICINE

## 2021-01-06 PROCEDURE — 250N000013 HC RX MED GY IP 250 OP 250 PS 637: Performed by: INTERNAL MEDICINE

## 2021-01-06 PROCEDURE — 250N000011 HC RX IP 250 OP 636: Performed by: STUDENT IN AN ORGANIZED HEALTH CARE EDUCATION/TRAINING PROGRAM

## 2021-01-06 PROCEDURE — 36415 COLL VENOUS BLD VENIPUNCTURE: CPT | Performed by: STUDENT IN AN ORGANIZED HEALTH CARE EDUCATION/TRAINING PROGRAM

## 2021-01-06 PROCEDURE — 83735 ASSAY OF MAGNESIUM: CPT | Performed by: STUDENT IN AN ORGANIZED HEALTH CARE EDUCATION/TRAINING PROGRAM

## 2021-01-06 PROCEDURE — 250N000013 HC RX MED GY IP 250 OP 250 PS 637: Performed by: STUDENT IN AN ORGANIZED HEALTH CARE EDUCATION/TRAINING PROGRAM

## 2021-01-06 PROCEDURE — 214N000001 HC R&B CCU UMMC

## 2021-01-06 PROCEDURE — 272N000004 HC RX 272: Performed by: INTERNAL MEDICINE

## 2021-01-06 PROCEDURE — 250N000009 HC RX 250: Performed by: STUDENT IN AN ORGANIZED HEALTH CARE EDUCATION/TRAINING PROGRAM

## 2021-01-06 PROCEDURE — 80048 BASIC METABOLIC PNL TOTAL CA: CPT | Performed by: STUDENT IN AN ORGANIZED HEALTH CARE EDUCATION/TRAINING PROGRAM

## 2021-01-06 PROCEDURE — 99233 SBSQ HOSP IP/OBS HIGH 50: CPT | Mod: GC | Performed by: INTERNAL MEDICINE

## 2021-01-06 PROCEDURE — 85610 PROTHROMBIN TIME: CPT | Performed by: INTERNAL MEDICINE

## 2021-01-06 RX ORDER — POTASSIUM CHLORIDE 750 MG/1
10 TABLET, EXTENDED RELEASE ORAL ONCE
Status: COMPLETED | OUTPATIENT
Start: 2021-01-06 | End: 2021-01-06

## 2021-01-06 RX ORDER — POTASSIUM CHLORIDE 750 MG/1
40 TABLET, EXTENDED RELEASE ORAL ONCE
Status: COMPLETED | OUTPATIENT
Start: 2021-01-06 | End: 2021-01-06

## 2021-01-06 RX ORDER — POTASSIUM CHLORIDE 750 MG/1
40 TABLET, EXTENDED RELEASE ORAL ONCE
Status: DISCONTINUED | OUTPATIENT
Start: 2021-01-06 | End: 2021-01-07 | Stop reason: CLARIF

## 2021-01-06 RX ORDER — WARFARIN SODIUM 1 MG/1
2 TABLET ORAL
Status: COMPLETED | OUTPATIENT
Start: 2021-01-06 | End: 2021-01-06

## 2021-01-06 RX ORDER — POTASSIUM CHLORIDE 750 MG/1
20 TABLET, EXTENDED RELEASE ORAL ONCE
Status: COMPLETED | OUTPATIENT
Start: 2021-01-06 | End: 2021-01-06

## 2021-01-06 RX ADMIN — ASPIRIN 81 MG CHEWABLE TABLET 81 MG: 81 TABLET CHEWABLE at 20:32

## 2021-01-06 RX ADMIN — POLYETHYLENE GLYCOL 3350 17 G: 17 POWDER, FOR SOLUTION ORAL at 08:29

## 2021-01-06 RX ADMIN — Medication 2 TABLET: at 08:30

## 2021-01-06 RX ADMIN — POTASSIUM CHLORIDE 40 MEQ: 750 TABLET, EXTENDED RELEASE ORAL at 07:02

## 2021-01-06 RX ADMIN — POTASSIUM CHLORIDE 60 MEQ: 1500 TABLET, EXTENDED RELEASE ORAL at 20:32

## 2021-01-06 RX ADMIN — ACETAMINOPHEN 650 MG: 325 TABLET, FILM COATED ORAL at 05:24

## 2021-01-06 RX ADMIN — POTASSIUM CHLORIDE 40 MEQ: 1500 TABLET, EXTENDED RELEASE ORAL at 17:53

## 2021-01-06 RX ADMIN — GABAPENTIN 300 MG: 300 CAPSULE ORAL at 20:32

## 2021-01-06 RX ADMIN — RIOCIGUAT 1.5 MG: 0.5 TABLET, FILM COATED ORAL at 20:34

## 2021-01-06 RX ADMIN — POTASSIUM CHLORIDE 10 MEQ: 750 TABLET, EXTENDED RELEASE ORAL at 13:24

## 2021-01-06 RX ADMIN — IPRATROPIUM BROMIDE 2 SPRAY: 42 SPRAY NASAL at 20:31

## 2021-01-06 RX ADMIN — ACETAMINOPHEN 650 MG: 325 TABLET, FILM COATED ORAL at 23:52

## 2021-01-06 RX ADMIN — TREPROSTINIL 30 NG/KG/MIN: 20 INJECTION, SOLUTION INTRAVENOUS; SUBCUTANEOUS at 09:19

## 2021-01-06 RX ADMIN — POTASSIUM CHLORIDE 60 MEQ: 1500 TABLET, EXTENDED RELEASE ORAL at 08:42

## 2021-01-06 RX ADMIN — BUMETANIDE 1.5 MG/HR: 0.25 INJECTION INTRAMUSCULAR; INTRAVENOUS at 10:09

## 2021-01-06 RX ADMIN — CETIRIZINE HYDROCHLORIDE 10 MG: 10 TABLET, FILM COATED ORAL at 20:34

## 2021-01-06 RX ADMIN — OXYCODONE HYDROCHLORIDE AND ACETAMINOPHEN 500 MG: 500 TABLET ORAL at 08:37

## 2021-01-06 RX ADMIN — IPRATROPIUM BROMIDE 2 SPRAY: 42 SPRAY NASAL at 14:21

## 2021-01-06 RX ADMIN — DOCUSATE SODIUM AND SENNOSIDES 2 TABLET: 8.6; 5 TABLET, FILM COATED ORAL at 08:30

## 2021-01-06 RX ADMIN — WARFARIN SODIUM 2 MG: 1 TABLET ORAL at 17:53

## 2021-01-06 RX ADMIN — POTASSIUM CHLORIDE 20 MEQ: 750 TABLET, EXTENDED RELEASE ORAL at 18:13

## 2021-01-06 RX ADMIN — GABAPENTIN 300 MG: 300 CAPSULE ORAL at 14:20

## 2021-01-06 RX ADMIN — GABAPENTIN 300 MG: 300 CAPSULE ORAL at 08:30

## 2021-01-06 RX ADMIN — CHLOROTHIAZIDE SODIUM 1000 MG: 500 INJECTION, POWDER, LYOPHILIZED, FOR SOLUTION INTRAVENOUS at 10:10

## 2021-01-06 RX ADMIN — HYDROXYCHLOROQUINE SULFATE 200 MG: 200 TABLET, FILM COATED ORAL at 08:30

## 2021-01-06 RX ADMIN — RIOCIGUAT 1.5 MG: 0.5 TABLET, FILM COATED ORAL at 08:30

## 2021-01-06 RX ADMIN — DOCUSATE SODIUM AND SENNOSIDES 1 TABLET: 8.6; 5 TABLET, FILM COATED ORAL at 20:32

## 2021-01-06 RX ADMIN — DIGOXIN 62.5 MCG: 0.06 TABLET ORAL at 11:44

## 2021-01-06 RX ADMIN — RIOCIGUAT 1.5 MG: 0.5 TABLET, FILM COATED ORAL at 14:20

## 2021-01-06 ASSESSMENT — ACTIVITIES OF DAILY LIVING (ADL)
ADLS_ACUITY_SCORE: 13
ADLS_ACUITY_SCORE: 14

## 2021-01-06 ASSESSMENT — MIFFLIN-ST. JEOR: SCORE: 903.63

## 2021-01-06 NOTE — PLAN OF CARE
Pt continues diuresing. Bumex gtt remains @ 1.5mg/hr. Pt given IV diuril dose again today.  K 3.0 this morning-replaced with scheduled KCl and replacement protocol. Recheck level at 11 was 3.5-replaced with another 10mEq per protocol. Cr 1.11. Plan to recheck lytes again at 16:00.  Continues to empty bowels throughout day; taking miralax and senna bid. Independently up to BSC frequently.   Continues on dobutamine gtt & remodulin infusion. INR 2.75. Receiving opsumit q48hrs.   Pt reports ongoing but tolerable discomfort in feet and legs. Declined need for pain medication. Wraps/stocking in place on legs-OT will return tomorrow to assess and re-wrap.

## 2021-01-06 NOTE — PROGRESS NOTES
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 Continue to diurese with bumex and diuril, output support with dobutamine, and other supportive treatment.     Plan 1/6/21  - another dose of 1 gm Diuril given this AM  - continue Bumex 1.5 mg/hr, dobutamine 2.5 mcg/kg/min  - continue adempas 1.5 TID, remodulin drip   - got opsumit yesterday, next dose would be tomorrow   - will wean off dobutamine and plan to switch diuretic to oral medication. If tolerates oral medication, can consider discharge and reevaluate for balloon angioplasty in an outpatient setting    #Acute on chronic right-sided heart failure  Progressive worsening of right heart function. This is her second admission; previous admission in 10/2020.  This admission presented with increasing SOB, dizziness, and weight gain to 126lbs (dry weight ~112 lbs) for the last couple of weeks, increased RIVERA. Has RLE swelling but US DVT negative (12/23/2020). Patient was hypervolemic with IVC diameter 2.8 cm with no respiratory variation on admission.     Responds well to diuretic regimen with net negative urine output ~2L in the past 24 hours. Feel generally better, but still hypervolemic. Her weight is up from 116 to 117 lbs. CVP is 17 today, compared to 13 yesterday. Current plan is to continue to diurese with bumex drip and maintain output with dobutamine and RV support with digoxin; will add another 1 gm diuril today. Continue opsumit at the same dose q48h. Will consider weaning of dobutamine drip and switch from diuretic drip to oral diuretic. If she maintains euvolemic  status with oral diuretic without requiring inotrope, will consider discharge and balloon angioplasty in an outpatient setting.    RHC (09/2020): RA 11/6/5 RV 90/8 PA 90/20/42 PCWP --/--/11 LUIZ 2.55/1.76 TD 3/2.07 PVR 10 (TD) (Moderately elevated pulmonary artery hypertension, Right sided and Left sided filling pressures are normal)  RHC 12/23/2020: RA 19/24/22 RV 80/21 PA 80/26/42 PCWP 17 LUIZ 3/2.04 TD 3.07/2.09 PVR 8(severely elevated right-sided filling pressure and pulmonary hypertension, reduced cardiac output)  Last TTE (09/2020): Normal LVEF 60-65%. Moderately dilated and reduced function of RV. Right ventricular systolic pressure is 82mmHg above the right atrial pressure.    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 Follow-up with , outpatient plan is to consider balloon angioplasty.   - contiue Opsumit q48h, next dose 1/7  - continue Adempas 1.5 mg TID   - continue Remodulin 30 ng/kg/min  - continue Warfarin. Dosed by pharmacy.   - consider balloon angioplasty when clinical status is stabilized.    #Acute on chronic respiratory failure with hypoxia  Mildly improved, likely from pulmonary edema. Continue to observe and gradually wean off his oxygen.  - continue to diurese   - Try wean-off O2, keep SpO2 > 92%; currently on 4 LPM satting ~94-98%    #JOSE G on CKD, improved  Creatinine downtrending to 1.09 after started on dobutamine and continued with bumex drip.  - continue diuresis  - trend BMP, Mg    #Concern for fecal impaction: Moderate stool burden with bloated abdomen  Causing abdominal comfort which interferes with breathing. Responding well to mg citrate and enema.  - simethicone prn  - continue senna BID &  lactulose BID prn    #Hypokalemia  - KCL 40 mEq BID   - trend and replace prn    #Cellulitis RLE, improved  LE edema R>L on initial presentation with some tenderness. LE ultrasound 12/23 showed no DVT. Improved after 24 hours of given Clindamycin.   - completed Clindamycin    #Asthma  #Allergic rhinitis  No acute concerns.  - Continue beclomethasone BID and albuterol inhaler PRN  - Continue cetirizine and ipratropium nasal spray    #RA  No acute concerns.  - Continue Hydroxychloroquine 200 mg daily    Diet: Na restriction diet  DVT Prophylaxis: Warfarin  Dominguez Catheter: not present  Code Status: FULL code        Disposition Plan   Expected discharge: 4 - 7 days, recommended to prior living arrangement once euvolemic status is obtained and able to be maintained with oral diuretics and without inotrope.     The patient's care was discussed with the Attending Physician, Dr. Turcios.    Emmett Schultz MD  Internal Medicine, PGY-1  ______________________________________________________________________    Interval History    K was replaced this AM. Weight up from 116 to 117 lbs today.    Feels generally better. Had multiple bowel movement yesterday but improved overnight. Feels less bloated. Breathing is about the same. Reports some dizziness&lightheadedness this AM. Denies any chest pain, palpitation, nausea, or vomiting. Otherwise no other complains.    Data reviewed today: I reviewed all medications, new labs and imaging results over the last 24 hours and discussed as pertinent in assessment and plan.    Physical Exam   Vital Signs: Temp: 97.8  F (36.6  C) Temp src: Oral BP: (!) 143/55 Pulse: 81   Resp: 18 SpO2: 90 % O2 Device: Nasal cannula Oxygen Delivery: 3 LPM  Weight: 117 lbs 11.2 oz up by one pound    Constitutional: awake, alert, cooperative, looks fatigue, and mildly dyspneic while talking  Neck: CVP~17  Respiratory: No increased work of breathing, crackles at both lower lungs, no  wheezing  Cardiovascular: Regular rate and rhythm, normal S1S2, and no murmur   GI: distended(improved), normal bowel sounds, soft, non-tender  Musculoskeletal: 2+ lower extremity pitting edema present R>L(mildly improved). Erythema, warmth, and tenderness on posterior RLE below knee.  Skin: mostly tenderness on lower extremities; R>L.  Neurologic: Awake, alert. No focal neurological deficit.    Data   Recent Labs   Lab 01/06/21  0515 01/05/21  1630 01/05/21  0600 01/04/21  0518 01/04/21  0518 12/31/20  0441 12/31/20 0441   WBC  --   --   --   --   --   --  3.9*   HGB  --   --   --   --   --   --  8.3*   MCV  --   --   --   --   --   --  91   PLT  --   --   --   --   --   --  143*   INR 2.75*  --  2.22*  --  2.02*   < > 1.96*    137 139   < > 137   < > 133   POTASSIUM 3.0* 3.7 3.3*   < > 3.8   < > 4.9   CHLORIDE 98 96 100   < > 98   < > 95   CO2 35* 34* 34*   < > 34*   < > 32   BUN 24 25 23   < > 24   < > 38*   CR 1.10* 1.16* 1.06*   < > 1.11*   < > 1.48*   ANIONGAP 7 6 6   < > 5   < > 6   ESTEFANÍA 9.2 9.5 9.3   < > 8.9   < > 9.0   GLC 84 151* 73   < > 85   < > 79    < > = values in this interval not displayed.     Medications     bumetanide 1.5 mg/hr (01/06/21 0002)     DOBUTamine 2.5 mcg/kg/min (01/06/21 0003)     - MEDICATION INSTRUCTIONS -       - MEDICATION INSTRUCTIONS -       - MEDICATION INSTRUCTIONS -       treprostinil (REMODULIN) intravenous infusion (LESS than or EQUAL to 100 mcg/mL) 30 ng/kg/min (01/06/21 0003)     Warfarin Therapy Reminder         aspirin  81 mg Oral QPM     beclomethasone HFA  2 puff Inhalation BID     calcium citrate and vitamin D  2 tablet Oral Daily     cetirizine  10 mg Oral QPM     digoxin  62.5 mcg Oral Daily     gabapentin  300 mg Oral TID     hydroxychloroquine  200 mg Oral Daily     ipratropium  2 spray Both Nostrils TID     macitentan  10 mg Oral Q48H     polyethylene glycol  17 g Oral Daily     potassium chloride  40 mEq Oral Once     potassium chloride  60 mEq Oral BID      riociguat  1.5 mg Oral TID     senna-docusate  1 tablet Oral BID    Or     senna-docusate  2 tablet Oral BID     sodium chloride (PF)  3 mL Intracatheter Q8H     vitamin C  500 mg Oral QAM

## 2021-01-06 NOTE — PROGRESS NOTES
"CLINICAL NUTRITION SERVICES - ASSESSMENT NOTE     Nutrition Prescription    RECOMMENDATIONS FOR MDs/PROVIDERS TO ORDER:  1. Monitor need for a fluid restriction.  2. Consider ordering a multivitamin with minerals to help ensure micronutrient needs are met.     Malnutrition Status:    Patient does not meet two of the established criteria necessary for diagnosing malnutrition but is at risk for malnutrition    Recommendations already ordered by Registered Dietitian (RD):  Oral supplements prn     Future/Additional Recommendations:  1. Continue low-sodium diet, as ordered.  2. Order multivitamin with minerals if inadequate oral intake.   3. Continue to replace K+.   4. Monitor need for iron supplementation, if needed. Currently ordered to receive vitamin C supplementation.   5. Continue calcium/vitamin D as per team.      REASON FOR ASSESSMENT  Karen Anderson is a/an 80 year old female assessed by the dietitian for LOS    NUTRITION/ADDITIONAL HISTORY  Per RD note 9/29/20: \"Offered to provide low-sodium meal planning. Pt declined need for nutrition education as she has received nutrition education in the past. Pt was aware of her low-sodium diet restriction but explained her fluid restriction. Provided the following handouts on first attempt when pt was busy: How to Read Nutrition Labels, Managing Fluid Restriction, and Nutrition Care Manual handout on Low Sodium Nutrition Therapy.\"   Per H & P, \"pHTN 2/2 CTEPH, RA was directly admitted for IV diuresis. Patient reports increasing shortness of breath, dizziness, and weight gain for the last couple of weeks.\" No N/V or diarrhea. Pt was ordered to receive, noting, fosamax, calcium/vitamin D, lasix, Imodium, vitamin C, and potassium PTA.   Per discussion with pt, admits to having early satiety so she was eating ok but possibly a little less than normal. Follows a low-sodium diet at home and also watches her intake of fluids. States she dislikes Mrs Conde.     CURRENT " "NUTRITION ORDERS  Diet: 2 g Sodium diet  Intake/Tolerance: Good diet tolerance. Per nursing flowsheets (% intake), consuming 100% with a good appetite 12/23-12/28, 100% with a fair to good appetite 12/29-1/1, 7% with a good appetite 1/2, 0-100% with a fair to good appetite 1/3, and 100% with a good appetite 1/4-1/5. Per pt, she does get full at times, but states she is able to eat. Ordering two to three meals daily. Food choices and low-sodium diet are factors affecting her oral intake. She was eating breakfast at time of nutrition visit.     LABS  Labs reviewed    MEDICATIONS  Medications reviewed    ANTHROPOMETRICS  Height: 147.3 cm (4' 10\")  Most Recent Weight: 53.4 kg (117 lb 11.2 oz)    IBW: 43.2 kg  BMI: Normal BMI  Wt hx: 54.6 kg (12/9/19), 53.8 kg (3/20/20), 53.3 kg (11/9/20), 57.5 kg (12/23/20), 55 kg (12/30/20), 53.4 kg (1/6/21) - Wt decrease due to diuresis. Overall, no significant/severe wt loss at this time.  Dosing Weight: 46 kg (adjusted, based on lowest wt so far this admission of 52.8 kg on 1/5)    ASSESSED NUTRITION NEEDS (for inpatient hospital stay)  Estimated Energy Needs: 4339-3968 kcals/day (25 - 30 kcals/kg)  Justification: Maintenance needs  Estimated Protein Needs: 51-64 grams protein/day (1.1 - 1.4 grams of pro/kg)  Justification: Increased needs with cardiac status  Estimated Fluid Needs: 8205-8324 mL/day (1 mL/kcal)   Justification: Maintenance needs or per team, pending fluid status    PHYSICAL FINDINGS/OTHER FINDINGS  See malnutrition section below.  Resp: 3 L O2  GI:  Last stool on 1/5. Having soft, brown stools. Having one to seven stools daily (has been on bowel meds, received an enema) as imaging noted moderate colonic burden on 1/2.    MALNUTRITION  % Intake: Not meeting this criteria.     % Weight Loss: Not meeting this criteria. However, difficult to assess with fluid status.  Subcutaneous Fat Loss: None observed per visualization  Muscle Loss: Age-related muscle loss per " visualization. Does not meet this criteria.   Fluid Accumulation/Edema: Moderate  Malnutrition Diagnosis: Patient does not meet two of the established criteria necessary for diagnosing malnutrition but is at risk for malnutrition    NUTRITION DIAGNOSIS  Predicted inadequate nutrient intake (protein-energy) related to restrictive diet order, food choices, and early satiety.    INTERVENTIONS  Implementation  Nutrition Education: Explained diet restrictions (currently on a sodium restriction but no fluid restriction). Pt states despite not having a fluid restriction, she is watching her fluid intake. Offered nutrition education on low-sodium diet and fluid restriction but pt declined need due to receiving education and following diet previously. Gave room service menu for sodium. Rec small, frequent meals. Suggested her family may drop off foods she likes at the front entrance, if needed.  Medical food supplement therapy: Placed prn snack/supplement order. Pt may request snacks/supplements prn.     Goals  Patient to consume % of nutritionally adequate meal trays TID, or the equivalent with supplements/snacks.     Monitoring/Evaluation  Progress toward goals will be monitored and evaluated per protocol.       Nutrition will continue to follow     Mirta Stinson, MS, RD, LD, [facility]   6C Pgr: [phone]

## 2021-01-06 NOTE — PLAN OF CARE
Up to the commode with frequent bowel movements/voiding. 3L NC sats >90s. RLE pain controlled with PRN tarmadol - lymphedema wraps on. Remodulin/Bumex/Dobutamine remain infusing. One dose of K given recheck in the morning - unit draw. Ate 100% of dinner.       Problem: Fluid Imbalance (Heart Failure)  Goal: Fluid Balance  Outcome: Improving     Problem: Adjustment to Illness (Heart Failure)  Goal: Optimal Coping  Intervention: Support Psychosocial Response  Recent Flowsheet Documentation  Taken 1/5/2021 1617 by Stephania Munoz, RN  Family/Support System Care: self-care encouraged

## 2021-01-06 NOTE — TELEPHONE ENCOUNTER
*Contacted Santana to follow-up on call from [employer]. Santana stated he received a call from [employer], but it was a confusing call. Santana stated that the rep initially told him his co-pay was 4,000.00 and then changed it later to no co-pay. Santana stated that the representative could not schedule a shipment as pt is not home and would require 2 days notice to discharge home before [facility] can ship. Stated to Santana that pt is expected to discharge as early as this weekend, which Santana confirmed was what he was told by the doctor. Requested that when Don calls to schedule the shipment to ask what the pt's co-pay for the Opsumit is again and if they tell him $0.00 or no co-pay, to have the medication shipped. Told Santana that if the co-pay is high, then to request assistance from the specialty pharmacy as grants are all currently closed. Santana agreed to plan and had no further questions to this regard.    Lexus Balbuena  Clinic   Pulmonary Hypertension  [facility]  (P) [phone]

## 2021-01-06 NOTE — PLAN OF CARE
Temp: 97.8  F (36.6  C) Temp src: Oral BP: (!) 143/55 Pulse: 81   Resp: 18 SpO2: 90 % O2 Device: Nasal cannula Oxygen Delivery: 3 LPM    PRN: Tylenol x1  Running: Bumex 6mL/hr, Dobutamine 2.5 mcg/kg/mL, Remodulin 30 ng/kg/min      A:   Neuro: A/Ox4. Calls approprietly.    Cardiac/Tele:  VVS. SR. Denies chest pain.   Respiratory: 3L NC. Tolerating well.   GI/: Continent. No loose BM overnight. Urinating adequately   Diet/Appetite: 2 gram Na+ diet  Skin: No new deficits noted. Lymph edema wraps on BLE.  LDAs: R PIV, R IJ, R PICC  Activity: Standby assist.  Pain: Reported head ache and leg pain. Tylenol given     P: Continue diuretic and gtt. to monitor and notify team with changes.

## 2021-01-07 ENCOUNTER — APPOINTMENT (OUTPATIENT)
Dept: OCCUPATIONAL THERAPY | Facility: CLINIC | Age: 81
DRG: 252 | End: 2021-01-07
Attending: INTERNAL MEDICINE
Payer: COMMERCIAL

## 2021-01-07 ENCOUNTER — APPOINTMENT (OUTPATIENT)
Dept: GENERAL RADIOLOGY | Facility: CLINIC | Age: 81
DRG: 252 | End: 2021-01-07
Attending: INTERNAL MEDICINE
Payer: COMMERCIAL

## 2021-01-07 LAB
ANION GAP SERPL CALCULATED.3IONS-SCNC: 6 MMOL/L (ref 3–14)
ANION GAP SERPL CALCULATED.3IONS-SCNC: 7 MMOL/L (ref 3–14)
BUN SERPL-MCNC: 21 MG/DL (ref 7–30)
BUN SERPL-MCNC: 23 MG/DL (ref 7–30)
CALCIUM SERPL-MCNC: 10.5 MG/DL (ref 8.5–10.1)
CALCIUM SERPL-MCNC: 9.8 MG/DL (ref 8.5–10.1)
CHLORIDE SERPL-SCNC: 100 MMOL/L (ref 94–109)
CHLORIDE SERPL-SCNC: 96 MMOL/L (ref 94–109)
CO2 SERPL-SCNC: 34 MMOL/L (ref 20–32)
CO2 SERPL-SCNC: 34 MMOL/L (ref 20–32)
CREAT SERPL-MCNC: 1.08 MG/DL (ref 0.52–1.04)
CREAT SERPL-MCNC: 1.09 MG/DL (ref 0.52–1.04)
ERYTHROCYTE [DISTWIDTH] IN BLOOD BY AUTOMATED COUNT: 15.7 % (ref 10–15)
GFR SERPL CREATININE-BSD FRML MDRD: 48 ML/MIN/{1.73_M2}
GFR SERPL CREATININE-BSD FRML MDRD: 49 ML/MIN/{1.73_M2}
GLUCOSE SERPL-MCNC: 130 MG/DL (ref 70–99)
GLUCOSE SERPL-MCNC: 74 MG/DL (ref 70–99)
HCT VFR BLD AUTO: 28.5 % (ref 35–47)
HGB BLD-MCNC: 8.5 G/DL (ref 11.7–15.7)
INR PPP: 2.42 (ref 0.86–1.14)
MAGNESIUM SERPL-MCNC: 2.1 MG/DL (ref 1.6–2.3)
MAGNESIUM SERPL-MCNC: 2.2 MG/DL (ref 1.6–2.3)
MCH RBC QN AUTO: 27.2 PG (ref 26.5–33)
MCHC RBC AUTO-ENTMCNC: 29.8 G/DL (ref 31.5–36.5)
MCV RBC AUTO: 91 FL (ref 78–100)
PLATELET # BLD AUTO: 208 10E9/L (ref 150–450)
POTASSIUM SERPL-SCNC: 3.2 MMOL/L (ref 3.4–5.3)
POTASSIUM SERPL-SCNC: 3.6 MMOL/L (ref 3.4–5.3)
POTASSIUM SERPL-SCNC: 3.9 MMOL/L (ref 3.4–5.3)
RBC # BLD AUTO: 3.12 10E12/L (ref 3.8–5.2)
SODIUM SERPL-SCNC: 137 MMOL/L (ref 133–144)
SODIUM SERPL-SCNC: 140 MMOL/L (ref 133–144)
WBC # BLD AUTO: 4.1 10E9/L (ref 4–11)

## 2021-01-07 PROCEDURE — 999N000044 HC STATISTIC CVC DRESSING CHANGE

## 2021-01-07 PROCEDURE — 83735 ASSAY OF MAGNESIUM: CPT | Performed by: INTERNAL MEDICINE

## 2021-01-07 PROCEDURE — 250N000011 HC RX IP 250 OP 636: Performed by: STUDENT IN AN ORGANIZED HEALTH CARE EDUCATION/TRAINING PROGRAM

## 2021-01-07 PROCEDURE — 97140 MANUAL THERAPY 1/> REGIONS: CPT | Mod: GO | Performed by: OCCUPATIONAL THERAPIST

## 2021-01-07 PROCEDURE — 80048 BASIC METABOLIC PNL TOTAL CA: CPT | Performed by: STUDENT IN AN ORGANIZED HEALTH CARE EDUCATION/TRAINING PROGRAM

## 2021-01-07 PROCEDURE — 83735 ASSAY OF MAGNESIUM: CPT | Performed by: STUDENT IN AN ORGANIZED HEALTH CARE EDUCATION/TRAINING PROGRAM

## 2021-01-07 PROCEDURE — 250N000009 HC RX 250: Performed by: STUDENT IN AN ORGANIZED HEALTH CARE EDUCATION/TRAINING PROGRAM

## 2021-01-07 PROCEDURE — 97530 THERAPEUTIC ACTIVITIES: CPT | Mod: GO | Performed by: OCCUPATIONAL THERAPIST

## 2021-01-07 PROCEDURE — 80048 BASIC METABOLIC PNL TOTAL CA: CPT | Performed by: INTERNAL MEDICINE

## 2021-01-07 PROCEDURE — 97535 SELF CARE MNGMENT TRAINING: CPT | Mod: GO | Performed by: OCCUPATIONAL THERAPIST

## 2021-01-07 PROCEDURE — 85027 COMPLETE CBC AUTOMATED: CPT | Performed by: STUDENT IN AN ORGANIZED HEALTH CARE EDUCATION/TRAINING PROGRAM

## 2021-01-07 PROCEDURE — 272N000004 HC RX 272: Performed by: INTERNAL MEDICINE

## 2021-01-07 PROCEDURE — 250N000013 HC RX MED GY IP 250 OP 250 PS 637: Performed by: INTERNAL MEDICINE

## 2021-01-07 PROCEDURE — 85610 PROTHROMBIN TIME: CPT | Performed by: INTERNAL MEDICINE

## 2021-01-07 PROCEDURE — 250N000011 HC RX IP 250 OP 636: Performed by: INTERNAL MEDICINE

## 2021-01-07 PROCEDURE — 74018 RADEX ABDOMEN 1 VIEW: CPT

## 2021-01-07 PROCEDURE — 84132 ASSAY OF SERUM POTASSIUM: CPT | Performed by: INTERNAL MEDICINE

## 2021-01-07 PROCEDURE — 99233 SBSQ HOSP IP/OBS HIGH 50: CPT | Mod: GC | Performed by: INTERNAL MEDICINE

## 2021-01-07 PROCEDURE — 250N000013 HC RX MED GY IP 250 OP 250 PS 637: Performed by: STUDENT IN AN ORGANIZED HEALTH CARE EDUCATION/TRAINING PROGRAM

## 2021-01-07 PROCEDURE — 74018 RADEX ABDOMEN 1 VIEW: CPT | Mod: 26 | Performed by: RADIOLOGY

## 2021-01-07 PROCEDURE — 214N000001 HC R&B CCU UMMC

## 2021-01-07 RX ORDER — MAGNESIUM CARB/ALUMINUM HYDROX 105-160MG
296 TABLET,CHEWABLE ORAL ONCE
Status: COMPLETED | OUTPATIENT
Start: 2021-01-07 | End: 2021-01-07

## 2021-01-07 RX ORDER — WARFARIN SODIUM 3 MG/1
3 TABLET ORAL
Status: DISCONTINUED | OUTPATIENT
Start: 2021-01-07 | End: 2021-01-07

## 2021-01-07 RX ORDER — POTASSIUM CHLORIDE 750 MG/1
10 TABLET, EXTENDED RELEASE ORAL ONCE
Status: COMPLETED | OUTPATIENT
Start: 2021-01-07 | End: 2021-01-07

## 2021-01-07 RX ORDER — POTASSIUM CHLORIDE 1500 MG/1
60 TABLET, EXTENDED RELEASE ORAL ONCE
Status: COMPLETED | OUTPATIENT
Start: 2021-01-07 | End: 2021-01-07

## 2021-01-07 RX ORDER — WARFARIN SODIUM 4 MG/1
4 TABLET ORAL
Status: COMPLETED | OUTPATIENT
Start: 2021-01-07 | End: 2021-01-07

## 2021-01-07 RX ADMIN — RIOCIGUAT 1.5 MG: 0.5 TABLET, FILM COATED ORAL at 19:50

## 2021-01-07 RX ADMIN — IPRATROPIUM BROMIDE 2 SPRAY: 42 SPRAY NASAL at 13:58

## 2021-01-07 RX ADMIN — BUMETANIDE 1.5 MG/HR: 0.25 INJECTION INTRAMUSCULAR; INTRAVENOUS at 01:34

## 2021-01-07 RX ADMIN — POTASSIUM CHLORIDE 60 MEQ: 1500 TABLET, EXTENDED RELEASE ORAL at 17:24

## 2021-01-07 RX ADMIN — DOCUSATE SODIUM AND SENNOSIDES 2 TABLET: 8.6; 5 TABLET, FILM COATED ORAL at 07:40

## 2021-01-07 RX ADMIN — OXYCODONE HYDROCHLORIDE AND ACETAMINOPHEN 500 MG: 500 TABLET ORAL at 07:40

## 2021-01-07 RX ADMIN — GABAPENTIN 300 MG: 300 CAPSULE ORAL at 13:57

## 2021-01-07 RX ADMIN — POTASSIUM CHLORIDE 60 MEQ: 1500 TABLET, EXTENDED RELEASE ORAL at 07:40

## 2021-01-07 RX ADMIN — IPRATROPIUM BROMIDE 2 SPRAY: 42 SPRAY NASAL at 19:46

## 2021-01-07 RX ADMIN — WARFARIN SODIUM 4 MG: 4 TABLET ORAL at 17:24

## 2021-01-07 RX ADMIN — MAGNESIUM CITRATE 296 ML: 1.75 LIQUID ORAL at 13:05

## 2021-01-07 RX ADMIN — GABAPENTIN 300 MG: 300 CAPSULE ORAL at 19:48

## 2021-01-07 RX ADMIN — CETIRIZINE HYDROCHLORIDE 10 MG: 10 TABLET, FILM COATED ORAL at 19:48

## 2021-01-07 RX ADMIN — ASPIRIN 81 MG CHEWABLE TABLET 81 MG: 81 TABLET CHEWABLE at 19:47

## 2021-01-07 RX ADMIN — DIGOXIN 62.5 MCG: 0.06 TABLET ORAL at 10:20

## 2021-01-07 RX ADMIN — RIOCIGUAT 1.5 MG: 0.5 TABLET, FILM COATED ORAL at 13:57

## 2021-01-07 RX ADMIN — RIOCIGUAT 1.5 MG: 0.5 TABLET, FILM COATED ORAL at 07:39

## 2021-01-07 RX ADMIN — TREPROSTINIL 30 NG/KG/MIN: 20 INJECTION, SOLUTION INTRAVENOUS; SUBCUTANEOUS at 12:37

## 2021-01-07 RX ADMIN — POTASSIUM CHLORIDE 60 MEQ: 1500 TABLET, EXTENDED RELEASE ORAL at 19:46

## 2021-01-07 RX ADMIN — POTASSIUM CHLORIDE 10 MEQ: 750 TABLET, EXTENDED RELEASE ORAL at 10:20

## 2021-01-07 RX ADMIN — BUMETANIDE 1.5 MG/HR: 0.25 INJECTION INTRAMUSCULAR; INTRAVENOUS at 18:08

## 2021-01-07 RX ADMIN — ACETAMINOPHEN 325 MG: 325 TABLET, FILM COATED ORAL at 15:00

## 2021-01-07 RX ADMIN — CHLOROTHIAZIDE SODIUM 1000 MG: 500 INJECTION, POWDER, LYOPHILIZED, FOR SOLUTION INTRAVENOUS at 09:45

## 2021-01-07 RX ADMIN — DOCUSATE SODIUM AND SENNOSIDES 1 TABLET: 8.6; 5 TABLET, FILM COATED ORAL at 19:47

## 2021-01-07 RX ADMIN — HYDROXYCHLOROQUINE SULFATE 200 MG: 200 TABLET, FILM COATED ORAL at 07:40

## 2021-01-07 RX ADMIN — POLYETHYLENE GLYCOL 3350 17 G: 17 POWDER, FOR SOLUTION ORAL at 01:57

## 2021-01-07 RX ADMIN — Medication 2 TABLET: at 07:40

## 2021-01-07 RX ADMIN — DOBUTAMINE HYDROCHLORIDE 2.5 MCG/KG/MIN: 200 INJECTION INTRAVENOUS at 21:15

## 2021-01-07 RX ADMIN — MACITENTAN 10 MG: 10 TABLET, FILM COATED ORAL at 11:25

## 2021-01-07 RX ADMIN — GABAPENTIN 300 MG: 300 CAPSULE ORAL at 07:40

## 2021-01-07 ASSESSMENT — ACTIVITIES OF DAILY LIVING (ADL)
ADLS_ACUITY_SCORE: 13
ADLS_ACUITY_SCORE: 13
ADLS_ACUITY_SCORE: 14
ADLS_ACUITY_SCORE: 13
ADLS_ACUITY_SCORE: 14
ADLS_ACUITY_SCORE: 13

## 2021-01-07 ASSESSMENT — MIFFLIN-ST. JEOR: SCORE: 892.29

## 2021-01-07 NOTE — PROGRESS NOTES
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 Continue to diurese with bumex and diuril, output support with dobutamine, and other supportive treatment.     Plan 1/7/21  - another dose of 1 gm Diuril given this AM  - Mg citrate given another dose today  - will wean off dobutamine and plan to switch diuretic to oral medication. If tolerates oral medication, can consider discharge and reevaluate for balloon angioplasty in an outpatient setting  - plan for inpatient balloon angioplasty(3-4 stage procedure), probably next week    #Acute on chronic right-sided heart failure  Progressive worsening of right heart function. This is her second admission; previous admission in 10/2020.  This admission presented with increasing SOB, dizziness, and weight gain to 126lbs (dry weight ~112 lbs) for the last couple of weeks, increased RIVERA. Has RLE swelling but US DVT negative (12/23/2020). Patient was hypervolemic with IVC diameter 2.8 cm with no respiratory variation on admission.     Continue to diurese with satisfactory response: urine output ~ 1.5-2L, weight downtrending to 115lbs. However, still volume up from physical exam. Overall going into the right direction. Continue to diurese. Plan to wean off dubutamine and switch to oral diuretic if euvolemic. Plan inpatient balloon angioplasty in inpatient setting, likely next week.    RHC (09/2020): RA 11/6/5 RV 90/8 PA 90/20/42 PCWP --/--/11 LUIZ 2.55/1.76 TD 3/2.07 PVR 10 (TD) (Moderately elevated pulmonary artery hypertension, Right sided and Left sided filling pressures are normal)  RHC  12/23/2020: RA 19/24/22 RV 80/21 PA 80/26/42 PCWP 17 LUIZ 3/2.04 TD 3.07/2.09 PVR 8(severely elevated right-sided filling pressure and pulmonary hypertension, reduced cardiac output)  Last TTE (09/2020): Normal LVEF 60-65%. Moderately dilated and reduced function of RV. Right ventricular systolic pressure is 82mmHg above the right atrial pressure.    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 Follow-up with , outpatient plan is to consider balloon angioplasty.   - contiue Opsumit q48h  - continue Adempas 1.5 mg TID   - continue Remodulin 30 ng/kg/min  - continue Warfarin. Dosed by pharmacy.   - consider balloon angioplasty when clinical status is stabilized, likely inpatient, next week.    #Acute on chronic respiratory failure with hypoxia  Mildly improved, likely from pulmonary edema. Continue to observe and gradually wean off his oxygen.  - continue to diurese   - Try wean-off O2, keep SpO2 > 92%; currently on 3 LPM satting ~94-95%    #JOSE G on CKD, improved  Creatinine stable at 1.09 after started on dobutamine and continued with bumex drip. Hypokalemia replaced prn, otherwise no electrolyte abnormalities.  - continue diuresis  - trend BMP, Mg    #Concern for fecal impaction: Moderate stool burden with bloated abdomen  Causing abdominal comfort which interferes with breathing. Responding well to mg citrate and enema.  - simethicone prn  - continue senna BID & lactulose BID prn  - add magnesium citrate another dose today    #Hypokalemia  - KCL 40 mEq BID + prn  - trend and replace prn    #Cellulitis RLE, improved  LE edema R>L on initial presentation with some tenderness. LE ultrasound 12/23 showed no DVT. Improved after 24  hours of given Clindamycin.   - completed Clindamycin    #Asthma  #Allergic rhinitis  No acute concerns.  - Continue beclomethasone BID and albuterol inhaler PRN  - Continue cetirizine and ipratropium nasal spray    #RA  No acute concerns.  - Continue Hydroxychloroquine 200 mg daily    Diet: Na restriction diet  DVT Prophylaxis: Warfarin  Dominguez Catheter: not present  Code Status: FULL code        Disposition Plan   Expected discharge: 4 - 7 days, recommended to prior living arrangement once euvolemic status is obtained and able to be maintained with oral diuretics and without inotrope.     The patient's care was discussed with the Attending Physician, Dr. Turcios.    Emmett Schultz MD  Internal Medicine, PGY-1  ______________________________________________________________________    Interval History    K was replaced this AM. Weight down to 115 lbs today.     Feels generally better. Had less bowel movement overnight. Still feel bloated but better. Breathing is about the same. Reports some dizziness&lightheadedness this AM. Denies any chest pain, palpitation, nausea, or vomiting. Otherwise no other complains.    Data reviewed today: I reviewed all medications, new labs and imaging results over the last 24 hours and discussed as pertinent in assessment and plan.    Physical Exam   Vital Signs: Temp: 98.4  F (36.9  C) Temp src: Oral BP: 128/59 Pulse: 84   Resp: 18 SpO2: 93 % O2 Device: Nasal cannula Oxygen Delivery: 2 LPM  Weight: 115 lbs 3.2 oz up by one pound    Constitutional: awake, alert, cooperative, looks fatigue, and mildly dyspneic while talking  Neck: CVP~15  Respiratory: No increased work of breathing, crackles at both lower lungs, no wheezing  Cardiovascular: Regular rate and rhythm, normal S1S2, and no murmur   GI: distended(improved), normal bowel sounds, soft, non-tender  Musculoskeletal: 2+ lower extremity pitting edema present R>L(mildly improved). Erythema, warmth, and tenderness on  posterior RLE below knee.  Skin: mostly tenderness on lower extremities; R>L.  Neurologic: Awake, alert. No focal neurological deficit.    Data   Recent Labs   Lab 01/07/21  0455 01/07/21  0010 01/06/21  1701 01/06/21  1110 01/06/21  0515 01/05/21  0600 01/05/21  0600   WBC 4.1  --   --   --   --   --   --    HGB 8.5*  --   --   --   --   --   --    MCV 91  --   --   --   --   --   --      --   --   --   --   --   --    INR 2.42*  --   --   --  2.75*  --  2.22*     --  136 139 139   < > 139   POTASSIUM 3.6 3.9 3.2* 3.5 3.0*   < > 3.3*   CHLORIDE 100  --  95 98 98   < > 100   CO2 34*  --  37* 35* 35*   < > 34*   BUN 23  --  25 24 24   < > 23   CR 1.08*  --  1.06* 1.11* 1.10*   < > 1.06*   ANIONGAP 6  --  4 6 7   < > 6   ESTEFANÍA 9.8  --  9.4 9.4 9.2   < > 9.3   GLC 74  --  124* 149* 84   < > 73    < > = values in this interval not displayed.     Medications     bumetanide 1.5 mg/hr (01/07/21 0134)     DOBUTamine 2.5 mcg/kg/min (01/06/21 2354)     - MEDICATION INSTRUCTIONS -       - MEDICATION INSTRUCTIONS -       - MEDICATION INSTRUCTIONS -       treprostinil (REMODULIN) intravenous infusion (LESS than or EQUAL to 100 mcg/mL) 30 ng/kg/min (01/06/21 2354)     Warfarin Therapy Reminder         aspirin  81 mg Oral QPM     beclomethasone HFA  2 puff Inhalation BID     calcium citrate and vitamin D  2 tablet Oral Daily     cetirizine  10 mg Oral QPM     digoxin  62.5 mcg Oral Daily     gabapentin  300 mg Oral TID     hydroxychloroquine  200 mg Oral Daily     ipratropium  2 spray Both Nostrils TID     macitentan  10 mg Oral Q48H     polyethylene glycol  17 g Oral Daily     potassium chloride  40 mEq Oral Once     potassium chloride  60 mEq Oral BID     riociguat  1.5 mg Oral TID     senna-docusate  1 tablet Oral BID    Or     senna-docusate  2 tablet Oral BID     sodium chloride (PF)  3 mL Intracatheter Q8H     vitamin C  500 mg Oral QAM

## 2021-01-07 NOTE — PLAN OF CARE
Pt went for abdominal xray this morning to assess stool burden. Pt was anxious about having no stools last night, so she took miralax in the early morning hours and since has begun having small loose stools again. Pt remains very anxious about her bowel status, frequency of stools, what foods to eat and when to take bowel meds. Abdomen softening.   Sats maintained on 2L NC-90-92%.  Good urine output continues with bumex gtt @1.5mg. Replacing K as needed per protocol. K 3.6 this morning. Recheck lytes at 1600.   Lymph edema therapy following. When LE wraps initially removed today, edema was much improved; especially noted in R leg. Then pt was interrupted by going to xray and when she returned, swelling in R leg had already begun without wraps in place. RLE re-wrapped; LLE has compression stocking.  Continues on dobutamine & remodulin infusions.    Addendum: Abdom xray shows improved stool burden. Pt to receive mag citrate now and possibly another pink lady enema later today per Cards 2 team. She has continued to have small-med loose stools throughout day.

## 2021-01-07 NOTE — PLAN OF CARE
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 Pt continues on bumex, dobutamine, and remodulin drips. Last K+ 3.2. MD notified as protocol would only have her get 20 meq. Dr ordered 60 meq now and pt had another 60 meq scheduled for 8PM. K+ recheck ordered for MN, PCU collect. Good urine and loose stool output per BSC. Pt states her abdomen is less distended, abdominal xray ordered for the AM. LEs with some edema R>L. Wrap and stocking on. Pt ambulated well with the NST in the [place]. Sats on 2lnc drop into the 80s with activity but are in the low 90s at rest, she refused increase in O2 as she wants to wean down and feels fine. VSS, SR 80s-100s. Pleasant and mostly independent in the room.

## 2021-01-07 NOTE — PLAN OF CARE
D: Admitted 12/23 with acute on chronic right sided heart failure with acute hypoxic respiratory failure. Hx of pulmonary HTN 2/2 to CTEPH (WHO group IV) with severe RV dysfunction, RA, HTN, and asthma.     I: Monitored vitals and assessed pt status.   Changed: Gave morning miralax dose early. Pt c/o of not having a BM in 5 hours.   Running: Bumex gtt 1.5 mg/hr (6mL/hr); dobutamine gtt 2.5 mcg/kg/min (4.2 mL/hr); remodulin 30 ng/kg/min (1.69 mL/hr)   PRN: tylenol     A: A0x4. VSS. SR. 2L O2 NC. Afebrile. Urinating adequately. Has been using bedside commode. Up independently/ SBA. No BM this shift. C/o pain in legs, PRN tylenol given for relief. Right leg ACE wrapped, left leg compression stocking. 0000 K+ level 3.9, per protocol, recheck in am. Patient expressed concerns that she has not had a BM since 2030 yesterday, gave morning dose of miralax overnight. Able to express needs.    P: Continue to monitor Pt status and report changes to CARDS 2.

## 2021-01-08 ENCOUNTER — APPOINTMENT (OUTPATIENT)
Dept: CARDIOLOGY | Facility: CLINIC | Age: 81
DRG: 252 | End: 2021-01-08
Attending: INTERNAL MEDICINE
Payer: COMMERCIAL

## 2021-01-08 ENCOUNTER — APPOINTMENT (OUTPATIENT)
Dept: OCCUPATIONAL THERAPY | Facility: CLINIC | Age: 81
DRG: 252 | End: 2021-01-08
Attending: INTERNAL MEDICINE
Payer: COMMERCIAL

## 2021-01-08 ENCOUNTER — HOSPITAL ENCOUNTER (OUTPATIENT)
Facility: CLINIC | Age: 81
End: 2021-01-08
Attending: INTERNAL MEDICINE | Admitting: INTERNAL MEDICINE
Payer: COMMERCIAL

## 2021-01-08 DIAGNOSIS — I27.24 CTEPH (CHRONIC THROMBOEMBOLIC PULMONARY HYPERTENSION) (H): Primary | ICD-10-CM

## 2021-01-08 DIAGNOSIS — R06.02 SOB (SHORTNESS OF BREATH): ICD-10-CM

## 2021-01-08 DIAGNOSIS — Z11.59 ENCOUNTER FOR SCREENING FOR OTHER VIRAL DISEASES: Primary | ICD-10-CM

## 2021-01-08 DIAGNOSIS — I27.24 CTEPH (CHRONIC THROMBOEMBOLIC PULMONARY HYPERTENSION) (H): ICD-10-CM

## 2021-01-08 LAB
ANION GAP SERPL CALCULATED.3IONS-SCNC: 4 MMOL/L (ref 3–14)
ANION GAP SERPL CALCULATED.3IONS-SCNC: 5 MMOL/L (ref 3–14)
BUN SERPL-MCNC: 21 MG/DL (ref 7–30)
BUN SERPL-MCNC: 23 MG/DL (ref 7–30)
CALCIUM SERPL-MCNC: 9.4 MG/DL (ref 8.5–10.1)
CALCIUM SERPL-MCNC: 9.9 MG/DL (ref 8.5–10.1)
CHLORIDE SERPL-SCNC: 98 MMOL/L (ref 94–109)
CHLORIDE SERPL-SCNC: 99 MMOL/L (ref 94–109)
CO2 SERPL-SCNC: 34 MMOL/L (ref 20–32)
CO2 SERPL-SCNC: 35 MMOL/L (ref 20–32)
CREAT SERPL-MCNC: 1.05 MG/DL (ref 0.52–1.04)
CREAT SERPL-MCNC: 1.16 MG/DL (ref 0.52–1.04)
GFR SERPL CREATININE-BSD FRML MDRD: 45 ML/MIN/{1.73_M2}
GFR SERPL CREATININE-BSD FRML MDRD: 51 ML/MIN/{1.73_M2}
GLUCOSE SERPL-MCNC: 118 MG/DL (ref 70–99)
GLUCOSE SERPL-MCNC: 80 MG/DL (ref 70–99)
INR PPP: 2.41 (ref 0.86–1.14)
MAGNESIUM SERPL-MCNC: 2.2 MG/DL (ref 1.6–2.3)
MAGNESIUM SERPL-MCNC: 2.2 MG/DL (ref 1.6–2.3)
POTASSIUM SERPL-SCNC: 3.8 MMOL/L (ref 3.4–5.3)
POTASSIUM SERPL-SCNC: 4.4 MMOL/L (ref 3.4–5.3)
POTASSIUM SERPL-SCNC: 4.7 MMOL/L (ref 3.4–5.3)
SODIUM SERPL-SCNC: 136 MMOL/L (ref 133–144)
SODIUM SERPL-SCNC: 138 MMOL/L (ref 133–144)

## 2021-01-08 PROCEDURE — 250N000013 HC RX MED GY IP 250 OP 250 PS 637: Performed by: STUDENT IN AN ORGANIZED HEALTH CARE EDUCATION/TRAINING PROGRAM

## 2021-01-08 PROCEDURE — 36592 COLLECT BLOOD FROM PICC: CPT | Performed by: STUDENT IN AN ORGANIZED HEALTH CARE EDUCATION/TRAINING PROGRAM

## 2021-01-08 PROCEDURE — 84132 ASSAY OF SERUM POTASSIUM: CPT | Performed by: INTERNAL MEDICINE

## 2021-01-08 PROCEDURE — 250N000011 HC RX IP 250 OP 636: Performed by: INTERNAL MEDICINE

## 2021-01-08 PROCEDURE — 99232 SBSQ HOSP IP/OBS MODERATE 35: CPT | Mod: 25 | Performed by: INTERNAL MEDICINE

## 2021-01-08 PROCEDURE — 97110 THERAPEUTIC EXERCISES: CPT | Mod: GO

## 2021-01-08 PROCEDURE — 80048 BASIC METABOLIC PNL TOTAL CA: CPT | Performed by: STUDENT IN AN ORGANIZED HEALTH CARE EDUCATION/TRAINING PROGRAM

## 2021-01-08 PROCEDURE — 93306 TTE W/DOPPLER COMPLETE: CPT

## 2021-01-08 PROCEDURE — 85610 PROTHROMBIN TIME: CPT | Performed by: INTERNAL MEDICINE

## 2021-01-08 PROCEDURE — 250N000013 HC RX MED GY IP 250 OP 250 PS 637: Performed by: INTERNAL MEDICINE

## 2021-01-08 PROCEDURE — 214N000001 HC R&B CCU UMMC

## 2021-01-08 PROCEDURE — 83735 ASSAY OF MAGNESIUM: CPT | Performed by: STUDENT IN AN ORGANIZED HEALTH CARE EDUCATION/TRAINING PROGRAM

## 2021-01-08 PROCEDURE — 80048 BASIC METABOLIC PNL TOTAL CA: CPT | Performed by: INTERNAL MEDICINE

## 2021-01-08 PROCEDURE — 83735 ASSAY OF MAGNESIUM: CPT | Performed by: INTERNAL MEDICINE

## 2021-01-08 PROCEDURE — 272N000004 HC RX 272: Performed by: INTERNAL MEDICINE

## 2021-01-08 PROCEDURE — 93306 TTE W/DOPPLER COMPLETE: CPT | Mod: 26 | Performed by: INTERNAL MEDICINE

## 2021-01-08 PROCEDURE — 250N000009 HC RX 250: Performed by: STUDENT IN AN ORGANIZED HEALTH CARE EDUCATION/TRAINING PROGRAM

## 2021-01-08 RX ORDER — SODIUM CHLORIDE 9 MG/ML
INJECTION, SOLUTION INTRAVENOUS CONTINUOUS
Status: CANCELLED | OUTPATIENT
Start: 2021-01-08

## 2021-01-08 RX ORDER — LIDOCAINE 40 MG/G
CREAM TOPICAL
Status: CANCELLED | OUTPATIENT
Start: 2021-01-08

## 2021-01-08 RX ORDER — POTASSIUM CHLORIDE 1500 MG/1
20 TABLET, EXTENDED RELEASE ORAL
Status: CANCELLED | OUTPATIENT
Start: 2021-01-08

## 2021-01-08 RX ORDER — POTASSIUM CHLORIDE 1500 MG/1
40 TABLET, EXTENDED RELEASE ORAL
Status: CANCELLED | OUTPATIENT
Start: 2021-01-08

## 2021-01-08 RX ADMIN — GABAPENTIN 300 MG: 300 CAPSULE ORAL at 08:54

## 2021-01-08 RX ADMIN — ACETAMINOPHEN 325 MG: 325 TABLET, FILM COATED ORAL at 11:11

## 2021-01-08 RX ADMIN — GABAPENTIN 300 MG: 300 CAPSULE ORAL at 20:00

## 2021-01-08 RX ADMIN — IPRATROPIUM BROMIDE 2 SPRAY: 42 SPRAY NASAL at 08:55

## 2021-01-08 RX ADMIN — POLYETHYLENE GLYCOL 3350 17 G: 17 POWDER, FOR SOLUTION ORAL at 08:54

## 2021-01-08 RX ADMIN — ASPIRIN 81 MG CHEWABLE TABLET 81 MG: 81 TABLET CHEWABLE at 20:01

## 2021-01-08 RX ADMIN — RIOCIGUAT 1.5 MG: 0.5 TABLET, FILM COATED ORAL at 08:54

## 2021-01-08 RX ADMIN — CETIRIZINE HYDROCHLORIDE 10 MG: 10 TABLET, FILM COATED ORAL at 20:00

## 2021-01-08 RX ADMIN — ACETAMINOPHEN 325 MG: 325 TABLET, FILM COATED ORAL at 00:20

## 2021-01-08 RX ADMIN — DOCUSATE SODIUM AND SENNOSIDES 1 TABLET: 8.6; 5 TABLET, FILM COATED ORAL at 09:53

## 2021-01-08 RX ADMIN — POTASSIUM CHLORIDE 60 MEQ: 1500 TABLET, EXTENDED RELEASE ORAL at 11:11

## 2021-01-08 RX ADMIN — BUMETANIDE 1.5 MG/HR: 0.25 INJECTION INTRAMUSCULAR; INTRAVENOUS at 13:00

## 2021-01-08 RX ADMIN — Medication 25 MG: at 15:39

## 2021-01-08 RX ADMIN — HYDROXYCHLOROQUINE SULFATE 200 MG: 200 TABLET, FILM COATED ORAL at 08:54

## 2021-01-08 RX ADMIN — DOCUSATE SODIUM AND SENNOSIDES 2 TABLET: 8.6; 5 TABLET, FILM COATED ORAL at 20:00

## 2021-01-08 RX ADMIN — IPRATROPIUM BROMIDE 2 SPRAY: 42 SPRAY NASAL at 19:59

## 2021-01-08 RX ADMIN — Medication 2 TABLET: at 08:54

## 2021-01-08 RX ADMIN — RIOCIGUAT 1.5 MG: 0.5 TABLET, FILM COATED ORAL at 20:01

## 2021-01-08 RX ADMIN — OXYCODONE HYDROCHLORIDE AND ACETAMINOPHEN 500 MG: 500 TABLET ORAL at 08:54

## 2021-01-08 RX ADMIN — DIGOXIN 62.5 MCG: 0.06 TABLET ORAL at 09:50

## 2021-01-08 RX ADMIN — GABAPENTIN 300 MG: 300 CAPSULE ORAL at 15:41

## 2021-01-08 RX ADMIN — RIOCIGUAT 1.5 MG: 0.5 TABLET, FILM COATED ORAL at 15:40

## 2021-01-08 RX ADMIN — TREPROSTINIL 30 NG/KG/MIN: 20 INJECTION, SOLUTION INTRAVENOUS; SUBCUTANEOUS at 16:49

## 2021-01-08 RX ADMIN — WARFARIN SODIUM 3.5 MG: 2.5 TABLET ORAL at 18:29

## 2021-01-08 RX ADMIN — IPRATROPIUM BROMIDE 2 SPRAY: 42 SPRAY NASAL at 15:41

## 2021-01-08 ASSESSMENT — ACTIVITIES OF DAILY LIVING (ADL)
ADLS_ACUITY_SCORE: 13
ADLS_ACUITY_SCORE: 14
ADLS_ACUITY_SCORE: 13
ADLS_ACUITY_SCORE: 13

## 2021-01-08 ASSESSMENT — MIFFLIN-ST. JEOR: SCORE: 868.71

## 2021-01-08 NOTE — PROGRESS NOTES
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 Continue to diurese with bumex and diuril, output support with dobutamine, and other supportive treatment.     Changes 1/8  - continue Bumex 1.5 mg/hr  - hold dobutamine  - will reevaluate after holding dobutamine  - plan for inpatient balloon angioplasty(3-4 stage procedure) next Tuesday 1/12; need to consider lowering INR to lower than 2 prior to the procedure    #Acute on chronic right-sided heart failure  Progressive worsening of right heart function. This is her second admission; previous admission in 10/2020.   This admission presented with increasing SOB, dizziness, and weight gain to 126lbs (dry weight ~112 lbs) for the last couple of weeks, increased RIVERA. Has RLE swelling but US DVT negative (12/23/2020). Patient was hypervolemic with IVC diameter 2.8 cm with no respiratory variation on admission.     Continue to diurese with satisfactory response: urine output > 3.5 L overnight, weight downtrending from 115-110 lbs. Improved volume status; JVP 12, decrease in peripheral edema. Overall going into the right direction. Will try to hold of dobutamine today so evaluate if she still need output support and will continue to diurese with Bumex drip. No additional diuril today. Plan inpatient balloon angioplasty next Tuesday 1/12/21.    RHC (09/2020): RA 11/6/5 RV 90/8 PA 90/20/42 PCWP --/--/11 LUIZ 2.55/1.76 TD 3/2.07 PVR 10 (TD) (Moderately elevated pulmonary artery hypertension, Right sided and Left sided filling pressures are normal)  RHC 12/23/2020: RA 19/24/22 RV 80/21 PA  80/26/42 PCWP 17 LUIZ 3/2.04 TD 3.07/2.09 PVR 8(severely elevated right-sided filling pressure and pulmonary hypertension, reduced cardiac output)  Last TTE (09/2020): Normal LVEF 60-65%. Moderately dilated and reduced function of RV. Right ventricular systolic pressure is 82mmHg above the right atrial pressure.    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 Follow-up with , outpatient plan is to consider balloon angioplasty.   - contiue Macitentin q48h  - continue Riociguat 1.5 mg TID   - continue Treprostinil 30 ng/kg/min  - continue Warfarin. Dosed by pharmacy; consider lowering INR prior to procedure   - balloon angioplasty next Tuesday 1/12/20    #Acute on chronic respiratory failure with hypoxia  Mildly improved, likely from pulmonary edema. Continue to observe and gradually wean off his oxygen.  - continue to diurese   - Try wean-off O2, keep SpO2 > 92%; currently on 3 LPM satting ~94-95%    #JOSE G on CKD, improved  Creatinine stable at 1.05 despite aggressive diuresis. Hypokalemia replaced prn, otherwise no electrolyte abnormalities.  - continue diuresis  - trend BMP, Mg    #Concern for fecal impaction: Moderate stool burden with bloated abdomen  Causing abdominal comfort which interferes with breathing. Responding well to mg citrate and enema.  - simethicone prn  - continue senna BID & lactulose BID prn    #Hypokalemia  - KCL 60 mEq BID + prn  - trend and replace prn    #Cellulitis RLE, improved  LE edema R>L on initial presentation with some tenderness. LE ultrasound 12/23 showed no DVT. Improved after 24 hours of given Clindamycin.   - completed  Clindamycin    #Asthma  #Allergic rhinitis  No acute concerns.  - Continue beclomethasone BID and albuterol inhaler PRN  - Continue cetirizine and ipratropium nasal spray    #RA  No acute concerns.  - Continue Hydroxychloroquine 200 mg daily    Diet: Na restriction diet  DVT Prophylaxis: Warfarin  Dominguez Catheter: not present  Code Status: FULL code        Disposition Plan   Expected discharge: 4 - 7 days, recommended to prior living arrangement once euvolemic status is obtained and able to be maintained with oral diuretics and without inotrope.     The patient's care was discussed with the Attending Physician, Dr. Marlow.    Emmett Schultz MD  Internal Medicine, PGY-1      I have reviewed today's vital signs, notes, medications, labs and imaging. I have also seen and examined the patient and agree with the findings and plan as outlined above.    Gladys Marlow MD  Section Head - Advanced Heart Failure, Transplantation and Mechanical Circulatory Support  Director - [facility]  Associate Professor of Medicine, [facility]  ______________________________________________________________________    Interval History    K was replaced this AM. Weight down to 110 lbs today.     Feels generally better. Had a lot of bowel movements overnight. Still feel bloated but better after another dose of mg citrate. Breathing is about the same. Denies any chest pain, palpitation, nausea, vomiting, lightheadedness, or dizziness. Otherwise no other complains.    Data reviewed today: I reviewed all medications, new labs and imaging results over the last 24 hours and discussed as pertinent in assessment and plan.    Physical Exam   Vital Signs: Temp: (P) 98.6  F (37  C)(Simultaneous filing. User may not have seen previous data.) Temp src: (P) Oral(Simultaneous filing. User may not have seen previous data.) BP: (P) 109/46(Simultaneous filing. User may be unaware of other  data.) Pulse: 73   Resp: (P) 16(Simultaneous filing. User may be unaware of other data.) SpO2: (P) 92 %(Simultaneous filing. User may not have seen previous data.) O2 Device: (P) Nasal cannula with humidification(Simultaneous filing. User may not have seen previous data.) Oxygen Delivery: (P) 2 LPM(Simultaneous filing. User may not have seen previous data.)  Weight: 110 lbs 0 oz down by 5 lbs    Constitutional: awake, alert, cooperative, looks fatigue, and mildly dyspneic while talking  Neck: CVP~12  Respiratory: No increased work of breathing, decrease crackles at both lower lungs, no wheezing  Cardiovascular: Regular rate and rhythm, normal S1S2, and no murmur, peripheral pitting edema both sides R>L.  GI: distended(improved), normal bowel sounds, soft, non-tender  Musculoskeletal: 2+ lower extremity pitting edema present R>L( improved). Erythema, warmth, and tenderness on posterior RLE below knee.  Skin: mostly tenderness on lower extremities; R>L.  Neurologic: Awake, alert. No focal neurological deficit.    Data   Recent Labs   Lab 01/08/21  0450 01/08/21  0005 01/07/21  1626 01/07/21  0455 01/06/21  0515 01/06/21  0515   WBC  --   --   --  4.1  --   --    HGB  --   --   --  8.5*  --   --    MCV  --   --   --  91  --   --    PLT  --   --   --  208  --   --    INR 2.41*  --   --  2.42*  --  2.75*     --  137 140   < > 139   POTASSIUM 3.8 4.4 3.2* 3.6   < > 3.0*   CHLORIDE 99  --  96 100   < > 98   CO2 34*  --  34* 34*   < > 35*   BUN 21  --  21 23   < > 24   CR 1.05*  --  1.09* 1.08*   < > 1.10*   ANIONGAP 5  --  7 6   < > 7   ESTEFANÍA 9.9  --  10.5* 9.8   < > 9.2   GLC 80  --  130* 74   < > 84    < > = values in this interval not displayed.     Medications     bumetanide 1.5 mg/hr (01/08/21 1300)     [Held by provider] DOBUTamine Stopped (01/08/21 1536)     - MEDICATION INSTRUCTIONS -       - MEDICATION INSTRUCTIONS -       - MEDICATION INSTRUCTIONS -       treprostinil (REMODULIN) intravenous infusion (LESS  than or EQUAL to 100 mcg/mL) 30 ng/kg/min (01/08/21 0001)     Warfarin Therapy Reminder         aspirin  81 mg Oral QPM     beclomethasone HFA  2 puff Inhalation BID     calcium citrate and vitamin D  2 tablet Oral Daily     cetirizine  10 mg Oral QPM     digoxin  62.5 mcg Oral Daily     gabapentin  300 mg Oral TID     hydroxychloroquine  200 mg Oral Daily     ipratropium  2 spray Both Nostrils TID     macitentan  10 mg Oral Q48H     polyethylene glycol  17 g Oral Daily     potassium chloride  60 mEq Oral BID     riociguat  1.5 mg Oral TID     senna-docusate  1 tablet Oral BID    Or     senna-docusate  2 tablet Oral BID     sodium chloride (PF)  3 mL Intracatheter Q8H     vitamin C  500 mg Oral QAM     warfarin ANTICOAGULANT  3.5 mg Oral ONCE at 18:00

## 2021-01-08 NOTE — PLAN OF CARE
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 Pt continues on bumex, dobutamine, and remodulin drips. She is trying to clear her bowels and has been urinating and stooling all evening, often watery diarrhea. Last K+ 3.2, MD called as the protocol does not give enough potassium. 60 meq KCL given at 17:30 and another 60 meq scheduled at 20:00. PCU collect recheck K+ scheduled for midnight. VSS, continues on O2 2lnc. Commode at the bedside, mostly up independently but needs some help with pull up changes. Bilateral legs wrapped for edema with seems to be decreasing. Pt has been quite anxious this evening, acceptance and reassurance offered frequently.

## 2021-01-08 NOTE — PLAN OF CARE
D: Admitted 12/23 with acute on chronic right sided heart failure with acute hypoxic respiratory failure. Hx of pulmonary HTN 2/2 to CTEPH (WHO group IV) with severe RV dysfunction, RA, HTN, and asthma.    I: Monitored vitals and assessed pt status.   Changed:  Running: Bumex 1.5 mg/hr; dobutamine 2.5 mcg/kg/min (4.2 mL/hr); remodulin 30 ng/kg/min (1.69 mL/hr)   PRN: tylenol     A: A0x4. Able to express needs, can be fixated on certain situations in care. Very anxious. VSS. SR. 2 L NC O2. Afebrile. Urinating adequately, see flow sheets for output. Loose stools throughout night. K+ recheck at 0000 was 4.4. Right leg ACE wrapped, left leg compression stocking. Up independently, uses bedside commode. Mepilex on coccyx. Pain in legs, PRN tylenol given for relief.     P: Continue to monitor Pt status and report changes to CARDS 2.

## 2021-01-09 ENCOUNTER — APPOINTMENT (OUTPATIENT)
Dept: OCCUPATIONAL THERAPY | Facility: CLINIC | Age: 81
DRG: 252 | End: 2021-01-09
Attending: INTERNAL MEDICINE
Payer: COMMERCIAL

## 2021-01-09 ENCOUNTER — APPOINTMENT (OUTPATIENT)
Dept: GENERAL RADIOLOGY | Facility: CLINIC | Age: 81
DRG: 252 | End: 2021-01-09
Attending: STUDENT IN AN ORGANIZED HEALTH CARE EDUCATION/TRAINING PROGRAM
Payer: COMMERCIAL

## 2021-01-09 LAB
ANION GAP SERPL CALCULATED.3IONS-SCNC: 7 MMOL/L (ref 3–14)
ANION GAP SERPL CALCULATED.3IONS-SCNC: 7 MMOL/L (ref 3–14)
BUN SERPL-MCNC: 28 MG/DL (ref 7–30)
BUN SERPL-MCNC: 31 MG/DL (ref 7–30)
CALCIUM SERPL-MCNC: 10 MG/DL (ref 8.5–10.1)
CALCIUM SERPL-MCNC: 9.6 MG/DL (ref 8.5–10.1)
CHLORIDE SERPL-SCNC: 92 MMOL/L (ref 94–109)
CHLORIDE SERPL-SCNC: 96 MMOL/L (ref 94–109)
CO2 SERPL-SCNC: 34 MMOL/L (ref 20–32)
CO2 SERPL-SCNC: 36 MMOL/L (ref 20–32)
CREAT SERPL-MCNC: 1.23 MG/DL (ref 0.52–1.04)
CREAT SERPL-MCNC: 1.29 MG/DL (ref 0.52–1.04)
GFR SERPL CREATININE-BSD FRML MDRD: 40 ML/MIN/{1.73_M2}
GFR SERPL CREATININE-BSD FRML MDRD: 42 ML/MIN/{1.73_M2}
GLUCOSE SERPL-MCNC: 142 MG/DL (ref 70–99)
GLUCOSE SERPL-MCNC: 76 MG/DL (ref 70–99)
INR PPP: 2 (ref 0.86–1.14)
MAGNESIUM SERPL-MCNC: 2.2 MG/DL (ref 1.6–2.3)
MAGNESIUM SERPL-MCNC: 2.5 MG/DL (ref 1.6–2.3)
POTASSIUM SERPL-SCNC: 3 MMOL/L (ref 3.4–5.3)
POTASSIUM SERPL-SCNC: 3.4 MMOL/L (ref 3.4–5.3)
SODIUM SERPL-SCNC: 135 MMOL/L (ref 133–144)
SODIUM SERPL-SCNC: 137 MMOL/L (ref 133–144)

## 2021-01-09 PROCEDURE — 80048 BASIC METABOLIC PNL TOTAL CA: CPT | Performed by: STUDENT IN AN ORGANIZED HEALTH CARE EDUCATION/TRAINING PROGRAM

## 2021-01-09 PROCEDURE — 214N000001 HC R&B CCU UMMC

## 2021-01-09 PROCEDURE — 250N000013 HC RX MED GY IP 250 OP 250 PS 637: Performed by: INTERNAL MEDICINE

## 2021-01-09 PROCEDURE — 97140 MANUAL THERAPY 1/> REGIONS: CPT | Mod: GO

## 2021-01-09 PROCEDURE — 83735 ASSAY OF MAGNESIUM: CPT | Performed by: STUDENT IN AN ORGANIZED HEALTH CARE EDUCATION/TRAINING PROGRAM

## 2021-01-09 PROCEDURE — 250N000009 HC RX 250: Performed by: STUDENT IN AN ORGANIZED HEALTH CARE EDUCATION/TRAINING PROGRAM

## 2021-01-09 PROCEDURE — 80048 BASIC METABOLIC PNL TOTAL CA: CPT | Performed by: INTERNAL MEDICINE

## 2021-01-09 PROCEDURE — 36592 COLLECT BLOOD FROM PICC: CPT | Performed by: INTERNAL MEDICINE

## 2021-01-09 PROCEDURE — 272N000004 HC RX 272: Performed by: INTERNAL MEDICINE

## 2021-01-09 PROCEDURE — 250N000013 HC RX MED GY IP 250 OP 250 PS 637: Performed by: STUDENT IN AN ORGANIZED HEALTH CARE EDUCATION/TRAINING PROGRAM

## 2021-01-09 PROCEDURE — 74018 RADEX ABDOMEN 1 VIEW: CPT

## 2021-01-09 PROCEDURE — 36592 COLLECT BLOOD FROM PICC: CPT | Performed by: STUDENT IN AN ORGANIZED HEALTH CARE EDUCATION/TRAINING PROGRAM

## 2021-01-09 PROCEDURE — 250N000011 HC RX IP 250 OP 636: Performed by: STUDENT IN AN ORGANIZED HEALTH CARE EDUCATION/TRAINING PROGRAM

## 2021-01-09 PROCEDURE — 83735 ASSAY OF MAGNESIUM: CPT | Performed by: INTERNAL MEDICINE

## 2021-01-09 PROCEDURE — 85610 PROTHROMBIN TIME: CPT | Performed by: INTERNAL MEDICINE

## 2021-01-09 PROCEDURE — 97530 THERAPEUTIC ACTIVITIES: CPT | Mod: GO

## 2021-01-09 PROCEDURE — 74018 RADEX ABDOMEN 1 VIEW: CPT | Mod: 26 | Performed by: RADIOLOGY

## 2021-01-09 PROCEDURE — 97535 SELF CARE MNGMENT TRAINING: CPT | Mod: GO

## 2021-01-09 PROCEDURE — 250N000011 HC RX IP 250 OP 636: Performed by: INTERNAL MEDICINE

## 2021-01-09 PROCEDURE — 99232 SBSQ HOSP IP/OBS MODERATE 35: CPT | Mod: GC | Performed by: INTERNAL MEDICINE

## 2021-01-09 RX ORDER — MAGNESIUM CARB/ALUMINUM HYDROX 105-160MG
296 TABLET,CHEWABLE ORAL ONCE
Status: COMPLETED | OUTPATIENT
Start: 2021-01-09 | End: 2021-01-09

## 2021-01-09 RX ORDER — POTASSIUM CHLORIDE 750 MG/1
60 TABLET, EXTENDED RELEASE ORAL ONCE
Status: COMPLETED | OUTPATIENT
Start: 2021-01-09 | End: 2021-01-09

## 2021-01-09 RX ORDER — WARFARIN SODIUM 5 MG/1
5 TABLET ORAL
Status: DISCONTINUED | OUTPATIENT
Start: 2021-01-09 | End: 2021-01-09

## 2021-01-09 RX ORDER — POTASSIUM CHLORIDE 750 MG/1
20 TABLET, EXTENDED RELEASE ORAL ONCE
Status: DISCONTINUED | OUTPATIENT
Start: 2021-01-09 | End: 2021-01-10 | Stop reason: CLARIF

## 2021-01-09 RX ADMIN — WARFARIN SODIUM 3.5 MG: 2.5 TABLET ORAL at 17:37

## 2021-01-09 RX ADMIN — POTASSIUM CHLORIDE 60 MEQ: 1500 TABLET, EXTENDED RELEASE ORAL at 11:12

## 2021-01-09 RX ADMIN — RIOCIGUAT 1.5 MG: 0.5 TABLET, FILM COATED ORAL at 07:47

## 2021-01-09 RX ADMIN — MAGNESIUM CITRATE 296 ML: 1.75 LIQUID ORAL at 15:02

## 2021-01-09 RX ADMIN — BUMETANIDE 1.5 MG/HR: 0.25 INJECTION INTRAMUSCULAR; INTRAVENOUS at 02:56

## 2021-01-09 RX ADMIN — Medication 2 TABLET: at 07:47

## 2021-01-09 RX ADMIN — HYDROXYCHLOROQUINE SULFATE 200 MG: 200 TABLET, FILM COATED ORAL at 07:48

## 2021-01-09 RX ADMIN — BUMETANIDE 1.5 MG/HR: 0.25 INJECTION INTRAMUSCULAR; INTRAVENOUS at 17:03

## 2021-01-09 RX ADMIN — GABAPENTIN 300 MG: 300 CAPSULE ORAL at 20:37

## 2021-01-09 RX ADMIN — OXYCODONE HYDROCHLORIDE AND ACETAMINOPHEN 500 MG: 500 TABLET ORAL at 07:48

## 2021-01-09 RX ADMIN — ALTEPLASE 1 MG: 2.2 INJECTION, POWDER, LYOPHILIZED, FOR SOLUTION INTRAVENOUS at 11:37

## 2021-01-09 RX ADMIN — IPRATROPIUM BROMIDE 2 SPRAY: 42 SPRAY NASAL at 20:38

## 2021-01-09 RX ADMIN — POLYETHYLENE GLYCOL 3350 17 G: 17 POWDER, FOR SOLUTION ORAL at 07:48

## 2021-01-09 RX ADMIN — RIOCIGUAT 1.5 MG: 0.5 TABLET, FILM COATED ORAL at 20:38

## 2021-01-09 RX ADMIN — ASPIRIN 81 MG CHEWABLE TABLET 81 MG: 81 TABLET CHEWABLE at 20:37

## 2021-01-09 RX ADMIN — DIGOXIN 62.5 MCG: 0.06 TABLET ORAL at 10:15

## 2021-01-09 RX ADMIN — GABAPENTIN 300 MG: 300 CAPSULE ORAL at 07:48

## 2021-01-09 RX ADMIN — CETIRIZINE HYDROCHLORIDE 10 MG: 10 TABLET, FILM COATED ORAL at 20:37

## 2021-01-09 RX ADMIN — POTASSIUM CHLORIDE 60 MEQ: 750 TABLET, EXTENDED RELEASE ORAL at 23:22

## 2021-01-09 RX ADMIN — IPRATROPIUM BROMIDE 2 SPRAY: 42 SPRAY NASAL at 14:18

## 2021-01-09 RX ADMIN — IPRATROPIUM BROMIDE 2 SPRAY: 42 SPRAY NASAL at 07:48

## 2021-01-09 RX ADMIN — MACITENTAN 10 MG: 10 TABLET, FILM COATED ORAL at 11:12

## 2021-01-09 RX ADMIN — CHLOROTHIAZIDE SODIUM 1000 MG: 500 INJECTION, POWDER, LYOPHILIZED, FOR SOLUTION INTRAVENOUS at 10:05

## 2021-01-09 RX ADMIN — RIOCIGUAT 1.5 MG: 0.5 TABLET, FILM COATED ORAL at 14:18

## 2021-01-09 RX ADMIN — POTASSIUM CHLORIDE 60 MEQ: 1500 TABLET, EXTENDED RELEASE ORAL at 18:13

## 2021-01-09 RX ADMIN — ACETAMINOPHEN 650 MG: 325 TABLET, FILM COATED ORAL at 00:02

## 2021-01-09 RX ADMIN — GABAPENTIN 300 MG: 300 CAPSULE ORAL at 14:18

## 2021-01-09 RX ADMIN — TREPROSTINIL 30 NG/KG/MIN: 20 INJECTION, SOLUTION INTRAVENOUS; SUBCUTANEOUS at 21:59

## 2021-01-09 ASSESSMENT — ACTIVITIES OF DAILY LIVING (ADL)
ADLS_ACUITY_SCORE: 13

## 2021-01-09 ASSESSMENT — MIFFLIN-ST. JEOR
SCORE: 875.96
SCORE: 870.07

## 2021-01-09 NOTE — PROGRESS NOTES
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 Continue to diurese with bumex and diuril, output support with dobutamine, and other supportive treatment.     Changes 1/9  - additional dose of diuril 1 gm today  - continue Bumex 1.5 mg/hr  - KUB X-ray ordered to evaluate fecal content  - add another dose of Magnesium citrate to promote bowel movement  - change INR goal to 1.8-2.3, can consider holding warfarin on Monday. Plan to keep INR<2 by Tuesday to prepare for balloon angioplasty  - plan for inpatient balloon angioplasty(3-4 stage procedure) next Tuesday 1/12; need to consider lowering INR to lower than 2 prior to the procedure    #Acute on chronic right-sided heart failure  Progressive worsening of right heart function. This is her second admission; previous admission in 10/2020.   This admission presented with increasing SOB, dizziness, and weight gain to 126lbs (dry weight ~112 lbs) for the last couple of weeks, increased RIVERA. Has RLE swelling but US DVT negative (12/23/2020). Patient was hypervolemic with IVC diameter 2.8 cm with no respiratory variation on admission.     Yesterday, Bumex drip was continued, and dobutamine was discontinued. Urine output net negative ~1.5 L overnight, weight stable at 110 lbs. JVP 17 today. Creatinine increase to 1.29(from 1.05), likely from congestion. Diuril ordered another dose today to improve fluid status. Plan inpatient balloon angioplasty next Tuesday 1/12/21. Keep INR around or less than 2 for RHC, can consider holding warfarin on Monday.    RHC (09/2020): RA 11/6/5 RV 90/8 PA  90/20/42 PCWP --/--/11 LUIZ 2.55/1.76 TD 3/2.07 PVR 10 (TD) (Moderately elevated pulmonary artery hypertension, Right sided and Left sided filling pressures are normal)  [facility] 12/23/2020: RA 19/24/22 RV 80/21 PA 80/26/42 PCWP 17 LUIZ 3/2.04 TD 3.07/2.09 PVR 8(severely elevated right-sided filling pressure and pulmonary hypertension, reduced cardiac output)  Last TTE (09/2020): Normal LVEF 60-65%. Moderately dilated and reduced function of RV. Right ventricular systolic pressure is 82mmHg above the right atrial pressure.    - diuril 1 gm iv *1   - continue diuresis with Bumex drip 1.5 mg/hr  - continue Digoxin 62.5 mcg daily for RV support    #Pulmonary HTN secondary to CTEPH (WHO group IV) with severe RV dysfunction  #Subtherapeutic INR  High-risk for surgical PTE despite proximal disease. Follow-up with , outpatient plan is to consider balloon angioplasty.   - contiue Macitentin q48h  - Continue Riociguat 1.5 mg TID. Pt's  to bring in patient's home supply tomorrow given no medication available in-hospital after tomorrow's morning dose  - continue Treprostinil 30 ng/kg/min  - continue Warfarin. Dosed by pharmacy; lowering INR prior to procedure; keep < 2 prior to procedure  - balloon angioplasty next Tuesday 1/12/20    #Acute on chronic respiratory failure with hypoxia  Mildly improved, likely from pulmonary edema. Continue to observe and gradually wean off his oxygen.  - continue to diurese   - Try wean-off O2, keep SpO2 > 92%; currently on 3 LPM satting ~94-95%    #JOSE G on CKD, improved  Creatinine stable at 1.05 despite aggressive diuresis. Hypokalemia replaced prn, otherwise no electrolyte abnormalities.  - continue diuresis  - trend BMP, Mg    #Concern for fecal impaction: Moderate stool burden with bloated abdomen  Causing abdominal comfort which interferes with breathing. Responding well to mg citrate and enema.  - simethicone prn  - continue senna BID & lactulose BID prn    #Hypokalemia  -  KCL 60 mEq BID + prn  - trend and replace prn    #Cellulitis RLE, improved  LE edema R>L on initial presentation with some tenderness. LE ultrasound 12/23 showed no DVT. Improved after 24 hours of given Clindamycin.   - completed Clindamycin    #Asthma  #Allergic rhinitis  No acute concerns.  - Continue beclomethasone BID and albuterol inhaler PRN  - Continue cetirizine and ipratropium nasal spray    #RA  No acute concerns.  - Continue Hydroxychloroquine 200 mg daily    Diet: Na restriction diet  DVT Prophylaxis: Warfarin  Dominguez Catheter: not present  Code Status: FULL code        Disposition Plan   Expected discharge: 4 - 7 days, recommended to prior living arrangement once euvolemic status is obtained and able to be maintained with oral diuretics and without inotrope.     The patient's care was discussed with the Attending Physician, Dr. Marlow.    Emmett Schultz MD  Internal Medicine, PGY-1      I have reviewed today's vital signs, notes, medications, labs and imaging. I have also seen and examined the patient and agree with the findings and plan as outlined above.    Gladys Marlow MD  Section Head - Advanced Heart Failure, Transplantation and Mechanical Circulatory Support  Director - Adult Congenital and Cardiovascular Genetics Center  Associate Professor of Medicine, [facility]  _____________________    Interval History    Feels lightheaded more frequent compared to the day prior. Had 8 small bowel movement overnight. Still feel bloated. Feels better in terms of breathing. Able to walk around the [place]. Denies any chest pain, palpitation, nausea, vomiting, lightheadedness, or dizziness. Otherwise no other complains.    Data reviewed today: I reviewed all medications, new labs and imaging results over the last 24 hours and discussed as pertinent in assessment and plan.    Physical Exam   Vital Signs: Temp: 97.4  F (36.3  C) Temp src: Oral BP: 134/53 Pulse: 73   Resp: 18 SpO2: 97 % O2  Device: Nasal cannula Oxygen Delivery: 2 LPM  Weight: 110 lbs 4.8 oz about the same    Constitutional: awake, alert, cooperative, looks fatigue, and mildly dyspneic while talking  Neck: CVP~17  Respiratory: No increased work of breathing, crackles at both lower lungs, no wheezing  Cardiovascular: Regular rate and rhythm, normal S1S2, and no murmur, peripheral pitting edema both sides R>L.  GI: distended(improved), normal bowel sounds, soft, non-tender  Musculoskeletal: 2+ lower extremity pitting edema present R>L( improved). Erythema, warmth, and tenderness on posterior RLE below knee.  Skin: mostly tenderness on lower extremities; R>L.  Neurologic: Awake, alert. No focal neurological deficit.    Data   Recent Labs   Lab 01/09/21  0637 01/08/21  1657 01/08/21  0450 01/07/21  0455 01/07/21  0455   WBC  --   --   --   --  4.1   HGB  --   --   --   --  8.5*   MCV  --   --   --   --  91   PLT  --   --   --   --  208   INR 2.00*  --  2.41*  --  2.42*    136 138   < > 140   POTASSIUM 3.4 4.7 3.8   < > 3.6   CHLORIDE 96 98 99   < > 100   CO2 34* 35* 34*   < > 34*   BUN 28 23 21   < > 23   CR 1.29* 1.16* 1.05*   < > 1.08*   ANIONGAP 7 4 5   < > 6   ESTEFANÍA 9.6 9.4 9.9   < > 9.8   GLC 76 118* 80   < > 74    < > = values in this interval not displayed.     Medications     bumetanide 1.5 mg/hr (01/09/21 0256)     [Held by provider] DOBUTamine Stopped (01/08/21 1536)     - MEDICATION INSTRUCTIONS -       - MEDICATION INSTRUCTIONS -       - MEDICATION INSTRUCTIONS -       treprostinil (REMODULIN) intravenous infusion (LESS than or EQUAL to 100 mcg/mL) 30 ng/kg/min (01/08/21 1959)     Warfarin Therapy Reminder         aspirin  81 mg Oral QPM     beclomethasone HFA  2 puff Inhalation BID     calcium citrate and vitamin D  2 tablet Oral Daily     cetirizine  10 mg Oral QPM     digoxin  62.5 mcg Oral Daily     gabapentin  300 mg Oral TID     hydroxychloroquine  200 mg Oral Daily     ipratropium  2 spray Both Nostrils TID      macitentan  10 mg Oral Q48H     polyethylene glycol  17 g Oral Daily     potassium chloride  20 mEq Oral Once     potassium chloride  60 mEq Oral BID     riociguat  1.5 mg Oral TID     senna-docusate  1 tablet Oral BID    Or     senna-docusate  2 tablet Oral BID     sodium chloride (PF)  3 mL Intracatheter Q8H     vitamin C  500 mg Oral QAM     warfarin ANTICOAGULANT  3.5 mg Oral ONCE at 18:00

## 2021-01-09 NOTE — PLAN OF CARE
D: Pt stable and comfortable. AVSS, SR, afebrile. No shortness of breath noted. K replaced. Order to hold evening K. Doputamine gtt stopped. Bumex gtt at 1.5 mg/hr. Remodulin gtt at 30 ng/kg/min.   I: Monitored/assessed pt. Assisted with cares.  A: Pt stable and comfortable.  P: Continue to monitor/assess pt, contact provider with concerns.

## 2021-01-09 NOTE — PLAN OF CARE
Neuro: A&Ox4.   Cardiac: SR. VSS.   Respiratory: Sating 90s on 2 LPM.  GI/: Adequate urine output. Multiple small BMs. Complains of feeling constipated.  Diet/appetite: On 2 gram Na diet. Good appetite.  Activity: Up independently in the room.   Pain: Tylenol given x1 for leg pain with relief.  Skin: No new deficits noted. Mepilex on coccyx.  LDA's: CVC infusing Remodulin at 30ng/kg/min. PIV infusing Bumex at 1.5mg/hr.     Plan: Continue with POC. Notify primary team with changes.

## 2021-01-10 ENCOUNTER — APPOINTMENT (OUTPATIENT)
Dept: OCCUPATIONAL THERAPY | Facility: CLINIC | Age: 81
DRG: 252 | End: 2021-01-10
Attending: INTERNAL MEDICINE
Payer: COMMERCIAL

## 2021-01-10 LAB
ANION GAP SERPL CALCULATED.3IONS-SCNC: 4 MMOL/L (ref 3–14)
ANION GAP SERPL CALCULATED.3IONS-SCNC: 4 MMOL/L (ref 3–14)
BUN SERPL-MCNC: 31 MG/DL (ref 7–30)
BUN SERPL-MCNC: 32 MG/DL (ref 7–30)
CALCIUM SERPL-MCNC: 9.2 MG/DL (ref 8.5–10.1)
CALCIUM SERPL-MCNC: 9.3 MG/DL (ref 8.5–10.1)
CHLORIDE SERPL-SCNC: 95 MMOL/L (ref 94–109)
CHLORIDE SERPL-SCNC: 95 MMOL/L (ref 94–109)
CO2 SERPL-SCNC: 36 MMOL/L (ref 20–32)
CO2 SERPL-SCNC: 36 MMOL/L (ref 20–32)
CREAT SERPL-MCNC: 1.09 MG/DL (ref 0.52–1.04)
CREAT SERPL-MCNC: 1.1 MG/DL (ref 0.52–1.04)
GFR SERPL CREATININE-BSD FRML MDRD: 48 ML/MIN/{1.73_M2}
GFR SERPL CREATININE-BSD FRML MDRD: 48 ML/MIN/{1.73_M2}
GLUCOSE SERPL-MCNC: 177 MG/DL (ref 70–99)
GLUCOSE SERPL-MCNC: 91 MG/DL (ref 70–99)
INR PPP: 2 (ref 0.86–1.14)
MAGNESIUM SERPL-MCNC: 2.4 MG/DL (ref 1.6–2.3)
MAGNESIUM SERPL-MCNC: 2.5 MG/DL (ref 1.6–2.3)
POTASSIUM SERPL-SCNC: 3.5 MMOL/L (ref 3.4–5.3)
POTASSIUM SERPL-SCNC: 4.2 MMOL/L (ref 3.4–5.3)
SODIUM SERPL-SCNC: 135 MMOL/L (ref 133–144)
SODIUM SERPL-SCNC: 136 MMOL/L (ref 133–144)

## 2021-01-10 PROCEDURE — 36592 COLLECT BLOOD FROM PICC: CPT | Performed by: INTERNAL MEDICINE

## 2021-01-10 PROCEDURE — 214N000001 HC R&B CCU UMMC

## 2021-01-10 PROCEDURE — 36592 COLLECT BLOOD FROM PICC: CPT | Performed by: STUDENT IN AN ORGANIZED HEALTH CARE EDUCATION/TRAINING PROGRAM

## 2021-01-10 PROCEDURE — 80048 BASIC METABOLIC PNL TOTAL CA: CPT | Performed by: STUDENT IN AN ORGANIZED HEALTH CARE EDUCATION/TRAINING PROGRAM

## 2021-01-10 PROCEDURE — 250N000013 HC RX MED GY IP 250 OP 250 PS 637: Performed by: STUDENT IN AN ORGANIZED HEALTH CARE EDUCATION/TRAINING PROGRAM

## 2021-01-10 PROCEDURE — 97140 MANUAL THERAPY 1/> REGIONS: CPT | Mod: GO

## 2021-01-10 PROCEDURE — 250N000013 HC RX MED GY IP 250 OP 250 PS 637: Performed by: INTERNAL MEDICINE

## 2021-01-10 PROCEDURE — 83735 ASSAY OF MAGNESIUM: CPT | Performed by: INTERNAL MEDICINE

## 2021-01-10 PROCEDURE — 99232 SBSQ HOSP IP/OBS MODERATE 35: CPT | Mod: GC | Performed by: INTERNAL MEDICINE

## 2021-01-10 PROCEDURE — 250N000009 HC RX 250: Performed by: STUDENT IN AN ORGANIZED HEALTH CARE EDUCATION/TRAINING PROGRAM

## 2021-01-10 PROCEDURE — 80048 BASIC METABOLIC PNL TOTAL CA: CPT | Performed by: INTERNAL MEDICINE

## 2021-01-10 PROCEDURE — 85610 PROTHROMBIN TIME: CPT | Performed by: INTERNAL MEDICINE

## 2021-01-10 PROCEDURE — 83735 ASSAY OF MAGNESIUM: CPT | Performed by: STUDENT IN AN ORGANIZED HEALTH CARE EDUCATION/TRAINING PROGRAM

## 2021-01-10 RX ORDER — POTASSIUM CHLORIDE 750 MG/1
10 TABLET, EXTENDED RELEASE ORAL ONCE
Status: COMPLETED | OUTPATIENT
Start: 2021-01-10 | End: 2021-01-10

## 2021-01-10 RX ORDER — WARFARIN SODIUM 3 MG/1
3 TABLET ORAL
Status: COMPLETED | OUTPATIENT
Start: 2021-01-10 | End: 2021-01-10

## 2021-01-10 RX ADMIN — HYDROXYCHLOROQUINE SULFATE 200 MG: 200 TABLET, FILM COATED ORAL at 09:18

## 2021-01-10 RX ADMIN — GABAPENTIN 300 MG: 300 CAPSULE ORAL at 19:36

## 2021-01-10 RX ADMIN — CETIRIZINE HYDROCHLORIDE 10 MG: 10 TABLET, FILM COATED ORAL at 19:36

## 2021-01-10 RX ADMIN — IPRATROPIUM BROMIDE 2 SPRAY: 42 SPRAY NASAL at 19:36

## 2021-01-10 RX ADMIN — POTASSIUM CHLORIDE 60 MEQ: 1500 TABLET, EXTENDED RELEASE ORAL at 09:18

## 2021-01-10 RX ADMIN — GABAPENTIN 300 MG: 300 CAPSULE ORAL at 09:19

## 2021-01-10 RX ADMIN — RIOCIGUAT 1.5 MG: 0.5 TABLET, FILM COATED ORAL at 09:19

## 2021-01-10 RX ADMIN — Medication 25 MG: at 14:11

## 2021-01-10 RX ADMIN — DIGOXIN 62.5 MCG: 0.06 TABLET ORAL at 11:23

## 2021-01-10 RX ADMIN — DOCUSATE SODIUM AND SENNOSIDES 1 TABLET: 8.6; 5 TABLET, FILM COATED ORAL at 16:04

## 2021-01-10 RX ADMIN — POTASSIUM CHLORIDE 60 MEQ: 1500 TABLET, EXTENDED RELEASE ORAL at 19:34

## 2021-01-10 RX ADMIN — WARFARIN SODIUM 3 MG: 3 TABLET ORAL at 17:48

## 2021-01-10 RX ADMIN — OXYCODONE HYDROCHLORIDE AND ACETAMINOPHEN 500 MG: 500 TABLET ORAL at 09:18

## 2021-01-10 RX ADMIN — Medication 2 TABLET: at 09:19

## 2021-01-10 RX ADMIN — RIOCIGUAT 1.5 MG: 0.5 TABLET, FILM COATED ORAL at 14:13

## 2021-01-10 RX ADMIN — BUMETANIDE 1.5 MG/HR: 0.25 INJECTION INTRAMUSCULAR; INTRAVENOUS at 06:18

## 2021-01-10 RX ADMIN — BUMETANIDE 1.5 MG/HR: 0.25 INJECTION INTRAMUSCULAR; INTRAVENOUS at 22:32

## 2021-01-10 RX ADMIN — POTASSIUM CHLORIDE 10 MEQ: 750 TABLET, EXTENDED RELEASE ORAL at 11:23

## 2021-01-10 RX ADMIN — RIOCIGUAT 1.5 MG: 0.5 TABLET, FILM COATED ORAL at 19:33

## 2021-01-10 RX ADMIN — GABAPENTIN 300 MG: 300 CAPSULE ORAL at 14:11

## 2021-01-10 RX ADMIN — ASPIRIN 81 MG CHEWABLE TABLET 81 MG: 81 TABLET CHEWABLE at 19:34

## 2021-01-10 RX ADMIN — DOCUSATE SODIUM AND SENNOSIDES 1 TABLET: 8.6; 5 TABLET, FILM COATED ORAL at 19:35

## 2021-01-10 RX ADMIN — IPRATROPIUM BROMIDE 2 SPRAY: 42 SPRAY NASAL at 09:19

## 2021-01-10 ASSESSMENT — ACTIVITIES OF DAILY LIVING (ADL)
ADLS_ACUITY_SCORE: 14
ADLS_ACUITY_SCORE: 13

## 2021-01-10 ASSESSMENT — MIFFLIN-ST. JEOR: SCORE: 852.83

## 2021-01-10 NOTE — PLAN OF CARE
Neuro: A&Ox4. Poor sleep overnight due to frequent stooling.  Cardiac: SR. VSS.       Respiratory: Sating 90s on 2 LPM.  GI/: Voiding adequately. 9 episodes of diarrhea. Continues to complain of feeling constipated.  Diet/appetite: On 2 gram Na diet. Good appetite.  Activity: Up independently in the room.   Pain: Tylenol given x1 for leg pain with relief.  Skin:  Mepilex on coccyx.  LDA's: CVC infusing Remodulin at 30ng/kg/min. PIV infusing Bumex at 1.5mg/hr.      Plan: Continue with POC. Notify primary team with changes.

## 2021-01-10 NOTE — PLAN OF CARE
D: Pt stable and comfortable. AVSS, SR, afebrile. No shortness of breath noted. Evening K given early for recheck 3.0. Bumex gtt at 1.5 mg/hr. 1,000 mg diuril given as ordered with increased UOP. Remodulin gtt at 30 ng/kg/min. Mag citrate given for bowel regimen. Abdominal xray showed no significant stool burden.  I: Monitored/assessed pt. Assisted with cares.  A: Pt stable and comfortable.  P: Continue to monitor/assess pt, contact provider with concerns. Pt's spouse will bring Adempas from home after morning dose from [facility] pharmacy.

## 2021-01-10 NOTE — PLAN OF CARE
D: Pt stable and comfortable. AVSS, SR, afebrile. No shortness of breath noted. K replaced, recheck at 1700. Bumex gtt at 1.5 mg/hr. Remodulin gtt at 30 ng/kg/min. O2 weaned to 1 LPM. Tramadol given for RLE pain, and LE wrap loosened for comfort.  I: Monitored/assessed pt. Assisted with cares.  A: Pt stable and comfortable.  P: Continue to monitor/assess pt, contact provider with concerns. Pt's spouse brought Adempas medication from home. Labeled by pharmacy and in pt's bin.

## 2021-01-10 NOTE — PROGRESS NOTES
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    Has been diuresed with bumex drip and intermittent dose of diuril. Was on output support with dobutamine for a while before discontinued on 1/8 after improvement of volume status. Given worsening of overall clinical status and right heart function in the past couple of months, balloon angioplasty was scheduled on Tuesday. Otherwise, continue with other supportive treatments for constipation.  Plan:  - optimize volume status; continue bumex, reevaluate 1/11 if need additional diuril + continue electrolyte replacement  - maintain INR ~ 2 for the balloon angioplasty on Tuesday 1/12  - Procedure balloon angioplasty on Tuesday     Updates 1/10  - continue Bumex 1.5 mg/hr + Potassium 60 mEq BID  - BMP check q12h, additional potassium prn  - plan to keep INR~2 by Tuesday to prepare for balloon angioplasty  - inpatient balloon angioplasty(3-4 stage procedure) next Tuesday 1/12    #Acute on chronic right-sided heart failure   Progressive worsening of right heart function. This is her second admission; previous admission in 10/2020.   This admission presented with increasing SOB, dizziness, and weight gain to 126lbs (dry weight ~112 lbs) for the last couple of weeks, increased RIVERA. Has RLE swelling but US DVT negative (12/23/2020). Patient was hypervolemic with IVC diameter 2.8 cm with no respiratory variation on admission.     1/10-->Urine output net negative ~2-3 L overnight after Bumex drip + 1 gm Diuril given yesterday, weight down from 110 to 106 lbs, JVP down to 11 today. Creatinine  decrease from 1.23 to 1.09 after diuresis. Continue to optimize volume status. Plan inpatient balloon angioplasty(for treatment of CTEPH) next Tuesday 1/12/21. Keep INR around or less than 2 for RHC.    RHC (09/2020): RA 11/6/5 RV 90/8 PA 90/20/42 PCWP --/--/11 LUIZ 2.55/1.76 TD 3/2.07 PVR 10 (TD) (Moderately elevated pulmonary artery hypertension, Right sided and Left sided filling pressures are normal)  RHC 12/23/2020: RA 19/24/22 RV 80/21 PA 80/26/42 PCWP 17 LUIZ 3/2.04 TD 3.07/2.09 PVR 8(severely elevated right-sided filling pressure and pulmonary hypertension, reduced cardiac output)  Last TTE (09/2020): Normal LVEF 60-65%. Moderately dilated and reduced function of RV. Right ventricular systolic pressure is 82mmHg above the right atrial pressure.     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 Follow up with Dr. Turcios in an outpatient. Currently on Remodulin drip, adempas, and opsumit.     Adempas ran out of the hospital today at noon. Had her  bring the medication in from home which would last for the next couple of days. Need to check back with pharmacy regarding when will the medication be available again.    - continue Remodulin 30 ng/kg/min  - contiue Opsumit q48h  - continue Adempas 1.5 mg TID. Pt's  brought Adempas from home today given no medication available in-hospital. The medication will be available Monday evening or Tuesday per pharmacy.  - continue Warfarin. Dosed by pharmacy; lowering INR prior to procedure; keep INR around or less than 2 prior to the procedure  - balloon angioplasty Tuesday 1/12/20, please place NPO AMN    #Hypokalemia  - KCL 60 mEq BID + prn  - trend and replace prn    #Concern for fecal impaction:  Moderate stool burden with bloated abdomen  Causing abdominal comfort which interferes with breathing. Responding well to mg citrate and enema.   - simethicone prn  - continue senna BID & lactulose BID prn  - can consider Mg citrate prn or enema if needed    #Acute on chronic respiratory failure with hypoxia, gradually improved  Mildly improved, likely from pulmonary edema. Less crackles heard after diuresis. Requiring less oxygen demand from 4 LPM to currently 2 LPM.  - continue to monitor  - Try wean-off O2, keep SpO2 > 92%; currently on 2 LPM satting ~90-97%    #JOSE G on CKD, improved  Creatinine rising was likely from congestion in renal vasculature. Creatinine stable at 1.09 despite aggressive diuresis. Hypokalemia replaced prn, otherwise no electrolyte abnormalities.  - continue diuresis  - trend BMP, Mg    #Cellulitis RLE, resolved  #bilateral lower extremities edema, improved  LE edema R>L on initial presentation with some tenderness. LE ultrasound 12/23 showed no DVT. Improved after 24 hours of given Clindamycin. Currently still has some tenderness, likely from distended skin from peripheral edema. No worsening tenderness or increasing signs of infection after last dose of clindamycin 12/30. Continue to improve with diuresis.  - continue to monitor.    #Asthma  #Allergic rhinitis  No acute concerns.  - Continue beclomethasone BID and albuterol inhaler PRN  - Continue cetirizine and ipratropium nasal spray    #RA  No acute concerns.  - Continue Hydroxychloroquine 200 mg daily    Diet: Na restriction diet  DVT Prophylaxis: Warfarin  Dominguez Catheter: not present  Code Status: FULL code        Disposition Plan   Expected discharge: 4 - 7 days, recommended to prior living arrangement once stable after procedure, euvolemic status is obtained and able to be maintained with oral diuretics, and not requiring any oxygen supplement.    The patient's care was discussed with the Attending Physician, Dr. Marlow.    Emmett  MD Antoine  Internal Medicine, PGY-1      I have reviewed today's vital signs, notes, medications, labs and imaging. I have also seen and examined the patient and agree with the findings and plan as outlined above.    Gladys Marlow MD  Section Head - Advanced Heart Failure, Transplantation and Mechanical Circulatory Support  Director - Adult Congenital and Cardiovascular Genetics Center  Associate Professor of Medicine, [facility]    _____________________    Interval History    Feels tired this morning due to lack of sleep last night. Had multiple bowel movement overnight. Feel less bloated. Feels better in terms of breathing. Able to walk around the [place]. Denies any chest pain, palpitation, nausea, vomiting, lightheadedness, or dizziness. Otherwise no other complains.    Data reviewed today: I reviewed all medications, new labs and imaging results over the last 24 hours and discussed as pertinent in assessment and plan.    Physical Exam   Vital Signs: Temp: 97.3  F (36.3  C) Temp src: Oral BP: 116/53 Pulse: 72   Resp: 18 SpO2: 94 % O2 Device: Nasal cannula Oxygen Delivery: 2 LPM  Weight: 106 lbs 8 oz about the same    Constitutional: awake, alert, cooperative, looks fatigue, and mildly dyspneic while talking  Neck: CVP~11  Respiratory: No increased work of breathing, slight crackles both lower lungs, no wheezing  Cardiovascular: Regular rate and rhythm, normal S1S2, and no murmur, peripheral pitting edema both sides R>L.  GI: distended(improved), normal bowel sounds, soft, non-tender  Musculoskeletal: lower extremity pitting edema present R>L(much improved). Erythema, warmth, and tenderness on posterior RLE below knee.  Skin: mostly tenderness on lower extremities; R>L.  Neurologic: Awake, alert. No focal neurological deficit.    Data   Recent Labs   Lab 01/10/21  0535 01/09/21  1646 01/09/21  0637 01/08/21  0450 01/08/21  0450 01/07/21  0455 01/07/21  0455   WBC  --   --   --   --   --    --  4.1   HGB  --   --   --   --   --   --  8.5*   MCV  --   --   --   --   --   --  91   PLT  --   --   --   --   --   --  208   INR 2.00*  --  2.00*  --  2.41*  --  2.42*    135 137   < > 138   < > 140   POTASSIUM 3.5 3.0* 3.4   < > 3.8   < > 3.6   CHLORIDE 95 92* 96   < > 99   < > 100   CO2 36* 36* 34*   < > 34*   < > 34*   BUN 31* 31* 28   < > 21   < > 23   CR 1.09* 1.23* 1.29*   < > 1.05*   < > 1.08*   ANIONGAP 4 7 7   < > 5   < > 6   ESTEFANÍA 9.3 10.0 9.6   < > 9.9   < > 9.8   GLC 91 142* 76   < > 80   < > 74    < > = values in this interval not displayed.     Medications     bumetanide 1.5 mg/hr (01/10/21 0618)     [Held by provider] DOBUTamine Stopped (01/08/21 1536)     - MEDICATION INSTRUCTIONS -       - MEDICATION INSTRUCTIONS -       - MEDICATION INSTRUCTIONS -       treprostinil (REMODULIN) intravenous infusion (LESS than or EQUAL to 100 mcg/mL) 30 ng/kg/min (01/09/21 5327)     Warfarin Therapy Reminder         aspirin  81 mg Oral QPM     beclomethasone HFA  2 puff Inhalation BID     calcium citrate and vitamin D  2 tablet Oral Daily     cetirizine  10 mg Oral QPM     digoxin  62.5 mcg Oral Daily     gabapentin  300 mg Oral TID     hydroxychloroquine  200 mg Oral Daily     ipratropium  2 spray Both Nostrils TID     macitentan  10 mg Oral Q48H     polyethylene glycol  17 g Oral Daily     potassium chloride  60 mEq Oral BID     riociguat  1.5 mg Oral TID     senna-docusate  1 tablet Oral BID    Or     senna-docusate  2 tablet Oral BID     sodium chloride (PF)  3 mL Intracatheter Q8H     vitamin C  500 mg Oral QAM     warfarin ANTICOAGULANT  3 mg Oral ONCE at 18:00

## 2021-01-10 NOTE — PROVIDER NOTIFICATION
Pt's spouse brought 11 tabs Adempas from home. Pharmacy notified that this is the very last of pt's supply and that pt needs to be discharged with at least a few doses of Adempas in hand, preferably equal to amount brought in today.

## 2021-01-11 ENCOUNTER — TELEPHONE (OUTPATIENT)
Dept: CARDIOLOGY | Facility: CLINIC | Age: 81
End: 2021-01-11

## 2021-01-11 ENCOUNTER — APPOINTMENT (OUTPATIENT)
Dept: OCCUPATIONAL THERAPY | Facility: CLINIC | Age: 81
DRG: 252 | End: 2021-01-11
Attending: INTERNAL MEDICINE
Payer: COMMERCIAL

## 2021-01-11 LAB
ANION GAP SERPL CALCULATED.3IONS-SCNC: 5 MMOL/L (ref 3–14)
ANION GAP SERPL CALCULATED.3IONS-SCNC: 7 MMOL/L (ref 3–14)
BUN SERPL-MCNC: 30 MG/DL (ref 7–30)
BUN SERPL-MCNC: 32 MG/DL (ref 7–30)
CALCIUM SERPL-MCNC: 9 MG/DL (ref 8.5–10.1)
CALCIUM SERPL-MCNC: 9.3 MG/DL (ref 8.5–10.1)
CHLORIDE SERPL-SCNC: 97 MMOL/L (ref 94–109)
CHLORIDE SERPL-SCNC: 99 MMOL/L (ref 94–109)
CO2 SERPL-SCNC: 32 MMOL/L (ref 20–32)
CO2 SERPL-SCNC: 34 MMOL/L (ref 20–32)
CREAT SERPL-MCNC: 1.14 MG/DL (ref 0.52–1.04)
CREAT SERPL-MCNC: 1.19 MG/DL (ref 0.52–1.04)
GFR SERPL CREATININE-BSD FRML MDRD: 44 ML/MIN/{1.73_M2}
GFR SERPL CREATININE-BSD FRML MDRD: 46 ML/MIN/{1.73_M2}
GLUCOSE SERPL-MCNC: 87 MG/DL (ref 70–99)
GLUCOSE SERPL-MCNC: 93 MG/DL (ref 70–99)
INR PPP: 1.92 (ref 0.86–1.14)
MAGNESIUM SERPL-MCNC: 2.2 MG/DL (ref 1.6–2.3)
MAGNESIUM SERPL-MCNC: 2.2 MG/DL (ref 1.6–2.3)
POTASSIUM SERPL-SCNC: 3.3 MMOL/L (ref 3.4–5.3)
POTASSIUM SERPL-SCNC: 4.3 MMOL/L (ref 3.4–5.3)
SODIUM SERPL-SCNC: 137 MMOL/L (ref 133–144)
SODIUM SERPL-SCNC: 138 MMOL/L (ref 133–144)

## 2021-01-11 PROCEDURE — 250N000013 HC RX MED GY IP 250 OP 250 PS 637: Performed by: STUDENT IN AN ORGANIZED HEALTH CARE EDUCATION/TRAINING PROGRAM

## 2021-01-11 PROCEDURE — 97140 MANUAL THERAPY 1/> REGIONS: CPT | Mod: GO | Performed by: OCCUPATIONAL THERAPIST

## 2021-01-11 PROCEDURE — 83735 ASSAY OF MAGNESIUM: CPT | Performed by: STUDENT IN AN ORGANIZED HEALTH CARE EDUCATION/TRAINING PROGRAM

## 2021-01-11 PROCEDURE — 250N000011 HC RX IP 250 OP 636: Performed by: INTERNAL MEDICINE

## 2021-01-11 PROCEDURE — 85610 PROTHROMBIN TIME: CPT | Performed by: STUDENT IN AN ORGANIZED HEALTH CARE EDUCATION/TRAINING PROGRAM

## 2021-01-11 PROCEDURE — 80048 BASIC METABOLIC PNL TOTAL CA: CPT | Performed by: STUDENT IN AN ORGANIZED HEALTH CARE EDUCATION/TRAINING PROGRAM

## 2021-01-11 PROCEDURE — 250N000009 HC RX 250: Performed by: STUDENT IN AN ORGANIZED HEALTH CARE EDUCATION/TRAINING PROGRAM

## 2021-01-11 PROCEDURE — 272N000004 HC RX 272: Performed by: INTERNAL MEDICINE

## 2021-01-11 PROCEDURE — 214N000001 HC R&B CCU UMMC

## 2021-01-11 PROCEDURE — 250N000013 HC RX MED GY IP 250 OP 250 PS 637: Performed by: INTERNAL MEDICINE

## 2021-01-11 PROCEDURE — 99232 SBSQ HOSP IP/OBS MODERATE 35: CPT | Mod: GC | Performed by: INTERNAL MEDICINE

## 2021-01-11 PROCEDURE — 36592 COLLECT BLOOD FROM PICC: CPT | Performed by: STUDENT IN AN ORGANIZED HEALTH CARE EDUCATION/TRAINING PROGRAM

## 2021-01-11 PROCEDURE — 97530 THERAPEUTIC ACTIVITIES: CPT | Mod: GO | Performed by: OCCUPATIONAL THERAPIST

## 2021-01-11 PROCEDURE — 97535 SELF CARE MNGMENT TRAINING: CPT | Mod: GO | Performed by: OCCUPATIONAL THERAPIST

## 2021-01-11 RX ORDER — POTASSIUM CHLORIDE 750 MG/1
20 TABLET, EXTENDED RELEASE ORAL ONCE
Status: COMPLETED | OUTPATIENT
Start: 2021-01-11 | End: 2021-01-11

## 2021-01-11 RX ORDER — WARFARIN SODIUM 3 MG/1
3 TABLET ORAL
Status: COMPLETED | OUTPATIENT
Start: 2021-01-11 | End: 2021-01-11

## 2021-01-11 RX ADMIN — POLYETHYLENE GLYCOL 3350 17 G: 17 POWDER, FOR SOLUTION ORAL at 08:07

## 2021-01-11 RX ADMIN — OXYCODONE HYDROCHLORIDE AND ACETAMINOPHEN 500 MG: 500 TABLET ORAL at 08:06

## 2021-01-11 RX ADMIN — RIOCIGUAT 1.5 MG: 0.5 TABLET, FILM COATED ORAL at 14:11

## 2021-01-11 RX ADMIN — RIOCIGUAT 1.5 MG: 0.5 TABLET, FILM COATED ORAL at 08:04

## 2021-01-11 RX ADMIN — TREPROSTINIL 30 NG/KG/MIN: 20 INJECTION, SOLUTION INTRAVENOUS; SUBCUTANEOUS at 22:18

## 2021-01-11 RX ADMIN — GABAPENTIN 300 MG: 300 CAPSULE ORAL at 08:06

## 2021-01-11 RX ADMIN — IPRATROPIUM BROMIDE 2 SPRAY: 42 SPRAY NASAL at 19:57

## 2021-01-11 RX ADMIN — GABAPENTIN 300 MG: 300 CAPSULE ORAL at 14:11

## 2021-01-11 RX ADMIN — TREPROSTINIL 30 NG/KG/MIN: 20 INJECTION, SOLUTION INTRAVENOUS; SUBCUTANEOUS at 02:18

## 2021-01-11 RX ADMIN — DOCUSATE SODIUM AND SENNOSIDES 2 TABLET: 8.6; 5 TABLET, FILM COATED ORAL at 08:05

## 2021-01-11 RX ADMIN — BUMETANIDE 1.5 MG/HR: 0.25 INJECTION INTRAMUSCULAR; INTRAVENOUS at 15:59

## 2021-01-11 RX ADMIN — Medication 2 TABLET: at 08:05

## 2021-01-11 RX ADMIN — DOCUSATE SODIUM AND SENNOSIDES 2 TABLET: 8.6; 5 TABLET, FILM COATED ORAL at 19:56

## 2021-01-11 RX ADMIN — WARFARIN SODIUM 3 MG: 3 TABLET ORAL at 18:09

## 2021-01-11 RX ADMIN — DIGOXIN 62.5 MCG: 0.06 TABLET ORAL at 10:36

## 2021-01-11 RX ADMIN — MACITENTAN 10 MG: 10 TABLET, FILM COATED ORAL at 11:49

## 2021-01-11 RX ADMIN — CETIRIZINE HYDROCHLORIDE 10 MG: 10 TABLET, FILM COATED ORAL at 19:56

## 2021-01-11 RX ADMIN — IPRATROPIUM BROMIDE 2 SPRAY: 42 SPRAY NASAL at 08:08

## 2021-01-11 RX ADMIN — HYDROXYCHLOROQUINE SULFATE 200 MG: 200 TABLET, FILM COATED ORAL at 08:06

## 2021-01-11 RX ADMIN — RIOCIGUAT 1.5 MG: 0.5 TABLET, FILM COATED ORAL at 19:57

## 2021-01-11 RX ADMIN — POTASSIUM CHLORIDE 60 MEQ: 1500 TABLET, EXTENDED RELEASE ORAL at 08:47

## 2021-01-11 RX ADMIN — POTASSIUM CHLORIDE 20 MEQ: 750 TABLET, EXTENDED RELEASE ORAL at 14:29

## 2021-01-11 RX ADMIN — GABAPENTIN 300 MG: 300 CAPSULE ORAL at 19:56

## 2021-01-11 RX ADMIN — POTASSIUM CHLORIDE 60 MEQ: 1500 TABLET, EXTENDED RELEASE ORAL at 19:56

## 2021-01-11 RX ADMIN — ASPIRIN 81 MG CHEWABLE TABLET 81 MG: 81 TABLET CHEWABLE at 19:56

## 2021-01-11 ASSESSMENT — ACTIVITIES OF DAILY LIVING (ADL)
ADLS_ACUITY_SCORE: 14
ADLS_ACUITY_SCORE: 13

## 2021-01-11 ASSESSMENT — MIFFLIN-ST. JEOR: SCORE: 867.35

## 2021-01-11 NOTE — PLAN OF CARE
D-PMH includes pulmonary HTN 2/2 CTEPH, severe RV dysfunction, RA, HTN and asthma.Admitted on 12/23/20 with acute on chronic R sided heart failure with acute hypoxic respiratory failure. INR  2.00. Pt's main concern is her bowels. Had several bowel movements overnight, but concerned about getting constipated.  I-Diuresing with bumex 1.5 mg/hr. Remodulin infusing at 30/ng/kg/min. 2 gram Na diet. 3 mg coumadin given this evening per order. 1 senna given 2 times this shift.  A-Net I/O -1736.13. No bowel movements this shift. Verbalizes that her breathing has improved since admission.  P-Continue with current poc. BPA planned for 01/12/21.

## 2021-01-11 NOTE — TELEPHONE ENCOUNTER
Received VM from Santana regarding the patient's Opsumit. He was not able to schedule shipment and was requesting additional information from our office.     [facility] advised [facility]'s [facility] is high and was referred to [employer] for PAP. [facility], who stated they have not received the approval from [facility] for co pay assistance. Called [facility] and was told that her Opsumit is pending review and is waiting on patient's application and signature from the MDO. Representative advised paperwork will be faxed over. Kezia Arnett RN on 1/11/2021 at 11:00 AM    Received PAP therapy attestation for Opsumit. MD signed and faxed back. Kezia Arnett RN on 1/11/2021 at 11:45 AM    Called Santana who advised he also received the application via fax. He will fill it out today and will return it ASAP. Kezia Arnett RN on 1/11/2021 at 11:45 AM

## 2021-01-11 NOTE — PLAN OF CARE
D: Pt stable and comfortable. AVSS, SR, afebrile. No shortness of breath noted. K replaced, recheck at 1700. Bumex gtt at 1.5 mg/hr. Remodulin gtt at 30 ng/kg/min. O2 increased back to 2 LPM. No coumadin dose tonight and NPO at midnight for balloon angio.  I: Monitored/assessed pt. Assisted with cares.  A: Pt stable and comfortable.  P: Continue to monitor/assess pt, contact provider with concerns.

## 2021-01-11 NOTE — PLAN OF CARE
Admitted 12/23 with acute on chronic R sided heart failure with acute hypoxic respiratory failure. Hx includes pulmonary HTN 2/2 CTEPH, severe RV dysfunction, RA, HTN and asthma.    Neuro: A&Ox4.   Cardiac: SR. SBP  this AM, pt asymtpomatic.       Respiratory: Sating 90s on 2 LPM.  GI/: Voiding adequately. No BM overnight.  Diet/appetite: 2 gram Na diet.   Activity: Up independently in the room.   Pain: Denies.  Skin:  Mepilex on coccyx. No new issues noted.  LDA's: CVC infusing Remodulin at 30ng/kg/min. PIV infusing Bumex at 1.5mg/hr.      Plan: Continue with POC. Notify primary team with changes.

## 2021-01-11 NOTE — PROGRESS NOTES
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    Has been diuresed with bumex drip and intermittent dose of diuril. Was on output support with dobutamine for a while before discontinued on 1/8 after improvement of volume status. Given worsening of overall clinical status and right heart function in the past couple of months, balloon angioplasty was scheduled on 1/12 with plans for 4 rounds to be continued outpatient. Otherwise, continue with other supportive treatments for constipation.    Plan today:  - optimize volume status: continue bumex gtt and continue electrolyte replacement, no diurel today as she is at/lower than her previous dry weight   - maintain INR ~ 2 for the balloon angioplasty on Tuesday 1/12  - possible repeat balloon angioplasty on 1/14 if possible       #Acute on chronic right-sided heart failure   Progressive worsening of right heart function. This is her second admission; previous admission in 10/2020.   This admission presented with increasing SOB, dizziness, and weight gain to 126lbs (dry weight ~112 lbs) for the last couple of weeks, increased RIVERA. Has RLE swelling but US DVT negative (12/23/2020). Patient was hypervolemic with IVC diameter 2.8 cm with no respiratory variation on admission.     1/11-->Urine output net negative ~2 L overnight after Bumex drip, weight down from 109lbs, JVP down to ~12 today. Creatinine bumped slightly to 1.14 from 1.10 yesterday she is fairly dry at this point will hold off additional Diurrel and plan for inpatient balloon angioplasty (for treatment of CTEPH) tomorrow  1/12/21. Keep INR around or less than 2 for RHC.    RHC (09/2020): RA 11/6/5 RV 90/8 PA 90/20/42 PCWP --/--/11 LUIZ 2.55/1.76 TD 3/2.07 PVR 10 (TD) (Moderately elevated pulmonary artery hypertension, Right sided and Left sided filling pressures are normal)  RHC 12/23/2020: RA 19/24/22 RV 80/21 PA 80/26/42 PCWP 17 LUIZ 3/2.04 TD 3.07/2.09 PVR 8(severely elevated right-sided filling pressure and pulmonary hypertension, reduced cardiac output)  Last TTE (09/2020): Normal LVEF 60-65%. Moderately dilated and reduced function of RV. Right ventricular systolic pressure is 82mmHg above the right atrial pressure.     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 Follow up with Dr. Turcios in an outpatient. Currently on Remodulin drip, adempas, and opsumit.     - continue Remodulin 30 ng/kg/min  - contiue Opsumit q48h  - continue Adempas 1.5 mg TID  - continue Warfarin. Dosed by pharmacy; lowering INR prior to procedure; keep INR around or less than 2 prior to the procedure  - balloon angioplasty Tuesday 1/12/20, NPO at [place] for this   - potential 2nd round of balloon angioplasty possibly 1/14    #Hypokalemia  - KCL 60 mEq BID + prn  - trend and replace prn    #Concern for fecal impaction: Moderate stool burden with bloated abdomen  Causing abdominal comfort which interferes with breathing. Responded well to mg citrate and enema. Imaging showing no stool burden after this.   - simethicone prn  - continue senna BID & lactulose BID prn  - can consider Mg citrate prn or enema if needed --> currently not needed     #Acute on chronic respiratory failure with hypoxia, gradually improved  Mildly improved, likely from pulmonary edema. Less crackles heard after diuresis.  Requiring less oxygen demand from 4 LPM to currently 1LPM.  - continue to monitor  - Try wean-off O2, keep SpO2 > 92%    #JOSE G on CKD, improved  Creatinine rising was likely from congestion in renal vasculature. Creatinine stable at 1.09 despite aggressive diuresis. Hypokalemia replaced prn, otherwise no electrolyte abnormalities.  - continue diuresis  - trend BMP, Mg    #Cellulitis RLE, resolved  #bilateral lower extremities edema, improved  LE edema R>L on initial presentation with some tenderness. LE ultrasound 12/23 showed no DVT. Improved after 24 hours of given Clindamycin. Currently still has some tenderness, likely from distended skin from peripheral edema. No worsening tenderness or increasing signs of infection after last dose of clindamycin 12/30. Continue to improve with diuresis.  - continue to monitor.    #Asthma  #Allergic rhinitis  No acute concerns.  - Continue beclomethasone BID and albuterol inhaler PRN  - Continue cetirizine and ipratropium nasal spray    #RA  No acute concerns.  - Continue Hydroxychloroquine 200 mg daily    Diet: Na restriction diet, NPO at [place]  DVT Prophylaxis: Warfarin  Dominguez Catheter: not present  Code Status: FULL code        Disposition Plan   Expected discharge: 2-3 days, recommended to prior living arrangement once stable after procedure(s), euvolemic status is obtained and able to be maintained with oral diuretics, and not requiring any oxygen supplement.    The patient's care was discussed with the Attending Physician, Dr. Marlow.    Rich Redd MD   Internal Medicine PGY-2      I have reviewed today's vital signs, notes, medications, labs and imaging. I have also seen and examined the patient and agree with the findings and plan as outlined above.    Gladys Marlow MD  Section Head - Advanced Heart Failure, Transplantation and Mechanical Circulatory Support  Director - Adult Congenital [facility]ovascular [facility]  Associate Professor of Medicine, [facility]  [place]  _____________________    Interval History    Feels like she is doing well today. Doing laps around the [place] without difficulty. Complaining of feeling constipated as no BM overnight but did have BMs for 3 days prior to this with good output.    Data reviewed today: I reviewed all medications, new labs and imaging results over the last 24 hours and discussed as pertinent in assessment and plan.    Physical Exam   Vital Signs: Temp: 98.1  F (36.7  C) Temp src: Oral BP: 113/48 Pulse: 69   Resp: 18 SpO2: 94 % O2 Device: Nasal cannula with humidification Oxygen Delivery: 2 LPM  Weight: 109 lbs 11.2 oz about the same    Constitutional: awake, alert, cooperative and sitting up at bedside   Neck: JVP ~12  Respiratory: No increased work of breathing, no wheezing or crackles on auscultation   Cardiovascular: Regular rate and rhythm, normal S1S2, and no murmur, peripheral pitting edema but legs in wraps   GI: distended, normal bowel sounds, soft, non-tender  Musculoskeletal: lower extremity wraps warmth, and tenderness on posterior RLE below knee.  Skin: mostly tenderness on lower extremities; R>L.  Neurologic: Awake, alert. No focal neurological deficit.    Data   Recent Labs   Lab 01/11/21  0507 01/10/21  1652 01/10/21  0535 01/09/21  0637 01/09/21  0637 01/07/21  0455 01/07/21  0455   WBC  --   --   --   --   --   --  4.1   HGB  --   --   --   --   --   --  8.5*   MCV  --   --   --   --   --   --  91   PLT  --   --   --   --   --   --  208   INR 1.92*  --  2.00*  --  2.00*   < > 2.42*    135 136   < > 137   < > 140   POTASSIUM 3.3* 4.2 3.5   < > 3.4   < > 3.6   CHLORIDE 99 95 95   < > 96   < > 100   CO2 32 36* 36*   < > 34*   < > 34*   BUN 32* 32* 31*   < > 28   < > 23   CR 1.14* 1.10* 1.09*   < > 1.29*   < > 1.08*   ANIONGAP 7 4 4   < > 7   < > 6   ESTEFANÍA 9.0 9.2 9.3   < > 9.6   < > 9.8   GLC 87 177* 91   < > 76   < > 74    < > = values in this interval not displayed.     Medications     bumetanide 1.5 mg/hr  (01/11/21 0200)                       treprostinil (REMODULIN) intravenous infusion (LESS than or EQUAL to 100 mcg/mL) 30 ng/kg/min (01/11/21 0218)            aspirin  81 mg Oral QPM     beclomethasone HFA  2 puff Inhalation BID     calcium citrate and vitamin D  2 tablet Oral Daily     cetirizine  10 mg Oral QPM     digoxin  62.5 mcg Oral Daily     gabapentin  300 mg Oral TID     hydroxychloroquine  200 mg Oral Daily     ipratropium  2 spray Both Nostrils TID     macitentan  10 mg Oral Q48H     polyethylene glycol  17 g Oral Daily     potassium chloride  60 mEq Oral BID     riociguat  1.5 mg Oral TID     senna-docusate  1 tablet Oral BID    Or     senna-docusate  2 tablet Oral BID     sodium chloride (PF)  3 mL Intracatheter Q8H     vitamin C  500 mg Oral QAM     warfarin ANTICOAGULANT  3 mg Oral ONCE at 18:00

## 2021-01-12 LAB
ABO + RH BLD: NORMAL
ABO + RH BLD: NORMAL
ANION GAP SERPL CALCULATED.3IONS-SCNC: 5 MMOL/L (ref 3–14)
ANION GAP SERPL CALCULATED.3IONS-SCNC: 9 MMOL/L (ref 3–14)
BLD GP AB SCN SERPL QL: NORMAL
BLD PROD TYP BPU: NORMAL
BLD PROD TYP BPU: NORMAL
BLD UNIT ID BPU: 0
BLD UNIT ID BPU: 0
BLOOD BANK CMNT PATIENT-IMP: NORMAL
BLOOD PRODUCT CODE: NORMAL
BLOOD PRODUCT CODE: NORMAL
BPU ID: NORMAL
BPU ID: NORMAL
BUN SERPL-MCNC: 29 MG/DL (ref 7–30)
BUN SERPL-MCNC: 32 MG/DL (ref 7–30)
CALCIUM SERPL-MCNC: 10 MG/DL (ref 8.5–10.1)
CALCIUM SERPL-MCNC: 9.1 MG/DL (ref 8.5–10.1)
CHLORIDE SERPL-SCNC: 102 MMOL/L (ref 94–109)
CHLORIDE SERPL-SCNC: 96 MMOL/L (ref 94–109)
CO2 SERPL-SCNC: 32 MMOL/L (ref 20–32)
CO2 SERPL-SCNC: 32 MMOL/L (ref 20–32)
CREAT SERPL-MCNC: 1.16 MG/DL (ref 0.52–1.04)
CREAT SERPL-MCNC: 1.19 MG/DL (ref 0.52–1.04)
ERYTHROCYTE [DISTWIDTH] IN BLOOD BY AUTOMATED COUNT: 16.2 % (ref 10–15)
GFR SERPL CREATININE-BSD FRML MDRD: 44 ML/MIN/{1.73_M2}
GFR SERPL CREATININE-BSD FRML MDRD: 45 ML/MIN/{1.73_M2}
GLUCOSE SERPL-MCNC: 78 MG/DL (ref 70–99)
GLUCOSE SERPL-MCNC: 88 MG/DL (ref 70–99)
HCT VFR BLD AUTO: 27.8 % (ref 35–47)
HGB BLD-MCNC: 8.2 G/DL (ref 11.7–15.7)
HGB BLD-MCNC: 9.3 G/DL (ref 11.7–15.7)
INR PPP: 1.77 (ref 0.86–1.14)
KCT BLD-ACNC: 156 SEC (ref 75–150)
KCT BLD-ACNC: 184 SEC (ref 75–150)
MAGNESIUM SERPL-MCNC: 2 MG/DL (ref 1.6–2.3)
MAGNESIUM SERPL-MCNC: 2.2 MG/DL (ref 1.6–2.3)
MCH RBC QN AUTO: 27.1 PG (ref 26.5–33)
MCHC RBC AUTO-ENTMCNC: 29.5 G/DL (ref 31.5–36.5)
MCV RBC AUTO: 92 FL (ref 78–100)
OXYHGB MFR BLDV: 57 %
PLATELET # BLD AUTO: 228 10E9/L (ref 150–450)
POTASSIUM SERPL-SCNC: 3.4 MMOL/L (ref 3.4–5.3)
POTASSIUM SERPL-SCNC: 3.8 MMOL/L (ref 3.4–5.3)
RBC # BLD AUTO: 3.03 10E12/L (ref 3.8–5.2)
SODIUM SERPL-SCNC: 137 MMOL/L (ref 133–144)
SODIUM SERPL-SCNC: 139 MMOL/L (ref 133–144)
SPECIMEN EXP DATE BLD: NORMAL
TRANSFUSION STATUS PATIENT QL: NORMAL
WBC # BLD AUTO: 4.5 10E9/L (ref 4–11)

## 2021-01-12 PROCEDURE — 86850 RBC ANTIBODY SCREEN: CPT | Performed by: INTERNAL MEDICINE

## 2021-01-12 PROCEDURE — 250N000013 HC RX MED GY IP 250 OP 250 PS 637: Performed by: INTERNAL MEDICINE

## 2021-01-12 PROCEDURE — 36592 COLLECT BLOOD FROM PICC: CPT | Performed by: INTERNAL MEDICINE

## 2021-01-12 PROCEDURE — C1725 CATH, TRANSLUMIN NON-LASER: HCPCS | Performed by: INTERNAL MEDICINE

## 2021-01-12 PROCEDURE — 250N000009 HC RX 250: Performed by: STUDENT IN AN ORGANIZED HEALTH CARE EDUCATION/TRAINING PROGRAM

## 2021-01-12 PROCEDURE — 92997 PUL ART BALLOON REPR PERCUT: CPT | Performed by: INTERNAL MEDICINE

## 2021-01-12 PROCEDURE — 83735 ASSAY OF MAGNESIUM: CPT | Performed by: STUDENT IN AN ORGANIZED HEALTH CARE EDUCATION/TRAINING PROGRAM

## 2021-01-12 PROCEDURE — 200N000002 HC R&B ICU UMMC

## 2021-01-12 PROCEDURE — P9059 PLASMA, FRZ BETWEEN 8-24HOUR: HCPCS | Performed by: INTERNAL MEDICINE

## 2021-01-12 PROCEDURE — 4A023N6 MEASUREMENT OF CARDIAC SAMPLING AND PRESSURE, RIGHT HEART, PERCUTANEOUS APPROACH: ICD-10-PCS | Performed by: INTERNAL MEDICINE

## 2021-01-12 PROCEDURE — 99232 SBSQ HOSP IP/OBS MODERATE 35: CPT | Mod: 25 | Performed by: INTERNAL MEDICINE

## 2021-01-12 PROCEDURE — 250N000009 HC RX 250: Performed by: INTERNAL MEDICINE

## 2021-01-12 PROCEDURE — C1894 INTRO/SHEATH, NON-LASER: HCPCS | Performed by: INTERNAL MEDICINE

## 2021-01-12 PROCEDURE — 85610 PROTHROMBIN TIME: CPT | Performed by: INTERNAL MEDICINE

## 2021-01-12 PROCEDURE — 85347 COAGULATION TIME ACTIVATED: CPT

## 2021-01-12 PROCEDURE — 80048 BASIC METABOLIC PNL TOTAL CA: CPT | Performed by: STUDENT IN AN ORGANIZED HEALTH CARE EDUCATION/TRAINING PROGRAM

## 2021-01-12 PROCEDURE — 86900 BLOOD TYPING SEROLOGIC ABO: CPT | Performed by: INTERNAL MEDICINE

## 2021-01-12 PROCEDURE — 85027 COMPLETE CBC AUTOMATED: CPT | Performed by: INTERNAL MEDICINE

## 2021-01-12 PROCEDURE — C1887 CATHETER, GUIDING: HCPCS | Performed by: INTERNAL MEDICINE

## 2021-01-12 PROCEDURE — B31S1ZZ FLUOROSCOPY OF RIGHT PULMONARY ARTERY USING LOW OSMOLAR CONTRAST: ICD-10-PCS | Performed by: INTERNAL MEDICINE

## 2021-01-12 PROCEDURE — 82810 BLOOD GASES O2 SAT ONLY: CPT

## 2021-01-12 PROCEDURE — 86901 BLOOD TYPING SEROLOGIC RH(D): CPT | Performed by: INTERNAL MEDICINE

## 2021-01-12 PROCEDURE — 250N000011 HC RX IP 250 OP 636: Performed by: INTERNAL MEDICINE

## 2021-01-12 PROCEDURE — C1769 GUIDE WIRE: HCPCS | Performed by: INTERNAL MEDICINE

## 2021-01-12 PROCEDURE — 93451 RIGHT HEART CATH: CPT

## 2021-01-12 PROCEDURE — 80048 BASIC METABOLIC PNL TOTAL CA: CPT | Performed by: INTERNAL MEDICINE

## 2021-01-12 PROCEDURE — 272N000001 HC OR GENERAL SUPPLY STERILE: Performed by: INTERNAL MEDICINE

## 2021-01-12 PROCEDURE — 027Q3ZZ DILATION OF RIGHT PULMONARY ARTERY, PERCUTANEOUS APPROACH: ICD-10-PCS | Performed by: INTERNAL MEDICINE

## 2021-01-12 PROCEDURE — 999N001063 HC STATISTIC THAWING COMPONENT: Performed by: INTERNAL MEDICINE

## 2021-01-12 PROCEDURE — 83735 ASSAY OF MAGNESIUM: CPT | Performed by: INTERNAL MEDICINE

## 2021-01-12 PROCEDURE — 93568 NJX CAR CTH NSLC P-ART ANGRP: CPT

## 2021-01-12 PROCEDURE — 85018 HEMOGLOBIN: CPT

## 2021-01-12 RX ORDER — PROTAMINE SULFATE 10 MG/ML
INJECTION, SOLUTION INTRAVENOUS
Status: DISCONTINUED | OUTPATIENT
Start: 2021-01-12 | End: 2021-01-12 | Stop reason: HOSPADM

## 2021-01-12 RX ORDER — IOPAMIDOL 755 MG/ML
INJECTION, SOLUTION INTRAVASCULAR
Status: DISCONTINUED | OUTPATIENT
Start: 2021-01-12 | End: 2021-01-12 | Stop reason: HOSPADM

## 2021-01-12 RX ORDER — POTASSIUM CHLORIDE 750 MG/1
10 TABLET, EXTENDED RELEASE ORAL ONCE
Status: COMPLETED | OUTPATIENT
Start: 2021-01-12 | End: 2021-01-12

## 2021-01-12 RX ORDER — HEPARIN SODIUM 1000 [USP'U]/ML
INJECTION, SOLUTION INTRAVENOUS; SUBCUTANEOUS
Status: DISCONTINUED | OUTPATIENT
Start: 2021-01-12 | End: 2021-01-12 | Stop reason: HOSPADM

## 2021-01-12 RX ADMIN — IPRATROPIUM BROMIDE 2 SPRAY: 42 SPRAY NASAL at 08:00

## 2021-01-12 RX ADMIN — GABAPENTIN 300 MG: 300 CAPSULE ORAL at 14:20

## 2021-01-12 RX ADMIN — RIOCIGUAT 1.5 MG: 0.5 TABLET, FILM COATED ORAL at 08:02

## 2021-01-12 RX ADMIN — POTASSIUM CHLORIDE 10 MEQ: 750 TABLET, EXTENDED RELEASE ORAL at 22:08

## 2021-01-12 RX ADMIN — GABAPENTIN 300 MG: 300 CAPSULE ORAL at 22:08

## 2021-01-12 RX ADMIN — GABAPENTIN 300 MG: 300 CAPSULE ORAL at 08:01

## 2021-01-12 RX ADMIN — BUMETANIDE 1.5 MG/HR: 0.25 INJECTION INTRAMUSCULAR; INTRAVENOUS at 23:17

## 2021-01-12 RX ADMIN — POTASSIUM CHLORIDE 60 MEQ: 1500 TABLET, EXTENDED RELEASE ORAL at 08:01

## 2021-01-12 RX ADMIN — IPRATROPIUM BROMIDE 2 SPRAY: 42 SPRAY NASAL at 14:20

## 2021-01-12 RX ADMIN — DIGOXIN 62.5 MCG: 0.06 TABLET ORAL at 10:31

## 2021-01-12 RX ADMIN — HYDROXYCHLOROQUINE SULFATE 200 MG: 200 TABLET, FILM COATED ORAL at 08:01

## 2021-01-12 RX ADMIN — IPRATROPIUM BROMIDE 2 SPRAY: 42 SPRAY NASAL at 22:11

## 2021-01-12 RX ADMIN — POTASSIUM CHLORIDE 60 MEQ: 1500 TABLET, EXTENDED RELEASE ORAL at 22:09

## 2021-01-12 RX ADMIN — Medication 2 TABLET: at 08:01

## 2021-01-12 RX ADMIN — BUMETANIDE 1.5 MG/HR: 0.25 INJECTION INTRAMUSCULAR; INTRAVENOUS at 05:54

## 2021-01-12 RX ADMIN — RIOCIGUAT 1.5 MG: 0.5 TABLET, FILM COATED ORAL at 14:23

## 2021-01-12 RX ADMIN — OXYCODONE HYDROCHLORIDE AND ACETAMINOPHEN 500 MG: 500 TABLET ORAL at 08:01

## 2021-01-12 RX ADMIN — CETIRIZINE HYDROCHLORIDE 10 MG: 10 TABLET, FILM COATED ORAL at 22:08

## 2021-01-12 ASSESSMENT — ACTIVITIES OF DAILY LIVING (ADL)
ADLS_ACUITY_SCORE: 13

## 2021-01-12 ASSESSMENT — MIFFLIN-ST. JEOR: SCORE: 868.71

## 2021-01-12 NOTE — PLAN OF CARE
Problem: Cardiac Output Decreased (Heart Failure)  Goal: Optimal Cardiac Output  Outcome: Improving     Problem: Fluid Imbalance (Heart Failure)  Goal: Fluid Balance  Outcome: Improving   Pt is Aaox4, NSR, RA denies any sob, Lymphedema wrap to RLE and compression stocking to LLE, removed and replaced 30min later.  Remodulin @30ng/kg/mn + Bumex 1.5mg/hr infusing and tolerating well. In Cath lab for pulmonary balloon angio pending return? No s/s of distress denies any pain CB and phone in reach. Will continue to monitor.  .Lyly Lynch RN

## 2021-01-12 NOTE — PROGRESS NOTES
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    Has been diuresed with bumex drip and intermittent dose of diuril. Was on output support with dobutamine for a while before discontinued on 1/8 after improvement of volume status. Given worsening of overall clinical status and right heart function in the past couple of months, balloon angioplasty was scheduled on 1/12 with plans for 4 rounds to be continued outpatient. Otherwise, continue with other supportive treatments for constipation.    Plan today:  - planned for balloon angioplasty today  - optimize volume status: continue bumex gtt and continue electrolyte replacement  - potentially could go to PO diuretics tomorrow pending procedure     #Acute on chronic right-sided heart failure   Progressive worsening of right heart function. This is her second admission; previous admission in 10/2020.   This admission presented with increasing SOB, dizziness, and weight gain to 126lbs (dry weight ~112 lbs) for the last couple of weeks, increased RIVERA. Has RLE swelling but US DVT negative (12/23/2020). Patient was hypervolemic with IVC diameter 2.8 cm with no respiratory variation on admission. Significant diuresis since admission with use of bumex gtt.     RHC (09/2020): RA 11/6/5 RV 90/8 PA 90/20/42 PCWP --/--/11 LUIZ 2.55/1.76 TD 3/2.07 PVR 10 (TD) (Moderately elevated pulmonary artery hypertension, Right sided and Left sided filling pressures are normal)  RHC 12/23/2020: RA 19/24/22 RV 80/21 PA 80/26/42 PCWP 17 LUIZ 3/2.04 TD 3.07/2.09 PVR 8(severely elevated right-sided filling  pressure and pulmonary hypertension, reduced cardiac output)  Last TTE (09/2020): Normal LVEF 60-65%. Moderately dilated and reduced function of RV. Right ventricular systolic pressure is 82mmHg above the right atrial pressure.     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 Follow up with Dr. Turcois in an outpatient. Currently on Remodulin drip, adempas, and opsumit. Underwent balloon pulmonary angioplasty this admission round 1.     - continue Remodulin 30 ng/kg/min  - contiue Opsumit q48h  - continue Adempas 1.5 mg TID  - continue Warfarin. Dosed by pharmacy; lowering INR prior to procedure; keep INR around or less than 2 prior to the procedure  - balloon pulmonary angioplasty planned today 1/12/20      #Hypokalemia  - KCL 60 mEq BID + prn  - trend and replace prn    #Concern for fecal impaction: Moderate stool burden with bloated abdomen  Causing abdominal comfort which interferes with breathing. Responded well to mg citrate and enema. Imaging showing no stool burden after this.   - simethicone prn  - continue senna BID & lactulose BID prn  - can consider Mg citrate prn or enema if needed --> currently not needed     #Acute on chronic respiratory failure with hypoxia, gradually improved  Mildly improved, likely from pulmonary edema. Less crackles heard after diuresis. Requiring less oxygen demand from 4 LPM to currently 1LPM.  - continue to monitor  - Try wean-off O2, keep SpO2 > 92%    #JOSE G on CKD, improved  Creatinine rising was likely from congestion in renal vasculature. Creatinine stable at 1.09 despite aggressive diuresis. Hypokalemia replaced prn, otherwise no electrolyte abnormalities.  - continue diuresis as above   - trend BMP,  Mg    #Cellulitis RLE, resolved  #bilateral lower extremities edema, improved  LE edema R>L on initial presentation with some tenderness. LE ultrasound 12/23 showed no DVT. Improved after 24 hours of given Clindamycin. Currently still has some tenderness, likely from distended skin from peripheral edema. No worsening tenderness or increasing signs of infection after last dose of clindamycin 12/30. Continue to improve with diuresis.  - continue to monitor.    #Asthma  #Allergic rhinitis  No acute concerns.  - Continue beclomethasone BID and albuterol inhaler PRN  - Continue cetirizine and ipratropium nasal spray    #RA  No acute concerns.  - Continue Hydroxychloroquine 200 mg daily    Diet: Na restriction diet  DVT Prophylaxis: Warfarin  Dominguez Catheter: not present  Code Status: FULL code        Disposition Plan   Expected discharge: 2-3 days, recommended to prior living arrangement once stable after procedure(s), euvolemic status is obtained and able to be maintained with oral diuretics, and not requiring any oxygen supplement.    The patient's care was discussed with the Attending Physician, Dr. Marlow.    Rich Redd MD   Internal Medicine PGY-2      I have reviewed today's vital signs, notes, medications, labs and imaging. I have also seen and examined the patient and agree with the findings and plan as outlined above.    Gladys Marlow MD  Section Head - Advanced Heart Failure, Transplantation and Mechanical Circulatory Support  Director - Adult Congenital and Cardiovascular Genetics Center  Associate Professor of Medicine, [facility]    _____________________    Interval History    Feels like she is doing well today. Doing laps around the [place] without difficulty. Complaining of feeling constipated this AM but had BM this morning and afternoon. Balloon agio planned today.     Data reviewed today: I reviewed all medications, new labs and imaging results over the last 24 hours and discussed as  pertinent in assessment and plan.    Physical Exam   Vital Signs: Temp: 97.5  F (36.4  C) Temp src: Oral BP: 128/70 Pulse: 85   Resp: 20 SpO2: 91 % O2 Device: Nasal cannula Oxygen Delivery: 2 LPM  Weight: 110 lbs 0 oz about the same    Constitutional: awake, alert, cooperative and sitting up at bedside   Neck: JVP ~10  Respiratory: No increased work of breathing, no wheezing or crackles on auscultation   Cardiovascular: Regular rate and rhythm, normal S1S2, and no murmur, peripheral pitting edema but legs in wraps   GI: distended, normal bowel sounds, soft, non-tender  Musculoskeletal: lower extremity wraps warmth, and tenderness on posterior RLE below knee.  Skin: mostly tenderness on lower extremities; R>L.  Neurologic: Awake, alert. No focal neurological deficit.    Data   Recent Labs   Lab 01/12/21  0623 01/11/21  1738 01/11/21  0507 01/10/21  0535 01/10/21  0535 01/07/21  0455 01/07/21  0455   WBC 4.5  --   --   --   --   --  4.1   HGB 8.2*  --   --   --   --   --  8.5*   MCV 92  --   --   --   --   --  91     --   --   --   --   --  208   INR 1.77*  --  1.92*  --  2.00*   < > 2.42*    137 138   < > 136   < > 140   POTASSIUM 3.8 4.3 3.3*   < > 3.5   < > 3.6   CHLORIDE 102 97 99   < > 95   < > 100   CO2 32 34* 32   < > 36*   < > 34*   BUN 29 30 32*   < > 31*   < > 23   CR 1.19* 1.19* 1.14*   < > 1.09*   < > 1.08*   ANIONGAP 5 5 7   < > 4   < > 6   ESTEFANÍA 9.1 9.3 9.0   < > 9.3   < > 9.8   GLC 78 93 87   < > 91   < > 74    < > = values in this interval not displayed.     Medications     bumetanide 1.5 mg/hr (01/11/21 0200)                       treprostinil (REMODULIN) intravenous infusion (LESS than or EQUAL to 100 mcg/mL) 30 ng/kg/min (01/11/21 0218)            aspirin  81 mg Oral QPM     beclomethasone HFA  2 puff Inhalation BID     calcium citrate and vitamin D  2 tablet Oral Daily     cetirizine  10 mg Oral QPM     digoxin  62.5 mcg Oral Daily     gabapentin  300 mg Oral TID     hydroxychloroquine   200 mg Oral Daily     ipratropium  2 spray Both Nostrils TID     macitentan  10 mg Oral Q48H     polyethylene glycol  17 g Oral Daily     potassium chloride  60 mEq Oral BID     riociguat  1.5 mg Oral TID     senna-docusate  1 tablet Oral BID    Or     senna-docusate  2 tablet Oral BID     sodium chloride (PF)  3 mL Intracatheter Q8H     vitamin C  500 mg Oral QAM     warfarin ANTICOAGULANT  3.5 mg Oral ONCE at 18:00

## 2021-01-12 NOTE — PLAN OF CARE
D: Admitted 12/23 with acute on chronic R sided heart failure with acute hypoxic respiratory failure.   Hx: pulmonary HTN 2/2 CTEPH, severe RV dysfunction, RA, HTN and asthma.     I: Monitored vitals and assessed pt status.   Changed: NPO since MN   Running:   - Remodulin 30 ng/kg/min (1.69 mL/hr)  - Bumex 1.5 mg/hr (6 mL/hr)     A: A0x4. VSS on 2L NC. Tele shows SR, rate 60-70s. Afebrile. Urinating adequately. Small BM x3 overnight. Pt denied pain, SOB, lightheadedness, dizziness. Up independently. Slept between cares.      P: Balloon angioplasty scheduled for 1300 today. Continue to monitor Pt status and report changes to Cards 2.     [id number]  Mary Diaz RN on 1/12/2021 at 6:32 AM

## 2021-01-12 NOTE — PROGRESS NOTES
Pt Transported to cath lab vie stretcher per transporter for balloon pulmonary angio.  .Lyly Lynch RN

## 2021-01-12 NOTE — PLAN OF CARE
NPO at midnight. Balloon pulmonary angio tomorrow at 1300. Tolerating room air. Up frequently to commode with loose Bms/urinating. Bumex/Remodulin continue running. K 4.3. MD approved for coumadin to be given at 1800. Ambulated hallway - tolerated well.       Problem: Cardiac Output Decreased (Heart Failure)  Goal: Optimal Cardiac Output  Outcome: Improving     Problem: Adult Inpatient Plan of Care  Goal: Plan of Care Review  Outcome: Improving

## 2021-01-12 NOTE — PLAN OF CARE
Remodulin double check  D: Upon syringe change this evening, RNs unable to see concentration dose labelled on syringe.   I: Contacted IV pharmacy prior to changing to new syringe.  A: Pharmacy verified that concentration was correct. Pharm tech at bedside noted that label that incorrectly placed on syringe and covered concentration by mistake and can be seen underneath label.  P: OK to use syringe that was sent.

## 2021-01-12 NOTE — PROGRESS NOTES
"CLINICAL NUTRITION SERVICES - REASSESSMENT NOTE     Nutrition Prescription    RECOMMENDATIONS FOR MDs/PROVIDERS TO ORDER:  1. Monitor need for a fluid restriction.  2. Consider ordering a multivitamin with minerals to help ensure micronutrient needs are met.     Malnutrition Status:    Patient does not meet two of the established criteria necessary for diagnosing malnutrition    Recommendations already ordered by Registered Dietitian (RD):  None additionally at this time.     Future/Additional Recommendations:  1. Resume low-sodium diet post-procedure. Encourage high protein options and use of oral supplements.  2. Monitor need for iron supplementation, if needed. Currently, ordered to receive vitamin C supplementation.   3. Continue calcium/vitamin D supplementation as per team.   4. Monitor GI sx.     EVALUATION OF THE PROGRESS TOWARD GOALS   Diet: NPO for possible procedure (possible pulmonary angiogram and angioplasty). Previously on a 2 g sodium diet.   Intake: Tolerating diet. Per nursing flowsheets, pt with a good appetite consuming 75% on 1/8, 100% with a good appetite 1/10, fair to good appetite consuming 100% on 1/11, and NPO so far today (1/12). Briefly spoke to pt today (1/12), but pt noted it was not a good time as she was busy with RNs getting ready for her procedure soon. [employer] (meal ordering system) indicates food/beverages sent 1/9-1/11 totaled 1692 kcals and 44 g protein daily on average. This meets kcal needs but does not quite meet protein needs. She is ordering chocolate Boost Plus at times, most often for lunch. She is ordering two to three meals daily.      NEW FINDINGS   GI: Small stool (soft and brown) noted 1/12. Having zero to 11 stools per day. On scheduled senna and miralax, held at times. Has received magnesium citrate. Per AXR on 1/9: \"No significant stool burden. Nonobstructive bowel gas pattern.\" Per RN, pt having some abdominal discomfort and fullness 1/12.   Resp: 2 L O2  Wt " hx: 54.6 kg (12/9/19), 53.8 kg (3/20/20), 53.3 kg (11/9/20), 57.5 kg (12/23/20, admit), 55 kg (12/30/20), 53.4 kg (1/6/21), 49.9 kg (1/12/21) - Wt decrease due to continued diuresis. Difficult/unable to assess if actual wt loss.    UPDATED ASSESSED NUTRITION NEEDS (for inpatient hospital stay)   Dosing Weight: 48 kg (based on lowest wt so far this admission of 48.3 kg on 1/10)  Estimated Energy Needs: 7059-8368 kcals/day (25 - 30 kcals/kg)  Justification: Maintenance needs  Estimated Protein Needs: 53-67 grams protein/day (1.1 - 1.4 grams of pro/kg)  Justification: Increased needs with cardiac status  Estimated Fluid Needs: 6011-7673 mL/day (1 mL/kcal)   Justification: Maintenance needs or per team, pending fluid status       MALNUTRITION  % Intake: Not meeting this criteria.     % Weight Loss: Not meeting this criteria. However, difficult to assess with fluid status.  Subcutaneous Fat Loss: None observed per visualization  Muscle Loss: Age-related muscle loss per visualization. Does not meet this criteria.   Fluid Accumulation/Edema: Trace  Malnutrition Diagnosis: Patient does not meet two of the established criteria necessary for diagnosing malnutrition    Previous Goals   Patient to consume % of nutritionally adequate meal trays TID, or the equivalent with supplements/snacks.  Evaluation: Appears to be meeting kcal needs but not protein needs.    Previous Nutrition Diagnosis  Predicted inadequate nutrient intake (protein-energy) related to restrictive diet order, food choices, and early satiety.  Evaluation: Unresolved. Appears to be meeting kcal needs but not protein needs. Changed to new nutrition dx.     CURRENT NUTRITION DIAGNOSIS  Inadequate protein intake related to restrictive diet order, food choices, and GI sx as evidenced by pt requesting an average of 44 g protein per day while estimated needs are 53-67 grams protein/day (1.1 - 1.4 grams of pro/kg).    INTERVENTIONS  Implementation  None  additionally at this time as pt was busy getting ready for procedure.     Goals  Patient to consume % of nutritionally adequate meal trays TID, or the equivalent with supplements/snacks.    Monitoring/Evaluation  Progress toward goals will be monitored and evaluated per protocol.     Nutrition will continue to follow.      Mirta Stinson MS, RD, LD, [facility]   6C Pgr: [phone]

## 2021-01-13 ENCOUNTER — APPOINTMENT (OUTPATIENT)
Dept: OCCUPATIONAL THERAPY | Facility: CLINIC | Age: 81
DRG: 252 | End: 2021-01-13
Attending: INTERNAL MEDICINE
Payer: COMMERCIAL

## 2021-01-13 ENCOUNTER — TELEPHONE (OUTPATIENT)
Dept: CARDIOLOGY | Facility: CLINIC | Age: 81
End: 2021-01-13

## 2021-01-13 ENCOUNTER — APPOINTMENT (OUTPATIENT)
Dept: GENERAL RADIOLOGY | Facility: CLINIC | Age: 81
DRG: 252 | End: 2021-01-13
Attending: INTERNAL MEDICINE
Payer: COMMERCIAL

## 2021-01-13 LAB
ANION GAP SERPL CALCULATED.3IONS-SCNC: 4 MMOL/L (ref 3–14)
ANION GAP SERPL CALCULATED.3IONS-SCNC: 6 MMOL/L (ref 3–14)
BUN SERPL-MCNC: 29 MG/DL (ref 7–30)
BUN SERPL-MCNC: 30 MG/DL (ref 7–30)
CALCIUM SERPL-MCNC: 9.3 MG/DL (ref 8.5–10.1)
CALCIUM SERPL-MCNC: 9.3 MG/DL (ref 8.5–10.1)
CHLORIDE SERPL-SCNC: 95 MMOL/L (ref 94–109)
CHLORIDE SERPL-SCNC: 98 MMOL/L (ref 94–109)
CO2 SERPL-SCNC: 34 MMOL/L (ref 20–32)
CO2 SERPL-SCNC: 34 MMOL/L (ref 20–32)
CREAT SERPL-MCNC: 1 MG/DL (ref 0.52–1.04)
CREAT SERPL-MCNC: 1.16 MG/DL (ref 0.52–1.04)
ERYTHROCYTE [DISTWIDTH] IN BLOOD BY AUTOMATED COUNT: 16.3 % (ref 10–15)
GFR SERPL CREATININE-BSD FRML MDRD: 45 ML/MIN/{1.73_M2}
GFR SERPL CREATININE-BSD FRML MDRD: 54 ML/MIN/{1.73_M2}
GLUCOSE SERPL-MCNC: 106 MG/DL (ref 70–99)
GLUCOSE SERPL-MCNC: 130 MG/DL (ref 70–99)
HCT VFR BLD AUTO: 28.4 % (ref 35–47)
HGB BLD-MCNC: 8.5 G/DL (ref 11.7–15.7)
INR PPP: 1.48 (ref 0.86–1.14)
MAGNESIUM SERPL-MCNC: 2 MG/DL (ref 1.6–2.3)
MAGNESIUM SERPL-MCNC: 2.1 MG/DL (ref 1.6–2.3)
MCH RBC QN AUTO: 27.5 PG (ref 26.5–33)
MCHC RBC AUTO-ENTMCNC: 29.9 G/DL (ref 31.5–36.5)
MCV RBC AUTO: 92 FL (ref 78–100)
PLATELET # BLD AUTO: 216 10E9/L (ref 150–450)
POTASSIUM SERPL-SCNC: 3.4 MMOL/L (ref 3.4–5.3)
POTASSIUM SERPL-SCNC: 4.7 MMOL/L (ref 3.4–5.3)
RBC # BLD AUTO: 3.09 10E12/L (ref 3.8–5.2)
SODIUM SERPL-SCNC: 135 MMOL/L (ref 133–144)
SODIUM SERPL-SCNC: 136 MMOL/L (ref 133–144)
WBC # BLD AUTO: 4.6 10E9/L (ref 4–11)

## 2021-01-13 PROCEDURE — 71045 X-RAY EXAM CHEST 1 VIEW: CPT

## 2021-01-13 PROCEDURE — 71045 X-RAY EXAM CHEST 1 VIEW: CPT | Mod: 26 | Performed by: RADIOLOGY

## 2021-01-13 PROCEDURE — 80048 BASIC METABOLIC PNL TOTAL CA: CPT | Performed by: STUDENT IN AN ORGANIZED HEALTH CARE EDUCATION/TRAINING PROGRAM

## 2021-01-13 PROCEDURE — 71045 X-RAY EXAM CHEST 1 VIEW: CPT | Mod: 76

## 2021-01-13 PROCEDURE — 250N000013 HC RX MED GY IP 250 OP 250 PS 637: Performed by: INTERNAL MEDICINE

## 2021-01-13 PROCEDURE — 99232 SBSQ HOSP IP/OBS MODERATE 35: CPT | Mod: GC | Performed by: INTERNAL MEDICINE

## 2021-01-13 PROCEDURE — 250N000013 HC RX MED GY IP 250 OP 250 PS 637: Performed by: STUDENT IN AN ORGANIZED HEALTH CARE EDUCATION/TRAINING PROGRAM

## 2021-01-13 PROCEDURE — 250N000011 HC RX IP 250 OP 636: Performed by: INTERNAL MEDICINE

## 2021-01-13 PROCEDURE — 214N000001 HC R&B CCU UMMC

## 2021-01-13 PROCEDURE — 97140 MANUAL THERAPY 1/> REGIONS: CPT | Mod: GO | Performed by: OCCUPATIONAL THERAPIST

## 2021-01-13 PROCEDURE — 85610 PROTHROMBIN TIME: CPT | Performed by: STUDENT IN AN ORGANIZED HEALTH CARE EDUCATION/TRAINING PROGRAM

## 2021-01-13 PROCEDURE — 85027 COMPLETE CBC AUTOMATED: CPT | Performed by: STUDENT IN AN ORGANIZED HEALTH CARE EDUCATION/TRAINING PROGRAM

## 2021-01-13 PROCEDURE — 83735 ASSAY OF MAGNESIUM: CPT | Performed by: STUDENT IN AN ORGANIZED HEALTH CARE EDUCATION/TRAINING PROGRAM

## 2021-01-13 PROCEDURE — 272N000004 HC RX 272: Performed by: INTERNAL MEDICINE

## 2021-01-13 RX ORDER — BUMETANIDE 2 MG/1
4 TABLET ORAL
Status: DISCONTINUED | OUTPATIENT
Start: 2021-01-13 | End: 2021-01-14 | Stop reason: HOSPADM

## 2021-01-13 RX ORDER — WARFARIN SODIUM 5 MG/1
5 TABLET ORAL
Status: COMPLETED | OUTPATIENT
Start: 2021-01-13 | End: 2021-01-13

## 2021-01-13 RX ORDER — POTASSIUM CHLORIDE 750 MG/1
20 TABLET, EXTENDED RELEASE ORAL 2 TIMES DAILY
Status: DISCONTINUED | OUTPATIENT
Start: 2021-01-13 | End: 2021-01-14 | Stop reason: HOSPADM

## 2021-01-13 RX ORDER — BUMETANIDE 2 MG/1
4 TABLET ORAL
Qty: 90 TABLET | Refills: 1 | Status: CANCELLED | OUTPATIENT
Start: 2021-01-13

## 2021-01-13 RX ORDER — POTASSIUM CHLORIDE 750 MG/1
20 TABLET, EXTENDED RELEASE ORAL ONCE
Status: COMPLETED | OUTPATIENT
Start: 2021-01-13 | End: 2021-01-13

## 2021-01-13 RX ADMIN — DOCUSATE SODIUM AND SENNOSIDES 1 TABLET: 8.6; 5 TABLET, FILM COATED ORAL at 08:53

## 2021-01-13 RX ADMIN — CETIRIZINE HYDROCHLORIDE 10 MG: 10 TABLET, FILM COATED ORAL at 19:49

## 2021-01-13 RX ADMIN — POTASSIUM CHLORIDE 20 MEQ: 1500 TABLET, EXTENDED RELEASE ORAL at 19:49

## 2021-01-13 RX ADMIN — BUMETANIDE 4 MG: 2 TABLET ORAL at 15:43

## 2021-01-13 RX ADMIN — RIOCIGUAT 1.5 MG: 0.5 TABLET, FILM COATED ORAL at 14:06

## 2021-01-13 RX ADMIN — IPRATROPIUM BROMIDE 2 SPRAY: 42 SPRAY NASAL at 08:59

## 2021-01-13 RX ADMIN — GABAPENTIN 300 MG: 300 CAPSULE ORAL at 19:50

## 2021-01-13 RX ADMIN — HYDROXYCHLOROQUINE SULFATE 200 MG: 200 TABLET, FILM COATED ORAL at 08:54

## 2021-01-13 RX ADMIN — IPRATROPIUM BROMIDE 2 SPRAY: 42 SPRAY NASAL at 19:50

## 2021-01-13 RX ADMIN — DOCUSATE SODIUM AND SENNOSIDES 1 TABLET: 8.6; 5 TABLET, FILM COATED ORAL at 19:49

## 2021-01-13 RX ADMIN — RIOCIGUAT 1.5 MG: 0.5 TABLET, FILM COATED ORAL at 19:50

## 2021-01-13 RX ADMIN — POTASSIUM CHLORIDE 20 MEQ: 1500 TABLET, EXTENDED RELEASE ORAL at 08:50

## 2021-01-13 RX ADMIN — WARFARIN SODIUM 5 MG: 5 TABLET ORAL at 18:33

## 2021-01-13 RX ADMIN — SALINE NASAL SPRAY 1 SPRAY: 1.5 SOLUTION NASAL at 09:18

## 2021-01-13 RX ADMIN — GABAPENTIN 300 MG: 300 CAPSULE ORAL at 14:05

## 2021-01-13 RX ADMIN — MACITENTAN 10 MG: 10 TABLET, FILM COATED ORAL at 12:27

## 2021-01-13 RX ADMIN — DIGOXIN 62.5 MCG: 0.06 TABLET ORAL at 09:20

## 2021-01-13 RX ADMIN — IPRATROPIUM BROMIDE 2 SPRAY: 42 SPRAY NASAL at 14:05

## 2021-01-13 RX ADMIN — Medication 2 TABLET: at 08:54

## 2021-01-13 RX ADMIN — TREPROSTINIL 30 NG/KG/MIN: 20 INJECTION, SOLUTION INTRAVENOUS; SUBCUTANEOUS at 01:03

## 2021-01-13 RX ADMIN — ASPIRIN 81 MG CHEWABLE TABLET 81 MG: 81 TABLET CHEWABLE at 19:50

## 2021-01-13 RX ADMIN — OXYCODONE HYDROCHLORIDE AND ACETAMINOPHEN 500 MG: 500 TABLET ORAL at 08:54

## 2021-01-13 RX ADMIN — POTASSIUM CHLORIDE 60 MEQ: 1500 TABLET, EXTENDED RELEASE ORAL at 08:50

## 2021-01-13 RX ADMIN — GABAPENTIN 300 MG: 300 CAPSULE ORAL at 08:54

## 2021-01-13 ASSESSMENT — ACTIVITIES OF DAILY LIVING (ADL)
ADLS_ACUITY_SCORE: 13

## 2021-01-13 NOTE — PROGRESS NOTES
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    Has been diuresed with bumex drip and intermittent dose of diuril. Was on output support with dobutamine for a while before discontinued on 1/8 after improvement of volume status. Given worsening of overall clinical status and right heart function in the past couple of months, balloon angioplasty was scheduled on 1/12 with plans for 4 rounds (in total) to be continued outpatient. Otherwise, continue with other supportive treatments for constipation.    Plan today:  - resume warfarin with INR goal 2-2.5  - resume Adempas  - discontinued bumex gtt   - bumex 4 mg BID PO   - potassium decreased to 20 BID  - cxr  - wilkins removed  - transfer placed for cards tele   - likely discharge tomorrow     #Acute on chronic right-sided heart failure   Progressive worsening of right heart function. This is her second admission; previous admission in 10/2020.   This admission presented with increasing SOB, dizziness, and weight gain to 126lbs (dry weight ~112 lbs) for the last couple of weeks, increased RIVERA. Has RLE swelling but US DVT negative (12/23/2020). Patient was hypervolemic with IVC diameter 2.8 cm with no respiratory variation on admission. Significant diuresis since admission with use of bumex gtt prior to her procedure then transitioned to PO bumex for maintenance diuretic.      RHC (09/2020): RA 11/6/5 RV 90/8 PA 90/20/42 PCWP --/--/11 LUIZ 2.55/1.76 TD 3/2.07 PVR 10 (TD) (Moderately elevated pulmonary artery hypertension, Right sided and Left sided filling pressures are normal)  RHC  12/23/2020: RA 19/24/22 RV 80/21 PA 80/26/42 PCWP 17 LUIZ 3/2.04 TD 3.07/2.09 PVR 8(severely elevated right-sided filling pressure and pulmonary hypertension, reduced cardiac output)  Last TTE (09/2020): Normal LVEF 60-65%. Moderately dilated and reduced function of RV. Right ventricular systolic pressure is 82mmHg above the right atrial pressure.     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 Follow up with Dr. Turcios in an outpatient. Currently on Remodulin drip, adempas, and opsumit. Underwent balloon pulmonary angioplasty this admission round 1.     - continue Remodulin 30 ng/kg/min  - contiue Opsumit q48h  - continue Adempas 1.5 mg TID  - continue Warfarin. Dosed by pharmacy, INR goal 2.0-2.5  - outpatient balloon angioplasties planned       #Hypokalemia  - KCL 20 mEq BID + prn  - trend and replace prn    #Concern for fecal impaction: Moderate stool burden with bloated abdomen, resolved   Causing abdominal comfort which interferes with breathing. Responded well to mg citrate and enema. Imaging showing no stool burden after this.   - simethicone prn  - continue senna BID & lactulose BID prn  - can consider Mg citrate prn or enema if needed --> currently not needed     #Acute on chronic respiratory failure with hypoxia, gradually improved  Mildly improved, likely from pulmonary edema. Less crackles heard after diuresis. Requiring less oxygen demand from 4 LPM to currently 1LPM.  - continue to monitor  - Try wean-off O2, keep SpO2 > 88%    #JOSE G on CKD, improvign  Creatinine rising was likely from congestion in renal vasculature. Improving with diureses   - continue diuresis as above   - trend BMP, Mg    #Cellulitis RLE, resolved  #bilateral lower extremities edema,  improved  LE edema R>L on initial presentation with some tenderness. LE ultrasound 12/23 showed no DVT. Improved after 24 hours of given Clindamycin. Currently still has some tenderness, likely from distended skin from peripheral edema. No worsening tenderness or increasing signs of infection after last dose of clindamycin 12/30. Continue to improve with diuresis.  - continue to monitor.    #Asthma  #Allergic rhinitis  No acute concerns.  - Continue beclomethasone BID and albuterol inhaler PRN  - Continue cetirizine and ipratropium nasal spray    #RA  No acute concerns.  - Continue Hydroxychloroquine 200 mg daily    Diet: Na restriction diet  DVT Prophylaxis: Warfarin  Dominguez Catheter: in place, indication: Strict 1-2 Hour I&O, Strict 1-2 Hour I&O  Code Status: FULL code        Disposition Plan   Expected discharge: 2-3 days, recommended to prior living arrangement once stable after procedure(s), euvolemic status is obtained and able to be maintained with oral diuretics, and not requiring any oxygen supplement.    The patient's care was discussed with the Attending Physician, Dr. Marlow.    Rich Redd MD   Internal Medicine PGY-2      I have reviewed today's vital signs, notes, medications, labs and imaging. I have also seen and examined the patient and agree with the findings and plan as outlined above.    Gladys Marlow MD  Section Head - Advanced Heart Failure, Transplantation and Mechanical Circulatory Support  Director - Adult Congenital and Cardiovascular Genetics Center  Associate Professor of Medicine, [facility]    _____________________    Interval History    Some hemoptysis post procedure that has now resolved and no recurrence this morning. She's tired as she didn't sleep well but overall feels well. Denies any chest pain or worsening shortness of breath. Denies nausea/vomiting/adbominal pain.    Data reviewed today: I reviewed all medications, new labs and imaging results over the last  24 hours and discussed as pertinent in assessment and plan.    Physical Exam   Vital Signs: Temp: 98.2  F (36.8  C) Temp src: Bladder BP: 119/50 Pulse: 67   Resp: 18 SpO2: 92 % O2 Device: Nasal cannula Oxygen Delivery: 3 LPM  Weight: 110 lbs 0 oz about the same    Constitutional: awake, alert, cooperative and sitting up at bedside   Neck: JVP ~10  Respiratory: No increased work of breathing, no some crackles at bl lung bases   Cardiovascular: Regular rate and rhythm, normal S1S2, and no murmur, peripheral pitting edema but legs in wraps   GI:  normal bowel sounds, soft, non-tender  Musculoskeletal: lower extremity wraps warmth, and tenderness on posterior RLE below knee.  Skin: mostly tenderness on lower extremities; R>L.  Neurologic: Awake, alert. No focal neurological deficit.    Data   Recent Labs   Lab 01/13/21  0457 01/12/21  2221 01/12/21  1823 01/12/21  0623 01/11/21  0507 01/11/21  0507 01/07/21  0455 01/07/21  0455   WBC 4.6  --   --  4.5  --   --   --  4.1   HGB 8.5*  --  9.3* 8.2*  --   --   --  8.5*   MCV 92  --   --  92  --   --   --  91     --   --  228  --   --   --  208   INR 1.48*  --   --  1.77*  --  1.92*   < > 2.42*    137  --  139   < > 138   < > 140   POTASSIUM 3.4 3.4  --  3.8   < > 3.3*   < > 3.6   CHLORIDE 95 96  --  102   < > 99   < > 100   CO2 34* 32  --  32   < > 32   < > 34*   BUN 29 32*  --  29   < > 32*   < > 23   CR 1.00 1.16*  --  1.19*   < > 1.14*   < > 1.08*   ANIONGAP 6 9  --  5   < > 7   < > 6   ESTEFANÍA 9.3 10.0  --  9.1   < > 9.0   < > 9.8   * 88  --  78   < > 87   < > 74    < > = values in this interval not displayed.     Medications     bumetanide 1.5 mg/hr (01/11/21 0200)                       treprostinil (REMODULIN) intravenous infusion (LESS than or EQUAL to 100 mcg/mL) 30 ng/kg/min (01/11/21 0218)            aspirin  81 mg Oral QPM     beclomethasone HFA  2 puff Inhalation BID     bumetanide  4 mg Oral BID     calcium citrate and vitamin D  2 tablet Oral  Daily     cetirizine  10 mg Oral QPM     digoxin  62.5 mcg Oral Daily     gabapentin  300 mg Oral TID     hydroxychloroquine  200 mg Oral Daily     ipratropium  2 spray Both Nostrils TID     macitentan  10 mg Oral Q48H     polyethylene glycol  17 g Oral Daily     potassium chloride  20 mEq Oral BID     riociguat  1.5 mg Oral TID     senna-docusate  1 tablet Oral BID    Or     senna-docusate  2 tablet Oral BID     sodium chloride (PF)  3 mL Intracatheter Q8H     vitamin C  500 mg Oral QAM     warfarin ANTICOAGULANT  5 mg Oral ONCE at 18:00

## 2021-01-13 NOTE — PROGRESS NOTES
Major Shift Events:  VSS overnight. Denies pain. No further hemoptysis. O2 weaned. Remodulin and bumex gtts infusing. Dominguez in place w/ excellent urine output. Pt stood to commode and had BM x2. Aspirin, riociguat, and warfarin held per cards fellow    Plan: Probable transfer to  when bed available.    For vital signs and complete assessments, please see documentation flowsheets.

## 2021-01-13 NOTE — PROGRESS NOTES
Admitted/transferred from: cath lab  Reason for admission/transfer: s/p pulmonary angioplasty w/ hemoptysis  Patient status upon admission/transfer: stable  Interventions: 2 units FFP given  Plan: monitor overnight transfer to  tomorrow  2 RN skin assessment: completed by Arnoldo Willams/Fabby Santana  Result of skin assessment and interventions/actions: Coccyx reddened but blanchable, left knee bruised and swollen  Height, weight, drug calc weight: done  Patient belongings: sent from  placed in closet. Pt's home medications received and placed in safe  MDR[employer] education (if applicable): N/A

## 2021-01-13 NOTE — TELEPHONE ENCOUNTER
*Spoke with pt's , Santana. Santana stated he received a call from the inpatient doctor about a week ago asking him for assistance with providing the Adempas medication. Per Santana, the inpatient doctor stated that they had run out of the medication and wanted Santana to bring pt's medication from home to help bridge her until they got her medication in stock at the hospital. Santana provided the requested medication of 11 pills and requested that the hospital provide him 11 pills in return when they received their stock. Santana stated when he asked for the pills from the hospital, he was only provided one. Santana stated he contacted the hospital again and was told that they would send the information off to the pharmacist to review and the pharmacist would get back to Santana. Santana never heard from the pharmacist about the 10 remaining pills and wasn't sure what to do next. Message routed to SHARON Balbuena  Clinic   Pulmonary Hypertension  [facility]  (P) [phone]

## 2021-01-13 NOTE — TELEPHONE ENCOUNTER
Received denial letter from [employer] [facility] that pt did not meet income criteria.    *Contacted pt's  to discuss the next steps. Santana stated that he was told he could appeal the decision or use insurance and pt would have to pay $2,700 for the first month and $400 after. Told Santana that appealing the decision for  assistance can be done and will require a letter to justify pt's income. Santana stated he will write a letter and send it to [facility]. Stated to Santana that with pt discharging tomorrow, the office could attempt to get pt on the [facility] Voucher Program to receive a 30 day free trial of the medication while the decision is being made. Told Santana that [employer] will contact the pt to schedule a shipment.    *Contacted [facility] and spoke with Ngoc. Asked Ngoc if pt can start on [employer]psumit Voucher Program. Ngoc stated she has submitted the request to begin patient on the Opsumit Voucher Program. Ngoc stated they will need to get the [facility] and triage it over to [facility]. Asked Ngoc if she could expedite the request. Ngoc stated that the soonest is Friday for the shipment because of the triage.     Lexus Balbuena  Clinic   Pulmonary Hypertension  [facility]  (P) [phone]

## 2021-01-14 ENCOUNTER — TELEPHONE (OUTPATIENT)
Dept: ANTICOAGULATION | Facility: CLINIC | Age: 81
End: 2021-01-14

## 2021-01-14 ENCOUNTER — PATIENT OUTREACH (OUTPATIENT)
Dept: CARE COORDINATION | Facility: CLINIC | Age: 81
End: 2021-01-14

## 2021-01-14 ENCOUNTER — TELEPHONE (OUTPATIENT)
Dept: CARDIOLOGY | Facility: CLINIC | Age: 81
End: 2021-01-14

## 2021-01-14 ENCOUNTER — APPOINTMENT (OUTPATIENT)
Dept: OCCUPATIONAL THERAPY | Facility: CLINIC | Age: 81
DRG: 252 | End: 2021-01-14
Attending: INTERNAL MEDICINE
Payer: COMMERCIAL

## 2021-01-14 VITALS
TEMPERATURE: 98.2 F | HEART RATE: 82 BPM | BODY MASS INDEX: 23.09 KG/M2 | DIASTOLIC BLOOD PRESSURE: 65 MMHG | RESPIRATION RATE: 18 BRPM | WEIGHT: 110 LBS | HEIGHT: 58 IN | OXYGEN SATURATION: 88 % | SYSTOLIC BLOOD PRESSURE: 150 MMHG

## 2021-01-14 DIAGNOSIS — I27.24 CTEPH (CHRONIC THROMBOEMBOLIC PULMONARY HYPERTENSION) (H): ICD-10-CM

## 2021-01-14 DIAGNOSIS — Z79.01 LONG TERM CURRENT USE OF ANTICOAGULANT THERAPY: ICD-10-CM

## 2021-01-14 DIAGNOSIS — I27.20 PULMONARY HYPERTENSION (H): ICD-10-CM

## 2021-01-14 LAB
ANION GAP SERPL CALCULATED.3IONS-SCNC: 8 MMOL/L (ref 3–14)
BUN SERPL-MCNC: 31 MG/DL (ref 7–30)
CALCIUM SERPL-MCNC: 9.7 MG/DL (ref 8.5–10.1)
CHLORIDE SERPL-SCNC: 102 MMOL/L (ref 94–109)
CO2 SERPL-SCNC: 31 MMOL/L (ref 20–32)
CREAT SERPL-MCNC: 1.26 MG/DL (ref 0.52–1.04)
GFR SERPL CREATININE-BSD FRML MDRD: 41 ML/MIN/{1.73_M2}
GLUCOSE SERPL-MCNC: 79 MG/DL (ref 70–99)
INR PPP: 1.59 (ref 0.86–1.14)
MAGNESIUM SERPL-MCNC: 2.1 MG/DL (ref 1.6–2.3)
POTASSIUM SERPL-SCNC: 4 MMOL/L (ref 3.4–5.3)
SODIUM SERPL-SCNC: 140 MMOL/L (ref 133–144)

## 2021-01-14 PROCEDURE — 36415 COLL VENOUS BLD VENIPUNCTURE: CPT | Performed by: INTERNAL MEDICINE

## 2021-01-14 PROCEDURE — 250N000013 HC RX MED GY IP 250 OP 250 PS 637: Performed by: STUDENT IN AN ORGANIZED HEALTH CARE EDUCATION/TRAINING PROGRAM

## 2021-01-14 PROCEDURE — 250N000011 HC RX IP 250 OP 636: Performed by: INTERNAL MEDICINE

## 2021-01-14 PROCEDURE — 250N000013 HC RX MED GY IP 250 OP 250 PS 637: Performed by: INTERNAL MEDICINE

## 2021-01-14 PROCEDURE — 80048 BASIC METABOLIC PNL TOTAL CA: CPT | Performed by: INTERNAL MEDICINE

## 2021-01-14 PROCEDURE — 85610 PROTHROMBIN TIME: CPT | Performed by: INTERNAL MEDICINE

## 2021-01-14 PROCEDURE — 272N000004 HC RX 272: Performed by: INTERNAL MEDICINE

## 2021-01-14 PROCEDURE — 83735 ASSAY OF MAGNESIUM: CPT | Performed by: INTERNAL MEDICINE

## 2021-01-14 PROCEDURE — 97535 SELF CARE MNGMENT TRAINING: CPT | Mod: GO | Performed by: OCCUPATIONAL THERAPIST

## 2021-01-14 RX ORDER — POTASSIUM CHLORIDE 1500 MG/1
20 TABLET, EXTENDED RELEASE ORAL 2 TIMES DAILY
Qty: 90 TABLET | Refills: 3 | Status: SHIPPED | OUTPATIENT
Start: 2021-01-14 | End: 2021-01-21

## 2021-01-14 RX ORDER — WARFARIN SODIUM 5 MG/1
5 TABLET ORAL
Status: DISCONTINUED | OUTPATIENT
Start: 2021-01-14 | End: 2021-01-14 | Stop reason: HOSPADM

## 2021-01-14 RX ORDER — BUMETANIDE 2 MG/1
4 TABLET ORAL
Qty: 90 TABLET | Refills: 1 | Status: SHIPPED | OUTPATIENT
Start: 2021-01-14 | End: 2021-01-21

## 2021-01-14 RX ADMIN — BUMETANIDE 4 MG: 2 TABLET ORAL at 08:25

## 2021-01-14 RX ADMIN — IPRATROPIUM BROMIDE 2 SPRAY: 42 SPRAY NASAL at 08:26

## 2021-01-14 RX ADMIN — HYDROXYCHLOROQUINE SULFATE 200 MG: 200 TABLET, FILM COATED ORAL at 08:25

## 2021-01-14 RX ADMIN — RIOCIGUAT 1.5 MG: 0.5 TABLET, FILM COATED ORAL at 08:25

## 2021-01-14 RX ADMIN — GABAPENTIN 300 MG: 300 CAPSULE ORAL at 08:25

## 2021-01-14 RX ADMIN — Medication 2 TABLET: at 08:25

## 2021-01-14 RX ADMIN — DIGOXIN 62.5 MCG: 0.06 TABLET ORAL at 11:01

## 2021-01-14 RX ADMIN — TREPROSTINIL 30 NG/KG/MIN: 20 INJECTION, SOLUTION INTRAVENOUS; SUBCUTANEOUS at 07:05

## 2021-01-14 RX ADMIN — POTASSIUM CHLORIDE 20 MEQ: 1500 TABLET, EXTENDED RELEASE ORAL at 08:23

## 2021-01-14 RX ADMIN — OXYCODONE HYDROCHLORIDE AND ACETAMINOPHEN 500 MG: 500 TABLET ORAL at 08:25

## 2021-01-14 ASSESSMENT — ACTIVITIES OF DAILY LIVING (ADL)
ADLS_ACUITY_SCORE: 13

## 2021-01-14 NOTE — PROGRESS NOTES
Care Coordinator  D/I: Per Elpidio message from lexus Balbuena:  Lexus Balbuena Jessalyn, PRIYA   Cc: Odette Sellers, PRIYA; Lucy Molina, PRIYA; Karolina Turcios MD; Kezia Arnett RN             Chet Villareal,     I got bad news - the patient's PAP application was denied. I know the pt's co-pay is high so I am requesting the pt start on the Opsumit Voucher program and started the request today. They informed me it will take two days for the triage to the pharmacy that dispenses the 30-day free trial medication. I'm sorry.      P: I will inform team this morning. I will inform discharge liason: Felisa Pradhan per [employer] teams. Will follow.

## 2021-01-14 NOTE — PLAN OF CARE
Discharge at 1210  Discharged to: home with   Via: wheelchair  Accompanied by: RN  Belongings: sent with patient. Home meds given back to pt.  Teaching: [facility] home infusion, follow-up appts, daily weights, BP monitoring  Clinic Appointment: set  Tele/IV: removed prior to discharge, jade in place and infusing home remodulin

## 2021-01-14 NOTE — TELEPHONE ENCOUNTER
Contacted [facility] to follow-up on pt's triage from [facility]. Spoke with Jenelle who stated that it hasn't been started yet as it needs to come back from [facility]. Asked Jenelle if that request was to be made by the [facility] or by [facility]. Jenelle stated it's to be made by [facility]. Requested [facility] urgent expedite the request as pt's plan to discharge was today and the request for OVP was yesterday. Jenelle stated she will request the referral expedited and send it off to the  for expedited assistance. Jenelle stated they will also need to confirm that the pt did not get a dispense from [facility], which may cause some delay. Stated to Jenelle that information will be relayed to [facility] representative for expedited assistance in answering questions and triaging the medication to [facility]. Asked Jenelle when pt should expect to hear from [facility]. Jenelle stated later today at 5, if pt is confirmed to not have received the shipment.    *Called Don and left him a voicemail stating he should hear from [facility] today at around 5:00pm.    Lexus Balbuena  Clinic   Pulmonary Hypertension  [facility]  (P) [phone]

## 2021-01-14 NOTE — TELEPHONE ENCOUNTER
ANTICOAGULATION  MANAGEMENT: Discharge Review    Karen Anderson chart reviewed for anticoagulation continuity of care    Hospital Admission on 12/23 to 1/14  for You were initially admitted to the hospital due to you worsening pulmonary hypertension and symptoms of  heart failure which required you to be placed on large IV diuretic doses to remove several lbs of fluid. During  this admission you also underwent balloon angioplasty to open up some of your pulmonary vessels and you  will need at least 3 more of these procedures after leaving the hospital.    Discharge disposition: Home with home infusion    Results:    Recent labs: (last 7 days)     01/08/21  0450 01/09/21  0637 01/10/21  0535 01/11/21  0507 01/12/21  0623 01/13/21  0457 01/14/21  0506   INR 2.41* 2.00* 2.00* 1.92* 1.77* 1.48* 1.59*     Anticoagulation inpatient management:     reversed with Protamamine IV on 1/12 35mg for procedure. on1/12 2 units FFP given on 1/12    Anticoagulation discharge instructions:     Warfarin dosing: home regimen continued   Bridging: No   INR goal change: While inpatient, Karen's goal range was changed to 1.8-2.2, then changed back at discharge to 2.0-2.5 (orders signed in Oct. 2020)     Medication changes affecting anticoagulation:  START taking:  bumetanide (BUMEX)  macitentan (OPSUMIT)  Start taking on: January 15, 2021  CHANGE how you take:  potassium chloride ER (KLOR-CON M)  riociguat (Adempas)  STOP taking:  furosemide 20 MG tablet (LASIX)    Additional factors affecting anticoagulation: Patient on continuous Remodulin.  Will be undergoing several more balloon procedures.    Plan     No adjustment to anticoagulation plan needed  Recommend to check INR on 1/18  Verified INR goal to be 2.0-2.5 with Dr. Turcios    Spoke with Karen  Sent My Chart message with dosing and assisted patient with next INR appt.    Anticoagulation Calendar updated     Discussed with Pharmacist Rosa Moon, Pharm-D for  patient's new Anticoagulation recommendation.    Jaylan Jacobo, RN

## 2021-01-14 NOTE — TELEPHONE ENCOUNTER
Email sent to [facility] nurses to add lab draw prior to clinic appt with Dr. Turcios next week. Kezia Arnett, RN on 1/14/2021 at 1:01 PM    ----- Message from Rich Redd MD sent at 1/14/2021 12:55 PM CST -----  Regarding: Karen Lozano discharged hospital  [facility] Ms Lozano has been discharged today.    She has an appointment with Dr Turcios scheduled 1/21 and should have CBC and BMP with this.     Thanks  Rich Redd PGY-2

## 2021-01-14 NOTE — DISCHARGE SUMMARY
[facility]     Cardiology Discharge Summary- Cards 2         Date of Admission:  12/23/2020  Date of Discharge:  1/14/2021 12:09 PM  Discharging Provider: Dr Gladys Marlow      Discharge Diagnoses   Acute on chronic systoli right sided heart failure exacerbation with severe right ventricular dysfunction  Chronic thromboembolic pulmonary hypertension   Acute on chronic hypoxic respiratory failure with hypoxia   Hypokalemia   Constipation   Acute kidney injury on chronic kidney disease   Cellulitis   Asthma   Rheumatoid arthritis       Follow-ups Needed After Discharge   Follow-up Appointments     Adult [facility] Follow-up and recommended labs and tests      Follow up with Dr Haq on 1/21 for continued discussion of balloon   angioplasty procedures and follow up     Follow up with your PCP in 2-3 weeks     Appointments on [place] and/or [facility] (with [facility] or [facility]   provider or service). Call [phone] if you haven't heard regarding   these appointments within 7 days of discharge.             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  Has been diuresed with bumex drip and intermittent dose of diuril. Was on output support with dobutamine for a while before discontinued on 1/8 after improvement of volume status. Given worsening of overall clinical status and right heart function in the past couple of months, balloon angioplasty was scheduled on 1/12 with plans for possibly 4 rounds (in total) to be continued outpatient.      #Acute on chronic systolic right-sided heart failure   Progressive worsening of right heart function.     This admission presented with increasing SOB, dizziness, and weight gain to 126lbs (dry weight ~112 lbs). Significant diuresis since admission with use of bumex gtt prior to her procedure then transitioned to PO bumex for maintenance diuretic. Wt down to 110 lbs prior to discharge. At home she was on 160 mg lasix total daily here is was responsive to bumex and she is now discharged on bumex instead.  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 Follow up with Dr. Turcios in an outpatient. Currently on Remodulin drip, adempas, and opsumit. Underwent balloon pulmonary angioplasty this admission round 1 on 1/13. She has some hemoptysis with her angioplasty due to small wire perforation but this quickly resolved with 2 unit of FFP and she required no transfusions for her hgb.      - continue Remodulin 30 ng/kg/min  - contiue Opsumit q48h  - continue Adempas 1.5 mg TID  - continue Warfarin. INR goal 2.0-2.5  - follow up with Dr Haq 1/21      #Hypokalemia  Discharged on potassium on above.      #Concern for fecal impaction: Moderate stool burden with bloated abdomen, resolved   Causing abdominal comfort which interferes with breathing. Responded well to mg citrate and enema. Imaging showing no stool burden after this.      #Acute on chronic respiratory failure with hypoxia, gradually improved  Mildly improved, likely from pulmonary edema and weaned to room air on discharge.     #JOSE G on CKD  Creatinine rising was likely from congestion in renal vasculature.      #Cellulitis RLE, resolved  #bilateral lower extremities edema, improved  LE edema R>L on initial presentation with some tenderness. LE ultrasound 12/23 showed no DVT. Improved after 24 hours of given Clindamycin. Currently still has some tenderness, likely from distended skin from  peripheral edema. No worsening tenderness or increasing signs of infection after last dose of clindamycin 12/30.      #Asthma  #Allergic rhinitis  No acute concerns.  - Continue beclomethasone BID and albuterol inhaler PRN  - Continue cetirizine and ipratropium nasal spray     #RA  No acute concerns.  - Continue Hydroxychloroquine 200 mg daily    Consultations This Hospital Stay   PHARMACY TO DOSE WARFARIN  PHARMACY IP CONSULT  MEDICATION HISTORY IP PHARMACY CONSULT  PHYSICAL THERAPY ADULT IP CONSULT  OCCUPATIONAL THERAPY ADULT IP CONSULT  CARE MANAGEMENT / SOCIAL WORK IP CONSULT  VASCULAR ACCESS CARE ADULT IP CONSULT  LYMPHEDEMA THERAPY IP CONSULT      Code Status   Full Code        Adilsonjoelle Mohsen Yacoup, MD  [facility]       I,Gladys Marlow MD, have seen and examined this patient. I have discussed with the team and agree with the findings and discharge plan. I have reviewed the hospital course with the patient and have spent 35 minutes in the process of discharge, including discharge planning with patient education, medication teaching, and the coordination of care to outpatient providers.  It has been a pleasure to participate in the care of your patient - please do not hesitate to contact me with any questions.    Gladys Marlow MD  Section Head - Advanced Heart Failure, Transplantation and Mechanical Circulatory Support  Director - Adult [facility]enital [facility] Cardiovascular [facility]etics Center  Associate Professor of Medicine, [facility]      ______________________________________________________________________    Physical Exam   Vital Signs: Temp: 98.2  F (36.8  C) Temp src: Oral BP: (!) 150/65 Pulse: 82   Resp: 18 SpO2: (!) 88 % O2 Device: None (Room air) Oxygen Delivery: 1 LPM  Weight: 110 lbs 0 oz  General Appearance: NAD sitting in bed   Respiratory: on room air no increase in work of breathing lungs clear   Cardiovascular: RRR  GI: soft non-tender abdomen    Skin: warm and well perfused         Primary Care Physician   JEANIE MCKEON    Discharge Orders      Home infusion referral      Reason for your hospital stay    You were initially admitted to the hospital due to you worsening pulmonary hypertension and symptoms of heart failure which required you to be placed on large IV diuretic doses to remove several lbs of fluid. During this admission you also underwent balloon angioplasty to open up some of your pulmonary vessels and you will need at least 3 more of these procedures after leaving the hospital.     Adult [facility] [place]/[facility] Follow-up and recommended labs and tests    Follow up with Dr Haq on 1/21 for continued discussion of balloon angioplasty procedures and follow up     Follow up with your PCP in 2-3 weeks     Appointments on [place] and/or [facility] (with [facility] [place] or [facility] provider or service). Call [phone] if you haven't heard regarding these appointments within 7 days of discharge.     Activity    Your activity upon discharge: activity as tolerated     Discharge Instructions    You will be discharged on a different diuretic than you were taking at home. Please discontinue your old diuretic and take your Bumex 4 mg twice a day.     Diet    Follow this diet upon discharge:       Fluid restriction 1800 ML FLUID      2 Gram Sodium Diet       Significant Results and Procedures   Most Recent 3 BMP's:  Recent Labs   Lab Test 01/14/21  0506 01/13/21  1537 01/13/21  0457    136 135   POTASSIUM 4.0 4.7 3.4   CHLORIDE 102 98 95   CO2 31 34* 34*   BUN 31* 30 29   CR 1.26* 1.16* 1.00   ANIONGAP 8 4 6   ESTEFANÍA 9.7 9.3 9.3   GLC 79 130* 106*   ,   Results for orders placed or performed during the hospital encounter of 12/23/20   XR Chest Port 1 View    Narrative    XR CHEST PORT 1 VW  12/23/2020 4:35 PM      HISTORY: PHTN    COMPARISON: Chest radiograph 10/2/2020.    FINDINGS:   Portable upright AP radiograph of the chest. Right internal jugular  central  catheter tip terminates at the cavoatrial junction.    Trachea is midline. Cardiac silhouette is enlarged, increased from  prior exam. Pulmonary vasculature is indistinct. No focal airspace  opacity. Small bilateral pleural effusions. No pneumothorax.      Impression    IMPRESSION:   1. Right internal jugular central catheter tip terminates at the  cavoatrial junction.  2. Cardiomegaly with small bilateral pleural effusions. No focal  airspace opacity or pneumothorax.    I have personally reviewed the examination and initial interpretation  and I agree with the findings.    SUSAN LUO MD   US Lower Extremity Venous Bilateral Port    Narrative    EXAM: DOPPLER VENOUS ULTRASOUND OF BILATERAL LOWER EXTREMITIES,  12/23/2020 6:45 PM.    HISTORY: DVT (unequal LE).    COMPARISON:  Lower extremity ultrasound 9/29/2020.    TECHNIQUE:  Gray-scale evaluation with compression, spectral flow and  color Doppler assessment of the deep venous system of both legs from  groin to knee, and then at the ankles.    FINDINGS:  In both lower extremities, the common femoral, femoral, popliteal and  posterior tibial veins demonstrate normal compressibility and blood  flow.     Marked bilateral subcutaneous edema, most prominent at the ankles.      Impression    IMPRESSION:  No evidence of deep venous thrombosis in either lower extremity.    I have personally reviewed the examination and initial interpretation  and I agree with the findings.    SUSAN LUO MD   XR Chest Port 1 View    Narrative    EXAM: XR CHEST PORT 1 VW  12/29/2020 9:50 AM      HISTORY: PAH with persistent oxygen requirement    COMPARISON: Chest x-ray dated 12/23/2020    FINDINGS: Single view of the chest. Right IJ CVC terminates near the  cavoatrial junction. Trachea is midline. Stable cardiomegaly.  Pulmonary vasculature is indistinct. Small bilateral pleural  effusions. No pneumothorax. Stable interstitial process throughout  both lungs.      Impression     IMPRESSION:    1. Stable cardiomegaly with small bilateral pleural effusions.  2. Stable position of right IJ CVC.  3. No acute airspace disease identified.    I have personally reviewed the examination and initial interpretation  and I agree with the findings.    SUKUMAR ANDREWS MD   XR Chest Port 1 View    Narrative    Exam: XR CHEST PORT 1 VW, 12/31/2020 10:42 AM    Indication: PICC    Comparison: 12/29/2020    Findings:   Single portable AP radiograph of the chest. Interval placement of  right upper extremity PICC, tip over the right atrium. Additional  right IJ central venous catheter tip terminates at the superior  cavoatrial junction. Stable configuration of the trachea. The cardiac  silhouette remains enlarged. No pneumothorax. Trace bilateral pleural  effusions appear unchanged. No new airspace disease. The visualized  upper abdomen is unremarkable. No acute osseous lesion.      Impression    Impression:   1. Right upper cavity PICC tip projects over the right atrium. Right  IJ central venous catheter tip at the superior cavoatrial junction.  2. Stable cardiomegaly and small bilateral pleural effusions. No acute  airspace opacity.    I have personally reviewed the examination and initial interpretation  and I agree with the findings.    SUKUMAR ANDREWS MD   XR Chest Port 1 View    Narrative    Exam: XR CHEST PORT 1 VW, 12/31/2020 12:17 PM    Indication: PICC    Comparison: Same-day radiograph, 12/29/2020    Findings:   AP view of the chest. Right arm PICC is been retracted, tip now near  the cavoatrial junction.. Right IJ central venous catheter tip  terminates also at the superior cavoatrial junction.     Stable configuration of the trachea. The cardiac silhouette remains  enlarged. No pneumothorax. Trace bilateral pleural effusions appear  unchanged. Improved interstitial opacities throughout both lungs. The  visualized upper abdomen is unremarkable. No acute osseous lesion.      Impression    Impression:    1. Right upper cavity PICC tip projects over the right atrium. Right  IJ central venous catheter tip at the superior cavoatrial junction.  2. Stable cardiomegaly and small bilateral pleural effusions.  3. Improved interstitial opacity suggesting improved edema.    I have personally reviewed the examination and initial interpretation  and I agree with the findings.    SUKUMAR ANDREWS MD   XR Abdomen Port 1 View    Narrative    XR ABDOMEN PORT 1 VW on 1/2/2021 10:01 AM.    INDICATION: Assess stool burden.    COMPARISON: Chest radiograph dated 12/31/2020    FINDINGS:   Portable AP supine radiograph of the abdomen. Nonobstructed bowel gas  pattern. No pneumatosis or portal venous gas. Moderate colonic stool  burden. Right convex curvature of the thoracolumbar spine. Pelvic  phleboliths. The lung bases are relatively clear.      Impression    IMPRESSION:   Moderate colonic stool burden.    I have personally reviewed the examination and initial interpretation  and I agree with the findings.    FAIZAN COHEN MD   XR Chest Port 1 View    Narrative    XR chest portable 1 view on 1/4/2021 8:23 AM.    INDICATION: assess airspace, oxygen requirement.    COMPARISON: Radiograph dated 12/31/2020    FINDINGS: Portable AP semiupright radiograph of the chest. Right chest  tunneled central venous catheter tip projects over the high right  atrium. Right upper cavity PICC tip projects over the superior  cavoatrial junction.    Trachea is clear. Cardiac silhouette is stable. Pulmonary vasculature  is indistinct. No pneumothorax. Small left pleural effusion is  increased. Slightly increased diffuse interstitial and airspace  opacities. The upper abdomen is unremarkable. Leftward convex  curvature of the thoracolumbar spine.      Impression    IMPRESSION:   1. Slightly increased diffuse interstitial and airspace opacities.  2. Small left pleural effusion.    I have personally reviewed the examination and initial interpretation  and I  agree with the findings.    FAIZAN COHEN MD   XR Abdomen 1 View    Narrative    EXAMINATION:  XR ABDOMEN 1 VW 1/7/2021 10:05 AM     COMPARISON: Abdominal radiograph 1/2/2021, CT chest 3/3/2020    HISTORY: constipation - stool burden.    TECHNIQUE: AP supine views of the abdomen.    FINDINGS: No abnormally dilated loops of bowel. Decreased colonic  stool burden. No pneumatosis or portal venous gas. The lung bases are  unremarkable. No acute osseous abnormality, chronic dextroscoliosis of  the thoracolumbar spine. Pelvic phleboliths.      Impression    IMPRESSION:   1. Decreased colonic stool burden compared to prior.  2. Non-obstructed bowel gas pattern.    I have personally reviewed the examination and initial interpretation  and I agree with the findings.    SUNITA SIFUENTES MD   XR Abdomen Port 1 View    Narrative    Exam: XR ABDOMEN PORT 1 VW, 1/9/2021 12:34 PM    Indication: stool burden    Comparison: 1/7/2021    Findings:   Nonobstructive bowel gas pattern. No free air or pneumatosis. No  significant stool burden.      Impression    Impression: No significant stool burden. Nonobstructive bowel gas  pattern.    SUKUMAR ANDREWS MD   XR Chest Port 1 View    Narrative    Portable chest    INDICATION: Status post pulmonary balloon angioplasty, hemoptysis    COMPARISON: 1/4/2021    Findings: Cardiomegaly. Right IJ catheter tip in the right atrium and  right PICC tip at the SVC/right atrial junction. No increasing  opacities in the lungs. No volume shift of suspicion in the lungs.  There is aortic arch atherosclerosis.      Impression    IMPRESSION: Cardiomegaly and aortic atherosclerosis. Catheters as  described. No evidence of pulmonary hemorrhage or other increasing  opacifications of suspicion.    SAMARIA CHANEY MD   XR Chest Port 1 View    Narrative    EXAM: XR CHEST PORT 1 VW  1/13/2021 5:13 PM     HISTORY:  After PICC removal to assess jade placement       COMPARISON:  1/13/2021 and 0932  hours    TECHNIQUE: Single frontal radiograph of the chest    FINDINGS:   Removal of right upper extremity PICC. Stable right-sided central  venous catheter.    Midline trachea. Stable enlarged cardiac mediastinal silhouette. No  consolidation, pleural effusion or appreciable pneumothorax.      Impression    IMPRESSION: No acute consolidation or appreciable pneumothorax.     I have personally reviewed the examination and initial interpretation  and I agree with the findings.    CR SALDIVAR MD   Echo Complete    Narrative    [id number]  [id number]  [id number]  [id number]^LYN^MICHAEL           [facility]  Echocardiography Laboratory  [address] [place] [id number]     Name: ELAINA SUTTON  MRN: [id number]  : 1940  Study Date: 2021 08:59 AM  Age: 80 yrs  Gender: Female  Patient Location: [facility]  Reason For Study: [facility]  Ordering Physician: MICHAEL NICHOLSON  Referring Physician: LEIGH ANN BELTRÁN  Performed By: Laura Moon RDCS     BSA: 1.4 m2  Height: 58 in  Weight: 115 lb  HR: 88  BP: 128/55 mmHg  _____________________________________________________________________________  __        Procedure  Complete Portable Echo Adult.  _____________________________________________________________________________  __        Interpretation Summary  Global and regional left ventricular function is hyperkinetic with an EF of  65-70%. Flattened septum is consistent with right ventricular pressure  overload.  Severe right ventricular dilation is present.  Severe right atrial enlargement is present.  Global right ventricular function is moderately reduced.  Small circumferential pericardial effusion is present without any hemodynamic  significance.  Dilation of the inferior vena cava is present with abnormal respiratory  variation in diameter.  IVC diameter >2.1 cm collapsing <50% with sniff suggests a high RA pressure  estimated at 15 mmHg or greater.  Right  ventricular systolic pressure is 37mmHg above the right atrial pressure.     This study was compared with the study from 9/29/2020 .Estimated RVSP is lower  in this study otherwise no change.     _____________________________________________________________________________  __        Left Ventricle  Left ventricular size is normal. Global and regional left ventricular function  is hyperkinetic with an EF of 65-70%. Mild concentric wall thickening  consistent with left ventricular hypertrophy is present. Left ventricular  diastolic function is indeterminate. Flattened septum is consistent with right  ventricular pressure overload.     Right Ventricle  Severe right ventricular dilation is present. Global right ventricular  function is moderately reduced.     Atria  LA cannot be assessed. Severe right atrial enlargement is present.     Mitral Valve  The mitral valve is normal. Trace mitral insufficiency is present.        Aortic Valve  The aortic valve is tricuspid.     Tricuspid Valve  Mild to moderate tricuspid insufficiency is present. Right ventricular  systolic pressure is 37mmHg above the right atrial pressure.     Pulmonic Valve  Trace pulmonic insufficiency is present.     Vessels  The aorta root is normal. Mild dilatation of the aorta is present. Ascending  aorta 3.7 cm. Dilation of the inferior vena cava is present with abnormal  respiratory variation in diameter. IVC diameter >2.1 cm collapsing <50% with  sniff suggests a high RA pressure estimated at 15 mmHg or greater. Dilated  pulmonary arteries.     Pericardium  Small circumferential pericardial effusion is present without any hemodynamic  significance.        Compared to Previous Study  This study was compared with the study from 9/29/2020 . Estimated RVSP is  lower in this study otherwise no change.  _____________________________________________________________________________  __  MMode/2D Measurements & Calculations  IVSd: 1.2 cm     LVIDd: 4.7  cm  LVIDs: 2.7 cm  LVPWd: 0.98 cm  FS: 42.6 %  LV mass(C)d: 180.2 grams  LV mass(C)dI: 125.2 grams/m2  asc Aorta Diam: 3.7 cm  LVOT diam: 1.9 cm  LVOT area: 2.7 cm2  RWT: 0.42  TAPSE: 1.5 cm        Doppler Measurements & Calculations  MV E max yola: 80.5 cm/sec  MV A max yola: 87.0 cm/sec  MV E/A: 0.93  PA acc time: 0.06 sec  TR max yola: 302.8 cm/sec  TR max P.7 mmHg  E/E' av.1  Lateral E/e': 8.6  Medial E/e': 13.6        _____________________________________________________________________________  __        Report approved by: Mustapha PUENTE 2021 12:51 PM      Cardiac Catheterization    Narrative      Right sided filling pressures are severely elevated.    Severely elevated pulmonary artery hypertension.    Left sided filling pressures are mildly elevated.    Reduced cardiac output level.      Cardiac Catheterization    Narrative      Right sided filling pressures are mildly elevated.    Severely elevated pulmonary artery hypertension.    Left sided filling pressures are normal.    Normal cardiac output level.    Hemodynamic data has been modified in Epic per physician review.     Successful BPA of right A9 and A10 segments.  Hemoptysis likely due to a small wire perforation resolved.           Discharge Medications   Discharge Medication List as of 2021 10:12 AM      START taking these medications    Details   bumetanide (BUMEX) 2 MG tablet Take 2 tablets (4 mg) by mouth 2 times daily, Disp-90 tablet, R-1, E-Prescribe      macitentan (OPSUMIT) 10 MG tablet Take 1 tablet (10 mg) by mouth every 48 hours, Disp-30 tablet, R-0, E-Prescribe         CONTINUE these medications which have CHANGED    Details   potassium chloride ER (KLOR-CON M) 20 MEQ CR tablet Take 1 tablet (20 mEq) by mouth 2 times daily, Disp-90 tablet, R-3, E-Prescribe      riociguat (ADEMPAS) 0.5 MG tablet Take 3 tablets (1.5 mg) by mouth 3 times daily Goal of 2.5 mg three times a day, with dose increase of 0.5 mg three times a  day every 2 weeks (SBP must be >95 mmHg and no signs or symptoms of hypotension to increase), Disp-90 tablet, R-11, E-Prescribe         CONTINUE these medications which have NOT CHANGED    Details   Ascorbic Acid (VITAMIN C) 500 MG CAPS Take 500 mg by mouth every morning , Historical      beclomethasone HFA (QVAR REDIHALER) 80 MCG/ACT inhaler Inhale 2 puffs into the lungs 2 times daily, Historical      cetirizine (ZYRTEC) 10 MG tablet Take 10 mg by mouth daily, Historical      digoxin (LANOXIN) 62.5 MCG tablet Take 1 tablet (62.5 mcg) by mouth daily, Disp-90 tablet,R-3, E-Prescribe      gabapentin (NEURONTIN) 300 MG capsule Take 300 mg by mouth 3 times daily Can take an additional 300mg at bedtime as needed, Historical      hydroxychloroquine (PLAQUENIL) 200 MG tablet Take 200 mg by mouth daily , Historical      ipratropium (ATROVENT) 0.06 % nasal spray Spray 2 sprays into both nostrils 3 times daily , Historical      loperamide (IMODIUM) 2 MG capsule Take 1 capsule (2 mg) by mouth daily Can take an additional 2 mg in the evening PRN, Disp-90 capsule, R-3, E-Prescribe      Probiotic Product (PROBIOTIC ACIDOPHILUS BIOBEADS) CAPS Take 1 capsule by mouth 2 times daily , Historical      sodium chloride (OCEAN) 0.65 % nasal spray Spray 1 spray into both nostrils daily as needed for congestion, Historical      Treprostinil (REMODULIN IJ) Dose: 30 ng/kg/min  Vial concentration: 1 mg/mL  Final concentration: 60,000 ng/mL  Dosing weight: 56.3 kg   Pump rate: 41 mL/day  Per patient and [facility] on 12/23/2020, Historical      warfarin ANTICOAGULANT (COUMADIN) 3 MG tablet Take 3 mg PO on Mon, Wed, Fri and Sat, and 4.5 mg all other days of the week, Disp-170 tablet, R-3, E-Prescribe      acetaminophen (TYLENOL) 325 MG tablet Take 325 mg by mouth every 6 hours as needed for mild pain, Historical      albuterol (PROAIR HFA/PROVENTIL HFA/VENTOLIN HFA) 108 (90 Base) MCG/ACT inhaler Inhale 2 puffs into the lungs every 4 hours as  needed for shortness of breath / dyspnea or wheezing , HistoricalPharmacy may dispense brand covered by insurance (Proair, or proventil or ventolin or generic albuterol inhaler)      alendronate (FOSAMAX) 70 MG tablet Take 70 mg by mouth once a week, Historical      aspirin (ASA) 81 MG tablet Take 81 mg by mouth daily , Historical      calcium citrate-vitamin D (CALCIUM CITRATE + D3) 315-250 MG-UNIT TABS per tablet Take 2 tablets by mouth daily , Historical         STOP taking these medications       furosemide (LASIX) 20 MG tablet Comments:   Reason for Stopping:             Allergies   Allergies   Allergen Reactions     Sulfa Drugs Anaphylaxis and Hives

## 2021-01-14 NOTE — PLAN OF CARE
Pt admitted 12/23/20 with acute on chronic right-sided hearth failure with acute hypoxic respiratory failure. 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     Neuro: A&O x 4. Calls appropriately. Slept well. Afebrile. Denies headache.   Cardiac: SR 60's-80's. SBP 90's-120's. Denies dizziness, chest pain, or palpitations.   Respiratory: Sating well on 2L nasal canula.   GI/: Good UOP into commode at bedside. Loose stools overnight x 3. Denies nausea.  Diet/Appetite: 2G Na diet. 2L FR.   Skin: Mepilex on coccyx CDI.  LDA: R PIV SL. R Ashlyn with Remodulin running at 30 ng/kg/min.   Activity: Independent  Pain: Denies     Plan: Possible discharge home today. Continue to monitor and alert team with any changes or concerns.

## 2021-01-14 NOTE — PROGRESS NOTES
Transfer  Transferred from: 4E at 1645  Via:bed  Reason for transfer:Pt appropriate for 6C- improved patient condition  Family: Aware of transfer  Belongings: Sent with pt  Chart: Sent with pt  Medications: Meds from bin sent with pt  Report called from: Zoe JONES RN on 4E    [id number]  D: Admit 12/23/2020 with acute on chronic right-sided heart failure with acute hypoxic respiratory failure. 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  I/A : Monitored vitals and assessed pt status. A0x4. VSS on 2 L NC. Afebrile. Urinating adequately. Up independently in room. PICC line removed. CXR done after to check placement of hickmann. Remodulin runnning at 30 ng/kg/min through Goldberg, and dressing over site replaced, site WDL. Patient denies pain, SOB, dizziness, nausea. No new skin concerns observed and two RN skin check completed, mepilex over coccyx due to blanchable redness, blanchable redness over left heel and outside of foot, patient aware of how to float heels as needed.  ?  P: Continue to monitor Pt status and report changes to treatment team. Likely discharge tomorrow.

## 2021-01-14 NOTE — PROGRESS NOTES
D: Pt needed to switch from hospital to home remodulin.  I: Offered to held patient ensure that procedures were followed to avoid concentration mix-ups.  A: Pt declined, saying she had done this many times and had never had any issues.  P: Pt completed transfer successfully and felt well afterwards. She declined further intervention.

## 2021-01-15 ENCOUNTER — TELEPHONE (OUTPATIENT)
Dept: CARDIOLOGY | Facility: CLINIC | Age: 81
End: 2021-01-15

## 2021-01-15 DIAGNOSIS — R06.02 SOB (SHORTNESS OF BREATH): ICD-10-CM

## 2021-01-15 DIAGNOSIS — I27.20 PULMONARY HYPERTENSION (H): Primary | ICD-10-CM

## 2021-01-15 NOTE — TELEPHONE ENCOUNTER
LM with Marielos, providing my direct contact line for clarification on Adempas dosing. Kezia Arnett RN on 1/18/2021 at 7:49 AM    Discharge orders written incorrectly and patient send home with only 0.5 mg tablets. Patient and Don anxious that they will be charged for the Rx they received. Called a verbal order to [facility] to get an expedited shipment of Adempas 1.5 mg TID, 90 tablets with 1 refill sent to patient's home. Kezia Arnett RN on 1/18/2021 at 9:20 AM    Spoke with Marielos regarding patient's Adempas concern. Patient admitted to the hospital and was on Adempas 1 mg TID. During her stay, in patient team increased dose to Adempas 1.5 mg TID. Don was asked to bring home dose of 1.5 mg tablets, as the hospital was out of the medication. Don brought in 11 tablets and was told her would be reimbursed. At discharge, patient was given 11 tablets of the 0.5 mg strength.     Called NINA Cabrera at the hospital for follow up. NINA covering for Dionna and was not able to provide additional information regarding patient's discharged dose. States she will work on getting additional information together. Provided my direct number for call back. Kezia Arnett RN on 1/18/2021 at 9:43 AM    [facility] Call Center    Phone Message    May a detailed message be left on voicemail: yes     Reason for Call: Medication Question or concern regarding medication   Prescription Clarification  Name of Medication: riociguat (ADEMPAS) 0.5 MG tablet  Prescribing Provider: Dr Marlow   Pharmacy:   [id number] - [place], [place] - [address] AT [facility] [address] [facility]    What on the order needs clarification?     Said dosing does not seem correct for Pt as she tapers up and said it should be written with new Rx for 1.5mg - wants call back to discuss - she is working with Pt who is concerned on not having correct amount of medication and then paying for medication dosage that is wrong - please call Marielos Moon from the  adempas program,  # [phone]      Action Taken: Message routed to:  [facility] ([facility]): Cardiology    Travel Screening: Not Applicable

## 2021-01-15 NOTE — TELEPHONE ENCOUNTER
Received a call from the patient who stated that they received the Adempas 0.5mg tablets instead of the 1.5mg tablets which pt is currently taking. Informed pt and pt's , Santana, to not use the 0.5mg and information will be relayed to the inpatient team for assistance in trading the medication for the correct amount. Pt and Don agreed to plan.    Message routed to inpatient coordinator.    Lexus Balbuena  Clinic   Pulmonary Hypertension  [facility]  (P) [phone]

## 2021-01-15 NOTE — PROGRESS NOTES
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  Has been diuresed with bumex drip and intermittent dose of diuril. Was on output support with dobutamine for a while before discontinued on 1/8 after improvement of volume status. Given worsening of overall clinical status and right heart function in the past couple of months, balloon angioplasty was scheduled on 1/12 with plans for possibly 4 rounds (in total) to be continued outpatient.     ASSESSMENT      Discharge Assessment  Patient reports symptoms are: Improved  Does the patient have all of their medications?: Yes  Does patient know what their new medications are for?: Yes  Does patient have a follow-up appointment scheduled?: No  Does patient have any other questions or concerns?: No    Post-op  Did the patient have surgery or a procedure: No  Fever: No  Chills: No  Eating & Drinking: eating and drinking without complaints/concerns  Bowel Function: normal  Urinary Status: voiding without complaint/concerns        PLAN                                                      Outpatient Plan:  Follow up with Dr Haq on 1/21 for continued discussion of balloon   angioplasty procedures and follow up      Follow up with your PCP in 2-3 weeks     Future Appointments   Date  Time Provider Department Center   1/18/2021 10:00 AM NE [facility]SE [facility]

## 2021-01-18 ENCOUNTER — TELEPHONE (OUTPATIENT)
Dept: CARDIOLOGY | Facility: CLINIC | Age: 81
End: 2021-01-18

## 2021-01-18 ENCOUNTER — ANTICOAGULATION THERAPY VISIT (OUTPATIENT)
Dept: ANTICOAGULATION | Facility: CLINIC | Age: 81
End: 2021-01-18

## 2021-01-18 ENCOUNTER — OFFICE VISIT (OUTPATIENT)
Dept: LAB | Facility: CLINIC | Age: 81
End: 2021-01-18
Payer: COMMERCIAL

## 2021-01-18 DIAGNOSIS — I27.20 PULMONARY HYPERTENSION (H): ICD-10-CM

## 2021-01-18 DIAGNOSIS — Z79.01 LONG TERM CURRENT USE OF ANTICOAGULANT THERAPY: ICD-10-CM

## 2021-01-18 DIAGNOSIS — I27.24 CTEPH (CHRONIC THROMBOEMBOLIC PULMONARY HYPERTENSION) (H): ICD-10-CM

## 2021-01-18 LAB
CAPILLARY BLOOD COLLECTION: NORMAL
INR PPP: 1.6 (ref 0.86–1.14)

## 2021-01-18 PROCEDURE — 85610 PROTHROMBIN TIME: CPT | Performed by: INTERNAL MEDICINE

## 2021-01-18 PROCEDURE — 36416 COLLJ CAPILLARY BLOOD SPEC: CPT | Performed by: INTERNAL MEDICINE

## 2021-01-18 NOTE — TELEPHONE ENCOUNTER
Reason for Admission  Right sided HF, fluid overload    How are you feeling since you got home?  Patient is feeling well, no complaints    And questions about medication? (I.E. Taking, received, new medication)  Yes, Adempas dosing was still not correct at discharge. Patient waiting to hear back from Marielos regarding additional 1 mg doses.     Do you have any questions about the discharge instructions?  No    Do you have any other questions?  No    Has a follow up appointment been made?   YES - Shawna Marcum, Thursday, January 21 11:15 AM with labs prior

## 2021-01-18 NOTE — PROGRESS NOTES
ANTICOAGULATION FOLLOW-UP CLINIC VISIT    Patient Name:  Karen Anderson  Date:  2021  Contact Type:  Telephone    SUBJECTIVE:  Patient Findings     Positives:  Change in medications (change in dose of adempas), Hospital admission (she was admitted 2020 - 21 for worsening pulmonary hypertension and symptoms of heart failure)    Comments:  Spoke with Karen.  While she was hospitalized, she was given protamamime 35 mg IV and 2 units of FFP before a procedure.        Clinical Outcomes     Comments:  Spoke with Karen.  While she was hospitalized, she was given protamamime 35 mg IV and 2 units of FFP before a procedure.           OBJECTIVE    Recent labs: (last 7 days)     21  1019   INR 1.60*       ASSESSMENT / PLAN  INR assessment SUB    Recheck INR In: 3 DAYS    INR Location Clinic      Anticoagulation Summary  As of 2021    INR goal:  2.0-2.5   TTR:  81.2 % (8.1 mo)   INR used for dosin.60 (2021)   Warfarin maintenance plan:  4.5 mg (3 mg x 1.5) every Sun, Tue, Thu; 3 mg (3 mg x 1) all other days   Full warfarin instructions:  4.5 mg every Sun, Tue, Thu; 3 mg all other days   Weekly warfarin total:  25.5 mg   No change documented:  Laure Enriquez RN   Plan last modified:  Tanisha Kimbrough, RN (10/30/2020)   Next INR check:  2021   Priority:  Critical   Target end date:  Indefinite    Indications    Pulmonary hypertension (H) [I27.20]  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 OK to speak with spouse, Don.  [facility] SPEAK IN TABLETS (Has 3mg Tabs) 10/23/20:  new goal range is 2 - 2.5  [facility] in [place] and will need to fax orders P [phone] F [phone]       Anticoagulation Care Providers     Provider Role Specialty Phone number    Osiris Luong MD Referring Cardiovascular Disease [phone]    Karolina Turcios MD Referring Cardiovascular Disease [phone]            See the Encounter Report to view Anticoagulation Flowsheet and Dosing Calendar (Go to Encounters tab in chart review, and find the Anticoagulation Therapy Visit)    Spoke with Karen.  She is not sure of the INR result today, as she states the person testing her INR wasn't sure how to do it.  Karen has an appointment at the [facility] on 1/21/21 and will check her INR then.  INR might be coming up slowly because warfarin was reversed while she was hospitalized.    Laure Enriquez RN

## 2021-01-19 ENCOUNTER — MEDICAL CORRESPONDENCE (OUTPATIENT)
Dept: HEALTH INFORMATION MANAGEMENT | Facility: CLINIC | Age: 81
End: 2021-01-19

## 2021-01-21 ENCOUNTER — OFFICE VISIT (OUTPATIENT)
Dept: CARDIOLOGY | Facility: CLINIC | Age: 81
End: 2021-01-21
Attending: PHYSICIAN ASSISTANT
Payer: COMMERCIAL

## 2021-01-21 ENCOUNTER — ANTICOAGULATION THERAPY VISIT (OUTPATIENT)
Dept: ANTICOAGULATION | Facility: CLINIC | Age: 81
End: 2021-01-21

## 2021-01-21 VITALS
SYSTOLIC BLOOD PRESSURE: 131 MMHG | OXYGEN SATURATION: 91 % | HEART RATE: 78 BPM | HEIGHT: 59 IN | BODY MASS INDEX: 22.28 KG/M2 | DIASTOLIC BLOOD PRESSURE: 71 MMHG | WEIGHT: 110.5 LBS

## 2021-01-21 DIAGNOSIS — I27.20 PULMONARY HYPERTENSION (H): ICD-10-CM

## 2021-01-21 DIAGNOSIS — I27.24 CTEPH (CHRONIC THROMBOEMBOLIC PULMONARY HYPERTENSION) (H): ICD-10-CM

## 2021-01-21 DIAGNOSIS — I27.24 CTEPH (CHRONIC THROMBOEMBOLIC PULMONARY HYPERTENSION) (H): Primary | ICD-10-CM

## 2021-01-21 DIAGNOSIS — R06.02 SOB (SHORTNESS OF BREATH): ICD-10-CM

## 2021-01-21 DIAGNOSIS — I50.810 RVF (RIGHT VENTRICULAR FAILURE) (H): ICD-10-CM

## 2021-01-21 DIAGNOSIS — Z86.711 HISTORY OF PULMONARY EMBOLISM: ICD-10-CM

## 2021-01-21 DIAGNOSIS — Z79.01 LONG TERM CURRENT USE OF ANTICOAGULANT THERAPY: ICD-10-CM

## 2021-01-21 LAB
ALBUMIN SERPL-MCNC: 3.3 G/DL (ref 3.4–5)
ALP SERPL-CCNC: 158 U/L (ref 40–150)
ALT SERPL W P-5'-P-CCNC: 20 U/L (ref 0–50)
ANION GAP SERPL CALCULATED.3IONS-SCNC: 9 MMOL/L (ref 3–14)
AST SERPL W P-5'-P-CCNC: 25 U/L (ref 0–45)
BILIRUB SERPL-MCNC: 0.5 MG/DL (ref 0.2–1.3)
BUN SERPL-MCNC: 50 MG/DL (ref 7–30)
CALCIUM SERPL-MCNC: 11 MG/DL (ref 8.5–10.1)
CHLORIDE SERPL-SCNC: 97 MMOL/L (ref 94–109)
CO2 SERPL-SCNC: 29 MMOL/L (ref 20–32)
CREAT SERPL-MCNC: 1.39 MG/DL (ref 0.52–1.04)
ERYTHROCYTE [DISTWIDTH] IN BLOOD BY AUTOMATED COUNT: 17.7 % (ref 10–15)
GFR SERPL CREATININE-BSD FRML MDRD: 36 ML/MIN/{1.73_M2}
GLUCOSE SERPL-MCNC: 87 MG/DL (ref 70–99)
HCT VFR BLD AUTO: 28.5 % (ref 35–47)
HGB BLD-MCNC: 8.8 G/DL (ref 11.7–15.7)
INR PPP: 1.63 (ref 0.86–1.14)
MCH RBC QN AUTO: 27.3 PG (ref 26.5–33)
MCHC RBC AUTO-ENTMCNC: 30.9 G/DL (ref 31.5–36.5)
MCV RBC AUTO: 89 FL (ref 78–100)
NT-PROBNP SERPL-MCNC: ABNORMAL PG/ML (ref 0–450)
PLATELET # BLD AUTO: 201 10E9/L (ref 150–450)
POTASSIUM SERPL-SCNC: 3.1 MMOL/L (ref 3.4–5.3)
PROT SERPL-MCNC: 7.7 G/DL (ref 6.8–8.8)
RBC # BLD AUTO: 3.22 10E12/L (ref 3.8–5.2)
SODIUM SERPL-SCNC: 135 MMOL/L (ref 133–144)
WBC # BLD AUTO: 4.7 10E9/L (ref 4–11)

## 2021-01-21 PROCEDURE — 85610 PROTHROMBIN TIME: CPT | Performed by: PATHOLOGY

## 2021-01-21 PROCEDURE — 80053 COMPREHEN METABOLIC PANEL: CPT | Performed by: PATHOLOGY

## 2021-01-21 PROCEDURE — 85027 COMPLETE CBC AUTOMATED: CPT | Performed by: PATHOLOGY

## 2021-01-21 PROCEDURE — 99215 OFFICE O/P EST HI 40 MIN: CPT | Performed by: PHYSICIAN ASSISTANT

## 2021-01-21 PROCEDURE — G0463 HOSPITAL OUTPT CLINIC VISIT: HCPCS

## 2021-01-21 PROCEDURE — 83880 ASSAY OF NATRIURETIC PEPTIDE: CPT | Performed by: PATHOLOGY

## 2021-01-21 PROCEDURE — 36415 COLL VENOUS BLD VENIPUNCTURE: CPT | Performed by: PATHOLOGY

## 2021-01-21 RX ORDER — DIGOXIN 0.06 MG/1
62.5 TABLET ORAL DAILY
Qty: 90 TABLET | Refills: 3 | Status: SHIPPED | OUTPATIENT
Start: 2021-01-21 | End: 2021-12-20

## 2021-01-21 RX ORDER — BUMETANIDE 2 MG/1
6 TABLET ORAL DAILY
Qty: 90 TABLET | Refills: 1 | Status: SHIPPED | OUTPATIENT
Start: 2021-01-21 | End: 2021-01-21

## 2021-01-21 RX ORDER — POTASSIUM CHLORIDE 1500 MG/1
40 TABLET, EXTENDED RELEASE ORAL 2 TIMES DAILY
Qty: 120 TABLET | Refills: 3 | Status: ON HOLD | OUTPATIENT
Start: 2021-01-21 | End: 2021-06-10

## 2021-01-21 RX ORDER — BUMETANIDE 2 MG/1
6 TABLET ORAL DAILY
Qty: 90 TABLET | Refills: 1 | Status: SHIPPED | OUTPATIENT
Start: 2021-01-21 | End: 2021-01-28

## 2021-01-21 ASSESSMENT — MIFFLIN-ST. JEOR: SCORE: 878.91

## 2021-01-21 ASSESSMENT — PAIN SCALES - GENERAL: PAINLEVEL: NO PAIN (0)

## 2021-01-21 NOTE — PATIENT INSTRUCTIONS
Thank you for visiting the Pulmonary Hypertension Clinic today.      Medication Changes:   DECREASE your bumex dose; you will now take a total of 3 pills a day (6mg total).    INCREASE your potassium pills to 2 pills twice a day (40meq twice daily).      Follow up Appointment Information:  Return in 1 week with repeat labs.       Additional Instructions:    1. Continue staying active and eat a heart healthy, low sodium diet.     2. Please keep current list of medications with you at all times.     3. Remember to weigh yourself daily after voiding and write it down on a log. If you have gained/lost 2 pounds overnight or 5 pounds in a week contact us for medication adjustments or further instructions.    4. Please call us immediately if you have syncope (fainting or passing out), chest pain, worsening edema (swelling or weight gain), or general worsening in how you are feeling.         ---------------------------------------------------------------------------------------------------------------    If you have questions or concerns please contact us at:        Fabby Seymour RN, BSN, PHN  [facility]  (Schedule,Prior Auth)  Nurse Coordinator     Clinic   Pulmonary Hypertension   Pulmonary Hypertension  [facility]             [facility] [place]  (P)[phone]    (P) [phone]        (F) [phone]      ** Please note that you will NOT receive a reminder call regarding your scheduled testing, reminder calls are for provider appointments only.  If you are scheduled for testing within the [employer] system you may receive a call regarding pre-registration for billing purposes only.**     ------------------------------------------------------------------------------------------------------------    Interested in joining a support group?    Pulmonary Hypertension Association  Https://www.phassociation.org/  **Look at the Events Tab** They even have  Support Groups that you can call into    [facility] Support Group  Second Saturday of the Month from 1-3 PM   Location: [address] [place] [id number]  Leader: Jessenia Loo  Phone: [phone] or [phone]  Email: [email or web address]

## 2021-01-21 NOTE — LETTER
1/21/2021      RE: Karen Anderson  [address]  [facility] [address]       Dear Colleague,    Thank you for the opportunity to participate in the care of your patient, Karen Anderson, at the [facility] [place] [facility]. Please see a copy of my visit note below.    Date of Service: 01/21/2021    [facility]      Primary PH cardiologist: Dr. Turcios       HPI:  Ms. Anderson is a pleasant 80 year old female with a PMHx including rheumatoid arthritis, hypertension, scoliosis, asthma, pulmonary MAC, breast cancer s/p chemotherapy with Adriamycin, Cytoxan, Taxol, and tamoxifen and status post left mastectomy. She also has a history of extensive bilateral pulmonary emboli and DVT in 1998 thought to be secondary to tamoxifen along with chemotherapy induced cardiomyopathy. 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 Although she had proximal disease, she was too high risk for surgical PTE. She is now on IV Remodulin therapy.      We were able to titrate her up to a goal of 30ng/kg/min complicated by transient side effects of leg pain, jaw pain, diarrhea and headaches. However, she reported overall improvement in her breathing. Last fall, she also fell and tore a tendon in her knee which was complicated by a painful hematoma due to her being on anticoagulation. Shortly after she developed rapid weight gain of almost 20 lbs and was then hospitalized from 9/29 to 10/6 for acute on chronic diastolic heart failure, requiring a lasix gtt. Repeat echo showed EF 60-65%, moderate RV dilation, and small effusion. She also had a repeat RHC (RA 5, RV 90/8, mPA 42, LUIZ 2.55/1.76) which showed some mild improvement from prior. Pulmonary angiogram showed  of the distal right A2, proximal A7 segment, severe stenosis of the right A8 segment with slow filling distally. There was  "possible stenosis of the proximal and mid right A10 segment, and no significant angiographic disease of the left lung segments. Her outpatient Lasix dose was increased and her stay was complicated by JOSE G on CKD but renal function improved with diuresis. Dr. Turcios recommended increasing her Remodulin dose vs adding Adempas. After further discussion, she chose the latter.     We then titrated up her Adempas, but she again developed some swelling and weight gain requiring further escalation of diuretics. Ultimately, we had to reduce her Adempas back to 1mg due to dizziness. When I visited with her last via virtual visit in mid December, she was feeling poorly and weight was back up with more edema once again. I recommended hospitalization for IV diuresis; she was hospitalized from 12/23 to 1/14 for acute on chronic right sided heart failure and was treated with a bumex gtt and intermittent diuril. She required transient support with dobutamine. Adempas was increased to 1.5mg TID. Opsumit was added but I believe she was only able to tolerate every other day due to dizziness. Due to her worsening clinical status, she underwent a BPA on 1/13. This was complicated by some hemoptysis, which resolved with 2 units of FFP. Her Remodulin was continued at 30ng/kg/min. Her weight at discharge was 110 lb, and Lasix was changed to bumex 4mg BID. Notably, she was also treated for RLE cellulitis with a course of Clindamycin.     Today, we are doing an in clinic visit to follow up on her progress. She tells me that since discharge, she says \"I don't think all that helped very much.\" Fortunately, her weight is stable and has remained ~110lbs since discharge, but she thinks she is still having some swelling in her knees. Her breathing is overall unchanged, and she has not had any further hemoptysis. Her main complaint is dizziness, which happens frequently, when she is standing or trying to walk. She is unsure if it is worse on the " days she takes Opsumit. She also continues to complain of a mix of both constipation and diarrhea which is quite bothersome to her, and she has tried multiple strategies including PRN Imodium, senna, and colace.       Labs done prior to our visit today showed an increase in her BUN/SCr 50/1.39. K is low at 3.1. Na was 135. AST/ALT normal at 20/25. NT-proBNP is still significantly elevated at 10,275. Her hemoglobin was 8.8 and hematocrit 28.5. Platelets were 201.         CURRENT PULMONARY HYPERTENSION REGIMEN:     PAH Rx: Remodulin 30ng/kg/min, Adempas 1.5mg TID, Opsumit 10mg every other day     Diuretics: Bumex 4mg BID     Oxygen: None     Anticoagulation: warfarin   Indication: CTEPH        Assessment/Plan:       1. Chronic thromboembolic pulmonary hypertension.              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 Although she had proximal disease, she was felt to be too high risk for surgical PTE. She is currently on IV Remodulin therapy, at 30ng/kg/min, and we recently added Adempas, but she has only been able to tolerate 1.5mg TID at this point. Opsumit has now been added at 10mg every other day as well. Today I kept these unchanged, but did not titrate up due to ongoing dizziness.    --Last visit I directed Karen for admission due to decompensated right heart failure. She was aggressively diuresed with dobutamine support and is now s/p BPA on 1/13/2021; it appears that about 25% of her available targets have been addressed. However, her pressures remained quite high at that time and she did have some hemoptysis post procedure. Discussed with Dr. Turcios who recommends a repeat RHC in about a month to re-evaluate her pressures and the safety of proceeding with further BPAs.  I explained this to her and her  today, that we may not be able to safely proceed with additional treatments.    --Today on exam she appears euvolemic, and perhaps slightly dry intravscularly on bumex 4mg BID. Labs  showed BUN/SCr have trended up, though NT-proBNP is still significantly elevated over 10k. As she remains stable at her suspected dry weight, I will reduce her bumex slightly to 6mg once daily (from 4mg BID). She will keep weighing at home and call with an upward trend. I also increased her Klor Con to 40meq BID due to hypokalemia.              --Continue digoxin for RV support. BP today was suspected to be inaccurate (they ultimately took on her leg). Home BP is running low 100s systolic consistently. She remains off lisinopril. Given her ongoing dizziness with monitor closely.   --Continue anticoagulation with warfarin for her CTEPH, though we have been monitoring with a lower INR goal of 2-2.5. INR today was 1.63. We will contact her INR clinic to make them aware.       Follow up plan: Return to see me in 1 week with repeat BMP. Then tentatively in 1 month for repeat RHC to see Dr. Turcios.         Orders this Visit:  Orders Placed This Encounter   Procedures     Basic Metabolic Profile     N terminal pro BNP outpatient     Follow-Up with Pulmonary Hypertension Clinic     Orders Placed This Encounter   Medications     riociguat (ADEMPAS) 1.5 MG tablet     Sig: Take by mouth 3 times daily     DISCONTD: bumetanide (BUMEX) 2 MG tablet     Sig: Take 3 tablets (6 mg) by mouth daily     Dispense:  90 tablet     Refill:  1     digoxin (LANOXIN) 62.5 MCG tablet     Sig: Take 1 tablet (62.5 mcg) by mouth daily     Dispense:  90 tablet     Refill:  3     bumetanide (BUMEX) 2 MG tablet     Sig: Take 3 tablets (6 mg) by mouth daily     Dispense:  90 tablet     Refill:  1     potassium chloride ER (KLOR-CON M) 20 MEQ CR tablet     Sig: Take 2 tablets (40 mEq) by mouth 2 times daily     Dispense:  120 tablet     Refill:  3     Medications Discontinued During This Encounter   Medication Reason     riociguat (ADEMPAS) 0.5 MG tablet Medication Reconciliation Clean Up     bumetanide (BUMEX) 2 MG tablet      digoxin (LANOXIN)  62.5 MCG tablet Reorder     bumetanide (BUMEX) 2 MG tablet Reorder     potassium chloride ER (KLOR-CON M) 20 MEQ CR tablet Reorder         PAST MEDICAL HISTORY:  Past Medical History:   Diagnosis Date     Asthma      Breast cancer (H)      Hypertension      Mycobacterium avium complex (H)      Pulmonary embolism (H)      Pulmonary hypertension (H)      Rheumatoid arthritis (H)      Scoliosis          FAMILY HISTORY:  Family History   Problem Relation Age of Onset     Heart Failure Mother      Kidney Disease Mother      Coronary Artery Disease Maternal Grandfather 50     LUNG DISEASE No family hx of      Clotting Disorder No family hx of          SOCIAL HISTORY:  Social History     Tobacco Use     Smoking status: Never Smoker     Smokeless tobacco: Never Used   Substance Use Topics     Alcohol use: Yes     Comment: occasionally      Drug use: Never         CURRENT MEDICATIONS:  Current Outpatient Medications   Medication Sig Dispense Refill     acetaminophen (TYLENOL) 325 MG tablet Take 325 mg by mouth every 6 hours as needed for mild pain       albuterol (PROAIR HFA/PROVENTIL HFA/VENTOLIN HFA) 108 (90 Base) MCG/ACT inhaler Inhale 2 puffs into the lungs every 4 hours as needed for shortness of breath / dyspnea or wheezing        alendronate (FOSAMAX) 70 MG tablet Take 70 mg by mouth once a week       Ascorbic Acid (VITAMIN C) 500 MG CAPS Take 500 mg by mouth every morning        aspirin (ASA) 81 MG tablet Take 81 mg by mouth daily        beclomethasone HFA (QVAR REDIHALER) 80 MCG/ACT inhaler Inhale 2 puffs into the lungs 2 times daily       bumetanide (BUMEX) 2 MG tablet Take 3 tablets (6 mg) by mouth daily 90 tablet 1     calcium citrate-vitamin D (CALCIUM CITRATE + D3) 315-250 MG-UNIT TABS per tablet Take 2 tablets by mouth daily        cetirizine (ZYRTEC) 10 MG tablet Take 10 mg by mouth daily       digoxin (LANOXIN) 62.5 MCG tablet Take 1 tablet (62.5 mcg) by mouth daily 90 tablet 3     gabapentin (NEURONTIN) 300  MG capsule Take 300 mg by mouth 3 times daily Can take an additional 300mg at bedtime as needed       hydroxychloroquine (PLAQUENIL) 200 MG tablet Take 200 mg by mouth daily        ipratropium (ATROVENT) 0.06 % nasal spray Spray 2 sprays into both nostrils 3 times daily        loperamide (IMODIUM) 2 MG capsule Take 1 capsule (2 mg) by mouth daily Can take an additional 2 mg in the evening PRN (Patient taking differently: Take 2 mg by mouth 2 times daily Can take an additional 2 mg in the evening PRN) 90 capsule 3     macitentan (OPSUMIT) 10 MG tablet Take 1 tablet (10 mg) by mouth every 48 hours 30 tablet 0     potassium chloride ER (KLOR-CON M) 20 MEQ CR tablet Take 2 tablets (40 mEq) by mouth 2 times daily 120 tablet 3     Probiotic Product (PROBIOTIC ACIDOPHILUS BIOBEADS) CAPS Take 1 capsule by mouth 2 times daily        riociguat (ADEMPAS) 1.5 MG tablet Take by mouth 3 times daily       sodium chloride (OCEAN) 0.65 % nasal spray Spray 1 spray into both nostrils daily as needed for congestion       Treprostinil (REMODULIN IJ) Dose: 30 ng/kg/min  Vial concentration: 1 mg/mL  Final concentration: 60,000 ng/mL  Dosing weight: 56.3 kg   Pump rate: 41 mL/day  Per patient and [facility] on 12/23/2020       warfarin ANTICOAGULANT (COUMADIN) 3 MG tablet Take 3 mg PO on Mon, Wed, Fri and Sat, and 4.5 mg all other days of the week 170 tablet 3         ROS:   10 point ROS of systems including Constitutional, Eyes, Respiratory, Cardiovascular, Gastroenterology, Genitourinary, Integumentary, Muscularskeletal, Psychiatric were  negative except for pertinent positives noted in my HPI.      EXAM:  GEN:  In general, this is a somewhat frail appearing elderly  female, in no acute distress on room air.  Patient in a wheelchair, accompanied by her .  HEENT:  Pupils grossly equal, sclerae nonicteric.   NECK: Supple, trachea midline. No JVD while upright in wheelchair.   CHEST: [place] site clean and dry.  C/V:  Regular  rate and rhythm, no murmur, rub or gallop. Accentuated P2 bu no S3 or RV heave.   RESP: Respirations are unlabored. No use of accessory muscles. Clear to auscultation bilaterally without wheezing, rales, or rhonchi.  GI: Abdomen soft, nontender, nondistended.   EXTREM: She has no LE edema today on exam. No cyanosis or clubbing.  NEURO: Alert and oriented, cooperative. Gait not formally assessed. No obvious focal deficits.   SKIN: Warm and dry.       Weight  Wt Readings from Last 10 Encounters:   01/21/21 50.1 kg (110 lb 8 oz)   01/12/21 49.9 kg (110 lb)   11/09/20 53.3 kg (117 lb 8 oz)   10/15/20 52.2 kg (115 lb)   10/04/20 52.2 kg (115 lb)   03/20/20 53.8 kg (118 lb 11.2 oz)   03/02/20 57.6 kg (126 lb 14.4 oz)         Labs:    CBC RESULTS:  Lab Results   Component Value Date    WBC 4.7 01/21/2021    RBC 3.22 (L) 01/21/2021    HGB 8.8 (L) 01/21/2021    HCT 28.5 (L) 01/21/2021    MCV 89 01/21/2021    MCH 27.3 01/21/2021    MCHC 30.9 (L) 01/21/2021    RDW 17.7 (H) 01/21/2021     01/21/2021       CMP RESULTS:  Lab Results   Component Value Date     01/21/2021    POTASSIUM 3.1 (L) 01/21/2021    CHLORIDE 97 01/21/2021    CO2 29 01/21/2021    ANIONGAP 9 01/21/2021    GLC 87 01/21/2021    BUN 50 (H) 01/21/2021    CR 1.39 (H) 01/21/2021    GFRESTIMATED 36 (L) 01/21/2021    GFRESTBLACK 42 (L) 01/21/2021    ESTEFANÍA 11.0 (H) 01/21/2021    BILITOTAL 0.5 01/21/2021    ALBUMIN 3.3 (L) 01/21/2021    ALKPHOS 158 (H) 01/21/2021    ALT 20 01/21/2021    AST 25 01/21/2021        NT-proBNP:  Results for ELAINA SUTTON (MRN [id number]) as of 1/21/2021 14:47   Ref. Range 12/7/2020 12:01 12/21/2020 08:56 1/21/2021 11:14   N-Terminal Pro Bnp Latest Ref Range: 0 - 450 pg/mL 7,780 (H) 8,941 (H) 10,275 (H)         Most recent testing:      Echocardiogram  1/8/2021     Interpretation Summary  Global and regional left ventricular function is hyperkinetic with an EF of  65-70%. Flattened septum is consistent with right ventricular  pressure  overload.  Severe right ventricular dilation is present.  Severe right atrial enlargement is present.  Global right ventricular function is moderately reduced.  Small circumferential pericardial effusion is present without any hemodynamic  significance.  Dilation of the inferior vena cava is present with abnormal respiratory  variation in diameter.  IVC diameter >2.1 cm collapsing <50% with sniff suggests a high RA pressure  estimated at 15 mmHg or greater.  Right ventricular systolic pressure is 37mmHg above the right atrial pressure.     This study was compared with the study from 9/29/2020 .Estimated RVSP is lower  in this study otherwise no change.    C  1/12/2021  Hemodynamics RA 8/15/7  /7  /20/50  PCWP 10/11/8  LUIZ 2.8/2  TD 5.2/3.7  PVR 8.1 Right sided filling pressures are mildly elevated. Left sided filling pressures are normal. Severely elevated pulmonary artery hypertension. Normal cardiac output level. Hemodynamic data has been modified in Epic per physician review.         NYHA Functional Class:  Functional class 3B    1--No limitation of activity  2--Slight limitation, ordinary activities may cause symptoms  3--Marked limitation, less than ordinary activities cause symptoms  4--Severe limitation, minimal activity causes symptoms, or symptoms at rest    A total of 45 minutes was spent performing chart and history review, gathering HPI, physical exam, documentation, and care coordination.    60335 20-29 min  74001  30-39 min  [id number] 40-54 min      Shawna Marcum PA-C  [facility]  Pager [phone]        Please do not hesitate to contact me if you have any questions/concerns.     Sincerely,     MAGEN Saeed

## 2021-01-21 NOTE — PROGRESS NOTES
Date of Service: 01/21/2021    [facility]      Primary PH cardiologist: Dr. Turcios       HPI:  Ms. Anderson is a pleasant 80 year old female with a PMHx including rheumatoid arthritis, hypertension, scoliosis, asthma, pulmonary MAC, breast cancer s/p chemotherapy with Adriamycin, Cytoxan, Taxol, and tamoxifen and status post left mastectomy. She also has a history of extensive bilateral pulmonary emboli and DVT in 1998 thought to be secondary to tamoxifen along with chemotherapy induced cardiomyopathy. 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 Although she had proximal disease, she was too high risk for surgical PTE. She is now on IV Remodulin therapy.      We were able to titrate her up to a goal of 30ng/kg/min complicated by transient side effects of leg pain, jaw pain, diarrhea and headaches. However, she reported overall improvement in her breathing. Last fall, she also fell and tore a tendon in her knee which was complicated by a painful hematoma due to her being on anticoagulation. Shortly after she developed rapid weight gain of almost 20 lbs and was then hospitalized from 9/29 to 10/6 for acute on chronic diastolic heart failure, requiring a lasix gtt. Repeat echo showed EF 60-65%, moderate RV dilation, and small effusion. She also had a repeat RHC (RA 5, RV 90/8, mPA 42, LUIZ 2.55/1.76) which showed some mild improvement from prior. Pulmonary angiogram showed  of the distal right A2, proximal A7 segment, severe stenosis of the right A8 segment with slow filling distally. There was possible stenosis of the proximal and mid right A10 segment, and no significant angiographic disease of the left lung segments. Her outpatient Lasix dose was increased and her stay was complicated by JOSE G on CKD but renal function improved with diuresis. Dr. Turcios recommended increasing her Remodulin dose vs adding Adempas. After further  "discussion, she chose the latter.     We then titrated up her Adempas, but she again developed some swelling and weight gain requiring further escalation of diuretics. Ultimately, we had to reduce her Adempas back to 1mg due to dizziness. When I visited with her last via virtual visit in mid December, she was feeling poorly and weight was back up with more edema once again. I recommended hospitalization for IV diuresis; she was hospitalized from 12/23 to 1/14 for acute on chronic right sided heart failure and was treated with a bumex gtt and intermittent diuril. She required transient support with dobutamine. Adempas was increased to 1.5mg TID. Opsumit was added but I believe she was only able to tolerate every other day due to dizziness. Due to her worsening clinical status, she underwent a BPA on 1/13. This was complicated by some hemoptysis, which resolved with 2 units of FFP. Her Remodulin was continued at 30ng/kg/min. Her weight at discharge was 110 lb, and Lasix was changed to bumex 4mg BID. Notably, she was also treated for RLE cellulitis with a course of Clindamycin.     Today, we are doing an in clinic visit to follow up on her progress. She tells me that since discharge, she says \"I don't think all that helped very much.\" Fortunately, her weight is stable and has remained ~110lbs since discharge, but she thinks she is still having some swelling in her knees. Her breathing is overall unchanged, and she has not had any further hemoptysis. Her main complaint is dizziness, which happens frequently, when she is standing or trying to walk. She is unsure if it is worse on the days she takes Opsumit. She also continues to complain of a mix of both constipation and diarrhea which is quite bothersome to her, and she has tried multiple strategies including PRN Imodium, senna, and colace.       Labs done prior to our visit today showed an increase in her BUN/SCr 50/1.39. K is low at 3.1. Na was 135. AST/ALT normal at " 20/25. NT-proBNP is still significantly elevated at 10,275. Her hemoglobin was 8.8 and hematocrit 28.5. Platelets were 201.         CURRENT PULMONARY HYPERTENSION REGIMEN:     PAH Rx: Remodulin 30ng/kg/min, Adempas 1.5mg TID, Opsumit 10mg every other day     Diuretics: Bumex 4mg BID     Oxygen: None     Anticoagulation: warfarin   Indication: CTEPH        Assessment/Plan:       1. Chronic thromboembolic pulmonary hypertension.              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 Although she had proximal disease, she was felt to be too high risk for surgical PTE. She is currently on IV Remodulin therapy, at 30ng/kg/min, and we recently added Adempas, but she has only been able to tolerate 1.5mg TID at this point. Opsumit has now been added at 10mg every other day as well. Today I kept these unchanged, but did not titrate up due to ongoing dizziness.    --Last visit I directed Karen for admission due to decompensated right heart failure. She was aggressively diuresed with dobutamine support and is now s/p BPA on 1/13/2021; it appears that about 25% of her available targets have been addressed. However, her pressures remained quite high at that time and she did have some hemoptysis post procedure. Discussed with Dr. Turcios who recommends a repeat RHC in about a month to re-evaluate her pressures and the safety of proceeding with further BPAs.  I explained this to her and her  today, that we may not be able to safely proceed with additional treatments.    --Today on exam she appears euvolemic, and perhaps slightly dry intravscularly on bumex 4mg BID. Labs showed BUN/SCr have trended up, though NT-proBNP is still significantly elevated over 10k. As she remains stable at her suspected dry weight, I will reduce her bumex slightly to 6mg once daily (from 4mg BID). She will keep weighing at home and call with an upward trend. I also increased her Klor Con to 40meq BID due to hypokalemia.               --Continue digoxin for RV support. BP today was suspected to be inaccurate (they ultimately took on her leg). Home BP is running low 100s systolic consistently. She remains off lisinopril. Given her ongoing dizziness with monitor closely.   --Continue anticoagulation with warfarin for her CTEPH, though we have been monitoring with a lower INR goal of 2-2.5. INR today was 1.63. We will contact her INR clinic to make them aware.       Follow up plan: Return to see me in 1 week with repeat BMP. Then tentatively in 1 month for repeat RHC to see Dr. Turcios.         Orders this Visit:  Orders Placed This Encounter   Procedures     Basic Metabolic Profile     N terminal pro BNP outpatient     Follow-Up with Pulmonary Hypertension Clinic     Orders Placed This Encounter   Medications     riociguat (ADEMPAS) 1.5 MG tablet     Sig: Take by mouth 3 times daily     DISCONTD: bumetanide (BUMEX) 2 MG tablet     Sig: Take 3 tablets (6 mg) by mouth daily     Dispense:  90 tablet     Refill:  1     digoxin (LANOXIN) 62.5 MCG tablet     Sig: Take 1 tablet (62.5 mcg) by mouth daily     Dispense:  90 tablet     Refill:  3     bumetanide (BUMEX) 2 MG tablet     Sig: Take 3 tablets (6 mg) by mouth daily     Dispense:  90 tablet     Refill:  1     potassium chloride ER (KLOR-CON M) 20 MEQ CR tablet     Sig: Take 2 tablets (40 mEq) by mouth 2 times daily     Dispense:  120 tablet     Refill:  3     Medications Discontinued During This Encounter   Medication Reason     riociguat (ADEMPAS) 0.5 MG tablet Medication Reconciliation Clean Up     bumetanide (BUMEX) 2 MG tablet      digoxin (LANOXIN) 62.5 MCG tablet Reorder     bumetanide (BUMEX) 2 MG tablet Reorder     potassium chloride ER (KLOR-CON M) 20 MEQ CR tablet Reorder         PAST MEDICAL HISTORY:  Past Medical History:   Diagnosis Date     Asthma      Breast cancer (H)      Hypertension      Mycobacterium avium complex (H)      Pulmonary embolism (H)      Pulmonary hypertension  (H)      Rheumatoid arthritis (H)      Scoliosis          FAMILY HISTORY:  Family History   Problem Relation Age of Onset     Heart Failure Mother      Kidney Disease Mother      Coronary Artery Disease Maternal Grandfather 50     LUNG DISEASE No family hx of      Clotting Disorder No family hx of          SOCIAL HISTORY:  Social History     Tobacco Use     Smoking status: Never Smoker     Smokeless tobacco: Never Used   Substance Use Topics     Alcohol use: Yes     Comment: occasionally      Drug use: Never         CURRENT MEDICATIONS:  Current Outpatient Medications   Medication Sig Dispense Refill     acetaminophen (TYLENOL) 325 MG tablet Take 325 mg by mouth every 6 hours as needed for mild pain       albuterol (PROAIR HFA/PROVENTIL HFA/VENTOLIN HFA) 108 (90 Base) MCG/ACT inhaler Inhale 2 puffs into the lungs every 4 hours as needed for shortness of breath / dyspnea or wheezing        alendronate (FOSAMAX) 70 MG tablet Take 70 mg by mouth once a week       Ascorbic Acid (VITAMIN C) 500 MG CAPS Take 500 mg by mouth every morning        aspirin (ASA) 81 MG tablet Take 81 mg by mouth daily        beclomethasone HFA (QVAR REDIHALER) 80 MCG/ACT inhaler Inhale 2 puffs into the lungs 2 times daily       bumetanide (BUMEX) 2 MG tablet Take 3 tablets (6 mg) by mouth daily 90 tablet 1     calcium citrate-vitamin D (CALCIUM CITRATE + D3) 315-250 MG-UNIT TABS per tablet Take 2 tablets by mouth daily        cetirizine (ZYRTEC) 10 MG tablet Take 10 mg by mouth daily       digoxin (LANOXIN) 62.5 MCG tablet Take 1 tablet (62.5 mcg) by mouth daily 90 tablet 3     gabapentin (NEURONTIN) 300 MG capsule Take 300 mg by mouth 3 times daily Can take an additional 300mg at bedtime as needed       hydroxychloroquine (PLAQUENIL) 200 MG tablet Take 200 mg by mouth daily        ipratropium (ATROVENT) 0.06 % nasal spray Spray 2 sprays into both nostrils 3 times daily        loperamide (IMODIUM) 2 MG capsule Take 1 capsule (2 mg) by mouth  daily Can take an additional 2 mg in the evening PRN (Patient taking differently: Take 2 mg by mouth 2 times daily Can take an additional 2 mg in the evening PRN) 90 capsule 3     macitentan (OPSUMIT) 10 MG tablet Take 1 tablet (10 mg) by mouth every 48 hours 30 tablet 0     potassium chloride ER (KLOR-CON M) 20 MEQ CR tablet Take 2 tablets (40 mEq) by mouth 2 times daily 120 tablet 3     Probiotic Product (PROBIOTIC ACIDOPHILUS BIOBEADS) CAPS Take 1 capsule by mouth 2 times daily        riociguat (ADEMPAS) 1.5 MG tablet Take by mouth 3 times daily       sodium chloride (OCEAN) 0.65 % nasal spray Spray 1 spray into both nostrils daily as needed for congestion       Treprostinil (REMODULIN IJ) Dose: 30 ng/kg/min  Vial concentration: 1 mg/mL  Final concentration: 60,000 ng/mL  Dosing weight: 56.3 kg   Pump rate: 41 mL/day  Per patient and [facility] on 12/23/2020       warfarin ANTICOAGULANT (COUMADIN) 3 MG tablet Take 3 mg PO on Mon, Wed, Fri and Sat, and 4.5 mg all other days of the week 170 tablet 3         ROS:   10 point ROS of systems including Constitutional, Eyes, Respiratory, Cardiovascular, Gastroenterology, Genitourinary, Integumentary, Muscularskeletal, Psychiatric were  negative except for pertinent positives noted in my HPI.      EXAM:  GEN:  In general, this is a somewhat frail appearing elderly  female, in no acute distress on room air.  Patient in a wheelchair, accompanied by her .  HEENT:  Pupils grossly equal, sclerae nonicteric.   NECK: Supple, trachea midline. No JVD while upright in wheelchair.   CHEST: [place] site clean and dry.  C/V:  Regular rate and rhythm, no murmur, rub or gallop. Accentuated P2 bu no S3 or RV heave.   RESP: Respirations are unlabored. No use of accessory muscles. Clear to auscultation bilaterally without wheezing, rales, or rhonchi.  GI: Abdomen soft, nontender, nondistended.   EXTREM: She has no LE edema today on exam. No cyanosis or clubbing.  NEURO: Alert  and oriented, cooperative. Gait not formally assessed. No obvious focal deficits.   SKIN: Warm and dry.       Weight  Wt Readings from Last 10 Encounters:   01/21/21 50.1 kg (110 lb 8 oz)   01/12/21 49.9 kg (110 lb)   11/09/20 53.3 kg (117 lb 8 oz)   10/15/20 52.2 kg (115 lb)   10/04/20 52.2 kg (115 lb)   03/20/20 53.8 kg (118 lb 11.2 oz)   03/02/20 57.6 kg (126 lb 14.4 oz)         Labs:    CBC RESULTS:  Lab Results   Component Value Date    WBC 4.7 01/21/2021    RBC 3.22 (L) 01/21/2021    HGB 8.8 (L) 01/21/2021    HCT 28.5 (L) 01/21/2021    MCV 89 01/21/2021    MCH 27.3 01/21/2021    MCHC 30.9 (L) 01/21/2021    RDW 17.7 (H) 01/21/2021     01/21/2021       CMP RESULTS:  Lab Results   Component Value Date     01/21/2021    POTASSIUM 3.1 (L) 01/21/2021    CHLORIDE 97 01/21/2021    CO2 29 01/21/2021    ANIONGAP 9 01/21/2021    GLC 87 01/21/2021    BUN 50 (H) 01/21/2021    CR 1.39 (H) 01/21/2021    GFRESTIMATED 36 (L) 01/21/2021    GFRESTBLACK 42 (L) 01/21/2021    ESTEFANÍA 11.0 (H) 01/21/2021    BILITOTAL 0.5 01/21/2021    ALBUMIN 3.3 (L) 01/21/2021    ALKPHOS 158 (H) 01/21/2021    ALT 20 01/21/2021    AST 25 01/21/2021        NT-proBNP:  Results for ELAINA SUTTON (MRN [id number]) as of 1/21/2021 14:47   Ref. Range 12/7/2020 12:01 12/21/2020 08:56 1/21/2021 11:14   N-Terminal Pro Bnp Latest Ref Range: 0 - 450 pg/mL 7,780 (H) 8,941 (H) 10,275 (H)         Most recent testing:      Echocardiogram  1/8/2021     Interpretation Summary  Global and regional left ventricular function is hyperkinetic with an EF of  65-70%. Flattened septum is consistent with right ventricular pressure  overload.  Severe right ventricular dilation is present.  Severe right atrial enlargement is present.  Global right ventricular function is moderately reduced.  Small circumferential pericardial effusion is present without any hemodynamic  significance.  Dilation of the inferior vena cava is present with abnormal  respiratory  variation in diameter.  IVC diameter >2.1 cm collapsing <50% with sniff suggests a high RA pressure  estimated at 15 mmHg or greater.  Right ventricular systolic pressure is 37mmHg above the right atrial pressure.     This study was compared with the study from 9/29/2020 .Estimated RVSP is lower  in this study otherwise no change.    [facility]  1/12/2021  Hemodynamics RA 8/15/7  /7  /20/50  PCWP 10/11/8  LUIZ 2.8/2  TD 5.2/3.7  PVR 8.1 Right sided filling pressures are mildly elevated. Left sided filling pressures are normal. Severely elevated pulmonary artery hypertension. Normal cardiac output level. Hemodynamic data has been modified in Epic per physician review.         NYHA Functional Class:  Functional class 3B    1--No limitation of activity  2--Slight limitation, ordinary activities may cause symptoms  3--Marked limitation, less than ordinary activities cause symptoms  4--Severe limitation, minimal activity causes symptoms, or symptoms at rest    A total of 45 minutes was spent performing chart and history review, gathering HPI, physical exam, documentation, and care coordination.    94045 20-29 min  [id number]  30-39 min  [id number] 40-54 min      Shawna Marcum PA-C  [facility]  Pager [phone]

## 2021-01-21 NOTE — NURSING NOTE
Chief Complaint   Patient presents with     Follow Up     Return PH follow up after hospital discharge     Vitals were taken and medication reconciled.    DEO Macario  11:47 AM

## 2021-01-21 NOTE — PROGRESS NOTES
ANTICOAGULATION FOLLOW-UP CLINIC VISIT    Patient Name:  Karen Anderson  Date:  2021  Contact Type:  Telephone    SUBJECTIVE:         OBJECTIVE    Recent labs: (last 7 days)     21  1114   INR 1.63*       ASSESSMENT / PLAN  No question data found.  Anticoagulation Summary  As of 2021    INR goal:  2.0-2.5   TTR:  80.2 % (8.2 mo)   INR used for dosin.63 (2021)   Warfarin maintenance plan:  3 mg (3 mg x 1) every Mon, Wed, Fri; 4.5 mg (3 mg x 1.5) all other days   Full warfarin instructions:  3 mg every Mon, Wed, Fri; 4.5 mg all other days   Weekly warfarin total:  27 mg   Plan last modified:  Brittany Kirk RN (2021)   Next INR check:  2021   Priority:  Critical   Target end date:  Indefinite    Indications    Pulmonary hypertension (H) [I27.20]  CTEPH (chronic thromboembolic pulmonary hypertension) (H) [I27.24]  Long term current use of anticoagulant therapy [Z79.01]             Anticoagulation Episode Summary     INR check location:      Preferred lab:      Send INR reminders to:  [facility]    Comments:  call home first, OK to leave message on either phone. OK to speak with spouse, Don.  [facility] SPEAK IN TABLETS (Has 3mg Tabs) 10/23/20:  new goal range is 2 - 2.5  [facility] in [place] and will need to fax orders P [phone] F [phone]      Anticoagulation Care Providers     Provider Role Specialty Phone number    Osiris Luong MD Referring Cardiovascular Disease [phone]    Karolina Turcios MD Referring Cardiovascular Disease [phone]            See the Encounter Report to view Anticoagulation Flowsheet and Dosing Calendar (Go to Encounters tab in chart review, and find the Anticoagulation Therapy Visit)    Spoke with patient.     Brittany Kirk RN

## 2021-01-26 NOTE — TELEPHONE ENCOUNTER
Received a call from Santana requesting information regarding a company by the name of [employer].     Returned Don's call. Santana stated he contacted [facility] and the representative requested he potentially apply for assistance with [employer]. Stated to Santana that the company looks like a software company and the office is not aware of such assistance. Recommended that Don not apply for assistance as the office is not familiar with the company. Santana agreed to plan and stated he would call [facility] and work with them instead about the cost of the Opsumit.    Asked Santana if he thought about completing the appeal letter with [facility]. Santana stated he has put thought into it and stated that based on the information he is able to provide, they would not get the appeal overturned. Told Santana that Dr. Turcios was informed of the pt's high co-pay for the Opsumit and Dr. Turcios has recommended [facility], but the decision would be up to pt and Don. Santana stated that they will think about it, but for now will pay the cost of the Opsumit.     No further follow-up completed and encounter closed.    [facility]  Clinic   Pulmonary Hypertension  [facility]  (P) [phone]

## 2021-01-28 ENCOUNTER — OFFICE VISIT (OUTPATIENT)
Dept: CARDIOLOGY | Facility: CLINIC | Age: 81
End: 2021-01-28
Attending: PHYSICIAN ASSISTANT
Payer: COMMERCIAL

## 2021-01-28 ENCOUNTER — ANTICOAGULATION THERAPY VISIT (OUTPATIENT)
Dept: ANTICOAGULATION | Facility: CLINIC | Age: 81
End: 2021-01-28

## 2021-01-28 VITALS
SYSTOLIC BLOOD PRESSURE: 120 MMHG | WEIGHT: 114 LBS | OXYGEN SATURATION: 94 % | HEIGHT: 59 IN | DIASTOLIC BLOOD PRESSURE: 77 MMHG | HEART RATE: 85 BPM | BODY MASS INDEX: 22.98 KG/M2

## 2021-01-28 DIAGNOSIS — I50.810 RVF (RIGHT VENTRICULAR FAILURE) (H): ICD-10-CM

## 2021-01-28 DIAGNOSIS — I27.20 PULMONARY HYPERTENSION (H): ICD-10-CM

## 2021-01-28 DIAGNOSIS — I27.24 CTEPH (CHRONIC THROMBOEMBOLIC PULMONARY HYPERTENSION) (H): ICD-10-CM

## 2021-01-28 DIAGNOSIS — Z79.01 LONG TERM CURRENT USE OF ANTICOAGULANT THERAPY: ICD-10-CM

## 2021-01-28 DIAGNOSIS — R19.7 DIARRHEA: Primary | ICD-10-CM

## 2021-01-28 DIAGNOSIS — R06.02 SOB (SHORTNESS OF BREATH): ICD-10-CM

## 2021-01-28 LAB
ANION GAP SERPL CALCULATED.3IONS-SCNC: 5 MMOL/L (ref 3–14)
BUN SERPL-MCNC: 44 MG/DL (ref 7–30)
CALCIUM SERPL-MCNC: 10.2 MG/DL (ref 8.5–10.1)
CHLORIDE SERPL-SCNC: 101 MMOL/L (ref 94–109)
CO2 SERPL-SCNC: 30 MMOL/L (ref 20–32)
CREAT SERPL-MCNC: 1.56 MG/DL (ref 0.52–1.04)
GFR SERPL CREATININE-BSD FRML MDRD: 31 ML/MIN/{1.73_M2}
GLUCOSE SERPL-MCNC: 128 MG/DL (ref 70–99)
INR PPP: 2.24 (ref 0.86–1.14)
NT-PROBNP SERPL-MCNC: 5679 PG/ML (ref 0–450)
POTASSIUM SERPL-SCNC: 4.3 MMOL/L (ref 3.4–5.3)
SODIUM SERPL-SCNC: 137 MMOL/L (ref 133–144)

## 2021-01-28 PROCEDURE — 36415 COLL VENOUS BLD VENIPUNCTURE: CPT | Performed by: PATHOLOGY

## 2021-01-28 PROCEDURE — 85610 PROTHROMBIN TIME: CPT | Performed by: PATHOLOGY

## 2021-01-28 PROCEDURE — 80048 BASIC METABOLIC PNL TOTAL CA: CPT | Performed by: PATHOLOGY

## 2021-01-28 PROCEDURE — 99215 OFFICE O/P EST HI 40 MIN: CPT | Performed by: PHYSICIAN ASSISTANT

## 2021-01-28 PROCEDURE — 83880 ASSAY OF NATRIURETIC PEPTIDE: CPT | Performed by: PATHOLOGY

## 2021-01-28 RX ORDER — BUMETANIDE 2 MG/1
4 TABLET ORAL 2 TIMES DAILY
Qty: 120 TABLET | Refills: 4 | Status: ON HOLD | OUTPATIENT
Start: 2021-01-28 | End: 2021-06-10

## 2021-01-28 RX ORDER — LIDOCAINE 40 MG/G
CREAM TOPICAL
Status: CANCELLED | OUTPATIENT
Start: 2021-01-28

## 2021-01-28 ASSESSMENT — PAIN SCALES - GENERAL: PAINLEVEL: NO PAIN (0)

## 2021-01-28 ASSESSMENT — MIFFLIN-ST. JEOR: SCORE: 902.73

## 2021-01-28 NOTE — NURSING NOTE
Chief Complaint   Patient presents with     Follow Up     1 week follow-up        Vitals were taken and medications were reconciled.    Kaylee Taylor RMA  4:39 PM

## 2021-01-28 NOTE — PATIENT INSTRUCTIONS
Thank you for visiting the Pulmonary Hypertension Clinic today.      Today we discussed:   1. We will increase your bumex back to 4mg twice a day.  2. Continue all other medications unchanged.   3. We will send a referral to GI.     Follow up Appointment Information:  Return in ~3 weeks for a heart catheterization and to see Dr. Turcios.       Additional Instructions:    1. Continue staying active and eat a heart healthy, low sodium diet.     2. Please keep current list of medications with you at all times.     3. Remember to weigh yourself daily after voiding and write it down on a log. If you have gained/lost 2 pounds overnight or 5 pounds in a week contact us for medication adjustments or further instructions.    4. Please call us immediately if you have syncope (fainting or passing out), chest pain, worsening edema (swelling or weight gain), or general worsening in how you are feeling.         ---------------------------------------------------------------------------------------------------------------    If you have questions or concerns please contact us at:        Fabby Seymour RN, BSN, PHN  [facility]  (Schedule,Prior Auth)  Nurse Coordinator     Clinic   Pulmonary Hypertension   Pulmonary Hypertension  [facility]             [facility]  (P)[phone]    (P) [phone]        (F) [phone]      ** Please note that you will NOT receive a reminder call regarding your scheduled testing, reminder calls are for provider appointments only.  If you are scheduled for testing within the [facility] system you may receive a call regarding pre-registration for billing purposes only.**     ------------------------------------------------------------------------------------------------------------    Interested in joining a support group?    Pulmonary Hypertension Association  [email or web address].phassociation.org/  **Look at the Events Tab**  They even have Support Groups that you can call into    [facility] Support Group  Second Saturday of the Month from 1-3 PM   [facility] [address]  Leader: Jessenia Loo  Phone: [phone] or [phone]  Email: [email or web address]

## 2021-01-28 NOTE — PROGRESS NOTES
Date of Service: 01/28/2021    [facility]      Primary PH cardiologist: Dr. Turcios       HPI:  Ms. Anderson is a pleasant 80 year old female with a PMHx including rheumatoid arthritis, hypertension, scoliosis, asthma, pulmonary MAC, breast cancer s/p chemotherapy with Adriamycin, Cytoxan, Taxol, and tamoxifen and status post left mastectomy. She also has a history of extensive bilateral pulmonary emboli and DVT in 1998 thought to be secondary to tamoxifen along with chemotherapy induced cardiomyopathy. 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 Although she had proximal disease, she was too high risk for surgical PTE. She is now on IV Remodulin therapy.      We were able to titrate her up to a goal of 30ng/kg/min complicated by transient side effects of leg pain, jaw pain, diarrhea and headaches. However, she reported overall improvement in her breathing. Last fall, she also fell and tore a tendon in her knee which was complicated by a painful hematoma due to her being on anticoagulation. Shortly after she developed rapid weight gain of almost 20 lbs and was then hospitalized from 9/29 to 10/6 for acute on chronic diastolic heart failure, requiring a lasix gtt. Repeat echo showed EF 60-65%, moderate RV dilation, and small effusion. She also had a repeat RHC (RA 5, RV 90/8, mPA 42, LUIZ 2.55/1.76) which showed some mild improvement from prior. Pulmonary angiogram showed  of the distal right A2, proximal A7 segment, severe stenosis of the right A8 segment with slow filling distally. There was possible stenosis of the proximal and mid right A10 segment, and no significant angiographic disease of the left lung segments. Her outpatient Lasix dose was increased and her stay was complicated by JOSE G on CKD but renal function improved with diuresis. Dr. Turcios recommended increasing her Remodulin dose vs adding Adempas. After further  discussion, she chose the latter.     We then titrated up her Adempas, but she again developed some swelling and weight gain requiring further escalation of diuretics. Ultimately, we had to reduce her Adempas back to 1mg due to dizziness. When I visited with her last via virtual visit in mid December, she was feeling poorly and weight was back up with more edema once again. I recommended hospitalization for IV diuresis; she was hospitalized from 12/23 to 1/14 for acute on chronic right sided heart failure and was treated with a bumex gtt and intermittent diuril. She required transient support with dobutamine. Adempas was increased to 1.5mg TID. Opsumit was added but I believe she was only able to tolerate every other day due to dizziness. Due to her worsening clinical status, she underwent a BPA on 1/13. This was complicated by some hemoptysis, which resolved with 2 units of FFP. Her Remodulin was continued at 30ng/kg/min. Her weight at discharge was 110 lb, and Lasix was changed to bumex 4mg BID. Notably, she was also treated for RLE cellulitis with a course of Clindamycin.     I saw Karen akers on 1/21. Her weight remained stable at 110 lbs but she felt she was still having swelling. She reported no further hemoptysis. Main complaint at that time was dizziness when standing or trying to walk. She was unsure if it was worse on days she takes Opsumit, but per BP log she did not appear to have lower BP those days. She also continues to struggle with a mix of both constipation and diarrhea which is quite bothersome to her, and she has tried multiple strategies including PRN Imodium, senna, and colace. Labs at that visit showed an increase in her BUN/scr and I asked her to reduce her bumex from 4mg BID to 6mg once a day, to see if this would help her dizziness.    Today, she is here in clinic for close follow up. Her weight log shows that her weight was stable at 111 lbs for several days, but over the last few days,  her weight jumped up to 114 lbs. She says her legs are more swollen again, but she has been unable to wrap them on her own due to sciatica pain. This morning, she was able to come in and have them wrapped for her. She says they are quite edematous. Overall, everything else is unchanged. She continues to struggle with extensive diarrhea and then constipation. She takes Imodium some days and softeners others; she spends a large amount of the day in her bathroom. She also continues with significant dizziness, which has not improved; she also does not believe it correlates with days she is taking the Opsumit.     Labs done prior to our visit showed BUN 44, SCr 1.56. Na 137, K 4.3. Nt-proBNP is 5,679. INR 2.24.         CURRENT PULMONARY HYPERTENSION REGIMEN:     PAH Rx: Remodulin 30ng/kg/min, Adempas 1.5mg TID, Opsumit 10mg every other day     Diuretics: Bumex 6mg once daily     Oxygen: None     Anticoagulation: warfarin   Indication: CTEPH        Assessment/Plan:       1. Chronic thromboembolic pulmonary hypertension.              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 Although she had proximal disease, she was felt to be too high risk for surgical PTE. She is currently on IV Remodulin therapy, at 30ng/kg/min, and we recently added Adempas, but she has only been able to tolerate 1.5mg TID at this point due to dizziness. She is also now on Opsumit 10mg every other day. Today I kept these unchanged, and did not titrate up due to ongoing dizziness.    --She is s/p BPA on 1/13/2021; it appears that about 25% of her available targets have been addressed. However, her pressures remained quite high at that time and she did have some hemoptysis post procedure. Dr. Tucrios has recommended a repeat RHC in the next few weeks to re-evaluate her pressures and the safety of proceeding with further BPAs.  I explained this to her and her , that we may not be able to safely proceed with additional treatments.     --Her volume status is difficult to sort out, but her weight did go up a few lbs the last few days. After discussion with Dr. Turcios, will increase her bumex back up to 4mg BID. Her dizziness remains despite no hypotension. She will keep weighing at home and call with an upward trend.              --Continue digoxin for RV support. BP has remained acceptable, though will need to continue to monitor given renal concerns.    --Continue anticoagulation with warfarin for her CTEPH, though we have been monitoring with a lower INR goal of 2-2.5. INR today was 2.24.    --One of her main complaints remains alternating diarrhea and constipation. Dr. Turcios recommended she stop the Imodium as she required enemas etc while in the hospital due to high stool burden. However, she is hesitant to do as she already has very frequent bowel movements. This has been a significant issue for her. A GI referral was offered, which we will place today.       Follow up plan: Return in ~3 weeks with RHC and echocardiogram, to see Dr. Turcios.         Orders this Visit:  No orders of the defined types were placed in this encounter.    Orders Placed This Encounter   Medications     bumetanide (BUMEX) 2 MG tablet     Sig: Take 2 tablets (4 mg) by mouth 2 times daily     Dispense:  120 tablet     Refill:  4     Medications Discontinued During This Encounter   Medication Reason     bumetanide (BUMEX) 2 MG tablet          PAST MEDICAL HISTORY:  Past Medical History:   Diagnosis Date     Asthma      Breast cancer (H)      Hypertension      Mycobacterium avium complex (H)      Pulmonary embolism (H)      Pulmonary hypertension (H)      Rheumatoid arthritis (H)      Scoliosis          FAMILY HISTORY:  Family History   Problem Relation Age of Onset     Heart Failure Mother      Kidney Disease Mother      Coronary Artery Disease Maternal Grandfather 50     LUNG DISEASE No family hx of      Clotting Disorder No family hx of          SOCIAL  HISTORY:  Social History     Tobacco Use     Smoking status: Never Smoker     Smokeless tobacco: Never Used   Substance Use Topics     Alcohol use: Yes     Comment: occasionally      Drug use: Never         CURRENT MEDICATIONS:  Current Outpatient Medications   Medication Sig Dispense Refill     acetaminophen (TYLENOL) 325 MG tablet Take 325 mg by mouth every 6 hours as needed for mild pain       albuterol (PROAIR HFA/PROVENTIL HFA/VENTOLIN HFA) 108 (90 Base) MCG/ACT inhaler Inhale 2 puffs into the lungs every 4 hours as needed for shortness of breath / dyspnea or wheezing        alendronate (FOSAMAX) 70 MG tablet Take 70 mg by mouth once a week       Ascorbic Acid (VITAMIN C) 500 MG CAPS Take 500 mg by mouth every morning        aspirin (ASA) 81 MG tablet Take 81 mg by mouth daily        beclomethasone HFA (QVAR REDIHALER) 80 MCG/ACT inhaler Inhale 2 puffs into the lungs 2 times daily       bumetanide (BUMEX) 2 MG tablet Take 2 tablets (4 mg) by mouth 2 times daily 120 tablet 4     calcium citrate-vitamin D (CALCIUM CITRATE + D3) 315-250 MG-UNIT TABS per tablet Take 2 tablets by mouth daily        cetirizine (ZYRTEC) 10 MG tablet Take 10 mg by mouth daily       digoxin (LANOXIN) 62.5 MCG tablet Take 1 tablet (62.5 mcg) by mouth daily 90 tablet 3     gabapentin (NEURONTIN) 300 MG capsule Take 300 mg by mouth 3 times daily Can take an additional 300mg at bedtime as needed       hydroxychloroquine (PLAQUENIL) 200 MG tablet Take 200 mg by mouth daily        ipratropium (ATROVENT) 0.06 % nasal spray Spray 2 sprays into both nostrils 3 times daily        loperamide (IMODIUM) 2 MG capsule Take 1 capsule (2 mg) by mouth daily Can take an additional 2 mg in the evening PRN (Patient taking differently: Take 2 mg by mouth 2 times daily Can take an additional 2 mg in the evening PRN) 90 capsule 3     macitentan (OPSUMIT) 10 MG tablet Take 1 tablet (10 mg) by mouth every 48 hours 30 tablet 0     potassium chloride ER (KLOR-CON  M) 20 MEQ CR tablet Take 2 tablets (40 mEq) by mouth 2 times daily 120 tablet 3     Probiotic Product (PROBIOTIC ACIDOPHILUS HUISociable Labs) CAPS Take 1 capsule by mouth 2 times daily        riociguat (ADEMPAS) 1.5 MG tablet Take by mouth 3 times daily       sodium chloride (OCEAN) 0.65 % nasal spray Spray 1 spray into both nostrils daily as needed for congestion       Treprostinil (REMODULIN IJ) Dose: 30 ng/kg/min  Vial concentration: 1 mg/mL  Final concentration: 60,000 ng/mL  Dosing weight: 56.3 kg   Pump rate: 41 mL/day  Per patient and [facility] on 12/23/2020       warfarin ANTICOAGULANT (COUMADIN) 3 MG tablet Take 3 mg PO on Mon, Wed, Fri and Sat, and 4.5 mg all other days of the week 170 tablet 3         ROS:   10 point ROS of systems including Constitutional, Eyes, Respiratory, Cardiovascular, Gastroenterology, Genitourinary, Integumentary, Muscularskeletal, Psychiatric were  negative except for pertinent positives noted in my HPI.      EXAM:  GEN:  In general, this is a somewhat frail appearing elderly  female, in no acute distress on room air.  Patient in a wheelchair, accompanied by her .  HEENT:  Pupils grossly equal, sclerae nonicteric.   NECK: Supple, trachea midline. Mild JVD while upright in wheelchair.   CHEST: [place] site clean and dry.  C/V:  Regular rate and rhythm, no murmur, rub or gallop. Accentuated P2 but no S3 or RV heave.   RESP: Respirations are unlabored. No use of accessory muscles. Clear to auscultation bilaterally without wheezing, rales, or rhonchi.  GI: Abdomen soft, nontender, nondistended.   EXTREM: Her legs are tightly wrapped today so difficult to assess edema currently. They are somewhat tender to palpation. No cyanosis or clubbing.  NEURO: Alert and oriented, cooperative. Gait not formally assessed. No obvious focal deficits.       Weight  Wt Readings from Last 10 Encounters:   01/28/21 51.7 kg (114 lb)   01/21/21 50.1 kg (110 lb 8 oz)   01/12/21 49.9 kg (110 lb)    11/09/20 53.3 kg (117 lb 8 oz)   10/15/20 52.2 kg (115 lb)   10/04/20 52.2 kg (115 lb)   03/20/20 53.8 kg (118 lb 11.2 oz)   03/02/20 57.6 kg (126 lb 14.4 oz)         Labs:    CBC RESULTS:  Lab Results   Component Value Date    WBC 4.7 01/21/2021    RBC 3.22 (L) 01/21/2021    HGB 8.8 (L) 01/21/2021    HCT 28.5 (L) 01/21/2021    MCV 89 01/21/2021    MCH 27.3 01/21/2021    MCHC 30.9 (L) 01/21/2021    RDW 17.7 (H) 01/21/2021     01/21/2021       CMP RESULTS:  Lab Results   Component Value Date     01/28/2021    POTASSIUM 4.3 01/28/2021    CHLORIDE 101 01/28/2021    CO2 30 01/28/2021    ANIONGAP 5 01/28/2021     (H) 01/28/2021    BUN 44 (H) 01/28/2021    CR 1.56 (H) 01/28/2021    GFRESTIMATED 31 (L) 01/28/2021    GFRESTBLACK 36 (L) 01/28/2021    ESTEFANÍA 10.2 (H) 01/28/2021    BILITOTAL 0.5 01/21/2021    ALBUMIN 3.3 (L) 01/21/2021    ALKPHOS 158 (H) 01/21/2021    ALT 20 01/21/2021    AST 25 01/21/2021      Results for ELAINA SUTTON (MRN [id number]) as of 1/28/2021 17:52   Ref. Range 12/21/2020 08:56 1/21/2021 11:14 1/28/2021 16:14   N-Terminal Pro Bnp Latest Ref Range: 0 - 450 pg/mL 8,941 (H) 10,275 (H) 5,679 (H)         Most recent testing:      Echocardiogram  1/8/2021     Interpretation Summary  Global and regional left ventricular function is hyperkinetic with an EF of  65-70%. Flattened septum is consistent with right ventricular pressure  overload.  Severe right ventricular dilation is present.  Severe right atrial enlargement is present.  Global right ventricular function is moderately reduced.  Small circumferential pericardial effusion is present without any hemodynamic  significance.  Dilation of the inferior vena cava is present with abnormal respiratory  variation in diameter.  IVC diameter >2.1 cm collapsing <50% with sniff suggests a high RA pressure  estimated at 15 mmHg or greater.  Right ventricular systolic pressure is 37mmHg above the right atrial pressure.     This study was  compared with the study from 9/29/2020 .Estimated RVSP is lower  in this study otherwise no change.    [facility]  1/12/2021  Hemodynamics RA 8/15/7  /7  /20/50  PCWP 10/11/8  LUIZ 2.8/2  TD 5.2/3.7  PVR 8.1 Right sided filling pressures are mildly elevated. Left sided filling pressures are normal. Severely elevated pulmonary artery hypertension. Normal cardiac output level. Hemodynamic data has been modified in Epic per physician review.         NYHA Functional Class:  Functional class 3B    1--No limitation of activity  2--Slight limitation, ordinary activities may cause symptoms  3--Marked limitation, less than ordinary activities cause symptoms  4--Severe limitation, minimal activity causes symptoms, or symptoms at rest    A total of 55 minutes was spent performing chart and history review, gathering HPI, physical exam, documentation, and care coordination.      Shawna Marcum PA-C  [facility]  Pager [phone]

## 2021-01-28 NOTE — LETTER
1/28/2021      RE: Karen Anderson  [address]  [facility] [address]       Dear Colleague,    Thank you for the opportunity to participate in the care of your patient, Karen Anderson, at the [facility] [place] [facility]. Please see a copy of my visit note below.    Date of Service: 01/28/2021    [facility]      Primary PH cardiologist: Dr. Turcios       HPI:  Ms. Anderson is a pleasant 80 year old female with a PMHx including rheumatoid arthritis, hypertension, scoliosis, asthma, pulmonary MAC, breast cancer s/p chemotherapy with Adriamycin, Cytoxan, Taxol, and tamoxifen and status post left mastectomy. She also has a history of extensive bilateral pulmonary emboli and DVT in 1998 thought to be secondary to tamoxifen along with chemotherapy induced cardiomyopathy. 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 Although she had proximal disease, she was too high risk for surgical PTE. She is now on IV Remodulin therapy.      We were able to titrate her up to a goal of 30ng/kg/min complicated by transient side effects of leg pain, jaw pain, diarrhea and headaches. However, she reported overall improvement in her breathing. Last fall, she also fell and tore a tendon in her knee which was complicated by a painful hematoma due to her being on anticoagulation. Shortly after she developed rapid weight gain of almost 20 lbs and was then hospitalized from 9/29 to 10/6 for acute on chronic diastolic heart failure, requiring a lasix gtt. Repeat echo showed EF 60-65%, moderate RV dilation, and small effusion. She also had a repeat RHC (RA 5, RV 90/8, mPA 42, LUIZ 2.55/1.76) which showed some mild improvement from prior. Pulmonary angiogram showed  of the distal right A2, proximal A7 segment, severe stenosis of the right A8 segment with slow filling distally. There was  possible stenosis of the proximal and mid right A10 segment, and no significant angiographic disease of the left lung segments. Her outpatient Lasix dose was increased and her stay was complicated by JOSE G on CKD but renal function improved with diuresis. Dr. Turcios recommended increasing her Remodulin dose vs adding Adempas. After further discussion, she chose the latter.     We then titrated up her Adempas, but she again developed some swelling and weight gain requiring further escalation of diuretics. Ultimately, we had to reduce her Adempas back to 1mg due to dizziness. When I visited with her last via virtual visit in mid December, she was feeling poorly and weight was back up with more edema once again. I recommended hospitalization for IV diuresis; she was hospitalized from 12/23 to 1/14 for acute on chronic right sided heart failure and was treated with a bumex gtt and intermittent diuril. She required transient support with dobutamine. Adempas was increased to 1.5mg TID. Opsumit was added but I believe she was only able to tolerate every other day due to dizziness. Due to her worsening clinical status, she underwent a BPA on 1/13. This was complicated by some hemoptysis, which resolved with 2 units of FFP. Her Remodulin was continued at 30ng/kg/min. Her weight at discharge was 110 lb, and Lasix was changed to bumex 4mg BID. Notably, she was also treated for RLE cellulitis with a course of Clindamycin.     I saw Karen akers on 1/21. Her weight remained stable at 110 lbs but she felt she was still having swelling. She reported no further hemoptysis. Main complaint at that time was dizziness when standing or trying to walk. She was unsure if it was worse on days she takes Opsumit, but per BP log she did not appear to have lower BP those days. She also continues to struggle with a mix of both constipation and diarrhea which is quite bothersome to her, and she has tried multiple strategies including PRN  Imodium, senna, and colace. Labs at that visit showed an increase in her BUN/scr and I asked her to reduce her bumex from 4mg BID to 6mg once a day, to see if this would help her dizziness.    Today, she is here in clinic for close follow up. Her weight log shows that her weight was stable at 111 lbs for several days, but over the last few days, her weight jumped up to 114 lbs. She says her legs are more swollen again, but she has been unable to wrap them on her own due to sciatica pain. This morning, she was able to come in and have them wrapped for her. She says they are quite edematous. Overall, everything else is unchanged. She continues to struggle with extensive diarrhea and then constipation. She takes Imodium some days and softeners others; she spends a large amount of the day in her bathroom. She also continues with significant dizziness, which has not improved; she also does not believe it correlates with days she is taking the Opsumit.     Labs done prior to our visit showed BUN 44, SCr 1.56. Na 137, K 4.3. Nt-proBNP is 5,679. INR 2.24.         CURRENT PULMONARY HYPERTENSION REGIMEN:     PAH Rx: Remodulin 30ng/kg/min, Adempas 1.5mg TID, Opsumit 10mg every other day     Diuretics: Bumex 6mg once daily     Oxygen: None     Anticoagulation: warfarin   Indication: CTEPH        Assessment/Plan:       1. Chronic thromboembolic pulmonary hypertension.              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 Although she had proximal disease, she was felt to be too high risk for surgical PTE. She is currently on IV Remodulin therapy, at 30ng/kg/min, and we recently added Adempas, but she has only been able to tolerate 1.5mg TID at this point due to dizziness. She is also now on Opsumit 10mg every other day. Today I kept these unchanged, and did not titrate up due to ongoing dizziness.    --She is s/p BPA on 1/13/2021; it appears that about 25% of her available targets have been addressed. However, her  pressures remained quite high at that time and she did have some hemoptysis post procedure. Dr. Turcios has recommended a repeat RHC in the next few weeks to re-evaluate her pressures and the safety of proceeding with further BPAs.  I explained this to her and her , that we may not be able to safely proceed with additional treatments.    --Her volume status is difficult to sort out, but her weight did go up a few lbs the last few days. After discussion with Dr. Turcios, will increase her bumex back up to 4mg BID. Her dizziness remains despite no hypotension. She will keep weighing at home and call with an upward trend.              --Continue digoxin for RV support. BP has remained acceptable, though will need to continue to monitor given renal concerns.    --Continue anticoagulation with warfarin for her CTEPH, though we have been monitoring with a lower INR goal of 2-2.5. INR today was 2.24.    --One of her main complaints remains alternating diarrhea and constipation. Dr. Turcios recommended she stop the Imodium as she required enemas etc while in the hospital due to high stool burden. However, she is hesitant to do as she already has very frequent bowel movements. This has been a significant issue for her. A GI referral was offered, which we will place today.       Follow up plan: Return in ~3 weeks with RHC and echocardiogram, to see Dr. Turcios.         Orders this Visit:  No orders of the defined types were placed in this encounter.    Orders Placed This Encounter   Medications     bumetanide (BUMEX) 2 MG tablet     Sig: Take 2 tablets (4 mg) by mouth 2 times daily     Dispense:  120 tablet     Refill:  4     Medications Discontinued During This Encounter   Medication Reason     bumetanide (BUMEX) 2 MG tablet          PAST MEDICAL HISTORY:  Past Medical History:   Diagnosis Date     Asthma      Breast cancer (H)      Hypertension      Mycobacterium avium complex (H)      Pulmonary embolism (H)       Pulmonary hypertension (H)      Rheumatoid arthritis (H)      Scoliosis          FAMILY HISTORY:  Family History   Problem Relation Age of Onset     Heart Failure Mother      Kidney Disease Mother      Coronary Artery Disease Maternal Grandfather 50     LUNG DISEASE No family hx of      Clotting Disorder No family hx of          SOCIAL HISTORY:  Social History     Tobacco Use     Smoking status: Never Smoker     Smokeless tobacco: Never Used   Substance Use Topics     Alcohol use: Yes     Comment: occasionally      Drug use: Never         CURRENT MEDICATIONS:  Current Outpatient Medications   Medication Sig Dispense Refill     acetaminophen (TYLENOL) 325 MG tablet Take 325 mg by mouth every 6 hours as needed for mild pain       albuterol (PROAIR HFA/PROVENTIL HFA/VENTOLIN HFA) 108 (90 Base) MCG/ACT inhaler Inhale 2 puffs into the lungs every 4 hours as needed for shortness of breath / dyspnea or wheezing        alendronate (FOSAMAX) 70 MG tablet Take 70 mg by mouth once a week       Ascorbic Acid (VITAMIN C) 500 MG CAPS Take 500 mg by mouth every morning        aspirin (ASA) 81 MG tablet Take 81 mg by mouth daily        beclomethasone HFA (QVAR REDIHALER) 80 MCG/ACT inhaler Inhale 2 puffs into the lungs 2 times daily       bumetanide (BUMEX) 2 MG tablet Take 2 tablets (4 mg) by mouth 2 times daily 120 tablet 4     calcium citrate-vitamin D (CALCIUM CITRATE + D3) 315-250 MG-UNIT TABS per tablet Take 2 tablets by mouth daily        cetirizine (ZYRTEC) 10 MG tablet Take 10 mg by mouth daily       digoxin (LANOXIN) 62.5 MCG tablet Take 1 tablet (62.5 mcg) by mouth daily 90 tablet 3     gabapentin (NEURONTIN) 300 MG capsule Take 300 mg by mouth 3 times daily Can take an additional 300mg at bedtime as needed       hydroxychloroquine (PLAQUENIL) 200 MG tablet Take 200 mg by mouth daily        ipratropium (ATROVENT) 0.06 % nasal spray Spray 2 sprays into both nostrils 3 times daily        loperamide (IMODIUM) 2 MG  capsule Take 1 capsule (2 mg) by mouth daily Can take an additional 2 mg in the evening PRN (Patient taking differently: Take 2 mg by mouth 2 times daily Can take an additional 2 mg in the evening PRN) 90 capsule 3     macitentan (OPSUMIT) 10 MG tablet Take 1 tablet (10 mg) by mouth every 48 hours 30 tablet 0     potassium chloride ER (KLOR-CON M) 20 MEQ CR tablet Take 2 tablets (40 mEq) by mouth 2 times daily 120 tablet 3     Probiotic Product (PROBIOTIC ACIDOPHILUS BIOBEADS) CAPS Take 1 capsule by mouth 2 times daily        riociguat (ADEMPAS) 1.5 MG tablet Take by mouth 3 times daily       sodium chloride (OCEAN) 0.65 % nasal spray Spray 1 spray into both nostrils daily as needed for congestion       Treprostinil (REMODULIN IJ) Dose: 30 ng/kg/min  Vial concentration: 1 mg/mL  Final concentration: 60,000 ng/mL  Dosing weight: 56.3 kg   Pump rate: 41 mL/day  Per patient and [facility] on 12/23/2020       warfarin ANTICOAGULANT (COUMADIN) 3 MG tablet Take 3 mg PO on Mon, Wed, Fri and Sat, and 4.5 mg all other days of the week 170 tablet 3         ROS:   10 point ROS of systems including Constitutional, Eyes, Respiratory, Cardiovascular, Gastroenterology, Genitourinary, Integumentary, Muscularskeletal, Psychiatric were  negative except for pertinent positives noted in my HPI.      EXAM:  GEN:  In general, this is a somewhat frail appearing elderly  female, in no acute distress on room air.  Patient in a wheelchair, accompanied by her .  HEENT:  Pupils grossly equal, sclerae nonicteric.   NECK: Supple, trachea midline. Mild JVD while upright in wheelchair.   CHEST: [place] site clean and dry.  C/V:  Regular rate and rhythm, no murmur, rub or gallop. Accentuated P2 but no S3 or RV heave.   RESP: Respirations are unlabored. No use of accessory muscles. Clear to auscultation bilaterally without wheezing, rales, or rhonchi.  GI: Abdomen soft, nontender, nondistended.   EXTREM: Her legs are tightly wrapped  today so difficult to assess edema currently. They are somewhat tender to palpation. No cyanosis or clubbing.  NEURO: Alert and oriented, cooperative. Gait not formally assessed. No obvious focal deficits.       Weight  Wt Readings from Last 10 Encounters:   01/28/21 51.7 kg (114 lb)   01/21/21 50.1 kg (110 lb 8 oz)   01/12/21 49.9 kg (110 lb)   11/09/20 53.3 kg (117 lb 8 oz)   10/15/20 52.2 kg (115 lb)   10/04/20 52.2 kg (115 lb)   03/20/20 53.8 kg (118 lb 11.2 oz)   03/02/20 57.6 kg (126 lb 14.4 oz)         Labs:    CBC RESULTS:  Lab Results   Component Value Date    WBC 4.7 01/21/2021    RBC 3.22 (L) 01/21/2021    HGB 8.8 (L) 01/21/2021    HCT 28.5 (L) 01/21/2021    MCV 89 01/21/2021    MCH 27.3 01/21/2021    MCHC 30.9 (L) 01/21/2021    RDW 17.7 (H) 01/21/2021     01/21/2021       CMP RESULTS:  Lab Results   Component Value Date     01/28/2021    POTASSIUM 4.3 01/28/2021    CHLORIDE 101 01/28/2021    CO2 30 01/28/2021    ANIONGAP 5 01/28/2021     (H) 01/28/2021    BUN 44 (H) 01/28/2021    CR 1.56 (H) 01/28/2021    GFRESTIMATED 31 (L) 01/28/2021    GFRESTBLACK 36 (L) 01/28/2021    ESTEFANÍA 10.2 (H) 01/28/2021    BILITOTAL 0.5 01/21/2021    ALBUMIN 3.3 (L) 01/21/2021    ALKPHOS 158 (H) 01/21/2021    ALT 20 01/21/2021    AST 25 01/21/2021      Results for ELAINA SUTTON (MRN [id number]) as of 1/28/2021 17:52   Ref. Range 12/21/2020 08:56 1/21/2021 11:14 1/28/2021 16:14   N-Terminal Pro Bnp Latest Ref Range: 0 - 450 pg/mL 8,941 (H) 10,275 (H) 5,679 (H)         Most recent testing:      Echocardiogram  1/8/2021     Interpretation Summary  Global and regional left ventricular function is hyperkinetic with an EF of  65-70%. Flattened septum is consistent with right ventricular pressure  overload.  Severe right ventricular dilation is present.  Severe right atrial enlargement is present.  Global right ventricular function is moderately reduced.  Small circumferential pericardial effusion is present  without any hemodynamic  significance.  Dilation of the inferior vena cava is present with abnormal respiratory  variation in diameter.  IVC diameter >2.1 cm collapsing <50% with sniff suggests a high RA pressure  estimated at 15 mmHg or greater.  Right ventricular systolic pressure is 37mmHg above the right atrial pressure.     This study was compared with the study from 9/29/2020 .Estimated RVSP is lower  in this study otherwise no change.    [facility]  1/12/2021  Hemodynamics RA 8/15/7  /7  /20/50  PCWP 10/11/8  LUIZ 2.8/2  TD 5.2/3.7  PVR 8.1 Right sided filling pressures are mildly elevated. Left sided filling pressures are normal. Severely elevated pulmonary artery hypertension. Normal cardiac output level. Hemodynamic data has been modified in Epic per physician review.         NYHA Functional Class:  Functional class 3B    1--No limitation of activity  2--Slight limitation, ordinary activities may cause symptoms  3--Marked limitation, less than ordinary activities cause symptoms  4--Severe limitation, minimal activity causes symptoms, or symptoms at rest    A total of 55 minutes was spent performing chart and history review, gathering HPI, physical exam, documentation, and care coordination.      Shawna Marcum PA-C  [facility]  Pager [phone]        Please do not hesitate to contact me if you have any questions/concerns.     Sincerely,     MAGEN Saeed

## 2021-01-28 NOTE — PROGRESS NOTES
ANTICOAGULATION FOLLOW-UP CLINIC VISIT    Patient Name:  Karen Anderson  Date:  2021  Contact Type:  Telephone    SUBJECTIVE:         OBJECTIVE    Recent labs: (last 7 days)     21  1614   INR 2.24*       ASSESSMENT / PLAN  No question data found.  Anticoagulation Summary  As of 2021    INR goal:  2.0-2.5   TTR:  79.1 % (8.5 mo)   INR used for dosin.24 (2021)   Warfarin maintenance plan:  3 mg (3 mg x 1) every Mon, Wed, Fri; 4.5 mg (3 mg x 1.5) all other days   Full warfarin instructions:  3 mg every Mon, Wed, Fri; 4.5 mg all other days   Weekly warfarin total:  27 mg   No change documented:  Brittany Kirk RN   Plan last modified:  Brittany Kirk RN (2021)   Next INR check:  2021   Priority:  Critical   Target end date:  Indefinite    Indications    Pulmonary hypertension (H) [I27.20]  CTEPH (chronic thromboembolic pulmonary hypertension) (H) [I27.24]  Long term current use of anticoagulant therapy [Z79.01]             Anticoagulation Episode Summary     INR check location:      Preferred lab:      Send INR reminders to:  [facility]    Comments:  call home first, OK to leave message on either phone. OK to speak with spouse, Don.  [facility] SPEAK IN TABLETS (Has 3mg Tabs) 10/23/20:  new goal range is 2 - 2.5  [facility] in [place] and will need to fax orders P [phone] F [phone]      Anticoagulation Care Providers     Provider Role Specialty Phone number    Osiris Luong MD Referring Cardiovascular Disease [phone]    Karolina Turcios MD Referring Cardiovascular Disease [phone]            See the Encounter Report to view Anticoagulation Flowsheet and Dosing Calendar (Go to Encounters tab in chart review, and find the Anticoagulation Therapy Visit)    Left message for patient with results and dosing recommendations. Asked patient to call back to report any missed doses, falls, signs and symptoms of bleeding or clotting, any  changes in health, medication, or diet. Asked patient to call back with any questions or concerns.      Brittany Kirk RN

## 2021-01-29 ENCOUNTER — TELEPHONE (OUTPATIENT)
Dept: CARDIOLOGY | Facility: CLINIC | Age: 81
End: 2021-01-29

## 2021-01-29 DIAGNOSIS — R06.02 SOB (SHORTNESS OF BREATH): ICD-10-CM

## 2021-01-29 DIAGNOSIS — I27.20 PULMONARY HYPERTENSION (H): Primary | ICD-10-CM

## 2021-01-29 NOTE — TELEPHONE ENCOUNTER
"Called patient and advised that Dr. Oconnell is located outside of the [facility] system at [facility]. Advised that I could provide them the scheduling line and they can make an appt/request records be sent to their office via Medical Records. Don and the patient agreed they do not want to go outside of [facility] and requested a referral be placed internally. Referral placed and provided the scheduling number in case they had not heard from their office in 2 business days.     Patient also stated she had a bad experience yesterday with the lab, delays in getting blood drawn and checking in, as well as  services after the appt. Patient stated she had a very long wait time in the lab and was \"scrambling\" to make it to the clinic appt on time with Shawna. Sincerily apologized for the inconvenience and asked if placing lab appts 45 min prior to visits would help. Patient agreed; note made in patient's chart asking for labs to be schedule 45 minutes prior to clinic appts.    Started to review pre-procedure instructions with patient for upcoming RHC; informed patient will need to be bridged with Lovenox prior to procedure. Patient states she has never done this before and does not remember having Lovenox shots prior to her BPA while inpatient. Advised I would follow up with Dr. Turcios on Monday while in clinic and would call the patient back with an update. Patient verbalized understanding and did not have any further questions at this time. Kezia Arnett RN on 1/29/2021 at 2:48 PM    ----- Message from Lexus Balbuena sent at 1/29/2021  2:03 PM CST -----  Regarding: RE: visit update  Krissy Rojo and thank you. I got it scheduled and pt is set to go on 2/17 then.    Also - if you havent already, when I spoke to Santana, he stated that Karen was waiting to hear from you about Dr. Matthews and what they are to do. Have you done that? If you did, you can ignore my message.     Thank you,  -Lexus  ----- Message " "-----  From: Kezia Arnett RN  Sent: 1/29/2021   1:03 PM CST  To: Lexus Balbuena  Subject: RE: visit update                                 No worries, I just placed another one. Thank you for your help.  ----- Message -----  From: Lexus Balbuena  Sent: 1/29/2021   1:01 PM CST  To: Kezia Arnett RN  Subject: RE: visit update                                 Hi Elan,    I see the labs, but I'm still not able to see the echo order :(    -Lexus   ----- Message -----  From: Kezia Arnett RN  Sent: 1/29/2021  12:20 PM CST  To: Leuxs Balbuena  Subject: RE: visit update                                 I see orders for both echo and labs under the \"order review\" tab. I know I put in lab orders this AM. Let me know if you still can't see them, I can put in another set.    Thanks,  Elan  ----- Message -----  From: Lexus Balbuena  Sent: 1/29/2021  11:58 AM CST  To: Kezia Arnett RN  Subject: RE: visit update                                 Hi Elan,    Scheduled for 2/17, but I need orders for the echo and labs.    Thank you,  Prabha  ----- Message -----  From: Kezia Arnett RN  Sent: 1/29/2021   8:00 AM CST  To: Kezia Ca RN  Subject: FW: visit update                                 Good morning Lexus,    Can you help get her scheduled and let me know when she's in the books, I'm going to have to go over pre-procedure instructions with her.     Thank you!Elan  ----- Message -----  From: Shawna Marcum PA  Sent: 1/28/2021   5:36 PM CST  To: Cardiology  Nurse-  Subject: visit update                                     Lots of issues today.  Ultimately, I went back up on her bumex to 4mg BID, Dr. Turcios aware.  We need to send a GI referral for diarrhea. They requested Dr. Matthews ([place]?) who the  thought was possibly [facility]. You may need to call them to clarify exactly where they want it sent.    She then needs a RHC/echo/labs in ~3 weeks with Dr" Karolina.    Pelon luz

## 2021-02-01 ENCOUNTER — TELEPHONE (OUTPATIENT)
Dept: CARDIOLOGY | Facility: CLINIC | Age: 81
End: 2021-02-01

## 2021-02-01 NOTE — TELEPHONE ENCOUNTER
LM with patient, advising Dr. Turcios does not want her to bridge prior to her RHC. Stated I will send pre-procedure instructions tomorrow via [facility] and to call me if she has any questions. Kezia Arnett RN on 2/1/2021 at 3:57 PM    ----- Message from Karolina Turcios MD sent at 2/1/2021  2:43 PM CST -----  Regarding: RE: visit update  I would just hold the coumadin for 3 days prior to the procedure and restart coumadin. No need for lovenox bridging.     TT  ----- Message -----  From: Kezia Arnett RN  Sent: 2/1/2021   2:38 PM CST  To: Karolina Turcios MD  Subject: FW: visit update                                 Quick question - does the patient need to be bridged with Lovenox prior to her RHC? When I brought this up to her last week, she stated that she does not remember needing Lovenox injections while inpatient with her BPA.     Elan  ----- Message -----  From: Lexus Balbuena  Sent: 1/29/2021   2:03 PM CST  To: Kezia Arnett RN  Subject: RE: visit update                                 Leo Ansari,    Krissy and thank you. I got it scheduled and pt is set to go on 2/17 then.    Also - if you havent already, when I spoke to Santana, he stated that Karen was waiting to hear from you about Dr. Matthews and what they are to do. Have you done that? If you did, you can ignore my message.     Thank you,  -Lexus  ----- Message -----  From: Kezia Arnett RN  Sent: 1/29/2021   1:03 PM CST  To: Lexus Balbuena  Subject: RE: visit update                                 No worries, I just placed another one. Thank you for your help.  ----- Message -----  From: Lexus Balbuena  Sent: 1/29/2021   1:01 PM CST  To: Kezia Arnett RN  Subject: RE: visit update                                 Leo Ansari,    I see the labs, but I'm still not able to see the echo order :(    -Lexus   ----- Message -----  From: Kezia Arnett RN  Sent: 1/29/2021  12:20 PM CST  To: Lexus Balbuena  Subject: RE: visit update                    "              I see orders for both echo and labs under the \"order review\" tab. I know I put in lab orders this AM. Let me know if you still can't see them, I can put in another set.    Thanks,  Elan  ----- Message -----  From: Lexus Balbuena  Sent: 1/29/2021  11:58 AM CST  To: Kezia Arnett, PRIYA  Subject: RE: visit update                                 Hi Elan,    Scheduled for 2/17, but I need orders for the echo and labs.    Thank you,  -Lexus  ----- Message -----  From: Kezia Arnett RN  Sent: 1/29/2021   8:00 AM CST  To: Kezia Ca RN  Subject: FW: visit update                                 Good morning Lexus,    Can you help get her scheduled and let me know when she's in the books, I'm going to have to go over pre-procedure instructions with her.     Thank you!Elan  ----- Message -----  From: Shawna Marcum PA  Sent: 1/28/2021   5:36 PM CST  To: Cardiology Ph Nurse-  Subject: visit update                                     Lots of issues today.  Ultimately, I went back up on her bumex to 4mg BID, Dr. Turcios aware.  We need to send a GI referral for diarrhea. They requested Dr. Matthews ([place]?) who the  thought was possibly [facility]. You may need to call them to clarify exactly where they want it sent.    She then needs a RHC/echo/labs in ~3 weeks with Dr Turcios.    Pelon luz                      "

## 2021-02-02 ENCOUNTER — TELEPHONE (OUTPATIENT)
Dept: GASTROENTEROLOGY | Facility: CLINIC | Age: 81
End: 2021-02-02

## 2021-02-02 DIAGNOSIS — Z11.59 ENCOUNTER FOR SCREENING FOR OTHER VIRAL DISEASES: Primary | ICD-10-CM

## 2021-02-02 NOTE — TELEPHONE ENCOUNTER
Health Call Center    Phone Message    May a detailed message be left on voicemail: yes     Reason for Call: Appointment Intake    Referring Provider Name: Shawna Marcum PA in  [facility]  Diagnosis and/or Symptoms: Diarrhea    Spoke with patient and offered next available appointment, patient declined. Patient has an upcoming procedure with Cardiology on 2/17 and would like to have GI consult prior to this as she is very concerned. Referral/recs are in system for review. Patient would like to speak with a nurse to discuss. Thanks!    Action Taken: Message routed to:  [facility] ([facility] Gastroenterology    Travel Screening: Not Applicable

## 2021-02-03 NOTE — TELEPHONE ENCOUNTER
REFERRAL INFORMATION:    Referring Provider:  MAGEN Saeed    Referring Clinic:  [facility]    Reason for Visit/Diagnosis: Diarrhea vs. Constipation      FUTURE VISIT INFORMATION:    Appointment Date: 2/5/2021    Appointment Time: 3 PM      NOTES STATUS DETAILS   OFFICE NOTE from Referring Provider Internal 1/28/2021 Office visit with MAGEN Saeed     OFFICE NOTE from Other Specialist Internal 12/14/2020 Office visit with Dr. Meño Turcios ([facility])    HOSPITAL DISCHARGE SUMMARY/  ED VISITS N/A    OPERATIVE REPORT N/A    MEDICATION LIST Internal         ENDOSCOPY  N/A    COLONOSCOPY N/A    ERCP N/A    EUS N/A    STOOL TESTING Internal 12/14/2020   PERTINENT LABS Internal    PATHOLOGY REPORTS (RELATED) N/A    IMAGING (CT, MRI, EGD, MRCP, Small Bowel Follow Through/SBT, MR/CT Enterography) N/A

## 2021-02-03 NOTE — TELEPHONE ENCOUNTER
Contacted patient to discuss earlier appointment.   Patient  states she is having diarrhea and constipation  Having heart cath February 17   Scheduled with Ms. Bar  Patient asked for recommendations about medications  Informed patient that could not advise until seen by GI

## 2021-02-05 ENCOUNTER — PRE VISIT (OUTPATIENT)
Dept: GASTROENTEROLOGY | Facility: CLINIC | Age: 81
End: 2021-02-05

## 2021-02-05 ENCOUNTER — VIRTUAL VISIT (OUTPATIENT)
Dept: GASTROENTEROLOGY | Facility: CLINIC | Age: 81
End: 2021-02-05
Payer: COMMERCIAL

## 2021-02-05 VITALS — HEIGHT: 59 IN | BODY MASS INDEX: 23.18 KG/M2 | WEIGHT: 115 LBS

## 2021-02-05 DIAGNOSIS — R19.4 CHANGE IN STOOL HABITS: Primary | ICD-10-CM

## 2021-02-05 DIAGNOSIS — R15.9 INCONTINENCE OF FECES, UNSPECIFIED FECAL INCONTINENCE TYPE: ICD-10-CM

## 2021-02-05 PROCEDURE — 99443 PR PHYSICIAN TELEPHONE EVALUATION 21-30 MIN: CPT | Mod: 95 | Performed by: PHYSICIAN ASSISTANT

## 2021-02-05 ASSESSMENT — MIFFLIN-ST. JEOR: SCORE: 907.27

## 2021-02-05 NOTE — NURSING NOTE
"Chief Complaint   Patient presents with     New Patient     new consult diarrhea       Vitals:    02/05/21 1433   Weight: 52.2 kg (115 lb)   Height: 1.499 m (4' 11\")       Body mass index is 23.23 kg/m .    Trixie Marie CMA    "

## 2021-02-05 NOTE — PATIENT INSTRUCTIONS
It was a pleasure taking care of you today.  I've included a brief summary of our discussion and care plan from today's visit below.  Please review this information with your primary care provider.  ______________________________________________________________________    My recommendations are summarized as follows:    --Stop taking your probiotic  --Stop taking Imodium  --Start fiber supplementation on a daily basis with Metamucil.  Start with one half a teaspoon a day for a week.  Then increase up to 1 tablespoon a day.  If you continue to have variable stool consistency, you can increase up to 3 tablespoons a day.  --Meet with our GI pharmacist  --Track your symptoms.  Write down on a blank calendar how many bowel movements you have a day and what the consistency is like    Return to GI Clinic in 1 month to review your progress.    ______________________________________________________________________    How do I schedule labs, imaging studies, or procedures that were ordered in clinic today?   Labs: To schedule lab appointment at the [facility], use my chart or call [phone]. If you have a [facility] lab closer to home where you are regularly seen you can give them a call.     Procedures: If a colonoscopy, upper endoscopy, breath test, esophageal manometry, or pH impedence was ordered today, our endoscopy team will call you to schedule this. If you have not heard from our endoscopy team within a week, please call [phone] to schedule.     Imaging Studies: If you were scheduled for a CT scan, X-ray, MRI, ultrasound, HIDA scan or other imaging study, please call [phone] to have this scheduled.     Referral: If a referral to another specialty was ordered, expect a phone call or follow instructions above. If you have not heard from anyone regarding your referral in a week, please call our clinic to check the status.     Who do I call with any questions after my visit?  Please be in touch  if there are any further questions that arise following today's visit.  There are multiple ways to contact your gastroenterology care team.        During business hours, you may reach a Gastroenterology nurse at [phone]      To schedule or reschedule an appointment, please call [phone].       You can always send a secure message through [employer].  [employer] messages are answered by your nurse or doctor typically within 24 hours.  Please allow extra time on weekends and holidays.        For urgent/emergent questions after business hours, you may reach the on-call GI Fellow by contacting the [facility]  at [phone].     How will I get the results of any tests ordered?    You will receive all of your results.  If you have signed up for [employer], any tests ordered at your visit will be available to you after your physician reviews them.  Typically this takes 1-2 weeks.  If there are urgent results that require a change in your care plan, your physician or nurse will call you to discuss the next steps.      What is [employer]?  [employer] is a secure way for you to access all of your healthcare records from the [facility].  It is a web based computer program, so you can sign on to it from any location.  It also allows you to send secure messages to your care team.  I recommend signing up for [employer] access if you have not already done so and are comfortable with using a computer.      How to I schedule a follow-up visit?  If you did not schedule a follow-up visit today, please call [phone] to schedule a follow-up office visit.      Sincerely,    Joselin Dinero PA-C  Division of Gastroenterology, Hepatology & Nutrition  [facility]

## 2021-02-05 NOTE — PROGRESS NOTES
"Karen Anderson is a 80 year old female who is being evaluated via a billable telephone visit.      Please call patient at phone number:  [phone]    The patient has been notified of following:     \"This video visit will be conducted via a call between you and your physician/provider. We have found that certain health care needs can be provided without the need for an in-person physical exam.  This service lets us provide the care you need with a video conversation.  If a prescription is necessary we can send it directly to your pharmacy.  If lab work is needed we can place an order for that and you can then stop by our lab to have the test done at a later time.    If during the course of the call the physician/provider feels a video visit is not appropriate, you will not be charged for this service.\"     Patient confirmed that they are in [place] for today's visit yes.    telephone-Visit Details  Type of service:  telephone  Length of visit: 1 hour             "

## 2021-02-05 NOTE — PROGRESS NOTES
GI CLINIC VISIT    CC/REFERRING MD:  No ref. provider found  REASON FOR CONSULTATION: change in stool consistency    ASSESSMENT/PLAN:  Ms. Anderson is a pleasant 80 year old female with a past medical history of rheumatoid arthritis, HTN, scoliosis, asthma, pulmonary MAC, breast cancer s/p chemotherapy  and status post left mastectomy, PE and DVT, pulmonary emboli and DVT, CTEPH w/ severe RV dysfunction presenting for variable stool consistency/change in stool consistency.    1.  Change in stool consistency, fecal incontinence   Patient reports variable stool consistency starting in the spring of this year.  Symptoms may have initially started with addition of Remodulin though patient did not give a clear history today. TSH 1.31 3/2/2020. Calcium elevated to 10.2 1/28/2021. Remodulin does have listed side effect of diarrhea. Adempas carries side effect of constipation and diarrhea.  Of note, when she was recently hospitalized, she did have a significant stool burden and constipation.  Differential for symptoms includes medication side effect, constipation with overflow diarrhea, IBS-M, pelvic floor dysfunction (states that he felt heaviness in her rectum).  Is not describing any watery stools, less likely to be infectious.  Has been on alternating Imodium with stool softeners with continued symptoms.  Would benefit from starting fiber supplementation which may help regulate stool consistency.  We discussed that she could stop her probiotic, Imodium, and laxatives.  Would also recommend that she avoids artificial sugar, and do a trial of a lactose-free diet.  Would benefit from meeting with our GI pharmacist to discuss which medications may be contributing to diarrhea, and if there are any alternatives and/or ways to administer the medications that might help.    Has had colonoscopy in 2017.  Will obtain these records.  Future considerations include repeat colonoscopy with biopsies for microscopic colitis.  May also  benefit from pelvic floor evaluation though patient wishes to avoid additional visits in person at this time in the setting of Covid.  --Stop taking your probiotic  --Stop taking Imodium  --Start fiber supplementation on a daily basis with Metamucil.  Start with one half a teaspoon a day for a week.  Then increase up to 1 tablespoon a day.  If you continue to have variable stool consistency, you can increase up to 3 tablespoons a day.  --Meet with our GI pharmacist  --Track your symptoms.  Write down on a blank calendar how many bowel movements you have a day and what the consistency is like    RTC 4-6 weeks     Thank you for this consultation.  It was a pleasure to participate in the care of this patient; please contact us with any further questions.      Joselin Dinero PA-C  Division of Gastroenterology, Hepatology & Nutrition  [facility]        HPI  Ms. Anderson is a pleasant 80 year old female with a past medical history of rheumatoid arthritis, HTN, scoliosis, asthma, pulmonary MAC, breast cancer s/p chemotherapy  and status post left mastectomy, PE and DVT, pulmonary emboli and DVT, CTEPH w/ severe RV dysfunction presenting for variable stool consistency. She is new to [facility] and this is my first encounter with the patient. Patient's  present for telephone visit today.     Patient reports that she had soft involuntary bowel movements since starting remodulin in March. She has been on imodium, senna, stool softeners (colace).    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 During this visit, she had concern for fecal impaction with moderate stool burden (x-ray 1/2/2021) with bloated abdomen- was treated with magnesium and enema (improvement in stool burden on x-ray 1/9/2021).     Today she has had several bowel movements. Is unable to quantify how many times. She has incontinence frequently. Describes that stools are  "quarter sized semi-soft \"balls\" which are not hard.  Mucus can come out, sometimes with gas. Does not feel empty. Sometimes describes that she cannot get all the urine out. Is having small amounts of blood with hemorrhoids. Not recently skipping days without bowel movements. Does not have any nausea, vomiting. No abdominal pain. Feels like her whole general \"area is coming out\" - can have some light pain with this (rectal pain).     PROBLEM LIST  Patient Active Problem List    Diagnosis Date Noted     Long term current use of anticoagulant therapy 10/23/2020     Priority: Medium     RVF (right ventricular failure) (H) 09/29/2020     Priority: Medium     CTEPH (chronic thromboembolic pulmonary hypertension) (H) 09/29/2020     Priority: Medium     Added automatically from request for surgery [id number]       Localized edema 03/02/2020     Priority: Medium     Added automatically from request for surgery [id number]       SOB (shortness of breath) 03/02/2020     Priority: Medium     Added automatically from request for surgery [id number]       Iron deficiency 03/02/2020     Priority: Medium     Added automatically from request for surgery [id number]       Dyslipidemia 03/02/2020     Priority: Medium     Added automatically from request for surgery [id number]       Pulmonary hypertension (H) 03/02/2020     Priority: Medium     Added automatically from request for surgery [id number]       Need for hepatitis B screening test 03/02/2020     Priority: Medium     Added automatically from request for surgery [id number]         PERTINENT PAST MEDICAL HISTORY:  Past Medical History:   Diagnosis Date     Asthma      Breast cancer (H)      Hypertension      Mycobacterium avium complex (H)      Pulmonary embolism (H)      Pulmonary hypertension (H)      Rheumatoid arthritis (H)      Scoliosis        PREVIOUS SURGERIES:  S/p hysterectomy   Past Surgical History:   Procedure Laterality Date     CV BALLOON PULMONARY ANGIOPLASTY N/A 1/12/2021    Procedure: " CV BALLOON PULMONARY ANGIOPLASTY;  Surgeon: Amos Castañeda MD;  Location:  [facility]     CV PULMONARY ANGIOGRAM N/A 10/5/2020    Procedure: Pulmonary Angiogram;  Surgeon: Lorenzo Sullivan MD;  Location:  [facility]     CV PULMONARY ANGIOGRAM N/A 1/12/2021    Procedure: CV PULMONARY ANGIOGRAM;  Surgeon: Amos Castañeda MD;  Location:  [facility]     CV RIGHT HEART CATH MEASUREMENTS RECORDED N/A 03/04/2020    Procedure: CV RIGHT HEART CATH;  Surgeon: Karolina Turcios MD;  Location:  [facility]     CV RIGHT HEART CATH MEASUREMENTS RECORDED N/A 10/5/2020    Procedure: Right Heart Cath; balloon pulmonary angiogram;  Surgeon: Lorenzo Sullivan MD;  Location:  [facility]     CV RIGHT HEART CATH MEASUREMENTS RECORDED N/A 12/30/2020    Procedure: Right Heart Cath;  Surgeon: Karolina Turcios MD;  Location:  [facility]     CV RIGHT HEART CATH MEASUREMENTS RECORDED N/A 1/12/2021    Procedure: CV RIGHT HEART CATH;  Surgeon: Amos Castañeda MD;  Location:  HE[facility]ATH LAB     IR CVC TUNNEL PLACEMENT > 5 YRS OF AGE  03/09/2020     MASTECTOMY Left      PICC SINGLE LUMEN PLACEMENT Right 03/04/2020    4Fr - 41cm, Medial brachial vein. Left mastectomy     PICC TRIPLE LUMEN PLACEMENT Right 10/02/2020    5Fr - 43cm (7cm external), Basilic vein, high RA       PREVIOUS ENDOSCOPY:  Colonoscopy 2017- [facility] w/ Dr. Krishnan     ALLERGIES:     Allergies   Allergen Reactions     Sulfa Drugs Anaphylaxis and Hives       PERTINENT MEDICATIONS:    Current Outpatient Medications:      acetaminophen (TYLENOL) 325 MG tablet, Take 325 mg by mouth every 6 hours as needed for mild pain, Disp: , Rfl:      albuterol (PROAIR HFA/PROVENTIL HFA/VENTOLIN HFA) 108 (90 Base) MCG/ACT inhaler, Inhale 2 puffs into the lungs every 4 hours as needed for shortness of breath / dyspnea or wheezing , Disp: , Rfl:      alendronate  (FOSAMAX) 70 MG tablet, Take 70 mg by mouth once a week, Disp: , Rfl:      Ascorbic Acid (VITAMIN C) 500 MG CAPS, Take 500 mg by mouth every morning , Disp: , Rfl:      aspirin (ASA) 81 MG tablet, Take 81 mg by mouth daily , Disp: , Rfl:      beclomethasone HFA (QVAR REDIHALER) 80 MCG/ACT inhaler, Inhale 2 puffs into the lungs 2 times daily, Disp: , Rfl:      bumetanide (BUMEX) 2 MG tablet, Take 2 tablets (4 mg) by mouth 2 times daily, Disp: 120 tablet, Rfl: 4     calcium citrate-vitamin D (CALCIUM CITRATE + D3) 315-250 MG-UNIT TABS per tablet, Take 2 tablets by mouth daily , Disp: , Rfl:      cetirizine (ZYRTEC) 10 MG tablet, Take 10 mg by mouth daily, Disp: , Rfl:      digoxin (LANOXIN) 62.5 MCG tablet, Take 1 tablet (62.5 mcg) by mouth daily, Disp: 90 tablet, Rfl: 3     gabapentin (NEURONTIN) 300 MG capsule, Take 300 mg by mouth 3 times daily Can take an additional 300mg at bedtime as needed, Disp: , Rfl:      hydroxychloroquine (PLAQUENIL) 200 MG tablet, Take 200 mg by mouth daily , Disp: , Rfl:      ipratropium (ATROVENT) 0.06 % nasal spray, Spray 2 sprays into both nostrils 3 times daily , Disp: , Rfl:      loperamide (IMODIUM) 2 MG capsule, Take 1 capsule (2 mg) by mouth daily Can take an additional 2 mg in the evening PRN (Patient taking differently: Take 2 mg by mouth 2 times daily Can take an additional 2 mg in the evening PRN), Disp: 90 capsule, Rfl: 3     macitentan (OPSUMIT) 10 MG tablet, Take 1 tablet (10 mg) by mouth every 48 hours, Disp: 30 tablet, Rfl: 0     potassium chloride ER (KLOR-CON M) 20 MEQ CR tablet, Take 2 tablets (40 mEq) by mouth 2 times daily, Disp: 120 tablet, Rfl: 3     Probiotic Product (PROBIOTIC ACIDOPHILUS BIOBEADS) CAPS, Take 1 capsule by mouth 2 times daily , Disp: , Rfl:      riociguat (ADEMPAS) 1.5 MG tablet, Take by mouth 3 times daily, Disp: , Rfl:      sodium chloride (OCEAN) 0.65 % nasal spray, Spray 1 spray into both nostrils daily as needed for congestion, Disp: , Rfl:       Treprostinil (REMODULIN IJ), Dose: 30 ng/kg/min Vial concentration: 1 mg/mL Final concentration: 60,000 ng/mL Dosing weight: 56.3 kg  Pump rate: 41 mL/day Per patient and [facility] on 12/23/2020, Disp: , Rfl:      warfarin ANTICOAGULANT (COUMADIN) 3 MG tablet, Take 3 mg PO on Mon, Wed, Fri and Sat, and 4.5 mg all other days of the week, Disp: 170 tablet, Rfl: 3    SOCIAL HISTORY:  Social History     Socioeconomic History     Marital status:      Spouse name: Not on file     Number of children: Not on file     Years of education: Not on file     Highest education level: Not on file   Occupational History     Not on file   Social Needs     Financial resource strain: Not on file     Food insecurity     Worry: Not on file     Inability: Not on file     Transportation needs     Medical: Not on file     Non-medical: Not on file   Tobacco Use     Smoking status: Never Smoker     Smokeless tobacco: Never Used   Substance and Sexual Activity     Alcohol use: Yes     Comment: occasionally      Drug use: Never     Sexual activity: Not on file   Lifestyle     Physical activity     Days per week: Not on file     Minutes per session: Not on file     Stress: Not on file   Relationships     Social connections     Talks on phone: Not on file     Gets together: Not on file     Attends Oriental orthodox service: Not on file     Active member of club or organization: Not on file     Attends meetings of clubs or organizations: Not on file     Relationship status: Not on file     Intimate partner violence     Fear of current or ex partner: Not on file     Emotionally abused: Not on file     Physically abused: Not on file     Forced sexual activity: Not on file   Other Topics Concern     Not on file   Social History Narrative    Is . Has 1 adult daughter who is in her 50s. Has 2 grandkids. 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       FAMILY HISTORY:  FH of CRC: none   FH of IBD: none   Family History    Problem Relation Age of Onset     Heart Failure Mother      Kidney Disease Mother      Coronary Artery Disease Maternal Grandfather 50     LUNG DISEASE No family hx of      Clotting Disorder No family hx of        Past/family/social history reviewed and no changes    PHYSICAL EXAMINATION:  No physical exam     PERTINENT STUDIES:    Orders Only on 01/28/2021   Component Date Value Ref Range Status     N-Terminal Pro Bnp 01/28/2021 5,679* 0 - 450 pg/mL Final     Sodium 01/28/2021 137  133 - 144 mmol/L Final     Potassium 01/28/2021 4.3  3.4 - 5.3 mmol/L Final     Chloride 01/28/2021 101  94 - 109 mmol/L Final     Carbon Dioxide 01/28/2021 30  20 - 32 mmol/L Final     Anion Gap 01/28/2021 5  3 - 14 mmol/L Final     Glucose 01/28/2021 128* 70 - 99 mg/dL Final     Urea Nitrogen 01/28/2021 44* 7 - 30 mg/dL Final     Creatinine 01/28/2021 1.56* 0.52 - 1.04 mg/dL Final     GFR Estimate 01/28/2021 31* >60 mL/min/[1.73_m2] Final     GFR Estimate If Black 01/28/2021 36* >60 mL/min/[1.73_m2] Final     Calcium 01/28/2021 10.2* 8.5 - 10.1 mg/dL Final     INR 01/28/2021 2.24* 0.86 - 1.14 Final

## 2021-02-05 NOTE — LETTER
2/5/2021       RE: Karen Anderson  [address]  [facility] [address]      Dear Colleague,    Thank you for referring your patient, Karen Anderson, to the [facility] [place]. Please see a copy of my visit note below.    GI CLINIC VISIT    CC/REFERRING MD:  No ref. provider found  REASON FOR CONSULTATION: change in stool consistency    ASSESSMENT/PLAN:  Ms. Anderson is a pleasant 80 year old female with a past medical history of rheumatoid arthritis, HTN, scoliosis, asthma, pulmonary MAC, breast cancer s/p chemotherapy  and status post left mastectomy, PE and DVT, pulmonary emboli and DVT, CTEPH w/ severe RV dysfunction presenting for variable stool consistency/change in stool consistency.    1.  Change in stool consistency, fecal incontinence   Patient reports variable stool consistency starting in the spring of this year.  Symptoms may have initially started with addition of Remodulin though patient did not give a clear history today. TSH 1.31 3/2/2020. Calcium elevated to 10.2 1/28/2021. Remodulin does have listed side effect of diarrhea. Adempas carries side effect of constipation and diarrhea.  Of note, when she was recently hospitalized, she did have a significant stool burden and constipation.  Differential for symptoms includes medication side effect, constipation with overflow diarrhea, IBS-M, pelvic floor dysfunction (states that he felt heaviness in her rectum).  Is not describing any watery stools, less likely to be infectious.  Has been on alternating Imodium with stool softeners with continued symptoms.  Would benefit from starting fiber supplementation which may help regulate stool consistency.  We discussed that she could stop her probiotic, Imodium, and laxatives.  Would also recommend that she avoids artificial sugar, and do a trial of a lactose-free diet.  Would benefit from meeting with our GI pharmacist to discuss which medications may be  contributing to diarrhea, and if there are any alternatives and/or ways to administer the medications that might help.    Has had colonoscopy in 2017.  Will obtain these records.  Future considerations include repeat colonoscopy with biopsies for microscopic colitis.  May also benefit from pelvic floor evaluation though patient wishes to avoid additional visits in person at this time in the setting of Covid.  --Stop taking your probiotic  --Stop taking Imodium  --Start fiber supplementation on a daily basis with Metamucil.  Start with one half a teaspoon a day for a week.  Then increase up to 1 tablespoon a day.  If you continue to have variable stool consistency, you can increase up to 3 tablespoons a day.  --Meet with our GI pharmacist  --Track your symptoms.  Write down on a blank calendar how many bowel movements you have a day and what the consistency is like    RTC 4-6 weeks     Thank you for this consultation.  It was a pleasure to participate in the care of this patient; please contact us with any further questions.    Joselin Dinero PA-C  Division of Gastroenterology, Hepatology & Nutrition  [facility]      HPI  Ms. nAderson is a pleasant 80 year old female with a past medical history of rheumatoid arthritis, HTN, scoliosis, asthma, pulmonary MAC, breast cancer s/p chemotherapy  and status post left mastectomy, PE and DVT, pulmonary emboli and DVT, CTEPH w/ severe RV dysfunction presenting for variable stool consistency. She is new to [facility] GI clinic and this is my first encounter with the patient. Patient's  present for telephone visit today.     Patient reports that she had soft involuntary bowel movements since starting remodulin in March. She has been on imodium, senna, stool softeners (colace).    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 During this visit, she had concern for fecal impaction with moderate stool  "burden (x-ray 1/2/2021) with bloated abdomen- was treated with magnesium and enema (improvement in stool burden on x-ray 1/9/2021).     Today she has had several bowel movements. Is unable to quantify how many times. She has incontinence frequently. Describes that stools are quarter sized semi-soft \"balls\" which are not hard.  Mucus can come out, sometimes with gas. Does not feel empty. Sometimes describes that she cannot get all the urine out. Is having small amounts of blood with hemorrhoids. Not recently skipping days without bowel movements. Does not have any nausea, vomiting. No abdominal pain. Feels like her whole general \"area is coming out\" - can have some light pain with this (rectal pain).     PROBLEM LIST  Patient Active Problem List    Diagnosis Date Noted     Long term current use of anticoagulant therapy 10/23/2020     Priority: Medium     RVF (right ventricular failure) (H) 09/29/2020     Priority: Medium     CTEPH (chronic thromboembolic pulmonary hypertension) (H) 09/29/2020     Priority: Medium     Added automatically from request for surgery [id number]       Localized edema 03/02/2020     Priority: Medium     Added automatically from request for surgery [id number]       SOB (shortness of breath) 03/02/2020     Priority: Medium     Added automatically from request for surgery [id number]       Iron deficiency 03/02/2020     Priority: Medium     Added automatically from request for surgery [id number]       Dyslipidemia 03/02/2020     Priority: Medium     Added automatically from request for surgery [id number]       Pulmonary hypertension (H) 03/02/2020     Priority: Medium     Added automatically from request for surgery [id number]       Need for hepatitis B screening test 03/02/2020     Priority: Medium     Added automatically from request for surgery [id number]         PERTINENT PAST MEDICAL HISTORY:  Past Medical History:   Diagnosis Date     Asthma      Breast cancer (H)      Hypertension      Mycobacterium avium " complex (H)      Pulmonary embolism (H)      Pulmonary hypertension (H)      Rheumatoid arthritis (H)      Scoliosis        PREVIOUS SURGERIES:  S/p hysterectomy   Past Surgical History:   Procedure Laterality Date     CV BALLOON PULMONARY ANGIOPLASTY N/A 1/12/2021    Procedure: CV BALLOON PULMONARY ANGIOPLASTY;  Surgeon: Amos Castañeda MD;  Location: [facility]     CV PULMONARY ANGIOGRAM N/A 10/5/2020    Procedure: Pulmonary Angiogram;  Surgeon: Lorenzo Sullivan MD;  Location: [facility]     CV PULMONARY ANGIOGRAM N/A 1/12/2021    Procedure: CV PULMONARY ANGIOGRAM;  Surgeon: Amos Castañeda MD;  Location: [facility]     CV RIGHT HEART CATH MEASUREMENTS RECORDED N/A 03/04/2020    Procedure: CV RIGHT HEART CATH;  Surgeon: Karolina Turcios MD;  Location: [facility]     CV RIGHT HEART CATH MEASUREMENTS RECORDED N/A 10/5/2020    Procedure: Right Heart Cath; balloon pulmonary angiogram;  Surgeon: Lorenzo Sullivan MD;  Location: [facility]     CV RIGHT HEART CATH MEASUREMENTS RECORDED N/A 12/30/2020    Procedure: Right Heart Cath;  Surgeon: Karolina Turcios MD;  Location: [facility]     CV RIGHT HEART CATH MEASUREMENTS RECORDED N/A 1/12/2021    Procedure: CV RIGHT HEART CATH;  Surgeon: Amos Castañeda MD;  [facility]     IR CVC TUNNEL PLACEMENT > 5 YRS OF AGE  03/09/2020     MASTECTOMY Left      PICC SINGLE LUMEN PLACEMENT Right 03/04/2020    4Fr - 41cm, Medial brachial vein. Left mastectomy     PICC TRIPLE LUMEN PLACEMENT Right 10/02/2020    5Fr - 43cm (7cm external), Basilic vein, high RA       PREVIOUS ENDOSCOPY:  Colonoscopy 2017- [facility] w/ Dr. Krishnan     ALLERGIES:     Allergies   Allergen Reactions     Sulfa Drugs Anaphylaxis and Hives       PERTINENT MEDICATIONS:    Current Outpatient Medications:      acetaminophen (TYLENOL) 325 MG tablet, Take 325 mg  by mouth every 6 hours as needed for mild pain, Disp: , Rfl:      albuterol (PROAIR HFA/PROVENTIL HFA/VENTOLIN HFA) 108 (90 Base) MCG/ACT inhaler, Inhale 2 puffs into the lungs every 4 hours as needed for shortness of breath / dyspnea or wheezing , Disp: , Rfl:      alendronate (FOSAMAX) 70 MG tablet, Take 70 mg by mouth once a week, Disp: , Rfl:      Ascorbic Acid (VITAMIN C) 500 MG CAPS, Take 500 mg by mouth every morning , Disp: , Rfl:      aspirin (ASA) 81 MG tablet, Take 81 mg by mouth daily , Disp: , Rfl:      beclomethasone HFA (QVAR REDIHALER) 80 MCG/ACT inhaler, Inhale 2 puffs into the lungs 2 times daily, Disp: , Rfl:      bumetanide (BUMEX) 2 MG tablet, Take 2 tablets (4 mg) by mouth 2 times daily, Disp: 120 tablet, Rfl: 4     calcium citrate-vitamin D (CALCIUM CITRATE + D3) 315-250 MG-UNIT TABS per tablet, Take 2 tablets by mouth daily , Disp: , Rfl:      cetirizine (ZYRTEC) 10 MG tablet, Take 10 mg by mouth daily, Disp: , Rfl:      digoxin (LANOXIN) 62.5 MCG tablet, Take 1 tablet (62.5 mcg) by mouth daily, Disp: 90 tablet, Rfl: 3     gabapentin (NEURONTIN) 300 MG capsule, Take 300 mg by mouth 3 times daily Can take an additional 300mg at bedtime as needed, Disp: , Rfl:      hydroxychloroquine (PLAQUENIL) 200 MG tablet, Take 200 mg by mouth daily , Disp: , Rfl:      ipratropium (ATROVENT) 0.06 % nasal spray, Spray 2 sprays into both nostrils 3 times daily , Disp: , Rfl:      loperamide (IMODIUM) 2 MG capsule, Take 1 capsule (2 mg) by mouth daily Can take an additional 2 mg in the evening PRN (Patient taking differently: Take 2 mg by mouth 2 times daily Can take an additional 2 mg in the evening PRN), Disp: 90 capsule, Rfl: 3     macitentan (OPSUMIT) 10 MG tablet, Take 1 tablet (10 mg) by mouth every 48 hours, Disp: 30 tablet, Rfl: 0     potassium chloride ER (KLOR-CON M) 20 MEQ CR tablet, Take 2 tablets (40 mEq) by mouth 2 times daily, Disp: 120 tablet, Rfl: 3     Probiotic Product (PROBIOTIC ACIDOPHILUS  BIOBEADS) CAPS, Take 1 capsule by mouth 2 times daily , Disp: , Rfl:      riociguat (ADEMPAS) 1.5 MG tablet, Take by mouth 3 times daily, Disp: , Rfl:      sodium chloride (OCEAN) 0.65 % nasal spray, Spray 1 spray into both nostrils daily as needed for congestion, Disp: , Rfl:      Treprostinil (REMODULIN IJ), Dose: 30 ng/kg/min Vial concentration: 1 mg/mL Final concentration: 60,000 ng/mL Dosing weight: 56.3 kg  Pump rate: 41 mL/day Per patient and [facility] on 12/23/2020, Disp: , Rfl:      warfarin ANTICOAGULANT (COUMADIN) 3 MG tablet, Take 3 mg PO on Mon, Wed, Fri and Sat, and 4.5 mg all other days of the week, Disp: 170 tablet, Rfl: 3    SOCIAL HISTORY:  Social History     Socioeconomic History     Marital status:      Spouse name: Not on file     Number of children: Not on file     Years of education: Not on file     Highest education level: Not on file   Occupational History     Not on file   Social Needs     Financial resource strain: Not on file     Food insecurity     Worry: Not on file     Inability: Not on file     Transportation needs     Medical: Not on file     Non-medical: Not on file   Tobacco Use     Smoking status: Never Smoker     Smokeless tobacco: Never Used   Substance and Sexual Activity     Alcohol use: Yes     Comment: occasionally      Drug use: Never     Sexual activity: Not on file   Lifestyle     Physical activity     Days per week: Not on file     Minutes per session: Not on file     Stress: Not on file   Relationships     Social connections     Talks on phone: Not on file     Gets together: Not on file     Attends Hoahaoism service: Not on file     Active member of club or organization: Not on file     Attends meetings of clubs or organizations: Not on file     Relationship status: Not on file     Intimate partner violence     Fear of current or ex partner: Not on file     Emotionally abused: Not on file     Physically abused: Not on file     Forced sexual activity: Not on file  "  Other Topics Concern     Not on file   Social History Narrative    Is . Has 1 adult daughter who is in her 50s. Has 2 grandkids. 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       FAMILY HISTORY:  FH of CRC: none   FH of IBD: none   Family History   Problem Relation Age of Onset     Heart Failure Mother      Kidney Disease Mother      Coronary Artery Disease Maternal Grandfather 50     LUNG DISEASE No family hx of      Clotting Disorder No family hx of        Past/family/social history reviewed and no changes    PHYSICAL EXAMINATION:  No physical exam     PERTINENT STUDIES:    Orders Only on 01/28/2021   Component Date Value Ref Range Status     N-Terminal Pro Bnp 01/28/2021 5,679* 0 - 450 pg/mL Final     Sodium 01/28/2021 137  133 - 144 mmol/L Final     Potassium 01/28/2021 4.3  3.4 - 5.3 mmol/L Final     Chloride 01/28/2021 101  94 - 109 mmol/L Final     Carbon Dioxide 01/28/2021 30  20 - 32 mmol/L Final     Anion Gap 01/28/2021 5  3 - 14 mmol/L Final     Glucose 01/28/2021 128* 70 - 99 mg/dL Final     Urea Nitrogen 01/28/2021 44* 7 - 30 mg/dL Final     Creatinine 01/28/2021 1.56* 0.52 - 1.04 mg/dL Final     GFR Estimate 01/28/2021 31* >60 mL/min/[1.73_m2] Final     GFR Estimate If Black 01/28/2021 36* >60 mL/min/[1.73_m2] Final     Calcium 01/28/2021 10.2* 8.5 - 10.1 mg/dL Final     INR 01/28/2021 2.24* 0.86 - 1.14 Final       Karen Anderson is a 80 year old female who is being evaluated via a billable telephone visit.      Please call patient at phone number:  [phone]    The patient has been notified of following:     \"This video visit will be conducted via a call between you and your physician/provider. We have found that certain health care needs can be provided without the need for an in-person physical exam.  This service lets us provide the care you need with a video conversation.  If a prescription is necessary we can send it directly to your pharmacy.  If lab work is " "needed we can place an order for that and you can then stop by our lab to have the test done at a later time.    If during the course of the call the physician/provider feels a video visit is not appropriate, you will not be charged for this service.\"     Patient confirmed that they are in [place] for today's visit yes.    telephone-Visit Details  Type of service:  telephone  Length of visit: 1 hour       Again, thank you for allowing me to participate in the care of your patient.      Sincerely,    Joselin Dinero PA-C    "

## 2021-02-10 ENCOUNTER — VIRTUAL VISIT (OUTPATIENT)
Dept: PHARMACY | Facility: CLINIC | Age: 81
End: 2021-02-10
Attending: PHYSICIAN ASSISTANT
Payer: COMMERCIAL

## 2021-02-10 DIAGNOSIS — R19.5 CHANGE IN CONSISTENCY OF STOOL: Primary | ICD-10-CM

## 2021-02-10 DIAGNOSIS — I27.20 PULMONARY HYPERTENSION (H): ICD-10-CM

## 2021-02-10 DIAGNOSIS — J45.909 UNCOMPLICATED ASTHMA, UNSPECIFIED ASTHMA SEVERITY, UNSPECIFIED WHETHER PERSISTENT: ICD-10-CM

## 2021-02-10 DIAGNOSIS — M06.9 RHEUMATOID ARTHRITIS, INVOLVING UNSPECIFIED SITE, UNSPECIFIED WHETHER RHEUMATOID FACTOR PRESENT (H): ICD-10-CM

## 2021-02-10 DIAGNOSIS — I50.810 RVF (RIGHT VENTRICULAR FAILURE) (H): ICD-10-CM

## 2021-02-10 DIAGNOSIS — M81.0 OSTEOPOROSIS, UNSPECIFIED OSTEOPOROSIS TYPE, UNSPECIFIED PATHOLOGICAL FRACTURE PRESENCE: ICD-10-CM

## 2021-02-10 DIAGNOSIS — Z78.9 TAKES DIETARY SUPPLEMENTS: ICD-10-CM

## 2021-02-10 DIAGNOSIS — R52 PAIN: ICD-10-CM

## 2021-02-10 DIAGNOSIS — J30.2 SEASONAL ALLERGIC RHINITIS, UNSPECIFIED TRIGGER: ICD-10-CM

## 2021-02-10 PROCEDURE — 99607 MTMS BY PHARM ADDL 15 MIN: CPT | Mod: TEL | Performed by: PHARMACIST

## 2021-02-10 PROCEDURE — 99605 MTMS BY PHARM NP 15 MIN: CPT | Mod: TEL | Performed by: PHARMACIST

## 2021-02-10 NOTE — PROGRESS NOTES
Medication Therapy Management (MTM) Encounter    ASSESSMENT:                            Medication Adherence/Access: No issues identified    Stool Consistency: Reviewed medications today that can have an impact on bowel habits. Agree that her symptoms appear more mixed at this time, though it appears she is leaning towards constipation with potential overflow diarrhea as described by Joselin. I agree that trying to improve consistency of stool will be helpful for Karen. We dicussed various types of bowel medications today including laxative varieties. Although I do not feel it is the major cause of her bowel symptoms, I encouraged her to split her calcium into twice daily as this may help somewhat with stool consistency and will certainly help maximize absorption for her bone health. We discussed importance of fluid with fiber to prevent back-up, and it is okay for her to do this within the confines of her fluid restriction.     We reviewed each of her medications in terms of potential impact on stools. Those most likely to have an impact include calcium (constipation), alendronate (constipation/diarrhea), potassium (diarrhea), adempas (diarrhea more common, but constipation is a potential) and treprostinil (diarrhea). Although I cannot diagnose, I am suspicious this may have to do with her pelvic floor given her description of symptoms and connection between bladder/bowel symptoms.      Pulmonary Hypertension/Acute on Chronic HF: Unchanged. Follows closely with cardiology. Encourage her to contact cardiology clinic as planned regarding fluid positive status today.    Asthma: Stable based on report.    Osteoporosis: Stable based on report.    Pain: Stable based on report.    RA: Stable based on report.    Allergic Rhinitis: Stable based on report. Okay to take saline spray to help with bloody noses if needed.    Supplements: Stable based on report.    PLAN:                            1. Karen to check-in with  "cardiology as planned regarding fluid status today.    2. Try splitting calcium into one tablet twice daily.    3. Continue with fiber as recommended by Joselin Dinero PA-C, try to take this with fluid (but can stay within fluid restriction).    Follow-up: as needed based on patient preference    SUBJECTIVE/OBJECTIVE:                          Karen Anderson is a 80 year old female called for an initial visit. She was referred to me from Joselin Dinero PA-C.       Reason for visit: requested by Joselin Dinero PA-C to review potential medications that could be causing change in stool consistency    Allergies/ADRs: Reviewed in chart  Tobacco: She reports that she has never smoked. She has never used smokeless tobacco.  Alcohol: not currently using  PCP: [facility]. Carter ([facility])    Medication Adherence/Access: no issues reported    Stool Consistency:   1/2 tablespoon fiber x 1 week, then increase to 1 tablespoon weekly   Loperamide (stopped for now)     Reports she was in the hospital for bowel blockage and said after that she hasn't stopped going. She was not having as much frequency in the hospital, but is now out of control. She reports having difficulty with urination. Reports \"jelly\" stool coming out frequently. Also states that she feels urine is \"holding back\" but then just a little comes out. She notes stool comes out when she stands or sits. At the same time, she is feeling more constipated. Denies overt diarrhea.    Reports she is on fluid restriction for 64 oz daily. Notes she still has loperamide, but she recalls Joselin telling her not to use this. She was recently hospitalized and there was concern for fecal impaction. She was given magnesium citrate and an enema with good response per chart notes. Imaging showed no stool burden after this.    She was recently seen by Joselin Dinero PA-C and office notes indicate the following plan:   \"--Stop taking your probiotic  --Stop taking " "Imodium  --Start fiber supplementation on a daily basis with Metamucil.  Start with one half a teaspoon a day for a week.  Then increase up to 1 tablespoon a day.  If you continue to have variable stool consistency, you can increase up to 3 tablespoons a day.  --Meet with our GI pharmacist  --Track your symptoms.  Write down on a blank calendar how many bowel movements you have a day and what the consistency is like     RTC 4-6 weeks\"    Pulmonary Hypertension/Acute on Chronic HF:   Aspirin 81 mg daily  Bumetanide 4 mg twice daily  Digoxin 62.5 mcg daily  macitentan 10 mg every 48 hours  Potassium chloride 40 mEq twice daily   treprostinil 30 ng/kg/min  Adempas 1.5 mg three times daily   Warfarin 3 mg on MWF and 4.5 mg ROW (update)    + 3 lbs today and plans to reach out to cardiology. They have adjusted her Opsumit which she does feel makes some impact on her stools, as does the Remodulin. She follows with the anticoagulation clinic for warfarin management. She denies concerns for bleed/clot at this time other than some more recent nose bleeds after getting out of the hospital, which does clot eventually. She notes bruising more easily, but also feels these do heal.    Goal INR 2-2.5     Lab Results   Component Value Date    INR 2.24 01/28/2021    INR 1.63 01/21/2021    INR 1.60 01/18/2021     Asthma:   Albuterol HFA  Beclomethasone 80 mcg/ACT 2 puffs twice daily    Not needing albuterol at this time. No reported concerns. She does rinse her mouth out after use of steroid inhaler.     Osteoporosis:   Alendronate 70 mg each week  Calcium + D two tablets daily    Denies concerns with administration or side effects.    Pain:   Gabapentin 300 mg three times daily + once in the evening if needed  Acetaminophen 325 mg every 6 hours as needed     No reported concerns or side effects.    RA:   Hydroxychloroquine 200 mg daily     Reports going to the ophthalmologist twice yearly, once for HCQ monitoring.She and her  " report October 22, 2020 was last appointment for hydroxychloroquine screening. No reported side effects or concerns.    Allergic Rhinitis:   Ipratropium 2 sprays three times daily as needed   Sodium chloride 0.65% in both nostril as needed   Cetirizine 10 mg daily    Not using this as much right now. She does note some blood noses recently and was wondering if it would be okay to use sodium chloride nasal spray, which would be fine.    Supplements:   Vitamin C 500 mg daily  Calcium + D daily    No reported concerns or side effects.  ----------------  Post Discharge Medication Reconciliation Status: discharge medications reconciled, continue medications without change.     I spent 64 minutes with this patient today (an extra 15 minutes was spent creating the Medication Action Plan). I offer these suggestions for consideration by her care team. A copy of the visit note was provided to the patient's referring provider.    The patient was sent via [email or web address] a summary of these recommendations.     Venice Ribeiro PharmD, BCACP  MTM Pharmacist   [facility]  Phone: [phone]    Telemedicine Visit Details  Type of service:  Telephone visit  Start Time: 11:05 AM  End Time: 12:09 PM  Originating Location (patient location): [place]  Distant Location (provider location):  [facility] [place]      Medication Therapy Recommendations  Change in consistency of stool    Current Medication: calcium citrate-vitamin D (CALCIUM CITRATE + D3) 315-250 MG-UNIT TABS per tablet   Rationale: Undesirable effect - Adverse medication event - Safety   Recommendation: Change Medication - Take one tablet by mouth twice daily.   Status: Accepted - no CPA Needed          Rationale: Does not understand instructions - Adherence - Adherence   Recommendation: Provide Education   Status: Accepted - no CPA Needed   Note: Ensure adequate fluid intake with fiber as recommended by Joselin Dinero PA-C. It is okay to keep  this within the confines of your fluid restriction.         Pulmonary hypertension (H)    Current Medication: bumetanide (BUMEX) 2 MG tablet   Rationale: Condition refractory to medication - Ineffective medication - Effectiveness   Status: Accepted - no CPA Needed   Note: Reach out to cardiology as planned to discuss positive fluid status.

## 2021-02-10 NOTE — LETTER
"        Date: 2021    Karen Anderson  [address] [facility] [address]    Dear Ms. Anderson,    Thank you for talking with me on  about your health and medications. Medicare s MTM (Medication Therapy Management) program helps you understand your medications and use them safely.      This letter includes an action plan (Medication Action Plan) and medication list (Personal Medication List). The action plan has steps you should take to help you get the best results from your medications. The medication list will help you keep track of your medications and how to use them the right way.       Have your action plan and medication list with you when you talk with your doctors, pharmacists, and other healthcare providers in your care team.     Ask your doctors, pharmacists, and other healthcare providers to update the action plan and medication list at every visit.     Take your medication list with you if you go to the hospital or emergency room.     Give a copy of the action plan and medication list to your family or caregivers.     If you want to talk about this letter or any of the papers with it, please call   [phone].We look forward to working with you, your doctors, and other healthcare providers to help you stay healthy through the [facility] MTM program.    Sincerely,  Venice Ribeiro [facility]    Enclosed: Medication Action Plan and Personal Medication List    MEDICATION ACTION PLAN FOR Karen Anderson,  1940     This action plan will help you get the best results from your medications if you:   1. Read \"What we talked about.\"   2. Take the steps listed in the \"What I need to do\" boxes.   3. Fill in \"What I did and when I did it.\"   4. Fill in \"My follow-up plan\" and \"Questions I want to ask.\"     Have this action plan with you when you talk with your doctors, pharmacists, and other healthcare providers in your care team. Share this with your family or caregivers " too.  DATE PREPARED: 2021  What we talked about: Following up with cardiology regarding your fluid status.                                                  What I need to do: Please reach out to cardiology as planned to discuss your recent weight gain.       What I did and when I did it:                                              What we talked about: Splitting your calcium dose.                                                  What I need to do: It may be helpful for both bone health and your stool consistency to take one tablet of calcium/D twice daily (as opposed to both at one time).   What I did and when I did it:                                               What we talked about: Continuing with the fiber recommendation from Joselin Dinero PA-C.                                                  What I need to do: As we discussed, it is important to get fluid with your fiber to prevent fiber from causing stool to back-up. It is okay to keep your fluid intake within the limits of your fluid restriction.       What I did and when I did it:                                               My follow-up plan:                 Questions I want to ask:              If you have any questions about your action plan, call Venice Ribeiro [facility], Phone: [phone] , Monday-Friday 8-4:30pm.           PERSONAL MEDICATION LIST FOR Karen Anderson,  1940     This medication list was made for you after we talked. We also used information from your doctor's chart.      Use blank rows to add new medications. Then fill in the dates you started using them.    Cross out medications when you no longer use them. Then write the date and why you stopped using them.    Ask your doctors, pharmacists, and other healthcare providers to update this list at every visit. Keep this list up-to-date with:       Prescription medications    Over the counter drugs     Herbals    Vitamins    Minerals      If you go to the hospital  or emergency room, take this list with you. Share this with your family or caregivers too.     DATE PREPARED: 2/17/2021  Allergies or side effects: Sulfa drugs     Medication:  ACETAMINOPHEN 325 MG PO TABS      How I use it:  Take 325 mg by mouth every 6 hours as needed for mild pain      Why I use it:  Pain    Prescriber:  Entered By History Unknown      Date I started using it:       Date I stopped using it:         Why I stopped using it:            Medication:  ALBUTEROL SULFATE  (90 BASE) MCG/ACT IN AERS      How I use it:  Inhale 2 puffs into the lungs every 4 hours as needed for shortness of breath / dyspnea or wheezing       Why I use it:  Shortness of breath    Prescriber:  Patient Reported      Date I started using it:       Date I stopped using it:         Why I stopped using it:            Medication:  ALENDRONATE SODIUM 70 MG PO TABS      How I use it:  Take 70 mg by mouth once a week      Why I use it: Bone health    Prescriber:  Patient Reported      Date I started using it:       Date I stopped using it:         Why I stopped using it:            Medication:  ASPIRIN 81 MG PO TABS      How I use it:  Take 81 mg by mouth daily       Why I use it:  clot prevention    Prescriber:  Patient Reported      Date I started using it:       Date I stopped using it:         Why I stopped using it:            Medication:  BECLOMETHASONE DIPROP HFA 80 MCG/ACT IN AERB      How I use it:  Inhale 2 puffs into the lungs 2 times daily      Why I use it:  Asthma    Prescriber:  Entered By History Unknown      Date I started using it:       Date I stopped using it:         Why I stopped using it:            Medication:  BUMETANIDE 2 MG PO TABS      How I use it:  Take 2 tablets (4 mg) by mouth 2 times daily      Why I use it: RVF (right ventricular failure) (H)    Prescriber:  MAGEN Saeed      Date I started using it:       Date I stopped using it:         Why I stopped using it:            Medication:   CALCIUM CITRATE + D3 315-250 MG-UNIT PO TABS      How I use it:  Take 1 tablet by mouth twice daily      Why I use it:  bone health    Prescriber:  Patient Reported      Date I started using it:       Date I stopped using it:         Why I stopped using it:            Medication:  CETIRIZINE HCL 10 MG PO TABS      How I use it:  Take 10 mg by mouth daily      Why I use it:  allergies    Prescriber:  Patient Reported      Date I started using it:       Date I stopped using it:         Why I stopped using it:            Medication:  DIGOXIN 62.5 MCG PO TABS      How I use it:  Take 1 tablet (62.5 mcg) by mouth daily      Why I use it: Pulmonary hypertension (H)    Prescriber:  MAGEN Saeed      Date I started using it:       Date I stopped using it:         Why I stopped using it:            Medication:  GABAPENTIN 300 MG PO CAPS      How I use it:  Take 300 mg by mouth 3 times daily Can take an additional 300mg at bedtime as needed      Why I use it:  pain    Prescriber:  MAGEN Saeed      Date I started using it:       Date I stopped using it:         Why I stopped using it:            Medication:  HYDROXYCHLOROQUINE SULFATE 200 MG PO TABS      How I use it:  Take 200 mg by mouth daily       Why I use it:  RA    Prescriber:  Patient Reported      Date I started using it:       Date I stopped using it:         Why I stopped using it:            Medication:  IPRATROPIUM BROMIDE 0.06 % NA SOLN      How I use it:  Spray 2 sprays into both nostrils 3 times daily       Why I use it: Nasal congestion    Prescriber:  Patient Reported      Date I started using it:       Date I stopped using it:         Why I stopped using it:            Medication:  MACITENTAN 10 MG PO TABS      How I use it:  Take 1 tablet (10 mg) by mouth every 48 hours      Why I use it: Pulmonary hypertension (H)    Prescriber:  Gladys Marlow MD      Date I started using it:       Date I stopped using it:         Why I stopped using it:             Medication:  POTASSIUM CHLORIDE OSWALDO ER 20 MEQ PO TBCR      How I use it:  Take 2 tablets (40 mEq) by mouth 2 times daily      Why I use it: Pulmonary hypertension (H)    Prescriber:  MAGEN Saeed      Date I started using it:       Date I stopped using it:         Why I stopped using it:            Medication:  REMODULIN IJ      How I use it:  Dose: 30 ng/kg/min  Vial concentration: 1 mg/mL  Final concentration: 60,000 ng/mL  Dosing weight: 56.3 kg   Pump rate: 41 mL/day  Per patient and [facility] on 12/23/2020      Why I use it:  pulmonary hypertension    Prescriber:  Patient Reported      Date I started using it:       Date I stopped using it:         Why I stopped using it:            Medication:  RIOCIGUAT 1.5 MG PO TABS      How I use it:  Take by mouth 3 times daily      Why I use it:  pulmonary hypertension    Prescriber:  Patient Reported      Date I started using it:       Date I stopped using it:         Why I stopped using it:            Medication:  SALINE 0.65 % NA SOLN      How I use it:  Spray 1 spray into both nostrils daily as needed for congestion      Why I use it:  dry nasal passages    Prescriber:  Entered By History Unknown      Date I started using it:       Date I stopped using it:         Why I stopped using it:            Medication:  VITAMIN C 500 MG PO CAPS      How I use it:  Take 500 mg by mouth every morning       Why I use it:  general health    Prescriber:  Patient Reported      Date I started using it:       Date I stopped using it:         Why I stopped using it:            Medication:  WARFARIN SODIUM 3 MG PO TABS      How I use it:  Take 3 mg PO on Mon, Wed, Fri and 4.5 mg all other days of the week or as directed by the INR clinic      Why I use it: Pulmonary hypertension (H)    Prescriber:  Karolina Turcios MD      Date I started using it:       Date I stopped using it:         Why I stopped using it:            Medication:         How I use it:         Why I use  it:      Prescriber:         Date I started using it:       Date I stopped using it:         Why I stopped using it:            Medication:         How I use it:         Why I use it:      Prescriber:         Date I started using it:       Date I stopped using it:         Why I stopped using it:            Medication:         How I use it:         Why I use it:      Prescriber:         Date I started using it:       Date I stopped using it:         Why I stopped using it:              Other Information:     If you have any questions about your medication list, call Venice Ribeiro [facility], Phone: [phone] , Monday-Friday 8-4:30pm.    According to the Paperwork Reduction Act of 1995, no persons are required to respond to a collection of information unless it displays a valid OMB control number. The valid OMB number for this information collection is [id number]. The time required to complete this information collection is estimated to average 40 minutes per response, including the time to review instructions, searching existing data resources, gather the data needed, and complete and review the information collection. If you have any comments concerning the accuracy of the time estimate(s) or suggestions for improving this form, please write to: CMS, Attn: DEMETRIO Reports Clearance Officer, [address] [place] [phone].

## 2021-02-11 ENCOUNTER — DOCUMENTATION ONLY (OUTPATIENT)
Dept: GASTROENTEROLOGY | Facility: CLINIC | Age: 81
End: 2021-02-11

## 2021-02-12 ENCOUNTER — ANTICOAGULATION THERAPY VISIT (OUTPATIENT)
Dept: ANTICOAGULATION | Facility: CLINIC | Age: 81
End: 2021-02-12

## 2021-02-12 DIAGNOSIS — Z79.01 LONG TERM CURRENT USE OF ANTICOAGULANT THERAPY: ICD-10-CM

## 2021-02-12 DIAGNOSIS — I27.20 PULMONARY HYPERTENSION (H): ICD-10-CM

## 2021-02-12 DIAGNOSIS — I27.24 CTEPH (CHRONIC THROMBOEMBOLIC PULMONARY HYPERTENSION) (H): ICD-10-CM

## 2021-02-12 LAB
CAPILLARY BLOOD COLLECTION: NORMAL
INR PPP: 3.9 (ref 0.86–1.14)

## 2021-02-12 PROCEDURE — 85610 PROTHROMBIN TIME: CPT | Performed by: INTERNAL MEDICINE

## 2021-02-12 PROCEDURE — 36416 COLLJ CAPILLARY BLOOD SPEC: CPT | Performed by: INTERNAL MEDICINE

## 2021-02-12 NOTE — PROGRESS NOTES
Action 2/11/2021 7:02pm -Bhao   Action Taken Fax request sent to [facility] ([phone]) for med recs.    ** Requested by Joselin Dinero PA-C     Action 2/15/2021 11:23am -Bhao   Action Taken Received recs from [facility]; sent to scan. A copy was placed in provider's folder in RightFax. CSS will message provider.

## 2021-02-12 NOTE — PROGRESS NOTES
ANTICOAGULATION FOLLOW-UP CLINIC VISIT    Patient Name:  Karen Anderson  Date:  2/12/2021  Contact Type:  Telephone    SUBJECTIVE:  Patient Findings     Comments:  Spoke with Karen.  Her INR today is 3.9.  She is scheduled for a RHC on 2/17/21 and was suppose to hold warfarin x 3 days before procedure.  Discussed INR result with Gaston Samayoa.  Karen will start holding warfarin today since it is supra therapeutic.    Karen also reports she is going to check with cardiology to see if she should have the RHC next week.  She is incontinent of bowel and bladder.  She has spoken with providers about this and has started a fiber supplement, which she thinks is helping. Her legs are also swollen and she feels weak.  Karen reports she does not have any unusual signs of bleeding.  She always bleeds a little bit from her nose and also after BM.  Neither of these is new.   Karen is calling cardiology today and will discuss above with them.  She will call the [facility] if the RHC is rescheduled.         Clinical Outcomes     Comments:  Spoke with Karen.  Her INR today is 3.9.  She is scheduled for a RHC on 2/17/21 and was suppose to hold warfarin x 3 days before procedure.  Discussed INR result with Gaston Samayoa.  Karen will start holding warfarin today since it is supra therapeutic.    Karen also reports she is going to check with cardiology to see if she should have the RHC next week.  She is incontinent of bowel and bladder.  She has spoken with providers about this and has started a fiber supplement, which she thinks is helping. Her legs are also swollen and she feels weak.  Karen reports she does not have any unusual signs of bleeding.  She always bleeds a little bit from her nose and also after BM.  Neither of these is new.   Karen is calling cardiology today and will discuss above with them.  She will call the [facility] if the RHC is rescheduled.            OBJECTIVE    Recent labs: (last 7 days)      02/12/21  1411   INR 3.90*       ASSESSMENT / PLAN  INR assessment SUPRA    Recheck INR In: 5 DAYS    INR Location Clinic      Anticoagulation Summary  As of 2/12/2021    INR goal:  2.0-2.5   TTR:  75.6 % (9 mo)   INR used for dosing:  3.90 (2/12/2021)   Warfarin maintenance plan:  3 mg (3 mg x 1) every Mon, Wed, Fri; 4.5 mg (3 mg x 1.5) all other days   Full warfarin instructions:  2/12: Hold; 2/13: Hold; 2/14: Hold; 2/15: Hold; 2/16: Hold; Otherwise 3 mg every Mon, Wed, Fri; 4.5 mg all other days   Weekly warfarin total:  27 mg   Plan last modified:  Brittany Kirk RN (1/21/2021)   Next INR check:  2/17/2021   Priority:  Critical   Target end date:  Indefinite    Indications    Pulmonary hypertension (H) [I27.20]  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 OK to speak with spouse, Don.  [facility] SPEAK IN TABLETS (Has 3mg Tabs) 10/23/20:  new goal range is 2 - 2.5  Summer[facility] in [place] and will need to fax orders P [phone] F [phone]      Anticoagulation Care Providers     Provider Role Specialty Phone number    Osiris Luong MD Referring Cardiovascular Disease [phone]    Karolina Turcios MD Referring Cardiovascular Disease [phone]            See the Encounter Report to view Anticoagulation Flowsheet and Dosing Calendar (Go to Encounters tab in chart review, and find the Anticoagulation Therapy Visit)    Spoke with Karen and Lorena DUMONT, Pharmacist.   Karen is scheduled for a RHC on 2/17/21.  Per note in Epic, she is to restart warfarin the evening of the RHC.      Laure Enriquez, PRIYA

## 2021-02-13 ENCOUNTER — TELEPHONE (OUTPATIENT)
Dept: CARDIOLOGY | Facility: CLINIC | Age: 81
End: 2021-02-13

## 2021-02-13 NOTE — TELEPHONE ENCOUNTER
Ms. Anderson is a pleasant 80 year old female with a past medical history of rheumatoid arthritis, HTN, scoliosis, asthma, pulmonary MAC, breast cancer s/p chemotherapy  and status post left mastectomy, PE and DVT, pulmonary emboli and DVT, CTEPH w/ severe RV dysfunction. Pending RHC this Wednesday for evaluation of PAH (CTEPH).    #Supratherapeutic INR  Patient reports she had an INR of 3.9 on 12/12.  She was contacted by the INR clinic and was recommended to stop her warfarin with plan to let her INR trended down in anticipation of right heart cath on Wednesday 12/17/21.  Patient was concerned that her INR would not appropriately trended down, reassured the patient that her INR should decrease and would be safe to proceed with right heart cath.  Patient would appreciate a call from Dr. Garcia's nurse regarding if it safe to proceed with RHC.    #LE Edema  Patient reports her lower extremity edema has worsened recently.  Her weight today was 118 pounds, weight on 2/8 was 114.5 which progressively increased to 117 pounds on 2/10.  She denies any significant shortness of breath.  Recommended increasing the dose of Bumex to 6 mg this evening and 6 mg tomorrow morning before returning to her usual regimen of 4 mg twice daily.    #Fecal Incontinence: Stool is soft but formed with small clumps. Pt has been wearing depends with pads to prevent herself from soiling herself. No numbness in the groin area. States she intermittently has been having a difficult time making it to the restroom in time. Pt was recently evaluated by GI for constipation with fecal incontinence.  At that time she was thought to have symptoms secondary to side effects of Remodulin versus constipation with overflow diarrhea versus IBS versus pelvic floor dysfunction.  She was recommended to start fiber supplementation to regulate stool consistency and stop her probiotic Imodium and laxatives.  Recommended repeat follow-up in 4 to 6 weeks.   Given  that she is following with GI for the same symptoms and is having persistent concerns, recommended getting in touch with the GI team regarding adjusting her fiber supplementation or additional recommendations. Pt agreed.    Mohsan Chaudhry, MD  Cardiology Fellow

## 2021-02-14 DIAGNOSIS — Z11.59 ENCOUNTER FOR SCREENING FOR OTHER VIRAL DISEASES: ICD-10-CM

## 2021-02-14 LAB
SARS-COV-2 RNA RESP QL NAA+PROBE: NORMAL
SPECIMEN SOURCE: NORMAL

## 2021-02-14 PROCEDURE — U0005 INFEC AGEN DETEC AMPLI PROBE: HCPCS | Performed by: INTERNAL MEDICINE

## 2021-02-14 PROCEDURE — U0003 INFECTIOUS AGENT DETECTION BY NUCLEIC ACID (DNA OR RNA); SEVERE ACUTE RESPIRATORY SYNDROME CORONAVIRUS 2 (SARS-COV-2) (CORONAVIRUS DISEASE [COVID-19]), AMPLIFIED PROBE TECHNIQUE, MAKING USE OF HIGH THROUGHPUT TECHNOLOGIES AS DESCRIBED BY CMS-2020-01-R: HCPCS | Performed by: INTERNAL MEDICINE

## 2021-02-15 ENCOUNTER — ANTICOAGULATION THERAPY VISIT (OUTPATIENT)
Dept: ANTICOAGULATION | Facility: CLINIC | Age: 81
End: 2021-02-15

## 2021-02-15 ENCOUNTER — TELEPHONE (OUTPATIENT)
Dept: CARDIOLOGY | Facility: CLINIC | Age: 81
End: 2021-02-15

## 2021-02-15 DIAGNOSIS — Z79.01 LONG TERM CURRENT USE OF ANTICOAGULANT THERAPY: ICD-10-CM

## 2021-02-15 DIAGNOSIS — I27.24 CTEPH (CHRONIC THROMBOEMBOLIC PULMONARY HYPERTENSION) (H): ICD-10-CM

## 2021-02-15 DIAGNOSIS — I27.20 PULMONARY HYPERTENSION (H): ICD-10-CM

## 2021-02-15 LAB
LABORATORY COMMENT REPORT: NORMAL
SARS-COV-2 RNA RESP QL NAA+PROBE: NEGATIVE
SPECIMEN SOURCE: NORMAL

## 2021-02-15 NOTE — TELEPHONE ENCOUNTER
[facility] Call Center    Phone Message    May a detailed message be left on voicemail: yes     Reason for Call: Other: Karen called back in and would like a call back as soon as you are able.     Action Taken: Message routed to:  [facility]     Travel Screening: Not Applicable

## 2021-02-15 NOTE — TELEPHONE ENCOUNTER
I called pt to follow up on her Right heart catheterization and questions she has.  I was unable to reach her and left a detailed message requesting that she call back.  Missy Perez, RN on 2/15/2021 at 9:06 AM     ----- Message from Karolina Turcios MD sent at 2/15/2021  8:26 AM CST -----  Regarding: RE: Upcoming RHC  Thank you Mohsan.    Team - we can reschedule her RHC if needed. Please call patient.     Thank you    TT  ----- Message -----  From: Chaudhry, Mohsan, MD  Sent: 2/13/2021   1:17 PM CST  To: Karolina Turcios MD, Cardiology Core   Subject: Upcoming RHC                                     Hi Dr. Turcios,    I was wondering if someone from the core clinic or your nurse could call this patient on Monday. Her INR was supratherapeutic and she is concerned about proceeding with RHC. I reassured her today, but she would like to hear from someone from the heart failure clinic.    She was also a few lbs up in weight, no other significant complaints. I asked her to take a bit more bumex for a day.     Thanks,  Mohsan Chaudhry  Cardiology Fellow

## 2021-02-15 NOTE — TELEPHONE ENCOUNTER
I called pt and she is concerned about her BM and Urinary pressures.  She stated that she did see GI and they told her to try to slow down the BM and to take Fiber Daily, and to not take a probiotic, Zyrtec or Atrovent.  She is concerned because she is having significant amounts of Diarrhea and is having accidents.  I have requested that she reach out to GI as they are following her for her diarrhea.  I also sent a message to Karolina Turcios MD letting him know.  Missy Perez RN on 2/15/2021 at 1:37 PM

## 2021-02-15 NOTE — PROGRESS NOTES
Karen called. The RHC that she had scheduled on 2/17/2021 has been cancelled because of the problems she has been having with her bowel and bladder.  She is going to call GI today to see if she can get some help.  She was in the hospital from 12/23/2020 - 1/14/21 with an intestinal blockage.  They opened it up with Mag Citrate, pink lady, stool softeners.  Now she cannot control her bowel or bladder.  She is worried about coming to the clinic because she is afraid of incontinence.  INR on 2/12/21 was 3.9 and she held warfarin x 3 days (because she was scheduled for RHC).  She will go back on her maintenance dose of warfarin and will try to recheck her INR at the end of the week.  Laure Enriquez RN

## 2021-02-18 DIAGNOSIS — R06.02 SOB (SHORTNESS OF BREATH): ICD-10-CM

## 2021-02-18 DIAGNOSIS — I27.20 PULMONARY HYPERTENSION (H): Primary | ICD-10-CM

## 2021-02-22 ENCOUNTER — TELEPHONE (OUTPATIENT)
Dept: CARDIOLOGY | Facility: CLINIC | Age: 81
End: 2021-02-22

## 2021-02-22 NOTE — TELEPHONE ENCOUNTER
"Dr. Turcios asked patient to hold Adempas over the weekend to see if her symptoms of dizziness improved. Spoke with patient this AM and overall patient is feeling much better off Adempas. Her dizziness and clarity have improved, GI symptoms unchanged. Patient also advised her weight is back down to 117.5 lb. Advised that patient will need to continue to see her GI specialist for her urgency/diarrhea. Kezia Arnett RN on 2/22/2021 at 10:34 AM    Spoke with Dr. Turcios, okay to hold Adempas. Reschedule RHC and determine if Opsumit should be increased at this time. Kezia Arnett RN on 2/22/2021 at 10:42 AM    Spoke with patient regarding follow up; patient still extremely hesitant about going through with the RHC d/t potentially having a \"blowout on the table.\" I advised that she can hold her diuretics for comfort the morning of the procedure and asked that she follow up with the GI physician's assistant on Friday regarding suggestions prior to her tests. Patient and Don concerns about the potential accident she could have while on the table. Advised I would reach out to Dr. Turcios again.    Patient also mentioned she got her first dose of the COVID vaccine last Saturday.  Kezia Arnett RN on 2/22/2021 at 11:10 AM        "

## 2021-02-26 ENCOUNTER — DOCUMENTATION ONLY (OUTPATIENT)
Dept: ANTICOAGULATION | Facility: CLINIC | Age: 81
End: 2021-02-26

## 2021-02-26 ENCOUNTER — VIRTUAL VISIT (OUTPATIENT)
Dept: GASTROENTEROLOGY | Facility: CLINIC | Age: 81
End: 2021-02-26
Payer: COMMERCIAL

## 2021-02-26 ENCOUNTER — ANTICOAGULATION THERAPY VISIT (OUTPATIENT)
Dept: ANTICOAGULATION | Facility: CLINIC | Age: 81
End: 2021-02-26

## 2021-02-26 VITALS — HEIGHT: 59 IN | BODY MASS INDEX: 23.28 KG/M2 | WEIGHT: 115.5 LBS

## 2021-02-26 DIAGNOSIS — R19.8 IRREGULAR BOWEL HABITS: Primary | ICD-10-CM

## 2021-02-26 DIAGNOSIS — Z79.01 LONG TERM CURRENT USE OF ANTICOAGULANT THERAPY: ICD-10-CM

## 2021-02-26 DIAGNOSIS — I27.24 CTEPH (CHRONIC THROMBOEMBOLIC PULMONARY HYPERTENSION) (H): ICD-10-CM

## 2021-02-26 DIAGNOSIS — I27.20 PULMONARY HYPERTENSION (H): ICD-10-CM

## 2021-02-26 DIAGNOSIS — R06.02 SOB (SHORTNESS OF BREATH): ICD-10-CM

## 2021-02-26 DIAGNOSIS — R15.9 INCONTINENCE OF FECES, UNSPECIFIED FECAL INCONTINENCE TYPE: ICD-10-CM

## 2021-02-26 LAB
ALBUMIN SERPL-MCNC: 3.2 G/DL (ref 3.4–5)
ALP SERPL-CCNC: 175 U/L (ref 40–150)
ALT SERPL W P-5'-P-CCNC: 36 U/L (ref 0–50)
ANION GAP SERPL CALCULATED.3IONS-SCNC: 4 MMOL/L (ref 3–14)
AST SERPL W P-5'-P-CCNC: 43 U/L (ref 0–45)
BILIRUB SERPL-MCNC: 0.4 MG/DL (ref 0.2–1.3)
BUN SERPL-MCNC: 41 MG/DL (ref 7–30)
CALCIUM SERPL-MCNC: 9.3 MG/DL (ref 8.5–10.1)
CAPILLARY BLOOD COLLECTION: NORMAL
CHLORIDE SERPL-SCNC: 104 MMOL/L (ref 94–109)
CO2 SERPL-SCNC: 30 MMOL/L (ref 20–32)
CREAT SERPL-MCNC: 1.44 MG/DL (ref 0.52–1.04)
GFR SERPL CREATININE-BSD FRML MDRD: 35 ML/MIN/{1.73_M2}
GLUCOSE SERPL-MCNC: 79 MG/DL (ref 70–99)
INR PPP: 2.7 (ref 0.86–1.14)
NT-PROBNP SERPL-MCNC: 4617 PG/ML (ref 0–450)
POTASSIUM SERPL-SCNC: 4.7 MMOL/L (ref 3.4–5.3)
PROT SERPL-MCNC: 7.3 G/DL (ref 6.8–8.8)
SODIUM SERPL-SCNC: 138 MMOL/L (ref 133–144)

## 2021-02-26 PROCEDURE — 80053 COMPREHEN METABOLIC PANEL: CPT | Performed by: INTERNAL MEDICINE

## 2021-02-26 PROCEDURE — 85610 PROTHROMBIN TIME: CPT | Performed by: INTERNAL MEDICINE

## 2021-02-26 PROCEDURE — 99215 OFFICE O/P EST HI 40 MIN: CPT | Mod: 95 | Performed by: PHYSICIAN ASSISTANT

## 2021-02-26 PROCEDURE — 36416 COLLJ CAPILLARY BLOOD SPEC: CPT | Performed by: INTERNAL MEDICINE

## 2021-02-26 PROCEDURE — 83880 ASSAY OF NATRIURETIC PEPTIDE: CPT | Performed by: INTERNAL MEDICINE

## 2021-02-26 ASSESSMENT — MIFFLIN-ST. JEOR: SCORE: 909.53

## 2021-02-26 NOTE — PROGRESS NOTES
Karen is scheduled for a RHC on 3/17/2021.  Checked with Dr. Turcios about where he wants INR to be for the RHC.    Received the following from him:  Karolina Turcios MD Warns, Samantha, PRIYA; Laure Enriquez RN             ~2 should be be good. Anything less than 2.5 is ok. No need for bridging. Just have her hold 2 or 3 days.

## 2021-02-26 NOTE — NURSING NOTE
"Chief Complaint   Patient presents with     Follow Up       Vitals:    02/26/21 1233   Weight: 52.4 kg (115 lb 8 oz)   Height: 1.499 m (4' 11\")       Body mass index is 23.33 kg/m .    Trixie Marie CMA    "

## 2021-02-26 NOTE — LETTER
"    2/26/2021         RE: Karen Anderson  [address]  [facility] [address]        Dear Colleague,    Thank you for referring your patient, Karen Anderson, to the [facility] [place]. Please see a copy of my visit note below.    Karen Anderson is a 80 year old female who is being evaluated via a billable telephone visit.      Please call phone number:  [phone]    The patient has been notified of following:     \"This video visit will be conducted via a call between you and your physician/provider. We have found that certain health care needs can be provided without the need for an in-person physical exam.  This service lets us provide the care you need with a video conversation.  If a prescription is necessary we can send it directly to your pharmacy.  If lab work is needed we can place an order for that and you can then stop by our lab to have the test done at a later time.    If during the course of the call the physician/provider feels a video visit is not appropriate, you will not be charged for this service.\"     Patient confirmed that they are in [place] for today's visit yes.    If the video visit is dropped, please send link to:     Video-Visit Details  Type of service:  telephone visit    Video Start Time: 44 minutes     Originating Location (pt. Location): Home    Distant Location (provider location):  [facility] [place]     Platform used: telephone      GI CLINIC VISIT    CC/REFERRING MD:  No ref. provider found  REASON FOR CONSULTATION: change in stool consistency    ASSESSMENT/PLAN:  Ms. Anderson is a pleasant 80 year old female with a past medical history of rheumatoid arthritis, HTN, scoliosis, asthma, pulmonary MAC, breast cancer s/p chemotherapy  and status post left mastectomy, PE and DVT, pulmonary emboli and DVT, CTEPH w/ severe RV dysfunction presenting for variable stool consistency/change in " stool consistency.    1.  Change in stool consistency, fecal incontinence   Patient reports variable stool consistency starting in the spring of this year.  Symptoms may have initially started with addition of Remodulin though patient did not give a clear history. TSH 1.31 3/2/2020. Calcium elevated to 10.2 1/28/2021. Remodulin does have listed side effect of diarrhea. Adempas carries side effect of constipation and diarrhea (has help for a week with slight improvement).  Of note, when she was recently hospitalized, she did have a significant stool burden and constipation.  Differential for symptoms includes medication side effect, constipation with overflow diarrhea, IBS-M, pelvic floor dysfunction (states that he felt heaviness in her rectum).  Is not describing any watery stools, less likely to be infectious.      Has had significant improvement in symptoms since stopping imodium alternating with senna. Fiber appeared to prevent loose stools and incontinence, but as she increased up this lead to constipation (is restricted with water intake). Would recommend that she try a lower dose of fiber with 1/2 tablespoon and stick to this for about 2 weeks.     Has had colonoscopy in 2017, no biopsies for microscopic colitis.  Future considerations include repeat colonoscopy with biopsies for microscopic colitis.  May also benefit from pelvic floor evaluation though patient wishes to avoid additional visits in person at this time in the setting of Covid, however the connection with urinary symptoms is suspicious for pelvic floor dysfunction.   -- continue to avoid  Imodium unless you have an emergency, than you can take 1/2 tablet   --re-start fiber supplementation on a daily basis with Metamucil.  Start with one half a tablespoon  --Track your symptoms.  Write down on a blank calendar how many bowel movements you have a day and what the consistency is like    RTC 6-8 weeks     Thank you for this consultation.  It was a  "pleasure to participate in the care of this patient; please contact us with any further questions.      Joselin Dinero PA-C  Division of Gastroenterology, Hepatology & Nutrition  [facility]        HPI  Ms. Anderson is a pleasant 80 year old female with a past medical history of rheumatoid arthritis, HTN, scoliosis, asthma, pulmonary MAC, breast cancer s/p chemotherapy  and status post left mastectomy, PE and DVT, pulmonary emboli and DVT, CTEPH w/ severe RV dysfunction presenting for variable stool consistency. Patient's  present for telephone visit today.     Patient reports that she had soft involuntary bowel movements since starting remodulin in March. She has been on imodium, senna, stool softeners (colace).    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 During this visit, she had concern for fecal impaction with moderate stool burden (x-ray 1/2/2021) with bloated abdomen- was treated with magnesium and enema (improvement in stool burden on x-ray 1/9/2021).     Today she has had several bowel movements. Is unable to quantify how many times. She has incontinence frequently. Describes that stools are quarter sized semi-soft \"balls\" which are not hard.  Mucus can come out, sometimes with gas. Does not feel empty. Sometimes describes that she cannot get all the urine out. Is having small amounts of blood with hemorrhoids. Not recently skipping days without bowel movements. Does not have any nausea, vomiting. No abdominal pain. Feels like her whole general \"area is coming out\" - can have some light pain with this (rectal pain).     Interval History 2/26/2021:   Reports that pressure in her anus with bowel movements and urinating has improved, though she continues to have a pressure. She did start powdered fiber with 1 tablespoon which caused her stools to be too hard, but this improved involuntary bowel movements. She went back " down to 1/2 tablespoon but continued to have involuntary bowel movements. She then stopped all fiber. Drinking a little more water seemed to help a little bit. Stool consistency has improved, but this morning had a runny stool. Hemorrhoids have improved- this has also helped with fecal incontinence.     Has been holding adempas for a week. With this she has had better control of urgency.     Has a lot of fruit and vegetables.   Stool is usually soft, 5 bowel movements yesterday, had 8 bowel movement the day before. Usually does urinate with bowel movements.     PROBLEM LIST  Patient Active Problem List    Diagnosis Date Noted     Long term current use of anticoagulant therapy 10/23/2020     Priority: Medium     RVF (right ventricular failure) (H) 09/29/2020     Priority: Medium     CTEPH (chronic thromboembolic pulmonary hypertension) (H) 09/29/2020     Priority: Medium     Added automatically from request for surgery [id number]       Localized edema 03/02/2020     Priority: Medium     Added automatically from request for surgery [id number]       SOB (shortness of breath) 03/02/2020     Priority: Medium     Added automatically from request for surgery [id number]       Iron deficiency 03/02/2020     Priority: Medium     Added automatically from request for surgery [id number]       Dyslipidemia 03/02/2020     Priority: Medium     Added automatically from request for surgery [id number]       Pulmonary hypertension (H) 03/02/2020     Priority: Medium     Added automatically from request for surgery [id number]       Need for hepatitis B screening test 03/02/2020     Priority: Medium     Added automatically from request for surgery [id number]         PERTINENT PAST MEDICAL HISTORY:  Past Medical History:   Diagnosis Date     Asthma      Breast cancer (H)      Hypertension      Mycobacterium avium complex (H)      Pulmonary embolism (H)      Pulmonary hypertension (H)      Rheumatoid arthritis (H)      Scoliosis        PREVIOUS SURGERIES:  S/p  hysterectomy   Past Surgical History:   Procedure Laterality Date     CV BALLOON PULMONARY ANGIOPLASTY N/A 1/12/2021    Procedure: CV BALLOON PULMONARY ANGIOPLASTY;  Surgeon: Amos Castañeda MD;  [facility]     CV PULMONARY ANGIOGRAM N/A 10/5/2020    Procedure: Pulmonary Angiogram;  Surgeon: Lorenzo Sullivan MD;  [facility]     CV PULMONARY ANGIOGRAM N/A 1/12/2021    Procedure: CV PULMONARY ANGIOGRAM;  Surgeon: Amos Castañeda MD;  [facility]     CV RIGHT HEART CATH MEASUREMENTS RECORDED N/A 03/04/2020    Procedure: CV RIGHT HEART CATH;  Surgeon: Karolina Turcios MD;  [facility]     CV RIGHT HEART CATH MEASUREMENTS RECORDED N/A 10/5/2020    Procedure: Right Heart Cath; balloon pulmonary angiogram;  Surgeon: Lorenzo Sullivan MD;  [facility]     CV RIGHT HEART CATH MEASUREMENTS RECORDED N/A 12/30/2020    Procedure: Right Heart Cath;  Surgeon: Karolina Turcios MD;  [facility]     CV RIGHT HEART CATH MEASUREMENTS RECORDED N/A 1/12/2021    Procedure: CV RIGHT HEART CATH;  Surgeon: Amos Castañeda MD;  Location:  [facility]     IR CVC TUNNEL PLACEMENT > 5 YRS OF AGE  03/09/2020     MASTECTOMY Left      PICC SINGLE LUMEN PLACEMENT Right 03/04/2020    4Fr - 41cm, Medial brachial vein. Left mastectomy     PICC TRIPLE LUMEN PLACEMENT Right 10/02/2020    5Fr - 43cm (7cm external), Basilic vein, high RA       PREVIOUS ENDOSCOPY:  Colonoscopy 2017- [facility] w/ Dr. Krishnan     ALLERGIES:     Allergies   Allergen Reactions     Sulfa Drugs Anaphylaxis and Hives       PERTINENT MEDICATIONS:    Current Outpatient Medications:      acetaminophen (TYLENOL) 325 MG tablet, Take 325 mg by mouth every 6 hours as needed for mild pain, Disp: , Rfl:      albuterol (PROAIR HFA/PROVENTIL HFA/VENTOLIN HFA) 108 (90 Base) MCG/ACT inhaler,  Inhale 2 puffs into the lungs every 4 hours as needed for shortness of breath / dyspnea or wheezing , Disp: , Rfl:      alendronate (FOSAMAX) 70 MG tablet, Take 70 mg by mouth once a week, Disp: , Rfl:      Ascorbic Acid (VITAMIN C) 500 MG CAPS, Take 500 mg by mouth every morning , Disp: , Rfl:      aspirin (ASA) 81 MG tablet, Take 81 mg by mouth daily , Disp: , Rfl:      beclomethasone HFA (QVAR REDIHALER) 80 MCG/ACT inhaler, Inhale 2 puffs into the lungs 2 times daily, Disp: , Rfl:      bumetanide (BUMEX) 2 MG tablet, Take 2 tablets (4 mg) by mouth 2 times daily, Disp: 120 tablet, Rfl: 4     calcium citrate-vitamin D (CALCIUM CITRATE + D3) 315-250 MG-UNIT TABS per tablet, Take 1 tablet by mouth 2 times daily , Disp: , Rfl:      cetirizine (ZYRTEC) 10 MG tablet, Take 10 mg by mouth daily, Disp: , Rfl:      digoxin (LANOXIN) 62.5 MCG tablet, Take 1 tablet (62.5 mcg) by mouth daily, Disp: 90 tablet, Rfl: 3     gabapentin (NEURONTIN) 300 MG capsule, Take 300 mg by mouth 3 times daily Can take an additional 300mg at bedtime as needed, Disp: , Rfl:      hydroxychloroquine (PLAQUENIL) 200 MG tablet, Take 200 mg by mouth daily , Disp: , Rfl:      ipratropium (ATROVENT) 0.06 % nasal spray, Spray 2 sprays into both nostrils 3 times daily , Disp: , Rfl:      macitentan (OPSUMIT) 10 MG tablet, Take 1 tablet (10 mg) by mouth every 48 hours, Disp: 30 tablet, Rfl: 0     potassium chloride ER (KLOR-CON M) 20 MEQ CR tablet, Take 2 tablets (40 mEq) by mouth 2 times daily, Disp: 120 tablet, Rfl: 3     riociguat (ADEMPAS) 1.5 MG tablet, Take by mouth 3 times daily, Disp: , Rfl:      sodium chloride (OCEAN) 0.65 % nasal spray, Spray 1 spray into both nostrils daily as needed for congestion, Disp: , Rfl:      Treprostinil (REMODULIN IJ), Dose: 30 ng/kg/min Vial concentration: 1 mg/mL Final concentration: 60,000 ng/mL Dosing weight: 56.3 kg  Pump rate: 41 mL/day Per patient and [facility] on 12/23/2020, Disp: , Rfl:      warfarin  ANTICOAGULANT (COUMADIN) 3 MG tablet, Take 3 mg PO on Mon, Wed, Fri and Sat, and 4.5 mg all other days of the week, Disp: 170 tablet, Rfl: 3    SOCIAL HISTORY:  Social History     Socioeconomic History     Marital status:      Spouse name: Not on file     Number of children: Not on file     Years of education: Not on file     Highest education level: Not on file   Occupational History     Not on file   Social Needs     Financial resource strain: Not on file     Food insecurity     Worry: Not on file     Inability: Not on file     Transportation needs     Medical: Not on file     Non-medical: Not on file   Tobacco Use     Smoking status: Never Smoker     Smokeless tobacco: Never Used   Substance and Sexual Activity     Alcohol use: Yes     Comment: occasionally      Drug use: Never     Sexual activity: Not on file   Lifestyle     Physical activity     Days per week: Not on file     Minutes per session: Not on file     Stress: Not on file   Relationships     Social connections     Talks on phone: Not on file     Gets together: Not on file     Attends Quaker service: Not on file     Active member of club or organization: Not on file     Attends meetings of clubs or organizations: Not on file     Relationship status: Not on file     Intimate partner violence     Fear of current or ex partner: Not on file     Emotionally abused: Not on file     Physically abused: Not on file     Forced sexual activity: Not on file   Other Topics Concern     Not on file   Social History Narrative    Is . Has 1 adult daughter who is in her 50s. Has 2 grandkids. 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       FAMILY HISTORY:  FH of CRC: none   FH of IBD: none   Family History   Problem Relation Age of Onset     Heart Failure Mother      Kidney Disease Mother      Coronary Artery Disease Maternal Grandfather 50     LUNG DISEASE No family hx of      Clotting Disorder No family hx of        Past/family/social  history reviewed and no changes    PHYSICAL EXAMINATION:  No physical exam     PERTINENT STUDIES:    Orders Only on 01/28/2021   Component Date Value Ref Range Status     N-Terminal Pro Bnp 01/28/2021 5,679* 0 - 450 pg/mL Final     Sodium 01/28/2021 137  133 - 144 mmol/L Final     Potassium 01/28/2021 4.3  3.4 - 5.3 mmol/L Final     Chloride 01/28/2021 101  94 - 109 mmol/L Final     Carbon Dioxide 01/28/2021 30  20 - 32 mmol/L Final     Anion Gap 01/28/2021 5  3 - 14 mmol/L Final     Glucose 01/28/2021 128* 70 - 99 mg/dL Final     Urea Nitrogen 01/28/2021 44* 7 - 30 mg/dL Final     Creatinine 01/28/2021 1.56* 0.52 - 1.04 mg/dL Final     GFR Estimate 01/28/2021 31* >60 mL/min/[1.73_m2] Final     GFR Estimate If Black 01/28/2021 36* >60 mL/min/[1.73_m2] Final     Calcium 01/28/2021 10.2* 8.5 - 10.1 mg/dL Final     INR 01/28/2021 2.24* 0.86 - 1.14 Final       Again, thank you for allowing me to participate in the care of your patient.      Sincerely,    Joselin Dinero PA-C

## 2021-02-26 NOTE — PATIENT INSTRUCTIONS
It was a pleasure taking care of you today.  I've included a brief summary of our discussion and care plan from today's visit below.  Please review this information with your primary care provider.  ______________________________________________________________________    My recommendations are summarized as follows:    -- continue to avoid  Imodium unless you have an emergency, than you can take 1/2 tablet   --re-start fiber supplementation on a daily basis with Metamucil.  Start with one half a tablespoon  --Track your symptoms.  Write down on a blank calendar how many bowel movements you have a day and what the consistency is like    Return to GI Clinic in 6-8 weeks to review your progress.    ______________________________________________________________________    How do I schedule labs, imaging studies, or procedures that were ordered in clinic today?   Labs: To schedule lab appointment at the [facility], use my chart or call [phone]. If you have a [facility] lab closer to home where you are regularly seen you can give them a call.     Procedures: If a colonoscopy, upper endoscopy, breath test, esophageal manometry, or pH impedence was ordered today, our endoscopy team will call you to schedule this. If you have not heard from our endoscopy team within a week, please call [phone] to schedule.     Imaging Studies: If you were scheduled for a CT scan, X-ray, MRI, ultrasound, HIDA scan or other imaging study, please call [phone] to have this scheduled.     Referral: If a referral to another specialty was ordered, expect a phone call or follow instructions above. If you have not heard from anyone regarding your referral in a week, please call our clinic to check the status.     Who do I call with any questions after my visit?  Please be in touch if there are any further questions that arise following today's visit.  There are multiple ways to contact your gastroenterology care team.         During business hours, you may reach a Gastroenterology nurse at [phone]      To schedule or reschedule an appointment, please call [phone].       You can always send a secure message through [employer].  [employer] messages are answered by your nurse or doctor typically within 24 hours.  Please allow extra time on weekends and holidays.        For urgent/emergent questions after business hours, you may reach the on-call GI Fellow by contacting the [facility]  at [phone].     How will I get the results of any tests ordered?    You will receive all of your results.  If you have signed up for [email or web address], any tests ordered at your visit will be available to you after your physician reviews them.  Typically this takes 1-2 weeks.  If there are urgent results that require a change in your care plan, your physician or nurse will call you to discuss the next steps.      What is [employer]?  [employer] is a secure way for you to access all of your healthcare records from the [facility].  It is a web based computer program, so you can sign on to it from any location.  It also allows you to send secure messages to your care team.  I recommend signing up for [employer] access if you have not already done so and are comfortable with using a computer.      How to I schedule a follow-up visit?  If you did not schedule a follow-up visit today, please call [phone] to schedule a follow-up office visit.      Sincerely,    Joselin Dinero PA-C  Division of Gastroenterology, Hepatology & Nutrition  [facility]

## 2021-02-26 NOTE — PROGRESS NOTES
"Karen Anderson is a 80 year old female who is being evaluated via a billable telephone visit.      Please call phone number:  [phone]    The patient has been notified of following:     \"This video visit will be conducted via a call between you and your physician/provider. We have found that certain health care needs can be provided without the need for an in-person physical exam.  This service lets us provide the care you need with a video conversation.  If a prescription is necessary we can send it directly to your pharmacy.  If lab work is needed we can place an order for that and you can then stop by our lab to have the test done at a later time.    If during the course of the call the physician/provider feels a video visit is not appropriate, you will not be charged for this service.\"     Patient confirmed that they are in [place] for today's visit yes.    If the video visit is dropped, please send link to:     Video-Visit Details  Type of service:  telephone visit    Video Start Time: 44 minutes     Originating Location (pt. Location): [place]    Distant Location (provider location):  [facility] [place]     Platform used: telephone      GI CLINIC VISIT    CC/REFERRING MD:  No ref. provider found  REASON FOR CONSULTATION: change in stool consistency    ASSESSMENT/PLAN:  Ms. Anderson is a pleasant 80 year old female with a past medical history of rheumatoid arthritis, HTN, scoliosis, asthma, pulmonary MAC, breast cancer s/p chemotherapy  and status post left mastectomy, PE and DVT, pulmonary emboli and DVT, CTEPH w/ severe RV dysfunction presenting for variable stool consistency/change in stool consistency.    1.  Change in stool consistency, fecal incontinence   Patient reports variable stool consistency starting in the spring of this year.  Symptoms may have initially started with addition of Remodulin though patient did not give a clear history. TSH 1.31 3/2/2020. " Calcium elevated to 10.2 1/28/2021. Remodulin does have listed side effect of diarrhea. Adempas carries side effect of constipation and diarrhea (has help for a week with slight improvement).  Of note, when she was recently hospitalized, she did have a significant stool burden and constipation.  Differential for symptoms includes medication side effect, constipation with overflow diarrhea, IBS-M, pelvic floor dysfunction (states that he felt heaviness in her rectum).  Is not describing any watery stools, less likely to be infectious.      Has had significant improvement in symptoms since stopping imodium alternating with senna. Fiber appeared to prevent loose stools and incontinence, but as she increased up this lead to constipation (is restricted with water intake). Would recommend that she try a lower dose of fiber with 1/2 tablespoon and stick to this for about 2 weeks.     Has had colonoscopy in 2017, no biopsies for microscopic colitis.  Future considerations include repeat colonoscopy with biopsies for microscopic colitis.  May also benefit from pelvic floor evaluation though patient wishes to avoid additional visits in person at this time in the setting of Covid, however the connection with urinary symptoms is suspicious for pelvic floor dysfunction.   -- continue to avoid  Imodium unless you have an emergency, than you can take 1/2 tablet   --re-start fiber supplementation on a daily basis with Metamucil.  Start with one half a tablespoon  --Track your symptoms.  Write down on a blank calendar how many bowel movements you have a day and what the consistency is like    RTC 6-8 weeks     Thank you for this consultation.  It was a pleasure to participate in the care of this patient; please contact us with any further questions.      Joselin Dinero PA-C  Division of Gastroenterology, Hepatology & Nutrition  [facility]        HPI  Ms. Anderson is a pleasant 80 year old female with a past medical  "history of rheumatoid arthritis, HTN, scoliosis, asthma, pulmonary MAC, breast cancer s/p chemotherapy  and status post left mastectomy, PE and DVT, pulmonary emboli and DVT, CTEPH w/ severe RV dysfunction presenting for variable stool consistency. Patient's  present for telephone visit today.     Patient reports that she had soft involuntary bowel movements since starting remodulin in March. She has been on imodium, senna, stool softeners (colace).    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 During this visit, she had concern for fecal impaction with moderate stool burden (x-ray 1/2/2021) with bloated abdomen- was treated with magnesium and enema (improvement in stool burden on x-ray 1/9/2021).     Today she has had several bowel movements. Is unable to quantify how many times. She has incontinence frequently. Describes that stools are quarter sized semi-soft \"balls\" which are not hard.  Mucus can come out, sometimes with gas. Does not feel empty. Sometimes describes that she cannot get all the urine out. Is having small amounts of blood with hemorrhoids. Not recently skipping days without bowel movements. Does not have any nausea, vomiting. No abdominal pain. Feels like her whole general \"area is coming out\" - can have some light pain with this (rectal pain).     Interval History 2/26/2021:   Reports that pressure in her anus with bowel movements and urinating has improved, though she continues to have a pressure. She did start powdered fiber with 1 tablespoon which caused her stools to be too hard, but this improved involuntary bowel movements. She went back down to 1/2 tablespoon but continued to have involuntary bowel movements. She then stopped all fiber. Drinking a little more water seemed to help a little bit. Stool consistency has improved, but this morning had a runny stool. Hemorrhoids have improved- this has also helped with fecal " incontinence.     Has been holding adempas for a week. With this she has had better control of urgency.     Has a lot of fruit and vegetables.   Stool is usually soft, 5 bowel movements yesterday, had 8 bowel movement the day before. Usually does urinate with bowel movements.     PROBLEM LIST  Patient Active Problem List    Diagnosis Date Noted     Long term current use of anticoagulant therapy 10/23/2020     Priority: Medium     RVF (right ventricular failure) (H) 09/29/2020     Priority: Medium     CTEPH (chronic thromboembolic pulmonary hypertension) (H) 09/29/2020     Priority: Medium     Added automatically from request for surgery [id number]       Localized edema 03/02/2020     Priority: Medium     Added automatically from request for surgery [id number]       SOB (shortness of breath) 03/02/2020     Priority: Medium     Added automatically from request for surgery [id number]       Iron deficiency 03/02/2020     Priority: Medium     Added automatically from request for surgery [id number]       Dyslipidemia 03/02/2020     Priority: Medium     Added automatically from request for surgery [id number]       Pulmonary hypertension (H) 03/02/2020     Priority: Medium     Added automatically from request for surgery [id number]       Need for hepatitis B screening test 03/02/2020     Priority: Medium     Added automatically from request for surgery [id number]         PERTINENT PAST MEDICAL HISTORY:  Past Medical History:   Diagnosis Date     Asthma      Breast cancer (H)      Hypertension      Mycobacterium avium complex (H)      Pulmonary embolism (H)      Pulmonary hypertension (H)      Rheumatoid arthritis (H)      Scoliosis        PREVIOUS SURGERIES:  S/p hysterectomy   Past Surgical History:   Procedure Laterality Date     CV BALLOON PULMONARY ANGIOPLASTY N/A 1/12/2021    Procedure: CV BALLOON PULMONARY ANGIOPLASTY;  Surgeon: Amos Castañeda MD;  Location:  HEART CARDIAC CATH LAB     CV PULMONARY ANGIOGRAM N/A 10/5/2020     Procedure: Pulmonary Angiogram;  Surgeon: Lorenzo Sullivan MD;  Location: [facility]     CV PULMONARY ANGIOGRAM N/A 1/12/2021    Procedure: CV PULMONARY ANGIOGRAM;  Surgeon: Amos Castañeda MD;  [facility]     CV RIGHT HEART CATH MEASUREMENTS RECORDED N/A 03/04/2020    Procedure: CV RIGHT HEART CATH;  Surgeon: Karolina Turcios MD;  [facility]     CV RIGHT HEART CATH MEASUREMENTS RECORDED N/A 10/5/2020    Procedure: Right Heart Cath; balloon pulmonary angiogram;  Surgeon: Lorenzo Sullivan MD;  Location: [facility]     CV RIGHT HEART CATH MEASUREMENTS RECORDED N/A 12/30/2020    Procedure: Right Heart Cath;  Surgeon: Karolina Turcios MD;  [facility]     CV RIGHT HEART CATH MEASUREMENTS RECORDED N/A 1/12/2021    Procedure: CV RIGHT HEART CATH;  Surgeon: Amos Castañeda MD;  Location:  HEART CARDIAC CATH LAB     IR CVC TUNNEL PLACEMENT > 5 YRS OF AGE  03/09/2020     MASTECTOMY Left      PICC SINGLE LUMEN PLACEMENT Right 03/04/2020    4Fr - 41cm, Medial brachial vein. Left mastectomy     PICC TRIPLE LUMEN PLACEMENT Right 10/02/2020    5Fr - 43cm (7cm external), Basilic vein, high RA       PREVIOUS ENDOSCOPY:  Colonoscopy 2017- [facility] w/ Dr. Krishnan     ALLERGIES:     Allergies   Allergen Reactions     Sulfa Drugs Anaphylaxis and Hives       PERTINENT MEDICATIONS:    Current Outpatient Medications:      acetaminophen (TYLENOL) 325 MG tablet, Take 325 mg by mouth every 6 hours as needed for mild pain, Disp: , Rfl:      albuterol (PROAIR HFA/PROVENTIL HFA/VENTOLIN HFA) 108 (90 Base) MCG/ACT inhaler, Inhale 2 puffs into the lungs every 4 hours as needed for shortness of breath / dyspnea or wheezing , Disp: , Rfl:      alendronate (FOSAMAX) 70 MG tablet, Take 70 mg by mouth once a week, Disp: , Rfl:      Ascorbic Acid (VITAMIN C) 500 MG CAPS, Take 500 mg by mouth every morning  , Disp: , Rfl:      aspirin (ASA) 81 MG tablet, Take 81 mg by mouth daily , Disp: , Rfl:      beclomethasone HFA (QVAR REDIHALER) 80 MCG/ACT inhaler, Inhale 2 puffs into the lungs 2 times daily, Disp: , Rfl:      bumetanide (BUMEX) 2 MG tablet, Take 2 tablets (4 mg) by mouth 2 times daily, Disp: 120 tablet, Rfl: 4     calcium citrate-vitamin D (CALCIUM CITRATE + D3) 315-250 MG-UNIT TABS per tablet, Take 1 tablet by mouth 2 times daily , Disp: , Rfl:      cetirizine (ZYRTEC) 10 MG tablet, Take 10 mg by mouth daily, Disp: , Rfl:      digoxin (LANOXIN) 62.5 MCG tablet, Take 1 tablet (62.5 mcg) by mouth daily, Disp: 90 tablet, Rfl: 3     gabapentin (NEURONTIN) 300 MG capsule, Take 300 mg by mouth 3 times daily Can take an additional 300mg at bedtime as needed, Disp: , Rfl:      hydroxychloroquine (PLAQUENIL) 200 MG tablet, Take 200 mg by mouth daily , Disp: , Rfl:      ipratropium (ATROVENT) 0.06 % nasal spray, Spray 2 sprays into both nostrils 3 times daily , Disp: , Rfl:      macitentan (OPSUMIT) 10 MG tablet, Take 1 tablet (10 mg) by mouth every 48 hours, Disp: 30 tablet, Rfl: 0     potassium chloride ER (KLOR-CON M) 20 MEQ CR tablet, Take 2 tablets (40 mEq) by mouth 2 times daily, Disp: 120 tablet, Rfl: 3     riociguat (ADEMPAS) 1.5 MG tablet, Take by mouth 3 times daily, Disp: , Rfl:      sodium chloride (OCEAN) 0.65 % nasal spray, Spray 1 spray into both nostrils daily as needed for congestion, Disp: , Rfl:      Treprostinil (REMODULIN IJ), Dose: 30 ng/kg/min Vial concentration: 1 mg/mL Final concentration: 60,000 ng/mL Dosing weight: 56.3 kg  Pump rate: 41 mL/day Per patient and [facility] on 12/23/2020, Disp: , Rfl:      warfarin ANTICOAGULANT (COUMADIN) 3 MG tablet, Take 3 mg PO on Mon, Wed, Fri and Sat, and 4.5 mg all other days of the week, Disp: 170 tablet, Rfl: 3    SOCIAL HISTORY:  Social History     Socioeconomic History     Marital status:      Spouse name: Not on file     Number of children: Not on  file     Years of education: Not on file     Highest education level: Not on file   Occupational History     Not on file   Social Needs     Financial resource strain: Not on file     Food insecurity     Worry: Not on file     Inability: Not on file     Transportation needs     Medical: Not on file     Non-medical: Not on file   Tobacco Use     Smoking status: Never Smoker     Smokeless tobacco: Never Used   Substance and Sexual Activity     Alcohol use: Yes     Comment: occasionally      Drug use: Never     Sexual activity: Not on file   Lifestyle     Physical activity     Days per week: Not on file     Minutes per session: Not on file     Stress: Not on file   Relationships     Social connections     Talks on phone: Not on file     Gets together: Not on file     Attends [employer] service: Not on file     Active member of club or organization: Not on file     Attends meetings of clubs or organizations: Not on file     Relationship status: Not on file     Intimate partner violence     Fear of current or ex partner: Not on file     Emotionally abused: Not on file     Physically abused: Not on file     Forced sexual activity: Not on file   Other Topics Concern     Not on file   Social History Narrative    Is . Has 1 adult daughter who is in her 50s. Has 2 grandkids. 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       FAMILY HISTORY:  FH of CRC: none   FH of IBD: none   Family History   Problem Relation Age of Onset     Heart Failure Mother      Kidney Disease Mother      Coronary Artery Disease Maternal Grandfather 50     LUNG DISEASE No family hx of      Clotting Disorder No family hx of        Past/family/social history reviewed and no changes    PHYSICAL EXAMINATION:  No physical exam     PERTINENT STUDIES:    Orders Only on 01/28/2021   Component Date Value Ref Range Status     N-Terminal Pro Bnp 01/28/2021 5,679* 0 - 450 pg/mL Final     Sodium 01/28/2021 137  133 - 144 mmol/L Final      Potassium 01/28/2021 4.3  3.4 - 5.3 mmol/L Final     Chloride 01/28/2021 101  94 - 109 mmol/L Final     Carbon Dioxide 01/28/2021 30  20 - 32 mmol/L Final     Anion Gap 01/28/2021 5  3 - 14 mmol/L Final     Glucose 01/28/2021 128* 70 - 99 mg/dL Final     Urea Nitrogen 01/28/2021 44* 7 - 30 mg/dL Final     Creatinine 01/28/2021 1.56* 0.52 - 1.04 mg/dL Final     GFR Estimate 01/28/2021 31* >60 mL/min/[1.73_m2] Final     GFR Estimate If Black 01/28/2021 36* >60 mL/min/[1.73_m2] Final     Calcium 01/28/2021 10.2* 8.5 - 10.1 mg/dL Final     INR 01/28/2021 2.24* 0.86 - 1.14 Final

## 2021-02-26 NOTE — PROGRESS NOTES
ANTICOAGULATION FOLLOW-UP CLINIC VISIT    Patient Name:  Karen Anderson  Date:  2021  Contact Type:  Telephone    SUBJECTIVE:  Patient Findings     Positives:  Change in medications (adempas on hold for now)    Comments:  Spoke with Karen.  She reports she is feeling a little better since the medication adempas was put on hold. She is eating well.  Having less diarrhea.        Clinical Outcomes     Comments:  Spoke with Karen.  She reports she is feeling a little better since the medication adempas was put on hold. She is eating well.  Having less diarrhea.           OBJECTIVE    Recent labs: (last 7 days)     21  0854   INR 2.70*       ASSESSMENT / PLAN  INR assessment SUPRA    Recheck INR In: 2 WEEKS    INR Location Clinic      Anticoagulation Summary  As of 2021    INR goal:  2.0-2.5   TTR:  71.9 % (9.4 mo)   INR used for dosin.70 (2021)   Warfarin maintenance plan:  4.5 mg (3 mg x 1.5) every Sun, Tue, Thu; 3 mg (3 mg x 1) all other days   Full warfarin instructions:  4.5 mg every Sun, Tue, Thu; 3 mg all other days   Weekly warfarin total:  25.5 mg   Plan last modified:  Laure Enriquez RN (2021)   Next INR check:  3/11/2021   Priority:  Critical   Target end date:  Indefinite    Indications    Pulmonary hypertension (H) [I27.20]  CTEPH (chronic thromboembolic pulmonary hypertension) (H) [I27.24]  Long term current use of anticoagulant therapy [Z79.01]             Anticoagulation Episode Summary     INR check location:      Preferred lab:      Send INR reminders to:  [facility]    Comments:  call home first, OK to leave message on either phone. OK to speak with spouse, Don.  [facility] SPEAK IN TABLETS (Has 3mg Tabs) 10/23/20:  new goal range is 2 - 2.5  [facility] in [place] and will need to fax orders P [phone] F [phone]      Anticoagulation Care Providers     Provider Role Specialty Phone number    Osiris Luong MD Referring Cardiovascular  Disease [phone]    Karolina Turcios MD Referring Cardiovascular Disease [phone]            See the Encounter Report to view Anticoagulation Flowsheet and Dosing Calendar (Go to Encounters tab in chart review, and find the Anticoagulation Therapy Visit)    Spoke with Karen.  Decreased warfarin dose slightly.  She will recheck INR in two weeks.  She is scheduled for a RHC on 3/17/21.      Laure Enriquez RN

## 2021-02-28 DIAGNOSIS — Z11.59 ENCOUNTER FOR SCREENING FOR OTHER VIRAL DISEASES: ICD-10-CM

## 2021-03-03 ENCOUNTER — TELEPHONE (OUTPATIENT)
Dept: CARDIOLOGY | Facility: CLINIC | Age: 81
End: 2021-03-03

## 2021-03-03 NOTE — TELEPHONE ENCOUNTER
"Patient called and LM regarding Adempas. Did not go into detail. Unable to reach patient, LM and asked her to call me back today when able. Kezia Arnett RN on 3/3/2021 at 9:00 AM    Patient wanted to let me know that she is doing \"great\" off Adempas. She has more energy, is able to do more around the house, no dizziness anymore, and is not falling asleep during the day as often. She is happy she is off the Adempas. Continues to follow up with GI. Kezia Arnett RN on 3/4/2021 at 10:02 AM      "

## 2021-03-11 ENCOUNTER — ANTICOAGULATION THERAPY VISIT (OUTPATIENT)
Dept: ANTICOAGULATION | Facility: CLINIC | Age: 81
End: 2021-03-11

## 2021-03-11 DIAGNOSIS — I27.20 PULMONARY HYPERTENSION (H): ICD-10-CM

## 2021-03-11 DIAGNOSIS — I27.24 CTEPH (CHRONIC THROMBOEMBOLIC PULMONARY HYPERTENSION) (H): ICD-10-CM

## 2021-03-11 DIAGNOSIS — Z79.01 LONG TERM CURRENT USE OF ANTICOAGULANT THERAPY: ICD-10-CM

## 2021-03-11 LAB
CAPILLARY BLOOD COLLECTION: NORMAL
INR PPP: 2.9 (ref 0.86–1.14)

## 2021-03-11 PROCEDURE — 36416 COLLJ CAPILLARY BLOOD SPEC: CPT | Performed by: INTERNAL MEDICINE

## 2021-03-11 PROCEDURE — 85610 PROTHROMBIN TIME: CPT | Performed by: INTERNAL MEDICINE

## 2021-03-11 NOTE — PROGRESS NOTES
ANTICOAGULATION FOLLOW-UP CLINIC VISIT    Patient Name:  Karen Anderson  Date:  3/11/2021  Contact Type:  Telephone    SUBJECTIVE:  Patient Findings     Comments:  Patient is scheduled for a RHC on 3/17.  INR needs to be <2.5.  Patient will hold warfarin X 2 days prior to procedure.         Clinical Outcomes     Comments:  Patient is scheduled for a RHC on 3/17.  INR needs to be <2.5.  Patient will hold warfarin X 2 days prior to procedure.            OBJECTIVE    Recent labs: (last 7 days)     21  1022   INR 2.90*       ASSESSMENT / PLAN  No question data found.  Anticoagulation Summary  As of 3/11/2021    INR goal:  2.0-2.5   TTR:  68.7 % (9.9 mo)   INR used for dosin.90 (3/11/2021)   Warfarin maintenance plan:  4.5 mg (3 mg x 1.5) every Mon, Fri; 3 mg (3 mg x 1) all other days   Full warfarin instructions:  3/15: Hold; 3/16: Hold; Otherwise 4.5 mg every Mon, Fri; 3 mg all other days   Weekly warfarin total:  24 mg   Plan last modified:  Brittany Kirk, RN (3/11/2021)   Next INR check:  3/17/2021   Priority:  Critical   Target end date:  Indefinite    Indications    Pulmonary hypertension (H) [I27.20]  CTEPH (chronic thromboembolic pulmonary hypertension) (H) [I27.24]  Long term current use of anticoagulant therapy [Z79.01]             Anticoagulation Episode Summary     INR check location:      Preferred lab:      Send INR reminders to:  [facility]    Comments:  call home first, OK to leave message on either phone. OK to speak with spouse, Don.  [facility] SPEAK IN TABLETS (Has 3mg Tabs) 10/23/20:  new goal range is 2 - 2.5  [facility] in [place] and will need to fax orders P [phone] F [phone]      Anticoagulation Care Providers     Provider Role Specialty Phone number    Osiris Luong MD Referring Cardiovascular Disease [phone]    Karolina Turcios MD Referring Cardiovascular Disease [phone]            See the Encounter Report to view Anticoagulation  Flowsheet and Dosing Calendar (Go to Encounters tab in chart review, and find the Anticoagulation Therapy Visit)    Spoke with patient.     Brittany Kirk RN

## 2021-03-14 DIAGNOSIS — Z11.59 ENCOUNTER FOR SCREENING FOR OTHER VIRAL DISEASES: ICD-10-CM

## 2021-03-14 LAB
SARS-COV-2 RNA RESP QL NAA+PROBE: NORMAL
SPECIMEN SOURCE: NORMAL

## 2021-03-14 PROCEDURE — U0005 INFEC AGEN DETEC AMPLI PROBE: HCPCS | Performed by: INTERNAL MEDICINE

## 2021-03-14 PROCEDURE — U0003 INFECTIOUS AGENT DETECTION BY NUCLEIC ACID (DNA OR RNA); SEVERE ACUTE RESPIRATORY SYNDROME CORONAVIRUS 2 (SARS-COV-2) (CORONAVIRUS DISEASE [COVID-19]), AMPLIFIED PROBE TECHNIQUE, MAKING USE OF HIGH THROUGHPUT TECHNOLOGIES AS DESCRIBED BY CMS-2020-01-R: HCPCS | Performed by: INTERNAL MEDICINE

## 2021-03-15 ENCOUNTER — TELEPHONE (OUTPATIENT)
Dept: CARDIOLOGY | Facility: CLINIC | Age: 81
End: 2021-03-15

## 2021-03-15 NOTE — TELEPHONE ENCOUNTER
LM with patient on both cell phone and home phone, reminding patient to hold warfarin prior to procedure on Wednesday. Asked patient to call me back with questions. Kezia Arnett RN on 3/15/2021 at 8:28 AM      Check-In  Time Check-In Location Estimated Length Procedure   Name        [facility]  waiting room 60-90 minutes Right Heart Catheterization**     Procedure Preparations & Instructions     This is an invasive procedure that DOES require preparation:    - Nothing to eat for 6 hours   - You may have clear liquids up until the time of your procedure  - A ride should be arranged for you in the instance you are unable to drive home, however you should be able to function as you normally would after the procedure      ?      *For Patients on anticoagulants: ? Your Coumadin Clinic will give you instructions on medication adjustments or bridging prior to the procedure      Per Dr. Turcios, patient does not need to be bridged with Lovenox. Okay to hold 2 days prior to procedure.

## 2021-03-16 ENCOUNTER — TELEPHONE (OUTPATIENT)
Dept: CARDIOLOGY | Facility: CLINIC | Age: 81
End: 2021-03-16

## 2021-03-17 ENCOUNTER — HOSPITAL ENCOUNTER (OUTPATIENT)
Facility: CLINIC | Age: 81
Discharge: HOME | End: 2021-03-17
Attending: INTERNAL MEDICINE | Admitting: INTERNAL MEDICINE
Payer: COMMERCIAL

## 2021-03-17 ENCOUNTER — APPOINTMENT (OUTPATIENT)
Dept: MEDSURG UNIT | Facility: CLINIC | Age: 81
End: 2021-03-17
Attending: INTERNAL MEDICINE
Payer: COMMERCIAL

## 2021-03-17 ENCOUNTER — HOSPITAL ENCOUNTER (OUTPATIENT)
Dept: CARDIOLOGY | Facility: CLINIC | Age: 81
End: 2021-03-17
Attending: INTERNAL MEDICINE
Payer: COMMERCIAL

## 2021-03-17 ENCOUNTER — APPOINTMENT (OUTPATIENT)
Dept: LAB | Facility: CLINIC | Age: 81
End: 2021-03-17
Attending: INTERNAL MEDICINE
Payer: COMMERCIAL

## 2021-03-17 VITALS
BODY MASS INDEX: 22.18 KG/M2 | DIASTOLIC BLOOD PRESSURE: 65 MMHG | HEART RATE: 66 BPM | HEIGHT: 59 IN | TEMPERATURE: 97.6 F | SYSTOLIC BLOOD PRESSURE: 122 MMHG | OXYGEN SATURATION: 91 % | RESPIRATION RATE: 20 BRPM | WEIGHT: 110 LBS

## 2021-03-17 DIAGNOSIS — I27.20 PULMONARY HYPERTENSION (H): ICD-10-CM

## 2021-03-17 DIAGNOSIS — I50.810 RVF (RIGHT VENTRICULAR FAILURE) (H): ICD-10-CM

## 2021-03-17 DIAGNOSIS — R06.02 SOB (SHORTNESS OF BREATH): ICD-10-CM

## 2021-03-17 LAB
ALBUMIN SERPL-MCNC: 3 G/DL (ref 3.4–5)
ALP SERPL-CCNC: 171 U/L (ref 40–150)
ALT SERPL W P-5'-P-CCNC: 26 U/L (ref 0–50)
ANION GAP SERPL CALCULATED.3IONS-SCNC: 3 MMOL/L (ref 3–14)
AST SERPL W P-5'-P-CCNC: 31 U/L (ref 0–45)
BILIRUB SERPL-MCNC: 0.3 MG/DL (ref 0.2–1.3)
BUN SERPL-MCNC: 37 MG/DL (ref 7–30)
CALCIUM SERPL-MCNC: 9.7 MG/DL (ref 8.5–10.1)
CHLORIDE SERPL-SCNC: 106 MMOL/L (ref 94–109)
CO2 SERPL-SCNC: 32 MMOL/L (ref 20–32)
CREAT SERPL-MCNC: 1.25 MG/DL (ref 0.52–1.04)
ERYTHROCYTE [DISTWIDTH] IN BLOOD BY AUTOMATED COUNT: 18.4 % (ref 10–15)
GFR SERPL CREATININE-BSD FRML MDRD: 41 ML/MIN/{1.73_M2}
GLUCOSE SERPL-MCNC: 70 MG/DL (ref 70–99)
HCT VFR BLD AUTO: 31.6 % (ref 35–47)
HGB BLD-MCNC: 8.7 G/DL (ref 11.7–15.7)
HGB BLD-MCNC: 9 G/DL (ref 11.7–15.7)
INR PPP: 1.52 (ref 0.86–1.14)
MCH RBC QN AUTO: 25.1 PG (ref 26.5–33)
MCHC RBC AUTO-ENTMCNC: 28.5 G/DL (ref 31.5–36.5)
MCV RBC AUTO: 88 FL (ref 78–100)
NT-PROBNP SERPL-MCNC: 3854 PG/ML (ref 0–1800)
OXYHGB MFR BLDV: 56 %
PLATELET # BLD AUTO: 204 10E9/L (ref 150–450)
POTASSIUM SERPL-SCNC: 4.6 MMOL/L (ref 3.4–5.3)
PROT SERPL-MCNC: 7.2 G/DL (ref 6.8–8.8)
RBC # BLD AUTO: 3.58 10E12/L (ref 3.8–5.2)
SODIUM SERPL-SCNC: 141 MMOL/L (ref 133–144)
WBC # BLD AUTO: 2.6 10E9/L (ref 4–11)

## 2021-03-17 PROCEDURE — 85027 COMPLETE CBC AUTOMATED: CPT | Performed by: PHYSICIAN ASSISTANT

## 2021-03-17 PROCEDURE — 999N000132 HC STATISTIC PP CARE STAGE 1

## 2021-03-17 PROCEDURE — 85018 HEMOGLOBIN: CPT

## 2021-03-17 PROCEDURE — 36415 COLL VENOUS BLD VENIPUNCTURE: CPT | Performed by: PHYSICIAN ASSISTANT

## 2021-03-17 PROCEDURE — 99213 OFFICE O/P EST LOW 20 MIN: CPT | Mod: 25 | Performed by: PHYSICIAN ASSISTANT

## 2021-03-17 PROCEDURE — 272N000002 HC OR SUPPLY OTHER OPNP: Performed by: INTERNAL MEDICINE

## 2021-03-17 PROCEDURE — 82810 BLOOD GASES O2 SAT ONLY: CPT

## 2021-03-17 PROCEDURE — 85610 PROTHROMBIN TIME: CPT | Performed by: PHYSICIAN ASSISTANT

## 2021-03-17 PROCEDURE — C1894 INTRO/SHEATH, NON-LASER: HCPCS | Performed by: INTERNAL MEDICINE

## 2021-03-17 PROCEDURE — 272N000001 HC OR GENERAL SUPPLY STERILE: Performed by: INTERNAL MEDICINE

## 2021-03-17 PROCEDURE — 250N000009 HC RX 250: Performed by: INTERNAL MEDICINE

## 2021-03-17 PROCEDURE — 93306 TTE W/DOPPLER COMPLETE: CPT | Mod: 26 | Performed by: INTERNAL MEDICINE

## 2021-03-17 PROCEDURE — 93306 TTE W/DOPPLER COMPLETE: CPT

## 2021-03-17 PROCEDURE — 250N000009 HC RX 250: Performed by: PHYSICIAN ASSISTANT

## 2021-03-17 PROCEDURE — 93451 RIGHT HEART CATH: CPT | Performed by: INTERNAL MEDICINE

## 2021-03-17 PROCEDURE — 83880 ASSAY OF NATRIURETIC PEPTIDE: CPT | Performed by: PHYSICIAN ASSISTANT

## 2021-03-17 PROCEDURE — 80053 COMPREHEN METABOLIC PANEL: CPT | Performed by: PHYSICIAN ASSISTANT

## 2021-03-17 RX ORDER — LIDOCAINE 40 MG/G
CREAM TOPICAL
Status: COMPLETED | OUTPATIENT
Start: 2021-03-17 | End: 2021-03-17

## 2021-03-17 RX ADMIN — LIDOCAINE: 40 CREAM TOPICAL at 13:30

## 2021-03-17 ASSESSMENT — MIFFLIN-ST. JEOR: SCORE: 884.59

## 2021-03-17 NOTE — PRE-PROCEDURE
[facility]   Interventional Cardiology        Consenting/Education for Right Heart Catheterization   Patient understands during the portion for right heart catheterization a fine tube (catheter) is put into the vein of the groin/neck.  It is carefully passed along until it reaches the heart and then goes up into the blood vessels of the lungs. This is done to measure a variety of pressures in your heart and can tell us how well the heart is filling and emptying, as well as monitor fluid status.     Patient also understands risks and complications of the procedure which include, but are not limited to bruising/swelling around the incision site, infection, bleeding, allergic reaction to local anesthetic.      Patient verbalized understanding of risks and benefits of the right heart catheterization and has elected to proceed with the procedure.       Moni Cardoso PA-C  [facility] Interventional Cardiology  [phone]

## 2021-03-17 NOTE — DISCHARGE INSTRUCTIONS
[facility]                        Interventional Cardiology  Discharge Instructions   Post Right Heart Cath      AFTER YOU GO HOME:    DO drink plenty of fluids    DO resume your regular diet and medications unless otherwise instructed by your Primary Physician    Do Not scrub the procedure site vigorously    No lotion or powder to the puncture site for 3 days    CALL YOUR PRIMARY PHYSICIAN IF: You may resume all normal activity.  Monitor neck site for bleeding, swelling, or voice changes. If you notice bleeding or swelling immediately apply pressure to the site and call number below to speak with Cardiology Fellow.  If you experience any changes in your breathing you should call your doctor immediately or come to the closest Emergency Department.  Do not drive yourself.    ADDITIONAL INSTRUCTIONS: Medications: You are to resume all home medications including anticoagulation therapy unless otherwise advised by your primary cardiologist or nurse coordinator.    Follow Up: Per your primary cardiology team    If you have any questions or concerns regarding your procedure site please call [phone] at anytime and ask for Cardiology Fellow on call.  They are available 24 hours a day.  You may also contact the Cardiology Clinic after hours number at [phone].                                                       Telephone Numbers [phone] Monday-Friday 8:00 am to 4:30 pm    [phone] After 4:30 pm Monday-Friday, Weekends & Holidays  Ask for Interventional Cardiologist on call. Someone is on call 24 hours/day   [facility] toll free number [phone] Monday-Friday 8:00 am to 4:30 pm   [facility] Emergency Dept [phone]

## 2021-03-17 NOTE — H&P
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     # Pulmonary hypertension  # CTEPH  - No contraindications noted, will move forward with right heart cath  - Patient will be admitted as OP to Obs unit post procedure, with plans to discharge home after post procedure orders have been met  - Follow up with Dr. Turcios after right heart cath  - resume warfarin tonight     Patient seen and discussed with Dr. Turcios.     Moni Cardoso PA-C  [facility] Cardiology Cath Lab Services  [phone]        HPI:   Ms. Anderson is a pleasant 80 year old female with a PMHx including rheumatoid arthritis, hypertension, scoliosis, asthma, pulmonary MAC, breast cancer s/p chemotherapy with Adriamycin, Cytoxan, Taxol, and tamoxifen and status post left mastectomy. She also has a history of extensive bilateral pulmonary emboli and DVT in 1998 thought to be secondary to tamoxifen along with chemotherapy induced cardiomyopathy. 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 Although she had proximal disease, she was too high risk for surgical PTE. Current pulmonary hypertension medications include Remodulin 30ng/kg/min, Adempas 1.5mg TID, Opsumit 10mg every other day. She presents today for scheduled right heart catheterization.     Patient reports feeling well today, denies recent illness, chest pain, shortness of breath, abdominal pain, headache, vision change, or weakness. She has been struggling with stool incontinence and diarrhea following a hospitalization in January for fecal impaction which was treated with magnesium and an enema. She has been working with GI and started a fiber supplement. She is following with PT for treatment of her chronic lower extremity edema.     No  changes in medications.      Past Medical History:   Diagnosis Date     Asthma      Breast cancer (H)      Hypertension      Mycobacterium avium complex (H)      Pulmonary embolism (H)      Pulmonary hypertension (H)      Rheumatoid arthritis (H)      Scoliosis        Past Surgical History:   Procedure Laterality Date     CV BALLOON PULMONARY ANGIOPLASTY N/A 1/12/2021    Procedure: CV BALLOON PULMONARY ANGIOPLASTY;  Surgeon: Amos Castañeda MD;  [facility]     CV PULMONARY ANGIOGRAM N/A 10/5/2020    Procedure: Pulmonary Angiogram;  Surgeon: Lorenzo Sullivan MD;  [facility]     CV PULMONARY ANGIOGRAM N/A 1/12/2021    Procedure: CV PULMONARY ANGIOGRAM;  Surgeon: Amos Castañeda MD;  [facility]     CV RIGHT HEART CATH MEASUREMENTS RECORDED N/A 03/04/2020    Procedure: CV RIGHT HEART CATH;  Surgeon: Karolina Turcios MD;  [facility]     CV RIGHT HEART CATH MEASUREMENTS RECORDED N/A 10/5/2020    Procedure: Right Heart Cath; balloon pulmonary angiogram;  Surgeon: Lorenzo Sullivan MD;  [facility]     CV RIGHT HEART CATH MEASUREMENTS RECORDED N/A 12/30/2020    Procedure: Right Heart Cath;  Surgeon: Karolina Turcios MD;  [facility]     CV RIGHT HEART CATH MEASUREMENTS RECORDED N/A 1/12/2021    Procedure: CV RIGHT HEART CATH;  Surgeon: Amos Castañeda MD;  Location:  [facility]     IR CVC TUNNEL PLACEMENT > 5 YRS OF AGE  03/09/2020     MASTECTOMY Left      PICC SINGLE LUMEN PLACEMENT Right 03/04/2020    4Fr - 41cm, Medial brachial vein. Left mastectomy     PICC TRIPLE LUMEN PLACEMENT Right 10/02/2020    5Fr - 43cm (7cm external), Basilic vein, high RA       No current facility-administered medications on file prior to encounter.   acetaminophen (TYLENOL) 325 MG tablet, Take 325 mg by mouth every 6 hours as needed for  mild pain  alendronate (FOSAMAX) 70 MG tablet, Take 70 mg by mouth once a week  Ascorbic Acid (VITAMIN C) 500 MG CAPS, Take 500 mg by mouth every morning   aspirin (ASA) 81 MG tablet, Take 81 mg by mouth daily   beclomethasone HFA (QVAR REDIHALER) 80 MCG/ACT inhaler, Inhale 2 puffs into the lungs 2 times daily  bumetanide (BUMEX) 2 MG tablet, Take 2 tablets (4 mg) by mouth 2 times daily  calcium citrate-vitamin D (CALCIUM CITRATE + D3) 315-250 MG-UNIT TABS per tablet, Take 1 tablet by mouth 2 times daily   digoxin (LANOXIN) 62.5 MCG tablet, Take 1 tablet (62.5 mcg) by mouth daily  gabapentin (NEURONTIN) 300 MG capsule, Take 300 mg by mouth 3 times daily Can take an additional 300mg at bedtime as needed  hydroxychloroquine (PLAQUENIL) 200 MG tablet, Take 200 mg by mouth daily   ipratropium (ATROVENT) 0.06 % nasal spray, Spray 2 sprays into both nostrils 3 times daily   potassium chloride ER (KLOR-CON M) 20 MEQ CR tablet, Take 2 tablets (40 mEq) by mouth 2 times daily  riociguat (ADEMPAS) 1.5 MG tablet, Take by mouth 3 times daily  sodium chloride (OCEAN) 0.65 % nasal spray, Spray 1 spray into both nostrils daily as needed for congestion  Treprostinil (REMODULIN IJ), Dose: 30 ng/kg/min  Vial concentration: 1 mg/mL  Final concentration: 60,000 ng/mL  Dosing weight: 56.3 kg   Pump rate: 41 mL/day  Per patient and [facility] on 12/23/2020  albuterol (PROAIR HFA/PROVENTIL HFA/VENTOLIN HFA) 108 (90 Base) MCG/ACT inhaler, Inhale 2 puffs into the lungs every 4 hours as needed for shortness of breath / dyspnea or wheezing   cetirizine (ZYRTEC) 10 MG tablet, Take 10 mg by mouth daily  macitentan (OPSUMIT) 10 MG tablet, Take 1 tablet (10 mg) by mouth every 48 hours  warfarin ANTICOAGULANT (COUMADIN) 3 MG tablet, Take 3 mg PO on Mon, Wed, Fri and Sat, and 4.5 mg all other days of the week        Family History   Problem Relation Age of Onset     Heart Failure Mother      Kidney Disease Mother      Coronary Artery Disease Maternal  "Grandfather 50     LUNG DISEASE No family hx of      Clotting Disorder No family hx of        Social History     Tobacco Use     Smoking status: Never Smoker     Smokeless tobacco: Never Used   Substance Use Topics     Alcohol use: Yes     Comment: occasionally        Allergies   Allergen Reactions     Sulfa Drugs Anaphylaxis and Hives         ROS:   Skin: negative  Respiratory: No shortness of breath, dyspnea on exertion, cough, or hemoptysis  Cardiovascular: negative  Gastrointestinal: positive for negative and diarrhea  Genitourinary: urgency  Musculoskeletal: negative  Neurologic: negative  Hematologic/Lymphatic/Immunologic: negative  Endocrine: negative    Physical Examination:  Vitals: /65 (BP Location: Right arm, Cuff Size: Adult Small)   Pulse 76   Temp 97.6  F (36.4  C) (Axillary)   Resp 16   Ht 1.499 m (4' 11\")   Wt 49.9 kg (110 lb)   SpO2 91%   BMI 22.22 kg/m    BMI= Body mass index is 22.22 kg/m .    GENERAL APPEARANCE: healthy, alert and no distress  HEENT: no icterus, no xanthelasmas, normal pupil size and reaction, normal palate, mucosa moist  NECK: JVP 12 cm, brisk carotid upstroke bilaterally  CHEST: lungs clear to auscultation without rales, rhonchi or wheezes, no use of accessory muscles, no retractions. Central venous catheter in right chest. Clean, dry, without erythema or drainage.   CARDIOVASCULAR: regular rhythm, normal S1 and S2, no S3 or S4 and no murmur, click or rub.  ABDOMEN: soft, non tender, bowel sounds normal  EXTREMITIES: warm, ACE wraps in place bilaterally. R > L edema. Radial pulses 2+ bilaterally.   NEURO: alert and oriented to person/place/time, normal speech.   PSYCH: Mood and affect are appropriate.   SKIN: no ecchymoses, no rashes      Laboratory:  CMP  Recent Labs   Lab 03/17/21  1137      POTASSIUM 4.6   CHLORIDE 106   CO2 32   ANIONGAP 3   GLC 70   BUN 37*   CR 1.25*   GFRESTIMATED 41*   GFRESTBLACK 48*   ESTEFANÍA 9.7   PROTTOTAL 7.2   ALBUMIN 3.0* "   BILITOTAL 0.3   ALKPHOS 171*   AST 31   ALT 26     CBC  Recent Labs   Lab 03/17/21  1137   WBC 2.6*   RBC 3.58*   HGB 9.0*   HCT 31.6*   MCV 88   MCH 25.1*   MCHC 28.5*   RDW 18.4*      INR 1.52

## 2021-03-17 NOTE — PROGRESS NOTES
We had the pleasure of seeing Ms. Anderson for follow-up in our pulmonary hypertension clinic.  As you know, she is a very pleasant 80-year-old female with chronic thromboembolic pulmonary hypertension who is currently on combination medical therapy with IV treprostinil 30 ng/kg/min and macitentan 10 mg daily every other day.  She also underwent 1 session of pulmonary balloon angioplasty of the right lower lobe.    She had pulmonary balloon angioplasty of the right lower lobe in the mid January.  The procedure was aborted in the middle after she had a hemoptysis.  She had very high PA pressures increasing the risk for perforation hemoptysis.  Subsequently, we stopped her Adempas because of lightheadedness dizziness and low blood pressure.  Overall, in the last several weeks after stopping the Adempas, she states that she feels better and her quality of life has improved.  She no longer has lightheadedness or dizziness.  She is able to think and do her finances properly.  She continues to be still quite limited with exertional shortness of breath but better than in December.  She can do her basic activities of daily living.  She can climb 1 flight of stairs slowly at home by herself.  I would currently characterize as functional class III.  She continues to have lower extremity swelling in the right lower extremity.  She uses compression stockings.  She is on high doses of Bumex 4 mg twice a day.  Her weight has been stable at 110 pounds.  She is working with GI clinic for her constipation his previous diarrhea.  She is on a high-fiber diet.  She no longer takes Imodium.  She has not had any bleeding problems.    Past Medical History:   Diagnosis Date     Asthma      Breast cancer (H)      Hypertension      Mycobacterium avium complex (H)      Pulmonary embolism (H)      Pulmonary hypertension (H)      Rheumatoid arthritis (H)      Scoliosis      No current facility-administered medications for this encounter.       Current Outpatient Medications   Medication Sig     acetaminophen (TYLENOL) 325 MG tablet Take 325 mg by mouth every 6 hours as needed for mild pain     alendronate (FOSAMAX) 70 MG tablet Take 70 mg by mouth once a week     Ascorbic Acid (VITAMIN C) 500 MG CAPS Take 500 mg by mouth every morning      aspirin (ASA) 81 MG tablet Take 81 mg by mouth daily      beclomethasone HFA (QVAR REDIHALER) 80 MCG/ACT inhaler Inhale 2 puffs into the lungs 2 times daily     bumetanide (BUMEX) 2 MG tablet Take 2 tablets (4 mg) by mouth 2 times daily     calcium citrate-vitamin D (CALCIUM CITRATE + D3) 315-250 MG-UNIT TABS per tablet Take 1 tablet by mouth 2 times daily      digoxin (LANOXIN) 62.5 MCG tablet Take 1 tablet (62.5 mcg) by mouth daily     gabapentin (NEURONTIN) 300 MG capsule Take 300 mg by mouth 3 times daily Can take an additional 300mg at bedtime as needed     hydroxychloroquine (PLAQUENIL) 200 MG tablet Take 200 mg by mouth daily      ipratropium (ATROVENT) 0.06 % nasal spray Spray 2 sprays into both nostrils 3 times daily      potassium chloride ER (KLOR-CON M) 20 MEQ CR tablet Take 2 tablets (40 mEq) by mouth 2 times daily     riociguat (ADEMPAS) 1.5 MG tablet Take by mouth 3 times daily     sodium chloride (OCEAN) 0.65 % nasal spray Spray 1 spray into both nostrils daily as needed for congestion     Treprostinil (REMODULIN IJ) Dose: 30 ng/kg/min  Vial concentration: 1 mg/mL  Final concentration: 60,000 ng/mL  Dosing weight: 56.3 kg   Pump rate: 41 mL/day  Per patient and [facility] on 12/23/2020     albuterol (PROAIR HFA/PROVENTIL HFA/VENTOLIN HFA) 108 (90 Base) MCG/ACT inhaler Inhale 2 puffs into the lungs every 4 hours as needed for shortness of breath / dyspnea or wheezing      cetirizine (ZYRTEC) 10 MG tablet Take 10 mg by mouth daily     macitentan (OPSUMIT) 10 MG tablet Take 1 tablet (10 mg) by mouth every 48 hours     warfarin ANTICOAGULANT (COUMADIN) 3 MG tablet Take 3 mg PO on Mon, Wed, Fri and Sat, and  4.5 mg all other days of the week       Review of system  A detailed 10 point review of system obtained as described in history of present illness all other systems reviewed and are negative.    Examination  She was awake, alert, oriented x3.  She was comfortable.  She had pallor but no cyanosis or jaundice.  Neck exam revealed jugular venous distention up to 12 cm above manubrium sternal angle.  Her carotids were 2+ bilaterally.  Her carotids are bounding.  Cardiac auscultation revealed normal S1 and loud P2 no murmur rub or gallop.  Auscultation of her lungs revealed equal air entry on both sides.  She had no wheezing or crepitations.  Her abdomen was soft with normal was no tenderness no rigidity no guarding.  She had no focal neurological deficit.  Her right leg had 1+ edema.  She also has some fullness and edema in her upper thighs from her compression stockings.    I personally reviewed her echocardiogram.  Her right ventricle appears to be moderate to severely dilated with moderately reduced in function.  She has severe tricuspid regurgitation.  Her right atrium is severely enlarged.  Her IVC was dilated and collapsing less than 50%.  She had a small circumferential pericardial effusion.  Her left ventricle was normal in size and function.  She had no other significant valvular abnormalities.  Compared to January of this year, there was no significant change in her right ventricle size or function.    Her NT proBNP is downtrending but still elevated.  Her renal function and electrolytes are stable.  She continues to remain anemic but her hemoglobin is slowly uptrending.  INR was subtherapeutic as she was holding her Coumadin for the procedure.    Her repeat right heart catheterization showed an RA pressure of 14, RV of 87/14, PA of 82/22 with a mean of 44, pulmonary capillary wedge pressure of 14, PA saturation of 50.5 with an aortic saturation of 86% on room air, thermodilution cardiac output of 3.97 L/min  with an index of 2.77 L/min/m , measured Concepcion cardiac output of 2.93 L/min with an cardiac index of 2.1 L/min/m .  Calculated PVR based on thermodilution cardiac output was 7.97 Wood units.    Assessment and Plan:    In summary, Ms. Anderson is a pleasant 80-year-old female with chronic thromboembolic pulmonary hypertension who is currently on combination therapy with IV treprostinil 30 ng/kg/min and macitentan 10 mg every other day.  She has undergone 1 session of pulmonary balloon angioplasty of the right lower lobe.  She returns today for follow-up.    Her cardiac index and PVR have improved on the current combination therapy of IV treprostinil 30 ng/kg/min, macitentan 10 mg every other day, and 1 session of pulmonary balloon angioplasty. However, clinically, she continues to still struggle with right heart failure.  She is volume overloaded despite being on Bumex 4 mg twice a day.  Clinically her JVD is elevated.  Her right atrial pressure is 14 mmHg.  She has small circumferential pericardial effusion.      She is definitely a high risk candidate for pulmonary thromboendarterectomy.  She is friable.  She has severe kyphoscoliosis.  She is also hesitant to undergo surgery given these risk.  Unfortunately, during her first session of pulmonary balloon angioplasty she had hemoptysis.  This was thought to be due to her high PA pressure and wire perforation.  We will discuss with our interventional cardiologist regarding further pulmonary balloon angioplasty. I had a very clear discussion with her. This would be a high risk procedure.  I quoted her a 5%-10% chance of pulmonary hemorrhage requiring ICU stay and mechanical ventilation and 3 to 5% mortality risk.  If she is not happy with her current quality of life and would like to take the risk we can plan for pulmonary balloon angioplasty.  I also discussed with her the option of getting a second opinion at [facility] for her candidacy for pulmonary balloon  angioplasty.  We will send her records to [facility] to see whether she would be a candidate there.    In the interim, we will try to optimize her pulmonary vasodilator therapy as much as we can.  As a first step, I have recommended her to start taking the macitentan every day.  She is currently mildly volume overloaded.  She will continue on Bumex 4 mg twice a day.  If she were to have worsening fluid retention then we will go back to every other day.  Will use metolazone as needed.  If she does not tolerate daily macitentan, then we will try to increase her IV treprostinil dose.  She has had problems with diarrhea in the past.  She has also not tolerated Adempas due to low blood pressure, dizziness and lightheadedness.  We could potentially try a low-dose of tadalafil or sildenafil as well as a last step.     Overall, I am afraid that her prognosis is poor given her severe chronic thromboembolic pulmonary hypertension and RV dysfunction that is not responding very well to medical therapy which is not surprising given the proximal nature of her disease.  She and her  are aware of her overall prognosis.    She will return to see me at the end of the month in the clinic.  She will call us in the interim should any further worsening symptoms.  It was a pleasure meeting her today in clinic continues to [facility].    Sincerely,  Karolina Turcios MD   [facility]  Heart Failure, Transplant, and Mechanical Circulatory Support Cardiology   Cardiovascular Division  [facility]   [phone]

## 2021-03-17 NOTE — IP AVS SNAPSHOT
MRN:[id number]                      After Visit Summary   3/17/2021    Karen Anderson    MRN: [id number]           Visit Information        Department      3/17/2021 11:01 AM [facility] [address] [place]          Review of your medicines      UNREVIEWED medicines. Ask your doctor about these medicines       Dose / Directions   acetaminophen 325 MG tablet  Commonly known as: TYLENOL      Dose: 325 mg  Take 325 mg by mouth every 6 hours as needed for mild pain  Refills: 0     albuterol 108 (90 Base) MCG/ACT inhaler  Commonly known as: PROAIR HFA/PROVENTIL HFA/VENTOLIN HFA      Dose: 2 puff  Inhale 2 puffs into the lungs every 4 hours as needed for shortness of breath / dyspnea or wheezing  Refills: 0     alendronate 70 MG tablet  Commonly known as: FOSAMAX      Dose: 70 mg  Take 70 mg by mouth once a week  Refills: 0     aspirin 81 MG tablet  Commonly known as: ASA      Dose: 81 mg  Take 81 mg by mouth daily  Refills: 0     beclomethasone HFA 80 MCG/ACT inhaler  Commonly known as: QVAR REDIHALER      Dose: 2 puff  Inhale 2 puffs into the lungs 2 times daily  Refills: 0     bumetanide 2 MG tablet  Commonly known as: BUMEX  Used for: RVF (right ventricular failure) (H)      Dose: 4 mg  Take 2 tablets (4 mg) by mouth 2 times daily  Quantity: 120 tablet  Refills: 4     Calcium Citrate + D3 315-250 MG-UNIT Tabs per tablet  Generic drug: calcium citrate-vitamin D      Dose: 1 tablet  Take 1 tablet by mouth 2 times daily  Refills: 0     cetirizine 10 MG tablet  Commonly known as: zyrTEC      Dose: 10 mg  Take 10 mg by mouth daily  Refills: 0     digoxin 62.5 MCG tablet  Commonly known as: LANOXIN  Used for: Pulmonary hypertension (H)      Dose: 62.5 mcg  Take 1 tablet (62.5 mcg) by mouth daily  Quantity: 90 tablet  Refills: 3     gabapentin 300 MG capsule  Commonly known as: NEURONTIN      Dose: 300 mg  Take 300 mg by mouth 3 times daily Can take an additional 300mg at bedtime as  needed  Refills: 0     hydroxychloroquine 200 MG tablet  Commonly known as: PLAQUENIL      Dose: 200 mg  Take 200 mg by mouth daily  Refills: 0     ipratropium 0.06 % nasal spray  Commonly known as: ATROVENT      Dose: 2 spray  Spray 2 sprays into both nostrils 3 times daily  Refills: 0     macitentan 10 MG tablet  Commonly known as: OPSUMIT  Used for: Pulmonary hypertension (H)      Dose: 10 mg  Take 1 tablet (10 mg) by mouth every 48 hours  Quantity: 30 tablet  Refills: 0     potassium chloride ER 20 MEQ CR tablet  Commonly known as: KLOR-CON M  Used for: SOB (shortness of breath), Pulmonary hypertension (H)      Dose: 40 mEq  Take 2 tablets (40 mEq) by mouth 2 times daily  Quantity: 120 tablet  Refills: 3     REMODULIN IJ      Dose: 30 ng/kg/min  Vial concentration: 1 mg/mL  Final concentration: 60,000 ng/mL  Dosing weight: 56.3 kg   Pump rate: 41 mL/day  Per patient and [facility] on 12/23/2020  Refills: 0     riociguat 1.5 MG tablet  Commonly known as: ADEMPAS      Take by mouth 3 times daily  Refills: 0     sodium chloride 0.65 % nasal spray  Commonly known as: OCEAN      Dose: 1 spray  Spray 1 spray into both nostrils daily as needed for congestion  Refills: 0     Vitamin C 500 MG Caps      Dose: 500 mg  Take 500 mg by mouth every morning  Refills: 0     warfarin ANTICOAGULANT 3 MG tablet  Commonly known as: COUMADIN  Used for: Pulmonary hypertension (H)      Take as directed. If you are unsure how to take this medication, talk to your nurse or doctor.  Original instructions: Take 3 mg PO on Mon, Wed, Fri and Sat, and 4.5 mg all other days of the week  Quantity: 170 tablet  Refills: 3              Protect others around you: Learn how to safely use, store and throw away your medicines at www.disposemymeds.org.       Follow-ups after your visit       Care Instructions       Further instructions from your care team       [facility]                        Interventional Cardiology  Discharge  Instructions   Post Right Heart Cath      AFTER YOU GO HOME:    DO drink plenty of fluids    DO resume your regular diet and medications unless otherwise instructed by your Primary Physician    Do Not scrub the procedure site vigorously    No lotion or powder to the puncture site for 3 days    CALL YOUR PRIMARY PHYSICIAN IF: You may resume all normal activity.  Monitor neck site for bleeding, swelling, or voice changes. If you notice bleeding or swelling immediately apply pressure to the site and call number below to speak with Cardiology Fellow.  If you experience any changes in your breathing you should call your doctor immediately or come to the closest Emergency Department.  Do not drive yourself.    ADDITIONAL INSTRUCTIONS: Medications: You are to resume all home medications including anticoagulation therapy unless otherwise advised by your primary cardiologist or nurse coordinator.    Follow Up: Per your primary cardiology team    If you have any questions or concerns regarding your procedure site please call [phone] at anytime and ask for Cardiology Fellow on call.  They are available 24 hours a day.  You may also contact the Cardiology Clinic after hours number at [phone].                                                       Telephone Numbers [phone] Monday-Friday 8:00 am to 4:30 pm    [phone] After 4:30 pm Monday-Friday, Weekends & Holidays  Ask for Interventional Cardiologist on call. Someone is on call 24 hours/day   [facility] toll free number [phone] Monday-Friday 8:00 am to 4:30 pm   [facility] Emergency Dept [phone]                   Additional Information About Your Visit       [employer] Payment Information    [employer] Payment gives you secure access to your electronic health record. If you see a primary care provider, you can also send messages to your care team and make appointments. If you have questions, please call your primary care clinic.  If you do not have a primary care  "provider, please call [phone] and they will assist you.       Care EveryWhere ID    This is your Care EveryWhere ID. This could be used by other organizations to access your [place] medical records  [id number]       Your Vitals Were  Most recent update: 3/17/2021  1:23 PM    Blood Pressure   127/65 (BP Location: Right arm, Cuff Size: Adult Small)          Pulse   76          Temperature   97.6  F (36.4  C) (Axillary)          Respirations   16          Height   1.499 m (4' 11\")             Weight   49.9 kg (110 lb)    Pulse Oximetry   91%    BMI (Body Mass Index)   22.22 kg/m           Primary Care Provider Office Phone # Fax #    Carlos Carter [phone]      Equal Access to Services    ANI PRINCE AH: Hadii satinder edmondson hadasho Soomaali, waaxda luqadaha, qaybta kaalmada adeegyada, deb morse hayalma norris . [facility] [phone].    ATENCIÓN: Si habla español, tiene a newby disposición servicios gratuitos de asistencia lingüística. Llame al [phone].    We comply with applicable federal and state civil rights laws, including the [place] Human Rights Act. We do not discriminate on the basis of race, color, creed, Protestant, national origin, marital status, age, disability, sex, sexual orientation, or gender identity.    If you would like an itemization of your charges they will now be available in [employer] 30 days after discharge. To access the itemized statements in [employer] go to billing/billing summary. From there select view account. There will be multiple tabs showing an overview of your account, detail, payments, and communications. From the communications tab you can see your monthly statements, your itemized statements, and any billing letters generated for your account. If you do not have a [employer] account and need help getting access please contact [employer] support at [phone].  If you would prefer to have your itemized statements mailed please contact our automated " itemized bill request line at [phone] option  2.       Thank you!    Thank you for choosing [facility] for your care. Our goal is always to provide you with excellent care. Hearing back from our patients is one way we can continue to improve our services. Please take a few minutes to complete the written survey that you may receive in the mail after you visit with us. Thank you!            Medication List      ASK your doctor about these medications          Morning Afternoon Evening Bedtime As Needed    acetaminophen 325 MG tablet  Also known as: TYLENOL  INSTRUCTIONS: Take 325 mg by mouth every 6 hours as needed for mild pain                     albuterol 108 (90 Base) MCG/ACT inhaler  Also known as: PROAIR HFA/PROVENTIL HFA/VENTOLIN HFA  INSTRUCTIONS: Inhale 2 puffs into the lungs every 4 hours as needed for shortness of breath / dyspnea or wheezing  Doctor's comments: Pharmacy may dispense brand covered by insurance (Proair, or proventil or ventolin or generic albuterol inhaler)                     alendronate 70 MG tablet  Also known as: FOSAMAX  INSTRUCTIONS: Take 70 mg by mouth once a week                     aspirin 81 MG tablet  Also known as: ASA  INSTRUCTIONS: Take 81 mg by mouth daily                     beclomethasone HFA 80 MCG/ACT inhaler  Also known as: QVAR REDIHALER  INSTRUCTIONS: Inhale 2 puffs into the lungs 2 times daily                     bumetanide 2 MG tablet  Also known as: BUMEX  INSTRUCTIONS: Take 2 tablets (4 mg) by mouth 2 times daily                     Calcium Citrate + D3 315-250 MG-UNIT Tabs per tablet  INSTRUCTIONS: Take 1 tablet by mouth 2 times daily  Generic drug: calcium citrate-vitamin D                     cetirizine 10 MG tablet  Also known as: zyrTEC  INSTRUCTIONS: Take 10 mg by mouth daily                     digoxin 62.5 MCG tablet  Also known as: LANOXIN  INSTRUCTIONS: Take 1 tablet (62.5 mcg) by mouth daily                     gabapentin 300 MG capsule  Also known  as: NEURONTIN  INSTRUCTIONS: Take 300 mg by mouth 3 times daily Can take an additional 300mg at bedtime as needed                     hydroxychloroquine 200 MG tablet  Also known as: PLAQUENIL  INSTRUCTIONS: Take 200 mg by mouth daily                     ipratropium 0.06 % nasal spray  Also known as: ATROVENT  INSTRUCTIONS: Spray 2 sprays into both nostrils 3 times daily                     macitentan 10 MG tablet  Also known as: OPSUMIT  INSTRUCTIONS: Take 1 tablet (10 mg) by mouth every 48 hours                     potassium chloride ER 20 MEQ CR tablet  Also known as: KLOR-CON M  INSTRUCTIONS: Take 2 tablets (40 mEq) by mouth 2 times daily                     REMODULIN IJ  INSTRUCTIONS: Dose: 30 ng/kg/min  Vial concentration: 1 mg/mL  Final concentration: 60,000 ng/mL  Dosing weight: 56.3 kg   Pump rate: 41 mL/day  Per patient and [facility] on 12/23/2020                     riociguat 1.5 MG tablet  Also known as: ADEMPAS  INSTRUCTIONS: Take by mouth 3 times daily                     sodium chloride 0.65 % nasal spray  Also known as: OCEAN  INSTRUCTIONS: Spray 1 spray into both nostrils daily as needed for congestion                     Vitamin C 500 MG Caps  INSTRUCTIONS: Take 500 mg by mouth every morning                     warfarin ANTICOAGULANT 3 MG tablet  Also known as: COUMADIN  Take as directed. If you are unsure how to take this medication, talk to your nurse or doctor.  Original instructions: Take 3 mg PO on Mon, Wed, Fri and Sat, and 4.5 mg all other days of the week

## 2021-03-17 NOTE — PROGRESS NOTES
Patient returned from cath lab post RHC.  Dr. Roche to see and then patient appropriate for discharge.  VSS, right neck site clean/dry/intact, no hematoma, and denies pain. Patient tolerated PO food and fluids. Teaching was done and discharge instructions were given.  Patient will discharge to home with  when ready.

## 2021-03-17 NOTE — IP AVS SNAPSHOT
[facility] [address] [address]                                    After Visit Summary   3/17/2021    Karen Anderson    MRN: [id number]           After Visit Summary Signature Page    I have received my discharge instructions, and my questions have been answered. I have discussed any challenges I see with this plan with the nurse or doctor.    ..........................................................................................................................................  Patient/Patient Representative Signature      ..........................................................................................................................................  Patient Representative Print Name and Relationship to Patient    ..................................................               ................................................  Date                                   Time    ..........................................................................................................................................  Reviewed by Signature/Title    ...................................................              ..............................................  Date                                               Time          22EPIC Rev 08/18

## 2021-03-18 ENCOUNTER — TELEPHONE (OUTPATIENT)
Dept: CARDIOLOGY | Facility: CLINIC | Age: 81
End: 2021-03-18

## 2021-03-18 ENCOUNTER — ANTICOAGULATION THERAPY VISIT (OUTPATIENT)
Dept: ANTICOAGULATION | Facility: CLINIC | Age: 81
End: 2021-03-18

## 2021-03-18 DIAGNOSIS — I27.20 PULMONARY HYPERTENSION (H): ICD-10-CM

## 2021-03-18 DIAGNOSIS — R06.02 SOB (SHORTNESS OF BREATH): Primary | ICD-10-CM

## 2021-03-18 DIAGNOSIS — E87.70 HYPERVOLEMIA: ICD-10-CM

## 2021-03-18 DIAGNOSIS — Z79.01 LONG TERM CURRENT USE OF ANTICOAGULANT THERAPY: ICD-10-CM

## 2021-03-18 DIAGNOSIS — I27.24 CTEPH (CHRONIC THROMBOEMBOLIC PULMONARY HYPERTENSION) (H): ICD-10-CM

## 2021-03-18 RX ORDER — METOLAZONE 2.5 MG/1
2.5 TABLET ORAL PRN
Qty: 10 TABLET | Refills: 0 | Status: ON HOLD | OUTPATIENT
Start: 2021-03-18 | End: 2021-06-10

## 2021-03-18 NOTE — TELEPHONE ENCOUNTER
LM with patient regarding metolazone prescription; advised that it should be used PRN with increased weight gain. Asked patient to call me before taking any doses, as we need to closely monitor her weight on Opsumit 10 mg daily. Kezia Arnett RN on 3/18/2021 at 10:18 AM    ----- Message from Fabby Seymour RN sent at 3/17/2021  6:51 PM CDT -----    ----- Message -----  From: Karolina Turcios MD  Sent: 3/17/2021   4:39 PM CDT  To: Cardiology Ph Nurse-    Team:  She feels better coming off Adempas. RHC and Echo are about the same. Still volume overloaded despite bumex 4 mg bid.     Plan:    1. I have asked her to increase her opsumit to every day - little worried she may retain fluid as she already has edema but we have only limited options. I have asked her to call us if she were to notice worsening lower extremity swelling.     2. Continue Bumex 4 mg bid and use metolazone 2.5 mg as needed for worsenign edema or weight gain    3. I will talk to [facility] whether they will be willing to do her BPA. I will also talk to Dr. Tamez/Ayala again    Thank you    TT

## 2021-03-18 NOTE — PROGRESS NOTES
ANTICOAGULATION FOLLOW-UP CLINIC VISIT    Patient Name:  Karen Anderson  Date:  3/18/2021  Contact Type:  Telephone    SUBJECTIVE:  Patient Findings     Comments:  Patient had an attempted angioplasty that was aborted because of hemoptysis.  Patient held her warfarin X 2 days prior to procedure.         Clinical Outcomes     Comments:  Patient had an attempted angioplasty that was aborted because of hemoptysis.  Patient held her warfarin X 2 days prior to procedure.            OBJECTIVE    Recent labs: (last 7 days)     21  1137   INR 1.52*       ASSESSMENT / PLAN  No question data found.  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 OK to speak with spouse, Don.  [facility] SPEAK IN TABLETS (Has 3mg Tabs) 10/23/20:  new goal range is 2 - 2.5  [facility] in [place] and will need to fax orders P [phone] F [phone]      Anticoagulation Care Providers     Provider Role Specialty Phone number    Osiris Luong MD Referring Cardiovascular Disease [phone]    Karolina Turcios MD Referring Cardiovascular Disease [phone]            See the Encounter Report to view  Anticoagulation Flowsheet and Dosing Calendar (Go to Encounters tab in chart review, and find the Anticoagulation Therapy Visit)    Left message for patient with results and dosing recommendations. Asked patient to call back to report any missed doses, falls, signs and symptoms of bleeding or clotting, any changes in health, medication, or diet. Asked patient to call back with any questions or concerns.      Brittany Kirk RN

## 2021-03-24 ENCOUNTER — ANTICOAGULATION THERAPY VISIT (OUTPATIENT)
Dept: ANTICOAGULATION | Facility: CLINIC | Age: 81
End: 2021-03-24

## 2021-03-24 DIAGNOSIS — Z79.01 LONG TERM CURRENT USE OF ANTICOAGULANT THERAPY: ICD-10-CM

## 2021-03-24 DIAGNOSIS — I27.20 PULMONARY HYPERTENSION (H): ICD-10-CM

## 2021-03-24 DIAGNOSIS — I27.24 CTEPH (CHRONIC THROMBOEMBOLIC PULMONARY HYPERTENSION) (H): ICD-10-CM

## 2021-03-24 LAB
CAPILLARY BLOOD COLLECTION: NORMAL
INR PPP: 1.3 (ref 0.9–1.1)

## 2021-03-24 PROCEDURE — 85610 PROTHROMBIN TIME: CPT | Performed by: INTERNAL MEDICINE

## 2021-03-24 PROCEDURE — 36416 COLLJ CAPILLARY BLOOD SPEC: CPT | Performed by: INTERNAL MEDICINE

## 2021-03-24 NOTE — PROGRESS NOTES
ANTICOAGULATION FOLLOW-UP CLINIC VISIT    Patient Name:  Karen Anderson  Date:  3/24/2021  Contact Type:  Telephone    SUBJECTIVE:  Patient Findings     Comments:  Pt denies missed doses or changes in her diet. Consulted with IMANI Carrillo with dosing.         Clinical Outcomes     Negatives:  Major bleeding event, Thromboembolic event, Anticoagulation-related hospital admission, Anticoagulation-related ED visit, Anticoagulation-related fatality    Comments:  Pt denies missed doses or changes in her diet. Consulted with IMANI Carrillo with dosing.            OBJECTIVE    Recent labs: (last 7 days)     21   INR 1.3*       ASSESSMENT / PLAN  INR assessment SUB    Recheck INR In: 1 WEEK    INR Location Clinic      Anticoagulation Summary  As of 3/24/2021    INR goal:  2.0-2.5   TTR:  66.8 % (10.3 mo)   INR used for dosin.3 (3/24/2021)   Warfarin maintenance plan:  3 mg (3 mg x 1) every Sun, Tue, Thu; 4.5 mg (3 mg x 1.5) all other days   Full warfarin instructions:  3 mg every Sun, Tue, Thu; 4.5 mg all other days   Weekly warfarin total:  27 mg   Plan last modified:  Tanisha Kimbrough, RN (3/24/2021)   Next INR check:  3/31/2021   Priority:  High   Target end date:  Indefinite    Indications    Pulmonary hypertension (H) [I27.20]  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 OK to speak with spouse, Don.  [facility] SPEAK IN TABLETS (Has 3mg Tabs) 10/23/20:  new goal range is 2 - 2.5  [facility] in [place] and will need to fax orders P [phone] F [phone]      Anticoagulation Care Providers     Provider Role Specialty Phone number    Osiris Luong MD Referring Cardiovascular Disease [phone]    Karolina Turcios  MD Referring Cardiovascular Disease [phone]            See the Encounter Report to view Anticoagulation Flowsheet and Dosing Calendar (Go to Encounters tab in chart review, and find the Anticoagulation Therapy Visit)    Spoke with patient. Gave them their lab results and new warfarin recommendation.  No changes in health, medication, or diet. No missed doses, no falls. No signs or symptoms of bleed or clotting.     Tanisha Kimbrough RN

## 2021-03-25 LAB — INR PPP: 1.3 (ref 0.86–1.14)

## 2021-03-31 ENCOUNTER — ANTICOAGULATION THERAPY VISIT (OUTPATIENT)
Dept: ANTICOAGULATION | Facility: CLINIC | Age: 81
End: 2021-03-31

## 2021-03-31 DIAGNOSIS — I27.24 CTEPH (CHRONIC THROMBOEMBOLIC PULMONARY HYPERTENSION) (H): ICD-10-CM

## 2021-03-31 DIAGNOSIS — Z79.01 LONG TERM CURRENT USE OF ANTICOAGULANT THERAPY: ICD-10-CM

## 2021-03-31 DIAGNOSIS — I27.20 PULMONARY HYPERTENSION (H): ICD-10-CM

## 2021-03-31 LAB
CAPILLARY BLOOD COLLECTION: NORMAL
INR PPP: 2 (ref 0.86–1.14)

## 2021-03-31 PROCEDURE — 36416 COLLJ CAPILLARY BLOOD SPEC: CPT | Performed by: INTERNAL MEDICINE

## 2021-03-31 PROCEDURE — 85610 PROTHROMBIN TIME: CPT | Performed by: INTERNAL MEDICINE

## 2021-04-05 ENCOUNTER — VIRTUAL VISIT (OUTPATIENT)
Dept: CARDIOLOGY | Facility: CLINIC | Age: 81
End: 2021-04-05
Attending: INTERNAL MEDICINE
Payer: COMMERCIAL

## 2021-04-05 DIAGNOSIS — I27.20 PULMONARY HYPERTENSION (H): Primary | ICD-10-CM

## 2021-04-05 DIAGNOSIS — R06.02 SOB (SHORTNESS OF BREATH): ICD-10-CM

## 2021-04-05 PROCEDURE — 99214 OFFICE O/P EST MOD 30 MIN: CPT | Mod: 95 | Performed by: INTERNAL MEDICINE

## 2021-04-05 NOTE — PROGRESS NOTES
"Karen is a 80 year old who is being evaluated via a billable video visit.      How would you like to obtain your AVS? [employer]  If the video visit is dropped, the invitation should be resent by:  Pt connecting via [employer]  Will anyone else be joining your video visit? No      Vitals - Patient Reported  Systolic (Patient Reported): 100  Diastolic (Patient Reported): 54  Weight (Patient Reported): 54.9 kg (121 lb)  Height (Patient Reported): 149.9 cm (4' 11\")  BMI (Based on Pt Reported Ht/Wt): 24.44  Pulse (Patient Reported): 70  Pain Score: No Pain (0)(SOB always on exertion)    Vitals were taken and medication reconciled.    DEO Macario  10:01 AM  "

## 2021-04-05 NOTE — LETTER
"2021      RE: Karen Anderson  [address]  [facility] [address]       Dear Colleague,    Thank you for the opportunity to participate in the care of your patient, Karen Anderson, at the [facility] at [facility]. Please see a copy of my visit note below.    Karen is a 80 year old who is being evaluated via a billable video visit.      How would you like to obtain your AVS? [employer]  If the video visit is dropped, the invitation should be resent by:  Pt connecting via [facility]  Will anyone else be joining your video visit? No      Vitals - Patient Reported  Systolic (Patient Reported): 100  Diastolic (Patient Reported): 54  Weight (Patient Reported): 54.9 kg (121 lb)  Height (Patient Reported): 149.9 cm (4' 11\")  BMI (Based on Pt Reported Ht/Wt): 24.44  Pulse (Patient Reported): 70  Pain Score: No Pain (0)(SOB always on exertion)    Vitals were taken and medication reconciled.    DEO Macario  10:01 AM    Video-Visit Details    Type of service:  Video Visit    Video Start Time:11:26 AM    Video End Time:11:42 AM    Originating Location (pt. Location): Home    Distant Location (provider location):  [facility]     Platform used for Video Visit: PenBlade      Service Date: 2021    Shaneka Forde MD  [address]  [place] [address]     Clint Abernathy MD  [address]  [place] [address]     Noam Jones MD  [address]  [place] [address]     Carlos Carter MD  [address]  [place] [address]    RE:  Karen Anderson   MRN:  [id number]  :  1940      Dear Josemanuel Ha Skemp, and Raul:    We had the pleasure of seeing Karen Anderson at the [facility]. As you know, Ms. Anderson is a very pleasant 80 year old female with chronic thromboembolic pulmonary " hypertension on IV treprostinil at 30 ng/kg/min and Opsumit 10 mg daily.  She presents today for follow-up.    She previously had difficulty with uptitration of Adempas due to dizziness.  Adempas was discontinued in February.  She had pulmonary balloon angioplasty of the right lower lobe in the mid January.  The procedure was aborted in the middle after she had hemoptysis.  She had very high PA pressures increasing the risk for perforation hemoptysis.  Macitentan was changed from every other day to daily after her right heart catheterization in March.  She presents for follow-up today via a video visit to monitor her status after changing macitentan from every other day to daily.    She notes that her lightheadedness/fogginess has improved after discontinuing Adempas.  Her respiratory status has been stable.  She is able to ascend a flight of stairs but often has to pause.  We would categorize her as NYHA functional class III.  She has persistent lower extremity edema that has not worsened.  She has occasional abdominal bloating.  She denies syncope.  Her bowel movements have been more firm and regular with the use of fiber.  Denies any erythema, pain, or purulent drainage from her Goldberg catheter site.  She occasionally has some white discharge from the catheter site.  Her home systolic blood pressures are 90s to 110s, but is mostly in the 100s.  She has an upcoming appointment at [facility] for a second opinion.      PAST MEDICAL HISTORY:  Past Medical History:   Diagnosis Date     Asthma      Breast cancer (H)      Hypertension      Mycobacterium avium complex (H)      Pulmonary embolism (H)      Pulmonary hypertension (H)      Rheumatoid arthritis (H)      Scoliosis        PAST SURGICAL HISTORY:  Past Surgical History:   Procedure Laterality Date     CV BALLOON PULMONARY ANGIOPLASTY N/A 1/12/2021     CV PULMONARY ANGIOGRAM N/A 10/5/2020     CV PULMONARY ANGIOGRAM N/A 1/12/2021     CV RIGHT HEART CATH MEASUREMENTS  RECORDED N/A 03/04/2020     CV RIGHT HEART CATH MEASUREMENTS RECORDED N/A 10/5/2020     CV RIGHT HEART CATH MEASUREMENTS RECORDED N/A 12/30/2020     CV RIGHT HEART CATH MEASUREMENTS RECORDED N/A 1/12/2021     CV RIGHT HEART CATH MEASUREMENTS RECORDED N/A 3/17/2021     IR CVC TUNNEL PLACEMENT > 5 YRS OF AGE  03/09/2020     MASTECTOMY Left      PICC SINGLE LUMEN PLACEMENT Right 03/04/2020     PICC TRIPLE LUMEN PLACEMENT Right 10/02/2020       FAMILY HISTORY:  Family History   Problem Relation Age of Onset     Heart Failure Mother      Kidney Disease Mother      Coronary Artery Disease Maternal Grandfather 50     LUNG DISEASE No family hx of      Clotting Disorder No family hx of        SOCIAL HISTORY:  Social History     Socioeconomic History     Marital status:      Spouse name: Not on file     Number of children: Not on file     Years of education: Not on file     Highest education level: Not on file   Occupational History     Not on file   Social Needs     Financial resource strain: Not on file     Food insecurity     Worry: Not on file     Inability: Not on file     Transportation needs     Medical: Not on file     Non-medical: Not on file   Tobacco Use     Smoking status: Never Smoker     Smokeless tobacco: Never Used   Substance and Sexual Activity     Alcohol use: Yes     Comment: occasionally      Drug use: Never     Sexual activity: Not on file   Lifestyle     Physical activity     Days per week: Not on file     Minutes per session: Not on file     Stress: Not on file   Relationships     Social connections     Talks on phone: Not on file     Gets together: Not on file     Attends Rastafari service: Not on file     Active member of club or organization: Not on file     Attends meetings of clubs or organizations: Not on file     Relationship status: Not on file     Intimate partner violence     Fear of current or ex partner: Not on file     Emotionally abused: Not on file     Physically abused: Not on  file     Forced sexual activity: Not on file   Other Topics Concern     Not on file   Social History Narrative    Is . Has 1 adult daughter who is in her 50s. Has 2 grandkids. 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       CURRENT MEDICATIONS:  Current Outpatient Medications   Medication Sig     acetaminophen (TYLENOL) 325 MG tablet Take 325 mg by mouth every 6 hours as needed for mild pain     albuterol (PROAIR HFA/PROVENTIL HFA/VENTOLIN HFA) 108 (90 Base) MCG/ACT inhaler Inhale 2 puffs into the lungs every 4 hours as needed for shortness of breath / dyspnea or wheezing      alendronate (FOSAMAX) 70 MG tablet Take 70 mg by mouth once a week     Ascorbic Acid (VITAMIN C) 500 MG CAPS Take 500 mg by mouth every morning      aspirin (ASA) 81 MG tablet Take 81 mg by mouth daily      beclomethasone HFA (QVAR REDIHALER) 80 MCG/ACT inhaler Inhale 2 puffs into the lungs 2 times daily     bumetanide (BUMEX) 2 MG tablet Take 2 tablets (4 mg) by mouth 2 times daily     calcium citrate-vitamin D (CALCIUM CITRATE + D3) 315-250 MG-UNIT TABS per tablet Take 1 tablet by mouth 2 times daily      cetirizine (ZYRTEC) 10 MG tablet Take 10 mg by mouth daily     Corn Dextrin (CLEAR FIBER POWDER PO)      digoxin (LANOXIN) 62.5 MCG tablet Take 1 tablet (62.5 mcg) by mouth daily     gabapentin (NEURONTIN) 300 MG capsule Take 300 mg by mouth 3 times daily Can take an additional 300mg at bedtime as needed     hydroxychloroquine (PLAQUENIL) 200 MG tablet Take 200 mg by mouth daily      ipratropium (ATROVENT) 0.06 % nasal spray Spray 2 sprays into both nostrils 3 times daily      macitentan (OPSUMIT) 10 MG tablet Take 1 tablet (10 mg) by mouth daily     metolazone (ZAROXOLYN) 2.5 MG tablet Take 1 tablet (2.5 mg) by mouth as needed (as needed for weight gain and swelling; call cardiology clinic before taking)     potassium chloride ER (KLOR-CON M) 20 MEQ CR tablet Take 2 tablets (40 mEq) by mouth 2 times daily      sodium chloride (OCEAN) 0.65 % nasal spray Spray 1 spray into both nostrils daily as needed for congestion     Treprostinil (REMODULIN IJ) Dose: 30 ng/kg/min  Vial concentration: 1 mg/mL  Final concentration: 60,000 ng/mL  Dosing weight: 56.3 kg   Pump rate: 41 mL/day  Per patient and [facility] on 12/23/2020     warfarin ANTICOAGULANT (COUMADIN) 3 MG tablet Take 3 mg PO on Mon, Wed, Fri and Sat, and 4.5 mg all other days of the week     No current facility-administered medications for this visit.        ROS:   10 point ROS negative except as discussed in above HPI.    EXAM:  GENERAL: Healthy, alert and no distress  EYES: Eyes grossly normal to inspection.  No discharge or erythema, or obvious scleral/conjunctival abnormalities.  RESP: No audible wheeze, cough, or visible cyanosis.  No visible retractions or increased work of breathing.     SKIN: Visible skin clear. No significant rash, abnormal pigmentation or lesions.  NEURO: Cranial nerves grossly intact.  Mentation and speech appropriate for age.  PSYCH: Mentation appears normal, affect normal/bright, judgement and insight intact, normal speech and appearance well-groomed.      Echocardiogram 3/17/21:  Global and regional left ventricular function is normal with an EF of 60-65%.  Paradoxical septal motion consistent with right ventricular pressure and  volume overload is present.  Moderate to severe right ventricular dilation is present.  Global right ventricular function is moderately reduced.  Right ventricular systolic pressure is 60mmHg above the right atrial pressure.  PV AT 69ms.  IVC diameter >2.1 cm collapsing <50% with sniff suggests a high RA pressure  estimated at 15 mmHg or greater.  Small circumferential pericardial effusion is present without any hemodynamic  significance.    RHC 3/17/21:  RA: 14  RV: 87/28  PA: 88/22 (44)  PCWP: 14  TD CO/CI: 3.97/2.8  Concepcion CO/CI: 2.95/2.1  PVR: 7.6    6MWT 3/2/20: Ambulated 165 meters, desaturated to 77% on 2 L/min  supplemental O2.      Assessment and Plan:     Karen Anderson is a pleasant 80 year old female with chronic thromboembolic pulmonary hypertension who is currently on combination therapy with IV treprostinil 30 ng/kg/min and macitentan 10 mg daily.  She has undergone 1 session of pulmonary balloon angioplasty of the right lower lobe that was complicated by hemoptysis.  She returns today for follow-up.     Her cardiac index and PVR have improved on the current combination therapy of IV treprostinil 30 ng/kg/min, macitentan 10 mg every other day, and 1 session of pulmonary balloon angioplasty. However, clinically, she continues to still struggle with right heart failure.  She is volume overloaded despite being on Bumex 4 mg twice a day.  Her right atrial pressure is 14 mmHg.  She has small circumferential pericardial effusion.       She is definitely a high risk candidate for pulmonary thromboendarterectomy.  She is friable.  She has severe kyphoscoliosis.  She is also hesitant to undergo surgery given these risk.  Unfortunately, during her first session of pulmonary balloon angioplasty she had hemoptysis.  This was thought to be due to her high PA pressure and wire perforation.  The high risks of repeat pulmonary balloon angioplasty were previously discussed with her.  She will be getting a second opinion at [facility] next month.    At this time, we will continue her current medications of IV Remodulin 32 ng/kg/min, macitentan 10 mg daily, digoxin, Bumex 4 mg twice daily, and metolazone as needed.  She is not previously tolerated Adempas due to low blood pressure, dizziness, and lightheadedness.  If needed, in the future, we could uptitrate her Remodulin and could potentially try a low-dose PDE-5 inhibitor.    Overall, her prognosis is poor given her severe chronic thromboembolic pulmonary hypertension and RV dysfunction that is not responding very well to medical therapy which is not surprising given the  proximal nature of her disease.  She and her  are aware of her overall prognosis.    We will check labs locally later this week (in 2 days) when she has her INR draw.  Pending her labs, in 1 to 2 weeks, we may consider increasing Remodulin.  We will follow-up with her with either a video or telephone visit during the third or fourth week in April after she is seen at [facility].    It was a pleasure seeing Karen Anderson at the [facility]. Please contact us with any questions or concerns that you may have.      Patient was seen and discussed with staff attending, Dr. Turcios.      Sincerely,      Eden Mckeon MD, PhD  Cardiology Fellow    I examined the patient and agree with the assessment and plan of Dr. Mckeon.    Karolina Turcios MD   [facility]  Heart Failure, Transplant, and Mechanical Circulatory Support Cardiology   Cardiovascular Division  [facility]   [phone]

## 2021-04-05 NOTE — PATIENT INSTRUCTIONS
Medication Changes:  - No medication changes at this time. Please continue current medication regiment.    Patient Instructions:  1. Continue staying active and eat a heart healthy diet.    2. Please keep current list of medications with you at all times.    3. Remember to weigh yourself daily after voiding and before you consume any food or beverages and log the numbers.  If you have gained 2 pounds overnight or 5 pounds in a week contact us immediately for medication adjustments or further instructions.    4. **Please call us immediately if you have any syncope (fainting or passing out), chest pain, edema (swelling or weight gain), or decline in your functional status (general decline in how you are feeling).    Follow up Appointment Information:  - Labs this week  - Follow up with Dr. Turcios in 1 month after you have your visit with the [facility]    We are located on the third floor of the [facility] the [facility].  Our address is     [address] [place] [address] on [address] [place] [address]    Thank you for allowing us to be a part of your care here at the [facility]    If you have questions or concerns please contact us at:    Fabby Seymour, RN, BSN, PHN  Lexus Balbuena (Scheduling,Prior Auth)  Nurse Coordinator     Clinic   Pulmonary Hypertension   Pulmonary Hypertension  [facility]  (Phone)[phone]   (Phone) [phone]        (Fax) [phone]    ** Please note that you will NOT receive a reminder call regarding your scheduled testing, reminder calls are for provider appointments only.  If you are scheduled for testing within the [facility] system you may receive a call regarding pre-registration for billing purposes only.**     Support Group:  Pulmonary Hypertension Association  Https://www.phassociation.org/  **Look at the Events  Tab** They even have Support Groups that you can call into    [facility] Support Group  Second Saturday of the Month from 1-3 PM   Location: [address] [place] [address] [facility] [address]  Leader: Jessenia Loo  Phone: [phone] or [phone]  Email: [email or web address]   \

## 2021-04-06 NOTE — PROGRESS NOTES
Video-Visit Details    Type of service:  Video Visit    Video Start Time:11:26 AM    Video End Time:11:42 AM    Originating Location (pt. Location): Home    Distant Location (provider location):  [facility]     Platform used for Video Visit: Darron      Service Date: 2021    Shaneka Forde MD  [address]  [place] [address]     Clint Abernathy MD  [address]  [place] [address]     Noam Jones MD  [address]  [place] [address]     Carlos Carter MD  [address]  [place] [address]    RE:  Karen Anderson   MRN:  [id number]  :  1940      Dear Josemanuel Ha, Karen, and Raul:    We had the pleasure of seeing Karen Anderson at the [facility]. As you know, Ms. Anderson is a very pleasant 80 year old female with chronic thromboembolic pulmonary hypertension on IV treprostinil at 30 ng/kg/min and Opsumit 10 mg daily.  She presents today for follow-up.    She previously had difficulty with uptitration of Adempas due to dizziness.  Adempas was discontinued in February.  She had pulmonary balloon angioplasty of the right lower lobe in the mid January.  The procedure was aborted in the middle after she had hemoptysis.  She had very high PA pressures increasing the risk for perforation hemoptysis.  Macitentan was changed from every other day to daily after her right heart catheterization in March.  She presents for follow-up today via a video visit to monitor her status after changing macitentan from every other day to daily.    She notes that her lightheadedness/fogginess has improved after discontinuing Adempas.  Her respiratory status has been stable.  She is able to ascend a flight of stairs but often has to pause.  We would categorize her as NYHA functional class III.  She has persistent lower extremity edema that has not worsened.  She has  occasional abdominal bloating.  She denies syncope.  Her bowel movements have been more firm and regular with the use of fiber.  Denies any erythema, pain, or purulent drainage from her Goldberg catheter site.  She occasionally has some white discharge from the catheter site.  Her home systolic blood pressures are 90s to 110s, but is mostly in the 100s.  She has an upcoming appointment at [facility] for a second opinion.      PAST MEDICAL HISTORY:  Past Medical History:   Diagnosis Date     Asthma      Breast cancer (H)      Hypertension      Mycobacterium avium complex (H)      Pulmonary embolism (H)      Pulmonary hypertension (H)      Rheumatoid arthritis (H)      Scoliosis        PAST SURGICAL HISTORY:  Past Surgical History:   Procedure Laterality Date     CV BALLOON PULMONARY ANGIOPLASTY N/A 1/12/2021     CV PULMONARY ANGIOGRAM N/A 10/5/2020     CV PULMONARY ANGIOGRAM N/A 1/12/2021     CV RIGHT HEART CATH MEASUREMENTS RECORDED N/A 03/04/2020     CV RIGHT HEART CATH MEASUREMENTS RECORDED N/A 10/5/2020     CV RIGHT HEART CATH MEASUREMENTS RECORDED N/A 12/30/2020     CV RIGHT HEART CATH MEASUREMENTS RECORDED N/A 1/12/2021     CV RIGHT HEART CATH MEASUREMENTS RECORDED N/A 3/17/2021     IR CVC TUNNEL PLACEMENT > 5 YRS OF AGE  03/09/2020     MASTECTOMY Left      PICC SINGLE LUMEN PLACEMENT Right 03/04/2020     PICC TRIPLE LUMEN PLACEMENT Right 10/02/2020       FAMILY HISTORY:  Family History   Problem Relation Age of Onset     Heart Failure Mother      Kidney Disease Mother      Coronary Artery Disease Maternal Grandfather 50     LUNG DISEASE No family hx of      Clotting Disorder No family hx of        SOCIAL HISTORY:  Social History     Socioeconomic History     Marital status:      Spouse name: Not on file     Number of children: Not on file     Years of education: Not on file     Highest education level: Not on file   Occupational History     Not on file   Social Needs     Financial resource strain: Not on file      Food insecurity     Worry: Not on file     Inability: Not on file     Transportation needs     Medical: Not on file     Non-medical: Not on file   Tobacco Use     Smoking status: Never Smoker     Smokeless tobacco: Never Used   Substance and Sexual Activity     Alcohol use: Yes     Comment: occasionally      Drug use: Never     Sexual activity: Not on file   Lifestyle     Physical activity     Days per week: Not on file     Minutes per session: Not on file     Stress: Not on file   Relationships     Social connections     Talks on phone: Not on file     Gets together: Not on file     Attends Congregational service: Not on file     Active member of club or organization: Not on file     Attends meetings of clubs or organizations: Not on file     Relationship status: Not on file     Intimate partner violence     Fear of current or ex partner: Not on file     Emotionally abused: Not on file     Physically abused: Not on file     Forced sexual activity: Not on file   Other Topics Concern     Not on file   Social History Narrative    Is . Has 1 adult daughter who is in her 50s. Has 2 grandkids. 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       CURRENT MEDICATIONS:  Current Outpatient Medications   Medication Sig     acetaminophen (TYLENOL) 325 MG tablet Take 325 mg by mouth every 6 hours as needed for mild pain     albuterol (PROAIR HFA/PROVENTIL HFA/VENTOLIN HFA) 108 (90 Base) MCG/ACT inhaler Inhale 2 puffs into the lungs every 4 hours as needed for shortness of breath / dyspnea or wheezing      alendronate (FOSAMAX) 70 MG tablet Take 70 mg by mouth once a week     Ascorbic Acid (VITAMIN C) 500 MG CAPS Take 500 mg by mouth every morning      aspirin (ASA) 81 MG tablet Take 81 mg by mouth daily      beclomethasone HFA (QVAR REDIHALER) 80 MCG/ACT inhaler Inhale 2 puffs into the lungs 2 times daily     bumetanide (BUMEX) 2 MG tablet Take 2 tablets (4 mg) by mouth 2 times daily     calcium  citrate-vitamin D (CALCIUM CITRATE + D3) 315-250 MG-UNIT TABS per tablet Take 1 tablet by mouth 2 times daily      cetirizine (ZYRTEC) 10 MG tablet Take 10 mg by mouth daily     Corn Dextrin (CLEAR FIBER POWDER PO)      digoxin (LANOXIN) 62.5 MCG tablet Take 1 tablet (62.5 mcg) by mouth daily     gabapentin (NEURONTIN) 300 MG capsule Take 300 mg by mouth 3 times daily Can take an additional 300mg at bedtime as needed     hydroxychloroquine (PLAQUENIL) 200 MG tablet Take 200 mg by mouth daily      ipratropium (ATROVENT) 0.06 % nasal spray Spray 2 sprays into both nostrils 3 times daily      macitentan (OPSUMIT) 10 MG tablet Take 1 tablet (10 mg) by mouth daily     metolazone (ZAROXOLYN) 2.5 MG tablet Take 1 tablet (2.5 mg) by mouth as needed (as needed for weight gain and swelling; call cardiology clinic before taking)     potassium chloride ER (KLOR-CON M) 20 MEQ CR tablet Take 2 tablets (40 mEq) by mouth 2 times daily     sodium chloride (OCEAN) 0.65 % nasal spray Spray 1 spray into both nostrils daily as needed for congestion     Treprostinil (REMODULIN IJ) Dose: 30 ng/kg/min  Vial concentration: 1 mg/mL  Final concentration: 60,000 ng/mL  Dosing weight: 56.3 kg   Pump rate: 41 mL/day  Per patient and [facility] on 12/23/2020     warfarin ANTICOAGULANT (COUMADIN) 3 MG tablet Take 3 mg PO on Mon, Wed, Fri and Sat, and 4.5 mg all other days of the week     No current facility-administered medications for this visit.        ROS:   10 point ROS negative except as discussed in above HPI.    EXAM:  GENERAL: Healthy, alert and no distress  EYES: Eyes grossly normal to inspection.  No discharge or erythema, or obvious scleral/conjunctival abnormalities.  RESP: No audible wheeze, cough, or visible cyanosis.  No visible retractions or increased work of breathing.     SKIN: Visible skin clear. No significant rash, abnormal pigmentation or lesions.  NEURO: Cranial nerves grossly intact.  Mentation and speech appropriate for  age.  PSYCH: Mentation appears normal, affect normal/bright, judgement and insight intact, normal speech and appearance well-groomed.      Echocardiogram 3/17/21:  Global and regional left ventricular function is normal with an EF of 60-65%.  Paradoxical septal motion consistent with right ventricular pressure and  volume overload is present.  Moderate to severe right ventricular dilation is present.  Global right ventricular function is moderately reduced.  Right ventricular systolic pressure is 60mmHg above the right atrial pressure.  PV AT 69ms.  IVC diameter >2.1 cm collapsing <50% with sniff suggests a high RA pressure  estimated at 15 mmHg or greater.  Small circumferential pericardial effusion is present without any hemodynamic  significance.    RHC 3/17/21:  RA: 14  RV: 87/28  PA: 88/22 (44)  PCWP: 14  TD CO/CI: 3.97/2.8  Concepcion CO/CI: 2.95/2.1  PVR: 7.6    6MWT 3/2/20: Ambulated 165 meters, desaturated to 77% on 2 L/min supplemental O2.      Assessment and Plan:     Karen Anderson is a pleasant 80 year old female with chronic thromboembolic pulmonary hypertension who is currently on combination therapy with IV treprostinil 30 ng/kg/min and macitentan 10 mg daily.  She has undergone 1 session of pulmonary balloon angioplasty of the right lower lobe that was complicated by hemoptysis.  She returns today for follow-up.     Her cardiac index and PVR have improved on the current combination therapy of IV treprostinil 30 ng/kg/min, macitentan 10 mg every other day, and 1 session of pulmonary balloon angioplasty. However, clinically, she continues to still struggle with right heart failure.  She is volume overloaded despite being on Bumex 4 mg twice a day.  Her right atrial pressure is 14 mmHg.  She has small circumferential pericardial effusion.       She is definitely a high risk candidate for pulmonary thromboendarterectomy.  She is friable.  She has severe kyphoscoliosis.  She is also hesitant to undergo  surgery given these risk.  Unfortunately, during her first session of pulmonary balloon angioplasty she had hemoptysis.  This was thought to be due to her high PA pressure and wire perforation.  The high risks of repeat pulmonary balloon angioplasty were previously discussed with her.  She will be getting a second opinion at [facility] next month.    At this time, we will continue her current medications of IV Remodulin 32 ng/kg/min, macitentan 10 mg daily, digoxin, Bumex 4 mg twice daily, and metolazone as needed.  She is not previously tolerated Adempas due to low blood pressure, dizziness, and lightheadedness.  If needed, in the future, we could uptitrate her Remodulin and could potentially try a low-dose PDE-5 inhibitor.    Overall, her prognosis is poor given her severe chronic thromboembolic pulmonary hypertension and RV dysfunction that is not responding very well to medical therapy which is not surprising given the proximal nature of her disease.  She and her  are aware of her overall prognosis.    We will check labs locally later this week (in 2 days) when she has her INR draw.  Pending her labs, in 1 to 2 weeks, we may consider increasing Remodulin.  We will follow-up with her with either a video or telephone visit during the third or fourth week in April after she is seen at [facility].    It was a pleasure seeing Karen RICO [place] at the [facility]. Please contact us with any questions or concerns that you may have.      Patient was seen and discussed with staff attending, Dr. Turcios.      Sincerely,      Eden Mckeon MD, PhD  Cardiology Fellow    I examined the patient and agree with the assessment and plan of Dr. Mckeon.    Karolina Turcios MD   [facility]  Heart Failure, Transplant, and Mechanical Circulatory Support Cardiology   Cardiovascular Division  [facility]    [phone]

## 2021-04-07 ENCOUNTER — ANTICOAGULATION THERAPY VISIT (OUTPATIENT)
Dept: ANTICOAGULATION | Facility: CLINIC | Age: 81
End: 2021-04-07

## 2021-04-07 DIAGNOSIS — R06.02 SOB (SHORTNESS OF BREATH): ICD-10-CM

## 2021-04-07 DIAGNOSIS — I27.20 PULMONARY HYPERTENSION (H): ICD-10-CM

## 2021-04-07 DIAGNOSIS — I27.20 PULMONARY HYPERTENSION (H): Primary | ICD-10-CM

## 2021-04-07 DIAGNOSIS — I27.24 CTEPH (CHRONIC THROMBOEMBOLIC PULMONARY HYPERTENSION) (H): ICD-10-CM

## 2021-04-07 DIAGNOSIS — Z79.01 LONG TERM CURRENT USE OF ANTICOAGULANT THERAPY: ICD-10-CM

## 2021-04-07 LAB
ALBUMIN SERPL-MCNC: 3 G/DL (ref 3.4–5)
ALP SERPL-CCNC: 170 U/L (ref 40–150)
ALT SERPL W P-5'-P-CCNC: 31 U/L (ref 0–50)
ANION GAP SERPL CALCULATED.3IONS-SCNC: 6 MMOL/L (ref 3–14)
AST SERPL W P-5'-P-CCNC: 33 U/L (ref 0–45)
BILIRUB SERPL-MCNC: 0.4 MG/DL (ref 0.2–1.3)
BUN SERPL-MCNC: 45 MG/DL (ref 7–30)
CALCIUM SERPL-MCNC: 9.3 MG/DL (ref 8.5–10.1)
CAPILLARY BLOOD COLLECTION: NORMAL
CHLORIDE SERPL-SCNC: 102 MMOL/L (ref 94–109)
CO2 SERPL-SCNC: 31 MMOL/L (ref 20–32)
CREAT SERPL-MCNC: 1.49 MG/DL (ref 0.52–1.04)
ERYTHROCYTE [DISTWIDTH] IN BLOOD BY AUTOMATED COUNT: 18.4 % (ref 10–15)
GFR SERPL CREATININE-BSD FRML MDRD: 33 ML/MIN/{1.73_M2}
GLUCOSE SERPL-MCNC: 116 MG/DL (ref 70–99)
HCT VFR BLD AUTO: 31.1 % (ref 35–47)
HGB BLD-MCNC: 8.9 G/DL (ref 11.7–15.7)
INR PPP: 2.6 (ref 0.86–1.14)
MCH RBC QN AUTO: 25 PG (ref 26.5–33)
MCHC RBC AUTO-ENTMCNC: 28.6 G/DL (ref 31.5–36.5)
MCV RBC AUTO: 87 FL (ref 78–100)
NT-PROBNP SERPL-MCNC: 4130 PG/ML (ref 0–450)
PLATELET # BLD AUTO: 200 10E9/L (ref 150–450)
POTASSIUM SERPL-SCNC: 4.6 MMOL/L (ref 3.4–5.3)
PROT SERPL-MCNC: 7.3 G/DL (ref 6.8–8.8)
RBC # BLD AUTO: 3.56 10E12/L (ref 3.8–5.2)
SODIUM SERPL-SCNC: 139 MMOL/L (ref 133–144)
WBC # BLD AUTO: 3.1 10E9/L (ref 4–11)

## 2021-04-07 PROCEDURE — 80053 COMPREHEN METABOLIC PANEL: CPT | Performed by: INTERNAL MEDICINE

## 2021-04-07 PROCEDURE — 83880 ASSAY OF NATRIURETIC PEPTIDE: CPT | Performed by: INTERNAL MEDICINE

## 2021-04-07 PROCEDURE — 85027 COMPLETE CBC AUTOMATED: CPT | Performed by: INTERNAL MEDICINE

## 2021-04-07 PROCEDURE — 36416 COLLJ CAPILLARY BLOOD SPEC: CPT | Performed by: INTERNAL MEDICINE

## 2021-04-07 PROCEDURE — 85610 PROTHROMBIN TIME: CPT | Performed by: INTERNAL MEDICINE

## 2021-04-07 NOTE — NURSING NOTE
"Orders placed for follow up and patient marked \"ready for checkout.\" Kezia Arnett RN on 4/7/2021 at 11:41 AM    "

## 2021-04-07 NOTE — PROGRESS NOTES
ANTICOAGULATION FOLLOW-UP CLINIC VISIT    Patient Name:  Karen Anderson  Date:  2021  Contact Type:  Telephone    SUBJECTIVE:  Patient Findings     Comments:  Patient will increase spinach intake by a little bit.         Clinical Outcomes     Comments:  Patient will increase spinach intake by a little bit.            OBJECTIVE    Recent labs: (last 7 days)     21  1124   INR 2.60*       ASSESSMENT / PLAN  INR assessment THER    Recheck INR In: 2 DAYS    INR Location Clinic      Anticoagulation Summary  As of 2021    INR goal:  2.0-2.5   TTR:  68.5 % (10.3 mo)   INR used for dosin.60 (2021)   Warfarin maintenance plan:  3 mg (3 mg x 1) every Sun, Tue, Thu; 4.5 mg (3 mg x 1.5) all other days   Full warfarin instructions:  3 mg every Sun, Tue, Thu; 4.5 mg all other days   Weekly warfarin total:  27 mg   Plan last modified:  Tanisha Kimbrough, RN (3/24/2021)   Next INR check:  2021   Priority:  High   Target end date:  Indefinite    Indications    Pulmonary hypertension (H) [I27.20]  CTEPH (chronic thromboembolic pulmonary hypertension) (H) [I27.24]  Long term current use of anticoagulant therapy [Z79.01]             Anticoagulation Episode Summary     INR check location:      Preferred lab:      Send INR reminders to:  [facility]    Comments:  call home first, OK to leave message on either phone. OK to speak with spouse, Don.  [facility] SPEAK IN TABLETS (Has 3mg Tabs) 10/23/20:  new goal range is 2 - 2.5  [facility] in [place] and will need to fax orders P [phone] F [phone]      Anticoagulation Care Providers     Provider Role Specialty Phone number    Osiris Luong MD Referring Cardiovascular Disease [phone]    Karolina Turcios MD Referring Cardiovascular Disease [phone]            See the Encounter Report to view Anticoagulation Flowsheet and Dosing Calendar (Go to Encounters tab in chart review, and find the Anticoagulation Therapy  Visit)    Spoke with patient.     Brittany Kirk RN

## 2021-04-08 ENCOUNTER — TELEPHONE (OUTPATIENT)
Dept: CARDIOLOGY | Facility: CLINIC | Age: 81
End: 2021-04-08

## 2021-04-08 NOTE — TELEPHONE ENCOUNTER
Patient LM regarding weight gain and wanting to know if she should take metolazone. Kezia Arnett RN on 4/8/2021 at 11:28 AM    Patient weight up to 125 lb today; on Monday it was 121.5 lb. I asked her to take metolazone 2.5 mg today and to call me tomorrow with a weight update. Patient verbalized understanding and did not have any additional questions. Kezia Arnett RN on 4/8/2021 at 11:36 AM

## 2021-04-12 ENCOUNTER — TELEPHONE (OUTPATIENT)
Dept: CARDIOLOGY | Facility: CLINIC | Age: 81
End: 2021-04-12

## 2021-04-12 NOTE — TELEPHONE ENCOUNTER
"Pt needs to schedule an appointment for a return primary pulm with Dr. Turcios for early May 2021.     FOLLOW UP REQUEST HAS BEEN CANCELLED; can be found in the \"finalized requests\" tab of the patient's appointment desk.    PLEASE reinstate request and link to appointment after scheduling.   "

## 2021-04-16 ENCOUNTER — TELEPHONE (OUTPATIENT)
Dept: CARDIOLOGY | Facility: CLINIC | Age: 81
End: 2021-04-16

## 2021-04-16 NOTE — TELEPHONE ENCOUNTER
Received another call from [employer] regarding refills on Adempas. LM on pharmacy voicemail again stating that the patient has stopped taking Adempas d/t adverse side effects and will no long require refills. Provided my direct number for contact. Kezia Arnett RN on 4/16/2021 at 2:52 PM

## 2021-04-19 ENCOUNTER — TELEPHONE (OUTPATIENT)
Dept: CARDIOLOGY | Facility: CLINIC | Age: 81
End: 2021-04-19

## 2021-04-19 NOTE — TELEPHONE ENCOUNTER
----- Message from Karolina Turcios MD sent at 4/16/2021  7:04 PM CDT -----  Leo Ansari and [facility],    I spoke to them.    I would appreciate it if we could send her records to [facility] for PTE consideration. [facility] has offered her PTE with a 30-40% risk and she wants a second opinion before proceeding.     I have also asked her to take Metolazone 2.5 mg one dose.     Sincerely,  Karolina            ----- Message -----  From: Kezia Arnett RN  Sent: 4/16/2021   9:45 AM CDT  To: Karolina Turcios MD  Subject: May Referral                                     Hi Karen Singh and Santana spoke with the [facility] last Wednesday and would like to speak with you before Monday morning. When you're able, can you reach out to Santana at [phone]?    Thank you,  Elan

## 2021-04-20 ENCOUNTER — TELEPHONE (OUTPATIENT)
Dept: CARDIOLOGY | Facility: CLINIC | Age: 81
End: 2021-04-20

## 2021-04-20 NOTE — TELEPHONE ENCOUNTER
Referral to [facility] for Pulmonary Thromboendarterectomy Surgery (PTE)    Referral To: [facility] [place]   Phone: [phone], opt 2   Fax [phone]  Re:quested Provider: Dr. Jolly Hendricks OR Dr. Alexandro Azevedo  Referral For: PTE Surgery  Referral Sent: 4/20/2021    *Completed Chronic Thromboembolic Pulmonary Hypertension (CTEPH) Referral form and faxed to [facility] for standard review along with clinical note, right heart catheterization report, pulmonary angiogram report, chest x-ray report, H&P, Ventilation/perfusion scan report, echocardiogram report, 6 minute walk test, EKG report, and chest CT/CTA scan report.    Sent completed referral packet along with CD of images by DocSend to [facility] - DocSend tracking number: [id number].    [facility]  Clinic   Pulmonary Hypertension  [facility]  (P) [phone]

## 2021-04-21 ENCOUNTER — ANTICOAGULATION THERAPY VISIT (OUTPATIENT)
Dept: ANTICOAGULATION | Facility: CLINIC | Age: 81
End: 2021-04-21

## 2021-04-21 DIAGNOSIS — I27.24 CTEPH (CHRONIC THROMBOEMBOLIC PULMONARY HYPERTENSION) (H): ICD-10-CM

## 2021-04-21 DIAGNOSIS — Z79.01 LONG TERM CURRENT USE OF ANTICOAGULANT THERAPY: ICD-10-CM

## 2021-04-21 DIAGNOSIS — I27.20 PULMONARY HYPERTENSION (H): ICD-10-CM

## 2021-04-21 LAB
CAPILLARY BLOOD COLLECTION: NORMAL
INR PPP: 4.4 (ref 0.86–1.14)

## 2021-04-21 PROCEDURE — 36416 COLLJ CAPILLARY BLOOD SPEC: CPT | Performed by: INTERNAL MEDICINE

## 2021-04-21 PROCEDURE — 85610 PROTHROMBIN TIME: CPT | Performed by: INTERNAL MEDICINE

## 2021-04-21 NOTE — PROGRESS NOTES
"ANTICOAGULATION FOLLOW-UP CLINIC VISIT    Patient Name:  Karen Anderson  Date:  2021  Contact Type:  Telephone    SUBJECTIVE:  Patient Findings     Comments:  Spoke with Karen.  She reports she has not had changes in health, diet, medications.  She is retaining \"a lot\" of fluid, but states this is not new. She is staying hydrated and has been eating extra vitamin K rich food. She denies any signs of bleeding.  Consulted with pharmacist, Lorena Jones states she does not want to eat greens everyday.  She will have a serving of vitamin K rich food today, and then after today, will eat greens how she wants to.   Reviewed signs of bleeding with Karen.  She knows she needs to be seen urgently if she develops any more signs of bleeding or if she falls.           Clinical Outcomes     Comments:  Spoke with Karen.  She reports she has not had changes in health, diet, medications.  She is retaining \"a lot\" of fluid, but states this is not new. She is staying hydrated and has been eating extra vitamin K rich food. She denies any signs of bleeding.  Consulted with pharmacist, Lorena Jones states she does not want to eat greens everyday.  She will have a serving of vitamin K rich food today, and then after today, will eat greens how she wants to.   Reviewed signs of bleeding with Karen.  She knows she needs to be seen urgently if she develops any more signs of bleeding or if she falls.              OBJECTIVE    Recent labs: (last 7 days)     21  1026   INR 4.40*       ASSESSMENT / PLAN  INR assessment SUPRA    Recheck INR In: 1 WEEK    INR Location Clinic      Anticoagulation Summary  As of 2021    INR goal:  2.0-2.5   TTR:  66.1 % (10.3 mo)   INR used for dosin.40 (2021)   Warfarin maintenance plan:  4.5 mg (3 mg x 1.5) every Tue, Sat; 3 mg (3 mg x 1) all other days   Full warfarin instructions:  : Hold; Otherwise 4.5 mg every Tue, Sat; 3 mg all other days   Weekly warfarin " total:  24 mg   Plan last modified:  Laure Enriquez RN (4/21/2021)   Next INR check:  4/28/2021   Priority:  High   Target end date:  Indefinite    Indications    Pulmonary hypertension (H) [I27.20]  CTEPH (chronic thromboembolic pulmonary hypertension) (H) [I27.24]  Long term current use of anticoagulant therapy [Z79.01]             Anticoagulation Episode Summary     INR check location:      Preferred lab:      Send INR reminders to:  [facility]    Comments:  call home first, OK to leave message on either phone. OK to speak with spouse, Don.  [facility] SPEAK IN TABLETS (Has 3mg Tabs) 10/23/20:  new goal range is 2 - 2.5  [facility] in [place] and will need to fax orders P [phone] F [phone]      Anticoagulation Care Providers     Provider Role Specialty Phone number    Osiris Luong MD Referring Cardiovascular Disease [phone]    Karolina Turcios MD Referring Cardiovascular Disease [phone]            See the Encounter Report to view Anticoagulation Flowsheet and Dosing Calendar (Go to Encounters tab in chart review, and find the Anticoagulation Therapy Visit)    Spoke with Karen.  Consulted with pharmacist, Lorena Enriquez, PRIYA

## 2021-04-23 ENCOUNTER — TELEPHONE (OUTPATIENT)
Dept: CARDIOLOGY | Facility: CLINIC | Age: 81
End: 2021-04-23

## 2021-04-23 DIAGNOSIS — E61.1 IRON DEFICIENCY: ICD-10-CM

## 2021-04-23 DIAGNOSIS — I27.20 PULMONARY HYPERTENSION (H): Primary | ICD-10-CM

## 2021-04-23 DIAGNOSIS — R06.02 SOB (SHORTNESS OF BREATH): ICD-10-CM

## 2021-04-23 NOTE — TELEPHONE ENCOUNTER
Unable to reach patient; LM with call back number to follow up on weight after taking metolazone. Kezia Arnett RN on 4/23/2021 at 11:53 AM    Confirmed with patient that date and time work. Patient stated that metolazone worked last week but thinks she needs it again today. Advised she can take another 2.5 mg today but will need at BMP drawn with her INR next week. Patient also asked that iron studies be added on. Kezia Arnett RN on 4/23/2021 at 2:40 PM    ----- Message from Karolina Turcios MD sent at 4/16/2021  7:04 PM CDT -----  Leo Ansari and [employer],    I spoke to them.    I would appreciate it if we could send her records to [facility] for PTE consideration. [facility] has offered her PTE with a 30-40% risk and she wants a second opinion before proceeding.     I have also asked her to take Metolazone 2.5 mg one dose.     Sincerely,  Karolina            ----- Message -----  From: Kezia Arnett RN  Sent: 4/16/2021   9:45 AM CDT  To: Karolina Turcios MD  Subject: May Referral                                     Karen Medina Dr. and Santana spoke with the [facility] last Wednesday and would like to speak with you before Monday morning. When you're able, can you reach out to Santana at [phone]?    Thank you,  Elan

## 2021-04-28 ENCOUNTER — ANTICOAGULATION THERAPY VISIT (OUTPATIENT)
Dept: ANTICOAGULATION | Facility: CLINIC | Age: 81
End: 2021-04-28

## 2021-04-28 DIAGNOSIS — I27.20 PULMONARY HYPERTENSION (H): ICD-10-CM

## 2021-04-28 DIAGNOSIS — I27.24 CTEPH (CHRONIC THROMBOEMBOLIC PULMONARY HYPERTENSION) (H): ICD-10-CM

## 2021-04-28 DIAGNOSIS — E61.1 IRON DEFICIENCY: ICD-10-CM

## 2021-04-28 DIAGNOSIS — R06.02 SOB (SHORTNESS OF BREATH): ICD-10-CM

## 2021-04-28 DIAGNOSIS — Z79.01 LONG TERM CURRENT USE OF ANTICOAGULANT THERAPY: ICD-10-CM

## 2021-04-28 LAB
ALBUMIN SERPL-MCNC: 3 G/DL (ref 3.4–5)
ALP SERPL-CCNC: 161 U/L (ref 40–150)
ALT SERPL W P-5'-P-CCNC: 26 U/L (ref 0–50)
ANION GAP SERPL CALCULATED.3IONS-SCNC: 8 MMOL/L (ref 3–14)
AST SERPL W P-5'-P-CCNC: 34 U/L (ref 0–45)
BILIRUB SERPL-MCNC: 0.4 MG/DL (ref 0.2–1.3)
BUN SERPL-MCNC: 49 MG/DL (ref 7–30)
CALCIUM SERPL-MCNC: 9.9 MG/DL (ref 8.5–10.1)
CHLORIDE SERPL-SCNC: 98 MMOL/L (ref 94–109)
CO2 SERPL-SCNC: 33 MMOL/L (ref 20–32)
CREAT SERPL-MCNC: 1.47 MG/DL (ref 0.52–1.04)
GFR SERPL CREATININE-BSD FRML MDRD: 34 ML/MIN/{1.73_M2}
GLUCOSE SERPL-MCNC: 125 MG/DL (ref 70–99)
INR PPP: 2.9 (ref 0.86–1.14)
NT-PROBNP SERPL-MCNC: 4132 PG/ML (ref 0–450)
POTASSIUM SERPL-SCNC: 4.5 MMOL/L (ref 3.4–5.3)
PROT SERPL-MCNC: 7.4 G/DL (ref 6.8–8.8)
SODIUM SERPL-SCNC: 139 MMOL/L (ref 133–144)

## 2021-04-28 PROCEDURE — 36415 COLL VENOUS BLD VENIPUNCTURE: CPT | Performed by: PHYSICIAN ASSISTANT

## 2021-04-28 PROCEDURE — 83550 IRON BINDING TEST: CPT | Performed by: PHYSICIAN ASSISTANT

## 2021-04-28 PROCEDURE — 83880 ASSAY OF NATRIURETIC PEPTIDE: CPT | Performed by: PHYSICIAN ASSISTANT

## 2021-04-28 PROCEDURE — 85610 PROTHROMBIN TIME: CPT | Performed by: PHYSICIAN ASSISTANT

## 2021-04-28 PROCEDURE — 80053 COMPREHEN METABOLIC PANEL: CPT | Performed by: PHYSICIAN ASSISTANT

## 2021-04-28 PROCEDURE — 83540 ASSAY OF IRON: CPT | Performed by: PHYSICIAN ASSISTANT

## 2021-04-28 NOTE — PROGRESS NOTES
ANTICOAGULATION FOLLOW-UP CLINIC VISIT    Patient Name:  Karen Anderson  Date:  2021  Contact Type:  Telephone    SUBJECTIVE:  Patient Findings     Comments:  Consulted with Lorena BAUM RPh for dosing recommendations.   Spoke with patient. Gave them their lab results and new warfarin recommendation.  No changes in health, medication, or diet. No missed doses, no falls. No signs or symptoms of bleed or clotting.           Clinical Outcomes     Comments:  Consulted with Lorena BAUM RPh for dosing recommendations.   Spoke with patient. Gave them their lab results and new warfarin recommendation.  No changes in health, medication, or diet. No missed doses, no falls. No signs or symptoms of bleed or clotting.              OBJECTIVE    Recent labs: (last 7 days)     21  1009   INR 2.90*       ASSESSMENT / PLAN  INR assessment SUPRA    Recheck INR In: 1 WEEK    INR Location Clinic      Anticoagulation Summary  As of 2021    INR goal:  2.0-2.5   TTR:  63.9 % (10.3 mo)   INR used for dosin.90 (2021)   Warfarin maintenance plan:  4.5 mg (3 mg x 1.5) every Tue; 3 mg (3 mg x 1) all other days   Full warfarin instructions:  : 1.5 mg; Otherwise 4.5 mg every Tue; 3 mg all other days   Weekly warfarin total:  22.5 mg   Plan last modified:  Gerri Olivier RN (2021)   Next INR check:  2021   Priority:  High   Target end date:  Indefinite    Indications    Pulmonary hypertension (H) [I27.20]  CTEPH (chronic thromboembolic pulmonary hypertension) (H) [I27.24]  Long term current use of anticoagulant therapy [Z79.01]             Anticoagulation Episode Summary     INR check location:      Preferred lab:      Send INR reminders to:  [facility]    Comments:  call home first, OK to leave message on either phone. OK to speak with spouse, Don.  [facility] SPEAK IN TABLETS (Has 3mg Tabs) 10/23/20:  new goal range is 2 - 2.5  [facility] in [place] and will need to fax orders P [phone] F  [phone]      Anticoagulation Care Providers     Provider Role Specialty Phone number    Osiris Luong MD Referring Cardiovascular Disease [phone]    Karolina Turcios MD Referring Cardiovascular Disease [phone]            See the Encounter Report to view Anticoagulation Flowsheet and Dosing Calendar (Go to Encounters tab in chart review, and find the Anticoagulation Therapy Visit)    INR/CFX/F2 Result: 2.90  Goal Range: 2-2.5  Assessment: negative for changes  Dosing Adjustment: 1.5mg today, maintenance dose decreased 6.3%  Next INR/CFX/F2: one week  Protocol Followed: consult    GOLDY PERALTA, RN

## 2021-04-29 LAB
IRON SATN MFR SERPL: 6 % (ref 15–46)
IRON SERPL-MCNC: 22 UG/DL (ref 35–180)
TIBC SERPL-MCNC: 393 UG/DL (ref 240–430)

## 2021-05-05 ENCOUNTER — ANTICOAGULATION THERAPY VISIT (OUTPATIENT)
Dept: ANTICOAGULATION | Facility: CLINIC | Age: 81
End: 2021-05-05

## 2021-05-05 DIAGNOSIS — Z79.01 LONG TERM CURRENT USE OF ANTICOAGULANT THERAPY: ICD-10-CM

## 2021-05-05 DIAGNOSIS — I27.20 PULMONARY HYPERTENSION (H): ICD-10-CM

## 2021-05-05 DIAGNOSIS — I27.24 CTEPH (CHRONIC THROMBOEMBOLIC PULMONARY HYPERTENSION) (H): ICD-10-CM

## 2021-05-05 LAB
CAPILLARY BLOOD COLLECTION: NORMAL
INR PPP: 2.4 (ref 0.86–1.14)

## 2021-05-05 PROCEDURE — 36416 COLLJ CAPILLARY BLOOD SPEC: CPT | Performed by: INTERNAL MEDICINE

## 2021-05-05 PROCEDURE — 85610 PROTHROMBIN TIME: CPT | Performed by: INTERNAL MEDICINE

## 2021-05-05 NOTE — PROGRESS NOTES
ANTICOAGULATION FOLLOW-UP CLINIC VISIT    Patient Name:  Karen Anderson  Date:  2021  Contact Type:  Telephone    SUBJECTIVE:  Patient Findings     Comments:  Patient and spouse might go to [place] in the upcoming weeks-lab may be drawn there vs normal clinic.         Clinical Outcomes     Negatives:  Major bleeding event, Thromboembolic event, Anticoagulation-related hospital admission, Anticoagulation-related ED visit, Anticoagulation-related fatality    Comments:  Patient and spouse might go to [place] in the upcoming weeks-lab may be drawn there vs normal clinic.            OBJECTIVE    Recent labs: (last 7 days)     21  1105   INR 2.40*       ASSESSMENT / PLAN  INR assessment THER    Recheck INR In: 2 WEEKS    INR Location Outside lab      Anticoagulation Summary  As of 2021    INR goal:  2.0-2.5   TTR:  62.1 % (10.3 mo)   INR used for dosin.40 (2021)   Warfarin maintenance plan:  4.5 mg (3 mg x 1.5) every Tue; 3 mg (3 mg x 1) all other days   Full warfarin instructions:  4.5 mg every Tue; 3 mg all other days   Weekly warfarin total:  22.5 mg   No change documented:  Gerri Olivier, RN   Plan last modified:  Gerri Olivier, RN (2021)   Next INR check:  2021   Priority:  Maintenance   Target end date:  Indefinite    Indications    Pulmonary hypertension (H) [I27.20]  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 OK to speak with spouse, Don.  [facility] SPEAK IN TABLETS (Has 3mg Tabs) 10/23/20:  new goal range is 2 - 2.5  Summer's in [place] and will need to fax orders P [phone] F [phone]      Anticoagulation Care Providers     Provider Role Specialty Phone number    Ag  Osiris GOLDBERG MD Referring Cardiovascular Disease [phone]    Karolina Turcios MD Referring Cardiovascular Disease [phone]            See the Encounter Report to view Anticoagulation Flowsheet and Dosing Calendar (Go to Encounters tab in chart review, and find the Anticoagulation Therapy Visit)    INR/CFX/F2 Result: 2.40  Goal Range: 2.0-2.5  Assessment: negative for changes  Dosing Adjustment: none made  Next INR/CFX/F2: 1-2 weeks  Protocol Followed: INR within goal range    GOLDY PERALTA RN

## 2021-05-11 ENCOUNTER — TELEPHONE (OUTPATIENT)
Dept: CARDIOLOGY | Facility: CLINIC | Age: 81
End: 2021-05-11

## 2021-05-11 NOTE — TELEPHONE ENCOUNTER
Don called stating he is having concerns about Karen staying at a non-[facility] TCU. I provided him the number for the [facility] on the [facility] and advised he call them to see if beds are available and if there is a possibility to transfer her. Santana verbalized understanding and advised he will call with updates. Kezia Arnett RN on 5/11/2021 at 8:19 AM

## 2021-05-14 ENCOUNTER — TELEPHONE (OUTPATIENT)
Dept: CARDIOLOGY | Facility: CLINIC | Age: 81
End: 2021-05-14

## 2021-05-14 NOTE — TELEPHONE ENCOUNTER
Spoke with patient' nurse at [facility] and confirmed with her that patient will decrease Opsumit dose to every other day. Patient already received metolazone 2.5 mg today; asked that labs be drawn tomorrow to follow up on BMP. Nurse verbalized understanding and did not request physician's orders to be faxed to facility. Kezia Arnett RN on 5/14/2021 at 12:52 PM      ----- Message from Karolina Turcios MD sent at 5/14/2021 12:07 PM CDT -----  Regarding: RE: Weight gain  Perhaps, make the opsumit every other day for now given her fluid retention    TT  ----- Message -----  From: Kezia Arnett RN  Sent: 5/13/2021   3:44 PM CDT  To: Karolina Turcios MD  Subject: Weight gain                                      Hi Dr. Turcios,    The patient called me today and stated that she is up to 128 lbs today (still at [facility]) and wants to know what to do. She's getting bumex daily from the nursing staff but is unable to take any additional metolazone because it's not ordered. Is this something that needs to be called in or do you need to speak with the floor staff?    The number for the facility is [phone]. Let me know what I need to do. Thank you

## 2021-05-24 ENCOUNTER — VIRTUAL VISIT (OUTPATIENT)
Dept: CARDIOLOGY | Facility: CLINIC | Age: 81
End: 2021-05-24
Attending: INTERNAL MEDICINE
Payer: COMMERCIAL

## 2021-05-24 DIAGNOSIS — I27.24 CTEPH (CHRONIC THROMBOEMBOLIC PULMONARY HYPERTENSION) (H): Primary | ICD-10-CM

## 2021-05-24 PROCEDURE — 99215 OFFICE O/P EST HI 40 MIN: CPT | Mod: 95 | Performed by: INTERNAL MEDICINE

## 2021-05-24 NOTE — LETTER
"2021      RE: Karen Anderson  [address] [facility] [address]       Dear Colleague,    Thank you for the opportunity to participate in the care of your patient, Karen Anderson, at the [facility] [place] at [facility]. Please see a copy of my visit note below.    Karen is a 80 year old who is being evaluated via a billable video visit.      How would you like to obtain your AVS? MyChart  If the video visit is dropped, the invitation should be resent by: Text to cell phone: [phone]  Will anyone else be joining your video visit? No    Vitals - Patient Reported  Weight (Patient Reported): (pt dont knwo weight)  Height (Patient Reported): 149.9 cm (4' 11\")  Pain Score: No Pain (0)  Pain Loc: Chest          Service Date: 2021    [place] [address]    Carlos Carter MD   [facility]  [address] [place], [place] [id number]    RE:      Karen Anderson   MRN:  [id number]  :   1940    Dear Dr. Abernathy:    We had the pleasure of seeing Ms. Karen Anderson in our [facility] for followup.  This was a virtual visit due to the ongoing COVID-19 pandemic.  As you know, Ms. Anderson is a very pleasant, 80-year-old female with chronic thromboembolic pulmonary hypertension who is currently on IV treprostinil 30 ng/kg/min and Opsumit 10 mg every other day.  She presents today for followup.    Unfortunately, she fell and had a distal fracture of her right radius and ulna.  She was hospitalized briefly at [facility].  She currently has a cast.  Subsequently, she has been in an inpatient rehab for the last 2 weeks.  Unfortunately, she has gained close to 20 pounds since being at the rehab place.  She is complaining of lower extremity swelling as well as swelling in her " upper extremities.  She has difficulty breathing with her weight gain.  She denies having any presyncope or syncope.  No exertional chest pain or chest pressure.  No PND or orthopnea.  We switched her macitentan to every other day in view of her fluid retention; however, this has not helped.  She also took one dose of metolazone 2.5 mg approximately 10 days ago.  She has not responded to this well.    PAST MEDICAL HISTORY:    1.  Chronic thromboembolic pulmonary hypertension.  2.  Rheumatoid arthritis.  3.  Systemic hypertension.  4.  Breast cancer.  5.  Asthma.  6.  Scoliosis.    REVIEW OF SYSTEMS:  A detailed 10 point review of systems was obtained as described in the History of Present Illness.  All other systems were reviewed and are negative.    MEDICATIONS:    Current Outpatient Medications   Medication Sig     acetaminophen (TYLENOL) 325 MG tablet Take 325 mg by mouth every 6 hours as needed for mild pain     albuterol (PROAIR HFA/PROVENTIL HFA/VENTOLIN HFA) 108 (90 Base) MCG/ACT inhaler Inhale 2 puffs into the lungs every 4 hours as needed for shortness of breath / dyspnea or wheezing      alendronate (FOSAMAX) 70 MG tablet Take 70 mg by mouth once a week     Ascorbic Acid (VITAMIN C) 500 MG CAPS Take 500 mg by mouth every morning      aspirin (ASA) 81 MG tablet Take 81 mg by mouth daily      beclomethasone HFA (QVAR REDIHALER) 80 MCG/ACT inhaler Inhale 2 puffs into the lungs 2 times daily     bumetanide (BUMEX) 2 MG tablet Take 2 tablets (4 mg) by mouth 2 times daily     calcium citrate-vitamin D (CALCIUM CITRATE + D3) 315-250 MG-UNIT TABS per tablet Take 1 tablet by mouth 2 times daily      digoxin (LANOXIN) 62.5 MCG tablet Take 1 tablet (62.5 mcg) by mouth daily     gabapentin (NEURONTIN) 300 MG capsule Take 300 mg by mouth 3 times daily Can take an additional 300mg at bedtime as needed     hydroxychloroquine (PLAQUENIL) 200 MG tablet Take 200 mg by mouth daily      ipratropium (ATROVENT) 0.06 % nasal  spray Spray 2 sprays into both nostrils 3 times daily      macitentan (OPSUMIT) 10 MG tablet Take 1 tablet (10 mg) by mouth daily     metolazone (ZAROXOLYN) 2.5 MG tablet Take 1 tablet (2.5 mg) by mouth as needed (as needed for weight gain and swelling; call cardiology clinic before taking)     potassium chloride ER (KLOR-CON M) 20 MEQ CR tablet Take 2 tablets (40 mEq) by mouth 2 times daily     sodium chloride (OCEAN) 0.65 % nasal spray Spray 1 spray into both nostrils daily as needed for congestion     Treprostinil (REMODULIN IJ) Dose: 30 ng/kg/min  Vial concentration: 1 mg/mL  Final concentration: 60,000 ng/mL  Dosing weight: 56.3 kg   Pump rate: 41 mL/day  Per patient and [facility] on 12/23/2020     warfarin ANTICOAGULANT (COUMADIN) 3 MG tablet Take 3 mg PO on Mon, Wed, Fri and Sat, and 4.5 mg all other days of the week     cetirizine (ZYRTEC) 10 MG tablet Take 10 mg by mouth daily     Corn Dextrin (CLEAR FIBER POWDER PO)      No current facility-administered medications for this visit.      .PHYSICAL EXAMINATION:  A detailed exam could not be done due to the video nature of the visit.  She was awake, alert, oriented x3.  She was in no apparent distress.  She appeared warm and well perfused.  She had puffiness of her face.  She had swelling in her lower extremities.  She had swelling in her right upper extremity, which was in a cast.  She was in no respiratory distress.    STUDIES:  She had labs done today.  Her electrolytes were within normal limits.  Her BUN was 48, and creatinine was 1.24.  Her INR was therapeutic.  Her hemoglobin was 7.2.    Her last echocardiogram from 03/2021 showed moderate to severe right ventricular dilatation with moderately reduced function.  She had flattening of the interventricular septum consistent with pressure and volume overload.  Her IVC was dilated and collapsing less than 50%.  She had a small pericardial effusion.      Her most recent heart catheterization from 03/2021 showed  an RA pressure of 14, RV of 87/28, PA of 88/22 with a mean of 44.  Pulmonary capillary wedge pressure of 14.  Thermodilution cardiac output of 3.97 with an index of 2.8 and a PVR of 7.6.    ASSESSMENT AND PLAN:     In summary, Ms. Karen Anderson is a 80-year-old female with chronic thromboembolic pulmonary hypertension who is currently on IV Remodulin 30 ng/kg/min and macitentan 10 mg every other day.  She returns today for followup.    Unfortunately, she is in decompensated heart failure.  Her weight is up by 20 pounds.  She is having worsening exertional shortness of breath.  She is currently in the rehab and is going to be discharged tomorrow.  She is already on a high dose of Bumex at 4 mg twice a day.  I have recommended to her to take 1 dose of metolazone 5 mg today.  We will arrange for her to be admitted to the hospital electively for intravenous diuresis.  She will probably need some inotropic support.  She is currently on Remodulin 30 ng/kg/min and macitentan every other day.    She recently had a second opinion at [facility] as well as [facility].  While [facility], recommended a high-risk pulmonary thromboendarterectomy, she was deemed not a candidate for PTE at [facility], especially given her frailty, rheumatoid arthritis and scoliosis.  I do agree with the decision from the [facility].  She would be a too high-risk candidate for a thromboendarterectomy.  I discussed this with her very candidly.  This would be an extremely high-risk operation, and personally I would also not recommend her.      Unfortunately, the best option for her at this time and point would be medical therapy.  Given her proximal disease and high pressure, a balloon angioplasty would also be high risk.  The [facility] is willing to consider balloon angioplasty.  Logistically, this would be difficult for her.  She had one session of balloon angioplasty here, but was complicated by  hemoptysis.  Thus, we are reluctant to proceed further with PBA here.  Unfortunately, I am afraid that the options are very limited for her except for medical therapy.  We will do our best to optimize her pulmonary hypertension and right heart failure with medical therapy.     It was a pleasure meeting Ms. Elaina Anderson today through a virtual visit.  We will arrange for her to get electively admitted to the hospital for intravenous diuresis in the next 48-72 hours.  She will call us if she has any further worsening symptoms.      Video-Visit Details     Type of service:  Video Visit     Video Start Time: 4.30 PM  Video End Time: 5.00 PM    Patient gave verbal consent for Videovisit    Originating Location (pt. Location): Home     Distant Location (provider location):  [facility]      Platform used for Video Visit: [employer] video call          Sincerely,  Karolina Turcios MD   [facility] for Pulmonary [facility]pertension  Heart Failure, Transplant, and Mechanical Circulatory Support Cardiology   Cardiovascular Division  [facility]   [phone]            D: 2021   T: 2021   MT: ольга    Name:     ELAINA ANDERSON  MRN:      [id number]        Account:      [id number]   :      1940           Service Date: 2021       Document: [id number]

## 2021-05-24 NOTE — PROGRESS NOTES
Service Date: 2021    Clint Abernathy MD  [facility]  [address] [place] [address]    Carlos Carter MD   [facility]  [address] [place] [address]    RE:      Karen Anderson   MRN:  [id number]  :   1940    Dear Dr. Abernathy:    We had the pleasure of seeing Ms. Karen Anderson in our [facility] for followup.  This was a virtual visit due to the ongoing COVID-19 pandemic.  As you know, Ms. Anderson is a very pleasant, 80-year-old female with chronic thromboembolic pulmonary hypertension who is currently on IV treprostinil 30 ng/kg/min and Opsumit 10 mg every other day.  She presents today for followup.    Unfortunately, she fell and had a distal fracture of her right radius and ulna.  She was hospitalized briefly at [facility].  She currently has a cast.  Subsequently, she has been in an inpatient rehab for the last 2 weeks.  Unfortunately, she has gained close to 20 pounds since being at the rehab place.  She is complaining of lower extremity swelling as well as swelling in her upper extremities.  She has difficulty breathing with her weight gain.  She denies having any presyncope or syncope.  No exertional chest pain or chest pressure.  No PND or orthopnea.  We switched her macitentan to every other day in view of her fluid retention; however, this has not helped.  She also took one dose of metolazone 2.5 mg approximately 10 days ago.  She has not responded to this well.    PAST MEDICAL HISTORY:    1.  Chronic thromboembolic pulmonary hypertension.  2.  Rheumatoid arthritis.  3.  Systemic hypertension.  4.  Breast cancer.  5.  Asthma.  6.  Scoliosis.    REVIEW OF SYSTEMS:  A detailed 10 point review of systems was obtained as described in the History of Present Illness.  All other systems were reviewed and are negative.    MEDICATIONS:    Current Outpatient Medications    Medication Sig     acetaminophen (TYLENOL) 325 MG tablet Take 325 mg by mouth every 6 hours as needed for mild pain     albuterol (PROAIR HFA/PROVENTIL HFA/VENTOLIN HFA) 108 (90 Base) MCG/ACT inhaler Inhale 2 puffs into the lungs every 4 hours as needed for shortness of breath / dyspnea or wheezing      alendronate (FOSAMAX) 70 MG tablet Take 70 mg by mouth once a week     Ascorbic Acid (VITAMIN C) 500 MG CAPS Take 500 mg by mouth every morning      aspirin (ASA) 81 MG tablet Take 81 mg by mouth daily      beclomethasone HFA (QVAR REDIHALER) 80 MCG/ACT inhaler Inhale 2 puffs into the lungs 2 times daily     bumetanide (BUMEX) 2 MG tablet Take 2 tablets (4 mg) by mouth 2 times daily     calcium citrate-vitamin D (CALCIUM CITRATE + D3) 315-250 MG-UNIT TABS per tablet Take 1 tablet by mouth 2 times daily      digoxin (LANOXIN) 62.5 MCG tablet Take 1 tablet (62.5 mcg) by mouth daily     gabapentin (NEURONTIN) 300 MG capsule Take 300 mg by mouth 3 times daily Can take an additional 300mg at bedtime as needed     hydroxychloroquine (PLAQUENIL) 200 MG tablet Take 200 mg by mouth daily      ipratropium (ATROVENT) 0.06 % nasal spray Spray 2 sprays into both nostrils 3 times daily      macitentan (OPSUMIT) 10 MG tablet Take 1 tablet (10 mg) by mouth daily     metolazone (ZAROXOLYN) 2.5 MG tablet Take 1 tablet (2.5 mg) by mouth as needed (as needed for weight gain and swelling; call cardiology clinic before taking)     potassium chloride ER (KLOR-CON M) 20 MEQ CR tablet Take 2 tablets (40 mEq) by mouth 2 times daily     sodium chloride (OCEAN) 0.65 % nasal spray Spray 1 spray into both nostrils daily as needed for congestion     Treprostinil (REMODULIN IJ) Dose: 30 ng/kg/min  Vial concentration: 1 mg/mL  Final concentration: 60,000 ng/mL  Dosing weight: 56.3 kg   Pump rate: 41 mL/day  Per patient and [facility] on 12/23/2020     warfarin ANTICOAGULANT (COUMADIN) 3 MG tablet Take 3 mg PO on Mon, Wed, Fri and Sat, and 4.5 mg all  other days of the week     cetirizine (ZYRTEC) 10 MG tablet Take 10 mg by mouth daily     Corn Dextrin (CLEAR FIBER POWDER PO)      No current facility-administered medications for this visit.      .PHYSICAL EXAMINATION:  A detailed exam could not be done due to the video nature of the visit.  She was awake, alert, oriented x3.  She was in no apparent distress.  She appeared warm and well perfused.  She had puffiness of her face.  She had swelling in her lower extremities.  She had swelling in her right upper extremity, which was in a cast.  She was in no respiratory distress.    STUDIES:  She had labs done today.  Her electrolytes were within normal limits.  Her BUN was 48, and creatinine was 1.24.  Her INR was therapeutic.  Her hemoglobin was 7.2.    Her last echocardiogram from 03/2021 showed moderate to severe right ventricular dilatation with moderately reduced function.  She had flattening of the interventricular septum consistent with pressure and volume overload.  Her IVC was dilated and collapsing less than 50%.  She had a small pericardial effusion.      Her most recent heart catheterization from 03/2021 showed an RA pressure of 14, RV of 87/28, PA of 88/22 with a mean of 44.  Pulmonary capillary wedge pressure of 14.  Thermodilution cardiac output of 3.97 with an index of 2.8 and a PVR of 7.6.    ASSESSMENT AND PLAN:     In summary, Ms. Karen Anderson is a 80-year-old female with chronic thromboembolic pulmonary hypertension who is currently on IV Remodulin 30 ng/kg/min and macitentan 10 mg every other day.  She returns today for followup.    Unfortunately, she is in decompensated heart failure.  Her weight is up by 20 pounds.  She is having worsening exertional shortness of breath.  She is currently in the rehab and is going to be discharged tomorrow.  She is already on a high dose of Bumex at 4 mg twice a day.  I have recommended to her to take 1 dose of metolazone 5 mg today.  We will arrange for  her to be admitted to the hospital electively for intravenous diuresis.  She will probably need some inotropic support.  She is currently on Remodulin 30 ng/kg/min and macitentan every other day.    She recently had a second opinion at [facility] as well as [facility].  While [facility], recommended a high-risk pulmonary thromboendarterectomy, she was deemed not a candidate for PTE at [facility], especially given her frailty, rheumatoid arthritis and scoliosis.  I do agree with the decision from the [facility].  She would be a too high-risk candidate for a thromboendarterectomy.  I discussed this with her very candidly.  This would be an extremely high-risk operation, and personally I would also not recommend her.      Unfortunately, the best option for her at this time and point would be medical therapy.  Given her proximal disease and high pressure, a balloon angioplasty would also be high risk.  The [facility] is willing to consider balloon angioplasty.  Logistically, this would be difficult for her.  She had one session of balloon angioplasty here, but was complicated by hemoptysis.  Thus, we are reluctant to proceed further with PBA here.  Unfortunately, I am afraid that the options are very limited for her except for medical therapy.  We will do our best to optimize her pulmonary hypertension and right heart failure with medical therapy.     It was a pleasure meeting Ms. Karen Anderson today through a virtual visit.  We will arrange for her to get electively admitted to the hospital for intravenous diuresis in the next 48-72 hours.  She will call us if she has any further worsening symptoms.      Video-Visit Details     Type of service:  Video Visit     Video Start Time: 4.30 PM  Video End Time: 5.00 PM    Patient gave verbal consent for Videovisit    Originating Location (pt. Location): Home     Distant Location (provider location):  [facility]      [employer]  used for Video Visit: WELL video call          Sincerely,  Karolina Turcios MD   Center for Pulmonary Hypertension  Heart Failure, Transplant, and Mechanical Circulatory Support Cardiology   Cardiovascular Division  [facility]   [phone]            D: 2021   T: 2021   MT: ольга    Name:     ELAINA SUTTON  MRN:      [id number]        Account:      [id number]   :      1940           Service Date: 2021       Document: [id number]

## 2021-05-24 NOTE — PROGRESS NOTES
"Karen is a 80 year old who is being evaluated via a billable video visit.      How would you like to obtain your AVS? MyChart  If the video visit is dropped, the invitation should be resent by: Text to cell phone: [phone]  Will anyone else be joining your video visit? No    Vitals - Patient Reported  Weight (Patient Reported): (pt dont knwo weight)  Height (Patient Reported): 149.9 cm (4' 11\")  Pain Score: No Pain (0)  Pain Loc: Chest        "

## 2021-05-25 ENCOUNTER — TELEPHONE (OUTPATIENT)
Dept: CARDIOLOGY | Facility: CLINIC | Age: 81
End: 2021-05-25

## 2021-05-25 NOTE — TELEPHONE ENCOUNTER
Scheduled admission for Thursday, May 27 at 2 PM. [id number] Kezia Arnett RN on 5/25/2021 at 8:02 AM    Spoke with Santana and advised that patient is scheduled for direct admission tomorrow at 2 PM for IV diuresis. Santana advised that patient is still retaining a lot of fluid and will drive her down tomorrow. Santana did not have any questions at this time.Kezia Arnett RN on 5/26/2021 at 8:53 AM    ----- Message from Fabby Seymour RN sent at 5/24/2021  8:52 PM CDT -----  Regarding: admit    ----- Message -----  From: Karolina Turcios MD  Sent: 5/24/2021   5:07 PM CDT  To: Cardiology Ph Nurse-    Hi Elan,    She is not doing well. Has gained close to 20 pounds at the rehab - she is now 135 lbs. More shortness of breath. Labs are ok Creatinine 1.24 and normal sodium and potassium.    I have asked them to give metolazone 5 mg once today in addition to her Bumex 4 mg bid. She is going to be discharged from the Rehab tomorrow.     We should plan for an elective admission on Wednesday/Thursday. I told her that we can cancel the appointment if she responds to metolazone and feels better ( but I doubt she will).    6C - Cards 2 - Decompensated Right heart failure from [facility]    Thank you    TT

## 2021-05-26 ENCOUNTER — TELEPHONE (OUTPATIENT)
Dept: ANTICOAGULATION | Facility: CLINIC | Age: 81
End: 2021-05-26

## 2021-05-26 NOTE — TELEPHONE ENCOUNTER
ANTICOAGULATION     Karen Anderson is overdue for INR check.      Patient answers and informs writer that she is currently at [facility] for rehab.  Patient reports that she will be transferring to [facility] tomorrow. Will follow up with patient once she is discharged.     Brittany Kirk RN

## 2021-05-27 ENCOUNTER — HOSPITAL ENCOUNTER (INPATIENT)
Facility: CLINIC | Age: 81
LOS: 14 days | Discharge: HOME HEALTH | DRG: 291 | End: 2021-06-10
Attending: INTERNAL MEDICINE | Admitting: INTERNAL MEDICINE
Payer: COMMERCIAL

## 2021-05-27 ENCOUNTER — APPOINTMENT (OUTPATIENT)
Dept: GENERAL RADIOLOGY | Facility: CLINIC | Age: 81
DRG: 291 | End: 2021-05-27
Attending: PHYSICIAN ASSISTANT
Payer: COMMERCIAL

## 2021-05-27 DIAGNOSIS — R60.0 LOCALIZED EDEMA: ICD-10-CM

## 2021-05-27 DIAGNOSIS — I27.20 PULMONARY HYPERTENSION (H): Primary | ICD-10-CM

## 2021-05-27 DIAGNOSIS — Z79.01 LONG TERM CURRENT USE OF ANTICOAGULANT THERAPY: ICD-10-CM

## 2021-05-27 DIAGNOSIS — R06.02 SOB (SHORTNESS OF BREATH): ICD-10-CM

## 2021-05-27 DIAGNOSIS — I27.20 PULMONARY HYPERTENSION (H): ICD-10-CM

## 2021-05-27 DIAGNOSIS — I50.810 RVF (RIGHT VENTRICULAR FAILURE) (H): ICD-10-CM

## 2021-05-27 LAB
ALBUMIN SERPL-MCNC: 3.1 G/DL (ref 3.4–5)
ALP SERPL-CCNC: 166 U/L (ref 40–150)
ALT SERPL W P-5'-P-CCNC: 29 U/L (ref 0–50)
ANION GAP SERPL CALCULATED.3IONS-SCNC: 6 MMOL/L (ref 3–14)
AST SERPL W P-5'-P-CCNC: 38 U/L (ref 0–45)
BILIRUB DIRECT SERPL-MCNC: 0.2 MG/DL (ref 0–0.2)
BILIRUB SERPL-MCNC: 0.5 MG/DL (ref 0.2–1.3)
BUN SERPL-MCNC: 53 MG/DL (ref 7–30)
CALCIUM SERPL-MCNC: 9.7 MG/DL (ref 8.5–10.1)
CHLORIDE SERPL-SCNC: 97 MMOL/L (ref 94–109)
CO2 SERPL-SCNC: 35 MMOL/L (ref 20–32)
CREAT SERPL-MCNC: 1.32 MG/DL (ref 0.52–1.04)
ERYTHROCYTE [DISTWIDTH] IN BLOOD BY AUTOMATED COUNT: 20.7 % (ref 10–15)
GFR SERPL CREATININE-BSD FRML MDRD: 38 ML/MIN/{1.73_M2}
GLUCOSE SERPL-MCNC: 180 MG/DL (ref 70–99)
HCT VFR BLD AUTO: 27.5 % (ref 35–47)
HGB BLD-MCNC: 7.9 G/DL (ref 11.7–15.7)
INR PPP: 1.78 (ref 0.86–1.14)
LABORATORY COMMENT REPORT: NORMAL
MAGNESIUM SERPL-MCNC: 2.2 MG/DL (ref 1.6–2.3)
MCH RBC QN AUTO: 24 PG (ref 26.5–33)
MCHC RBC AUTO-ENTMCNC: 28.7 G/DL (ref 31.5–36.5)
MCV RBC AUTO: 84 FL (ref 78–100)
NT-PROBNP SERPL-MCNC: 6431 PG/ML (ref 0–1800)
PLATELET # BLD AUTO: 152 10E9/L (ref 150–450)
POTASSIUM SERPL-SCNC: 3 MMOL/L (ref 3.4–5.3)
PROT SERPL-MCNC: 7.9 G/DL (ref 6.8–8.8)
RBC # BLD AUTO: 3.29 10E12/L (ref 3.8–5.2)
SARS-COV-2 RNA RESP QL NAA+PROBE: NEGATIVE
SODIUM SERPL-SCNC: 139 MMOL/L (ref 133–144)
SPECIMEN SOURCE: NORMAL
WBC # BLD AUTO: 3.7 10E9/L (ref 4–11)

## 2021-05-27 PROCEDURE — 36592 COLLECT BLOOD FROM PICC: CPT | Performed by: PHYSICIAN ASSISTANT

## 2021-05-27 PROCEDURE — 36569 INSJ PICC 5 YR+ W/O IMAGING: CPT

## 2021-05-27 PROCEDURE — 85610 PROTHROMBIN TIME: CPT | Performed by: PHYSICIAN ASSISTANT

## 2021-05-27 PROCEDURE — 214N000001 HC R&B CCU UMMC

## 2021-05-27 PROCEDURE — 272N000458 ZZ HC KIT, 5 FR DL BIOFLO OPEN ENDED PICC

## 2021-05-27 PROCEDURE — 80048 BASIC METABOLIC PNL TOTAL CA: CPT | Performed by: PHYSICIAN ASSISTANT

## 2021-05-27 PROCEDURE — 272N000473 HC KIT, VPS RHYTHM STYLET

## 2021-05-27 PROCEDURE — 250N000013 HC RX MED GY IP 250 OP 250 PS 637: Performed by: PHYSICIAN ASSISTANT

## 2021-05-27 PROCEDURE — 93005 ELECTROCARDIOGRAM TRACING: CPT

## 2021-05-27 PROCEDURE — 250N000011 HC RX IP 250 OP 636: Performed by: PHYSICIAN ASSISTANT

## 2021-05-27 PROCEDURE — 85027 COMPLETE CBC AUTOMATED: CPT | Performed by: PHYSICIAN ASSISTANT

## 2021-05-27 PROCEDURE — U0003 INFECTIOUS AGENT DETECTION BY NUCLEIC ACID (DNA OR RNA); SEVERE ACUTE RESPIRATORY SYNDROME CORONAVIRUS 2 (SARS-COV-2) (CORONAVIRUS DISEASE [COVID-19]), AMPLIFIED PROBE TECHNIQUE, MAKING USE OF HIGH THROUGHPUT TECHNOLOGIES AS DESCRIBED BY CMS-2020-01-R: HCPCS | Performed by: PHYSICIAN ASSISTANT

## 2021-05-27 PROCEDURE — 93010 ELECTROCARDIOGRAM REPORT: CPT | Performed by: INTERNAL MEDICINE

## 2021-05-27 PROCEDURE — 80076 HEPATIC FUNCTION PANEL: CPT | Performed by: PHYSICIAN ASSISTANT

## 2021-05-27 PROCEDURE — 83735 ASSAY OF MAGNESIUM: CPT | Performed by: PHYSICIAN ASSISTANT

## 2021-05-27 PROCEDURE — 99232 SBSQ HOSP IP/OBS MODERATE 35: CPT | Performed by: INTERNAL MEDICINE

## 2021-05-27 PROCEDURE — 71045 X-RAY EXAM CHEST 1 VIEW: CPT

## 2021-05-27 PROCEDURE — 83880 ASSAY OF NATRIURETIC PEPTIDE: CPT | Performed by: PHYSICIAN ASSISTANT

## 2021-05-27 PROCEDURE — U0005 INFEC AGEN DETEC AMPLI PROBE: HCPCS | Performed by: PHYSICIAN ASSISTANT

## 2021-05-27 PROCEDURE — 250N000013 HC RX MED GY IP 250 OP 250 PS 637: Performed by: INTERNAL MEDICINE

## 2021-05-27 PROCEDURE — 71045 X-RAY EXAM CHEST 1 VIEW: CPT | Mod: 26 | Performed by: STUDENT IN AN ORGANIZED HEALTH CARE EDUCATION/TRAINING PROGRAM

## 2021-05-27 RX ORDER — HEPARIN SODIUM,PORCINE 10 UNIT/ML
5-10 VIAL (ML) INTRAVENOUS EVERY 24 HOURS
Status: DISCONTINUED | OUTPATIENT
Start: 2021-05-27 | End: 2021-06-10 | Stop reason: HOSPADM

## 2021-05-27 RX ORDER — DIGOXIN 0.06 MG/1
62.5 TABLET ORAL DAILY
Status: DISCONTINUED | OUTPATIENT
Start: 2021-05-28 | End: 2021-05-28

## 2021-05-27 RX ORDER — ASPIRIN 81 MG/1
81 TABLET, CHEWABLE ORAL DAILY
Status: DISCONTINUED | OUTPATIENT
Start: 2021-05-27 | End: 2021-06-10 | Stop reason: HOSPADM

## 2021-05-27 RX ORDER — POTASSIUM CHLORIDE 750 MG/1
40 TABLET, EXTENDED RELEASE ORAL 2 TIMES DAILY
Status: DISCONTINUED | OUTPATIENT
Start: 2021-05-27 | End: 2021-05-30

## 2021-05-27 RX ORDER — LIDOCAINE 40 MG/G
CREAM TOPICAL
Status: DISCONTINUED | OUTPATIENT
Start: 2021-05-27 | End: 2021-05-27

## 2021-05-27 RX ORDER — ASCORBIC ACID 500 MG
500 TABLET ORAL EVERY MORNING
Status: DISCONTINUED | OUTPATIENT
Start: 2021-05-28 | End: 2021-06-10 | Stop reason: HOSPADM

## 2021-05-27 RX ORDER — POTASSIUM CHLORIDE 20MEQ/15ML
60 LIQUID (ML) ORAL ONCE
Status: COMPLETED | OUTPATIENT
Start: 2021-05-27 | End: 2021-05-28

## 2021-05-27 RX ORDER — ALENDRONATE SODIUM 70 MG/1
70 TABLET ORAL WEEKLY
Status: DISCONTINUED | OUTPATIENT
Start: 2021-05-31 | End: 2021-05-28

## 2021-05-27 RX ORDER — HYDROXYCHLOROQUINE SULFATE 200 MG/1
200 TABLET, FILM COATED ORAL DAILY
Status: DISCONTINUED | OUTPATIENT
Start: 2021-05-28 | End: 2021-06-10 | Stop reason: HOSPADM

## 2021-05-27 RX ORDER — BUMETANIDE 0.25 MG/ML
4 INJECTION INTRAMUSCULAR; INTRAVENOUS ONCE
Status: COMPLETED | OUTPATIENT
Start: 2021-05-27 | End: 2021-05-28

## 2021-05-27 RX ORDER — HEPARIN SODIUM,PORCINE 10 UNIT/ML
2-5 VIAL (ML) INTRAVENOUS
Status: ACTIVE | OUTPATIENT
Start: 2021-05-27 | End: 2021-05-30

## 2021-05-27 RX ORDER — HEPARIN SODIUM 5000 [USP'U]/.5ML
5000 INJECTION, SOLUTION INTRAVENOUS; SUBCUTANEOUS EVERY 12 HOURS
Status: DISCONTINUED | OUTPATIENT
Start: 2021-05-27 | End: 2021-05-27 | Stop reason: CLARIF

## 2021-05-27 RX ORDER — POTASSIUM CHLORIDE 29.8 MG/ML
20 INJECTION INTRAVENOUS ONCE
Status: COMPLETED | OUTPATIENT
Start: 2021-05-27 | End: 2021-05-28

## 2021-05-27 RX ORDER — HEPARIN SODIUM,PORCINE 10 UNIT/ML
5-10 VIAL (ML) INTRAVENOUS
Status: DISCONTINUED | OUTPATIENT
Start: 2021-05-27 | End: 2021-06-10 | Stop reason: HOSPADM

## 2021-05-27 RX ORDER — IPRATROPIUM BROMIDE 42 UG/1
2 SPRAY, METERED NASAL 3 TIMES DAILY
Status: DISCONTINUED | OUTPATIENT
Start: 2021-05-27 | End: 2021-06-10 | Stop reason: HOSPADM

## 2021-05-27 RX ORDER — CETIRIZINE HYDROCHLORIDE 10 MG/1
10 TABLET ORAL DAILY PRN
COMMUNITY
End: 2021-06-24

## 2021-05-27 RX ORDER — ACETAMINOPHEN 325 MG/1
325 TABLET ORAL EVERY 6 HOURS PRN
Status: DISCONTINUED | OUTPATIENT
Start: 2021-05-27 | End: 2021-06-01

## 2021-05-27 RX ORDER — ALBUTEROL SULFATE 90 UG/1
2 AEROSOL, METERED RESPIRATORY (INHALATION) EVERY 4 HOURS PRN
Status: DISCONTINUED | OUTPATIENT
Start: 2021-05-27 | End: 2021-06-02

## 2021-05-27 RX ORDER — GABAPENTIN 300 MG/1
300 CAPSULE ORAL 3 TIMES DAILY
Status: DISCONTINUED | OUTPATIENT
Start: 2021-05-27 | End: 2021-06-10 | Stop reason: HOSPADM

## 2021-05-27 RX ORDER — POTASSIUM CHLORIDE 29.8 MG/ML
20 INJECTION INTRAVENOUS ONCE
Status: COMPLETED | OUTPATIENT
Start: 2021-05-28 | End: 2021-05-28

## 2021-05-27 RX ORDER — POTASSIUM CHLORIDE 750 MG/1
40 TABLET, EXTENDED RELEASE ORAL ONCE
Status: DISCONTINUED | OUTPATIENT
Start: 2021-05-27 | End: 2021-05-28

## 2021-05-27 RX ORDER — LIDOCAINE 40 MG/G
CREAM TOPICAL
Status: DISCONTINUED | OUTPATIENT
Start: 2021-05-27 | End: 2021-06-10 | Stop reason: HOSPADM

## 2021-05-27 RX ADMIN — IPRATROPIUM BROMIDE 2 SPRAY: 42 SPRAY, METERED NASAL at 21:16

## 2021-05-27 RX ADMIN — ASPIRIN 81 MG CHEWABLE TABLET 81 MG: 81 TABLET CHEWABLE at 17:40

## 2021-05-27 RX ADMIN — POTASSIUM CHLORIDE 40 MEQ: 750 TABLET, EXTENDED RELEASE ORAL at 21:14

## 2021-05-27 RX ADMIN — WARFARIN SODIUM 4.5 MG: 2.5 TABLET ORAL at 21:14

## 2021-05-27 RX ADMIN — MACITENTAN 10 MG: 10 TABLET, FILM COATED ORAL at 18:30

## 2021-05-27 RX ADMIN — GABAPENTIN 300 MG: 300 CAPSULE ORAL at 17:41

## 2021-05-27 RX ADMIN — GABAPENTIN 300 MG: 300 CAPSULE ORAL at 21:14

## 2021-05-27 RX ADMIN — Medication 1 TABLET: at 21:13

## 2021-05-27 RX ADMIN — Medication 5 ML: at 20:09

## 2021-05-27 ASSESSMENT — ACTIVITIES OF DAILY LIVING (ADL)
ADLS_ACUITY_SCORE: 15
ADLS_ACUITY_SCORE: 15

## 2021-05-27 NOTE — PHARMACY-CONSULT NOTE
Parenteral Prostacyclin Therapy Continuation     This patient has been admitted to the hospital while receiving outpatient Treprostinil (Remodulin) therapy for the treatment of pulmonary hypertension.      [facility]rent has been contacted at [phone] to verify outpatient prostacyclin regimen. Based on the latest records, the following applies to this patient's  prostacyclin therapy.    1. Type of ambulatory pump used:  [employer] (100 mL cassette)  2.  Treprostinil (Remodulin)  Dosing Weight= 56.3kg  3.  Prostacyclin Concentration= 60,000 Nanograms/mL  4.  Prostacyclin Dose = 30 Nanograms/kg/min  5. Ambulatory Pump Rate = 41 mL/day     Plan:  Patient refilled her pump yesterday afternoon. Patient will continue on home pump overnight and switch to our pump in the morning. Orders entered into Epic.     Anastasia Melo, PharmD  Pager: [phone]

## 2021-05-27 NOTE — PROGRESS NOTES
Admitted/transferred from: home  Reason for admission/transfer: fluid overload, IV diuretics, remodulin titration  2 RN skin assessment: completed by Salena  Result of skin assessment and interventions/actions: n/a  Height, weight, drug calc weight: Done  Patient belongings: with patient    ?  Arrived on unit at 1400. A&Ox4 up ad jamie. Will transition remodulin to hospital pump in AM. PICC to be placed this evening to start IV diuretics.

## 2021-05-27 NOTE — PHARMACY-ADMISSION MEDICATION HISTORY
Admission Medication History Completed by Pharmacy    See [facility] Admission Navigator for allergy information, preferred outpatient pharmacy, prior to admission medications and immunization status.     Medication History Sources:     Patient     Fill history     Chart review     Changes made to PTA medication list (reason):    Added:  Ketotifen eye drops    Deleted: None    Changed:   o Cetirizine daily --> prn  o macitentan daily --> every other day  o Warfarin updated to 4.5mg Tu and 3mg ROW    Additional Information:    Patient's macitentan was changed to every other day by MD due to fluid retention. Pt isnt sure if she took a dose yesterday.    Warfarin: patient was recently discharged from TCU and patient does not know if her dose was changed there but says most of her doses are 3mg so she took 3mg yesterday. Patient was last see at [facility] on 5/5/21 - at that time her dose was 4.5mg Tue and 3mg ROW    Prior to Admission medications    Medication Sig Last Dose Taking? Auth Provider   acetaminophen (TYLENOL) 325 MG tablet Take 325 mg by mouth every 6 hours as needed for mild pain prn Yes Unknown, Entered By History   alendronate (FOSAMAX) 70 MG tablet Take 70 mg by mouth once a week On Mondays 5/24/2021 Yes Reported, Patient   Ascorbic Acid (VITAMIN C) 500 MG CAPS Take 500 mg by mouth every morning  5/27/2021 at Unknown time Yes Reported, Patient   aspirin (ASA) 81 MG tablet Take 81 mg by mouth daily  5/26/2021 at Unknown time Yes Reported, Patient   beclomethasone HFA (QVAR REDIHALER) 80 MCG/ACT inhaler Inhale 2 puffs into the lungs 2 times daily 5/27/2021 at Unknown time Yes Unknown, Entered By History   bumetanide (BUMEX) 2 MG tablet Take 2 tablets (4 mg) by mouth 2 times daily 5/27/2021 at Unknown time Yes Shawna Marcum PA   calcium citrate-vitamin D (CALCIUM CITRATE + D3) 315-250 MG-UNIT TABS per tablet Take 1 tablet by mouth 2 times daily  5/27/2021 at Unknown time Yes Reported, Patient    cetirizine (ZYRTEC) 10 MG tablet Take 10 mg by mouth daily as needed for allergies Past Month at Unknown time Yes Unknown, Entered By History   digoxin (LANOXIN) 62.5 MCG tablet Take 1 tablet (62.5 mcg) by mouth daily 5/27/2021 at Unknown time Yes Shawna Marcum PA   gabapentin (NEURONTIN) 300 MG capsule Take 300 mg by mouth 3 times daily Can take an additional 300mg at bedtime as needed 5/27/2021 at Unknown time Yes Shawna Marcum PA   hydroxychloroquine (PLAQUENIL) 200 MG tablet Take 200 mg by mouth daily  5/27/2021 at Unknown time Yes Reported, Patient   ipratropium (ATROVENT) 0.06 % nasal spray Spray 2 sprays into both nostrils 3 times daily  5/27/2021 at Unknown time Yes Unknown, Entered By History   ketotifen (ZADITOR) 0.025 % ophthalmic solution Place 1 drop into both eyes daily 5/27/2021 at Unknown time Yes Unknown, Entered By History   macitentan (OPSUMIT) 10 MG tablet Take 1 tablet (10 mg) by mouth daily  Patient taking differently: Take 10 mg by mouth every other day  Past Week at Unknown time Yes Karolina Turcios MD   potassium chloride ER (KLOR-CON M) 20 MEQ CR tablet Take 2 tablets (40 mEq) by mouth 2 times daily 5/27/2021 at Unknown time Yes Shawna Marcum PA   sodium chloride (OCEAN) 0.65 % nasal spray Spray 1 spray into both nostrils daily as needed for congestion 5/26/2021 at Unknown time Yes Unknown, Entered By History   Treprostinil (REMODULIN IJ) Dose: 30 ng/kg/min  Vial concentration: 1 mg/mL  Final concentration: 60,000 ng/mL  Dosing weight: 56.3 kg   Pump rate: 41 mL/day  Per patient and [facility] on 5/27/2021 5/27/2021 at Unknown time Yes Reported, Patient   warfarin ANTICOAGULANT (COUMADIN) 3 MG tablet Take 3 mg PO on Mon, Wed, Fri and Sat, and 4.5 mg all other days of the week  Patient taking differently: Per anticoag clinic note 5/5/21: 4.5mg Tue and 3mg ROW 5/26/2021 at Unknown time Yes Karolina Turcios MD   albuterol (PROAIR HFA/PROVENTIL HFA/VENTOLIN HFA) 108 (90 Base)  MCG/ACT inhaler Inhale 2 puffs into the lungs every 4 hours as needed for shortness of breath / dyspnea or wheezing  prn  Reported, Patient   Corn Dextrin (CLEAR FIBER POWDER PO) Take by mouth daily  Unknown at Unknown time  Reported, Patient   metolazone (ZAROXOLYN) 2.5 MG tablet Take 1 tablet (2.5 mg) by mouth as needed (as needed for weight gain and swelling; call cardiology clinic before taking) Unknown at Unknown time  Karolina Turcios MD         Date completed: 05/27/21    Medication history completed by: Anastasia Melo, PharmD

## 2021-05-27 NOTE — H&P
[facility]   Cardiology II Service / Advanced Heart Failure  History & Physical    Karen Anderson  : 1940  MRN # [id number]    ADMIT DATE: 2021    PCP: Carlos Carter    CHIEF COMPLAINT: Hypervolemia, lower extremity edema.    HPI: Karen Anderson is a 80 year old female who presents with chronic thromboembolic pulmonary hypertension, RA, systemic HTN, breast cancer, asthma, scoliosis, breast cancer s/p chemotherapy with Adriamycin, Cytoxan, Taxol, and tamoxifen and status post left mastectomy who presents with hypervolemia.    She was recently admitted to rehab for her right arm fracture. During that stay, she gained nearly 20 lbs and was 135 lbs when she saw Dr. Turcios. Unclear if she took a metolazone at rehab or not, but she is down 15 lbs since seeing Dr. Turcios on the .  Can walk to the bathroom and back without RIVERA, but more than that would make her feel RIVERA. No SOB at rest. She does sleep propped up, stable. No PND. Significant LE edema. R leg swollen more than L, which is always true for her. Having fluid in her legs and stomach. No cough. No fevers or chills. One epsidoe of dizziness with a potision change when she was getting ready to come to the hospital. No hemoptysis. No Chest pain. Occasional fluttering, bery brief, at her baseline.     Of not, her opsumit was recently decreased from 10 mg daily to every other day due to fluid retension.    ASSESSMENT & PLAN:  Karen Anderson is a 80 year old female who presents with chronic thromboembolic pulmonary hypertension, RA, systemic HTN, breast cancer, asthma, scoliosis, breast cancer s/p chemotherapy with Adriamycin, Cytoxan, Taxol, and tamoxifen and status post left mastectomy who presents with hypervolemia.    Today's Plan:  - Admission labs  - Admission EKG  - CXR given RIVERA  - PICC line d/t poor access  - Once we have IV access and labs, will start bumex gtt at 1 with a bolus of 4mg IV x1  - BID labs  while diuresing  - Will consider increasing remodulin per Dr. Turcios up to 32 (from30)    #Acute on chronic systolic right-sided heart failure   Progressive worsening of right heart function.    This admission presented with increasing SOB, swelling, and weight gain (dry weight ~112 lbs). She was 135 lbs on 5/24, likely took a metolazone although she cannot remember and now 122 lbs. Her home regimen is bumex 4 mg PO BID and Kcl 40 meq PO BID.  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 Given her proximal disease and high pressures, balloon angioplasty would also be high risk. Underwent balloon pulmonary angioplasty in January which was complicated by hemoptysis. At this time, would not repeat here. [facility] willing to consider in the future. Currently being managed on medical therapy by Dr. Turcios as an outpatient  - Continue PTA Remodulin 30 ng/kg/min. Pending timing of switching over to hospital pump, will increase to 32 ng/kg/min per Dr. Turcios. If she is here through Monday would consider going to 34.  - Contiue PTA Opsumit q48h (recently reduced d/t concerns for fluid retention)   - Continue Warfarin. INR goal 2.0-2.5  - O2 via NC if sat <92%  - 2L fluid restriction  - Strict ins and outs  - Daily weights  - Salt restriction     # CKD stage 3b  - Cr pending     #Asthma  #Allergic rhinitis  No acute concerns.  - Continue beclomethasone BID and albuterol inhaler PRN  - Continue cetirizine and ipratropium nasal spray     #RA  No acute concerns.  - Continue Hydroxychloroquine 200 mg daily    # Distal fracture of her right radius and ulna  - OT while inpatient  - Continue PTA alendronate 80 mg  every monday    FEN: 2g NA restriction, 2L fluid restriction  PROPHY:  On anticoagulation  LINES:  Need to place PICC  DISPO:  Pending diuresis  CODE STATUS:  Full code    ROS:   CONSTITUTIONAL: Denies fever, fatigue. + weight gain  HEAD: Denies headache.  EENT: Denies vision changes, hearing changes, dysphagia, or sore throat.    NECK: Denies lymphadenopathy.   CV:Denies chest pain or shortness of breath. Palpitations at baseline.  PULMONARY:Denies shortness of breath, cough, or previous TB exposure.   GI:Denies nausea, vomiting, diarrhea, and abdominal pain. Bowel movements are regular.   :Denies urinary alterations, dysuria, urinary frequency, hematuria, and abnormal drainage.   EXT:+ extremity edema.   SKIN:Denies abnormal rashes or lesions.   MUSCULOSKELETAL:Denies upper or lower extremity weakness and pain.   NEUROLOGIC:Denies dizziness, seizures, or upper or lower extremity paresthesia.   HEMATOLOGICAL:No abnormal bruising or bleeding.   PSYCHIATRIC:Denies any mood alterations.     PMH:  Past Medical History:   Diagnosis Date     Asthma      Breast cancer (H)      Hypertension      Mycobacterium avium complex (H)      Pulmonary embolism (H)      Pulmonary hypertension (H)      Rheumatoid arthritis (H)      Scoliosis        PSH:  Past Surgical History:   Procedure Laterality Date     CV BALLOON PULMONARY ANGIOPLASTY N/A 1/12/2021    Procedure: CV BALLOON PULMONARY ANGIOPLASTY;  Surgeon: Amos Castañeda MD;  Location: [facility] CARDIAC CATH LAB     CV PULMONARY ANGIOGRAM N/A 10/5/2020    Procedure: Pulmonary Angiogram;  Surgeon: Lorenzo Sullivan MD;  Location:  HEART CARDIAC CATH LAB     CV PULMONARY ANGIOGRAM N/A 1/12/2021    Procedure: CV PULMONARY ANGIOGRAM;  Surgeon: Amos Castañeda MD;  Location: [facility] CARDIAC CATH LAB     CV RIGHT HEART CATH MEASUREMENTS RECORDED N/A 03/04/2020    Procedure: CV RIGHT HEART CATH;  Surgeon: Karolina Turcios MD;  Location: [facility] CARDIAC  CATH LAB     CV RIGHT HEART CATH MEASUREMENTS RECORDED N/A 10/5/2020    Procedure: Right Heart Cath; balloon pulmonary angiogram;  Surgeon: Lorenzo Sullivan MD;  Location:  HEART CARDIAC CATH LAB     CV RIGHT HEART CATH MEASUREMENTS RECORDED N/A 12/30/2020    Procedure: Right Heart Cath;  Surgeon: Karolina Turcios MD;  Location:  HEART CARDIAC CATH LAB     CV RIGHT HEART CATH MEASUREMENTS RECORDED N/A 1/12/2021    Procedure: CV RIGHT HEART CATH;  Surgeon: Amos Castañeda MD;  Location:  HEART CARDIAC CATH LAB     CV RIGHT HEART CATH MEASUREMENTS RECORDED N/A 3/17/2021    Procedure: CV RIGHT HEART CATH;  Surgeon: Karolina Turcios MD;  Location:  HEART CARDIAC CATH LAB     IR CVC TUNNEL PLACEMENT > 5 YRS OF AGE  03/09/2020     MASTECTOMY Left      PICC SINGLE LUMEN PLACEMENT Right 03/04/2020    4Fr - 41cm, Medial brachial vein. Left mastectomy     PICC TRIPLE LUMEN PLACEMENT Right 10/02/2020    5Fr - 43cm (7cm external), Basilic vein, high RA       MEDICATIONS:  Prior to Admission Medications   Prescriptions Last Dose Informant Patient Reported? Taking?   Ascorbic Acid (VITAMIN C) 500 MG CAPS 5/27/2021 at Unknown time  Yes Yes   Sig: Take 500 mg by mouth every morning    Corn Dextrin (CLEAR FIBER POWDER PO) Unknown at Unknown time  Yes No   Treprostinil (REMODULIN IJ) 5/27/2021 at Unknown time  Yes Yes   Sig: Dose: 30 ng/kg/min  Vial concentration: 1 mg/mL  Final concentration: 60,000 ng/mL  Dosing weight: 56.3 kg   Pump rate: 41 mL/day  Per patient and [facility] on 12/23/2020   acetaminophen (TYLENOL) 325 MG tablet prn  Yes Yes   Sig: Take 325 mg by mouth every 6 hours as needed for mild pain   albuterol (PROAIR HFA/PROVENTIL HFA/VENTOLIN HFA) 108 (90 Base) MCG/ACT inhaler prn  Yes No   Sig: Inhale 2 puffs into the lungs every 4 hours as needed for shortness of breath / dyspnea or wheezing    alendronate (FOSAMAX) 70 MG tablet 5/24/2021  Yes Yes   Sig: Take 70 mg by mouth once a week  On Mondays   aspirin (ASA) 81 MG tablet 5/26/2021 at Unknown time  Yes Yes   Sig: Take 81 mg by mouth daily    beclomethasone HFA (QVAR REDIHALER) 80 MCG/ACT inhaler 5/27/2021 at Unknown time  Yes Yes   Sig: Inhale 2 puffs into the lungs 2 times daily   bumetanide (BUMEX) 2 MG tablet 5/27/2021 at Unknown time  No Yes   Sig: Take 2 tablets (4 mg) by mouth 2 times daily   calcium citrate-vitamin D (CALCIUM CITRATE + D3) 315-250 MG-UNIT TABS per tablet 5/27/2021 at Unknown time  Yes Yes   Sig: Take 1 tablet by mouth 2 times daily    digoxin (LANOXIN) 62.5 MCG tablet 5/27/2021 at Unknown time  No Yes   Sig: Take 1 tablet (62.5 mcg) by mouth daily   gabapentin (NEURONTIN) 300 MG capsule 5/27/2021 at Unknown time  Yes Yes   Sig: Take 300 mg by mouth 3 times daily Can take an additional 300mg at bedtime as needed   hydroxychloroquine (PLAQUENIL) 200 MG tablet 5/27/2021 at Unknown time  Yes Yes   Sig: Take 200 mg by mouth daily    ipratropium (ATROVENT) 0.06 % nasal spray 5/27/2021 at Unknown time  Yes Yes   Sig: Spray 2 sprays into both nostrils 3 times daily    ketotifen (ZADITOR) 0.025 % ophthalmic solution 5/27/2021 at Unknown time  Yes Yes   Sig: Place 1 drop into both eyes daily   macitentan (OPSUMIT) 10 MG tablet 5/26/2021 at Unknown time  No Yes   Sig: Take 1 tablet (10 mg) by mouth daily   metolazone (ZAROXOLYN) 2.5 MG tablet Unknown at Unknown time  No No   Sig: Take 1 tablet (2.5 mg) by mouth as needed (as needed for weight gain and swelling; call cardiology clinic before taking)   potassium chloride ER (KLOR-CON M) 20 MEQ CR tablet 5/27/2021 at Unknown time  No Yes   Sig: Take 2 tablets (40 mEq) by mouth 2 times daily   sodium chloride (OCEAN) 0.65 % nasal spray 5/26/2021 at Unknown time  Yes Yes   Sig: Spray 1 spray into both nostrils daily as needed for congestion   warfarin ANTICOAGULANT (COUMADIN) 3 MG tablet 5/26/2021 at Unknown time  No Yes   Sig: Take 3 mg PO on Mon, Wed, Fri and Sat, and 4.5 mg all other  days of the week      Facility-Administered Medications: None        ALLERGIES:     Allergies   Allergen Reactions     Sulfa Drugs Anaphylaxis and Hives       FAMILY HISTORY:  Family History   Problem Relation Age of Onset     Heart Failure Mother      Kidney Disease Mother      Coronary Artery Disease Maternal Grandfather 50     LUNG DISEASE No family hx of      Clotting Disorder No family hx of        SOCIAL HISTORY:  Social History     Socioeconomic History     Marital status:      Spouse name: Not on file     Number of children: Not on file     Years of education: Not on file     Highest education level: Not on file   Occupational History     Not on file   Social Needs     Financial resource strain: Not on file     Food insecurity     Worry: Not on file     Inability: Not on file     Transportation needs     Medical: Not on file     Non-medical: Not on file   Tobacco Use     Smoking status: Never Smoker     Smokeless tobacco: Never Used   Substance and Sexual Activity     Alcohol use: Yes     Comment: occasionally      Drug use: Never     Sexual activity: Not on file   Lifestyle     Physical activity     Days per week: Not on file     Minutes per session: Not on file     Stress: Not on file   Relationships     Social connections     Talks on phone: Not on file     Gets together: Not on file     Attends Synagogue service: Not on file     Active member of club or organization: Not on file     Attends meetings of clubs or organizations: Not on file     Relationship status: Not on file     Intimate partner violence     Fear of current or ex partner: Not on file     Emotionally abused: Not on file     Physically abused: Not on file     Forced sexual activity: Not on file   Other Topics Concern     Not on file   Social History Narrative    Is . Has 1 adult daughter who is in her 50s. Has 2 grandkids. 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       PHYSICAL EXAM:  Blood pressure (!)  145/77, pulse 89, weight 55.5 kg (122 lb 4.8 oz), SpO2 94 %.    GENERAL: Appears comfortable, in no acute distress.   HEENT: Eye symmetrical, no discharge or icterus bilaterally. Mucous membranes moist and without lesions.  NECK: Supple, JVD >12.   CV: RRR, +S1S2, no murmur, rub, or gallop.   RESPIRATORY: Respirations regular, even, and unlabored. Lungs CTA throughout.   GI: Soft and non distended with normoactive bowel sounds present in all quadrants. No tenderness, rebound, guarding. No organomegaly.   EXTREMITIES: 2+ peripheral edema, R>L. All extremities are warm and well perfused  NEUROLOGIC: Alert and interacting appropriatly. No focal deficits.   MUSCULOSKELETAL: No joint swelling or tenderness.   SKIN: No jaundice. No rashes or lesions.     LABS:  CBC:  Recent Labs   Lab Test 04/07/21  1140   WBC 3.1*   RBC 3.56*   HGB 8.9*   HCT 31.1*   MCV 87   MCH 25.0*   MCHC 28.6*   RDW 18.4*          CMP:  Recent Labs   Lab Test 04/28/21  1009      POTASSIUM 4.5   CHLORIDE 98   ESTEFANÍA 9.9   CO2 33*   BUN 49*   CR 1.47*   *   AST 34   ALT 26   BILITOTAL 0.4   ALBUMIN 3.0*   PROTTOTAL 7.4   ALKPHOS 161*       IMAGING:  - CXR pending    3/17/21 [facility]  Time Systolic (mmHg) Diastolic (mmHg) Mean (mmHg) A Wave (mmHg) V Wave (mmHg) EDP (mmHg) Max dp/dt (mmHg/sec) HR (bpm) Content (mL/dL) SAT (%)    RA Pressures  2:31 PM   14    14    22      63        RV Pressures  2:16 PM       912           2:32 PM 87    9       28     62        PA Pressures  2:32 PM 88    22    44        62        PCW Pressures  2:33 PM   14                SAT(%)  PO2  Content (mL/dL) PA Sat (%)     PA  2:16 PM  50.5 %      50.5      Art  2:16 PM  86 %     10.53          TDCO (L/min) TDCI (L/min/m2) Concepcion C.O. (L/min) Concepcion C.I. (L/min/m2) Concepcion HR (bpm)    Cardiac Output Results  2:16 PM 3.97    2.77    2.95    2.06         2:36 PM 3.97            3/17/21 ECHO  Interpretation Summary  Global and regional left ventricular function is normal  with an EF of 60-65%.  Paradoxical septal motion consistent with right ventricular pressure and  volume overload is present.  Moderate to severe right ventricular dilation is present.  Global right ventricular function is moderately reduced.  Right ventricular systolic pressure is 60mmHg above the right atrial pressure.  PV AT 69ms.  IVC diameter >2.1 cm collapsing <50% with sniff suggests a high RA pressure  estimated at 15 mmHg or greater.  Small circumferential pericardial effusion is present without any hemodynamic  Significance.    Tricuspid Valve  Mild to moderate tricuspid insufficiency is present. Right ventricular  systolic pressure is 60mmHg above the right atrial pressure.        Time/Communication  I personally spent a total of 75 minutes. Of that >45 minutes was counseling/coordination of patient's care. Plan of care discussed with patient. See my note above for details.        Deborah Chaudhry PA-C  Advanced Heart Failure/Cardiology II Service  Pager [phone]  5/27/2021

## 2021-05-28 ENCOUNTER — APPOINTMENT (OUTPATIENT)
Dept: OCCUPATIONAL THERAPY | Facility: CLINIC | Age: 81
DRG: 291 | End: 2021-05-28
Attending: PHYSICIAN ASSISTANT
Payer: COMMERCIAL

## 2021-05-28 LAB
ALBUMIN SERPL-MCNC: 2.7 G/DL (ref 3.4–5)
ALBUMIN UR-MCNC: NEGATIVE MG/DL
ALP SERPL-CCNC: 156 U/L (ref 40–150)
ALT SERPL W P-5'-P-CCNC: 25 U/L (ref 0–50)
ANION GAP SERPL CALCULATED.3IONS-SCNC: 1 MMOL/L (ref 3–14)
ANION GAP SERPL CALCULATED.3IONS-SCNC: 2 MMOL/L (ref 3–14)
ANION GAP SERPL CALCULATED.3IONS-SCNC: 4 MMOL/L (ref 3–14)
APPEARANCE UR: CLEAR
AST SERPL W P-5'-P-CCNC: 36 U/L (ref 0–45)
BILIRUB DIRECT SERPL-MCNC: 0.1 MG/DL (ref 0–0.2)
BILIRUB SERPL-MCNC: 0.3 MG/DL (ref 0.2–1.3)
BILIRUB UR QL STRIP: NEGATIVE
BUN SERPL-MCNC: 53 MG/DL (ref 7–30)
BUN SERPL-MCNC: 54 MG/DL (ref 7–30)
BUN SERPL-MCNC: 56 MG/DL (ref 7–30)
CALCIUM SERPL-MCNC: 10 MG/DL (ref 8.5–10.1)
CALCIUM SERPL-MCNC: 9.8 MG/DL (ref 8.5–10.1)
CALCIUM SERPL-MCNC: 9.8 MG/DL (ref 8.5–10.1)
CHLORIDE SERPL-SCNC: 100 MMOL/L (ref 94–109)
CHLORIDE SERPL-SCNC: 100 MMOL/L (ref 94–109)
CHLORIDE SERPL-SCNC: 101 MMOL/L (ref 94–109)
CO2 SERPL-SCNC: 32 MMOL/L (ref 20–32)
CO2 SERPL-SCNC: 35 MMOL/L (ref 20–32)
CO2 SERPL-SCNC: 36 MMOL/L (ref 20–32)
COLOR UR AUTO: ABNORMAL
CREAT SERPL-MCNC: 1.35 MG/DL (ref 0.52–1.04)
CREAT SERPL-MCNC: 1.36 MG/DL (ref 0.52–1.04)
CREAT SERPL-MCNC: 1.39 MG/DL (ref 0.52–1.04)
ERYTHROCYTE [DISTWIDTH] IN BLOOD BY AUTOMATED COUNT: 20.8 % (ref 10–15)
GFR SERPL CREATININE-BSD FRML MDRD: 36 ML/MIN/{1.73_M2}
GFR SERPL CREATININE-BSD FRML MDRD: 37 ML/MIN/{1.73_M2}
GFR SERPL CREATININE-BSD FRML MDRD: 37 ML/MIN/{1.73_M2}
GLUCOSE SERPL-MCNC: 104 MG/DL (ref 70–99)
GLUCOSE SERPL-MCNC: 128 MG/DL (ref 70–99)
GLUCOSE SERPL-MCNC: 153 MG/DL (ref 70–99)
GLUCOSE UR STRIP-MCNC: NEGATIVE MG/DL
HCT VFR BLD AUTO: 24.7 % (ref 35–47)
HGB BLD-MCNC: 7.1 G/DL (ref 11.7–15.7)
HGB UR QL STRIP: NEGATIVE
HYALINE CASTS #/AREA URNS LPF: 4 /LPF (ref 0–2)
INR PPP: 2 (ref 0.86–1.14)
INTERPRETATION ECG - MUSE: NORMAL
IRON SATN MFR SERPL: 6 % (ref 15–46)
IRON SERPL-MCNC: 24 UG/DL (ref 35–180)
KETONES UR STRIP-MCNC: NEGATIVE MG/DL
LEUKOCYTE ESTERASE UR QL STRIP: NEGATIVE
MAGNESIUM SERPL-MCNC: 2.3 MG/DL (ref 1.6–2.3)
MCH RBC QN AUTO: 24 PG (ref 26.5–33)
MCHC RBC AUTO-ENTMCNC: 28.7 G/DL (ref 31.5–36.5)
MCV RBC AUTO: 83 FL (ref 78–100)
NITRATE UR QL: NEGATIVE
PH UR STRIP: 6.5 PH (ref 5–7)
PLATELET # BLD AUTO: 148 10E9/L (ref 150–450)
POTASSIUM SERPL-SCNC: 4.6 MMOL/L (ref 3.4–5.3)
POTASSIUM SERPL-SCNC: 4.6 MMOL/L (ref 3.4–5.3)
POTASSIUM SERPL-SCNC: 5.3 MMOL/L (ref 3.4–5.3)
PROT SERPL-MCNC: 7 G/DL (ref 6.8–8.8)
RBC # BLD AUTO: 2.96 10E12/L (ref 3.8–5.2)
RBC #/AREA URNS AUTO: <1 /HPF (ref 0–2)
SODIUM SERPL-SCNC: 137 MMOL/L (ref 133–144)
SOURCE: ABNORMAL
SP GR UR STRIP: 1.01 (ref 1–1.03)
SQUAMOUS #/AREA URNS AUTO: <1 /HPF (ref 0–1)
TIBC SERPL-MCNC: 416 UG/DL (ref 240–430)
UROBILINOGEN UR STRIP-MCNC: NORMAL MG/DL (ref 0–2)
WBC # BLD AUTO: 3.7 10E9/L (ref 4–11)
WBC #/AREA URNS AUTO: 0 /HPF (ref 0–5)

## 2021-05-28 PROCEDURE — 250N000011 HC RX IP 250 OP 636: Performed by: STUDENT IN AN ORGANIZED HEALTH CARE EDUCATION/TRAINING PROGRAM

## 2021-05-28 PROCEDURE — 83735 ASSAY OF MAGNESIUM: CPT | Performed by: STUDENT IN AN ORGANIZED HEALTH CARE EDUCATION/TRAINING PROGRAM

## 2021-05-28 PROCEDURE — 214N000001 HC R&B CCU UMMC

## 2021-05-28 PROCEDURE — 99232 SBSQ HOSP IP/OBS MODERATE 35: CPT | Performed by: PHYSICIAN ASSISTANT

## 2021-05-28 PROCEDURE — 81001 URINALYSIS AUTO W/SCOPE: CPT | Performed by: PHYSICIAN ASSISTANT

## 2021-05-28 PROCEDURE — 250N000009 HC RX 250: Performed by: STUDENT IN AN ORGANIZED HEALTH CARE EDUCATION/TRAINING PROGRAM

## 2021-05-28 PROCEDURE — 85027 COMPLETE CBC AUTOMATED: CPT | Performed by: PHYSICIAN ASSISTANT

## 2021-05-28 PROCEDURE — 272N000004 HC RX 272: Performed by: INTERNAL MEDICINE

## 2021-05-28 PROCEDURE — 250N000013 HC RX MED GY IP 250 OP 250 PS 637: Performed by: STUDENT IN AN ORGANIZED HEALTH CARE EDUCATION/TRAINING PROGRAM

## 2021-05-28 PROCEDURE — 80048 BASIC METABOLIC PNL TOTAL CA: CPT | Performed by: PHYSICIAN ASSISTANT

## 2021-05-28 PROCEDURE — 83540 ASSAY OF IRON: CPT | Performed by: PHYSICIAN ASSISTANT

## 2021-05-28 PROCEDURE — 250N000011 HC RX IP 250 OP 636: Performed by: PHYSICIAN ASSISTANT

## 2021-05-28 PROCEDURE — 97530 THERAPEUTIC ACTIVITIES: CPT | Mod: GO | Performed by: OCCUPATIONAL THERAPIST

## 2021-05-28 PROCEDURE — 250N000009 HC RX 250: Performed by: PHYSICIAN ASSISTANT

## 2021-05-28 PROCEDURE — 36592 COLLECT BLOOD FROM PICC: CPT | Performed by: PHYSICIAN ASSISTANT

## 2021-05-28 PROCEDURE — 250N000013 HC RX MED GY IP 250 OP 250 PS 637: Performed by: INTERNAL MEDICINE

## 2021-05-28 PROCEDURE — 80048 BASIC METABOLIC PNL TOTAL CA: CPT | Performed by: STUDENT IN AN ORGANIZED HEALTH CARE EDUCATION/TRAINING PROGRAM

## 2021-05-28 PROCEDURE — 36592 COLLECT BLOOD FROM PICC: CPT | Performed by: STUDENT IN AN ORGANIZED HEALTH CARE EDUCATION/TRAINING PROGRAM

## 2021-05-28 PROCEDURE — 250N000011 HC RX IP 250 OP 636: Performed by: INTERNAL MEDICINE

## 2021-05-28 PROCEDURE — 83550 IRON BINDING TEST: CPT | Performed by: PHYSICIAN ASSISTANT

## 2021-05-28 PROCEDURE — 80076 HEPATIC FUNCTION PANEL: CPT | Performed by: PHYSICIAN ASSISTANT

## 2021-05-28 PROCEDURE — 97166 OT EVAL MOD COMPLEX 45 MIN: CPT | Mod: GO | Performed by: OCCUPATIONAL THERAPIST

## 2021-05-28 PROCEDURE — 85610 PROTHROMBIN TIME: CPT | Performed by: PHYSICIAN ASSISTANT

## 2021-05-28 PROCEDURE — 250N000013 HC RX MED GY IP 250 OP 250 PS 637: Performed by: PHYSICIAN ASSISTANT

## 2021-05-28 PROCEDURE — 97535 SELF CARE MNGMENT TRAINING: CPT | Mod: GO | Performed by: OCCUPATIONAL THERAPIST

## 2021-05-28 RX ORDER — SENNOSIDES 8.6 MG
8.6 TABLET ORAL 2 TIMES DAILY PRN
Status: DISCONTINUED | OUTPATIENT
Start: 2021-05-28 | End: 2021-06-10 | Stop reason: HOSPADM

## 2021-05-28 RX ORDER — BUMETANIDE 0.25 MG/ML
4 INJECTION INTRAMUSCULAR; INTRAVENOUS ONCE
Status: COMPLETED | OUTPATIENT
Start: 2021-05-28 | End: 2021-05-28

## 2021-05-28 RX ORDER — ONDANSETRON 2 MG/ML
4 INJECTION INTRAMUSCULAR; INTRAVENOUS EVERY 6 HOURS PRN
Status: DISCONTINUED | OUTPATIENT
Start: 2021-05-28 | End: 2021-06-10 | Stop reason: HOSPADM

## 2021-05-28 RX ORDER — DIGOXIN 0.06 MG/1
62.5 TABLET ORAL DAILY
Status: DISCONTINUED | OUTPATIENT
Start: 2021-05-29 | End: 2021-06-05

## 2021-05-28 RX ORDER — WARFARIN SODIUM 3 MG/1
3 TABLET ORAL
Status: COMPLETED | OUTPATIENT
Start: 2021-05-28 | End: 2021-05-28

## 2021-05-28 RX ORDER — POLYETHYLENE GLYCOL 3350 17 G/17G
17 POWDER, FOR SOLUTION ORAL DAILY
Status: DISCONTINUED | OUTPATIENT
Start: 2021-05-28 | End: 2021-06-10 | Stop reason: HOSPADM

## 2021-05-28 RX ADMIN — OXYCODONE HYDROCHLORIDE AND ACETAMINOPHEN 500 MG: 500 TABLET ORAL at 07:36

## 2021-05-28 RX ADMIN — POTASSIUM CHLORIDE 20 MEQ: 29.8 INJECTION, SOLUTION INTRAVENOUS at 01:10

## 2021-05-28 RX ADMIN — DIGOXIN 62.5 MCG: 0.06 TABLET ORAL at 07:37

## 2021-05-28 RX ADMIN — GABAPENTIN 300 MG: 300 CAPSULE ORAL at 20:19

## 2021-05-28 RX ADMIN — POTASSIUM CHLORIDE 60 MEQ: 20 SOLUTION ORAL at 00:35

## 2021-05-28 RX ADMIN — Medication 1 TABLET: at 20:19

## 2021-05-28 RX ADMIN — ASPIRIN 81 MG CHEWABLE TABLET 81 MG: 81 TABLET CHEWABLE at 07:37

## 2021-05-28 RX ADMIN — ACETAMINOPHEN 325 MG: 325 TABLET, FILM COATED ORAL at 10:31

## 2021-05-28 RX ADMIN — ONDANSETRON 4 MG: 2 INJECTION INTRAMUSCULAR; INTRAVENOUS at 12:14

## 2021-05-28 RX ADMIN — BUMETANIDE 4 MG: 0.25 INJECTION, SOLUTION INTRAMUSCULAR; INTRAVENOUS at 00:38

## 2021-05-28 RX ADMIN — IPRATROPIUM BROMIDE 2 SPRAY: 42 SPRAY, METERED NASAL at 07:53

## 2021-05-28 RX ADMIN — POTASSIUM CHLORIDE 20 MEQ: 29.8 INJECTION, SOLUTION INTRAVENOUS at 00:04

## 2021-05-28 RX ADMIN — BUMETANIDE 2 MG/HR: 0.25 INJECTION INTRAMUSCULAR; INTRAVENOUS at 16:12

## 2021-05-28 RX ADMIN — Medication 5 ML: at 20:20

## 2021-05-28 RX ADMIN — ACETAMINOPHEN 325 MG: 325 TABLET, FILM COATED ORAL at 03:34

## 2021-05-28 RX ADMIN — WARFARIN SODIUM 3 MG: 3 TABLET ORAL at 17:10

## 2021-05-28 RX ADMIN — BUMETANIDE 4 MG: 0.25 INJECTION, SOLUTION INTRAMUSCULAR; INTRAVENOUS at 12:14

## 2021-05-28 RX ADMIN — KETOTIFEN FUMARATE 1 DROP: 0.35 SOLUTION/ DROPS OPHTHALMIC at 07:54

## 2021-05-28 RX ADMIN — GABAPENTIN 300 MG: 300 CAPSULE ORAL at 07:37

## 2021-05-28 RX ADMIN — GABAPENTIN 300 MG: 300 CAPSULE ORAL at 14:08

## 2021-05-28 RX ADMIN — POTASSIUM CHLORIDE 40 MEQ: 750 TABLET, EXTENDED RELEASE ORAL at 07:38

## 2021-05-28 RX ADMIN — BUMETANIDE 1 MG/HR: 0.25 INJECTION INTRAMUSCULAR; INTRAVENOUS at 00:48

## 2021-05-28 RX ADMIN — TREPROSTINIL 30 NG/KG/MIN: 20 INJECTION, SOLUTION INTRAVENOUS; SUBCUTANEOUS at 10:20

## 2021-05-28 RX ADMIN — HYDROXYCHLOROQUINE SULFATE 200 MG: 200 TABLET, FILM COATED ORAL at 07:36

## 2021-05-28 RX ADMIN — IPRATROPIUM BROMIDE 2 SPRAY: 42 SPRAY, METERED NASAL at 20:21

## 2021-05-28 RX ADMIN — Medication 1 TABLET: at 07:36

## 2021-05-28 RX ADMIN — POTASSIUM CHLORIDE 40 MEQ: 750 TABLET, EXTENDED RELEASE ORAL at 20:19

## 2021-05-28 ASSESSMENT — ACTIVITIES OF DAILY LIVING (ADL)
ADLS_ACUITY_SCORE: 14
ADLS_ACUITY_SCORE: 15
ADLS_ACUITY_SCORE: 14
PREVIOUS_RESPONSIBILITIES: MEAL PREP;HOUSEKEEPING;LAUNDRY;MEDICATION MANAGEMENT;FINANCES;WORK
ADLS_ACUITY_SCORE: 14

## 2021-05-28 NOTE — PROGRESS NOTES
"SPIRITUAL HEALTH SERVICES  SPIRITUAL ASSESSMENT Progress Note  [facility] 6C     REFERRAL SOURCE: Initial unit  visit with pt Karen Grundcharbel, per request for hospital  visit as noted in initial nursing assessment.    Pt welcomed spiritual support, prayer. Pt talked about the discomfort she has felt with fluid overload, and that she has also been experiencing some nausea. Pt also talked about her concern for her , \"who has been my caregiver... he really needs a procedure but doesn't want to have it because of the long time of recovery he would need. It's getting harder for the two of us to care for each other.\" I encouraged pt to make sure she lets her care team know about her concerns.  PLAN: continue to follow, will check in on pt next week if pt still on unit.    Daniel Harmon) Satindre Castro M.Div., [facility]  Staff   Pager [phone]      * [facility] remains available 24/7 for emergent requests/referrals, either by having the switchboard page the on-call  or by entering an ASAP/STAT consult in Epic (this will also page the on-call ). Routine Epic consults receive an initial response within 24 hours.*      "

## 2021-05-28 NOTE — PHARMACY-ANTICOAGULATION SERVICE
Clinical Pharmacy - Warfarin Dosing Consult     Pharmacy has been consulted to manage this patient s warfarin therapy.  Indication: DVT/ PE Treatment  Therapy Goal: INR 2-2.5  Warfarin Prior to Admission: Yes  Warfarin PTA Regimen: 4.5mg Tue and 3mg ROW; patient was recently discharged from TCU and patient does not know if her dose was changed there but says most of her doses are 3mg so she took 3mg yesterday. Patient was last seen at [facility] on 5/5/21 - at that time her dose was 4.5mg Tue and 3mg ROW  Significant drug interactions: aspirin    INR   Date Value Ref Range Status   05/27/2021 1.78 (H) 0.86 - 1.14 Final   05/05/2021 2.40 (H) 0.86 - 1.14 Final     Comment:     This test is intended for monitoring Coumadin therapy.  Results are not   accurate in patients with prolonged INR due to factor deficiency.         Recommend warfarin 4.5 mg today due to sub therapeutic INR.  Pharmacy will monitor Karen Anderson daily and order warfarin doses to achieve specified goal.      Please contact pharmacy as soon as possible if the warfarin needs to be held for a procedure or if the warfarin goals change.      Anastasia Melo, PharmD  Pager: [phone]

## 2021-05-28 NOTE — PLAN OF CARE
D: Hypervolemia. On home remodulin pump.     I: Monitored vitals and assessed pt status. Neck massage for head ache. Elevated feet. Will benefit with compression stockings from OT.   Running: Bumex @ 1mg/hr  PRN: Tylenol for leg and arm pain.     A: A0x4. VSS. Room air. Up with SBA. Patient on home remodulin pump. Will transition to hospital pump at 0900. RT leg > Left leg swelling. Mild urine output with bumex gtt.     Temp:  [97.5  F (36.4  C)-97.9  F (36.6  C)] 97.7  F (36.5  C)  Pulse:  [77-89] 77  Resp:  [16] 16  BP: (110-145)/(40-77) 112/61  SpO2:  [92 %-97 %] 92 %      P: Continue to monitor Pt status and report changes to treatment team. Continue diureses. Monitor effects of increased remodulin.

## 2021-05-28 NOTE — PLAN OF CARE
PT: PT eval orders received, unavailable at time of attempt. Per OT; pt may be appropriate for one discipline while inpatient. Will hold eval today and initiate as indicated.

## 2021-05-28 NOTE — PROGRESS NOTES
"   05/28/21 1415   Quick Adds   Type of Visit Initial Occupational Therapy Evaluation   Living Environment   People in home spouse   Current Living Arrangements house   Home Accessibility stairs to enter home;stairs within home   Number of Stairs, Main Entrance   (1 + 1 from garage)   Number of Stairs, Within Home, Primary   (7+ 7 to basement (laundry))   Transportation Anticipated family or friend will provide   Living Environment Comments Patient lives with her  in a split-level home w/ x2 steps to enter then 7 steps up or 7 steps down. Laundry is located in the basement. Pt likes to do the laundry when she \"feels up to it\" otherwise her  completes. Pt reports she has not driven since the start of Covid, \"I had nowhere to go.\"   Self-Care   Usual Activity Tolerance good   Current Activity Tolerance fair   Regular Exercise Yes   Activity/Exercise Type walking  (Therapy at [facility])   Exercise Amount/Frequency daily   Equipment Currently Used at Home cane, straight;grab bar, tub/shower   Activity/Exercise/Self-Care Comment Pt is coming from [facility] where she was working with therapy 3 hrs/day to recover from R wrist fx. Pt reports she is IND w/ ADLs at baseline, uses a cane for community mobility, but does not typically use in her house.    Disability/Function   Hearing Difficulty or Deaf yes   Patient's preferred means of communication English speaker with hearing loss, no speech problems.   Describe hearing loss bilateral hearing loss   Use of hearing assistive devices right hearing aid;left hearing aid;bilateral hearing aids   Were auxiliary aids offered? no   The following aids were provided; patient declined offer of auxiliary aids   Hearing Management hearing aids.    Wear Glasses or Blind yes   Vision Management glasses   Concentrating, Remembering or Making Decisions Difficulty no   Difficulty Communicating no   Walking or Climbing Stairs Difficulty yes   Walking or Climbing Stairs " "ambulation difficulty, requires equipment   Mobility Management SEC for long distance/community   Dressing/Bathing Difficulty no   Toileting issues no   Doing Errands Independently Difficulty (such as shopping) no   Fall history within last six months yes   Number of times patient has fallen within last six months 1   Change in Functional Status Since Onset of Current Illness/Injury yes  (Impaired ADL performance)   General Information   Onset of Illness/Injury or Date of Surgery 05/27/21   Referring Physician Deborah Chaudhry PA-C   Patient/Family Therapy Goal Statement (OT) To manage BLE edema so pt can return home and perform IADLs as before. 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 Pt reports she still tries to do some of this work.   Existing Precautions/Restrictions fall   Left Upper Extremity (Weight-bearing Status) full weight-bearing (FWB)   Right Upper Extremity (Weight-bearing Status) partial weight-bearing (PWB)  (Per pt, she has 10# limit following fx)   Left Lower Extremity (Weight-bearing Status) full weight-bearing (FWB)   Right Lower Extremity (Weight-bearing Status) full weight-bearing (FWB)   General Observations and Info Activity: Up ad jamie   Cognitive Status Examination   Orientation Status orientation to person, place and time   Visual Perception   Impact of Vision Impairment on Function (Vision) Pt reports no acute concerns   Sensory   Sensory Comments Altered sensation in " BLE   Pain Assessment   Patient Currently in Pain Yes, see Vital Sign flowsheet  (Pain/discomfort in BLE)   Integumentary/Edema   Integumentary/Edema Comments Pt has 3+ pitting edema in BLE that extends from toes to thighs and into pt's abdomen. RLE is larger than LLE. Pt reports this is typically how her edema presents. Pt's skin is taught, dry, dusky/reddened below her knee. No ulcerations/wounds noted. Pt asked RN to cut the elastic at the top of her socks because they were constricting her ankles. Pt reports her edema has been treated by GCB below her knees w/ compressive sleeves over her thighs, but pt did not like the compressive sleeves because they were easily soiled. Pt educated in non-compressive techniques for managing BLE edema, including elevation, ambulation, and ankle pumps. Pt verbalized familiarity w/ all of these techniques, wanted to be able to walk more, but was feeling nauseated earlier and feeling limited by weight/volume of BLE.    Posture   Posture protracted shoulders   Range of Motion Comprehensive   General Range of Motion bilateral upper extremity ROM WFL   Comment, General Range of Motion Cast on R wrist, R finger ROM limited by swelling.   Strength Comprehensive (MMT)   Comment, General Manual Muscle Testing (MMT) Assessment BUE strength not formally assessed. WFL for ADLs, but appears generally weak. Pt reports she was performing a BUE HEP from [facility], pt planning on continuing IND.   Coordination   Coordination Comments Fine motor coordination limited in R hand 2/2 cast and swelling in fingers.   Bed Mobility   Bed Mobility supine-sit;sit-supine   Supine-Sit [place] (Bed Mobility) supervision   Sit-Supine [place] (Bed Mobility) supervision   Assistive Device (Bed Mobility) bed rails  (HOB elevated)   Comment (Bed Mobility) SBA for supine < > sit EOB. Pt initially calling for help 2/2 bed rail in low position, pt unable to navigate around.    Transfers   Transfers  sit-stand transfer   Sit-Stand Transfer   Sit-Stand [place] (Transfers) supervision   Sit/Stand Transfer Comments SBA for sit EOB < > stand < > sit on commode. Pt demonstrating good balance.   Toileting   [place] Level (Toileting) set up   Assistive Devices (Toileting) commode chair   Position (Toileting) unsupported sitting   Comment (Toileting) Pt completing transfer w/ SBA, cleaning and garment management IND following set up assist from therapist.   Instrumental Activities of Daily Living (IADL)   Previous Responsibilities meal prep;housekeeping;laundry;medication management;finances;work   IADL Comments Pt reports she was IND w/ IADLs before breaking her wrist, though her  would provide assist when she was fatigued.    Clinical Impression   Criteria for Skilled Therapeutic Interventions Met (OT) yes;skilled treatment is necessary   OT Diagnosis Impaired ADL performance   OT Problem List-Impairments impacting ADL problems related to;activity tolerance impaired;strength  (BLE edema)   Assessment of Occupational Performance 3-5 Performance Deficits   Identified Performance Deficits dressing, bathing, meal prep, house keeping    Planned Therapy Interventions (OT) ADL retraining;IADL retraining;manual therapy;strengthening;progressive activity/exercise;home program guidelines   Clinical Decision Making Complexity (OT) moderate complexity   Therapy Frequency (OT) Daily   Predicted Duration of Therapy x1 week   Anticipated Equipment Needs Upon Discharge (OT)   (TBD)   Risk & Benefits of therapy have been explained evaluation/treatment results reviewed;care plan/treatment goals reviewed;risks/benefits reviewed;current/potential barriers reviewed;participants voiced agreement with care plan;participants included;patient   Comment-Clinical Impression Pt presents today with acute on chronic BLE edema and a R wrist fx which limit pt's ADL performance. Pt is very motivated to work w/ IP OT to progress  ADL/IADL independence and safety. OT or PT will initiate lymphedema treatment once indicated by lymphedema orders or verbal permission from MD.    OT Discharge Planning    OT Discharge Recommendation (DC Rec) Acute [facility]hab [facility]-Motivated patient will benefit from intensive, interdisciplinary therapy.  Anticipate will be able to tolerate 3 hours of therapy per day;Home with assist;home with home care occupational therapy   OT Rationale for DC Rec If pt were to discharge today, would recommend ARU to progress pt's ADL IND and safety. Depending on pt's LOS, pt may progress to being safe to discharge home w/ assist and [facility] OT/PT.   OT Brief overview of current status  SBA for toileting on commode, ambulating w/ IV pole. RN- please encourage pt to ambulate and to keep BLE elevated.

## 2021-05-28 NOTE — PLAN OF CARE
D: Hypervolemia. On remodulin gtt     I: Monitored vitals and assessed pt status.   Running: Bumex @ 2mg/hr. Remodulin 30ng/kg/min or 1.69ml/hr  PRN: Tylenol for leg pain and headache. Zofran for nausea     A: A0x4. VSS. Room air Up with SBA. Patient switched to hospital remodulin pump today at same dose. RT leg > Left leg swelling. Persistent nausea despite zofran, skipped lunch d/t nausea       P: Continue to monitor Pt status and report changes to treatment team. Continue diureses.

## 2021-05-28 NOTE — PROCEDURES
[facility]    Double Lumen PICC Placement    Date/Time: 5/27/2021 7:25 PM  Performed by: Shirley Garrison RN  Authorized by: Deborah Chaudhry PA-C   Indications: vascular access    UNIVERSAL PROTOCOL   Site Marked: Yes  Prior Images Obtained and Reviewed:  Yes  Required items: Required blood products, implants, devices and special equipment available    Patient identity confirmed:  Verbally with patient and arm band  NA - No sedation, light sedation, or local anesthesia  Confirmation Checklist:  Patient's identity using two indicators, relevant allergies, procedure was appropriate and matched the consent or emergent situation and correct equipment/implants were available  Time out: Immediately prior to the procedure a time out was called    Universal Protocol: the Joint Commission Universal Protocol was followed    Preparation: Patient was prepped and draped in usual sterile fashion           ANESTHESIA    Anesthesia: Local infiltration  Local Anesthetic:  Lidocaine 1% without epinephrine  Anesthetic Total (mL):  3.5      SEDATION    Patient Sedated: No        Preparation: skin prepped with ChloraPrep  Skin prep agent: skin prep agent completely dried prior to procedure  Sterile barriers: maximum sterile barriers were used: cap, mask, sterile gown, sterile gloves, and large sterile sheet  Hand hygiene: hand hygiene performed prior to central venous catheter insertion  Type of line used: PICC and Power PICC  Catheter type: double lumen  Lumen type: non-valved  Catheter size: 5 Fr  Brand: Bard  Lot number: [id number]  Placement method: venipuncture, MST, ultrasound and tip confirmation system  Number of attempts: 1  Successful placement: yes  Orientation: right  Location: brachial vein (medial) (0.50 cm vein diameter)  Arm circumference: adults 10 cm  Extremity circumference: 21  Visible catheter length: 3  Total catheter length: 37  Dressing and securement: blood  cleaned with CHG, chlorhexidine disc applied, sterile dressing applied, statlock and site cleaned  Post procedure assessment: free fluid flow and blood return through all ports (BARD 3 cg tip confirmation technology)  PROCEDURE   Patient Tolerance:  Patient tolerated the procedure well with no immediate complications  Describe Procedure: PICC was ready to use.

## 2021-05-29 ENCOUNTER — APPOINTMENT (OUTPATIENT)
Dept: OCCUPATIONAL THERAPY | Facility: CLINIC | Age: 81
DRG: 291 | End: 2021-05-29
Attending: NURSE PRACTITIONER
Payer: COMMERCIAL

## 2021-05-29 LAB
ALBUMIN SERPL-MCNC: 3 G/DL (ref 3.4–5)
ALP SERPL-CCNC: 163 U/L (ref 40–150)
ALT SERPL W P-5'-P-CCNC: 28 U/L (ref 0–50)
ANION GAP SERPL CALCULATED.3IONS-SCNC: 4 MMOL/L (ref 3–14)
ANION GAP SERPL CALCULATED.3IONS-SCNC: 4 MMOL/L (ref 3–14)
AST SERPL W P-5'-P-CCNC: 39 U/L (ref 0–45)
BASOPHILS # BLD AUTO: 0.1 10E9/L (ref 0–0.2)
BASOPHILS NFR BLD AUTO: 1.7 %
BILIRUB DIRECT SERPL-MCNC: 0.2 MG/DL (ref 0–0.2)
BILIRUB SERPL-MCNC: 0.4 MG/DL (ref 0.2–1.3)
BUN SERPL-MCNC: 50 MG/DL (ref 7–30)
BUN SERPL-MCNC: 50 MG/DL (ref 7–30)
CALCIUM SERPL-MCNC: 10 MG/DL (ref 8.5–10.1)
CALCIUM SERPL-MCNC: 9.5 MG/DL (ref 8.5–10.1)
CHLORIDE SERPL-SCNC: 97 MMOL/L (ref 94–109)
CHLORIDE SERPL-SCNC: 97 MMOL/L (ref 94–109)
CO2 SERPL-SCNC: 37 MMOL/L (ref 20–32)
CO2 SERPL-SCNC: 38 MMOL/L (ref 20–32)
CREAT SERPL-MCNC: 1.23 MG/DL (ref 0.52–1.04)
CREAT SERPL-MCNC: 1.3 MG/DL (ref 0.52–1.04)
DIFFERENTIAL METHOD BLD: ABNORMAL
DIGOXIN SERPL-MCNC: 0.8 UG/L (ref 0.5–2)
EOSINOPHIL # BLD AUTO: 0.1 10E9/L (ref 0–0.7)
EOSINOPHIL NFR BLD AUTO: 3.3 %
ERYTHROCYTE [DISTWIDTH] IN BLOOD BY AUTOMATED COUNT: 20.2 % (ref 10–15)
FERRITIN SERPL-MCNC: 89 NG/ML (ref 8–252)
FOLATE SERPL-MCNC: 19 NG/ML
GFR SERPL CREATININE-BSD FRML MDRD: 39 ML/MIN/{1.73_M2}
GFR SERPL CREATININE-BSD FRML MDRD: 42 ML/MIN/{1.73_M2}
GLUCOSE SERPL-MCNC: 133 MG/DL (ref 70–99)
GLUCOSE SERPL-MCNC: 75 MG/DL (ref 70–99)
HCT VFR BLD AUTO: 26.8 % (ref 35–47)
HGB BLD-MCNC: 7.5 G/DL (ref 11.7–15.7)
IMM GRANULOCYTES # BLD: 0 10E9/L (ref 0–0.4)
IMM GRANULOCYTES NFR BLD: 0.3 %
INR PPP: 2.23 (ref 0.86–1.14)
LYMPHOCYTES # BLD AUTO: 0.4 10E9/L (ref 0.8–5.3)
LYMPHOCYTES NFR BLD AUTO: 14.6 %
MAGNESIUM SERPL-MCNC: 2 MG/DL (ref 1.6–2.3)
MCH RBC QN AUTO: 23.5 PG (ref 26.5–33)
MCHC RBC AUTO-ENTMCNC: 28 G/DL (ref 31.5–36.5)
MCV RBC AUTO: 84 FL (ref 78–100)
MONOCYTES # BLD AUTO: 0.5 10E9/L (ref 0–1.3)
MONOCYTES NFR BLD AUTO: 16.9 %
NEUTROPHILS # BLD AUTO: 1.9 10E9/L (ref 1.6–8.3)
NEUTROPHILS NFR BLD AUTO: 63.2 %
NRBC # BLD AUTO: 0 10*3/UL
NRBC BLD AUTO-RTO: 0 /100
PLATELET # BLD AUTO: 155 10E9/L (ref 150–450)
POTASSIUM SERPL-SCNC: 3.5 MMOL/L (ref 3.4–5.3)
POTASSIUM SERPL-SCNC: 3.8 MMOL/L (ref 3.4–5.3)
PROT SERPL-MCNC: 7.7 G/DL (ref 6.8–8.8)
RBC # BLD AUTO: 3.19 10E12/L (ref 3.8–5.2)
SODIUM SERPL-SCNC: 138 MMOL/L (ref 133–144)
SODIUM SERPL-SCNC: 138 MMOL/L (ref 133–144)
TRANSFERRIN SERPL-MCNC: 327 MG/DL (ref 210–360)
VIT B12 SERPL-MCNC: 560 PG/ML (ref 193–986)
WBC # BLD AUTO: 3 10E9/L (ref 4–11)

## 2021-05-29 PROCEDURE — 84466 ASSAY OF TRANSFERRIN: CPT | Performed by: PHYSICIAN ASSISTANT

## 2021-05-29 PROCEDURE — 82607 VITAMIN B-12: CPT | Performed by: PHYSICIAN ASSISTANT

## 2021-05-29 PROCEDURE — 80076 HEPATIC FUNCTION PANEL: CPT | Performed by: PHYSICIAN ASSISTANT

## 2021-05-29 PROCEDURE — 97140 MANUAL THERAPY 1/> REGIONS: CPT | Mod: GO | Performed by: OCCUPATIONAL THERAPIST

## 2021-05-29 PROCEDURE — 82746 ASSAY OF FOLIC ACID SERUM: CPT | Performed by: PHYSICIAN ASSISTANT

## 2021-05-29 PROCEDURE — 36592 COLLECT BLOOD FROM PICC: CPT | Performed by: PHYSICIAN ASSISTANT

## 2021-05-29 PROCEDURE — 83735 ASSAY OF MAGNESIUM: CPT | Performed by: PHYSICIAN ASSISTANT

## 2021-05-29 PROCEDURE — 80162 ASSAY OF DIGOXIN TOTAL: CPT | Performed by: PHYSICIAN ASSISTANT

## 2021-05-29 PROCEDURE — 250N000011 HC RX IP 250 OP 636: Performed by: INTERNAL MEDICINE

## 2021-05-29 PROCEDURE — 214N000001 HC R&B CCU UMMC

## 2021-05-29 PROCEDURE — 97530 THERAPEUTIC ACTIVITIES: CPT | Mod: GO | Performed by: OCCUPATIONAL THERAPIST

## 2021-05-29 PROCEDURE — 85610 PROTHROMBIN TIME: CPT | Performed by: PHYSICIAN ASSISTANT

## 2021-05-29 PROCEDURE — 250N000013 HC RX MED GY IP 250 OP 250 PS 637: Performed by: INTERNAL MEDICINE

## 2021-05-29 PROCEDURE — 250N000011 HC RX IP 250 OP 636: Performed by: PHYSICIAN ASSISTANT

## 2021-05-29 PROCEDURE — 85025 COMPLETE CBC W/AUTO DIFF WBC: CPT | Performed by: PHYSICIAN ASSISTANT

## 2021-05-29 PROCEDURE — 82728 ASSAY OF FERRITIN: CPT | Performed by: PHYSICIAN ASSISTANT

## 2021-05-29 PROCEDURE — 97168 OT RE-EVAL EST PLAN CARE: CPT | Mod: GO | Performed by: OCCUPATIONAL THERAPIST

## 2021-05-29 PROCEDURE — 272N000004 HC RX 272: Performed by: INTERNAL MEDICINE

## 2021-05-29 PROCEDURE — 250N000013 HC RX MED GY IP 250 OP 250 PS 637: Performed by: PHYSICIAN ASSISTANT

## 2021-05-29 PROCEDURE — 99232 SBSQ HOSP IP/OBS MODERATE 35: CPT | Performed by: NURSE PRACTITIONER

## 2021-05-29 PROCEDURE — 80048 BASIC METABOLIC PNL TOTAL CA: CPT | Performed by: PHYSICIAN ASSISTANT

## 2021-05-29 PROCEDURE — 250N000009 HC RX 250: Performed by: PHYSICIAN ASSISTANT

## 2021-05-29 RX ORDER — POTASSIUM CHLORIDE 750 MG/1
10 TABLET, EXTENDED RELEASE ORAL ONCE
Status: COMPLETED | OUTPATIENT
Start: 2021-05-29 | End: 2021-05-29

## 2021-05-29 RX ORDER — WARFARIN SODIUM 3 MG/1
3 TABLET ORAL
Status: COMPLETED | OUTPATIENT
Start: 2021-05-29 | End: 2021-05-29

## 2021-05-29 RX ORDER — MAGNESIUM SULFATE HEPTAHYDRATE 40 MG/ML
2 INJECTION, SOLUTION INTRAVENOUS ONCE
Status: COMPLETED | OUTPATIENT
Start: 2021-05-29 | End: 2021-05-29

## 2021-05-29 RX ADMIN — GABAPENTIN 300 MG: 300 CAPSULE ORAL at 14:31

## 2021-05-29 RX ADMIN — IPRATROPIUM BROMIDE 2 SPRAY: 42 SPRAY, METERED NASAL at 08:00

## 2021-05-29 RX ADMIN — POTASSIUM CHLORIDE 10 MEQ: 750 TABLET, EXTENDED RELEASE ORAL at 20:23

## 2021-05-29 RX ADMIN — Medication 1 TABLET: at 09:27

## 2021-05-29 RX ADMIN — BUMETANIDE 2 MG/HR: 0.25 INJECTION INTRAMUSCULAR; INTRAVENOUS at 18:26

## 2021-05-29 RX ADMIN — HYDROXYCHLOROQUINE SULFATE 200 MG: 200 TABLET, FILM COATED ORAL at 09:27

## 2021-05-29 RX ADMIN — BUMETANIDE 2 MG/HR: 0.25 INJECTION INTRAMUSCULAR; INTRAVENOUS at 04:45

## 2021-05-29 RX ADMIN — GABAPENTIN 300 MG: 300 CAPSULE ORAL at 20:23

## 2021-05-29 RX ADMIN — KETOTIFEN FUMARATE 1 DROP: 0.35 SOLUTION/ DROPS OPHTHALMIC at 08:00

## 2021-05-29 RX ADMIN — OXYCODONE HYDROCHLORIDE AND ACETAMINOPHEN 500 MG: 500 TABLET ORAL at 09:27

## 2021-05-29 RX ADMIN — Medication 5 ML: at 17:06

## 2021-05-29 RX ADMIN — ASPIRIN 81 MG CHEWABLE TABLET 81 MG: 81 TABLET CHEWABLE at 09:27

## 2021-05-29 RX ADMIN — DIGOXIN 62.5 MCG: 0.06 TABLET ORAL at 09:27

## 2021-05-29 RX ADMIN — Medication 5 ML: at 06:12

## 2021-05-29 RX ADMIN — POTASSIUM CHLORIDE 40 MEQ: 750 TABLET, EXTENDED RELEASE ORAL at 20:22

## 2021-05-29 RX ADMIN — IPRATROPIUM BROMIDE 2 SPRAY: 42 SPRAY, METERED NASAL at 20:00

## 2021-05-29 RX ADMIN — MACITENTAN 10 MG: 10 TABLET, FILM COATED ORAL at 16:35

## 2021-05-29 RX ADMIN — POTASSIUM CHLORIDE 40 MEQ: 750 TABLET, EXTENDED RELEASE ORAL at 09:29

## 2021-05-29 RX ADMIN — TREPROSTINIL 30 NG/KG/MIN: 20 INJECTION, SOLUTION INTRAVENOUS; SUBCUTANEOUS at 14:28

## 2021-05-29 RX ADMIN — Medication 5 ML: at 20:23

## 2021-05-29 RX ADMIN — MAGNESIUM SULFATE IN WATER 2 G: 40 INJECTION, SOLUTION INTRAVENOUS at 09:30

## 2021-05-29 RX ADMIN — Medication 1 TABLET: at 20:23

## 2021-05-29 RX ADMIN — WARFARIN SODIUM 3 MG: 3 TABLET ORAL at 18:26

## 2021-05-29 RX ADMIN — POTASSIUM CHLORIDE 10 MEQ: 750 TABLET, EXTENDED RELEASE ORAL at 09:29

## 2021-05-29 RX ADMIN — GABAPENTIN 300 MG: 300 CAPSULE ORAL at 09:28

## 2021-05-29 ASSESSMENT — ACTIVITIES OF DAILY LIVING (ADL)
ADLS_ACUITY_SCORE: 14

## 2021-05-29 NOTE — PROGRESS NOTES
[facility]   Cardiology II Service / Advanced Heart Failure  Daily Progress Note  Date of Service: 5/29/2021      Patient: Karen Anderson  MRN: [id number]  Admission Date: 5/27/2021  Hospital Day # 2    Assessment and Plan:  Ms. Karen Anderson is a 80yr old female with a history of chronic thromboembolic pulmonary hypertension (on IV treprostinil and macitentan), severe RV dysfunction, RA, systemic HTN, breast cancer (s/p chemotherapy with Adriamycin, Cytoxan, Taxol, and tamoxifen, and s/p left mastectomy), asthma, and scoliosis who presents with hypervolemia.        PLAN:  - bubbler for O2 given nosebleed  - continue bumex infusion  - check BMP q12 hours  - defer increasing remodulin today given dizziness and ?softer BPs  - lymphedema consult      Chronic thromboembolic pulmonary hypertension (WHO Group IV)  Severe RV dysfunction  Acute on chronic RV failure exacerbation  She has been evaluated for high-risk pulmonary thromboendarterectomy but deemed to be too high-risk at [facility].  Given her proximal disease and high pressures, balloon angioplasty would also be high- risk.  She underwent balloon pulmonary angioplasty January 2021, which was complicated by hemoptysis.  No plans to repeat this here, but [facility] willing to consider in the future.  Currently being managed on medical therapy by Dr. Turcios as an outpatient.    She presented with increasing SOB, swelling, and weight gain (dry weight ~112 lbs).  She was 135 lbs on 5/24, and was advised to take a metolazone.  She presents now for IV diuresis and potentially increase in remodulin therapy.    - continue bumex gtt @ 2mg/hr  - continue to trend BMP q12 hours  - continue PTA Remodulin @ 30ng/kg/min.  Pending her BPs, dizziness, and nausea/headache, will increase to 32ng/kg/min per Dr. Turcios --> deferring today due to dizziness and ?softer BPs.  - contiue PTA Opsumit 10mg q48h (recently reduced d/t concerns for fluid  retention)   - continue Warfarin, with goal INR 2.0-2.5.  INR today 2.23.  - O2 via NC if sat <92%  - 2L fluid restriction  - Strict ins and outs  - Daily weights  - Salt restriction        Normocytic anemia, with elevated RDW  No bleeding symptoms.  Has had low iron saturation in the past, doesn't tolerate PO iron well d/t abdominal upset.  Hemoglobin down to 7.1 from a b/l ~9.  - Trend CBC    CKD  Baseline cr 1.2-1.5, has been stable with diuresis.  - Trend BMP BID    Asthma  Allergic rhinitis  No acute concerns.  - Continue beclomethasone BID and albuterol inhaler PRN  - Continue cetirizine and ipratropium nasal spray     RA  No acute concerns.  - Continue Hydroxychloroquine 200mg daily      Distal fracture of her right radius and ulna  - OT while inpatient  - Continue PTA alendronate 80mg every monday          FEN:  2g NA restriction, 2L fluid restriction  PROPHY:  warfarin, ambulate  LINES:  PICC  DISPO:  Pending diuresis  CODE STATUS:  Full code    =============================================================    Interval History/ROS:   Ms. Anderson states that she feels slightly better today.  Her dizziness is slightly better, and her headaches are slightly better.  She is still nauseated, but better.  She has not been eating much.  She denies vomiting, diarrhea, and constipation.  She continues to note fluid retention, which is mildly better today than yesterday.  Her breathing is stable, and she denies worsening SOB.  She denies PND/orthopnea.  She has been trying to ambulate, but has been limited by dizziness.  She otherwise denies chest pain, palpitations, acute vision changes, fevers, chills, cough, sore throat, and signs of persistent bleeding.    Last 24 hr care team notes reviewed.   ROS:  4 point ROS including Respiratory, CV, GI and , other than that noted in the HPI, is negative.     Medications: Reviewed in EPIC.     Physical Exam:   /61 (BP Location: Left leg)   Pulse 73   Temp 98.2  F  (36.8  C) (Oral)   Resp 18   Wt 57.1 kg (125 lb 12.8 oz)   SpO2 96%   BMI 25.41 kg/m    GENERAL: Appears alert and oriented times three. Sitting upright, NAD.  HEENT: Eye symmetrical and free of discharge bilaterally. Mucous membranes moist and without lesions.  NECK: Supple and without lymphadenopathy. JVD appears above mid neck when sitting upright.   CV: RRR, S1S2 present without murmur, rub, or gallop.   RESPIRATORY: Respirations regular, even, and unlabored. Lungs CTA throughout.   GI: Soft and non distended with normoactive bowel sounds present in all quadrants. No tenderness, rebound, guarding. No organomegaly.   EXTREMITIES: Taut bilateral LE edema, LE wraps in place. 2+ bilateral pedal pulses.   NEUROLOGIC: Alert and orientated x 3. CN II-XII grossly intact. No focal deficits.   MUSCULOSKELETAL: No joint swelling or tenderness.   SKIN: No jaundice. No rashes or lesions.     Data:  CMP  Recent Labs   Lab 05/29/21  0625 05/28/21  1605 05/28/21  0641 05/28/21  0142 05/27/21 2018    137 137 137 139   POTASSIUM 3.5 4.6 4.6 5.3 3.0*   CHLORIDE 97 100 101 100 97   CO2 38* 35* 32 36* 35*   ANIONGAP 4 2* 4 1* 6   GLC 75 104* 128* 153* 180*   BUN 50* 54* 56* 53* 53*   CR 1.30* 1.35* 1.39* 1.36* 1.32*   GFRESTIMATED 39* 37* 36* 37* 38*   GFRESTBLACK 45* 43* 42* 43* 44*   ESTEFANÍA 10.0 10.0 9.8 9.8 9.7   MAG 2.0  --   --  2.3 2.2   PROTTOTAL 7.7  --  7.0  --  7.9   ALBUMIN 3.0*  --  2.7*  --  3.1*   BILITOTAL 0.4  --  0.3  --  0.5   ALKPHOS 163*  --  156*  --  166*   AST 39  --  36  --  38   ALT 28  --  25  --  29     CBC  Recent Labs   Lab 05/29/21  0625 05/28/21  0641 05/27/21 2018   WBC 3.0* 3.7* 3.7*   RBC 3.19* 2.96* 3.29*   HGB 7.5* 7.1* 7.9*   HCT 26.8* 24.7* 27.5*   MCV 84 83 84   MCH 23.5* 24.0* 24.0*   MCHC 28.0* 28.7* 28.7*   RDW 20.2* 20.8* 20.7*    148* 152     INR  Recent Labs   Lab 05/29/21  0625 05/28/21  0641 05/27/21 2018   INR 2.23* 2.00* 1.78*       Patient discussed with Dr. Marlow.       Alba Dowd, SERGEY, FNP-BC, CHFN  Advanced Heart Failure Nurse Practitioner  [facility]

## 2021-05-29 NOTE — PLAN OF CARE
D: Hypervolemia. On hospital remodulin pump.     I: Monitored vitals and assessed pt status.  Elevated feet. Will benefit with compression stockings from OT.   Running: Bumex @ 2mg/hr    A: A0x4. VSS. 2-3L O2. Up with SBA/Cane. BLE edema Rt > Lt. Nausea improved from evening. Voiding copiously with bumex gtt. No dizziness, no SOB.     Temp:  [97  F (36.1  C)-98  F (36.7  C)] 97.5  F (36.4  C)  Pulse:  [74-84] 78  Resp:  [16-22] 18  BP: ()/(47-85) 127/62  SpO2:  [85 %-96 %] 96 %      P: Continue to monitor Pt status and report changes to treatment team. Continue diureses. Possible increase remodulin in the AM.

## 2021-05-29 NOTE — PROGRESS NOTES
"   05/29/21 0915   Quick Adds   Type of Visit Occupational Therapy Re-evaluation  (Edema)   General Information   Onset of Illness/Injury or Date of Surgery 05/27/21   Referring Physician Alba Dowd NP   Patient/Family Therapy Goal Statement (OT) To improve BLE edema and return to ADL IND w/in RUE precautions.   Edema General Information   Onset of Edema   (acute on chronic, pt unable to remember exact start)   Affected Body Part(s) Left LE;Right LE  (abdomen)   Etiology Comments chronic thromboembolic pulmonary hypertension, reduced mobility   Edema Precautions Cardiac Edema/CHF   General Comments/Previous Edema Treatment/Edema Equipment Pt reports OP edema treatment including CGB wraps on BLE, compressive sleeves on thighs. Pt did not likes compression on thighs, felt it was ineffective, easily soiled. Pt reports trying Jobst socks, did not like.   Edema Examination/Assessment   Skin Condition Pitting;Dryness   Skin Condition Comments Softball size bruise over L knee w/ line of bruising down shin, appears to be an old/fading bruise. Pt reports this is from her fall. Pt's skin dry and flaking on shins, heel, toes. Skin below knees reddened/dusky, appears fragile with blisters close to forming.    Skin Integrity Intact except for 1\" dried scab on LLE mid-shin w/ dime-sized fluid-filled blister above. Skin is taut, shiny.    Pitting Assessment 3+ pitting edema in BLE. R ankle bulbous, RLE larger than L, pt reports this is her normal presentation.   Edema Assessment Comments GCBs applied to BLE from MTPs to knee crease. Pt reports comfort, verbalized understanding of wear schedule, contraindication for compression, and non-compressive techniques for managing BLE edema.   Clinical Impression   OT Diagnosis Impaired functional mobility and ADL performance   Edema: Patient Presentation Edema   Assessment of Occupational Performance 3-5 Performance Deficits   Identified Performance Deficits dressing, bathing, meal " prep/housekeeping, mobility   Edema: Planned Interventions Gradient compression bandaging;Fit for compression garment;Edema exercises;Precautions to prevent infection/exacerbation;Education;ADL training   Clinical Decision Making Complexity (OT) moderate complexity   Therapy Frequency (OT) 4x/week   Predicted Duration of Therapy x1 week   Risk & Benefits of therapy have been explained evaluation/treatment results reviewed;care plan/treatment goals reviewed;risks/benefits reviewed;current/potential barriers reviewed;participants voiced agreement with care plan;participants included;patient   Comment-Clinical Impression Pt presents today w/ acute on chronic BLE edema that extends into her abdomen, causing discomfort, limiting mobility, and impairing pt's ADL IND. Pt will benefit from IP edema treatment in addition to IP OT to reduce BLE volume, improve pt comfort, and maximize pt's ADL IND and safety.   OT Discharge Planning    OT Discharge Recommendation (DC Rec) Acute Rehab Center-Motivated patient will benefit from intensive, interdisciplinary therapy.  Anticipate will be able to tolerate 3 hours of therapy per day;Home with assist;home with home care occupational therapy   OT Rationale for DC Rec If pt were to discharge today, would recommend ARU to progress pt's ADL IND and safety. Depending on pt's LOS, pt may progress to being safe to discharge home w/ assist and [facility] OT/PT.   OT Brief overview of current status  SBA for mobility w/ SEC or IV pole. Pt limited by dizziness today.   Total Evaluation Time (Minutes)   Total Evaluation Time (Minutes) 5

## 2021-05-29 NOTE — PLAN OF CARE
D:pt ambulated half of hallway steady gait on 6 liters of n/c  A:pt expressed she felt good with walk, expressed no SOB  I:pace activities  P:per Primary

## 2021-05-30 ENCOUNTER — APPOINTMENT (OUTPATIENT)
Dept: OCCUPATIONAL THERAPY | Facility: CLINIC | Age: 81
DRG: 291 | End: 2021-05-30
Attending: INTERNAL MEDICINE
Payer: COMMERCIAL

## 2021-05-30 LAB
ANION GAP SERPL CALCULATED.3IONS-SCNC: 1 MMOL/L (ref 3–14)
ANION GAP SERPL CALCULATED.3IONS-SCNC: 4 MMOL/L (ref 3–14)
BUN SERPL-MCNC: 50 MG/DL (ref 7–30)
BUN SERPL-MCNC: 50 MG/DL (ref 7–30)
CALCIUM SERPL-MCNC: 9.6 MG/DL (ref 8.5–10.1)
CALCIUM SERPL-MCNC: 9.7 MG/DL (ref 8.5–10.1)
CHLORIDE SERPL-SCNC: 96 MMOL/L (ref 94–109)
CHLORIDE SERPL-SCNC: 97 MMOL/L (ref 94–109)
CO2 SERPL-SCNC: 38 MMOL/L (ref 20–32)
CO2 SERPL-SCNC: 41 MMOL/L (ref 20–32)
CREAT SERPL-MCNC: 1.25 MG/DL (ref 0.52–1.04)
CREAT SERPL-MCNC: 1.28 MG/DL (ref 0.52–1.04)
ERYTHROCYTE [DISTWIDTH] IN BLOOD BY AUTOMATED COUNT: 20 % (ref 10–15)
GFR SERPL CREATININE-BSD FRML MDRD: 40 ML/MIN/{1.73_M2}
GFR SERPL CREATININE-BSD FRML MDRD: 41 ML/MIN/{1.73_M2}
GLUCOSE SERPL-MCNC: 79 MG/DL (ref 70–99)
GLUCOSE SERPL-MCNC: 87 MG/DL (ref 70–99)
HCT VFR BLD AUTO: 24.6 % (ref 35–47)
HGB BLD-MCNC: 7 G/DL (ref 11.7–15.7)
INR PPP: 2.62 (ref 0.86–1.14)
MAGNESIUM SERPL-MCNC: 2.2 MG/DL (ref 1.6–2.3)
MCH RBC QN AUTO: 23.8 PG (ref 26.5–33)
MCHC RBC AUTO-ENTMCNC: 28.5 G/DL (ref 31.5–36.5)
MCV RBC AUTO: 84 FL (ref 78–100)
PLATELET # BLD AUTO: 147 10E9/L (ref 150–450)
POTASSIUM SERPL-SCNC: 3.5 MMOL/L (ref 3.4–5.3)
POTASSIUM SERPL-SCNC: 3.5 MMOL/L (ref 3.4–5.3)
RBC # BLD AUTO: 2.94 10E12/L (ref 3.8–5.2)
SODIUM SERPL-SCNC: 138 MMOL/L (ref 133–144)
SODIUM SERPL-SCNC: 139 MMOL/L (ref 133–144)
WBC # BLD AUTO: 4 10E9/L (ref 4–11)

## 2021-05-30 PROCEDURE — 250N000013 HC RX MED GY IP 250 OP 250 PS 637: Performed by: NURSE PRACTITIONER

## 2021-05-30 PROCEDURE — 84238 ASSAY NONENDOCRINE RECEPTOR: CPT | Performed by: NURSE PRACTITIONER

## 2021-05-30 PROCEDURE — 250N000011 HC RX IP 250 OP 636: Performed by: PHYSICIAN ASSISTANT

## 2021-05-30 PROCEDURE — 250N000013 HC RX MED GY IP 250 OP 250 PS 637: Performed by: PHYSICIAN ASSISTANT

## 2021-05-30 PROCEDURE — 97535 SELF CARE MNGMENT TRAINING: CPT | Mod: GO | Performed by: OCCUPATIONAL THERAPIST

## 2021-05-30 PROCEDURE — 250N000011 HC RX IP 250 OP 636: Performed by: NURSE PRACTITIONER

## 2021-05-30 PROCEDURE — 82668 ASSAY OF ERYTHROPOIETIN: CPT | Performed by: NURSE PRACTITIONER

## 2021-05-30 PROCEDURE — 999N000147 HC STATISTIC PT IP EVAL DEFER

## 2021-05-30 PROCEDURE — 99232 SBSQ HOSP IP/OBS MODERATE 35: CPT | Performed by: NURSE PRACTITIONER

## 2021-05-30 PROCEDURE — 83735 ASSAY OF MAGNESIUM: CPT | Performed by: PHYSICIAN ASSISTANT

## 2021-05-30 PROCEDURE — 214N000001 HC R&B CCU UMMC

## 2021-05-30 PROCEDURE — 36592 COLLECT BLOOD FROM PICC: CPT | Performed by: PHYSICIAN ASSISTANT

## 2021-05-30 PROCEDURE — 36592 COLLECT BLOOD FROM PICC: CPT | Performed by: NURSE PRACTITIONER

## 2021-05-30 PROCEDURE — 85610 PROTHROMBIN TIME: CPT | Performed by: PHYSICIAN ASSISTANT

## 2021-05-30 PROCEDURE — 250N000009 HC RX 250: Performed by: PHYSICIAN ASSISTANT

## 2021-05-30 PROCEDURE — 250N000011 HC RX IP 250 OP 636: Performed by: INTERNAL MEDICINE

## 2021-05-30 PROCEDURE — 97140 MANUAL THERAPY 1/> REGIONS: CPT | Mod: GO | Performed by: OCCUPATIONAL THERAPIST

## 2021-05-30 PROCEDURE — 250N000013 HC RX MED GY IP 250 OP 250 PS 637: Performed by: INTERNAL MEDICINE

## 2021-05-30 PROCEDURE — 272N000004 HC RX 272: Performed by: INTERNAL MEDICINE

## 2021-05-30 PROCEDURE — 85027 COMPLETE CBC AUTOMATED: CPT | Performed by: PHYSICIAN ASSISTANT

## 2021-05-30 PROCEDURE — 97530 THERAPEUTIC ACTIVITIES: CPT | Mod: GO | Performed by: OCCUPATIONAL THERAPIST

## 2021-05-30 PROCEDURE — 80048 BASIC METABOLIC PNL TOTAL CA: CPT | Performed by: PHYSICIAN ASSISTANT

## 2021-05-30 RX ORDER — WARFARIN SODIUM 2.5 MG/1
2.5 TABLET ORAL
Status: COMPLETED | OUTPATIENT
Start: 2021-05-30 | End: 2021-05-30

## 2021-05-30 RX ORDER — POTASSIUM CHLORIDE 750 MG/1
10 TABLET, EXTENDED RELEASE ORAL ONCE
Status: COMPLETED | OUTPATIENT
Start: 2021-05-30 | End: 2021-05-30

## 2021-05-30 RX ORDER — WARFARIN SODIUM 3 MG/1
3 TABLET ORAL
Status: DISCONTINUED | OUTPATIENT
Start: 2021-05-30 | End: 2021-05-30

## 2021-05-30 RX ORDER — POTASSIUM CHLORIDE 1500 MG/1
60 TABLET, EXTENDED RELEASE ORAL 2 TIMES DAILY
Status: DISCONTINUED | OUTPATIENT
Start: 2021-05-30 | End: 2021-05-31

## 2021-05-30 RX ADMIN — KETOTIFEN FUMARATE 1 DROP: 0.35 SOLUTION/ DROPS OPHTHALMIC at 07:31

## 2021-05-30 RX ADMIN — WARFARIN SODIUM 2.5 MG: 2.5 TABLET ORAL at 17:23

## 2021-05-30 RX ADMIN — ACETAMINOPHEN 325 MG: 325 TABLET, FILM COATED ORAL at 08:09

## 2021-05-30 RX ADMIN — OXYCODONE HYDROCHLORIDE AND ACETAMINOPHEN 500 MG: 500 TABLET ORAL at 08:02

## 2021-05-30 RX ADMIN — Medication 1 TABLET: at 08:02

## 2021-05-30 RX ADMIN — IPRATROPIUM BROMIDE 2 SPRAY: 42 SPRAY, METERED NASAL at 19:36

## 2021-05-30 RX ADMIN — Medication 1 TABLET: at 21:15

## 2021-05-30 RX ADMIN — ACETAMINOPHEN 325 MG: 325 TABLET, FILM COATED ORAL at 23:13

## 2021-05-30 RX ADMIN — BUMETANIDE 2 MG/HR: 0.25 INJECTION INTRAMUSCULAR; INTRAVENOUS at 09:20

## 2021-05-30 RX ADMIN — TREPROSTINIL 30 NG/KG/MIN: 20 INJECTION, SOLUTION INTRAVENOUS; SUBCUTANEOUS at 18:38

## 2021-05-30 RX ADMIN — POTASSIUM CHLORIDE 60 MEQ: 1500 TABLET, EXTENDED RELEASE ORAL at 21:15

## 2021-05-30 RX ADMIN — DIGOXIN 62.5 MCG: 0.06 TABLET ORAL at 08:02

## 2021-05-30 RX ADMIN — POTASSIUM CHLORIDE 40 MEQ: 750 TABLET, EXTENDED RELEASE ORAL at 08:02

## 2021-05-30 RX ADMIN — IPRATROPIUM BROMIDE 2 SPRAY: 42 SPRAY, METERED NASAL at 13:59

## 2021-05-30 RX ADMIN — IPRATROPIUM BROMIDE 2 SPRAY: 42 SPRAY, METERED NASAL at 07:31

## 2021-05-30 RX ADMIN — GABAPENTIN 300 MG: 300 CAPSULE ORAL at 13:59

## 2021-05-30 RX ADMIN — GABAPENTIN 300 MG: 300 CAPSULE ORAL at 21:15

## 2021-05-30 RX ADMIN — ASPIRIN 81 MG CHEWABLE TABLET 81 MG: 81 TABLET CHEWABLE at 08:02

## 2021-05-30 RX ADMIN — Medication 5 ML: at 21:16

## 2021-05-30 RX ADMIN — Medication 5 ML: at 16:53

## 2021-05-30 RX ADMIN — GABAPENTIN 300 MG: 300 CAPSULE ORAL at 08:03

## 2021-05-30 RX ADMIN — HYDROXYCHLOROQUINE SULFATE 200 MG: 200 TABLET, FILM COATED ORAL at 08:02

## 2021-05-30 RX ADMIN — BUMETANIDE 2 MG/HR: 0.25 INJECTION INTRAMUSCULAR; INTRAVENOUS at 19:30

## 2021-05-30 RX ADMIN — CHLOROTHIAZIDE SODIUM 500 MG: 500 INJECTION, POWDER, LYOPHILIZED, FOR SOLUTION INTRAVENOUS at 14:54

## 2021-05-30 RX ADMIN — POTASSIUM CHLORIDE 10 MEQ: 750 TABLET, EXTENDED RELEASE ORAL at 09:19

## 2021-05-30 ASSESSMENT — ACTIVITIES OF DAILY LIVING (ADL)
ADLS_ACUITY_SCORE: 14

## 2021-05-30 NOTE — PROGRESS NOTES
Shift: 0700 - 1930  VS: Temp: 97.6  F (36.4  C) Temp src: Oral BP: 118/52 Pulse: 75   Resp: 18 SpO2: 93 % O2 Device: Nasal cannula with humidification Oxygen Delivery: 2.5 LPM  Pain: Right side pain from fall, tylenol given x1, decreased pain reported.   Neuro: A&Ox4, baseline neuropathy in extremities.   Cardiac:   SR. Remodulin 30ng/kg/min. New syringe started at ~1850 this shift. Bumex at 2mg/hr, new bag requested. No c/o of headache, nausea, BP remains stable. Received one time dose of diuril.   Respiratory: Lung sounds clear/diminished on 2-3L NC.   GI/Diet/Appetite: 2gm Na diet w/2L FR. Fair appetite. Several small BM today, small formed, hard stools.   :  Voiding w/o difficulty, GUOP.   LDA's: PORT infusing Remodulin, PICC to RUE infusing Bumex.   Skin: [place], dry flaky, otherwise intact.   Activity: Up ad jamie in room, SBA in halls.   Tests/Procedures: Lymph wraps removed and replaced today by PT.   Pertinent Labs/Lab Collection: K+ 3.5 replaced with KCl tabs 50meq, Recheck K+ 3.5, Replacement scheduled.      Plan: Continue to diurese, possible increase in remodulin 5/31. Continue w/POC.

## 2021-05-30 NOTE — PLAN OF CARE
D: pt admitted on 5/27/2021 with hypervolemia. PMH of chronic thromboembolic pulmonary hypertension, RA, systemic HTN, breast cancer, asthma, scoliosis, breast cancer s/p chemotherapy with Adriamycin, Cytoxan, Taxol, and tamoxifen and status post left mastectomy.        I: Monitored vitals and assessed pt status.     Running:   -Bumex 2mg/hr   -Remodulin 30 ng/kg/min            Vitals:  Temp: 94.5  F (34.7  C) Temp src: Axillary BP: 120/65 Pulse: 81   Resp: 20 SpO2: 91 % O2 Device: Nasal cannula Oxygen Delivery: 2 LPM      A:   Neuro: A&O x 4. Neurologically intact.  Denies Headache, dizziness, and lightheadedness. Report numbness and tingling on BLE .  calls appropriately   Cardiac: SR,  Afebrile, VSS.  Denies Chest pain. + 4 edema on right LE and +3Edema on left LE. Lymph wrap inplace  Respiratory: sating > 92% on 2L NC. RIVERA LS clear diminished at bases   Diet/appetite: 2g Na Diet. Good appetite  GI/:  No BM  this shift. Denies/ abdominal pain and nausea. Good urine output, Voids without difficulty  Activity:  Stand by assist    Pain: Denies pain    Skin:  no new deficit noted   LDAs: Double lumen PICC and Port A cath   Labs:  Mg and K replaced per protocol      P: Continue to monitor Pt status and report changes to treatment team.

## 2021-05-30 NOTE — PROGRESS NOTES
[facility]   Cardiology II Service / Advanced Heart Failure  Daily Progress Note  Date of Service: 5/30/2021      Patient: Karen Anderson  MRN: [id number]  Admission Date: 5/27/2021  Hospital Day # 3    Assessment and Plan:  Ms. Karen Anderson is a 80yr old female with a history of chronic thromboembolic pulmonary hypertension (on IV treprostinil and macitentan), severe RV dysfunction, RA, systemic HTN, breast cancer (s/p chemotherapy with Adriamycin, Cytoxan, Taxol, and tamoxifen, and s/p left mastectomy), asthma, and scoliosis who presents with hypervolemia.      PLAN:  - diuril 500mg IV x1 today  - add soluble transferrin receptor and epo levels to labs from today (if not, draw tomorrow)  - will increase remodulin pending diuresis/symptoms      Chronic thromboembolic pulmonary hypertension (WHO Group IV)  Severe RV dysfunction  Acute on chronic RV failure exacerbation  She has been evaluated for high-risk pulmonary thromboendarterectomy but deemed to be too high-risk at [facility].  Given her proximal disease and high pressures, balloon angioplasty would also be high- risk.  She underwent balloon pulmonary angioplasty January 2021, which was complicated by hemoptysis.  No plans to repeat this here, but [facility] willing to consider in the future.  Currently being managed on medical therapy by Dr. Turcios as an outpatient.    She presented with increasing SOB, swelling, and weight gain (dry weight ~112 lbs).  She was 135 lbs on 5/24, and was advised to take a metolazone.  She presents now for IV diuresis and potentially increase in remodulin therapy.  Weight on admission was 122#, up to 128#, now down to 125#.    - diuril 500mg IV x1 today  - continue bumex gtt @ 2mg/hr  - continue to trend BMP q12 hours  - continue PTA Remodulin @ 30ng/kg/min.  Pending her BPs, dizziness, and nausea/headache, will increase to 32ng/kg/min per Dr. Turcios --> deferring today due to diuresis and  symptoms  - contiue PTA Opsumit 10mg q48h (recently reduced d/t concerns for fluid retention)   - continue Warfarin, with goal INR 2.0-2.5.  INR today 2.62.  - continue digoxin 62.5mcg daily  - O2 via NC if sat <92%  - 2L fluid restriction  - Strict ins and outs  - Daily weights  - Salt restriction      Normocytic anemia, with elevated RDW  Has had low iron saturation in the past.  Normal ferritin and transferrin (5/29).  Has not tolerated po iron (abdominal upset).  Hemoglobin down to 7s from baseline ~9s.  - adding epo level and soluble transferrin receptor --> will consider IV iron repletion pending results  - Trend CBC    CKD  Baseline cr 1.2-1.5, has been stable with diuresis.  - Trend BMP BID    Asthma  Allergic rhinitis  No acute concerns.  - Continue beclomethasone BID and albuterol inhaler PRN  - Continue cetirizine and ipratropium nasal spray     RA  No acute concerns.  - Continue Hydroxychloroquine 200mg daily      Distal fracture of her right radius and ulna  - OT while inpatient  - Continue PTA alendronate 80mg every monday        FEN:  2g NA restriction, 2L fluid restriction  PROPHY:  warfarin, ambulate  LINES:  PICC  DISPO:  Pending diuresis  CODE STATUS:  Full code    =============================================================    Interval History/ROS:   Ms. Anderson states that she feels ok today.  She is still somewhat dizzy, but better.  She is not terribly nauseated.  She has a mild headache.  She is eating, with fair appetite.  She denies nausea and vomiting, and reports some looser stools.  Her breathing has been stable, and she denies worsening SOB.  She has not been ambulating much, limited mostly by her dizziness.  Her fluid status seems stable, and she has not noticed much improvement in her lower extremity edema.  She otherwise denies chest pain, palpitations, falls, headaches, acute vision changes, fevers, chills, cough, sore throat, and signs of bleeding.    Last 24 hr care team notes  reviewed.   ROS:  4 point ROS including Respiratory, CV, GI and , other than that noted in the HPI, is negative.     Medications: Reviewed in EPIC.     Physical Exam:   /68 (BP Location: Left leg)   Pulse 72   Temp 97.7  F (36.5  C) (Oral)   Resp 16   Wt 56.7 kg (125 lb 1.6 oz)   SpO2 93%   BMI 25.27 kg/m    GENERAL: Appears alert and oriented times three. Sitting upright, NAD.  HEENT: Eye symmetrical and free of discharge bilaterally. Mucous membranes moist and without lesions.  NECK: Supple and without lymphadenopathy. JVD appears above mid neck when sitting upright.   CV: RRR, S1S2 present without murmur, rub, or gallop.   RESPIRATORY: Respirations regular, even, and unlabored. Lungs CTA throughout.   GI: Soft and non distended with normoactive bowel sounds present in all quadrants. No tenderness, rebound, guarding. No organomegaly.   EXTREMITIES: Taut bilateral LE edema, LE wraps in place. 2+ bilateral pedal pulses.   NEUROLOGIC: Alert and orientated x 3. CN II-XII grossly intact. No focal deficits.   MUSCULOSKELETAL: No joint swelling or tenderness.   SKIN: No jaundice. No rashes or lesions.     Data:  CMP  Recent Labs   Lab 05/30/21  0551 05/29/21  1720 05/29/21  0625 05/28/21  1605 05/28/21  0641 05/28/21  0142 05/27/21 2018    138 138 137 137 137 139   POTASSIUM 3.5 3.8 3.5 4.6 4.6 5.3 3.0*   CHLORIDE 97 97 97 100 101 100 97   CO2 38* 37* 38* 35* 32 36* 35*   ANIONGAP 4 4 4 2* 4 1* 6   GLC 79 133* 75 104* 128* 153* 180*   BUN 50* 50* 50* 54* 56* 53* 53*   CR 1.28* 1.23* 1.30* 1.35* 1.39* 1.36* 1.32*   GFRESTIMATED 40* 42* 39* 37* 36* 37* 38*   GFRESTBLACK 46* 48* 45* 43* 42* 43* 44*   ESTEFANÍA 9.6 9.5 10.0 10.0 9.8 9.8 9.7   MAG 2.2  --  2.0  --   --  2.3 2.2   PROTTOTAL  --   --  7.7  --  7.0  --  7.9   ALBUMIN  --   --  3.0*  --  2.7*  --  3.1*   BILITOTAL  --   --  0.4  --  0.3  --  0.5   ALKPHOS  --   --  163*  --  156*  --  166*   AST  --   --  39  --  36  --  38   ALT  --   --  28  --   25  --  29     CBC  Recent Labs   Lab 05/30/21  0551 05/29/21  0625 05/28/21  0641 05/27/21 2018   WBC 4.0 3.0* 3.7* 3.7*   RBC 2.94* 3.19* 2.96* 3.29*   HGB 7.0* 7.5* 7.1* 7.9*   HCT 24.6* 26.8* 24.7* 27.5*   MCV 84 84 83 84   MCH 23.8* 23.5* 24.0* 24.0*   MCHC 28.5* 28.0* 28.7* 28.7*   RDW 20.0* 20.2* 20.8* 20.7*   * 155 148* 152     INR  Recent Labs   Lab 05/30/21  0551 05/29/21  0625 05/28/21  0641 05/27/21 2018   INR 2.62* 2.23* 2.00* 1.78*       Patient discussed with Dr. Marlow.      Alba Dowd, SERGEY, FNP-BC, CHFN  Advanced Heart Failure Nurse Practitioner  [facility]

## 2021-05-30 NOTE — PLAN OF CARE
D: Hypervolemia on remodulin. Hx: thromboembolic pulmonary hypertension  I: Monitored vitals and assessed pt status.     A: A0x4. VSS. 2-3L. Up SBA with walker. Lymphedema wraps in place. Voiding adequately on 2 mg/hr bumex gtt. Remodulin 30 ng/kg/mn. Walked 2x in hallway with cane or IV pole.  Pleasant and cooperative with cares. Makes needs known.     Temp:  [94.5  F (34.7  C)-97.7  F (36.5  C)] 97.7  F (36.5  C)  Pulse:  [72-86] 72  Resp:  [16-20] 16  BP: (103-120)/(48-68) 118/68  SpO2:  [90 %-95 %] 95 %      P: Continue to monitor Pt status and report changes to treatment team. Monitor electrolytes. Monitor urine output. Possible up titrate remodulin dose. Promote activity outside room.

## 2021-05-30 NOTE — PLAN OF CARE
6C PT. Defer    PT: Orders received, chart reviewed, discussed with OT. Pt safely mobilizing with OT and nursing staff during stay, like at/near baseline with some decreased tolerance from fluid overload. OT following during admission to functional activity tolerance. No further IP PT needs.

## 2021-05-31 LAB
ABO + RH BLD: NORMAL
ABO + RH BLD: NORMAL
ANION GAP SERPL CALCULATED.3IONS-SCNC: 1 MMOL/L (ref 3–14)
ANION GAP SERPL CALCULATED.3IONS-SCNC: 3 MMOL/L (ref 3–14)
BLD GP AB SCN SERPL QL: NORMAL
BLD PROD TYP BPU: NORMAL
BLD PROD TYP BPU: NORMAL
BLD UNIT ID BPU: 0
BLOOD BANK CMNT PATIENT-IMP: NORMAL
BLOOD PRODUCT CODE: NORMAL
BPU ID: NORMAL
BUN SERPL-MCNC: 47 MG/DL (ref 7–30)
BUN SERPL-MCNC: 49 MG/DL (ref 7–30)
CALCIUM SERPL-MCNC: 9 MG/DL (ref 8.5–10.1)
CALCIUM SERPL-MCNC: 9.8 MG/DL (ref 8.5–10.1)
CHLORIDE SERPL-SCNC: 97 MMOL/L (ref 94–109)
CHLORIDE SERPL-SCNC: 97 MMOL/L (ref 94–109)
CO2 SERPL-SCNC: 40 MMOL/L (ref 20–32)
CO2 SERPL-SCNC: 40 MMOL/L (ref 20–32)
CREAT SERPL-MCNC: 1.15 MG/DL (ref 0.52–1.04)
CREAT SERPL-MCNC: 1.17 MG/DL (ref 0.52–1.04)
ERYTHROCYTE [DISTWIDTH] IN BLOOD BY AUTOMATED COUNT: 20.4 % (ref 10–15)
GFR SERPL CREATININE-BSD FRML MDRD: 44 ML/MIN/{1.73_M2}
GFR SERPL CREATININE-BSD FRML MDRD: 45 ML/MIN/{1.73_M2}
GLUCOSE SERPL-MCNC: 169 MG/DL (ref 70–99)
GLUCOSE SERPL-MCNC: 96 MG/DL (ref 70–99)
HCT VFR BLD AUTO: 24.6 % (ref 35–47)
HGB BLD-MCNC: 6.9 G/DL (ref 11.7–15.7)
INR PPP: 2.51 (ref 0.86–1.14)
MAGNESIUM SERPL-MCNC: 2 MG/DL (ref 1.6–2.3)
MAGNESIUM SERPL-MCNC: 2.1 MG/DL (ref 1.6–2.3)
MCH RBC QN AUTO: 23.5 PG (ref 26.5–33)
MCHC RBC AUTO-ENTMCNC: 28 G/DL (ref 31.5–36.5)
MCV RBC AUTO: 84 FL (ref 78–100)
NUM BPU REQUESTED: 1
PLATELET # BLD AUTO: 144 10E9/L (ref 150–450)
POTASSIUM SERPL-SCNC: 2.9 MMOL/L (ref 3.4–5.3)
POTASSIUM SERPL-SCNC: 5.2 MMOL/L (ref 3.4–5.3)
RBC # BLD AUTO: 2.94 10E12/L (ref 3.8–5.2)
SODIUM SERPL-SCNC: 137 MMOL/L (ref 133–144)
SODIUM SERPL-SCNC: 140 MMOL/L (ref 133–144)
SPECIMEN EXP DATE BLD: NORMAL
TRANSFUSION STATUS PATIENT QL: NORMAL
TRANSFUSION STATUS PATIENT QL: NORMAL
WBC # BLD AUTO: 3.1 10E9/L (ref 4–11)

## 2021-05-31 PROCEDURE — 86900 BLOOD TYPING SEROLOGIC ABO: CPT | Performed by: INTERNAL MEDICINE

## 2021-05-31 PROCEDURE — 86923 COMPATIBILITY TEST ELECTRIC: CPT | Performed by: INTERNAL MEDICINE

## 2021-05-31 PROCEDURE — 250N000013 HC RX MED GY IP 250 OP 250 PS 637: Performed by: INTERNAL MEDICINE

## 2021-05-31 PROCEDURE — 250N000011 HC RX IP 250 OP 636: Performed by: STUDENT IN AN ORGANIZED HEALTH CARE EDUCATION/TRAINING PROGRAM

## 2021-05-31 PROCEDURE — 272N000004 HC RX 272: Performed by: STUDENT IN AN ORGANIZED HEALTH CARE EDUCATION/TRAINING PROGRAM

## 2021-05-31 PROCEDURE — 85027 COMPLETE CBC AUTOMATED: CPT | Performed by: PHYSICIAN ASSISTANT

## 2021-05-31 PROCEDURE — 36592 COLLECT BLOOD FROM PICC: CPT | Performed by: STUDENT IN AN ORGANIZED HEALTH CARE EDUCATION/TRAINING PROGRAM

## 2021-05-31 PROCEDURE — 36592 COLLECT BLOOD FROM PICC: CPT | Performed by: PHYSICIAN ASSISTANT

## 2021-05-31 PROCEDURE — 86901 BLOOD TYPING SEROLOGIC RH(D): CPT | Performed by: INTERNAL MEDICINE

## 2021-05-31 PROCEDURE — 80048 BASIC METABOLIC PNL TOTAL CA: CPT | Performed by: PHYSICIAN ASSISTANT

## 2021-05-31 PROCEDURE — 86850 RBC ANTIBODY SCREEN: CPT | Performed by: INTERNAL MEDICINE

## 2021-05-31 PROCEDURE — 85610 PROTHROMBIN TIME: CPT | Performed by: PHYSICIAN ASSISTANT

## 2021-05-31 PROCEDURE — 99232 SBSQ HOSP IP/OBS MODERATE 35: CPT | Mod: GC | Performed by: INTERNAL MEDICINE

## 2021-05-31 PROCEDURE — P9016 RBC LEUKOCYTES REDUCED: HCPCS | Performed by: INTERNAL MEDICINE

## 2021-05-31 PROCEDURE — 83735 ASSAY OF MAGNESIUM: CPT | Performed by: PHYSICIAN ASSISTANT

## 2021-05-31 PROCEDURE — 250N000011 HC RX IP 250 OP 636: Performed by: PHYSICIAN ASSISTANT

## 2021-05-31 PROCEDURE — 250N000013 HC RX MED GY IP 250 OP 250 PS 637: Performed by: STUDENT IN AN ORGANIZED HEALTH CARE EDUCATION/TRAINING PROGRAM

## 2021-05-31 PROCEDURE — 83735 ASSAY OF MAGNESIUM: CPT | Performed by: STUDENT IN AN ORGANIZED HEALTH CARE EDUCATION/TRAINING PROGRAM

## 2021-05-31 PROCEDURE — 80048 BASIC METABOLIC PNL TOTAL CA: CPT | Performed by: STUDENT IN AN ORGANIZED HEALTH CARE EDUCATION/TRAINING PROGRAM

## 2021-05-31 PROCEDURE — 250N000013 HC RX MED GY IP 250 OP 250 PS 637: Performed by: PHYSICIAN ASSISTANT

## 2021-05-31 PROCEDURE — 250N000009 HC RX 250: Performed by: PHYSICIAN ASSISTANT

## 2021-05-31 PROCEDURE — 214N000001 HC R&B CCU UMMC

## 2021-05-31 RX ORDER — GRANISETRON HYDROCHLORIDE 1 MG/ML
10 INJECTION INTRAVENOUS ONCE
Status: COMPLETED | OUTPATIENT
Start: 2021-05-31 | End: 2021-05-31

## 2021-05-31 RX ORDER — TRAMADOL HYDROCHLORIDE 50 MG/1
50 TABLET ORAL ONCE
Status: COMPLETED | OUTPATIENT
Start: 2021-05-31 | End: 2021-05-31

## 2021-05-31 RX ORDER — POTASSIUM CHLORIDE 1500 MG/1
80 TABLET, EXTENDED RELEASE ORAL ONCE
Status: DISCONTINUED | OUTPATIENT
Start: 2021-05-31 | End: 2021-05-31

## 2021-05-31 RX ORDER — POTASSIUM CHLORIDE 1500 MG/1
60 TABLET, EXTENDED RELEASE ORAL ONCE
Status: COMPLETED | OUTPATIENT
Start: 2021-05-31 | End: 2021-05-31

## 2021-05-31 RX ORDER — POTASSIUM CHLORIDE 29.8 MG/ML
20 INJECTION INTRAVENOUS
Status: DISCONTINUED | OUTPATIENT
Start: 2021-05-31 | End: 2021-05-31

## 2021-05-31 RX ORDER — POTASSIUM CHLORIDE 750 MG/1
60 TABLET, EXTENDED RELEASE ORAL 3 TIMES DAILY
Status: DISCONTINUED | OUTPATIENT
Start: 2021-05-31 | End: 2021-06-10 | Stop reason: HOSPADM

## 2021-05-31 RX ORDER — POTASSIUM CHLORIDE 1500 MG/1
80 TABLET, EXTENDED RELEASE ORAL 3 TIMES DAILY
Status: DISCONTINUED | OUTPATIENT
Start: 2021-05-31 | End: 2021-05-31

## 2021-05-31 RX ORDER — WARFARIN SODIUM 2.5 MG/1
2.5 TABLET ORAL
Status: COMPLETED | OUTPATIENT
Start: 2021-05-31 | End: 2021-05-31

## 2021-05-31 RX ORDER — POTASSIUM CHLORIDE 29.8 MG/ML
20 INJECTION INTRAVENOUS
Status: COMPLETED | OUTPATIENT
Start: 2021-05-31 | End: 2021-05-31

## 2021-05-31 RX ORDER — WARFARIN SODIUM 3 MG/1
3 TABLET ORAL
Status: DISCONTINUED | OUTPATIENT
Start: 2021-05-31 | End: 2021-05-31

## 2021-05-31 RX ADMIN — BUMETANIDE 2 MG/HR: 0.25 INJECTION INTRAMUSCULAR; INTRAVENOUS at 20:21

## 2021-05-31 RX ADMIN — POTASSIUM CHLORIDE 60 MEQ: 1500 TABLET, EXTENDED RELEASE ORAL at 08:55

## 2021-05-31 RX ADMIN — KETOTIFEN FUMARATE 1 DROP: 0.35 SOLUTION/ DROPS OPHTHALMIC at 08:56

## 2021-05-31 RX ADMIN — ACETAMINOPHEN 325 MG: 325 TABLET, FILM COATED ORAL at 18:08

## 2021-05-31 RX ADMIN — GABAPENTIN 300 MG: 300 CAPSULE ORAL at 20:56

## 2021-05-31 RX ADMIN — Medication 1 TABLET: at 08:55

## 2021-05-31 RX ADMIN — MACITENTAN 10 MG: 10 TABLET, FILM COATED ORAL at 16:05

## 2021-05-31 RX ADMIN — TREPROSTINIL 32 NG/KG/MIN: 20 INJECTION, SOLUTION INTRAVENOUS; SUBCUTANEOUS at 23:01

## 2021-05-31 RX ADMIN — POTASSIUM CHLORIDE 20 MEQ: 29.8 INJECTION, SOLUTION INTRAVENOUS at 09:00

## 2021-05-31 RX ADMIN — TRAMADOL HYDROCHLORIDE 50 MG: 50 TABLET ORAL at 16:05

## 2021-05-31 RX ADMIN — Medication 1 TABLET: at 20:56

## 2021-05-31 RX ADMIN — DIGOXIN 62.5 MCG: 0.06 TABLET ORAL at 08:55

## 2021-05-31 RX ADMIN — ASPIRIN 81 MG CHEWABLE TABLET 81 MG: 81 TABLET CHEWABLE at 08:55

## 2021-05-31 RX ADMIN — Medication 5 ML: at 21:01

## 2021-05-31 RX ADMIN — IPRATROPIUM BROMIDE 2 SPRAY: 42 SPRAY, METERED NASAL at 08:57

## 2021-05-31 RX ADMIN — GRANISETRON HYDROCHLORIDE 600 MCG: 1 INJECTION INTRAVENOUS at 16:05

## 2021-05-31 RX ADMIN — BUMETANIDE 2 MG/HR: 0.25 INJECTION INTRAMUSCULAR; INTRAVENOUS at 09:18

## 2021-05-31 RX ADMIN — GABAPENTIN 300 MG: 300 CAPSULE ORAL at 14:15

## 2021-05-31 RX ADMIN — WARFARIN SODIUM 2.5 MG: 2.5 TABLET ORAL at 17:19

## 2021-05-31 RX ADMIN — POTASSIUM CHLORIDE 80 MEQ: 1500 TABLET, EXTENDED RELEASE ORAL at 14:15

## 2021-05-31 RX ADMIN — HYDROXYCHLOROQUINE SULFATE 200 MG: 200 TABLET, FILM COATED ORAL at 08:56

## 2021-05-31 RX ADMIN — IPRATROPIUM BROMIDE 2 SPRAY: 42 SPRAY, METERED NASAL at 20:55

## 2021-05-31 RX ADMIN — GABAPENTIN 300 MG: 300 CAPSULE ORAL at 08:56

## 2021-05-31 RX ADMIN — OXYCODONE HYDROCHLORIDE AND ACETAMINOPHEN 500 MG: 500 TABLET ORAL at 08:55

## 2021-05-31 ASSESSMENT — ACTIVITIES OF DAILY LIVING (ADL)
ADLS_ACUITY_SCORE: 14
ADLS_ACUITY_SCORE: 16
ADLS_ACUITY_SCORE: 16
ADLS_ACUITY_SCORE: 14
ADLS_ACUITY_SCORE: 16
ADLS_ACUITY_SCORE: 14

## 2021-05-31 NOTE — PLAN OF CARE
D: Admitted s/p hypervolemia   PMH of chronic thromboembolic pulmonary HTN, severe RV dysfunction, systemic HTN, breast cancer, asthma, and scoliosis     I: Monitored vitals and assessed pt status.     Running: Remodulin 32 ng/kg/min, 1.8 ml/hr        Bumex gtt  2 mg/hr, 8 ml/hr   PRN: Tylenol      A: A0x4. Afebrile. VSS. HRs 70s. Sinus rhythm. Sats >92% on 2.5L NC. Pt denies any shortness of breath at rest, chest pain/palpitations, nausea, dizziness, or chills. Tylenol given for generalized pain. 1 unit of PRBC given for Hgb of 6.9. K+ 2.9, oral and IV potassium given per MD, recheck scheduled this evening. Remodulin gtt dose increase, pt able to tolerate it well. Pt has cast on right arm. Moderate pitting edema on lower extremities. Legs elevated on pillows. Pt is on 2g Na+ diet w/2L FR.         P: Continue to monitor pt status and notify Cards 2 treatment team with any changes or concerns.

## 2021-05-31 NOTE — PROGRESS NOTES
[facility]   Cardiology II Service / Advanced Heart Failure  Daily Progress Note  Date of Service: 5/30/2021      Patient: Karen Anderson  MRN: [id number]  Admission Date: 5/27/2021  Hospital Day # 4    Assessment and Plan:  Ms. Karen Anderson is a 80yr old female with a history of chronic thromboembolic pulmonary hypertension (on IV treprostinil and macitentan), severe RV dysfunction, RA, systemic HTN, breast cancer (s/p chemotherapy with Adriamycin, Cytoxan, Taxol, and tamoxifen, and s/p left mastectomy), asthma, and scoliosis who presents with hypervolemia.      PLAN:  - Will increase remodulin to 32 ng/kg/min, pretreatment with Kytril and tramadol ordered  - Continue Bumex drip at 2 mg/hr  - Increase scheduled potassium to 60 mEq TID    Chronic thromboembolic pulmonary hypertension (WHO Group IV)  Severe RV dysfunction  Acute on chronic RV failure exacerbation   Hypokalemia  She has been evaluated for high-risk pulmonary thromboendarterectomy but deemed to be too high-risk at [facility].  Given her proximal disease and high pressures, balloon angioplasty would also be high- risk.  She underwent balloon pulmonary angioplasty January 2021, which was complicated by hemoptysis.  No plans to repeat this here, but [facility] willing to consider in the future.  Currently being managed on medical therapy by Dr. Turcios as an outpatient.    She presented with increasing SOB, swelling, and weight gain (dry weight ~112 lbs).  She was 135 lbs on 5/24, and was advised to take a metolazone.  She presents now for IV diuresis and potentially increase in remodulin therapy.  Weight on admission was 122#, up to 128#, now down to 123#.    - continue bumex gtt @ 2mg/hr   - continue to trend BMP, Mg q12 hours, replete K>4 and Mg >2, will increase scheduled K to 60 TID  - Increase remodulin to 32ng/kg/min. Pretreatment with Kytril and tramadol  - contiue PTA Opsumit 10mg q48h (recently reduced d/t concerns for fluid  retention)   - continue Warfarin, with goal INR 2.0-2.5.  INR today 2.51.  - continue digoxin 62.5mcg daily  - O2 via NC if sat <92%  - 2L fluid restriction  - Strict ins and outs  - Daily weights  - Salt restriction    Normocytic anemia, with elevated RDW  Has had low iron saturation in the past.  Normal ferritin and transferrin (5/29).  Has not tolerated po iron (abdominal upset).  Hemoglobin down to 7s from baseline ~9s.  - adding epo level and soluble transferrin receptor --> will consider IV iron repletion pending results  - Trend CBC  - Hb 6.9 5/31 AM will transfuse 1 U     CKD  Baseline cr 1.2-1.5, has been stable with diuresis.  - Trend BMP, Mg BID, lyte replacement as above     Asthma  Allergic rhinitis  No acute concerns.  - Continue beclomethasone BID and albuterol inhaler PRN  - Continue cetirizine and ipratropium nasal spray     RA  No acute concerns.  - Continue Hydroxychloroquine 200mg daily      Distal fracture of her right radius and ulna  - OT while inpatient  - Continue PTA alendronate 80mg every monday    FEN:  2g NA restriction, 2L fluid restriction  PROPHY:  warfarin, ambulate  LINES:  2 lumen PICC, single lumen line  DISPO:  Pending diuresis  CODE STATUS:  Full code    Patient discussed with Dr. Marlow.     Geo Medina MD  Cards 2 Service  Internal Medicine PGY-1      I have reviewed today's vital signs, notes, medications, labs and imaging. I have also seen and examined the patient and agree with the findings and plan as outlined above.    Gladys Marlow MD  Section Head - Advanced Heart Failure, Transplantation and Mechanical Circulatory Support  Director - [facility]  Associate Professor of Medicine, [facility]  =============================================================    Interval History/ROS:   Ms. Anderson states that she feels ok today.  She is still somewhat dizzy, but better.  She is not terribly nauseated.  She has a mild  headache.  She is eating, with fair appetite.  She denies nausea and vomiting, and reports some looser stools.  Her breathing has been stable, and she denies worsening SOB.  She has not been ambulating much, limited mostly by her dizziness.  Her fluid status seems stable, and she has not noticed much improvement in her lower extremity edema.  She otherwise denies chest pain, palpitations, falls, headaches, acute vision changes, fevers, chills, cough, sore throat, and signs of bleeding.    Last 24 hr care team notes reviewed.   ROS:  4 point ROS including Respiratory, CV, GI and , other than that noted in the HPI, is negative.     Medications: Reviewed in EPIC.     Physical Exam:   /56 (BP Location: Left leg)   Pulse 79   Temp 97.7  F (36.5  C) (Oral)   Resp 18   Wt 56.1 kg (123 lb 9.6 oz)   SpO2 93%   BMI 24.96 kg/m    GENERAL: Appears alert and oriented times three. Sitting upright, NAD.  HEENT: Eye symmetrical and free of discharge bilaterally. Mucous membranes moist and without lesions.  NECK: Supple and without lymphadenopathy. JVD appears above mid neck when sitting upright.   CV: RRR, S1S2 present without murmur, rub, or gallop.   RESPIRATORY: Respirations regular, even, and unlabored. Lungs CTA throughout.   GI: Soft and non distended with normoactive bowel sounds present in all quadrants. No tenderness, rebound, guarding. No organomegaly.   EXTREMITIES: Taut bilateral LE edema, LE wraps in place. 2+ bilateral pedal pulses.   NEUROLOGIC: Alert and orientated x 3. CN II-XII grossly intact. No focal deficits.   MUSCULOSKELETAL: No joint swelling or tenderness.   SKIN: No jaundice. No rashes or lesions.     Data:  CMP  Recent Labs   Lab 05/31/21  0706 05/30/21  1700 05/30/21  0551 05/29/21  1720 05/29/21  0625 05/28/21  0641 05/28/21  0641 05/28/21  0142 05/27/21 2018    138 139 138 138   < > 137 137 139   POTASSIUM 2.9* 3.5 3.5 3.8 3.5   < > 4.6 5.3 3.0*   CHLORIDE 97 96 97 97 97   < > 101  100 97   CO2 40* 41* 38* 37* 38*   < > 32 36* 35*   ANIONGAP 3 1* 4 4 4   < > 4 1* 6   GLC 96 87 79 133* 75   < > 128* 153* 180*   BUN 49* 50* 50* 50* 50*   < > 56* 53* 53*   CR 1.15* 1.25* 1.28* 1.23* 1.30*   < > 1.39* 1.36* 1.32*   GFRESTIMATED 45* 41* 40* 42* 39*   < > 36* 37* 38*   GFRESTBLACK 53* 47* 46* 48* 45*   < > 42* 43* 44*   ESTEFANÍA 9.8 9.7 9.6 9.5 10.0   < > 9.8 9.8 9.7   MAG 2.1  --  2.2  --  2.0  --   --  2.3 2.2   PROTTOTAL  --   --   --   --  7.7  --  7.0  --  7.9   ALBUMIN  --   --   --   --  3.0*  --  2.7*  --  3.1*   BILITOTAL  --   --   --   --  0.4  --  0.3  --  0.5   ALKPHOS  --   --   --   --  163*  --  156*  --  166*   AST  --   --   --   --  39  --  36  --  38   ALT  --   --   --   --  28  --  25  --  29    < > = values in this interval not displayed.     CBC  Recent Labs   Lab 05/30/21  0551 05/29/21  0625 05/28/21  0641 05/27/21 2018   WBC 4.0 3.0* 3.7* 3.7*   RBC 2.94* 3.19* 2.96* 3.29*   HGB 7.0* 7.5* 7.1* 7.9*   HCT 24.6* 26.8* 24.7* 27.5*   MCV 84 84 83 84   MCH 23.8* 23.5* 24.0* 24.0*   MCHC 28.5* 28.0* 28.7* 28.7*   RDW 20.0* 20.2* 20.8* 20.7*   * 155 148* 152     INR  Recent Labs   Lab 05/31/21  0706 05/30/21  0551 05/29/21  0625 05/28/21  0641   INR 2.51* 2.62* 2.23* 2.00*

## 2021-05-31 NOTE — PLAN OF CARE
-------------------CRITICAL LAB VALUE-------------------    Lab Value: Hemoglobin 6.9  Patient status: Pt alert and oriented, sitting up in bed  Time of notification: 08:45 AM  MD notified: Cards 2 MD notified and aware     Temp:  [97.4  F (36.3  C)-97.7  F (36.5  C)] 97.4  F (36.3  C)  Pulse:  [75-84] 79  Resp:  [18-20] 18  BP: (118-157)/(43-96) 136/65  SpO2:  [91 %-96 %] 96 %

## 2021-05-31 NOTE — PLAN OF CARE
Admitted 5/27 for hypervolemia. History of CTEPH on Remodulin, RV dysfunction, HTN, breast cancer s/p chemo and left mastectomy, asthma, and scoliosis.     Neuro: A&Ox4.   Cardiac: SR. VSS.   Respiratory: Sating 90s on 2.5 LPM.  GI/: Voiding adequate amounts. Multiple small hard BMs.  Diet/appetite: On 2 gram Na diet with 2L FR. Good appetite.  Activity:  Up independently in the room  Pain: Tylenol given x1 for right arm pain with relief.   Skin: Lymph wraps removed d/t getting soiled. Legs kaylee. Blanchable redness to coccyx.  LDA's: PICC infusing Bumex at 2mg/hr. Portacath infusing Remodulin at 30ng/kg/min.    Plan: Possible up titration of Remodulin today. Will continue to monitor and notify team accordingly.

## 2021-06-01 ENCOUNTER — APPOINTMENT (OUTPATIENT)
Dept: OCCUPATIONAL THERAPY | Facility: CLINIC | Age: 81
DRG: 291 | End: 2021-06-01
Attending: INTERNAL MEDICINE
Payer: COMMERCIAL

## 2021-06-01 ENCOUNTER — RECORDS - HEALTHEAST (OUTPATIENT)
Dept: ADMINISTRATIVE | Facility: CLINIC | Age: 81
End: 2021-06-01

## 2021-06-01 LAB
ANION GAP SERPL CALCULATED.3IONS-SCNC: <1 MMOL/L (ref 3–14)
ANION GAP SERPL CALCULATED.3IONS-SCNC: <1 MMOL/L (ref 3–14)
BUN SERPL-MCNC: 44 MG/DL (ref 7–30)
BUN SERPL-MCNC: 46 MG/DL (ref 7–30)
CALCIUM SERPL-MCNC: 9.4 MG/DL (ref 8.5–10.1)
CALCIUM SERPL-MCNC: 9.6 MG/DL (ref 8.5–10.1)
CHLORIDE SERPL-SCNC: 96 MMOL/L (ref 94–109)
CHLORIDE SERPL-SCNC: 97 MMOL/L (ref 94–109)
CO2 SERPL-SCNC: 38 MMOL/L (ref 20–32)
CO2 SERPL-SCNC: 40 MMOL/L (ref 20–32)
CREAT SERPL-MCNC: 1.2 MG/DL (ref 0.52–1.04)
CREAT SERPL-MCNC: 1.3 MG/DL (ref 0.52–1.04)
EPO SERPL-ACNC: 317 MU/ML (ref 4–27)
ERYTHROCYTE [DISTWIDTH] IN BLOOD BY AUTOMATED COUNT: 19.8 % (ref 10–15)
GFR SERPL CREATININE-BSD FRML MDRD: 39 ML/MIN/{1.73_M2}
GFR SERPL CREATININE-BSD FRML MDRD: 43 ML/MIN/{1.73_M2}
GLUCOSE SERPL-MCNC: 84 MG/DL (ref 70–99)
GLUCOSE SERPL-MCNC: 93 MG/DL (ref 70–99)
HCT VFR BLD AUTO: 28.6 % (ref 35–47)
HGB BLD-MCNC: 8.1 G/DL (ref 11.7–15.7)
INR PPP: 2.66 (ref 0.86–1.14)
MAGNESIUM SERPL-MCNC: 2 MG/DL (ref 1.6–2.3)
MAGNESIUM SERPL-MCNC: 2 MG/DL (ref 1.6–2.3)
MCH RBC QN AUTO: 24.6 PG (ref 26.5–33)
MCHC RBC AUTO-ENTMCNC: 28.3 G/DL (ref 31.5–36.5)
MCV RBC AUTO: 87 FL (ref 78–100)
PLATELET # BLD AUTO: 123 10E9/L (ref 150–450)
POTASSIUM SERPL-SCNC: 4.1 MMOL/L (ref 3.4–5.3)
POTASSIUM SERPL-SCNC: 5.2 MMOL/L (ref 3.4–5.3)
RBC # BLD AUTO: 3.29 10E12/L (ref 3.8–5.2)
SODIUM SERPL-SCNC: 135 MMOL/L (ref 133–144)
SODIUM SERPL-SCNC: 137 MMOL/L (ref 133–144)
STFR SERPL-SCNC: 9.3 MG/L (ref 1.9–4.4)
WBC # BLD AUTO: 3.3 10E9/L (ref 4–11)

## 2021-06-01 PROCEDURE — 272N000004 HC RX 272: Performed by: STUDENT IN AN ORGANIZED HEALTH CARE EDUCATION/TRAINING PROGRAM

## 2021-06-01 PROCEDURE — 250N000009 HC RX 250: Performed by: PHYSICIAN ASSISTANT

## 2021-06-01 PROCEDURE — 99232 SBSQ HOSP IP/OBS MODERATE 35: CPT | Performed by: PHYSICIAN ASSISTANT

## 2021-06-01 PROCEDURE — 80048 BASIC METABOLIC PNL TOTAL CA: CPT | Performed by: STUDENT IN AN ORGANIZED HEALTH CARE EDUCATION/TRAINING PROGRAM

## 2021-06-01 PROCEDURE — 97535 SELF CARE MNGMENT TRAINING: CPT | Mod: GO

## 2021-06-01 PROCEDURE — 97140 MANUAL THERAPY 1/> REGIONS: CPT | Mod: GO

## 2021-06-01 PROCEDURE — 85027 COMPLETE CBC AUTOMATED: CPT | Performed by: PHYSICIAN ASSISTANT

## 2021-06-01 PROCEDURE — 83735 ASSAY OF MAGNESIUM: CPT | Performed by: STUDENT IN AN ORGANIZED HEALTH CARE EDUCATION/TRAINING PROGRAM

## 2021-06-01 PROCEDURE — 250N000013 HC RX MED GY IP 250 OP 250 PS 637: Performed by: INTERNAL MEDICINE

## 2021-06-01 PROCEDURE — 36592 COLLECT BLOOD FROM PICC: CPT | Performed by: PHYSICIAN ASSISTANT

## 2021-06-01 PROCEDURE — 250N000013 HC RX MED GY IP 250 OP 250 PS 637: Performed by: STUDENT IN AN ORGANIZED HEALTH CARE EDUCATION/TRAINING PROGRAM

## 2021-06-01 PROCEDURE — 83735 ASSAY OF MAGNESIUM: CPT | Performed by: PHYSICIAN ASSISTANT

## 2021-06-01 PROCEDURE — 80048 BASIC METABOLIC PNL TOTAL CA: CPT | Performed by: PHYSICIAN ASSISTANT

## 2021-06-01 PROCEDURE — 97530 THERAPEUTIC ACTIVITIES: CPT | Mod: GO

## 2021-06-01 PROCEDURE — 250N000013 HC RX MED GY IP 250 OP 250 PS 637: Performed by: PHYSICIAN ASSISTANT

## 2021-06-01 PROCEDURE — 258N000003 HC RX IP 258 OP 636: Performed by: PHYSICIAN ASSISTANT

## 2021-06-01 PROCEDURE — 214N000001 HC R&B CCU UMMC

## 2021-06-01 PROCEDURE — 85610 PROTHROMBIN TIME: CPT | Performed by: PHYSICIAN ASSISTANT

## 2021-06-01 PROCEDURE — 250N000011 HC RX IP 250 OP 636: Performed by: PHYSICIAN ASSISTANT

## 2021-06-01 PROCEDURE — 250N000011 HC RX IP 250 OP 636: Performed by: STUDENT IN AN ORGANIZED HEALTH CARE EDUCATION/TRAINING PROGRAM

## 2021-06-01 RX ORDER — ACETAMINOPHEN 325 MG/1
325-650 TABLET ORAL EVERY 6 HOURS PRN
Status: DISCONTINUED | OUTPATIENT
Start: 2021-06-01 | End: 2021-06-10 | Stop reason: HOSPADM

## 2021-06-01 RX ORDER — METHYLPREDNISOLONE SODIUM SUCCINATE 125 MG/2ML
125 INJECTION, POWDER, LYOPHILIZED, FOR SOLUTION INTRAMUSCULAR; INTRAVENOUS
Status: DISCONTINUED | OUTPATIENT
Start: 2021-06-01 | End: 2021-06-02

## 2021-06-01 RX ORDER — DIPHENHYDRAMINE HYDROCHLORIDE 50 MG/ML
50 INJECTION INTRAMUSCULAR; INTRAVENOUS
Status: DISCONTINUED | OUTPATIENT
Start: 2021-06-01 | End: 2021-06-02

## 2021-06-01 RX ORDER — WARFARIN SODIUM 2.5 MG/1
2.5 TABLET ORAL
Status: COMPLETED | OUTPATIENT
Start: 2021-06-01 | End: 2021-06-01

## 2021-06-01 RX ADMIN — GABAPENTIN 300 MG: 300 CAPSULE ORAL at 08:40

## 2021-06-01 RX ADMIN — KETOTIFEN FUMARATE 1 DROP: 0.35 SOLUTION/ DROPS OPHTHALMIC at 08:49

## 2021-06-01 RX ADMIN — IPRATROPIUM BROMIDE 2 SPRAY: 42 SPRAY, METERED NASAL at 21:00

## 2021-06-01 RX ADMIN — POTASSIUM CHLORIDE 60 MEQ: 750 TABLET, EXTENDED RELEASE ORAL at 07:45

## 2021-06-01 RX ADMIN — KETOTIFEN FUMARATE 1 DROP: 0.35 SOLUTION/ DROPS OPHTHALMIC at 21:00

## 2021-06-01 RX ADMIN — BUMETANIDE 2 MG/HR: 0.25 INJECTION INTRAMUSCULAR; INTRAVENOUS at 09:03

## 2021-06-01 RX ADMIN — GABAPENTIN 300 MG: 300 CAPSULE ORAL at 14:36

## 2021-06-01 RX ADMIN — CHLOROTHIAZIDE SODIUM 500 MG: 500 INJECTION, POWDER, LYOPHILIZED, FOR SOLUTION INTRAVENOUS at 12:36

## 2021-06-01 RX ADMIN — GABAPENTIN 300 MG: 300 CAPSULE ORAL at 19:36

## 2021-06-01 RX ADMIN — IRON SUCROSE 300 MG: 20 INJECTION, SOLUTION INTRAVENOUS at 18:59

## 2021-06-01 RX ADMIN — POTASSIUM CHLORIDE 60 MEQ: 750 TABLET, EXTENDED RELEASE ORAL at 14:36

## 2021-06-01 RX ADMIN — TREPROSTINIL 32 NG/KG/MIN: 20 INJECTION, SOLUTION INTRAVENOUS; SUBCUTANEOUS at 22:24

## 2021-06-01 RX ADMIN — ASPIRIN 81 MG CHEWABLE TABLET 81 MG: 81 TABLET CHEWABLE at 08:40

## 2021-06-01 RX ADMIN — Medication 1 TABLET: at 19:36

## 2021-06-01 RX ADMIN — HYDROXYCHLOROQUINE SULFATE 200 MG: 200 TABLET, FILM COATED ORAL at 08:40

## 2021-06-01 RX ADMIN — OXYCODONE HYDROCHLORIDE AND ACETAMINOPHEN 500 MG: 500 TABLET ORAL at 08:40

## 2021-06-01 RX ADMIN — Medication 5 ML: at 20:58

## 2021-06-01 RX ADMIN — Medication 1 TABLET: at 08:40

## 2021-06-01 RX ADMIN — DIGOXIN 62.5 MCG: 0.06 TABLET ORAL at 08:40

## 2021-06-01 RX ADMIN — ACETAMINOPHEN 325 MG: 325 TABLET, FILM COATED ORAL at 00:00

## 2021-06-01 RX ADMIN — IPRATROPIUM BROMIDE 2 SPRAY: 42 SPRAY, METERED NASAL at 08:48

## 2021-06-01 RX ADMIN — WARFARIN SODIUM 2.5 MG: 2.5 TABLET ORAL at 19:08

## 2021-06-01 RX ADMIN — ACETAMINOPHEN 325 MG: 325 TABLET, FILM COATED ORAL at 06:25

## 2021-06-01 RX ADMIN — BUMETANIDE 2 MG/HR: 0.25 INJECTION INTRAMUSCULAR; INTRAVENOUS at 21:01

## 2021-06-01 ASSESSMENT — ACTIVITIES OF DAILY LIVING (ADL)
ADLS_ACUITY_SCORE: 18

## 2021-06-01 NOTE — CONSULTS
Care Management Initial Consult    General Information  Assessment completed with: Patient by phone,    Type of CM/SW Visit: Offer D/C Planning  Primary Care Provider verified and updated as needed: Yes   Readmission within the last 30 days:      Advance Care Planning: Advance Care Planning Reviewed: (per bedside staff)          Communication Assessment  Patient's communication style: spoken language (English or Bilingual)    Hearing Difficulty or Deaf: yes   Wear Glasses or Blind: yes  Cognitive  Cognitive/Neuro/Behavioral: WDL                      Living Environment:   People in home: spouse     Current living Arrangements: house      Able to return to prior arrangements: yes       Family/Social Support:  Care provided by: self, spouse/significant other  Provides care for: no one  Marital Status:     Description of Support System: Supportive, Involved         Current Resources:   Patient receiving home care services: No     Community Resources: Home Infusion - [facility] IV Remodulin  Equipment currently used at home: cane, straight  Supplies currently used at home:      Employment/Financial:  Employment Status: (not discussed)     Financial Concerns: No concerns identified        Functional Status:  Prior to admission patient needed assistance:            Values/Beliefs:  Spiritual, Cultural Beliefs, Yarsani Practices, Values that affect care: (not discussed)  Description of Beliefs that Will Affect Care: Faith/Islam            Additional Information:  OT recs HH OT.   Spoke with pt by phone to discuss home care and complete above initial assessment. Education given to patient that star ratings are updated by Medicare and can be reviewed by visiting www.medicare.gov. She is interested in home care, but would like more time to think about agency.   [facility] will follow up for preferences.       Taina Arora RN, [place]  Float Care Coordinator  Covering 6C [facility]   Pager: [phone]

## 2021-06-01 NOTE — PROGRESS NOTES
[facility]   Cardiology II Service / Advanced Heart Failure  Daily Progress Note        Patient: Karen Anderson  MRN: [id number]  Admission Date: 5/27/2021  Hospital Day # 5    Assessment and Plan:  Ms. Karen Anderson is a 80yr old female with a history of chronic thromboembolic pulmonary hypertension (on IV treprostinil and macitentan), severe RV dysfunction, RA, systemic HTN, breast cancer (s/p chemotherapy with Adriamycin, Cytoxan, Taxol, and tamoxifen, and s/p left mastectomy), asthma, and scoliosis who presents with hypervolemia.    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  Given her proximal disease and high pressures, balloon angioplasty would also be high- risk.  She underwent balloon pulmonary angioplasty January 2021, which was complicated by hemoptysis.  No plans to repeat this here, but [facility] willing to consider in the future.  Currently being managed on medical therapy by Dr. Turcios as an outpatient.    She presented with increasing SOB, swelling, and weight gain (dry weight ~112 lbs).  She was 135 lbs on 5/24, and was advised to take a metolazone.  She presents now for IV diuresis and potentially increase in remodulin therapy.  Weight on admission was 122#, up to 128#, now down to 123#.    - continue bumex gtt @ 2mg/hr   - continue to trend BMP, Mg q12 hours, replete K>4 and Mg >2, will increase scheduled K to 60 TID  - Increased remodulin to 32ng/kg/min.   - Contiue PTA Opsumit 10mg q48h (recently reduced d/t concerns for fluid retention)   - Continue Warfarin, with goal INR 2.0-2.5.  INR today  2.66.  - Continue digoxin 62.5mcg daily  - O2 via NC if sat <92%  - 2L fluid restriction  - Strict ins and outs  - Daily weights  - Salt restriction    Normocytic anemia, with elevated RDW  Has had low iron saturation in the past.  Normal ferritin and transferrin (5/29).  Has not tolerated po iron (abdominal upset).  Hemoglobin down to 7s from baseline ~9s. Received 1 UPRBC on 5/31.  - F/uepo level and soluble transferrin receptor (both pending today)--> will consider IV iron repletion pending results  - Trend CBC    CKD  Baseline cr 1.2-1.5, has been stable with diuresis.  - Trend BMP, Mg BID, lyte replacement as above     Asthma  Allergic rhinitis  No acute concerns.  - Continue beclomethasone BID and albuterol inhaler PRN  - Continue cetirizine and ipratropium nasal spray     RA  No acute concerns.  - Continue Hydroxychloroquine 200mg daily      Distal fracture of her right radius and ulna  - OT while inpatient  - Continue PTA alendronate 80mg every Monday (will need to use home supply)    FEN:  2g NA restriction, 2L fluid restriction  PROPHY:  warfarin, ambulate  LINES:  2 lumen PICC, single lumen line  DISPO:  Pending diuresis  CODE STATUS:  Full code    Patient discussed with Dr. Marlow.     Deborah Chaudhry PA-C  [facility]    =============================================================    Interval History/ROS:   Ms. Anderson states that she feels ok today.  Nausea is better.  She has a mild headache which has been unchanged since increasing remodulin. No as bad as it has been with remodulin titration in the past.  She is eating, with fair appetite.  She denies nausea and vomiting, and reports some looser stools so she held off on mirilax this morning.  Her breathing has been stable, and she denies worsening SOB. She has not noticed much improvement in her lower extremity edema.  She otherwise denies chest pain, palpitations, falls, acute vision changes, fevers, chills, cough, sore throat, and signs  of bleeding.    Last 24 hr care team notes reviewed.   ROS:  4 point ROS including Respiratory, CV, GI and , other than that noted in the HPI, is negative.     Medications: Reviewed in EPIC.     Physical Exam:   /47 (BP Location: Left leg)   Pulse 65   Temp 97.5  F (36.4  C) (Oral)   Resp 18   Wt 55.3 kg (122 lb)   SpO2 94%   BMI 24.64 kg/m    GENERAL: Appears alert and interacting appropriatly.   HEENT: Eye symmetrical and free of discharge bilaterally. Mucous membranes moist and without lesions.  NECK: Supple and without lymphadenopathy. JVD appears above mid neck when sitting upright.   CV: RRR, S1S2 present without murmur, rub, or gallop.   RESPIRATORY: Respirations regular, even, and unlabored. Lungs CTA throughout.   GI: Soft and non distended with normoactive bowel sounds present in all quadrants. No tenderness, rebound, guarding. No organomegaly.   EXTREMITIES: Taut bilateral LE edema, LE wraps in place. 2+ bilateral pedal pulses.   NEUROLOGIC: Alert and interacting appropriately.. No focal deficits.   MUSCULOSKELETAL: No joint swelling or tenderness.   SKIN: No jaundice. No rashes or lesions.     Data:  CMP  Recent Labs   Lab 06/01/21  0603 05/31/21  1835 05/31/21  0706 05/30/21  1700 05/30/21  0551 05/29/21  0625 05/29/21  0625 05/28/21  0641 05/28/21  0641 05/27/21 2018 05/27/21 2018    137 140 138 139   < > 138   < > 137   < > 139   POTASSIUM 4.1 5.2 2.9* 3.5 3.5   < > 3.5   < > 4.6   < > 3.0*   CHLORIDE 97 97 97 96 97   < > 97   < > 101   < > 97   CO2 40* 40* 40* 41* 38*   < > 38*   < > 32   < > 35*   ANIONGAP <1* 1* 3 1* 4   < > 4   < > 4   < > 6   GLC 84 169* 96 87 79   < > 75   < > 128*   < > 180*   BUN 44* 47* 49* 50* 50*   < > 50*   < > 56*   < > 53*   CR 1.20* 1.17* 1.15* 1.25* 1.28*   < > 1.30*   < > 1.39*   < > 1.32*   GFRESTIMATED 43* 44* 45* 41* 40*   < > 39*   < > 36*   < > 38*   GFRESTBLACK 50* 51* 53* 47* 46*   < > 45*   < > 42*   < > 44*   ESTEFANÍA 9.6 9.0 9.8 9.7 9.6   <  > 10.0   < > 9.8   < > 9.7   MAG 2.0 2.0 2.1  --  2.2  --  2.0  --   --    < > 2.2   PROTTOTAL  --   --   --   --   --   --  7.7  --  7.0  --  7.9   ALBUMIN  --   --   --   --   --   --  3.0*  --  2.7*  --  3.1*   BILITOTAL  --   --   --   --   --   --  0.4  --  0.3  --  0.5   ALKPHOS  --   --   --   --   --   --  163*  --  156*  --  166*   AST  --   --   --   --   --   --  39  --  36  --  38   ALT  --   --   --   --   --   --  28  --  25  --  29    < > = values in this interval not displayed.     CBC  Recent Labs   Lab 06/01/21 0603 05/31/21 0706 05/30/21  0551 05/29/21  0625   WBC 3.3* 3.1* 4.0 3.0*   RBC 3.29* 2.94* 2.94* 3.19*   HGB 8.1* 6.9* 7.0* 7.5*   HCT 28.6* 24.6* 24.6* 26.8*   MCV 87 84 84 84   MCH 24.6* 23.5* 23.8* 23.5*   MCHC 28.3* 28.0* 28.5* 28.0*   RDW 19.8* 20.4* 20.0* 20.2*   * 144* 147* 155     INR  Recent Labs   Lab 06/01/21 0603 05/31/21 0706 05/30/21  0551 05/29/21  0625   INR 2.66* 2.51* 2.62* 2.23*

## 2021-06-01 NOTE — PLAN OF CARE
D: Admitted 5/27 for hypervolemia. History of CTEPH on Remodulin, RV dysfunction, HTN, breast cancer s/p chemo and left mastectomy, asthma, and scoliosis.    I: Monitored vitals and assessed pt status. Bedtime KCl held due to lab value of 5.2.  Running:  -Remodulin at 32 ng/kg/min to jade   -Bumex at 2 mg/hr to PICC  PRN:  -Tylenol 325 mg x2    A: A0x4. VSS, on 2-3 lpm nc. Sinus rhythm on monitor. Afebrile. Complains of right arm pain related to cast and mild headache, tylenol partially effective. Awaiting AM labs - on K and Mg replacement. BM x3 this shift. Voiding adequately in small amounts (~150mL). 2L FR and 2 g Na diet. +3 edema present in BLEs. Up with SBA. Appeared to sleep well between cares.      No intake/output data recorded.    Temp:  [97.2  F (36.2  C)-97.7  F (36.5  C)] 97.3  F (36.3  C)  Pulse:  [67-79] 67  Resp:  [16-20] 18  BP: (100-157)/(40-82) 151/82  SpO2:  [92 %-96 %] 95 %      P: Continue to monitor Pt status and report changes to treatment team.

## 2021-06-01 NOTE — PLAN OF CARE
D: admitted on 5/27 for pulmonary HTN and hypervolemia     I: Monitored vitals and assessed pt status.     Changed: one time dose of diuril   Running: bumex @ 2mg/hr, Remodulin @ 32ng/kg/min, iron 300mg/90min  PRN: tylenol     A: A0x4. VSS. Afebrile. Urinating adequately. Pt's pressures were appearing to be running high in the AM, but pt reported that if the cuff is not in the correct spot on left leg, BP reports falsely high. Pt struggles with incontinence while being on diuretics, but understands the need to get the fluid off. Pt is worried about soiling compression socks on lower legs so OT put on ace bandages and left an extra set in room so nursing can put on a new pair of old ones become soiled. Pt walked well with OT today, but needed to be on 5LPM while walking, which is higher than it was the last time she walked. Pt received IV iron at 1900. No reaction was noticed.     P: Continue to monitor Pt status and report changes to treatment team.

## 2021-06-02 ENCOUNTER — APPOINTMENT (OUTPATIENT)
Dept: OCCUPATIONAL THERAPY | Facility: CLINIC | Age: 81
DRG: 291 | End: 2021-06-02
Attending: INTERNAL MEDICINE
Payer: COMMERCIAL

## 2021-06-02 LAB
ANION GAP SERPL CALCULATED.3IONS-SCNC: 1 MMOL/L (ref 3–14)
ANION GAP SERPL CALCULATED.3IONS-SCNC: 2 MMOL/L (ref 3–14)
BUN SERPL-MCNC: 40 MG/DL (ref 7–30)
BUN SERPL-MCNC: 44 MG/DL (ref 7–30)
CALCIUM SERPL-MCNC: 9.1 MG/DL (ref 8.5–10.1)
CALCIUM SERPL-MCNC: 9.5 MG/DL (ref 8.5–10.1)
CHLORIDE SERPL-SCNC: 97 MMOL/L (ref 94–109)
CHLORIDE SERPL-SCNC: 97 MMOL/L (ref 94–109)
CO2 SERPL-SCNC: 39 MMOL/L (ref 20–32)
CO2 SERPL-SCNC: 41 MMOL/L (ref 20–32)
CREAT SERPL-MCNC: 1.17 MG/DL (ref 0.52–1.04)
CREAT SERPL-MCNC: 1.23 MG/DL (ref 0.52–1.04)
ERYTHROCYTE [DISTWIDTH] IN BLOOD BY AUTOMATED COUNT: 20.2 % (ref 10–15)
GFR SERPL CREATININE-BSD FRML MDRD: 42 ML/MIN/{1.73_M2}
GFR SERPL CREATININE-BSD FRML MDRD: 44 ML/MIN/{1.73_M2}
GLUCOSE SERPL-MCNC: 71 MG/DL (ref 70–99)
GLUCOSE SERPL-MCNC: 92 MG/DL (ref 70–99)
HCT VFR BLD AUTO: 28 % (ref 35–47)
HGB BLD-MCNC: 8.1 G/DL (ref 11.7–15.7)
INR PPP: 2.8 (ref 0.86–1.14)
MAGNESIUM SERPL-MCNC: 2.1 MG/DL (ref 1.6–2.3)
MAGNESIUM SERPL-MCNC: 2.1 MG/DL (ref 1.6–2.3)
MCH RBC QN AUTO: 24.9 PG (ref 26.5–33)
MCHC RBC AUTO-ENTMCNC: 28.9 G/DL (ref 31.5–36.5)
MCV RBC AUTO: 86 FL (ref 78–100)
PLATELET # BLD AUTO: 124 10E9/L (ref 150–450)
POTASSIUM SERPL-SCNC: 3.4 MMOL/L (ref 3.4–5.3)
POTASSIUM SERPL-SCNC: 4.2 MMOL/L (ref 3.4–5.3)
RBC # BLD AUTO: 3.25 10E12/L (ref 3.8–5.2)
SODIUM SERPL-SCNC: 138 MMOL/L (ref 133–144)
SODIUM SERPL-SCNC: 140 MMOL/L (ref 133–144)
WBC # BLD AUTO: 4 10E9/L (ref 4–11)

## 2021-06-02 PROCEDURE — 258N000003 HC RX IP 258 OP 636: Performed by: PHYSICIAN ASSISTANT

## 2021-06-02 PROCEDURE — 83735 ASSAY OF MAGNESIUM: CPT | Performed by: PHYSICIAN ASSISTANT

## 2021-06-02 PROCEDURE — 250N000009 HC RX 250: Performed by: PHYSICIAN ASSISTANT

## 2021-06-02 PROCEDURE — 97140 MANUAL THERAPY 1/> REGIONS: CPT | Mod: GO

## 2021-06-02 PROCEDURE — 85610 PROTHROMBIN TIME: CPT | Performed by: PHYSICIAN ASSISTANT

## 2021-06-02 PROCEDURE — 85049 AUTOMATED PLATELET COUNT: CPT | Performed by: PHYSICIAN ASSISTANT

## 2021-06-02 PROCEDURE — 36592 COLLECT BLOOD FROM PICC: CPT | Performed by: PHYSICIAN ASSISTANT

## 2021-06-02 PROCEDURE — 250N000013 HC RX MED GY IP 250 OP 250 PS 637: Performed by: INTERNAL MEDICINE

## 2021-06-02 PROCEDURE — 99232 SBSQ HOSP IP/OBS MODERATE 35: CPT | Performed by: PHYSICIAN ASSISTANT

## 2021-06-02 PROCEDURE — 80048 BASIC METABOLIC PNL TOTAL CA: CPT | Performed by: PHYSICIAN ASSISTANT

## 2021-06-02 PROCEDURE — 250N000013 HC RX MED GY IP 250 OP 250 PS 637: Performed by: PHYSICIAN ASSISTANT

## 2021-06-02 PROCEDURE — 80048 BASIC METABOLIC PNL TOTAL CA: CPT | Performed by: STUDENT IN AN ORGANIZED HEALTH CARE EDUCATION/TRAINING PROGRAM

## 2021-06-02 PROCEDURE — 85027 COMPLETE CBC AUTOMATED: CPT | Performed by: PHYSICIAN ASSISTANT

## 2021-06-02 PROCEDURE — 250N000013 HC RX MED GY IP 250 OP 250 PS 637: Performed by: STUDENT IN AN ORGANIZED HEALTH CARE EDUCATION/TRAINING PROGRAM

## 2021-06-02 PROCEDURE — 214N000001 HC R&B CCU UMMC

## 2021-06-02 PROCEDURE — 250N000011 HC RX IP 250 OP 636: Performed by: PHYSICIAN ASSISTANT

## 2021-06-02 PROCEDURE — 83735 ASSAY OF MAGNESIUM: CPT | Performed by: STUDENT IN AN ORGANIZED HEALTH CARE EDUCATION/TRAINING PROGRAM

## 2021-06-02 PROCEDURE — 36592 COLLECT BLOOD FROM PICC: CPT | Performed by: STUDENT IN AN ORGANIZED HEALTH CARE EDUCATION/TRAINING PROGRAM

## 2021-06-02 RX ORDER — NALOXONE HYDROCHLORIDE 0.4 MG/ML
0.4 INJECTION, SOLUTION INTRAMUSCULAR; INTRAVENOUS; SUBCUTANEOUS
Status: DISCONTINUED | OUTPATIENT
Start: 2021-06-02 | End: 2021-06-10 | Stop reason: HOSPADM

## 2021-06-02 RX ORDER — TRAMADOL HYDROCHLORIDE 50 MG/1
50 TABLET ORAL EVERY 6 HOURS PRN
Status: DISCONTINUED | OUTPATIENT
Start: 2021-06-02 | End: 2021-06-10 | Stop reason: HOSPADM

## 2021-06-02 RX ORDER — NALOXONE HYDROCHLORIDE 0.4 MG/ML
0.2 INJECTION, SOLUTION INTRAMUSCULAR; INTRAVENOUS; SUBCUTANEOUS
Status: DISCONTINUED | OUTPATIENT
Start: 2021-06-02 | End: 2021-06-10 | Stop reason: HOSPADM

## 2021-06-02 RX ORDER — POTASSIUM CHLORIDE 750 MG/1
20 TABLET, EXTENDED RELEASE ORAL ONCE
Status: DISCONTINUED | OUTPATIENT
Start: 2021-06-02 | End: 2021-06-03

## 2021-06-02 RX ORDER — DOBUTAMINE HCL IN DEXTROSE 5 % 500MG/250
2.5 INTRAVENOUS SOLUTION INTRAVENOUS CONTINUOUS
Status: DISCONTINUED | OUTPATIENT
Start: 2021-06-02 | End: 2021-06-08

## 2021-06-02 RX ADMIN — BUMETANIDE 2 MG/HR: 0.25 INJECTION INTRAMUSCULAR; INTRAVENOUS at 08:19

## 2021-06-02 RX ADMIN — GABAPENTIN 300 MG: 300 CAPSULE ORAL at 14:34

## 2021-06-02 RX ADMIN — HYDROXYCHLOROQUINE SULFATE 200 MG: 200 TABLET, FILM COATED ORAL at 08:20

## 2021-06-02 RX ADMIN — IPRATROPIUM BROMIDE 2 SPRAY: 42 SPRAY, METERED NASAL at 20:06

## 2021-06-02 RX ADMIN — GABAPENTIN 300 MG: 300 CAPSULE ORAL at 08:20

## 2021-06-02 RX ADMIN — Medication 1 TABLET: at 20:09

## 2021-06-02 RX ADMIN — BUMETANIDE 2 MG/HR: 0.25 INJECTION INTRAMUSCULAR; INTRAVENOUS at 10:35

## 2021-06-02 RX ADMIN — ASPIRIN 81 MG CHEWABLE TABLET 81 MG: 81 TABLET CHEWABLE at 08:20

## 2021-06-02 RX ADMIN — MACITENTAN 10 MG: 10 TABLET, FILM COATED ORAL at 18:38

## 2021-06-02 RX ADMIN — BUMETANIDE 2 MG/HR: 0.25 INJECTION INTRAMUSCULAR; INTRAVENOUS at 22:09

## 2021-06-02 RX ADMIN — DOBUTAMINE 2.5 MCG/KG/MIN: 12.5 INJECTION, SOLUTION INTRAVENOUS at 12:44

## 2021-06-02 RX ADMIN — OXYCODONE HYDROCHLORIDE AND ACETAMINOPHEN 500 MG: 500 TABLET ORAL at 08:20

## 2021-06-02 RX ADMIN — POTASSIUM CHLORIDE 60 MEQ: 750 TABLET, EXTENDED RELEASE ORAL at 08:19

## 2021-06-02 RX ADMIN — IPRATROPIUM BROMIDE 2 SPRAY: 42 SPRAY, METERED NASAL at 08:22

## 2021-06-02 RX ADMIN — DIGOXIN 62.5 MCG: 0.06 TABLET ORAL at 06:25

## 2021-06-02 RX ADMIN — GABAPENTIN 300 MG: 300 CAPSULE ORAL at 20:09

## 2021-06-02 RX ADMIN — ACETAMINOPHEN 650 MG: 325 TABLET, FILM COATED ORAL at 13:10

## 2021-06-02 RX ADMIN — Medication 1 TABLET: at 08:20

## 2021-06-02 RX ADMIN — TRAMADOL HYDROCHLORIDE 50 MG: 50 TABLET ORAL at 13:10

## 2021-06-02 RX ADMIN — KETOTIFEN FUMARATE 1 DROP: 0.35 SOLUTION/ DROPS OPHTHALMIC at 08:22

## 2021-06-02 RX ADMIN — Medication 1.5 MG: at 18:39

## 2021-06-02 RX ADMIN — POTASSIUM CHLORIDE 60 MEQ: 750 TABLET, EXTENDED RELEASE ORAL at 14:34

## 2021-06-02 RX ADMIN — KETOTIFEN FUMARATE 1 DROP: 0.35 SOLUTION/ DROPS OPHTHALMIC at 20:06

## 2021-06-02 RX ADMIN — POTASSIUM CHLORIDE 60 MEQ: 750 TABLET, EXTENDED RELEASE ORAL at 20:10

## 2021-06-02 ASSESSMENT — ACTIVITIES OF DAILY LIVING (ADL)
ADLS_ACUITY_SCORE: 16

## 2021-06-02 NOTE — PROGRESS NOTES
Care Management Follow Up    Length of Stay (days): 6    Expected Discharge Date: 06/05/21  Concerns to be Addressed:  Discharge planning.    Patient plan of care discussed at interdisciplinary rounds: Yes    Anticipated Discharge Disposition:  Home  Anticipated Discharge Services:    [facility] ph: [phone] fx: [phone] (remodulin - resumed)  [facility] (may need new IV dobutamine - referral sent + with request for HH OT)  Anticipated Discharge DME:  Possible O2 needed at discharge.     Patient/family educated on Medicare website which has current facility and service quality ratings:  Per [facility]  Education Provided on the Discharge Plan:  Per [facility]  Patient/Family in Agreement with the Plan:  Per [facility]  Referrals Placed by CM/SW:  [facility]  Private pay costs discussed: Per [facility]      Additional Information:  Per team they will start IV dobutamine today, they are uncertain if this will be needed at discharge. Referral made to [facility] who will complete benefit check and discuss choice with patient. Per [facility], they will first check to see if [facility] provides this type of infusion, if not [facility] will complete check.   Addendum: per [facility] , [facility] does not provide inotrope therapy. We will check benefits and follow up.        Tania Arora, RN, [place]  Float Care Coordinator  Covering 6C [facility]   Pager: [phone]

## 2021-06-02 NOTE — PROGRESS NOTES
SPIRITUAL HEALTH SERVICES  SPIRITUAL ASSESSMENT Progress Note  [facility] [facility] 6C     Pt quite tired but welcomed prayer. We talked about spiritual healing and physical healing and how spiritual healing is always possible even when physical healing might be a challenge.  PLAN: continue to follow, offering appropriate spiritual support.    Daniel Harmon) Satinder Castro M.Div., [facility]  Staff   Pager [phone]      * [facility] remains available 24/7 for emergent requests/referrals, either by having the switchboard page the on-call  or by entering an ASAP/STAT consult in Epic (this will also page the on-call ). Routine Epic consults receive an initial response within 24 hours.*

## 2021-06-02 NOTE — PLAN OF CARE
AVSS.  A&OX4.  Pt up all night from bed to commode(consider Purwik).  Stable, pleasant, without distress.  Bumex gtt continues at 2mg/hr.  Remodulin at 32ng/kg/min.  Last K+ 5.2.  Evening dose KCl held.  +edema.  Bilat lymph wraps on.  Legs elevated while in bed.  R arm cast.  CMS intact.  Continue current POC.  Follow labs.  Notify Cards 2 with any change in status.

## 2021-06-02 NOTE — PROGRESS NOTES
[facility]   Cardiology II Service / Advanced Heart Failure  Daily Progress Note        Patient: Karen Anderson  MRN: [id number]  Admission Date: 5/27/2021  Hospital Day # 6    Assessment and Plan:  Ms. Karen Anderson is a 80yr old female with a history of chronic thromboembolic pulmonary hypertension (on IV treprostinil and macitentan), severe RV dysfunction, RA, systemic HTN, breast cancer (s/p chemotherapy with Adriamycin, Cytoxan, Taxol, and tamoxifen, and s/p left mastectomy), asthma, and scoliosis who presents with hypervolemia.    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  Given her proximal disease and high pressures, balloon angioplasty would also be high- risk.  She underwent balloon pulmonary angioplasty January 2021, which was complicated by hemoptysis.  No plans to repeat this here, but [facility] willing to consider in the future.  Currently being managed on medical therapy by Dr. Turcios as an outpatient.    She presented with increasing SOB, swelling, and weight gain (dry weight ~112 lbs).  She was 135 lbs on 5/24, and was advised to take a metolazone.  She presents now for IV diuresis and potentially increase in remodulin therapy.  Weight on admission was 122#, up to 128#, now down to 123#.    - continue bumex gtt @ 2mg/hr   - starting dobutamine @2.5 today  - continue to trend BMP, Mg q12 hours, replete K>4 and Mg >2, will increase scheduled K to 60 TID  - Increased remodulin to 32ng/kg/min on 6/1  - Contiue PTA Opsumit 10mg q48h (recently reduced d/t concerns for fluid retention)   - Continue Warfarin,  with goal INR 2.0-2.5.  INR today 2.80.  - Continue digoxin 62.5mcg daily  - O2 via NC if sat <92%  - 2L fluid restriction  - Strict ins and outs  - Daily weights  - Salt restriction    Normocytic anemia, with elevated RDW  Has had low iron saturation in the past.  Normal ferritin and transferrin (5/29).  Has not tolerated po iron (abdominal upset).  Hemoglobin down to 7s from baseline ~9s. Received 1 UPRBC on 5/31. Epo levels  - F/uepo level and soluble transferrin receptor (both pending today)--> will consider IV iron repletion pending results  - Trend CBC    CKD  Baseline cr 1.2-1.5, has been stable with diuresis.  - Trend BMP, Mg BID, lyte replacement as above     Asthma  Allergic rhinitis  No acute concerns.  - Continue beclomethasone BID and albuterol inhaler PRN  - Continue cetirizine and ipratropium nasal spray     RA  No acute concerns.  - Continue Hydroxychloroquine 200mg daily      Distal fracture of her right radius and ulna  - OT while inpatient  - Continue PTA alendronate 80mg every Monday (will need to use home supply)    FEN:  2g NA restriction, 2L fluid restriction  PROPHY:  warfarin, ambulate  LINES:  2 lumen PICC, single lumen line  DISPO:  Pending diuresis  CODE STATUS:  Full code    Patient discussed with Dr. Marlow.     Deborah Chaudhry PAALEXX  [facility]    =============================================================    Interval History/ROS:   Ms. Anderson states that she feels ok today.  Very fatigued as she feels that she was up all night urinating. Headache ongoing, but not any worse today.  Not sure if it is getting better, although she notes that it is better than uribe we have titrated remodulin in the past. No fever/chills, chest pain, SOB, abd pain. Continues to have multiple small bowel movements. No dysuria. Swelling in legs is unchanged. She is achy all over.    Last 24 hr care team notes reviewed.   ROS:  4 point ROS including Respiratory, CV, GI and , other than that noted  in the HPI, is negative.     Medications: Reviewed in EPIC.     Physical Exam:   /49 (BP Location: Left leg)   Pulse 79   Temp 98.4  F (36.9  C) (Oral)   Resp 16   Wt 56 kg (123 lb 6.4 oz)   SpO2 93%   BMI 24.92 kg/m    GENERAL: Appears alert and interacting appropriatly.   HEENT: Eye symmetrical and free of discharge bilaterally. Mucous membranes moist and without lesions.  NECK: Supple and without lymphadenopathy. JVD appears above mid neck when sitting upright.   CV: RRR, S1S2 present without murmur, rub, or gallop.   RESPIRATORY: Respirations regular, even, and unlabored. Lungs CTA throughout.   GI: Soft and non distended with normoactive bowel sounds present in all quadrants. No tenderness, rebound, guarding. No organomegaly.   EXTREMITIES: Taut bilateral LE edema, LE wraps in place. 2+ bilateral pedal pulses.   NEUROLOGIC: Alert and interacting appropriately.. No focal deficits.   MUSCULOSKELETAL: No joint swelling or tenderness.   SKIN: No jaundice. No rashes or lesions.     Data:  CMP  Recent Labs   Lab 06/02/21  0559 06/01/21  1730 06/01/21  0603 05/31/21  1835 05/29/21  0625 05/29/21  0625 05/28/21  0641 05/28/21  0641 05/27/21 2018 05/27/21 2018    135 137 137   < > 138   < > 137   < > 139   POTASSIUM 3.4 5.2 4.1 5.2   < > 3.5   < > 4.6   < > 3.0*   CHLORIDE 97 96 97 97   < > 97   < > 101   < > 97   CO2 39* 38* 40* 40*   < > 38*   < > 32   < > 35*   ANIONGAP 2* <1* <1* 1*   < > 4   < > 4   < > 6   GLC 71 93 84 169*   < > 75   < > 128*   < > 180*   BUN 44* 46* 44* 47*   < > 50*   < > 56*   < > 53*   CR 1.23* 1.30* 1.20* 1.17*   < > 1.30*   < > 1.39*   < > 1.32*   GFRESTIMATED 42* 39* 43* 44*   < > 39*   < > 36*   < > 38*   GFRESTBLACK 48* 45* 50* 51*   < > 45*   < > 42*   < > 44*   ESTEFANÍA 9.5 9.4 9.6 9.0   < > 10.0   < > 9.8   < > 9.7   MAG 2.1 2.0 2.0 2.0   < > 2.0  --   --    < > 2.2   PROTTOTAL  --   --   --   --   --  7.7  --  7.0  --  7.9   ALBUMIN  --   --   --   --   --  3.0*  --   2.7*  --  3.1*   BILITOTAL  --   --   --   --   --  0.4  --  0.3  --  0.5   ALKPHOS  --   --   --   --   --  163*  --  156*  --  166*   AST  --   --   --   --   --  39  --  36  --  38   ALT  --   --   --   --   --  28  --  25  --  29    < > = values in this interval not displayed.     CBC  Recent Labs   Lab 06/02/21 0559 06/01/21 0603 05/31/21 0706 05/30/21  0551   WBC 4.0 3.3* 3.1* 4.0   RBC 3.25* 3.29* 2.94* 2.94*   HGB 8.1* 8.1* 6.9* 7.0*   HCT 28.0* 28.6* 24.6* 24.6*   MCV 86 87 84 84   MCH 24.9* 24.6* 23.5* 23.8*   MCHC 28.9* 28.3* 28.0* 28.5*   RDW 20.2* 19.8* 20.4* 20.0*   * 123* 144* 147*     INR  Recent Labs   Lab 06/02/21 0559 06/01/21 0603 05/31/21 0706 05/30/21  0551   INR 2.80* 2.66* 2.51* 2.62*

## 2021-06-02 NOTE — PROGRESS NOTES
"CLINICAL NUTRITION SERVICES - ASSESSMENT NOTE     Nutrition Prescription    RECOMMENDATIONS FOR MDs/PROVIDERS TO ORDER:  None at this time.     Malnutrition Status:    Unable to determine due to pt was busy with RN and other staff members during attempts. Minimally, at risk for malnutrition.     Recommendations already ordered by Registered Dietitian (RD):  Oral supplements prn      Future/Additional Recommendations:  1. Rec continue current diet, as ordered. Continue fluid restriction as per team. Rec small, frequent meals and avoid foods/beverages that may worsen loose stools.    2. Order a multivitamin with minerals, if inadequate oral intake, to help meet micronutrient needs.   3. Monitor K+ trends and potential need for a 3 g potassium diet.    4. Change scheduled miralax to prn (RNs have been holding).   5. Consider thiamine.      REASON FOR ASSESSMENT  Karen Anderson is a/an 80 year old female assessed by the dietitian for LOS    NUTRITION/ADDITIONAL HISTORY  Per RD note 1/6/21: \"Per discussion with pt, admits to having early satiety so she was eating ok but possibly a little less than normal. Follows a low-sodium diet at home and also watches her intake of fluids. States she dislikes Mrs Conde. Explained diet restrictions (currently on a sodium restriction but no fluid restriction). Pt states despite not having a fluid restriction, she is watching her fluid intake. Offered nutrition education on low-sodium diet and fluid restriction but pt declined need due to receiving education and following diet previously. Gave room service menu for sodium. Rec small, frequent meals. Suggested her family may drop off foods she likes at the front entrance, if needed. Per nutrition note later during admission, \"She is ordering chocolate Boost Plus at times, most often for lunch. She is ordering two to three meals daily.\"   Per H & P, \"Chronic thromboembolic pulmonary hypertension, RA, systemic HTN, breast cancer, " "asthma, scoliosis, breast cancer s/p chemotherapy with Adriamycin, Cytoxan, Taxol, and tamoxifen and status post left mastectomy who presents with hypervolemia.. Significant LE edema. R leg swollen more than L, which is always true for her. Having fluid in her legs and stomach. Denies nausea, vomiting, diarrhea, and abdominal pain. Bowel movements are regular.\" Pt was ordered to take, noting, vitamin C, clear fiber powder, remodulin, fosamax, bumex, cacium/vitamin D, bumex, and potassium PTA.   Attempts x3 to see today. Pt was busy with RN and other staff members during attempts.     CURRENT NUTRITION ORDERS  Diet: 2 g Sodium and 2000 mL Fluid Restriction  Intake/Tolerance: Good diet tolerance. Per nursing flowsheets (% intake), pt consuming 100% of meals consistently with a fair to good appetite with the exception of 50% of a meal on 6/2 with a fair appetite.    LABS  Labs reviewed    MEDICATIONS  Medications reviewed    ANTHROPOMETRICS  Height: 149.9 cm (4' 11\")    Most Recent Weight: 56 kg (123 lb 6.4 oz)  IBW: 44.3 kg  BMI: Normal BMI  Wt hx: 54.6 kg (12/9/19), 53.8 kg (3/20/20), 53.3 kg (11/9/20), 57.5 kg (12/23/20), 55 kg (12/30/20), 53.4 kg (1/6/21), 49.9 kg (1/12/21), 55.5 kg (5/27/21, admit), 56 kg (6/2) - Suspect current wt is affected by fluid status. Difficult to assess any actual wt loss due to fluid status  Dosing Weight: 47 kg (adjusted, based on lowest wt so far this admission of 55.3 kg on 6/1)    ASSESSED NUTRITION NEEDS (for inpatient hospital stay)  Estimated Energy Needs: 2607-5544 kcals/day (25 - 30 kcals/kg)  Justification: Maintenance needs  Estimated Protein Needs: 52-66 grams protein/day (1.1 - 1.4 grams of pro/kg)  Justification: Increased needs with cardiac status  Estimated Fluid Needs: 9339-6371 mL/day (1 mL/kcal)   Justification: Maintenance needs or per team, pending fluid status      PHYSICAL FINDINGS/OTHER FINDINGS  See malnutrition section below.  Resp: On 3 L nasal cannula  GI: " Formed and brown stool. Last stool on 6/2. Per team, stomach upset after taking iron.     MALNUTRITION  % Intake: Not meeting this criteria.     % Weight Loss: Difficult to assess any actual wt loss due to fluid status  Subcutaneous Fat Loss: Pt was busy with RN or other staff members during attempts.   Muscle Loss: Pt was busy with RN or other staff members during attempts. Age-related muscle loss per visualization in 1/2021.   Fluid Accumulation/Edema: Mild-Moderate  Malnutrition Diagnosis: Unable to determine due to pt was busy with RN or other staff members during attempts. Minimally, at risk for malnutrition.     NUTRITION DIAGNOSIS   Predicted inadequate nutrient intake (protein-energy) related to diet order and GI sx, including loose stools.     INTERVENTIONS  Implementation  Nutrition Education, Modify composition of meals/snacks: Left sodium room service menu in pt's room.   Medical food supplement therapy: Placed prn snack/supplement order. Pt may request snacks/oral supplements prn.     Goals  Patient to consume % of nutritionally adequate meal trays TID, or the equivalent with supplements/snacks.     Monitoring/Evaluation  Progress toward goals will be monitored and evaluated per protocol.       Nutrition will continue to follow.     Mirta Stinson, MS, RD, LD, [facility]   6C Pgr: [phone]

## 2021-06-03 ENCOUNTER — APPOINTMENT (OUTPATIENT)
Dept: OCCUPATIONAL THERAPY | Facility: CLINIC | Age: 81
DRG: 291 | End: 2021-06-03
Attending: INTERNAL MEDICINE
Payer: COMMERCIAL

## 2021-06-03 ENCOUNTER — APPOINTMENT (OUTPATIENT)
Dept: CARDIOLOGY | Facility: CLINIC | Age: 81
DRG: 291 | End: 2021-06-03
Attending: PHYSICIAN ASSISTANT
Payer: COMMERCIAL

## 2021-06-03 ENCOUNTER — HOME INFUSION (PRE-WILLOW HOME INFUSION) (OUTPATIENT)
Dept: PHARMACY | Facility: CLINIC | Age: 81
End: 2021-06-03

## 2021-06-03 LAB
ANION GAP SERPL CALCULATED.3IONS-SCNC: 3 MMOL/L (ref 3–14)
ANION GAP SERPL CALCULATED.3IONS-SCNC: <1 MMOL/L (ref 3–14)
BUN SERPL-MCNC: 36 MG/DL (ref 7–30)
BUN SERPL-MCNC: 36 MG/DL (ref 7–30)
CALCIUM SERPL-MCNC: 9.4 MG/DL (ref 8.5–10.1)
CALCIUM SERPL-MCNC: 9.6 MG/DL (ref 8.5–10.1)
CHLORIDE SERPL-SCNC: 97 MMOL/L (ref 94–109)
CHLORIDE SERPL-SCNC: 97 MMOL/L (ref 94–109)
CO2 SERPL-SCNC: 39 MMOL/L (ref 20–32)
CO2 SERPL-SCNC: 42 MMOL/L (ref 20–32)
CREAT SERPL-MCNC: 1.11 MG/DL (ref 0.52–1.04)
CREAT SERPL-MCNC: 1.15 MG/DL (ref 0.52–1.04)
ERYTHROCYTE [DISTWIDTH] IN BLOOD BY AUTOMATED COUNT: 20.9 % (ref 10–15)
GFR SERPL CREATININE-BSD FRML MDRD: 45 ML/MIN/{1.73_M2}
GFR SERPL CREATININE-BSD FRML MDRD: 47 ML/MIN/{1.73_M2}
GLUCOSE SERPL-MCNC: 70 MG/DL (ref 70–99)
GLUCOSE SERPL-MCNC: 96 MG/DL (ref 70–99)
HCT VFR BLD AUTO: 28.6 % (ref 35–47)
HGB BLD-MCNC: 8.1 G/DL (ref 11.7–15.7)
INR PPP: 2.62 (ref 0.86–1.14)
MAGNESIUM SERPL-MCNC: 2 MG/DL (ref 1.6–2.3)
MAGNESIUM SERPL-MCNC: 2.1 MG/DL (ref 1.6–2.3)
MCH RBC QN AUTO: 24.9 PG (ref 26.5–33)
MCHC RBC AUTO-ENTMCNC: 28.3 G/DL (ref 31.5–36.5)
MCV RBC AUTO: 88 FL (ref 78–100)
PLATELET # BLD AUTO: 151 10E9/L (ref 150–450)
POTASSIUM SERPL-SCNC: 4.2 MMOL/L (ref 3.4–5.3)
POTASSIUM SERPL-SCNC: 4.6 MMOL/L (ref 3.4–5.3)
RBC # BLD AUTO: 3.25 10E12/L (ref 3.8–5.2)
SODIUM SERPL-SCNC: 138 MMOL/L (ref 133–144)
SODIUM SERPL-SCNC: 139 MMOL/L (ref 133–144)
WBC # BLD AUTO: 3.9 10E9/L (ref 4–11)

## 2021-06-03 PROCEDURE — 85610 PROTHROMBIN TIME: CPT | Performed by: PHYSICIAN ASSISTANT

## 2021-06-03 PROCEDURE — 99232 SBSQ HOSP IP/OBS MODERATE 35: CPT | Mod: 25 | Performed by: PHYSICIAN ASSISTANT

## 2021-06-03 PROCEDURE — 93308 TTE F-UP OR LMTD: CPT | Mod: 26 | Performed by: INTERNAL MEDICINE

## 2021-06-03 PROCEDURE — 83735 ASSAY OF MAGNESIUM: CPT | Performed by: PHYSICIAN ASSISTANT

## 2021-06-03 PROCEDURE — 272N000004 HC RX 272: Performed by: STUDENT IN AN ORGANIZED HEALTH CARE EDUCATION/TRAINING PROGRAM

## 2021-06-03 PROCEDURE — 250N000013 HC RX MED GY IP 250 OP 250 PS 637: Performed by: STUDENT IN AN ORGANIZED HEALTH CARE EDUCATION/TRAINING PROGRAM

## 2021-06-03 PROCEDURE — 80048 BASIC METABOLIC PNL TOTAL CA: CPT | Performed by: PHYSICIAN ASSISTANT

## 2021-06-03 PROCEDURE — 93321 DOPPLER ECHO F-UP/LMTD STD: CPT | Mod: 26 | Performed by: INTERNAL MEDICINE

## 2021-06-03 PROCEDURE — 93321 DOPPLER ECHO F-UP/LMTD STD: CPT

## 2021-06-03 PROCEDURE — 85027 COMPLETE CBC AUTOMATED: CPT | Performed by: PHYSICIAN ASSISTANT

## 2021-06-03 PROCEDURE — 250N000013 HC RX MED GY IP 250 OP 250 PS 637: Performed by: PHYSICIAN ASSISTANT

## 2021-06-03 PROCEDURE — 250N000013 HC RX MED GY IP 250 OP 250 PS 637: Performed by: INTERNAL MEDICINE

## 2021-06-03 PROCEDURE — 93325 DOPPLER ECHO COLOR FLOW MAPG: CPT | Mod: 26 | Performed by: INTERNAL MEDICINE

## 2021-06-03 PROCEDURE — 214N000001 HC R&B CCU UMMC

## 2021-06-03 PROCEDURE — 250N000011 HC RX IP 250 OP 636: Performed by: STUDENT IN AN ORGANIZED HEALTH CARE EDUCATION/TRAINING PROGRAM

## 2021-06-03 PROCEDURE — 80048 BASIC METABOLIC PNL TOTAL CA: CPT | Performed by: STUDENT IN AN ORGANIZED HEALTH CARE EDUCATION/TRAINING PROGRAM

## 2021-06-03 PROCEDURE — 97140 MANUAL THERAPY 1/> REGIONS: CPT | Mod: GO

## 2021-06-03 PROCEDURE — 250N000009 HC RX 250: Performed by: PHYSICIAN ASSISTANT

## 2021-06-03 PROCEDURE — 83735 ASSAY OF MAGNESIUM: CPT | Performed by: STUDENT IN AN ORGANIZED HEALTH CARE EDUCATION/TRAINING PROGRAM

## 2021-06-03 PROCEDURE — 97530 THERAPEUTIC ACTIVITIES: CPT | Mod: GO

## 2021-06-03 RX ORDER — WARFARIN SODIUM 1 MG/1
2 TABLET ORAL
Status: COMPLETED | OUTPATIENT
Start: 2021-06-03 | End: 2021-06-03

## 2021-06-03 RX ADMIN — ACETAMINOPHEN 650 MG: 325 TABLET, FILM COATED ORAL at 00:20

## 2021-06-03 RX ADMIN — BUMETANIDE 2 MG/HR: 0.25 INJECTION INTRAMUSCULAR; INTRAVENOUS at 08:30

## 2021-06-03 RX ADMIN — GABAPENTIN 300 MG: 300 CAPSULE ORAL at 14:54

## 2021-06-03 RX ADMIN — POTASSIUM CHLORIDE 60 MEQ: 750 TABLET, EXTENDED RELEASE ORAL at 08:26

## 2021-06-03 RX ADMIN — ASPIRIN 81 MG CHEWABLE TABLET 81 MG: 81 TABLET CHEWABLE at 08:26

## 2021-06-03 RX ADMIN — POTASSIUM CHLORIDE 60 MEQ: 750 TABLET, EXTENDED RELEASE ORAL at 20:15

## 2021-06-03 RX ADMIN — BUMETANIDE 2 MG/HR: 0.25 INJECTION INTRAMUSCULAR; INTRAVENOUS at 20:00

## 2021-06-03 RX ADMIN — DIGOXIN 62.5 MCG: 0.06 TABLET ORAL at 06:00

## 2021-06-03 RX ADMIN — Medication 1 TABLET: at 08:26

## 2021-06-03 RX ADMIN — GABAPENTIN 300 MG: 300 CAPSULE ORAL at 08:25

## 2021-06-03 RX ADMIN — ACETAMINOPHEN 325 MG: 325 TABLET, FILM COATED ORAL at 23:05

## 2021-06-03 RX ADMIN — TREPROSTINIL 32 NG/KG/MIN: 20 INJECTION, SOLUTION INTRAVENOUS; SUBCUTANEOUS at 00:30

## 2021-06-03 RX ADMIN — HYDROXYCHLOROQUINE SULFATE 200 MG: 200 TABLET, FILM COATED ORAL at 08:25

## 2021-06-03 RX ADMIN — IPRATROPIUM BROMIDE 2 SPRAY: 42 SPRAY, METERED NASAL at 08:25

## 2021-06-03 RX ADMIN — KETOTIFEN FUMARATE 1 DROP: 0.35 SOLUTION/ DROPS OPHTHALMIC at 08:25

## 2021-06-03 RX ADMIN — Medication 1 TABLET: at 20:15

## 2021-06-03 RX ADMIN — OXYCODONE HYDROCHLORIDE AND ACETAMINOPHEN 500 MG: 500 TABLET ORAL at 08:26

## 2021-06-03 RX ADMIN — GABAPENTIN 300 MG: 300 CAPSULE ORAL at 20:15

## 2021-06-03 RX ADMIN — POTASSIUM CHLORIDE 60 MEQ: 750 TABLET, EXTENDED RELEASE ORAL at 14:54

## 2021-06-03 RX ADMIN — WARFARIN SODIUM 2 MG: 1 TABLET ORAL at 18:52

## 2021-06-03 ASSESSMENT — ACTIVITIES OF DAILY LIVING (ADL)
ADLS_ACUITY_SCORE: 16

## 2021-06-03 ASSESSMENT — MIFFLIN-ST. JEOR: SCORE: 941.29

## 2021-06-03 NOTE — PLAN OF CARE
D: Admitted 5/27 with increased SOB, edema, weight gain, hypervolemic.  PMHx: Chronic thromboembolic pulmonary HTN, sever RV dysfunction, systemic HTN, breast cancer (s/p chemotherapy and left mastectomy), asthma, and scoliosis    I: Monitored vitals and assessed pt status.   Running:   Dobutamine  @ 2.1mL/hr via R PICC  Bumex @ 0.2mg/mL  via R PICC  Romodulin @ 0.32ng/kg/min via Goldberg  PRN:   Tylenol 650mg x1   Tramadol 50mg x1    A: A0x4. VSS. Afebrile. On 3-4L via NC. Urinating adequately. LBM 6/2. SBA. Pt up from bed to commode often (discussed Purewik for overnight use). Bps on left leg. Has bilateral ACE wraps on lower extremities. R PICC double lumen both lumens are infusing. Goldberg on R chest infusing. 2L FR. 2g Na+ diet. R arm has a cast. Started dobutamine infusion today. Has generalized pain, predominately on R side gave PRN tylenol and tramadol. Pt has edema bilaterally in legs, ankles, and feet. Pt also has edema in R hand.    P: Continue to monitor Pt status and report changes to Cards 2 team.

## 2021-06-03 NOTE — PLAN OF CARE
D: Patient admitted 5/27 w/hypervolemia. Hx. chronic thromboembolic pulmonary hypertension, severe RV dysfunction, RA, systemic HTN, breast cancer (s/p chemotherapy and s/p left mastectomy), asthma, and scoliosis.  I/A: A&Ox4. VSSA on 4L NC. RIVERA. SR 70s-90s. C/o generalized aching/soreness partially relieved by prn tylenol. Remodulin infusing at 32 ng/kg/min. Dobutamine infusing at 2.5 mcg/kg/min. Bumex infusing at 2 mg/hr. Adequate uop. *trialed purewick overnight and patient prefers up to the commode. SBA. Appeared to rest comfortably overnight.   P: Continue to monitor and notify Cards 2 with questions/concerns.

## 2021-06-03 NOTE — PLAN OF CARE
Edema/6C: Continuing to use ace bandages vs lymphedema wraps (GCBs) at this time d/t frequent soiling from urgency/incontinence. Patient would highly benefit from use of GCBs if soiling improves - will continue to follow.     If ace bandages become soiled, RN staff to please assist with changing (patient care order in place). Extra set in patient's room. Patient only tolerates light spiral wrap at this time.

## 2021-06-03 NOTE — PROGRESS NOTES
[facility]   Cardiology II Service / Advanced Heart Failure  Daily Progress Note        Patient: Karen Anderson  MRN: [id number]  Admission Date: 5/27/2021  Hospital Day # 7    Assessment and Plan:  Ms. Karen Anderson is a 80yr old female with a history of chronic thromboembolic pulmonary hypertension (on IV treprostinil and macitentan), severe RV dysfunction, RA, systemic HTN, breast cancer (s/p chemotherapy with Adriamycin, Cytoxan, Taxol, and tamoxifen, and s/p left mastectomy), asthma, and scoliosis who presents with hypervolemia.    PLAN:  - Continue dobutamine at 2.5  - Increased remodulin to 32 ng/kg/min on 6/1  - Continue Bumex drip at 2 mg/hr  - BID labs while diuresing    Chronic thromboembolic pulmonary hypertension (WHO Group IV)  Severe RV dysfunction  Acute on chronic RV failure exacerbation   Hypokalemia  She has been evaluated for high-risk pulmonary thromboendarterectomy but deemed to be too high-risk at [facility].  Given her proximal disease and high pressures, balloon angioplasty would also be high- risk.  She underwent balloon pulmonary angioplasty January 2021, which was complicated by hemoptysis.  No plans to repeat this here, but [facility] willing to consider in the future.  Currently being managed on medical therapy by Dr. Turcios as an outpatient.    She presented with increasing SOB, swelling, and weight gain (dry weight ~112 lbs).  She was 135 lbs on 5/24, and was advised to take a metolazone.  She presents now for IV diuresis and potentially increase in remodulin therapy.  Weight on admission was 122#, up to 128#, now down to 123#.    - Continue bumex gtt @ 2mg/hr   - Continue dobutamine @2.5 today, will discuss increase with staff  - Continue to trend BMP, Mg q12 hours, replete K>4 and Mg >2, will increase scheduled K to 60 TID  - Increased remodulin to 32ng/kg/min on 6/1  - Contiue PTA Opsumit 10mg q48h (recently reduced d/t concerns for fluid retention)   - Continue  Warfarin, with goal INR 2.0-2.5.  INR today 2.60.  - Continue digoxin 62.5mcg daily  - O2 via NC if sat <92%  - 2L fluid restriction  - Strict ins and outs  - Daily weights  - Salt restriction    Normocytic anemia, with elevated RDW  Has had low iron saturation in the past.  Normal ferritin and transferrin (5/29).  Has not tolerated po iron (abdominal upset).  Hemoglobin down to 7s from baseline ~9s. Received 1 UPRBC on 5/31. Epo levels are elevated near appropriate levels, soluble transferrin is also elevated, all consisted with combined anemia of chronic disease and iron deficiency.   - Trend CBC  - IV Iron, venofer 300 mg q72 hours for three doses, s/p 1/3    CKD  Baseline cr 1.2-1.5, has been stable with diuresis.  - Trend BMP, Mg BID, lyte replacement as above     Asthma  Allergic rhinitis  No acute concerns.  - Continue beclomethasone BID and albuterol inhaler PRN  - Continue cetirizine and ipratropium nasal spray     RA  No acute concerns.  - Continue Hydroxychloroquine 200mg daily      Distal fracture of her right radius and ulna  - OT while inpatient  - Continue PTA alendronate 80mg every Monday (will need to use home supply)    FEN:  2g NA restriction, 2L fluid restriction  PROPHY:  warfarin, ambulate  LINES:  2 lumen PICC, single lumen line  DISPO:  Pending diuresis  CODE STATUS:  Full code    Patient discussed with Dr. Marlow.     Deborah Chaudhry PA-C  [facility] Cardiology    =============================================================    Interval History/ROS:   Ms. Anderson states that she feels ok today. External catheter did not work well for her. Headache better today.  No fever/chills, chest pain, SOB, abd pain. Continues to have multiple small bowel movements. No dysuria. Swelling in legs is unchanged. She is achy all over.    Last 24 hr care team notes reviewed.   ROS:  4 point ROS including Respiratory, CV, GI and , other than that noted in the HPI, is negative.     Medications: Reviewed in  "EPIC.     Physical Exam:   /53 (BP Location: Left leg)   Pulse 86   Temp 97.7  F (36.5  C) (Oral)   Resp 18   Ht 1.499 m (4' 11\")   Wt 55.6 kg (122 lb 8 oz)   SpO2 93%   BMI 24.74 kg/m    GENERAL: Appears alert and interacting appropriatly.   HEENT: Eye symmetrical and free of discharge bilaterally. Mucous membranes moist and without lesions.  NECK: Supple and without lymphadenopathy. JVD appears above mid neck when sitting upright.   CV: RRR, S1S2 present without murmur, rub, or gallop.   RESPIRATORY: Respirations regular, even, and unlabored. Lungs CTA throughout.   GI: Soft and non distended with normoactive bowel sounds present in all quadrants. No tenderness, rebound, guarding. No organomegaly.   EXTREMITIES: Taut bilateral LE edema, unchanged from yesterday's exam.  LE wraps in place. 2+ bilateral pedal pulses.   NEUROLOGIC: Alert and interacting appropriately.. No focal deficits.   MUSCULOSKELETAL: No joint swelling or tenderness.   SKIN: No jaundice. No rashes or lesions.     Data:  CMP  Recent Labs   Lab 06/03/21  0540 06/02/21  1715 06/02/21  0559 06/01/21  1730 05/29/21  0625 05/29/21  0625 05/28/21  0641 05/28/21  0641 05/27/21 2018 05/27/21 2018    140 138 135   < > 138   < > 137   < > 139   POTASSIUM 4.2 4.2 3.4 5.2   < > 3.5   < > 4.6   < > 3.0*   CHLORIDE 97 97 97 96   < > 97   < > 101   < > 97   CO2 39* 41* 39* 38*   < > 38*   < > 32   < > 35*   ANIONGAP 3 1* 2* <1*   < > 4   < > 4   < > 6   GLC 70 92 71 93   < > 75   < > 128*   < > 180*   BUN 36* 40* 44* 46*   < > 50*   < > 56*   < > 53*   CR 1.15* 1.17* 1.23* 1.30*   < > 1.30*   < > 1.39*   < > 1.32*   GFRESTIMATED 45* 44* 42* 39*   < > 39*   < > 36*   < > 38*   GFRESTBLACK 52* 51* 48* 45*   < > 45*   < > 42*   < > 44*   ESTEFANÍA 9.6 9.1 9.5 9.4   < > 10.0   < > 9.8   < > 9.7   MAG 2.0 2.1 2.1 2.0   < > 2.0  --   --    < > 2.2   PROTTOTAL  --   --   --   --   --  7.7  --  7.0  --  7.9   ALBUMIN  --   --   --   --   --  3.0*  --  " 2.7*  --  3.1*   BILITOTAL  --   --   --   --   --  0.4  --  0.3  --  0.5   ALKPHOS  --   --   --   --   --  163*  --  156*  --  166*   AST  --   --   --   --   --  39  --  36  --  38   ALT  --   --   --   --   --  28  --  25  --  29    < > = values in this interval not displayed.     CBC  Recent Labs   Lab 06/03/21 0540 06/02/21 0559 06/01/21 0603 05/31/21 0706   WBC 3.9* 4.0 3.3* 3.1*   RBC 3.25* 3.25* 3.29* 2.94*   HGB 8.1* 8.1* 8.1* 6.9*   HCT 28.6* 28.0* 28.6* 24.6*   MCV 88 86 87 84   MCH 24.9* 24.9* 24.6* 23.5*   MCHC 28.3* 28.9* 28.3* 28.0*   RDW 20.9* 20.2* 19.8* 20.4*    124* 123* 144*     INR  Recent Labs   Lab 06/03/21 0540 06/02/21 0559 06/01/21 0603 05/31/21 0706   INR 2.62* 2.80* 2.66* 2.51*

## 2021-06-03 NOTE — PROGRESS NOTES
Therapy: IV Dobutamin  Insurance: [facility] Medicare    Patient has [facility] Medicare and it follows Medicare guidelines for the IV dobutamine. Per our pharmacists, patient does not meet Medicare criteria due to the diagnosis of Chronic thromboembolic pulmonary hypertension (WHO Group IV) which is not listed as one of the diagnosis codes required for approval. The patient would not have coverage for the IV dobutamine in the home. Patient should have coverage in a TCU.    [facility] in reference to admission date 5/27/21 to check IV dobutamine in the home coverage.    Please contact Intake with any questions, [phone] or In Basket pool, [facility] ([phone]).

## 2021-06-04 ENCOUNTER — APPOINTMENT (OUTPATIENT)
Dept: OCCUPATIONAL THERAPY | Facility: CLINIC | Age: 81
DRG: 291 | End: 2021-06-04
Attending: INTERNAL MEDICINE
Payer: COMMERCIAL

## 2021-06-04 DIAGNOSIS — I27.20 PULMONARY HYPERTENSION (H): ICD-10-CM

## 2021-06-04 DIAGNOSIS — R06.02 SOB (SHORTNESS OF BREATH): ICD-10-CM

## 2021-06-04 LAB
ANION GAP SERPL CALCULATED.3IONS-SCNC: 2 MMOL/L (ref 3–14)
ANION GAP SERPL CALCULATED.3IONS-SCNC: <1 MMOL/L (ref 3–14)
BUN SERPL-MCNC: 33 MG/DL (ref 7–30)
BUN SERPL-MCNC: 33 MG/DL (ref 7–30)
CALCIUM SERPL-MCNC: 9.3 MG/DL (ref 8.5–10.1)
CALCIUM SERPL-MCNC: 9.6 MG/DL (ref 8.5–10.1)
CHLORIDE SERPL-SCNC: 95 MMOL/L (ref 94–109)
CHLORIDE SERPL-SCNC: 97 MMOL/L (ref 94–109)
CO2 SERPL-SCNC: 40 MMOL/L (ref 20–32)
CO2 SERPL-SCNC: 42 MMOL/L (ref 20–32)
CREAT SERPL-MCNC: 0.92 MG/DL (ref 0.52–1.04)
CREAT SERPL-MCNC: 1.04 MG/DL (ref 0.52–1.04)
ERYTHROCYTE [DISTWIDTH] IN BLOOD BY AUTOMATED COUNT: 21.1 % (ref 10–15)
GFR SERPL CREATININE-BSD FRML MDRD: 51 ML/MIN/{1.73_M2}
GFR SERPL CREATININE-BSD FRML MDRD: 60 ML/MIN/{1.73_M2}
GLUCOSE SERPL-MCNC: 100 MG/DL (ref 70–99)
GLUCOSE SERPL-MCNC: 239 MG/DL (ref 70–99)
HCT VFR BLD AUTO: 28.6 % (ref 35–47)
HGB BLD-MCNC: 8 G/DL (ref 11.7–15.7)
INR PPP: 2.1 (ref 0.86–1.14)
LABORATORY COMMENT REPORT: NORMAL
MAGNESIUM SERPL-MCNC: 2 MG/DL (ref 1.6–2.3)
MAGNESIUM SERPL-MCNC: 2.4 MG/DL (ref 1.6–2.3)
MCH RBC QN AUTO: 24.8 PG (ref 26.5–33)
MCHC RBC AUTO-ENTMCNC: 28 G/DL (ref 31.5–36.5)
MCV RBC AUTO: 89 FL (ref 78–100)
PLATELET # BLD AUTO: 130 10E9/L (ref 150–450)
POTASSIUM SERPL-SCNC: 3.5 MMOL/L (ref 3.4–5.3)
POTASSIUM SERPL-SCNC: 4.7 MMOL/L (ref 3.4–5.3)
RBC # BLD AUTO: 3.23 10E12/L (ref 3.8–5.2)
SARS-COV-2 RNA RESP QL NAA+PROBE: NEGATIVE
SODIUM SERPL-SCNC: 136 MMOL/L (ref 133–144)
SODIUM SERPL-SCNC: 141 MMOL/L (ref 133–144)
SPECIMEN SOURCE: NORMAL
WBC # BLD AUTO: 4.6 10E9/L (ref 4–11)

## 2021-06-04 PROCEDURE — 250N000013 HC RX MED GY IP 250 OP 250 PS 637: Performed by: INTERNAL MEDICINE

## 2021-06-04 PROCEDURE — 250N000013 HC RX MED GY IP 250 OP 250 PS 637: Performed by: PHYSICIAN ASSISTANT

## 2021-06-04 PROCEDURE — 99232 SBSQ HOSP IP/OBS MODERATE 35: CPT | Performed by: INTERNAL MEDICINE

## 2021-06-04 PROCEDURE — 250N000011 HC RX IP 250 OP 636: Performed by: STUDENT IN AN ORGANIZED HEALTH CARE EDUCATION/TRAINING PROGRAM

## 2021-06-04 PROCEDURE — U0005 INFEC AGEN DETEC AMPLI PROBE: HCPCS | Performed by: PHYSICIAN ASSISTANT

## 2021-06-04 PROCEDURE — 85027 COMPLETE CBC AUTOMATED: CPT | Performed by: PHYSICIAN ASSISTANT

## 2021-06-04 PROCEDURE — 97530 THERAPEUTIC ACTIVITIES: CPT | Mod: GO

## 2021-06-04 PROCEDURE — 83735 ASSAY OF MAGNESIUM: CPT | Performed by: STUDENT IN AN ORGANIZED HEALTH CARE EDUCATION/TRAINING PROGRAM

## 2021-06-04 PROCEDURE — 250N000009 HC RX 250: Performed by: PHYSICIAN ASSISTANT

## 2021-06-04 PROCEDURE — 214N000001 HC R&B CCU UMMC

## 2021-06-04 PROCEDURE — 250N000011 HC RX IP 250 OP 636: Performed by: PHYSICIAN ASSISTANT

## 2021-06-04 PROCEDURE — 97140 MANUAL THERAPY 1/> REGIONS: CPT | Mod: GO

## 2021-06-04 PROCEDURE — 80048 BASIC METABOLIC PNL TOTAL CA: CPT | Performed by: PHYSICIAN ASSISTANT

## 2021-06-04 PROCEDURE — 80048 BASIC METABOLIC PNL TOTAL CA: CPT | Performed by: STUDENT IN AN ORGANIZED HEALTH CARE EDUCATION/TRAINING PROGRAM

## 2021-06-04 PROCEDURE — 272N000004 HC RX 272: Performed by: STUDENT IN AN ORGANIZED HEALTH CARE EDUCATION/TRAINING PROGRAM

## 2021-06-04 PROCEDURE — 250N000013 HC RX MED GY IP 250 OP 250 PS 637: Performed by: STUDENT IN AN ORGANIZED HEALTH CARE EDUCATION/TRAINING PROGRAM

## 2021-06-04 PROCEDURE — 250N000011 HC RX IP 250 OP 636: Performed by: INTERNAL MEDICINE

## 2021-06-04 PROCEDURE — 258N000003 HC RX IP 258 OP 636: Performed by: PHYSICIAN ASSISTANT

## 2021-06-04 PROCEDURE — U0003 INFECTIOUS AGENT DETECTION BY NUCLEIC ACID (DNA OR RNA); SEVERE ACUTE RESPIRATORY SYNDROME CORONAVIRUS 2 (SARS-COV-2) (CORONAVIRUS DISEASE [COVID-19]), AMPLIFIED PROBE TECHNIQUE, MAKING USE OF HIGH THROUGHPUT TECHNOLOGIES AS DESCRIBED BY CMS-2020-01-R: HCPCS | Performed by: PHYSICIAN ASSISTANT

## 2021-06-04 PROCEDURE — 83735 ASSAY OF MAGNESIUM: CPT | Performed by: PHYSICIAN ASSISTANT

## 2021-06-04 PROCEDURE — 85610 PROTHROMBIN TIME: CPT | Performed by: PHYSICIAN ASSISTANT

## 2021-06-04 RX ORDER — WARFARIN SODIUM 4 MG/1
4 TABLET ORAL
Status: COMPLETED | OUTPATIENT
Start: 2021-06-04 | End: 2021-06-04

## 2021-06-04 RX ORDER — SALIVA STIMULANT COMB. NO.3
2 SPRAY, NON-AEROSOL (ML) MUCOUS MEMBRANE
Status: DISCONTINUED | OUTPATIENT
Start: 2021-06-04 | End: 2021-06-10 | Stop reason: HOSPADM

## 2021-06-04 RX ORDER — POTASSIUM CHLORIDE 750 MG/1
10 TABLET, EXTENDED RELEASE ORAL ONCE
Status: COMPLETED | OUTPATIENT
Start: 2021-06-04 | End: 2021-06-04

## 2021-06-04 RX ORDER — MAGNESIUM SULFATE HEPTAHYDRATE 40 MG/ML
2 INJECTION, SOLUTION INTRAVENOUS ONCE
Status: COMPLETED | OUTPATIENT
Start: 2021-06-04 | End: 2021-06-04

## 2021-06-04 RX ADMIN — GABAPENTIN 300 MG: 300 CAPSULE ORAL at 14:23

## 2021-06-04 RX ADMIN — ASPIRIN 81 MG CHEWABLE TABLET 81 MG: 81 TABLET CHEWABLE at 08:01

## 2021-06-04 RX ADMIN — BUMETANIDE 2 MG/HR: 0.25 INJECTION INTRAMUSCULAR; INTRAVENOUS at 20:29

## 2021-06-04 RX ADMIN — IPRATROPIUM BROMIDE 2 SPRAY: 42 SPRAY, METERED NASAL at 07:53

## 2021-06-04 RX ADMIN — Medication 1 TABLET: at 20:32

## 2021-06-04 RX ADMIN — POTASSIUM CHLORIDE 60 MEQ: 750 TABLET, EXTENDED RELEASE ORAL at 14:23

## 2021-06-04 RX ADMIN — KETOTIFEN FUMARATE 1 DROP: 0.35 SOLUTION/ DROPS OPHTHALMIC at 07:53

## 2021-06-04 RX ADMIN — OXYCODONE HYDROCHLORIDE AND ACETAMINOPHEN 500 MG: 500 TABLET ORAL at 08:01

## 2021-06-04 RX ADMIN — ACETAMINOPHEN 325 MG: 325 TABLET, FILM COATED ORAL at 07:53

## 2021-06-04 RX ADMIN — IRON SUCROSE 300 MG: 20 INJECTION, SOLUTION INTRAVENOUS at 22:33

## 2021-06-04 RX ADMIN — ACETAMINOPHEN 325 MG: 325 TABLET, FILM COATED ORAL at 23:06

## 2021-06-04 RX ADMIN — MAGNESIUM SULFATE HEPTAHYDRATE 2 G: 40 INJECTION, SOLUTION INTRAVENOUS at 10:26

## 2021-06-04 RX ADMIN — GABAPENTIN 300 MG: 300 CAPSULE ORAL at 20:32

## 2021-06-04 RX ADMIN — MACITENTAN 10 MG: 10 TABLET, FILM COATED ORAL at 16:32

## 2021-06-04 RX ADMIN — POTASSIUM CHLORIDE 60 MEQ: 750 TABLET, EXTENDED RELEASE ORAL at 20:32

## 2021-06-04 RX ADMIN — TREPROSTINIL 32 NG/KG/MIN: 20 INJECTION, SOLUTION INTRAVENOUS; SUBCUTANEOUS at 02:55

## 2021-06-04 RX ADMIN — ACETAMINOPHEN 325 MG: 325 TABLET, FILM COATED ORAL at 15:24

## 2021-06-04 RX ADMIN — Medication 5 ML: at 20:33

## 2021-06-04 RX ADMIN — ACETAMINOPHEN 325 MG: 325 TABLET, FILM COATED ORAL at 06:04

## 2021-06-04 RX ADMIN — DIGOXIN 62.5 MCG: 0.06 TABLET ORAL at 06:04

## 2021-06-04 RX ADMIN — WARFARIN SODIUM 4 MG: 4 TABLET ORAL at 18:02

## 2021-06-04 RX ADMIN — Medication 1 TABLET: at 08:01

## 2021-06-04 RX ADMIN — POTASSIUM CHLORIDE 60 MEQ: 750 TABLET, EXTENDED RELEASE ORAL at 08:00

## 2021-06-04 RX ADMIN — ACETAMINOPHEN 325 MG: 325 TABLET, FILM COATED ORAL at 20:33

## 2021-06-04 RX ADMIN — HYDROXYCHLOROQUINE SULFATE 200 MG: 200 TABLET, FILM COATED ORAL at 08:01

## 2021-06-04 RX ADMIN — POTASSIUM CHLORIDE 10 MEQ: 750 TABLET, EXTENDED RELEASE ORAL at 10:29

## 2021-06-04 RX ADMIN — GABAPENTIN 300 MG: 300 CAPSULE ORAL at 08:01

## 2021-06-04 RX ADMIN — BUMETANIDE 2 MG/HR: 0.25 INJECTION INTRAMUSCULAR; INTRAVENOUS at 08:05

## 2021-06-04 RX ADMIN — ACETAMINOPHEN 325 MG: 325 TABLET, FILM COATED ORAL at 13:25

## 2021-06-04 ASSESSMENT — ACTIVITIES OF DAILY LIVING (ADL)
ADLS_ACUITY_SCORE: 16

## 2021-06-04 ASSESSMENT — MIFFLIN-ST. JEOR
SCORE: 932.67
SCORE: 928.13

## 2021-06-04 NOTE — PROGRESS NOTES
Care Coordinator  D/I: Per email from [facility] re: Home IV Dobutamine  Hello,    Sorry for the delay. Patient has [facility] Medicare and it follows Medicare guidelines for the IV dobutamine. Per our pharmacists, patient does not meet Medicare criteria due to the diagnosis of Chronic thromboembolic pulmonary hypertension (WHO Group IV) which is not listed as one of the diagnosis codes required for approval. The patient would not have coverage for the IV dobutamine in the home. Patient should have coverage in a TCU.    Let us know how to proceed.    Thanks,    [place]  Intake/   [facility]   [facility]   [address] [place] [id number]   [email or web address]  [email or web address]   Office: [phone]  Fax: [phone]   P: I will notify Cards 2 team and will follow. Per Bernie Kulkarni [facility] lisa, they can check self pay quote, but this is a very expensive drug.

## 2021-06-04 NOTE — PLAN OF CARE
"D: Pt admitted for hypervolemia. She has a past medical history of chronic thromboembolic pulmonary hypertension, severe RV dysfunction, RA, systemic HTN, asthma, scoliosis, and breast CA.     I: Monitored vitals and assessed pt status; nursing interventions provided as ordered.  Changed: Dobutamine gtt increased to 5 mcg/kg/min.  Running:     Remodulin at 32 ng/kg/ min (through Goldberg port in chest)    Dobutamine at 5 mcg/kg/min     Bumex at 2 mg/hour     PRN: None given/requested    A: A0x4. VSS, afebrile, 93% on 3L NC. Denies pain. Sinus Rhythm on the tele monitor.Urinating frequently with some urgency present, likely due to Bumex gtt; tolerating increase of dobutamine gtt. Echo done at bedside today. Lymphedema wraps soiled this evening, offered to put new pair on, pt declined wanting them on \"for awhile\". BMP & Mg drawn at 1900 this evening per order.    P: Continue to monitor Pt status and contact Cards 2 for questions or concerns.    Leesa Zamora, RN  Cardiology    "

## 2021-06-04 NOTE — PLAN OF CARE
D: Patient admitted 5/27 w/hypervolemia. Hx. chronic thromboembolic pulmonary hypertension, severe RV dysfunction, RA, systemic HTN, breast cancer (s/p chemotherapy and s/p left mastectomy), asthma, and scoliosis.  I/A: A&Ox4. VSSA on 2-4L NC. RIVERA. SR 80s-90s. C/o generalized aching/soreness partially relieved by prn tylenol. Remodulin infusing at 32 ng/kg/min. Dobutamine infusing at 5 mcg/kg/min. Bumex infusing at 2 mg/hr. Good uop, frequent; pt rarely incontinent but instead sense of urgency seems to be causing urine spills on legs/wraps and floor. SBA. BLE lymph wraps soiled and removed, offered to reapply wraps x3 and patient refused. Patient did not sleep well overnight d/t frequent trips up to the commode.   P: Continue to monitor and notify Cards 2 with questions/concerns.

## 2021-06-04 NOTE — PROGRESS NOTES
[facility]   Cardiology II Service / Advanced Heart Failure  Daily Progress Note        Patient: Karen Anderson  MRN: [id number]  Admission Date: 5/27/2021  Hospital Day # 8    Assessment and Plan:  Ms. Karen Anderson is a 80yr old female with a history of chronic thromboembolic pulmonary hypertension (on IV treprostinil and macitentan), severe RV dysfunction, RA, systemic HTN, breast cancer (s/p chemotherapy with Adriamycin, Cytoxan, Taxol, and tamoxifen, and s/p left mastectomy), asthma, and scoliosis who presents with hypervolemia.    PLAN:  - Increased dobutamine to 5  - Increased remodulin to 32 ng/kg/min on 6/1, consider increasing to 34 on 6/5 pending clinical status  - Continue Bumex drip at 2 mg/hr  - BID labs while diuresing    Chronic thromboembolic pulmonary hypertension (WHO Group IV)  Severe RV dysfunction  Acute on chronic RV failure exacerbation   Hypokalemia  She has been evaluated for high-risk pulmonary thromboendarterectomy but deemed to be too high-risk at [facility].  Given her proximal disease and high pressures, balloon angioplasty would also be high- risk.  She underwent balloon pulmonary angioplasty January 2021, which was complicated by hemoptysis.  No plans to repeat this here, but [facility] willing to consider in the future.  Currently being managed on medical therapy by Dr. Turcios as an outpatient.    She presented with increasing SOB, swelling, and weight gain (dry weight ~112 lbs).  She was 135 lbs on 5/24, and was advised to take a metolazone.  She presents now for IV diuresis and potentially increase in remodulin therapy.  Weight on admission was 122#, up to 128#, now improving.    - Continue bumex gtt @ 2mg/hr   - Continue dobutamine @5 today  - Continue to trend BMP, Mg q12 hours, replete K>4 and Mg >2, will increase scheduled K to 60 TID  - Increased remodulin to 32ng/kg/min on 6/1, per Dr. Turcios, after 4 days, could increase to 34  ng/kg/min  - Contiue PTA Opsumit 10mg q48h (recently reduced d/t concerns for fluid retention)   - Continue Warfarin, with goal INR 2.0-2.5.  INR today 2.1.  - Continue digoxin 62.5mcg daily  - O2 via NC if sat <90%  - 2L fluid restriction  - Strict ins and outs  - Daily weights  - Salt restriction    Chronic Pancytopenia   Normocytic anemia, with elevated RDW  Has had low iron saturation in the past.  Normal ferritin and transferrin (5/29).  Has not tolerated po iron (abdominal upset).  Hemoglobin down to 7s from baseline ~9s. Received 1 UPRBC on 5/31. Epo levels are elevated near appropriate levels, soluble transferrin is also elevated, all consisted with combined anemia of chronic disease and iron deficiency.   - Trend CBC  - IV Iron, venofer 300 mg q72 hours for three doses, s/p 1/3  - Consider PCP f/u of pancytopenia, no acute changes    CKD  Baseline cr 1.2-1.5, has been stable with diuresis.  - Trend BMP, Mg BID, lyte replacement as above     Asthma  Allergic rhinitis  No acute concerns.  - Continue beclomethasone BID and albuterol inhaler PRN  - Continue cetirizine and ipratropium nasal spray     RA  No acute concerns.  - Continue Hydroxychloroquine 200mg daily      Distal fracture of her right radius and ulna  - OT while inpatient  - Continue PTA alendronate 80mg every Monday (will need to use home supply)    FEN:  2g NA restriction, 2L fluid restriction  PROPHY:  warfarin, ambulate  LINES:  2 lumen PICC, single lumen line  DISPO:  Pending diuresis  CODE STATUS:  Full code    Patient discussed with Dr. Marlow.     Deborah Chaudhry PAAilynC  [facility] Cardiology    =============================================================    Interval History/ROS:   Ms. Anderson states that she feels ok today.  Headache better today.  No fever/chills, chest pain, SOB, abd pain. Continues to have multiple small bowel movements. No dysuria. Swelling in legs is unchanged. She is encouraged by some weight loss today.    Last 24 hr  "care team notes reviewed.   ROS:  4 point ROS including Respiratory, CV, GI and , other than that noted in the HPI, is negative.     Medications: Reviewed in EPIC.     Physical Exam:   /63 (BP Location: Left leg)   Pulse 96   Temp 97.7  F (36.5  C) (Oral)   Resp 18   Ht 1.499 m (4' 11\")   Wt 54.7 kg (120 lb 9.6 oz)   SpO2 90%   BMI 24.36 kg/m    GENERAL: Appears alert and interacting appropriatly.   HEENT: Eye symmetrical and free of discharge bilaterally. Mucous membranes moist and without lesions.  NECK: Supple and without lymphadenopathy. JVD at angle of the jaw when sitting upright.  CV: RRR, S1S2 present without murmur, rub, or gallop.   RESPIRATORY: Respirations regular, even, and unlabored. Lungs CTA throughout.   GI: Soft and non distended with normoactive bowel sounds present in all quadrants. No tenderness, rebound, guarding. No organomegaly.   EXTREMITIES: Taut bilateral LE edema, unchanged from yesterday's exam.  Not wearing wearing wraps. 2+ bilateral pedal pulses.   NEUROLOGIC: Alert and interacting appropriately.. No focal deficits.   MUSCULOSKELETAL: No joint swelling or tenderness.   SKIN: No jaundice. No rashes or lesions.     Data:  CMP  Recent Labs   Lab 06/04/21  0633 06/03/21  1900 06/03/21  0540 06/02/21  1715 05/29/21  0625 05/29/21  0625    138 139 140   < > 138   POTASSIUM 3.5 4.6 4.2 4.2   < > 3.5   CHLORIDE 97 97 97 97   < > 97   CO2 42* 42* 39* 41*   < > 38*   ANIONGAP 2* <1* 3 1*   < > 4   * 96 70 92   < > 75   BUN 33* 36* 36* 40*   < > 50*   CR 1.04 1.11* 1.15* 1.17*   < > 1.30*   GFRESTIMATED 51* 47* 45* 44*   < > 39*   GFRESTBLACK 59* 55* 52* 51*   < > 45*   ESTEFANÍA 9.6 9.4 9.6 9.1   < > 10.0   MAG 2.0 2.1 2.0 2.1   < > 2.0   PROTTOTAL  --   --   --   --   --  7.7   ALBUMIN  --   --   --   --   --  3.0*   BILITOTAL  --   --   --   --   --  0.4   ALKPHOS  --   --   --   --   --  163*   AST  --   --   --   --   --  39   ALT  --   --   --   --   --  28    < > = " values in this interval not displayed.     CBC  Recent Labs   Lab 06/04/21  0633 06/03/21  0540 06/02/21  0559 06/01/21  0603   WBC 4.6 3.9* 4.0 3.3*   RBC 3.23* 3.25* 3.25* 3.29*   HGB 8.0* 8.1* 8.1* 8.1*   HCT 28.6* 28.6* 28.0* 28.6*   MCV 89 88 86 87   MCH 24.8* 24.9* 24.9* 24.6*   MCHC 28.0* 28.3* 28.9* 28.3*   RDW 21.1* 20.9* 20.2* 19.8*   * 151 124* 123*     INR  Recent Labs   Lab 06/04/21  0633 06/03/21  0540 06/02/21  0559 06/01/21  0603   INR 2.10* 2.62* 2.80* 2.66*

## 2021-06-05 ENCOUNTER — APPOINTMENT (OUTPATIENT)
Dept: OCCUPATIONAL THERAPY | Facility: CLINIC | Age: 81
DRG: 291 | End: 2021-06-05
Attending: INTERNAL MEDICINE
Payer: COMMERCIAL

## 2021-06-05 LAB
ANION GAP SERPL CALCULATED.3IONS-SCNC: 2 MMOL/L (ref 3–14)
ANION GAP SERPL CALCULATED.3IONS-SCNC: <1 MMOL/L (ref 3–14)
BUN SERPL-MCNC: 29 MG/DL (ref 7–30)
BUN SERPL-MCNC: 30 MG/DL (ref 7–30)
CALCIUM SERPL-MCNC: 9.2 MG/DL (ref 8.5–10.1)
CALCIUM SERPL-MCNC: 9.4 MG/DL (ref 8.5–10.1)
CHLORIDE SERPL-SCNC: 96 MMOL/L (ref 94–109)
CHLORIDE SERPL-SCNC: 98 MMOL/L (ref 94–109)
CO2 SERPL-SCNC: 40 MMOL/L (ref 20–32)
CO2 SERPL-SCNC: 41 MMOL/L (ref 20–32)
CREAT SERPL-MCNC: 1.08 MG/DL (ref 0.52–1.04)
CREAT SERPL-MCNC: 1.2 MG/DL (ref 0.52–1.04)
ERYTHROCYTE [DISTWIDTH] IN BLOOD BY AUTOMATED COUNT: 22.3 % (ref 10–15)
GFR SERPL CREATININE-BSD FRML MDRD: 43 ML/MIN/{1.73_M2}
GFR SERPL CREATININE-BSD FRML MDRD: 49 ML/MIN/{1.73_M2}
GLUCOSE SERPL-MCNC: 100 MG/DL (ref 70–99)
GLUCOSE SERPL-MCNC: 75 MG/DL (ref 70–99)
HCT VFR BLD AUTO: 27.5 % (ref 35–47)
HGB BLD-MCNC: 7.9 G/DL (ref 11.7–15.7)
INR PPP: 2.09 (ref 0.86–1.14)
MAGNESIUM SERPL-MCNC: 2.2 MG/DL (ref 1.6–2.3)
MAGNESIUM SERPL-MCNC: 2.3 MG/DL (ref 1.6–2.3)
MCH RBC QN AUTO: 25.2 PG (ref 26.5–33)
MCHC RBC AUTO-ENTMCNC: 28.7 G/DL (ref 31.5–36.5)
MCV RBC AUTO: 88 FL (ref 78–100)
PLATELET # BLD AUTO: 153 10E9/L (ref 150–450)
POTASSIUM SERPL-SCNC: 3.5 MMOL/L (ref 3.4–5.3)
POTASSIUM SERPL-SCNC: 4.3 MMOL/L (ref 3.4–5.3)
RBC # BLD AUTO: 3.13 10E12/L (ref 3.8–5.2)
SODIUM SERPL-SCNC: 137 MMOL/L (ref 133–144)
SODIUM SERPL-SCNC: 141 MMOL/L (ref 133–144)
WBC # BLD AUTO: 5.1 10E9/L (ref 4–11)

## 2021-06-05 PROCEDURE — 97110 THERAPEUTIC EXERCISES: CPT | Mod: GO | Performed by: OCCUPATIONAL THERAPIST

## 2021-06-05 PROCEDURE — 250N000013 HC RX MED GY IP 250 OP 250 PS 637: Performed by: INTERNAL MEDICINE

## 2021-06-05 PROCEDURE — 214N000001 HC R&B CCU UMMC

## 2021-06-05 PROCEDURE — 999N000044 HC STATISTIC CVC DRESSING CHANGE

## 2021-06-05 PROCEDURE — 85610 PROTHROMBIN TIME: CPT | Performed by: PHYSICIAN ASSISTANT

## 2021-06-05 PROCEDURE — 80048 BASIC METABOLIC PNL TOTAL CA: CPT | Performed by: PHYSICIAN ASSISTANT

## 2021-06-05 PROCEDURE — 80048 BASIC METABOLIC PNL TOTAL CA: CPT | Performed by: STUDENT IN AN ORGANIZED HEALTH CARE EDUCATION/TRAINING PROGRAM

## 2021-06-05 PROCEDURE — 250N000013 HC RX MED GY IP 250 OP 250 PS 637: Performed by: STUDENT IN AN ORGANIZED HEALTH CARE EDUCATION/TRAINING PROGRAM

## 2021-06-05 PROCEDURE — 97530 THERAPEUTIC ACTIVITIES: CPT | Mod: GO | Performed by: OCCUPATIONAL THERAPIST

## 2021-06-05 PROCEDURE — 83735 ASSAY OF MAGNESIUM: CPT | Performed by: PHYSICIAN ASSISTANT

## 2021-06-05 PROCEDURE — 272N000004 HC RX 272: Performed by: STUDENT IN AN ORGANIZED HEALTH CARE EDUCATION/TRAINING PROGRAM

## 2021-06-05 PROCEDURE — 85027 COMPLETE CBC AUTOMATED: CPT | Performed by: PHYSICIAN ASSISTANT

## 2021-06-05 PROCEDURE — 250N000011 HC RX IP 250 OP 636: Performed by: PHYSICIAN ASSISTANT

## 2021-06-05 PROCEDURE — 258N000003 HC RX IP 258 OP 636: Performed by: PHYSICIAN ASSISTANT

## 2021-06-05 PROCEDURE — 250N000013 HC RX MED GY IP 250 OP 250 PS 637: Performed by: PHYSICIAN ASSISTANT

## 2021-06-05 PROCEDURE — 99232 SBSQ HOSP IP/OBS MODERATE 35: CPT | Performed by: NURSE PRACTITIONER

## 2021-06-05 PROCEDURE — 83735 ASSAY OF MAGNESIUM: CPT | Performed by: STUDENT IN AN ORGANIZED HEALTH CARE EDUCATION/TRAINING PROGRAM

## 2021-06-05 PROCEDURE — 250N000009 HC RX 250: Performed by: PHYSICIAN ASSISTANT

## 2021-06-05 PROCEDURE — 250N000011 HC RX IP 250 OP 636: Performed by: STUDENT IN AN ORGANIZED HEALTH CARE EDUCATION/TRAINING PROGRAM

## 2021-06-05 RX ORDER — POTASSIUM CHLORIDE 750 MG/1
10 TABLET, EXTENDED RELEASE ORAL ONCE
Status: COMPLETED | OUTPATIENT
Start: 2021-06-05 | End: 2021-06-05

## 2021-06-05 RX ORDER — DIGOXIN 0.06 MG/1
62.5 TABLET ORAL DAILY
Status: DISCONTINUED | OUTPATIENT
Start: 2021-06-06 | End: 2021-06-10 | Stop reason: HOSPADM

## 2021-06-05 RX ORDER — WARFARIN SODIUM 3 MG/1
3 TABLET ORAL
Status: COMPLETED | OUTPATIENT
Start: 2021-06-05 | End: 2021-06-05

## 2021-06-05 RX ADMIN — ASPIRIN 81 MG CHEWABLE TABLET 81 MG: 81 TABLET CHEWABLE at 07:58

## 2021-06-05 RX ADMIN — TREPROSTINIL 32 NG/KG/MIN: 20 INJECTION, SOLUTION INTRAVENOUS; SUBCUTANEOUS at 05:45

## 2021-06-05 RX ADMIN — GABAPENTIN 300 MG: 300 CAPSULE ORAL at 20:52

## 2021-06-05 RX ADMIN — IPRATROPIUM BROMIDE 2 SPRAY: 42 SPRAY, METERED NASAL at 18:55

## 2021-06-05 RX ADMIN — GABAPENTIN 300 MG: 300 CAPSULE ORAL at 07:59

## 2021-06-05 RX ADMIN — Medication 1 TABLET: at 07:58

## 2021-06-05 RX ADMIN — BUMETANIDE 2 MG/HR: 0.25 INJECTION INTRAMUSCULAR; INTRAVENOUS at 21:25

## 2021-06-05 RX ADMIN — KETOTIFEN FUMARATE 1 DROP: 0.35 SOLUTION/ DROPS OPHTHALMIC at 06:48

## 2021-06-05 RX ADMIN — ACETAMINOPHEN 325 MG: 325 TABLET, FILM COATED ORAL at 11:42

## 2021-06-05 RX ADMIN — BUMETANIDE 2 MG/HR: 0.25 INJECTION INTRAMUSCULAR; INTRAVENOUS at 09:04

## 2021-06-05 RX ADMIN — ACETAMINOPHEN 650 MG: 325 TABLET, FILM COATED ORAL at 17:37

## 2021-06-05 RX ADMIN — POTASSIUM CHLORIDE 60 MEQ: 750 TABLET, EXTENDED RELEASE ORAL at 08:00

## 2021-06-05 RX ADMIN — DOBUTAMINE 5 MCG/KG/MIN: 12.5 INJECTION, SOLUTION INTRAVENOUS at 05:46

## 2021-06-05 RX ADMIN — Medication 1 TABLET: at 20:52

## 2021-06-05 RX ADMIN — IPRATROPIUM BROMIDE 2 SPRAY: 42 SPRAY, METERED NASAL at 14:45

## 2021-06-05 RX ADMIN — Medication 5 ML: at 20:53

## 2021-06-05 RX ADMIN — POTASSIUM CHLORIDE 60 MEQ: 750 TABLET, EXTENDED RELEASE ORAL at 20:52

## 2021-06-05 RX ADMIN — DIGOXIN 62.5 MCG: 0.06 TABLET ORAL at 10:00

## 2021-06-05 RX ADMIN — WARFARIN SODIUM 3 MG: 3 TABLET ORAL at 17:37

## 2021-06-05 RX ADMIN — OXYCODONE HYDROCHLORIDE AND ACETAMINOPHEN 500 MG: 500 TABLET ORAL at 07:59

## 2021-06-05 RX ADMIN — HYDROXYCHLOROQUINE SULFATE 200 MG: 200 TABLET, FILM COATED ORAL at 07:58

## 2021-06-05 RX ADMIN — POTASSIUM CHLORIDE 60 MEQ: 750 TABLET, EXTENDED RELEASE ORAL at 14:48

## 2021-06-05 RX ADMIN — POTASSIUM CHLORIDE 10 MEQ: 750 TABLET, EXTENDED RELEASE ORAL at 10:00

## 2021-06-05 RX ADMIN — GABAPENTIN 300 MG: 300 CAPSULE ORAL at 14:47

## 2021-06-05 ASSESSMENT — ACTIVITIES OF DAILY LIVING (ADL)
ADLS_ACUITY_SCORE: 16

## 2021-06-05 ASSESSMENT — MIFFLIN-ST. JEOR: SCORE: 909.08

## 2021-06-05 NOTE — PLAN OF CARE
D: Pt admit 5/27/21 w/hypervolemia. PMH chronic thromboembolic pulmonary hypertension (IV treprostinil and macitentan), severe RV dysfunction, RA, systemic HTN, breast cancer (s/p chemotherapy and left mastectomy), asthma, and scoliosis who presents with hypervolemia.     I/A:   Neuro: A&Ox4. [place], hearing aids at bedside  VS: VSS. 3-4L NC with humidification  Tele: SR. Pt has generalized edema, RLE > LLE, cards cross cover paged. Pt endorsed numbness/tingling in extremities, baseline this admission per pt.  Pain: pt c/o pain in BLE controlled with PRN tylenol.   GI/: Pt up to bedside commode at shift start, having urgency issues, purewick placed. Pt had one large BM at shift start and several small BM's throughout the night.  Diet: 2g Na 2L FR  IV/Drips: R DL PICC infusing dobutamine gtt 5 mcg/kg/min and bumex gtt 2 mg/hr via purple lumen. Red lumen SL. treprostinil gtt infusing via hickmann at 32 ng/kg/min.   Activity: SBA w/cane  Skin/drains: legs red/kaylee. Red blanchable coccyx. Fragile skin. [place] on L ankle for popped blister/weeping legs per pt.     P: Plan to increase rate of remodulin gtt today. Continue to monitor pt status and report changes to cards 2.     Simona Medina RN

## 2021-06-05 NOTE — PLAN OF CARE
D: Hypervolemia; Hx of chronic thromboembolic pulmonary hypertension, severe RV dysfunction, RA, systemic HTN, breast cancer, asthma, and scoliosis.  I/A: A&Ox4, SBA. VSS on 3-4 L NC. Pain in bilateral LE- PRN tylenol. SR on tele. Bilateral LE lymph wraps. Up to commode at bedside. Adequate UO/stools. PICC infusing Bumex and Dobutamine per orders. Remodulin infusing per orders via jade. PCU collect for labs. Good appetite on 2 gram Na diet, 2 L FR.  P: Continue to follow POC and contact Cards 2 with updates/changes.

## 2021-06-05 NOTE — PROGRESS NOTES
[facility]   Cardiology II Service / Advanced Heart Failure  Daily Progress Note  Date of Service: 6/5/2021      Patient: Karen Anderson  MRN: [id number]  Admission Date: 5/27/2021  Hospital Day # 9    Assessment and Plan: Karen Anderson is a 80yr old female with a history of chronic thromboembolic pulmonary hypertension (on IV treprostinil and macitentan), severe RV dysfunction, RA, systemic HTN, breast cancer (s/p chemotherapy with Adriamycin, Cytoxan, Taxol, and tamoxifen, and s/p left mastectomy), asthma, and scoliosis who presents with hypervolemia.    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 Echo 6/3/21 consistent with RVSP 117 mmHg, LVEF-55-60%, flattened septum, modear RV dilate with moderately reduced RV function, moderate TR, and dilated IVC.   NYHA Class IV, Stage C  volume status: Hypervolemic. Continue Bumex 2 mg/hr with goal dry weight 110 lbs.   Aldosterone antagonist: Not on PTA.   - Continue Digoxin for RV support.   - Dobutamine decreased to 2.5 mcg/kg/min.     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 RHC 3/17/21 with mPA-44, mPCW-14. Not a candidate thromboendarterectomy/ S/p balloon pulmonary angioplasty 1/21.  WHO Group IV, Functional Class IV  - Continue Coumadin per pharmacy with goal 2-3, INR-2.09.  - Continue Opsumit 10 mg q48 hrs.   - Increase Remodulin to 34 ng/kg/min.     Chronic Pancytopenia. Ferritin, Transferrin, Epo, and Soluble transerrin WNL 5/29. Intolerant to po Iron. 1 unit PRBC 5/31 inpatient for Hgb 7.   - Venofer 300 mg q72h, completed 3/3 doses 6/4/21.    Chronic Medical Conditions:   RA. Continue Hydroxychloroquine.   Distal right and ulnar right fracture. Cast in place. Continue Fosamax. PT/OT.   Allergic Rhinitis. Mild Persistent Asthma. Continue Beclomethasone, Albuterol prn, Cetrizine, and Ipratropium.     FEN: 2 gram sodium diet   PROPHY:  Coumadin  LINES:  Goldberg and PICC  DISPO:  TBD  CODE STATUS:  Full Code  "  ================================================================    Interval History/ROS: She notes improved LE edema, RIVERA, and abdominal distention. She notes intermittent loose stools. She denies fever, chills, cough, nausea, and vomiting. She is tolerating oral intake and ambulation with therapy.     Last 24 hr care team notes reviewed.   ROS:  4 point ROS including Respiratory, CV, GI and , other than that noted in the HPI, is negative.     Medications: Reviewed in EPIC.     Physical Exam:   BP (!) 166/90 (BP Location: Left leg)   Pulse 89   Temp 98.1  F (36.7  C) (Oral)   Resp 18   Ht 1.499 m (4' 11\")   Wt 52.3 kg (115 lb 6.4 oz)   SpO2 94%   BMI 23.31 kg/m    GENERAL: Appears alert and oriented times three.   HEENT: Eye symmetrical and free of discharge bilaterally. Mucous membranes moist and without lesions.  NECK: Supple and without lymphadenopathy. JVD mid neck.   CV: RRR, S1S2 present with III/VI murmur heard best at LSB  RESPIRATORY: Respirations regular, even, and unlabored. Lungs CTA throughout.   GI: Soft and distended with normoactive bowel sounds present in all quadrants. No tenderness, rebound, guarding. No organomegaly.   EXTREMITIES: +2 RLE and +1 LLE peripheral edema. 2+ bilateral pedal pulses.   NEUROLOGIC: Alert and orientated x 3. CN II-XII grossly intact. No focal deficits.   MUSCULOSKELETAL: No joint swelling or tenderness.   SKIN: No jaundice. No rashes or lesions. Goldberg CDI.    Data:  CMP  Recent Labs   Lab 06/05/21  0558 06/04/21  1745 06/04/21  0633 06/03/21  1900    136 141 138   POTASSIUM 3.5 4.7 3.5 4.6   CHLORIDE 98 95 97 97   CO2 41* 40* 42* 42*   ANIONGAP 2* <1* 2* <1*   GLC 75 239* 100* 96   BUN 30 33* 33* 36*   CR 1.08* 0.92 1.04 1.11*   GFRESTIMATED 49* 60* 51* 47*   GFRESTBLACK 57* 69 59* 55*   ESTEFANÍA 9.2 9.3 9.6 9.4   MAG 2.3 2.4* 2.0 2.1     CBC  Recent Labs   Lab 06/05/21  0558 06/04/21  0633 06/03/21  0540 06/02/21  0559   WBC 5.1 4.6 3.9* 4.0   RBC 3.13* " 3.23* 3.25* 3.25*   HGB 7.9* 8.0* 8.1* 8.1*   HCT 27.5* 28.6* 28.6* 28.0*   MCV 88 89 88 86   MCH 25.2* 24.8* 24.9* 24.9*   MCHC 28.7* 28.0* 28.3* 28.9*   RDW 22.3* 21.1* 20.9* 20.2*    130* 151 124*     INR  Recent Labs   Lab 06/05/21  0558 06/04/21  0633 06/03/21  0540 06/02/21  0559   INR 2.09* 2.10* 2.62* 2.80*       Patient discussed with Dr. Ahmadi.      Darleen Randall [facility]  6/5/2021

## 2021-06-05 NOTE — PROGRESS NOTES
Diagnosis: pulmonary HTN   Neuro: A&O x4, given acetaminophen for generalized pain and migraine given at 1145 and 1845, denied palpitations, difficulty breathing, SOB, dizziness, and nausea r/t medication  VS: SR with inv. Pwave. BP on the lower end, but WDL according to patient. SpO2 80-90% on 4L NC with humidification  Respiratory:  Dyspnea on exertion.   Cardiac: S1 and S2. 3+ edema in RLE,  2+ edema in LLE.   GI: Denied nausea, bowel sounds normoactive in all quadrants with freq. Small BM  : urgency and frequently.  Skin: scab on the LLE covered with dressing. Goldberg dressing changed today.   Drips: R DL PICC infusing dobutamine at 2.1 mL/hr and bumex at 8 mL/hr. Goldberg infusing Remodulin 34 ng/kg/min after titrating up from 32 ng/kg/min.   Electrolytes: K+ per protocol given 10 meq    Plan:  Continue to monitor pain, VS, heart rhythm, fluid status, bowel status, cardiac and respiratory status.  Notify care team of changes in patient condition or other concerns.

## 2021-06-06 ENCOUNTER — APPOINTMENT (OUTPATIENT)
Dept: OCCUPATIONAL THERAPY | Facility: CLINIC | Age: 81
DRG: 291 | End: 2021-06-06
Attending: INTERNAL MEDICINE
Payer: COMMERCIAL

## 2021-06-06 LAB
ANION GAP SERPL CALCULATED.3IONS-SCNC: 2 MMOL/L (ref 3–14)
ANION GAP SERPL CALCULATED.3IONS-SCNC: <1 MMOL/L (ref 3–14)
BUN SERPL-MCNC: 25 MG/DL (ref 7–30)
BUN SERPL-MCNC: 28 MG/DL (ref 7–30)
CALCIUM SERPL-MCNC: 9.2 MG/DL (ref 8.5–10.1)
CALCIUM SERPL-MCNC: 9.7 MG/DL (ref 8.5–10.1)
CHLORIDE SERPL-SCNC: 97 MMOL/L (ref 94–109)
CHLORIDE SERPL-SCNC: 97 MMOL/L (ref 94–109)
CO2 SERPL-SCNC: 40 MMOL/L (ref 20–32)
CO2 SERPL-SCNC: 40 MMOL/L (ref 20–32)
CREAT SERPL-MCNC: 1.08 MG/DL (ref 0.52–1.04)
CREAT SERPL-MCNC: 1.27 MG/DL (ref 0.52–1.04)
ERYTHROCYTE [DISTWIDTH] IN BLOOD BY AUTOMATED COUNT: 22.5 % (ref 10–15)
GFR SERPL CREATININE-BSD FRML MDRD: 40 ML/MIN/{1.73_M2}
GFR SERPL CREATININE-BSD FRML MDRD: 49 ML/MIN/{1.73_M2}
GLUCOSE SERPL-MCNC: 121 MG/DL (ref 70–99)
GLUCOSE SERPL-MCNC: 73 MG/DL (ref 70–99)
HCT VFR BLD AUTO: 28.3 % (ref 35–47)
HGB BLD-MCNC: 8.1 G/DL (ref 11.7–15.7)
INR PPP: 2.37 (ref 0.86–1.14)
MAGNESIUM SERPL-MCNC: 2 MG/DL (ref 1.6–2.3)
MAGNESIUM SERPL-MCNC: 2.7 MG/DL (ref 1.6–2.3)
MCH RBC QN AUTO: 25.2 PG (ref 26.5–33)
MCHC RBC AUTO-ENTMCNC: 28.6 G/DL (ref 31.5–36.5)
MCV RBC AUTO: 88 FL (ref 78–100)
PLATELET # BLD AUTO: 152 10E9/L (ref 150–450)
POTASSIUM SERPL-SCNC: 3.7 MMOL/L (ref 3.4–5.3)
POTASSIUM SERPL-SCNC: 5.1 MMOL/L (ref 3.4–5.3)
POTASSIUM SERPL-SCNC: 5.4 MMOL/L (ref 3.4–5.3)
RBC # BLD AUTO: 3.22 10E12/L (ref 3.8–5.2)
SODIUM SERPL-SCNC: 136 MMOL/L (ref 133–144)
SODIUM SERPL-SCNC: 138 MMOL/L (ref 133–144)
WBC # BLD AUTO: 4.6 10E9/L (ref 4–11)

## 2021-06-06 PROCEDURE — 250N000011 HC RX IP 250 OP 636: Performed by: INTERNAL MEDICINE

## 2021-06-06 PROCEDURE — 250N000013 HC RX MED GY IP 250 OP 250 PS 637: Performed by: INTERNAL MEDICINE

## 2021-06-06 PROCEDURE — 250N000011 HC RX IP 250 OP 636: Performed by: NURSE PRACTITIONER

## 2021-06-06 PROCEDURE — 83735 ASSAY OF MAGNESIUM: CPT | Performed by: STUDENT IN AN ORGANIZED HEALTH CARE EDUCATION/TRAINING PROGRAM

## 2021-06-06 PROCEDURE — 84132 ASSAY OF SERUM POTASSIUM: CPT | Performed by: STUDENT IN AN ORGANIZED HEALTH CARE EDUCATION/TRAINING PROGRAM

## 2021-06-06 PROCEDURE — 250N000011 HC RX IP 250 OP 636: Performed by: PHYSICIAN ASSISTANT

## 2021-06-06 PROCEDURE — 85610 PROTHROMBIN TIME: CPT | Performed by: PHYSICIAN ASSISTANT

## 2021-06-06 PROCEDURE — 97530 THERAPEUTIC ACTIVITIES: CPT | Mod: GO | Performed by: OCCUPATIONAL THERAPIST

## 2021-06-06 PROCEDURE — 80048 BASIC METABOLIC PNL TOTAL CA: CPT | Performed by: STUDENT IN AN ORGANIZED HEALTH CARE EDUCATION/TRAINING PROGRAM

## 2021-06-06 PROCEDURE — 250N000013 HC RX MED GY IP 250 OP 250 PS 637: Performed by: NURSE PRACTITIONER

## 2021-06-06 PROCEDURE — 258N000003 HC RX IP 258 OP 636: Performed by: NURSE PRACTITIONER

## 2021-06-06 PROCEDURE — 250N000013 HC RX MED GY IP 250 OP 250 PS 637: Performed by: PHYSICIAN ASSISTANT

## 2021-06-06 PROCEDURE — 80048 BASIC METABOLIC PNL TOTAL CA: CPT | Performed by: PHYSICIAN ASSISTANT

## 2021-06-06 PROCEDURE — 97140 MANUAL THERAPY 1/> REGIONS: CPT | Mod: GO | Performed by: OCCUPATIONAL THERAPIST

## 2021-06-06 PROCEDURE — 214N000001 HC R&B CCU UMMC

## 2021-06-06 PROCEDURE — 99232 SBSQ HOSP IP/OBS MODERATE 35: CPT | Performed by: NURSE PRACTITIONER

## 2021-06-06 PROCEDURE — 272N000004 HC RX 272: Performed by: NURSE PRACTITIONER

## 2021-06-06 PROCEDURE — 85027 COMPLETE CBC AUTOMATED: CPT | Performed by: PHYSICIAN ASSISTANT

## 2021-06-06 PROCEDURE — 250N000013 HC RX MED GY IP 250 OP 250 PS 637: Performed by: STUDENT IN AN ORGANIZED HEALTH CARE EDUCATION/TRAINING PROGRAM

## 2021-06-06 PROCEDURE — 83735 ASSAY OF MAGNESIUM: CPT | Performed by: PHYSICIAN ASSISTANT

## 2021-06-06 PROCEDURE — 250N000009 HC RX 250: Performed by: PHYSICIAN ASSISTANT

## 2021-06-06 RX ORDER — MAGNESIUM SULFATE HEPTAHYDRATE 40 MG/ML
2 INJECTION, SOLUTION INTRAVENOUS ONCE
Status: COMPLETED | OUTPATIENT
Start: 2021-06-06 | End: 2021-06-06

## 2021-06-06 RX ORDER — POTASSIUM CHLORIDE 750 MG/1
40 TABLET, EXTENDED RELEASE ORAL ONCE
Status: COMPLETED | OUTPATIENT
Start: 2021-06-06 | End: 2021-06-06

## 2021-06-06 RX ORDER — POTASSIUM CHLORIDE 750 MG/1
10 TABLET, EXTENDED RELEASE ORAL ONCE
Status: COMPLETED | OUTPATIENT
Start: 2021-06-06 | End: 2021-06-06

## 2021-06-06 RX ORDER — WARFARIN SODIUM 3 MG/1
3 TABLET ORAL
Status: COMPLETED | OUTPATIENT
Start: 2021-06-06 | End: 2021-06-06

## 2021-06-06 RX ADMIN — POTASSIUM CHLORIDE 60 MEQ: 750 TABLET, EXTENDED RELEASE ORAL at 14:58

## 2021-06-06 RX ADMIN — HYDROXYCHLOROQUINE SULFATE 200 MG: 200 TABLET, FILM COATED ORAL at 08:32

## 2021-06-06 RX ADMIN — POTASSIUM CHLORIDE 10 MEQ: 750 TABLET, EXTENDED RELEASE ORAL at 12:02

## 2021-06-06 RX ADMIN — MAGNESIUM SULFATE IN WATER 2 G: 40 INJECTION, SOLUTION INTRAVENOUS at 09:26

## 2021-06-06 RX ADMIN — DOBUTAMINE 2.5 MCG/KG/MIN: 12.5 INJECTION, SOLUTION INTRAVENOUS at 13:14

## 2021-06-06 RX ADMIN — OXYCODONE HYDROCHLORIDE AND ACETAMINOPHEN 500 MG: 500 TABLET ORAL at 08:32

## 2021-06-06 RX ADMIN — GABAPENTIN 300 MG: 300 CAPSULE ORAL at 14:58

## 2021-06-06 RX ADMIN — POTASSIUM CHLORIDE 40 MEQ: 750 TABLET, EXTENDED RELEASE ORAL at 12:02

## 2021-06-06 RX ADMIN — DIGOXIN 62.5 MCG: 0.06 TABLET ORAL at 06:10

## 2021-06-06 RX ADMIN — POTASSIUM CHLORIDE 60 MEQ: 750 TABLET, EXTENDED RELEASE ORAL at 08:31

## 2021-06-06 RX ADMIN — ACETAMINOPHEN 650 MG: 325 TABLET, FILM COATED ORAL at 06:11

## 2021-06-06 RX ADMIN — MACITENTAN 10 MG: 10 TABLET, FILM COATED ORAL at 15:00

## 2021-06-06 RX ADMIN — IPRATROPIUM BROMIDE 2 SPRAY: 42 SPRAY, METERED NASAL at 08:35

## 2021-06-06 RX ADMIN — IPRATROPIUM BROMIDE 2 SPRAY: 42 SPRAY, METERED NASAL at 21:11

## 2021-06-06 RX ADMIN — GABAPENTIN 300 MG: 300 CAPSULE ORAL at 08:32

## 2021-06-06 RX ADMIN — ACETAMINOPHEN 325 MG: 325 TABLET, FILM COATED ORAL at 23:18

## 2021-06-06 RX ADMIN — GABAPENTIN 300 MG: 300 CAPSULE ORAL at 20:59

## 2021-06-06 RX ADMIN — KETOTIFEN FUMARATE 1 DROP: 0.35 SOLUTION/ DROPS OPHTHALMIC at 08:36

## 2021-06-06 RX ADMIN — CHLOROTHIAZIDE SODIUM 500 MG: 500 INJECTION, POWDER, LYOPHILIZED, FOR SOLUTION INTRAVENOUS at 13:08

## 2021-06-06 RX ADMIN — WARFARIN SODIUM 3 MG: 3 TABLET ORAL at 21:10

## 2021-06-06 RX ADMIN — TRAMADOL HYDROCHLORIDE 50 MG: 50 TABLET ORAL at 08:46

## 2021-06-06 RX ADMIN — TRAMADOL HYDROCHLORIDE 50 MG: 50 TABLET ORAL at 14:58

## 2021-06-06 RX ADMIN — Medication 2 SPRAY: at 14:58

## 2021-06-06 RX ADMIN — ASPIRIN 81 MG CHEWABLE TABLET 81 MG: 81 TABLET CHEWABLE at 08:32

## 2021-06-06 RX ADMIN — BUMETANIDE 2 MG/HR: 0.25 INJECTION INTRAMUSCULAR; INTRAVENOUS at 08:51

## 2021-06-06 RX ADMIN — ACETAMINOPHEN 650 MG: 325 TABLET, FILM COATED ORAL at 00:20

## 2021-06-06 RX ADMIN — Medication 5 ML: at 20:59

## 2021-06-06 RX ADMIN — Medication 1 TABLET: at 08:32

## 2021-06-06 RX ADMIN — TREPROSTINIL 34 NG/KG/MIN: 20 INJECTION, SOLUTION INTRAVENOUS; SUBCUTANEOUS at 06:42

## 2021-06-06 RX ADMIN — BENZOCAINE AND MENTHOL 1 LOZENGE: 15; 3.6 LOZENGE ORAL at 08:46

## 2021-06-06 RX ADMIN — Medication 1 TABLET: at 20:59

## 2021-06-06 RX ADMIN — BUMETANIDE 2 MG/HR: 0.25 INJECTION INTRAMUSCULAR; INTRAVENOUS at 20:56

## 2021-06-06 ASSESSMENT — ACTIVITIES OF DAILY LIVING (ADL)
ADLS_ACUITY_SCORE: 16

## 2021-06-06 ASSESSMENT — MIFFLIN-ST. JEOR: SCORE: 909.53

## 2021-06-06 NOTE — PROGRESS NOTES
[facility]   Cardiology II Service / Advanced Heart Failure  Daily Progress Note  Date of Service: 6/6/2021      Patient: Karen Anderson  MRN: [id number]  Admission Date: 5/27/2021  Hospital Day # 10    Assessment and Plan: Karen Anderson is a 80yr old female with a history of chronic thromboembolic pulmonary hypertension (on IV treprostinil and macitentan), severe RV dysfunction, RA, systemic HTN, breast cancer (s/p chemotherapy with Adriamycin, Cytoxan, Taxol, and tamoxifen, and s/p left mastectomy), asthma, and scoliosis who presents with hypervolemia.     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 Echo 6/3/21 consistent with RVSP 117 mmHg, LVEF-55-60%, flattened septum, moderate RV dilation with moderately reduced RV function, moderate TR, and dilated IVC.   NYHA Class IV, Stage C  volume status: Hypervolemic. Continue Bumex 2 mg/hr with goal dry weight 110 lbs. Diuril 500 mg IV times one.   Aldosterone antagonist: Not on PTA.   - Continue Digoxin for RV support.   - Dobutamine 2.5 mcg/kg/min.      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 RHC 3/17/21 with mPA-44, mPCW-14. Not a candidate thromboendarterectomy/ S/p balloon pulmonary angioplasty 1/21.  WHO Group IV, Functional Class IV  - Continue Coumadin per pharmacy with goal 2-3, INR-2.37.  - Continue Opsumit 10 mg q48 hrs.   - Remodulin to 34 ng/kg/min.      Chronic Pancytopenia. Ferritin, Transferrin, Epo, and Soluble transerrin WNL 5/29. Intolerant to po Iron. 1 unit PRBC 5/31 inpatient for Hgb 7.   - Venofer 300 mg q72h, completed 3/3 doses 6/4/21.     Chronic Medical Conditions:   RA. Continue Hydroxychloroquine.   Distal right and ulnar right fracture. Cast in place. Continue Fosamax. PT/OT.   Allergic Rhinitis. Mild Persistent Asthma. Continue Beclomethasone, Albuterol prn, Cetrizine, and Ipratropium.      FEN: 2 gram sodium diet   PROPHY:  Coumadin  LINES:  Goldberg and PICC  DISPO:  TBD  CODE STATUS:  Full Code  "  ================================================================  Interval History/ROS: She notes persistent RIVERA, nonproductive cough, abdominal bloating, and LE edema. She notes headache this AM with Remodulin increase. She denies fever, chills, chest pain, palpitations, nausea, and vomiting. She notes mild diarrhea.     Last 24 hr care team notes reviewed.   ROS:  4 point ROS including Respiratory, CV, GI and , other than that noted in the HPI, is negative.     Medications: Reviewed in EPIC.     Physical Exam:   /60 (BP Location: Left leg)   Pulse 82   Temp 98  F (36.7  C) (Oral)   Resp 16   Ht 1.499 m (4' 11\")   Wt 52.4 kg (115 lb 8 oz)   SpO2 91%   BMI 23.33 kg/m    GENERAL: Appears alert and oriented times three.   HEENT: Eye symmetrical and free of discharge bilaterally. Mucous membranes moist and without lesions.  NECK: Supple and without lymphadenopathy. JVD mid neck  CV: RRR, S1S2 present without murmur, rub, or gallop.   RESPIRATORY: Respirations regular, even, and unlabored. BB crackles.   GI: Soft and non distended with normoactive bowel sounds present in all quadrants. No tenderness, rebound, guarding. No organomegaly.   EXTREMITIES: +2 RLE and +1 LLE peripheral edema. 2+ bilateral pedal pulses.   NEUROLOGIC: Alert and orientated x 3. CN II-XII grossly intact. No focal deficits.   MUSCULOSKELETAL: No joint swelling or tenderness.   SKIN: No jaundice. No rashes or lesions.     Data:  CMP  Recent Labs   Lab 06/06/21  0623 06/05/21  1755 06/05/21  0558 06/04/21  1745    137 141 136   POTASSIUM 3.7 4.3 3.5 4.7   CHLORIDE 97 96 98 95   CO2 40* 40* 41* 40*   ANIONGAP 2* <1* 2* <1*   GLC 73 100* 75 239*   BUN 25 29 30 33*   CR 1.08* 1.20* 1.08* 0.92   GFRESTIMATED 49* 43* 49* 60*   GFRESTBLACK 57* 50* 57* 69   ESTEFANÍA 9.2 9.4 9.2 9.3   MAG 2.0 2.2 2.3 2.4*     CBC  Recent Labs   Lab 06/06/21  0623 06/05/21  0558 06/04/21  0633 06/03/21  0540   WBC 4.6 5.1 4.6 3.9*   RBC 3.22* 3.13* 3.23* " 3.25*   HGB 8.1* 7.9* 8.0* 8.1*   HCT 28.3* 27.5* 28.6* 28.6*   MCV 88 88 89 88   MCH 25.2* 25.2* 24.8* 24.9*   MCHC 28.6* 28.7* 28.0* 28.3*   RDW 22.5* 22.3* 21.1* 20.9*    153 130* 151     INR  Recent Labs   Lab 06/06/21  0623 06/05/21  0558 06/04/21  0633 06/03/21  0540   INR 2.37* 2.09* 2.10* 2.62*       Patient discussed with Dr. Ahmadi.      Darleen Randall FN  6/6/2021

## 2021-06-06 NOTE — PLAN OF CARE
D: Admitted 5/27 with hypervolemia (SOB, weight gain).  PMHx: Chronic thromboembolic pulmonary HTN, severe RV dysfunction, RA, systemic HTN, breast cancer (s/p chemotherapy and left mastectomy), asthma, and scoliosis).    I: Monitored vitals and assessed pt status.   Running:   Bumex @8mL/hr via PICC  Dobutamine 2.5mcg/kg/min via PICC  Remodulin @34ng/kg/min via Goldberg  PRN: tramadol 50mg x2 for headache and generalized pain    A: A0x4. VSS. Afebrile on 4-5L O2 via humidified NC. O2 sometimes dips into upper 80s. SR on tele. Urinating adequately. LBM 6/6/21. SBA/assist x1 to commode. Replaced Mg (via IV). Pt got IV diuril and additional scheduled K+. R DL PICC purple lumen infusing. R chest Goldberg infusing remodulin. Has bilateral leg wraps on. Edema moderate in RLE and mild in LLE. Has cast on right arm. Pt has headaches and generalized pain gave PRN tramadol.    P: Continue to monitor Pt status and report changes to Cards 2 team.

## 2021-06-06 NOTE — PLAN OF CARE
"D: Pt admit 5/27/21 w/hypervolemia. PMH chronic thromboembolic pulmonary hypertension (IV treprostinil and macitentan), severe RV dysfunction, RA, systemic HTN, breast cancer (s/p chemotherapy and left mastectomy), asthma, and scoliosis who presents with hypervolemia.      I/A:   Neuro: A&Ox4. [place], hearing aids in place. Pt endorsed headache and \"eye pain\" PRN tylenol administered, cross cover notified. Pt voice hoarse, cards 2 intern paged inquiring about lozenges   VS: VSS. 4-6 L NC with humidification  Tele: SR. Pt has generalized edema, RLE > LLE. Pt endorsed numbness/tingling in extremities, baseline this admission per pt.  Pain: pt c/o pain in BLE controlled with PRN tylenol.   GI/: Urinating adequately into purewick overnight otherwise using bedside commode when awake. BM x2-3  Diet: 2g Na 2L FR  IV/Drips: R DL PICC infusing dobutamine gtt 2.5 mcg/kg/min and bumex gtt 2 mg/hr via purple lumen. Red lumen SL. treprostinil gtt infusing via hickmann at 34 ng/kg/min.   Activity: SBA w/cane  Skin/drains: legs red/kaylee. Red blanchable coccyx. Fragile skin. Primapore on L ankle for popped blister/weeping legs per pt.      P: Plan to Continue to monitor pt status and report changes to cards 2.      Simona Medina RN  "

## 2021-06-07 ENCOUNTER — APPOINTMENT (OUTPATIENT)
Dept: OCCUPATIONAL THERAPY | Facility: CLINIC | Age: 81
DRG: 291 | End: 2021-06-07
Attending: INTERNAL MEDICINE
Payer: COMMERCIAL

## 2021-06-07 LAB
ANION GAP SERPL CALCULATED.3IONS-SCNC: 1 MMOL/L (ref 3–14)
ANION GAP SERPL CALCULATED.3IONS-SCNC: 2 MMOL/L (ref 3–14)
BUN SERPL-MCNC: 25 MG/DL (ref 7–30)
BUN SERPL-MCNC: 27 MG/DL (ref 7–30)
CALCIUM SERPL-MCNC: 10.2 MG/DL (ref 8.5–10.1)
CALCIUM SERPL-MCNC: 9.3 MG/DL (ref 8.5–10.1)
CHLORIDE SERPL-SCNC: 97 MMOL/L (ref 94–109)
CHLORIDE SERPL-SCNC: 97 MMOL/L (ref 94–109)
CO2 SERPL-SCNC: 39 MMOL/L (ref 20–32)
CO2 SERPL-SCNC: 40 MMOL/L (ref 20–32)
CREAT SERPL-MCNC: 1.26 MG/DL (ref 0.52–1.04)
CREAT SERPL-MCNC: 1.33 MG/DL (ref 0.52–1.04)
ERYTHROCYTE [DISTWIDTH] IN BLOOD BY AUTOMATED COUNT: 22.5 % (ref 10–15)
GFR SERPL CREATININE-BSD FRML MDRD: 38 ML/MIN/{1.73_M2}
GFR SERPL CREATININE-BSD FRML MDRD: 41 ML/MIN/{1.73_M2}
GLUCOSE SERPL-MCNC: 107 MG/DL (ref 70–99)
GLUCOSE SERPL-MCNC: 67 MG/DL (ref 70–99)
HCT VFR BLD AUTO: 29.4 % (ref 35–47)
HGB BLD-MCNC: 8.4 G/DL (ref 11.7–15.7)
INR PPP: 2.17 (ref 0.86–1.14)
MAGNESIUM SERPL-MCNC: 2.3 MG/DL (ref 1.6–2.3)
MAGNESIUM SERPL-MCNC: 2.6 MG/DL (ref 1.6–2.3)
MCH RBC QN AUTO: 25.5 PG (ref 26.5–33)
MCHC RBC AUTO-ENTMCNC: 28.6 G/DL (ref 31.5–36.5)
MCV RBC AUTO: 89 FL (ref 78–100)
PLATELET # BLD AUTO: 140 10E9/L (ref 150–450)
POTASSIUM SERPL-SCNC: 3.9 MMOL/L (ref 3.4–5.3)
POTASSIUM SERPL-SCNC: 4.3 MMOL/L (ref 3.4–5.3)
RBC # BLD AUTO: 3.3 10E12/L (ref 3.8–5.2)
SODIUM SERPL-SCNC: 138 MMOL/L (ref 133–144)
SODIUM SERPL-SCNC: 139 MMOL/L (ref 133–144)
WBC # BLD AUTO: 5.1 10E9/L (ref 4–11)

## 2021-06-07 PROCEDURE — 83735 ASSAY OF MAGNESIUM: CPT | Performed by: STUDENT IN AN ORGANIZED HEALTH CARE EDUCATION/TRAINING PROGRAM

## 2021-06-07 PROCEDURE — 99232 SBSQ HOSP IP/OBS MODERATE 35: CPT | Performed by: NURSE PRACTITIONER

## 2021-06-07 PROCEDURE — 250N000011 HC RX IP 250 OP 636: Performed by: NURSE PRACTITIONER

## 2021-06-07 PROCEDURE — 85027 COMPLETE CBC AUTOMATED: CPT | Performed by: PHYSICIAN ASSISTANT

## 2021-06-07 PROCEDURE — 258N000003 HC RX IP 258 OP 636: Performed by: PHYSICIAN ASSISTANT

## 2021-06-07 PROCEDURE — 250N000013 HC RX MED GY IP 250 OP 250 PS 637: Performed by: NURSE PRACTITIONER

## 2021-06-07 PROCEDURE — 85610 PROTHROMBIN TIME: CPT | Performed by: PHYSICIAN ASSISTANT

## 2021-06-07 PROCEDURE — 272N000004 HC RX 272: Performed by: NURSE PRACTITIONER

## 2021-06-07 PROCEDURE — 83735 ASSAY OF MAGNESIUM: CPT | Performed by: PHYSICIAN ASSISTANT

## 2021-06-07 PROCEDURE — 250N000009 HC RX 250: Performed by: PHYSICIAN ASSISTANT

## 2021-06-07 PROCEDURE — 250N000011 HC RX IP 250 OP 636: Performed by: PHYSICIAN ASSISTANT

## 2021-06-07 PROCEDURE — 250N000013 HC RX MED GY IP 250 OP 250 PS 637: Performed by: INTERNAL MEDICINE

## 2021-06-07 PROCEDURE — 250N000013 HC RX MED GY IP 250 OP 250 PS 637: Performed by: STUDENT IN AN ORGANIZED HEALTH CARE EDUCATION/TRAINING PROGRAM

## 2021-06-07 PROCEDURE — 250N000013 HC RX MED GY IP 250 OP 250 PS 637: Performed by: PHYSICIAN ASSISTANT

## 2021-06-07 PROCEDURE — 97140 MANUAL THERAPY 1/> REGIONS: CPT | Mod: GO

## 2021-06-07 PROCEDURE — 80048 BASIC METABOLIC PNL TOTAL CA: CPT | Performed by: PHYSICIAN ASSISTANT

## 2021-06-07 PROCEDURE — 97530 THERAPEUTIC ACTIVITIES: CPT | Mod: GO

## 2021-06-07 PROCEDURE — 80048 BASIC METABOLIC PNL TOTAL CA: CPT | Performed by: STUDENT IN AN ORGANIZED HEALTH CARE EDUCATION/TRAINING PROGRAM

## 2021-06-07 PROCEDURE — 214N000001 HC R&B CCU UMMC

## 2021-06-07 RX ORDER — WARFARIN SODIUM 2.5 MG/1
2.5 TABLET ORAL
Status: COMPLETED | OUTPATIENT
Start: 2021-06-07 | End: 2021-06-07

## 2021-06-07 RX ORDER — BUMETANIDE 2 MG/1
4 TABLET ORAL 3 TIMES DAILY
Status: DISCONTINUED | OUTPATIENT
Start: 2021-06-07 | End: 2021-06-10 | Stop reason: HOSPADM

## 2021-06-07 RX ORDER — POTASSIUM CHLORIDE 1500 MG/1
40 TABLET, EXTENDED RELEASE ORAL 2 TIMES DAILY
Qty: 120 TABLET | Refills: 3 | OUTPATIENT
Start: 2021-06-07

## 2021-06-07 RX ADMIN — POTASSIUM CHLORIDE 60 MEQ: 750 TABLET, EXTENDED RELEASE ORAL at 20:24

## 2021-06-07 RX ADMIN — HYDROXYCHLOROQUINE SULFATE 200 MG: 200 TABLET, FILM COATED ORAL at 08:32

## 2021-06-07 RX ADMIN — Medication 1 TABLET: at 08:32

## 2021-06-07 RX ADMIN — IPRATROPIUM BROMIDE 2 SPRAY: 42 SPRAY, METERED NASAL at 08:32

## 2021-06-07 RX ADMIN — WARFARIN SODIUM 2.5 MG: 2.5 TABLET ORAL at 17:20

## 2021-06-07 RX ADMIN — DIGOXIN 62.5 MCG: 0.06 TABLET ORAL at 08:32

## 2021-06-07 RX ADMIN — Medication 5 ML: at 20:25

## 2021-06-07 RX ADMIN — GABAPENTIN 300 MG: 300 CAPSULE ORAL at 08:32

## 2021-06-07 RX ADMIN — BUMETANIDE 4 MG: 2 TABLET ORAL at 17:20

## 2021-06-07 RX ADMIN — ASPIRIN 81 MG CHEWABLE TABLET 81 MG: 81 TABLET CHEWABLE at 08:31

## 2021-06-07 RX ADMIN — KETOTIFEN FUMARATE 1 DROP: 0.35 SOLUTION/ DROPS OPHTHALMIC at 08:32

## 2021-06-07 RX ADMIN — TREPROSTINIL 34 NG/KG/MIN: 20 INJECTION, SOLUTION INTRAVENOUS; SUBCUTANEOUS at 07:40

## 2021-06-07 RX ADMIN — BUMETANIDE 2 MG/HR: 0.25 INJECTION INTRAMUSCULAR; INTRAVENOUS at 07:38

## 2021-06-07 RX ADMIN — GABAPENTIN 300 MG: 300 CAPSULE ORAL at 20:25

## 2021-06-07 RX ADMIN — BUMETANIDE 4 MG: 2 TABLET ORAL at 14:46

## 2021-06-07 RX ADMIN — GABAPENTIN 300 MG: 300 CAPSULE ORAL at 13:14

## 2021-06-07 RX ADMIN — POTASSIUM CHLORIDE 60 MEQ: 750 TABLET, EXTENDED RELEASE ORAL at 13:14

## 2021-06-07 RX ADMIN — IRON SUCROSE 300 MG: 20 INJECTION, SOLUTION INTRAVENOUS at 17:13

## 2021-06-07 RX ADMIN — OXYCODONE HYDROCHLORIDE AND ACETAMINOPHEN 500 MG: 500 TABLET ORAL at 08:32

## 2021-06-07 RX ADMIN — POTASSIUM CHLORIDE 60 MEQ: 750 TABLET, EXTENDED RELEASE ORAL at 08:31

## 2021-06-07 RX ADMIN — Medication 1 TABLET: at 20:25

## 2021-06-07 RX ADMIN — IPRATROPIUM BROMIDE 2 SPRAY: 42 SPRAY, METERED NASAL at 20:25

## 2021-06-07 ASSESSMENT — ACTIVITIES OF DAILY LIVING (ADL)
ADLS_ACUITY_SCORE: 17
ADLS_ACUITY_SCORE: 17
ADLS_ACUITY_SCORE: 16
ADLS_ACUITY_SCORE: 17
ADLS_ACUITY_SCORE: 16
ADLS_ACUITY_SCORE: 16

## 2021-06-07 ASSESSMENT — MIFFLIN-ST. JEOR: SCORE: 907.27

## 2021-06-07 NOTE — PLAN OF CARE
DX:  Fluid volume overload.   PMH: Pulmonary HTN, Breast cancer s/p mastectomy. Scoliosis, RA, HTN,RV dysfunction    Code Status: Full code  Team: Cards 2    Cardiac: SR. VSS.  Respiratory: 6L O2 via nasal cannula. Tolerating well.  Neuro: A&Ox4. Forgetful. Calm and cooperative.   Pain: Generalized achyness. Pt tolerating.   GI/: Medium BM. Utilizing bedside commode.   Diet: 2G sodium restriction  2 L FR.   Skin: Lymph wraps to BLE. Mepilex to sacrum/coccyx. Cast to right forearm and wrist. Otherwise intact.   Activity: Stand by assist to assist of one. Tolerating well    Gtts/fluid:  Remodulin @ 34ng/kg/min  Dobutamine @ 2.5mcg/jg/min    Plan: Activity promotion. Continue advanced therapies. Monitor and report to the team.    Chandrika García RN

## 2021-06-07 NOTE — PROGRESS NOTES
"SPIRITUAL HEALTH SERVICES  SPIRITUAL ASSESSMENT Progress Note  [facility] 6C     Pt talked today mostly about the difficulties of coping with loss of control - \"I feel sometimes like I'm on a chain gang, shackled to all this equipment...\" Pt asked for prayers for help with coping with the changes happening in her life, particularly her health and her sense of loss of control.     PLAN: continue to follow, visiting at least 2x weekly while pt on unit.    Daniel Harmon) Satinder Castro M.Div., [facility]  Staff   Pager [phone]      * [facility] remains available 24/7 for emergent requests/referrals, either by having the switchboard page the on-call  or by entering an ASAP/STAT consult in Epic (this will also page the on-call ). Routine Epic consults receive an initial response within 24 hours.*      "

## 2021-06-07 NOTE — PLAN OF CARE
D: Pt admit 5/27/21 w/hypervolemia. PMH chronic thromboembolic pulmonary hypertension (IV treprostinil and macitentan), severe RV dysfunction, RA, systemic HTN, breast cancer (s/p chemotherapy and left mastectomy), asthma, and scoliosis who presents with hypervolemia.      I/A:   Neuro: A&Ox4. [place], hearing aids in place. After awakening from sleep yesterday evening, pt was found very anxious and adamant on switching remodulin from hospital pump to home cassette. Neuro exam WDL, pt redirectable. Pt requested to call  with writer present to discuss situation. Agreed pt possibly having anxiety following deep sleep, bed alarm on overnight for increased safety.   VS: VSS. 4-6 L NC with humidification  Tele: SR. Pt has generalized edema, RLE > LLE. BLE lumph wraps in place overnight. Pt endorsed numbness/tingling in extremities, baseline this admission per pt.  Pain: pt c/o pain in BLE controlled with PRN tylenol.   GI/: Urinating adequately into purewick overnight otherwise using bedside commode when awake. BM x2-3  Diet: 2g Na 2L FR. Pt needs reinforcement regarding fluid restriction, pt continually asking for 2-3 cups of ice chips at once despite education on limiting to one cup at a time  IV/Drips: R DL PICC infusing dobutamine gtt 2.5 mcg/kg/min and bumex gtt 2 mg/hr via purple lumen. Red lumen SL. treprostinil gtt infusing via hickmann at 34 ng/kg/min.   Activity: Ax1 w/cane or IV pole  Skin/drains: legs red/kaylee. Red blanchable coccyx. Fragile skin. Primapore on L ankle for popped blister/weeping legs per pt.      P: Plan to Continue to monitor pt status and report changes to cards 2.      Simona Medina RN

## 2021-06-07 NOTE — PROGRESS NOTES
Care Management Follow Up    Length of Stay (days): 11    Expected Discharge Date: 06/09/21      Concerns to be Addressed:       Patient plan of care discussed at interdisciplinary rounds: Yes     Anticipated Discharge Disposition:  Home  Anticipated Discharge Services:  [facility] (PT/OT/lymph) [facility] ph: [phone] fx: [phone] (remodulin - resumed).    Anticipated Discharge DME:  [facility] - oxygen PTA.     Additional Information:   left  requesting referral to [facility]. They do not accept [employer] insurance. They recommended [facility]. They do not have lymphedema therapists. [facility] has availability and can see patient starting 6/11/21.   Spoke with  to discuss situation and gave opportunity to choose different agency besides [facility]. He will go with [facility]. Referral sent.      also mentioned patient had oxygen set up through [place] in May. He has a portable tank in the car for discharge and concentrator at home.       Tania Arora RN, [place]  Float Care Coordinator  Covering 6C RNCC   Pager: [phone]

## 2021-06-07 NOTE — PROGRESS NOTES
Care Management Follow Up    Length of Stay (days): 11    Expected Discharge Date: 06/09/21     Concerns to be Addressed:       Patient plan of care discussed at interdisciplinary rounds: Yes    Anticipated Discharge Disposition:  Home     Anticipated Discharge Services:  [facility] ph: [phone] fx: [phone] (remodulin - resumed).  Plan for home RN, PT, OT - agency pending  Anticipated Discharge DME:      Patient/family educated on Medicare website which has current facility and service quality ratings:  [facility]  Education Provided on the Discharge Plan:  Yes  Patient/Family in Agreement with the Plan:  Yes    Referrals Placed by CM/SW:  Will initiate home care referral pending pt/spouse    Additional Information:  Per primary RNCC team anticipates patient will discharge end of week.  Home care RN, PT, OT has been recommended.  Pt will resume home remodulin infusion.  Patient will not discharge on IV dobutamine.    Met with pt.  Reviewed Medicare.gov star rated home care.  Pt requested writer call her spouse to discuss home care preferences.   Spoke with pt spouse via phone.  Introduced RNCC role.  Discussed Medicare.gov site for home care agency options.  Pt spouse agreed to review and f/u with 6C RNCC.  Provided pt spouse with 6C RNCC phone #.       Elinor Martinez RN BSN, PHN, ACM-RN  7A RN Care Coordinator    6/7/2021 2:46 PM

## 2021-06-07 NOTE — PROGRESS NOTES
[facility]   Cardiology II Service / Advanced Heart Failure  Daily Progress Note  Date of Service: 6/7/2021      Patient: Karen Anderson  MRN: [id number]  Admission Date: 5/27/2021  Hospital Day # 11    Assessment and Plan: Karen Anderson is a 80yr old female with a history of chronic thromboembolic pulmonary hypertension (on IV treprostinil and macitentan), severe RV dysfunction, RA, systemic HTN, breast cancer (s/p chemotherapy with Adriamycin, Cytoxan, Taxol, and tamoxifen, and s/p left mastectomy), asthma, and scoliosis who presents with hypervolemia.     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 Echo 6/3/21 consistent with RVSP 117 mmHg, LVEF-55-60%, flattened septum, moderate RV dilation with moderately reduced RV function, moderate TR, and dilated IVC.   NYHA Class IV, Stage C  volume status: Hypervolemic. Continue Bumex 2 mg/hr with goal dry weight 110 lbs. Transition to Bumex 4 mg po TID.   Aldosterone antagonist: Not on PTA.   - Continue Digoxin for RV support.   - Dobutamine 2.5 mcg/kg/min.      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 RHC 3/17/21 with mPA-44, mPCW-14. Not a candidate thromboendarterectomy/ S/p balloon pulmonary angioplasty 1/21.  WHO Group IV, Functional Class IV  - Continue Coumadin per pharmacy with goal 2-3, INR-2.17.  - Continue Opsumit 10 mg q48 hrs.   - Remodulin 34 ng/kg/min.      Chronic Pancytopenia. Ferritin, Transferrin, Epo, and Soluble transerrin WNL 5/29. Intolerant to po Iron. 1 unit PRBC 5/31 inpatient for Hgb 7.   - Venofer 300 mg q72h, completed 3/3 doses 6/4/21.     Chronic Medical Conditions:   RA. Continue Hydroxychloroquine.   Distal right and ulnar right fracture. Cast in place. Continue Fosamax. PT/OT.   Allergic Rhinitis. Mild Persistent Asthma. Continue Beclomethasone, Albuterol prn, Cetrizine, and Ipratropium.      FEN: 2 gram sodium diet   PROPHY:  Coumadin  LINES:  Goldberg and PICC  DISPO:  TBD  CODE STATUS:  Full  "Code   ================================================================  Interval History/ROS: She notes episode of confusion following Tramadol yesterday evening. She denies headache, vision changes, speech changes, fever, chills, chest pain, palpitations, cough, nausea, vomiting, and diarrhea today. She notes persistent RIVERA and LE edema, improved slightly today. She is tolerating oral intake and ambulation with therapy.     Last 24 hr care team notes reviewed.   ROS:  4 point ROS including Respiratory, CV, GI and , other than that noted in the HPI, is negative.     Medications: Reviewed in EPIC.     Physical Exam:   /53 (BP Location: Left leg)   Pulse 93   Temp 99.1  F (37.3  C) (Oral)   Resp 16   Ht 1.499 m (4' 11\")   Wt 52.2 kg (115 lb)   SpO2 91%   BMI 23.23 kg/m    GENERAL: Appears alert and oriented times three.   HEENT: Eye symmetrical and free of discharge bilaterally. Mucous membranes moist and without lesions.  NECK: Supple and without lymphadenopathy. JVD mid neck.   CV: RRR, S1S2 present without murmur, rub, or gallop.   RESPIRATORY: Respirations regular, even, and unlabored. Lungs CTA throughout.   GI: Soft and non distended with normoactive bowel sounds present in all quadrants. No tenderness, rebound, guarding. No organomegaly.   EXTREMITIES: +1 RLE and trace LLE peripheral edema. 2+ bilateral pedal pulses.   NEUROLOGIC: Alert and orientated x 3. CN II-XII grossly intact. No focal deficits.   MUSCULOSKELETAL: No joint swelling or tenderness.   SKIN: No jaundice. No rashes or lesions. Goldberg CDI.     Data:  CMP  Recent Labs   Lab 06/07/21  0440 06/06/21  2240 06/06/21  1655 06/06/21  0623 06/05/21  1755     --  136 138 137   POTASSIUM 3.9 5.1 5.4* 3.7 4.3   CHLORIDE 97  --  97 97 96   CO2 40*  --  40* 40* 40*   ANIONGAP 2*  --  <1* 2* <1*   GLC 67*  --  121* 73 100*   BUN 25  --  28 25 29   CR 1.26*  --  1.27* 1.08* 1.20*   GFRESTIMATED 41*  --  40* 49* 43*   GFRESTBLACK 47*  --  " 47* 57* 50*   ESTEFANÍA 10.2*  --  9.7 9.2 9.4   MAG 2.6*  --  2.7* 2.0 2.2     CBC  Recent Labs   Lab 06/07/21  0440 06/06/21  0623 06/05/21  0558 06/04/21  0633   WBC 5.1 4.6 5.1 4.6   RBC 3.30* 3.22* 3.13* 3.23*   HGB 8.4* 8.1* 7.9* 8.0*   HCT 29.4* 28.3* 27.5* 28.6*   MCV 89 88 88 89   MCH 25.5* 25.2* 25.2* 24.8*   MCHC 28.6* 28.6* 28.7* 28.0*   RDW 22.5* 22.5* 22.3* 21.1*   * 152 153 130*     INR  Recent Labs   Lab 06/07/21  0440 06/06/21 0623 06/05/21  0558 06/04/21  0633   INR 2.17* 2.37* 2.09* 2.10*       Patient discussed with Dr. Ahmadi.      Darleen Randall FN  6/7/2021

## 2021-06-08 ENCOUNTER — APPOINTMENT (OUTPATIENT)
Dept: OCCUPATIONAL THERAPY | Facility: CLINIC | Age: 81
DRG: 291 | End: 2021-06-08
Attending: INTERNAL MEDICINE
Payer: COMMERCIAL

## 2021-06-08 LAB
ANION GAP SERPL CALCULATED.3IONS-SCNC: <1 MMOL/L (ref 3–14)
BUN SERPL-MCNC: 27 MG/DL (ref 7–30)
CALCIUM SERPL-MCNC: 9.8 MG/DL (ref 8.5–10.1)
CHLORIDE SERPL-SCNC: 100 MMOL/L (ref 94–109)
CO2 SERPL-SCNC: 39 MMOL/L (ref 20–32)
CREAT SERPL-MCNC: 1.11 MG/DL (ref 0.52–1.04)
ERYTHROCYTE [DISTWIDTH] IN BLOOD BY AUTOMATED COUNT: 23 % (ref 10–15)
GFR SERPL CREATININE-BSD FRML MDRD: 47 ML/MIN/{1.73_M2}
GLUCOSE SERPL-MCNC: 73 MG/DL (ref 70–99)
HCT VFR BLD AUTO: 30.3 % (ref 35–47)
HGB BLD-MCNC: 8.7 G/DL (ref 11.7–15.7)
INR PPP: 1.83 (ref 0.86–1.14)
INR PPP: NORMAL (ref 0.86–1.14)
MAGNESIUM SERPL-MCNC: 2.1 MG/DL (ref 1.6–2.3)
MAGNESIUM SERPL-MCNC: 2.3 MG/DL (ref 1.6–2.3)
MCH RBC QN AUTO: 25.7 PG (ref 26.5–33)
MCHC RBC AUTO-ENTMCNC: 28.7 G/DL (ref 31.5–36.5)
MCV RBC AUTO: 89 FL (ref 78–100)
PLATELET # BLD AUTO: 157 10E9/L (ref 150–450)
POTASSIUM SERPL-SCNC: 3.4 MMOL/L (ref 3.4–5.3)
RBC # BLD AUTO: 3.39 10E12/L (ref 3.8–5.2)
SODIUM SERPL-SCNC: 139 MMOL/L (ref 133–144)
WBC # BLD AUTO: 4.4 10E9/L (ref 4–11)

## 2021-06-08 PROCEDURE — 250N000011 HC RX IP 250 OP 636: Performed by: NURSE PRACTITIONER

## 2021-06-08 PROCEDURE — 85610 PROTHROMBIN TIME: CPT | Performed by: INTERNAL MEDICINE

## 2021-06-08 PROCEDURE — 214N000001 HC R&B CCU UMMC

## 2021-06-08 PROCEDURE — 250N000011 HC RX IP 250 OP 636: Performed by: PHYSICIAN ASSISTANT

## 2021-06-08 PROCEDURE — 250N000013 HC RX MED GY IP 250 OP 250 PS 637: Performed by: NURSE PRACTITIONER

## 2021-06-08 PROCEDURE — 250N000013 HC RX MED GY IP 250 OP 250 PS 637: Performed by: STUDENT IN AN ORGANIZED HEALTH CARE EDUCATION/TRAINING PROGRAM

## 2021-06-08 PROCEDURE — 85027 COMPLETE CBC AUTOMATED: CPT | Performed by: PHYSICIAN ASSISTANT

## 2021-06-08 PROCEDURE — 97530 THERAPEUTIC ACTIVITIES: CPT | Mod: GO

## 2021-06-08 PROCEDURE — 250N000013 HC RX MED GY IP 250 OP 250 PS 637: Performed by: PHYSICIAN ASSISTANT

## 2021-06-08 PROCEDURE — 97535 SELF CARE MNGMENT TRAINING: CPT | Mod: GO

## 2021-06-08 PROCEDURE — 80048 BASIC METABOLIC PNL TOTAL CA: CPT | Performed by: PHYSICIAN ASSISTANT

## 2021-06-08 PROCEDURE — 250N000013 HC RX MED GY IP 250 OP 250 PS 637: Performed by: INTERNAL MEDICINE

## 2021-06-08 PROCEDURE — 83735 ASSAY OF MAGNESIUM: CPT | Performed by: STUDENT IN AN ORGANIZED HEALTH CARE EDUCATION/TRAINING PROGRAM

## 2021-06-08 PROCEDURE — 272N000004 HC RX 272: Performed by: NURSE PRACTITIONER

## 2021-06-08 PROCEDURE — 83735 ASSAY OF MAGNESIUM: CPT | Performed by: PHYSICIAN ASSISTANT

## 2021-06-08 PROCEDURE — 99232 SBSQ HOSP IP/OBS MODERATE 35: CPT | Performed by: NURSE PRACTITIONER

## 2021-06-08 PROCEDURE — 97140 MANUAL THERAPY 1/> REGIONS: CPT | Mod: GO

## 2021-06-08 RX ORDER — WARFARIN SODIUM 3 MG/1
3 TABLET ORAL
Status: DISCONTINUED | OUTPATIENT
Start: 2021-06-08 | End: 2021-06-09

## 2021-06-08 RX ORDER — POTASSIUM CHLORIDE 750 MG/1
20 TABLET, EXTENDED RELEASE ORAL ONCE
Status: COMPLETED | OUTPATIENT
Start: 2021-06-08 | End: 2021-06-08

## 2021-06-08 RX ORDER — WARFARIN SODIUM 3 MG/1
3 TABLET ORAL
Status: COMPLETED | OUTPATIENT
Start: 2021-06-08 | End: 2021-06-08

## 2021-06-08 RX ADMIN — Medication 1 TABLET: at 08:15

## 2021-06-08 RX ADMIN — OXYCODONE HYDROCHLORIDE AND ACETAMINOPHEN 500 MG: 500 TABLET ORAL at 08:15

## 2021-06-08 RX ADMIN — KETOTIFEN FUMARATE 1 DROP: 0.35 SOLUTION/ DROPS OPHTHALMIC at 08:15

## 2021-06-08 RX ADMIN — DIGOXIN 62.5 MCG: 0.06 TABLET ORAL at 08:14

## 2021-06-08 RX ADMIN — GABAPENTIN 300 MG: 300 CAPSULE ORAL at 13:08

## 2021-06-08 RX ADMIN — GABAPENTIN 300 MG: 300 CAPSULE ORAL at 20:54

## 2021-06-08 RX ADMIN — HYDROXYCHLOROQUINE SULFATE 200 MG: 200 TABLET, FILM COATED ORAL at 08:15

## 2021-06-08 RX ADMIN — GABAPENTIN 300 MG: 300 CAPSULE ORAL at 08:15

## 2021-06-08 RX ADMIN — BUMETANIDE 4 MG: 2 TABLET ORAL at 08:15

## 2021-06-08 RX ADMIN — POTASSIUM CHLORIDE 60 MEQ: 750 TABLET, EXTENDED RELEASE ORAL at 20:54

## 2021-06-08 RX ADMIN — MACITENTAN 10 MG: 10 TABLET, FILM COATED ORAL at 15:07

## 2021-06-08 RX ADMIN — BUMETANIDE 4 MG: 2 TABLET ORAL at 13:08

## 2021-06-08 RX ADMIN — POTASSIUM CHLORIDE 20 MEQ: 750 TABLET, EXTENDED RELEASE ORAL at 08:15

## 2021-06-08 RX ADMIN — POTASSIUM CHLORIDE 60 MEQ: 750 TABLET, EXTENDED RELEASE ORAL at 13:08

## 2021-06-08 RX ADMIN — POTASSIUM CHLORIDE 60 MEQ: 750 TABLET, EXTENDED RELEASE ORAL at 08:14

## 2021-06-08 RX ADMIN — TREPROSTINIL 34 NG/KG/MIN: 20 INJECTION, SOLUTION INTRAVENOUS; SUBCUTANEOUS at 08:12

## 2021-06-08 RX ADMIN — ASPIRIN 81 MG CHEWABLE TABLET 81 MG: 81 TABLET CHEWABLE at 08:14

## 2021-06-08 RX ADMIN — IPRATROPIUM BROMIDE 2 SPRAY: 42 SPRAY, METERED NASAL at 20:55

## 2021-06-08 RX ADMIN — Medication 1 TABLET: at 20:54

## 2021-06-08 RX ADMIN — ACETAMINOPHEN 650 MG: 325 TABLET, FILM COATED ORAL at 12:34

## 2021-06-08 RX ADMIN — IPRATROPIUM BROMIDE 2 SPRAY: 42 SPRAY, METERED NASAL at 08:15

## 2021-06-08 RX ADMIN — BUMETANIDE 4 MG: 2 TABLET ORAL at 17:18

## 2021-06-08 RX ADMIN — Medication 5 ML: at 21:08

## 2021-06-08 RX ADMIN — WARFARIN SODIUM 3 MG: 3 TABLET ORAL at 18:40

## 2021-06-08 ASSESSMENT — ACTIVITIES OF DAILY LIVING (ADL)
ADLS_ACUITY_SCORE: 16

## 2021-06-08 NOTE — PROGRESS NOTES
[facility]   Cardiology II Service / Advanced Heart Failure  Daily Progress Note  Date of Service: 6/8/2021      Patient: Karen Anderson  MRN: [id number]  Admission Date: 5/27/2021  Hospital Day # 12    Assessment and Plan: Karen Anderson is a 80yr old female with a history of chronic thromboembolic pulmonary hypertension (on IV treprostinil and macitentan), severe RV dysfunction, RA, systemic HTN, breast cancer (s/p chemotherapy with Adriamycin, Cytoxan, Taxol, and tamoxifen, and s/p left mastectomy), asthma, and scoliosis who presents with hypervolemia.     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 Echo 6/3/21 consistent with RVSP 117 mmHg, LVEF-55-60%, flattened septum, moderate RV dilation with moderately reduced RV function, moderate TR, and dilated IVC.   NYHA Class IV, Stage C  volume status: Euvolemic. Bumex 4 mg po TID.   Aldosterone antagonist: Not on PTA.   - Continue Digoxin for RV support.   - Dobutamine discontinued today.     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 RHC 3/17/21 with mPA-44, mPCW-14. Not a candidate thromboendarterectomy/ S/p balloon pulmonary angioplasty 1/21.  WHO Group IV, Functional Class IV  - Continue Coumadin per pharmacy with goal 2-3, INR-1.83.  - Continue Opsumit 10 mg q48 hrs.   - Remodulin 34 ng/kg/min.      Chronic Pancytopenia. Ferritin, Transferrin, Epo, and Soluble transerrin WNL 5/29. Intolerant to po Iron. 1 unit PRBC 5/31 inpatient for Hgb 7.   - Venofer 300 mg q72h, completed 3/3 doses 6/4/21.     Chronic Medical Conditions:   RA. Continue Hydroxychloroquine.   Distal right and ulnar right fracture. Cast in place. Continue Fosamax. PT/OT.   Allergic Rhinitis. Mild Persistent Asthma. Continue Beclomethasone, Albuterol prn, Cetrizine, and Ipratropium.      FEN: 2 gram sodium diet   PROPHY:  Coumadin  LINES:  Goldberg and PICC  DISPO:  TBD  CODE STATUS:  Full Code   ================================================================  Interval History/ROS:  "She notes persistent RIVERA and LE edema. She denies fever, chills, chest pain, palpitations, cough, nausea, vomiting, diarrhea, and headache. She is tolerating oral intake and working with therapy.     Last 24 hr care team notes reviewed.   ROS:  4 point ROS including Respiratory, CV, GI and , other than that noted in the HPI, is negative.     Medications: Reviewed in EPIC.     Physical Exam:   /72   Pulse 75   Temp 97.6  F (36.4  C) (Oral)   Resp 18   Ht 1.499 m (4' 11\")   Wt 52.2 kg (115 lb)   SpO2 93%   BMI 23.23 kg/m    GENERAL: Appears alert and oriented times three.   HEENT: Eye symmetrical and free of discharge bilaterally. Mucous membranes moist and without lesions.  NECK: Supple and without lymphadenopathy. JVD 10 cm.   CV: RRR, S1S2 present without murmur, rub, or gallop.   RESPIRATORY: Respirations regular, even, and unlabored. Lungs CTA throughout.   GI: Soft and non distended with normoactive bowel sounds present in all quadrants. No tenderness, rebound, guarding. No organomegaly.   EXTREMITIES: +1 RLE and trace LLE peripheral edema. 2+ bilateral pedal pulses.   NEUROLOGIC: Alert and orientated x 3. CN II-XII grossly intact. No focal deficits.   MUSCULOSKELETAL: No joint swelling or tenderness.   SKIN: No jaundice. No rashes or lesions. Goldberg CDI.     Data:  CMP  Recent Labs   Lab 06/08/21  0625 06/07/21  1717 06/07/21  0440 06/06/21  2240 06/06/21  1655    138 139  --  136   POTASSIUM 3.4 4.3 3.9 5.1 5.4*   CHLORIDE 100 97 97  --  97   CO2 39* 39* 40*  --  40*   ANIONGAP <1* 1* 2*  --  <1*   GLC 73 107* 67*  --  121*   BUN 27 27 25  --  28   CR 1.11* 1.33* 1.26*  --  1.27*   GFRESTIMATED 47* 38* 41*  --  40*   GFRESTBLACK 55* 44* 47*  --  47*   ESTEFANÍA 9.8 9.3 10.2*  --  9.7   MAG 2.3 2.3 2.6*  --  2.7*     CBC  Recent Labs   Lab 06/08/21  0625 06/07/21  0440 06/06/21  0623 06/05/21  0558   WBC 4.4 5.1 4.6 5.1   RBC 3.39* 3.30* 3.22* 3.13*   HGB 8.7* 8.4* 8.1* 7.9*   HCT 30.3* 29.4* " 28.3* 27.5*   MCV 89 89 88 88   MCH 25.7* 25.5* 25.2* 25.2*   MCHC 28.7* 28.6* 28.6* 28.7*   RDW 23.0* 22.5* 22.5* 22.3*    140* 152 153     INR  Recent Labs   Lab 06/08/21  0820 06/08/21  0625 06/07/21  0440 06/06/21  0623   INR 1.83* Canceled, Test credited 2.17* 2.37*       Patient discussed with Dr. Ahmadi.      Darleen Randall FN  6/8/2021

## 2021-06-08 NOTE — PLAN OF CARE
DX:  Fluid volume overload.   PMH: Pulmonary HTN, Breast cancer s/p mastectomy. Scoliosis, RA, HTN,RV dysfunction     Code Status: Full code  Team: Cards 2     Cardiac: SR. VSS.  Respiratory: 5L O2 via nasal cannula. Tolerating well.  Neuro: A&Ox4. Forgetful. Calm and cooperative.   Pain: Back discomfort. Aqua k pad used with some relief.  GI/: Many small hard/formed BMs. Utilizing bedside commode.   Diet: 2G sodium restriction  2 L FR.   Skin: Lymph wraps to BLE. Mepilex to sacrum/coccyx. Cast to right forearm and wrist. Otherwise intact.   Activity: Stand by assist to assist of one. Tolerating well     Gtts/fluid:  Remodulin @ 34ng/kg/min     Plan: Activity promotion. Continue advanced therapies. Monitor and report to the team.     Chandrika García RN

## 2021-06-08 NOTE — PLAN OF CARE
Temp: 98.1  F (36.7  C) Temp src: Oral BP: 122/47 Pulse: 83   Resp: 18 SpO2: 94 % O2 Device: Nasal cannula with humidification Oxygen Delivery: 5 LPM     D: Hypervolemia. Hx chronic thromboembolic pulmonary HTN, severe RV dysfunction, breast CA (s/p chemo and left mastectomy), HTN, RA, asthma, scoliosis.    I/A: Monitored vitals and assessed pt status. A&O x4. VSS see flowsheet. SR. O2 at 4-5L via NC, humidification on. Reports generalized pain, declined intervention. Dobutamine gtt at 2.5 mcg/kg/min, plan to wean prior to discharge. Remodulin gtt at 34 ng/kg/min. Voiding good amount, purewick at hs d/t urgency. +BM. Generalized edema. RLE>LLE, lymph wraps in place. Up SBA. Sleeping between cares.    P: Possible discharge to home Wed 6/9 with remoduin and home O2. Continue to monitor and follow POC. Notify Cards 2 with changes.

## 2021-06-09 ENCOUNTER — APPOINTMENT (OUTPATIENT)
Dept: OCCUPATIONAL THERAPY | Facility: CLINIC | Age: 81
DRG: 291 | End: 2021-06-09
Attending: INTERNAL MEDICINE
Payer: COMMERCIAL

## 2021-06-09 LAB
ANION GAP SERPL CALCULATED.3IONS-SCNC: 1 MMOL/L (ref 3–14)
BUN SERPL-MCNC: 28 MG/DL (ref 7–30)
CALCIUM SERPL-MCNC: 9.7 MG/DL (ref 8.5–10.1)
CHLORIDE SERPL-SCNC: 102 MMOL/L (ref 94–109)
CO2 SERPL-SCNC: 36 MMOL/L (ref 20–32)
CREAT SERPL-MCNC: 1.17 MG/DL (ref 0.52–1.04)
ERYTHROCYTE [DISTWIDTH] IN BLOOD BY AUTOMATED COUNT: 23.6 % (ref 10–15)
GFR SERPL CREATININE-BSD FRML MDRD: 44 ML/MIN/{1.73_M2}
GLUCOSE BLDC GLUCOMTR-MCNC: 83 MG/DL (ref 70–99)
GLUCOSE SERPL-MCNC: 69 MG/DL (ref 70–99)
HCT VFR BLD AUTO: 29.5 % (ref 35–47)
HGB BLD-MCNC: 8.4 G/DL (ref 11.7–15.7)
INR PPP: 1.78 (ref 0.86–1.14)
MAGNESIUM SERPL-MCNC: 2.1 MG/DL (ref 1.6–2.3)
MAGNESIUM SERPL-MCNC: 2.2 MG/DL (ref 1.6–2.3)
MCH RBC QN AUTO: 25.3 PG (ref 26.5–33)
MCHC RBC AUTO-ENTMCNC: 28.5 G/DL (ref 31.5–36.5)
MCV RBC AUTO: 89 FL (ref 78–100)
PLATELET # BLD AUTO: 150 10E9/L (ref 150–450)
POTASSIUM SERPL-SCNC: 4.4 MMOL/L (ref 3.4–5.3)
RBC # BLD AUTO: 3.32 10E12/L (ref 3.8–5.2)
SODIUM SERPL-SCNC: 139 MMOL/L (ref 133–144)
WBC # BLD AUTO: 5 10E9/L (ref 4–11)

## 2021-06-09 PROCEDURE — 250N000013 HC RX MED GY IP 250 OP 250 PS 637: Performed by: STUDENT IN AN ORGANIZED HEALTH CARE EDUCATION/TRAINING PROGRAM

## 2021-06-09 PROCEDURE — 214N000001 HC R&B CCU UMMC

## 2021-06-09 PROCEDURE — 97110 THERAPEUTIC EXERCISES: CPT | Mod: GO | Performed by: OCCUPATIONAL THERAPIST

## 2021-06-09 PROCEDURE — 250N000013 HC RX MED GY IP 250 OP 250 PS 637

## 2021-06-09 PROCEDURE — 85027 COMPLETE CBC AUTOMATED: CPT | Performed by: PHYSICIAN ASSISTANT

## 2021-06-09 PROCEDURE — 250N000013 HC RX MED GY IP 250 OP 250 PS 637: Performed by: NURSE PRACTITIONER

## 2021-06-09 PROCEDURE — 85610 PROTHROMBIN TIME: CPT | Performed by: PHYSICIAN ASSISTANT

## 2021-06-09 PROCEDURE — 250N000013 HC RX MED GY IP 250 OP 250 PS 637: Performed by: PHYSICIAN ASSISTANT

## 2021-06-09 PROCEDURE — 250N000011 HC RX IP 250 OP 636: Performed by: PHYSICIAN ASSISTANT

## 2021-06-09 PROCEDURE — 36592 COLLECT BLOOD FROM PICC: CPT | Performed by: STUDENT IN AN ORGANIZED HEALTH CARE EDUCATION/TRAINING PROGRAM

## 2021-06-09 PROCEDURE — 83735 ASSAY OF MAGNESIUM: CPT | Performed by: STUDENT IN AN ORGANIZED HEALTH CARE EDUCATION/TRAINING PROGRAM

## 2021-06-09 PROCEDURE — 83735 ASSAY OF MAGNESIUM: CPT | Performed by: PHYSICIAN ASSISTANT

## 2021-06-09 PROCEDURE — 97140 MANUAL THERAPY 1/> REGIONS: CPT | Mod: GO | Performed by: OCCUPATIONAL THERAPIST

## 2021-06-09 PROCEDURE — 80048 BASIC METABOLIC PNL TOTAL CA: CPT | Performed by: PHYSICIAN ASSISTANT

## 2021-06-09 PROCEDURE — 99232 SBSQ HOSP IP/OBS MODERATE 35: CPT | Performed by: NURSE PRACTITIONER

## 2021-06-09 PROCEDURE — 272N000004 HC RX 272: Performed by: NURSE PRACTITIONER

## 2021-06-09 PROCEDURE — 999N001017 HC STATISTIC GLUCOSE BY METER IP

## 2021-06-09 PROCEDURE — 250N000011 HC RX IP 250 OP 636: Performed by: NURSE PRACTITIONER

## 2021-06-09 RX ORDER — WARFARIN SODIUM 4 MG/1
4 TABLET ORAL
Status: COMPLETED | OUTPATIENT
Start: 2021-06-09 | End: 2021-06-09

## 2021-06-09 RX ADMIN — OXYCODONE HYDROCHLORIDE AND ACETAMINOPHEN 500 MG: 500 TABLET ORAL at 08:26

## 2021-06-09 RX ADMIN — IPRATROPIUM BROMIDE 2 SPRAY: 42 SPRAY, METERED NASAL at 21:05

## 2021-06-09 RX ADMIN — Medication 1 TABLET: at 08:26

## 2021-06-09 RX ADMIN — Medication 5 ML: at 18:17

## 2021-06-09 RX ADMIN — IPRATROPIUM BROMIDE 2 SPRAY: 42 SPRAY, METERED NASAL at 10:28

## 2021-06-09 RX ADMIN — ACETAMINOPHEN 650 MG: 325 TABLET, FILM COATED ORAL at 01:43

## 2021-06-09 RX ADMIN — GABAPENTIN 300 MG: 300 CAPSULE ORAL at 13:22

## 2021-06-09 RX ADMIN — DIGOXIN 62.5 MCG: 0.06 TABLET ORAL at 05:25

## 2021-06-09 RX ADMIN — BUMETANIDE 4 MG: 2 TABLET ORAL at 13:22

## 2021-06-09 RX ADMIN — KETOTIFEN FUMARATE 1 DROP: 0.35 SOLUTION/ DROPS OPHTHALMIC at 06:00

## 2021-06-09 RX ADMIN — TREPROSTINIL 34 NG/KG/MIN: 20 INJECTION, SOLUTION INTRAVENOUS; SUBCUTANEOUS at 09:04

## 2021-06-09 RX ADMIN — BUMETANIDE 4 MG: 2 TABLET ORAL at 08:25

## 2021-06-09 RX ADMIN — GABAPENTIN 300 MG: 300 CAPSULE ORAL at 21:03

## 2021-06-09 RX ADMIN — BUMETANIDE 4 MG: 2 TABLET ORAL at 17:59

## 2021-06-09 RX ADMIN — ASPIRIN 81 MG CHEWABLE TABLET 81 MG: 81 TABLET CHEWABLE at 08:26

## 2021-06-09 RX ADMIN — Medication 5 ML: at 21:07

## 2021-06-09 RX ADMIN — Medication 5 ML: at 05:38

## 2021-06-09 RX ADMIN — HYDROXYCHLOROQUINE SULFATE 200 MG: 200 TABLET, FILM COATED ORAL at 08:26

## 2021-06-09 RX ADMIN — WARFARIN SODIUM 4 MG: 4 TABLET ORAL at 17:59

## 2021-06-09 RX ADMIN — ACETAMINOPHEN 650 MG: 325 TABLET, FILM COATED ORAL at 12:17

## 2021-06-09 RX ADMIN — GABAPENTIN 300 MG: 300 CAPSULE ORAL at 08:26

## 2021-06-09 RX ADMIN — Medication 1 TABLET: at 21:03

## 2021-06-09 RX ADMIN — IPRATROPIUM BROMIDE 2 SPRAY: 42 SPRAY, METERED NASAL at 06:00

## 2021-06-09 RX ADMIN — POTASSIUM CHLORIDE 60 MEQ: 750 TABLET, EXTENDED RELEASE ORAL at 13:22

## 2021-06-09 RX ADMIN — POTASSIUM CHLORIDE 60 MEQ: 750 TABLET, EXTENDED RELEASE ORAL at 08:26

## 2021-06-09 RX ADMIN — POTASSIUM CHLORIDE 60 MEQ: 750 TABLET, EXTENDED RELEASE ORAL at 21:03

## 2021-06-09 ASSESSMENT — ACTIVITIES OF DAILY LIVING (ADL)
ADLS_ACUITY_SCORE: 16

## 2021-06-09 ASSESSMENT — MIFFLIN-ST. JEOR: SCORE: 903.63

## 2021-06-09 NOTE — PROGRESS NOTES
[facility]   Cardiology II Service / Advanced Heart Failure  Daily Progress Note  Date of Service: 6/9/2021      Patient: Karen Anderson  MRN: [id number]  Admission Date: 5/27/2021  Hospital Day # 13    Assessment and Plan: Karen Anderson is a 80yr old female with a history of chronic thromboembolic pulmonary hypertension (on IV treprostinil and macitentan), severe RV dysfunction, RA, systemic HTN, breast cancer (s/p chemotherapy with Adriamycin, Cytoxan, Taxol, and tamoxifen, and s/p left mastectomy), asthma, and scoliosis who presents with hypervolemia.     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 Echo 6/3/21 consistent with RVSP 117 mmHg, LVEF-55-60%, flattened septum, moderate RV dilation with moderately reduced RV function, moderate TR, and dilated IVC. Dobutamine discontinued 6/8/21.  NYHA Class IV, Stage C  volume status: Euvolemic. Bumex 4 mg po TID.   Aldosterone antagonist: Not on PTA.   - Continue Digoxin for RV support.      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 RHC 3/17/21 with mPA-44, mPCW-14. Not a candidate for thromboendarterectomy/ S/p balloon pulmonary angioplasty 1/21.  WHO Group IV, Functional Class IV  - Continue Coumadin per pharmacy with goal 2-2.5 INR-1.78. Pharmacy to increase dose today.   - Continue Opsumit 10 mg q48 hrs.   - Remodulin 34 ng/kg/min.      Chronic Pancytopenia. Ferritin, Transferrin, Epo, and Soluble transerrin WNL 5/29. Intolerant to po Iron. 1 unit PRBC 5/31 inpatient for Hgb 7.   - Venofer 300 mg q72h, completed 3/3 doses 6/4/21.     Chronic Medical Conditions:   RA. Continue Hydroxychloroquine.   Distal right and ulnar right fracture. Cast in place. Continue Fosamax. PT/OT.   Allergic Rhinitis. Mild Persistent Asthma. Continue Beclomethasone, Albuterol prn, Cetrizine, and Ipratropium.      FEN: 2 gram sodium diet   PROPHY:  Coumadin  LINES:  Goldberg and PICC  DISPO:  TBD  CODE STATUS:  Full  "Code   ================================================================  Interval History/ROS: She notes mild LE edema and RIVERA. She denies fever, chills, chest pain, palpitations, cough, nausea, and vomiting. She notes loose stool yesterday, resolved this AM. She is tolerating oral intake and ambulating in the halls.     Last 24 hr care team notes reviewed.   ROS:  4 point ROS including Respiratory, CV, GI and , other than that noted in the HPI, is negative.     Medications: Reviewed in EPIC.     Physical Exam:   /41 (BP Location: Left leg)   Pulse 80   Temp 98.2  F (36.8  C) (Oral)   Resp 20   Ht 1.499 m (4' 11\")   Wt 51.8 kg (114 lb 3.2 oz)   SpO2 (!) 88%   BMI 23.07 kg/m    GENERAL: Appears alert and oriented times three.   HEENT: Eye symmetrical and free of discharge bilaterally. Mucous membranes moist and without lesions.  NECK: Supple and without lymphadenopathy. JVD lower 1/3 of neck upright.   CV: RRR, S1S2 present without murmur, rub, or gallop.   RESPIRATORY: Respirations regular, even, and unlabored. Lungs CTA throughout.   GI: Soft and non distended with normoactive bowel sounds present in all quadrants. No tenderness, rebound, guarding. No organomegaly.   EXTREMITIES: +1 RLE and trace LLE peripheral edema. 2+ bilateral pedal pulses.   NEUROLOGIC: Alert and orientated x 3. CN II-XII grossly intact. No focal deficits.   MUSCULOSKELETAL: No joint swelling or tenderness.   SKIN: No jaundice. No rashes or lesions. Goldberg CDI.     Data:  CMP  Recent Labs   Lab 06/09/21  0549 06/08/21  2030 06/08/21  0625 06/07/21  1717 06/07/21  0440     --  139 138 139   POTASSIUM 4.4  --  3.4 4.3 3.9   CHLORIDE 102  --  100 97 97   CO2 36*  --  39* 39* 40*   ANIONGAP 1*  --  <1* 1* 2*   GLC 69*  --  73 107* 67*   BUN 28  --  27 27 25   CR 1.17*  --  1.11* 1.33* 1.26*   GFRESTIMATED 44*  --  47* 38* 41*   GFRESTBLACK 51*  --  55* 44* 47*   ESTEFANÍA 9.7  --  9.8 9.3 10.2*   MAG 2.1 2.1 2.3 2.3 2.6* "     CBC  Recent Labs   Lab 06/09/21  0549 06/08/21  0625 06/07/21  0440 06/06/21  0623   WBC 5.0 4.4 5.1 4.6   RBC 3.32* 3.39* 3.30* 3.22*   HGB 8.4* 8.7* 8.4* 8.1*   HCT 29.5* 30.3* 29.4* 28.3*   MCV 89 89 89 88   MCH 25.3* 25.7* 25.5* 25.2*   MCHC 28.5* 28.7* 28.6* 28.6*   RDW 23.6* 23.0* 22.5* 22.5*    157 140* 152     INR  Recent Labs   Lab 06/09/21  0549 06/08/21  0820 06/08/21  0625 06/07/21  0440   INR 1.78* 1.83* Canceled, Test credited 2.17*       Patient discussed with Dr. Ahmadi.      Darleen Randall FNP  6/9/2021

## 2021-06-09 NOTE — PROGRESS NOTES
Care Coordinator  D/I: Pt's spouse, Santana, left me a vm that plan is for discharge 6/10/21 and he would like a call from /[facility]: referral for PT/OT/lymphedema.  P: I have left [facility] liason: Eric Thrasher know to call Santana. Will follow.  3:40pm per Darleen Randall NP--pt's remodulin dose will go up from pta, and spouse has questions on how to mix/change pump. She has given me the script and I have fax;d it to [email or web address] [phone].  I also called [email or web address] [phone] Felisa: pharmacist--to inform them and find out how spouse then mixes and changes the pump--he will call [email or web address] tonight and they will talk him through it. I met with Karen in her room and she agrees that Santana will call [email or web address] approx 4:30pm this evening for instructions (she will call him and inform him that I fax'd the new script at [phone]). Pt shared Santana is having some health issues himself--I asked if there is someone else who can give her a ride home tomorrow and there is not.  Santana will bring her portable oxygen.

## 2021-06-09 NOTE — PLAN OF CARE
"/42 (BP Location: Left leg)   Pulse 66   Temp 97.6  F (36.4  C) (Tympanic)   Resp 20   Ht 1.499 m (4' 11\")   Wt 51.8 kg (114 lb 3.2 oz)   SpO2 (!) 89%   BMI 23.07 kg/m      D: Hypervolemia. Hx chronic thromboembolic pulmonary HTN, severe RV dysfunction, breast CA (s/p chemo and left mastectomy), HTN, RA, asthma, scoliosis.  I/A: Patient is A&OX4, on 4L of oxygen via NC since 2am, SR in 70s on Remodulin gtt at 34ng/kg/min via SEGAL c/o lower back pain using aqua-k-pad and tylenol PO with relief, on lymp wraps for edema RLE>LLE.  Ambulated in the hallway with gait belt and personal cane, up to commode with SBA and pivot, had a two-BMs.  At HS, pt. wanted pure-wick installed while sleeping.  BS=69 at 6am, gave apple juice and pudding with improved BS=83 no S&S of hypoglycemia.  P: Will continue to monitor and notify MD of status changes.  Care coordinator working on services for lymp and oxygen needs.    "

## 2021-06-10 ENCOUNTER — APPOINTMENT (OUTPATIENT)
Dept: OCCUPATIONAL THERAPY | Facility: CLINIC | Age: 81
DRG: 291 | End: 2021-06-10
Attending: INTERNAL MEDICINE
Payer: COMMERCIAL

## 2021-06-10 ENCOUNTER — DOCUMENTATION ONLY (OUTPATIENT)
Dept: ANTICOAGULATION | Facility: CLINIC | Age: 81
End: 2021-06-10

## 2021-06-10 VITALS
HEIGHT: 59 IN | HEART RATE: 73 BPM | SYSTOLIC BLOOD PRESSURE: 118 MMHG | RESPIRATION RATE: 18 BRPM | DIASTOLIC BLOOD PRESSURE: 45 MMHG | BODY MASS INDEX: 23.04 KG/M2 | OXYGEN SATURATION: 91 % | WEIGHT: 114.3 LBS | TEMPERATURE: 98.2 F

## 2021-06-10 DIAGNOSIS — Z79.01 LONG TERM CURRENT USE OF ANTICOAGULANT THERAPY: ICD-10-CM

## 2021-06-10 DIAGNOSIS — I27.24 CTEPH (CHRONIC THROMBOEMBOLIC PULMONARY HYPERTENSION) (H): ICD-10-CM

## 2021-06-10 DIAGNOSIS — I27.20 PULMONARY HYPERTENSION (H): ICD-10-CM

## 2021-06-10 LAB
ANION GAP SERPL CALCULATED.3IONS-SCNC: <1 MMOL/L (ref 3–14)
BUN SERPL-MCNC: 28 MG/DL (ref 7–30)
CALCIUM SERPL-MCNC: 9.7 MG/DL (ref 8.5–10.1)
CHLORIDE SERPL-SCNC: 103 MMOL/L (ref 94–109)
CO2 SERPL-SCNC: 36 MMOL/L (ref 20–32)
CREAT SERPL-MCNC: 1.2 MG/DL (ref 0.52–1.04)
ERYTHROCYTE [DISTWIDTH] IN BLOOD BY AUTOMATED COUNT: 23.7 % (ref 10–15)
GFR SERPL CREATININE-BSD FRML MDRD: 43 ML/MIN/{1.73_M2}
GLUCOSE SERPL-MCNC: 76 MG/DL (ref 70–99)
HCT VFR BLD AUTO: 30.4 % (ref 35–47)
HGB BLD-MCNC: 8.5 G/DL (ref 11.7–15.7)
INR PPP: 2.04 (ref 0.86–1.14)
MAGNESIUM SERPL-MCNC: 2.2 MG/DL (ref 1.6–2.3)
MCH RBC QN AUTO: 25.3 PG (ref 26.5–33)
MCHC RBC AUTO-ENTMCNC: 28 G/DL (ref 31.5–36.5)
MCV RBC AUTO: 91 FL (ref 78–100)
PLATELET # BLD AUTO: 157 10E9/L (ref 150–450)
POTASSIUM SERPL-SCNC: 4.8 MMOL/L (ref 3.4–5.3)
RBC # BLD AUTO: 3.36 10E12/L (ref 3.8–5.2)
SODIUM SERPL-SCNC: 140 MMOL/L (ref 133–144)
WBC # BLD AUTO: 5.3 10E9/L (ref 4–11)

## 2021-06-10 PROCEDURE — 85610 PROTHROMBIN TIME: CPT | Performed by: PHYSICIAN ASSISTANT

## 2021-06-10 PROCEDURE — 97535 SELF CARE MNGMENT TRAINING: CPT | Mod: GO

## 2021-06-10 PROCEDURE — 250N000013 HC RX MED GY IP 250 OP 250 PS 637: Performed by: PHYSICIAN ASSISTANT

## 2021-06-10 PROCEDURE — 250N000011 HC RX IP 250 OP 636: Performed by: NURSE PRACTITIONER

## 2021-06-10 PROCEDURE — 85027 COMPLETE CBC AUTOMATED: CPT | Performed by: PHYSICIAN ASSISTANT

## 2021-06-10 PROCEDURE — 250N000013 HC RX MED GY IP 250 OP 250 PS 637: Performed by: NURSE PRACTITIONER

## 2021-06-10 PROCEDURE — 97530 THERAPEUTIC ACTIVITIES: CPT | Mod: GO

## 2021-06-10 PROCEDURE — 80048 BASIC METABOLIC PNL TOTAL CA: CPT | Performed by: PHYSICIAN ASSISTANT

## 2021-06-10 PROCEDURE — 99238 HOSP IP/OBS DSCHRG MGMT 30/<: CPT | Performed by: INTERNAL MEDICINE

## 2021-06-10 PROCEDURE — 36415 COLL VENOUS BLD VENIPUNCTURE: CPT | Performed by: PHYSICIAN ASSISTANT

## 2021-06-10 PROCEDURE — 272N000004 HC RX 272: Performed by: NURSE PRACTITIONER

## 2021-06-10 PROCEDURE — 250N000013 HC RX MED GY IP 250 OP 250 PS 637: Performed by: STUDENT IN AN ORGANIZED HEALTH CARE EDUCATION/TRAINING PROGRAM

## 2021-06-10 PROCEDURE — 97140 MANUAL THERAPY 1/> REGIONS: CPT | Mod: GO

## 2021-06-10 PROCEDURE — 83735 ASSAY OF MAGNESIUM: CPT | Performed by: PHYSICIAN ASSISTANT

## 2021-06-10 PROCEDURE — 250N000011 HC RX IP 250 OP 636: Performed by: PHYSICIAN ASSISTANT

## 2021-06-10 RX ORDER — BUMETANIDE 2 MG/1
4 TABLET ORAL 3 TIMES DAILY
Qty: 180 TABLET | Refills: 1 | Status: SHIPPED | OUTPATIENT
Start: 2021-06-10 | End: 2021-07-22

## 2021-06-10 RX ORDER — POTASSIUM CHLORIDE 1500 MG/1
40 TABLET, EXTENDED RELEASE ORAL 3 TIMES DAILY
Qty: 180 TABLET | Refills: 1 | Status: SHIPPED | OUTPATIENT
Start: 2021-06-10 | End: 2021-09-03

## 2021-06-10 RX ORDER — TRAMADOL HYDROCHLORIDE 50 MG/1
25 TABLET ORAL EVERY 6 HOURS PRN
Qty: 5 TABLET | Refills: 0 | Status: SHIPPED | OUTPATIENT
Start: 2021-06-10 | End: 2021-11-22

## 2021-06-10 RX ORDER — TRAMADOL HYDROCHLORIDE 50 MG/1
25 TABLET ORAL EVERY 6 HOURS PRN
Qty: 5 TABLET | Refills: 0 | Status: SHIPPED | OUTPATIENT
Start: 2021-06-10 | End: 2021-06-10

## 2021-06-10 RX ORDER — WARFARIN SODIUM 3 MG/1
3 TABLET ORAL
Status: DISCONTINUED | OUTPATIENT
Start: 2021-06-10 | End: 2021-06-10 | Stop reason: HOSPADM

## 2021-06-10 RX ORDER — WARFARIN SODIUM 3 MG/1
TABLET ORAL
Qty: 170 TABLET | Refills: 3 | Status: SHIPPED | OUTPATIENT
Start: 2021-06-10 | End: 2022-01-14

## 2021-06-10 RX ADMIN — DIGOXIN 62.5 MCG: 0.06 TABLET ORAL at 06:08

## 2021-06-10 RX ADMIN — ACETAMINOPHEN 650 MG: 325 TABLET, FILM COATED ORAL at 00:14

## 2021-06-10 RX ADMIN — HYDROXYCHLOROQUINE SULFATE 200 MG: 200 TABLET, FILM COATED ORAL at 08:42

## 2021-06-10 RX ADMIN — OXYCODONE HYDROCHLORIDE AND ACETAMINOPHEN 500 MG: 500 TABLET ORAL at 08:41

## 2021-06-10 RX ADMIN — KETOTIFEN FUMARATE 1 DROP: 0.35 SOLUTION/ DROPS OPHTHALMIC at 08:43

## 2021-06-10 RX ADMIN — POTASSIUM CHLORIDE 60 MEQ: 750 TABLET, EXTENDED RELEASE ORAL at 08:42

## 2021-06-10 RX ADMIN — ASPIRIN 81 MG CHEWABLE TABLET 81 MG: 81 TABLET CHEWABLE at 08:42

## 2021-06-10 RX ADMIN — TREPROSTINIL 34 NG/KG/MIN: 20 INJECTION, SOLUTION INTRAVENOUS; SUBCUTANEOUS at 10:36

## 2021-06-10 RX ADMIN — GABAPENTIN 300 MG: 300 CAPSULE ORAL at 14:50

## 2021-06-10 RX ADMIN — Medication 5 ML: at 04:54

## 2021-06-10 RX ADMIN — POTASSIUM CHLORIDE 60 MEQ: 750 TABLET, EXTENDED RELEASE ORAL at 14:51

## 2021-06-10 RX ADMIN — IPRATROPIUM BROMIDE 2 SPRAY: 42 SPRAY, METERED NASAL at 08:43

## 2021-06-10 RX ADMIN — GABAPENTIN 300 MG: 300 CAPSULE ORAL at 08:42

## 2021-06-10 RX ADMIN — Medication 1 TABLET: at 08:42

## 2021-06-10 RX ADMIN — BUMETANIDE 4 MG: 2 TABLET ORAL at 08:42

## 2021-06-10 ASSESSMENT — ACTIVITIES OF DAILY LIVING (ADL)
ADLS_ACUITY_SCORE: 16

## 2021-06-10 ASSESSMENT — MIFFLIN-ST. JEOR: SCORE: 904.09

## 2021-06-10 NOTE — PLAN OF CARE
DISCHARGE                         6/10/2021  ---------------------------------------------------------------------  ----------------------------------------------------------------------------  Discharged to: Home  Via: Automobile  Accompanied by:   Discharge Instructions: diet, activity, medications, follow up appointments, when to call the MD, aftercare instructions, and what to watchout for (i.e. s/s of infection, increasing SOB, palpitations, chest pain,)  Prescriptions: To be filled by Discharge pharmacy per pt's reques and given to pt and Bedside, medication list reviewed & sent with pt  Follow Up Appointments: arranged; information given  Belongings: All sent with pt  IV: out  Telemetry: off  Pt exhibits understanding of above discharge instructions; all questions answered.    Discharge Paperwork: Signed, copied, and sent home with patient.   .Lyly Lynch RN

## 2021-06-10 NOTE — PROGRESS NOTES
"Care Management Discharge Note    Discharge Date: 06/10/21       Discharge Disposition:  To home with spouse, Santana.    Discharge Services:  Resume [facility] for home Remodulin--new dose increase script fax'd 6/9/21--I will fax AVS today.  Resume Home/Portable Oxygen with [facility]--liter flow increased from 2l-4l--I called Molly and informed her and have fax'd AVS.  [facility]: lisa Elsa Thrasher--informed me that team is full until 6/22/21--she will have RN visit on 6/16(preferreed or 6/17 with lab draw)--pt refused to go to clinic on Monday, 6/14/21 and will wait to have labs and INR drwn on 6/16 or 6/17--Darleen Gandhi NP informed and agrees.    Discharge DME:  No new    Discharge Transportation: family or friend will provide    Private pay costs discussed: Not applicable    PAS Confirmation Code:    Patient/family educated on Medicare website which has current facility and service quality ratings:      Education Provided on the Discharge Plan:    Persons Notified of Discharge Plans: Pt and we called spouse Don--he agrees and has no questions.  He will arrive after pt has eaten lunch and will come up to her room to do Home Remodulin pump/syringe  Patient/Family in Agreement with the Plan: yes    Handoff Referral Completed: Yes--made referral for [facility]    Additional Information:  Pt broke her RUE on 5/6/21 @ home  Darleen Huber ordered Orthopedic f/u in 1-2 weeks--pt agrees--central scheduling to arrange, \"unless Don already called\", per pt.  I have called bedside PRIYA Adams [phone] and informed her.          Odette Sellers RN      "

## 2021-06-10 NOTE — PLAN OF CARE
D: Pt who presents this admission with hypervolemia. Hx of chronic thromboembolic pulmonary HTN, severe RV dysfunction, breast CA (s/p chemo and left mastectomy), HTN, RA, asthma, and scoliosis.    I/A: Pt alert and oriented x4. Pt sinus rhythm with HRs 70-80s. On 4-5L O2 via NC with humidification and oxymask. Remodulin continued at 34 ng/kg/min via R jade. Pt reports intermittent back pain that is tolerable with aqua K pad, repositioning and rest. PRN tylenol given 1x. Pt continued on 2g Na+ diet, appetite good; denies nausea. Multiple formed-soft small BMs this shift. PO bumex given, pt voiding. Cast on RUE CDI. Lymph wraps on BLE. Pt up with SBA, GB and cane in room and hallways.    P: Pt to discharge tomorrow once re-education on Remodulin is done with  Don since dose changed. Continue to monitor and assess pt with every encounter. Will notify Cards 2 with any changes or concerns.

## 2021-06-10 NOTE — DISCHARGE SUMMARY
[facility]   Cardiology II Service / Advanced Heart Failure  Discharge Summary     Karen Anderson MRN# [id number]   YOB: 1940 Age: 80 year old     DATE OF ADMISSION:  5/27/2021  DATE OF DISCHARGE:  6/10/2021  ADMITTING PROVIDER: Yumi Rod MD  DISCHARGE PROVIDER: Darleen Randall FNP-C  PRIMARY PROVIDER:   Carlos Carter    ADMIT DIAGNOSES:   1. 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 Moderate PAH secondary CTEPH  3. Acute on Chronic Respiratory Failure with Hypoxia     DISCHARGE DIAGNOSES:   1. Chronic Pancytopenia  2. RA  3. History of distal right and ulnar right fracture s/p mechanical fall  4. Allergic Rhinitis  5. Mild Persistent Asthma    HPI: Please see the detailed H & P by Dr. Rod from 5/27/2021. Karen Anderson is a 80 year old female who presents with chronic thromboembolic pulmonary hypertension, RA, systemic HTN, breast cancer, asthma, scoliosis, breast cancer s/p chemotherapy with Adriamycin, Cytoxan, Taxol, and tamoxifen and status post left mastectomy who presents with hypervolemia.     She was recently admitted to rehab for her right arm fracture. During that stay, she gained nearly 20 lbs and was 135 lbs when she saw Dr. Turcios. Unclear if she took a metolazone at rehab or not, but she is down 15 lbs since seeing Dr. Turcios on the 24th.  Can walk to the bathroom and back without RIVERA, but more than that would make her feel RIVERA. No SOB at rest. She does sleep propped up, stable. No PND. Significant LE edema. R leg swollen more than L, which is always true for her. Having fluid in her legs and stomach. No cough. No fevers or chills. One epsidoe of dizziness with a potision change when she was getting ready to come to the hospital. No hemoptysis. No Chest pain. Occasional fluttering, bery brief, at her baseline.     PHYSICAL EXAM:  Blood pressure 118/45, pulse 73, temperature 98.2  F (36.8  C), temperature source Oral, resp. rate 18,  "height 1.499 m (4' 11\"), weight 51.8 kg (114 lb 4.8 oz), SpO2 91 %.  GENERAL: Appears alert and oriented times three.   HEENT: Eye symmetrical and free of discharge bilaterally. Mucous membranes moist and without lesions.  NECK: Supple and without lymphadenopathy. JVD 8-10 cm.   CV: RRR, S1S2 present without murmur, rub, or gallop.   RESPIRATORY: Respirations regular, even, and unlabored. Lungs CTA throughout.   GI: Soft and non distended with normoactive bowel sounds present in all quadrants. No tenderness, rebound, guarding. No organomegaly.   EXTREMITIES: No peripheral edema. 2+ bilateral pedal pulses.   NEUROLOGIC: Alert and orientated x 3. CN II-XII grossly intact. No focal deficits.   MUSCULOSKELETAL: No joint swelling or tenderness.   SKIN: No jaundice. No rashes or lesions.     LABS:   Last CBC:   Recent Labs   Lab Test 06/10/21  0437   WBC 5.3   RBC 3.36*   HGB 8.5*   HCT 30.4*   MCV 91   MCH 25.3*   MCHC 28.0*   RDW 23.7*          Last CMP:  Recent Labs   Lab Test 06/10/21  0437 05/29/21  0625 05/29/21  0625      < > 138   POTASSIUM 4.8   < > 3.5   CHLORIDE 103   < > 97   ESTEFANÍA 9.7   < > 10.0   CO2 36*   < > 38*   BUN 28   < > 50*   CR 1.20*   < > 1.30*   GLC 76   < > 75   AST  --   --  39   ALT  --   --  28   BILITOTAL  --   --  0.4   ALBUMIN  --   --  3.0*   PROTTOTAL  --   --  7.7   ALKPHOS  --   --  163*    < > = values in this interval not displayed.       IMAGING:  Results for orders placed or performed during the hospital encounter of 05/27/21   XR Chest Port 1 View    Narrative    Exam:  Chest X-ray 5/27/2021 4:57 PM    History: RIVERA    Comparison: 1/13/2021    Findings: Frontal radiograph of the chest. Upright portable radiograph  of the chest. Abnormal positioning of the patient, likely and from  cervical flexion/cervicothoracic kyphosis position which limits  evaluation and comparison to prior exams. Right IJ central venous  catheter tip projects over the right atrium. Midline trachea. " The  cardiomediastinal silhouette appears unremarkable. Potential small  bilateral pleural effusions. Bilateral interstitial opacities,  slightly more pronounced in the upper lungs. No pneumothorax.  Granuloma in the right midlung. The upper abdomen is unremarkable.  Degenerative changes of the thoracic spine.      Impression    Impression: Difficult examination due to patient positioning.  1. Mid to upper lung predominant interstitial opacities which can be  seen with pulmonary edema, infection, or atelectasis.  2. Possible tiny bilateral pleural effusions.    I have personally reviewed the examination and initial interpretation  and I agree with the findings.    VICKY KERR,    Echo Limited    Narrative    [id number]  [id number]  [id number]  [id number]^BRENDA^JOHNATHAN     [facility],[place]  Echocardiography Laboratory  [address] [place] [address]     Name: ELAINA SUTTON  MRN: [id number]  : 1940  Study Date: 2021 01:59 PM  Age: 80 yrs  Gender: Female  Patient Location: [facility]  Reason For Study: Pulmonary Hypertension  Ordering Physician: JOHNATHAN GUTIERREZ  Referring Physician: LEIGH ANN BELTRÁN  Performed By: DELPHINE Sanhcez     BSA: 1.5 m2  Height: 59 in  Weight: 122 lb  BP: 99/31 mmHg  ______________________________________________________________________________  Procedure  Limited Portable Echo Adult.  ______________________________________________________________________________  Interpretation Summary  Severe pulmonary hypertension is present. Right ventricular systolic pressure  is 117mmHg above the right atrial pressure.  Global and regional left ventricular function is normal with an EF of 55-60%.  Flattened septum is consistent with right ventricular pressure and volume  overload.  Moderate right ventricular dilation is present. Global right ventricular  function is moderately reduced.  Moderate tricuspid insufficiency is present.  Small  circumferential pericardial effusion is present without any hemodynamic  significance.  IVC diameter >2.1 cm collapsing <50% with sniff suggests a high RA pressure  estimated at 15 mmHg or greater.  There has been no change.  ______________________________________________________________________________  Left Ventricle  Global and regional left ventricular function is normal with an EF of 55-60%.  Flattened septum is consistent with right ventricular pressure and volume  overload.     Right Ventricle  Moderate right ventricular dilation is present. Global right ventricular  function is moderately reduced.     Atria  The left atrium appears normal. Severe right atrial enlargement is present.     Tricuspid Valve  Moderate tricuspid insufficiency is present. Severe pulmonary hypertension is  present. Right ventricular systolic pressure is 117mmHg above the right atrial  pressure.     Pulmonic Valve  Pulmonary vein doppler indicates elevated pulmonary vascular resistance. Mild  pulmonic insufficiency is present.     Vessels  IVC diameter >2.1 cm collapsing <50% with sniff suggests a high RA pressure  estimated at 15 mmHg or greater.     Pericardium  Small circumferential pericardial effusion is present without any hemodynamic  significance.     Compared to Previous Study  There has been no change.     ______________________________________________________________________________  Doppler Measurements & Calculations  PA V2 max: 107.0 cm/sec  PA max P.6 mmHg  PA acc time: 0.06 sec  TR max yola: 541.0 cm/sec  TR max P.1 mmHg     ______________________________________________________________________________  Report approved by: Aramis Camacho 2021 03:05 PM           CONSULTATIONS:   1. 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 Karen presented for hypervolemia and Remodulin titration. Aggressive diuresis was done with IV Bumex gtt. Dobutamine was initiated and increased to  5 mcg/kg/min to augment diuresis. Echo 6/3/21 consistent with RVSP 117 mmHg, LVEF-55-60%, flattened septum, moderate RV dilation with moderately reduced RV function, moderate TR, and dilated IVC. Dobutamine discontinued 6/8/21. She was transitioned to Bumex 4 mg po TID and maintained her weight from greater than 24 hours prior to discharge. Her discharge weight is 114 lbs.   NYHA Class IV, Stage C  volume status: Euvolemic. Bumex 4 mg po TID.   Aldosterone antagonist: Not on PTA.   - Continue Digoxin for RV support.      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  Acute on Chronic Respiratory Failure with Hypoxia.   RHC 3/17/21 with mPA-44, mPCW-14. Not a candidate for thromboendarterectomy/ S/p balloon pulmonary angioplasty 1/21.  WHO Group IV, Functional Class IV  - Continue Coumadin per pharmacy with goal 2-2.5 INR-1.78. Pharmacy to increase dose today.   - Continue Opsumit 10 mg q48 hrs.   - Remodulin uptitrated to 34 ng/kg/min prior to discharge. Will defer further titration to PAH team outpatient.     Chronic Pancytopenia. Ferritin, Transferrin, Epo, and Soluble transerrin WNL 5/29. Intolerant to po Iron. 1 unit PRBC 5/31 inpatient for Hgb 7. She received Venofer 300 mg q72h, completed 3/3 doses 6/4/21.  Hgb 8.5 prior to discharge.      Chronic Medical Conditions:   RA. Continue Hydroxychloroquine.   Distal right and ulnar right fracture. Cast in place. Continue Fosamax. PT/OT.   Allergic Rhinitis. Mild Persistent Asthma. Continue Beclomethasone, Albuterol prn, Cetrizine, and Ipratropium.     DISCHARGE MEDICATIONS:  Current Discharge Medication List      START taking these medications    Details   traMADol (ULTRAM) 50 MG tablet Take 0.5 tablets (25 mg) by mouth every 6 hours as needed for moderate pain  Qty: 5 tablet, Refills: 0    Associated Diagnoses: Pulmonary hypertension (H)      treprostinil (REMODULIN) 60 mcg/mL in glycine diluent 50 mL infusion Inject 1,914.2 ng/min into the vein continuous  Qty: 1368 mL,  Refills: 0    Associated Diagnoses: Pulmonary hypertension (H)         CONTINUE these medications which have CHANGED    Details   bumetanide (BUMEX) 2 MG tablet Take 2 tablets (4 mg) by mouth 3 times daily  Qty: 180 tablet, Refills: 1    Associated Diagnoses: RVF (right ventricular failure) (H)      macitentan (OPSUMIT) 10 MG tablet Take 1 tablet (10 mg) by mouth every other day    Associated Diagnoses: Pulmonary hypertension (H)      potassium chloride ER (KLOR-CON M) 20 MEQ CR tablet Take 2 tablets (40 mEq) by mouth 3 times daily  Qty: 180 tablet, Refills: 1    Associated Diagnoses: SOB (shortness of breath); Pulmonary hypertension (H)      warfarin ANTICOAGULANT (COUMADIN) 3 MG tablet Take 3 mg PO on Mon, Wed, Fri and Sat, and 4.5 mg all other days of the week  Qty: 170 tablet, Refills: 3    Associated Diagnoses: Pulmonary hypertension (H)         CONTINUE these medications which have NOT CHANGED    Details   acetaminophen (TYLENOL) 325 MG tablet Take 325 mg by mouth every 6 hours as needed for mild pain      alendronate (FOSAMAX) 70 MG tablet Take 70 mg by mouth once a week On Mondays      Ascorbic Acid (VITAMIN C) 500 MG CAPS Take 500 mg by mouth every morning       aspirin (ASA) 81 MG tablet Take 81 mg by mouth daily       beclomethasone HFA (QVAR REDIHALER) 80 MCG/ACT inhaler Inhale 2 puffs into the lungs 2 times daily      calcium citrate-vitamin D (CALCIUM CITRATE + D3) 315-250 MG-UNIT TABS per tablet Take 1 tablet by mouth 2 times daily       cetirizine (ZYRTEC) 10 MG tablet Take 10 mg by mouth daily as needed for allergies      digoxin (LANOXIN) 62.5 MCG tablet Take 1 tablet (62.5 mcg) by mouth daily  Qty: 90 tablet, Refills: 3    Associated Diagnoses: Pulmonary hypertension (H)      gabapentin (NEURONTIN) 300 MG capsule Take 300 mg by mouth 3 times daily Can take an additional 300mg at bedtime as needed      hydroxychloroquine (PLAQUENIL) 200 MG tablet Take 200 mg by mouth daily       ipratropium  (ATROVENT) 0.06 % nasal spray Spray 2 sprays into both nostrils 3 times daily       ketotifen (ZADITOR) 0.025 % ophthalmic solution Place 1 drop into both eyes daily      sodium chloride (OCEAN) 0.65 % nasal spray Spray 1 spray into both nostrils daily as needed for congestion      albuterol (PROAIR HFA/PROVENTIL HFA/VENTOLIN HFA) 108 (90 Base) MCG/ACT inhaler Inhale 2 puffs into the lungs every 4 hours as needed for shortness of breath / dyspnea or wheezing     Comments: Pharmacy may dispense brand covered by insurance (Proair, or proventil or ventolin or generic albuterol inhaler)      Corn Dextrin (CLEAR FIBER POWDER PO) Take by mouth daily          STOP taking these medications       metolazone (ZAROXOLYN) 2.5 MG tablet Comments:   Reason for Stopping:         Treprostinil (REMODULIN IJ) Comments:   Reason for Stopping:               DISCHARGE DISPOSITION: Karen Anderson will discharge to home in stable condition.     DISCHARGE INSTRUCTIONS:  Discharge Procedure Orders   Home infusion referral   Referral Priority: Routine   Number of Visits Requested: 1     Home Care PT Referral for Hospital Discharge   Referral Priority: Routine Referral Type: Home Health Therapies & Aides   Number of Visits Requested: 1     Medication Therapy Management Referral   Referral Priority: Routine Referral Type: Med Therapy Management   Requested Specialty: Pharmacist   Number of Visits Requested: 1     Home care nursing referral   Referral Priority: Routine Referral Type: Home Health Therapies & Aides   Number of Visits Requested: 1     Care Coordination Referral   Referral Priority: Routine Referral Type: Care Coordination   Number of Visits Requested: 1     Home Care OT Referral for Hospital Discharge   Referral Priority: Routine   Number of Visits Requested: 1     MD face to face encounter   Order Comments: Documentation of Face to Face and Certification for Home Health Services    I certify that patient: Karen Anderson  is under my care and that I, or a nurse practitioner or physician's assistant working with me, had a face-to-face encounter that meets the physician face-to-face encounter requirements with this patient on: June 8, 2021.    This encounter with the patient was in whole, or in part, for the following medical condition, which is the primary reason for home health care: Right Heart Failure Exacerbation.    I certify that, based on my findings, the following services are medically necessary home health services: Occupational Therapy and Physical Therapy.    My clinical findings support the need for the above services because: Physical/Occupational Therapy to maximize independence and safety in ADLs/IADLs, functional mobility, and endurance and provide lymphadema treatments      Further, I certify that my clinical findings support that this patient is homebound (i.e. absences from home require considerable and taxing effort and are for medical reasons or Jehovah's witness services or infrequently or of short duration when for other reasons) because: Requires assistance of another person or specialized equipment to access medical services because patient: Requires supervision of another for safe transfer...    Based on the above findings. I certify that this patient is confined to the home and needs intermittent skilled nursing care, physical therapy and/or speech therapy.  The patient is under my care, and I have initiated the establishment of the plan of care.  This patient will be followed by a physician who will periodically review the plan of care.  Physician/Provider to provide follow up care: Carlos Carter    Attending hospital physician (the Medicare certified PECOS provider): Yumi Rod, *  Physician Signature: See electronic signature associated with these discharge orders.  Date: 6/8/2021     Reason for your hospital stay   Order Comments: You were admitted to the hospital for fluid overload and Remodulin  titration. Please notify your Cardiology team for worsening shortness of breath, dizziness, heart racing, abdominal bloating, increased swelling in your feet, and weight gain of 3 lbs over night and 5 lbs in a week.     Activity   Order Comments: Your activity upon discharge: activity as tolerated     Order Specific Question Answer Comments   Is discharge order? Yes      IV access   Order Comments: **Ordering Provider MUST call/page Care Coordinator/ to discuss arranging this service**    You are going home with the following vascular access device: Noemi CROWDER face to face encounter   Order Comments: Documentation of Face to Face and Certification for Home Health Services    I certify that patient: Karen Anderson is under my care and that I, or a nurse practitioner or physician's assistant working with me, had a face-to-face encounter that meets the physician face-to-face encounter requirements with this patient on: 6/10/2021.    This encounter with the patient was in whole, or in part, for the following medical condition, which is the primary reason for home health care: Karen Anderson is a 80yr old female with a history of chronic thromboembolic pulmonary hypertension (on IV treprostinil and macitentan), severe RV dysfunction, RA, systemic HTN, breast cancer (s/p chemotherapy with Adriamycin, Cytoxan, Taxol, and tamoxifen, and s/p left mastectomy), asthma, and scoliosis who presents with hypervolemia.   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 Echo 6/3/21 consistent with RVSP 117 mmHg, LVEF-55-60%, flattened septum, moderate RV dilation with moderately reduced RV function, moderate TR, and dilated IVC..    I certify that, based on my findings, the following services are medically necessary home health services: Nursing.    My clinical findings support the need for the above services because: Nurse is needed: To assess s/p Prolonged hospitalization after changes in medications or other  medical regimen frequent assessments and lab draws.    Further, I certify that my clinical findings support that this patient is homebound (i.e. absences from home require considerable and taxing effort and are for medical reasons or Shinto services or infrequently or of short duration when for other reasons) because: Pt is on home IV reemodulin/needs another for safe transfer.    Based on the above findings. I certify that this patient is confined to the home and needs intermittent skilled nursing care, physical therapy and/or speech therapy.  The patient is under my care, and I have initiated the establishment of the plan of care.  This patient will be followed by a physician who will periodically review the plan of care.  Physician/Provider to provide follow up care: Carlos Carter    Attending [facility] physician (the Medicare certified PECOS provider): Yumi Rod,   Physician Signature: See electronic signature associated with these discharge orders.  Date: 6/10/2021    Oxygen Vendor:  [facility]  Ph: [phone]  Fax; [phone]     Adult [facility] Follow-up and recommended labs and tests   Order Comments: Follow up with [facility] in 1 week. Repeat CBC, BMP, and INR per Home Health RN Wednesday, 6/16/21.  Follow up with Orthopedics in 1-2 weeks---was to see [facility]    Appointments on [place] and/or [facility] (with [facility] or [facility] provider or service). Call [phone] if you haven't heard regarding these appointments within 7 days of discharge.     Full Code     Order Specific Question Answer Comments   Code status determined by: Discussion with patient/ legal decision maker      Oxygen Adult/Peds   Order Comments: Oxygen Documentation:   I certify that this patient, Karen Anderson has been under my care (or a nurse practitioner or physican's assistant working with me). This is the face-to-face encounter for oxygen medical necessity.       Karen Anderson is now in a chronic stable state and continues to require supplemental oxygen. Patient has continued oxygen desaturation due to Pulmonary Hypertension I27.20.    Alternative treatment(s) tried or considered and deemed clinically infective for treatment of Pulmonary Hypertension I27.20 include inhalers, oral medications, and IV continuous medication.   If portability is ordered, is the patient mobile within the home? yes    **Patients who qualify for home O2 coverage under the CMS guidelines require ABG tests or O2 sat readings obtained closest to, but no earlier than 2 days prior to the discharge, as evidence of the need for home oxygen therapy. Testing must be performed while patient is in the chronic stable state. See notes for O2 sats.**     Order Specific Question Answer Comments   DME Provider: [facility]    Type: Resume    Did the patient have SpO2 (sat) testing (only needed for new oxgyen or liter flow changes)? Yes    Length of Need: Lifetime    Frequency of Use: Continuous    Mode of Delivery - Continuous Nasal Cannula    Liter Flow - Continuous (LPM): 4LNC    Need for Portability: Yes    Evaluate for Conserving Device: Yes    Maintain Sats >= Other (Comments) 88%   The face to face evaluation was performed on: 6/10/2021      Diet   Order Comments: Follow this diet upon discharge: -2 gram sodium diet. 2 Liter fluid restriction.     Order Specific Question Answer Comments   Is discharge order? Yes        45 minutes spent in discharge, including >50% in counseling and coordination of care, medication review and plan of care recommended on follow up. Please do not hesitate to contact me should there be questions regarding the hospital course or discharge plan.      DEVAN Santillan CNP FNP-C  6/10/2021  [phone]

## 2021-06-10 NOTE — PLAN OF CARE
Occupational Therapy Discharge Summary    Reason for therapy discharge:    Discharged to home with home therapy.    Progress towards therapy goal(s). See goals on Care Plan in [facility] electronic health record for goal details.  Goals partially met.  Barriers to achieving goals:   discharge from facility.    Therapy recommendation(s):    Continued therapy is recommended.  Rationale/Recommendations:  OT safety evaluation to promote ADL/IADL safety and activity tolerance within the home environment and within RUE restrictions. May also benefit from  lymphedema therapies to assist with long term management plan for BLE lymphedema. Would benefit from OP hand therapy after cast removal and when cleared by MDs.

## 2021-06-10 NOTE — PLAN OF CARE
"/56 (BP Location: Left leg)   Pulse 68   Temp 97.9  F (36.6  C) (Oral)   Resp 21   Ht 1.499 m (4' 11\")   Wt 51.8 kg (114 lb 4.8 oz)   SpO2 92%   BMI 23.09 kg/m      D: Hypervolemia. Hx chronic thromboembolic pulmonary HTN, severe RV dysfunction, breast CA (s/p chemo and left mastectomy), HTN, RA, asthma, scoliosis.  I/A: Patient is A&OX4, on 4L of oxygen via oxymask, SR in 70s on Remodulin gtt at 34ng/kg/min via SEGAL c/o lower back pain and back rub provided and she is using aqua-k-pad and took tylenol PO with relief, on lymp wraps for edema RLE>LLE.  Up to commode X4, with mixed stool and urine.  At HS, pt. wanted pure-wick installed while sleeping.   P: Will continue to monitor and notify MD of status changes.  Possible discharge today.  "

## 2021-06-10 NOTE — PHARMACY-ANTICOAGULATION SERVICE
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  INR Goal= 2-2.5  Expected length of therapy lifetime.           Anticoagulation Dose History     Recent Dosing and Labs Latest Ref Rng & Units 6/6/2021 6/7/2021 6/8/2021 6/8/2021 6/8/2021 6/9/2021 6/10/2021    Warfarin 2.5 mg - - 2.5 mg - - - - -    Warfarin 3 mg - 3 mg - - - 3 mg - -    Warfarin 4 mg - - - - - - 4 mg -    INR 0.86 - 1.14 2.37(H) 2.17(H) Canceled, Test credited 1.83(H) - 1.78(H) 2.04(H)          Vitamin K doses administered during the last 7 days: none  FFP administered during the last 7 days: none  Recommend discharging the patient on a warfarin regimen of 3 mg daily 6/10 through 6/13 with an INR check on 6/14 to determine ongoing needs. Of note patient previously on 4.5 mg on Tuesday and 3 mg all other days of the week. Would send with a prescription for warfarin 3mg tablets.      The patient should have an INR checked Monday, 6/14.    Chandan Avlarado, GastonD

## 2021-06-10 NOTE — DISCHARGE INSTRUCTIONS
Discharging nurse, please fax AVS to: [facility] F: [phone]  And  [facility]  And   [facility]  ---all fax'd by 6C CC Odette LOPEZ approx 11:55am

## 2021-06-10 NOTE — PROGRESS NOTES
ANTICOAGULATION  MANAGEMENT: Discharge Review    Karen Anderson chart reviewed for anticoagulation continuity of care    Hospital Admission on 5/27-6/10 for Fluid overload and Remodulin titration.    Discharge disposition: Home with Home [facility]    Results:    Recent labs: (last 7 days)     06/04/21  0633 06/05/21  0558 06/06/21  0623 06/07/21  0440 06/08/21  0625 06/08/21  0820 06/09/21  0549 06/10/21  0437   INR 2.10* 2.09* 2.37* 2.17* Canceled, Test credited 1.83* 1.78* 2.04*     Anticoagulation inpatient management:     See calender    Anticoagulation discharge instructions:     Warfarin dosing: home regimen continued   Bridging: No   INR goal change: No      Medication changes affecting anticoagulation: Yes: Tramadol and Remodulin    Additional factors affecting anticoagulation: Yes: Patient is recovering from a long hospital stay.     Plan     Agree with dosing adjustment on discharge    Patient not contacted    Anticoagulation Calendar updated    Brittany Kirk RN

## 2021-06-14 ENCOUNTER — TELEPHONE (OUTPATIENT)
Dept: CARDIOLOGY | Facility: CLINIC | Age: 81
End: 2021-06-14

## 2021-06-14 DIAGNOSIS — R06.02 SOB (SHORTNESS OF BREATH): ICD-10-CM

## 2021-06-14 DIAGNOSIS — I27.20 PULMONARY HYPERTENSION (H): Primary | ICD-10-CM

## 2021-06-14 NOTE — TELEPHONE ENCOUNTER
"Reason for Admission  Fluid volume overload    How are you feeling since you got home?  Patient feels good since discharge. Weight today 208 lb. States she still has some fluid in her LE, but she is \"shriveled up everywhere else\" so she does not think she is retaining fluid again.     And questions about medication? (I.E. Taking, received, new medication)  No    Do you have any questions about the discharge instructions?  Yes, patient did voice concern about follow up lab draws. Advised she is a very difficult stick and is worried about coming in to have any kind of blood work done. Patient does understand the importance of follow up labs and wants them done, but is afraid. Advised I would reach out to my supervisor and see if there is something we can do.     Do you have any other questions?  Yes, Don had questions regarding switching oxygen companies. Wants more options regarding POCs. Provided numbers for both [facility] and [employer], and asked Don to call to see which provider is covered under his insurance.    Has a follow up appointment been made?   YES - MAGEN Saeed on June 24 at 4:45 PM with labs prior    Staff message sent to PRIYA Louis and Mary Dominguez to help with lab draw concerns. Kezia Arnett RN on 6/14/2021 at 9:17 AM     ----- Message from DEVAN Santillan CNP sent at 6/11/2021  7:21 AM CDT -----  I couldn't find the PH pool today for some reason. Karen discharged to home on Remodulin at 34 ng/kg/min. She will have repeat labs per [facility] RN on Wednesday. Can we ensure she gets a 1-2 week follow up in person with Shawna Marcum.     Thanks,     Darleen"

## 2021-06-15 ENCOUNTER — TELEPHONE (OUTPATIENT)
Dept: CARDIOLOGY | Facility: CLINIC | Age: 81
End: 2021-06-15

## 2021-06-15 NOTE — TELEPHONE ENCOUNTER
Confirmed with Maria that patient will need a BMP and CBC as well. Kezia Arnett, RN on 6/15/2021 at 9:11 AM      [facility] Call Center    Phone Message    May a detailed message be left on voicemail: yes     Reason for Call: Other: Maria calling to see what Labs Dr. Rod and Dr. Turcios are wanting drawn besides the INR.  She states that a nurse is going out to be with Karen tomorrow to do the draw.  Please call Maria back to discuss     Action Taken: Message routed to:  [facility]):  Cardiology    Travel Screening: Not Applicable

## 2021-06-16 ENCOUNTER — ANTICOAGULATION THERAPY VISIT (OUTPATIENT)
Dept: ANTICOAGULATION | Facility: CLINIC | Age: 81
End: 2021-06-16

## 2021-06-16 DIAGNOSIS — I27.24 CTEPH (CHRONIC THROMBOEMBOLIC PULMONARY HYPERTENSION) (H): ICD-10-CM

## 2021-06-16 DIAGNOSIS — Z79.01 LONG TERM CURRENT USE OF ANTICOAGULANT THERAPY: ICD-10-CM

## 2021-06-16 DIAGNOSIS — I27.20 PULMONARY HYPERTENSION (H): ICD-10-CM

## 2021-06-16 LAB — INR PPP: 2.5 (ref 0.9–1.1)

## 2021-06-16 NOTE — PROGRESS NOTES
ANTICOAGULATION FOLLOW-UP CLINIC VISIT    Patient Name:  Karen Anderson  Date:  2021  Contact Type:  Telephone    SUBJECTIVE:  Patient Findings     Comments:  See calender for reported dosing.         Clinical Outcomes     Comments:  See calender for reported dosing.            OBJECTIVE    Recent labs: (last 7 days)     21   INR 2.5*       ASSESSMENT / PLAN  INR assessment THER    Recheck INR In: 1 WEEK    INR Location [facility] INR      Anticoagulation Summary  As of 2021    INR goal:  2.0-2.5   TTR:  56.1 % (8.9 mo)   INR used for dosin.5 (2021)   Warfarin maintenance plan:  4.5 mg (3 mg x 1.5) every Sun, Wed; 3 mg (3 mg x 1) all other days   Full warfarin instructions:  4.5 mg every Sun, Wed; 3 mg all other days   Weekly warfarin total:  24 mg   Plan last modified:  Brittany Kirk, RN (2021)   Next INR check:  2021   Priority:  Maintenance   Target end date:  Indefinite    Indications    Pulmonary hypertension (H) [I27.20]  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 OK to speak with spouse, Don.  [facility] SPEAK IN TABLETS (Has 3mg Tabs) 10/23/20:  new goal range is 2 - 2.5  [facility] in [place] and will need to fax orders P [phone] F [phone]      Anticoagulation Care Providers     Provider Role Specialty Phone number    Osiris Luong MD Referring Cardiovascular Disease [phone]    Karolina Turcios MD Referring Cardiovascular Disease [phone]            See the Encounter Report to view Anticoagulation Flowsheet and Dosing Calendar (Go to Encounters tab in chart review, and find the Anticoagulation Therapy Visit)    Spoke with patient.     Brittany Kirk  RN

## 2021-06-16 NOTE — TELEPHONE ENCOUNTER
Unable to reach home care nurse; LM advising that we will attempt to get labs done on Thursday when the patient is here for her clinic follow up. Kezia Arnett RN on 6/16/2021 at 1:58 PM    [facility] Call Center    Phone Message    May a detailed message be left on voicemail: yes     Reason for Call: Other: [facility] called in stating that they were unable to draw labs today. Please call her back at [phone] to discuss.      Action Taken: Message routed to:  [facility] Cardio    Travel Screening: Not Applicable

## 2021-06-23 ENCOUNTER — ANTICOAGULATION THERAPY VISIT (OUTPATIENT)
Dept: ANTICOAGULATION | Facility: CLINIC | Age: 81
End: 2021-06-23

## 2021-06-23 DIAGNOSIS — I27.24 CTEPH (CHRONIC THROMBOEMBOLIC PULMONARY HYPERTENSION) (H): ICD-10-CM

## 2021-06-23 DIAGNOSIS — Z79.01 LONG TERM CURRENT USE OF ANTICOAGULANT THERAPY: ICD-10-CM

## 2021-06-23 DIAGNOSIS — I27.20 PULMONARY HYPERTENSION (H): ICD-10-CM

## 2021-06-23 LAB — INR PPP: 3.2 (ref 0.9–1.1)

## 2021-06-23 NOTE — PROGRESS NOTES
ANTICOAGULATION FOLLOW-UP CLINIC VISIT    Patient Name:  Karen Anderson  Date:  6/23/2021  Contact Type:  Telephone    SUBJECTIVE:  Patient Findings     Positives:  Change in activity ([facility] may be ending)             OBJECTIVE    Recent labs: (last 7 days)     06/23/21   INR 3.2*       ASSESSMENT / PLAN  INR assessment SUPRA    Recheck INR In: 1 WEEK    INR Location [facility]R      Anticoagulation Summary  As of 6/23/2021    INR goal:  2.0-2.5   TTR:  53.5 % (8.9 mo)   INR used for dosing:  3.2 (6/23/2021)   Warfarin maintenance plan:  4.5 mg (3 mg x 1.5) every Sun, Wed; 3 mg (3 mg x 1) all other days   Full warfarin instructions:  6/23: 3 mg; Otherwise 4.5 mg every Sun, Wed; 3 mg all other days   Weekly warfarin total:  24 mg   Plan last modified:  Brittany Kirk RN (6/16/2021)   Next INR check:  6/30/2021   Priority:  Maintenance   Target end date:  Indefinite    Indications    Pulmonary hypertension (H) [I27.20]  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    Comments:  call home first, OK to leave message on either phone. OK to speak with spouse, Don.  [facility] SPEAK IN TABLETS (Has 3mg Tabs) 10/23/20:  new goal range is 2 - 2.5  [facility] in [place] and will need to fax orders P [phone] F [phone]      Anticoagulation Care Providers     Provider Role Specialty Phone number    Osiris Luong MD Referring Cardiovascular Disease [phone]    Karolina Turcios MD Referring Cardiovascular Disease [phone]            See the Encounter Report to view Anticoagulation Flowsheet and Dosing Calendar (Go to Encounters tab in chart review, and find the Anticoagulation Therapy Visit)    Spoke with Paulina Leach [facility] nurse.    Yumi Bates RN

## 2021-06-24 ENCOUNTER — OFFICE VISIT (OUTPATIENT)
Dept: CARDIOLOGY | Facility: CLINIC | Age: 81
End: 2021-06-24
Attending: PHYSICIAN ASSISTANT
Payer: COMMERCIAL

## 2021-06-24 VITALS
OXYGEN SATURATION: 99 % | HEART RATE: 65 BPM | BODY MASS INDEX: 20.66 KG/M2 | HEIGHT: 59 IN | DIASTOLIC BLOOD PRESSURE: 80 MMHG | WEIGHT: 102.5 LBS | SYSTOLIC BLOOD PRESSURE: 147 MMHG

## 2021-06-24 DIAGNOSIS — I27.20 PULMONARY HYPERTENSION (H): ICD-10-CM

## 2021-06-24 DIAGNOSIS — R06.02 SOB (SHORTNESS OF BREATH): ICD-10-CM

## 2021-06-24 LAB
ALBUMIN SERPL-MCNC: 3.1 G/DL (ref 3.4–5)
ALP SERPL-CCNC: 184 U/L (ref 40–150)
ALT SERPL W P-5'-P-CCNC: 29 U/L (ref 0–50)
ANION GAP SERPL CALCULATED.3IONS-SCNC: 1 MMOL/L (ref 3–14)
AST SERPL W P-5'-P-CCNC: 36 U/L (ref 0–45)
BILIRUB SERPL-MCNC: 0.4 MG/DL (ref 0.2–1.3)
BUN SERPL-MCNC: 39 MG/DL (ref 7–30)
CALCIUM SERPL-MCNC: 9.7 MG/DL (ref 8.5–10.1)
CHLORIDE SERPL-SCNC: 102 MMOL/L (ref 94–109)
CO2 SERPL-SCNC: 37 MMOL/L (ref 20–32)
CREAT SERPL-MCNC: 1.29 MG/DL (ref 0.52–1.04)
ERYTHROCYTE [DISTWIDTH] IN BLOOD BY AUTOMATED COUNT: 25.1 % (ref 10–15)
GFR SERPL CREATININE-BSD FRML MDRD: 39 ML/MIN/{1.73_M2}
GLUCOSE SERPL-MCNC: 87 MG/DL (ref 70–99)
HCT VFR BLD AUTO: 35 % (ref 35–47)
HGB BLD-MCNC: 10.2 G/DL (ref 11.7–15.7)
MCH RBC QN AUTO: 26.5 PG (ref 26.5–33)
MCHC RBC AUTO-ENTMCNC: 29.1 G/DL (ref 31.5–36.5)
MCV RBC AUTO: 91 FL (ref 78–100)
NT-PROBNP SERPL-MCNC: 3827 PG/ML (ref 0–450)
PLATELET # BLD AUTO: 164 10E9/L (ref 150–450)
POTASSIUM SERPL-SCNC: 4.6 MMOL/L (ref 3.4–5.3)
PROT SERPL-MCNC: 7.3 G/DL (ref 6.8–8.8)
RBC # BLD AUTO: 3.85 10E12/L (ref 3.8–5.2)
SODIUM SERPL-SCNC: 140 MMOL/L (ref 133–144)
WBC # BLD AUTO: 4.5 10E9/L (ref 4–11)

## 2021-06-24 PROCEDURE — 80053 COMPREHEN METABOLIC PANEL: CPT | Performed by: PATHOLOGY

## 2021-06-24 PROCEDURE — G0463 HOSPITAL OUTPT CLINIC VISIT: HCPCS

## 2021-06-24 PROCEDURE — 36415 COLL VENOUS BLD VENIPUNCTURE: CPT | Performed by: PATHOLOGY

## 2021-06-24 PROCEDURE — 99215 OFFICE O/P EST HI 40 MIN: CPT | Performed by: PHYSICIAN ASSISTANT

## 2021-06-24 PROCEDURE — 83880 ASSAY OF NATRIURETIC PEPTIDE: CPT | Performed by: PATHOLOGY

## 2021-06-24 PROCEDURE — 85027 COMPLETE CBC AUTOMATED: CPT | Performed by: PATHOLOGY

## 2021-06-24 ASSESSMENT — PAIN SCALES - GENERAL: PAINLEVEL: NO PAIN (0)

## 2021-06-24 ASSESSMENT — MIFFLIN-ST. JEOR: SCORE: 850.57

## 2021-06-24 NOTE — PROGRESS NOTES
Date of Service: 06/25/2021      [facility]      Primary PH cardiologist: Dr. Turcios       HPI:  Ms. Anderson is a pleasant 80 year old female with a PMHx including rheumatoid arthritis, hypertension, scoliosis, asthma, pulmonary MAC, breast cancer s/p chemotherapy with Adriamycin, Cytoxan, Taxol, and tamoxifen and status post left mastectomy. She also has a history of extensive bilateral pulmonary emboli and DVT in 1998 thought to be secondary to tamoxifen along with chemotherapy induced cardiomyopathy. 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 Although she had proximal disease, she was too high risk for surgical PTE. She is now on combination therapy with IV Remodulin therapy along with Adempas and Opsumit.     She has had some trouble with side effects of Remodulin with titration which we have managed. Last fall she fell and tore a tendon in her knee which was complicated by a painful hematoma. Shortly after she developed weight gain and required a hospitalization for IV diuretics.    In January, due to worsening clinical status, she underwent a BPA which was complicated by hemoptysis, requiring FFP. We have since continued to work on diuretic adjustments, and due to low BP/dizziness, her Adempas was discontinued and Opsumit was decreased to every other day. However, after her repeat RHC in March, Opsumit was increased back to daily. As she was felt high risk to proceed with further surgical interventions, she was sent to [place] for a second opinion. They had considered a high risk PTE surgery but Dr. Turcios disagreed and felt she was still too high risk.    Karen returned to see Dr. Turcios last in late May. Unfortunately, she had fallen again and had a distal fracture of her right radius and ulna. She was hospitalized briefly at [facility], placed in a cast, and then sent to inpatient rehab. Unfortunately,  "she then gained over 20 lbs with more edema. Her Opsumit was changed back to every other day and given a PRN dose of metolazone with some improvement, but ultimately she was sent back to the hospital for admission.    She was admitted from 5/27 to 6/10 and was placed on a bumex gtt along with dobutamine support to augment diuresis. She was discharged on bumex 4mg TID with a discharge wt of 114#. Echo during her stay showed continued severe PH with estimated RVSP 117mmHg and moderately dilated RV with moderately reduced RV function. She had a small circumferential pericardial effusion without tamponade. IV Remodulin was titrated up to 34ng/kg/min. She received pRBCs and Venofer for anemia.     Today, we are doing a clinic visit for close follow up. Karen tells me that overall, she's doing ok. Her weight has continued to trend down since discharge and has settled out at 102-104# at home, and is similar on our scale today. She reports overall much improvement in her edema, though still \"some areas\" that get swollen. Her breathing is stable, but she isn't doing much. Her  says that she is now wearing 3LNC and sats will drop briefly into the low 80s with walking, but quickly recover a minute or two later. She denies palpitations, and only has dizziness if she exerts herself too much off the oxygen. BP at home has not been low.     Note--her  tells me they have had issues with their Remodulin pumps and cassettes, with frequent pump alarms--they have been in contact with [employer] multiple times and were told there may be a \"bad lot\" of cassettes and are in the process of trying to figure it out. Also, with their recent pump rate increase, they are now running closer to empty on their cassette sooner and are concerned about this which is understandable.      Labs done prior to our visit were reviewed: WBC 4.5, hemoglobin 10.2, hematocrit 35.0,plt 164. Na 140, K 4.6, BUN 37, SCR 1.29. ALT 29, AST 36, NT-proBNP " 3,827.         CURRENT PULMONARY HYPERTENSION REGIMEN:     PAH Rx: Remodulin 34ng/kg/min, Opsumit 10mg every other day  (failed Adempas due to dizziness)     Diuretics: Bumex 4mg TID      Oxygen: NC 3L     Anticoagulation: warfarin   Indication: CTEPH        Assessment/Plan:        1. Chronic thromboembolic pulmonary hypertension.              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 Although she had proximal disease, she was felt to be too high risk for surgical PTE here and at [facility]. She was offered potential PTE at [place], but still felt to be very high risk. She did have BPA here in January but this was complicated by hemoptysis. No further surgical interventions are being offered at this time. Currently, we will continue to focus on medical management.   --She is now on IV Remodulin 34ng/kg/min which was titrated up during recent admission. Per Dr. Turcios, will continue to increase her Remodulin gradually by 1 ng/kg/min weekly to a goal of 45 ng/kg/min as tolerated. She is not able to tolerate Adempas due to dizziness, and on Opsumit 10mg every other day for similar reasons.   --Her weight is down even further since hospital discharge and edema much improved. Labs today showed that renal function remains reasonable. She is currently on bumex 4mg TID. Given concerns with significant fluid retention and weights have settled out at home, will keep as is for now. Recheck BMP in 1-2 weeks for close monitoring.               --Continue digoxin for RV support. BP has remained acceptable, though will need to continue to monitor given renal concerns.               --Continue anticoagulation with warfarin for her CTEPH, though we have been monitoring with a lower INR goal of 2-2.5.    --Continue oxygen at 3L with exertion. I told her that if sats are >92% at rest, she may take off while sitting.     Follow up plan: Recheck labs in 1-2 week, return in 1 month to clinic.       Orders this Visit:  No orders of  the defined types were placed in this encounter.    Orders Placed This Encounter   Medications     treprostinil (REMODULIN) 60 mcg/mL in glycine diluent 50 mL infusion     Sig: Inject 34 ng/kg/min into the vein continuous Current dose: 34 ng/kg/min  Inject 34 ng/kg/min into the vein, increasing 1 ng/kg/min weekly to a goal dose of 45 ng/kg/min     Dispense:  1368 mL     Refill:  0     Medications Discontinued During This Encounter   Medication Reason     cetirizine (ZYRTEC) 10 MG tablet Stopped by Patient     treprostinil (REMODULIN) 60 mcg/mL in glycine diluent 50 mL infusion            CURRENT MEDICATIONS:  Current Outpatient Medications   Medication Sig Dispense Refill     acetaminophen (TYLENOL) 325 MG tablet Take 325 mg by mouth every 6 hours as needed for mild pain       albuterol (PROAIR HFA/PROVENTIL HFA/VENTOLIN HFA) 108 (90 Base) MCG/ACT inhaler Inhale 2 puffs into the lungs every 4 hours as needed for shortness of breath / dyspnea or wheezing        alendronate (FOSAMAX) 70 MG tablet Take 70 mg by mouth once a week On Mondays       Ascorbic Acid (VITAMIN C) 500 MG CAPS Take 500 mg by mouth every morning        aspirin (ASA) 81 MG tablet Take 81 mg by mouth daily        beclomethasone HFA (QVAR REDIHALER) 80 MCG/ACT inhaler Inhale 2 puffs into the lungs 2 times daily       bumetanide (BUMEX) 2 MG tablet Take 2 tablets (4 mg) by mouth 3 times daily 180 tablet 1     calcium citrate-vitamin D (CALCIUM CITRATE + D3) 315-250 MG-UNIT TABS per tablet Take 1 tablet by mouth 2 times daily        Corn Dextrin (CLEAR FIBER POWDER PO) Take by mouth daily        digoxin (LANOXIN) 62.5 MCG tablet Take 1 tablet (62.5 mcg) by mouth daily 90 tablet 3     gabapentin (NEURONTIN) 300 MG capsule Take 300 mg by mouth 3 times daily Can take an additional 300mg at bedtime as needed       hydroxychloroquine (PLAQUENIL) 200 MG tablet Take 200 mg by mouth daily        ipratropium (ATROVENT) 0.06 % nasal spray Spray 2 sprays into  both nostrils 3 times daily        ketotifen (ZADITOR) 0.025 % ophthalmic solution Place 1 drop into both eyes daily       macitentan (OPSUMIT) 10 MG tablet Take 1 tablet (10 mg) by mouth every other day       potassium chloride ER (KLOR-CON M) 20 MEQ CR tablet Take 2 tablets (40 mEq) by mouth 3 times daily 180 tablet 1     sodium chloride (OCEAN) 0.65 % nasal spray Spray 1 spray into both nostrils daily as needed for congestion       traMADol (ULTRAM) 50 MG tablet Take 0.5 tablets (25 mg) by mouth every 6 hours as needed for moderate pain 5 tablet 0     treprostinil (REMODULIN) 60 mcg/mL in glycine diluent 50 mL infusion Inject 34 ng/kg/min into the vein continuous Current dose: 34 ng/kg/min  Inject 34 ng/kg/min into the vein, increasing 1 ng/kg/min weekly to a goal dose of 45 ng/kg/min 1368 mL 0     warfarin ANTICOAGULANT (COUMADIN) 3 MG tablet Take 3 mg PO on Mon, Wed, Fri and Sat, and 4.5 mg all other days of the week 170 tablet 3       ALLERGIES     Allergies   Allergen Reactions     Sulfa Drugs Anaphylaxis and Hives       PAST MEDICAL HISTORY:  Past Medical History:   Diagnosis Date     Asthma      Breast cancer (H)      Hypertension      Mycobacterium avium complex (H)      Pulmonary embolism (H)      Pulmonary hypertension (H)      Rheumatoid arthritis (H)      Scoliosis          Review of Systems:  Cardiovascular: negative for chest pain, palpitations, orthopnea, pos LE edema  Constitutional: negative for chills, sweats, fevers   Resp: Negative for dyspnea at rest, pos dyspnea on exertion, neg cough, known chronic lung disease  HEENT: Negative for new visual changes, frequent headaches  Gastrointestinal: negative for abdominal pain, diarrhea, blood in stool, nausea, vomiting  Hematologic/lymphatic:pos for current systemic anticoagulation, hx of blood clots  Neurological: negative for focal weakness, LOC, seizures, syncope/presyncope. Pos occasional dizziness.      Physical Exam:  Vitals: BP (!) 147/80 (BP  "Location: Left leg, Patient Position: Chair, Cuff Size: Adult Large)   Pulse 65   Ht 1.499 m (4' 11\")   Wt 46.5 kg (102 lb 8 oz)   SpO2 99%   BMI 20.70 kg/m     Wt Readings from Last 4 Encounters:   06/24/21 46.5 kg (102 lb 8 oz)   06/10/21 51.8 kg (114 lb 4.8 oz)   03/17/21 49.9 kg (110 lb)   02/26/21 52.4 kg (115 lb 8 oz)       GEN:  In general, this is a somewhat frail appearing elderly  female, in no acute distress on room air.  Patient in a wheelchair, accompanied by her .  HEENT:  Pupils grossly equal, sclerae nonicteric.   NECK: Supple, trachea midline. No significant JVD while upright in wheelchair.   CHEST: Goldberg site clean and dry.  C/V:  Regular rate and rhythm, no murmur, rub or gallop. Accentuated P2 but no S3 or RV heave.   RESP: Respirations are unlabored. No use of accessory muscles. Clear to auscultation bilaterally without wheezing, rales, or rhonchi.  GI: Abdomen soft, nontender, nondistended.   EXTREM: Her right leg is wrapped, left leg is erythematous but minimal edema.No cyanosis or clubbing.  NEURO: Alert and oriented, cooperative. Gait not formally assessed. No obvious focal deficits.     Recent Lab Results:  LIVER ENZYME RESULTS:  Lab Results   Component Value Date    AST 36 06/24/2021    ALT 29 06/24/2021       CBC RESULTS:  Lab Results   Component Value Date    WBC 4.5 06/24/2021    RBC 3.85 06/24/2021    HGB 10.2 (L) 06/24/2021    HCT 35.0 06/24/2021    MCV 91 06/24/2021    MCH 26.5 06/24/2021    MCHC 29.1 (L) 06/24/2021    RDW 25.1 (H) 06/24/2021     06/24/2021       BMP RESULTS:  Lab Results   Component Value Date     06/24/2021    POTASSIUM 4.6 06/24/2021    CHLORIDE 102 06/24/2021    CO2 37 (H) 06/24/2021    ANIONGAP 1 (L) 06/24/2021    GLC 87 06/24/2021    BUN 39 (H) 06/24/2021    CR 1.29 (H) 06/24/2021    GFRESTIMATED 39 (L) 06/24/2021    GFRESTBLACK 46 (L) 06/24/2021    ESTEFANÍA 9.7 06/24/2021        Recent Labs   Lab Test 06/24/21  1630 " 05/27/21 2018 04/28/21  1009 04/07/21  1140 03/17/21  1137 12/23/20  1533 12/23/20  1533   NTBNPI  --  6,431*  --   --  3,854*  --  10,532*   NTBNP 3,827*  --  4,132* 4,130*  --    < >  --     < > = values in this interval not displayed.         Most recent testing:      Echocardiogram  6/3/2021  Interpretation Summary  Severe pulmonary hypertension is present. Right ventricular systolic pressure  is 117mmHg above the right atrial pressure.  Global and regional left ventricular function is normal with an EF of 55-60%.  Flattened septum is consistent with right ventricular pressure and volume  overload.  Moderate right ventricular dilation is present. Global right ventricular  function is moderately reduced.  Moderate tricuspid insufficiency is present.  Small circumferential pericardial effusion is present without any hemodynamic  significance.  IVC diameter >2.1 cm collapsing <50% with sniff suggests a high RA pressure  estimated at 15 mmHg or greater.  There has been no change.    RHC  3/17/2021    Severe pulmonary hypertension    Normal cardiac output by Thermodilution    Elevated left sided filling pressures    No signficant change when comapred to her last hemodynamic assessment          Pressures Phase: Baseline     Time Systolic (mmHg) Diastolic (mmHg) Mean (mmHg) A Wave (mmHg) V Wave (mmHg) EDP (mmHg) Max dp/dt (mmHg/sec) HR (bpm) Content (mL/dL) SAT (%)   RA Pressures  2:31 PM   14    14    22      63        RV Pressures  2:16 PM       912           2:32 PM 87    9       28     62        PA Pressures  2:32 PM 88    22    44        62        PCW Pressures  2:33 PM   14        64              NYHA Functional Class:  Functional class 3        A total of 45 minutes was spent today performing chart and history review, gathering HPI, physical exam, pre and post visit documentation, and care coordination.      Shawan Marcum PA-C  [facility]  Pager [phone]

## 2021-06-24 NOTE — PATIENT INSTRUCTIONS
Thank you for visiting the Pulmonary Hypertension Clinic today.      Today we discussed:   We will send you a message with plans for your water pills after your labs result.  I will reach out to [facility] to change the concentration on your Remodulin pump.      Follow up Appointment Information:  Will arrange with you pending any medication changes.       Additional Instructions:    1. Continue staying active and eat a heart healthy, low sodium diet.     2. Please keep current list of medications with you at all times.     3. Remember to weigh yourself daily after voiding and write it down on a log. If you have gained/lost 2 pounds overnight or 5 pounds in a week contact us for medication adjustments or further instructions.    4. Please call us immediately if you have syncope (fainting or passing out), chest pain, worsening edema (swelling or weight gain), or general worsening in how you are feeling.     --------------------------------------------------------------------------------------------------------------    If you have questions or concerns please contact us at:    Elan Arnett RN, BSN   Lex[facility] Balbuena (Schedule,Prior Auth)  Nurse Coordinator     Clinic   Pulmonary Hypertension   Pulmonary Hypertension  [facility]  [facility]  (Phone)[phone]    (Phone) [phone]        (Fax) [phone]      ** Please note that you will NOT receive a reminder call regarding your scheduled testing, reminder calls are for provider appointments only.  If you are scheduled for testing within the [employer] system you may receive a call regarding pre-registration for billing purposes only.**     --------------------------------------------------------------------------------------------------------------    Interested in joining a support group?    Pulmonary Hypertension Association  Https://www.phassociation.org/  **Look at the Events Tab** They even  have Support Groups that you can call into    [facility]  Second Saturday of the Month from 1-3 PM   Location: [address] [place] [address]  Leader: Jessenia Loo  Phone: [phone] or [phone]  Email: [email or web address]

## 2021-06-24 NOTE — NURSING NOTE
Chief Complaint   Patient presents with     Follow Up     return primary pulm     Vitals were taken and medications reconciled.    Moris De Jesus, EMT  5:09 PM

## 2021-06-24 NOTE — LETTER
6/24/2021      RE: Karen Anderson  [address]  [facility] [address]       Dear Colleague,    Thank you for the opportunity to participate in the care of your patient, Karen Anderson, at the [facility] [place] at [facility]. Please see a copy of my visit note below.        Date of Service: 06/25/2021      [facility]      Primary PH cardiologist: Dr. Turcios       HPI:  Ms. Anderson is a pleasant 80 year old female with a PMHx including rheumatoid arthritis, hypertension, scoliosis, asthma, pulmonary MAC, breast cancer s/p chemotherapy with Adriamycin, Cytoxan, Taxol, and tamoxifen and status post left mastectomy. She also has a history of extensive bilateral pulmonary emboli and DVT in 1998 thought to be secondary to tamoxifen along with chemotherapy induced cardiomyopathy. 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 Although she had proximal disease, she was too high risk for surgical PTE. She is now on combination therapy with IV Remodulin therapy along with Adempas and Opsumit.     She has had some trouble with side effects of Remodulin with titration which we have managed. Last fall she fell and tore a tendon in her knee which was complicated by a painful hematoma. Shortly after she developed weight gain and required a hospitalization for IV diuretics.    In January, due to worsening clinical status, she underwent a BPA which was complicated by hemoptysis, requiring FFP. We have since continued to work on diuretic adjustments, and due to low BP/dizziness, her Adempas was discontinued and Opsumit was decreased to every other day. However, after her repeat RHC in March, Opsumit was increased back to daily. As she was felt high risk to proceed with further surgical interventions, she was sent to [place] for a second opinion. They had considered a high risk  "PTE surgery but Dr. Turcios disagreed and felt she was still too high risk.    Karen returned to see Dr. Turcios last in late May. Unfortunately, she had fallen again and had a distal fracture of her right radius and ulna. She was hospitalized briefly at [facility], placed in a cast, and then sent to inpatient rehab. Unfortunately, she then gained over 20 lbs with more edema. Her Opsumit was changed back to every other day and given a PRN dose of metolazone with some improvement, but ultimately she was sent back to the hospital for admission.    She was admitted from 5/27 to 6/10 and was placed on a bumex gtt along with dobutamine support to augment diuresis. She was discharged on bumex 4mg TID with a discharge wt of 114#. Echo during her stay showed continued severe PH with estimated RVSP 117mmHg and moderately dilated RV with moderately reduced RV function. She had a small circumferential pericardial effusion without tamponade. IV Remodulin was titrated up to 34ng/kg/min. She received pRBCs and Venofer for anemia.     Today, we are doing a clinic visit for close follow up. Karen tells me that overall, she's doing ok. Her weight has continued to trend down since discharge and has settled out at 102-104# at home, and is similar on our scale today. She reports overall much improvement in her edema, though still \"some areas\" that get swollen. Her breathing is stable, but she isn't doing much. Her  says that she is now wearing 3LNC and sats will drop briefly into the low 80s with walking, but quickly recover a minute or two later. She denies palpitations, and only has dizziness if she exerts herself too much off the oxygen. BP at home has not been low.     Note--her  tells me they have had issues with their Remodulin pumps and cassettes, with frequent pump alarms--they have been in contact with [facility] multiple times and were told there may be a \"bad lot\" of cassettes and are in the " process of trying to figure it out. Also, with their recent pump rate increase, they are now running closer to empty on their cassette sooner and are concerned about this which is understandable.      Labs done prior to our visit were reviewed: WBC 4.5, hemoglobin 10.2, hematocrit 35.0,plt 164. Na 140, K 4.6, BUN 37, SCR 1.29. ALT 29, AST 36, NT-proBNP 3,827.         CURRENT PULMONARY HYPERTENSION REGIMEN:     PAH Rx: Remodulin 34ng/kg/min, Opsumit 10mg every other day  (failed Adempas due to dizziness)     Diuretics: Bumex 4mg TID      Oxygen: NC 3L     Anticoagulation: warfarin   Indication: CTEPH        Assessment/Plan:        1. Chronic thromboembolic pulmonary hypertension.              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 Although she had proximal disease, she was felt to be too high risk for surgical PTE here and at [facility]. She was offered potential PTE at [place], but still felt to be very high risk. She did have BPA here in January but this was complicated by hemoptysis. No further surgical interventions are being offered at this time. Currently, we will continue to focus on medical management.   --She is now on IV Remodulin 34ng/kg/min which was titrated up during recent admission. Per Dr. Turcios, will continue to increase her Remodulin gradually by 1 ng/kg/min weekly to a goal of 45 ng/kg/min as tolerated. She is not able to tolerate Adempas due to dizziness, and on Opsumit 10mg every other day for similar reasons.   --Her weight is down even further since hospital discharge and edema much improved. Labs today showed that renal function remains reasonable. She is currently on bumex 4mg TID. Given concerns with significant fluid retention and weights have settled out at home, will keep as is for now. Recheck BMP in 1-2 weeks for close monitoring.               --Continue digoxin for RV support. BP has remained acceptable, though will need to continue to monitor given renal concerns.                --Continue anticoagulation with warfarin for her CTEPH, though we have been monitoring with a lower INR goal of 2-2.5.    --Continue oxygen at 3L with exertion. I told her that if sats are >92% at rest, she may take off while sitting.     Follow up plan: Recheck labs in 1-2 week, return in 1 month to clinic.       Orders this Visit:  No orders of the defined types were placed in this encounter.    Orders Placed This Encounter   Medications     treprostinil (REMODULIN) 60 mcg/mL in glycine diluent 50 mL infusion     Sig: Inject 34 ng/kg/min into the vein continuous Current dose: 34 ng/kg/min  Inject 34 ng/kg/min into the vein, increasing 1 ng/kg/min weekly to a goal dose of 45 ng/kg/min     Dispense:  1368 mL     Refill:  0     Medications Discontinued During This Encounter   Medication Reason     cetirizine (ZYRTEC) 10 MG tablet Stopped by Patient     treprostinil (REMODULIN) 60 mcg/mL in glycine diluent 50 mL infusion            CURRENT MEDICATIONS:  Current Outpatient Medications   Medication Sig Dispense Refill     acetaminophen (TYLENOL) 325 MG tablet Take 325 mg by mouth every 6 hours as needed for mild pain       albuterol (PROAIR HFA/PROVENTIL HFA/VENTOLIN HFA) 108 (90 Base) MCG/ACT inhaler Inhale 2 puffs into the lungs every 4 hours as needed for shortness of breath / dyspnea or wheezing        alendronate (FOSAMAX) 70 MG tablet Take 70 mg by mouth once a week On Mondays       Ascorbic Acid (VITAMIN C) 500 MG CAPS Take 500 mg by mouth every morning        aspirin (ASA) 81 MG tablet Take 81 mg by mouth daily        beclomethasone HFA (QVAR REDIHALER) 80 MCG/ACT inhaler Inhale 2 puffs into the lungs 2 times daily       bumetanide (BUMEX) 2 MG tablet Take 2 tablets (4 mg) by mouth 3 times daily 180 tablet 1     calcium citrate-vitamin D (CALCIUM CITRATE + D3) 315-250 MG-UNIT TABS per tablet Take 1 tablet by mouth 2 times daily        Corn Dextrin (CLEAR FIBER POWDER PO) Take by mouth daily        digoxin  (LANOXIN) 62.5 MCG tablet Take 1 tablet (62.5 mcg) by mouth daily 90 tablet 3     gabapentin (NEURONTIN) 300 MG capsule Take 300 mg by mouth 3 times daily Can take an additional 300mg at bedtime as needed       hydroxychloroquine (PLAQUENIL) 200 MG tablet Take 200 mg by mouth daily        ipratropium (ATROVENT) 0.06 % nasal spray Spray 2 sprays into both nostrils 3 times daily        ketotifen (ZADITOR) 0.025 % ophthalmic solution Place 1 drop into both eyes daily       macitentan (OPSUMIT) 10 MG tablet Take 1 tablet (10 mg) by mouth every other day       potassium chloride ER (KLOR-CON M) 20 MEQ CR tablet Take 2 tablets (40 mEq) by mouth 3 times daily 180 tablet 1     sodium chloride (OCEAN) 0.65 % nasal spray Spray 1 spray into both nostrils daily as needed for congestion       traMADol (ULTRAM) 50 MG tablet Take 0.5 tablets (25 mg) by mouth every 6 hours as needed for moderate pain 5 tablet 0     treprostinil (REMODULIN) 60 mcg/mL in glycine diluent 50 mL infusion Inject 34 ng/kg/min into the vein continuous Current dose: 34 ng/kg/min  Inject 34 ng/kg/min into the vein, increasing 1 ng/kg/min weekly to a goal dose of 45 ng/kg/min 1368 mL 0     warfarin ANTICOAGULANT (COUMADIN) 3 MG tablet Take 3 mg PO on Mon, Wed, Fri and Sat, and 4.5 mg all other days of the week 170 tablet 3       ALLERGIES     Allergies   Allergen Reactions     Sulfa Drugs Anaphylaxis and Hives       PAST MEDICAL HISTORY:  Past Medical History:   Diagnosis Date     Asthma      Breast cancer (H)      Hypertension      Mycobacterium avium complex (H)      Pulmonary embolism (H)      Pulmonary hypertension (H)      Rheumatoid arthritis (H)      Scoliosis          Review of Systems:  Cardiovascular: negative for chest pain, palpitations, orthopnea, pos LE edema  Constitutional: negative for chills, sweats, fevers   Resp: Negative for dyspnea at rest, pos dyspnea on exertion, neg cough, known chronic lung disease  HEENT: Negative for new visual  "changes, frequent headaches  Gastrointestinal: negative for abdominal pain, diarrhea, blood in stool, nausea, vomiting  Hematologic/lymphatic:pos for current systemic anticoagulation, hx of blood clots  Neurological: negative for focal weakness, LOC, seizures, syncope/presyncope. Pos occasional dizziness.      Physical Exam:  Vitals: BP (!) 147/80 (BP Location: Left leg, Patient Position: Chair, Cuff Size: Adult Large)   Pulse 65   Ht 1.499 m (4' 11\")   Wt 46.5 kg (102 lb 8 oz)   SpO2 99%   BMI 20.70 kg/m     Wt Readings from Last 4 Encounters:   06/24/21 46.5 kg (102 lb 8 oz)   06/10/21 51.8 kg (114 lb 4.8 oz)   03/17/21 49.9 kg (110 lb)   02/26/21 52.4 kg (115 lb 8 oz)       GEN:  In general, this is a somewhat frail appearing elderly  female, in no acute distress on room air.  Patient in a wheelchair, accompanied by her .  HEENT:  Pupils grossly equal, sclerae nonicteric.   NECK: Supple, trachea midline. No significant JVD while upright in wheelchair.   CHEST: Goldberg site clean and dry.  C/V:  Regular rate and rhythm, no murmur, rub or gallop. Accentuated P2 but no S3 or RV heave.   RESP: Respirations are unlabored. No use of accessory muscles. Clear to auscultation bilaterally without wheezing, rales, or rhonchi.  GI: Abdomen soft, nontender, nondistended.   EXTREM: Her right leg is wrapped, left leg is erythematous but minimal edema.No cyanosis or clubbing.  NEURO: Alert and oriented, cooperative. Gait not formally assessed. No obvious focal deficits.     Recent Lab Results:  LIVER ENZYME RESULTS:  Lab Results   Component Value Date    AST 36 06/24/2021    ALT 29 06/24/2021       CBC RESULTS:  Lab Results   Component Value Date    WBC 4.5 06/24/2021    RBC 3.85 06/24/2021    HGB 10.2 (L) 06/24/2021    HCT 35.0 06/24/2021    MCV 91 06/24/2021    MCH 26.5 06/24/2021    MCHC 29.1 (L) 06/24/2021    RDW 25.1 (H) 06/24/2021     06/24/2021       BMP RESULTS:  Lab Results   Component Value " Date     06/24/2021    POTASSIUM 4.6 06/24/2021    CHLORIDE 102 06/24/2021    CO2 37 (H) 06/24/2021    ANIONGAP 1 (L) 06/24/2021    GLC 87 06/24/2021    BUN 39 (H) 06/24/2021    CR 1.29 (H) 06/24/2021    GFRESTIMATED 39 (L) 06/24/2021    GFRESTBLACK 46 (L) 06/24/2021    ESTEFANÍA 9.7 06/24/2021        Recent Labs   Lab Test 06/24/21  1630 05/27/21 2018 04/28/21  1009 04/07/21  1140 03/17/21  1137 12/23/20  1533 12/23/20  1533   NTBNPI  --  6,431*  --   --  3,854*  --  10,532*   NTBNP 3,827*  --  4,132* 4,130*  --    < >  --     < > = values in this interval not displayed.         Most recent testing:      Echocardiogram  6/3/2021  Interpretation Summary  Severe pulmonary hypertension is present. Right ventricular systolic pressure  is 117mmHg above the right atrial pressure.  Global and regional left ventricular function is normal with an EF of 55-60%.  Flattened septum is consistent with right ventricular pressure and volume  overload.  Moderate right ventricular dilation is present. Global right ventricular  function is moderately reduced.  Moderate tricuspid insufficiency is present.  Small circumferential pericardial effusion is present without any hemodynamic  significance.  IVC diameter >2.1 cm collapsing <50% with sniff suggests a high RA pressure  estimated at 15 mmHg or greater.  There has been no change.    RHC  3/17/2021    Severe pulmonary hypertension    Normal cardiac output by Thermodilution    Elevated left sided filling pressures    No signficant change when comapred to her last hemodynamic assessment          Pressures Phase: Baseline     Time Systolic (mmHg) Diastolic (mmHg) Mean (mmHg) A Wave (mmHg) V Wave (mmHg) EDP (mmHg) Max dp/dt (mmHg/sec) HR (bpm) Content (mL/dL) SAT (%)   RA Pressures  2:31 PM   14    14    22      63        RV Pressures  2:16 PM       912           2:32 PM 87    9       28     62        PA Pressures  2:32 PM 88    22    44        62        PCW Pressures  2:33 PM   14         64              NYHA Functional Class:  Functional class 3        A total of 45 minutes was spent today performing chart and history review, gathering HPI, physical exam, pre and post visit documentation, and care coordination.      Shawna Marcum PA-C  [facility]  Pager [phone]

## 2021-06-25 NOTE — NURSING NOTE
Team,     I think we should continue to increase her Remodulin gradually perhaps by 1 ng/kg/min weekly to a goal of 45 ng/kg/min as tolerated     TT  ___________________________________    Called [facility] with new dosing orders and will have dosing sheet faxed to our office. Kezia Arnett RN on 6/25/2021 at 9:22 AM

## 2021-06-29 ENCOUNTER — TELEPHONE (OUTPATIENT)
Dept: CARDIOLOGY | Facility: CLINIC | Age: 81
End: 2021-06-29

## 2021-06-30 ENCOUNTER — ANTICOAGULATION THERAPY VISIT (OUTPATIENT)
Dept: ANTICOAGULATION | Facility: CLINIC | Age: 81
End: 2021-06-30

## 2021-06-30 DIAGNOSIS — I27.24 CTEPH (CHRONIC THROMBOEMBOLIC PULMONARY HYPERTENSION) (H): ICD-10-CM

## 2021-06-30 DIAGNOSIS — Z79.01 LONG TERM CURRENT USE OF ANTICOAGULANT THERAPY: ICD-10-CM

## 2021-06-30 DIAGNOSIS — I27.20 PULMONARY HYPERTENSION (H): ICD-10-CM

## 2021-06-30 LAB — INR PPP: 3.6 (ref 0.9–1.1)

## 2021-06-30 NOTE — PROGRESS NOTES
ANTICOAGULATION FOLLOW-UP CLINIC VISIT    Patient Name:  Karen Anderson  Date:  6/30/2021  Contact Type:  Telephone    SUBJECTIVE:         OBJECTIVE    Recent labs: (last 7 days)     06/30/21   INR 3.6*       ASSESSMENT / PLAN  INR assessment SUPRA    Recheck INR In: 1 WEEK    INR Location [facility] INR      Anticoagulation Summary  As of 6/30/2021    INR goal:  2.0-2.5   TTR:  50.8 % (8.9 mo)   INR used for dosing:  3.6 (6/30/2021)   Warfarin maintenance plan:  3 mg (3 mg x 1) every day   Full warfarin instructions:  6/30: Hold; Otherwise 3 mg every day   Weekly warfarin total:  21 mg   Plan last modified:  Laure Enriquez RN (6/30/2021)   Next INR check:  7/7/2021   Priority:  High   Target end date:  Indefinite    Indications    Pulmonary hypertension (H) [I27.20]  CTEPH (chronic thromboembolic pulmonary hypertension) (H) [I27.24]  Long term current use of anticoagulant therapy [Z79.01]             Anticoagulation Episode Summary     INR check location:      Preferred lab:      Send INR reminders to:  [facility]    Comments:  call home first, OK to leave message on either phone. OK to speak with spouse, Don.  [place] [facility] SPEAK IN TABLETS (Has 3mg Tabs) 10/23/20:  new goal range is 2 - 2.5  [facility] in [place] and will need to fax orders P [phone] F [phone]      Anticoagulation Care Providers     Provider Role Specialty Phone number    Osiris Luong MD Referring Cardiovascular Disease [phone]    Karolina Turcios MD Referring Cardiovascular Disease [phone]            See the Encounter Report to view Anticoagulation Flowsheet and Dosing Calendar (Go to Encounters tab in chart review, and find the Anticoagulation Therapy Visit)    Spoke with Kelli FARRELL.      Laure Enriquez, RN

## 2021-06-30 NOTE — PROGRESS NOTES
ANTICOAGULATION MANAGEMENT     Karen Anderson 80 year old female is on warfarin with supratherapeutic INR result. (Goal INR 2.0-2.5)    Recent labs: (last 7 days)     06/30/21   INR 3.6*       ASSESSMENT     Source(s): Home Care/Facility Nurse       Warfarin doses taken: Warfarin taken as instructed    Diet: No new diet changes identified    New illness, injury, or hospitalization: No    Medication/supplement changes: remodulin dose is slowly being increased.  remodulin can increase risk of bleeding.    Signs or symptoms of bleeding or clotting: No    Previous INR: Supratherapeutic    Additional findings: Karen continues to have constipation/diarrhea.  she is eating well, and staying hydrated the best she can.      PLAN     Recommended plan for ongoing change(s) affecting INR     Dosing Instructions: Hold dose then Decrease your warfarin dose (12% change) with next INR in 1 week       Summary  As of 6/30/2021    Full warfarin instructions:  6/30: Hold; Otherwise 3 mg every day   Next INR check:  7/7/2021             Telephone call with home care nurse Kelli and patient whom agrees to plan and repeated back plan correctly    Orders given to  Homecare nurse/facility to recheck    Education provided: Potential interaction between warfarin and remodulin.  Reviewed signs of bleeding, she understands the need to be seen urgently in ER if she develops any signs of bleeding or if she falls.    Plan made per [facility] anticoagulation protocol    Laure Enriquez RN  Anticoagulation Clinic  6/30/2021    _______________________________________________________________________     Anticoagulation Episode Summary     Current INR goal:  2.0-2.5   TTR:  50.8 % (8.9 mo)   Target end date:  Indefinite   Send INR reminders to:  [facility]    Indications    Pulmonary hypertension (H) [I27.20]  CTEPH (chronic thromboembolic pulmonary hypertension) (H) [I27.24]  Long term current use of anticoagulant therapy [Z79.01]            Comments:  call home first, OK to leave message on either phone. OK to speak with spouse, Don.  [place] [facility] SPEAK IN TABLETS (Has 3mg Tabs) 10/23/20:  new goal range is 2 - 2.5  [facility] in [place] and will need to fax orders P [phone] F [phone]         Anticoagulation Care Providers     Provider Role Specialty Phone number    Osiris Luong MD Referring Cardiovascular Disease [phone]    Karolina Turcios MD Referring Cardiovascular Disease [phone]

## 2021-07-01 ENCOUNTER — TELEPHONE (OUTPATIENT)
Dept: CARDIOLOGY | Facility: CLINIC | Age: 81
End: 2021-07-01

## 2021-07-07 ENCOUNTER — ANTICOAGULATION THERAPY VISIT (OUTPATIENT)
Dept: ANTICOAGULATION | Facility: CLINIC | Age: 81
End: 2021-07-07

## 2021-07-07 DIAGNOSIS — I27.24 CTEPH (CHRONIC THROMBOEMBOLIC PULMONARY HYPERTENSION) (H): ICD-10-CM

## 2021-07-07 DIAGNOSIS — I27.20 PULMONARY HYPERTENSION (H): ICD-10-CM

## 2021-07-07 DIAGNOSIS — Z79.01 LONG TERM CURRENT USE OF ANTICOAGULANT THERAPY: ICD-10-CM

## 2021-07-07 LAB — INR PPP: 3 (ref 0.9–1.1)

## 2021-07-07 NOTE — PROGRESS NOTES
ANTICOAGULATION MANAGEMENT     Karen Anderson 80 year old female is on warfarin with therapeutic INR result. (Goal INR 2.0-2.5)    Recent labs: (last 7 days)     07/07/21   INR 3.0*       ASSESSMENT     Source(s): Chart Review and Patient/Caregiver Call       Warfarin doses taken: Warfarin taken as instructed    Diet: No new diet changes identified    New illness, injury, or hospitalization: No    Medication/supplement changes: Remodulin dose is being titrated up    Signs or symptoms of bleeding or clotting: Yes: eyes have broken blood vessels    Previous INR: Supratherapeutic    Additional findings: None     PLAN     Recommended plan for no diet, medication or health factor changes affecting INR     Dosing Instructions:  Decrease your warfarin dose (14% change) with next INR in 1 week       Summary  As of 7/7/2021    Full warfarin instructions:  1.5 mg every Wed, Sat; 3 mg all other days   Next INR check:  7/14/2021             Telephone call with home care nurse Paulina Pereira [facility] nurse who verbalizes understanding and agrees to plan    Orders given to  Homecare nurse/facility to recheck  Plan made per [facility] anticoagulation protocol   Education: Interaction with Remodulin and coumadin.    Yumi Bates, RN  Anticoagulation Clinic  7/7/2021    _______________________________________________________________________     Anticoagulation Episode Summary     Current INR goal:  2.0-2.5   TTR:  48.2 % (8.9 mo)   Target end date:  Indefinite   Send INR reminders to:  [facility]    Indications    Pulmonary hypertension (H) [I27.20]  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 OK to speak with spouse, Don.  [facility] SPEAK IN TABLETS (Has 3mg Tabs) 10/23/20:  new goal range is 2 - 2.5  [facility] in [place] and will need to fax orders P  [phone]  [phone]         Anticoagulation Care Providers     Provider Role Specialty Phone number    Osiris Luong MD Referring Cardiovascular Disease [phone]    Karolina Turcios MD Referring Cardiovascular Disease [phone]

## 2021-07-09 DIAGNOSIS — I27.20 PULMONARY HYPERTENSION (H): ICD-10-CM

## 2021-07-12 ENCOUNTER — TELEPHONE (OUTPATIENT)
Dept: ANTICOAGULATION | Facility: CLINIC | Age: 81
End: 2021-07-12

## 2021-07-12 NOTE — TELEPHONE ENCOUNTER
Received VM this AM that patient had scrapped her leg (edematous leg) with a jagged fingernail. Reports that her therapist with [facility] applied and dressing and compression to her leg to stop the bleeding. Bleeding has appeared to have stopped-dressing dry. Patient is wondering if she should take a decreased dose of Warfarin today. Consulted with Lorena BAUM RPH for dosing recommendations. Patient reports that her Remodulin dose will not be increased until Wed this week. Reports that the broken blood vessels in her eyes have improved over the last two days. Patient will continue current Warfarin dosing with a recheck on Wed with [facility]. Advised to call back [facility] at [phone] with any questions or concerns that come up.    GOLDY PERALTA RN-BC, [facility]  Anticoagulation Clinic  [phone]

## 2021-07-14 ENCOUNTER — ANTICOAGULATION THERAPY VISIT (OUTPATIENT)
Dept: ANTICOAGULATION | Facility: CLINIC | Age: 81
End: 2021-07-14

## 2021-07-14 DIAGNOSIS — I27.20 PULMONARY HYPERTENSION (H): Primary | ICD-10-CM

## 2021-07-14 DIAGNOSIS — Z79.01 LONG TERM CURRENT USE OF ANTICOAGULANT THERAPY: ICD-10-CM

## 2021-07-14 DIAGNOSIS — I27.24 CTEPH (CHRONIC THROMBOEMBOLIC PULMONARY HYPERTENSION) (H): ICD-10-CM

## 2021-07-14 LAB — INR PPP: 1.7

## 2021-07-14 NOTE — PROGRESS NOTES
ANTICOAGULATION MANAGEMENT     Karen Anderson 80 year old female is on warfarin with subtherapeutic INR result. (Goal INR 2.0-2.5)    Recent labs: (last 7 days)     07/14/21  0000   INR 1.7       ASSESSMENT     Source(s): Home Care/Facility Nurse       Warfarin doses taken: Warfarin taken as instructed    Diet: No new diet changes identified    New illness, injury, or hospitalization: No    Medication/supplement changes: Romodulin continues to be increased slowly. Per literature this can increase pt's risk for bleeding    Signs or symptoms of bleeding or clotting: Yes: Tuesday 7/13 morning pt cut her shin with her nail. She reports it is a small cut, but bled and soaked multiple bandages. Pt's physical therapist wrapped her shin with a bandage and today there is no bleeding notes per JAMI Wilkes. Chung will re-bandage pt's shin.     Previous INR: Supratherapeutic    Additional findings: None     PLAN     Recommended plan for temporary change(s) affecting INR     Dosing Instructions: Booster dose then continue your current warfarin dose with next INR in 1 week       Summary  As of 7/14/2021    Full warfarin instructions:  7/14: 3 mg; Otherwise 1.5 mg every Wed, Sat; 3 mg all other days   Next INR check:  7/21/2021             Telephone call with home care nurse Chung who verbalizes understanding and agrees to plan and who agrees to plan and repeated back plan correctly    Orders given to  Homecare nurse/facility to recheck    Education provided: Potential interaction between warfarin and Romodulin and Monitoring for bleeding signs and symptoms    Plan made per [facility] anticoagulation protocol    Tanisha Kimbrough, RN  Anticoagulation Clinic  7/14/2021    _______________________________________________________________________     Anticoagulation Episode Summary     Current INR goal:  2.0-2.5   TTR:  46.6 % (8.9 mo)   Target end date:  Indefinite   Send INR reminders to:  [facility]    Indications    Pulmonary  hypertension (H) [I27.20]  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 OK to speak with spouse, Don.  [facility] SPEAK IN TABLETS (Has 3mg Tabs) 10/23/20:  new goal range is 2 - 2.5  [facility] in [place] and will need to fax orders P [phone] F [phone]         Anticoagulation Care Providers     Provider Role Specialty Phone number    Karolina Turcios MD Referring Cardiovascular Disease [phone]

## 2021-07-16 DIAGNOSIS — I27.20 PULMONARY HYPERTENSION (H): ICD-10-CM

## 2021-07-21 ENCOUNTER — ANTICOAGULATION THERAPY VISIT (OUTPATIENT)
Dept: ANTICOAGULATION | Facility: CLINIC | Age: 81
End: 2021-07-21

## 2021-07-21 ENCOUNTER — MYC MEDICAL ADVICE (OUTPATIENT)
Dept: CARDIOLOGY | Facility: CLINIC | Age: 81
End: 2021-07-21

## 2021-07-21 ENCOUNTER — TELEPHONE (OUTPATIENT)
Dept: CARDIOLOGY | Facility: CLINIC | Age: 81
End: 2021-07-21

## 2021-07-21 DIAGNOSIS — I27.20 PULMONARY HYPERTENSION (H): Primary | ICD-10-CM

## 2021-07-21 DIAGNOSIS — Z79.01 LONG TERM CURRENT USE OF ANTICOAGULANT THERAPY: ICD-10-CM

## 2021-07-21 DIAGNOSIS — I27.24 CTEPH (CHRONIC THROMBOEMBOLIC PULMONARY HYPERTENSION) (H): ICD-10-CM

## 2021-07-21 DIAGNOSIS — I50.810 RVF (RIGHT VENTRICULAR FAILURE) (H): ICD-10-CM

## 2021-07-21 NOTE — PROGRESS NOTES
Home care is not able to see patient today 2/2 scheduling.  Home care will see patient tomorrow.  Patient will take 1.5mg of warfarin today.

## 2021-07-21 NOTE — TELEPHONE ENCOUNTER
[employer] requests a new O2 order be faxed to  [facility] for the patient. Patient has only been requiring 1.5-3L continuously to maintain sats above 90. Patient would like this updated order faxed so that she can have a chargeable POC they can take along with them in the car. Kezia Arnett RN on 7/21/2021 at 12:56 PM    New O2 orders faxed to  [facility] at (f) [phone]. Kezia Arnett RN on 7/21/2021 at 12:58 PM    [facility] Call Center    Phone Message    May a detailed message be left on voicemail: yes     Reason for Call: Other: [employer] called in looking to discuss home O2 orders that were supposed to be signed by Dr. Rod. Please call her back at [phone].     Action Taken: Message routed to:  [facility] ([facility] Cardio    Travel Screening: Not Applicable

## 2021-07-22 ENCOUNTER — ANTICOAGULATION THERAPY VISIT (OUTPATIENT)
Dept: ANTICOAGULATION | Facility: CLINIC | Age: 81
End: 2021-07-22

## 2021-07-22 ENCOUNTER — TELEPHONE (OUTPATIENT)
Dept: ANTICOAGULATION | Facility: CLINIC | Age: 81
End: 2021-07-22
Payer: COMMERCIAL

## 2021-07-22 DIAGNOSIS — I27.20 PULMONARY HYPERTENSION (H): Primary | ICD-10-CM

## 2021-07-22 DIAGNOSIS — I27.24 CTEPH (CHRONIC THROMBOEMBOLIC PULMONARY HYPERTENSION) (H): ICD-10-CM

## 2021-07-22 DIAGNOSIS — Z79.01 LONG TERM CURRENT USE OF ANTICOAGULANT THERAPY: ICD-10-CM

## 2021-07-22 LAB — INR (EXTERNAL): 1.7 (ref 0.9–1.1)

## 2021-07-22 RX ORDER — BUMETANIDE 2 MG/1
4 TABLET ORAL 3 TIMES DAILY
Qty: 540 TABLET | Refills: 3 | Status: SHIPPED | OUTPATIENT
Start: 2021-07-22 | End: 2022-04-19

## 2021-07-22 NOTE — TELEPHONE ENCOUNTER
Anticoagulation Management    Discussed INR home monitoring program with Karen Anderson reviewing:      [employer] requirements: >= 3 months of anticoagulation therapy, indication for chronic anticoagulation and order from provider    Required testing frequency (q1-2 weeks)    Home meters, testing supplies, meter training, and reporting of INR results done through an outside company. Patient would be contacted by home monitoring company to review insurance coverage with home monitoring company prior to enrolling.    [facility] would continue to receive and manage INR results.    Home monitoring application may take several weeks and must continue to follow up with recommended INR monitoring in clinic until receives monitor and training completed.     Home monitoring terms reviewed with patient      Patient agrees to frequency of testing as directed by referring provider ( weekly or biweekly) Yes    Testing to be performed during business hours of [facility] Yes    Patient agrees they have the skill (or a designated caregiver) necessary to perform the self test Yes    Patient agrees to report all INR results to INR home monitoring company Yes    Patient agrees to have additional INR test in clinic if a home result is critical Yes    Patient agrees to schedule an INR test at a [facility] clinic yearly for technique observation and quality check of INR results with their home meter Yes    Patient agrees to use a [facility] approved service provider and device for home monitoring Yes    [facility]    Referring provider: Karolina Turcios    Referring providers Clinic Fax number [phone]    Karen Anderson is interested home INR monitoring and requests order be submitted.

## 2021-07-23 DIAGNOSIS — I27.20 PULMONARY HYPERTENSION (H): ICD-10-CM

## 2021-07-26 ENCOUNTER — DOCUMENTATION ONLY (OUTPATIENT)
Dept: ANTICOAGULATION | Facility: CLINIC | Age: 81
End: 2021-07-26

## 2021-07-26 ENCOUNTER — TELEPHONE (OUTPATIENT)
Dept: CARDIOLOGY | Facility: CLINIC | Age: 81
End: 2021-07-26

## 2021-07-26 DIAGNOSIS — I27.24 CTEPH (CHRONIC THROMBOEMBOLIC PULMONARY HYPERTENSION) (H): Primary | ICD-10-CM

## 2021-07-26 DIAGNOSIS — I27.20 PULMONARY HYPERTENSION (H): ICD-10-CM

## 2021-07-26 DIAGNOSIS — I50.810 RVF (RIGHT VENTRICULAR FAILURE) (H): ICD-10-CM

## 2021-07-26 DIAGNOSIS — Z79.01 LONG TERM CURRENT USE OF ANTICOAGULANT THERAPY: ICD-10-CM

## 2021-07-26 NOTE — TELEPHONE ENCOUNTER
Don requesting that the same Rx for patient's POC be faxed to (f) [phone], as they don't want to go through [facility] d/t financial issues. Kezia Arnett RN on 7/26/2021 at 8:07 AM    Order faxed to number provided. Kezia Arnett RN on 7/26/2021 at 8:27 AM

## 2021-07-29 ENCOUNTER — TELEPHONE (OUTPATIENT)
Dept: ANTICOAGULATION | Facility: CLINIC | Age: 81
End: 2021-07-29

## 2021-07-29 DIAGNOSIS — I27.20 PULMONARY HYPERTENSION (H): Primary | ICD-10-CM

## 2021-07-29 DIAGNOSIS — Z79.01 LONG TERM CURRENT USE OF ANTICOAGULANT THERAPY: ICD-10-CM

## 2021-07-29 DIAGNOSIS — I27.24 CTEPH (CHRONIC THROMBOEMBOLIC PULMONARY HYPERTENSION) (H): ICD-10-CM

## 2021-07-29 NOTE — TELEPHONE ENCOUNTER
7/29/21   Karen chaudhry.  She will be increasing her Remodulin dose every 6 days. She cancelled her appt for labs today, and rescheduled for next Thursday.  Updated calendar.  PRIYA Shields

## 2021-07-30 DIAGNOSIS — I27.20 PULMONARY HYPERTENSION (H): ICD-10-CM

## 2021-08-05 ENCOUNTER — LAB (OUTPATIENT)
Dept: LAB | Facility: CLINIC | Age: 81
End: 2021-08-05
Payer: COMMERCIAL

## 2021-08-05 ENCOUNTER — ANTICOAGULATION THERAPY VISIT (OUTPATIENT)
Dept: ANTICOAGULATION | Facility: CLINIC | Age: 81
End: 2021-08-05

## 2021-08-05 DIAGNOSIS — I27.24 CTEPH (CHRONIC THROMBOEMBOLIC PULMONARY HYPERTENSION) (H): ICD-10-CM

## 2021-08-05 DIAGNOSIS — I27.20 PULMONARY HYPERTENSION (H): Primary | ICD-10-CM

## 2021-08-05 DIAGNOSIS — I27.20 PULMONARY HYPERTENSION (H): ICD-10-CM

## 2021-08-05 DIAGNOSIS — Z79.01 LONG TERM CURRENT USE OF ANTICOAGULANT THERAPY: ICD-10-CM

## 2021-08-05 DIAGNOSIS — I50.810 RVF (RIGHT VENTRICULAR FAILURE) (H): ICD-10-CM

## 2021-08-05 LAB — INR BLD: 1.7 (ref 0.9–1.1)

## 2021-08-05 PROCEDURE — 85610 PROTHROMBIN TIME: CPT

## 2021-08-05 PROCEDURE — 36416 COLLJ CAPILLARY BLOOD SPEC: CPT

## 2021-08-05 NOTE — PROGRESS NOTES
ANTICOAGULATION MANAGEMENT     Karen Anderson 80 year old female is on warfarin with subtherapeutic INR result. (Goal INR 2.0-2.5)    Recent labs: (last 7 days)     08/05/21  1330   INR 1.7*       ASSESSMENT     Source(s): Chart Review and Patient/Caregiver Call       Warfarin doses taken: Warfarin taken as instructed    Diet: No new diet changes identified    New illness, injury, or hospitalization: No    Medication/supplement changes: None noted    Signs or symptoms of bleeding or clotting: No    Previous INR: Subtherapeutic    Additional findings: None     PLAN     Recommended plan for no diet, medication or health factor changes affecting INR     Dosing Instructions:  Increase your warfarin dose (6.7% change) with next INR in 2 weeks       Summary  As of 8/5/2021    Full warfarin instructions:  4.5 mg every Mon, Thu; 3 mg all other days   Next INR check:  8/19/2021             Telephone call with Karen who verbalizes understanding and agrees to plan and who agrees to plan and repeated back plan correctly    Lab visit scheduled    Education provided: None required    Plan made per [facility] anticoagulation protocol    Brittany Kirk, RN  Anticoagulation Clinic  8/5/2021    _______________________________________________________________________     Anticoagulation Episode Summary     Current INR goal:  2.0-2.5   TTR:  38.3 % (8.9 mo)   Target end date:  Indefinite   Send INR reminders to:  [facility]    Indications    Pulmonary hypertension (H) [I27.20]  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 OK to speak with spouse, Don.  [facility] SPEAK IN TABLETS (Has 3mg Tabs) 10/23/20:  new goal range is 2 - 2.5  [facility] in [place] and will need to fax orders P [phone] F [phone]         Anticoagulation Care Providers     Provider Role  Specialty Phone number    Karolina Turcios MD Referring Cardiovascular Disease [phone]

## 2021-08-06 DIAGNOSIS — I27.20 PULMONARY HYPERTENSION (H): ICD-10-CM

## 2021-08-09 ENCOUNTER — MEDICAL CORRESPONDENCE (OUTPATIENT)
Dept: HEALTH INFORMATION MANAGEMENT | Facility: CLINIC | Age: 81
End: 2021-08-09

## 2021-08-16 ENCOUNTER — DOCUMENTATION ONLY (OUTPATIENT)
Dept: LAB | Facility: CLINIC | Age: 81
End: 2021-08-16

## 2021-08-16 DIAGNOSIS — I27.0 PRIMARY PULMONARY HYPERTENSION (H): Primary | ICD-10-CM

## 2021-08-16 DIAGNOSIS — I27.20 PULMONARY HYPERTENSION (H): ICD-10-CM

## 2021-08-16 DIAGNOSIS — R06.02 SOB (SHORTNESS OF BREATH): ICD-10-CM

## 2021-08-19 ENCOUNTER — LAB (OUTPATIENT)
Dept: LAB | Facility: CLINIC | Age: 81
End: 2021-08-19
Payer: COMMERCIAL

## 2021-08-19 ENCOUNTER — ANTICOAGULATION THERAPY VISIT (OUTPATIENT)
Dept: ANTICOAGULATION | Facility: CLINIC | Age: 81
End: 2021-08-19

## 2021-08-19 DIAGNOSIS — R06.02 SOB (SHORTNESS OF BREATH): ICD-10-CM

## 2021-08-19 DIAGNOSIS — I27.20 PULMONARY HYPERTENSION (H): Primary | ICD-10-CM

## 2021-08-19 DIAGNOSIS — I27.24 CTEPH (CHRONIC THROMBOEMBOLIC PULMONARY HYPERTENSION) (H): ICD-10-CM

## 2021-08-19 DIAGNOSIS — I27.0 PRIMARY PULMONARY HYPERTENSION (H): ICD-10-CM

## 2021-08-19 DIAGNOSIS — I50.810 RVF (RIGHT VENTRICULAR FAILURE) (H): ICD-10-CM

## 2021-08-19 DIAGNOSIS — Z79.01 LONG TERM CURRENT USE OF ANTICOAGULANT THERAPY: ICD-10-CM

## 2021-08-19 DIAGNOSIS — I27.20 PULMONARY HYPERTENSION (H): ICD-10-CM

## 2021-08-19 LAB
INR BLD: 1.8 (ref 0.9–1.1)
INR BLD: 1.8 (ref 0.9–1.1)

## 2021-08-19 PROCEDURE — 80048 BASIC METABOLIC PNL TOTAL CA: CPT

## 2021-08-19 PROCEDURE — 36415 COLL VENOUS BLD VENIPUNCTURE: CPT

## 2021-08-19 PROCEDURE — 85610 PROTHROMBIN TIME: CPT

## 2021-08-19 NOTE — PROGRESS NOTES
ANTICOAGULATION MANAGEMENT     Karen Anderson 80 year old female is on warfarin with subtherapeutic INR result. (Goal INR 2.0-2.5)    Recent labs: (last 7 days)     08/19/21  1036   INR 1.8*       ASSESSMENT     Source(s): Chart Review and Patient/Caregiver Call       Warfarin doses taken: Warfarin taken as instructed    Diet: No new diet changes identified    New illness, injury, or hospitalization: No    Medication/supplement changes: remodulin dose increasing every 6 days    Signs or symptoms of bleeding or clotting: No    Previous INR: Subtherapeutic    Additional findings: None     PLAN     Recommended plan for ongoing change(s) affecting INR     Dosing Instructions:  Increase your warfarin dose (6.3% change) with next INR in 2 weeks       Summary  As of 8/19/2021    Full warfarin instructions:  4.5 mg every Mon, Thu, Sat; 3 mg all other days   Next INR check:  8/26/2021             Telephone call with Karen who agrees to plan and repeated back plan correctly    Lab visit scheduled Recommended Karen recheck INR in one week; but she states she will come in two weeks.    Education provided: Please call back if any changes to your diet, medications or how you've been taking warfarin and Contact [phone] with any changes, questions or concerns.     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 OK to speak with spouse, DonMariana Blanco SPEAK IN TABLETS  (Has 3mg Tabs) 10/23/20:  new goal range is 2 - 2.5  [facility] in [place] and will need to fax orders P [phone] F [phone]         Anticoagulation Care Providers     Provider Role Specialty Phone number    Karolina Turcios MD Referring Cardiovascular Disease [phone]

## 2021-08-20 ENCOUNTER — TELEPHONE (OUTPATIENT)
Dept: CARDIOLOGY | Facility: CLINIC | Age: 81
End: 2021-08-20

## 2021-08-20 DIAGNOSIS — R06.02 SOB (SHORTNESS OF BREATH): ICD-10-CM

## 2021-08-20 DIAGNOSIS — I27.20 PULMONARY HYPERTENSION (H): Primary | ICD-10-CM

## 2021-08-20 LAB
ANION GAP SERPL CALCULATED.3IONS-SCNC: 5 MMOL/L (ref 3–14)
BUN SERPL-MCNC: 37 MG/DL (ref 7–30)
CALCIUM SERPL-MCNC: 10 MG/DL (ref 8.5–10.1)
CHLORIDE BLD-SCNC: 101 MMOL/L (ref 94–109)
CO2 SERPL-SCNC: 34 MMOL/L (ref 20–32)
CREAT SERPL-MCNC: 1.31 MG/DL (ref 0.52–1.04)
GFR SERPL CREATININE-BSD FRML MDRD: 39 ML/MIN/1.73M2
GLUCOSE BLD-MCNC: 158 MG/DL (ref 70–99)
POTASSIUM BLD-SCNC: 4.1 MMOL/L (ref 3.4–5.3)
SODIUM SERPL-SCNC: 140 MMOL/L (ref 133–144)

## 2021-08-20 NOTE — TELEPHONE ENCOUNTER
Confirmed overdue clinic appt with spouse for 9/2/at 1:45 PM with labs prior. Kezia Arnett RN on 8/20/2021 at 9:14 AM

## 2021-08-25 ENCOUNTER — TELEPHONE (OUTPATIENT)
Dept: CARDIOLOGY | Facility: CLINIC | Age: 81
End: 2021-08-25

## 2021-08-25 NOTE — TELEPHONE ENCOUNTER
For Karen Anderson,   1940, her  states that her pump is alarming low volume when they have ~ 8 ml left, and they would like to do a more concentrated solution to give the more wiggle room for cassette changes (they are too busy to plan for exact times for cassette changes- hope I am that active at 80!).    Please call us with verbal orders to change as noted in attachment- we really don t need signed rx unless you prefer.  She is now at 44 ng ng/kg/ min, and with the change to the Dennis 2.5 mg/ml we can go to 44.5 or 45.5 ng/kg/min--your choice        Thanks much!    Yessica Angel  ____________________________________________    Called [facility] to give verbal orders on IV Remodulin concentration change. Kezia Arnett RN on 2021 at 1:48 PM

## 2021-08-27 DIAGNOSIS — I27.20 PULMONARY HYPERTENSION (H): ICD-10-CM

## 2021-09-02 ENCOUNTER — ANTICOAGULATION THERAPY VISIT (OUTPATIENT)
Dept: ANTICOAGULATION | Facility: CLINIC | Age: 81
End: 2021-09-02

## 2021-09-02 ENCOUNTER — PATIENT OUTREACH (OUTPATIENT)
Dept: CARDIOLOGY | Facility: CLINIC | Age: 81
End: 2021-09-02

## 2021-09-02 ENCOUNTER — OFFICE VISIT (OUTPATIENT)
Dept: CARDIOLOGY | Facility: CLINIC | Age: 81
End: 2021-09-02
Attending: PHYSICIAN ASSISTANT
Payer: COMMERCIAL

## 2021-09-02 ENCOUNTER — LAB (OUTPATIENT)
Dept: LAB | Facility: CLINIC | Age: 81
End: 2021-09-02
Payer: COMMERCIAL

## 2021-09-02 VITALS
WEIGHT: 104 LBS | BODY MASS INDEX: 20.96 KG/M2 | HEIGHT: 59 IN | DIASTOLIC BLOOD PRESSURE: 60 MMHG | HEART RATE: 82 BPM | SYSTOLIC BLOOD PRESSURE: 130 MMHG | OXYGEN SATURATION: 93 %

## 2021-09-02 DIAGNOSIS — I27.24 CTEPH (CHRONIC THROMBOEMBOLIC PULMONARY HYPERTENSION) (H): Primary | ICD-10-CM

## 2021-09-02 DIAGNOSIS — Z79.01 LONG TERM CURRENT USE OF ANTICOAGULANT THERAPY: ICD-10-CM

## 2021-09-02 DIAGNOSIS — I27.24 CTEPH (CHRONIC THROMBOEMBOLIC PULMONARY HYPERTENSION) (H): ICD-10-CM

## 2021-09-02 DIAGNOSIS — I27.20 PULMONARY HYPERTENSION (H): Primary | ICD-10-CM

## 2021-09-02 DIAGNOSIS — R06.02 SOB (SHORTNESS OF BREATH): ICD-10-CM

## 2021-09-02 DIAGNOSIS — R06.09 DYSPNEA ON EXERTION: ICD-10-CM

## 2021-09-02 DIAGNOSIS — I50.810 RVF (RIGHT VENTRICULAR FAILURE) (H): ICD-10-CM

## 2021-09-02 DIAGNOSIS — I27.20 PULMONARY HYPERTENSION (H): ICD-10-CM

## 2021-09-02 LAB
ERYTHROCYTE [DISTWIDTH] IN BLOOD BY AUTOMATED COUNT: 19 % (ref 10–15)
HCT VFR BLD AUTO: 39.8 % (ref 35–47)
HGB BLD-MCNC: 12.4 G/DL (ref 11.7–15.7)
INR BLD: 1.9 (ref 0.9–1.1)
MCH RBC QN AUTO: 30 PG (ref 26.5–33)
MCHC RBC AUTO-ENTMCNC: 31.2 G/DL (ref 31.5–36.5)
MCV RBC AUTO: 96 FL (ref 78–100)
NT-PROBNP SERPL-MCNC: 1840 PG/ML (ref 0–450)
PLATELET # BLD AUTO: 242 10E3/UL (ref 150–450)
RBC # BLD AUTO: 4.13 10E6/UL (ref 3.8–5.2)
WBC # BLD AUTO: 4.6 10E3/UL (ref 4–11)

## 2021-09-02 PROCEDURE — 85610 PROTHROMBIN TIME: CPT

## 2021-09-02 PROCEDURE — G0463 HOSPITAL OUTPT CLINIC VISIT: HCPCS

## 2021-09-02 PROCEDURE — 83880 ASSAY OF NATRIURETIC PEPTIDE: CPT

## 2021-09-02 PROCEDURE — 80053 COMPREHEN METABOLIC PANEL: CPT

## 2021-09-02 PROCEDURE — 99214 OFFICE O/P EST MOD 30 MIN: CPT | Performed by: PHYSICIAN ASSISTANT

## 2021-09-02 PROCEDURE — 36415 COLL VENOUS BLD VENIPUNCTURE: CPT

## 2021-09-02 PROCEDURE — 85027 COMPLETE CBC AUTOMATED: CPT

## 2021-09-02 ASSESSMENT — MIFFLIN-ST. JEOR: SCORE: 852.37

## 2021-09-02 ASSESSMENT — PAIN SCALES - GENERAL: PAINLEVEL: NO PAIN (0)

## 2021-09-02 NOTE — PROGRESS NOTES
Date of Service: 09/02/2021      [facility]      Primary PH cardiologist: Dr. Turcios       HPI:  Ms. Anderson is a pleasant 81 year old female with a PMHx including rheumatoid arthritis, hypertension, scoliosis, asthma, pulmonary MAC, breast cancer s/p chemotherapy with Adriamycin, Cytoxan, Taxol, and tamoxifen and status post left mastectomy. She also has a history of extensive bilateral pulmonary emboli and DVT in 1998 thought to be secondary to tamoxifen along with chemotherapy induced cardiomyopathy. 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 Although she had proximal disease, she was too high risk for surgical PTE. She is now on combination therapy with IV Remodulin therapy along Opsumit.      She has had some trouble with side effects of Remodulin with titration which we have managed. Last fall she fell and tore a tendon in her knee which was complicated by a painful hematoma. Shortly after she developed weight gain and required a hospitalization for IV diuretics.    In January, due to worsening clinical status, she underwent a BPA which was complicated by hemoptysis, requiring FFP. We have since continued to work on diuretic adjustments, and due to low BP/dizziness, her Adempas was ultimately discontinued. After her repeat RHC in March, Opsumit was increased back to daily. As she was felt high risk to proceed with further surgical interventions, she was sent to [place] for a second opinion. They had considered PTE surgery but Dr. Turcios felt she was still too high risk.    In late May, she had fallen again and had a distal fracture of her right radius and ulna. She was hospitalized briefly at [facility], placed in a cast, and then sent to inpatient rehab. Unfortunately, she then gained over 20 lbs with more edema. Her Opsumit was changed back to every other day, but ultimately she was sent back to the hospital  "for admission. She was there from 5/27 to 6/10 and was placed on a bumex gtt along with dobutamine support to augment diuresis. She was discharged on bumex 4mg TID with a discharge wt of 114#. Echo during her stay showed continued severe PH with estimated RVSP 117mmHg and moderately dilated RV with moderately reduced RV function. She had a small circumferential pericardial effusion without tamponade. IV Remodulin was titrated up to 34ng/kg/min. She received pRBCs and Venofer for anemia.     When I saw Karen last in late June, she was doing fairly weight. Her weight had continued to trend down since discharge and settled out at 102-104# at home, with much improvement in her edema. She remained quite sedentary, BP was reasonable. Plan at that time was to continue to increase her Remodulin gradually by 1 ng/kg/min weekly to a goal of 45 ng/kg. As renal function was acceptable, I kept her diuretics unchanged.     Today, I am seeing Karen back in clinic for follow up. She tells me that overall she is doing \"ok.\" Her home weight has been mostly  lbs recently, though today she is at 104 lbs on our scale. The swelling has been much better since doing leg wraps, but she does still have edema when she takes them off. She says her breathing has been reasonable and she's able to go up and down the stairs now, though she does go slowly. She is also doing some simple tasks like helping with laundry. She denies any new chest pain, palpitations, or dizziness/presyncope. She is up to goal 44ng/kg/min on her Remodulin. Home systolic BP has been running 106-110 mostly at home, and heart rates are 60-70s. Sats are in the mid to low 90s and she is wearing 1.5L NC at home.      Labs done prior to our visit were reviewed: WBC 4.6, hemoglobin 12.4, hematocrit 39.8, plt 242. NT-proBNP 1,840. BMP was still pending at time of visit.          CURRENT PULMONARY HYPERTENSION REGIMEN:     PAH Rx: Remodulin 44.5ng/kg/min, Opsumit 10mg " every other day  (failed Adempas due to dizziness)     Diuretics: Bumex 4mg TID      Oxygen: NC 1.5LPM     Anticoagulation: warfarin   Indication: CTEPH        Assessment/Plan:        1. Chronic thromboembolic pulmonary hypertension.              --Ms. Anderson has CTEPH with sisgnificant RV dysfunction. Although she had proximal disease, she was felt to be too high risk for surgical PTE here and at [facility]. She was offered potential PTE at [place], but still felt to be very high risk. She did have BPA here in January but this was complicated by hemoptysis. No further surgical interventions are being offered at this time. Currently, we will continue to focus on medical management.   --She is now on goal IV Remodulin at 44.5ng/kg/min. She is not able to tolerate Adempas due to dizziness, and on Opsumit 10mg every other day for similar reasons.    --Her weight is down and settled out around 100-102 lbs at home. She tells me she is having trouble sleeping at night due to frequent urination. BMP is still pending at time of visit: if renal function remains stable, will have her trial bumex 6mg BID instead of 4mg TID, with her second dose being in the early afternoon to allow better rest at night. Notably, her NT-proBNP today was the best it has been in quite some time. If renal function has worsened, we may be able to try coming down on diuretics slightly.               --Continue digoxin for RV support. BP has remained acceptable, though will need to continue to monitor given renal concerns.               --Continue anticoagulation with warfarin for her CTEPH, though we have been monitoring with a lower INR goal of 2-2.5.    --Continue oxygen at 1-2L with exertion. I told her that if sats are >92% at rest, she may take off while sitting.     Follow up plan: Return in 6 weeks to see me in clinic with labs. We are happy to see the patient back sooner with any new concerns.         Orders this Visit:  Orders Placed This  Encounter   Procedures     Basic Metabolic Profile     N terminal pro BNP outpatient     Follow-Up with Cardiac Advanced Practice Provider     No orders of the defined types were placed in this encounter.    There are no discontinued medications.        CURRENT MEDICATIONS:  Current Outpatient Medications   Medication Sig Dispense Refill     acetaminophen (TYLENOL) 325 MG tablet Take 325 mg by mouth every 6 hours as needed for mild pain       albuterol (PROAIR HFA/PROVENTIL HFA/VENTOLIN HFA) 108 (90 Base) MCG/ACT inhaler Inhale 2 puffs into the lungs every 4 hours as needed for shortness of breath / dyspnea or wheezing        alendronate (FOSAMAX) 70 MG tablet Take 70 mg by mouth once a week On Mondays       Ascorbic Acid (VITAMIN C) 500 MG CAPS Take 500 mg by mouth every morning        aspirin (ASA) 81 MG tablet Take 81 mg by mouth daily        beclomethasone HFA (QVAR REDIHALER) 80 MCG/ACT inhaler Inhale 2 puffs into the lungs 2 times daily       bumetanide (BUMEX) 2 MG tablet Take 2 tablets (4 mg) by mouth 3 times daily 540 tablet 3     calcium citrate-vitamin D (CALCIUM CITRATE + D3) 315-250 MG-UNIT TABS per tablet Take 1 tablet by mouth 2 times daily        Corn Dextrin (CLEAR FIBER POWDER PO) Take by mouth daily        digoxin (LANOXIN) 62.5 MCG tablet Take 1 tablet (62.5 mcg) by mouth daily 90 tablet 3     gabapentin (NEURONTIN) 300 MG capsule Take 300 mg by mouth 3 times daily Can take an additional 300mg at bedtime as needed       hydroxychloroquine (PLAQUENIL) 200 MG tablet Take 200 mg by mouth daily        ipratropium (ATROVENT) 0.06 % nasal spray Spray 2 sprays into both nostrils 3 times daily        ketotifen (ZADITOR) 0.025 % ophthalmic solution Place 1 drop into both eyes daily       macitentan (OPSUMIT) 10 MG tablet Take 1 tablet (10 mg) by mouth every other day       potassium chloride ER (KLOR-CON M) 20 MEQ CR tablet Take 2 tablets (40 mEq) by mouth 3 times daily 180 tablet 1     sodium chloride  "(OCEAN) 0.65 % nasal spray Spray 1 spray into both nostrils daily as needed for congestion       traMADol (ULTRAM) 50 MG tablet Take 0.5 tablets (25 mg) by mouth every 6 hours as needed for moderate pain 5 tablet 0     treprostinil (REMODULIN) 60 mcg/mL in glycine diluent 50 mL infusion Inject 41 ng/kg/min into the vein continuous Inject 41 ng/kg/min into the vein continuously, increasing 1 ng/kg/min weekly to a goal dose of 45 ng/kg/min 1368 mL 0     warfarin ANTICOAGULANT (COUMADIN) 3 MG tablet Take 3 mg PO on Mon, Wed, Fri and Sat, and 4.5 mg all other days of the week 170 tablet 3       ALLERGIES     Allergies   Allergen Reactions     Sulfa Drugs Anaphylaxis and Hives       PAST MEDICAL HISTORY:  Past Medical History:   Diagnosis Date     Asthma      Breast cancer (H)      Hypertension      Mycobacterium avium complex (H)      Pulmonary embolism (H)      Pulmonary hypertension (H)      Rheumatoid arthritis (H)      Scoliosis          Review of Systems:  Cardiovascular: negative for chest pain, palpitations, orthopnea, pos LE edema (improved)  Constitutional: negative for chills, sweats, fevers   Resp: Negative for dyspnea at rest, pos dyspnea on exertion (stable), neg cough, known chronic lung disease  HEENT: Negative for new visual changes, frequent headaches  Gastrointestinal: negative for abdominal pain, diarrhea, blood in stool, nausea, vomiting  Hematologic/lymphatic:pos for current systemic anticoagulation, hx of blood clots  Neurological: negative for focal weakness, LOC, seizures, syncope/presyncope.     Physical Exam:  Vitals: /60 (BP Location: Left leg, Patient Position: Chair, Cuff Size: Adult Regular)   Pulse 82   Ht 1.499 m (4' 11\")   Wt 47.2 kg (104 lb)   SpO2 93%   BMI 21.01 kg/m     Wt Readings from Last 4 Encounters:   09/02/21 47.2 kg (104 lb)   06/24/21 46.5 kg (102 lb 8 oz)   06/10/21 51.8 kg (114 lb 4.8 oz)   03/17/21 49.9 kg (110 lb)       GEN:  In general, this is a somewhat " frail appearing elderly  female, in no acute distress on room air.  Patient in a wheelchair, accompanied by her  again today.  HEENT:  Pupils grossly equal, sclerae nonicteric.   NECK: Supple, trachea midline. No significant JVD while upright in wheelchair.   CHEST: Goldberg site clean and dry.  C/V:  Regular rate and rhythm, no murmur, rub or gallop. Accentuated P2 but no S3 or RV heave.   RESP: Respirations are unlabored. No use of accessory muscles. Clear to auscultation bilaterally without wheezing, rales, or rhonchi.  GI: Abdomen soft, nontender, nondistended.   EXTREM: Her legs are wrapped, so difficult to assess edema today. No cyanosis or clubbing.  NEURO: Alert and oriented, cooperative. Gait not formally assessed. No obvious focal deficits.     Recent Lab Results:  LIVER ENZYME RESULTS:  Lab Results   Component Value Date    AST 36 06/24/2021    ALT 29 06/24/2021       CBC RESULTS:  Lab Results   Component Value Date    WBC 4.6 09/02/2021    WBC 4.5 06/24/2021    RBC 4.13 09/02/2021    RBC 3.85 06/24/2021    HGB 12.4 09/02/2021    HGB 10.2 (L) 06/24/2021    HCT 39.8 09/02/2021    HCT 35.0 06/24/2021    MCV 96 09/02/2021    MCV 91 06/24/2021    MCH 30.0 09/02/2021    MCH 26.5 06/24/2021    MCHC 31.2 (L) 09/02/2021    MCHC 29.1 (L) 06/24/2021    RDW 19.0 (H) 09/02/2021    RDW 25.1 (H) 06/24/2021     09/02/2021     06/24/2021       BMP RESULTS:  Lab Results   Component Value Date     08/19/2021     06/24/2021    POTASSIUM 4.1 08/19/2021    POTASSIUM 4.6 06/24/2021    CHLORIDE 101 08/19/2021    CHLORIDE 102 06/24/2021    CO2 34 (H) 08/19/2021    CO2 37 (H) 06/24/2021    ANIONGAP 5 08/19/2021    ANIONGAP 1 (L) 06/24/2021     (H) 08/19/2021    GLC 87 06/24/2021    BUN 37 (H) 08/19/2021    BUN 39 (H) 06/24/2021    CR 1.31 (H) 08/19/2021    CR 1.29 (H) 06/24/2021    GFRESTIMATED 39 (L) 08/19/2021    GFRESTIMATED 39 (L) 06/24/2021    GFRESTBLACK 46 (L) 06/24/2021     ESTEFANÍA 10.0 08/19/2021    ESTEFANÍA 9.7 06/24/2021        Recent Labs   Lab Test 09/02/21  1029 06/24/21  1630 05/27/21 2018 04/28/21  1009 03/17/21  1137 12/23/20  1533   NTBNPI  --   --  6,431*  --  3,854* 10,532*   NTBNP 1,840* 3,827*  --  4,132*  --   --          Most recent testing:      Echocardiogram  6/3/2021  Interpretation Summary  Severe pulmonary hypertension is present. Right ventricular systolic pressure  is 117mmHg above the right atrial pressure.  Global and regional left ventricular function is normal with an EF of 55-60%.  Flattened septum is consistent with right ventricular pressure and volume  overload.  Moderate right ventricular dilation is present. Global right ventricular  function is moderately reduced.  Moderate tricuspid insufficiency is present.  Small circumferential pericardial effusion is present without any hemodynamic  significance.  IVC diameter >2.1 cm collapsing <50% with sniff suggests a high RA pressure  estimated at 15 mmHg or greater.  There has been no change.    RHC  3/17/2021    Severe pulmonary hypertension    Normal cardiac output by Thermodilution    Elevated left sided filling pressures    No signficant change when comapred to her last hemodynamic assessment          Pressures Phase: Baseline     Time Systolic (mmHg) Diastolic (mmHg) Mean (mmHg) A Wave (mmHg) V Wave (mmHg) EDP (mmHg) Max dp/dt (mmHg/sec) HR (bpm) Content (mL/dL) SAT (%)   RA Pressures  2:31 PM   14    14    22      63        RV Pressures  2:16 PM       912           2:32 PM 87    9       28     62        PA Pressures  2:32 PM 88    22    44        62        PCW Pressures  2:33 PM   14        64              NYHA Functional Class:  Functional class 3      35 total minutes was spent today including chart review, precharting, history and exam, post visit documentation, and reviewing studies as outlined above.       Shawna Marcum PA-C  [facility]  Pager [phone]

## 2021-09-02 NOTE — TELEPHONE ENCOUNTER
Updated Karen that results would not be available until tomorrow and that the PH team would reach out tomorrow about the potassium

## 2021-09-02 NOTE — PROGRESS NOTES
ANTICOAGULATION MANAGEMENT     Karen JACKY Monica 81 year old female is on warfarin with subtherapeutic INR result. (Goal INR 2.0-2.5)    Recent labs: (last 7 days)     09/02/21  1020   INR 1.9*       ASSESSMENT     Source(s): Chart Review       Warfarin doses taken: Warfarin taken as instructed    Diet: No new diet changes identified    New illness, injury, or hospitalization: No    Medication/supplement changes: None noted    Signs or symptoms of bleeding or clotting: No    Previous INR: Subtherapeutic    Additional findings: None     PLAN     Recommended plan for no diet, medication or health factor changes affecting INR     Dosing Instructions:  Increase your warfarin dose (5.9% change) with next INR in 2 weeks       Summary  As of 9/2/2021    Full warfarin instructions:  3 mg every Mon, Wed, Fri; 4.5 mg all other days   Next INR check:               Telephone call with Karen who verbalizes understanding and agrees to plan and who agrees to plan and repeated back plan correctly    Lab visit scheduled    Education provided: Importance of consistent vitamin K intake, Impact of vitamin K foods on INR and Vitamin K content of foods    Plan made per [facility] anticoagulation protocol    Brittany Kirk, RN  Anticoagulation Clinic  9/2/2021    _______________________________________________________________________     Anticoagulation Episode Summary     Current INR goal:  2.0-2.5   TTR:  27.8 % (8.9 mo)   Target end date:  Indefinite   Send INR reminders to:  [facility]    Indications    Pulmonary hypertension (H) [I27.20]  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 OK to speak with spouse, Don.  [facility] SPEAK IN TABLETS (Has 3mg Tabs) 10/23/20:  new goal range is 2 - 2.5  [facility] in [place] and will need to fax orders P [phone] F  [phone]         Anticoagulation Care Providers     Provider Role Specialty Phone number    Karolina Turcios MD Referring Cardiovascular Disease [phone]

## 2021-09-02 NOTE — NURSING NOTE
Chief Complaint   Patient presents with     Follow Up     3 month follow up      Vitals were taken and medications were reconciled.   Bryson Jin, EMT  1:49 PM

## 2021-09-02 NOTE — PATIENT INSTRUCTIONS
Thank you for visiting the Pulmonary Hypertension Clinic today.      Today we discussed:     Will await your labs and then call you with results/recommendations.       Follow up Appointment Information:  Return in 6 weeks or sooner if needed.       Additional Instructions:    1. Continue staying active and eat a heart healthy, low sodium diet.     2. Please keep current list of medications with you at all times.     3. Remember to weigh yourself daily after voiding and write it down on a log. If you have gained/lost 2 pounds overnight or 5 pounds in a week contact us for medication adjustments or further instructions.    4. Please call us immediately if you have syncope (fainting or passing out), chest pain, worsening edema (swelling or weight gain), or general worsening in how you are feeling.     --------------------------------------------------------------------------------------------------------------    If you have questions or concerns please contact us at:    Elan Arnett RN, BSN   Lexus Balbuena (Schedule,Prior Auth)  Nurse Coordinator     Clinic   Pulmonary Hypertension   Pulmonary Hypertension  [facility]  [facility]  (Phone)[phone]    (Phone) [phone]        (Fax) [phone]      ** Please note that you will NOT receive a reminder call regarding your scheduled testing, reminder calls are for provider appointments only.  If you are scheduled for testing within the [email or web address] system you may receive a call regarding pre-registration for billing purposes only.**     --------------------------------------------------------------------------------------------------------------    Interested in joining a support group?    Pulmonary Hypertension Association  Https://www.phassociation.org/  **Look at the Events Tab** They even have Support Groups that you can call into    [facility] Support Group  Second Saturday of the Month  from 1-3 PM   Location: [address] [place] [address]  Leader: Jessenia Moeller and Marielos Loo  Phone: [phone] or [phone]  Email: [email or web address]

## 2021-09-02 NOTE — NURSING NOTE
Reviewed plan of care, and verified orders were placed.  Results of CMP are still pending. Fabby Seymour RN on 9/2/2021 at 6:02 PM

## 2021-09-02 NOTE — LETTER
9/2/2021      RE: Karen Anderson  [address]  [facility] [address]       Dear Colleague,    Thank you for the opportunity to participate in the care of your patient, Karen Anderson, at the [facility] [place] [facility]. Please see a copy of my visit note below.      Date of Service: 09/02/2021      [facility]      Primary PH cardiologist: Dr. Turcios       HPI:  Ms. Anderson is a pleasant 81 year old female with a PMHx including rheumatoid arthritis, hypertension, scoliosis, asthma, pulmonary MAC, breast cancer s/p chemotherapy with Adriamycin, Cytoxan, Taxol, and tamoxifen and status post left mastectomy. She also has a history of extensive bilateral pulmonary emboli and DVT in 1998 thought to be secondary to tamoxifen along with chemotherapy induced cardiomyopathy. 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 Although she had proximal disease, she was too high risk for surgical PTE. She is now on combination therapy with IV Remodulin therapy along Opsumit.      She has had some trouble with side effects of Remodulin with titration which we have managed. Last fall she fell and tore a tendon in her knee which was complicated by a painful hematoma. Shortly after she developed weight gain and required a hospitalization for IV diuretics.    In January, due to worsening clinical status, she underwent a BPA which was complicated by hemoptysis, requiring FFP. We have since continued to work on diuretic adjustments, and due to low BP/dizziness, her Adempas was ultimately discontinued. After her repeat RHC in March, Opsumit was increased back to daily. As she was felt high risk to proceed with further surgical interventions, she was sent to [facility] for a second opinion. They had considered PTE surgery but Dr. Turcios felt she was still too high risk.    In  "late May, she had fallen again and had a distal fracture of her right radius and ulna. She was hospitalized briefly at [facility], placed in a cast, and then sent to inpatient rehab. Unfortunately, she then gained over 20 lbs with more edema. Her Opsumit was changed back to every other day, but ultimately she was sent back to the hospital for admission. She was there from 5/27 to 6/10 and was placed on a bumex gtt along with dobutamine support to augment diuresis. She was discharged on bumex 4mg TID with a discharge wt of 114#. Echo during her stay showed continued severe PH with estimated RVSP 117mmHg and moderately dilated RV with moderately reduced RV function. She had a small circumferential pericardial effusion without tamponade. IV Remodulin was titrated up to 34ng/kg/min. She received pRBCs and Venofer for anemia.     When I saw Karen last in late June, she was doing fairly weight. Her weight had continued to trend down since discharge and settled out at 102-104# at home, with much improvement in her edema. She remained quite sedentary, BP was reasonable. Plan at that time was to continue to increase her Remodulin gradually by 1 ng/kg/min weekly to a goal of 45 ng/kg. As renal function was acceptable, I kept her diuretics unchanged.     Today, I am seeing Karen back in clinic for follow up. She tells me that overall she is doing \"ok.\" Her home weight has been mostly  lbs recently, though today she is at 104 lbs on our scale. The swelling has been much better since doing leg wraps, but she does still have edema when she takes them off. She says her breathing has been reasonable and she's able to go up and down the stairs now, though she does go slowly. She is also doing some simple tasks like helping with laundry. She denies any new chest pain, palpitations, or dizziness/presyncope. She is up to goal 44ng/kg/min on her Remodulin. Home systolic BP has been running 106-110 mostly at home, " and heart rates are 60-70s. Sats are in the mid to low 90s and she is wearing 1.5L NC at home.      Labs done prior to our visit were reviewed: WBC 4.6, hemoglobin 12.4, hematocrit 39.8, plt 242. NT-proBNP 1,840. BMP was still pending at time of visit.          CURRENT PULMONARY HYPERTENSION REGIMEN:     PAH Rx: Remodulin 44.5ng/kg/min, Opsumit 10mg every other day  (failed Adempas due to dizziness)     Diuretics: Bumex 4mg TID      Oxygen: NC 1.5LPM     Anticoagulation: warfarin   Indication: CTEPH        Assessment/Plan:        1. Chronic thromboembolic pulmonary hypertension.              --Ms. Anderson has CTEPH with sisgnificant RV dysfunction. Although she had proximal disease, she was felt to be too high risk for surgical PTE here and at [facility]. She was offered potential PTE at [place], but still felt to be very high risk. She did have BPA here in January but this was complicated by hemoptysis. No further surgical interventions are being offered at this time. Currently, we will continue to focus on medical management.   --She is now on goal IV Remodulin at 44.5ng/kg/min. She is not able to tolerate Adempas due to dizziness, and on Opsumit 10mg every other day for similar reasons.    --Her weight is down and settled out around 100-102 lbs at home. She tells me she is having trouble sleeping at night due to frequent urination. BMP is still pending at time of visit: if renal function remains stable, will have her trial bumex 6mg BID instead of 4mg TID, with her second dose being in the early afternoon to allow better rest at night. Notably, her NT-proBNP today was the best it has been in quite some time. If renal function has worsened, we may be able to try coming down on diuretics slightly.               --Continue digoxin for RV support. BP has remained acceptable, though will need to continue to monitor given renal concerns.               --Continue anticoagulation with warfarin for her CTEPH, though we have been  monitoring with a lower INR goal of 2-2.5.    --Continue oxygen at 1-2L with exertion. I told her that if sats are >92% at rest, she may take off while sitting.     Follow up plan: Return in 6 weeks to see me in clinic with labs. We are happy to see the patient back sooner with any new concerns.         Orders this Visit:  Orders Placed This Encounter   Procedures     Basic Metabolic Profile     N terminal pro BNP outpatient     Follow-Up with Cardiac Advanced Practice Provider     No orders of the defined types were placed in this encounter.    There are no discontinued medications.        CURRENT MEDICATIONS:  Current Outpatient Medications   Medication Sig Dispense Refill     acetaminophen (TYLENOL) 325 MG tablet Take 325 mg by mouth every 6 hours as needed for mild pain       albuterol (PROAIR HFA/PROVENTIL HFA/VENTOLIN HFA) 108 (90 Base) MCG/ACT inhaler Inhale 2 puffs into the lungs every 4 hours as needed for shortness of breath / dyspnea or wheezing        alendronate (FOSAMAX) 70 MG tablet Take 70 mg by mouth once a week On Mondays       Ascorbic Acid (VITAMIN C) 500 MG CAPS Take 500 mg by mouth every morning        aspirin (ASA) 81 MG tablet Take 81 mg by mouth daily        beclomethasone HFA (QVAR REDIHALER) 80 MCG/ACT inhaler Inhale 2 puffs into the lungs 2 times daily       bumetanide (BUMEX) 2 MG tablet Take 2 tablets (4 mg) by mouth 3 times daily 540 tablet 3     calcium citrate-vitamin D (CALCIUM CITRATE + D3) 315-250 MG-UNIT TABS per tablet Take 1 tablet by mouth 2 times daily        Corn Dextrin (CLEAR FIBER POWDER PO) Take by mouth daily        digoxin (LANOXIN) 62.5 MCG tablet Take 1 tablet (62.5 mcg) by mouth daily 90 tablet 3     gabapentin (NEURONTIN) 300 MG capsule Take 300 mg by mouth 3 times daily Can take an additional 300mg at bedtime as needed       hydroxychloroquine (PLAQUENIL) 200 MG tablet Take 200 mg by mouth daily        ipratropium (ATROVENT) 0.06 % nasal spray Spray 2 sprays into  both nostrils 3 times daily        ketotifen (ZADITOR) 0.025 % ophthalmic solution Place 1 drop into both eyes daily       macitentan (OPSUMIT) 10 MG tablet Take 1 tablet (10 mg) by mouth every other day       potassium chloride ER (KLOR-CON M) 20 MEQ CR tablet Take 2 tablets (40 mEq) by mouth 3 times daily 180 tablet 1     sodium chloride (OCEAN) 0.65 % nasal spray Spray 1 spray into both nostrils daily as needed for congestion       traMADol (ULTRAM) 50 MG tablet Take 0.5 tablets (25 mg) by mouth every 6 hours as needed for moderate pain 5 tablet 0     treprostinil (REMODULIN) 60 mcg/mL in glycine diluent 50 mL infusion Inject 41 ng/kg/min into the vein continuous Inject 41 ng/kg/min into the vein continuously, increasing 1 ng/kg/min weekly to a goal dose of 45 ng/kg/min 1368 mL 0     warfarin ANTICOAGULANT (COUMADIN) 3 MG tablet Take 3 mg PO on Mon, Wed, Fri and Sat, and 4.5 mg all other days of the week 170 tablet 3       ALLERGIES     Allergies   Allergen Reactions     Sulfa Drugs Anaphylaxis and Hives       PAST MEDICAL HISTORY:  Past Medical History:   Diagnosis Date     Asthma      Breast cancer (H)      Hypertension      Mycobacterium avium complex (H)      Pulmonary embolism (H)      Pulmonary hypertension (H)      Rheumatoid arthritis (H)      Scoliosis          Review of Systems:  Cardiovascular: negative for chest pain, palpitations, orthopnea, pos LE edema (improved)  Constitutional: negative for chills, sweats, fevers   Resp: Negative for dyspnea at rest, pos dyspnea on exertion (stable), neg cough, known chronic lung disease  HEENT: Negative for new visual changes, frequent headaches  Gastrointestinal: negative for abdominal pain, diarrhea, blood in stool, nausea, vomiting  Hematologic/lymphatic:pos for current systemic anticoagulation, hx of blood clots  Neurological: negative for focal weakness, LOC, seizures, syncope/presyncope.     Physical Exam:  Vitals: /60 (BP Location: Left leg, Patient  "Position: Chair, Cuff Size: Adult Regular)   Pulse 82   Ht 1.499 m (4' 11\")   Wt 47.2 kg (104 lb)   SpO2 93%   BMI 21.01 kg/m     Wt Readings from Last 4 Encounters:   09/02/21 47.2 kg (104 lb)   06/24/21 46.5 kg (102 lb 8 oz)   06/10/21 51.8 kg (114 lb 4.8 oz)   03/17/21 49.9 kg (110 lb)       GEN:  In general, this is a somewhat frail appearing elderly  female, in no acute distress on room air.  Patient in a wheelchair, accompanied by her  again today.  HEENT:  Pupils grossly equal, sclerae nonicteric.   NECK: Supple, trachea midline. No significant JVD while upright in wheelchair.   CHEST: Goldberg site clean and dry.  C/V:  Regular rate and rhythm, no murmur, rub or gallop. Accentuated P2 but no S3 or RV heave.   RESP: Respirations are unlabored. No use of accessory muscles. Clear to auscultation bilaterally without wheezing, rales, or rhonchi.  GI: Abdomen soft, nontender, nondistended.   EXTREM: Her legs are wrapped, so difficult to assess edema today. No cyanosis or clubbing.  NEURO: Alert and oriented, cooperative. Gait not formally assessed. No obvious focal deficits.     Recent Lab Results:  LIVER ENZYME RESULTS:  Lab Results   Component Value Date    AST 36 06/24/2021    ALT 29 06/24/2021       CBC RESULTS:  Lab Results   Component Value Date    WBC 4.6 09/02/2021    WBC 4.5 06/24/2021    RBC 4.13 09/02/2021    RBC 3.85 06/24/2021    HGB 12.4 09/02/2021    HGB 10.2 (L) 06/24/2021    HCT 39.8 09/02/2021    HCT 35.0 06/24/2021    MCV 96 09/02/2021    MCV 91 06/24/2021    MCH 30.0 09/02/2021    MCH 26.5 06/24/2021    MCHC 31.2 (L) 09/02/2021    MCHC 29.1 (L) 06/24/2021    RDW 19.0 (H) 09/02/2021    RDW 25.1 (H) 06/24/2021     09/02/2021     06/24/2021       BMP RESULTS:  Lab Results   Component Value Date     08/19/2021     06/24/2021    POTASSIUM 4.1 08/19/2021    POTASSIUM 4.6 06/24/2021    CHLORIDE 101 08/19/2021    CHLORIDE 102 06/24/2021    CO2 34 (H) " 08/19/2021    CO2 37 (H) 06/24/2021    ANIONGAP 5 08/19/2021    ANIONGAP 1 (L) 06/24/2021     (H) 08/19/2021    GLC 87 06/24/2021    BUN 37 (H) 08/19/2021    BUN 39 (H) 06/24/2021    CR 1.31 (H) 08/19/2021    CR 1.29 (H) 06/24/2021    GFRESTIMATED 39 (L) 08/19/2021    GFRESTIMATED 39 (L) 06/24/2021    GFRESTBLACK 46 (L) 06/24/2021    ESTEFANÍA 10.0 08/19/2021    ESTEFANÍA 9.7 06/24/2021        Recent Labs   Lab Test 09/02/21  1029 06/24/21  1630 05/27/21  2018 04/28/21  1009 03/17/21  1137 12/23/20  1533   NTBNPI  --   --  6,431*  --  3,854* 10,532*   NTBNP 1,840* 3,827*  --  4,132*  --   --          Most recent testing:      Echocardiogram  6/3/2021  Interpretation Summary  Severe pulmonary hypertension is present. Right ventricular systolic pressure  is 117mmHg above the right atrial pressure.  Global and regional left ventricular function is normal with an EF of 55-60%.  Flattened septum is consistent with right ventricular pressure and volume  overload.  Moderate right ventricular dilation is present. Global right ventricular  function is moderately reduced.  Moderate tricuspid insufficiency is present.  Small circumferential pericardial effusion is present without any hemodynamic  significance.  IVC diameter >2.1 cm collapsing <50% with sniff suggests a high RA pressure  estimated at 15 mmHg or greater.  There has been no change.    RHC  3/17/2021    Severe pulmonary hypertension    Normal cardiac output by Thermodilution    Elevated left sided filling pressures    No signficant change when comapred to her last hemodynamic assessment          Pressures Phase: Baseline     Time Systolic (mmHg) Diastolic (mmHg) Mean (mmHg) A Wave (mmHg) V Wave (mmHg) EDP (mmHg) Max dp/dt (mmHg/sec) HR (bpm) Content (mL/dL) SAT (%)   RA Pressures  2:31 PM   14    14    22      63        RV Pressures  2:16 PM       912           2:32 PM 87    9       28     62        PA Pressures  2:32 PM 88    22    44        62        PCW Pressures   2:33 PM   14        64              NYHA Functional Class:  Functional class 3      35 total minutes was spent today including chart review, precharting, history and exam, post visit documentation, and reviewing studies as outlined above.       Shawna Marcum PA-C  [facility]  Pager [phone]

## 2021-09-03 ENCOUNTER — TELEPHONE (OUTPATIENT)
Dept: CARDIOLOGY | Facility: CLINIC | Age: 81
End: 2021-09-03

## 2021-09-03 DIAGNOSIS — I27.20 PULMONARY HYPERTENSION (H): ICD-10-CM

## 2021-09-03 DIAGNOSIS — R06.02 SOB (SHORTNESS OF BREATH): ICD-10-CM

## 2021-09-03 LAB
ALBUMIN SERPL-MCNC: 3.5 G/DL (ref 3.4–5)
ALP SERPL-CCNC: 181 U/L (ref 40–150)
ALT SERPL W P-5'-P-CCNC: 30 U/L (ref 0–50)
ANION GAP SERPL CALCULATED.3IONS-SCNC: 4 MMOL/L (ref 3–14)
AST SERPL W P-5'-P-CCNC: 41 U/L (ref 0–45)
BILIRUB SERPL-MCNC: 0.4 MG/DL (ref 0.2–1.3)
BUN SERPL-MCNC: 33 MG/DL (ref 7–30)
CALCIUM SERPL-MCNC: 9.7 MG/DL (ref 8.5–10.1)
CHLORIDE BLD-SCNC: 100 MMOL/L (ref 94–109)
CO2 SERPL-SCNC: 33 MMOL/L (ref 20–32)
CREAT SERPL-MCNC: 1.11 MG/DL (ref 0.52–1.04)
GFR SERPL CREATININE-BSD FRML MDRD: 47 ML/MIN/1.73M2
GLUCOSE BLD-MCNC: 55 MG/DL (ref 70–99)
POTASSIUM BLD-SCNC: 4.1 MMOL/L (ref 3.4–5.3)
PROT SERPL-MCNC: 8.1 G/DL (ref 6.8–8.8)
SODIUM SERPL-SCNC: 137 MMOL/L (ref 133–144)

## 2021-09-03 RX ORDER — POTASSIUM CHLORIDE 1500 MG/1
40 TABLET, EXTENDED RELEASE ORAL 3 TIMES DAILY
Qty: 540 TABLET | Refills: 3 | Status: SHIPPED | OUTPATIENT
Start: 2021-09-03 | End: 2022-08-30

## 2021-09-12 ENCOUNTER — HEALTH MAINTENANCE LETTER (OUTPATIENT)
Age: 81
End: 2021-09-12

## 2021-09-16 ENCOUNTER — ANTICOAGULATION THERAPY VISIT (OUTPATIENT)
Dept: ANTICOAGULATION | Facility: CLINIC | Age: 81
End: 2021-09-16

## 2021-09-16 ENCOUNTER — LAB (OUTPATIENT)
Dept: LAB | Facility: CLINIC | Age: 81
End: 2021-09-16
Payer: COMMERCIAL

## 2021-09-16 DIAGNOSIS — I27.20 PULMONARY HYPERTENSION (H): Primary | ICD-10-CM

## 2021-09-16 DIAGNOSIS — Z79.01 LONG TERM CURRENT USE OF ANTICOAGULANT THERAPY: ICD-10-CM

## 2021-09-16 DIAGNOSIS — I27.20 PULMONARY HYPERTENSION (H): ICD-10-CM

## 2021-09-16 DIAGNOSIS — I50.810 RVF (RIGHT VENTRICULAR FAILURE) (H): ICD-10-CM

## 2021-09-16 DIAGNOSIS — I27.24 CTEPH (CHRONIC THROMBOEMBOLIC PULMONARY HYPERTENSION) (H): ICD-10-CM

## 2021-09-16 LAB — INR BLD: 1.6 (ref 0.9–1.1)

## 2021-09-16 PROCEDURE — 85610 PROTHROMBIN TIME: CPT

## 2021-09-16 PROCEDURE — 36415 COLL VENOUS BLD VENIPUNCTURE: CPT

## 2021-09-16 NOTE — PROGRESS NOTES
ANTICOAGULATION MANAGEMENT     Karen JACKY Monica 81 year old female is on warfarin with subtherapeutic INR result. (Goal INR 2.0-2.5)    Recent labs: (last 7 days)     09/16/21  1107   INR 1.6*       ASSESSMENT     Source(s): Chart Review       Warfarin doses taken: Warfarin taken as instructed    Diet: No new diet changes identified    New illness, injury, or hospitalization: No    Medication/supplement changes: None noted    Signs or symptoms of bleeding or clotting: No    Previous INR: Subtherapeutic    Additional findings: None     PLAN     Recommended plan for no diet, medication or health factor changes affecting INR     Dosing Instructions:  Increase your warfarin dose (11.1% change) with next INR in 2 weeks       Summary  As of 9/16/2021    Full warfarin instructions:  3 mg every Wed; 4.5 mg all other days   Next INR check:  9/30/2021             Telephone call with Karen who verbalizes understanding and agrees to plan and who agrees to plan and repeated back plan correctly    Lab visit scheduled    Education provided: Importance of consistent vitamin K intake, Impact of vitamin K foods on INR and Vitamin K content of foods    Plan made per [facility] anticoagulation protocol    Brittany Kirk RN  Anticoagulation Clinic  9/16/2021    _______________________________________________________________________     Anticoagulation Episode Summary     Current INR goal:  2.0-2.5   TTR:  24.3 % (9.1 mo)   Target end date:  Indefinite   Send INR reminders to:  [facility]    Indications    Pulmonary hypertension (H) [I27.20]  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 OK to speak with spouse, Don.  [facility] SPEAK IN TABLETS (Has 3mg Tabs) 10/23/20:  new goal range is 2 - 2.5  [facility] in [place] and will need to fax orders P [phone] F  [phone]         Anticoagulation Care Providers     Provider Role Specialty Phone number    Karolina Turcios MD Referring Cardiovascular Disease [phone]

## 2021-09-20 ENCOUNTER — APPOINTMENT (OUTPATIENT)
Dept: CT IMAGING | Facility: CLINIC | Age: 81
End: 2021-09-20
Attending: EMERGENCY MEDICINE
Payer: COMMERCIAL

## 2021-09-20 ENCOUNTER — APPOINTMENT (OUTPATIENT)
Dept: GENERAL RADIOLOGY | Facility: CLINIC | Age: 81
End: 2021-09-20
Attending: EMERGENCY MEDICINE
Payer: COMMERCIAL

## 2021-09-20 ENCOUNTER — HOSPITAL ENCOUNTER (EMERGENCY)
Facility: CLINIC | Age: 81
Discharge: HOME | End: 2021-09-20
Attending: EMERGENCY MEDICINE | Admitting: EMERGENCY MEDICINE
Payer: COMMERCIAL

## 2021-09-20 VITALS
RESPIRATION RATE: 15 BRPM | HEART RATE: 66 BPM | OXYGEN SATURATION: 96 % | SYSTOLIC BLOOD PRESSURE: 123 MMHG | TEMPERATURE: 98.5 F | BODY MASS INDEX: 20.76 KG/M2 | DIASTOLIC BLOOD PRESSURE: 55 MMHG | HEIGHT: 59 IN | WEIGHT: 103 LBS

## 2021-09-20 DIAGNOSIS — S01.01XA LACERATION OF SCALP, INITIAL ENCOUNTER: ICD-10-CM

## 2021-09-20 DIAGNOSIS — S09.90XA TRAUMATIC INJURY OF HEAD, INITIAL ENCOUNTER: ICD-10-CM

## 2021-09-20 LAB
HOLD SPECIMEN: NORMAL
INR PPP: 1.58 (ref 0.85–1.15)

## 2021-09-20 PROCEDURE — 70450 CT HEAD/BRAIN W/O DYE: CPT

## 2021-09-20 PROCEDURE — 99285 EMERGENCY DEPT VISIT HI MDM: CPT | Mod: 25 | Performed by: EMERGENCY MEDICINE

## 2021-09-20 PROCEDURE — 73110 X-RAY EXAM OF WRIST: CPT | Mod: 26 | Performed by: RADIOLOGY

## 2021-09-20 PROCEDURE — 85610 PROTHROMBIN TIME: CPT | Performed by: EMERGENCY MEDICINE

## 2021-09-20 PROCEDURE — 12001 RPR S/N/AX/GEN/TRNK 2.5CM/<: CPT | Performed by: EMERGENCY MEDICINE

## 2021-09-20 PROCEDURE — 250N000011 HC RX IP 250 OP 636: Performed by: EMERGENCY MEDICINE

## 2021-09-20 PROCEDURE — 70450 CT HEAD/BRAIN W/O DYE: CPT | Mod: 26 | Performed by: RADIOLOGY

## 2021-09-20 PROCEDURE — 250N000009 HC RX 250: Performed by: EMERGENCY MEDICINE

## 2021-09-20 PROCEDURE — 90715 TDAP VACCINE 7 YRS/> IM: CPT | Performed by: EMERGENCY MEDICINE

## 2021-09-20 PROCEDURE — 73110 X-RAY EXAM OF WRIST: CPT | Mod: RT

## 2021-09-20 PROCEDURE — 72125 CT NECK SPINE W/O DYE: CPT | Mod: 26 | Performed by: STUDENT IN AN ORGANIZED HEALTH CARE EDUCATION/TRAINING PROGRAM

## 2021-09-20 PROCEDURE — 70450 CT HEAD/BRAIN W/O DYE: CPT | Mod: 76

## 2021-09-20 PROCEDURE — 73070 X-RAY EXAM OF ELBOW: CPT | Mod: RT

## 2021-09-20 PROCEDURE — 90471 IMMUNIZATION ADMIN: CPT | Performed by: EMERGENCY MEDICINE

## 2021-09-20 PROCEDURE — 73070 X-RAY EXAM OF ELBOW: CPT | Mod: 26 | Performed by: RADIOLOGY

## 2021-09-20 PROCEDURE — 72125 CT NECK SPINE W/O DYE: CPT

## 2021-09-20 PROCEDURE — 99284 EMERGENCY DEPT VISIT MOD MDM: CPT | Mod: 25 | Performed by: EMERGENCY MEDICINE

## 2021-09-20 PROCEDURE — 70450 CT HEAD/BRAIN W/O DYE: CPT | Mod: 26 | Performed by: STUDENT IN AN ORGANIZED HEALTH CARE EDUCATION/TRAINING PROGRAM

## 2021-09-20 PROCEDURE — 36415 COLL VENOUS BLD VENIPUNCTURE: CPT | Performed by: EMERGENCY MEDICINE

## 2021-09-20 RX ORDER — LIDOCAINE HYDROCHLORIDE AND EPINEPHRINE 10; 10 MG/ML; UG/ML
1 INJECTION, SOLUTION INFILTRATION; PERINEURAL ONCE
Status: COMPLETED | OUTPATIENT
Start: 2021-09-20 | End: 2021-09-20

## 2021-09-20 RX ADMIN — LIDOCAINE HYDROCHLORIDE AND EPINEPHRINE 1 ML: 10; 10 INJECTION, SOLUTION INFILTRATION; PERINEURAL at 13:05

## 2021-09-20 RX ADMIN — CLOSTRIDIUM TETANI TOXOID ANTIGEN (FORMALDEHYDE INACTIVATED), CORYNEBACTERIUM DIPHTHERIAE TOXOID ANTIGEN (FORMALDEHYDE INACTIVATED), BORDETELLA PERTUSSIS TOXOID ANTIGEN (GLUTARALDEHYDE INACTIVATED), BORDETELLA PERTUSSIS FILAMENTOUS HEMAGGLUTININ ANTIGEN (FORMALDEHYDE INACTIVATED), BORDETELLA PERTUSSIS PERTACTIN ANTIGEN, AND BORDETELLA PERTUSSIS FIMBRIAE 2/3 ANTIGEN 0.5 ML: 5; 2; 2.5; 5; 3; 5 INJECTION, SUSPENSION INTRAMUSCULAR at 13:04

## 2021-09-20 ASSESSMENT — MIFFLIN-ST. JEOR: SCORE: 847.83

## 2021-09-20 NOTE — ED PROVIDER NOTES
History     Chief Complaint   Patient presents with     Fall     Head Injury     HPI  Karen Anderson is a 81 year old female with a past medical history of pulmonary hypertension, CTEPH, right ventricular failure, hypertension, breast cancer, rheumatoid arthritis, scoliosis, asthma, chronic anticoagulation on Coumadin who presents to the emergency department with a chief complaint of fall.  The patient reports that she tripped over her oxygen tubing earlier today and fell backwards, hitting the back of her head as well as her right elbow and wrist.  The patient reports she broke her right wrist several months ago.  She has had some swelling there since then, but now it has worsened.  She has some bruising to the arm as well.  Additionally, she has a laceration to her posterior scalp that is actively bleeding.  The patient's last tetanus vaccine was in 2015.  The patient presents to the emergency department today accompanied by her .  No new neurologic symptoms.    I have reviewed the Medications, Allergies, Past Medical and Surgical History, and Social History in the [facility] system.    Past Medical History:   Diagnosis Date     Asthma      Breast cancer (H)      Hypertension      Mycobacterium avium complex (H)      Pulmonary embolism (H)      Pulmonary hypertension (H)      Rheumatoid arthritis (H)      Scoliosis      Past Surgical History:   Procedure Laterality Date     CV PULMONARY ANGIOGRAM N/A 10/5/2020    Procedure: Pulmonary Angiogram;  Surgeon: Lorenzo Sullivan MD;  Location:  [facility]     CV PULMONARY ANGIOGRAM N/A 1/12/2021    Procedure: CV PULMONARY ANGIOGRAM;  Surgeon: Amos Castañeda MD;  Location:  [facility]     CV RIGHT HEART CATH MEASUREMENTS RECORDED N/A 03/04/2020    Procedure: CV RIGHT HEART CATH;  Surgeon: Karolina Turcios MD;  Location:  [facility]     CV RIGHT HEART CATH MEASUREMENTS RECORDED N/A 10/5/2020     Procedure: Right Heart Cath; balloon pulmonary angiogram;  Surgeon: Lorenzo Sullivan MD;  Location:  [facility]     CV RIGHT HEART CATH MEASUREMENTS RECORDED N/A 12/30/2020    Procedure: Right Heart Cath;  Surgeon: Karolina Turcios MD;  Location:  [facility]     CV RIGHT HEART CATH MEASUREMENTS RECORDED N/A 1/12/2021    Procedure: CV RIGHT HEART CATH;  Surgeon: Amos Castañeda MD;  Location:  [facility]     CV RIGHT HEART CATH MEASUREMENTS RECORDED N/A 3/17/2021    Procedure: CV RIGHT HEART CATH;  Surgeon: Karolina Turcios MD;  Location:  [facility]     IITTTLLOON PULMONARY ANGIOPLASTY N/A 1/12/2021    Procedure: CV BALLOON PULMONARY ANGIOPLASTY;  Surgeon: Amos Castañeda MD;  Location:  [facility]     IR CVC TUNNEL PLACEMENT > 5 YRS OF AGE  03/09/2020     MASTECTOMY Left      PICC SINGLE LUMEN PLACEMENT Right 03/04/2020    4Fr - 41cm, Medial brachial vein. Left mastectomy     PICC TRIPLE LUMEN PLACEMENT Right 10/02/2020    5Fr - 43cm (7cm external), Basilic vein, high RA     No current facility-administered medications for this encounter.     Current Outpatient Medications   Medication     acetaminophen (TYLENOL) 325 MG tablet     albuterol (PROAIR HFA/PROVENTIL HFA/VENTOLIN HFA) 108 (90 Base) MCG/ACT inhaler     alendronate (FOSAMAX) 70 MG tablet     Ascorbic Acid (VITAMIN C) 500 MG CAPS     aspirin (ASA) 81 MG tablet     beclomethasone HFA (QVAR REDIHALER) 80 MCG/ACT inhaler     bumetanide (BUMEX) 2 MG tablet     calcium citrate-vitamin D (CALCIUM CITRATE + D3) 315-250 MG-UNIT TABS per tablet     Corn Dextrin (CLEAR FIBER POWDER PO)     digoxin (LANOXIN) 62.5 MCG tablet     gabapentin (NEURONTIN) 300 MG capsule     hydroxychloroquine (PLAQUENIL) 200 MG tablet     ipratropium (ATROVENT) 0.06 % nasal spray     ketotifen (ZADITOR) 0.025 % ophthalmic solution     macitentan (OPSUMIT) 10 MG tablet     potassium  chloride ER (KLOR-CON M) 20 MEQ CR tablet     sodium chloride (OCEAN) 0.65 % nasal spray     traMADol (ULTRAM) 50 MG tablet     treprostinil (REMODULIN) 60 mcg/mL in glycine diluent 50 mL infusion     warfarin ANTICOAGULANT (COUMADIN) 3 MG tablet     Allergies   Allergen Reactions     Sulfa Drugs Anaphylaxis and Hives     Past medical history, past surgical history, medications, and allergies were reviewed with the patient. Additional pertinent items: None    Social History     Socioeconomic History     Marital status:      Spouse name: Not on file     Number of children: Not on file     Years of education: Not on file     Highest education level: Not on file   Occupational History     Not on file   Tobacco Use     Smoking status: Never Smoker     Smokeless tobacco: Never Used   Substance and Sexual Activity     Alcohol use: Yes     Comment: occasionally      Drug use: Never     Sexual activity: Not on file   Other Topics Concern     Not on file   Social History Narrative    Is . Has 1 adult daughter who is in her 50s. Has 2 grandkids. 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     Social Determinants of Health     Financial Resource Strain:      Difficulty of Paying Living Expenses:    Food Insecurity:      Worried About Running Out of Food in the Last Year:      Ran Out of Food in the Last Year:    Transportation Needs:      Lack of Transportation (Medical):      Lack of Transportation (Non-Medical):    Physical Activity:      Days of Exercise per Week:      Minutes of Exercise per Session:    Stress:      Feeling of Stress :    Social Connections:      Frequency of Communication with Friends and Family:      Frequency of Social Gatherings with Friends and Family:      Attends Orthodoxy Services:      Active Member of Clubs or Organizations:      Attends Club or Organization Meetings:      Marital Status:    Intimate Partner Violence:      Fear of Current or Ex-Partner:       "Emotionally Abused:      Physically Abused:      Sexually Abused:      Social history was reviewed with the patient. Additional pertinent items: None    Review of Systems  General: No fevers or chills  Skin: No rash or diaphoresis, positive for laceration  Eyes: No eye redness or discharge  Ears/Nose/Throat: No rhinorrhea or nasal congestion  Respiratory: No cough or SOB  Cardiovascular: No chest pain or palpitations  Gastrointestinal: No nausea, vomiting, or diarrhea  Genitourinary: No urinary frequency, hematuria, or dysuria  Musculoskeletal: Right arm pain, otherwise no new arthralgias or myalgias  Neurologic: No numbness or weakness  Hematologic/Lymphatic/Immunologic: No leg swelling, positive for easy bruising/bleeding  Endocrine: No polyuria/polydypsia    A complete review of systems was performed with pertinent positives and negatives noted in the HPI, and all other systems negative.    Physical Exam   BP: (!) 155/75  Pulse: 88  Temp: 98.5  F (36.9  C)  Resp: 16  Height: 149.9 cm (4' 11\")  Weight: 46.7 kg (103 lb)  SpO2: 97 %      General: Well nourished, well developed, NAD  HEENT: EOMI, anicteric.  Area of swelling, tenderness, and small laceration to posterior scalp with active bleeding controlled with direct pressure  Neck: no jugular venous distension, supple, nl ROM  Cardiac: Regular rate and rhythm. No murmurs, rubs, or gallops. Normal S1, S2.  Intact peripheral pulses  Pulm: CTAB, no stridor, wheezes, rales, rhonchi    Skin: Warm and dry to the touch.  Laceration to posterior scalp as described above (approximately 2.5 cm).  Ecchymosis and swelling overlying right elbow and palmar surface of right wrist, deformity to R wrist (present since prior injury per patient)  Extremities: No LE edema, no cyanosis, w/w/p, tenderness to palpation over right elbow and wrist, range of motion at patient's baseline, distally neurovascularly intact  Neuro: Alert and oriented x 4, no facial droop, CN II-XII intact, " moving all 4 extremities, sensation intact throughout, no nystagmus, coordination normal as tested. PERRL, EOMI, visual fields intact. Speech is clear without aphasia or dysarthria.      ED [facility]    -Laceration Repair    Date/Time: 9/20/2021 1:16 PM  Performed by: Rosa Le MD  Authorized by: Rosa Le MD       ANESTHESIA (see MAR for exact dosages):     Anesthesia method:  Local infiltration    Local anesthetic:  Lidocaine 1% WITH epi  LACERATION DETAILS     Location:  Scalp    Scalp location:  Occipital    Length (cm):  2.5    Depth (mm):  1    REPAIR TYPE:     Repair type:  Simple      EXPLORATION:     Hemostasis achieved with:  Direct pressure    Wound exploration: wound explored through full range of motion and entire depth of wound probed and visualized      Wound extent: no foreign body, no nerve damage, no underlying fracture and no vascular damage      Contaminated: no      TREATMENT:     Area cleansed with:  Saline    Amount of cleaning:  Standard    Irrigation solution:  Sterile saline    SKIN REPAIR     Repair method:  Staples    Number of staples:  5    APPROXIMATION     Approximation:  Close    POST-PROCEDURE DETAILS     Dressing:  Open (no dressing)      PROCEDURE   Patient Tolerance:  Patient tolerated the procedure well with no immediate complications                                Labs Ordered and Resulted from Time of ED Arrival Up to the Time of Departure from the ED   EXTRA BLUE TOP TUBE   EXTRA RED TOP TUBE   EXTRA GREEN TOP (LITHIUM HEPARIN) TUBE   EXTRA PURPLE TOP TUBE   INR   EXTRA TUBE    Narrative:     The following orders were created for panel order [place] Draw.  Procedure                               Abnormality         Status                     ---------                               -----------         ------                     Extra Blue Top Tube[[id number]]                              Final  result               Extra Red Top Tube[[id number]]                               Final result               Extra Green Top (Lithium...[[id number]]                      Final result               Extra Purple Top Tube[[id number]]                            Final result                 Please view results for these tests on the individual orders.            Results for orders placed or performed during the hospital encounter of 09/20/21 (from the past 24 hour(s))   CT Head w/o Contrast    Narrative    Head CT without contrast9/20/2021 12:16 PM    History: Trauma - Head Injury.  Comparison: none    Technique: Axial images through the brain obtained without intravenous  contrast, reviewed in bone, brain, and subdural windows.    Findings: Subcutaneous hematoma is noted over the occipital region  predominantly on the left side without any breach in the underlying  cortex or the injury to underlying brain. Mild generalized cerebral  volume loss . There is no evidence of intracranial hemorrhage. There  is no mass-effect or midline shift. The ventricles do not appear  enlarged out of proportion to the cerebral sulci.    The visualized portions of the paranasal sinuses and mastoid air cells  are unremarkable on bone windows.      Impression    Impression:  Small subcutaneous hematoma overlying the occipital bone without any  acute intracranial pathology. No fracture identified.    I have personally reviewed the examination and initial interpretation  and I agree with the findings.    BIB LEDESMA MD         SYSTEM ID:  [id number]   CT Cervical Spine w/o Contrast    Narrative    CT CERVICAL SPINE W/O CONTRAST 9/20/2021 12:17 PM    Provided History: Neck trauma (Age >= 65y)    Comparison: None available    Technique: Using multidetector thin collimation helical acquisition  technique, axial, coronal and sagittal CT images through the cervical  spine were obtained without intravenous contrast.     Findings:  There is no evidence  of acute fracture or subluxation in the cervical  spine. There is kyphoscoliosis of the thoracic and cervical spine with  convexity towards the right side. Minimal anterolisthesis at C7-T1. No  prevertebral edema. There is multilevel disc height narrowing at C4-C7  with degenerative osteophytosis.     The findings on a level by level basis are as follows:    C2-3:  Left facet arthropathy and uncinate spurring. No neural  foraminal narrowing. No spinal canal stenosis.    C3-4:  Bilateral facet arthropathy. Left uncinate spurring. No neural  foraminal narrowing or spinal canal stenosis    C4-5:  Bilateral facet arthropathy. Bilateral uncinate spurring. Mild  left neural foraminal narrowing and no spinal canal stenosis.    C5-6:  Bilateral uncinate spurring. No Neural foraminal narrowing or  spinal canal stenosis.    C6-7:  Bilateral facet arthropathy and uncinate spurring. Mild left  neural foraminal narrowing. No spinal canal stenosis.    C7-T1:  No spinal canal or neural foraminal stenosis.     No abnormality of the paraspinous soft tissues. Patchy groundglass  opacities in the visualized lung apices with thickening of the  interlobular septations.      Impression    Impression:   1. No acute fracture or traumatic subluxation.  2. Cervical spondylosis with no high-grade neural foraminal narrowing  or spinal canal stenosis  3. Partially visualized lung apices demonstrate thickening of the  interlobular septations with groundglass opacities which can be seen  in interstitial lung disease. Consider obtaining a nonemergent  high-resolution chest CT.    I have personally reviewed the examination and initial interpretation  and I agree with the findings.    BIB LEDESMA MD         SYSTEM ID:  [id number]   [place] Draw    Narrative    The following orders were created for panel order [place] Draw.  Procedure                               Abnormality         Status                     ---------                                -----------         ------                     Extra Blue Top Tube[[id number]]                              Final result               Extra Red Top Tube[[id number]]                               Final result               Extra Green Top (Lithium...[[id number]]                      Final result               Extra Purple Top Tube[[id number]]                            Final result                 Please view results for these tests on the individual orders.   Extra Blue Top Tube   Result Value Ref Range    Hold Specimen JIC    Extra Red Top Tube   Result Value Ref Range    Hold Specimen JIC    Extra Green Top (Lithium Heparin) Tube   Result Value Ref Range    Hold Specimen JIC    Extra Purple Top Tube   Result Value Ref Range    Hold Specimen JIC    XR Wrist Right G/E 3 Views    Narrative    Exam: 3 views of the right wrist dated 9/20/2021.    COMPARISON: None.    CLINICAL HISTORY: Fall.    FINDINGS: AP, oblique, and lateral views of the right wrist were  obtained. Comminuted fracture involving the right distal radius with  impaction and marked dorsal angulation. Additionally there is a  fracture with mild impaction of the right distal ulna. The bones are  osteopenic appearing. Vascular calcifications are noted.      Impression    IMPRESSION: Comminuted fracture of the right distal radius with  impaction and marked dorsal angulation. Additionally there is a mildly  impacted fracture involving the right distal ulna. Per clinical  history, patient had a recent wrist fracture, no direct comparisons  are available to assess for stability versus new findings. Correlate  with clinical exam and history.    PREETI MONTGOMERY MD         SYSTEM ID:  [id number]   XR Elbow Right 2 Views    Narrative    Exam: 2 views of the right elbow dated 9/20/2021.    COMPARISON: None.    CLINICAL HISTORY: Fall.    FINDINGS: AP and lateral views of the right elbow were obtained. There  is a joint effusion. The bones are well aligned. No  displaced  fractures. Vascular calcifications are noted. Soft tissue swelling in  the region of the olecranon bursa.      Impression    IMPRESSION: Right elbow joint effusion with no displaced fractures  noted. If clinical symptoms persist, follow-up radiographs in 7-10  days recommended.    PREETI MONTGOMERY MD         SYSTEM ID:  [id number]     EXAM: X-RAY WRIST     DATE: 5/6/2021 5:49 PM     COMPARISON: None     CLINICAL DATA: Fall.     ADDITIONAL CLINICAL DATA:     TECHNIQUE: Frontal, oblique, and lateral views of the right wrist were obtained.     FINDINGS:     Transverse fractures at the distal radius and ulnar styloid. There is mild dorsal displacement and dorsal angulation at the distal radial fracture. There is no intra-articular extension. Bones are demineralized. Degenerative changes identified.      Labs, vital signs, and imaging studies were reviewed by me.    Medications   Tdap (tetanus-diphtheria-acell pertussis) (ADACEL) injection 0.5 mL (0.5 mLs Intramuscular Given 9/20/21 1304)   lidocaine 1% with EPINEPHrine 1:100,000 injection 1 mL (1 mL Intradermal Given 9/20/21 1305)       Assessments & Plan (with Medical Decision Making)   Karen Anderson is a 81 year old female who presents after a fall with head trauma.  She is anticoagulated on Coumadin.  CT head, C-spine, and INR ordered.  Additionally, patient has swelling and pain to her right elbow and wrist, x-rays were ordered of these joints.  Laceration was repaired as described above.  Tetanus vaccine was updated in the emergency department.    CT head shows no acute intracranial process, CT C-spine shows no acute fracture.    X-ray right elbow negative for acute fracture or dislocation, x-ray right wrist shows fracture, c/w report of recent fracture, pt does report new swelling, but most new pain is in her elbow, minimal pain in her wrist, pt advised to f/u with orthopedic surgery regarding her wrist. Unfortunately, we are not able to view  previous XR for direct comparison as it is in outside hospital system, see XR read above    Given patient's anticoagulation on Coumadin, will obtain 6-hour repeat head CT at 6 PM    I have reviewed the nursing notes.    I have reviewed the findings, diagnosis, plan and need for follow up with the patient.    Patient to be signed out to oncoming provider.  To be discharged home if repeat head CT remains normal    New Prescriptions    No medications on file       Final diagnoses:   Laceration of scalp, initial encounter   Traumatic injury of head, initial encounter     Rosa Le MD  9/20/2021   [facility] EMERGENCY DEPARTMENT     Rosa Le MD  09/20/21 9997

## 2021-09-20 NOTE — ED TRIAGE NOTES
Triage Assessment & Note:    Patient presents with: PT reports she fell this AM when she tripped over there oxygen cord striking her right elbow, chest and back of head. PT is taking blood thinners and has a hx of pulmonary HTN. PT denies LOC. PT is noted to have some bleeding from the back of her head that is controlled without intervention. PT denies neck or back pain at this time.     Home Treatments/Remedies: None    Febrile / Afebrile? Afebrile     Duration of C/o: Onset 0900    Tobias Souza RN  September 20, 2021

## 2021-09-20 NOTE — DISCHARGE INSTRUCTIONS
Both of your head CTs today showed no evidence of bleeding.    TODAY'S VISIT:  You were seen today for head trauma, laceration   -   - If you had any labs or imaging/radiology tests performed today, you should also discuss these tests with your usual provider.     FOLLOW-UP:  Please make an appointment to follow up with:  - Your Primary Care Provider. If you do not have a PCP, please call the Primary Care Center (phone: [phone] for an appointment  - Please make an appointment to follow up with your orthopedic doctor or call the Orthopedics Clinic (phone: [phone]) regarding your prior wrist fracture  - your CT of your neck today showed some findings in the tops of your lungs that may be consistent with interstitial lung disease, it is recommended you discuss this with your primary doctor to arrange an outpatient CT of your chest to better visualize these areas     - Have your provider review the results from today's visit with you again to make sure no further follow-up or additional testing is needed based on those results.     OTHER INSTRUCTIONS:  - Please follow up with your PCP for staple removal in 7 days. You may also return here or go to any urgent care or ER for suture removal.  Keep the affected area as clean and dry as it is possible.  If one was applied, leave the dressing that was applied in the emergency department in place for 24 hours, after which you may leave the wound uncovered or change the dressing daily or whenever it becomes soiled.  Make sure to keep the wound covered when participating in any activities in which the affected area may become dirty.  Avoid swimming or submerging the affected area in the tub/bath for at least 1 week.  Showering is okay. Return to the emergency department immediately if you develop any signs of infection, including fevers/chills or increasing pain/redness/swelling/warmth/cloudy discharge from around the wound or if bleeding from the wound occurs and  cannot be controlled with direct pressure to the wound. You may apply bacitracin to the laceration twice daily. Make sure to use sunscreen in the future whenever sun exposure to the affected area may occur in order to help minimize scarring.      RETURN TO THE EMERGENCY DEPARTMENT  Return to the Emergency Department at any time for any new or worsening symptoms or any concerns.

## 2021-09-21 ENCOUNTER — DOCUMENTATION ONLY (OUTPATIENT)
Dept: ANTICOAGULATION | Facility: CLINIC | Age: 81
End: 2021-09-21

## 2021-09-21 DIAGNOSIS — Z79.01 LONG TERM CURRENT USE OF ANTICOAGULANT THERAPY: ICD-10-CM

## 2021-09-21 DIAGNOSIS — I27.24 CTEPH (CHRONIC THROMBOEMBOLIC PULMONARY HYPERTENSION) (H): ICD-10-CM

## 2021-09-21 DIAGNOSIS — I27.20 PULMONARY HYPERTENSION (H): Primary | ICD-10-CM

## 2021-09-21 NOTE — PROGRESS NOTES
ANTICOAGULATION  MANAGEMENT: Discharge Review    Karen Anderson chart reviewed for anticoagulation continuity of care    Emergency room visit on 9/20 for fall and head injury.    Discharge disposition: Home    Results:    Recent labs: (last 7 days)     09/16/21  1107 09/20/21  1257   INR 1.6* 1.58*     Anticoagulation inpatient management:     not applicable , INR checked 1.58    Anticoagulation discharge instructions:     Warfarin dosing: home regimen continued and Next INR 9/30   Bridging: No   INR goal change: No      Medication changes affecting anticoagulation: No    Additional factors affecting anticoagulation: Yes: fall backwards hitting her head and right elbow/wrist-bruising to arm. Laceration to posterior scalp actively bleeding in ED. 5 staples placed to head laceration.CT head with small subcutaneous hematoma overlying occipital bone without any acute intracranial pathology.    Plan     Recommend to adjust dose to 4.5 mg tomorrow x1, continue current maintenance plan.     Spoke with Santana, patient's spouse    Anticoagulation Calendar updated    GOLDY PERALTA RN

## 2021-09-27 DIAGNOSIS — I27.20 PULMONARY HYPERTENSION (H): ICD-10-CM

## 2021-09-28 ENCOUNTER — ALLIED HEALTH/NURSE VISIT (OUTPATIENT)
Dept: NURSING | Facility: CLINIC | Age: 81
End: 2021-09-28
Payer: COMMERCIAL

## 2021-09-28 DIAGNOSIS — Z48.02 ENCOUNTER FOR REMOVAL OF SUTURES: Primary | ICD-10-CM

## 2021-09-28 PROCEDURE — 99207 PR NO CHARGE NURSE ONLY: CPT

## 2021-09-28 NOTE — PROGRESS NOTES
Karen R [place] presents to the clinic today for removal of staples.  The patient has had the sutures and staples in place for 7 days.  There has been no history of infection or drainage.  5 sutures and staples are seen located on the back of head scalp (Occipital).  The wound is healing well with no signs of infection.  Tetanus status is up to date.   All sutures and staples were easily removed today.  Routine wound care discussed.  The patient will follow up as needed.    Copied from ED visit dated 9/20/21  OTHER INSTRUCTIONS:  - Please follow up with your PCP for staple removal in 7 days. You may also return here or go to any urgent care or ER for  suture removal. Keep the affected area as clean and dry as it is possible. If one was applied, leave the dressing that was  applied in the emergency department in place for 24 hours, after which you may leave the wound uncovered or change  the dressing daily or whenever it becomes soiled. Make sure to keep the wound covered when participating in any  activities in which the affected area may become dirty. Avoid swimming or submerging the affected area in the tub/bath  for at least 1 week. Showering is okay. Return to the emergency department immediately if you develop any signs of  infection, including fevers/chills or increasing pain/redness/swelling/warmth/cloudy discharge from around the wound or  if bleeding from the wound occurs and cannot be controlled with direct pressure to the wound. You may apply bacitracin  to the laceration twice daily. Make sure to use sunscreen in the future whenever sun exposure to the affected area may  occur in order to help minimize scarring

## 2021-09-30 ENCOUNTER — DOCUMENTATION ONLY (OUTPATIENT)
Dept: LAB | Facility: CLINIC | Age: 81
End: 2021-09-30

## 2021-09-30 ENCOUNTER — LAB (OUTPATIENT)
Dept: LAB | Facility: CLINIC | Age: 81
End: 2021-09-30
Payer: COMMERCIAL

## 2021-09-30 ENCOUNTER — ANTICOAGULATION THERAPY VISIT (OUTPATIENT)
Dept: ANTICOAGULATION | Facility: CLINIC | Age: 81
End: 2021-09-30

## 2021-09-30 DIAGNOSIS — I27.20 PULMONARY HYPERTENSION (H): Primary | ICD-10-CM

## 2021-09-30 DIAGNOSIS — R06.02 SOB (SHORTNESS OF BREATH): ICD-10-CM

## 2021-09-30 DIAGNOSIS — Z79.01 LONG TERM CURRENT USE OF ANTICOAGULANT THERAPY: ICD-10-CM

## 2021-09-30 DIAGNOSIS — R06.09 DYSPNEA ON EXERTION: ICD-10-CM

## 2021-09-30 DIAGNOSIS — I27.24 CTEPH (CHRONIC THROMBOEMBOLIC PULMONARY HYPERTENSION) (H): ICD-10-CM

## 2021-09-30 DIAGNOSIS — I27.20 PULMONARY HYPERTENSION (H): ICD-10-CM

## 2021-09-30 DIAGNOSIS — I50.810 RVF (RIGHT VENTRICULAR FAILURE) (H): ICD-10-CM

## 2021-09-30 LAB
ERYTHROCYTE [DISTWIDTH] IN BLOOD BY AUTOMATED COUNT: 16.6 % (ref 10–15)
HCT VFR BLD AUTO: 36.9 % (ref 35–47)
HGB BLD-MCNC: 11.9 G/DL (ref 11.7–15.7)
INR BLD: 3.2 (ref 0.9–1.1)
MCH RBC QN AUTO: 31.2 PG (ref 26.5–33)
MCHC RBC AUTO-ENTMCNC: 32.2 G/DL (ref 31.5–36.5)
MCV RBC AUTO: 97 FL (ref 78–100)
NT-PROBNP SERPL-MCNC: 2944 PG/ML (ref 0–450)
PLATELET # BLD AUTO: 257 10E3/UL (ref 150–450)
RBC # BLD AUTO: 3.81 10E6/UL (ref 3.8–5.2)
WBC # BLD AUTO: 4.4 10E3/UL (ref 4–11)

## 2021-09-30 PROCEDURE — 83880 ASSAY OF NATRIURETIC PEPTIDE: CPT

## 2021-09-30 PROCEDURE — 85610 PROTHROMBIN TIME: CPT

## 2021-09-30 PROCEDURE — 85027 COMPLETE CBC AUTOMATED: CPT

## 2021-09-30 PROCEDURE — 80053 COMPREHEN METABOLIC PANEL: CPT

## 2021-09-30 PROCEDURE — 36415 COLL VENOUS BLD VENIPUNCTURE: CPT

## 2021-09-30 NOTE — PROGRESS NOTES
This patient was here today for a lab only appointment. She was concerned that she was not getting everything needed for her next appointment with you. One thing was a comp profile instead to a basic and the other was a cbc was never ordered and she thought she needed one. Please let us know if you need to add any testing. Thanks chinyere

## 2021-09-30 NOTE — PROGRESS NOTES
ANTICOAGULATION MANAGEMENT     Karen Anderson 81 year old female is on warfarin with supratherapeutic INR result. (Goal INR 2.0-2.5)    Recent labs: (last 7 days)     09/30/21  1113   INR 3.2*       ASSESSMENT     Source(s): Chart Review       Warfarin doses taken: Warfarin taken as instructed    Diet: No new diet changes identified    New illness, injury, or hospitalization: No    Medication/supplement changes: None noted    Signs or symptoms of bleeding or clotting: No    Previous INR: Subtherapeutic    Additional findings: None     PLAN     Recommended plan for no diet, medication or health factor changes affecting INR     Dosing Instructions:  Decrease your warfarin dose (11% change) with next INR in 2 weeks       Summary  As of 9/30/2021    Full warfarin instructions:  3 mg every Mon, Thu, Sat; 4.5 mg all other days   Next INR check:  10/14/2021             Telephone call with Karen who verbalizes understanding and agrees to plan and who agrees to plan and repeated back plan correctly    Patient to recheck with home meter    Education provided: Importance of consistent vitamin K intake, Impact of vitamin K foods on INR and Vitamin K content of foods    Plan made per [facility] anticoagulation protocol    Brittany Kirk, RN  Anticoagulation Clinic  9/30/2021    _______________________________________________________________________     Anticoagulation Episode Summary     Current INR goal:  2.0-2.5   TTR:  24.2 % (9.6 mo)   Target end date:  Indefinite   Send INR reminders to:  [facility]    Indications    Pulmonary hypertension (H) [I27.20]  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 OK to speak with spouse, Don.  [facility] SPEAK IN TABLETS (Has 3mg Tabs) 10/23/20:  new goal range is 2 - 2.5  [facility] in [place] and will need to fax orders  P [phone] F [phone]         Anticoagulation Care Providers     Provider Role Specialty Phone number    Karolina Turcios MD Referring Cardiovascular Disease [phone]

## 2021-10-01 LAB
ALBUMIN SERPL-MCNC: 3 G/DL (ref 3.4–5)
ALP SERPL-CCNC: 178 U/L (ref 40–150)
ALT SERPL W P-5'-P-CCNC: 22 U/L (ref 0–50)
ANION GAP SERPL CALCULATED.3IONS-SCNC: 4 MMOL/L (ref 3–14)
AST SERPL W P-5'-P-CCNC: 30 U/L (ref 0–45)
BILIRUB SERPL-MCNC: 0.3 MG/DL (ref 0.2–1.3)
BUN SERPL-MCNC: 26 MG/DL (ref 7–30)
CALCIUM SERPL-MCNC: 10.1 MG/DL (ref 8.5–10.1)
CHLORIDE BLD-SCNC: 105 MMOL/L (ref 94–109)
CO2 SERPL-SCNC: 32 MMOL/L (ref 20–32)
CREAT SERPL-MCNC: 1.03 MG/DL (ref 0.52–1.04)
GFR SERPL CREATININE-BSD FRML MDRD: 52 ML/MIN/1.73M2
GLUCOSE BLD-MCNC: 77 MG/DL (ref 70–99)
POTASSIUM BLD-SCNC: 4.3 MMOL/L (ref 3.4–5.3)
PROT SERPL-MCNC: 7.2 G/DL (ref 6.8–8.8)
SODIUM SERPL-SCNC: 141 MMOL/L (ref 133–144)

## 2021-10-04 ASSESSMENT — ENCOUNTER SYMPTOMS
HYPOTENSION: 0
POSTURAL DYSPNEA: 1
MEMORY LOSS: 0
SEIZURES: 0
SLEEP DISTURBANCES DUE TO BREATHING: 0
EYE PAIN: 0
WHEEZING: 0
TINGLING: 1
HEARTBURN: 0
SPUTUM PRODUCTION: 0
CONSTIPATION: 1
LOSS OF CONSCIOUSNESS: 0
SKIN CHANGES: 1
EYE REDNESS: 0
SPEECH CHANGE: 0
NUMBNESS: 1
NAIL CHANGES: 0
DIFFICULTY URINATING: 0
SINUS CONGESTION: 0
POOR WOUND HEALING: 0
ABDOMINAL PAIN: 0
COUGH: 0
BLOOD IN STOOL: 0
HEADACHES: 0
MUSCLE CRAMPS: 0
EXERCISE INTOLERANCE: 1
NECK MASS: 0
DOUBLE VISION: 0
SWOLLEN GLANDS: 0
TASTE DISTURBANCE: 1
BOWEL INCONTINENCE: 0
HOARSE VOICE: 1
LEG PAIN: 0
STIFFNESS: 1
SYNCOPE: 0
ARTHRALGIAS: 1
COUGH DISTURBING SLEEP: 0
HEMATURIA: 0
MUSCLE WEAKNESS: 1
ORTHOPNEA: 1
SHORTNESS OF BREATH: 1
SINUS PAIN: 0
JAUNDICE: 0
FLANK PAIN: 0
DISTURBANCES IN COORDINATION: 1
HYPERTENSION: 1
DIZZINESS: 1
SORE THROAT: 0
PALPITATIONS: 1
EYE WATERING: 1
BACK PAIN: 0
SMELL DISTURBANCE: 1
NAUSEA: 0
SNORES LOUDLY: 0
WEAKNESS: 1
NECK PAIN: 0
TREMORS: 0
JOINT SWELLING: 1
PARALYSIS: 0
MYALGIAS: 0
DYSPNEA ON EXERTION: 1
EYE IRRITATION: 1
BRUISES/BLEEDS EASILY: 1
DIARRHEA: 1
DYSURIA: 0
BLOATING: 1
HEMOPTYSIS: 0
TROUBLE SWALLOWING: 0
RECTAL PAIN: 0
LIGHT-HEADEDNESS: 0
VOMITING: 0

## 2021-10-07 ENCOUNTER — VIRTUAL VISIT (OUTPATIENT)
Dept: CARDIOLOGY | Facility: CLINIC | Age: 81
End: 2021-10-07
Attending: PHYSICIAN ASSISTANT
Payer: COMMERCIAL

## 2021-10-07 ENCOUNTER — ANTICOAGULATION THERAPY VISIT (OUTPATIENT)
Dept: ANTICOAGULATION | Facility: CLINIC | Age: 81
End: 2021-10-07

## 2021-10-07 DIAGNOSIS — Z79.01 LONG TERM CURRENT USE OF ANTICOAGULANT THERAPY: ICD-10-CM

## 2021-10-07 DIAGNOSIS — R06.09 DYSPNEA ON EXERTION: ICD-10-CM

## 2021-10-07 DIAGNOSIS — I27.20 PULMONARY HYPERTENSION (H): Primary | ICD-10-CM

## 2021-10-07 DIAGNOSIS — I27.24 CTEPH (CHRONIC THROMBOEMBOLIC PULMONARY HYPERTENSION) (H): ICD-10-CM

## 2021-10-07 LAB — INR (EXTERNAL): 2.2 (ref 0.9–1.1)

## 2021-10-07 PROCEDURE — 99214 OFFICE O/P EST MOD 30 MIN: CPT | Mod: 95 | Performed by: PHYSICIAN ASSISTANT

## 2021-10-07 NOTE — PROGRESS NOTES
ANTICOAGULATION MANAGEMENT     Karen Anderson 81 year old female is on warfarin with therapeutic INR result. (Goal INR 2.0-2.5)    Recent labs: (last 7 days)     10/07/21  1416   INR 2.2*       ASSESSMENT     Source(s): Chart Review and Patient/Caregiver Call       Warfarin doses taken: Warfarin taken as instructed    Diet: No new diet changes identified    New illness, injury, or hospitalization: No    Medication/supplement changes: None noted    Signs or symptoms of bleeding or clotting: No    Previous INR: Supratherapeutic    Additional findings: INR dropped a full point since last week so writer will incorporate another day of 4.5mg into schedule.  Patient has received new home INR machine.      PLAN     Recommended plan for no diet, medication or health factor changes affecting INR     Dosing Instructions:  Decrease your warfarin dose (5% change) with next INR in 1 week       Summary  As of 10/7/2021    Full warfarin instructions:  3 mg every Mon, Thu; 4.5 mg all other days   Next INR check:               Telephone call with Karen who verbalizes understanding and agrees to plan and who agrees to plan and repeated back plan correctly    Patient to recheck with home meter    Education provided: Please call back if any changes to your diet, medications or how you've been taking warfarin    Plan made per [facility] anticoagulation protocol    Brittany Kirk RN  Anticoagulation Clinic  10/7/2021    _______________________________________________________________________     Anticoagulation Episode Summary     Current INR goal:  2.0-2.5   TTR:  24.3 % (9.8 mo)   Target end date:  Indefinite   Send INR reminders to:  [facility]    Indications    Pulmonary hypertension (H) [I27.20]  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 OK to speak with spouse,  [facility] SPEAK IN TABLETS (Has 3mg Tabs) 10/23/20:  new goal range is 2 - 2.5  [facility] in [place] and will need to fax orders P [phone] F [phone]         Anticoagulation Care Providers     Provider Role Specialty Phone number    Karolina Turcios MD Referring Cardiovascular Disease [phone]

## 2021-10-07 NOTE — PROGRESS NOTES
Karen Anderson is a 81 year old who is being evaluated via a billable video visit.      How would you like to obtain your AVS? MyChart  If the video visit is dropped, the invitation should be resent by: Text to cell phone: [phone]  Will anyone else be joining your video visit? No    Vitals - Patient Reported  Systolic (Patient Reported): 103  Diastolic (Patient Reported): 52  Weight (Patient Reported): 48.3 kg (106 lb 8 oz)  Pulse (Patient Reported): 77  Pain Score: No Pain (0) (No SOB)    Vitals were taken and medications reconciled.    Vik Paulino, EMT  1:58 PM

## 2021-10-07 NOTE — PATIENT INSTRUCTIONS
Thank you for visiting the Pulmonary Hypertension Clinic today.      Medication Changes:   -Take extra Bumex for 2-3 days (until you reach goal weight of 102-103 lbs), then resume previous dosing.      Follow up Appointment Information:  -follow up in 4-6 weeks with Dr. Turcios via Video.  Have labs drawn a couple days prior.      Additional Instructions:    1. Continue staying active and eat a heart healthy, low sodium diet.     2. Please keep current list of medications with you at all times.     3. Remember to weigh yourself daily after voiding and write it down on a log. If you have gained/lost 2 pounds overnight or 5 pounds in a week contact us for medication adjustments or further instructions.    4. Please call us immediately if you have syncope (fainting or passing out), chest pain, worsening edema (swelling or weight gain), or general worsening in how you are feeling.     --------------------------------------------------------------------------------------------------------------    If you have questions or concerns please contact us at:    Elan Arnett RN, BSN   Lexus Balbuena (Schedule,Prior Auth)  Nurse Coordinator     Clinic   Pulmonary Hypertension   Pulmonary Hypertension  [facility]  [facility]  (Phone)[phone]    (Phone) [phone]        (Fax) [phone]      ** Please note that you will NOT receive a reminder call regarding your scheduled testing, reminder calls are for provider appointments only.  If you are scheduled for testing within the [facility] system you may receive a call regarding pre-registration for billing purposes only.**     --------------------------------------------------------------------------------------------------------------    Interested in joining a support group?    Pulmonary Hypertension Association  [email or web address].phassociation.org/  **Look at the Events Tab** They even have Support Groups that  you can call into    [facility] Support Group  Second Saturday of the Month from 1-3 PM   Location: [address] [place] [address]  Leader: Jessenia Moeller and Marielos Loo  Phone: [phone] or [phone]  Email: [email or web address]

## 2021-10-07 NOTE — PROGRESS NOTES
CARDIOLOGY PH CLINIC VIDEO VISIT    Date of video visit: 10/07/21      Karen Anderson is a 81 year old female who is being evaluated via a billable video visit.        I have reviewed and updated the patient's Past Medical History, Social History, Family History and Medication List.    MEDICATIONS:  Current Outpatient Medications   Medication Sig Dispense Refill     acetaminophen (TYLENOL) 325 MG tablet Take 325 mg by mouth every 6 hours as needed for mild pain       albuterol (PROAIR HFA/PROVENTIL HFA/VENTOLIN HFA) 108 (90 Base) MCG/ACT inhaler Inhale 2 puffs into the lungs every 4 hours as needed for shortness of breath / dyspnea or wheezing        alendronate (FOSAMAX) 70 MG tablet Take 70 mg by mouth once a week On Mondays       Ascorbic Acid (VITAMIN C) 500 MG CAPS Take 500 mg by mouth every morning        aspirin (ASA) 81 MG tablet Take 81 mg by mouth daily        beclomethasone HFA (QVAR REDIHALER) 80 MCG/ACT inhaler Inhale 2 puffs into the lungs 2 times daily       bumetanide (BUMEX) 2 MG tablet Take 2 tablets (4 mg) by mouth 3 times daily 540 tablet 3     calcium citrate-vitamin D (CALCIUM CITRATE + D3) 315-250 MG-UNIT TABS per tablet Take 1 tablet by mouth 2 times daily        Corn Dextrin (CLEAR FIBER POWDER PO) Take by mouth daily        digoxin (LANOXIN) 62.5 MCG tablet Take 1 tablet (62.5 mcg) by mouth daily 90 tablet 3     gabapentin (NEURONTIN) 300 MG capsule Take 300 mg by mouth 3 times daily Can take an additional 300mg at bedtime as needed       hydroxychloroquine (PLAQUENIL) 200 MG tablet Take 200 mg by mouth daily        ipratropium (ATROVENT) 0.06 % nasal spray Spray 2 sprays into both nostrils 3 times daily        ketotifen (ZADITOR) 0.025 % ophthalmic solution Place 1 drop into both eyes daily       macitentan (OPSUMIT) 10 MG tablet Take 1 tablet (10 mg) by mouth every other day 15 tablet 11     potassium chloride ER (KLOR-CON M) 20 MEQ CR tablet Take 2 tablets (40 mEq) by mouth 3  times daily 540 tablet 3     sodium chloride (OCEAN) 0.65 % nasal spray Spray 1 spray into both nostrils daily as needed for congestion       traMADol (ULTRAM) 50 MG tablet Take 0.5 tablets (25 mg) by mouth every 6 hours as needed for moderate pain 5 tablet 0     treprostinil (REMODULIN) 60 mcg/mL in glycine diluent 50 mL infusion Inject 44.5 ng/kg/min into the vein continuous Goal Dose: 44.5 ng/kg/min 1368 mL 0     warfarin ANTICOAGULANT (COUMADIN) 3 MG tablet Take 3 mg PO on Mon, Wed, Fri and Sat, and 4.5 mg all other days of the week 170 tablet 3       ALLERGIES  Sulfa drugs    Brief physical exam:  General: In no acute distress, upright and calm.  Eyes: No apparent redness or discharge.   Chest: No labored breathing, no cough during exam or audible wheezing.   Neuro: No obvious focal defects or tremors.   Psych: Alert and oriented. Does not appear anxious.     Self reported vitals:  Wt: 106#    The rest of a comprehensive physical examination is deferred due to public health emergency video visit restrictions.       Primary PH cardiologist: Dr. Turcios       HPI:  Ms. Anderson is a pleasant 81 year old female with a PMHx including rheumatoid arthritis, hypertension, scoliosis, asthma, pulmonary MAC, breast cancer s/p chemotherapy with Adriamycin, Cytoxan, Taxol, and tamoxifen and status post left mastectomy. She also has a history of extensive bilateral pulmonary emboli and DVT in 1998 thought to be secondary to tamoxifen along with chemotherapy induced cardiomyopathy. 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 Although she had proximal disease, she was too high risk for surgical PTE.  She is currently on combination therapy with IV Remodulin therapy along with Opsumit.       She has had some trouble with side effects of Remodulin with titration which we have managed. Last fall she fell and tore a tendon in her knee which was complicated by a painful hematoma. Shortly after  she developed weight gain and required a hospitalization for IV diuretics.     In January, due to worsening clinical status, she underwent a BPA which was complicated by hemoptysis, requiring FFP. We have since continued to work on diuretic adjustments, and due to low BP/dizziness, her Adempas was ultimately discontinued. After her repeat RHC in March, Opsumit was increased back to daily. As she was felt high risk to proceed with further surgical interventions, she was sent to [place] for a second opinion. They had considered PTE surgery but Dr. Turcios felt she was still too high risk.     In late May, she had fallen again and had a distal fracture of her right radius and ulna. She was hospitalized briefly at [facility], placed in a cast, and then sent to inpatient rehab. Unfortunately, she then gained over 20 lbs with more edema. Her Opsumit was changed back to every other day, but ultimately she was sent back to the hospital for admission. She was there from 5/27 to 6/10 and was placed on a bumex gtt along with dobutamine support to augment diuresis. She was discharged on bumex 4mg TID with a discharge wt of 114#. Echo during her stay showed continued severe PH with estimated RVSP 117mmHg and moderately dilated RV with moderately reduced RV function. She had a small circumferential pericardial effusion without tamponade. IV Remodulin was titrated up to 34ng/kg/min. She received pRBCs and Venofer for anemia.      When I saw Karen in late June, she was doing fairly well. Her weight had continued to trend down since discharge and settled out at 102-104# at home, with much improvement in her edema. She remained quite sedentary, BP was reasonable. Plan at that time was to continue to increase her Remodulin gradually by 1 ng/kg/min weekly to a goal of 45 ng/kg. As renal function was acceptable, I kept her diuretics unchanged. When I saw her last in early September, home weight had trended down a bit  "further, at #, and swelling was improved with consistent use of leg wraps. She was able to start doing some simple tasks at home like helping with laundry. AT that time she was at goal 44ng/kg/min on her Remodulin. At that time, due to difficulty sleeping with frequent urination, I had her trial bumex 6mg BID instead of 4mg TID, with her second dose being in the early afternoon to allow better rest at night.     Today, we are doing a virtual visit for follow up. Since her saw her last, she suffered a fall on 9/20 after tripping on her oxygen tubing. She had a head laceration, but head CT and cspine did not show any acute issues. She also hurt her arm again, though it does not appear that she had any new fractures. She tells me that overall thing are \"ok.\" She says her breathing is worse than usual, though she still gets winded easily. She thinks that her LE edema is also no worse than usual, as when she wraps them they improve. However, her weight is up about 4 lbs from when I saw her last. She says that she did try to change her bumex to 6mg BID but ultimately it made no difference in her nocturnal urination so she went back to her usual dosing. She denies any recent dizziness or presyncope.      Labs done prior to our visit were reviewed: 9/30: K 4.3, chl 105, c02 32, BUN 26, SCR 1.03, ALT 22, AST 30, NT-proBNP 2944, WBC 4.4, hemoglobin 11.9, hematocrit 36.9, plt 257.         CURRENT PULMONARY HYPERTENSION REGIMEN:     PAH Rx: Remodulin 44.5ng/kg/min, Opsumit 10mg every other day  (failed Adempas due to dizziness)     Diuretics: Bumex 4mg TID     Oxygen: NC 1.5LPM     Anticoagulation: warfarin   Indication: CTEPH        Assessment/Plan:        1. Chronic thromboembolic pulmonary hypertension.              --Ms. Anderson has CTEPH with sisgnificant RV dysfunction. Although she had proximal disease, she was felt to be too high risk for surgical PTE here and at [facility]. She was offered potential PTE at [place], but " still felt to be very high risk. She did have BPA here in January but this was complicated by hemoptysis. No further surgical interventions are being offered at this time. Currently, we will continue to focus on medical management.              --She is now on goal IV Remodulin at 44.5ng/kg/min. She is not able to tolerate Adempas due to dizziness, and on Opsumit 10mg every other day for similar reasons.               --Her weight is has trended back up at 106# for the last week or so. Last visit she had settled out at 100-102#. We tried to condense her bumex doses to BID instead of TID to help with nocturnal urination, though this did not help so she went back to 4mg TID. NT-proBNP is up and renal function a bit improved. Today, I asked her to take an extra 2mg of bumex for the next 2-3 days until her weight comes back to baseline.              --Continue digoxin for RV support. BP has remained acceptable, though will need to continue to monitor given renal concerns.               --Continue anticoagulation with warfarin for her CTEPH, though we have been monitoring with a lower INR goal of 2-2.5. She recently received equipment to do home checks.              --Continue oxygen at 1-2L with exertion. I told her that if sats are >92% at rest, she may take off while sitting.      Follow up plan: Return in 4-6 weeks to see Dr. Karolina ponce (per pt request) with labs done near her home prior.       Testing/labs:    Most recent labs:   Results for ELAINA SUTTON (MRN [id number]) as of 10/7/2021 12:59   Ref. Range 9/30/2021 11:13   Sodium Latest Ref Range: 133 - 144 mmol/L 141   Potassium Latest Ref Range: 3.4 - 5.3 mmol/L 4.3   Chloride Latest Ref Range: 94 - 109 mmol/L 105   Carbon Dioxide Latest Ref Range: 20 - 32 mmol/L 32   Urea Nitrogen Latest Ref Range: 7 - 30 mg/dL 26   Creatinine Latest Ref Range: 0.52 - 1.04 mg/dL 1.03   GFR Estimate Latest Ref Range: >60 mL/min/1.73m2 52 (L)   Calcium Latest Ref  Range: 8.5 - 10.1 mg/dL 10.1   Anion Gap Latest Ref Range: 3 - 14 mmol/L 4   Albumin Latest Ref Range: 3.4 - 5.0 g/dL 3.0 (L)   Protein Total Latest Ref Range: 6.8 - 8.8 g/dL 7.2   Bilirubin Total Latest Ref Range: 0.2 - 1.3 mg/dL 0.3   Alkaline Phosphatase Latest Ref Range: 40 - 150 U/L 178 (H)   ALT Latest Ref Range: 0 - 50 U/L 22   AST Latest Ref Range: 0 - 45 U/L 30   N-Terminal Pro Bnp Latest Ref Range: 0 - 450 pg/mL 2,944 (H)   Glucose Latest Ref Range: 70 - 99 mg/dL 77   WBC Latest Ref Range: 4.0 - 11.0 10e3/uL 4.4   Hemoglobin Latest Ref Range: 11.7 - 15.7 g/dL 11.9   Hematocrit Latest Ref Range: 35.0 - 47.0 % 36.9   Platelet Count Latest Ref Range: 150 - 450 10e3/uL 257       Other recent pertinent testing:    Echo 6/23/2021  Interpretation Summary  Severe pulmonary hypertension is present. Right ventricular systolic pressure  is 117mmHg above the right atrial pressure.  Global and regional left ventricular function is normal with an EF of 55-60%.  Flattened septum is consistent with right ventricular pressure and volume  overload.  Moderate right ventricular dilation is present. Global right ventricular  function is moderately reduced.  Moderate tricuspid insufficiency is present.  Small circumferential pericardial effusion is present without any hemodynamic  significance.  IVC diameter >2.1 cm collapsing <50% with sniff suggests a high RA pressure  estimated at 15 mmHg or greater.  There has been no change.    RHC  3/17/2021        Severe pulmonary hypertension    Normal cardiac output by Thermodilution    Elevated left sided filling pressures    No signficant change when comapred to her last hemodynamic assessment            Pressures Phase: Baseline                                                     Time Systolic (mmHg) Diastolic (mmHg) Mean (mmHg) A Wave (mmHg) V Wave (mmHg) EDP (mmHg) Max dp/dt (mmHg/sec) HR (bpm) Content (mL/dL) SAT (%)   RA Pressures  2:31 PM     14    14    22        63           RV Pressures  2:16 PM             912               2:32 PM 87    9          28      62          PA Pressures  2:32 PM 88    22    44            62          PCW Pressures  2:33 PM     14                                               NYHA Functional Class:  Functional class 3    1--No limitation of activity  2--Slight limitation, ordinary activities may cause symptoms  3--Marked limitation, less than ordinary activities cause symptoms  4--Severe limitation, minimal activity causes symptoms, or symptoms at rest      Video-Visit Details    Type of service:  Video Visit    Video Start Time: 1412  Video End Time: 1433    An additional 15 minutes was spent today performing chart and history review, pre and post visit documentation, and care coordination.      Originating Location (pt. Location): Home    Distant Location (provider location):  [facility]    Platform used for Video Visit: Darron Marcum PA-C  [facility]  Pager [phone]

## 2021-10-07 NOTE — LETTER
10/7/2021      RE: Karen Anderson  [address] [facility] [address]       Dear Colleague,    Thank you for the opportunity to participate in the care of your patient, Karen Anderson, at the [facility]. Please see a copy of my visit note below.          CARDIOLOGY PH CLINIC VIDEO VISIT    Date of video visit: 10/07/21      Karen Anderson is a 81 year old female who is being evaluated via a billable video visit.        I have reviewed and updated the patient's Past Medical History, Social History, Family History and Medication List.    MEDICATIONS:  Current Outpatient Medications   Medication Sig Dispense Refill     acetaminophen (TYLENOL) 325 MG tablet Take 325 mg by mouth every 6 hours as needed for mild pain       albuterol (PROAIR HFA/PROVENTIL HFA/VENTOLIN HFA) 108 (90 Base) MCG/ACT inhaler Inhale 2 puffs into the lungs every 4 hours as needed for shortness of breath / dyspnea or wheezing        alendronate (FOSAMAX) 70 MG tablet Take 70 mg by mouth once a week On Mondays       Ascorbic Acid (VITAMIN C) 500 MG CAPS Take 500 mg by mouth every morning        aspirin (ASA) 81 MG tablet Take 81 mg by mouth daily        beclomethasone HFA (QVAR REDIHALER) 80 MCG/ACT inhaler Inhale 2 puffs into the lungs 2 times daily       bumetanide (BUMEX) 2 MG tablet Take 2 tablets (4 mg) by mouth 3 times daily 540 tablet 3     calcium citrate-vitamin D (CALCIUM CITRATE + D3) 315-250 MG-UNIT TABS per tablet Take 1 tablet by mouth 2 times daily        Corn Dextrin (CLEAR FIBER POWDER PO) Take by mouth daily        digoxin (LANOXIN) 62.5 MCG tablet Take 1 tablet (62.5 mcg) by mouth daily 90 tablet 3     gabapentin (NEURONTIN) 300 MG capsule Take 300 mg by mouth 3 times daily Can take an additional 300mg at bedtime as needed       hydroxychloroquine (PLAQUENIL) 200 MG tablet Take 200 mg by mouth daily        ipratropium  (ATROVENT) 0.06 % nasal spray Spray 2 sprays into both nostrils 3 times daily        ketotifen (ZADITOR) 0.025 % ophthalmic solution Place 1 drop into both eyes daily       macitentan (OPSUMIT) 10 MG tablet Take 1 tablet (10 mg) by mouth every other day 15 tablet 11     potassium chloride ER (KLOR-CON M) 20 MEQ CR tablet Take 2 tablets (40 mEq) by mouth 3 times daily 540 tablet 3     sodium chloride (OCEAN) 0.65 % nasal spray Spray 1 spray into both nostrils daily as needed for congestion       traMADol (ULTRAM) 50 MG tablet Take 0.5 tablets (25 mg) by mouth every 6 hours as needed for moderate pain 5 tablet 0     treprostinil (REMODULIN) 60 mcg/mL in glycine diluent 50 mL infusion Inject 44.5 ng/kg/min into the vein continuous Goal Dose: 44.5 ng/kg/min 1368 mL 0     warfarin ANTICOAGULANT (COUMADIN) 3 MG tablet Take 3 mg PO on Mon, Wed, Fri and Sat, and 4.5 mg all other days of the week 170 tablet 3       ALLERGIES  Sulfa drugs    Brief physical exam:  General: In no acute distress, upright and calm.  Eyes: No apparent redness or discharge.   Chest: No labored breathing, no cough during exam or audible wheezing.   Neuro: No obvious focal defects or tremors.   Psych: Alert and oriented. Does not appear anxious.     Self reported vitals:  Wt: 106#    The rest of a comprehensive physical examination is deferred due to public health emergency video visit restrictions.       Primary PH cardiologist: Dr. Turcios       HPI:  Ms. Anderson is a pleasant 81 year old female with a PMHx including rheumatoid arthritis, hypertension, scoliosis, asthma, pulmonary MAC, breast cancer s/p chemotherapy with Adriamycin, Cytoxan, Taxol, and tamoxifen and status post left mastectomy. She also has a history of extensive bilateral pulmonary emboli and DVT in 1998 thought to be secondary to tamoxifen along with chemotherapy induced cardiomyopathy. 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 Although she  had proximal disease, she was too high risk for surgical PTE.  She is currently on combination therapy with IV Remodulin therapy along with Opsumit.       She has had some trouble with side effects of Remodulin with titration which we have managed. Last fall she fell and tore a tendon in her knee which was complicated by a painful hematoma. Shortly after she developed weight gain and required a hospitalization for IV diuretics.     In January, due to worsening clinical status, she underwent a BPA which was complicated by hemoptysis, requiring FFP. We have since continued to work on diuretic adjustments, and due to low BP/dizziness, her Adempas was ultimately discontinued. After her repeat RHC in March, Opsumit was increased back to daily. As she was felt high risk to proceed with further surgical interventions, she was sent to [facility] for a second opinion. They had considered PTE surgery but Dr. Turcios felt she was still too high risk.     In late May, she had fallen again and had a distal fracture of her right radius and ulna. She was hospitalized briefly at [facility] in [place], placed in a cast, and then sent to inpatient rehab. Unfortunately, she then gained over 20 lbs with more edema. Her Opsumit was changed back to every other day, but ultimately she was sent back to the hospital for admission. She was there from 5/27 to 6/10 and was placed on a bumex gtt along with dobutamine support to augment diuresis. She was discharged on bumex 4mg TID with a discharge wt of 114#. Echo during her stay showed continued severe PH with estimated RVSP 117mmHg and moderately dilated RV with moderately reduced RV function. She had a small circumferential pericardial effusion without tamponade. IV Remodulin was titrated up to 34ng/kg/min. She received pRBCs and Venofer for anemia.      When I saw Karen in late June, she was doing fairly well. Her weight had continued to trend down since discharge and settled out at  "102-104# at home, with much improvement in her edema. She remained quite sedentary, BP was reasonable. Plan at that time was to continue to increase her Remodulin gradually by 1 ng/kg/min weekly to a goal of 45 ng/kg. As renal function was acceptable, I kept her diuretics unchanged. When I saw her last in early September, home weight had trended down a bit further, at #, and swelling was improved with consistent use of leg wraps. She was able to start doing some simple tasks at home like helping with laundry. AT that time she was at goal 44ng/kg/min on her Remodulin. At that time, due to difficulty sleeping with frequent urination, I had her trial bumex 6mg BID instead of 4mg TID, with her second dose being in the early afternoon to allow better rest at night.     Today, we are doing a virtual visit for follow up. Since her saw her last, she suffered a fall on 9/20 after tripping on her oxygen tubing. She had a head laceration, but head CT and cspine did not show any acute issues. She also hurt her arm again, though it does not appear that she had any new fractures. She tells me that overall thing are \"ok.\" She says her breathing is worse than usual, though she still gets winded easily. She thinks that her LE edema is also no worse than usual, as when she wraps them they improve. However, her weight is up about 4 lbs from when I saw her last. She says that she did try to change her bumex to 6mg BID but ultimately it made no difference in her nocturnal urination so she went back to her usual dosing. She denies any recent dizziness or presyncope.      Labs done prior to our visit were reviewed: 9/30: K 4.3, chl 105, c02 32, BUN 26, SCR 1.03, ALT 22, AST 30, NT-proBNP 2944, WBC 4.4, hemoglobin 11.9, hematocrit 36.9, plt 257.         CURRENT PULMONARY HYPERTENSION REGIMEN:     PAH Rx: Remodulin 44.5ng/kg/min, Opsumit 10mg every other day  (failed Adempas due to dizziness)     Diuretics: Bumex 4mg " TID     Oxygen: NC 1.5LPM     Anticoagulation: warfarin   Indication: CTEPH        Assessment/Plan:        1. Chronic thromboembolic pulmonary hypertension.              --Ms. Anderson has CTEPH with sisgnificant RV dysfunction. Although she had proximal disease, she was felt to be too high risk for surgical PTE here and at [facility]. She was offered potential PTE at [place], but still felt to be very high risk. She did have BPA here in January but this was complicated by hemoptysis. No further surgical interventions are being offered at this time. Currently, we will continue to focus on medical management.              --She is now on goal IV Remodulin at 44.5ng/kg/min. She is not able to tolerate Adempas due to dizziness, and on Opsumit 10mg every other day for similar reasons.               --Her weight is has trended back up at 106# for the last week or so. Last visit she had settled out at 100-102#. We tried to condense her bumex doses to BID instead of TID to help with nocturnal urination, though this did not help so she went back to 4mg TID. NT-proBNP is up and renal function a bit improved. Today, I asked her to take an extra 2mg of bumex for the next 2-3 days until her weight comes back to baseline.              --Continue digoxin for RV support. BP has remained acceptable, though will need to continue to monitor given renal concerns.               --Continue anticoagulation with warfarin for her CTEPH, though we have been monitoring with a lower INR goal of 2-2.5. She recently received equipment to do home checks.              --Continue oxygen at 1-2L with exertion. I told her that if sats are >92% at rest, she may take off while sitting.      Follow up plan: Return in 4-6 weeks to see Dr. Karolina ponce (per pt request) with labs done near her home prior.       Testing/labs:    Most recent labs:   Results for ELAINA ANDERSON (MRN [id number]) as of 10/7/2021 12:59   Ref. Range 9/30/2021 11:13   Sodium  Latest Ref Range: 133 - 144 mmol/L 141   Potassium Latest Ref Range: 3.4 - 5.3 mmol/L 4.3   Chloride Latest Ref Range: 94 - 109 mmol/L 105   Carbon Dioxide Latest Ref Range: 20 - 32 mmol/L 32   Urea Nitrogen Latest Ref Range: 7 - 30 mg/dL 26   Creatinine Latest Ref Range: 0.52 - 1.04 mg/dL 1.03   GFR Estimate Latest Ref Range: >60 mL/min/1.73m2 52 (L)   Calcium Latest Ref Range: 8.5 - 10.1 mg/dL 10.1   Anion Gap Latest Ref Range: 3 - 14 mmol/L 4   Albumin Latest Ref Range: 3.4 - 5.0 g/dL 3.0 (L)   Protein Total Latest Ref Range: 6.8 - 8.8 g/dL 7.2   Bilirubin Total Latest Ref Range: 0.2 - 1.3 mg/dL 0.3   Alkaline Phosphatase Latest Ref Range: 40 - 150 U/L 178 (H)   ALT Latest Ref Range: 0 - 50 U/L 22   AST Latest Ref Range: 0 - 45 U/L 30   N-Terminal Pro Bnp Latest Ref Range: 0 - 450 pg/mL 2,944 (H)   Glucose Latest Ref Range: 70 - 99 mg/dL 77   WBC Latest Ref Range: 4.0 - 11.0 10e3/uL 4.4   Hemoglobin Latest Ref Range: 11.7 - 15.7 g/dL 11.9   Hematocrit Latest Ref Range: 35.0 - 47.0 % 36.9   Platelet Count Latest Ref Range: 150 - 450 10e3/uL 257       Other recent pertinent testing:    Echo 6/23/2021  Interpretation Summary  Severe pulmonary hypertension is present. Right ventricular systolic pressure  is 117mmHg above the right atrial pressure.  Global and regional left ventricular function is normal with an EF of 55-60%.  Flattened septum is consistent with right ventricular pressure and volume  overload.  Moderate right ventricular dilation is present. Global right ventricular  function is moderately reduced.  Moderate tricuspid insufficiency is present.  Small circumferential pericardial effusion is present without any hemodynamic  significance.  IVC diameter >2.1 cm collapsing <50% with sniff suggests a high RA pressure  estimated at 15 mmHg or greater.  There has been no change.    RHC  3/17/2021        Severe pulmonary hypertension    Normal cardiac output by Thermodilution    Elevated left sided filling  pressures    No signficant change when comapred to her last hemodynamic assessment            Pressures Phase: Baseline                                                     Time Systolic (mmHg) Diastolic (mmHg) Mean (mmHg) A Wave (mmHg) V Wave (mmHg) EDP (mmHg) Max dp/dt (mmHg/sec) HR (bpm) Content (mL/dL) SAT (%)   RA Pressures  2:31 PM     14    14    22        63          RV Pressures  2:16 PM             912               2:32 PM 87    9          28      62          PA Pressures  2:32 PM 88    22    44            62          PCW Pressures  2:33 PM     14                                               NYHA Functional Class:  Functional class 3    1--No limitation of activity  2--Slight limitation, ordinary activities may cause symptoms  3--Marked limitation, less than ordinary activities cause symptoms  4--Severe limitation, minimal activity causes symptoms, or symptoms at rest      Video-Visit Details    Type of service:  Video Visit    Video Start Time: 1412  Video End Time: 1433    An additional 15 minutes was spent today performing chart and history review, pre and post visit documentation, and care coordination.      Originating Location (pt. Location): Home    Distant Location (provider location):  [facility]    Platform used for Video Visit: Darron Marcum PA-C  [facility]  Pager [phone]      Karen Anderson is a 81 year old who is being evaluated via a billable video visit.          Please do not hesitate to contact me if you have any questions/concerns.     Sincerely,     MAGEN Saeed

## 2021-10-12 ENCOUNTER — TELEPHONE (OUTPATIENT)
Dept: CARDIOLOGY | Facility: CLINIC | Age: 81
End: 2021-10-12

## 2021-10-12 NOTE — TELEPHONE ENCOUNTER
Unable to reach patient; LM with direct number for contact. Kezia Arnett RN on 10/12/2021 at 9:16 AM    Weight has not gone up and swelling has not gotten worse. Advised I would reach out to Shawna regarding F/U with further diuretics management. Current weight 106lb. Kezia Arnett RN on 10/12/2021 at 11:24 AM  ________________________________________________    If she isn't feeling too bad let's leave her where she is for now, and have her call if it goes up.   I would like to get her in with TT in the next few weeks if possible, to help with some further direction.     Thanks   Shawna   _______________________________________________________    Confirmed with patient that she does not need to take additional diuretics at this time; will call me immediately if weight starts to increase or has additional swelling. Kezia Arnett RN on 10/12/2021 at 1:21 PM    ----- Message from Missy Perez RN sent at 10/7/2021  3:15 PM CDT -----  Regarding: FW: Follow up    ----- Message -----  From: Fabby Seymour RN  Sent: 10/7/2021   3:13 PM CDT  To: Missy Perez RN  Subject: Follow up                                        Hi Missy,    Patient had VV today and was advised to take extra bumex for 2--3 days (goal weight of 102-103 lbs).    Please call patient Mon/Tues & let Shawna know what her weight is.    She should have VV in 4-6 weeks with TT with labs prior locally.    Fabby

## 2021-10-14 ENCOUNTER — ANTICOAGULATION THERAPY VISIT (OUTPATIENT)
Dept: ANTICOAGULATION | Facility: CLINIC | Age: 81
End: 2021-10-14

## 2021-10-14 DIAGNOSIS — I27.20 PULMONARY HYPERTENSION (H): Primary | ICD-10-CM

## 2021-10-14 DIAGNOSIS — I27.24 CTEPH (CHRONIC THROMBOEMBOLIC PULMONARY HYPERTENSION) (H): ICD-10-CM

## 2021-10-14 DIAGNOSIS — Z79.01 LONG TERM CURRENT USE OF ANTICOAGULANT THERAPY: ICD-10-CM

## 2021-10-14 LAB — INR (EXTERNAL): 1.9 (ref 0.9–1.1)

## 2021-10-14 NOTE — PROGRESS NOTES
ANTICOAGULATION MANAGEMENT     Karen Anderson 81 year old female is on warfarin with subtherapeutic INR result. (Goal INR 2.0-2.5)    Recent labs: (last 7 days)     10/14/21  1632   INR 1.9*       ASSESSMENT     Source(s): Chart Review and Patient/Caregiver Call       Warfarin doses taken: Warfarin taken as instructed    Diet: Possibly slightly increased greens/vitamin K in diet; plans to resume previous intake. Union sprouts last night. Karen eats a good amount of Vit K foods but not often Union sprouts, usually salads and occasionally broccoli    New illness, injury, or hospitalization: No    Medication/supplement changes: None noted    Signs or symptoms of bleeding or clotting: No    Previous INR: Therapeutic last visit; previously outside of goal range    Additional findings: None     PLAN     Recommended plan for temporary change(s) affecting INR     Dosing Instructions: Booster dose then continue your current warfarin dose with next INR in 1 week       Summary  As of 10/14/2021    Full warfarin instructions:  10/14: 4.5 mg; Otherwise 3 mg every Mon, Thu; 4.5 mg all other days   Next INR check:  10/21/2021             Telephone call with Karen who verbalizes understanding and agrees to plan    Patient to recheck with home meter    Education provided: Impact of vitamin K foods on INR, Vitamin K content of foods, Goal range and significance of current result, Monitoring for bleeding signs and symptoms and Monitoring for clotting signs and symptoms    Plan made per [facility] anticoagulation protocol    Mayi Herring RN  Anticoagulation Clinic  10/14/2021    _______________________________________________________________________     Anticoagulation Episode Summary     Current INR goal:  2.0-2.5   TTR:  25.7 % (9.9 mo)   Target end date:  Indefinite   Send INR reminders to:  [facility]    Indications    Pulmonary hypertension (H) [I27.20]  CTEPH (chronic thromboembolic pulmonary hypertension)  (H) [I27.24]  Long term current use of anticoagulant therapy [Z79.01]           Comments:  call home first, OK to leave message on either phone. OK to speak with spouse, Don.  [facility] SPEAK IN TABLETS 10/23/20:  new goal range is 2 - 2.5  [facility] in [place] and will need to fax orders P [phone] F [phone]         Anticoagulation Care Providers     Provider Role Specialty Phone number    Karolina Turcios MD Referring Cardiovascular Disease [phone]

## 2021-10-21 ENCOUNTER — ANTICOAGULATION THERAPY VISIT (OUTPATIENT)
Dept: ANTICOAGULATION | Facility: CLINIC | Age: 81
End: 2021-10-21

## 2021-10-21 DIAGNOSIS — I27.24 CTEPH (CHRONIC THROMBOEMBOLIC PULMONARY HYPERTENSION) (H): ICD-10-CM

## 2021-10-21 DIAGNOSIS — Z79.01 LONG TERM CURRENT USE OF ANTICOAGULANT THERAPY: ICD-10-CM

## 2021-10-21 DIAGNOSIS — Z91.199 FAILURE TO ATTEND APPOINTMENT: ICD-10-CM

## 2021-10-21 DIAGNOSIS — I27.20 PULMONARY HYPERTENSION (H): Primary | ICD-10-CM

## 2021-10-21 LAB
INR (EXTERNAL): 2.1 (ref 0.9–1.1)
INR (EXTERNAL): 2.1 (ref 0.9–1.1)

## 2021-10-21 NOTE — PROGRESS NOTES
ANTICOAGULATION MANAGEMENT     Karen Anderson 81 year old female is on warfarin with therapeutic INR result. (Goal INR 2.0-2.5)    Recent labs: (last 7 days)     10/21/21  1110   INR 1.88*       ASSESSMENT     Source(s): Chart Review and Patient/Caregiver Call       Warfarin doses taken: Warfarin taken as instructed    Diet: No new diet changes identified    New illness, injury, or hospitalization: No    Medication/supplement changes: None noted    Signs or symptoms of bleeding or clotting: No    Previous INR: Subtherapeutic    Additional findings: None     PLAN     Recommended plan for no diet, medication or health factor changes affecting INR     Dosing Instructions:  Increase your warfarin dose (5.3% change) with next INR in 1 week       Summary  As of 10/21/2021    Full warfarin instructions:  3 mg every Mon; 4.5 mg all other days   Next INR check:  10/28/2021             Telephone call with  Patient's spouseSantana who verbalizes understanding and agrees to plan    Patient using outside facility for labs    Education provided: None required    Plan made per [facility] anticoagulation protocol    Jaylan Santana, RN  Anticoagulation Clinic  10/21/2021    _______________________________________________________________________     Anticoagulation Episode Summary     Current INR goal:  2.0-2.5   TTR:  23.4 % (9.8 mo)   Target end date:  Indefinite   Send INR reminders to:  [facility]    Indications    Pulmonary hypertension (H) [I27.20]  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 OK to speak with spouseSantana.  [facility] SPEAK IN TABLETS 10/23/20:  new goal range is 2 - 2.5  [facility] in [place] and will need to fax orders P [phone] F [phone]         Anticoagulation Care Providers     Provider Role Specialty Phone number    Karolina  MD Karolina Referring Cardiovascular Disease [phone]

## 2021-10-21 NOTE — PROGRESS NOTES
ANTICOAGULATION MANAGEMENT     Karen Anderson 81 year old female is on warfarin with therapeutic INR result. (Goal INR 2.0-2.5)    Recent labs: (last 7 days)     10/21/21  1242   INR 2.1*       ASSESSMENT     Source(s): Chart Review and Patient/Caregiver Call       Warfarin doses taken: Warfarin taken as instructed    Diet: No new diet changes identified    New illness, injury, or hospitalization: No    Medication/supplement changes: None noted    Signs or symptoms of bleeding or clotting: No    Previous INR: Subtherapeutic    Additional findings: None     PLAN     Recommended plan for no diet, medication or health factor changes affecting INR     Dosing Instructions: Continue your current warfarin dose with next INR in 2 weeks       Summary  As of 10/21/2021    Full warfarin instructions:  3 mg every Mon; 4.5 mg all other days   Next INR check:  10/28/2021             Telephone call with Karen who agrees to plan and repeated back plan correctly    Patient to recheck with home meter    Education provided: Please call back if any changes to your diet, medications or how you've been taking warfarin    Plan made per [facility] anticoagulation protocol       Incorrectly recorded 1.88 on 10/21.  Patient corrected writer.  INR on 10/21 is 2.1 per patient on home monitor.      Jaylan Santana, RN  Anticoagulation Clinic  10/21/2021    _______________________________________________________________________     Anticoagulation Episode Summary     Current INR goal:  2.0-2.5   TTR:  24.5 % (9.8 mo)   Target end date:  Indefinite   Send INR reminders to:  [facility]    Indications    Pulmonary hypertension (H) [I27.20]  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 OK to speak with spouse, Don.  [facility] SPEAK IN TABLETS 10/23/20:  new goal range is 2 -  2.5  [facility] in [place] and will need to fax orders P [phone] F [phone]         Anticoagulation Care Providers     Provider Role Specialty Phone number    Karolina Turcios MD Referring Cardiovascular Disease [phone]

## 2021-11-04 ENCOUNTER — TRANSFERRED RECORDS (OUTPATIENT)
Dept: HEALTH INFORMATION MANAGEMENT | Facility: CLINIC | Age: 81
End: 2021-11-04
Payer: COMMERCIAL

## 2021-11-04 ENCOUNTER — ANTICOAGULATION THERAPY VISIT (OUTPATIENT)
Dept: ANTICOAGULATION | Facility: CLINIC | Age: 81
End: 2021-11-04

## 2021-11-04 DIAGNOSIS — I27.24 CTEPH (CHRONIC THROMBOEMBOLIC PULMONARY HYPERTENSION) (H): ICD-10-CM

## 2021-11-04 DIAGNOSIS — Z79.01 LONG TERM CURRENT USE OF ANTICOAGULANT THERAPY: ICD-10-CM

## 2021-11-04 DIAGNOSIS — I27.20 PULMONARY HYPERTENSION (H): Primary | ICD-10-CM

## 2021-11-04 LAB — INR (EXTERNAL): 3 (ref 0.9–1.1)

## 2021-11-04 NOTE — PROGRESS NOTES
ANTICOAGULATION MANAGEMENT     Karen Anderson 81 year old female is on warfarin with supratherapeutic INR result. (Goal INR 2.0-2.5)    Recent labs: (last 7 days)     11/04/21  1303   INR 3.0*       ASSESSMENT     Source(s): Chart Review and Patient/Caregiver Call       Warfarin doses taken: Warfarin taken as instructed    Diet: No new diet changes identified    New illness, injury, or hospitalization: No    Medication/supplement changes: None noted    Signs or symptoms of bleeding or clotting: No    Previous INR: Therapeutic last visit; previously outside of goal range    Additional findings: None     PLAN     Recommended plan for no diet, medication or health factor changes affecting INR     Dosing Instructions:  Decrease your warfarin dose (10% change) with next INR in 1 week       Summary  As of 11/4/2021    Full warfarin instructions:  3 mg every Tue, Thu, Sat; 4.5 mg all other days   Next INR check:  11/11/2021             Telephone call with Karen who verbalizes understanding and agrees to plan and who agrees to plan and repeated back plan correctly    Patient to recheck with home meter    Education provided: Goal range and significance of current result, Monitoring for bleeding signs and symptoms, Importance of notifying clinic for changes in medications; a sooner lab recheck maybe needed., Importance of notifying clinic for diarrhea, nausea/vomiting, reduced intake, and/or illness; a sooner lab recheck maybe needed., Importance of notifying clinic of upcoming surgeries and procedures 2 weeks in advance and Contact [phone] with any changes, questions or concerns.     Plan made per [facility] anticoagulation protocol    GOLDY PERALTA RN  Anticoagulation Clinic  11/4/2021    _______________________________________________________________________     Anticoagulation Episode Summary     Current INR goal:  2.0-2.5   TTR:  26.0 % (9.8 mo)   Target end date:  Indefinite   Send INR reminders to:  [facility]    Indications    Pulmonary hypertension (H) [I27.20]  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 OK to speak with spouse, Santana.  [facility] SPEAK IN TABLETS 10/23/20:  new goal range is 2 - 2.5  [facility] in [place] and will need to fax orders P [phone] F [phone]         Anticoagulation Care Providers     Provider Role Specialty Phone number    Karolina Turcios MD Referring Cardiovascular Disease [phone]

## 2021-11-11 ENCOUNTER — TRANSFERRED RECORDS (OUTPATIENT)
Dept: HEALTH INFORMATION MANAGEMENT | Facility: CLINIC | Age: 81
End: 2021-11-11
Payer: COMMERCIAL

## 2021-11-11 ENCOUNTER — ANTICOAGULATION THERAPY VISIT (OUTPATIENT)
Dept: ANTICOAGULATION | Facility: CLINIC | Age: 81
End: 2021-11-11
Payer: COMMERCIAL

## 2021-11-11 DIAGNOSIS — I27.20 PULMONARY HYPERTENSION (H): Primary | ICD-10-CM

## 2021-11-11 DIAGNOSIS — Z79.01 LONG TERM CURRENT USE OF ANTICOAGULANT THERAPY: ICD-10-CM

## 2021-11-11 DIAGNOSIS — I27.24 CTEPH (CHRONIC THROMBOEMBOLIC PULMONARY HYPERTENSION) (H): ICD-10-CM

## 2021-11-11 LAB — INR (EXTERNAL): 2.4 (ref 0.9–1.1)

## 2021-11-11 NOTE — PROGRESS NOTES
ANTICOAGULATION MANAGEMENT     Karen Anderson 81 year old female is on warfarin with supratherapeutic INR result. (Goal INR 2.0-2.5)    Recent labs: (last 7 days)     11/11/21  1307   INR 2.4*       ASSESSMENT     Source(s): Chart Review and Patient/Caregiver Call       Warfarin doses taken: Warfarin taken as instructed    Diet: No new diet changes identified    New illness, injury, or hospitalization: No    Medication/supplement changes: None noted    Signs or symptoms of bleeding or clotting: No    Previous INR: Supratherapeutic    Additional findings: None     PLAN     Recommended plan for ongoing change(s) affecting INR     Dosing Instructions: Continue your current warfarin dose with next INR in 6 days       Summary  As of 11/11/2021    Full warfarin instructions:  3 mg every Tue, Thu, Sat; 4.5 mg all other days   Next INR check:  11/17/2021             Telephone call with Karen who verbalizes understanding and agrees to plan and who agrees to plan and repeated back plan correctly    Check at provider office visit    Education provided: Goal range and significance of current result, Importance of notifying clinic for changes in medications; a sooner lab recheck maybe needed., Importance of notifying clinic for diarrhea, nausea/vomiting, reduced intake, and/or illness; a sooner lab recheck maybe needed., Importance of notifying clinic of upcoming surgeries and procedures 2 weeks in advance and Contact [phone] with any changes, questions or concerns.     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 OK to speak with spouse, Santana.  [facility] SPEAK IN TABLETS 10/23/20:  new goal range is 2 - 2.5  [facility] in [place] and will need to fax orders P [phone] F [phone]         Anticoagulation Care Providers     Provider Role Specialty Phone number    Karolina Turcios MD Referring Cardiovascular Disease [phone]

## 2021-11-17 ENCOUNTER — LAB (OUTPATIENT)
Dept: LAB | Facility: CLINIC | Age: 81
End: 2021-11-17
Payer: COMMERCIAL

## 2021-11-17 ENCOUNTER — ANTICOAGULATION THERAPY VISIT (OUTPATIENT)
Dept: ANTICOAGULATION | Facility: CLINIC | Age: 81
End: 2021-11-17

## 2021-11-17 DIAGNOSIS — Z79.01 LONG TERM CURRENT USE OF ANTICOAGULANT THERAPY: ICD-10-CM

## 2021-11-17 DIAGNOSIS — I27.24 CTEPH (CHRONIC THROMBOEMBOLIC PULMONARY HYPERTENSION) (H): ICD-10-CM

## 2021-11-17 DIAGNOSIS — I27.20 PULMONARY HYPERTENSION (H): Primary | ICD-10-CM

## 2021-11-17 DIAGNOSIS — R06.09 DYSPNEA ON EXERTION: ICD-10-CM

## 2021-11-17 DIAGNOSIS — I27.20 PULMONARY HYPERTENSION (H): ICD-10-CM

## 2021-11-17 LAB
INR BLD: 2.1 (ref 0.9–1.1)
NT-PROBNP SERPL-MCNC: 1284 PG/ML (ref 0–450)

## 2021-11-17 PROCEDURE — 83880 ASSAY OF NATRIURETIC PEPTIDE: CPT

## 2021-11-17 PROCEDURE — 36415 COLL VENOUS BLD VENIPUNCTURE: CPT

## 2021-11-17 PROCEDURE — 80048 BASIC METABOLIC PNL TOTAL CA: CPT

## 2021-11-17 PROCEDURE — 85610 PROTHROMBIN TIME: CPT

## 2021-11-17 NOTE — PROGRESS NOTES
ANTICOAGULATION MANAGEMENT     Karen Anderson 81 year old female is on warfarin with therapeutic INR result. (Goal INR 2.0-2.5)    Recent labs: (last 7 days)     11/17/21  1328   INR 2.1*       ASSESSMENT     Source(s): Chart Review and Patient/Caregiver Call       Warfarin doses taken: Warfarin taken as instructed    Diet: No new diet changes identified    New illness, injury, or hospitalization: No    Medication/supplement changes: None noted    Signs or symptoms of bleeding or clotting: No    Previous INR: Therapeutic last visit; previously outside of goal range    Additional findings: None     PLAN     Recommended plan for no diet, medication or health factor changes affecting INR     Dosing Instructions: Continue your current warfarin dose with next INR in 2 weeks       Summary  As of 11/17/2021    Full warfarin instructions:  3 mg every Tue, Thu, Sat; 4.5 mg all other days   Next INR check:  12/1/2021             Telephone call with Karen who agrees to plan and repeated back plan correctly    Patient to recheck with home meter    Education provided: None required    Plan made per [facility] protocol    Jaylan Santana, RN  Anticoagulation Clinic  11/17/2021    _______________________________________________________________________     Anticoagulation Episode Summary     Current INR goal:  2.0-2.5   TTR:  26.5 % (9.8 mo)   Target end date:  Indefinite   Send INR reminders to:  [facility]    Indications    Pulmonary hypertension (H) [I27.20]  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 OK to speak with spouse, Don.  [facility] SPEAK IN TABLETS 10/23/20:  new goal range is 2 - 2.5  [facility] in [place] and will need to fax orders P [phone] F [phone]         Anticoagulation Care Providers     Provider Role Specialty  Phone number    Karolina Turcios MD Referring Cardiovascular Disease [phone]

## 2021-11-18 LAB
ANION GAP SERPL CALCULATED.3IONS-SCNC: 2 MMOL/L (ref 3–14)
BUN SERPL-MCNC: 26 MG/DL (ref 7–30)
CALCIUM SERPL-MCNC: 9.8 MG/DL (ref 8.5–10.1)
CHLORIDE BLD-SCNC: 101 MMOL/L (ref 94–109)
CO2 SERPL-SCNC: 35 MMOL/L (ref 20–32)
CREAT SERPL-MCNC: 1.13 MG/DL (ref 0.52–1.04)
GFR SERPL CREATININE-BSD FRML MDRD: 46 ML/MIN/1.73M2
GLUCOSE BLD-MCNC: 117 MG/DL (ref 70–99)
POTASSIUM BLD-SCNC: 4.6 MMOL/L (ref 3.4–5.3)
SODIUM SERPL-SCNC: 138 MMOL/L (ref 133–144)

## 2021-11-22 ENCOUNTER — VIRTUAL VISIT (OUTPATIENT)
Dept: CARDIOLOGY | Facility: CLINIC | Age: 81
End: 2021-11-22
Attending: INTERNAL MEDICINE
Payer: COMMERCIAL

## 2021-11-22 DIAGNOSIS — I27.20 PULMONARY HYPERTENSION (H): ICD-10-CM

## 2021-11-22 DIAGNOSIS — R06.09 DYSPNEA ON EXERTION: ICD-10-CM

## 2021-11-22 PROCEDURE — 99215 OFFICE O/P EST HI 40 MIN: CPT | Mod: 95 | Performed by: INTERNAL MEDICINE

## 2021-11-22 PROCEDURE — G0463 HOSPITAL OUTPT CLINIC VISIT: HCPCS | Mod: PN,RTG | Performed by: INTERNAL MEDICINE

## 2021-11-22 NOTE — NURSING NOTE
"Karen is a 81 year old who is being evaluated via a billable video visit.      How would you like to obtain your AVS? MyChart  If the video visit is dropped, the invitation should be resent by: Text to cell phone: [phone]  Will anyone else be joining your video visit? No     Vitals - Patient Reported  Systolic (Patient Reported): 99  Diastolic (Patient Reported): 55  Weight (Patient Reported): 48.1 kg (106 lb)  Height (Patient Reported): 149.9 cm (4' 11\")  BMI (Based on Pt Reported Ht/Wt): 21.41  Pulse (Patient Reported): 61  Pain Score: No Pain (0) (No SOB only up on exertion)      Vitals were taken and medications were reconciled.   Bryson Jin, EMT  4:27 PM,    "

## 2021-11-22 NOTE — PATIENT INSTRUCTIONS
Medication Changes:   No medication changes at this time. Please continue current medication regiment.    Patient Instructions:  1. Continue staying active and eat a heart healthy diet.    2. Please keep current list of medications with you at all times.    3. Remember to weigh yourself daily after voiding and before you consume any food or beverages and log the numbers.  If you have gained 2 pounds overnight or 5 pounds in a week contact us immediately for medication adjustments or further instructions.    4. **Please call us immediately if you have any syncope (fainting or passing out), chest pain, edema (swelling or weight gain), or decline in your functional status (general decline in how you are feeling).    5. Patients on Remodulin (treprostinil) or Veletri (epoprostenol): Please make sure that you have your backup pump and supplies with you at all times, your mixing instructions, and contact information for your specialty pharmacy.    Follow up Appointment Information:  3 month follow up with labs    Results:    We are located on the third floor of the [facility] on the [facility].  Our address is     [address] [place] [address] [place] [address] [place] [address]      Thank you for allowing us to be a part of your care here at the [facility]    If you have questions or concerns please contact us at:    Missy Perez RN, BSN PHN  Lexus Balbuena (Schedule,Prior Auth)  Nurse Coordinator     Clinic   Pulmonary Hypertension   Pulmonary Hypertension  [facility]  (Phone)[phone]   (Phone) [phone]        (Fax)[phone]                ** Please note that you will NOT receive a reminder call regarding your scheduled testing, reminder calls are for provider appointments only.  If you are scheduled for testing within the [facility] system you may receive a  call regarding pre-registration for billing purposes only.**     Support Group:  Pulmonary Hypertension Association  Https://www.phassociation.org/  **Look at the Events Tab** They even have Support Groups that you can call into    [facility] PH Support Group  Second Saturday of the Month from 1-3 PM   Location: [address] [place] [id number]  Leader: Jessenia Loo  Phone: [phone] or [phone]  Email: [email or web address]

## 2021-11-22 NOTE — LETTER
11/22/2021      RE: Karen Anderson  [address]  [facility] [address]       Dear Colleague,    Thank you for the opportunity to participate in the care of your patient, Karen Anderson, at the [facility] [place] at [facility]. Please see a copy of my visit note below.    Service Date: 11/22/21    [facility]  Pulmonary Hypertension Clinic  Video Visit    Dear Dr. Boogie:    Karen Anderson is a 81 year old female we saw in the [facility]monary [facility]pert[facility] for follow up.  As you know, she has a past medical history that includes asthma, pulmonary MAC, breast cancer s/p chemotherapy with Adriamycin, Cytoxan, Taxol and tamoxifen and s/p left mastectomy, extensive b/l pulmonary emboli and DVT in 1998 though to be secondary to tamoxifen, chemotherapy induced cardiomyopathy, rheumatoid arthritis, HTN, chronic hypoxic respiratory failure on O2 via NC 1.5lpm, multiple falls, and scoliosis.  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    With regards to her CTEPH, she was noted to have proximal disease but was felt to be too high risk for surgical PTE.  She is currently on combination therapy with IV remodulin at 44.5ng/kg/min and Opsumit every other day (due to dizziness). She is also on coumadin (with a lower INR goal 2-2.5) and digoxin. She was previously tried on Admepas which she failed due to dizziness. She underwent BPA in January 2021 which was complicated by hemoptysis requiring FFP-plan is for medical management.  She was referred to [facility] for surgical PTE but felt to be too high risk.     At her last visit in March and she reported her weight had settled around 102lbs, was wrapping her legs so not much edema. No med changes were made.      Today she joins us via video visit. She reports she is overall doing well. She denies any worsening SOB,  palpitations, chest pain, presyncope, syncope, or worsening LE edema. She is wrapping her legs daily.  She can do the stairs slowly.  She is back to doing the books at home as she normally does.  She feels like she is not foggy anymore and has more energy since stopping the Adempas.  She has occasional bouts of constipation and diarrhea which are improved. No incontinence so it is tolerable.    She showed us her catheter site via video and it looks ok.  She is asking if she can drive locally to get her hair done. She has had no falls or syncope.    PAST MEDICAL HISTORY:  Past Medical History:   Diagnosis Date     Asthma      Breast cancer (H)      Hypertension      Mycobacterium avium complex (H)      Pulmonary embolism (H)      Pulmonary hypertension (H)      Rheumatoid arthritis (H)      Scoliosis      CURRENT MEDICATIONS:  Current Outpatient Medications   Medication Sig     acetaminophen (TYLENOL) 325 MG tablet Take 325 mg by mouth every 6 hours as needed for mild pain     albuterol (PROAIR HFA/PROVENTIL HFA/VENTOLIN HFA) 108 (90 Base) MCG/ACT inhaler Inhale 2 puffs into the lungs every 4 hours as needed for shortness of breath / dyspnea or wheezing      alendronate (FOSAMAX) 70 MG tablet Take 70 mg by mouth once a week On Mondays     Ascorbic Acid (VITAMIN C) 500 MG CAPS Take 500 mg by mouth every morning      aspirin (ASA) 81 MG tablet Take 81 mg by mouth daily      beclomethasone HFA (QVAR REDIHALER) 80 MCG/ACT inhaler Inhale 2 puffs into the lungs 2 times daily     bumetanide (BUMEX) 2 MG tablet Take 2 tablets (4 mg) by mouth 3 times daily     calcium citrate-vitamin D (CALCIUM CITRATE + D3) 315-250 MG-UNIT TABS per tablet Take 1 tablet by mouth 2 times daily      Corn Dextrin (CLEAR FIBER POWDER PO) Take by mouth daily      digoxin (LANOXIN) 62.5 MCG tablet Take 1 tablet (62.5 mcg) by mouth daily     gabapentin (NEURONTIN) 300 MG capsule Take 300 mg by mouth 3 times daily Can take an additional 300mg at  bedtime as needed     hydroxychloroquine (PLAQUENIL) 200 MG tablet Take 200 mg by mouth daily      ipratropium (ATROVENT) 0.06 % nasal spray Spray 2 sprays into both nostrils 3 times daily      ketotifen (ZADITOR) 0.025 % ophthalmic solution Place 1 drop into both eyes daily     macitentan (OPSUMIT) 10 MG tablet Take 1 tablet (10 mg) by mouth every other day     potassium chloride ER (KLOR-CON M) 20 MEQ CR tablet Take 2 tablets (40 mEq) by mouth 3 times daily     sodium chloride (OCEAN) 0.65 % nasal spray Spray 1 spray into both nostrils daily as needed for congestion     treprostinil (REMODULIN) 60 mcg/mL in glycine diluent 50 mL infusion Inject 44.5 ng/kg/min into the vein continuous Goal Dose: 44.5 ng/kg/min     warfarin ANTICOAGULANT (COUMADIN) 3 MG tablet Take 3 mg PO on Mon, Wed, Fri and Sat, and 4.5 mg all other days of the week     No current facility-administered medications for this visit.     ROS:   10 point ROS negative except as discussed in above HPI.    EXAM:  There were no vitals taken for this visit.    GENERAL: Healthy, alert and no distress  EYES: Eyes grossly normal to inspection.  No discharge or erythema, or obvious scleral/conjunctival abnormalities.  NECK: No asymmetry, visible masses or scars  RESP: No audible wheeze, cough, or visible cyanosis.  No visible retractions or increased work of breathing.    SKIN: Visible skin clear. No significant rash, abnormal pigmentation or lesions.  NEURO: Cranial nerves grossly intact.  Mentation and speech appropriate for age.  PSYCH: Mentation appears normal, affect normal/bright, judgement and insight intact, normal speech and appearance well-groomed.      R chest catheter site with clear dressing appears okay no visible erythema, swelling, or drainage noted.     Labs:  CBC RESULTS:   Recent Labs   Lab Test 09/30/21  1113   WBC 4.4   RBC 3.81   HGB 11.9   HCT 36.9   MCV 97   MCH 31.2   MCHC 32.2   RDW 16.6*          Recent Labs   Lab Test  11/17/21  1328 09/30/21  1113    141   POTASSIUM 4.6 4.3   CHLORIDE 101 105   CO2 35* 32   ANIONGAP 2* 4   * 77   BUN 26 26   CR 1.13* 1.03   ESTEFANÍA 9.8 10.1       Liver Function Studies -   Recent Labs   Lab Test 09/30/21  1113   PROTTOTAL 7.2   ALBUMIN 3.0*   BILITOTAL 0.3   ALKPHOS 178*   AST 30   ALT 22       Lab Results   Component Value Date    NTBNPI 6,431 (H) 05/27/2021     Lab Results   Component Value Date    NTBNP 1,284 (H) 11/17/2021    NTBNP 3,827 (H) 06/24/2021       Echocardiogram:    TTE 6/3/21  Severe pulmonary hypertension is present. Right ventricular systolic pressure  is 117mmHg above the right atrial pressure.  Global and regional left ventricular function is normal with an EF of 55-60%.  Flattened septum is consistent with right ventricular pressure and volume  overload.  Moderate right ventricular dilation is present. Global right ventricular  function is moderately reduced.  Moderate tricuspid insufficiency is present.  Small circumferential pericardial effusion is present without any hemodynamic  significance.  IVC diameter >2.1 cm collapsing <50% with sniff suggests a high RA pressure  estimated at 15 mmHg or greater.  There has been no change.     3/17/21 RHC:  RA: 14  PA: 88/22, mPA44  PCWP: 14  TD CO/CI: 3.97/2.8  Concepcion CO/CI: 2.95/2.1  PVR: 10.2    Assessment and Plan:      Karen Anderson is a 81 year old female with history that includes asthma, pulmonary MAC, breast cancer s/p chemotherapy with Adriamycin, Cytoxan, Taxol and tamoxifen and s/p left mastectomy, extensive b/l pulmonary emboli and DVT in 1998 though to be secondary to tamoxifen, chemotherapy induced cardiomyopathy, rheumatoid arthritis, HTN, chronic hypoxic respiratory failure on O2 via NC 1.5lpm, multiple falls, and scoliosis.  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 For her CTEPH, she is on goal IV Remodulin dose at 44.5ng/kg/min, Opsumit 10mg every other day  (due to dizziness), digoxin, and coumadin.    We will continue her current medication regimen as is.  From a volume standpoint she sounds euvolemic, so we will continue her Bumex 4mg tid and potassium supplementation.  She is stable on her low O2 requirement. She would like to drive locally and since she has not had any syncope or presyncope symptoms we think it is ok for her to do so.  She can follow up with us in 3 months with labs, earlier if she needs to.      Thank you for allowing us to participate in the care of Karen Manceravale.    Patient was seen and examined with Dr. Turcios.    Kirsten Archibald MD.    Video-Visit Details     Type of service:  Video Visit     Video Start Time: 4.30 PM  Video End Time: 4.58 PM    Patient gave verbal consent for Videovisit    Originating Location (pt. Location): Home     Distant Location (provider location):  [facility]      Platform used for Video Visit: [employer] video call    I examined the patient and agree with the assessment and plan of Dr. Archibald    Total time today was 44  minutes reviewing notes, imaging, labs, patient visit, orders and documentation       Karolina Turcios MD   [facility]  Heart Failure, Transplant, and Mechanical Circulatory Support Cardiology   Cardiovascular Division  [facility]   [phone]

## 2021-11-26 NOTE — PROGRESS NOTES
Service Date: 11/22/21    [facility]  Pulmonary Hypertension Clinic  Video Visit    Dear Dr. Boogie:    Karen Anderson is a 81 year old female we saw in the [facility] Pulmonary Hypertension Clinic for follow up.  As you know, she has a past medical history that includes asthma, pulmonary MAC, breast cancer s/p chemotherapy with Adriamycin, Cytoxan, Taxol and tamoxifen and s/p left mastectomy, extensive b/l pulmonary emboli and DVT in 1998 though to be secondary to tamoxifen, chemotherapy induced cardiomyopathy, rheumatoid arthritis, HTN, chronic hypoxic respiratory failure on O2 via NC 1.5lpm, multiple falls, and scoliosis.  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    With regards to her CTEPH, she was noted to have proximal disease but was felt to be too high risk for surgical PTE.  She is currently on combination therapy with IV remodulin at 44.5ng/kg/min and Opsumit every other day (due to dizziness). She is also on coumadin (with a lower INR goal 2-2.5) and digoxin. She was previously tried on Admepas which she failed due to dizziness. She underwent BPA in January 2021 which was complicated by hemoptysis requiring FFP-plan is for medical management.  She was referred to [place] and [facility] for surgical PTE but felt to be too high risk.     At her last visit in March and she reported her weight had settled around 102lbs, was wrapping her legs so not much edema. No med changes were made.      Today she joins us via video visit. She reports she is overall doing well. She denies any worsening SOB, palpitations, chest pain, presyncope, syncope, or worsening LE edema. She is wrapping her legs daily.  She can do the stairs slowly.  She is back to doing the books at home as she normally does.  She feels like she is not foggy anymore and has more energy since stopping the Adempas.  She has occasional bouts of constipation and diarrhea which are  improved. No incontinence so it is tolerable.    She showed us her catheter site via video and it looks ok.  She is asking if she can drive locally to get her hair done. She has had no falls or syncope.    PAST MEDICAL HISTORY:  Past Medical History:   Diagnosis Date     Asthma      Breast cancer (H)      Hypertension      Mycobacterium avium complex (H)      Pulmonary embolism (H)      Pulmonary hypertension (H)      Rheumatoid arthritis (H)      Scoliosis      CURRENT MEDICATIONS:  Current Outpatient Medications   Medication Sig     acetaminophen (TYLENOL) 325 MG tablet Take 325 mg by mouth every 6 hours as needed for mild pain     albuterol (PROAIR HFA/PROVENTIL HFA/VENTOLIN HFA) 108 (90 Base) MCG/ACT inhaler Inhale 2 puffs into the lungs every 4 hours as needed for shortness of breath / dyspnea or wheezing      alendronate (FOSAMAX) 70 MG tablet Take 70 mg by mouth once a week On Mondays     Ascorbic Acid (VITAMIN C) 500 MG CAPS Take 500 mg by mouth every morning      aspirin (ASA) 81 MG tablet Take 81 mg by mouth daily      beclomethasone HFA (QVAR REDIHALER) 80 MCG/ACT inhaler Inhale 2 puffs into the lungs 2 times daily     bumetanide (BUMEX) 2 MG tablet Take 2 tablets (4 mg) by mouth 3 times daily     calcium citrate-vitamin D (CALCIUM CITRATE + D3) 315-250 MG-UNIT TABS per tablet Take 1 tablet by mouth 2 times daily      Corn Dextrin (CLEAR FIBER POWDER PO) Take by mouth daily      digoxin (LANOXIN) 62.5 MCG tablet Take 1 tablet (62.5 mcg) by mouth daily     gabapentin (NEURONTIN) 300 MG capsule Take 300 mg by mouth 3 times daily Can take an additional 300mg at bedtime as needed     hydroxychloroquine (PLAQUENIL) 200 MG tablet Take 200 mg by mouth daily      ipratropium (ATROVENT) 0.06 % nasal spray Spray 2 sprays into both nostrils 3 times daily      ketotifen (ZADITOR) 0.025 % ophthalmic solution Place 1 drop into both eyes daily     macitentan (OPSUMIT) 10 MG tablet Take 1 tablet (10 mg) by mouth every  other day     potassium chloride ER (KLOR-CON M) 20 MEQ CR tablet Take 2 tablets (40 mEq) by mouth 3 times daily     sodium chloride (OCEAN) 0.65 % nasal spray Spray 1 spray into both nostrils daily as needed for congestion     treprostinil (REMODULIN) 60 mcg/mL in glycine diluent 50 mL infusion Inject 44.5 ng/kg/min into the vein continuous Goal Dose: 44.5 ng/kg/min     warfarin ANTICOAGULANT (COUMADIN) 3 MG tablet Take 3 mg PO on Mon, Wed, Fri and Sat, and 4.5 mg all other days of the week     No current facility-administered medications for this visit.     ROS:   10 point ROS negative except as discussed in above HPI.    EXAM:  There were no vitals taken for this visit.    GENERAL: Healthy, alert and no distress  EYES: Eyes grossly normal to inspection.  No discharge or erythema, or obvious scleral/conjunctival abnormalities.  NECK: No asymmetry, visible masses or scars  RESP: No audible wheeze, cough, or visible cyanosis.  No visible retractions or increased work of breathing.    SKIN: Visible skin clear. No significant rash, abnormal pigmentation or lesions.  NEURO: Cranial nerves grossly intact.  Mentation and speech appropriate for age.  PSYCH: Mentation appears normal, affect normal/bright, judgement and insight intact, normal speech and appearance well-groomed.      R chest catheter site with clear dressing appears okay no visible erythema, swelling, or drainage noted.     Labs:  CBC RESULTS:   Recent Labs   Lab Test 09/30/21  1113   WBC 4.4   RBC 3.81   HGB 11.9   HCT 36.9   MCV 97   MCH 31.2   MCHC 32.2   RDW 16.6*          Recent Labs   Lab Test 11/17/21  1328 09/30/21  1113    141   POTASSIUM 4.6 4.3   CHLORIDE 101 105   CO2 35* 32   ANIONGAP 2* 4   * 77   BUN 26 26   CR 1.13* 1.03   ESTEFANÍA 9.8 10.1       Liver Function Studies -   Recent Labs   Lab Test 09/30/21  1113   PROTTOTAL 7.2   ALBUMIN 3.0*   BILITOTAL 0.3   ALKPHOS 178*   AST 30   ALT 22       Lab Results   Component Value  Date    NTBNPI 6,431 (H) 05/27/2021     Lab Results   Component Value Date    NTBNP 1,284 (H) 11/17/2021    NTBNP 3,827 (H) 06/24/2021       Echocardiogram:    TTE 6/3/21  Severe pulmonary hypertension is present. Right ventricular systolic pressure  is 117mmHg above the right atrial pressure.  Global and regional left ventricular function is normal with an EF of 55-60%.  Flattened septum is consistent with right ventricular pressure and volume  overload.  Moderate right ventricular dilation is present. Global right ventricular  function is moderately reduced.  Moderate tricuspid insufficiency is present.  Small circumferential pericardial effusion is present without any hemodynamic  significance.  IVC diameter >2.1 cm collapsing <50% with sniff suggests a high RA pressure  estimated at 15 mmHg or greater.  There has been no change.     3/17/21 RHC:  RA: 14  PA: 88/22, mPA44  PCWP: 14  TD CO/CI: 3.97/2.8  Concepcion CO/CI: 2.95/2.1  PVR: 10.2    Assessment and Plan:      Karen Anderson is a 81 year old female with history that includes asthma, pulmonary MAC, breast cancer s/p chemotherapy with Adriamycin, Cytoxan, Taxol and tamoxifen and s/p left mastectomy, extensive b/l pulmonary emboli and DVT in 1998 though to be secondary to tamoxifen, chemotherapy induced cardiomyopathy, rheumatoid arthritis, HTN, chronic hypoxic respiratory failure on O2 via NC 1.5lpm, multiple falls, and scoliosis.  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 For her CTEPH, she is on goal IV Remodulin dose at 44.5ng/kg/min, Opsumit 10mg every other day (due to dizziness), digoxin, and coumadin.    We will continue her current medication regimen as is.  From a volume standpoint she sounds euvolemic, so we will continue her Bumex 4mg tid and potassium supplementation.  She is stable on her low O2 requirement. She would like to drive locally and since she has not had any syncope or presyncope symptoms we  think it is ok for her to do so.  She can follow up with us in 3 months with labs, earlier if she needs to.      Thank you for allowing us to participate in the care of Karen Anderson.    Patient was seen and examined with Dr. Turcios.    Kirsten Archibald MD.    Video-Visit Details     Type of service:  Video Visit     Video Start Time: 4.30 PM  Video End Time: 4.58 PM    Patient gave verbal consent for Videovisit    Originating Location (pt. Location): Home     Distant Location (provider location):   [facility]      Platform used for Video Visit: [employer] video call    I examined the patient and agree with the assessment and plan of Dr. Archibald    Total time today was 44  minutes reviewing notes, imaging, labs, patient visit, orders and documentation         Karolina Turcios MD   [facility]  Heart Failure, Transplant, and Mechanical Circulatory Support Cardiology   Cardiovascular Division  [facility]   [phone]

## 2021-12-01 ENCOUNTER — TRANSFERRED RECORDS (OUTPATIENT)
Dept: HEALTH INFORMATION MANAGEMENT | Facility: CLINIC | Age: 81
End: 2021-12-01
Payer: COMMERCIAL

## 2021-12-01 ENCOUNTER — ANTICOAGULATION THERAPY VISIT (OUTPATIENT)
Dept: ANTICOAGULATION | Facility: CLINIC | Age: 81
End: 2021-12-01
Payer: COMMERCIAL

## 2021-12-01 DIAGNOSIS — I27.24 CTEPH (CHRONIC THROMBOEMBOLIC PULMONARY HYPERTENSION) (H): ICD-10-CM

## 2021-12-01 DIAGNOSIS — Z79.01 LONG TERM CURRENT USE OF ANTICOAGULANT THERAPY: ICD-10-CM

## 2021-12-01 DIAGNOSIS — I27.20 PULMONARY HYPERTENSION (H): Primary | ICD-10-CM

## 2021-12-01 LAB — INR (EXTERNAL): 2.4 (ref 0.9–1.1)

## 2021-12-01 NOTE — PROGRESS NOTES
ANTICOAGULATION MANAGEMENT     Karen Anderson 81 year old female is on warfarin with therapeutic INR result. (Goal INR 2.0-2.5)    Recent labs: (last 7 days)     12/01/21  1117   INR 2.4*       ASSESSMENT     Source(s): Chart Review and Patient/Caregiver Call       Warfarin doses taken: Warfarin taken as instructed    Diet: No new diet changes identified    New illness, injury, or hospitalization: No    Medication/supplement changes: None noted    Signs or symptoms of bleeding or clotting: No    Previous INR: Therapeutic last visit; previously outside of goal range    Additional findings: None     PLAN     Recommended plan for no diet, medication or health factor changes affecting INR     Dosing Instructions: Continue your current warfarin dose with next INR in 2 weeks       Summary  As of 12/1/2021    Full warfarin instructions:  3 mg every Tue, Thu, Sat; 4.5 mg all other days   Next INR check:  12/15/2021             Telephone call with Karen who verbalizes understanding and agrees to plan and who agrees to plan and repeated back plan correctly    Patient to recheck with home meter    Education provided: None required    Plan made per [facility] anticoagulation protocol    Brittany Kirk, RN  Anticoagulation Clinic  12/1/2021    _______________________________________________________________________     Anticoagulation Episode Summary     Current INR goal:  2.0-2.5   TTR:  26.5 % (9.8 mo)   Target end date:  Indefinite   Send INR reminders to:  [facility]    Indications    Pulmonary hypertension (H) [I27.20]  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 OK to speak with spouse, Don.  [facility] SPEAK IN TABLETS 10/23/20:  new goal range is 2 - 2.5  [facility] in [place] and will need to fax orders P [phone] F [phone]          Anticoagulation Care Providers     Provider Role Specialty Phone number    Karolina Turcios MD Referring Cardiovascular Disease [phone]

## 2021-12-07 NOTE — PLAN OF CARE
NSR. RA. VSS. Heparin gtt at 1100 units/hr, next Xa at 1530. Remodulin gtt at 6ng/kg/min. 1.5L FR Tylenol scheduled for headache, pt denies n/v today, declines need for Reglan today. Independent in room. Ambulated in halls x1 with RN, tolerated well.    Cardiac

## 2021-12-15 ENCOUNTER — ANTICOAGULATION THERAPY VISIT (OUTPATIENT)
Dept: ANTICOAGULATION | Facility: CLINIC | Age: 81
End: 2021-12-15
Payer: COMMERCIAL

## 2021-12-15 DIAGNOSIS — Z79.01 LONG TERM CURRENT USE OF ANTICOAGULANT THERAPY: ICD-10-CM

## 2021-12-15 DIAGNOSIS — I27.24 CTEPH (CHRONIC THROMBOEMBOLIC PULMONARY HYPERTENSION) (H): ICD-10-CM

## 2021-12-15 DIAGNOSIS — I27.20 PULMONARY HYPERTENSION (H): Primary | ICD-10-CM

## 2021-12-15 LAB — INR (EXTERNAL): 1.8 (ref 0.9–1.1)

## 2021-12-15 NOTE — PROGRESS NOTES
ANTICOAGULATION MANAGEMENT     Karen JACKY Calderonhilariacharbel 81 year old female is on warfarin with subtherapeutic INR result. (Goal INR 2.0-2.5)    Recent labs: (last 7 days)     12/15/21  0000   INR 1.8*       ASSESSMENT     Source(s): Chart Review and Patient/Caregiver Call       Warfarin doses taken: Warfarin taken as instructed    Diet: No new diet changes identified    New illness, injury, or hospitalization: No    Medication/supplement changes: None noted    Signs or symptoms of bleeding or clotting: No    Previous INR: Therapeutic last 2(+) visits    Additional findings: None     PLAN     Recommended plan for no diet, medication or health factor changes affecting INR     Dosing Instructions:  Increase your warfarin dose (5.6% change) with next INR in 2 weeks       Summary  As of 12/15/2021    Full warfarin instructions:  3 mg every Tue, Fri; 4.5 mg all other days   Next INR check:  12/29/2021             Telephone call with Karen who verbalizes understanding and agrees to plan and who agrees to plan and repeated back plan correctly    Patient to recheck with home meter    Education provided: Importance of consistent vitamin K intake, Impact of vitamin K foods on INR and Vitamin K content of foods    Plan made per [facility] anticoagulation protocol    Brittany Kirk, RN  Anticoagulation Clinic  12/15/2021    _______________________________________________________________________     Anticoagulation Episode Summary     Current INR goal:  2.0-2.5   TTR:  24.8 % (9.8 mo)   Target end date:  Indefinite   Send INR reminders to:  [facility]    Indications    Pulmonary hypertension (H) [I27.20]  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 OK to speak with spouse, Don.  [facility] SPEAK IN TABLETS 10/23/20:  new goal range is 2 - 2.5  [facility] in [place] and  will need to fax orders P [phone] F [phone]         Anticoagulation Care Providers     Provider Role Specialty Phone number    Karolina Turcios MD Referring Cardiovascular Disease [phone]

## 2021-12-20 DIAGNOSIS — I27.20 PULMONARY HYPERTENSION (H): ICD-10-CM

## 2021-12-20 RX ORDER — DIGOXIN 0.06 MG/1
62.5 TABLET ORAL DAILY
Qty: 90 TABLET | Refills: 1 | Status: SHIPPED | OUTPATIENT
Start: 2021-12-20 | End: 2022-06-20

## 2021-12-20 NOTE — TELEPHONE ENCOUNTER
Medication Refill double check:    Last  virtual visit was on 11/22/21 with Dr. Dominguez    Follow up was recommended for 3 months .    Any additional encounters with changes to requested med? No    Authorizing provider is: Dr. Turcios     Refill was 90 tablets with 1 refill approved.     Additional orders/notes:       Henrietta Lucia RN on 12/20/2021 at 4:19 PM

## 2021-12-29 ENCOUNTER — ANTICOAGULATION THERAPY VISIT (OUTPATIENT)
Dept: ANTICOAGULATION | Facility: CLINIC | Age: 81
End: 2021-12-29
Payer: COMMERCIAL

## 2021-12-29 ENCOUNTER — TRANSFERRED RECORDS (OUTPATIENT)
Dept: HEALTH INFORMATION MANAGEMENT | Facility: CLINIC | Age: 81
End: 2021-12-29
Payer: COMMERCIAL

## 2021-12-29 DIAGNOSIS — I27.20 PULMONARY HYPERTENSION (H): Primary | ICD-10-CM

## 2021-12-29 DIAGNOSIS — I27.24 CTEPH (CHRONIC THROMBOEMBOLIC PULMONARY HYPERTENSION) (H): ICD-10-CM

## 2021-12-29 DIAGNOSIS — Z79.01 LONG TERM CURRENT USE OF ANTICOAGULANT THERAPY: ICD-10-CM

## 2021-12-29 LAB — INR (EXTERNAL): 1.7 (ref 0.9–1.1)

## 2021-12-29 NOTE — PROGRESS NOTES
ANTICOAGULATION MANAGEMENT     Karen JACKY Monica 81 year old female is on warfarin with subtherapeutic INR result. (Goal INR 2.0-2.5)    Recent labs: (last 7 days)     12/29/21  1204   INR 1.7*       ASSESSMENT     Source(s): Chart Review and Patient/Caregiver Call       Warfarin doses taken: Warfarin taken as instructed    Diet: No new diet changes identified    New illness, injury, or hospitalization: No    Medication/supplement changes: None noted    Signs or symptoms of bleeding or clotting: No    Previous INR: Subtherapeutic    Additional findings: None     PLAN     Recommended plan for no diet, medication or health factor changes affecting INR     Dosing Instructions:  Increase your warfarin dose (5.3% change) with next INR in 1 week       Summary  As of 12/29/2021    Full warfarin instructions:  3 mg every Tue; 4.5 mg all other days   Next INR check:  1/5/2022             Telephone call with Karen who verbalizes understanding and agrees to plan and who agrees to plan and repeated back plan correctly    Patient to recheck with home meter    Education provided: Importance of consistent vitamin K intake, Impact of vitamin K foods on INR and Vitamin K content of foods    Plan made per [facility] anticoagulation protocol    Brittany Kirk RN  Anticoagulation Clinic  12/29/2021    _______________________________________________________________________     Anticoagulation Episode Summary     Current INR goal:  2.0-2.5   TTR:  22.1 % (10.1 mo)   Target end date:  Indefinite   Send INR reminders to:  [facility]    Indications    Pulmonary hypertension (H) [I27.20]  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 OK to speak with spouse, Don.  [facility] SPEAK IN TABLETS 10/23/20:  new goal range is 2 - 2.5  [facility] in [place] and will need to fax  orders P [phone] F [phone]         Anticoagulation Care Providers     Provider Role Specialty Phone number    Karolina Turcios MD Referring Cardiovascular Disease [phone]

## 2022-01-02 ENCOUNTER — HEALTH MAINTENANCE LETTER (OUTPATIENT)
Age: 82
End: 2022-01-02

## 2022-01-06 ENCOUNTER — TRANSFERRED RECORDS (OUTPATIENT)
Dept: HEALTH INFORMATION MANAGEMENT | Facility: CLINIC | Age: 82
End: 2022-01-06
Payer: COMMERCIAL

## 2022-01-06 ENCOUNTER — ANTICOAGULATION THERAPY VISIT (OUTPATIENT)
Dept: ANTICOAGULATION | Facility: CLINIC | Age: 82
End: 2022-01-06
Payer: COMMERCIAL

## 2022-01-06 DIAGNOSIS — I27.24 CTEPH (CHRONIC THROMBOEMBOLIC PULMONARY HYPERTENSION) (H): ICD-10-CM

## 2022-01-06 DIAGNOSIS — Z79.01 LONG TERM CURRENT USE OF ANTICOAGULANT THERAPY: ICD-10-CM

## 2022-01-06 DIAGNOSIS — I27.20 PULMONARY HYPERTENSION (H): Primary | ICD-10-CM

## 2022-01-06 LAB — INR (EXTERNAL): 1.6 (ref 0.9–1.1)

## 2022-01-06 NOTE — PROGRESS NOTES
ANTICOAGULATION MANAGEMENT     Karen Anderson 81 year old female is on warfarin with subtherapeutic INR result. (Goal INR 2.0-2.5)    Recent labs: (last 7 days)     01/06/22  0000   INR 1.6*       ASSESSMENT     Source(s): Chart Review and Patient/Caregiver Call       Warfarin doses taken: Warfarin taken as instructed    Diet: No new diet changes identified    New illness, injury, or hospitalization: No    Medication/supplement changes: None noted    Signs or symptoms of bleeding or clotting: No    Previous INR: Subtherapeutic    Additional findings: None     PLAN     Recommended plan for no diet, medication or health factor changes affecting INR     Dosing Instructions:  Increase your warfarin dose (10% change) with next INR in 1 week       Summary  As of 1/6/2022    Full warfarin instructions:  6 mg every Thu; 4.5 mg all other days   Next INR check:  1/13/2022             Telephone call with Karen who verbalizes understanding and agrees to plan and who agrees to plan and repeated back plan correctly    Patient to recheck with home meter    Education provided: Importance of consistent vitamin K intake, Impact of vitamin K foods on INR and Vitamin K content of foods    Plan made per [facility] anticoagulation protocol    Brittany Kirk, RN  Anticoagulation Clinic  1/6/2022    _______________________________________________________________________     Anticoagulation Episode Summary     Current INR goal:  2.0-2.5   TTR:  21.5 % (10.4 mo)   Target end date:  Indefinite   Send [facility]ers [facility]:  [facility]    Indications    Pulmonary hypertension (H) [I27.20]  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 OK to speak with spouse, Don.  [facility] SPEAK IN TABLETS 10/23/20:  new goal range is 2 - 2.5  [facility] in [place] and will need to fax orders P  [phone]  [phone]         Anticoagulation Care Providers     Provider Role Specialty Phone number    Karolina Turcios MD Referring Cardiovascular Disease [phone]

## 2022-01-13 ENCOUNTER — ANTICOAGULATION THERAPY VISIT (OUTPATIENT)
Dept: ANTICOAGULATION | Facility: CLINIC | Age: 82
End: 2022-01-13
Payer: COMMERCIAL

## 2022-01-13 ENCOUNTER — TRANSFERRED RECORDS (OUTPATIENT)
Dept: HEALTH INFORMATION MANAGEMENT | Facility: CLINIC | Age: 82
End: 2022-01-13
Payer: COMMERCIAL

## 2022-01-13 DIAGNOSIS — I27.20 PULMONARY HYPERTENSION (H): ICD-10-CM

## 2022-01-13 DIAGNOSIS — I27.20 PULMONARY HYPERTENSION (H): Primary | ICD-10-CM

## 2022-01-13 DIAGNOSIS — Z79.01 LONG TERM CURRENT USE OF ANTICOAGULANT THERAPY: ICD-10-CM

## 2022-01-13 DIAGNOSIS — I27.24 CTEPH (CHRONIC THROMBOEMBOLIC PULMONARY HYPERTENSION) (H): ICD-10-CM

## 2022-01-13 LAB — INR (EXTERNAL): 1.8 (ref 0.9–1.1)

## 2022-01-13 NOTE — PROGRESS NOTES
ANTICOAGULATION MANAGEMENT     Karen JACKY Monica 81 year old female is on warfarin with subtherapeutic INR result. (Goal INR 2.0-2.5)    Recent labs: (last 7 days)     01/13/22  0000   INR 1.8*       ASSESSMENT     Source(s): Chart Review and Patient/Caregiver Call       Warfarin doses taken: Warfarin taken as instructed    Diet: No new diet changes identified    New illness, injury, or hospitalization: No    Medication/supplement changes: None noted    Signs or symptoms of bleeding or clotting: No    Previous INR: Subtherapeutic    Additional findings: None     PLAN     Recommended plan for no diet, medication or health factor changes affecting INR     Dosing Instructions:  Increase your warfarin dose (4.5% change) with next INR in 1 week       Summary  As of 1/13/2022    Full warfarin instructions:  6 mg every Mon, Thu; 4.5 mg all other days   Next INR check:  1/20/2022             Telephone call with Karen who verbalizes understanding and agrees to plan and who agrees to plan and repeated back plan correctly    Patient to recheck with home meter    Education provided: Importance of consistent vitamin K intake, Impact of vitamin K foods on INR and Vitamin K content of foods    Plan made per [facility] anticoagulation protocol    Brittany Kirk, RN  Anticoagulation Clinic  1/13/2022    _______________________________________________________________________     Anticoagulation Episode Summary     Current INR goal:  2.0-2.5   TTR:  21.1 % (10.6 mo)   Target end date:  Indefinite   Send [facility] reminders to:  [facility]    Indications    Pulmonary hypertension (H) [I27.20]  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 OK to speak with spouse, Don.  [facility] SPEAK IN TABLETS 10/23/20:  new goal range is 2 - 2.5  [facility] in [place] and will need to fax  orders P [phone] F [phone]         Anticoagulation Care Providers     Provider Role Specialty Phone number    Karolina Turcios MD Referring Cardiovascular Disease [phone]

## 2022-01-14 RX ORDER — WARFARIN SODIUM 3 MG/1
TABLET ORAL
Qty: 170 TABLET | Refills: 3 | Status: SHIPPED | OUTPATIENT
Start: 2022-01-14 | End: 2022-12-09

## 2022-01-20 ENCOUNTER — TRANSFERRED RECORDS (OUTPATIENT)
Dept: HEALTH INFORMATION MANAGEMENT | Facility: CLINIC | Age: 82
End: 2022-01-20
Payer: COMMERCIAL

## 2022-01-20 ENCOUNTER — ANTICOAGULATION THERAPY VISIT (OUTPATIENT)
Dept: ANTICOAGULATION | Facility: CLINIC | Age: 82
End: 2022-01-20
Payer: COMMERCIAL

## 2022-01-20 DIAGNOSIS — I27.24 CTEPH (CHRONIC THROMBOEMBOLIC PULMONARY HYPERTENSION) (H): ICD-10-CM

## 2022-01-20 DIAGNOSIS — I27.20 PULMONARY HYPERTENSION (H): Primary | ICD-10-CM

## 2022-01-20 DIAGNOSIS — Z79.01 LONG TERM CURRENT USE OF ANTICOAGULANT THERAPY: ICD-10-CM

## 2022-01-20 LAB — INR (EXTERNAL): 2.5 (ref 0.9–1.1)

## 2022-01-20 NOTE — PROGRESS NOTES
ANTICOAGULATION MANAGEMENT     Karen Anderson 81 year old female is on warfarin with therapeutic INR result. (Goal INR 2.0-2.5)    Recent labs: (last 7 days)     01/20/22  0916   INR 2.5*       ASSESSMENT     Source(s): Chart Review and Patient/Caregiver Call       Warfarin doses taken: Warfarin taken as instructed    Diet: No new diet changes identified    New illness, injury, or hospitalization: No    Medication/supplement changes: None noted    Signs or symptoms of bleeding or clotting: Yes: bruises easily    Previous INR: Subtherapeutic    Additional findings: Discussed ordering a smaller tab size for dosing flexibility     PLAN     Recommended plan for no diet, medication or health factor changes affecting INR     Dosing Instructions: Continue your current warfarin dose with next INR in 1 week       Summary  As of 1/20/2022    Full warfarin instructions:  6 mg every Mon, Thu; 4.5 mg all other days   Next INR check:  1/27/2022             Telephone call with Karen who verbalizes understanding and agrees to plan and who agrees to plan and repeated back plan correctly    Patient to recheck with home meter    Education provided: Goal range and significance of current result, Monitoring for bleeding signs and symptoms and Contact [phone] with any changes, questions or concerns.     Plan made per [facility] anticoagulation protocol    GOLDY PERALTA RN  Anticoagulation Clinic  1/20/2022    _______________________________________________________________________     Anticoagulation Episode Summary     Current INR goal:  2.0-2.5   TTR:  22.3 % (10.8 mo)   Target end date:  Indefinite   Send IN[facility]ers [facility]:  [facility]    Indications    Pulmonary hypertension (H) [I27.20]  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 OK to speak with spouse, Don.  New  [place] [facility] SPEAK IN TABLETS 10/23/20:  new goal range is 2 - 2.5  [facility] in [place] and will need to fax orders P [phone] F [phone]         Anticoagulation Care Providers     Provider Role Specialty Phone number    Karolina Turcios MD Referring Cardiovascular Disease [phone]

## 2022-01-27 ENCOUNTER — TRANSFERRED RECORDS (OUTPATIENT)
Dept: HEALTH INFORMATION MANAGEMENT | Facility: CLINIC | Age: 82
End: 2022-01-27
Payer: COMMERCIAL

## 2022-01-27 ENCOUNTER — ANTICOAGULATION THERAPY VISIT (OUTPATIENT)
Dept: ANTICOAGULATION | Facility: CLINIC | Age: 82
End: 2022-01-27
Payer: COMMERCIAL

## 2022-01-27 DIAGNOSIS — Z79.01 LONG TERM CURRENT USE OF ANTICOAGULANT THERAPY: ICD-10-CM

## 2022-01-27 DIAGNOSIS — I27.24 CTEPH (CHRONIC THROMBOEMBOLIC PULMONARY HYPERTENSION) (H): ICD-10-CM

## 2022-01-27 DIAGNOSIS — I27.20 PULMONARY HYPERTENSION (H): Primary | ICD-10-CM

## 2022-01-27 LAB — INR (EXTERNAL): 2.3 (ref 0.9–1.1)

## 2022-01-27 NOTE — PROGRESS NOTES
ANTICOAGULATION MANAGEMENT     Karen Anderson 81 year old female is on warfarin with therapeutic INR result. (Goal INR 2.0-2.5)    Recent labs: (last 7 days)     01/27/22  1108   INR 2.3*       ASSESSMENT     Source(s): Chart Review and Patient/Caregiver Call       Warfarin doses taken: Warfarin taken as instructed    Diet: No new diet changes identified    New illness, injury, or hospitalization: No    Medication/supplement changes: None noted    Signs or symptoms of bleeding or clotting: No    Previous INR: Therapeutic last 2(+) visits    Additional findings: Karen will call back with date of lab scheduled.  Please update calendar if date has changed or if she needs us to fax orders.     PLAN     Recommended plan for no diet, medication or health factor changes affecting INR     Dosing Instructions: Continue your current warfarin dose with next INR in 2 weeks       Summary  As of 1/27/2022    Full warfarin instructions:  6 mg every Mon, Thu; 4.5 mg all other days   Next INR check:  2/9/2022             Telephone call with Karen who verbalizes understanding and agrees to plan    Checking prior to 2/10/22 virtual visit with Cardiology labs.    Education provided: Monitoring for bleeding signs and symptoms, Monitoring for clotting signs and symptoms, Importance of notifying clinic of upcoming surgeries and procedures 2 weeks in advance and Contact [phone] with any changes, questions or concerns.     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 OK to speak with spouse, Don.  [facility] SPEAK IN TABLETS 10/23/20:  new goal range is 2 - 2.5  [facility] in [place] and will need to fax orders P [phone] F [phone]         Anticoagulation Care Providers     Provider Role Specialty Phone number    Karolina Turcios MD Referring Cardiovascular Disease [phone]

## 2022-02-03 ENCOUNTER — TRANSFERRED RECORDS (OUTPATIENT)
Dept: HEALTH INFORMATION MANAGEMENT | Facility: CLINIC | Age: 82
End: 2022-02-03
Payer: COMMERCIAL

## 2022-02-03 ENCOUNTER — ANTICOAGULATION THERAPY VISIT (OUTPATIENT)
Dept: ANTICOAGULATION | Facility: CLINIC | Age: 82
End: 2022-02-03
Payer: COMMERCIAL

## 2022-02-03 DIAGNOSIS — I27.24 CTEPH (CHRONIC THROMBOEMBOLIC PULMONARY HYPERTENSION) (H): ICD-10-CM

## 2022-02-03 DIAGNOSIS — Z79.01 LONG TERM CURRENT USE OF ANTICOAGULANT THERAPY: ICD-10-CM

## 2022-02-03 DIAGNOSIS — I27.20 PULMONARY HYPERTENSION (H): Primary | ICD-10-CM

## 2022-02-03 LAB — INR (EXTERNAL): 2.9 (ref 0.9–1.1)

## 2022-02-03 NOTE — PROGRESS NOTES
ANTICOAGULATION MANAGEMENT     Karen Anderson 81 year old female is on warfarin with supratherapeutic INR result. (Goal INR 2.0-2.5)    Recent labs: (last 7 days)     02/03/22  1626   INR 2.9*       ASSESSMENT     Source(s): Chart Review and Patient/Caregiver Call       Warfarin doses taken: Warfarin taken as instructed    Diet: No new diet changes identified    New illness, injury, or hospitalization: No    Medication/supplement changes: None noted    Signs or symptoms of bleeding or clotting: Yes: monitoring bruising    Previous INR: Therapeutic last 2(+) visits    Additional findings: None     PLAN     Recommended plan for no diet, medication or health factor changes affecting INR     Dosing Instructions:  Decrease your warfarin dose (8.7% change) with next INR in 4 days       Summary  As of 2/3/2022    Full warfarin instructions:  4.5 mg every day   Next INR check:  2/7/2022             Telephone call with Karen who agrees to plan and repeated back plan correctly    Lab visit scheduled    Education provided: Please call back if any changes to your diet, medications or how you've been taking warfarin    Plan made per [facility] anticoagulation protocol    Jaylan Santana, RN  Anticoagulation Clinic  2/3/2022    _______________________________________________________________________     Anticoagulation Episode Summary     Current INR goal:  2.0-2.5   TTR:  23.6 % (10.8 mo)   Target end date:  Indefinite   Send INR reminders to:  [facility]    Indications    Pulmonary hypertension (H) [I27.20]  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 OK to speak with spouse, Don.  [facility] SPEAK IN TABLETS 10/23/20:  new goal range is 2 - 2.5  [facility] in [place] and will need to fax orders P [phone] F [phone]         Anticoagulation Care  Providers     Provider Role Specialty Phone number    Karolina Turcios MD Referring Cardiovascular Disease [phone]

## 2022-02-07 ENCOUNTER — LAB (OUTPATIENT)
Dept: LAB | Facility: CLINIC | Age: 82
End: 2022-02-07
Payer: COMMERCIAL

## 2022-02-07 ENCOUNTER — ANTICOAGULATION THERAPY VISIT (OUTPATIENT)
Dept: ANTICOAGULATION | Facility: CLINIC | Age: 82
End: 2022-02-07

## 2022-02-07 DIAGNOSIS — Z79.01 LONG TERM CURRENT USE OF ANTICOAGULANT THERAPY: ICD-10-CM

## 2022-02-07 DIAGNOSIS — I27.24 CTEPH (CHRONIC THROMBOEMBOLIC PULMONARY HYPERTENSION) (H): ICD-10-CM

## 2022-02-07 DIAGNOSIS — I27.20 PULMONARY HYPERTENSION (H): Primary | ICD-10-CM

## 2022-02-07 DIAGNOSIS — R06.09 DYSPNEA ON EXERTION: ICD-10-CM

## 2022-02-07 DIAGNOSIS — I27.20 PULMONARY HYPERTENSION (H): ICD-10-CM

## 2022-02-07 LAB
ERYTHROCYTE [DISTWIDTH] IN BLOOD BY AUTOMATED COUNT: 15.8 % (ref 10–15)
HCT VFR BLD AUTO: 42.5 % (ref 35–47)
HGB BLD-MCNC: 12.9 G/DL (ref 11.7–15.7)
INR BLD: 2.8 (ref 0.9–1.1)
MCH RBC QN AUTO: 30.3 PG (ref 26.5–33)
MCHC RBC AUTO-ENTMCNC: 30.4 G/DL (ref 31.5–36.5)
MCV RBC AUTO: 100 FL (ref 78–100)
NT-PROBNP SERPL-MCNC: 2309 PG/ML (ref 0–450)
PLATELET # BLD AUTO: 221 10E3/UL (ref 150–450)
RBC # BLD AUTO: 4.26 10E6/UL (ref 3.8–5.2)
WBC # BLD AUTO: 4.2 10E3/UL (ref 4–11)

## 2022-02-07 PROCEDURE — 80053 COMPREHEN METABOLIC PANEL: CPT

## 2022-02-07 PROCEDURE — 85610 PROTHROMBIN TIME: CPT

## 2022-02-07 PROCEDURE — 85027 COMPLETE CBC AUTOMATED: CPT

## 2022-02-07 PROCEDURE — 36415 COLL VENOUS BLD VENIPUNCTURE: CPT

## 2022-02-07 PROCEDURE — 83880 ASSAY OF NATRIURETIC PEPTIDE: CPT

## 2022-02-07 NOTE — PROGRESS NOTES
ANTICOAGULATION MANAGEMENT     Karen Anderson 81 year old female is on warfarin with supratherapeutic INR result. (Goal INR 2.0-2.5)    Recent labs: (last 7 days)     02/07/22  1002   INR 2.8*       ASSESSMENT     Source(s): Chart Review and Patient/Caregiver Call       Warfarin doses taken: Warfarin taken as instructed    Diet: No new diet changes identified    New illness, injury, or hospitalization: No    Medication/supplement changes: None noted    Signs or symptoms of bleeding or clotting: No    Previous INR: Supratherapeutic    Additional findings: Last Monday patient took a 6mg dose.  Writer advises that patient should continue with 4.5mg daily with an INR check in 1 week after giving the dose a chance to work.      PLAN     Recommended plan for no diet, medication or health factor changes affecting INR     Dosing Instructions: Continue your current warfarin dose with next INR in 1 week       Summary  As of 2/7/2022    Full warfarin instructions:  4.5 mg every day   Next INR check:  2/14/2022             Telephone call with Karen who verbalizes understanding and agrees to plan and who agrees to plan and repeated back plan correctly    Patient to recheck with home meter    Education provided: Importance of consistent vitamin K intake, Impact of vitamin K foods on INR, Monitoring for bleeding signs and symptoms and When to seek medical attention/emergency care    Plan made per [facility] anticoagulation protocol    Brittany Kirk RN  Anticoagulation Clinic  2/7/2022    _______________________________________________________________________     Anticoagulation Episode Summary     Current INR goal:  2.0-2.5   TTR:  23.6 % (10.8 mo)   Target end date:  Indefinite   Send INR reminders to:  [facility]    Indications    Pulmonary hypertension (H) [I27.20]  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 OK to speak with spouse, Don.  [facility] SPEAK IN TABLETS 10/23/20:  new goal range is 2 - 2.5  [facility] in [place] and will need to fax orders P [phone] F [phone]         Anticoagulation Care Providers     Provider Role Specialty Phone number    Karolina Turcios MD Referring Cardiovascular Disease [phone]

## 2022-02-08 LAB
ALBUMIN SERPL-MCNC: 3.6 G/DL (ref 3.4–5)
ALP SERPL-CCNC: 135 U/L (ref 40–150)
ALT SERPL W P-5'-P-CCNC: 22 U/L (ref 0–50)
ANION GAP SERPL CALCULATED.3IONS-SCNC: 3 MMOL/L (ref 3–14)
AST SERPL W P-5'-P-CCNC: 31 U/L (ref 0–45)
BILIRUB SERPL-MCNC: 0.4 MG/DL (ref 0.2–1.3)
BUN SERPL-MCNC: 27 MG/DL (ref 7–30)
CALCIUM SERPL-MCNC: 9.9 MG/DL (ref 8.5–10.1)
CHLORIDE BLD-SCNC: 104 MMOL/L (ref 94–109)
CO2 SERPL-SCNC: 34 MMOL/L (ref 20–32)
CREAT SERPL-MCNC: 1.25 MG/DL (ref 0.52–1.04)
GFR SERPL CREATININE-BSD FRML MDRD: 44 ML/MIN/1.73M2
GLUCOSE BLD-MCNC: 93 MG/DL (ref 70–99)
POTASSIUM BLD-SCNC: 4.5 MMOL/L (ref 3.4–5.3)
PROT SERPL-MCNC: 7.3 G/DL (ref 6.8–8.8)
SODIUM SERPL-SCNC: 141 MMOL/L (ref 133–144)

## 2022-02-10 ENCOUNTER — VIRTUAL VISIT (OUTPATIENT)
Dept: CARDIOLOGY | Facility: CLINIC | Age: 82
End: 2022-02-10
Attending: PHYSICIAN ASSISTANT
Payer: COMMERCIAL

## 2022-02-10 DIAGNOSIS — I27.20 PULMONARY HYPERTENSION (H): ICD-10-CM

## 2022-02-10 DIAGNOSIS — R06.09 DYSPNEA ON EXERTION: ICD-10-CM

## 2022-02-10 PROCEDURE — G0463 HOSPITAL OUTPT CLINIC VISIT: HCPCS | Mod: PN,RTG | Performed by: PHYSICIAN ASSISTANT

## 2022-02-10 PROCEDURE — 99214 OFFICE O/P EST MOD 30 MIN: CPT | Mod: 95 | Performed by: PHYSICIAN ASSISTANT

## 2022-02-10 NOTE — PROGRESS NOTES
Karen Anderson  is being evaluated via a billable video visit.      How would you like to obtain your AVS? [email or web address]  For the video visit, send the invitation by: Send to e-mail at: [email or web address] if mychart [email or web address]  Will anyone else be joining your video visit?   Pts  Don

## 2022-02-10 NOTE — PROGRESS NOTES
CARDIOLOGY PH CLINIC VIDEO VISIT    Date of video visit: 02/10/22      Karen Anderson is a 81 year old female who is being evaluated via a billable video visit.        I have reviewed and updated the patient's Past Medical History, Social History, Family History and Medication List.    MEDICATIONS:  Current Outpatient Medications   Medication Sig Dispense Refill     acetaminophen (TYLENOL) 325 MG tablet Take 325 mg by mouth every 6 hours as needed for mild pain       albuterol (PROAIR HFA/PROVENTIL HFA/VENTOLIN HFA) 108 (90 Base) MCG/ACT inhaler Inhale 2 puffs into the lungs every 4 hours as needed for shortness of breath / dyspnea or wheezing        alendronate (FOSAMAX) 70 MG tablet Take 70 mg by mouth once a week On Mondays       Ascorbic Acid (VITAMIN C) 500 MG CAPS Take 500 mg by mouth every morning        aspirin (ASA) 81 MG tablet Take 81 mg by mouth daily        beclomethasone HFA (QVAR REDIHALER) 80 MCG/ACT inhaler Inhale 2 puffs into the lungs 2 times daily       bumetanide (BUMEX) 2 MG tablet Take 2 tablets (4 mg) by mouth 3 times daily 540 tablet 3     calcium citrate-vitamin D (CALCIUM CITRATE + D3) 315-250 MG-UNIT TABS per tablet Take 1 tablet by mouth 2 times daily        Corn Dextrin (CLEAR FIBER POWDER PO) Take by mouth daily        digoxin (LANOXIN) 62.5 MCG tablet Take 1 tablet (62.5 mcg) by mouth daily 90 tablet 1     gabapentin (NEURONTIN) 300 MG capsule Take 300 mg by mouth 3 times daily Can take an additional 300mg at bedtime as needed       hydroxychloroquine (PLAQUENIL) 200 MG tablet Take 200 mg by mouth daily        ipratropium (ATROVENT) 0.06 % nasal spray Spray 2 sprays into both nostrils 3 times daily        ketotifen (ZADITOR) 0.025 % ophthalmic solution Place 1 drop into both eyes daily       macitentan (OPSUMIT) 10 MG tablet Take 1 tablet (10 mg) by mouth every other day 15 tablet 11     potassium chloride ER (KLOR-CON M) 20 MEQ CR tablet Take 2 tablets (40 mEq) by mouth 3  times daily 540 tablet 3     sodium chloride (OCEAN) 0.65 % nasal spray Spray 1 spray into both nostrils daily as needed for congestion       treprostinil (REMODULIN) 60 mcg/mL in glycine diluent 50 mL infusion Inject 44.5 ng/kg/min into the vein continuous Goal Dose: 44.5 ng/kg/min 1368 mL 0     warfarin ANTICOAGULANT (COUMADIN) 3 MG tablet TAKE 3 MG BY MOUTH ON MONDAY, WEDNESDAY, FRIDAY, AND SATURDAY, AND 4.5 MG ON ALL OTHER DAYS OF THE WEEK 170 tablet 3       ALLERGIES  Sulfa drugs      Self reported vitals:  Wt: 106 lb  /64    Brief physical exam:  General: In no acute distress, upright and calm. Accompanied by her  today.  Eyes: No apparent redness or discharge.   Chest: No labored breathing, no cough during exam or audible wheezing.   Neuro: No obvious focal defects or tremors.   Psych: Alert and oriented. Does not appear anxious.     The rest of a comprehensive physical examination is deferred due to public health emergency video visit restrictions.       Primary PH cardiologist: Dr. Turcios       HPI:  Ms. Anderson is a pleasant 81 year old female with a PMHx including rheumatoid arthritis, hypertension, scoliosis, asthma, pulmonary MAC, breast cancer s/p chemotherapy with Adriamycin, Cytoxan, Taxol, and tamoxifen and status post left mastectomy. She also has a history of extensive bilateral pulmonary emboli and DVT in 1998 thought to be secondary to tamoxifen along with chemotherapy induced cardiomyopathy. 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 Although she had proximal disease, she was too high risk for surgical PTE. She had a BPA in Jan 2021 which was complicated by hemoptysis, requiring FFP. She has required frequent diuretic adjustments, and we have been working on medical management. She is currently on combination therapy with IV Remodulin therapy along with Opsumit, and had failed Adempas due to dizziness and hypotension.     Karen was seen last  by Dr. Turcios in September via virtual visit. She reported doing relatively well and did not sounds to be volume overloaded. No changes were made at that time.    Today, I'm doing another virtual visit with Karen for follow up. She tells me that things remain about the same. Her weight is fluctuating a few pounds up and down but sounds to be stable overall. She still has edema, but says no worse than usual and she continues with leg wraps. Breathing has been ok, and she denies recurrence of dizziness. She has not fallen since we met last. BP has been acceptable at home as well.       Most recent labs were reviewed as below.         CURRENT PULMONARY HYPERTENSION REGIMEN:     PAH Rx: Remodulin 45.5ng/kg/min, Opsumit 10mg every other day  (failed Adempas due to dizziness)     Diuretics: Bumex 4mg TID     Oxygen: NC 1-2LPM     Anticoagulation: warfarin   Indication: CTEPH        Assessment/Plan:        1. Chronic thromboembolic pulmonary hypertension.              --Ms. Anderson has CTEPH with sisgnificant RV dysfunction. Although she had proximal disease, she was felt to be too high risk for surgical PTE here and at [facility]. She was offered potential PTE at [place], but still felt to be very high risk. She did have BPA here in January os 2021 but this was complicated by hemoptysis. No further surgical interventions are being offered at this time. Currently, we will continue to focus on medical management.              --She is on IV Remodulin at 45.5ng/kg/min. She is not able to tolerate Adempas due to dizziness, and on Opsumit 10mg every other day for similar reasons. She has inquired whether we can do anything further for her PH. As she appears to have stabilized over the last few months, will review with Dr. Turcios whether further titration of her Remodulin can be considered. [Addendum: After continued discussion, no medication changes for now. ]              --Her weight is mostly between 101-106# at home on  bumex 4mg TID. Recent NT-proBNP has trended up slightly, and renal function appears overall stable. Will continue to monitor closely.               --Continue digoxin for RV support. BP has remained acceptable, though will need to continue to monitor given renal concerns.               --Continue anticoagulation with warfarin for her CTEPH, though we have been monitoring with a lower INR goal of 2-2.5 after some bleeding issues after a fall. She is having a harder time staying in this goal; will d/w Dr. Turcios whether we can broaden back out to 2-3. She now has equipment to do home checks.              --Continue oxygen at 1-2L with exertion. I told her that if sats are >92% at rest, she may take off while sitting.      Follow up plan: Return in 2 months to see Dr. Turcios, or sooner if needed.       Testing/labs:    Most recent labs:   Results for ELAINA SUTTON (MRN [id number]) as of 2/10/2022 13:25   Ref. Range 2/7/2022 10:02   Sodium Latest Ref Range: 133 - 144 mmol/L 141   Potassium Latest Ref Range: 3.4 - 5.3 mmol/L 4.5   Chloride Latest Ref Range: 94 - 109 mmol/L 104   Carbon Dioxide Latest Ref Range: 20 - 32 mmol/L 34 (H)   Urea Nitrogen Latest Ref Range: 7 - 30 mg/dL 27   Creatinine Latest Ref Range: 0.52 - 1.04 mg/dL 1.25 (H)   GFR Estimate Latest Ref Range: >60 mL/min/1.73m2 44 (L)   Calcium Latest Ref Range: 8.5 - 10.1 mg/dL 9.9   Anion Gap Latest Ref Range: 3 - 14 mmol/L 3   Albumin Latest Ref Range: 3.4 - 5.0 g/dL 3.6   Protein Total Latest Ref Range: 6.8 - 8.8 g/dL 7.3   Bilirubin Total Latest Ref Range: 0.2 - 1.3 mg/dL 0.4   Alkaline Phosphatase Latest Ref Range: 40 - 150 U/L 135   ALT Latest Ref Range: 0 - 50 U/L 22   AST Latest Ref Range: 0 - 45 U/L 31   N-Terminal Pro Bnp Latest Ref Range: 0 - 450 pg/mL 2,309 (H)   Glucose Latest Ref Range: 70 - 99 mg/dL 93   WBC Latest Ref Range: 4.0 - 11.0 10e3/uL 4.2   Hemoglobin Latest Ref Range: 11.7 - 15.7 g/dL 12.9   Hematocrit Latest Ref Range:  35.0 - 47.0 % 42.5   Platelet Count Latest Ref Range: 150 - 450 10e3/uL 221   RBC Count Latest Ref Range: 3.80 - 5.20 10e6/uL 4.26   MCV Latest Ref Range: 78 - 100 fL 100   MCH Latest Ref Range: 26.5 - 33.0 pg 30.3   MCHC Latest Ref Range: 31.5 - 36.5 g/dL 30.4 (L)   RDW Latest Ref Range: 10.0 - 15.0 % 15.8 (H)   INR Point of Care Latest Ref Range: 0.9 - 1.1  2.8 (H)           Other recent pertinent testing:    Echo 6/23/2021  Interpretation Summary  Severe pulmonary hypertension is present. Right ventricular systolic pressure  is 117mmHg above the right atrial pressure.  Global and regional left ventricular function is normal with an EF of 55-60%.  Flattened septum is consistent with right ventricular pressure and volume  overload.  Moderate right ventricular dilation is present. Global right ventricular  function is moderately reduced.  Moderate tricuspid insufficiency is present.  Small circumferential pericardial effusion is present without any hemodynamic  significance.  IVC diameter >2.1 cm collapsing <50% with sniff suggests a high RA pressure  estimated at 15 mmHg or greater.  There has been no change.    [facility]  3/17/2021        Severe pulmonary hypertension    Normal cardiac output by Thermodilution    Elevated left sided filling pressures    No signficant change when comapred to her last hemodynamic assessment            Pressures Phase: Baseline                                                     Time Systolic (mmHg) Diastolic (mmHg) Mean (mmHg) A Wave (mmHg) V Wave (mmHg) EDP (mmHg) Max dp/dt (mmHg/sec) HR (bpm) Content (mL/dL) SAT (%)   RA Pressures  2:31 PM     14    14    22        63          RV Pressures  2:16 PM             912               2:32 PM 87    9          28      62          PA Pressures  2:32 PM 88    22    44            62          PCW Pressures  2:33 PM     14                                               NYHA Functional Class:  Functional class 3    1--No limitation of  activity  2--Slight limitation, ordinary activities may cause symptoms  3--Marked limitation, less than ordinary activities cause symptoms  4--Severe limitation, minimal activity causes symptoms, or symptoms at rest      Video-Visit Details    Type of service:  Video Visit    Video Start Time: 1540  Video End Time: 1601    An additional 15 minutes was spent today performing chart and history review, pre and post visit documentation, and care coordination.      Originating Location (pt. Location): Home    Distant Location (provider location):  [facility]    Platform used for Video Visit: Darron Marcum PA-C  [facility] [[email or web address]] Heart  Pager [phone]

## 2022-02-10 NOTE — PATIENT INSTRUCTIONS
Thank you for visiting the Pulmonary Hypertension Clinic virtually today.      Today we discussed:   I will review with Dr. Turcios about your Remodulin and INR goals. I will then contact you with further instructions.    Tentative plan to return in 2 months to see Dr. Turcios.         Additional Instructions:    1. Continue staying active and eat a heart healthy, low sodium diet.     2. Please keep current list of medications with you at all times.     3. Remember to weigh yourself daily after voiding and write it down on a log. If you have gained/lost 2 pounds overnight or 5 pounds in a week contact us for medication adjustments or further instructions.    4. Please call us immediately if you have syncope (fainting or passing out), chest pain, worsening edema (swelling or weight gain), or general worsening in how you are feeling.     --------------------------------------------------------------------------------------------------------------    If you have questions or concerns please contact us at:    Elan Arnett RN, BSN   Lexus Balbuena (Schedule,Prior Auth)  Nurse Coordinator     Clinic   Pulmonary Hypertension   Pulmonary Hypertension  [facility]  [facility]  (Phone)[phone]    (Phone) [phone]        (Fax) [phone]      ** Please note that you will NOT receive a reminder call regarding your scheduled testing, reminder calls are for provider appointments only.  If you are scheduled for testing within the [employer] system you may receive a call regarding pre-registration for billing purposes only.**     --------------------------------------------------------------------------------------------------------------    Interested in joining a support group?    Pulmonary Hypertension Association  [email or web address].phassociation.org/  **Look at the Events Tab** They even have Support Groups that you can call into    [facility] PH  Support Group  Second Saturday of the Month from 1-3 PM   Location: [address] [place] [address] [facility] [address]  Leader: Jessenia Moeller and Marielos Loo  Phone: [phone] or [phone]  Email: [email or web address]

## 2022-02-10 NOTE — NURSING NOTE
Chief Complaint   Patient presents with     Follow Up     no questions/concerns at time of rooming, pt states she has been trying fluticasone instead of atrovent        Patient denies any changes since echeck-in regarding medication and allergies and states all information entered during echeck-in remains accurate.      Emanuel Antonio, VF/CMA

## 2022-02-10 NOTE — LETTER
2/10/2022      RE: Karen Anderson  [address] [facility] [address]       Dear Colleague,    Thank you for the opportunity to participate in the care of your patient, Karen Anderson, at the [facility] [place] [facility]. Please see a copy of my visit note below.          CARDIOLOGY PH CLINIC VIDEO VISIT    Date of video visit: 02/10/22      Karen Anderson is a 81 year old female who is being evaluated via a billable video visit.        I have reviewed and updated the patient's Past Medical History, Social History, Family History and Medication List.    MEDICATIONS:  Current Outpatient Medications   Medication Sig Dispense Refill     acetaminophen (TYLENOL) 325 MG tablet Take 325 mg by mouth every 6 hours as needed for mild pain       albuterol (PROAIR HFA/PROVENTIL HFA/VENTOLIN HFA) 108 (90 Base) MCG/ACT inhaler Inhale 2 puffs into the lungs every 4 hours as needed for shortness of breath / dyspnea or wheezing        alendronate (FOSAMAX) 70 MG tablet Take 70 mg by mouth once a week On Mondays       Ascorbic Acid (VITAMIN C) 500 MG CAPS Take 500 mg by mouth every morning        aspirin (ASA) 81 MG tablet Take 81 mg by mouth daily        beclomethasone HFA (QVAR REDIHALER) 80 MCG/ACT inhaler Inhale 2 puffs into the lungs 2 times daily       bumetanide (BUMEX) 2 MG tablet Take 2 tablets (4 mg) by mouth 3 times daily 540 tablet 3     calcium citrate-vitamin D (CALCIUM CITRATE + D3) 315-250 MG-UNIT TABS per tablet Take 1 tablet by mouth 2 times daily        Corn Dextrin (CLEAR FIBER POWDER PO) Take by mouth daily        digoxin (LANOXIN) 62.5 MCG tablet Take 1 tablet (62.5 mcg) by mouth daily 90 tablet 1     gabapentin (NEURONTIN) 300 MG capsule Take 300 mg by mouth 3 times daily Can take an additional 300mg at bedtime as needed       hydroxychloroquine (PLAQUENIL) 200 MG tablet Take 200 mg by mouth daily        ipratropium  (ATROVENT) 0.06 % nasal spray Spray 2 sprays into both nostrils 3 times daily        ketotifen (ZADITOR) 0.025 % ophthalmic solution Place 1 drop into both eyes daily       macitentan (OPSUMIT) 10 MG tablet Take 1 tablet (10 mg) by mouth every other day 15 tablet 11     potassium chloride ER (KLOR-CON M) 20 MEQ CR tablet Take 2 tablets (40 mEq) by mouth 3 times daily 540 tablet 3     sodium chloride (OCEAN) 0.65 % nasal spray Spray 1 spray into both nostrils daily as needed for congestion       treprostinil (REMODULIN) 60 mcg/mL in glycine diluent 50 mL infusion Inject 44.5 ng/kg/min into the vein continuous Goal Dose: 44.5 ng/kg/min 1368 mL 0     warfarin ANTICOAGULANT (COUMADIN) 3 MG tablet TAKE 3 MG BY MOUTH ON MONDAY, WEDNESDAY, FRIDAY, AND SATURDAY, AND 4.5 MG ON ALL OTHER DAYS OF THE WEEK 170 tablet 3       ALLERGIES  Sulfa drugs      Self reported vitals:  Wt: 106 lb  /64    Brief physical exam:  General: In no acute distress, upright and calm. Accompanied by her  today.  Eyes: No apparent redness or discharge.   Chest: No labored breathing, no cough during exam or audible wheezing.   Neuro: No obvious focal defects or tremors.   Psych: Alert and oriented. Does not appear anxious.     The rest of a comprehensive physical examination is deferred due to public health emergency video visit restrictions.       Primary PH cardiologist: Dr. Turcios       HPI:  Ms. Anderson is a pleasant 81 year old female with a PMHx including rheumatoid arthritis, hypertension, scoliosis, asthma, pulmonary MAC, breast cancer s/p chemotherapy with Adriamycin, Cytoxan, Taxol, and tamoxifen and status post left mastectomy. She also has a history of extensive bilateral pulmonary emboli and DVT in 1998 thought to be secondary to tamoxifen along with chemotherapy induced cardiomyopathy. 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 Although she had proximal disease, she was too high risk for  surgical PTE. She had a BPA in Jan 2021 which was complicated by hemoptysis, requiring FFP. She has required frequent diuretic adjustments, and we have been working on medical management. She is currently on combination therapy with IV Remodulin therapy along with Opsumit, and had failed Adempas due to dizziness and hypotension.     Karen was seen last by Dr. Turcios in September via virtual visit. She reported doing relatively well and did not sounds to be volume overloaded. No changes were made at that time.    Today, I'm doing another virtual visit with Karen for follow up. She tells me that things remain about the same. Her weight is fluctuating a few pounds up and down but sounds to be stable overall. She still has edema, but says no worse than usual and she continues with leg wraps. Breathing has been ok, and she denies recurrence of dizziness. She has not fallen since we met last. BP has been acceptable at home as well.       Most recent labs were reviewed as below.         CURRENT PULMONARY HYPERTENSION REGIMEN:     PAH Rx: Remodulin 45.5ng/kg/min, Opsumit 10mg every other day  (failed Adempas due to dizziness)     Diuretics: Bumex 4mg TID     Oxygen: NC 1-2LPM     Anticoagulation: warfarin   Indication: CTEPH        Assessment/Plan:        1. Chronic thromboembolic pulmonary hypertension.              --Ms. Anderson has CTEPH with sisgnificant RV dysfunction. Although she had proximal disease, she was felt to be too high risk for surgical PTE here and at [facility]. She was offered potential PTE at [place], but still felt to be very high risk. She did have BPA here in January os 2021 but this was complicated by hemoptysis. No further surgical interventions are being offered at this time. Currently, we will continue to focus on medical management.              --She is on IV Remodulin at 45.5ng/kg/min. She is not able to tolerate Adempas due to dizziness, and on Opsumit 10mg every other day for similar  reasons. She has inquired whether we can do anything further for her PH. As she appears to have stabilized over the last few months, will review with Dr. Turcios whether further titration of her Remodulin can be considered. [Addendum: After continued discussion, no medication changes for now. ]              --Her weight is mostly between 101-106# at home on bumex 4mg TID. Recent NT-proBNP has trended up slightly, and renal function appears overall stable. Will continue to monitor closely.               --Continue digoxin for RV support. BP has remained acceptable, though will need to continue to monitor given renal concerns.               --Continue anticoagulation with warfarin for her CTEPH, though we have been monitoring with a lower INR goal of 2-2.5 after some bleeding issues after a fall. She is having a harder time staying in this goal; will d/w Dr. Turcios whether we can broaden back out to 2-3. She now has equipment to do home checks.              --Continue oxygen at 1-2L with exertion. I told her that if sats are >92% at rest, she may take off while sitting.      Follow up plan: Return in 2 months to see Dr. Turcios, or sooner if needed.       Testing/labs:    Most recent labs:   Results for ELAINA SUTTON (MRN [id number]) as of 2/10/2022 13:25   Ref. Range 2/7/2022 10:02   Sodium Latest Ref Range: 133 - 144 mmol/L 141   Potassium Latest Ref Range: 3.4 - 5.3 mmol/L 4.5   Chloride Latest Ref Range: 94 - 109 mmol/L 104   Carbon Dioxide Latest Ref Range: 20 - 32 mmol/L 34 (H)   Urea Nitrogen Latest Ref Range: 7 - 30 mg/dL 27   Creatinine Latest Ref Range: 0.52 - 1.04 mg/dL 1.25 (H)   GFR Estimate Latest Ref Range: >60 mL/min/1.73m2 44 (L)   Calcium Latest Ref Range: 8.5 - 10.1 mg/dL 9.9   Anion Gap Latest Ref Range: 3 - 14 mmol/L 3   Albumin Latest Ref Range: 3.4 - 5.0 g/dL 3.6   Protein Total Latest Ref Range: 6.8 - 8.8 g/dL 7.3   Bilirubin Total Latest Ref Range: 0.2 - 1.3 mg/dL 0.4   Alkaline  Phosphatase Latest Ref Range: 40 - 150 U/L 135   ALT Latest Ref Range: 0 - 50 U/L 22   AST Latest Ref Range: 0 - 45 U/L 31   N-Terminal Pro Bnp Latest Ref Range: 0 - 450 pg/mL 2,309 (H)   Glucose Latest Ref Range: 70 - 99 mg/dL 93   WBC Latest Ref Range: 4.0 - 11.0 10e3/uL 4.2   Hemoglobin Latest Ref Range: 11.7 - 15.7 g/dL 12.9   Hematocrit Latest Ref Range: 35.0 - 47.0 % 42.5   Platelet Count Latest Ref Range: 150 - 450 10e3/uL 221   RBC Count Latest Ref Range: 3.80 - 5.20 10e6/uL 4.26   MCV Latest Ref Range: 78 - 100 fL 100   MCH Latest Ref Range: 26.5 - 33.0 pg 30.3   MCHC Latest Ref Range: 31.5 - 36.5 g/dL 30.4 (L)   RDW Latest Ref Range: 10.0 - 15.0 % 15.8 (H)   INR Point of Care Latest Ref Range: 0.9 - 1.1  2.8 (H)           Other recent pertinent testing:    Echo 6/23/2021  Interpretation Summary  Severe pulmonary hypertension is present. Right ventricular systolic pressure  is 117mmHg above the right atrial pressure.  Global and regional left ventricular function is normal with an EF of 55-60%.  Flattened septum is consistent with right ventricular pressure and volume  overload.  Moderate right ventricular dilation is present. Global right ventricular  function is moderately reduced.  Moderate tricuspid insufficiency is present.  Small circumferential pericardial effusion is present without any hemodynamic  significance.  IVC diameter >2.1 cm collapsing <50% with sniff suggests a high RA pressure  estimated at 15 mmHg or greater.  There has been no change.    [facility]  3/17/2021        Severe pulmonary hypertension    Normal cardiac output by Thermodilution    Elevated left sided filling pressures    No signficant change when comapred to her last hemodynamic assessment            Pressures Phase: Baseline                                                     Time Systolic (mmHg) Diastolic (mmHg) Mean (mmHg) A Wave (mmHg) V Wave (mmHg) EDP (mmHg) Max dp/dt (mmHg/sec) HR (bpm) Content (mL/dL) SAT (%)   RA Pressures   2:31 PM     14    14    22        63          RV Pressures  2:16 PM             912               2:32 PM 87    9          28      62          PA Pressures  2:32 PM 88    22    44            62          PCW Pressures  2:33 PM     14                                               NYHA Functional Class:  Functional class 3    1--No limitation of activity  2--Slight limitation, ordinary activities may cause symptoms  3--Marked limitation, less than ordinary activities cause symptoms  4--Severe limitation, minimal activity causes symptoms, or symptoms at rest    Shawna Marcum PA-C  [facility]  Pager [phone]

## 2022-02-10 NOTE — PROGRESS NOTES
Karen Anderson  is being evaluated via a billable video visit.      How would you like to obtain your AVS? Locally  For the video visit, send the invitation by: Text to cell phone: ***  Will anyone else be joining your video visit? Yes: ***. How would they like to receive their invitation? {:[id number]}  {If patient encounters technical issues they should call [phone]}

## 2022-02-11 ENCOUNTER — DOCUMENTATION ONLY (OUTPATIENT)
Dept: ANTICOAGULATION | Facility: CLINIC | Age: 82
End: 2022-02-11
Payer: COMMERCIAL

## 2022-02-11 ENCOUNTER — TELEPHONE (OUTPATIENT)
Dept: CARDIOLOGY | Facility: CLINIC | Age: 82
End: 2022-02-11
Payer: COMMERCIAL

## 2022-02-11 DIAGNOSIS — Z79.01 LONG TERM CURRENT USE OF ANTICOAGULANT THERAPY: Primary | ICD-10-CM

## 2022-02-11 NOTE — PROGRESS NOTES
Received an in-basket message from PRIYA Ansari with Dr. Turcios pt's goal range is changed to 2.0-3.0. Message sent to PRIYA Ansari to put in orders for DANIE Turcios to sign regarding new goal range

## 2022-02-11 NOTE — TELEPHONE ENCOUNTER
"Staff message sent to INR clinic with updated goal range. Kezia Arnett RN on 2/11/2022 at 11:52 AM      ----- Message from Kezia Arnett RN sent at 2/11/2022 11:43 AM CST -----  Regarding: FW: Remodulin and INR    ----- Message -----  From: Shawna Marcum PA  Sent: 2/11/2022  11:42 AM CST  To: Cardiology Ph Nurse-  Subject: FW: Remodulin and INR                            Please contact her INR clinic and let her know we can broaden her INR goal back to 2-3.  I will contact the patient with the other info about no other changes.    Thanks  Shawna      ----- Message -----  From: Karolina Turcios MD  Sent: 2/11/2022   5:56 AM CST  To: MAGEN Saeed  Subject: RE: Remodulin and INR                            Hi Shawna,    Thank you.    I agree with you. I have also been very reluctant to change anything as she has been doing \"relatively\" well and out of the hospital for now almost a year.     I would lean towards don't rock the boat.     I am ok with increasing her INR to 2-3    TT    ----- Message -----  From: Shawna Marcum PA  Sent: 2/10/2022   4:21 PM CST  To: Karolina Turcios MD  Subject: Remodulin and INR                                Saw Karen virtually today. Sounds to me as if she is about the same as last visit. I had wanted to leave things be, but she continues to ask if there is more she can do now, like retrial Adempas, but she felt so much better after we stopped so I just don't see a utility in this.   I said now that she is stable we could MAYBE reconsider trying to go up on Remodulin some more, but that I would discuss with you first. Thoughts?    Also--she still has an INR goal of 2.0-2.5, I think we lowered this when she had that fall and her leg was having a hematoma etc. She's having a hard time keeping in that range. You ok with going back to goal 2-3?    Thanks  Shawna            "

## 2022-02-11 NOTE — NURSING NOTE
"Doing ok, but I'm going to reach out to TT to see if he would like to titrate her IV Dennis any further. Otherwise no changes.     Please put in for 2 month follow up with TT in clinic with labs.   ______________________________________    Orders placed and patient marked \"ready for checkout.\" Kezia Arnett RN on 2/11/2022 at 7:45 AM    "

## 2022-02-11 NOTE — PROGRESS NOTES
Addendum 2/11/22 Pt called reporting that this week she noticed when she gargled in the morning she noticed that this was red. She usually gargles with hot water and after seeing this switched to cold water. After switching to cold water she has not noticed anymore blood in her saliva. Pt reports she has chronic allergies and is on a allergy nose spray that was changed to Fluconazole. Nose sprays historically don't interact with Warfarin. Writer asked about dental hygiene and pt didn't think this should be a factor and reports that she brushes with an electric toothbrush. Writer encouraged to continue to monitor for bleeding and continue to gargle with cold water. If pt continues to see bright red blood in salvia and doesn't suspect trauma then she should go to the ER to be assessed. Pt communicated understanding. Tanisha Kimbrough RN

## 2022-02-14 ENCOUNTER — ANTICOAGULATION THERAPY VISIT (OUTPATIENT)
Dept: ANTICOAGULATION | Facility: CLINIC | Age: 82
End: 2022-02-14
Payer: COMMERCIAL

## 2022-02-14 ENCOUNTER — TRANSFERRED RECORDS (OUTPATIENT)
Dept: HEALTH INFORMATION MANAGEMENT | Facility: CLINIC | Age: 82
End: 2022-02-14
Payer: COMMERCIAL

## 2022-02-14 DIAGNOSIS — I27.20 PULMONARY HYPERTENSION (H): Primary | ICD-10-CM

## 2022-02-14 DIAGNOSIS — Z79.01 LONG TERM CURRENT USE OF ANTICOAGULANT THERAPY: ICD-10-CM

## 2022-02-14 DIAGNOSIS — I27.24 CTEPH (CHRONIC THROMBOEMBOLIC PULMONARY HYPERTENSION) (H): ICD-10-CM

## 2022-02-14 LAB — INR (EXTERNAL): 1.9 (ref 0.9–1.1)

## 2022-02-14 RX ORDER — WARFARIN SODIUM 1 MG/1
TABLET ORAL
Qty: 60 TABLET | Refills: 1 | Status: SHIPPED | OUTPATIENT
Start: 2022-02-14 | End: 2022-12-13 | Stop reason: ALTCHOICE

## 2022-02-14 NOTE — PROGRESS NOTES
ANTICOAGULATION MANAGEMENT     Karen Anderson 81 year old female is on warfarin with subtherapeutic INR result. (Goal INR 2.0-3.0)    Recent labs: (last 7 days)     02/14/22  1125   INR 1.9*       ASSESSMENT     Source(s): Chart Review and Patient/Caregiver Call       Warfarin doses taken: Warfarin taken as instructed    Diet: No new diet changes identified    New illness, injury, or hospitalization: No    Medication/supplement changes: reports she has chronic allergies and is on a allergy nose spray that was changed to Fluconazole    Signs or symptoms of bleeding or clotting: No-reports decrease in blood in salvia.    Previous INR: Supratherapeutic    Additional findings: 1 mg tabs ordered today for dosing flexibility     PLAN     Recommended plan for ongoing change(s) affecting INR     Dosing Instructions: Continue your current warfarin dose with next INR in 1 week       Summary  As of 2/14/2022    Full warfarin instructions:  4.5 mg every day   Next INR check:  2/21/2022             Telephone call with Karen who verbalizes understanding and agrees to plan and who agrees to plan and repeated back plan correctly    Patient to recheck with home meter    Education provided: Goal range and significance of current result, Monitoring for bleeding signs and symptoms and Contact [phone] with any changes, questions or concerns.     Plan made per [facility] anticoagulation protocol    GOLDY PERALTA RN  Anticoagulation Clinic  2/14/2022    _______________________________________________________________________     Anticoagulation Episode Summary     Current INR goal:  2.0-3.0   TTR:  24.8 % (10.8 mo)   Target end date:  Indefinite   Send INR reminders to:  [facility]    Indications    Pulmonary hypertension (H) [I27.20]  CTEPH (chronic thromboembolic pulmonary hypertension) (H) [I27.24]  Long term current use of anticoagulant therapy [Z79.01]           Comments:  call home first, OK to leave message on either  phone. OK to speak with spouse, Santana.  [facility] SPEAK IN TABLETS 2/11/22:  new goal 2.0-3.0  [facility] in [place] and will need to fax orders P [phone] F [phone]         Anticoagulation Care Providers     Provider Role Specialty Phone number    Karolina Turcios MD Referring Cardiovascular Disease [phone]

## 2022-02-21 ENCOUNTER — TRANSFERRED RECORDS (OUTPATIENT)
Dept: HEALTH INFORMATION MANAGEMENT | Facility: CLINIC | Age: 82
End: 2022-02-21
Payer: COMMERCIAL

## 2022-02-21 ENCOUNTER — ANTICOAGULATION THERAPY VISIT (OUTPATIENT)
Dept: ANTICOAGULATION | Facility: CLINIC | Age: 82
End: 2022-02-21
Payer: COMMERCIAL

## 2022-02-21 DIAGNOSIS — I27.20 PULMONARY HYPERTENSION (H): Primary | ICD-10-CM

## 2022-02-21 DIAGNOSIS — I27.24 CTEPH (CHRONIC THROMBOEMBOLIC PULMONARY HYPERTENSION) (H): ICD-10-CM

## 2022-02-21 DIAGNOSIS — Z79.01 LONG TERM CURRENT USE OF ANTICOAGULANT THERAPY: ICD-10-CM

## 2022-02-21 LAB — INR (EXTERNAL): 2.3 (ref 0.9–1.1)

## 2022-02-21 NOTE — PROGRESS NOTES
ANTICOAGULATION MANAGEMENT     Karen RICO [place] 81 year old female is on warfarin with therapeutic INR result. (Goal INR 2.0-3.0)    Recent labs: (last 7 days)     02/21/22  0000   INR 2.3*       ASSESSMENT     Source(s): Patient/Caregiver Call       Warfarin doses taken: Warfarin taken as instructed    Diet: No new diet changes identified    New illness, injury, or hospitalization: No    Medication/supplement changes: None noted    Signs or symptoms of bleeding or clotting: No    Previous INR: Subtherapeutic    Additional findings: Pt uses [employer] home monitoring machine, but reports she would like to go out 2 weeks between draws and that she has spoke with Janice on this issue and the rep noted that this would happen.      PLAN     Recommended plan for no diet, medication or health factor changes affecting INR     Dosing Instructions: Continue your current warfarin dose with next INR in 2 weeks       Summary  As of 2/21/2022    Full warfarin instructions:  4.5 mg every day   Next INR check:  3/7/2022             Telephone call with Karen who verbalizes understanding and agrees to plan and who agrees to plan and repeated back plan correctly    Patient to recheck with home meter    Education provided: None required    Plan made per [facility] anticoagulation protocol    Tanisha Kimbrough, RN  Anticoagulation Clinic  2/21/2022    _______________________________________________________________________     Anticoagulation Episode Summary     Current INR goal:  2.0-3.0   TTR:  26.4 % (10.8 mo)   Target end date:  Indefinite   Send INR reminders to:  [facility]    Indications    Pulmonary hypertension (H) [I27.20]  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 OK to speak with spouse, Don.  [facility] 2/11/22:  new goal 2.0-3.0  [facility] in [place] and will need  to fax orders P [phone] F [phone]         Anticoagulation Care Providers     Provider Role Specialty Phone number    Karolina Turcios MD Referring Cardiovascular Disease [phone]

## 2022-02-25 ENCOUNTER — MYC MEDICAL ADVICE (OUTPATIENT)
Dept: CARDIOLOGY | Facility: CLINIC | Age: 82
End: 2022-02-25
Payer: COMMERCIAL

## 2022-03-07 ENCOUNTER — ANTICOAGULATION THERAPY VISIT (OUTPATIENT)
Dept: ANTICOAGULATION | Facility: CLINIC | Age: 82
End: 2022-03-07
Payer: COMMERCIAL

## 2022-03-07 ENCOUNTER — TRANSFERRED RECORDS (OUTPATIENT)
Dept: HEALTH INFORMATION MANAGEMENT | Facility: CLINIC | Age: 82
End: 2022-03-07
Payer: COMMERCIAL

## 2022-03-07 DIAGNOSIS — Z79.01 LONG TERM CURRENT USE OF ANTICOAGULANT THERAPY: ICD-10-CM

## 2022-03-07 DIAGNOSIS — I27.20 PULMONARY HYPERTENSION (H): Primary | ICD-10-CM

## 2022-03-07 DIAGNOSIS — I27.24 CTEPH (CHRONIC THROMBOEMBOLIC PULMONARY HYPERTENSION) (H): ICD-10-CM

## 2022-03-07 LAB — INR (EXTERNAL): 1.8 (ref 0.9–1.1)

## 2022-03-07 NOTE — PROGRESS NOTES
ANTICOAGULATION MANAGEMENT     Karen Anderson 81 year old female is on warfarin with subtherapeutic INR result. (Goal INR 2.0-3.0)    Recent labs: (last 7 days)     03/07/22  1241   INR 1.8*       ASSESSMENT       Source(s): Chart Review and Patient/Caregiver Call       Warfarin doses taken: Warfarin taken as instructed    Diet: No new diet changes identified    New illness, injury, or hospitalization: No    Medication/supplement changes: None noted    Signs or symptoms of bleeding or clotting: No    Previous INR: Therapeutic last visit; previously outside of goal range    Additional findings: None-INR range 1.8 to 2.8 on 31.5 mg/week. No change to maintenance dose at this time.        PLAN     Recommended plan for no diet, medication or health factor changes affecting INR     Dosing Instructions: Booster dose then continue your current warfarin dose with next INR in 2 weeks       Summary  As of 3/7/2022    Full warfarin instructions:  3/7: 6 mg; Otherwise 4.5 mg every day   Next INR check:  3/21/2022             Telephone call with Karen who verbalizes understanding and agrees to plan and who agrees to plan and repeated back plan correctly    Patient to recheck with home meter    Education provided: Goal range and significance of current result, Monitoring for bleeding signs and symptoms and Contact [phone] with any changes, questions or concerns.     Plan made per [facility] anticoagulation protocol    GOLDY PERALTA RN  Anticoagulation Clinic  3/7/2022    _______________________________________________________________________     Anticoagulation Episode Summary     Current INR goal:  2.0-3.0   TTR:  29.0 % (10.8 mo)   Target end date:  Indefinite   Send INR reminders to:  [facility]    Indications    Pulmonary hypertension (H) [I27.20]  CTEPH (chronic thromboembolic pulmonary hypertension) (H) [I27.24]  Long term current use of anticoagulant therapy [Z79.01]           Comments:  call home first, OK to  leave message on either phone. OK to speak with spouse, Don.  [facility] 2/11/22:  new goal 2.0-3.0  [facility] in [place] and will need to fax orders P [phone] F [phone]         Anticoagulation Care Providers     Provider Role Specialty Phone number    Karolina Turcios MD Referring Cardiovascular Disease [phone]

## 2022-03-17 ENCOUNTER — TELEPHONE (OUTPATIENT)
Dept: CARDIOLOGY | Facility: CLINIC | Age: 82
End: 2022-03-17
Payer: COMMERCIAL

## 2022-03-17 NOTE — TELEPHONE ENCOUNTER
2 days ago started to notice elevated HR in the 90s. 102.4 F; going up a degree a day since last week. No appetite. The catheter site looks okay; no redness, discharge, or pain at the site. Advised he reach out to the PCP, as it sounds like a viral infection. Don verbalized understanding and did not have any further questions. Kezia Arnett RN on 3/17/2022 at 12:33 PM

## 2022-03-21 ENCOUNTER — ANTICOAGULATION THERAPY VISIT (OUTPATIENT)
Dept: ANTICOAGULATION | Facility: CLINIC | Age: 82
End: 2022-03-21
Payer: COMMERCIAL

## 2022-03-21 ENCOUNTER — TRANSFERRED RECORDS (OUTPATIENT)
Dept: HEALTH INFORMATION MANAGEMENT | Facility: CLINIC | Age: 82
End: 2022-03-21
Payer: COMMERCIAL

## 2022-03-21 ENCOUNTER — DOCUMENTATION ONLY (OUTPATIENT)
Dept: LAB | Facility: CLINIC | Age: 82
End: 2022-03-21

## 2022-03-21 DIAGNOSIS — I27.20 PULMONARY HYPERTENSION (H): Primary | ICD-10-CM

## 2022-03-21 DIAGNOSIS — I27.24 CTEPH (CHRONIC THROMBOEMBOLIC PULMONARY HYPERTENSION) (H): ICD-10-CM

## 2022-03-21 DIAGNOSIS — Z79.01 LONG TERM CURRENT USE OF ANTICOAGULANT THERAPY: ICD-10-CM

## 2022-03-21 LAB — INR (EXTERNAL): 2.6 (ref 0.9–1.1)

## 2022-03-21 NOTE — PROGRESS NOTES
ANTICOAGULATION MANAGEMENT     Karen Anderson 81 year old female is on warfarin with therapeutic INR result. (Goal INR 2.0-3.0)    Recent labs: (last 7 days)     03/21/22  0000   INR 2.6*       ASSESSMENT       Source(s): Chart Review and Patient/Caregiver Call       Warfarin doses taken: Warfarin taken as instructed    Diet: reports that she has been having poor PO intake, finally feeling better today and able to eat something    New illness, injury, or hospitalization: Yes: has not been feeling well with poor PO intake, diarrhea and elevated temp. Feeling better today    Medication/supplement changes: None noted    Signs or symptoms of bleeding or clotting: No    Previous INR: Subtherapeutic    Additional findings: None       PLAN     Recommended plan for no diet, medication or health factor changes affecting INR     Dosing Instructions: Continue your current warfarin dose with next INR in 1 week       Summary  As of 3/21/2022    Full warfarin instructions:  4.5 mg every day   Next INR check:  3/28/2022             Telephone call with Karen who verbalizes understanding and agrees to plan and who agrees to plan and repeated back plan correctly    Lab visit scheduled    Education provided: Goal range and significance of current result, Importance of notifying clinic for diarrhea, nausea/vomiting, reduced intake, and/or illness; a sooner lab recheck maybe needed. and Contact [phone] with any changes, questions or concerns.     Plan made per [facility] anticoagulation protocol    GOLDY PERALTA RN  Anticoagulation Clinic  3/21/2022    _______________________________________________________________________     Anticoagulation Episode Summary     Current INR goal:  2.0-3.0   TTR:  31.5 % (10.8 mo)   Target end date:  Indefinite   Send INR reminders to:  [facility]    Indications    Pulmonary hypertension (H) [I27.20]  CTEPH (chronic thromboembolic pulmonary hypertension) (H) [I27.24]  Long term current use  of anticoagulant therapy [Z79.01]           Comments:  call home first, OK to leave message on either phone. OK to speak with spouse, Don.  [facility] 2/11/22:  new goal 2.0-3.0  [facility] in [place] and will need to fax orders P [phone] F [phone]         Anticoagulation Care Providers     Provider Role Specialty Phone number    Karolina Turcios MD Referring Cardiovascular Disease [phone]

## 2022-03-25 ENCOUNTER — DOCUMENTATION ONLY (OUTPATIENT)
Dept: LAB | Facility: CLINIC | Age: 82
End: 2022-03-25

## 2022-03-25 NOTE — PROGRESS NOTES
This patient has a future lab only appointment on 03/28/2022 and needs orders. Please send orders. She states she needs a comp, cbc and BNP. Thanks chinyere

## 2022-03-28 ENCOUNTER — LAB (OUTPATIENT)
Dept: LAB | Facility: CLINIC | Age: 82
End: 2022-03-28
Payer: COMMERCIAL

## 2022-03-28 ENCOUNTER — ANTICOAGULATION THERAPY VISIT (OUTPATIENT)
Dept: ANTICOAGULATION | Facility: CLINIC | Age: 82
End: 2022-03-28

## 2022-03-28 ENCOUNTER — TRANSFERRED RECORDS (OUTPATIENT)
Dept: HEALTH INFORMATION MANAGEMENT | Facility: CLINIC | Age: 82
End: 2022-03-28

## 2022-03-28 DIAGNOSIS — R06.02 SOB (SHORTNESS OF BREATH): ICD-10-CM

## 2022-03-28 DIAGNOSIS — Z79.01 LONG TERM CURRENT USE OF ANTICOAGULANT THERAPY: ICD-10-CM

## 2022-03-28 DIAGNOSIS — I27.24 CTEPH (CHRONIC THROMBOEMBOLIC PULMONARY HYPERTENSION) (H): ICD-10-CM

## 2022-03-28 DIAGNOSIS — I27.20 PULMONARY HYPERTENSION (H): ICD-10-CM

## 2022-03-28 DIAGNOSIS — I27.20 PULMONARY HYPERTENSION (H): Primary | ICD-10-CM

## 2022-03-28 LAB
ERYTHROCYTE [DISTWIDTH] IN BLOOD BY AUTOMATED COUNT: 15.1 % (ref 10–15)
HCT VFR BLD AUTO: 36.8 % (ref 35–47)
HGB BLD-MCNC: 11.7 G/DL (ref 11.7–15.7)
HOLD SPECIMEN: NORMAL
HOLD SPECIMEN: NORMAL
INR BLD: 2.9 (ref 0.9–1.1)
MCH RBC QN AUTO: 31.5 PG (ref 26.5–33)
MCHC RBC AUTO-ENTMCNC: 31.8 G/DL (ref 31.5–36.5)
MCV RBC AUTO: 99 FL (ref 78–100)
NT-PROBNP SERPL-MCNC: 2449 PG/ML (ref 0–450)
PLATELET # BLD AUTO: 215 10E3/UL (ref 150–450)
RBC # BLD AUTO: 3.72 10E6/UL (ref 3.8–5.2)
WBC # BLD AUTO: 4.4 10E3/UL (ref 4–11)

## 2022-03-28 PROCEDURE — 85610 PROTHROMBIN TIME: CPT

## 2022-03-28 PROCEDURE — 83880 ASSAY OF NATRIURETIC PEPTIDE: CPT

## 2022-03-28 PROCEDURE — 36415 COLL VENOUS BLD VENIPUNCTURE: CPT

## 2022-03-28 PROCEDURE — 85027 COMPLETE CBC AUTOMATED: CPT

## 2022-03-28 PROCEDURE — 80053 COMPREHEN METABOLIC PANEL: CPT

## 2022-03-28 NOTE — PROGRESS NOTES
Hello, patient came in for a blood draw on 3/28/2022. She states that there should be lab orders in the chart other than her INR. See encounter from 2/25.    NE lab

## 2022-03-28 NOTE — PROGRESS NOTES
ANTICOAGULATION MANAGEMENT     Karen JACKY Monica 81 year old female is on warfarin with therapeutic INR result. (Goal INR 2.0-3.0)    Recent labs: (last 7 days)     03/28/22  1042   INR 2.9*       ASSESSMENT       Source(s): Chart Review and Patient/Caregiver Call       Warfarin doses taken: Warfarin taken as instructed    Diet: No new diet changes identified    New illness, injury, or hospitalization: No    Medication/supplement changes: None noted    Signs or symptoms of bleeding or clotting: No    Previous INR: Therapeutic last visit; previously outside of goal range    Additional findings: None       PLAN     Recommended plan for no diet, medication or health factor changes affecting INR     Dosing Instructions: Continue your current warfarin dose with next INR in 2 weeks       Summary  As of 3/28/2022    Full warfarin instructions:  4.5 mg every day   Next INR check:  4/11/2022             Telephone call with Karen who verbalizes understanding and agrees to plan and who agrees to plan and repeated back plan correctly    Patient to recheck with home meter    Education provided: Goal range and significance of current result, Monitoring for bleeding signs and symptoms and Contact [phone] with any changes, questions or concerns.     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 OK to speak with spouse, Don.  [facility] 2/11/22:  new goal 2.0-3.0  Summer's in  [place] and will need to fax orders P [phone] F [phone]         Anticoagulation Care Providers     Provider Role Specialty Phone number    Karolina Turcios MD Referring Cardiovascular Disease [phone]

## 2022-03-29 LAB
ALBUMIN SERPL-MCNC: 3.2 G/DL (ref 3.4–5)
ALP SERPL-CCNC: 111 U/L (ref 40–150)
ALT SERPL W P-5'-P-CCNC: 23 U/L (ref 0–50)
ANION GAP SERPL CALCULATED.3IONS-SCNC: 3 MMOL/L (ref 3–14)
AST SERPL W P-5'-P-CCNC: 32 U/L (ref 0–45)
BILIRUB SERPL-MCNC: 0.4 MG/DL (ref 0.2–1.3)
BUN SERPL-MCNC: 22 MG/DL (ref 7–30)
CALCIUM SERPL-MCNC: 9.8 MG/DL (ref 8.5–10.1)
CHLORIDE BLD-SCNC: 103 MMOL/L (ref 94–109)
CO2 SERPL-SCNC: 33 MMOL/L (ref 20–32)
CREAT SERPL-MCNC: 1.13 MG/DL (ref 0.52–1.04)
GFR SERPL CREATININE-BSD FRML MDRD: 49 ML/MIN/1.73M2
GLUCOSE BLD-MCNC: 79 MG/DL (ref 70–99)
POTASSIUM BLD-SCNC: 4.5 MMOL/L (ref 3.4–5.3)
PROT SERPL-MCNC: 7 G/DL (ref 6.8–8.8)
SODIUM SERPL-SCNC: 139 MMOL/L (ref 133–144)

## 2022-04-03 ASSESSMENT — ENCOUNTER SYMPTOMS
LIGHT-HEADEDNESS: 0
DYSPNEA ON EXERTION: 1
MEMORY LOSS: 0
EYE PAIN: 0
COUGH DISTURBING SLEEP: 0
STIFFNESS: 1
DISTURBANCES IN COORDINATION: 1
SINUS PAIN: 1
BOWEL INCONTINENCE: 0
EYE WATERING: 1
SPUTUM PRODUCTION: 0
NAUSEA: 0
NIGHT SWEATS: 0
WHEEZING: 0
DYSURIA: 0
CONSTIPATION: 1
ALTERED TEMPERATURE REGULATION: 1
NECK MASS: 0
POLYPHAGIA: 0
DECREASED APPETITE: 1
HEMATURIA: 0
NUMBNESS: 1
POLYDIPSIA: 1
HOARSE VOICE: 0
BLOATING: 1
EXERCISE INTOLERANCE: 1
CHILLS: 1
MYALGIAS: 1
DIZZINESS: 0
POOR WOUND HEALING: 1
JAUNDICE: 0
BRUISES/BLEEDS EASILY: 1
HEADACHES: 0
SORE THROAT: 0
SWOLLEN GLANDS: 0
HEMOPTYSIS: 0
MUSCLE CRAMPS: 0
MUSCLE WEAKNESS: 1
SKIN CHANGES: 1
LOSS OF CONSCIOUSNESS: 0
NAIL CHANGES: 0
FLANK PAIN: 0
TASTE DISTURBANCE: 0
POSTURAL DYSPNEA: 0
DIARRHEA: 1
HALLUCINATIONS: 0
FEVER: 1
ORTHOPNEA: 0
VOMITING: 0
HYPERTENSION: 0
PARALYSIS: 0
ABDOMINAL PAIN: 0
PALPITATIONS: 0
SMELL DISTURBANCE: 0
SLEEP DISTURBANCES DUE TO BREATHING: 0
WEIGHT LOSS: 1
TREMORS: 0
FATIGUE: 1
DIFFICULTY URINATING: 0
DOUBLE VISION: 0
INCREASED ENERGY: 1
TROUBLE SWALLOWING: 0
COUGH: 0
ARTHRALGIAS: 1
LEG PAIN: 0
HEARTBURN: 1
WEIGHT GAIN: 1
WEAKNESS: 1
TINGLING: 1
SINUS CONGESTION: 0
SYNCOPE: 0
EYE IRRITATION: 1
BACK PAIN: 0
RECTAL PAIN: 0
EYE REDNESS: 1
SNORES LOUDLY: 0
NECK PAIN: 0
JOINT SWELLING: 1
SEIZURES: 0
BLOOD IN STOOL: 0
SHORTNESS OF BREATH: 1
SPEECH CHANGE: 0
HYPOTENSION: 0

## 2022-04-04 ENCOUNTER — VIRTUAL VISIT (OUTPATIENT)
Dept: CARDIOLOGY | Facility: CLINIC | Age: 82
End: 2022-04-04
Attending: INTERNAL MEDICINE
Payer: COMMERCIAL

## 2022-04-04 DIAGNOSIS — R06.09 DYSPNEA ON EXERTION: ICD-10-CM

## 2022-04-04 DIAGNOSIS — I27.20 PULMONARY HYPERTENSION (H): ICD-10-CM

## 2022-04-04 PROCEDURE — G0463 HOSPITAL OUTPT CLINIC VISIT: HCPCS | Mod: PN,RTG | Performed by: INTERNAL MEDICINE

## 2022-04-04 PROCEDURE — 99215 OFFICE O/P EST HI 40 MIN: CPT | Mod: 95 | Performed by: INTERNAL MEDICINE

## 2022-04-04 NOTE — PROGRESS NOTES
Karen Anderson  is being evaluated via a billable video visit.      How would you like to obtain your AVS? Domo  For the video visit, send the invitation by: joining via [employer]  Will anyone else be joining your video visit? Yes pts   Emanuel Antonio, JOSÉ LUIS/CMA

## 2022-04-04 NOTE — PATIENT INSTRUCTIONS
Medication Changes:  No changes    Follow up Appointment Information:  Schedule an Echocardiogram in [place]  Schedule a 3 month follow up with Shawna Marcum in clinic with labs prior    Patient Instructions:  1. Continue staying active and eat a heart healthy diet.    2. Please keep current list of medications with you at all times.    3. Remember to weigh yourself daily after voiding and before you consume any food or beverages and log the numbers.  If you have gained 2 pounds overnight or 5 pounds in a week contact us immediately for medication adjustments or further instructions.    4. **Please call us immediately if you have any syncope (fainting or passing out), chest pain, edema (swelling or weight gain), or decline in your functional status (general decline in how you are feeling).    5. Patients on Remodulin (treprostinil) or Veletri (epoprostenol): Please make sure that you have your backup pump and supplies with you at all times, your mixing instructions, and contact information for your specialty pharmacy.      We are located on the third floor of [facility] on the [facility].  Our address is     [address] [place] [address] [place] [address] [place] [address]    Thank you for allowing us to be a part of your care here at the [facility]    If you have questions or concerns please contact us at:    Elan Arnett RN, BSN   Rosa Moya (Schedule,Prior Auth)  Nurse Coordinator     Clinic   Pulmonary Hypertension   Pulmonary Hypertension  [facility]  [facility]  (Phone)[phone]    (Phone) [phone]        (Fax) [phone]    ** Please note that you will NOT receive a reminder call regarding your scheduled testing, reminder calls are for provider appointments only.  If you are scheduled for testing within the [facility] system you may receive a call  regarding pre-registration for billing purposes only.**     Remember to weigh yourself daily after voiding and before you consume any food or beverages and log the numbers.  If you have gained/lost 2 pounds overnight or 5 pounds in a week contact us immediately for medication adjustments or further instructions.   **Please call us immediately if you have any syncope, chest pain, edema, or decline in your functional status.    Support Group:  Pulmonary Hypertension Association  Https://www.phassociation.org/  **Look at the Events Tab** They even have Support Groups that you can call into    [facility] Support Group  Second Saturday of the Month from 1-3 PM   Location: [address] [place] [address]  Leader: Jessenia Moeller   Phone: [phone]  Email: [email or web address]

## 2022-04-04 NOTE — LETTER
4/4/2022      RE: Karen Anderson  [address]  [facility] [address]       Dear Colleague,    Thank you for the opportunity to participate in the care of your patient, Karen Anderson, at the [facility] [place] [facility]. Please see a copy of my visit note below.        April 4, 2022     Dear Dr. Boogie:     Karen Anderson is a 81 year old female we saw in the [facility] for follow up.  As you know, she has a past medical history that includes asthma, pulmonary MAC, breast cancer s/p chemotherapy with Adriamycin, Cytoxan, Taxol and tamoxifen and s/p left mastectomy, extensive b/l pulmonary emboli and DVT in 1998 though to be secondary to tamoxifen, chemotherapy induced cardiomyopathy, rheumatoid arthritis, HTN, chronic hypoxic respiratory failure on O2 via NC 1.5lpm, multiple falls, and scoliosis.  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    With regards to her CTEPH, she was noted to have proximal disease but was felt to be too high risk for surgical PTE.  She is currently on combination therapy with IV remodulin at 44.5ng/kg/min and Opsumit every other day (due to dizziness). She is also on coumadin (with a lower INR goal 2-2.5) and digoxin. She was previously tried on Admepas which she failed due to dizziness. She underwent BPA in January 2021 which was complicated by hemoptysis requiring FFP-plan is for medical management.  She was referred to [facility] for surgical PTE but felt to be too high risk.      Today she joins us via video visit. She reports she is overall doing well. Her  is with her on the phone call. She denies any worsening SOB, palpitations, chest pain, presyncope, syncope, or worsening LE edema. She can do the stairs slowly. She is back to doing the books at home as she normally does. She reports significant  sticky white-yellow residue that forms in her mouth at night time. Otherwise throughout the day she does not experience this.     She has not started driving as previously discussed. She is interested in when escalation of her current IV prostacyclin therapy would be appropriate. She is concerned about the mild increase in her NTproBNP    Her current medical regimen includes:  Treprostinil 45.5 ng/kg/min  Macitentan 10mg daily  Digoxin 62.5mcg daily  Bumetanide 4mg tid  Aspirin 81mg daily  Warfarin   .       ROS: Complete ROS per HPI otherwise negative      Medications:   acetaminophen (TYLENOL) 325 MG tablet, Take 325 mg by mouth every 6 hours as needed for mild pain  albuterol (PROAIR HFA/PROVENTIL HFA/VENTOLIN HFA) 108 (90 Base) MCG/ACT inhaler, Inhale 2 puffs into the lungs every 4 hours as needed for shortness of breath / dyspnea or wheezing   alendronate (FOSAMAX) 70 MG tablet, Take 70 mg by mouth once a week On Mondays  Ascorbic Acid (VITAMIN C) 500 MG CAPS, Take 500 mg by mouth every morning   aspirin (ASA) 81 MG tablet, Take 81 mg by mouth daily   beclomethasone HFA (QVAR REDIHALER) 80 MCG/ACT inhaler, Inhale 2 puffs into the lungs 2 times daily  bumetanide (BUMEX) 2 MG tablet, Take 2 tablets (4 mg) by mouth 3 times daily  calcium citrate-vitamin D (CALCIUM CITRATE + D3) 315-250 MG-UNIT TABS per tablet, Take 1 tablet by mouth 2 times daily   Corn Dextrin (CLEAR FIBER POWDER PO), Take by mouth daily   digoxin (LANOXIN) 62.5 MCG tablet, Take 1 tablet (62.5 mcg) by mouth daily  gabapentin (NEURONTIN) 300 MG capsule, Take 300 mg by mouth 3 times daily Can take an additional 300mg at bedtime as needed  hydroxychloroquine (PLAQUENIL) 200 MG tablet, Take 200 mg by mouth daily   ipratropium (ATROVENT) 0.06 % nasal spray, Spray 2 sprays into both nostrils 3 times daily   ketotifen (ZADITOR) 0.025 % ophthalmic solution, Place 1 drop into both eyes daily  macitentan (OPSUMIT) 10 MG tablet, Take 1 tablet (10 mg) by  mouth every other day  potassium chloride ER (KLOR-CON M) 20 MEQ CR tablet, Take 2 tablets (40 mEq) by mouth 3 times daily  sodium chloride (OCEAN) 0.65 % nasal spray, Spray 1 spray into both nostrils daily as needed for congestion  treprostinil (REMODULIN) 60 mcg/mL in glycine diluent 50 mL infusion, Inject 45.5 ng/kg/min into the vein continuous Goal Dose: 45.5 ng/kg/min  warfarin ANTICOAGULANT (COUMADIN) 1 MG tablet, Take one tab by mouth daily or as directed by Anticoagulation Clinic.  warfarin ANTICOAGULANT (COUMADIN) 3 MG tablet, TAKE 3 MG BY MOUTH ON MONDAY, WEDNESDAY, FRIDAY, AND SATURDAY, AND 4.5 MG ON ALL OTHER DAYS OF THE WEEK    No current facility-administered medications on file prior to visit.         EXAM:  Vital signs:  There were no vitals taken for this visit.   Exam deferred 2/2 virtual encounter     Labs and studies reviewed:     Lab Results   Component Value Date    WBC 4.4 03/28/2022    HGB 11.7 03/28/2022    HCT 36.8 03/28/2022     03/28/2022     03/28/2022    POTASSIUM 4.5 03/28/2022    CHLORIDE 103 03/28/2022    CO2 33 (H) 03/28/2022    BUN 22 03/28/2022    CR 1.13 (H) 03/28/2022    GLC 79 03/28/2022    NTBNPI 6,431 (H) 05/27/2021    NTBNP 2,449 (H) 03/28/2022    AST 32 03/28/2022    ALT 23 03/28/2022    ALKPHOS 111 03/28/2022    BILITOTAL 0.4 03/28/2022    INR 2.9 (H) 03/28/2022       Echocardiogram:     TTE 6/3/21  Severe pulmonary hypertension is present. Right ventricular systolic pressure is 117mmHg above the right atrial pressure.  Global and regional left ventricular function is normal with an EF of 55-60%.  Flattened septum is consistent with right ventricular pressure and volume overload.  Moderate right ventricular dilation is present. Global right ventricular function is moderately reduced.  Moderate tricuspid insufficiency is present.  Small circumferential pericardial effusion is present without any hemodynamic significance.  IVC diameter >2.1 cm collapsing <50%  with sniff suggests a high RA pressure estimated at 15 mmHg or greater.  There has been no change.      3/17/21 RHC:  RA: 14  PA: 88/22, mPA44  PCWP: 14  TD CO/CI: 3.97/2.8  Concepcion CO/CI: 2.95/2.1  PVR: 10.2     Assessment and Plan:       Karen Anderson is a 81 year old female with history that includes asthma, pulmonary MAC, breast cancer s/p chemotherapy with Adriamycin, Cytoxan, Taxol and tamoxifen and s/p left mastectomy, extensive b/l pulmonary emboli and DVT in 1998 though to be secondary to tamoxifen, chemotherapy induced cardiomyopathy, rheumatoid arthritis, HTN, chronic hypoxic respiratory failure on O2 via NC 1.5lpm, multiple falls, and scoliosis.  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 For her CTEPH, she is on goal IV treprostinil dose at 45.5ng/kg/min, macitentan 10mg every other day (due to dizziness), digoxin, and warfarin.    We do acknowledge that her BNP has increased slightly the last two readings. This increase is mild compared to where she has been in the past and likely is expected between measurement variation. However should this continue to move in the wrong direction we may need to escalate therapies.     For now we will continue her current medication regimen as is.  From a volume standpoint she sounds euvolemic, so we will continue her Bumex 4mg tid and potassium supplementation. She is stable on her low O2 requirement. She can follow up with us in 3 months with labs, earlier if she needs to.        Mahendra Saucedo MD   Cardiology Fellow, PGY-5  April 4, 2022  12:04 PM     Video-Visit Details     Type of service:  Video Visit     Video Start Time: 4.30 pm  Video End Time: 5 pm    Patient gave verbal consent for Videovisit    Originating Location (pt. Location): Home     Distant Location (provider location):   [employer] [facility]      Platform used for Video Visit: [employer] video call    I examined the patient and agree with the assessment and  plan of Dr. Saucedo.    Total time today was 40 minutes reviewing notes, imaging, labs, patient visit, orders and documentation         Karolina Turcios MD   Center for Pulmonary Hypertension  Heart Failure, Transplant, and Mechanical Circulatory Support Cardiology   Cardiovascular Division  [facility]   [phone]

## 2022-04-04 NOTE — PROGRESS NOTES
April 4, 2022     Dear Dr. Boogie:     Karen Anderson is a 81 year old female we saw in the [facility] Pulmonary Hypertension Clinic for follow up.  As you know, she has a past medical history that includes asthma, pulmonary MAC, breast cancer s/p chemotherapy with Adriamycin, Cytoxan, Taxol and tamoxifen and s/p left mastectomy, extensive b/l pulmonary emboli and DVT in 1998 though to be secondary to tamoxifen, chemotherapy induced cardiomyopathy, rheumatoid arthritis, HTN, chronic hypoxic respiratory failure on O2 via NC 1.5lpm, multiple falls, and scoliosis.  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    With regards to her CTEPH, she was noted to have proximal disease but was felt to be too high risk for surgical PTE.  She is currently on combination therapy with IV remodulin at 44.5ng/kg/min and Opsumit every other day (due to dizziness). She is also on coumadin (with a lower INR goal 2-2.5) and digoxin. She was previously tried on Admepas which she failed due to dizziness. She underwent BPA in January 2021 which was complicated by hemoptysis requiring FFP-plan is for medical management.  She was referred to [place] [facility] for surgical PTE but felt to be too high risk.      Today she joins us via video visit. She reports she is overall doing well. Her  is with her on the phone call. She denies any worsening SOB, palpitations, chest pain, presyncope, syncope, or worsening LE edema. She can do the stairs slowly. She is back to doing the books at home as she normally does. She reports significant sticky white-yellow residue that forms in her mouth at night time. Otherwise throughout the day she does not experience this.     She has not started driving as previously discussed. She is interested in when escalation of her current IV prostacyclin therapy would be appropriate. She is concerned about the mild increase in her NTproBNP    Her current  medical regimen includes:  Treprostinil 45.5 ng/kg/min  Macitentan 10mg daily  Digoxin 62.5mcg daily  Bumetanide 4mg tid  Aspirin 81mg daily  Warfarin   .       ROS: Complete ROS per HPI otherwise negative      Medications:   acetaminophen (TYLENOL) 325 MG tablet, Take 325 mg by mouth every 6 hours as needed for mild pain  albuterol (PROAIR HFA/PROVENTIL HFA/VENTOLIN HFA) 108 (90 Base) MCG/ACT inhaler, Inhale 2 puffs into the lungs every 4 hours as needed for shortness of breath / dyspnea or wheezing   alendronate (FOSAMAX) 70 MG tablet, Take 70 mg by mouth once a week On Mondays  Ascorbic Acid (VITAMIN C) 500 MG CAPS, Take 500 mg by mouth every morning   aspirin (ASA) 81 MG tablet, Take 81 mg by mouth daily   beclomethasone HFA (QVAR REDIHALER) 80 MCG/ACT inhaler, Inhale 2 puffs into the lungs 2 times daily  bumetanide (BUMEX) 2 MG tablet, Take 2 tablets (4 mg) by mouth 3 times daily  calcium citrate-vitamin D (CALCIUM CITRATE + D3) 315-250 MG-UNIT TABS per tablet, Take 1 tablet by mouth 2 times daily   Corn Dextrin (CLEAR FIBER POWDER PO), Take by mouth daily   digoxin (LANOXIN) 62.5 MCG tablet, Take 1 tablet (62.5 mcg) by mouth daily  gabapentin (NEURONTIN) 300 MG capsule, Take 300 mg by mouth 3 times daily Can take an additional 300mg at bedtime as needed  hydroxychloroquine (PLAQUENIL) 200 MG tablet, Take 200 mg by mouth daily   ipratropium (ATROVENT) 0.06 % nasal spray, Spray 2 sprays into both nostrils 3 times daily   ketotifen (ZADITOR) 0.025 % ophthalmic solution, Place 1 drop into both eyes daily  macitentan (OPSUMIT) 10 MG tablet, Take 1 tablet (10 mg) by mouth every other day  potassium chloride ER (KLOR-CON M) 20 MEQ CR tablet, Take 2 tablets (40 mEq) by mouth 3 times daily  sodium chloride (OCEAN) 0.65 % nasal spray, Spray 1 spray into both nostrils daily as needed for congestion  treprostinil (REMODULIN) 60 mcg/mL in glycine diluent 50 mL infusion, Inject 45.5 ng/kg/min into the vein continuous Goal  Dose: 45.5 ng/kg/min  warfarin ANTICOAGULANT (COUMADIN) 1 MG tablet, Take one tab by mouth daily or as directed by Anticoagulation Clinic.  warfarin ANTICOAGULANT (COUMADIN) 3 MG tablet, TAKE 3 MG BY MOUTH ON MONDAY, WEDNESDAY, FRIDAY, AND SATURDAY, AND 4.5 MG ON ALL OTHER DAYS OF THE WEEK    No current facility-administered medications on file prior to visit.         EXAM:  Vital signs:  There were no vitals taken for this visit.   Exam deferred 2/2 virtual encounter     Labs and studies reviewed:     Lab Results   Component Value Date    WBC 4.4 03/28/2022    HGB 11.7 03/28/2022    HCT 36.8 03/28/2022     03/28/2022     03/28/2022    POTASSIUM 4.5 03/28/2022    CHLORIDE 103 03/28/2022    CO2 33 (H) 03/28/2022    BUN 22 03/28/2022    CR 1.13 (H) 03/28/2022    GLC 79 03/28/2022    NTBNPI 6,431 (H) 05/27/2021    NTBNP 2,449 (H) 03/28/2022    AST 32 03/28/2022    ALT 23 03/28/2022    ALKPHOS 111 03/28/2022    BILITOTAL 0.4 03/28/2022    INR 2.9 (H) 03/28/2022       Echocardiogram:     TTE 6/3/21  Severe pulmonary hypertension is present. Right ventricular systolic pressure is 117mmHg above the right atrial pressure.  Global and regional left ventricular function is normal with an EF of 55-60%.  Flattened septum is consistent with right ventricular pressure and volume overload.  Moderate right ventricular dilation is present. Global right ventricular function is moderately reduced.  Moderate tricuspid insufficiency is present.  Small circumferential pericardial effusion is present without any hemodynamic significance.  IVC diameter >2.1 cm collapsing <50% with sniff suggests a high RA pressure estimated at 15 mmHg or greater.  There has been no change.      3/17/21 RHC:  RA: 14  PA: 88/22, mPA44  PCWP: 14  TD CO/CI: 3.97/2.8  Concepcion CO/CI: 2.95/2.1  PVR: 10.2     Assessment and Plan:       Karen Anderson is a 81 year old female with history that includes asthma, pulmonary MAC, breast cancer s/p  chemotherapy with Adriamycin, Cytoxan, Taxol and tamoxifen and s/p left mastectomy, extensive b/l pulmonary emboli and DVT in 1998 though to be secondary to tamoxifen, chemotherapy induced cardiomyopathy, rheumatoid arthritis, HTN, chronic hypoxic respiratory failure on O2 via NC 1.5lpm, multiple falls, and scoliosis.  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 For her CTEPH, she is on goal IV treprostinil dose at 45.5ng/kg/min, macitentan 10mg every other day (due to dizziness), digoxin, and warfarin.    We do acknowledge that her BNP has increased slightly the last two readings. This increase is mild compared to where she has been in the past and likely is expected between measurement variation. However should this continue to move in the wrong direction we may need to escalate therapies.     For now we will continue her current medication regimen as is.  From a volume standpoint she sounds euvolemic, so we will continue her Bumex 4mg tid and potassium supplementation. She is stable on her low O2 requirement. She can follow up with us in 3 months with labs, earlier if she needs to.        Mahendra Saucedo MD   Cardiology Fellow, PGY-5  April 4, 2022  12:04 PM     Video-Visit Details     Type of service:  Video Visit     Video Start Time: 4.30 pm  Video End Time: 5 pm    Patient gave verbal consent for Videovisit    Originating Location (pt. Location): Home     Distant Location (provider location):  [facility]      Platform used for Video Visit: [employer] video call    I examined the patient and agree with the assessment and plan of Dr. Saucedo.    Total time today was 40 minutes reviewing notes, imaging, labs, patient visit, orders and documentation         Karolina Turcios MD   Center for Pulmonary Hypertension  Heart Failure, Transplant, and Mechanical Circulatory Support Cardiology   Cardiovascular Division  [facility]    [phone]

## 2022-04-04 NOTE — NURSING NOTE
Chief Complaint   Patient presents with     Follow Up     2 month     Patient denies any changes since echeck-in regarding medication and allergies and states all information entered during echeck-in remains accurate.    JOSÉ LUIS Tobias/CMA

## 2022-04-11 ENCOUNTER — ANTICOAGULATION THERAPY VISIT (OUTPATIENT)
Dept: ANTICOAGULATION | Facility: CLINIC | Age: 82
End: 2022-04-11
Payer: COMMERCIAL

## 2022-04-11 ENCOUNTER — TRANSFERRED RECORDS (OUTPATIENT)
Dept: HEALTH INFORMATION MANAGEMENT | Facility: CLINIC | Age: 82
End: 2022-04-11
Payer: COMMERCIAL

## 2022-04-11 DIAGNOSIS — I27.20 PULMONARY HYPERTENSION (H): Primary | ICD-10-CM

## 2022-04-11 DIAGNOSIS — I27.24 CTEPH (CHRONIC THROMBOEMBOLIC PULMONARY HYPERTENSION) (H): ICD-10-CM

## 2022-04-11 DIAGNOSIS — Z79.01 LONG TERM CURRENT USE OF ANTICOAGULANT THERAPY: ICD-10-CM

## 2022-04-11 LAB — INR (EXTERNAL): 3 (ref 0.9–1.1)

## 2022-04-11 NOTE — PROGRESS NOTES
ANTICOAGULATION MANAGEMENT     Karen Anderson 81 year old female is on warfarin with therapeutic INR result. (Goal INR 2.0-3.0)    Recent labs: (last 7 days)     04/11/22  0000   INR 3.0*       ASSESSMENT       Source(s): Patient/Caregiver Call       Warfarin doses taken: Warfarin taken as instructed    Diet: No new diet changes identified    New illness, injury, or hospitalization: No    Medication/supplement changes: None noted    Signs or symptoms of bleeding or clotting: No    Previous INR: Therapeutic last 2(+) visits    Additional findings: INR is trending and patient ok with taking doses with her 1mg tablets        PLAN     Recommended plan for no diet, medication or health factor changes affecting INR     Dosing Instructions: decrease your warfarin dose (6.3% change) with next INR in 1 week       Summary  As of 4/11/2022    Full warfarin instructions:  3.5 mg every Mon, Fri; 4.5 mg all other days   Next INR check:  4/18/2022             Telephone call with Karen who verbalizes understanding and agrees to plan and who agrees to plan and repeated back plan correctly    Patient to recheck with home meter    Education provided: None required    Plan made per [facility] anticoagulation protocol    Tanisha Kimbrough, RN  Anticoagulation Clinic  4/11/2022    _______________________________________________________________________     Anticoagulation Episode Summary     Current INR goal:  2.0-3.0   TTR:  38.2 % (11.5 mo)   Target end date:  Indefinite   Send INR reminders to:  [facility]    Indications    Pulmonary hypertension (H) [I27.20]  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 OK to speak with spouse, Don.  [facility] 2/11/22:  new goal 2.0-3.0  [place]'s in [place] and will need to fax orders P [phone] F [phone]          Anticoagulation Care Providers     Provider Role Specialty Phone number    Karolina Turcios MD Referring Cardiovascular Disease [phone]

## 2022-04-18 ENCOUNTER — ANTICOAGULATION THERAPY VISIT (OUTPATIENT)
Dept: ANTICOAGULATION | Facility: CLINIC | Age: 82
End: 2022-04-18
Payer: COMMERCIAL

## 2022-04-18 DIAGNOSIS — Z79.01 LONG TERM CURRENT USE OF ANTICOAGULANT THERAPY: ICD-10-CM

## 2022-04-18 DIAGNOSIS — I27.20 PULMONARY HYPERTENSION (H): Primary | ICD-10-CM

## 2022-04-18 DIAGNOSIS — I27.24 CTEPH (CHRONIC THROMBOEMBOLIC PULMONARY HYPERTENSION) (H): ICD-10-CM

## 2022-04-18 LAB — INR (EXTERNAL): 2.5 (ref 0.9–1.1)

## 2022-04-18 NOTE — PROGRESS NOTES
ANTICOAGULATION MANAGEMENT     Karen Anderson 81 year old female is on warfarin with therapeutic INR result. (Goal INR 2.0-3.0)    Recent labs: (last 7 days)     04/18/22  0936   INR 2.5*       ASSESSMENT       Source(s): Chart Review and Patient/Caregiver Call       Warfarin doses taken: Warfarin taken as instructed    Diet: No new diet changes identified    New illness, injury, or hospitalization: No    Medication/supplement changes: None noted    Signs or symptoms of bleeding or clotting: No    Previous INR: Therapeutic last 2(+) visits    Additional findings: None       PLAN     Recommended plan for no diet, medication or health factor changes affecting INR     Dosing Instructions: continue your current warfarin dose with next INR in 1-2 weeks       Summary  As of 4/18/2022    Full warfarin instructions:  3.5 mg every Mon, Fri; 4.5 mg all other days   Next INR check:  5/2/2022             Telephone call with Karen who verbalizes understanding and agrees to plan    Patient to recheck with home meter    Education provided: Goal range and significance of current result    Plan made per [facility] anticoagulation protocol    Mayi Herring RN  Anticoagulation Clinic  4/18/2022    _______________________________________________________________________     Anticoagulation Episode Summary     Current INR goal:  2.0-3.0   TTR:  40.3 % (11.5 mo)   Target end date:  Indefinite   Send INR reminders to:  [facility]    Indications    Pulmonary hypertension (H) [I27.20]  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

## 2022-04-19 ENCOUNTER — TELEPHONE (OUTPATIENT)
Dept: CARDIOLOGY | Facility: CLINIC | Age: 82
End: 2022-04-19
Payer: COMMERCIAL

## 2022-04-19 ENCOUNTER — MYC MEDICAL ADVICE (OUTPATIENT)
Dept: CARDIOLOGY | Facility: CLINIC | Age: 82
End: 2022-04-19
Payer: COMMERCIAL

## 2022-04-19 DIAGNOSIS — I50.810 RVF (RIGHT VENTRICULAR FAILURE) (H): ICD-10-CM

## 2022-04-19 RX ORDER — BUMETANIDE 2 MG/1
4 TABLET ORAL 3 TIMES DAILY
Qty: 540 TABLET | Refills: 3 | Status: SHIPPED | OUTPATIENT
Start: 2022-04-19 | End: 2022-12-13

## 2022-04-25 ENCOUNTER — ANTICOAGULATION THERAPY VISIT (OUTPATIENT)
Dept: ANTICOAGULATION | Facility: CLINIC | Age: 82
End: 2022-04-25

## 2022-04-25 ENCOUNTER — ANCILLARY PROCEDURE (OUTPATIENT)
Dept: CARDIOLOGY | Facility: CLINIC | Age: 82
End: 2022-04-25
Attending: INTERNAL MEDICINE
Payer: COMMERCIAL

## 2022-04-25 ENCOUNTER — TRANSFERRED RECORDS (OUTPATIENT)
Dept: HEALTH INFORMATION MANAGEMENT | Facility: CLINIC | Age: 82
End: 2022-04-25

## 2022-04-25 DIAGNOSIS — R06.09 DYSPNEA ON EXERTION: ICD-10-CM

## 2022-04-25 DIAGNOSIS — Z79.01 LONG TERM CURRENT USE OF ANTICOAGULANT THERAPY: ICD-10-CM

## 2022-04-25 DIAGNOSIS — I27.24 CTEPH (CHRONIC THROMBOEMBOLIC PULMONARY HYPERTENSION) (H): ICD-10-CM

## 2022-04-25 DIAGNOSIS — I27.20 PULMONARY HYPERTENSION (H): Primary | ICD-10-CM

## 2022-04-25 DIAGNOSIS — I27.20 PULMONARY HYPERTENSION (H): ICD-10-CM

## 2022-04-25 LAB
INR (EXTERNAL): 1.9 (ref 0.9–1.1)
LVEF ECHO: NORMAL

## 2022-04-25 PROCEDURE — 93306 TTE W/DOPPLER COMPLETE: CPT | Performed by: STUDENT IN AN ORGANIZED HEALTH CARE EDUCATION/TRAINING PROGRAM

## 2022-04-25 NOTE — PROGRESS NOTES
Pt didn't miss any doses or have an increase in greens. Continue with plan as documented below. Fabby Ferguson RN

## 2022-04-25 NOTE — PROGRESS NOTES
ANTICOAGULATION MANAGEMENT     Karne Anderson 81 year old female is on warfarin with subtherapeutic INR result. (Goal INR 2.0-3.0)    No results for input(s): INR in the last 168 hours.    ASSESSMENT       Source(s): Chart Review    Previous INR was Therapeutic last 2(+) visits    Medication, diet, health changes since last INR chart reviewed; none identified           PLAN     Recommended plan for no diet, medication or health factor changes affecting INR     Dosing Instructions: continue your current warfarin dose with next INR in 1 week       Summary  As of 4/25/2022    Full warfarin instructions:  3.5 mg every Mon, Fri; 4.5 mg all other days   Next INR check:  5/2/2022             Detailed voice message left for aKren with dosing instructions and follow up date.     Patient to recheck with home meter    Education provided: Please call back if any changes to your diet, medications or how you've been taking warfarin and Contact [phone] with any changes, questions or concerns.     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

## 2022-05-02 ENCOUNTER — TRANSFERRED RECORDS (OUTPATIENT)
Dept: HEALTH INFORMATION MANAGEMENT | Facility: CLINIC | Age: 82
End: 2022-05-02
Payer: COMMERCIAL

## 2022-05-02 ENCOUNTER — ANTICOAGULATION THERAPY VISIT (OUTPATIENT)
Dept: ANTICOAGULATION | Facility: CLINIC | Age: 82
End: 2022-05-02
Payer: COMMERCIAL

## 2022-05-02 DIAGNOSIS — I27.24 CTEPH (CHRONIC THROMBOEMBOLIC PULMONARY HYPERTENSION) (H): ICD-10-CM

## 2022-05-02 DIAGNOSIS — I27.20 PULMONARY HYPERTENSION (H): Primary | ICD-10-CM

## 2022-05-02 DIAGNOSIS — Z79.01 LONG TERM CURRENT USE OF ANTICOAGULANT THERAPY: ICD-10-CM

## 2022-05-02 LAB — INR (EXTERNAL): 2.2 (ref 0.9–1.1)

## 2022-05-02 NOTE — PROGRESS NOTES
ANTICOAGULATION MANAGEMENT     Karen Anderson 81 year old female is on warfarin with therapeutic INR result. (Goal INR 2.0-3.0)    Recent labs: (last 7 days)     05/02/22  1049   INR 2.2*       ASSESSMENT       Source(s): Chart Review and Patient/Caregiver Call       Warfarin doses taken: Warfarin taken as instructed    Diet: No new diet changes identified    New illness, injury, or hospitalization: No    Medication/supplement changes: None noted    Signs or symptoms of bleeding or clotting: No    Previous INR: Subtherapeutic    Additional findings: None       PLAN     Recommended plan for no diet, medication or health factor changes affecting INR     Dosing Instructions: continue your current warfarin dose with next INR in 1-2 weeks     Summary  As of 5/2/2022    Full warfarin instructions:  3.5 mg every Mon, Fri; 4.5 mg all other days   Next INR check:  5/9/2022             Telephone call with Karen who verbalizes understanding and agrees to plan and who agrees to plan and repeated back plan correctly    Patient to recheck with home meter    Education provided: Goal range and significance of current result and Contact [phone] with any changes, questions or concerns.     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

## 2022-05-09 ENCOUNTER — TELEPHONE (OUTPATIENT)
Dept: CARDIOLOGY | Facility: CLINIC | Age: 82
End: 2022-05-09
Payer: COMMERCIAL

## 2022-05-09 NOTE — TELEPHONE ENCOUNTER
"[employer] will be reaching out for re-certification for meal delivery; Don will be faxing paperwork over.     Don also had questions about where to take patient if she started to show \"symptoms\" and needed medical care. Advised that if it is the weekend/after hours and he couldn't get a hold of me, to go to the [place] [facility] because they can manage her pump and PH medications. Don verbalized understanding and did not have any further questions. Kezia Arnett RN on 5/9/2022 at 10:03 AM       "

## 2022-05-16 ENCOUNTER — ANTICOAGULATION THERAPY VISIT (OUTPATIENT)
Dept: ANTICOAGULATION | Facility: CLINIC | Age: 82
End: 2022-05-16
Payer: COMMERCIAL

## 2022-05-16 ENCOUNTER — TRANSFERRED RECORDS (OUTPATIENT)
Dept: HEALTH INFORMATION MANAGEMENT | Facility: CLINIC | Age: 82
End: 2022-05-16
Payer: COMMERCIAL

## 2022-05-16 DIAGNOSIS — I27.24 CTEPH (CHRONIC THROMBOEMBOLIC PULMONARY HYPERTENSION) (H): ICD-10-CM

## 2022-05-16 DIAGNOSIS — I27.20 PULMONARY HYPERTENSION (H): Primary | ICD-10-CM

## 2022-05-16 DIAGNOSIS — Z79.01 LONG TERM CURRENT USE OF ANTICOAGULANT THERAPY: ICD-10-CM

## 2022-05-16 LAB — INR (EXTERNAL): 2 (ref 0.9–1.1)

## 2022-05-16 NOTE — PROGRESS NOTES
ANTICOAGULATION MANAGEMENT     Karen Anderson 81 year old female is on warfarin with therapeutic INR result. (Goal INR 2.0-3.0)    Recent labs: (last 7 days)     05/16/22  1120   INR 2.0*       ASSESSMENT       Source(s): Chart Review and Patient/Caregiver Call       Warfarin doses taken: Warfarin taken as instructed    Diet: No new diet changes identified    New illness, injury, or hospitalization: No    Medication/supplement changes: None noted    Signs or symptoms of bleeding or clotting: No    Previous INR: Therapeutic last visit; previously outside of goal range    Additional findings: None       PLAN     Recommended plan for no diet, medication or health factor changes affecting INR     Dosing Instructions: continue your current warfarin dose with next INR in 2 weeks       Summary  As of 5/16/2022    Full warfarin instructions:  3.5 mg every Mon, Fri; 4.5 mg all other days   Next INR check:  5/30/2022             Telephone call with Karen who verbalizes understanding and agrees to plan and who agrees to plan and repeated back plan correctly    Patient to recheck with home meter    Education provided: Goal range and significance of current result and Contact [phone] with any changes, questions or concerns.     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

## 2022-05-27 ENCOUNTER — TELEPHONE (OUTPATIENT)
Dept: ANTICOAGULATION | Facility: CLINIC | Age: 82
End: 2022-05-27
Payer: COMMERCIAL

## 2022-05-27 NOTE — TELEPHONE ENCOUNTER
Karen called with questions following a skin biopsy that she had 2 days ago. She did not have to hold her warfarin for the biopsy.     Dermatology instructed her to change the bandage today, and she reports that when she cleaned it the site did ooze and bleed. She put vaseline on the area as instructed and that seemed to stop the bleeding and she has rebandaged. The bandage at this time is CDI. She will be doing daily bandage changes.     She is next due for an INR with her home monitor early next week, she is wondering if she should take her warfarin tonight.     Advised Karen that since she takes a blood thinner she is more likely to have oozing/bleeding following procedures. It is possible to have excessive bleeding with an in range INR. It seems that her bleeding is controlled at this time. Instructed her to continue warfarin at her usual dose and continue wound care/bandage changes as instructed by dermatology.     Did make her aware that there is RN triage available to speak to over the weekend if she has any concerns, and otherwise we will speak to her early next week about her INR.     Patient verbalized understanding and had no other concerns or questions

## 2022-05-30 ENCOUNTER — TRANSFERRED RECORDS (OUTPATIENT)
Dept: HEALTH INFORMATION MANAGEMENT | Facility: CLINIC | Age: 82
End: 2022-05-30
Payer: COMMERCIAL

## 2022-05-30 LAB — INR (EXTERNAL): 1.5 (ref 0.9–1.1)

## 2022-05-31 ENCOUNTER — ANTICOAGULATION THERAPY VISIT (OUTPATIENT)
Dept: ANTICOAGULATION | Facility: CLINIC | Age: 82
End: 2022-05-31
Payer: COMMERCIAL

## 2022-05-31 DIAGNOSIS — I27.20 PULMONARY HYPERTENSION (H): Primary | ICD-10-CM

## 2022-05-31 DIAGNOSIS — I27.24 CTEPH (CHRONIC THROMBOEMBOLIC PULMONARY HYPERTENSION) (H): ICD-10-CM

## 2022-05-31 DIAGNOSIS — Z79.01 LONG TERM CURRENT USE OF ANTICOAGULANT THERAPY: ICD-10-CM

## 2022-05-31 NOTE — PROGRESS NOTES
ANTICOAGULATION MANAGEMENT     Karen RICO Calderonhilariacharbel 81 year old female is on warfarin with subtherapeutic INR result. (Goal INR 2.0-3.0)    Recent labs: (last 7 days)     05/30/22  0000   INR 1.5*       ASSESSMENT       Source(s): Chart Review and Patient/Caregiver Call       Warfarin doses taken: Warfarin taken as instructed    Diet: No new diet changes identified    New illness, injury, or hospitalization: Yes: Patient has been sick with cold symptoms    Medication/supplement changes: None noted    Signs or symptoms of bleeding or clotting: No    Previous INR: Therapeutic last 2(+) visits    Additional findings: None       PLAN     Recommended plan for no diet, medication or health factor changes affecting INR     Dosing Instructions: Increase your warfarin dose (6.8% change) with next INR in 1 week       Summary  As of 5/31/2022    Full warfarin instructions:  4.5 mg every day   Next INR check:  6/6/2022             Telephone call with Karen who verbalizes understanding and agrees to plan and who agrees to plan and repeated back plan correctly    Patient to recheck with home meter    Education provided: Goal range and significance of current result and Importance of therapeutic range    Plan made per [facility] anticoagulation protocol    Brittany Kirk RN  Anticoagulation Clinic  5/31/2022    _______________________________________________________________________     Anticoagulation Episode Summary     Current INR goal:  2.0-3.0   TTR:  42.3 % (11.6 mo)   Target end date:  Indefinite   Send INR reminders to:  [facility]    Indications    Pulmonary hypertension (H) [I27.20]  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

## 2022-06-06 ENCOUNTER — ANTICOAGULATION THERAPY VISIT (OUTPATIENT)
Dept: ANTICOAGULATION | Facility: CLINIC | Age: 82
End: 2022-06-06
Payer: COMMERCIAL

## 2022-06-06 ENCOUNTER — TRANSFERRED RECORDS (OUTPATIENT)
Dept: HEALTH INFORMATION MANAGEMENT | Facility: CLINIC | Age: 82
End: 2022-06-06

## 2022-06-06 DIAGNOSIS — I27.24 CTEPH (CHRONIC THROMBOEMBOLIC PULMONARY HYPERTENSION) (H): ICD-10-CM

## 2022-06-06 DIAGNOSIS — Z79.01 LONG TERM CURRENT USE OF ANTICOAGULANT THERAPY: ICD-10-CM

## 2022-06-06 DIAGNOSIS — I27.20 PULMONARY HYPERTENSION (H): Primary | ICD-10-CM

## 2022-06-06 LAB — INR (EXTERNAL): 2.9 (ref 0.9–1.1)

## 2022-06-06 NOTE — PROGRESS NOTES
Having trouble with home monitoring machine. Upcoming MOHS procedure in August.    ANTICOAGULATION MANAGEMENT     Karen Anderson 81 year old female is on warfarin with therapeutic INR result. (Goal INR 2.0-3.0)    Recent labs: (last 7 days)     06/06/22  1733   INR 2.9*       ASSESSMENT       Source(s): Chart Review and Patient/Caregiver Call       Warfarin doses taken: Warfarin taken as instructed    Diet: No new diet changes identified-decrease in PO intake with recent illness    New illness, injury, or hospitalization: Yes: has been experiencing cold-like symptoms for a couple of weeks-she is now starting to feel better    Medication/supplement changes: None noted    Signs or symptoms of bleeding or clotting: No    Previous INR: Subtherapeutic    Additional findings: maintenance dose increased laste encounter, patient is scheduled to receive more Warfarin in the next week and is at top of goal range    Upcoming MOHS procedure in August, historically no Warfarin hold required for this procedure. Patient is also having some trouble with her home monitoring machine and has reached out the the company for assistance.      PLAN     Recommended plan for ongoing change(s) affecting INR     Dosing Instructions: decrease your warfarin dose (3.2% change) with next INR in 1-2 weeks       Summary  As of 6/6/2022    Full warfarin instructions:  6/6: 3.5 mg; Otherwise 3.5 mg every Mon; 4.5 mg all other days   Next INR check:  6/20/2022             Telephone call with Karen who verbalizes understanding and agrees to plan and who agrees to plan and repeated back plan correctly    Patient to recheck with home meter    Education provided: Goal range and significance of current result, Monitoring for bleeding signs and symptoms, Monitoring for clotting signs and symptoms and Contact [phone] with any changes, questions or concerns.     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

## 2022-06-16 DIAGNOSIS — I27.20 PULMONARY HYPERTENSION (H): ICD-10-CM

## 2022-06-19 ENCOUNTER — MYC MEDICAL ADVICE (OUTPATIENT)
Dept: CARDIOLOGY | Facility: CLINIC | Age: 82
End: 2022-06-19
Payer: COMMERCIAL

## 2022-06-19 DIAGNOSIS — I27.20 PULMONARY HYPERTENSION (H): ICD-10-CM

## 2022-06-20 ENCOUNTER — ANTICOAGULATION THERAPY VISIT (OUTPATIENT)
Dept: ANTICOAGULATION | Facility: CLINIC | Age: 82
End: 2022-06-20
Payer: COMMERCIAL

## 2022-06-20 ENCOUNTER — TRANSFERRED RECORDS (OUTPATIENT)
Dept: HEALTH INFORMATION MANAGEMENT | Facility: CLINIC | Age: 82
End: 2022-06-20

## 2022-06-20 DIAGNOSIS — Z79.01 LONG TERM CURRENT USE OF ANTICOAGULANT THERAPY: ICD-10-CM

## 2022-06-20 DIAGNOSIS — I27.20 PULMONARY HYPERTENSION (H): Primary | ICD-10-CM

## 2022-06-20 DIAGNOSIS — I27.24 CTEPH (CHRONIC THROMBOEMBOLIC PULMONARY HYPERTENSION) (H): ICD-10-CM

## 2022-06-20 LAB — INR (EXTERNAL): 2.2 (ref 0.9–1.1)

## 2022-06-20 RX ORDER — DIGOXIN 0.06 MG/1
62.5 TABLET ORAL DAILY
Qty: 90 TABLET | Refills: 3 | Status: SHIPPED | OUTPATIENT
Start: 2022-06-20 | End: 2022-06-23

## 2022-06-20 NOTE — PROGRESS NOTES
ANTICOAGULATION MANAGEMENT     Karen Anderson 81 year old female is on warfarin with therapeutic INR result. (Goal INR 2.0-3.0)    Recent labs: (last 7 days)     06/20/22  0000   INR 2.2*       ASSESSMENT       Source(s): Chart Review and Patient/Caregiver Call       Warfarin doses taken: Warfarin taken as instructed    Diet: No new diet changes identified    New illness, injury, or hospitalization: No    Medication/supplement changes: None noted    Signs or symptoms of bleeding or clotting: Yes: a couple bruises no >nickel size on shin and arm from bumping into things the last few days. They are resolving.    Previous INR: Therapeutic last visit; previously outside of goal range    Additional findings: None       PLAN     Recommended plan for no diet, medication or health factor changes affecting INR     Dosing Instructions: continue your current warfarin dose with next INR in 2 weeks       Summary  As of 6/20/2022    Full warfarin instructions:  3.5 mg every Mon; 4.5 mg all other days   Next INR check:  7/5/2022             Telephone call with Karen who verbalizes understanding and agrees to plan and who agrees to plan and repeated back plan correctly    Patient to recheck with home meter    Education provided: Goal range and significance of current result    Plan made per [facility] anticoagulation protocol    Fabby Ferguson RN  Anticoagulation Clinic  6/20/2022    _______________________________________________________________________     Anticoagulation Episode Summary     Current INR goal:  2.0-3.0   TTR:  45.3 % (1 y)   Target end date:  Indefinite   Send INR reminders to:  [facility]    Indications    Pulmonary hypertension (H) [I27.20]  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

## 2022-06-21 RX ORDER — DIGOXIN 0.06 MG/1
62.5 TABLET ORAL DAILY
Qty: 90 TABLET | Refills: 1 | OUTPATIENT
Start: 2022-06-21

## 2022-06-23 ENCOUNTER — TELEPHONE (OUTPATIENT)
Dept: CARDIOLOGY | Facility: CLINIC | Age: 82
End: 2022-06-23

## 2022-06-23 DIAGNOSIS — I27.20 PULMONARY HYPERTENSION (H): ICD-10-CM

## 2022-06-23 RX ORDER — DIGOXIN 125 MCG
62.5 TABLET ORAL DAILY
Qty: 13 TABLET | COMMUNITY
Start: 2022-06-23 | End: 2023-09-07

## 2022-06-23 NOTE — TELEPHONE ENCOUNTER
Spoke with pharmacist who changed Rx to 125 mcg and cut the tablets in half; Rx now covered and picked up by patient on 6/21. Kezia Arnett RN on 6/23/2022 at 2:20 PM

## 2022-06-27 ENCOUNTER — ANTICOAGULATION THERAPY VISIT (OUTPATIENT)
Dept: ANTICOAGULATION | Facility: CLINIC | Age: 82
End: 2022-06-27

## 2022-06-27 ENCOUNTER — TRANSFERRED RECORDS (OUTPATIENT)
Dept: HEALTH INFORMATION MANAGEMENT | Facility: CLINIC | Age: 82
End: 2022-06-27

## 2022-06-27 DIAGNOSIS — Z79.01 LONG TERM CURRENT USE OF ANTICOAGULANT THERAPY: ICD-10-CM

## 2022-06-27 DIAGNOSIS — I27.24 CTEPH (CHRONIC THROMBOEMBOLIC PULMONARY HYPERTENSION) (H): ICD-10-CM

## 2022-06-27 DIAGNOSIS — I27.20 PULMONARY HYPERTENSION (H): Primary | ICD-10-CM

## 2022-06-27 LAB — INR (EXTERNAL): 2.1 (ref 0.9–1.1)

## 2022-06-27 NOTE — PROGRESS NOTES
ANTICOAGULATION MANAGEMENT     Karen Anderson 81 year old female is on warfarin with therapeutic INR result. (Goal INR 2.0-3.0)    Recent labs: (last 7 days)     06/27/22  0000   INR 2.1*       ASSESSMENT       Source(s): Chart Review and Patient/Caregiver Call       Warfarin doses taken: Warfarin taken as instructed    Diet: No new diet changes identified    New illness, injury, or hospitalization: No    Medication/supplement changes: None noted    Signs or symptoms of bleeding or clotting: Yes: Patient reports she still has that nickel size bruise and was concerned that she was making the bruise worse when she wrapped her legs. Writer encourages patient to just watch area.  As long as not getting worse than will just observe for now.     Previous INR: Therapeutic last 2(+) visits    Additional findings: None       PLAN     Recommended plan for no diet, medication or health factor changes affecting INR     Dosing Instructions: continue your current warfarin dose with next INR in 1 week       Summary  As of 6/27/2022    Full warfarin instructions:  3.5 mg every Mon; 4.5 mg all other days   Next INR check:  7/5/2022             Telephone call with Karen who verbalizes understanding and agrees to plan and who agrees to plan and repeated back plan correctly    Patient to recheck with home meter    Education provided: Importance of therapeutic range and Importance of following up at instructed interval    Plan made per [facility] anticoagulation protocol    Brittany Kirk RN  Anticoagulation Clinic  6/27/2022    _______________________________________________________________________     Anticoagulation Episode Summary     Current INR goal:  2.0-3.0   TTR:  47.2 % (1 y)   Target end date:  Indefinite   Send INR reminders to:  [facility]    Indications    Pulmonary hypertension (H) [I27.20]  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

## 2022-07-05 ENCOUNTER — TRANSFERRED RECORDS (OUTPATIENT)
Dept: HEALTH INFORMATION MANAGEMENT | Facility: CLINIC | Age: 82
End: 2022-07-05

## 2022-07-05 ENCOUNTER — ANTICOAGULATION THERAPY VISIT (OUTPATIENT)
Dept: ANTICOAGULATION | Facility: CLINIC | Age: 82
End: 2022-07-05

## 2022-07-05 DIAGNOSIS — I27.24 CTEPH (CHRONIC THROMBOEMBOLIC PULMONARY HYPERTENSION) (H): ICD-10-CM

## 2022-07-05 DIAGNOSIS — I27.20 PULMONARY HYPERTENSION (H): Primary | ICD-10-CM

## 2022-07-05 DIAGNOSIS — Z79.01 LONG TERM CURRENT USE OF ANTICOAGULANT THERAPY: ICD-10-CM

## 2022-07-05 LAB — INR (EXTERNAL): 1.5 (ref 0.9–1.1)

## 2022-07-05 NOTE — PROGRESS NOTES
ANTICOAGULATION MANAGEMENT     Karen Anderson 81 year old female is on warfarin with subtherapeutic INR result. (Goal INR 2.0-3.0)    Recent labs: (last 7 days)     07/05/22  0000   INR 1.5*       ASSESSMENT       Source(s): Chart Review and Patient/Caregiver Call       Warfarin doses taken: Warfarin taken as instructed    Diet: Patient is currently not a home and not getting foods from [employer] like they typically do. Patient reports she is eating good portions of vitamin K    New illness, injury, or hospitalization: No    Medication/supplement changes: None noted    Signs or symptoms of bleeding or clotting: No    Previous INR: Therapeutic last 2(+) visits    Additional findings: None       PLAN     Recommended plan for no diet, medication or health factor changes affecting INR     Dosing Instructions:Increase the current warfarin dose with next INR in 1 week       Summary  As of 7/5/2022    Full warfarin instructions:  5 mg every Tue; 4.5 mg all other days   Next INR check:  7/13/2022             Telephone call with Karen who verbalizes understanding and agrees to plan and who agrees to plan and repeated back plan correctly    Lab visit scheduled    Education provided: Importance of consistent vitamin K intake, Impact of vitamin K foods on INR, Goal range and significance of current result and Importance of therapeutic range    Plan made per [facility] anticoagulation protocol    Brittany Kirk RN  Anticoagulation Clinic  7/5/2022    _______________________________________________________________________     Anticoagulation Episode Summary     Current INR goal:  2.0-3.0   TTR:  47.5 % (1 y)   Target end date:  Indefinite   [facility]ers [facility]    Indications    Pulmonary hypertension (H) [I27.20]  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

## 2022-07-13 ENCOUNTER — TRANSFERRED RECORDS (OUTPATIENT)
Dept: HEALTH INFORMATION MANAGEMENT | Facility: CLINIC | Age: 82
End: 2022-07-13

## 2022-07-13 ENCOUNTER — LAB (OUTPATIENT)
Dept: LAB | Facility: CLINIC | Age: 82
End: 2022-07-13
Payer: COMMERCIAL

## 2022-07-13 ENCOUNTER — ANTICOAGULATION THERAPY VISIT (OUTPATIENT)
Dept: ANTICOAGULATION | Facility: CLINIC | Age: 82
End: 2022-07-13

## 2022-07-13 DIAGNOSIS — Z79.01 LONG TERM CURRENT USE OF ANTICOAGULANT THERAPY: ICD-10-CM

## 2022-07-13 DIAGNOSIS — R06.09 DYSPNEA ON EXERTION: ICD-10-CM

## 2022-07-13 DIAGNOSIS — I27.20 PULMONARY HYPERTENSION (H): ICD-10-CM

## 2022-07-13 DIAGNOSIS — I27.20 PULMONARY HYPERTENSION (H): Primary | ICD-10-CM

## 2022-07-13 DIAGNOSIS — I27.24 CTEPH (CHRONIC THROMBOEMBOLIC PULMONARY HYPERTENSION) (H): ICD-10-CM

## 2022-07-13 LAB
ERYTHROCYTE [DISTWIDTH] IN BLOOD BY AUTOMATED COUNT: 15 % (ref 10–15)
HCT VFR BLD AUTO: 41.3 % (ref 35–47)
HGB BLD-MCNC: 12.9 G/DL (ref 11.7–15.7)
INR BLD: 1.8 (ref 0.9–1.1)
MCH RBC QN AUTO: 31 PG (ref 26.5–33)
MCHC RBC AUTO-ENTMCNC: 31.2 G/DL (ref 31.5–36.5)
MCV RBC AUTO: 99 FL (ref 78–100)
NT-PROBNP SERPL-MCNC: 1101 PG/ML (ref 0–450)
PLATELET # BLD AUTO: 235 10E3/UL (ref 150–450)
RBC # BLD AUTO: 4.16 10E6/UL (ref 3.8–5.2)
WBC # BLD AUTO: 4.4 10E3/UL (ref 4–11)

## 2022-07-13 PROCEDURE — 80048 BASIC METABOLIC PNL TOTAL CA: CPT

## 2022-07-13 PROCEDURE — 85610 PROTHROMBIN TIME: CPT

## 2022-07-13 PROCEDURE — 85027 COMPLETE CBC AUTOMATED: CPT

## 2022-07-13 PROCEDURE — 36415 COLL VENOUS BLD VENIPUNCTURE: CPT

## 2022-07-13 PROCEDURE — 83880 ASSAY OF NATRIURETIC PEPTIDE: CPT

## 2022-07-13 NOTE — PROGRESS NOTES
ANTICOAGULATION MANAGEMENT     Karen Anderson 81 year old female is on warfarin with subtherapeutic INR result. (Goal INR 2.0-3.0)    Recent labs: (last 7 days)     07/13/22  1025   INR 1.8*       ASSESSMENT       Source(s): Chart Review and Patient/Caregiver Call       Warfarin doses taken: Warfarin taken as instructed    Diet: No new diet changes identified    New illness, injury, or hospitalization: No    Medication/supplement changes: None noted    Signs or symptoms of bleeding or clotting: No    Previous INR: Subtherapeutic    Additional findings: INR trended up nicely.  Patient is very concerned that she is taking more warfarin that she usually does.  Writer attempts to expain that her need for warfarin has gone up. Writer decides to leave dose the same for now and watch the trend next week.        PLAN     Recommended plan for no diet, medication or health factor changes affecting INR     Dosing Instructions: continue your current warfarin dose with next INR in 1 week       Summary  As of 7/13/2022    Full warfarin instructions:  5 mg every Wed; 4.5 mg all other days   Next INR check:  7/20/2022             Telephone call with Karen who verbalizes understanding and agrees to plan and who agrees to plan and repeated back plan correctly    Patient to recheck with home meter    Education provided: Goal range and significance of current result, Importance of therapeutic range, Importance of following up at instructed interval and Importance of taking warfarin as instructed    Plan made per [facility] anticoagulation protocol    Brittany Kirk RN  Anticoagulation Clinic  7/13/2022    _______________________________________________________________________     Anticoagulation Episode Summary     Current INR goal:  2.0-3.0   TTR:  46.8 % (1 y)   Target end date:  Indefinite   Send INR reminders to:  [facility]    Indications    Pulmonary hypertension (H) [I27.20]  CTEPH (chronic thromboembolic pulmonary  hypertension) (H) [I27.24]  Long term current use of anticoagulant therapy [Z79.01]           Comments:           Anticoagulation Care Providers     Provider Role Specialty Phone number    Karolina Turcios MD Referring Cardiovascular Disease [phone]

## 2022-07-14 LAB
ANION GAP SERPL CALCULATED.3IONS-SCNC: 3 MMOL/L (ref 3–14)
BUN SERPL-MCNC: 24 MG/DL (ref 7–30)
CALCIUM SERPL-MCNC: 9.8 MG/DL (ref 8.5–10.1)
CHLORIDE BLD-SCNC: 104 MMOL/L (ref 94–109)
CO2 SERPL-SCNC: 34 MMOL/L (ref 20–32)
CREAT SERPL-MCNC: 1.04 MG/DL (ref 0.52–1.04)
GFR SERPL CREATININE-BSD FRML MDRD: 54 ML/MIN/1.73M2
GLUCOSE BLD-MCNC: 65 MG/DL (ref 70–99)
POTASSIUM BLD-SCNC: 4.6 MMOL/L (ref 3.4–5.3)
SODIUM SERPL-SCNC: 141 MMOL/L (ref 133–144)

## 2022-07-18 ASSESSMENT — ENCOUNTER SYMPTOMS
HEMATURIA: 0
ARTHRALGIAS: 1
WEAKNESS: 1
COUGH DISTURBING SLEEP: 0
ABDOMINAL PAIN: 0
DOUBLE VISION: 0
PARALYSIS: 0
TINGLING: 1
NUMBNESS: 1
LEG PAIN: 1
LIGHT-HEADEDNESS: 0
BOWEL INCONTINENCE: 0
DIARRHEA: 1
SINUS PAIN: 0
EXERCISE INTOLERANCE: 1
SMELL DISTURBANCE: 0
BRUISES/BLEEDS EASILY: 1
HEMOPTYSIS: 0
EYE PAIN: 0
DIFFICULTY URINATING: 0
MEMORY LOSS: 0
CONSTIPATION: 1
SWOLLEN GLANDS: 0
BLOOD IN STOOL: 0
SPUTUM PRODUCTION: 0
SPEECH CHANGE: 0
COUGH: 0
WHEEZING: 0
RECTAL PAIN: 0
EYE REDNESS: 1
EYE WATERING: 1
HYPERTENSION: 1
BACK PAIN: 1
HOARSE VOICE: 1
JOINT SWELLING: 1
SEIZURES: 0
SHORTNESS OF BREATH: 1
SLEEP DISTURBANCES DUE TO BREATHING: 0
MUSCLE CRAMPS: 0
NECK PAIN: 0
DYSURIA: 0
DIZZINESS: 1
SORE THROAT: 0
SYNCOPE: 0
MYALGIAS: 1
TROUBLE SWALLOWING: 0
ORTHOPNEA: 0
POOR WOUND HEALING: 0
TASTE DISTURBANCE: 0
LOSS OF CONSCIOUSNESS: 0
STIFFNESS: 1
VOMITING: 0
DYSPNEA ON EXERTION: 1
DISTURBANCES IN COORDINATION: 1
SINUS CONGESTION: 1
HYPOTENSION: 0
POSTURAL DYSPNEA: 1
JAUNDICE: 0
TREMORS: 0
HEARTBURN: 0
PALPITATIONS: 0
EYE IRRITATION: 1
FLANK PAIN: 0
MUSCLE WEAKNESS: 1
BLOATING: 1
SKIN CHANGES: 1
SNORES LOUDLY: 0
NECK MASS: 0
NAIL CHANGES: 0
HEADACHES: 0
NAUSEA: 0

## 2022-07-19 ENCOUNTER — VIRTUAL VISIT (OUTPATIENT)
Dept: CARDIOLOGY | Facility: CLINIC | Age: 82
End: 2022-07-19
Attending: PHYSICIAN ASSISTANT
Payer: COMMERCIAL

## 2022-07-19 DIAGNOSIS — I27.20 PULMONARY HYPERTENSION (H): ICD-10-CM

## 2022-07-19 DIAGNOSIS — R06.09 DYSPNEA ON EXERTION: ICD-10-CM

## 2022-07-19 PROCEDURE — 99214 OFFICE O/P EST MOD 30 MIN: CPT | Mod: 95 | Performed by: PHYSICIAN ASSISTANT

## 2022-07-19 PROCEDURE — G0463 HOSPITAL OUTPT CLINIC VISIT: HCPCS | Mod: PN,RTG | Performed by: PHYSICIAN ASSISTANT

## 2022-07-19 NOTE — PATIENT INSTRUCTIONS
Medication Changes:  Start taking opsumit EVERY day instead of every other day (once you get your new shipment--we contacted [facility] today to make them aware).    Patient Instructions:  1. Continue staying active and eat a heart healthy diet.    2. Please keep current list of medications with you at all times.    3. Remember to weigh yourself daily after voiding and before you consume any food or beverages and log the numbers.  If you have gained 2 pounds overnight or 5 pounds in a week contact us immediately for medication adjustments or further instructions.    4. **Please call us immediately if you have any syncope (fainting or passing out), chest pain, edema (swelling or weight gain), or decline in your functional status (general decline in how you are feeling).    5. Patients on Remodulin (treprostinil) or Veletri (epoprostenol): Please make sure that you have your backup pump and supplies with you at all times, your mixing instructions, and contact information for your specialty pharmacy.    Follow up Appointment Information:  Scheduled with Shawna in 3 months with labs prior    We are located on the third floor [facility].  Our address is     [address] [place] [address] [place] [address] [place] [address]    Thank you for allowing us to be a part of your care here at the [facility]    If you have questions or concerns please contact us at:    Elan Arnett, RN, BSN   Rosa Moya (Schedule,Prior Auth)  Nurse Coordinator     Clinic   Pulmonary Hypertension   Pulmonary Hypertension  [facility]  [facility]  (Phone)[phone]    (Phone) [phone]        (Fax) [phone]    ** Please note that you will NOT receive a reminder call regarding your scheduled testing, reminder calls are for provider appointments only.  If you are scheduled  for testing within the [employer] system you may receive a call regarding pre-registration for billing purposes only.**     Remember to weigh yourself daily after voiding and before you consume any food or beverages and log the numbers.  If you have gained/lost 2 pounds overnight or 5 pounds in a week contact us immediately for medication adjustments or further instructions.   **Please call us immediately if you have any syncope, chest pain, edema, or decline in your functional status.    Support Group:  Pulmonary Hypertension Association  Https://www.phassociation.org/  **Look at the Events Tab** They even have Support Groups that you can call into    [facility] PH Support Group  Second Saturday of the Month from 1-3 PM   Location: [address] [place] [address] [facility] [address]  Leader: Jessenia Moeller   Phone: [phone]  Email: [email or web address]

## 2022-07-19 NOTE — PROGRESS NOTES
Karen is a 81 year old who is being evaluated via a billable video visit.      How would you like to obtain your AVS? MyChart  If the video visit is dropped, the invitation should be resent by: Send to e-mail at: [email or web address]  Will anyone else be joining your video visit?  Santana Nelson, Visit Facilitator/MA.            CARDIOLOGY PH CLINIC VIDEO VISIT    Date of video visit: 07/19/22      Karen Anderson is a 81 year old female who is being evaluated via a billable video visit.        I have reviewed and updated the patient's Past Medical History, Social History, Family History and Medication List.    MEDICATIONS:  Current Outpatient Medications   Medication Sig Dispense Refill     acetaminophen (TYLENOL) 325 MG tablet Take 325 mg by mouth every 6 hours as needed for mild pain       albuterol (PROAIR HFA/PROVENTIL HFA/VENTOLIN HFA) 108 (90 Base) MCG/ACT inhaler Inhale 2 puffs into the lungs every 4 hours as needed for shortness of breath / dyspnea or wheezing        alendronate (FOSAMAX) 70 MG tablet Take 70 mg by mouth once a week On Mondays       Ascorbic Acid (VITAMIN C) 500 MG CAPS Take 500 mg by mouth every morning        aspirin (ASA) 81 MG tablet Take 81 mg by mouth daily        beclomethasone HFA (QVAR REDIHALER) 80 MCG/ACT inhaler Inhale 2 puffs into the lungs 2 times daily       bumetanide (BUMEX) 2 MG tablet Take 2 tablets (4 mg) by mouth 3 times daily 540 tablet 3     calcium citrate-vitamin D (CALCIUM CITRATE + D3) 315-250 MG-UNIT TABS per tablet Take 1 tablet by mouth 2 times daily        Corn Dextrin (CLEAR FIBER POWDER PO) Take by mouth daily        digoxin (LANOXIN) 125 MCG tablet Take 0.5 tablets (62.5 mcg) by mouth daily 13 tablet      gabapentin (NEURONTIN) 300 MG capsule Take 300 mg by mouth 3 times daily Can take an additional 300mg at bedtime as needed       hydroxychloroquine (PLAQUENIL) 200 MG tablet Take 200 mg by mouth daily        ipratropium (ATROVENT)  0.06 % nasal spray Spray 2 sprays into both nostrils 3 times daily        ketotifen (ZADITOR) 0.025 % ophthalmic solution Place 1 drop into both eyes daily       macitentan (OPSUMIT) 10 MG tablet Take 1 tablet (10 mg) by mouth every other day 15 tablet 11     potassium chloride ER (KLOR-CON M) 20 MEQ CR tablet Take 2 tablets (40 mEq) by mouth 3 times daily 540 tablet 3     sodium chloride (OCEAN) 0.65 % nasal spray Spray 1 spray into both nostrils daily as needed for congestion       treprostinil (REMODULIN) 60 mcg/mL in glycine diluent 50 mL infusion Inject 45.5 ng/kg/min into the vein continuous Goal Dose: 45.5 ng/kg/min 1368 mL 0     warfarin ANTICOAGULANT (COUMADIN) 1 MG tablet Take one tab by mouth daily or as directed by Anticoagulation Clinic. 60 tablet 1     warfarin ANTICOAGULANT (COUMADIN) 3 MG tablet TAKE 3 MG BY MOUTH ON MONDAY, WEDNESDAY, FRIDAY, AND SATURDAY, AND 4.5 MG ON ALL OTHER DAYS OF THE WEEK 170 tablet 3       ALLERGIES  Sulfa drugs    Self reported vitals:  Wt: 102#  BP: Not reported    Brief physical exam:  General: In no acute distress, upright and calm. Accompanied by her  today.  Eyes: No apparent redness or discharge.   Chest: No labored breathing, no cough during exam or audible wheezing.   Neuro: No obvious focal defects or tremors.   Psych: Alert and oriented. Does not appear anxious.     The rest of a comprehensive physical examination is deferred due to public health emergency video visit restrictions.       Primary PH cardiologist: Dr. Turcios       HPI:  Ms. Anderson is a pleasant 81 year old female with a PMHx including rheumatoid arthritis, hypertension, scoliosis, asthma, pulmonary MAC, breast cancer s/p chemotherapy with Adriamycin, Cytoxan, Taxol, and tamoxifen and status post left mastectomy. She also has a history of extensive bilateral pulmonary emboli and DVT in 1998 thought to be secondary to tamoxifen along with chemotherapy induced cardiomyopathy. Ms.  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 Although she had proximal disease, she was too high risk for surgical PTE. She had a BPA in Jan 2021 which was complicated by hemoptysis, requiring FFP. She has required frequent diuretic adjustments, and we have been working on medical management. She is currently on combination therapy with IV Remodulin therapy along with Opsumit, and has failed Adempas due to dizziness and hypotension.     Karen visited last with Dr. Turcios in April via virtual visit at which time she was stable in regard to her RIVERA. No medication changes were made at that time.    Today, I am visiting virtually with Karen once again for routine follow up. She tells me that overall, things are about the same. She denies worsening in her breathing and says her swelling is \"pretty good\" lately. Weight has been stable mostly in the low 100s. She denies any recent chest pain, or palpitations. She also notes that her dizziness has been good lately as well, and she inquires whether we should try to increase his Opsumit.     Labs performed prior to our visit today on 7/13 were reviewed as below.       CURRENT PULMONARY HYPERTENSION REGIMEN:     PAH Rx: Remodulin 45.5ng/kg/min, Opsumit 10mg every other day  (failed Adempas due to dizziness)     Diuretics: Bumex 4mg TID     Oxygen: NC 1-2LPM     Anticoagulation: warfarin   Indication: CTEPH        Assessment/Plan:        1. Chronic thromboembolic pulmonary hypertension.              --Ms. Anderson has CTEPH with sisgnificant RV dysfunction. Although she had proximal disease, she was felt to be too high risk for surgical PTE here and at [facility]. She was offered potential PTE at [place], but still felt to be very high risk. She did have BPA here in January 2021 but this was complicated by hemoptysis. No further surgical interventions are being offered at this time. Currently, we will continue to focus on medical management.            "   --She is on IV Remodulin at 45.5ng/kg/min. She is not able to tolerate Adempas due to dizziness, and has been on Opsumit 10mg every other day for similar reasons. As she has been stable the last few months, we discussed possibly trying to increase to every day, which she would like to trial. With her next shipment, will increase to daily and observe.              --Her weight is mostly between 101-106# at home on bumex 4mg TID. NT-proBNP has trended back downward which she is pleased about. Renal function is stable.                --Continue digoxin for RV support. BP has remained acceptable, though will need to continue to monitor given renal concerns.               --Continue anticoagulation with warfarin for her CTEPH,  She has equipment to do home checks, and follows with the INR clinic.              --Continue oxygen at 1-2L with exertion. I told her that if sats are >92% at rest, she may take off while sitting.      Follow up plan: Return in 3 months to see me in clinic with repeat labs. We are happy to see the patient back sooner with any new concerns.       Testing/labs:    Most recent labs:    Latest Reference Range & Units 07/13/22 10:25   Sodium 133 - 144 mmol/L 141   Potassium 3.4 - 5.3 mmol/L 4.6   Chloride 94 - 109 mmol/L 104   Carbon Dioxide 20 - 32 mmol/L 34 (H)   Urea Nitrogen 7 - 30 mg/dL 24   Creatinine 0.52 - 1.04 mg/dL 1.04   GFR Estimate >60 mL/min/1.73m2 54 (L)   Calcium 8.5 - 10.1 mg/dL 9.8   Anion Gap 3 - 14 mmol/L 3   N-Terminal Pro Bnp 0 - 450 pg/mL 1,101 (H)   Glucose 70 - 99 mg/dL 65 (L)   WBC 4.0 - 11.0 10e3/uL 4.4   Hemoglobin 11.7 - 15.7 g/dL 12.9   Hematocrit 35.0 - 47.0 % 41.3   Platelet Count 150 - 450 10e3/uL 235   RBC Count 3.80 - 5.20 10e6/uL 4.16   MCV 78 - 100 fL 99   MCH 26.5 - 33.0 pg 31.0   MCHC 31.5 - 36.5 g/dL 31.2 (L)   RDW 10.0 - 15.0 % 15.0   INR Point of Care 0.9 - 1.1  1.8 (H)   (H): Data is abnormally high  (L): Data is abnormally low        Other recent pertinent  testing:    Echo 4/25/22  Interpretation Summary     Left ventricular function is normal.The ejection fraction is 55-60%. Grade II  or moderate diastolic dysfunction. Paradoxical septal motion consistent with  right ventricular pressure overload is present.  The RV is not clearly visualized but the size appears to be moderately dilated  and function appears to be at least mildly reduced.  The estimated PA systolic pressure is 55 mmHg above the RA pressure.  The left atrial pressure is elevated.  No pericardial effusion is present.  This study was compared with the study from 06/03/2021. The estimated PA  systolic pressure is lower. The RV function and size are similar on direct  visual comparison. The pericardial effusion is also no longer evident      [facility]  3/17/2021        Severe pulmonary hypertension    Normal cardiac output by Thermodilution    Elevated left sided filling pressures    No signficant change when comapred to her last hemodynamic assessment            Pressures Phase: Baseline                                                     Time Systolic (mmHg) Diastolic (mmHg) Mean (mmHg) A Wave (mmHg) V Wave (mmHg) EDP (mmHg) Max dp/dt (mmHg/sec) HR (bpm) Content (mL/dL) SAT (%)   RA Pressures  2:31 PM     14    14    22        63          RV Pressures  2:16 PM             912               2:32 PM 87    9          28      62          PA Pressures  2:32 PM 88    22    44            62          PCW Pressures  2:33 PM     14                                               NYHA Functional Class:  Functional class 3      Video-Visit Details    Type of service:  Video Visit    Video Start Time: 1157  Video End Time: 1216    An additional 15 minutes was spent today performing chart and history review, pre and post visit documentation, and care coordination.      Originating Location (pt. Location): Home    Distant Location (provider location):  [facility]    Platform used for  Video Visit: Darron Marcum PA-C  [facility]  Pager [phone]

## 2022-07-19 NOTE — NURSING NOTE
Patient called and discussed AVS  from via virtual visit with Shawna Marcum    Plan: Start taking opsumit daily from EOD- [facility] updated    Patient to be called for scheduling after virtual visit by      Rebecca Ngo RN on 7/19/2022 at 12:32 PM

## 2022-07-19 NOTE — LETTER
7/19/2022      RE: Karen Anderson  [address] [facility] [address]       Dear Colleague,    Thank you for the opportunity to participate in the care of your patient, Karen Anderson, at the [facility] [place] [facility]. Please see a copy of my visit note below.        CARDIOLOGY PH CLINIC VIDEO VISIT    Date of video visit: 07/19/22      Karen Anderson is a 81 year old female who is being evaluated via a billable video visit.        I have reviewed and updated the patient's Past Medical History, Social History, Family History and Medication List.    MEDICATIONS:  Current Outpatient Medications   Medication Sig Dispense Refill     acetaminophen (TYLENOL) 325 MG tablet Take 325 mg by mouth every 6 hours as needed for mild pain       albuterol (PROAIR HFA/PROVENTIL HFA/VENTOLIN HFA) 108 (90 Base) MCG/ACT inhaler Inhale 2 puffs into the lungs every 4 hours as needed for shortness of breath / dyspnea or wheezing        alendronate (FOSAMAX) 70 MG tablet Take 70 mg by mouth once a week On Mondays       Ascorbic Acid (VITAMIN C) 500 MG CAPS Take 500 mg by mouth every morning        aspirin (ASA) 81 MG tablet Take 81 mg by mouth daily        beclomethasone HFA (QVAR REDIHALER) 80 MCG/ACT inhaler Inhale 2 puffs into the lungs 2 times daily       bumetanide (BUMEX) 2 MG tablet Take 2 tablets (4 mg) by mouth 3 times daily 540 tablet 3     calcium citrate-vitamin D (CALCIUM CITRATE + D3) 315-250 MG-UNIT TABS per tablet Take 1 tablet by mouth 2 times daily        Corn Dextrin (CLEAR FIBER POWDER PO) Take by mouth daily        digoxin (LANOXIN) 125 MCG tablet Take 0.5 tablets (62.5 mcg) by mouth daily 13 tablet      gabapentin (NEURONTIN) 300 MG capsule Take 300 mg by mouth 3 times daily Can take an additional 300mg at bedtime as needed       hydroxychloroquine (PLAQUENIL) 200 MG tablet Take 200 mg by mouth daily        ipratropium  (ATROVENT) 0.06 % nasal spray Spray 2 sprays into both nostrils 3 times daily        ketotifen (ZADITOR) 0.025 % ophthalmic solution Place 1 drop into both eyes daily       macitentan (OPSUMIT) 10 MG tablet Take 1 tablet (10 mg) by mouth every other day 15 tablet 11     potassium chloride ER (KLOR-CON M) 20 MEQ CR tablet Take 2 tablets (40 mEq) by mouth 3 times daily 540 tablet 3     sodium chloride (OCEAN) 0.65 % nasal spray Spray 1 spray into both nostrils daily as needed for congestion       treprostinil (REMODULIN) 60 mcg/mL in glycine diluent 50 mL infusion Inject 45.5 ng/kg/min into the vein continuous Goal Dose: 45.5 ng/kg/min 1368 mL 0     warfarin ANTICOAGULANT (COUMADIN) 1 MG tablet Take one tab by mouth daily or as directed by Anticoagulation Clinic. 60 tablet 1     warfarin ANTICOAGULANT (COUMADIN) 3 MG tablet TAKE 3 MG BY MOUTH ON MONDAY, WEDNESDAY, FRIDAY, AND SATURDAY, AND 4.5 MG ON ALL OTHER DAYS OF THE WEEK 170 tablet 3       ALLERGIES  Sulfa drugs    Self reported vitals:  Wt: 102#  BP: Not reported    Brief physical exam:  General: In no acute distress, upright and calm. Accompanied by her  today.  Eyes: No apparent redness or discharge.   Chest: No labored breathing, no cough during exam or audible wheezing.   Neuro: No obvious focal defects or tremors.   Psych: Alert and oriented. Does not appear anxious.     The rest of a comprehensive physical examination is deferred due to public health emergency video visit restrictions.       Primary PH cardiologist: Dr. Turcios       HPI:  Ms. Anderson is a pleasant 81 year old female with a PMHx including rheumatoid arthritis, hypertension, scoliosis, asthma, pulmonary MAC, breast cancer s/p chemotherapy with Adriamycin, Cytoxan, Taxol, and tamoxifen and status post left mastectomy. She also has a history of extensive bilateral pulmonary emboli and DVT in 1998 thought to be secondary to tamoxifen along with chemotherapy induced cardiomyopathy.  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 Although she had proximal disease, she was too high risk for surgical PTE. She had a BPA in Jan 2021 which was complicated by hemoptysis, requiring FFP. She has required frequent diuretic adjustments, and we have been working on medical management. She is currently on combination therapy with IV Remodulin therapy along with Opsumit, and has failed Adempas due to dizziness and hypotension.     Karen visited last with Dr. Turcios in April via virtual visit at which time she was stable in regard to her RIVERA. No medication changes were made at that time.    Today, I am visiting virtually with Karen once again for routine follow up. She tells me that overall, things are about the same. She denies worsening in her breathing and says her swelling is \"pretty good\" lately. Weight has been stable mostly in the low 100s. She denies any recent chest pain, or palpitations. She also notes that her dizziness has been good lately as well, and she inquires whether we should try to increase his Opsumit.     Labs performed prior to our visit today on 7/13 were reviewed as below.       CURRENT PULMONARY HYPERTENSION REGIMEN:     PAH Rx: Remodulin 45.5ng/kg/min, Opsumit 10mg every other day  (failed Adempas due to dizziness)     Diuretics: Bumex 4mg TID     Oxygen: NC 1-2LPM     Anticoagulation: warfarin   Indication: CTEPH        Assessment/Plan:        1. Chronic thromboembolic pulmonary hypertension.              --Ms. Anderson has CTEPH with sisgnificant RV dysfunction. Although she had proximal disease, she was felt to be too high risk for surgical PTE here and at [facility]. She was offered potential PTE at [place], but still felt to be very high risk. She did have BPA here in January 2021 but this was complicated by hemoptysis. No further surgical interventions are being offered at this time. Currently, we will continue to focus on medical management.        "       --She is on IV Remodulin at 45.5ng/kg/min. She is not able to tolerate Adempas due to dizziness, and has been on Opsumit 10mg every other day for similar reasons. As she has been stable the last few months, we discussed possibly trying to increase to every day, which she would like to trial. With her next shipment, will increase to daily and observe.              --Her weight is mostly between 101-106# at home on bumex 4mg TID. NT-proBNP has trended back downward which she is pleased about. Renal function is stable.                --Continue digoxin for RV support. BP has remained acceptable, though will need to continue to monitor given renal concerns.               --Continue anticoagulation with warfarin for her CTEPH,  She has equipment to do home checks, and follows with the INR clinic.              --Continue oxygen at 1-2L with exertion. I told her that if sats are >92% at rest, she may take off while sitting.      Follow up plan: Return in 3 months to see me in clinic with repeat labs. We are happy to see the patient back sooner with any new concerns.       Testing/labs:    Most recent labs:    Latest Reference Range & Units 07/13/22 10:25   Sodium 133 - 144 mmol/L 141   Potassium 3.4 - 5.3 mmol/L 4.6   Chloride 94 - 109 mmol/L 104   Carbon Dioxide 20 - 32 mmol/L 34 (H)   Urea Nitrogen 7 - 30 mg/dL 24   Creatinine 0.52 - 1.04 mg/dL 1.04   GFR Estimate >60 mL/min/1.73m2 54 (L)   Calcium 8.5 - 10.1 mg/dL 9.8   Anion Gap 3 - 14 mmol/L 3   N-Terminal Pro Bnp 0 - 450 pg/mL 1,101 (H)   Glucose 70 - 99 mg/dL 65 (L)   WBC 4.0 - 11.0 10e3/uL 4.4   Hemoglobin 11.7 - 15.7 g/dL 12.9   Hematocrit 35.0 - 47.0 % 41.3   Platelet Count 150 - 450 10e3/uL 235   RBC Count 3.80 - 5.20 10e6/uL 4.16   MCV 78 - 100 fL 99   MCH 26.5 - 33.0 pg 31.0   MCHC 31.5 - 36.5 g/dL 31.2 (L)   RDW 10.0 - 15.0 % 15.0   INR Point of Care 0.9 - 1.1  1.8 (H)   (H): Data is abnormally high  (L): Data is abnormally low        Other recent  pertinent testing:    Echo 4/25/22  Interpretation Summary     Left ventricular function is normal.The ejection fraction is 55-60%. Grade II  or moderate diastolic dysfunction. Paradoxical septal motion consistent with  right ventricular pressure overload is present.  The RV is not clearly visualized but the size appears to be moderately dilated  and function appears to be at least mildly reduced.  The estimated PA systolic pressure is 55 mmHg above the RA pressure.  The left atrial pressure is elevated.  No pericardial effusion is present.  This study was compared with the study from 06/03/2021. The estimated PA  systolic pressure is lower. The RV function and size are similar on direct  visual comparison. The pericardial effusion is also no longer evident      [facility]  3/17/2021        Severe pulmonary hypertension    Normal cardiac output by Thermodilution    Elevated left sided filling pressures    No signficant change when comapred to her last hemodynamic assessment            Pressures Phase: Baseline                                                     Time Systolic (mmHg) Diastolic (mmHg) Mean (mmHg) A Wave (mmHg) V Wave (mmHg) EDP (mmHg) Max dp/dt (mmHg/sec) HR (bpm) Content (mL/dL) SAT (%)   RA Pressures  2:31 PM     14    14    22        63          RV Pressures  2:16 PM             912               2:32 PM 87    9          28      62          PA Pressures  2:32 PM 88    22    44            62          PCW Pressures  2:33 PM     14                                               NYHA Functional Class:  Functional class 3      Video-Visit Details    Type of service:  Video Visit    Video Start Time: 1157  Video End Time: 1216    An additional 15 minutes was spent today performing chart and history review, pre and post visit documentation, and care coordination.      Originating Location (pt. Location): Home    Distant Location (provider location):  [facility]    Platform  used for Video Visit: Darron Marcum PA-C  [facility]  Pager [phone]

## 2022-07-19 NOTE — NURSING NOTE
Chief Complaint   Patient presents with     Follow Up     5 month follow up, pt has question regarding a lesion that was removed because it was cancerous, going in for a mole biopsy MO[facility], wants to know if she should stop Warfarin for upcoming procedure. Medications reviewed with pt, pt states that her pharmacy states it would be less expensive doing a 90 day instead of 30 day supply,wants to discuss options.      Patient denies any changes since echeck-in regarding medication and allergies and states all information entered during echeck-in remains accurate.    Janelle Nelson, Visit Facilitator/MA.

## 2022-07-20 ENCOUNTER — TELEPHONE (OUTPATIENT)
Dept: CARDIOLOGY | Facility: CLINIC | Age: 82
End: 2022-07-20

## 2022-07-20 ENCOUNTER — ANTICOAGULATION THERAPY VISIT (OUTPATIENT)
Dept: ANTICOAGULATION | Facility: CLINIC | Age: 82
End: 2022-07-20

## 2022-07-20 ENCOUNTER — MYC MEDICAL ADVICE (OUTPATIENT)
Dept: CARE COORDINATION | Facility: CLINIC | Age: 82
End: 2022-07-20

## 2022-07-20 ENCOUNTER — TRANSFERRED RECORDS (OUTPATIENT)
Dept: HEALTH INFORMATION MANAGEMENT | Facility: CLINIC | Age: 82
End: 2022-07-20

## 2022-07-20 DIAGNOSIS — I27.24 CTEPH (CHRONIC THROMBOEMBOLIC PULMONARY HYPERTENSION) (H): ICD-10-CM

## 2022-07-20 DIAGNOSIS — I27.20 PULMONARY HYPERTENSION (H): Primary | ICD-10-CM

## 2022-07-20 DIAGNOSIS — Z79.01 LONG TERM CURRENT USE OF ANTICOAGULANT THERAPY: ICD-10-CM

## 2022-07-20 LAB — INR (EXTERNAL): 2 (ref 0.9–1.1)

## 2022-07-20 RX ORDER — WARFARIN SODIUM 1 MG/1
1 TABLET ORAL DAILY
Qty: 90 TABLET | Refills: 1 | Status: SHIPPED | OUTPATIENT
Start: 2022-07-20 | End: 2022-12-09

## 2022-07-20 NOTE — PROGRESS NOTES
ANTICOAGULATION MANAGEMENT     Karen RICO [place] 81 year old female is on warfarin with therapeutic INR result. (Goal INR 2.0-3.0)    Recent labs: (last 7 days)     07/20/22  0000   INR 2.0*       ASSESSMENT       Source(s): Chart Review and Patient/Caregiver Call       Warfarin doses taken: Warfarin taken as instructed    Diet: No new diet changes identified    New illness, injury, or hospitalization: No    Medication/supplement changes: None noted    Signs or symptoms of bleeding or clotting: No    Previous INR: Subtherapeutic    Additional findings: None       PLAN     Recommended plan for no diet, medication or health factor changes affecting INR     Dosing Instructions: continue your current warfarin dose with next INR in 1 week       Summary  As of 7/20/2022    Full warfarin instructions:  5 mg every Wed; 4.5 mg all other days   Next INR check:  7/27/2022             Telephone call with Karen who verbalizes understanding and agrees to plan and who agrees to plan and repeated back plan correctly    Patient to recheck with home meter    Education provided: Goal range and significance of current result and Importance of therapeutic range    Plan made per [facility] anticoagulation protocol    Brittany iKrk, RN  Anticoagulation Clinic  7/20/2022    _______________________________________________________________________     Anticoagulation Episode Summary     Current INR goal:  2.0-3.0   TTR:  47.0 % (1 y)   Target end date:  Indefinite   Send INR reminders to:  [facility]    Indications    Pulmonary hypertension (H) [I27.20]  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

## 2022-07-26 ENCOUNTER — TELEPHONE (OUTPATIENT)
Dept: CARDIOLOGY | Facility: CLINIC | Age: 82
End: 2022-07-26

## 2022-07-26 DIAGNOSIS — I27.20 PULMONARY HYPERTENSION (H): ICD-10-CM

## 2022-07-26 NOTE — TELEPHONE ENCOUNTER
Pt states she is taking the Opsumit 1 tab daily, instead of 1 tab every other day. Pt also stated she did not see this change listed on her last AVF. Pt stated it was delivered while we were on the phone, and today is the first day she will start this regiment. She stated that it's a 30 count instead of a 15 count.    Janelle Nelson, Visit Facilitator/MA.

## 2022-07-27 ENCOUNTER — TRANSFERRED RECORDS (OUTPATIENT)
Dept: HEALTH INFORMATION MANAGEMENT | Facility: CLINIC | Age: 82
End: 2022-07-27

## 2022-07-27 ENCOUNTER — ANTICOAGULATION THERAPY VISIT (OUTPATIENT)
Dept: ANTICOAGULATION | Facility: CLINIC | Age: 82
End: 2022-07-27

## 2022-07-27 DIAGNOSIS — I27.20 PULMONARY HYPERTENSION (H): Primary | ICD-10-CM

## 2022-07-27 DIAGNOSIS — I27.24 CTEPH (CHRONIC THROMBOEMBOLIC PULMONARY HYPERTENSION) (H): ICD-10-CM

## 2022-07-27 DIAGNOSIS — Z79.01 LONG TERM CURRENT USE OF ANTICOAGULANT THERAPY: ICD-10-CM

## 2022-07-27 LAB — INR (EXTERNAL): 1.6 (ref 0.9–1.1)

## 2022-07-27 NOTE — PROGRESS NOTES
ANTICOAGULATION MANAGEMENT     Karen Anderson 81 year old female is on warfarin with subtherapeutic INR result. (Goal INR 2.0-3.0)    Recent labs: (last 7 days)     07/27/22  0000   INR 1.6*       ASSESSMENT       Source(s): Chart Review and Patient/Caregiver Call       Warfarin doses taken: Warfarin taken as instructed    Diet: No new diet changes identified    New illness, injury, or hospitalization: No    Medication/supplement changes: None noted    Signs or symptoms of bleeding or clotting: No    Previous INR: Therapeutic last visit; previously outside of goal range    Additional findings: Patient reports diet is different because when they are at home they get groceries delivered from [employer].  Patient reports that they are still getting plenty of fruits and veggies but not more than before she doesn't think.  Karen and her  are up [facility]       PLAN     Recommended plan for no diet, medication or health factor changes affecting INR     Dosing Instructions: Increase your warfarin dose (6% change) with next INR in 1 week       Summary  As of 7/27/2022    Full warfarin instructions:  4.5 mg every Tue, Sat; 5 mg all other days   Next INR check:  8/3/2022             Telephone call with Karen who verbalizes understanding and agrees to plan and who agrees to plan and repeated back plan correctly    Patient to recheck with home meter    Education provided: Importance of consistent vitamin K intake, Impact of vitamin K foods on INR, Vitamin K content of foods, Goal range and significance of current result and Importance of therapeutic range    Plan made per [facility] anticoagulation protocol    Brittany Kirk RN  Anticoagulation Clinic  7/27/2022    _______________________________________________________________________     Anticoagulation Episode Summary     Current INR goal:  2.0-3.0   TTR:  47.0 % (1 y)   Target end date:  Indefinite   Send INR reminders to:  [facility]    Indications     Pulmonary hypertension (H) [I27.20]  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

## 2022-08-03 ENCOUNTER — ANTICOAGULATION THERAPY VISIT (OUTPATIENT)
Dept: ANTICOAGULATION | Facility: CLINIC | Age: 82
End: 2022-08-03

## 2022-08-03 DIAGNOSIS — I27.24 CTEPH (CHRONIC THROMBOEMBOLIC PULMONARY HYPERTENSION) (H): ICD-10-CM

## 2022-08-03 DIAGNOSIS — I27.20 PULMONARY HYPERTENSION (H): Primary | ICD-10-CM

## 2022-08-03 DIAGNOSIS — Z79.01 LONG TERM CURRENT USE OF ANTICOAGULANT THERAPY: ICD-10-CM

## 2022-08-03 LAB — INR (EXTERNAL): 1.9 (ref 0.9–1.1)

## 2022-08-03 NOTE — PROGRESS NOTES
ANTICOAGULATION MANAGEMENT     Karen Anderson 81 year old female is on warfarin with subtherapeutic INR result. (Goal INR 2.0-3.0)    Recent labs: (last 7 days)     08/03/22  0000   INR 1.9*       ASSESSMENT       Source(s): Chart Review and Patient/Caregiver Call       Warfarin doses taken: Warfarin taken as instructed    Diet: No new diet changes identified[place] thinks her eating is about the same--maybe a little different than when at home, but still eating fruits/vegetables    New illness, injury, or hospitalization: No    Medication/supplement changes: None noted    Signs or symptoms of bleeding or clotting: No--she does have a bump on her shin from where she bumped it.  It is slowly improving. The bump is no longer painful.    Previous INR: Subtherapeutic    Additional findings: None       PLAN     Recommended plan for no diet, medication or health factor changes affecting INR     Dosing Instructions: Increase your warfarin dose (2.9% change) with next INR in 1 week       Summary  As of 8/3/2022    Full warfarin instructions:  5 mg every day   Next INR check:  8/10/2022             Telephone call with Karen who agrees to plan and repeated back plan correctly    Patient to recheck with home meter    Education provided: Please call back if any changes to your diet, medications or how you've been taking warfarin and Contact [phone] with any changes, questions or concerns.     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

## 2022-08-10 ENCOUNTER — TRANSFERRED RECORDS (OUTPATIENT)
Dept: HEALTH INFORMATION MANAGEMENT | Facility: CLINIC | Age: 82
End: 2022-08-10

## 2022-08-10 ENCOUNTER — ANTICOAGULATION THERAPY VISIT (OUTPATIENT)
Dept: ANTICOAGULATION | Facility: CLINIC | Age: 82
End: 2022-08-10

## 2022-08-10 DIAGNOSIS — I27.24 CTEPH (CHRONIC THROMBOEMBOLIC PULMONARY HYPERTENSION) (H): ICD-10-CM

## 2022-08-10 DIAGNOSIS — Z79.01 LONG TERM CURRENT USE OF ANTICOAGULANT THERAPY: ICD-10-CM

## 2022-08-10 DIAGNOSIS — I27.20 PULMONARY HYPERTENSION (H): Primary | ICD-10-CM

## 2022-08-10 LAB — INR (EXTERNAL): 2.6 (ref 0.9–1.1)

## 2022-08-10 NOTE — PROGRESS NOTES
ANTICOAGULATION MANAGEMENT     Karen Anderson 81 year old female is on warfarin with therapeutic INR result. (Goal INR 2.0-3.0)    Recent labs: (last 7 days)     08/10/22  1214   INR 2.6*       ASSESSMENT       Source(s): Chart Review and Patient/Caregiver Call       Warfarin doses taken: Warfarin taken as instructed    Diet: No new diet changes identified    New illness, injury, or hospitalization: No    Medication/supplement changes: None noted    Signs or symptoms of bleeding or clotting: No    Previous INR: Subtherapeutic    Additional findings: Upcoming surgery/procedure MOHS procedure on 8/25/22. No interruption of Warfarin recommended.       PLAN     Recommended plan for no diet, medication or health factor changes affecting INR     Dosing Instructions: Continue your current warfarin dose with next INR in 2 weeks       Summary  As of 8/10/2022    Full warfarin instructions:  5 mg every day   Next INR check:  8/24/2022             Telephone call with Karen who verbalizes understanding and agrees to plan    Patient to recheck with home meter    Education provided: None required    Plan made per [facility] anticoagulation protocol    Jaylan Santana, RN  Anticoagulation Clinic  8/10/2022    _______________________________________________________________________     Anticoagulation Episode Summary     Current INR goal:  2.0-3.0   TTR:  48.6 % (1 y)   Target end date:  Indefinite   Send INR reminders to:  [facility]    Indications    Pulmonary hypertension (H) [I27.20]  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

## 2022-08-24 ENCOUNTER — ANTICOAGULATION THERAPY VISIT (OUTPATIENT)
Dept: ANTICOAGULATION | Facility: CLINIC | Age: 82
End: 2022-08-24

## 2022-08-24 ENCOUNTER — TRANSFERRED RECORDS (OUTPATIENT)
Dept: HEALTH INFORMATION MANAGEMENT | Facility: CLINIC | Age: 82
End: 2022-08-24

## 2022-08-24 DIAGNOSIS — I27.24 CTEPH (CHRONIC THROMBOEMBOLIC PULMONARY HYPERTENSION) (H): ICD-10-CM

## 2022-08-24 DIAGNOSIS — Z79.01 LONG TERM CURRENT USE OF ANTICOAGULANT THERAPY: ICD-10-CM

## 2022-08-24 DIAGNOSIS — I27.20 PULMONARY HYPERTENSION (H): Primary | ICD-10-CM

## 2022-08-24 LAB — INR (EXTERNAL): 2.6 (ref 0.9–1.1)

## 2022-08-24 NOTE — PROGRESS NOTES
ANTICOAGULATION MANAGEMENT     Karen Anderson 81 year old female is on warfarin with supratherapeutic INR result. (Goal INR 2.0-3.0)    Recent labs: (last 7 days)     08/24/22  0000   INR 2.6*       ASSESSMENT       Source(s): Chart Review       Warfarin doses taken: Reviewed in chart    Diet: No new diet changes identified    New illness, injury, or hospitalization: No    Medication/supplement changes: None noted    Signs or symptoms of bleeding or clotting: No    Previous INR: Therapeutic last visit; previously outside of goal range    Additional findings: None       PLAN     Recommended plan for no diet, medication or health factor changes affecting INR     Dosing Instructions: Continue your current warfarin dose with next INR in 2 weeks       Summary  As of 8/24/2022    Full warfarin instructions:  5 mg every day   Next INR check:  9/7/2022             Detailed voice message left for Karen with dosing instructions and follow up date.     Patient to recheck with home meter    Education provided: Please call back if any changes to your diet, medications or how you've been taking warfarin and Contact [phone] with any changes, questions or concerns.     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

## 2022-08-30 DIAGNOSIS — R06.02 SOB (SHORTNESS OF BREATH): ICD-10-CM

## 2022-08-30 DIAGNOSIS — I27.20 PULMONARY HYPERTENSION (H): ICD-10-CM

## 2022-08-30 RX ORDER — POTASSIUM CHLORIDE 1500 MG/1
40 TABLET, EXTENDED RELEASE ORAL 3 TIMES DAILY
Qty: 540 TABLET | Refills: 3 | Status: SHIPPED | OUTPATIENT
Start: 2022-08-30 | End: 2023-11-03

## 2022-09-07 ENCOUNTER — ANTICOAGULATION THERAPY VISIT (OUTPATIENT)
Dept: ANTICOAGULATION | Facility: CLINIC | Age: 82
End: 2022-09-07

## 2022-09-07 ENCOUNTER — TRANSFERRED RECORDS (OUTPATIENT)
Dept: HEALTH INFORMATION MANAGEMENT | Facility: CLINIC | Age: 82
End: 2022-09-07

## 2022-09-07 DIAGNOSIS — I27.20 PULMONARY HYPERTENSION (H): Primary | ICD-10-CM

## 2022-09-07 DIAGNOSIS — I27.24 CTEPH (CHRONIC THROMBOEMBOLIC PULMONARY HYPERTENSION) (H): ICD-10-CM

## 2022-09-07 DIAGNOSIS — Z79.01 LONG TERM CURRENT USE OF ANTICOAGULANT THERAPY: ICD-10-CM

## 2022-09-07 LAB — INR (EXTERNAL): 2.1 (ref 0.9–1.1)

## 2022-09-07 NOTE — PROGRESS NOTES
ANTICOAGULATION MANAGEMENT     Karen Anderson 82 year old female is on warfarin with therapeutic INR result. (Goal INR 2.0-3.0)    Recent labs: (last 7 days)     09/07/22  1000   INR 2.1*       ASSESSMENT       Source(s): Chart Review    Previous INR was Therapeutic last 2(+) visits    Medication, diet, health changes since last INR chart reviewed; none identified           PLAN     Recommended plan for no diet, medication or health factor changes affecting INR     Dosing Instructions: Continue your current warfarin dose with next INR in 2 weeks       Summary  As of 9/7/2022    Full warfarin instructions:  5 mg every day   Next INR check:  9/21/2022             Detailed voice message left for Karen with dosing instructions and follow up date.     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

## 2022-09-21 ENCOUNTER — TRANSFERRED RECORDS (OUTPATIENT)
Dept: HEALTH INFORMATION MANAGEMENT | Facility: CLINIC | Age: 82
End: 2022-09-21

## 2022-09-21 LAB — INR (EXTERNAL): 2.5 (ref 0.9–1.1)

## 2022-09-22 ENCOUNTER — ANTICOAGULATION THERAPY VISIT (OUTPATIENT)
Dept: ANTICOAGULATION | Facility: CLINIC | Age: 82
End: 2022-09-22

## 2022-09-22 DIAGNOSIS — Z79.01 LONG TERM CURRENT USE OF ANTICOAGULANT THERAPY: ICD-10-CM

## 2022-09-22 DIAGNOSIS — I27.24 CTEPH (CHRONIC THROMBOEMBOLIC PULMONARY HYPERTENSION) (H): ICD-10-CM

## 2022-09-22 DIAGNOSIS — I27.20 PULMONARY HYPERTENSION (H): Primary | ICD-10-CM

## 2022-09-22 NOTE — PROGRESS NOTES
ANTICOAGULATION MANAGEMENT     Karen Anderson 82 year old female is on warfarin with therapeutic INR result. (Goal INR 2.0-3.0)    Recent labs: (last 7 days)     09/21/22  1209   INR 2.5*       ASSESSMENT       Source(s): Chart Review and Patient/Caregiver Call       Warfarin doses taken: Warfarin taken as instructed    Diet: No new diet changes identified    New illness, injury, or hospitalization: No    Medication/supplement changes: None noted    Signs or symptoms of bleeding or clotting: No    Previous INR: Therapeutic last 2(+) visits    Additional findings: None       PLAN     Recommended plan for no diet, medication or health factor changes affecting INR     Dosing Instructions: Continue your current warfarin dose with next INR in 2 weeks       Summary  As of 9/22/2022    Full warfarin instructions:  5 mg every day   Next INR check:  10/5/2022             Telephone call with Karen who verbalizes understanding and agrees to plan and who agrees to plan and repeated back plan correctly    Patient to recheck with home meter    Education provided: None required    Plan made per [facility] anticoagulation protocol    Jaylan Santana, RN  Anticoagulation Clinic  9/22/2022    _______________________________________________________________________     Anticoagulation Episode Summary     Current INR goal:  2.0-3.0   TTR:  60.3 % (1 y)   Target end date:  Indefinite   Send INR reminders to:  [facility]    Indications    Pulmonary hypertension (H) [I27.20]  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

## 2022-10-05 ENCOUNTER — TRANSFERRED RECORDS (OUTPATIENT)
Dept: HEALTH INFORMATION MANAGEMENT | Facility: CLINIC | Age: 82
End: 2022-10-05

## 2022-10-05 ENCOUNTER — ANTICOAGULATION THERAPY VISIT (OUTPATIENT)
Dept: ANTICOAGULATION | Facility: CLINIC | Age: 82
End: 2022-10-05

## 2022-10-05 DIAGNOSIS — Z79.01 LONG TERM CURRENT USE OF ANTICOAGULANT THERAPY: ICD-10-CM

## 2022-10-05 DIAGNOSIS — I27.24 CTEPH (CHRONIC THROMBOEMBOLIC PULMONARY HYPERTENSION) (H): ICD-10-CM

## 2022-10-05 DIAGNOSIS — I27.20 PULMONARY HYPERTENSION (H): Primary | ICD-10-CM

## 2022-10-05 LAB — INR (EXTERNAL): 2.7 (ref 0.9–1.1)

## 2022-10-05 NOTE — PROGRESS NOTES
ANTICOAGULATION MANAGEMENT     Karen Anderson 82 year old female is on warfarin with therapeutic INR result. (Goal INR 2.0-3.0)    Recent labs: (last 7 days)     10/05/22  1327   INR 2.7*       ASSESSMENT       Source(s): Chart Review and Patient/Caregiver Call       Warfarin doses taken: Warfarin taken as instructed    Diet: No new diet changes identified    New illness, injury, or hospitalization: No    Medication/supplement changes: None noted    Signs or symptoms of bleeding or clotting: No    Previous INR: Therapeutic last 2(+) visits    Additional findings: None       PLAN     Recommended plan for no diet, medication or health factor changes affecting INR     Dosing Instructions: Continue your current warfarin dose with next INR in 1 week       Summary  As of 10/5/2022    Full warfarin instructions:  5 mg every day   Next INR check:  10/11/2022             Telephone call with Karen who verbalizes understanding and agrees to plan and who agrees to plan and repeated back plan correctly    Lab visit scheduled-previously scheduled    Education provided: Goal range and significance of current result and Contact [phone] with any changes, questions or concerns.     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

## 2022-10-11 ENCOUNTER — LAB (OUTPATIENT)
Dept: LAB | Facility: CLINIC | Age: 82
End: 2022-10-11
Payer: COMMERCIAL

## 2022-10-11 ENCOUNTER — ANTICOAGULATION THERAPY VISIT (OUTPATIENT)
Dept: ANTICOAGULATION | Facility: CLINIC | Age: 82
End: 2022-10-11

## 2022-10-11 DIAGNOSIS — Z79.01 LONG TERM CURRENT USE OF ANTICOAGULANT THERAPY: ICD-10-CM

## 2022-10-11 DIAGNOSIS — I27.24 CTEPH (CHRONIC THROMBOEMBOLIC PULMONARY HYPERTENSION) (H): ICD-10-CM

## 2022-10-11 DIAGNOSIS — R06.09 DYSPNEA ON EXERTION: ICD-10-CM

## 2022-10-11 DIAGNOSIS — I27.20 PULMONARY HYPERTENSION (H): ICD-10-CM

## 2022-10-11 DIAGNOSIS — I27.20 PULMONARY HYPERTENSION (H): Primary | ICD-10-CM

## 2022-10-11 LAB
ERYTHROCYTE [DISTWIDTH] IN BLOOD BY AUTOMATED COUNT: 14.8 % (ref 10–15)
HCT VFR BLD AUTO: 39 % (ref 35–47)
HGB BLD-MCNC: 12.2 G/DL (ref 11.7–15.7)
INR BLD: 2.8 (ref 0.9–1.1)
MCH RBC QN AUTO: 31 PG (ref 26.5–33)
MCHC RBC AUTO-ENTMCNC: 31.3 G/DL (ref 31.5–36.5)
MCV RBC AUTO: 99 FL (ref 78–100)
NT-PROBNP SERPL-MCNC: 1135 PG/ML (ref 0–450)
PLATELET # BLD AUTO: 229 10E3/UL (ref 150–450)
RBC # BLD AUTO: 3.94 10E6/UL (ref 3.8–5.2)
WBC # BLD AUTO: 3 10E3/UL (ref 4–11)

## 2022-10-11 PROCEDURE — 85027 COMPLETE CBC AUTOMATED: CPT

## 2022-10-11 PROCEDURE — 36415 COLL VENOUS BLD VENIPUNCTURE: CPT

## 2022-10-11 PROCEDURE — 83880 ASSAY OF NATRIURETIC PEPTIDE: CPT

## 2022-10-11 PROCEDURE — 80053 COMPREHEN METABOLIC PANEL: CPT

## 2022-10-11 PROCEDURE — 85610 PROTHROMBIN TIME: CPT

## 2022-10-11 NOTE — PROGRESS NOTES
ANTICOAGULATION MANAGEMENT     Karen RICO Calderonhilariacharbel 82 year old female is on warfarin with therapeutic INR result. (Goal INR 2.0-3.0)    Recent labs: (last 7 days)     10/11/22  1030   INR 2.8*       ASSESSMENT       Source(s): Chart Review and Patient/Caregiver Call       Warfarin doses taken: Warfarin taken as instructed    Diet: No new diet changes identified    New illness, injury, or hospitalization: No    Medication/supplement changes: None noted    Signs or symptoms of bleeding or clotting: No    Previous INR: Therapeutic last 2(+) visits    Additional findings: None       PLAN     Recommended plan for no diet, medication or health factor changes affecting INR     Dosing Instructions: Continue your current warfarin dose with next INR in 2 weeks       Summary  As of 10/11/2022    Full warfarin instructions:  5 mg every day   Next INR check:  10/25/2022             Telephone call with Karen who verbalizes understanding and agrees to plan and who agrees to plan and repeated back plan correctly    Patient to recheck with home meter    Education provided: Goal range and significance of current result and Importance of therapeutic range    Plan made per [facility] anticoagulation protocol    Brittany Kirk RN  Anticoagulation Clinic  10/11/2022    _______________________________________________________________________     Anticoagulation Episode Summary     Current INR goal:  2.0-3.0   TTR:  63.2 % (1 y)   Target end date:  Indefinite   Send INR reminders to:  [facility]    Indications    Pulmonary hypertension (H) [I27.20]  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

## 2022-10-12 LAB
ALBUMIN SERPL-MCNC: 3.7 G/DL (ref 3.4–5)
ALP SERPL-CCNC: 103 U/L (ref 40–150)
ALT SERPL W P-5'-P-CCNC: 22 U/L (ref 0–50)
ANION GAP SERPL CALCULATED.3IONS-SCNC: 4 MMOL/L (ref 3–14)
AST SERPL W P-5'-P-CCNC: 32 U/L (ref 0–45)
BILIRUB SERPL-MCNC: 0.4 MG/DL (ref 0.2–1.3)
BUN SERPL-MCNC: 23 MG/DL (ref 7–30)
CALCIUM SERPL-MCNC: 9.9 MG/DL (ref 8.5–10.1)
CHLORIDE BLD-SCNC: 103 MMOL/L (ref 94–109)
CO2 SERPL-SCNC: 33 MMOL/L (ref 20–32)
CREAT SERPL-MCNC: 1.04 MG/DL (ref 0.52–1.04)
GFR SERPL CREATININE-BSD FRML MDRD: 54 ML/MIN/1.73M2
GLUCOSE BLD-MCNC: 76 MG/DL (ref 70–99)
POTASSIUM BLD-SCNC: 4.4 MMOL/L (ref 3.4–5.3)
PROT SERPL-MCNC: 7.4 G/DL (ref 6.8–8.8)
SODIUM SERPL-SCNC: 140 MMOL/L (ref 133–144)

## 2022-10-14 ENCOUNTER — TRANSFERRED RECORDS (OUTPATIENT)
Dept: HEALTH INFORMATION MANAGEMENT | Facility: CLINIC | Age: 82
End: 2022-10-14

## 2022-10-18 ENCOUNTER — VIRTUAL VISIT (OUTPATIENT)
Dept: CARDIOLOGY | Facility: CLINIC | Age: 82
End: 2022-10-18
Attending: PHYSICIAN ASSISTANT
Payer: COMMERCIAL

## 2022-10-18 DIAGNOSIS — I27.20 PULMONARY HYPERTENSION (H): ICD-10-CM

## 2022-10-18 DIAGNOSIS — R06.09 DYSPNEA ON EXERTION: ICD-10-CM

## 2022-10-18 PROCEDURE — 99214 OFFICE O/P EST MOD 30 MIN: CPT | Mod: 95 | Performed by: PHYSICIAN ASSISTANT

## 2022-10-18 RX ORDER — BUMETANIDE 1 MG/1
1 TABLET ORAL 3 TIMES DAILY
Qty: 540 TABLET | Refills: 3 | Status: SHIPPED | OUTPATIENT
Start: 2022-10-18 | End: 2022-12-13 | Stop reason: ALTCHOICE

## 2022-10-18 NOTE — NURSING NOTE
Chief Complaint   Patient presents with     Follow Up     3 month, pt states she had lesion on head that was removed in August, pt wondering if can lower the Bumex at all due to flaking of skin     JOSÉ LUIS Tobias/ALINA

## 2022-10-18 NOTE — PROGRESS NOTES
"  Karen is a 82 year old who is being evaluated via a billable video visit.      How would you like to obtain your AVS? MyChart  If the video visit is dropped, the invitation should be resent by: Send to e-mail at: [email or web address]  Will anyone else be joining your video visit?   pts  Santana Antonio, VF/CMA    Vitals - Patient Reported  Systolic (Patient Reported): 123  Diastolic (Patient Reported): 67  Weight (Patient Reported): 46 kg (101 lb 6.4 oz)  SpO2 (Patient Reported): 93  Pulse (Patient Reported): 71  Pain Score: No Pain (0)      {If patient encounters technical issues they should call [phone] :[id number]}      Video-Visit Details    Video Start Time: {video visit start/end time for provider to select:666243}    Type of service:  Video Visit    Video End Time:{video visit start/end time for provider to select:399797}    Originating Location (pt. Location): {video visit patient location:835371::\"Home\"}    {PROVIDER LOCATION On-site should be selected for visits conducted from your clinic location or adjoining [facility] hospital, academic office, or other nearby [facility] building. Off-site should be selected for all other provider locations, including home:577451}    Distant Location (provider location):  {virtual location provider:258495}    Platform used for Video Visit: {Virtual Visit Platforms:610742::\"Quantivo\"}  "

## 2022-10-18 NOTE — LETTER
10/18/2022      RE: Karen Anderson  [address] [place] [address]       Dear Colleague,    Thank you for the opportunity to participate in the care of your patient, Karen Anderson, at the [facility] [place] [facility]. Please see a copy of my visit note below.            CARDIOLOGY PH CLINIC VIDEO VISIT    Date of video visit: 10/18/22        Karen Anderson is a 81 year old female who is being evaluated via a billable video visit.        I have reviewed and updated the patient's Past Medical History, Social History, Family History and Medication List.    MEDICATIONS:  Current Outpatient Medications   Medication Sig Dispense Refill     acetaminophen (TYLENOL) 325 MG tablet Take 325 mg by mouth every 6 hours as needed for mild pain       albuterol (PROAIR HFA/PROVENTIL HFA/VENTOLIN HFA) 108 (90 Base) MCG/ACT inhaler Inhale 2 puffs into the lungs every 4 hours as needed for shortness of breath / dyspnea or wheezing        alendronate (FOSAMAX) 70 MG tablet Take 70 mg by mouth once a week On Mondays       Ascorbic Acid (VITAMIN C) 500 MG CAPS Take 500 mg by mouth every morning        aspirin (ASA) 81 MG tablet Take 81 mg by mouth daily        beclomethasone HFA (QVAR REDIHALER) 80 MCG/ACT inhaler Inhale 2 puffs into the lungs 2 times daily       bumetanide (BUMEX) 2 MG tablet Take 2 tablets (4 mg) by mouth 3 times daily 540 tablet 3     calcium citrate-vitamin D (CALCIUM CITRATE + D3) 315-250 MG-UNIT TABS per tablet Take 1 tablet by mouth 2 times daily        Corn Dextrin (CLEAR FIBER POWDER PO) Take by mouth daily        digoxin (LANOXIN) 125 MCG tablet Take 0.5 tablets (62.5 mcg) by mouth daily 13 tablet      gabapentin (NEURONTIN) 300 MG capsule Take 300 mg by mouth 3 times daily Can take an additional 300mg at bedtime as needed       hydroxychloroquine (PLAQUENIL) 200 MG tablet Take 200 mg by mouth daily         ipratropium (ATROVENT) 0.06 % nasal spray Spray 2 sprays into both nostrils 3 times daily        ketotifen (ZADITOR) 0.025 % ophthalmic solution Place 1 drop into both eyes daily       macitentan (OPSUMIT) 10 MG tablet Take 1 tablet (10 mg) by mouth daily 15 tablet 11     potassium chloride ER (KLOR-CON M) 20 MEQ CR tablet Take 2 tablets (40 mEq) by mouth 3 times daily 540 tablet 3     sodium chloride (OCEAN) 0.65 % nasal spray Spray 1 spray into both nostrils daily as needed for congestion       treprostinil (REMODULIN) 60 mcg/mL in glycine diluent 50 mL infusion Inject 45.5 ng/kg/min into the vein continuous Goal Dose: 45.5 ng/kg/min 1368 mL 0     warfarin ANTICOAGULANT (COUMADIN) 1 MG tablet Take 1 tablet (1 mg) by mouth daily 90 tablet 1     warfarin ANTICOAGULANT (COUMADIN) 1 MG tablet Take one tab by mouth daily or as directed by Anticoagulation Clinic. 60 tablet 1     warfarin ANTICOAGULANT (COUMADIN) 3 MG tablet TAKE 3 MG BY MOUTH ON MONDAY, WEDNESDAY, FRIDAY, AND SATURDAY, AND 4.5 MG ON ALL OTHER DAYS OF THE WEEK 170 tablet 3       ALLERGIES  Sulfa drugs    Brief physical exam:  General: In no acute distress, upright and calm. Accompanied by her  today.  Eyes: No apparent redness or discharge.   Chest: No labored breathing, no cough during exam or audible wheezing.   Neuro: No obvious focal defects or tremors.   Psych: Alert and oriented. Does not appear anxious.     The rest of a comprehensive physical examination is deferred due to public health emergency video visit restrictions.       Primary PH cardiologist: Dr. Turcios       HPI:  Ms. Anderson is a pleasant 82 year old female with a PMHx including rheumatoid arthritis, hypertension, scoliosis, asthma, pulmonary MAC, breast cancer s/p chemotherapy with Adriamycin, Cytoxan, Taxol, and tamoxifen and status post left mastectomy. She also has a history of extensive bilateral pulmonary emboli and DVT in 1998 thought to be secondary to  "tamoxifen along with chemotherapy induced cardiomyopathy. 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 Although she had proximal disease, she was too high risk for surgical PTE. She had a BPA in Jan 2021 which was complicated by hemoptysis, requiring FFP. She has required frequent diuretic adjustments, and we have been working on medical management. She is currently on combination therapy with IV Remodulin therapy along with Opsumit, and has failed Adempas due to dizziness and hypotension.     I last met virtually with Karen in July at which time she was stable from a cardiopulmonary standpoint. Weight had been stable mostly in the low 100s. At that time, we decided to try increasing her Opsumit from every other day to once daily.    Today, we are meeting virtually again for follow up. She tells me that she is grateful that she continues to do well. She thinks her breathing has been \"pretty good.\" Weights remain stable in the low 100s and as long as she wraps her legs the swelling stays under good control. She is tolerating the Opsumit every day without significant dizziness or hypotension. She tells me that there main issue recently is that her skin is severely flaking off. She tried a cream recommended by her dermatologist that helps but is still very bothersome to her. She is requesting to come down slightly on her diuretics if possible.     Most recent labs reviewed as below.       CURRENT PULMONARY HYPERTENSION REGIMEN:     PAH Rx: Remodulin 45.5ng/kg/min, Opsumit 10mg daily  (failed Adempas due to dizziness)     Diuretics: Bumex 4mg TID     Oxygen: NC 1.5-2LPM     Anticoagulation: warfarin   Indication: CTEPH    Immunosuppression: Yes (RA)    Pulm: Dr. Abernathy ([facility])        Assessment/Plan:        1. Chronic thromboembolic pulmonary hypertension.              --Ms. Anderson has CTEPH with RV dysfunction. Although she had proximal disease, she was felt to be too high " risk for surgical PTE here and at [facility]. She was offered potential PTE at [place], but still felt to be very high risk. She did have BPA here in January 2021 but this was complicated by hemoptysis. No further surgical interventions are being offered at this time. Thus, we have since been focusing on medical management.              --She is on IV Remodulin at 45.5ng/kg/min.  She is now taking Opsumit every day which we continue. She is not able to tolerate Adempas due to dizziness              --Her weight remains stable at home in the low 100s on bumex 4mg TID. Renal function is preserved and NT-proBNP remains elevated but stable. We had along discussion about her diuretics today. She would like to trial reduction to see if this helps her dry/flaking skin. I voiced my concern as things have been stable for the last few months, but after shared decision making, we decided to try a slow decrease in a stepwise fashion by 1mg per week, until she reaches 3mg TID. She knows to call early with weight gain or more edema.               --Continue digoxin for RV support. BP has remained acceptable recently.                --Continue anticoagulation with warfarin for her CTEPH,  She has equipment to do home checks, and follows with the INR clinic.              --Continue oxygen at 1-2L with exertion as she is doing. She also follows with pulmonary locally.       Follow up plan: Return in 1 month to see me virtually to follow up after diuretic changs. We are happy to see the patient back sooner with any new concerns.       Testing/labs:    Most recent labs:    Latest Reference Range & Units 10/11/22 10:30   Sodium 133 - 144 mmol/L 140   Potassium 3.4 - 5.3 mmol/L 4.4   Chloride 94 - 109 mmol/L 103   Carbon Dioxide 20 - 32 mmol/L 33 (H)   Urea Nitrogen 7 - 30 mg/dL 23   Creatinine 0.52 - 1.04 mg/dL 1.04   GFR Estimate >60 mL/min/1.73m2 54 (L)   Calcium 8.5 - 10.1 mg/dL 9.9   Anion Gap 3 - 14 mmol/L 4   Albumin 3.4 - 5.0 g/dL 3.7    Protein Total 6.8 - 8.8 g/dL 7.4   Alkaline Phosphatase 40 - 150 U/L 103   ALT 0 - 50 U/L 22   AST 0 - 45 U/L 32   Bilirubin Total 0.2 - 1.3 mg/dL 0.4   N-Terminal Pro Bnp 0 - 450 pg/mL 1,135 (H)   Glucose 70 - 99 mg/dL 76   WBC 4.0 - 11.0 10e3/uL 3.0 (L)   Hemoglobin 11.7 - 15.7 g/dL 12.2   Hematocrit 35.0 - 47.0 % 39.0   Platelet Count 150 - 450 10e3/uL 229   RBC Count 3.80 - 5.20 10e6/uL 3.94   MCV 78 - 100 fL 99   MCH 26.5 - 33.0 pg 31.0   MCHC 31.5 - 36.5 g/dL 31.3 (L)   RDW 10.0 - 15.0 % 14.8   INR Point of Care 0.9 - 1.1  2.8 (H)   (H): Data is abnormally high  (L): Data is abnormally low    Other recent pertinent testing:    Echo 4/25/22  Interpretation Summary     Left ventricular function is normal.The ejection fraction is 55-60%. Grade II  or moderate diastolic dysfunction. Paradoxical septal motion consistent with  right ventricular pressure overload is present.  The RV is not clearly visualized but the size appears to be moderately dilated  and function appears to be at least mildly reduced.  The estimated PA systolic pressure is 55 mmHg above the RA pressure.  The left atrial pressure is elevated.  No pericardial effusion is present.  This study was compared with the study from 06/03/2021. The estimated PA  systolic pressure is lower. The RV function and size are similar on direct  visual comparison. The pericardial effusion is also no longer evident      [facility]  3/17/2021        Severe pulmonary hypertension    Normal cardiac output by Thermodilution    Elevated left sided filling pressures    No signficant change when comapred to her last hemodynamic assessment            Pressures Phase: Baseline                                                     Time Systolic (mmHg) Diastolic (mmHg) Mean (mmHg) A Wave (mmHg) V Wave (mmHg) EDP (mmHg) Max dp/dt (mmHg/sec) HR (bpm) Content (mL/dL) SAT (%)   RA Pressures  2:31 PM     14    14    22        63          RV Pressures  2:16 PM             912                2:32 PM 87    9          28      62          PA Pressures  2:32 PM 88    22    44            62          PCW Pressures  2:33 PM     14                                               NYHA Functional Class:  Functional class 3      Video-Visit Details    Type of service:  Video Visit    Video Start Time: 1139  Video End Time: 1200    An additional 15 minutes was spent today performing chart and history review, pre and post visit documentation, and care coordination.      Originating Location (pt. Location): Home    Distant Location (provider location):  [facility]    Platform used for Video Visit: Darron Marcum PA-C  [facility]  Pager [phone]

## 2022-10-18 NOTE — PATIENT INSTRUCTIONS
Medication Changes: Bumex change from 4mg Three times a day to 3 mg three times a day in a stepwise fashion.  Week 1: 4 mg am, 4 mg, afternoon, 3 mg evening  Week 2: 4 mg am, 3 mg, afternoon, 3 mg evening  Weeks 3 & 4: 3 mg three times a day.        Patient Instructions:  1. Continue staying active and eat a heart healthy diet.    2. Please keep current list of medications with you at all times.    3. Remember to weigh yourself daily after voiding and before you consume any food or beverages and log the numbers.  If you have gained 2 pounds overnight or 5 pounds in a week contact us immediately for medication adjustments or further instructions.    4. **Please call us immediately if you have any syncope (fainting or passing out), chest pain, edema (swelling or weight gain), or decline in your functional status (general decline in how you are feeling).    5. Patients on Remodulin (treprostinil) or Veletri (epoprostenol): Please make sure that you have your backup pump and supplies with you at all times, your mixing instructions, and contact information for your specialty pharmacy.    Follow up Appointment Information:  - Follow-up virtual appointment with MAGEN Saeed on 11/17 @ 11:45a   - No labs needed prior to appointment      Results:    We are located on the third floor of [facility] on the [facility].  Our address is     [address] [place] [address] on [address] [place] [address]      Thank you for allowing us to be a part of your care here at the [facility]    If you have questions or concerns please contact us at:    Rebecca Ngo RN, BSN    Rosa Moya (Schedule,Prior Auth)  Nurse Coordinator       Clinic   Pulmonary Hypertension     Pulmonary Hypertension  [facility]   [facility]  (Phone)[phone]     (Phone)  [phone]          (Fax)[phone]                ** Please note that you will NOT receive a reminder call regarding your scheduled testing, reminder calls are for provider appointments only.  If you are scheduled for testing within the [facility] [place] system you may receive a call regarding pre-registration for billing purposes only.**     Support Group:  Pulmonary Hypertension Association  Https://www.phassociation.org/  **Look at the Events Tab** They even have Support Groups that you can call into    [facility] PH Support Group  Second Saturday of the Month from 1-3 PM   Location: [address] [place] [address]  Leader: Jessenia oMeller  Phone: [phone]  Email: [email or web address]     Great Videos about Pulmonary Hypertension!!  Scan ME!    Website: [email or web address]

## 2022-10-18 NOTE — PROGRESS NOTES
Karen is a 82 year old who is being evaluated via a billable video visit.      How would you like to obtain your AVS? MyChart  If the video visit is dropped, the invitation should be resent by: Send to e-mail at: [email or web address]  Will anyone else be joining your video visit?  pts  Santana Antonio, VF/CMA    Vitals - Patient Reported  Systolic (Patient Reported): 123  Diastolic (Patient Reported): 67  Weight (Patient Reported): 46 kg (101 lb 6.4 oz)  SpO2 (Patient Reported): 93  Pulse (Patient Reported): 71  Pain Score: No Pain (0)         Distant Location (provider location):  On-site    Platform used for Video Visit: [facility]            CARDIOLOGY  CLINIC VIDEO VISIT    Date of video visit: 10/18/22        Karen Anderson is a 81 year old female who is being evaluated via a billable video visit.        I have reviewed and updated the patient's Past Medical History, Social History, Family History and Medication List.    MEDICATIONS:  Current Outpatient Medications   Medication Sig Dispense Refill     acetaminophen (TYLENOL) 325 MG tablet Take 325 mg by mouth every 6 hours as needed for mild pain       albuterol (PROAIR HFA/PROVENTIL HFA/VENTOLIN HFA) 108 (90 Base) MCG/ACT inhaler Inhale 2 puffs into the lungs every 4 hours as needed for shortness of breath / dyspnea or wheezing        alendronate (FOSAMAX) 70 MG tablet Take 70 mg by mouth once a week On Mondays       Ascorbic Acid (VITAMIN C) 500 MG CAPS Take 500 mg by mouth every morning        aspirin (ASA) 81 MG tablet Take 81 mg by mouth daily        beclomethasone HFA (QVAR REDIHALER) 80 MCG/ACT inhaler Inhale 2 puffs into the lungs 2 times daily       bumetanide (BUMEX) 2 MG tablet Take 2 tablets (4 mg) by mouth 3 times daily 540 tablet 3     calcium citrate-vitamin D (CALCIUM CITRATE + D3) 315-250 MG-UNIT TABS per tablet Take 1 tablet by mouth 2 times daily        Corn Dextrin (CLEAR FIBER POWDER PO) Take by mouth daily         digoxin (LANOXIN) 125 MCG tablet Take 0.5 tablets (62.5 mcg) by mouth daily 13 tablet      gabapentin (NEURONTIN) 300 MG capsule Take 300 mg by mouth 3 times daily Can take an additional 300mg at bedtime as needed       hydroxychloroquine (PLAQUENIL) 200 MG tablet Take 200 mg by mouth daily        ipratropium (ATROVENT) 0.06 % nasal spray Spray 2 sprays into both nostrils 3 times daily        ketotifen (ZADITOR) 0.025 % ophthalmic solution Place 1 drop into both eyes daily       macitentan (OPSUMIT) 10 MG tablet Take 1 tablet (10 mg) by mouth daily 15 tablet 11     potassium chloride ER (KLOR-CON M) 20 MEQ CR tablet Take 2 tablets (40 mEq) by mouth 3 times daily 540 tablet 3     sodium chloride (OCEAN) 0.65 % nasal spray Spray 1 spray into both nostrils daily as needed for congestion       treprostinil (REMODULIN) 60 mcg/mL in glycine diluent 50 mL infusion Inject 45.5 ng/kg/min into the vein continuous Goal Dose: 45.5 ng/kg/min 1368 mL 0     warfarin ANTICOAGULANT (COUMADIN) 1 MG tablet Take 1 tablet (1 mg) by mouth daily 90 tablet 1     warfarin ANTICOAGULANT (COUMADIN) 1 MG tablet Take one tab by mouth daily or as directed by Anticoagulation Clinic. 60 tablet 1     warfarin ANTICOAGULANT (COUMADIN) 3 MG tablet TAKE 3 MG BY MOUTH ON MONDAY, WEDNESDAY, FRIDAY, AND SATURDAY, AND 4.5 MG ON ALL OTHER DAYS OF THE WEEK 170 tablet 3       ALLERGIES  Sulfa drugs    Brief physical exam:  General: In no acute distress, upright and calm. Accompanied by her  today.  Eyes: No apparent redness or discharge.   Chest: No labored breathing, no cough during exam or audible wheezing.   Neuro: No obvious focal defects or tremors.   Psych: Alert and oriented. Does not appear anxious.     The rest of a comprehensive physical examination is deferred due to public health emergency video visit restrictions.       Primary PH cardiologist: Dr. Turcios       HPI:  Ms. Anderson is a pleasant 82 year old female with a PMHx including  "rheumatoid arthritis, hypertension, scoliosis, asthma, pulmonary MAC, breast cancer s/p chemotherapy with Adriamycin, Cytoxan, Taxol, and tamoxifen and status post left mastectomy. She also has a history of extensive bilateral pulmonary emboli and DVT in 1998 thought to be secondary to tamoxifen along with chemotherapy induced cardiomyopathy. 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 Although she had proximal disease, she was too high risk for surgical PTE. She had a BPA in Jan 2021 which was complicated by hemoptysis, requiring FFP. She has required frequent diuretic adjustments, and we have been working on medical management. She is currently on combination therapy with IV Remodulin therapy along with Opsumit, and has failed Adempas due to dizziness and hypotension.     I last met virtually with Karen in July at which time she was stable from a cardiopulmonary standpoint. Weight had been stable mostly in the low 100s. At that time, we decided to try increasing her Opsumit from every other day to once daily.    Today, we are meeting virtually again for follow up. She tells me that she is grateful that she continues to do well. She thinks her breathing has been \"pretty good.\" Weights remain stable in the low 100s and as long as she wraps her legs the swelling stays under good control. She is tolerating the Opsumit every day without significant dizziness or hypotension. She tells me that there main issue recently is that her skin is severely flaking off. She tried a cream recommended by her dermatologist that helps but is still very bothersome to her. She is requesting to come down slightly on her diuretics if possible.     Most recent labs reviewed as below.       CURRENT PULMONARY HYPERTENSION REGIMEN:     PAH Rx: Remodulin 45.5ng/kg/min, Opsumit 10mg daily  (failed Adempas due to dizziness)     Diuretics: Bumex 4mg TID     Oxygen: NC 1.5-2LPM     Anticoagulation: warfarin "   Indication: CTEPH    Immunosuppression: Yes (RA)    Pulm: [facility] Abernathy ([facility])        Assessment/Plan:        1. Chronic thromboembolic pulmonary hypertension.              --Ms. Anderson has CTEPH with RV dysfunction. Although she had proximal disease, she was felt to be too high risk for surgical PTE here and at [facility]. She was offered potential PTE at [facility], but still felt to be very high risk. She did have BPA here in January 2021 but this was complicated by hemoptysis. No further surgical interventions are being offered at this time. Thus, we have since been focusing on medical management.              --She is on IV Remodulin at 45.5ng/kg/min.  She is now taking Opsumit every day which we continue. She is not able to tolerate Adempas due to dizziness              --Her weight remains stable at home in the low 100s on bumex 4mg TID. Renal function is preserved and NT-proBNP remains elevated but stable. We had along discussion about her diuretics today. She would like to trial reduction to see if this helps her dry/flaking skin. I voiced my concern as things have been stable for the last few months, but after shared decision making, we decided to try a slow decrease in a stepwise fashion by 1mg per week, until she reaches 3mg TID. She knows to call early with weight gain or more edema.               --Continue digoxin for RV support. BP has remained acceptable recently.                --Continue anticoagulation with warfarin for her CTEPH,  She has equipment to do home checks, and follows with the INR clinic.              --Continue oxygen at 1-2L with exertion as she is doing. She also follows with pulmonary locally.       Follow up plan: Return in 1 month to see me virtually to follow up after diuretic changs. We are happy to see the patient back sooner with any new concerns.       Testing/labs:    Most recent labs:    Latest Reference Range & Units 10/11/22 10:30   Sodium 133 - 144 mmol/L 140    Potassium 3.4 - 5.3 mmol/L 4.4   Chloride 94 - 109 mmol/L 103   Carbon Dioxide 20 - 32 mmol/L 33 (H)   Urea Nitrogen 7 - 30 mg/dL 23   Creatinine 0.52 - 1.04 mg/dL 1.04   GFR Estimate >60 mL/min/1.73m2 54 (L)   Calcium 8.5 - 10.1 mg/dL 9.9   Anion Gap 3 - 14 mmol/L 4   Albumin 3.4 - 5.0 g/dL 3.7   Protein Total 6.8 - 8.8 g/dL 7.4   Alkaline Phosphatase 40 - 150 U/L 103   ALT 0 - 50 U/L 22   AST 0 - 45 U/L 32   Bilirubin Total 0.2 - 1.3 mg/dL 0.4   N-Terminal Pro Bnp 0 - 450 pg/mL 1,135 (H)   Glucose 70 - 99 mg/dL 76   WBC 4.0 - 11.0 10e3/uL 3.0 (L)   Hemoglobin 11.7 - 15.7 g/dL 12.2   Hematocrit 35.0 - 47.0 % 39.0   Platelet Count 150 - 450 10e3/uL 229   RBC Count 3.80 - 5.20 10e6/uL 3.94   MCV 78 - 100 fL 99   MCH 26.5 - 33.0 pg 31.0   MCHC 31.5 - 36.5 g/dL 31.3 (L)   RDW 10.0 - 15.0 % 14.8   INR Point of Care 0.9 - 1.1  2.8 (H)   (H): Data is abnormally high  (L): Data is abnormally low    Other recent pertinent testing:    Echo 4/25/22  Interpretation Summary     Left ventricular function is normal.The ejection fraction is 55-60%. Grade II  or moderate diastolic dysfunction. Paradoxical septal motion consistent with  right ventricular pressure overload is present.  The RV is not clearly visualized but the size appears to be moderately dilated  and function appears to be at least mildly reduced.  The estimated PA systolic pressure is 55 mmHg above the RA pressure.  The left atrial pressure is elevated.  No pericardial effusion is present.  This study was compared with the study from 06/03/2021. The estimated PA  systolic pressure is lower. The RV function and size are similar on direct  visual comparison. The pericardial effusion is also no longer evident      [facility]  3/17/2021        Severe pulmonary hypertension    Normal cardiac output by Thermodilution    Elevated left sided filling pressures    No signficant change when comapred to her last hemodynamic assessment            Pressures Phase: Baseline                                                      Time Systolic (mmHg) Diastolic (mmHg) Mean (mmHg) A Wave (mmHg) V Wave (mmHg) EDP (mmHg) Max dp/dt (mmHg/sec) HR (bpm) Content (mL/dL) SAT (%)   RA Pressures  2:31 PM     14    14    22        63          RV Pressures  2:16 PM             912               2:32 PM 87    9          28      62          PA Pressures  2:32 PM 88    22    44            62          PCW Pressures  2:33 PM     14                                               NYHA Functional Class:  Functional class 3      Video-Visit Details    Type of service:  Video Visit    Video Start Time: 1139  Video End Time: 1200    An additional 15 minutes was spent today performing chart and history review, pre and post visit documentation, and care coordination.      Originating Location (pt. Location): Home    Distant Location (provider location):  [facility] HEART [facility]    Platform used for Video Visit: Darron Marcum PA-C  [facility]  Pager [phone]

## 2022-10-26 ENCOUNTER — ANTICOAGULATION THERAPY VISIT (OUTPATIENT)
Dept: ANTICOAGULATION | Facility: CLINIC | Age: 82
End: 2022-10-26

## 2022-10-26 DIAGNOSIS — I27.20 PULMONARY HYPERTENSION (H): Primary | ICD-10-CM

## 2022-10-26 DIAGNOSIS — I27.24 CTEPH (CHRONIC THROMBOEMBOLIC PULMONARY HYPERTENSION) (H): ICD-10-CM

## 2022-10-26 DIAGNOSIS — Z79.01 LONG TERM CURRENT USE OF ANTICOAGULANT THERAPY: ICD-10-CM

## 2022-10-26 LAB — INR (EXTERNAL): 2.9 (ref 0.9–1.1)

## 2022-10-30 ENCOUNTER — HEALTH MAINTENANCE LETTER (OUTPATIENT)
Age: 82
End: 2022-10-30

## 2022-10-31 ENCOUNTER — MYC MEDICAL ADVICE (OUTPATIENT)
Dept: CARDIOLOGY | Facility: CLINIC | Age: 82
End: 2022-10-31

## 2022-11-09 ENCOUNTER — TRANSFERRED RECORDS (OUTPATIENT)
Dept: HEALTH INFORMATION MANAGEMENT | Facility: CLINIC | Age: 82
End: 2022-11-09

## 2022-11-09 ENCOUNTER — ANTICOAGULATION THERAPY VISIT (OUTPATIENT)
Dept: ANTICOAGULATION | Facility: CLINIC | Age: 82
End: 2022-11-09

## 2022-11-09 DIAGNOSIS — I27.24 CTEPH (CHRONIC THROMBOEMBOLIC PULMONARY HYPERTENSION) (H): ICD-10-CM

## 2022-11-09 DIAGNOSIS — I27.20 PULMONARY HYPERTENSION (H): Primary | ICD-10-CM

## 2022-11-09 DIAGNOSIS — Z79.01 LONG TERM CURRENT USE OF ANTICOAGULANT THERAPY: ICD-10-CM

## 2022-11-09 LAB — INR (EXTERNAL): 3.6 (ref 0.9–1.1)

## 2022-11-09 NOTE — PROGRESS NOTES
ANTICOAGULATION MANAGEMENT     Karen RICO Calderonhilariacharbel 82 year old female is on warfarin with supratherapeutic INR result. (Goal INR 2.0-3.0)    Recent labs: (last 7 days)     11/09/22  0000   INR 3.6*       ASSESSMENT       Source(s): Chart Review and Patient/Caregiver Call       Warfarin doses taken: Warfarin taken as instructed    Diet: No new diet changes identified    New illness, injury, or hospitalization: Yes: Patient has had flu like symptoms for the past week.  Patient took 2 at home covid tests and the results were negative    Medication/supplement changes: Patient took 2 doses of Tylenol over the last week.     Signs or symptoms of bleeding or clotting: No    Previous INR: Therapeutic last 2(+) visits    Additional findings: None       PLAN     Recommended plan for temporary change(s) affecting INR     Dosing Instructions: partial hold then continue your current warfarin dose with next INR in 1 week       Summary  As of 11/9/2022    Full warfarin instructions:  11/9: 3 mg; Otherwise 5 mg every day; Starting 11/9/2022   Next INR check:  11/16/2022             Telephone call with Karen who verbalizes understanding and agrees to plan and who agrees to plan and repeated back plan correctly    Patient to recheck with home meter    Education provided:     Taking warfarin: Importance of taking warfarin as instructed    Goal range and lab monitoring: goal range and significance of current result and Importance of therapeutic range    Plan made per [facility] anticoagulation protocol    Brittany Kirk RN  Anticoagulation Clinic  11/9/2022    _______________________________________________________________________     Anticoagulation Episode Summary     Current INR goal:  2.0-3.0   TTR:  64.9 % (1 y)   Target end date:  Indefinite   Send INR reminders to:  [facility]    Indications    Pulmonary hypertension (H) [I27.20]  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

## 2022-11-16 ENCOUNTER — TRANSFERRED RECORDS (OUTPATIENT)
Dept: HEALTH INFORMATION MANAGEMENT | Facility: CLINIC | Age: 82
End: 2022-11-16

## 2022-11-16 ENCOUNTER — ANTICOAGULATION THERAPY VISIT (OUTPATIENT)
Dept: ANTICOAGULATION | Facility: CLINIC | Age: 82
End: 2022-11-16

## 2022-11-16 DIAGNOSIS — Z79.01 LONG TERM CURRENT USE OF ANTICOAGULANT THERAPY: ICD-10-CM

## 2022-11-16 DIAGNOSIS — I27.24 CTEPH (CHRONIC THROMBOEMBOLIC PULMONARY HYPERTENSION) (H): ICD-10-CM

## 2022-11-16 DIAGNOSIS — I27.20 PULMONARY HYPERTENSION (H): Primary | ICD-10-CM

## 2022-11-16 LAB — INR (EXTERNAL): 3.4 (ref 0.9–1.1)

## 2022-11-16 NOTE — PROGRESS NOTES
ANTICOAGULATION MANAGEMENT     Karne Anderson 82 year old female is on warfarin with supratherapeutic INR result. (Goal INR 2.0-3.0)    Recent labs: (last 7 days)     11/16/22  0000   INR 3.4*       ASSESSMENT       Source(s): Chart Review and Patient/Caregiver Call       Warfarin doses taken: Warfarin taken as instructed    Diet: No new diet changes identified    New illness, injury, or hospitalization: Patient reports feeling much better this week    Medication/supplement changes: None noted    Signs or symptoms of bleeding or clotting: No    Previous INR: Supratherapeutic    Additional findings: None       PLAN     Recommended plan for no diet, medication or health factor changes affecting INR     Dosing Instructions: decrease your warfarin dose (5.5% over the next week% change) with next INR in 1 week       Summary  As of 11/16/2022    Full warfarin instructions:  5 mg every Sun, Tue, Thu; 4 mg all other days; Starting 11/16/2022   Next INR check:  11/23/2022             Telephone call with Karen who verbalizes understanding and agrees to plan and who agrees to plan and repeated back plan correctly    Patient to recheck with home meter    Education provided:     Taking warfarin: Importance of taking warfarin as instructed    Goal range and lab monitoring: goal range and significance of current result and Importance of therapeutic range    Plan made per [facility] anticoagulation protocol    Brittany Kirk RN  Anticoagulation Clinic  11/16/2022    _______________________________________________________________________     Anticoagulation Episode Summary     Current INR goal:  2.0-3.0   TTR:  63.3 % (1 y)   Target end date:  Indefinite   Send INR reminders to:  [facility]    Indications    Pulmonary hypertension (H) [I27.20]  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

## 2022-11-16 NOTE — PROGRESS NOTES
Karen is a 82 year old who is being evaluated via a billable video visit.      Pt states in [place] for visit.     How would you like to obtain your AVS? MyChart  If the video visit is dropped, the invitation should be resent by: Text to cell phone: [phone]  Will anyone else be joining your video visit?   pts    Emanuel Antonio, VF/CMA        Vitals - Patient Reported  Systolic (Patient Reported): 109  Diastolic (Patient Reported): 57  Weight (Patient Reported): 46.3 kg (102 lb) (yesterday 101 lbs)  Pulse (Patient Reported): 68  Pain Score: No Pain (0)      Originating Location (pt. Location): Home        Distant Location (provider location):  On-site    Platform used for Video Visit: [facility]          CARDIOLOGY  CLINIC VIDEO VISIT    Date of video visit: 11/17/22        Karen Anderson is a 81 year old female who is being evaluated via a billable video visit.        I have reviewed and updated the patient's Past Medical History, Social History, Family History and Medication List.    MEDICATIONS:  Current Outpatient Medications   Medication Sig Dispense Refill     acetaminophen (TYLENOL) 325 MG tablet Take 325 mg by mouth every 6 hours as needed for mild pain       albuterol (PROAIR HFA/PROVENTIL HFA/VENTOLIN HFA) 108 (90 Base) MCG/ACT inhaler Inhale 2 puffs into the lungs every 4 hours as needed for shortness of breath / dyspnea or wheezing        alendronate (FOSAMAX) 70 MG tablet Take 70 mg by mouth once a week On Mondays       Ascorbic Acid (VITAMIN C) 500 MG CAPS Take 500 mg by mouth every morning        aspirin (ASA) 81 MG tablet Take 81 mg by mouth daily        beclomethasone HFA (QVAR REDIHALER) 80 MCG/ACT inhaler Inhale 2 puffs into the lungs 2 times daily       bumetanide (BUMEX) 1 MG tablet Take 1 tablet (1 mg) by mouth 3 times daily 540 tablet 3     calcium citrate-vitamin D (CALCIUM CITRATE + D3) 315-250 MG-UNIT TABS per tablet Take 1 tablet by mouth 2 times daily        Corn Dextrin  (CLEAR FIBER POWDER PO) Take by mouth daily        digoxin (LANOXIN) 125 MCG tablet Take 0.5 tablets (62.5 mcg) by mouth daily 13 tablet      gabapentin (NEURONTIN) 300 MG capsule Take 300 mg by mouth 3 times daily Can take an additional 300mg at bedtime as needed       hydroxychloroquine (PLAQUENIL) 200 MG tablet Take 200 mg by mouth daily        ipratropium (ATROVENT) 0.06 % nasal spray Spray 2 sprays into both nostrils 3 times daily        macitentan (OPSUMIT) 10 MG tablet Take 1 tablet (10 mg) by mouth daily 30 tablet 11     potassium chloride ER (KLOR-CON M) 20 MEQ CR tablet Take 2 tablets (40 mEq) by mouth 3 times daily 540 tablet 3     sodium chloride (OCEAN) 0.65 % nasal spray Spray 1 spray into both nostrils daily as needed for congestion       treprostinil (REMODULIN) 60 mcg/mL in glycine diluent 50 mL infusion Inject 45.5 ng/kg/min into the vein continuous Goal Dose: 45.5 ng/kg/min 1368 mL 0     warfarin ANTICOAGULANT (COUMADIN) 1 MG tablet Take 1 tablet (1 mg) by mouth daily 90 tablet 1     warfarin ANTICOAGULANT (COUMADIN) 1 MG tablet Take one tab by mouth daily or as directed by Anticoagulation Clinic. 60 tablet 1     warfarin ANTICOAGULANT (COUMADIN) 3 MG tablet TAKE 3 MG BY MOUTH ON MONDAY, WEDNESDAY, FRIDAY, AND SATURDAY, AND 4.5 MG ON ALL OTHER DAYS OF THE WEEK 170 tablet 3     bumetanide (BUMEX) 2 MG tablet Take 2 tablets (4 mg) by mouth 3 times daily (Patient not taking: Reported on 11/17/2022) 540 tablet 3     ketotifen (ZADITOR) 0.025 % ophthalmic solution Place 1 drop into both eyes daily (Patient not taking: Reported on 11/17/2022)         ALLERGIES  Sulfa drugs    Brief physical exam:  General: In no acute distress, upright and calm. Accompanied by her  again today.  Eyes: No apparent redness or discharge.   Chest: No labored breathing, no cough during exam or audible wheezing.   Neuro: No obvious focal defects or tremors.   Psych: Alert and oriented. Does not appear anxious.     The  rest of a comprehensive physical examination is deferred due to public [facility] emergency video visit restrictions.       Primary PH cardiologist: Dr. Turcios       HPI:  Ms. Anderson is a pleasant 82 year old female with a PMHx including rheumatoid arthritis, hypertension, scoliosis, asthma, pulmonary MAC, breast cancer s/p chemotherapy with Adriamycin, Cytoxan, Taxol, and tamoxifen and status post left mastectomy. She also has a history of extensive bilateral pulmonary emboli and DVT in 1998 thought to be secondary to tamoxifen along with chemotherapy induced cardiomyopathy. 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 Although she had proximal disease, she was too high risk for surgical PTE. She had a BPA in Jan 2021 which was complicated by hemoptysis, requiring FFP. She has required frequent diuretic adjustments, and we have been working on medical management. She is currently on combination therapy with IV Remodulin therapy along with Opsumit, and has failed Adempas due to dizziness and hypotension.     When I met with Karen ponce last in mid October, she was doing well and weight was stable. She requested a reduction in her diuretics to see if it would help her dry, flaking skin as she felt dehydrated. After prolonged discussion with shared decision making, we decided to do a slow taper from 4mg TID to 3mg TID in weekly increments.    Today, we are meeting virtually again for follow up. She tells me that she is grateful that she continues to do well. She thinks her breathing has been good, and fortunately, weight remains stable in the low 100s and as long as she wraps her legs the swelling stays under good control. She thinks the skin dryness is slightly better but it hasn't helped as much as she hoped. She denies any new CP, palpitations, dizziness, or presyncope.     The patient did not have any new labs performed for our visit today, but most recent available labs were  reviewed as below.         CURRENT PULMONARY HYPERTENSION REGIMEN:     PAH Rx: Remodulin 45.5ng/kg/min, Opsumit 10mg daily  (failed Adempas due to dizziness)     Diuretics: Bumex 3mg TID     Oxygen: NC 1.5-2LPM     Anticoagulation: warfarin   Indication: CTEPH    Immunosuppression: Yes (RA)    Pulm: Dr. Abernathy ([facility])        Assessment/Plan:        1. Chronic thromboembolic pulmonary hypertension.              --Ms. Anderson has CTEPH with RV dysfunction. Although she had proximal disease, she was felt to be too high risk for surgical PTE both here and at [facility]. She was offered potential PTE at [place], but still felt to be very high risk and ultimately deferred. She did have BPA here in January 2021 but this was complicated by hemoptysis. No further surgical interventions are being offered at this time. Thus, we have since been focusing on medical management.              --She is on IV Remodulin at 45.5ng/kg/min.  Continue this along with Opsumit daily as she is doing. She is not able to tolerate Adempas due to dizziness              --Her weight remains stable at home in the low 100s now down to 3mg TID of Bumex. She again inquired whether we could reduce further but I advised against it due to risk of fluid retention. I asked her to continue to weigh daily at home and call with new concerns.                --Continue digoxin for RV support. BP and renal function have remained acceptable recently.                --Continue anticoagulation with warfarin for her CTEPH,  She has equipment to do home checks, and follows with the INR clinic.              --Continue oxygen at 1-2L with exertion as she is doing. She also follows with pulmonary locally.         Follow up plan: I asked her to come in person to our next visit, though she declined. She would like to continue virtual visits for now as she is feeling well and does not want to expose herself to viral illnesses. Thus, will do another video visit in 1  month. We are happy to see the patient back sooner with any new concerns.       Testing/labs:    Most recent labs:    Latest Reference Range & Units 10/11/22 10:30   Sodium 133 - 144 mmol/L 140   Potassium 3.4 - 5.3 mmol/L 4.4   Chloride 94 - 109 mmol/L 103   Carbon Dioxide 20 - 32 mmol/L 33 (H)   Urea Nitrogen 7 - 30 mg/dL 23   Creatinine 0.52 - 1.04 mg/dL 1.04   GFR Estimate >60 mL/min/1.73m2 54 (L)   Calcium 8.5 - 10.1 mg/dL 9.9   Anion Gap 3 - 14 mmol/L 4   Albumin 3.4 - 5.0 g/dL 3.7   Protein Total 6.8 - 8.8 g/dL 7.4   Alkaline Phosphatase 40 - 150 U/L 103   ALT 0 - 50 U/L 22   AST 0 - 45 U/L 32   Bilirubin Total 0.2 - 1.3 mg/dL 0.4   N-Terminal Pro Bnp 0 - 450 pg/mL 1,135 (H)   Glucose 70 - 99 mg/dL 76   WBC 4.0 - 11.0 10e3/uL 3.0 (L)   Hemoglobin 11.7 - 15.7 g/dL 12.2   Hematocrit 35.0 - 47.0 % 39.0   Platelet Count 150 - 450 10e3/uL 229   RBC Count 3.80 - 5.20 10e6/uL 3.94   MCV 78 - 100 fL 99   MCH 26.5 - 33.0 pg 31.0   MCHC 31.5 - 36.5 g/dL 31.3 (L)   RDW 10.0 - 15.0 % 14.8   INR Point of Care 0.9 - 1.1  2.8 (H)   (H): Data is abnormally high  (L): Data is abnormally low    Other recent pertinent testing:    Echo 4/25/22  Interpretation Summary     Left ventricular function is normal.The ejection fraction is 55-60%. Grade II  or moderate diastolic dysfunction. Paradoxical septal motion consistent with  right ventricular pressure overload is present.  The RV is not clearly visualized but the size appears to be moderately dilated  and function appears to be at least mildly reduced.  The estimated PA systolic pressure is 55 mmHg above the RA pressure.  The left atrial pressure is elevated.  No pericardial effusion is present.  This study was compared with the study from 06/03/2021. The estimated PA  systolic pressure is lower. The RV function and size are similar on direct  visual comparison. The pericardial effusion is also no longer evident      [facility]  3/17/2021        Severe pulmonary hypertension    Normal  cardiac output by Thermodilution    Elevated left sided filling pressures    No signficant change when comapred to her last hemodynamic assessment            Pressures Phase: Baseline                                                     Time Systolic (mmHg) Diastolic (mmHg) Mean (mmHg) A Wave (mmHg) V Wave (mmHg) EDP (mmHg) Max dp/dt (mmHg/sec) HR (bpm) Content (mL/dL) SAT (%)   RA Pressures  2:31 PM     14    14    22        63          RV Pressures  2:16 PM             912               2:32 PM 87    9          28      62          PA Pressures  2:32 PM 88    22    44            62          PCW Pressures  2:33 PM     14                                               NYHA Functional Class:  Functional class 3      Video-Visit Details    Type of service:  Video Visit    Video Start Time: 1154  Video End Time: 1220    An additional 10 minutes was spent today performing chart and history review, pre and post visit documentation, and care coordination.      Originating Location (pt. Location): Home    Distant Location (provider location):  [facility] HEART [facility]-- [facility]    Platform used for Video Visit: Darron Marcum PA-C  [facility]  Pager [phone]

## 2022-11-17 ENCOUNTER — VIRTUAL VISIT (OUTPATIENT)
Dept: CARDIOLOGY | Facility: CLINIC | Age: 82
End: 2022-11-17
Attending: PHYSICIAN ASSISTANT
Payer: COMMERCIAL

## 2022-11-17 DIAGNOSIS — R06.09 DYSPNEA ON EXERTION: ICD-10-CM

## 2022-11-17 DIAGNOSIS — I27.20 PULMONARY HYPERTENSION (H): ICD-10-CM

## 2022-11-17 PROCEDURE — 99214 OFFICE O/P EST MOD 30 MIN: CPT | Mod: 95 | Performed by: PHYSICIAN ASSISTANT

## 2022-11-17 NOTE — NURSING NOTE
"Plan as patient understands:  Video f/u in 1 month with Shawna.    ========================    Reviewed Med list    Completed AVS    Follow-up orders placed    Marked chart \"Ready for Checkout\"    Appt made.  Patient verbalized understanding, agreed with plan and denied any further questions. Fabby Seymour RN on 11/17/2022 at 12:36 PM      "

## 2022-11-17 NOTE — LETTER
11/17/2022      RE: Karen Anderson  [address]  [place] [address]       Dear Colleague,    Thank you for the opportunity to participate in the care of your patient, Karen Anderson, at the [facility]IN[facility] [place] at [facility]. Please see a copy of my visit note below.    Karen is a 82 year old who is being evaluated via a billable video visit.      [place] in [place] for [place].     How would you like to obtain your AVS? MyChart  If the video visit is dropped, the invitation should be resent by: Text to cell phone: [phone]  Will anyone else be joining your video visit?   pts    Emanuel Corderojanee, VF/CMA        Vitals - Patient Reported  Systolic (Patient Reported): 109  Diastolic (Patient Reported): 57  Weight (Patient Reported): 46.3 kg (102 lb) (yesterday 101 lbs)  Pulse (Patient Reported): 68  Pain Score: No Pain (0)      Originating Location (pt. Location): Home        Distant Location (provider location):  On-site    Platform used for Video Visit: [facility]          CARDIOLOGY  CLINIC VIDEO VISIT    Date of video visit: 11/17/22        Karen Anderson is a 81 year old female who is being evaluated via a billable video visit.        I have reviewed and updated the patient's Past Medical History, Social History, Family History and Medication List.    MEDICATIONS:  Current Outpatient Medications   Medication Sig Dispense Refill     acetaminophen (TYLENOL) 325 MG tablet Take 325 mg by mouth every 6 hours as needed for mild pain       albuterol (PROAIR HFA/PROVENTIL HFA/VENTOLIN HFA) 108 (90 Base) MCG/ACT inhaler Inhale 2 puffs into the lungs every 4 hours as needed for shortness of breath / dyspnea or wheezing        alendronate (FOSAMAX) 70 MG tablet Take 70 mg by mouth once a week On Mondays       Ascorbic Acid (VITAMIN C) 500 MG CAPS Take 500 mg by mouth every morning        aspirin (ASA) 81 MG tablet Take 81 mg by  mouth daily        beclomethasone HFA (QVAR REDIHALER) 80 MCG/ACT inhaler Inhale 2 puffs into the lungs 2 times daily       bumetanide (BUMEX) 1 MG tablet Take 1 tablet (1 mg) by mouth 3 times daily 540 tablet 3     calcium citrate-vitamin D (CALCIUM CITRATE + D3) 315-250 MG-UNIT TABS per tablet Take 1 tablet by mouth 2 times daily        Corn Dextrin (CLEAR FIBER POWDER PO) Take by mouth daily        digoxin (LANOXIN) 125 MCG tablet Take 0.5 tablets (62.5 mcg) by mouth daily 13 tablet      gabapentin (NEURONTIN) 300 MG capsule Take 300 mg by mouth 3 times daily Can take an additional 300mg at bedtime as needed       hydroxychloroquine (PLAQUENIL) 200 MG tablet Take 200 mg by mouth daily        ipratropium (ATROVENT) 0.06 % nasal spray Spray 2 sprays into both nostrils 3 times daily        macitentan (OPSUMIT) 10 MG tablet Take 1 tablet (10 mg) by mouth daily 30 tablet 11     potassium chloride ER (KLOR-CON M) 20 MEQ CR tablet Take 2 tablets (40 mEq) by mouth 3 times daily 540 tablet 3     sodium chloride (OCEAN) 0.65 % nasal spray Spray 1 spray into both nostrils daily as needed for congestion       treprostinil (REMODULIN) 60 mcg/mL in glycine diluent 50 mL infusion Inject 45.5 ng/kg/min into the vein continuous Goal Dose: 45.5 ng/kg/min 1368 mL 0     warfarin ANTICOAGULANT (COUMADIN) 1 MG tablet Take 1 tablet (1 mg) by mouth daily 90 tablet 1     warfarin ANTICOAGULANT (COUMADIN) 1 MG tablet Take one tab by mouth daily or as directed by Anticoagulation Clinic. 60 tablet 1     warfarin ANTICOAGULANT (COUMADIN) 3 MG tablet TAKE 3 MG BY MOUTH ON MONDAY, WEDNESDAY, FRIDAY, AND SATURDAY, AND 4.5 MG ON ALL OTHER DAYS OF THE WEEK 170 tablet 3     bumetanide (BUMEX) 2 MG tablet Take 2 tablets (4 mg) by mouth 3 times daily (Patient not taking: Reported on 11/17/2022) 540 tablet 3     ketotifen (ZADITOR) 0.025 % ophthalmic solution Place 1 drop into both eyes daily (Patient not taking: Reported on 11/17/2022)          ALLERGIES  Sulfa drugs    Brief physical exam:  General: In no acute distress, upright and calm. Accompanied by her  again today.  Eyes: No apparent redness or discharge.   Chest: No labored breathing, no cough during exam or audible wheezing.   Neuro: No obvious focal defects or tremors.   Psych: Alert and oriented. Does not appear anxious.     The rest of a comprehensive physical examination is deferred due to public health emergency video visit restrictions.       Primary PH cardiologist: Dr. Turcios       HPI:  Ms. Anderson is a pleasant 82 year old female with a PMHx including rheumatoid arthritis, hypertension, scoliosis, asthma, pulmonary MAC, breast cancer s/p chemotherapy with Adriamycin, Cytoxan, Taxol, and tamoxifen and status post left mastectomy. She also has a history of extensive bilateral pulmonary emboli and DVT in 1998 thought to be secondary to tamoxifen along with chemotherapy induced cardiomyopathy. 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 Although she had proximal disease, she was too high risk for surgical PTE. She had a BPA in Jan 2021 which was complicated by hemoptysis, requiring FFP. She has required frequent diuretic adjustments, and we have been working on medical management. She is currently on combination therapy with IV Remodulin therapy along with Opsumit, and has failed Adempas due to dizziness and hypotension.     When I met with Karen virtually last in mid October, she was doing well and weight was stable. She requested a reduction in her diuretics to see if it would help her dry, flaking skin as she felt dehydrated. After prolonged discussion with shared decision making, we decided to do a slow taper from 4mg TID to 3mg TID in weekly increments.    Today, we are meeting virtually again for follow up. She tells me that she is grateful that she continues to do well. She thinks her breathing has been good, and fortunately, weight  remains stable in the low 100s and as long as she wraps her legs the swelling stays under good control. She thinks the skin dryness is slightly better but it hasn't helped as much as she hoped. She denies any new CP, palpitations, dizziness, or presyncope.     The patient did not have any new labs performed for our visit today, but most recent available labs were reviewed as below.         CURRENT PULMONARY HYPERTENSION REGIMEN:     PAH Rx: Remodulin 45.5ng/kg/min, Opsumit 10mg daily  (failed Adempas due to dizziness)     Diuretics: Bumex 3mg TID     Oxygen: NC 1.5-2LPM     Anticoagulation: warfarin   Indication: CTEPH    Immunosuppression: Yes (RA)    Pulm: Dr. Abernathy ([facility])        Assessment/Plan:        1. Chronic thromboembolic pulmonary hypertension.              --Ms. Anderson has CTEPH with RV dysfunction. Although she had proximal disease, she was felt to be too high risk for surgical PTE both here and at [facility]. She was offered potential PTE at [place], but still felt to be very high risk and ultimately deferred. She did have BPA here in January 2021 but this was complicated by hemoptysis. No further surgical interventions are being offered at this time. Thus, we have since been focusing on medical management.              --She is on IV Remodulin at 45.5ng/kg/min.  Continue this along with Opsumit daily as she is doing. She is not able to tolerate Adempas due to dizziness              --Her weight remains stable at home in the low 100s now down to 3mg TID of Bumex. She again inquired whether we could reduce further but I advised against it due to risk of fluid retention. I asked her to continue to weigh daily at home and call with new concerns.                --Continue digoxin for RV support. BP and renal function have remained acceptable recently.                --Continue anticoagulation with warfarin for her CTEPH,  She has equipment to do home checks, and follows with the INR clinic.               --Continue oxygen at 1-2L with exertion as she is doing. She also follows with pulmonary locally.         Follow up plan: I asked her to come in person to our next visit, though she declined. She would like to continue virtual visits for now as she is feeling well and does not want to expose herself to viral illnesses. Thus, will do another video visit in 1 month. We are happy to see the patient back sooner with any new concerns.       Testing/labs:    Most recent labs:    Latest Reference Range & Units 10/11/22 10:30   Sodium 133 - 144 mmol/L 140   Potassium 3.4 - 5.3 mmol/L 4.4   Chloride 94 - 109 mmol/L 103   Carbon Dioxide 20 - 32 mmol/L 33 (H)   Urea Nitrogen 7 - 30 mg/dL 23   Creatinine 0.52 - 1.04 mg/dL 1.04   GFR Estimate >60 mL/min/1.73m2 54 (L)   Calcium 8.5 - 10.1 mg/dL 9.9   Anion Gap 3 - 14 mmol/L 4   Albumin 3.4 - 5.0 g/dL 3.7   Protein Total 6.8 - 8.8 g/dL 7.4   Alkaline Phosphatase 40 - 150 U/L 103   ALT 0 - 50 U/L 22   AST 0 - 45 U/L 32   Bilirubin Total 0.2 - 1.3 mg/dL 0.4   N-Terminal Pro Bnp 0 - 450 pg/mL 1,135 (H)   Glucose 70 - 99 mg/dL 76   WBC 4.0 - 11.0 10e3/uL 3.0 (L)   Hemoglobin 11.7 - 15.7 g/dL 12.2   Hematocrit 35.0 - 47.0 % 39.0   Platelet Count 150 - 450 10e3/uL 229   RBC Count 3.80 - 5.20 10e6/uL 3.94   MCV 78 - 100 fL 99   MCH 26.5 - 33.0 pg 31.0   MCHC 31.5 - 36.5 g/dL 31.3 (L)   RDW 10.0 - 15.0 % 14.8   INR Point of Care 0.9 - 1.1  2.8 (H)   (H): Data is abnormally high  (L): Data is abnormally low    Other recent pertinent testing:    Echo 4/25/22  Interpretation Summary     Left ventricular function is normal.The ejection fraction is 55-60%. Grade II  or moderate diastolic dysfunction. Paradoxical septal motion consistent with  right ventricular pressure overload is present.  The RV is not clearly visualized but the size appears to be moderately dilated  and function appears to be at least mildly reduced.  The estimated PA systolic pressure is 55 mmHg above the RA  pressure.  The left atrial pressure is elevated.  No pericardial effusion is present.  This study was compared with the study from 06/03/2021. The estimated PA  systolic pressure is lower. The RV function and size are similar on direct  visual comparison. The pericardial effusion is also no longer evident      [facility]  3/17/2021        Severe pulmonary hypertension    Normal cardiac output by Thermodilution    Elevated left sided filling pressures    No signficant change when comapred to her last hemodynamic assessment            Pressures Phase: Baseline                                                     Time Systolic (mmHg) Diastolic (mmHg) Mean (mmHg) A Wave (mmHg) V Wave (mmHg) EDP (mmHg) Max dp/dt (mmHg/sec) HR (bpm) Content (mL/dL) SAT (%)   RA Pressures  2:31 PM     14    14    22        63          RV Pressures  2:16 PM             912               2:32 PM 87    9          28      62          PA Pressures  2:32 PM 88    22    44            62          PCW Pressures  2:33 PM     14                                               NYHA Functional Class:  Functional class 3        Shawna Marcum PA-C  [facility]  Pager [phone]

## 2022-11-17 NOTE — NURSING NOTE
Chief Complaint   Patient presents with     Follow Up     1 month, pt states currently taking Bumex 3mg TID     Emanuel Antonio, VF/CMA

## 2022-11-17 NOTE — PATIENT INSTRUCTIONS
Medication Changes:   -none    Patient Instructions:  1. Continue staying active and eat a heart healthy diet.    2. Please keep current list of medications with you at all times.    3. Remember to weigh yourself daily after voiding and before you consume any food or beverages and log the numbers.  If you have gained 2 pounds overnight or 5 pounds in a week contact us immediately for medication adjustments or further instructions.    4. **Please call us immediately if you have any syncope (fainting or passing out), chest pain, edema (swelling or weight gain), or decline in your functional status (general decline in how you are feeling).    5. Patients on Remodulin (treprostinil) or Veletri (epoprostenol): Please make sure that you have your backup pump and supplies with you at all times, your mixing instructions, and contact information for your specialty pharmacy.    Follow up Appointment Information:  -Virtual visit with Shawna Marcum PA-C December 13th @ 2:15pm.    We are located on the third floor [facility] on the [facility].  Our address is     [address] [place] [address] [place] [address] [place] [address]      Thank you for allowing us to be a part of your care here at the [facility]    If you have questions or concerns please contact us at:    Rebecca Ngo RN, BSN    Rosa Moya (Schedule,Prior Auth)  Nurse Coordinator       Clinic   Pulmonary Hypertension     Pulmonary Hypertension  [facility]   [facility]  (Phone)[phone]     (Phone) [phone]          (Fax)[phone]                ** Please note that you will NOT receive a reminder call regarding your scheduled testing, reminder calls are for provider appointments only.  If you are scheduled for testing within the [facility] system you may receive a call regarding pre-registration  for billing purposes only.**     Support Group:  Pulmonary Hypertension Association  Https://www.phassociation.org/  **Look at the Events Tab** They even have Support Groups that you can call into    [facility] Support Group  Second Saturday of the Month from 1-3 PM   Location: [address] [place] [id number]  Leader: Jessenia Moeller  Phone: [phone]  Email: [email or web address]     Great Videos about Pulmonary Hypertension!!  Scan ME!    Website: bit.ly/[email or web address]

## 2022-11-23 ENCOUNTER — TRANSFERRED RECORDS (OUTPATIENT)
Dept: HEALTH INFORMATION MANAGEMENT | Facility: CLINIC | Age: 82
End: 2022-11-23

## 2022-11-23 ENCOUNTER — ANTICOAGULATION THERAPY VISIT (OUTPATIENT)
Dept: ANTICOAGULATION | Facility: CLINIC | Age: 82
End: 2022-11-23

## 2022-11-23 DIAGNOSIS — Z79.01 LONG TERM CURRENT USE OF ANTICOAGULANT THERAPY: ICD-10-CM

## 2022-11-23 DIAGNOSIS — I27.24 CTEPH (CHRONIC THROMBOEMBOLIC PULMONARY HYPERTENSION) (H): ICD-10-CM

## 2022-11-23 DIAGNOSIS — I27.20 PULMONARY HYPERTENSION (H): Primary | ICD-10-CM

## 2022-11-23 LAB — INR (EXTERNAL): 2.8 (ref 0.9–1.1)

## 2022-11-23 NOTE — PROGRESS NOTES
ANTICOAGULATION MANAGEMENT     Karen Anderson 82 year old female is on warfarin with therapeutic INR result. (Goal INR 2.0-3.0)    Recent labs: (last 7 days)     11/23/22  0000   INR 2.8*       ASSESSMENT       Source(s): Chart Review and Patient/Caregiver Call       Warfarin doses taken: Warfarin taken as instructed    Diet: No new diet changes identified    New illness, injury, or hospitalization: No    Medication/supplement changes: None noted    Signs or symptoms of bleeding or clotting: No    Previous INR: Supratherapeutic    Additional findings: None       PLAN     Recommended plan for no diet, medication or health factor changes affecting INR     Dosing Instructions: Continue your current warfarin dose with next INR in 1 week       Summary  As of 11/23/2022    Full warfarin instructions:  5 mg every Sun, Tue, Thu; 4 mg all other days; Starting 11/23/2022   Next INR check:  11/30/2022             Telephone call with Karen who verbalizes understanding and agrees to plan    Patient to recheck with home meter    Education provided:     Please call back if any changes to your diet, medications or how you've been taking warfarin    Plan made per [facility] anticoagulation protocol    Laure Enriquez RN  Anticoagulation Clinic  11/23/2022    _______________________________________________________________________     Anticoagulation Episode Summary     Current INR goal:  2.0-3.0   TTR:  62.1 % (1 y)   Target end date:  Indefinite   Send INR reminders to:  [facility]    Indications    Pulmonary hypertension (H) [I27.20]  CTEPH (chronic thromboembolic pulmonary hypertension) (H) [I27.24]  Long term current use of anticoagulant therapy [Z79.01]           Comments:           Anticoagulation Care Providers     Provider Role Specialty Phone number    Karolina Turcios MD Referring Cardiovascular Disease [phone]

## 2022-11-25 ENCOUNTER — HOSPITAL ENCOUNTER (INPATIENT)
Facility: CLINIC | Age: 82
LOS: 1 days | Discharge: HOME | DRG: 254 | End: 2022-11-25
Attending: EMERGENCY MEDICINE | Admitting: INTERNAL MEDICINE
Payer: COMMERCIAL

## 2022-11-25 ENCOUNTER — APPOINTMENT (OUTPATIENT)
Dept: GENERAL RADIOLOGY | Facility: CLINIC | Age: 82
DRG: 254 | End: 2022-11-25
Attending: EMERGENCY MEDICINE
Payer: COMMERCIAL

## 2022-11-25 ENCOUNTER — APPOINTMENT (OUTPATIENT)
Dept: INTERVENTIONAL RADIOLOGY/VASCULAR | Facility: CLINIC | Age: 82
DRG: 254 | End: 2022-11-25
Attending: PHYSICIAN ASSISTANT
Payer: COMMERCIAL

## 2022-11-25 VITALS
WEIGHT: 102 LBS | TEMPERATURE: 97.7 F | HEART RATE: 59 BPM | HEIGHT: 58 IN | RESPIRATION RATE: 18 BRPM | SYSTOLIC BLOOD PRESSURE: 120 MMHG | BODY MASS INDEX: 21.41 KG/M2 | DIASTOLIC BLOOD PRESSURE: 67 MMHG | OXYGEN SATURATION: 95 %

## 2022-11-25 DIAGNOSIS — I27.20 PULMONARY HYPERTENSION (H): ICD-10-CM

## 2022-11-25 LAB
ALBUMIN SERPL BCG-MCNC: 4.1 G/DL (ref 3.5–5.2)
ALP SERPL-CCNC: 107 U/L (ref 35–104)
ALT SERPL W P-5'-P-CCNC: 11 U/L (ref 10–35)
ANION GAP SERPL CALCULATED.3IONS-SCNC: 11 MMOL/L (ref 7–15)
AST SERPL W P-5'-P-CCNC: 30 U/L (ref 10–35)
BASOPHILS # BLD AUTO: 0 10E3/UL (ref 0–0.2)
BASOPHILS NFR BLD AUTO: 1 %
BILIRUB SERPL-MCNC: 0.3 MG/DL
BUN SERPL-MCNC: 28.9 MG/DL (ref 8–23)
CALCIUM SERPL-MCNC: 10.3 MG/DL (ref 8.8–10.2)
CHLORIDE SERPL-SCNC: 100 MMOL/L (ref 98–107)
CREAT SERPL-MCNC: 1.18 MG/DL (ref 0.51–0.95)
DEPRECATED HCO3 PLAS-SCNC: 32 MMOL/L (ref 22–29)
EOSINOPHIL # BLD AUTO: 0.1 10E3/UL (ref 0–0.7)
EOSINOPHIL NFR BLD AUTO: 4 %
ERYTHROCYTE [DISTWIDTH] IN BLOOD BY AUTOMATED COUNT: 15.9 % (ref 10–15)
GFR SERPL CREATININE-BSD FRML MDRD: 46 ML/MIN/1.73M2
GLUCOSE SERPL-MCNC: 84 MG/DL (ref 70–99)
HCT VFR BLD AUTO: 31.3 % (ref 35–47)
HGB BLD-MCNC: 9.6 G/DL (ref 11.7–15.7)
HOLD SPECIMEN: NORMAL
IMM GRANULOCYTES # BLD: 0 10E3/UL
IMM GRANULOCYTES NFR BLD: 0 %
INR PPP: 2.94 (ref 0.85–1.15)
LYMPHOCYTES # BLD AUTO: 0.9 10E3/UL (ref 0.8–5.3)
LYMPHOCYTES NFR BLD AUTO: 24 %
MCH RBC QN AUTO: 30.2 PG (ref 26.5–33)
MCHC RBC AUTO-ENTMCNC: 30.7 G/DL (ref 31.5–36.5)
MCV RBC AUTO: 98 FL (ref 78–100)
MONOCYTES # BLD AUTO: 0.4 10E3/UL (ref 0–1.3)
MONOCYTES NFR BLD AUTO: 11 %
NEUTROPHILS # BLD AUTO: 2.1 10E3/UL (ref 1.6–8.3)
NEUTROPHILS NFR BLD AUTO: 60 %
NRBC # BLD AUTO: 0 10E3/UL
NRBC BLD AUTO-RTO: 0 /100
PLATELET # BLD AUTO: 237 10E3/UL (ref 150–450)
POTASSIUM SERPL-SCNC: 3.6 MMOL/L (ref 3.4–5.3)
PROT SERPL-MCNC: 7.3 G/DL (ref 6.4–8.3)
RBC # BLD AUTO: 3.18 10E6/UL (ref 3.8–5.2)
SARS-COV-2 RNA RESP QL NAA+PROBE: NEGATIVE
SODIUM SERPL-SCNC: 143 MMOL/L (ref 136–145)
WBC # BLD AUTO: 3.6 10E3/UL (ref 4–11)

## 2022-11-25 PROCEDURE — 250N000011 HC RX IP 250 OP 636: Performed by: EMERGENCY MEDICINE

## 2022-11-25 PROCEDURE — 36595 MECH REMOV TUNNELED CV CATH: CPT | Mod: XU | Performed by: RADIOLOGY

## 2022-11-25 PROCEDURE — 85025 COMPLETE CBC W/AUTO DIFF WBC: CPT | Performed by: EMERGENCY MEDICINE

## 2022-11-25 PROCEDURE — 120N000002 HC R&B MED SURG/OB UMMC

## 2022-11-25 PROCEDURE — U0003 INFECTIOUS AGENT DETECTION BY NUCLEIC ACID (DNA OR RNA); SEVERE ACUTE RESPIRATORY SYNDROME CORONAVIRUS 2 (SARS-COV-2) (CORONAVIRUS DISEASE [COVID-19]), AMPLIFIED PROBE TECHNIQUE, MAKING USE OF HIGH THROUGHPUT TECHNOLOGIES AS DESCRIBED BY CMS-2020-01-R: HCPCS | Performed by: EMERGENCY MEDICINE

## 2022-11-25 PROCEDURE — 250N000011 HC RX IP 250 OP 636: Performed by: PHYSICIAN ASSISTANT

## 2022-11-25 PROCEDURE — 99283 EMERGENCY DEPT VISIT LOW MDM: CPT | Performed by: EMERGENCY MEDICINE

## 2022-11-25 PROCEDURE — 0J2TXYZ CHANGE OTHER DEVICE IN TRUNK SUBCUTANEOUS TISSUE AND FASCIA, EXTERNAL APPROACH: ICD-10-PCS | Performed by: RADIOLOGY

## 2022-11-25 PROCEDURE — 36415 COLL VENOUS BLD VENIPUNCTURE: CPT | Performed by: EMERGENCY MEDICINE

## 2022-11-25 PROCEDURE — 027V3ZZ DILATION OF SUPERIOR VENA CAVA, PERCUTANEOUS APPROACH: ICD-10-PCS | Performed by: RADIOLOGY

## 2022-11-25 PROCEDURE — 99236 HOSP IP/OBS SAME DATE HI 85: CPT | Mod: GC | Performed by: INTERNAL MEDICINE

## 2022-11-25 PROCEDURE — 272N000302 HC DEVICE INFLATION CR5

## 2022-11-25 PROCEDURE — C1725 CATH, TRANSLUMIN NON-LASER: HCPCS

## 2022-11-25 PROCEDURE — 80053 COMPREHEN METABOLIC PANEL: CPT | Performed by: EMERGENCY MEDICINE

## 2022-11-25 PROCEDURE — C1769 GUIDE WIRE: HCPCS

## 2022-11-25 PROCEDURE — 77001 FLUOROGUIDE FOR VEIN DEVICE: CPT

## 2022-11-25 PROCEDURE — 272N000004 HC RX 272: Performed by: EMERGENCY MEDICINE

## 2022-11-25 PROCEDURE — C1751 CATH, INF, PER/CENT/MIDLINE: HCPCS

## 2022-11-25 PROCEDURE — 82040 ASSAY OF SERUM ALBUMIN: CPT | Performed by: EMERGENCY MEDICINE

## 2022-11-25 PROCEDURE — 999N000248 HC STATISTIC IV INSERT WITH US BY RN

## 2022-11-25 PROCEDURE — 250N000009 HC RX 250: Performed by: PHYSICIAN ASSISTANT

## 2022-11-25 PROCEDURE — 36596 MECH REMOV TUNNELED CV CATH: CPT

## 2022-11-25 PROCEDURE — 71045 X-RAY EXAM CHEST 1 VIEW: CPT

## 2022-11-25 PROCEDURE — 250N000013 HC RX MED GY IP 250 OP 250 PS 637: Performed by: INTERNAL MEDICINE

## 2022-11-25 PROCEDURE — 99285 EMERGENCY DEPT VISIT HI MDM: CPT | Mod: 25 | Performed by: EMERGENCY MEDICINE

## 2022-11-25 PROCEDURE — 85610 PROTHROMBIN TIME: CPT | Performed by: EMERGENCY MEDICINE

## 2022-11-25 PROCEDURE — 71045 X-RAY EXAM CHEST 1 VIEW: CPT | Mod: 26 | Performed by: RADIOLOGY

## 2022-11-25 PROCEDURE — 75901 REMOVE CVA DEVICE OBSTRUCT: CPT | Mod: 26 | Performed by: RADIOLOGY

## 2022-11-25 PROCEDURE — 999N000127 HC STATISTIC PERIPHERAL IV START W US GUIDANCE

## 2022-11-25 PROCEDURE — 36581 REPLACE TUNNELED CV CATH: CPT | Mod: RT | Performed by: RADIOLOGY

## 2022-11-25 RX ORDER — POTASSIUM CHLORIDE 750 MG/1
40 TABLET, EXTENDED RELEASE ORAL 3 TIMES DAILY
Status: DISCONTINUED | OUTPATIENT
Start: 2022-11-25 | End: 2022-11-25 | Stop reason: HOSPADM

## 2022-11-25 RX ORDER — ASCORBIC ACID 500 MG
500 TABLET ORAL EVERY MORNING
Status: DISCONTINUED | OUTPATIENT
Start: 2022-11-25 | End: 2022-11-25 | Stop reason: HOSPADM

## 2022-11-25 RX ORDER — ONDANSETRON 2 MG/ML
4 INJECTION INTRAMUSCULAR; INTRAVENOUS EVERY 6 HOURS PRN
Status: DISCONTINUED | OUTPATIENT
Start: 2022-11-25 | End: 2022-11-25 | Stop reason: HOSPADM

## 2022-11-25 RX ORDER — HYDROXYCHLOROQUINE SULFATE 200 MG/1
200 TABLET, FILM COATED ORAL DAILY
Status: DISCONTINUED | OUTPATIENT
Start: 2022-11-25 | End: 2022-11-25 | Stop reason: HOSPADM

## 2022-11-25 RX ORDER — ALBUTEROL SULFATE 90 UG/1
2 AEROSOL, METERED RESPIRATORY (INHALATION) EVERY 4 HOURS PRN
Status: DISCONTINUED | OUTPATIENT
Start: 2022-11-25 | End: 2022-11-25 | Stop reason: HOSPADM

## 2022-11-25 RX ORDER — LIDOCAINE HYDROCHLORIDE 10 MG/ML
1-30 INJECTION, SOLUTION EPIDURAL; INFILTRATION; INTRACAUDAL; PERINEURAL
Status: COMPLETED | OUTPATIENT
Start: 2022-11-25 | End: 2022-11-25

## 2022-11-25 RX ORDER — ACETAMINOPHEN 325 MG/1
325 TABLET ORAL EVERY 6 HOURS PRN
Status: DISCONTINUED | OUTPATIENT
Start: 2022-11-25 | End: 2022-11-25 | Stop reason: HOSPADM

## 2022-11-25 RX ORDER — WARFARIN SODIUM 4 MG/1
4 TABLET ORAL
Status: DISCONTINUED | OUTPATIENT
Start: 2022-11-25 | End: 2022-11-25 | Stop reason: HOSPADM

## 2022-11-25 RX ORDER — HEPARIN SODIUM,PORCINE 10 UNIT/ML
5 VIAL (ML) INTRAVENOUS
Status: COMPLETED | OUTPATIENT
Start: 2022-11-25 | End: 2022-11-25

## 2022-11-25 RX ORDER — LIDOCAINE 40 MG/G
CREAM TOPICAL
Status: DISCONTINUED | OUTPATIENT
Start: 2022-11-25 | End: 2022-11-25 | Stop reason: HOSPADM

## 2022-11-25 RX ORDER — HEPARIN SODIUM,PORCINE 10 UNIT/ML
5-20 VIAL (ML) INTRAVENOUS
Status: CANCELLED | OUTPATIENT
Start: 2022-11-25

## 2022-11-25 RX ORDER — ONDANSETRON 4 MG/1
4 TABLET, ORALLY DISINTEGRATING ORAL EVERY 6 HOURS PRN
Status: DISCONTINUED | OUTPATIENT
Start: 2022-11-25 | End: 2022-11-25 | Stop reason: HOSPADM

## 2022-11-25 RX ORDER — DIGOXIN 0.06 MG/1
62.5 TABLET ORAL DAILY
Status: DISCONTINUED | OUTPATIENT
Start: 2022-11-25 | End: 2022-11-25 | Stop reason: HOSPADM

## 2022-11-25 RX ORDER — CEFAZOLIN SODIUM 2 G/100ML
2 INJECTION, SOLUTION INTRAVENOUS
Status: COMPLETED | OUTPATIENT
Start: 2022-11-25 | End: 2022-11-25

## 2022-11-25 RX ORDER — WARFARIN SODIUM 3 MG/1
3 TABLET ORAL AT BEDTIME
Status: DISCONTINUED | OUTPATIENT
Start: 2022-11-25 | End: 2022-11-25

## 2022-11-25 RX ORDER — GABAPENTIN 300 MG/1
300 CAPSULE ORAL 3 TIMES DAILY
Status: DISCONTINUED | OUTPATIENT
Start: 2022-11-25 | End: 2022-11-25 | Stop reason: HOSPADM

## 2022-11-25 RX ORDER — IPRATROPIUM BROMIDE 42 UG/1
2 SPRAY, METERED NASAL 3 TIMES DAILY
Status: DISCONTINUED | OUTPATIENT
Start: 2022-11-25 | End: 2022-11-25 | Stop reason: HOSPADM

## 2022-11-25 RX ORDER — HEPARIN SODIUM,PORCINE 10 UNIT/ML
5-20 VIAL (ML) INTRAVENOUS EVERY 24 HOURS
Status: CANCELLED | OUTPATIENT
Start: 2022-11-25

## 2022-11-25 RX ORDER — ALENDRONATE SODIUM 70 MG/1
70 TABLET ORAL WEEKLY
Status: DISCONTINUED | OUTPATIENT
Start: 2022-11-28 | End: 2022-11-25 | Stop reason: HOSPADM

## 2022-11-25 RX ADMIN — POTASSIUM CHLORIDE 40 MEQ: 750 TABLET, EXTENDED RELEASE ORAL at 10:00

## 2022-11-25 RX ADMIN — IPRATROPIUM BROMIDE 2 SPRAY: 42 SPRAY, METERED NASAL at 10:02

## 2022-11-25 RX ADMIN — TREPROSTINIL 45.5 NG/KG/MIN: 20 INJECTION, SOLUTION INTRAVENOUS; SUBCUTANEOUS at 06:25

## 2022-11-25 RX ADMIN — Medication 0.5 ML: at 15:11

## 2022-11-25 RX ADMIN — FLUTICASONE FUROATE 1 PUFF: 100 POWDER RESPIRATORY (INHALATION) at 10:01

## 2022-11-25 RX ADMIN — LIDOCAINE HYDROCHLORIDE 3 ML: 10 INJECTION, SOLUTION EPIDURAL; INFILTRATION; INTRACAUDAL; PERINEURAL at 14:32

## 2022-11-25 RX ADMIN — HYDROXYCHLOROQUINE SULFATE 200 MG: 200 TABLET, FILM COATED ORAL at 09:59

## 2022-11-25 RX ADMIN — POTASSIUM CHLORIDE 40 MEQ: 750 TABLET, EXTENDED RELEASE ORAL at 17:20

## 2022-11-25 RX ADMIN — ACETAMINOPHEN 325 MG: 325 TABLET, FILM COATED ORAL at 10:01

## 2022-11-25 RX ADMIN — GABAPENTIN 300 MG: 300 CAPSULE ORAL at 17:20

## 2022-11-25 RX ADMIN — GABAPENTIN 300 MG: 300 CAPSULE ORAL at 10:15

## 2022-11-25 RX ADMIN — CEFAZOLIN SODIUM 2 G: 2 INJECTION, SOLUTION INTRAVENOUS at 13:59

## 2022-11-25 RX ADMIN — LIDOCAINE HYDROCHLORIDE 4 ML: 10 INJECTION, SOLUTION EPIDURAL; INFILTRATION; INTRACAUDAL; PERINEURAL at 14:56

## 2022-11-25 RX ADMIN — BUMETANIDE 3 MG: 1 TABLET ORAL at 10:01

## 2022-11-25 RX ADMIN — OXYCODONE HYDROCHLORIDE AND ACETAMINOPHEN 500 MG: 500 TABLET ORAL at 10:00

## 2022-11-25 RX ADMIN — DIGOXIN 62.5 MCG: 0.06 TABLET ORAL at 10:00

## 2022-11-25 RX ADMIN — KETOTIFEN FUMARATE 1 DROP: 0.35 SOLUTION/ DROPS OPHTHALMIC at 10:02

## 2022-11-25 ASSESSMENT — ACTIVITIES OF DAILY LIVING (ADL)
ADLS_ACUITY_SCORE: 35

## 2022-11-25 NOTE — CONSULTS
Interventional Radiology Consult Service Note  11/25/22     Patient is on IR schedule 11/25/22 for a IR right single lumen tunneled line check/revision  Labs WNL for procedure.  Orders entered for procedure, NPO, and IV ancef antibiotics have been entered.   Medications to be held include: Coumadin  Consent will be done prior to procedure.     Please contact the IR [place] RN at [phone] for estimated time of procedure.     Case discussed with IR attending Dr. Filipe Farr.     Ms. Anderson is a pleasant 82 year old female with a PMHx including rheumatoid arthritis, hypertension, scoliosis, asthma, pulmonary MAC, breast cancer s/p chemotherapy with Adriamycin, Cytoxan, Taxol, and tamoxifen and status post left mastectomy. She also has a history of extensive bilateral pulmonary emboli and DVT in 1998 thought to be secondary to tamoxifen along with chemotherapy induced cardiomyopathy. 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 Although she had proximal disease, she was too high risk for surgical PTE. She had a BPA in Jan 2021 which was complicated by hemoptysis, requiring FFP. She has required frequent diuretic adjustments, and we have been working on medical management. She is currently on combination therapy with IV Remodulin therapy along with Opsumit, and has failed Adempas due to dizziness and hypotension.     Has RIJ tunneled single lumen catheter placed 3/9/2022 who coded during sedation administration (2mg of versed and 50 fentanyl) and required narcan.    Pulmonary hypertension patient on continuous Remodulin, Goldberg catheter and Remodulin pump last night began having high pressure alarms and fluid spilling onto her skin, now running peripherally in-hospital, hemodynamically stable and asymptomatic.    Spoke with Dr. Guzman and not a candidate for subcutaneous remodulin.  Will plan on check, possibly just need to loosen suture or exchange the line without sedation.  But if  "needs sedation, will move cautiously.        Pertinent Imaging:        3/9/2022:  IR CVC placement:    IMPRESSION: Completed image-guided placement of 5 Malawian, 20.5 cm  single lumen tunneled central venous catheter via right internal  jugular vein. 5 Malawian catheter has a priming volume of 0.42 mL.  Catheter tip in right atrium. Aspirates and flushes freely, heparin  locked and ready for immediate use. Patient had a significant amount  of pain upon arrival to the room and transferred to the procedure.  Sedation was started at the direction of an IR provider to help  alleviate patient's discomfort. Patient was given 2 mg of Versed and  50 mcg of fentanyl and promptly had an episode of apnea, oxygen  desaturation and became unresponsive. Patient's airway was stabilized  and Ambu bag was utilized. Patient was given 0.1 mg of Narcan and  quickly became responsive and began breathing on her own and oxygen  saturations improved.    Expected date of discharge: TBD    Vitals:   BP (!) 152/68   Pulse 80   Temp 98.6  F (37  C) (Oral)   Resp 18   Ht 1.473 m (4' 10\")   Wt 46.3 kg (102 lb)   SpO2 94%   BMI 21.32 kg/m      Pertinent Labs:     Lab Results   Component Value Date    WBC 3.6 (L) 11/25/2022    WBC 3.0 (L) 10/11/2022    WBC 4.4 07/13/2022    WBC 4.5 06/24/2021    WBC 5.3 06/10/2021    WBC 5.0 06/09/2021     Lab Results   Component Value Date    HGB 9.6 11/25/2022    HGB 12.2 10/11/2022    HGB 12.9 07/13/2022    HGB 10.2 06/24/2021    HGB 8.5 06/10/2021    HGB 8.4 06/09/2021     Lab Results   Component Value Date     11/25/2022     10/11/2022     07/13/2022     06/24/2021     06/10/2021     06/09/2021     Lab Results   Component Value Date    INR 2.94 (H) 11/25/2022    INR 2.8 (A) 11/23/2022    INR 1.7 07/14/2021     Lab Results   Component Value Date    POTASSIUM 3.6 11/25/2022    POTASSIUM 4.4 10/11/2022    POTASSIUM 4.6 06/24/2021      COVID-19 Antibody Results, " Testing for Immunity    COVID-19 Antibody Results, Testing for Immunity   No data to display.         COVID-19 PCR Results    COVID-19 PCR Results 12/23/20 2/14/21 2/14/21 3/14/21 3/14/21 5/27/21 6/4/21     1244 1244 1300 1300     COVID-19 Virus PCR to [facility] - Result  Test received-See reflex to IDDL test SARS CoV2 (COVID-19) Virus RT-PCR  Test received-See reflex to IDDL test SARS CoV2 (COVID-19) Virus RT-PCR      COVID-19 Virus PCR to [facility] - Source  Nasopharyngeal  Nasopharyngeal      SARS-CoV-2 Virus Specimen Source Nasopharyngeal  Nasopharyngeal  Nasopharyngeal Nasopharyngeal Nasopharyngeal   SARS-CoV-2 PCR Result NEGATIVE  NEGATIVE  NEGATIVE NEGATIVE NEGATIVE      Comments are available for some flowsheets but are not being displayed.           Monika Hazel PA-C  Interventional Radiology  Pager: [phone]

## 2022-11-25 NOTE — PLAN OF CARE
A & O x4. On 2L NC. VSS. Pt went to IR at 2pm for Goldberg revision. Remodulin is infusing at 45.5 ng/kg/min. Gave tylenol x1 for generalized discomfort. Up to bedside commode to void with SBA.

## 2022-11-25 NOTE — PROGRESS NOTES
[facility]  Cardiology II Service / Advanced Heart Failure  Daily Progress Note    Patient: Karen Anderson      : 1940      MRN: [id number]    Assessment/Plan:   82 year old female w/ pertinent PMHx of PAH (group 4, CTEPH - on combo therapy w/ Remodulin and Macitentan), chronic HTN, RA (on HCQ), asthma, and breast cancer s/p chemotherapy (Adriamycin, Cytoxan, Toxol, and Tamoxifen) and s/p left mastectomy in addition to extensive bilateral PE and DVT hisotry thought to be associated w/ Tamoxifen therapy in the . She was admitted 2022 after Goldberg catheter malfunction and inability to run her Remodulin therapy. Overall hemodynamically stable, asymptomatic.    Today's changes:  - IR consulted for Goldberg troubleshooting vs replacement, running Remodulin peripherally in the meantime    Goldberg catheter malfunction  Unclear exact cause. Was running fine until noticed high pressure alarms on Remodulin pump with liquid soaking her shirt around midnight early morning of 22. Tried walking through troubleshooting at home on the phone with her [facility] representative, unsuccessful. No fevers or other concerning systemic symptoms, Goldberg site without erythema or tenderness, some old-appearing dried skin present around site. Overall low suspicion for infection. Unclear if equipment malfunction vs clot vs other.  Plan:  - Running Remodulin peripherally for now, see below  - IR consult placed for Goldberg troubleshooting vs replacement    Severe pulmonary hypertension (group 4, CTEPH) and RV dysfunction  Pt of Dr. Turcios's. Historically identified to have proximal disease; however, thought to be too high-risk for surgical PTE. Did have BPA in 2021. Overall feels good on a day to day basis. No symptoms after Goldberg malfunction as per above, presented to the ER shortly afterwards. Remains overall hemodynamically stable and asymptomatic this am, on  baseline 2L oxygen.  Plan:  - PAH regimen (continued home regimen/dosing):  - Remodulin @ 45.5 ng/kg/min  - Macitentan 10mg qday  - Digoxin 62.5 mcg qday  - Bumex 3mg TID w/ 40meq K TID  - Warfarin (INR goal 2-3, INR of 2.9 on admission)  - Volume: Euvolemic, weight 102 lbs, dry weight documented as 110 lbs  - Oxygen: On 2L (baseline is 1.5-2L at home)     ================================  Chronic Problems:  Rheumatoid arthritis - Cont PTA HCQ 200mg qday  Asthma - Cont PTA inhaler regimen  Chronic neuropathic pain - Cont PTA Gabapentin 300mg TID    Hospital Checklist:  DVT Prophylaxis: Warfarin  Code Status: Full Code  Diet/Nutrition: NPO for now pending IR as per above  Lines: PIV, troubleshooting Goldberg CVC as per above    Disposition Planning:  Anticipate discharge home today after Goldberg catheter replacement.    Staffed w/ Dr. Ahmadi.    Valdez Chaudhry MD  Internal Medicine PGY-2  Cardiology II Service  ASCOM #[id number]  11/25/2022  ==================================    Subjective:   Admission H&P reviewed. No interval events. This morning Karen feels overall well, though apprehensive about being in the hospital, usually tries to avoid being in a healthcare setting to avoid contagious illnesses. No dyspnea, chest pain, palpitations. No recent fevers, chills, diaphoresis, myalgias. No abdominal pain or nausea. No dizziness/light-headedness. No syncope.    Objective:     Vitals:    11/25/22 0429   Weight: 46.3 kg (102 lb)     Vital Signs with Ranges  Temp:  [98.6  F (37  C)] 98.6  F (37  C)  Pulse:  [80] 80  Resp:  [18] 18  BP: (152)/(68) 152/68  SpO2:  [94 %] 94 %  No intake/output data recorded.    Exam:  General: No acute distress, well-appearing, sitting upright in bed, pleasantly conversational  Neck: No apparent JVD visualized  CV: RRR, no new murmurs  Lungs: On 2L NC, CTA b/l w/o wheezes or crackles, no increased WOB  Abd: Soft, non-tender, non-distended  Ext: Warm and well-perfused, legs wrapped without  appreciable lower extremity edema  Skin: Right upper chest Goldberg site without erythema or tenderness to palpation, does have some clumped dried skin overlying insertion site without overt discharge  Neuro: Awake, alert, conversational, moving all extremities spontaneously throughout examination    Medications     - MEDICATION INSTRUCTIONS -       - MEDICATION INSTRUCTIONS -       treprostinil (REMODULIN) intravenous infusion (LESS than or EQUAL to 100 mcg/mL) 45.5 ng/kg/min (11/25/22 0625)       [START ON 11/28/2022] alendronate  70 mg Oral Weekly     bumetanide  3 mg Oral TID     digoxin  62.5 mcg Oral Daily     fluticasone  1 puff Inhalation Daily     gabapentin  300 mg Oral TID     hydroxychloroquine  200 mg Oral Daily     ipratropium  2 spray Both Nostrils TID     ketotifen  1 drop Both Eyes Daily     macitentan  10 mg Oral Daily     potassium chloride ER  40 mEq Oral TID     sodium chloride (PF)  3 mL Intracatheter Q8H     vitamin C  500 mg Oral QAM     warfarin ANTICOAGULANT  4 mg Oral ONCE at 18:00     Warfarin Therapy Reminder  1 each Oral See Admin Instructions       Data   Recent Labs   Lab 11/25/22  0441 11/23/22  0000   WBC 3.6*  --    HGB 9.6*  --    MCV 98  --      --    INR 2.94* 2.8*     --    POTASSIUM 3.6  --    CHLORIDE 100  --    CO2 32*  --    BUN 28.9*  --    CR 1.18*  --    ANIONGAP 11  --    ESTEFANÍA 10.3*  --    GLC 84  --    ALBUMIN 4.1  --    PROTTOTAL 7.3  --    BILITOTAL 0.3  --    ALKPHOS 107*  --    ALT 11  --    AST 30  --        Recent Results (from the past 24 hour(s))   XR Chest Port 1 View    Narrative    EXAM: CHEST SINGLE VIEW PORTABLE  LOCATION: [facility]  DATE/TIME: 11/25/2022 5:26 AM    INDICATION: Goldberg catheter.  COMPARISON: 5/27/2021.    FINDINGS: A right internal jugular central venous catheter is present with distal catheter tip in the right atrium, approximately 2 cm distal to the junction of the superior vena  cava and right atrium. A few chronic-appearing linear opacities scattered   in both lungs. The lungs are otherwise clear. Enlarged cardiopericardial silhouette again noted. Atherosclerotic calcification in the thoracic aorta.      Impression    IMPRESSION:   1. A right internal jugular central venous catheter is present with distal catheter tip in the right atrium, approximately 2 cm distal to the junction of the superior vena cava and right atrium.  2. No convincing evidence of active cardiopulmonary disease.

## 2022-11-25 NOTE — DISCHARGE SUMMARY
[facility]  Cardiology II Service / Advanced Heart Failure  Discharge Summary       Date of Admission:  2022   Date of Discharge:  2022  Discharging Provider: Brad Ahmadi MD  Discharge Service: Cardiology 2 Inpatient Service    Discharge Diagnoses:   Primary:  Goldberg catheter malfunction    Secondary:  Severe PAH w/ RV dysfunction, present on admission  Rheumatoid arthritis, present on admission  Chronic asthma, present on admission  Chronic neuropathic pain, present on admission    Follow-up Plannin. Medication changes: N/A  2. Follow-up appointments:   a. Upcoming follow-up already scheduled w/ MAGEN Saeed, of the PH clinic on 22  3. Pending diagnostics: N/A    Unresulted Labs Ordered in the Past 30 Days of this Admission     No orders found from 10/26/2022 to 2022.        Hospital Course   82 year old female w/ pertinent PMHx of PAH (group 4, CTEPH - on combo therapy w/ Remodulin and Macitentan), chronic HTN, RA (on HCQ), asthma, and breast cancer s/p chemotherapy (Adriamycin, Cytoxan, Toxol, and Tamoxifen) and s/p left mastectomy in addition to extensive bilateral PE and DVT hisotry thought to be associated w/ Tamoxifen therapy in the . She was admitted very briefly 2022 after Goldberg catheter malfunction and inability to run her Remodulin therapy. Please see H&P from 2022 for full details of presentation. The following problems were addressed during hospitalization:    Goldberg catheter malfunction  Unclear exact cause. Was running fine until noticed high pressure alarms on Remodulin pump with liquid soaking her shirt around midnight early morning of 22. Tried walking through troubleshooting at home on the phone with her [facility] representative, unsuccessful. No fevers or other concerning systemic symptoms, Goldberg site without erythema or tenderness, some old-appearing dried skin present around site.  "Overall low suspicion for infection. Unclear if equipment malfunction vs clot vs other. Uncomplicated exchange per IR on 11/25/22.     Severe pulmonary hypertension (group 4, CTEPH) and RV dysfunction  Pt of Dr. Turcios's. Historically identified to have proximal disease; however, thought to be too high-risk for surgical PTE. Did have BPA in Jan 2021. Overall feels good on a day to day basis. No symptoms after Goldberg malfunction as per above, presented to the ER shortly afterwards. Remained overall hemodynamically stable and asymptomatic both prior to CVC exchange as well as after.  Discharge plan:  - Has follow-up with the PH clinic on 12/13/22  - PAH regimen (no changes to home regimen):  - Remodulin @ 45.5 ng/kg/min  - Macitentan 10mg qday  - Digoxin 62.5 mcg qday  - Bumex 3mg TID w/ 40meq K TID  - Warfarin (INR goal 2-3, INR of 2.9 on admission)  - Volume: Euvolemic, weight 102 lbs, dry weight documented as 110 lbs  - Oxygen: On 2L (baseline is 1.5-2L at home)     ================================  Chronic Problems:  Rheumatoid arthritis - Cont PTA HCQ 200mg qday  Asthma - Cont PTA inhaler regimen  Chronic neuropathic pain - Cont PTA Gabapentin 300mg TID    Discharge Disposition   Discharged to home  Condition at discharge: Good    Code Status on Discharge   Full Code      The patient was discussed on day of discharge w/ Dr. Ahmadi.    Valdez Chaudhry MD  Internal Medicine PGY-2  Cardiology II Service  ASCOM #[id number]  ==================================    Discharge Physical Exam   Vital Signs: /57   Pulse 69   Temp 97.7  F (36.5  C) (Oral)   Resp 18   Ht 1.473 m (4' 10\")   Wt 46.3 kg (102 lb)   SpO2 96%   BMI 21.32 kg/m    Weight: 102 lbs 0 oz  General: No acute distress, well-appearing, sitting upright in bed, pleasantly conversational  CV: RRR, no new murmurs, no JVD  Lungs: On 2L NC, CTA b/l w/o wheezes or crackles, no increased WOB  Abd: Soft, non-tender, non-distended  Ext: Warm and " well-perfused, legs wrapped without appreciable lower extremity edema  Neuro: Awake, alert, conversational, moving all extremities spontaneously throughout examination    Significant Results and Procedures     Results for orders placed or performed during the hospital encounter of 11/25/22   XR Chest Port 1 View    Narrative    EXAM: CHEST SINGLE VIEW PORTABLE  LOCATION: [facility]  DATE/TIME: 11/25/2022 5:26 AM    INDICATION: Goldberg catheter.  COMPARISON: 5/27/2021.    FINDINGS: A right internal jugular central venous catheter is present with distal catheter tip in the right atrium, approximately 2 cm distal to the junction of the superior vena cava and right atrium. A few chronic-appearing linear opacities scattered   in both lungs. The lungs are otherwise clear. Enlarged cardiopericardial silhouette again noted. Atherosclerotic calcification in the thoracic aorta.      Impression    IMPRESSION:   1. A right internal jugular central venous catheter is present with distal catheter tip in the right atrium, approximately 2 cm distal to the junction of the superior vena cava and right atrium.  2. No convincing evidence of active cardiopulmonary disease.                    Consultations this Admission   PHARMACY IP CONSULT  PHARMACY IP CONSULT  CARDIOLOGY HEART FAILURE (HF) IP CONSULT  PHARMACY IP CONSULT  PHARMACY IP CONSULT  PHARMACY IP CONSULT  PHARMACY TO DOSE WARFARIN  INTERVENTIONAL RADIOLOGY ADULT/PEDS IP CONSULT  NURSING TO CONSULT FOR VASCULAR ACCESS CARE IP CONSULT  NURSING TO CONSULT FOR VASCULAR ACCESS CARE IP CONSULT  NURSING TO CONSULT FOR VASCULAR ACCESS CARE IP CONSULT  SMOKING CESSATION PROGRAM IP CONSULT    Discharge Orders   No discharge procedures on file.    Discharge Medications     Current Discharge Medication List      CONTINUE these medications which have NOT CHANGED    Details   acetaminophen (TYLENOL) 325 MG tablet Take 325 mg by mouth every 6  hours as needed for mild pain      albuterol (PROAIR HFA/PROVENTIL HFA/VENTOLIN HFA) 108 (90 Base) MCG/ACT inhaler Inhale 2 puffs into the lungs every 4 hours as needed for shortness of breath / dyspnea or wheezing     Comments: Pharmacy may dispense brand covered by insurance (Proair, or proventil or ventolin or generic albuterol inhaler)      Ascorbic Acid (VITAMIN C) 500 MG CAPS Take 500 mg by mouth every morning       aspirin (ASA) 81 MG tablet Take 81 mg by mouth daily       beclomethasone HFA (QVAR REDIHALER) 80 MCG/ACT inhaler Inhale 2 puffs into the lungs 2 times daily      !! bumetanide (BUMEX) 1 MG tablet Take 1 tablet (1 mg) by mouth 3 times daily  Qty: 540 tablet, Refills: 3    Comments: Take 1 tablet daily in addition to your 2mg tablets for a total of 3mg TID  Associated Diagnoses: Pulmonary hypertension (H); Dyspnea on exertion      calcium citrate-vitamin D (CALCIUM CITRATE + D3) 315-250 MG-UNIT TABS per tablet Take 1 tablet by mouth 2 times daily       Corn Dextrin (CLEAR FIBER POWDER PO) Take by mouth daily       digoxin (LANOXIN) 125 MCG tablet Take 0.5 tablets (62.5 mcg) by mouth daily  Qty: 13 tablet    Associated Diagnoses: Pulmonary hypertension (H)      gabapentin (NEURONTIN) 300 MG capsule Take 300 mg by mouth 3 times daily Can take an additional 300mg at bedtime as needed      hydroxychloroquine (PLAQUENIL) 200 MG tablet Take 200 mg by mouth daily       ipratropium (ATROVENT) 0.06 % nasal spray Spray 2 sprays into both nostrils 3 times daily       macitentan (OPSUMIT) 10 MG tablet Take 1 tablet (10 mg) by mouth daily  Qty: 30 tablet, Refills: 11    Associated Diagnoses: Pulmonary hypertension (H)      potassium chloride ER (KLOR-CON M) 20 MEQ CR tablet Take 2 tablets (40 mEq) by mouth 3 times daily  Qty: 540 tablet, Refills: 3    Associated Diagnoses: SOB (shortness of breath); Pulmonary hypertension (H)      sodium chloride (OCEAN) 0.65 % nasal spray Spray 1 spray into both nostrils daily  as needed for congestion      treprostinil (REMODULIN) 60 mcg/mL in glycine diluent 50 mL infusion Inject 45.5 ng/kg/min into the vein continuous Goal Dose: 45.5 ng/kg/min  Qty: 1368 mL, Refills: 0    Associated Diagnoses: Pulmonary hypertension (H)      !! warfarin ANTICOAGULANT (COUMADIN) 1 MG tablet Take 1 tablet (1 mg) by mouth daily  Qty: 90 tablet, Refills: 1    Associated Diagnoses: Pulmonary hypertension (H); CTEPH (chronic thromboembolic pulmonary hypertension) (H); Long term current use of anticoagulant therapy      !! warfarin ANTICOAGULANT (COUMADIN) 1 MG tablet Take one tab by mouth daily or as directed by Anticoagulation Clinic.  Qty: 60 tablet, Refills: 1    Associated Diagnoses: Pulmonary hypertension (H); CTEPH (chronic thromboembolic pulmonary hypertension) (H); Long term current use of anticoagulant therapy      !! warfarin ANTICOAGULANT (COUMADIN) 3 MG tablet TAKE 3 MG BY MOUTH ON MONDAY, WEDNESDAY, FRIDAY, AND SATURDAY, AND 4.5 MG ON ALL OTHER DAYS OF THE WEEK  Qty: 170 tablet, Refills: 3    Associated Diagnoses: Pulmonary hypertension (H)      alendronate (FOSAMAX) 70 MG tablet Take 70 mg by mouth once a week On Mondays      !! bumetanide (BUMEX) 2 MG tablet Take 2 tablets (4 mg) by mouth 3 times daily  Qty: 540 tablet, Refills: 3    Associated Diagnoses: RVF (right ventricular failure) (H)      ketotifen (ZADITOR) 0.025 % ophthalmic solution Place 1 drop into both eyes daily       !! - Potential duplicate medications found. Please discuss with provider.           Primary Care Physician   JEANIE MCKEON

## 2022-11-25 NOTE — H&P
Cardiology Inpatient H&P  November 25, 2022    HPI:   Ms. Anderson is a pleasant 82 year old female with a PMHx including rheumatoid arthritis, hypertension, scoliosis, asthma, pulmonary MAC, breast cancer s/p chemotherapy with Adriamycin, Cytoxan, Taxol, and tamoxifen and status post left mastectomy. She also has a history of extensive bilateral pulmonary emboli and DVT in 1998 thought to be secondary to tamoxifen along with chemotherapy induced cardiomyopathy. 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 Although she had proximal disease, she was too high risk for surgical PTE. She had a BPA in Jan 2021 which was complicated by hemoptysis, requiring FFP. She has required frequent diuretic adjustments, and we have been working on medical management. She is currently on combination therapy with IV Remodulin therapy along with Opsumit, and has failed Adempas due to dizziness and hypotension.      She was exchanging the cassette for her Remodulin pump on 11/24 evening around 9 PM.  She woke up around midnight and noticed that there was liquid and blood on her chest and not infusing into the Goldberg catheter on her right upper chest.  Her pump was also displaying high pressure alarms. She called [facility] who instructed her to come to the emergency department.  She has not had any syncope, lightheadedness, dyspnea, chest pain, palpitations, orthopnea, abdominal pain.    Review of Systems:    Complete review of systems was performed and negative except per HPI.    PMH:  Past Medical History:   Diagnosis Date     Asthma      Breast cancer (H)      Hypertension      Mycobacterium avium complex (H)      Pulmonary embolism (H)      Pulmonary hypertension (H)      Rheumatoid arthritis (H)      Scoliosis      Active Problems:  Patient Active Problem List    Diagnosis Date Noted     Long term current use of anticoagulant therapy 10/23/2020     Priority: Medium     RVF (right ventricular  failure) (H) 09/29/2020     Priority: Medium     CTEPH (chronic thromboembolic pulmonary hypertension) (H) 09/29/2020     Priority: Medium     Added automatically from request for surgery [id number]       Localized edema 03/02/2020     Priority: Medium     Added automatically from request for surgery [id number]       SOB (shortness of breath) 03/02/2020     Priority: Medium     Added automatically from request for surgery [id number]       Iron deficiency 03/02/2020     Priority: Medium     Added automatically from request for surgery [id number]       Dyslipidemia 03/02/2020     Priority: Medium     Added automatically from request for surgery [id number]       Pulmonary hypertension (H) 03/02/2020     Priority: Medium     Added automatically from request for surgery [id number]       Need for hepatitis B screening test 03/02/2020     Priority: Medium     Added automatically from request for surgery [id number]       Social History:  Social History     Tobacco Use     Smoking status: Never     Smokeless tobacco: Never   Substance Use Topics     Alcohol use: Yes     Comment: occasionally      Drug use: Never     Family History:  Family History   Problem Relation Age of Onset     Heart Failure Mother      Kidney Disease Mother      Coronary Artery Disease Maternal Grandfather 50     LUNG DISEASE No family hx of      Clotting Disorder No family hx of        Medications:        - MEDICATION INSTRUCTIONS -       treprostinil (REMODULIN) intravenous infusion (LESS than or EQUAL to 100 mcg/mL)         Physical Exam:  Temp:  [98.6  F (37  C)] 98.6  F (37  C)  Pulse:  [80] 80  Resp:  [18] 18  BP: (152)/(68) 152/68  SpO2:  [94 %] 94 %  GEN: pleasant, no acute distress  HEENT: no icterus   CV: normal rate, regular rhythm, normal s1, prominent S2, no murmurs/rubs/s3/s4. JVP ~12 cm H2O.   CHEST: clear to ausculation bilaterally, no rales or wheezing. R upper chest Goldberg catheter with blood in lumen of catheter and visible clot. Insertion site is c/d/i    ABD: soft, non-tender, non-distended  EXTR: pulses 2+ radial. No clubbing, cyanosis or edema.   NEURO: alert oriented, speech fluent/appropriate, motor grossly nonfocal    Diagnostics:  CMP, CBC, INR, and chest XR are pending at time of admission       Assessment and Plan:  Ms. Anderson is a pleasant 82 year old female with a PMHx including severe pulmonary hypertension 2/2 CTEPH, rheumatoid arthritis, hypertension, scoliosis, asthma, pulmonary MAC, breast cancer s/p chemotherapy with Adriamycin, Cytoxan, Taxol, and tamoxifen and status post left mastectomy. She is admitted on 11/25/22 with Goldberg catheter malfunction.     #Goldberg catheter malfunction   -unclear exactly what happened but it sounds like at some point during her cassette exchange on 11/24 evening that blood entered the catheter and thrombosed.   -ED placing peripheral IV and resuming her home remodulin  -IR consult in am for Goldberg catheter exchange, NPO for this     #Severe pulmonary hypertension 2/2 CTEPH  -follows with Dr. Turcios  -continue home remodulin IV @ 45.5 ng/kg/min via peripheral IV for now   -replace Goldberg as above  -continue home macitentan 10 mg daily   -continue home digoxin 62.5 mcg daily   -continue home bumex 3 mg TID and KCl 40 mEq TID   -continue home warfarin     #RA  -continue home plaquenil     #Neuropathy  -continue home gabapentin     #FEN: NPO in case IR needs this   PPx: warfarin for DVT ppx   Code: full     I have discussed the above with Dr. Rivers who agrees with the above assessment and plan    Mathew Castro MD  Cardiology Fellow  Pager: [phone]

## 2022-11-25 NOTE — ED TRIAGE NOTES
BIBA  Left sided mastectomy  Right ulnar fracture near wrist   Pt's remodulin pump changed tonight at 2100 went fine, new cartridge saturated blood and medication through shirt, called tech to troubleshoot did not work   VSS  150/90  98% on 2L baseline   156/78

## 2022-11-25 NOTE — PHARMACY-ANTICOAGULATION SERVICE
Clinical Pharmacy - Warfarin Dosing Consult     Pharmacy has been consulted to manage this patient s warfarin therapy.  Indication: Other - specify in comments (chronic thromboembolic pulmonary hypertension)  Therapy Goal: INR 2-3  OP Anticoag Clinic: [facility]  Warfarin Prior to Admission: Yes  Warfarin PTA Regimen: 5 mg every Sun, Tue, Thu; 4 mg all other days  Recent documented change in oral intake/nutrition: Unknown    INR   Date Value Ref Range Status   11/25/2022 2.94 (H) 0.85 - 1.15 Final     INR (External)   Date Value Ref Range Status   11/23/2022 2.8 (A) 0.9 - 1.1 Final       Recommend warfarin 4 mg today (11/25).  Pharmacy will monitor Karen Anderson daily and order warfarin doses to achieve specified goal.      Please contact pharmacy as soon as possible if the warfarin needs to be held for a procedure or if the warfarin goals change.      Bandar Pruitt, [facility]

## 2022-11-25 NOTE — PROCEDURES
[facility]    Procedure: IR Procedure Note    Date/Time: 11/25/2022 3:54 PM  Performed by: Filipe Farr MD  Authorized by: Filipe Farr MD       UNIVERSAL PROTOCOL   Site Marked: NA  Prior Images Obtained and Reviewed:  Yes  Required items: Required blood products, implants, devices and special equipment available    Patient identity confirmed:  Verbally with patient, arm band, provided demographic data and hospital-assigned identification number  Patient was reevaluated immediately before administering moderate or deep sedation or anesthesia  Confirmation Checklist:  Patient's identity using two indicators, relevant allergies, procedure was appropriate and matched the consent or emergent situation and correct equipment/implants were available  Time out: Immediately prior to the procedure a time out was called    Universal Protocol: the Joint Commission Universal Protocol was followed    Preparation: Patient was prepped and draped in usual sterile fashion    ESBL (mL):  2     ANESTHESIA    Anesthesia: Local infiltration  Local Anesthetic:  Lidocaine 1% without epinephrine  Anesthetic Total (mL):  10      SEDATION    Patient Sedated: No    Fluoroscopy Time: 4 minute(s)  See dictated procedure note for full details.  Findings: Catheter evaluation demonstrated a fibrin sheath.    Right internal jugular venotomy under ultrasound guidance to attempt to place a new catheter. Needle and guidewire went through the vein into the carotid artery. Hematoma in the right internal jugular vein prevented wire passage. Placement through new venotomy was abandoned.    Existing catheter removed over guidewire. Fibrin sheath disrupted over guidewire with balloon. New 5 Japanese 21 single lumen BD Bard PowerLine place over guidewire. Tip in the high right atrium.    New catheter heparin locked and ready for use.    Specimens: none    Complications: None    Condition:  Stable      PROCEDURE    Patient Tolerance:  Patient tolerated the procedure well with no immediate complications  Length of time physician/provider present for 1:1 monitoring during sedation: 0       157.48

## 2022-11-25 NOTE — PHARMACY
Parenteral Prostacyclin Therapy Continuation     This patient has been admitted to the hospital while receiving outpatient Treprostinil (Remodulin) therapy for the treatment of pulmonary hypertension.      [employer] has been contacted at [phone] to verify outpatient prostacyclin regimen. Based on the latest records, the following applies to this patient's  prostacyclin therapy.    1. Type of ambulatory pump used:  CADD-Legacy (100 mL cassette)  2.  Treprostinil (Remodulin)  Dosing Weight= 56.3kg  3.  Prostacyclin Concentration= 100,000 Nanograms/mL for CADD Legacy pump  4.  Prostacyclin Dose = 45.5 Nanograms/kg/min  5. Ambulatory Pump Rate = 37 mL/day for CADD Legacy pump    Confirmed the above information with the patient.      Plan:  The patient will be switched over to our hospital syringe pump for the duration of their admission.  We have entered the order into [employer] based on the patient s most recent dose.  Please note,  MS3/Crono 5 pump patients switching to our syringe pump will necessitate calculating a new concentration/rate to deliver the patient s correct dose.          Please note that the patient's dosing tash of 56.3kg is significantly different that the patient's current weight of 46.3kg.  This dosing weight of 56.3kg should be used to program the pump (this is the weight used to initiate prostacyclin therapy and the weight remains the same throughout therapy).    Please call central pharmacy ([phone]) with any ?'s  Bandar Pruitt [facility]

## 2022-11-25 NOTE — PROGRESS NOTES
Patient Name: Karen Anderson  Medical Record Number: [id number]  Today's Date: 11/25/2022    Procedure: Tunneled line revision  Proceduralist: Martha Smith PA-C  Pathology present: N/A    Procedure Start: 14:26  Procedure end: 15:15    Sedation medications administered: local only per Veto Buckner PA-C    Other medications administered: Ancef 2 g; Heparin 0.5mL (10 units/mL concentration) to lock lumen.     Report given to: PRIYA James ED  : N/A    Other Notes: Pt arrived to IR room 2 from ED. Consent signed. Pt denies any questions or concerns regarding procedure. Pt positioned supine and monitored per protocol. Pt tolerated procedure.     At 15:07, pt spontaneously became tachycardic (HR 130s) for approximately 10-15 seconds, which spontaneously resolved. Pt remained asymptomatic.    Pt transferred back to ED.

## 2022-11-25 NOTE — ED PROVIDER NOTES
"  History     Chief Complaint   Patient presents with     Device Repair     HPI  Karen Anderson is a 82 year old female with PMH notable for pulmonary hypertension on continuous Remodulin who presents to the ED with Remodulin pump problem.  Patient reports that around 1 AM she noted a mix of clear fluid and blood on her shirt near her Goldberg catheter site and Remodulin pump tubing.  She notes that it appeared that the leak was coming from near the connection between the catheter and tubing.  They contacted the on-call tech for the device, and were given troubleshooting advice.  They changed out the tubing and reconnected it with no further leak.  However the pump then would continually alarm \"high pressure\".  She then turned off the pump and came to the ED for evaluation.  Patient reports feeling in normal state of health.    Past Medical History  Past Medical History:   Diagnosis Date     Asthma      Breast cancer (H)      Hypertension      Mycobacterium avium complex (H)      Pulmonary embolism (H)      Pulmonary hypertension (H)      Rheumatoid arthritis (H)      Scoliosis      Past Surgical History:   Procedure Laterality Date     CV PULMONARY ANGIOGRAM N/A 10/5/2020    Procedure: Pulmonary Angiogram;  Surgeon: Lorenzo Sullivan MD;  Location:  [facility]     CV PULMONARY ANGIOGRAM N/A 1/12/2021    Procedure: CV PULMONARY ANGIOGRAM;  Surgeon: Amos Castañeda MD;  Location:  [facility]     CV RIGHT HEART CATH MEASUREMENTS RECORDED N/A 03/04/2020    Procedure: CV RIGHT HEART CATH;  Surgeon: Karolina Turcios MD;  [facility]     CV RIGHT HEART CATH MEASUREMENTS RECORDED N/A 10/5/2020    Procedure: Right Heart Cath; balloon pulmonary angiogram;  Surgeon: Lorenzo Sullivan MD;  Location:  [facility]     CV RIGHT HEART CATH MEASUREMENTS RECORDED N/A 12/30/2020    Procedure: Right Heart Cath;  Surgeon: Kraolina" MD Karolina;  Location:  [facility]     CV RIGHT HEART CATH MEASUREMENTS RECORDED N/A 1/12/2021    Procedure: CV RIGHT HEART CATH;  Surgeon: Amos Castañeda MD;  Location:  [facility]     CV RIGHT HEART CATH MEASUREMENTS RECORDED N/A 3/17/2021    Procedure: CV RIGHT HEART CATH;  Surgeon: Karolina Turcios MD;  Location:  [facility]     IITTTLLOON PULMONARY ANGIOPLASTY N/A 1/12/2021    Procedure: CV BALLOON PULMONARY ANGIOPLASTY;  Surgeon: Amos Castañeda MD;  Location:  [facility]     IR CVC TUNNEL PLACEMENT > 5 YRS OF AGE  03/09/2020     MASTECTOMY Left      PICC SINGLE LUMEN PLACEMENT Right 03/04/2020    4Fr - 41cm, Medial brachial vein. Left mastectomy     PICC TRIPLE LUMEN PLACEMENT Right 10/02/2020    5Fr - 43cm (7cm external), Basilic vein, high RA     acetaminophen (TYLENOL) 325 MG tablet  albuterol (PROAIR HFA/PROVENTIL HFA/VENTOLIN HFA) 108 (90 Base) MCG/ACT inhaler  alendronate (FOSAMAX) 70 MG tablet  Ascorbic Acid (VITAMIN C) 500 MG CAPS  aspirin (ASA) 81 MG tablet  beclomethasone HFA (QVAR REDIHALER) 80 MCG/ACT inhaler  bumetanide (BUMEX) 1 MG tablet  bumetanide (BUMEX) 2 MG tablet  calcium citrate-vitamin D (CALCIUM CITRATE + D3) 315-250 MG-UNIT TABS per tablet  Corn Dextrin (CLEAR FIBER POWDER PO)  digoxin (LANOXIN) 125 MCG tablet  gabapentin (NEURONTIN) 300 MG capsule  hydroxychloroquine (PLAQUENIL) 200 MG tablet  ipratropium (ATROVENT) 0.06 % nasal spray  ketotifen (ZADITOR) 0.025 % ophthalmic solution  macitentan (OPSUMIT) 10 MG tablet  potassium chloride ER (KLOR-CON M) 20 MEQ CR tablet  sodium chloride (OCEAN) 0.65 % nasal spray  treprostinil (REMODULIN) 60 mcg/mL in glycine diluent 50 mL infusion  warfarin ANTICOAGULANT (COUMADIN) 1 MG tablet  warfarin ANTICOAGULANT (COUMADIN) 1 MG tablet  warfarin ANTICOAGULANT (COUMADIN) 3 MG tablet      Allergies   Allergen Reactions     Sulfa Drugs Anaphylaxis and Hives     Social History  "  Social History     Tobacco Use     Smoking status: Never     Smokeless tobacco: Never   Substance Use Topics     Alcohol use: Yes     Comment: occasionally      Drug use: Never      Past medical history and social history were reviewed with the patient. Additional pertinent items: None     Review of Systems  A complete review of systems was performed with pertinent positives and negatives noted in the HPI, and all other systems negative.    Physical Exam   BP: (!) 152/68  Pulse: 80  Temp: 98.6  F (37  C)  Resp: 18  Height: 147.3 cm (4' 10\")  Weight: 46.3 kg (102 lb)  SpO2: 94 %    Physical Exam  General: no acute distress. Appears stated age.   HENT: MMM, no oropharyngeal lesions  Eyes: PERRL, normal sclerae  Cardio: Regular rate. Regular rhythm. Extremities well perfused  Resp: Normal work of breathing, normal respiratory rate.  Chest/Back: Goldberg catheter in the right upper anterior chest, catheter tubing with bloody appearance within the tube on the catheter site of the conduction, clear on the device site of the connection.  No active leak.  Abdomen: no tenderness, non-distended, no rebound, no guarding  Neuro: alert and fully oriented. CN II-XII grossly intact. Grossly normal strength and sensation in all extremities.   MSK: no deformities. Grossly normal ROM in extremities.   Integumentary/Skin: no rash visualized, normal color  Psych: normal affect, normal behavior    ED Course      Procedures       A consult was attained from the cardiology service.  Cardiology fellow paged at [phone].  The case was discussed with the on-call fellow from that service. The consulting service's recommendations were provided at 0508 AM.          Labs Ordered and Resulted from Time of ED Arrival to Time of ED Departure   INR - Abnormal       Result Value    INR 2.94 (*)    CBC WITH PLATELETS AND DIFFERENTIAL - Abnormal    WBC Count 3.6 (*)     RBC Count 3.18 (*)     Hemoglobin 9.6 (*)     Hematocrit 31.3 (*)     MCV 98      MCH " 30.2      MCHC 30.7 (*)     RDW 15.9 (*)     Platelet Count 237      % Neutrophils 60      % Lymphocytes 24      % Monocytes 11      % Eosinophils 4      % Basophils 1      % Immature Granulocytes 0      NRBCs per 100 WBC 0      Absolute Neutrophils 2.1      Absolute Lymphocytes 0.9      Absolute Monocytes 0.4      Absolute Eosinophils 0.1      Absolute Basophils 0.0      Absolute Immature Granulocytes 0.0      Absolute NRBCs 0.0     COVID-19 VIRUS (CORONAVIRUS) BY PCR   COMPREHENSIVE METABOLIC PANEL     XR Chest Port 1 View   Final Result   IMPRESSION:    1. A right internal jugular central venous catheter is present with distal catheter tip in the right atrium, approximately 2 cm distal to the junction of the superior vena cava and right atrium.   2. No convincing evidence of active cardiopulmonary disease.                             Assessments & Plan (with Medical Decision Making)   Patient presenting with Remodulin pump problem.. Vitals in the ED unremarkable. Nursing notes reviewed.     Orders placed to have patient started on peripheral intravenous Remodulin at her outpatient rate.  Cardiology consulted, case discussed with the on-call fellow for troubleshooting the device.  After evaluating the patient, cardiology fellow recommended observation admission to their service, CBC, CMP, INR, chest x-ray.    Chest x-ray showed right IJ catheter in appropriate position without other evidence of acute abnormalities.  INR 2.94.  CBC with hemoglobin 9.6, down from 12.2 about 6 weeks prior.    The complete clinical picture is most consistent with pulmonary hypertension with Goldberg catheter/Remodulin pump dysfunction. After counseling on the diagnosis, work-up, and treatment plan, the patient was admitted to cardiology 2, observation status.     Final diagnoses:   Pulmonary hypertension (H)     New Prescriptions    No medications on file       --  Kyle Perez MD   Emergency Medicine   [facility]  EMERGENCY DEPARTMENT  11/25/2022     Kyle Perez MD  11/25/22 0606

## 2022-11-26 NOTE — ED NOTES
Patient's daughter arranged for uber ride home. Writer assisted pt to lobby and into uber car. Pt endorses feeling comfortable with uber transport and ability to transfer safely when home.  will be at home to assist.

## 2022-11-28 ENCOUNTER — DOCUMENTATION ONLY (OUTPATIENT)
Dept: ANTICOAGULATION | Facility: CLINIC | Age: 82
End: 2022-11-28

## 2022-11-28 NOTE — PROGRESS NOTES
ANTICOAGULATION  MANAGEMENT: Discharge Review    Karen R [place]onv[place] chart reviewed for anticoagulation continuity of care    Emergency room visit on 11/25 for Goldberg Catheter Malfunction.    Discharge disposition: Home     IR replaced Goldberg Catheter     Results:    Recent labs: (last 7 days)     11/23/22  0000 11/25/22  0441   INR 2.8* 2.94*     Anticoagulation inpatient management:     not applicable     Anticoagulation discharge instructions:     Warfarin dosing: home regimen continued   Bridging: No   INR goal change: No      Medication changes affecting anticoagulation: No    Additional factors affecting anticoagulation: No     PLAN     No adjustment to anticoagulation plan needed    Patient not contacted    No adjustment to Anticoagulation Calendar was required    Tanisha Kimbrough RN

## 2022-11-30 ENCOUNTER — TELEPHONE (OUTPATIENT)
Dept: CARDIOLOGY | Facility: CLINIC | Age: 82
End: 2022-11-30

## 2022-11-30 ENCOUNTER — ANTICOAGULATION THERAPY VISIT (OUTPATIENT)
Dept: ANTICOAGULATION | Facility: CLINIC | Age: 82
End: 2022-11-30

## 2022-11-30 ENCOUNTER — TRANSFERRED RECORDS (OUTPATIENT)
Dept: HEALTH INFORMATION MANAGEMENT | Facility: CLINIC | Age: 82
End: 2022-11-30

## 2022-11-30 DIAGNOSIS — Z79.01 LONG TERM CURRENT USE OF ANTICOAGULANT THERAPY: ICD-10-CM

## 2022-11-30 DIAGNOSIS — I27.24 CTEPH (CHRONIC THROMBOEMBOLIC PULMONARY HYPERTENSION) (H): ICD-10-CM

## 2022-11-30 DIAGNOSIS — I27.20 PULMONARY HYPERTENSION (H): Primary | ICD-10-CM

## 2022-11-30 LAB — INR (EXTERNAL): 2.8 (ref 0.9–1.1)

## 2022-11-30 NOTE — PROGRESS NOTES
ANTICOAGULATION MANAGEMENT     Karen RICO Calderonvale 82 year old female is on warfarin with therapeutic INR result. (Goal INR 2.0-3.0)    Recent labs: (last 7 days)     11/30/22  0000   INR 2.8*       ASSESSMENT       Source(s): Chart Review and Patient/Caregiver Call       Warfarin doses taken: Warfarin taken as instructed    Diet: No new diet changes identified    New illness, injury, or hospitalization: Patient was in the ER for a blocked jade catheter.    Medication/supplement changes: None noted    Signs or symptoms of bleeding or clotting: No    Previous INR: Therapeutic last 2(+) visits    Additional findings: Patient ordered more strips but it went to summer address. Strips were forwarded to home address.       PLAN     Recommended plan for no diet, medication or health factor changes affecting INR     Dosing Instructions: Continue your current warfarin dose with next INR in 1 week       Summary  As of 11/30/2022    Full warfarin instructions:  5 mg every Sun, Tue, Thu; 4 mg all other days; Starting 11/30/2022   Next INR check:  12/7/2022             Telephone call with Karen who verbalizes understanding and agrees to plan and who agrees to plan and repeated back plan correctly    Patient to recheck with home meter    Education provided:     Taking warfarin: Importance of taking warfarin as instructed    Goal range and lab monitoring: goal range and significance of current result and Importance of therapeutic range    Plan made per [facility] anticoagulation protocol    Brittany Kirk RN  Anticoagulation Clinic  11/30/2022    _______________________________________________________________________     Anticoagulation Episode Summary     Current INR goal:  2.0-3.0   TTR:  62.2 % (1 y)   Target end date:  Indefinite   Send INR reminders to:  [facility]    Indications    Pulmonary hypertension (H) [I27.20]  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

## 2022-11-30 NOTE — TELEPHONE ENCOUNTER
Called patient to discuss Ed visit on 11/25. Goldberg catheter was blocked and patient brought into ED with removal and replacement of catheter. Discussed no ongiong issues at home or pump malfunction. Patient does report this event was very traumatic for her as the IR procedure there was a issue with placement. Patient discussed she is thankful she is okay and did request to go home that same night versus staying for observation. Pt otherwise is feeling well and had no further questions. Pt has a follow-up.    Rebecca Ngo RN on 11/30/2022 at 9:27 AM

## 2022-12-01 ENCOUNTER — TELEPHONE (OUTPATIENT)
Dept: CARDIOLOGY | Facility: CLINIC | Age: 82
End: 2022-12-01

## 2022-12-01 DIAGNOSIS — I27.20 PULMONARY HYPERTENSION (H): ICD-10-CM

## 2022-12-01 NOTE — TELEPHONE ENCOUNTER
Got a call from [facility] about her opsumit that they originally had her Dr Listed as Dr. Turcios with the enrollment/ rems forms and with the 1st shipment. Then the next shipment they stated that it was under Shawna Marcum this time. They said if its going to be under her we need a new rems forms or we can just send another RX to [facility] with Dr. Turcios's name to have that get updated.     Rosa Moya  Clinic    Pulmonary Hypertension   [facility]  P) [phone]

## 2022-12-07 ENCOUNTER — TRANSFERRED RECORDS (OUTPATIENT)
Dept: HEALTH INFORMATION MANAGEMENT | Facility: CLINIC | Age: 82
End: 2022-12-07

## 2022-12-07 ENCOUNTER — ANTICOAGULATION THERAPY VISIT (OUTPATIENT)
Dept: ANTICOAGULATION | Facility: CLINIC | Age: 82
End: 2022-12-07

## 2022-12-07 DIAGNOSIS — I27.20 PULMONARY HYPERTENSION (H): Primary | ICD-10-CM

## 2022-12-07 DIAGNOSIS — I27.24 CTEPH (CHRONIC THROMBOEMBOLIC PULMONARY HYPERTENSION) (H): ICD-10-CM

## 2022-12-07 DIAGNOSIS — Z79.01 LONG TERM CURRENT USE OF ANTICOAGULANT THERAPY: ICD-10-CM

## 2022-12-07 LAB — INR (EXTERNAL): 2.7 (ref 0.9–1.1)

## 2022-12-07 NOTE — PROGRESS NOTES
ANTICOAGULATION MANAGEMENT     Karen Anderson 82 year old female is on warfarin with therapeutic INR result. (Goal INR 2.0-3.0)    Recent labs: (last 7 days)     12/07/22  1251   INR 2.7*       ASSESSMENT       Source(s): Chart Review and Patient/Caregiver Call       Warfarin doses taken: Warfarin taken as instructed    Diet: No new diet changes identified    New illness, injury, or hospitalization: No    Medication/supplement changes: None noted    Signs or symptoms of bleeding or clotting: No    Previous INR: Therapeutic last 2(+) visits    Additional findings: None       PLAN     Recommended plan for no diet, medication or health factor changes affecting INR     Dosing Instructions: Continue your current warfarin dose with next INR in 2 weeks       Summary  As of 12/7/2022    Full warfarin instructions:  5 mg every Sun, Tue, Thu; 4 mg all other days; Starting 12/7/2022   Next INR check:  12/21/2022             Telephone call with Karen who verbalizes understanding and agrees to plan and who agrees to plan and repeated back plan correctly    Patient to recheck with home meter    Education provided:     Contact [phone] with any changes, questions or concerns.     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    Good morning !! Forgot to talk at her appt last week that we need the authorization sent to the insurance company for the next year of Dexcom. They aren’t shipping anything out to us until they get it from [facility] ! Wanted to reach out to make sure the office has sent it in so we get it ASAP! Thanks SO much!!!     Jarvis Mom :)

## 2022-12-08 ENCOUNTER — TELEPHONE (OUTPATIENT)
Dept: ANTICOAGULATION | Facility: CLINIC | Age: 82
End: 2022-12-08

## 2022-12-08 DIAGNOSIS — I50.810 RVF (RIGHT VENTRICULAR FAILURE) (H): ICD-10-CM

## 2022-12-08 DIAGNOSIS — I27.24 CTEPH (CHRONIC THROMBOEMBOLIC PULMONARY HYPERTENSION) (H): Primary | ICD-10-CM

## 2022-12-08 DIAGNOSIS — Z79.01 LONG TERM CURRENT USE OF ANTICOAGULANT THERAPY: ICD-10-CM

## 2022-12-08 DIAGNOSIS — I27.20 PULMONARY HYPERTENSION (H): ICD-10-CM

## 2022-12-08 DIAGNOSIS — I27.24 CTEPH (CHRONIC THROMBOEMBOLIC PULMONARY HYPERTENSION) (H): ICD-10-CM

## 2022-12-08 RX ORDER — WARFARIN SODIUM 1 MG/1
TABLET ORAL
Qty: 90 TABLET | Refills: 1 | Status: SHIPPED | OUTPATIENT
Start: 2022-12-08 | End: 2022-12-13 | Stop reason: ALTCHOICE

## 2022-12-08 RX ORDER — WARFARIN SODIUM 1 MG/1
TABLET ORAL
Qty: 60 TABLET | Refills: 1 | Status: CANCELLED | OUTPATIENT
Start: 2022-12-08

## 2022-12-08 NOTE — TELEPHONE ENCOUNTER
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 Rx instructions and quantity supplied updated to match patient's current dosing plan. Warfarin 90 day supply with 1 refill granted per [facility] protocol     Jaylan Santana, RN  Anticoagulation Clinic

## 2022-12-09 RX ORDER — WARFARIN SODIUM 1 MG/1
1 TABLET ORAL DAILY
Qty: 90 TABLET | Refills: 3 | Status: SHIPPED | OUTPATIENT
Start: 2022-12-09

## 2022-12-09 RX ORDER — WARFARIN SODIUM 3 MG/1
TABLET ORAL
Qty: 170 TABLET | Refills: 3 | Status: SHIPPED | OUTPATIENT
Start: 2022-12-09 | End: 2024-01-09

## 2022-12-09 NOTE — TELEPHONE ENCOUNTER
Medication Refill double check:    Last virtual visit was on 11/17/22 with Shawna Marcum PA-C.    Follow up was recommended for 1 month.    Any additional encounters with changes to requested med? no    Authorizing provider is: Dr. Karolina Collazo was approved.     Additional orders/notes:       Fabby Seymour RN on 12/9/2022 at 7:34 AM

## 2022-12-13 ENCOUNTER — VIRTUAL VISIT (OUTPATIENT)
Dept: CARDIOLOGY | Facility: CLINIC | Age: 82
End: 2022-12-13
Attending: PHYSICIAN ASSISTANT
Payer: COMMERCIAL

## 2022-12-13 DIAGNOSIS — I27.24 CTEPH (CHRONIC THROMBOEMBOLIC PULMONARY HYPERTENSION) (H): Primary | ICD-10-CM

## 2022-12-13 DIAGNOSIS — R06.02 SOB (SHORTNESS OF BREATH): ICD-10-CM

## 2022-12-13 DIAGNOSIS — I50.810 RVF (RIGHT VENTRICULAR FAILURE) (H): ICD-10-CM

## 2022-12-13 PROCEDURE — 99215 OFFICE O/P EST HI 40 MIN: CPT | Mod: 95 | Performed by: PHYSICIAN ASSISTANT

## 2022-12-13 RX ORDER — BUMETANIDE 2 MG/1
4 TABLET ORAL 3 TIMES DAILY
Qty: 540 TABLET | Refills: 3 | Status: SHIPPED | OUTPATIENT
Start: 2022-12-13 | End: 2023-05-01

## 2022-12-13 NOTE — PROGRESS NOTES
Karen is a 82 year old who is being evaluated via a billable telephone visit.      Pt states in [place] for visit.     What phone number would you like to be contacted at? [phone]   How would you like to obtain your AVS? Alayna Antonio, VF/CMA    Vitals - Patient Reported  Systolic (Patient Reported): 117  Diastolic (Patient Reported): 57  Weight (Patient Reported): 46.7 kg (103 lb)  SpO2 (Patient Reported):  (77 right when waking up this morning, after sitting up back to 90s, states this is common)  Pulse (Patient Reported): 64  Pain Score: No Pain (0)        CARDIOLOGY PH CLINIC TELEPHONE VISIT    Date of telephone visit: 12/13/22      Karen Anderson is a 82 year old female who is being evaluated via a billable telephone visit.      I have reviewed and updated the patient's Past Medical History, Social History, Family History and Medication List.      MEDICATIONS:  Current Outpatient Medications   Medication Sig Dispense Refill     acetaminophen (TYLENOL) 325 MG tablet Take 325 mg by mouth every 6 hours as needed for mild pain       albuterol (PROAIR HFA/PROVENTIL HFA/VENTOLIN HFA) 108 (90 Base) MCG/ACT inhaler Inhale 2 puffs into the lungs every 4 hours as needed for shortness of breath / dyspnea or wheezing        alendronate (FOSAMAX) 70 MG tablet Take 70 mg by mouth once a week On Mondays       Ascorbic Acid (VITAMIN C) 500 MG CAPS Take 500 mg by mouth every morning        aspirin (ASA) 81 MG tablet Take 81 mg by mouth daily        beclomethasone HFA (QVAR REDIHALER) 80 MCG/ACT inhaler Inhale 2 puffs into the lungs 2 times daily       bumetanide (BUMEX) 1 MG tablet Take 1 tablet (1 mg) by mouth 3 times daily 540 tablet 3     bumetanide (BUMEX) 2 MG tablet Take 2 tablets (4 mg) by mouth 3 times daily (Patient not taking: Reported on 11/17/2022) 540 tablet 3     calcium citrate-vitamin D (CALCIUM CITRATE + D3) 315-250 MG-UNIT TABS per tablet Take 1 tablet by mouth 2 times daily        Corn  Dextrin (CLEAR FIBER POWDER PO) Take by mouth daily        digoxin (LANOXIN) 125 MCG tablet Take 0.5 tablets (62.5 mcg) by mouth daily 13 tablet      gabapentin (NEURONTIN) 300 MG capsule Take 300 mg by mouth 3 times daily Can take an additional 300mg at bedtime as needed       hydroxychloroquine (PLAQUENIL) 200 MG tablet Take 200 mg by mouth daily        ipratropium (ATROVENT) 0.06 % nasal spray Spray 2 sprays into both nostrils 3 times daily        ketotifen (ZADITOR) 0.025 % ophthalmic solution Place 1 drop into both eyes daily (Patient not taking: Reported on 11/17/2022)       macitentan (OPSUMIT) 10 MG tablet Take 1 tablet (10 mg) by mouth daily 30 tablet 11     potassium chloride ER (KLOR-CON M) 20 MEQ CR tablet Take 2 tablets (40 mEq) by mouth 3 times daily 540 tablet 3     sodium chloride (OCEAN) 0.65 % nasal spray Spray 1 spray into both nostrils daily as needed for congestion       treprostinil (REMODULIN) 60 mcg/mL in glycine diluent 50 mL infusion Inject 45.5 ng/kg/min into the vein continuous Goal Dose: 45.5 ng/kg/min 1368 mL 0     warfarin ANTICOAGULANT (COUMADIN) 1 MG tablet Take 1 tablet (1 mg) by mouth daily 90 tablet 3     warfarin ANTICOAGULANT (COUMADIN) 1 MG tablet Take 1 tablet daily by mouth or as directed by the Coumadin clinic 90 tablet 1     warfarin ANTICOAGULANT (COUMADIN) 1 MG tablet Take one tab by mouth daily or as directed by Anticoagulation Clinic. 60 tablet 1     warfarin ANTICOAGULANT (COUMADIN) 3 MG tablet TAKE 3 MG BY MOUTH ON MONDAY, WEDNESDAY, FRIDAY, AND SATURDAY, AND 4.5 MG ON ALL OTHER DAYS OF THE WEEK 170 tablet 3       ALLERGIES  Sulfa drugs       Primary PH cardiologist: Dr. Turcios      HPI:  Ms. Anderson is a pleasant 82 year old female with a PMHx including rheumatoid arthritis, hypertension, scoliosis, asthma, pulmonary MAC, breast cancer s/p chemotherapy with Adriamycin, Cytoxan, Taxol, and tamoxifen and status post left mastectomy. She also has a history of  extensive bilateral pulmonary emboli and DVT in 1998 thought to be secondary to tamoxifen along with chemotherapy induced cardiomyopathy. 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 Although she had proximal disease, she was too high risk for surgical PTE. She had a BPA in Jan 2021 which was complicated by hemoptysis, requiring FFP. She has required frequent diuretic adjustments, and we have been working on medical management. She is currently on combination therapy with IV Remodulin therapy along with Opsumit, and has failed Adempas due to dizziness and hypotension.      When I met with Karen kris last in mid October, she was doing well and weight was stable. She requested a reduction in her diuretics to see if it would help her dry, flaking skin as she felt dehydrated. After prolonged discussion with shared decision making, we decided to do a slow taper from 4mg TID to 3mg TID in weekly increments.  When we met last virtually in mid November, she was grateful that she had continued to feel well at the 3mg TID dose. Weight fortunately remained stable with slight reduction in her diuretics and swelling was still under good control. I made no changes at that time.     Unfortunately, a few days later, she started having some high pump alarms and then her Remodulin started leaking. She came to the ER and ultimately had to have her Goldberg replaced. She was then discharged home shortly after. Today, we are following up virtually; she tells me that the new Goldberg has been functioning fine with no pump alarms or leakage. Upon questioning, she has self increased her bumex back up to 4mg TID as she wanted to try to get her home weight back down to 101-102# where she feels she should be. She did not make any changes to her potassium.     The patient did not have any new labs performed for our visit today, but most recent available labs were reviewed as below.          CURRENT PULMONARY  HYPERTENSION REGIMEN:     PAH Rx: Remodulin 45.5ng/kg/min, Opsumit 10mg daily  (failed Adempas due to dizziness)     Diuretics: Bumex 3mg TID (though self increased to 4mg TID)     Oxygen: NC 1.5-2LPM     Anticoagulation: warfarin   Indication: CTEPH     Immunosuppression: Yes (RA)     Pulm: Dr. Abernathy ([facility])        Assessment/Plan:        1. Chronic thromboembolic pulmonary hypertension.              --Ms. Anderson has CTEPH with RV dysfunction. Although she had proximal disease, she was felt to be too high risk for surgical PTE both here and at [facility]. She was offered potential PTE at [place], but still felt to be very high risk and ultimately deferred. She did have BPA here in January 2021 but this was complicated by hemoptysis. No further surgical interventions are being offered at this time. Thus, we have since been focusing on medical management.              --She is on IV Remodulin at 45.5ng/kg/min.  Continue this along with Opsumit daily as she is doing. She is not able to tolerate Adempas due to dizziness.              --She was recently seen in the ER due to a Goldberg malfunction. This was replaced and she is no longer having issues. I asked her to call with any new concerns.    --She self increased her bumex back to 4mg TID. Her weight is down to 103# today. I told her she may continue for now, but I'd like her to get a repeat BMP in the next day or two to monitor renal function and potassium.               --Continue digoxin for RV support.                --Continue anticoagulation with warfarin for her CTEPH,  She has equipment to do home checks, and follows with the INR clinic.              --Continue oxygen at 1-2L with exertion as she is doing. She also follows with pulmonary locally.          Follow up plan: She declines coming back in person unless necessary and would like to continue virtual visits for now as she is feeling well. Thus, will do another video visit in 1 month. We are happy  to see the patient back sooner with any new concerns.     Testing/labs:      Most recent labs:    Latest Reference Range & Units 11/25/22 04:41   Sodium 136 - 145 mmol/L 143   Potassium 3.4 - 5.3 mmol/L 3.6   Chloride 98 - 107 mmol/L 100   Carbon Dioxide (CO2) 22 - 29 mmol/L 32 (H)   Urea Nitrogen 8.0 - 23.0 mg/dL 28.9 (H)   Creatinine 0.51 - 0.95 mg/dL 1.18 (H)   GFR Estimate >60 mL/min/1.73m2 46 (L)   Calcium 8.8 - 10.2 mg/dL 10.3 (H)   Anion Gap 7 - 15 mmol/L 11   Albumin 3.5 - 5.2 g/dL 4.1   Protein Total 6.4 - 8.3 g/dL 7.3   Alkaline Phosphatase 35 - 104 U/L 107 (H)   ALT 10 - 35 U/L 11   AST 10 - 35 U/L 30   Bilirubin Total <=1.2 mg/dL 0.3   Glucose 70 - 99 mg/dL 84   WBC 4.0 - 11.0 10e3/uL 3.6 (L)   Hemoglobin 11.7 - 15.7 g/dL 9.6 (L)   Hematocrit 35.0 - 47.0 % 31.3 (L)   Platelet Count 150 - 450 10e3/uL 237   RBC Count 3.80 - 5.20 10e6/uL 3.18 (L)   MCV 78 - 100 fL 98   MCH 26.5 - 33.0 pg 30.2   MCHC 31.5 - 36.5 g/dL 30.7 (L)   RDW 10.0 - 15.0 % 15.9 (H)   (H): Data is abnormally high  (L): Data is abnormally low      Other recent pertinent testing:    Echo 4/25/22  Interpretation Summary     Left ventricular function is normal.The ejection fraction is 55-60%. Grade II  or moderate diastolic dysfunction. Paradoxical septal motion consistent with  right ventricular pressure overload is present.  The RV is not clearly visualized but the size appears to be moderately dilated  and function appears to be at least mildly reduced.  The estimated PA systolic pressure is 55 mmHg above the RA pressure.  The left atrial pressure is elevated.  No pericardial effusion is present.  This study was compared with the study from 06/03/2021. The estimated PA  systolic pressure is lower. The RV function and size are similar on direct  visual comparison. The pericardial effusion is also no longer evident.    R[facility] 3/17/21  Conclusion      Severe pulmonary hypertension    Normal cardiac output by Thermodilution    Elevated left  sided filling pressures    No signficant change when comapred to her last hemodynamic assessment         Pressures Phase: Baseline     Time Systolic (mmHg) Diastolic (mmHg) Mean (mmHg) A Wave (mmHg) V Wave (mmHg) EDP (mmHg) Max dp/dt (mmHg/sec) HR (bpm) Content (mL/dL) SAT (%)   RA Pressures  2:31 PM   14    14    22      63        RV Pressures  2:16 PM       912           2:32 PM 87    9       28     62        PA Pressures  2:32 PM 88    22    44        62        PCW Pressures  2:33 PM   14        64              NYHA Functional Class:  3    I have reviewed the note as documented above.  This accurately captures the substance of my conversation with the patient.      Phone call contact time  Call Started at 1425  Call Ended at 1450    An additional 20 minutes was spent today performing chart and history review, pre and post visit documentation, review of recent testing, and care coordination.      Shawna Marcum PA-C  [facility]  Pager [phone]

## 2022-12-13 NOTE — PATIENT INSTRUCTIONS
Medication Changes:   Please get some labs done in the next few days near your home.  Our office will call you next week for an update.     Follow up Appointment Information:  Follow-up in 1 month virtually with Shawna.      Thank you for allowing us to be a part of your care here at the [facility]    If you have questions or concerns please contact us at:      Rebecca Ngo, RN, BSN  Rosa Moya (Schedule,Prior Auth)  Nurse Coordinator     Clinic   Pulmonary Hypertension   Pulmonary Hypertension  [facility]  [facility]  (Phone)[phone]              (Phone) [phone]  (Main Number) [phone]  (Fax) [phone]             On Call Cardiologist for after hours or on weekends: [phone]    option #4        Patient Instructions:  1. Continue staying active and eat a heart healthy diet.    2. Please keep current list of medications with you at all times.    3. Remember to weigh yourself daily after voiding and before you consume any food or beverages and log the numbers.  If you have gained 2 pounds overnight or 5 pounds in a week contact us immediately for medication adjustments or further instructions.    4. **Please call us immediately if you have any syncope (fainting or passing out), chest pain, edema (swelling or weight gain), or decline in your functional status (general decline in how you are feeling).    5. Patients on Remodulin (treprostinil) or Veletri (epoprostenol): Please make sure that you have your backup pump and supplies with you at all times, your mixing instructions, and contact information for your specialty pharmacy.    Great Videos about Pulmonary Hypertension!!  Scan ME!    Website: [email or web address]     Support Group:  Pulmonary Hypertension Association  Https://www.phassociation.org/  **Look at the Events Tab** They even have Support Groups that you can call into    [place]  - [facility] Support Group  Second Saturday of the Month from 1-3 PM   Location: [address] [place] [address]  Leader: Jessenia Moeller  Phone: [phone]  Email: [email or web address]

## 2022-12-13 NOTE — NURSING NOTE
Chief Complaint   Patient presents with     Follow Up     Pt states only change to medications is Bumex (current dose per pt in med notes)     Patient declined individual allergy and medication review by support staff because they deny any changes and state that all information remains accurate since last reviewed/verified.       Emanuel Antonio, VF/CMA

## 2022-12-13 NOTE — LETTER
12/13/2022      RE: Karen Anderson  [address]  [facility] [address]       Dear Colleague,    Thank you for the opportunity to participate in the care of your patient, Karen Anderson, at the [facility] [place] [facility]. Please see a copy of my visit note below.    Karen is a 82 year old who is being evaluated via a billable telephone visit.      Pt states in [place] for visit.     What phone number would you like to be contacted at? [phone]   How would you like to obtain your AVS? Alayna Antonio, VF/CMA    Vitals - Patient Reported  Systolic (Patient Reported): 117  Diastolic (Patient Reported): 57  Weight (Patient Reported): 46.7 kg (103 lb)  SpO2 (Patient Reported):  (77 right when waking up this morning, after sitting up back to 90s, states this is common)  Pulse (Patient Reported): 64  Pain Score: No Pain (0)        CARDIOLOGY PH CLINIC TELEPHONE VISIT    Date of telephone visit: 12/13/22      Karen Anderson is a 82 year old female who is being evaluated via a billable telephone visit.      I have reviewed and updated the patient's Past Medical History, Social History, Family History and Medication List.      MEDICATIONS:  Current Outpatient Medications   Medication Sig Dispense Refill     acetaminophen (TYLENOL) 325 MG tablet Take 325 mg by mouth every 6 hours as needed for mild pain       albuterol (PROAIR HFA/PROVENTIL HFA/VENTOLIN HFA) 108 (90 Base) MCG/ACT inhaler Inhale 2 puffs into the lungs every 4 hours as needed for shortness of breath / dyspnea or wheezing        alendronate (FOSAMAX) 70 MG tablet Take 70 mg by mouth once a week On Mondays       Ascorbic Acid (VITAMIN C) 500 MG CAPS Take 500 mg by mouth every morning        aspirin (ASA) 81 MG tablet Take 81 mg by mouth daily        beclomethasone HFA (QVAR REDIHALER) 80 MCG/ACT inhaler Inhale 2 puffs into the lungs 2 times daily        bumetanide (BUMEX) 1 MG tablet Take 1 tablet (1 mg) by mouth 3 times daily 540 tablet 3     bumetanide (BUMEX) 2 MG tablet Take 2 tablets (4 mg) by mouth 3 times daily (Patient not taking: Reported on 11/17/2022) 540 tablet 3     calcium citrate-vitamin D (CALCIUM CITRATE + D3) 315-250 MG-UNIT TABS per tablet Take 1 tablet by mouth 2 times daily        Corn Dextrin (CLEAR FIBER POWDER PO) Take by mouth daily        digoxin (LANOXIN) 125 MCG tablet Take 0.5 tablets (62.5 mcg) by mouth daily 13 tablet      gabapentin (NEURONTIN) 300 MG capsule Take 300 mg by mouth 3 times daily Can take an additional 300mg at bedtime as needed       hydroxychloroquine (PLAQUENIL) 200 MG tablet Take 200 mg by mouth daily        ipratropium (ATROVENT) 0.06 % nasal spray Spray 2 sprays into both nostrils 3 times daily        ketotifen (ZADITOR) 0.025 % ophthalmic solution Place 1 drop into both eyes daily (Patient not taking: Reported on 11/17/2022)       macitentan (OPSUMIT) 10 MG tablet Take 1 tablet (10 mg) by mouth daily 30 tablet 11     potassium chloride ER (KLOR-CON M) 20 MEQ CR tablet Take 2 tablets (40 mEq) by mouth 3 times daily 540 tablet 3     sodium chloride (OCEAN) 0.65 % nasal spray Spray 1 spray into both nostrils daily as needed for congestion       treprostinil (REMODULIN) 60 mcg/mL in glycine diluent 50 mL infusion Inject 45.5 ng/kg/min into the vein continuous Goal Dose: 45.5 ng/kg/min 1368 mL 0     warfarin ANTICOAGULANT (COUMADIN) 1 MG tablet Take 1 tablet (1 mg) by mouth daily 90 tablet 3     warfarin ANTICOAGULANT (COUMADIN) 1 MG tablet Take 1 tablet daily by mouth or as directed by the Coumadin clinic 90 tablet 1     warfarin ANTICOAGULANT (COUMADIN) 1 MG tablet Take one tab by mouth daily or as directed by Anticoagulation Clinic. 60 tablet 1     warfarin ANTICOAGULANT (COUMADIN) 3 MG tablet TAKE 3 MG BY MOUTH ON MONDAY, WEDNESDAY, FRIDAY, AND SATURDAY, AND 4.5 MG ON ALL OTHER DAYS OF THE WEEK 170 tablet 3        ALLERGIES  Sulfa drugs       Primary PH cardiologist: Dr. Turcios      HPI:  Ms. Anderson is a pleasant 82 year old female with a PMHx including rheumatoid arthritis, hypertension, scoliosis, asthma, pulmonary MAC, breast cancer s/p chemotherapy with Adriamycin, Cytoxan, Taxol, and tamoxifen and status post left mastectomy. She also has a history of extensive bilateral pulmonary emboli and DVT in 1998 thought to be secondary to tamoxifen along with chemotherapy induced cardiomyopathy. 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 Although she had proximal disease, she was too high risk for surgical PTE. She had a BPA in Jan 2021 which was complicated by hemoptysis, requiring FFP. She has required frequent diuretic adjustments, and we have been working on medical management. She is currently on combination therapy with IV Remodulin therapy along with Opsumit, and has failed Adempas due to dizziness and hypotension.      When I met with Karengold ponce last in mid October, she was doing well and weight was stable. She requested a reduction in her diuretics to see if it would help her dry, flaking skin as she felt dehydrated. After prolonged discussion with shared decision making, we decided to do a slow taper from 4mg TID to 3mg TID in weekly increments.  When we met last virtually in mid November, she was grateful that she had continued to feel well at the 3mg TID dose. Weight fortunately remained stable with slight reduction in her diuretics and swelling was still under good control. I made no changes at that time.     Unfortunately, a few days later, she started having some high pump alarms and then her Remodulin started leaking. She came to the ER and ultimately had to have her Goldberg replaced. She was then discharged home shortly after. Today, we are following up virtually; she tells me that the new Goldberg has been functioning fine with no pump alarms or leakage. Upon  questioning, she has self increased her bumex back up to 4mg TID as she wanted to try to get her home weight back down to 101-102# where she feels she should be. She did not make any changes to her potassium.     The patient did not have any new labs performed for our visit today, but most recent available labs were reviewed as below.          CURRENT PULMONARY HYPERTENSION REGIMEN:     PAH Rx: Remodulin 45.5ng/kg/min, Opsumit 10mg daily  (failed Adempas due to dizziness)     Diuretics: Bumex 3mg TID (though self increased to 4mg TID)     Oxygen: NC 1.5-2LPM     Anticoagulation: warfarin   Indication: CTEPH     Immunosuppression: Yes (RA)     Pulm: Dr. Abernathy ([facility])        Assessment/Plan:        1. Chronic thromboembolic pulmonary hypertension.              --Ms. Anderson has CTEPH with RV dysfunction. Although she had proximal disease, she was felt to be too high risk for surgical PTE both here and at [facility]. She was offered potential PTE at [place], but still felt to be very high risk and ultimately deferred. She did have BPA here in January 2021 but this was complicated by hemoptysis. No further surgical interventions are being offered at this time. Thus, we have since been focusing on medical management.              --She is on IV Remodulin at 45.5ng/kg/min.  Continue this along with Opsumit daily as she is doing. She is not able to tolerate Adempas due to dizziness.              --She was recently seen in the ER due to a Goldberg malfunction. This was replaced and she is no longer having issues. I asked her to call with any new concerns.    --She self increased her bumex back to 4mg TID. Her weight is down to 103# today. I told her she may continue for now, but I'd like her to get a repeat BMP in the next day or two to monitor renal function and potassium.               --Continue digoxin for RV support.                --Continue anticoagulation with warfarin for her CTEPH,  She has equipment to do home  checks, and follows with the INR clinic.              --Continue oxygen at 1-2L with exertion as she is doing. She also follows with pulmonary locally.          Follow up plan: She declines coming back in person unless necessary and would like to continue virtual visits for now as she is feeling well. Thus, will do another video visit in 1 month. We are happy to see the patient back sooner with any new concerns.     Testing/labs:      Most recent labs:    Latest Reference Range & Units 11/25/22 04:41   Sodium 136 - 145 mmol/L 143   Potassium 3.4 - 5.3 mmol/L 3.6   Chloride 98 - 107 mmol/L 100   Carbon Dioxide (CO2) 22 - 29 mmol/L 32 (H)   Urea Nitrogen 8.0 - 23.0 mg/dL 28.9 (H)   Creatinine 0.51 - 0.95 mg/dL 1.18 (H)   GFR Estimate >60 mL/min/1.73m2 46 (L)   Calcium 8.8 - 10.2 mg/dL 10.3 (H)   Anion Gap 7 - 15 mmol/L 11   Albumin 3.5 - 5.2 g/dL 4.1   Protein Total 6.4 - 8.3 g/dL 7.3   Alkaline Phosphatase 35 - 104 U/L 107 (H)   ALT 10 - 35 U/L 11   AST 10 - 35 U/L 30   Bilirubin Total <=1.2 mg/dL 0.3   Glucose 70 - 99 mg/dL 84   WBC 4.0 - 11.0 10e3/uL 3.6 (L)   Hemoglobin 11.7 - 15.7 g/dL 9.6 (L)   Hematocrit 35.0 - 47.0 % 31.3 (L)   Platelet Count 150 - 450 10e3/uL 237   RBC Count 3.80 - 5.20 10e6/uL 3.18 (L)   MCV 78 - 100 fL 98   MCH 26.5 - 33.0 pg 30.2   MCHC 31.5 - 36.5 g/dL 30.7 (L)   RDW 10.0 - 15.0 % 15.9 (H)   (H): Data is abnormally high  (L): Data is abnormally low      Other recent pertinent testing:    Echo 4/25/22  Interpretation Summary     Left ventricular function is normal.The ejection fraction is 55-60%. Grade II  or moderate diastolic dysfunction. Paradoxical septal motion consistent with  right ventricular pressure overload is present.  The RV is not clearly visualized but the size appears to be moderately dilated  and function appears to be at least mildly reduced.  The estimated PA systolic pressure is 55 mmHg above the RA pressure.  The left atrial pressure is elevated.  No pericardial  effusion is present.  This study was compared with the study from 06/03/2021. The estimated PA  systolic pressure is lower. The RV function and size are similar on direct  visual comparison. The pericardial effusion is also no longer evident.    [facility] 3/17/21  Conclusion      Severe pulmonary hypertension    Normal cardiac output by Thermodilution    Elevated left sided filling pressures    No signficant change when comapred to her last hemodynamic assessment         Pressures Phase: Baseline     Time Systolic (mmHg) Diastolic (mmHg) Mean (mmHg) A Wave (mmHg) V Wave (mmHg) EDP (mmHg) Max dp/dt (mmHg/sec) HR (bpm) Content (mL/dL) SAT (%)   RA Pressures  2:31 PM   14    14    22      63        RV Pressures  2:16 PM       912           2:32 PM 87    9       28     62        PA Pressures  2:32 PM 88    22    44        62        PCW Pressures  2:33 PM   14        64              NYHA Functional Class:  3    I have reviewed the note as documented above.  This accurately captures the substance of my conversation with the patient.      Phone call contact time  Call Started at 1425  Call Ended at 1450    An additional 20 minutes was spent today performing chart and history review, pre and post visit documentation, review of recent testing, and care coordination.      Shawna Marcum PA-C  [facility] Heart  Pager [phone]

## 2022-12-14 ENCOUNTER — TELEPHONE (OUTPATIENT)
Dept: CARDIOLOGY | Facility: CLINIC | Age: 82
End: 2022-12-14

## 2022-12-14 ENCOUNTER — LAB (OUTPATIENT)
Dept: LAB | Facility: CLINIC | Age: 82
End: 2022-12-14
Payer: COMMERCIAL

## 2022-12-14 DIAGNOSIS — I27.24 CTEPH (CHRONIC THROMBOEMBOLIC PULMONARY HYPERTENSION) (H): ICD-10-CM

## 2022-12-14 DIAGNOSIS — R06.02 SOB (SHORTNESS OF BREATH): ICD-10-CM

## 2022-12-14 LAB
ALBUMIN SERPL BCG-MCNC: 4.2 G/DL (ref 3.5–5.2)
ALP SERPL-CCNC: 102 U/L (ref 35–104)
ALT SERPL W P-5'-P-CCNC: 13 U/L (ref 10–35)
ANION GAP SERPL CALCULATED.3IONS-SCNC: 10 MMOL/L (ref 7–15)
AST SERPL W P-5'-P-CCNC: 36 U/L (ref 10–35)
BILIRUB SERPL-MCNC: 0.3 MG/DL
BUN SERPL-MCNC: 24.5 MG/DL (ref 8–23)
CALCIUM SERPL-MCNC: 10 MG/DL (ref 8.8–10.2)
CHLORIDE SERPL-SCNC: 100 MMOL/L (ref 98–107)
CREAT SERPL-MCNC: 1.07 MG/DL (ref 0.51–0.95)
DEPRECATED HCO3 PLAS-SCNC: 33 MMOL/L (ref 22–29)
ERYTHROCYTE [DISTWIDTH] IN BLOOD BY AUTOMATED COUNT: 16.2 % (ref 10–15)
GFR SERPL CREATININE-BSD FRML MDRD: 52 ML/MIN/1.73M2
GLUCOSE SERPL-MCNC: 79 MG/DL (ref 70–99)
HCT VFR BLD AUTO: 37.1 % (ref 35–47)
HGB BLD-MCNC: 11.2 G/DL (ref 11.7–15.7)
MCH RBC QN AUTO: 30.6 PG (ref 26.5–33)
MCHC RBC AUTO-ENTMCNC: 30.2 G/DL (ref 31.5–36.5)
MCV RBC AUTO: 101 FL (ref 78–100)
NT-PROBNP SERPL-MCNC: 1227 PG/ML (ref 0–1800)
PLATELET # BLD AUTO: 237 10E3/UL (ref 150–450)
POTASSIUM SERPL-SCNC: 4.3 MMOL/L (ref 3.4–5.3)
PROT SERPL-MCNC: 7.5 G/DL (ref 6.4–8.3)
RBC # BLD AUTO: 3.66 10E6/UL (ref 3.8–5.2)
SODIUM SERPL-SCNC: 143 MMOL/L (ref 136–145)
WBC # BLD AUTO: 3.6 10E3/UL (ref 4–11)

## 2022-12-14 PROCEDURE — 36415 COLL VENOUS BLD VENIPUNCTURE: CPT

## 2022-12-14 PROCEDURE — 85027 COMPLETE CBC AUTOMATED: CPT

## 2022-12-14 PROCEDURE — 83880 ASSAY OF NATRIURETIC PEPTIDE: CPT

## 2022-12-14 PROCEDURE — 80053 COMPREHEN METABOLIC PANEL: CPT

## 2022-12-14 NOTE — TELEPHONE ENCOUNTER
Left Voicemail (1st Attempt) and Sent Mychart (1st Attempt) for the patient to call back and schedule the following:    Appointment type: Cardiology- Return Pulm Hypertension  Provider: Jossue  Return date: 1/13/23  Specialty phone number: [phone]  Additional appointment(s) needed: none  Additonal Notes: Navid Nelson, Visit Facilitator/MA.

## 2022-12-15 ENCOUNTER — TELEPHONE (OUTPATIENT)
Dept: CARDIOLOGY | Facility: CLINIC | Age: 82
End: 2022-12-15

## 2022-12-15 DIAGNOSIS — Z79.01 LONG TERM CURRENT USE OF ANTICOAGULANT THERAPY: Primary | ICD-10-CM

## 2022-12-15 NOTE — TELEPHONE ENCOUNTER
[facility] Call Center    Phone Message    May a detailed message be left on voicemail: yes     Reason for Call: Order(s): Other:   Reason for requested: INR orders   Date needed: Prior to 01/12/23  Provider name: Shawna Marcum       Action Taken: Other: Cardiology    Travel Screening: Not Applicable     Thank you!  [facility]

## 2022-12-21 ENCOUNTER — ANTICOAGULATION THERAPY VISIT (OUTPATIENT)
Dept: ANTICOAGULATION | Facility: CLINIC | Age: 82
End: 2022-12-21

## 2022-12-21 ENCOUNTER — TRANSFERRED RECORDS (OUTPATIENT)
Dept: HEALTH INFORMATION MANAGEMENT | Facility: CLINIC | Age: 82
End: 2022-12-21

## 2022-12-21 DIAGNOSIS — I27.20 PULMONARY HYPERTENSION (H): Primary | ICD-10-CM

## 2022-12-21 DIAGNOSIS — I27.24 CTEPH (CHRONIC THROMBOEMBOLIC PULMONARY HYPERTENSION) (H): ICD-10-CM

## 2022-12-21 DIAGNOSIS — Z79.01 LONG TERM CURRENT USE OF ANTICOAGULANT THERAPY: ICD-10-CM

## 2022-12-21 LAB — INR (EXTERNAL): 2.1 (ref 0.9–1.1)

## 2022-12-21 NOTE — PROGRESS NOTES
ANTICOAGULATION MANAGEMENT     Karen Anderson 82 year old female is on warfarin with therapeutic INR result. (Goal INR 2.0-3.0)    Recent labs: (last 7 days)     12/21/22  1416   INR 2.1*       ASSESSMENT       Source(s): Chart Review and Patient/Caregiver Call       Warfarin doses taken: Warfarin taken as instructed    Diet: No new diet changes identified    New illness, injury, or hospitalization: No    Medication/supplement changes: None noted    Signs or symptoms of bleeding or clotting: No    Previous INR: Therapeutic last 2(+) visits    Additional findings: None       PLAN     Recommended plan for no diet, medication or health factor changes affecting INR     Dosing Instructions: Continue your current warfarin dose with next INR in 2 weeks       Summary  As of 12/21/2022    Full warfarin instructions:  5 mg every Sun, Tue, Thu; 4 mg all other days   Next INR check:  1/4/2023             Telephone call with Karen who verbalizes understanding and agrees to plan and who agrees to plan and repeated back plan correctly    Patient to recheck with home meter    Education provided:     Contact [phone] with any changes, questions or concerns.     Plan made per [facility] anticoagulation protocol    GOLDY PERALTA RN  Anticoagulation Clinic  12/21/2022    _______________________________________________________________________     Anticoagulation Episode Summary     Current INR goal:  2.0-3.0   TTR:  65.2 % (1 y)   Target end date:  Indefinite   Send INR reminders to:  [facility]    Indications    Pulmonary hypertension (H) [I27.20]  CTEPH (chronic thromboembolic pulmonary hypertension) (H) [I27.24]  Long term current use of anticoagulant therapy [Z79.01]           Comments:           Anticoagulation Care Providers     Provider Role Specialty Phone number    Karolina Turcios MD Referring Cardiovascular Disease [phone]

## 2023-01-04 ENCOUNTER — ANTICOAGULATION THERAPY VISIT (OUTPATIENT)
Dept: ANTICOAGULATION | Facility: CLINIC | Age: 83
End: 2023-01-04

## 2023-01-04 ENCOUNTER — TRANSFERRED RECORDS (OUTPATIENT)
Dept: HEALTH INFORMATION MANAGEMENT | Facility: CLINIC | Age: 83
End: 2023-01-04

## 2023-01-04 DIAGNOSIS — I27.24 CTEPH (CHRONIC THROMBOEMBOLIC PULMONARY HYPERTENSION) (H): ICD-10-CM

## 2023-01-04 DIAGNOSIS — I27.20 PULMONARY HYPERTENSION (H): Primary | ICD-10-CM

## 2023-01-04 DIAGNOSIS — Z79.01 LONG TERM CURRENT USE OF ANTICOAGULANT THERAPY: ICD-10-CM

## 2023-01-04 LAB — INR (EXTERNAL): 1.8 (ref 0.9–1.1)

## 2023-01-04 NOTE — PROGRESS NOTES
ANTICOAGULATION MANAGEMENT     Karen Anderson 82 year old female is on warfarin with subtherapeutic INR result. (Goal INR 2.0-3.0)    Recent labs: (last 7 days)     01/04/23  0000   INR 1.8*       ASSESSMENT       Source(s): Chart Review and Patient/Caregiver Call       Warfarin doses taken: Warfarin taken as instructed    Diet: Decreased greens/vitamin K in diet; plans to resume previous intake    New illness, injury, or hospitalization: No    Medication/supplement changes: None noted    Signs or symptoms of bleeding or clotting: No    Previous INR: Therapeutic last 2(+) visits    Additional findings: None       PLAN     Recommended plan for temporary change(s) affecting INR     Dosing Instructions: booster dose then continue your current warfarin dose with next INR in 2 weeks       Summary  As of 1/4/2023    Full warfarin instructions:  1/4: 6 mg; Otherwise 5 mg every Sun, Tue, Thu; 4 mg all other days   Next INR check:  1/18/2023             Telephone call with Karen who verbalizes understanding and agrees to plan    Patient to recheck with home meter    Education provided:     Goal range and lab monitoring: goal range and significance of current result and Importance of therapeutic range    Dietary considerations: importance of consistent vitamin K intake and impact of vitamin K foods on INR    Plan made per [facility] anticoagulation protocol    Cooper Murillo RN  Anticoagulation Clinic  1/4/2023    _______________________________________________________________________     Anticoagulation Episode Summary     Current INR goal:  2.0-3.0   TTR:  66.4 % (1 y)   Target end date:  Indefinite   Send INR reminders to:  [facility]    Indications    Pulmonary hypertension (H) [I27.20]  CTEPH (chronic thromboembolic pulmonary hypertension) (H) [I27.24]  Long term current use of anticoagulant therapy [Z79.01]           Comments:           Anticoagulation Care Providers     Provider Role Specialty Phone number     Karolina Turcios MD Referring Cardiovascular Disease [phone]

## 2023-01-12 ENCOUNTER — LAB (OUTPATIENT)
Dept: LAB | Facility: CLINIC | Age: 83
End: 2023-01-12
Payer: COMMERCIAL

## 2023-01-12 ENCOUNTER — TELEPHONE (OUTPATIENT)
Dept: CARDIOLOGY | Facility: CLINIC | Age: 83
End: 2023-01-12

## 2023-01-12 ENCOUNTER — ANTICOAGULATION THERAPY VISIT (OUTPATIENT)
Dept: ANTICOAGULATION | Facility: CLINIC | Age: 83
End: 2023-01-12

## 2023-01-12 DIAGNOSIS — Z79.01 LONG TERM CURRENT USE OF ANTICOAGULANT THERAPY: ICD-10-CM

## 2023-01-12 DIAGNOSIS — I27.24 CTEPH (CHRONIC THROMBOEMBOLIC PULMONARY HYPERTENSION) (H): ICD-10-CM

## 2023-01-12 DIAGNOSIS — R06.02 SOB (SHORTNESS OF BREATH): ICD-10-CM

## 2023-01-12 DIAGNOSIS — I27.20 PULMONARY HYPERTENSION (H): Primary | ICD-10-CM

## 2023-01-12 LAB
ANION GAP SERPL CALCULATED.3IONS-SCNC: 11 MMOL/L (ref 7–15)
BUN SERPL-MCNC: 33.5 MG/DL (ref 8–23)
CALCIUM SERPL-MCNC: 10 MG/DL (ref 8.8–10.2)
CHLORIDE SERPL-SCNC: 99 MMOL/L (ref 98–107)
CREAT SERPL-MCNC: 1.06 MG/DL (ref 0.51–0.95)
DEPRECATED HCO3 PLAS-SCNC: 31 MMOL/L (ref 22–29)
GFR SERPL CREATININE-BSD FRML MDRD: 53 ML/MIN/1.73M2
GLUCOSE SERPL-MCNC: 75 MG/DL (ref 70–99)
INR PPP: 1.96 (ref 0.85–1.15)
POTASSIUM SERPL-SCNC: 4.7 MMOL/L (ref 3.4–5.3)
SODIUM SERPL-SCNC: 141 MMOL/L (ref 136–145)

## 2023-01-12 PROCEDURE — 36415 COLL VENOUS BLD VENIPUNCTURE: CPT

## 2023-01-12 PROCEDURE — 80048 BASIC METABOLIC PNL TOTAL CA: CPT

## 2023-01-12 PROCEDURE — 85610 PROTHROMBIN TIME: CPT

## 2023-01-12 NOTE — PROGRESS NOTES
ANTICOAGULATION MANAGEMENT     Karen RICO Calderonhilariacharbel 82 year old female is on warfarin with subtherapeutic INR result. (Goal INR 2.0-3.0)    Recent labs: (last 7 days)     01/12/23  1038   INR 1.96*       ASSESSMENT       Source(s): Chart Review and Patient/Caregiver Call       Warfarin doses taken: Warfarin taken as instructed    Diet: No new diet changes identified    New illness, injury, or hospitalization: No    Medication/supplement changes: None noted    Signs or symptoms of bleeding or clotting: No    Previous INR: Subtherapeutic    Additional findings: None       PLAN     Recommended plan for no diet, medication or health factor changes affecting INR     Dosing Instructions: Continue your current warfarin dose with next INR in 1 week       Summary  As of 1/12/2023    Full warfarin instructions:  5 mg every Sun, Tue, Thu; 4 mg all other days   Next INR check:  1/19/2023             Telephone call with Karen who verbalizes understanding and agrees to plan and who agrees to plan and repeated back plan correctly    Patient to recheck with home meter    Education provided:     Taking warfarin: Importance of taking warfarin as instructed    Goal range and lab monitoring: goal range and significance of current result and Importance of therapeutic range    Plan made per [facility] anticoagulation protocol    Brittany Kirk RN  Anticoagulation Clinic  1/12/2023    _______________________________________________________________________     Anticoagulation Episode Summary     Current INR goal:  2.0-3.0   TTR:  66.3 % (1 y)   Target end date:  Indefinite   Send INR reminders to:  [facility]    Indications    Pulmonary hypertension (H) [I27.20]  CTEPH (chronic thromboembolic pulmonary hypertension) (H) [I27.24]  Long term current use of anticoagulant therapy [Z79.01]           Comments:           Anticoagulation Care Providers     Provider Role Specialty Phone number    Karolina Turcios MD Referring  Cardiovascular Disease [phone]

## 2023-01-12 NOTE — TELEPHONE ENCOUNTER
1/12/2023 2:32 PM -       ----------------------------  Insurance: [employer]  BIN: **  PCN:   ID#: [id number]  GRP#:     **person not found with address submitted by [employer] of:  [id number] [place] [id number]. Updated to current address on file in epic:     [address] [facility] [address]    PA Initiation    Medication: Remodulin IV solution - renewal - claim rejecting per [employer], requesting MDO obtain PA  Insurance Company: [employer] - Phone [phone] Fax [phone]:  rcvd claim rej alert from [employer]  Pharmacy Filling the Rx: [facility] - [place], [place] - [address] [place] [address]  Filling Pharmacy Phone:    Filling Pharmacy Fax:    Start Date: 1/12/2023 via [facility], key:   [id number] - **pending questions  lrr    Yessica PATEL CMA  Clinical   Cardiology/Pulmonary Hypertension   [facility]  PH: [phone]

## 2023-01-12 NOTE — TELEPHONE ENCOUNTER
1/12/2023 2:31 PM -   rcvd email from Yessica/[facility] :           Yessica PATEL CMA  Clinical   Cardiology/Pulmonary Hypertension   [facility]  PH: [phone]

## 2023-01-16 NOTE — TELEPHONE ENCOUNTER
1/16/2023 11:09 AM -  DENIED     PRIOR AUTHORIZATION DENIED    Medication: Remodulin IV solution - renewal - claim rejecting per [facility], requesting MDO obtain PA    Denial Date: 1/13/2023    Denial Rational:    Appeal Information: N/A - FORWARDING TO KAYLEY/[facility] TO SUBMIT TO MEDICAL, NOT PART D                    Yessica PATEL CMA  Clinical   Cardiology/Pulmonary Hypertension   [facility]  PH: [phone]

## 2023-01-17 ENCOUNTER — VIRTUAL VISIT (OUTPATIENT)
Dept: CARDIOLOGY | Facility: CLINIC | Age: 83
End: 2023-01-17
Attending: PHYSICIAN ASSISTANT
Payer: COMMERCIAL

## 2023-01-17 DIAGNOSIS — I27.24 CTEPH (CHRONIC THROMBOEMBOLIC PULMONARY HYPERTENSION) (H): ICD-10-CM

## 2023-01-17 DIAGNOSIS — R06.02 SOB (SHORTNESS OF BREATH): ICD-10-CM

## 2023-01-17 PROCEDURE — 99213 OFFICE O/P EST LOW 20 MIN: CPT | Mod: 95 | Performed by: PHYSICIAN ASSISTANT

## 2023-01-17 ASSESSMENT — ENCOUNTER SYMPTOMS
SNORES LOUDLY: 0
HEARTBURN: 0
JAUNDICE: 0
BLOATING: 0
HEMOPTYSIS: 0
VOMITING: 0
EYE WATERING: 1
ABDOMINAL PAIN: 0
POSTURAL DYSPNEA: 1
BLOOD IN STOOL: 0
SHORTNESS OF BREATH: 0
SPUTUM PRODUCTION: 0
EYE PAIN: 0
RECTAL PAIN: 0
DYSPNEA ON EXERTION: 1
EYE REDNESS: 0
COUGH DISTURBING SLEEP: 0
COUGH: 0
CONSTIPATION: 1
BOWEL INCONTINENCE: 0
DOUBLE VISION: 0
EYE IRRITATION: 1
DIARRHEA: 1
NAUSEA: 0
WHEEZING: 0

## 2023-01-17 NOTE — PATIENT INSTRUCTIONS
Medication Changes & Instructions:  No changes    Follow up Appointment Information:  Virtual visit scheduled with Shawna 23 at 10:30    [address]  [place] [address]      Thank you for allowing us to be a part of your care here at the [facility].      If you have questions or concerns please contact us at:  Cardiovascular Clinic  [facility]   [phone] Press #1 to send a message to your care team  On Call Cardiologist for after hours or on weekends: [phone]  option #4     *All co-payments are due at the time of services, if financial concerns are keeping you from attending scheduled clinic visits please contact our financial counselors at [phone] for further assistance.   * Please note that you will NOT receive a reminder call regarding your scheduled testing, reminder calls are for provider appointments only.  If you are scheduled for testing within the [facility] system you may receive a call regarding pre-registration for billing purposes only.**

## 2023-01-17 NOTE — NURSING NOTE
Chief Complaint   Patient presents with     Follow Up       Patient confirms medications and allergies are accurate via patients echeck in completion, and or denies any changes since last reviewed/verified.     Carmelita Lambert, Virtual Facilitator

## 2023-01-17 NOTE — PROGRESS NOTES
"Karen is a 82 year old who is being evaluated via a billable video visit.      Vitals - Patient Reported  Weight (Patient Reported): 46.3 kg (102 lb)  Height (Patient Reported): 149.9 cm (4' 11\")  BMI (Based on Pt Reported Ht/Wt): 20.6  Pain Score: No Pain (0)      How would you like to obtain your AVS? MyChart  If the video visit is dropped, the invitation should be resent by: Text to cell phone: [phone]  Will anyone else be joining your video visit? No   Patient states is currently in the [facility]: Yes    Karen is a 82 year old who is being evaluated via a billable telephone visit.      Pt states in [place] for visit.         CARDIOLOGY PH CLINIC VIDEO VISIT    Date of telephone visit: 01/17/23      Karen Anderson is a 82 year old female who is being evaluated via a billable virtual visit.      I have reviewed and updated the patient's Past Medical History, Social History, Family History and Medication List.      MEDICATIONS:  Current Outpatient Medications   Medication Sig Dispense Refill     acetaminophen (TYLENOL) 325 MG tablet Take 325 mg by mouth every 6 hours as needed for mild pain       albuterol (PROAIR HFA/PROVENTIL HFA/VENTOLIN HFA) 108 (90 Base) MCG/ACT inhaler Inhale 2 puffs into the lungs every 4 hours as needed for shortness of breath / dyspnea or wheezing        alendronate (FOSAMAX) 70 MG tablet Take 70 mg by mouth once a week On Mondays       Ascorbic Acid (VITAMIN C) 500 MG CAPS Take 500 mg by mouth every morning        aspirin (ASA) 81 MG tablet Take 81 mg by mouth daily        beclomethasone HFA (QVAR REDIHALER) 80 MCG/ACT inhaler Inhale 2 puffs into the lungs 2 times daily       bumetanide (BUMEX) 2 MG tablet Take 2 tablets (4 mg) by mouth 3 times daily 540 tablet 3     calcium citrate-vitamin D (CALCIUM CITRATE + D3) 315-250 MG-UNIT TABS per tablet Take 1 tablet by mouth 2 times daily        Corn Dextrin (CLEAR FIBER POWDER PO) Take by mouth daily        digoxin (LANOXIN) " 125 MCG tablet Take 0.5 tablets (62.5 mcg) by mouth daily 13 tablet      gabapentin (NEURONTIN) 300 MG capsule Take 300 mg by mouth 3 times daily Can take an additional 300mg at bedtime as needed       hydroxychloroquine (PLAQUENIL) 200 MG tablet Take 200 mg by mouth daily        ipratropium (ATROVENT) 0.06 % nasal spray Spray 2 sprays into both nostrils 3 times daily        ketotifen (ZADITOR) 0.025 % ophthalmic solution Place 1 drop into both eyes daily       macitentan (OPSUMIT) 10 MG tablet Take 1 tablet (10 mg) by mouth daily 30 tablet 11     potassium chloride ER (KLOR-CON M) 20 MEQ CR tablet Take 2 tablets (40 mEq) by mouth 3 times daily 540 tablet 3     sodium chloride (OCEAN) 0.65 % nasal spray Spray 1 spray into both nostrils daily as needed for congestion       treprostinil (REMODULIN) 60 mcg/mL in glycine diluent 50 mL infusion Inject 45.5 ng/kg/min into the vein continuous Goal Dose: 45.5 ng/kg/min 1368 mL 0     warfarin ANTICOAGULANT (COUMADIN) 1 MG tablet Take 1 tablet (1 mg) by mouth daily 90 tablet 3     warfarin ANTICOAGULANT (COUMADIN) 3 MG tablet TAKE 3 MG BY MOUTH ON MONDAY, WEDNESDAY, FRIDAY, AND SATURDAY, AND 4.5 MG ON ALL OTHER DAYS OF THE WEEK 170 tablet 3       ALLERGIES  Sulfa drugs       Brief physical exam:  General: In no acute distress, upright and calm.  Eyes: No apparent redness or discharge.   Chest: No labored breathing, no cough during exam or audible wheezing.   Neuro: No obvious focal defects or tremors.   Psych: Alert and oriented. Does not appear anxious.     The rest of a comprehensive physical examination is deferred due to public health emergency video visit restrictions.       Primary PH cardiologist: Dr. Turcios      HPI:  Ms. Anderson is a pleasant 82 year old female with a PMHx including rheumatoid arthritis, hypertension, scoliosis, asthma, pulmonary MAC, breast cancer s/p chemotherapy with Adriamycin, Cytoxan, Taxol, and tamoxifen and status post left  mastectomy. She also has a history of extensive bilateral pulmonary emboli and DVT in 1998 thought to be secondary to tamoxifen along with chemotherapy induced cardiomyopathy. 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 Although she had proximal disease, she was too high risk for surgical PTE. She had a BPA in Jan 2021 which was complicated by hemoptysis, requiring FFP. She has required frequent diuretic adjustments, and we have been working on medical management. She is currently on combination therapy with IV Remodulin therapy along with Opsumit, and has failed Adempas due to dizziness and hypotension.      When I met with Karen virtually last in mid October, she was doing well and weight was stable. She requested a reduction in her diuretics to see if it would help her dry, flaking skin as she felt dehydrated. We tried a slow taper but ultimately he weight climbed slightly so she self increased back to 4mg TID.     I met with her last in mid December virtually after she had an ER Visit after her Remodulin pump started leaking. Goldberg was replaced successfully and she was discharged shortly after. As she was doing well, I made no further changes at that time.    Today, we are following up virtually again as she has requested to limit in person visits to clinic. She tells me that things are overall about the same. Home weight is stable around 102-103#, and as long as she wraps her legs swelling is under good control. No worsening in her breathing and she denies any new chest discomfort or dizziness.     She had some labs performed last week in preparation for our visit today which were reviewed as below.          CURRENT PULMONARY HYPERTENSION REGIMEN:     PAH Rx: IV Remodulin 45.5ng/kg/min, Opsumit 10mg daily  (failed Adempas due to dizziness)     Diuretics: Bumex 4mg TID      Oxygen: NC 1.5-2LPM     Anticoagulation: warfarin   Indication: CTEPH     Immunosuppression: Yes  (RA)     Pulm: Dr. Abernathy ([facility])        Assessment/Plan:        1. Chronic thromboembolic pulmonary hypertension.              --Ms. Anderson has CTEPH with RV dysfunction. Although she had proximal disease, she was felt to be too high risk for surgical PTE both here and at [facility]. She was offered potential PTE at [place], but still felt to be very high risk and ultimately deferred. She did have BPA here in January 2021 but this was complicated by hemoptysis. Thus, we have since been focusing on medical management.              --She remains a stable dose of IV Remodulin at 45.5ng/kg/min.  Continue this along with Opsumit daily as she is doing. She is not able to tolerate Adempas due to dizziness. From a cardiopulmonary standpoint, she sounds to be stable.    --Bumex is at 4mg TID and weight is stable, but she continues to voice her request to try to reduce if possible. As she often is up at night with urination, will trial slight reduction to 4/4/3mg. If weight trends back upward she will return to 4mg TID. Renal function is currently stable.              --Continue digoxin for RV support.                --Continue anticoagulation with warfarin for her CTEPH,  She has equipment to do home checks, and follows with the INR clinic.              --Continue oxygen at 1-2L with exertion as she is doing. She also follows with pulmonary locally.          Follow up plan: She declines coming back in person unless necessary and would like to continue virtual visits for now as she is feeling well. Thus, will do another video visit in 16 weeks.  We are happy to see the patient back sooner with any new concerns.     Testing/labs:      Most recent labs:    Latest Reference Range & Units 01/12/23 10:38   Sodium 136 - 145 mmol/L 141   Potassium 3.4 - 5.3 mmol/L 4.7   Chloride 98 - 107 mmol/L 99   Carbon Dioxide (CO2) 22 - 29 mmol/L 31 (H)   Urea Nitrogen 8.0 - 23.0 mg/dL 33.5 (H)   Creatinine 0.51 - 0.95 mg/dL 1.06 (H)   GFR  Estimate >60 mL/min/1.73m2 53 (L)   Calcium 8.8 - 10.2 mg/dL 10.0   Anion Gap 7 - 15 mmol/L 11   Glucose 70 - 99 mg/dL 75   INR 0.85 - 1.15  1.96 (H)   (H): Data is abnormally high  (L): Data is abnormally low      Other recent pertinent testing:    Echo 4/25/22  Interpretation Summary     Left ventricular function is normal.The ejection fraction is 55-60%. Grade II  or moderate diastolic dysfunction. Paradoxical septal motion consistent with  right ventricular pressure overload is present.  The RV is not clearly visualized but the size appears to be moderately dilated  and function appears to be at least mildly reduced.  The estimated PA systolic pressure is 55 mmHg above the RA pressure.  The left atrial pressure is elevated.  No pericardial effusion is present.  This study was compared with the study from 06/03/2021. The estimated PA  systolic pressure is lower. The RV function and size are similar on direct  visual comparison. The pericardial effusion is also no longer evident.    [facility] 3/17/21  Conclusion      Severe pulmonary hypertension    Normal cardiac output by Thermodilution    Elevated left sided filling pressures    No signficant change when comapred to her last hemodynamic assessment         Pressures Phase: Baseline     Time Systolic (mmHg) Diastolic (mmHg) Mean (mmHg) A Wave (mmHg) V Wave (mmHg) EDP (mmHg) Max dp/dt (mmHg/sec) HR (bpm) Content (mL/dL) SAT (%)   RA Pressures  2:31 PM   14    14    22      63        RV Pressures  2:16 PM       912           2:32 PM 87    9       28     62        PA Pressures  2:32 PM 88    22    44        62        PCW Pressures  2:33 PM   14        64              NYHA Functional Class:  3    I have reviewed the note as documented above.  This accurately captures the substance of my conversation with the patient.      Video-Visit Details    Type of service:  Video Visit    Video Start Time: 0949  Video End Time: 0959    An additional 15 minutes was spent today  performing chart and history review, pre and post visit documentation, and care coordination.      Originating Location (pt. Location): Home    Distant Location (provider location):  On-site    Platform used for Video Visit: Darron Marcum PA-C  [facility]  Pager [phone]

## 2023-01-17 NOTE — LETTER
1/17/2023      RE: Karen Anderson  [address] [facility] [address]       Dear Colleague,    Thank you for the opportunity to participate in the care of your patient, Karen Anderson, at the [facility] [place] at [facility]. Please see a copy of my visit note below.      CARDIOLOGY PH CLINIC VIDEO VISIT    Date of telephone visit: 01/17/23      Karen Anderson is a 82 year old female who is being evaluated via a billable virtual visit.      I have reviewed and updated the patient's Past Medical History, Social History, Family History and Medication List.      MEDICATIONS:  Current Outpatient Medications   Medication Sig Dispense Refill     acetaminophen (TYLENOL) 325 MG tablet Take 325 mg by mouth every 6 hours as needed for mild pain       albuterol (PROAIR HFA/PROVENTIL HFA/VENTOLIN HFA) 108 (90 Base) MCG/ACT inhaler Inhale 2 puffs into the lungs every 4 hours as needed for shortness of breath / dyspnea or wheezing        alendronate (FOSAMAX) 70 MG tablet Take 70 mg by mouth once a week On Mondays       Ascorbic Acid (VITAMIN C) 500 MG CAPS Take 500 mg by mouth every morning        aspirin (ASA) 81 MG tablet Take 81 mg by mouth daily        beclomethasone HFA (QVAR REDIHALER) 80 MCG/ACT inhaler Inhale 2 puffs into the lungs 2 times daily       bumetanide (BUMEX) 2 MG tablet Take 2 tablets (4 mg) by mouth 3 times daily 540 tablet 3     calcium citrate-vitamin D (CALCIUM CITRATE + D3) 315-250 MG-UNIT TABS per tablet Take 1 tablet by mouth 2 times daily        Corn Dextrin (CLEAR FIBER POWDER PO) Take by mouth daily        digoxin (LANOXIN) 125 MCG tablet Take 0.5 tablets (62.5 mcg) by mouth daily 13 tablet      gabapentin (NEURONTIN) 300 MG capsule Take 300 mg by mouth 3 times daily Can take an additional 300mg at bedtime as needed       hydroxychloroquine (PLAQUENIL) 200 MG tablet Take 200 mg by mouth daily        ipratropium  (ATROVENT) 0.06 % nasal spray Spray 2 sprays into both nostrils 3 times daily        ketotifen (ZADITOR) 0.025 % ophthalmic solution Place 1 drop into both eyes daily       macitentan (OPSUMIT) 10 MG tablet Take 1 tablet (10 mg) by mouth daily 30 tablet 11     potassium chloride ER (KLOR-CON M) 20 MEQ CR tablet Take 2 tablets (40 mEq) by mouth 3 times daily 540 tablet 3     sodium chloride (OCEAN) 0.65 % nasal spray Spray 1 spray into both nostrils daily as needed for congestion       treprostinil (REMODULIN) 60 mcg/mL in glycine diluent 50 mL infusion Inject 45.5 ng/kg/min into the vein continuous Goal Dose: 45.5 ng/kg/min 1368 mL 0     warfarin ANTICOAGULANT (COUMADIN) 1 MG tablet Take 1 tablet (1 mg) by mouth daily 90 tablet 3     warfarin ANTICOAGULANT (COUMADIN) 3 MG tablet TAKE 3 MG BY MOUTH ON MONDAY, WEDNESDAY, FRIDAY, AND SATURDAY, AND 4.5 MG ON ALL OTHER DAYS OF THE WEEK 170 tablet 3       ALLERGIES  Sulfa drugs       Brief physical exam:  General: In no acute distress, upright and calm.  Eyes: No apparent redness or discharge.   Chest: No labored breathing, no cough during exam or audible wheezing.   Neuro: No obvious focal defects or tremors.   Psych: Alert and oriented. Does not appear anxious.     The rest of a comprehensive physical examination is deferred due to public health emergency video visit restrictions.       Primary PH cardiologist: Dr. Turcios      HPI:  Ms. Anderson is a pleasant 82 year old female with a PMHx including rheumatoid arthritis, hypertension, scoliosis, asthma, pulmonary MAC, breast cancer s/p chemotherapy with Adriamycin, Cytoxan, Taxol, and tamoxifen and status post left mastectomy. She also has a history of extensive bilateral pulmonary emboli and DVT in 1998 thought to be secondary to tamoxifen along with chemotherapy induced cardiomyopathy. 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 Although she had proximal disease, she was too high  risk for surgical PTE. She had a BPA in Jan 2021 which was complicated by hemoptysis, requiring FFP. She has required frequent diuretic adjustments, and we have been working on medical management. She is currently on combination therapy with IV Remodulin therapy along with Opsumit, and has failed Adempas due to dizziness and hypotension.      When I met with Karen ponce last in mid October, she was doing well and weight was stable. She requested a reduction in her diuretics to see if it would help her dry, flaking skin as she felt dehydrated. We tried a slow taper but ultimately he weight climbed slightly so she self increased back to 4mg TID.     I met with her last in mid December virtually after she had an ER Visit after her Remodulin pump started leaking. Goldberg was replaced successfully and she was discharged shortly after. As she was doing well, I made no further changes at that time.    Today, we are following up virtually again as she has requested to limit in person visits to clinic. She tells me that things are overall about the same. Home weight is stable around 102-103#, and as long as she wraps her legs swelling is under good control. No worsening in her breathing and she denies any new chest discomfort or dizziness.     She had some labs performed last week in preparation for our visit today which were reviewed as below.          CURRENT PULMONARY HYPERTENSION REGIMEN:     PAH Rx: IV Remodulin 45.5ng/kg/min, Opsumit 10mg daily  (failed Adempas due to dizziness)     Diuretics: Bumex 4mg TID      Oxygen: NC 1.5-2LPM     Anticoagulation: warfarin   Indication: CTEPH     Immunosuppression: Yes (RA)     Pulm: Dr. Abernathy ([facility])        Assessment/Plan:        1. Chronic thromboembolic pulmonary hypertension.              --Ms. Anderson has CTEPH with RV dysfunction. Although she had proximal disease, she was felt to be too high risk for surgical PTE both here and at [facility]. She was offered  potential PTE at [place], but still felt to be very high risk and ultimately deferred. She did have BPA here in January 2021 but this was complicated by hemoptysis. Thus, we have since been focusing on medical management.              --She remains a stable dose of IV Remodulin at 45.5ng/kg/min.  Continue this along with Opsumit daily as she is doing. She is not able to tolerate Adempas due to dizziness. From a cardiopulmonary standpoint, she sounds to be stable.    --Bumex is at 4mg TID and weight is stable, but she continues to voice her request to try to reduce if possible. As she often is up at night with urination, will trial slight reduction to 4/4/3mg. If weight trends back upward she will return to 4mg TID. Renal function is currently stable.              --Continue digoxin for RV support.                --Continue anticoagulation with warfarin for her CTEPH,  She has equipment to do home checks, and follows with the INR clinic.              --Continue oxygen at 1-2L with exertion as she is doing. She also follows with pulmonary locally.          Follow up plan: She declines coming back in person unless necessary and would like to continue virtual visits for now as she is feeling well. Thus, will do another video visit in 16 weeks.  We are happy to see the patient back sooner with any new concerns.     Testing/labs:      Most recent labs:    Latest Reference Range & Units 01/12/23 10:38   Sodium 136 - 145 mmol/L 141   Potassium 3.4 - 5.3 mmol/L 4.7   Chloride 98 - 107 mmol/L 99   Carbon Dioxide (CO2) 22 - 29 mmol/L 31 (H)   Urea Nitrogen 8.0 - 23.0 mg/dL 33.5 (H)   Creatinine 0.51 - 0.95 mg/dL 1.06 (H)   GFR Estimate >60 mL/min/1.73m2 53 (L)   Calcium 8.8 - 10.2 mg/dL 10.0   Anion Gap 7 - 15 mmol/L 11   Glucose 70 - 99 mg/dL 75   INR 0.85 - 1.15  1.96 (H)   (H): Data is abnormally high  (L): Data is abnormally low      Other recent pertinent testing:    Echo 4/25/22  Interpretation Summary     Left ventricular  function is normal.The ejection fraction is 55-60%. Grade II  or moderate diastolic dysfunction. Paradoxical septal motion consistent with  right ventricular pressure overload is present.  The RV is not clearly visualized but the size appears to be moderately dilated  and function appears to be at least mildly reduced.  The estimated PA systolic pressure is 55 mmHg above the RA pressure.  The left atrial pressure is elevated.  No pericardial effusion is present.  This study was compared with the study from 06/03/2021. The estimated PA  systolic pressure is lower. The RV function and size are similar on direct  visual comparison. The pericardial effusion is also no longer evident.    [facility] 3/17/21  Conclusion      Severe pulmonary hypertension    Normal cardiac output by Thermodilution    Elevated left sided filling pressures    No signficant change when comapred to her last hemodynamic assessment         Pressures Phase: Baseline     Time Systolic (mmHg) Diastolic (mmHg) Mean (mmHg) A Wave (mmHg) V Wave (mmHg) EDP (mmHg) Max dp/dt (mmHg/sec) HR (bpm) Content (mL/dL) SAT (%)   RA Pressures  2:31 PM   14    14    22      63        RV Pressures  2:16 PM       912           2:32 PM 87    9       28     62        PA Pressures  2:32 PM 88    22    44        62        PCW Pressures  2:33 PM   14        64              NYHA Functional Class:  3    I have reviewed the note as documented above.  This accurately captures the substance of my conversation with the patient.      Video-Visit Details    Type of service:  Video Visit    Video Start Time: 0949  Video End Time: 0959    An additional 15 minutes was spent today performing chart and history review, pre and post visit documentation, and care coordination.      Originating Location (pt. Location): Home    Distant Location (provider location):  On-site    Platform used for Video Visit: Darron Marcum PA-C  [facility]  Pager [phone]

## 2023-01-19 ENCOUNTER — ANTICOAGULATION THERAPY VISIT (OUTPATIENT)
Dept: ANTICOAGULATION | Facility: CLINIC | Age: 83
End: 2023-01-19
Payer: COMMERCIAL

## 2023-01-19 ENCOUNTER — TRANSFERRED RECORDS (OUTPATIENT)
Dept: HEALTH INFORMATION MANAGEMENT | Facility: CLINIC | Age: 83
End: 2023-01-19
Payer: COMMERCIAL

## 2023-01-19 DIAGNOSIS — Z79.01 LONG TERM CURRENT USE OF ANTICOAGULANT THERAPY: ICD-10-CM

## 2023-01-19 DIAGNOSIS — I27.20 PULMONARY HYPERTENSION (H): Primary | ICD-10-CM

## 2023-01-19 DIAGNOSIS — I27.24 CTEPH (CHRONIC THROMBOEMBOLIC PULMONARY HYPERTENSION) (H): ICD-10-CM

## 2023-01-19 LAB — INR (EXTERNAL): 2.3 (ref 0.9–1.1)

## 2023-01-19 NOTE — PROGRESS NOTES
ANTICOAGULATION MANAGEMENT     Karen Anderson 82 year old female is on warfarin with therapeutic INR result. (Goal INR 2.0-3.0)    Recent labs: (last 7 days)     01/19/23  0000   INR 2.3*       ASSESSMENT       Source(s): Chart Review and Patient/Caregiver Call       Warfarin doses taken: Warfarin taken as instructed    Diet: No new diet changes identified    New illness, injury, or hospitalization: No    Medication/supplement changes: None noted    Signs or symptoms of bleeding or clotting: No    Previous INR: Subtherapeutic    Additional findings: None       PLAN     Recommended plan for no diet, medication or health factor changes affecting INR     Dosing Instructions: Continue your current warfarin dose with next INR in 2 weeks       Summary  As of 1/19/2023    Full warfarin instructions:  5 mg every Sun, Tue, Thu; 4 mg all other days   Next INR check:  2/2/2023             Telephone call with Karen who verbalizes understanding and agrees to plan    Patient to recheck with home meter    Education provided:     Goal range and lab monitoring: goal range and significance of current result and Importance of therapeutic range    Plan made per [facility] anticoagulation protocol    Cooper Murillo RN  Anticoagulation Clinic  1/19/2023    _______________________________________________________________________     Anticoagulation Episode Summary     Current INR goal:  2.0-3.0   TTR:  66.9 % (1 y)   Target end date:  Indefinite   Send INR reminders to:  [facility]    Indications    Pulmonary hypertension (H) [I27.20]  CTEPH (chronic thromboembolic pulmonary hypertension) (H) [I27.24]  Long term current use of anticoagulant therapy [Z79.01]           Comments:           Anticoagulation Care Providers     Provider Role Specialty Phone number    Karolina Turcios MD Referring Cardiovascular Disease [phone]

## 2023-02-01 ENCOUNTER — TRANSFERRED RECORDS (OUTPATIENT)
Dept: HEALTH INFORMATION MANAGEMENT | Facility: CLINIC | Age: 83
End: 2023-02-01
Payer: COMMERCIAL

## 2023-02-01 ENCOUNTER — ANTICOAGULATION THERAPY VISIT (OUTPATIENT)
Dept: ANTICOAGULATION | Facility: CLINIC | Age: 83
End: 2023-02-01
Payer: COMMERCIAL

## 2023-02-01 DIAGNOSIS — I27.20 PULMONARY HYPERTENSION (H): Primary | ICD-10-CM

## 2023-02-01 DIAGNOSIS — Z79.01 LONG TERM CURRENT USE OF ANTICOAGULANT THERAPY: ICD-10-CM

## 2023-02-01 DIAGNOSIS — I27.24 CTEPH (CHRONIC THROMBOEMBOLIC PULMONARY HYPERTENSION) (H): ICD-10-CM

## 2023-02-01 LAB — INR (EXTERNAL): 2.2 (ref 0.9–1.1)

## 2023-02-01 NOTE — PROGRESS NOTES
ANTICOAGULATION MANAGEMENT     Karen Anderson 82 year old female is on warfarin with therapeutic INR result. (Goal INR 2.0-3.0)    Recent labs: (last 7 days)     02/01/23  1533   INR 2.2*       ASSESSMENT       Source(s): Chart Review and Patient/Caregiver Call       Warfarin doses taken: Warfarin taken as instructed    Diet: No new diet changes identified    New illness, injury, or hospitalization: No    Medication/supplement changes: None noted    Signs or symptoms of bleeding or clotting: No    Previous INR: Therapeutic last visit; previously outside of goal range    Additional findings: None       PLAN     Recommended plan for no diet, medication or health factor changes affecting INR     Dosing Instructions: Continue your current warfarin dose with next INR in 2 weeks       Summary  As of 2/1/2023    Full warfarin instructions:  5 mg every Sun, Tue, Thu; 4 mg all other days   Next INR check:  2/15/2023             Telephone call with Karen who verbalizes understanding and agrees to plan and who agrees to plan and repeated back plan correctly    Patient to recheck with home meter    Education provided:     Contact [phone] with any changes, questions or concerns.     Plan made per [facility] anticoagulation protocol    GOLDY PERALTA RN  Anticoagulation Clinic  2/1/2023    _______________________________________________________________________     Anticoagulation Episode Summary     Current INR goal:  2.0-3.0   TTR:  67.6 % (1 y)   Target end date:  Indefinite   Send INR reminders to:  [facility]    Indications    Pulmonary hypertension (H) [I27.20]  CTEPH (chronic thromboembolic pulmonary hypertension) (H) [I27.24]  Long term current use of anticoagulant therapy [Z79.01]           Comments:           Anticoagulation Care Providers     Provider Role Specialty Phone number    Karolina Turcios MD Referring Cardiovascular Disease [phone]

## 2023-02-15 ENCOUNTER — ANTICOAGULATION THERAPY VISIT (OUTPATIENT)
Dept: ANTICOAGULATION | Facility: CLINIC | Age: 83
End: 2023-02-15
Payer: COMMERCIAL

## 2023-02-15 ENCOUNTER — TRANSFERRED RECORDS (OUTPATIENT)
Dept: HEALTH INFORMATION MANAGEMENT | Facility: CLINIC | Age: 83
End: 2023-02-15
Payer: COMMERCIAL

## 2023-02-15 ENCOUNTER — TELEPHONE (OUTPATIENT)
Dept: ANTICOAGULATION | Facility: CLINIC | Age: 83
End: 2023-02-15
Payer: COMMERCIAL

## 2023-02-15 DIAGNOSIS — I27.20 PULMONARY HYPERTENSION (H): Primary | ICD-10-CM

## 2023-02-15 DIAGNOSIS — Z79.01 LONG TERM CURRENT USE OF ANTICOAGULANT THERAPY: Primary | ICD-10-CM

## 2023-02-15 DIAGNOSIS — Z79.01 LONG TERM CURRENT USE OF ANTICOAGULANT THERAPY: ICD-10-CM

## 2023-02-15 DIAGNOSIS — I27.24 CTEPH (CHRONIC THROMBOEMBOLIC PULMONARY HYPERTENSION) (H): ICD-10-CM

## 2023-02-15 LAB — INR (EXTERNAL): 1.9 (ref 0.9–1.1)

## 2023-02-15 RX ORDER — WARFARIN SODIUM 1 MG/1
TABLET ORAL
Qty: 180 TABLET | Refills: 1 | Status: SHIPPED | OUTPATIENT
Start: 2023-02-15 | End: 2023-08-07

## 2023-02-15 NOTE — PROGRESS NOTES
ANTICOAGULATION MANAGEMENT     Karen RICO Calderonhilariacharbel 82 year old female is on warfarin with subtherapeutic INR result. (Goal INR 2.0-3.0)    Recent labs: (last 7 days)     02/15/23  0000   INR 1.9*       ASSESSMENT       Source(s): Chart Review and Patient/Caregiver Call       Warfarin doses taken: Warfarin taken as instructed    Diet: No new diet changes identified    New illness, injury, or hospitalization: No    Medication/supplement changes: None noted    Signs or symptoms of bleeding or clotting: No    Previous INR: Therapeutic last 2(+) visits    Additional findings: None       PLAN     Recommended plan for no diet, medication or health factor changes affecting INR     Dosing Instructions: Continue your current warfarin dose with next INR in 2 weeks       Summary  As of 2/15/2023    Full warfarin instructions:  5 mg every Sun, Tue, Thu; 4 mg all other days   Next INR check:  3/1/2023             Telephone call with Karen who verbalizes understanding and agrees to plan and who agrees to plan and repeated back plan correctly    Patient to recheck with home meter    Education provided:     Taking warfarin: Importance of taking warfarin as instructed    Goal range and lab monitoring: goal range and significance of current result and Importance of therapeutic range    Plan made per [facility] anticoagulation protocol    Brittany Kirk, RN  Anticoagulation Clinic  2/15/2023    _______________________________________________________________________     Anticoagulation Episode Summary     Current INR goal:  2.0-3.0   TTR:  69.0 % (1 y)   Target end date:  Indefinite   Send INR reminders to:  [facility]    Indications    Pulmonary hypertension (H) [I27.20]  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

## 2023-02-15 NOTE — TELEPHONE ENCOUNTER
ANTICOAGULATION CLINIC REFERRAL RENEWAL REQUEST       An annual renewal order is required for all patients referred to [facility].?  Please review and sign the pended referral order for Karen Anderson.       ANTICOAGULATION SUMMARY      Warfarin indication(s)   Long term use of anticoagulation: Chronic thromboembolic pulmonary hypertension    Mechanical heart valve present?  NO       Current goal range   INR: 2.0-3.0     Goal appropriate for indication? Goal INR 2-3, standard for indication(s) above     Time in Therapeutic Range (TTR)  (Goal > 60%) 69%       Office visit with referring provider's group within last year yes on 1/17/23       Brittany Kirk RN  [facility]

## 2023-02-15 NOTE — TELEPHONE ENCOUNTER
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 New referral placed today    Karen meets all criteria for refill (current [facility] referral, office visit with referring provider/group in last year, lab monitoring up to date or not exceeding 2 weeks overdue). Rx instructions and quantity supplied updated to match patient's current dosing plan. Warfarin 90 day supply with 1 refill granted per [facility] protocol     Brittany Kirk, RN  Anticoagulation Clinic

## 2023-02-24 ENCOUNTER — TRANSFERRED RECORDS (OUTPATIENT)
Dept: HEALTH INFORMATION MANAGEMENT | Facility: CLINIC | Age: 83
End: 2023-02-24

## 2023-02-24 ENCOUNTER — LAB (OUTPATIENT)
Dept: LAB | Facility: CLINIC | Age: 83
End: 2023-02-24
Payer: COMMERCIAL

## 2023-02-24 ENCOUNTER — ANTICOAGULATION THERAPY VISIT (OUTPATIENT)
Dept: ANTICOAGULATION | Facility: CLINIC | Age: 83
End: 2023-02-24

## 2023-02-24 DIAGNOSIS — I27.24 CTEPH (CHRONIC THROMBOEMBOLIC PULMONARY HYPERTENSION) (H): ICD-10-CM

## 2023-02-24 DIAGNOSIS — Z79.01 LONG TERM CURRENT USE OF ANTICOAGULANT THERAPY: ICD-10-CM

## 2023-02-24 DIAGNOSIS — R06.02 SOB (SHORTNESS OF BREATH): ICD-10-CM

## 2023-02-24 DIAGNOSIS — I27.20 PULMONARY HYPERTENSION (H): Primary | ICD-10-CM

## 2023-02-24 LAB
ALBUMIN SERPL BCG-MCNC: 4.1 G/DL (ref 3.5–5.2)
ALP SERPL-CCNC: 100 U/L (ref 35–104)
ALT SERPL W P-5'-P-CCNC: 15 U/L (ref 10–35)
ANION GAP SERPL CALCULATED.3IONS-SCNC: 14 MMOL/L (ref 7–15)
AST SERPL W P-5'-P-CCNC: ABNORMAL U/L
BILIRUB SERPL-MCNC: 0.2 MG/DL
BUN SERPL-MCNC: 25.3 MG/DL (ref 8–23)
CALCIUM SERPL-MCNC: 10.4 MG/DL (ref 8.8–10.2)
CHLORIDE SERPL-SCNC: 100 MMOL/L (ref 98–107)
CREAT SERPL-MCNC: 1.09 MG/DL (ref 0.51–0.95)
DEPRECATED HCO3 PLAS-SCNC: 28 MMOL/L (ref 22–29)
ERYTHROCYTE [DISTWIDTH] IN BLOOD BY AUTOMATED COUNT: 14.7 % (ref 10–15)
GFR SERPL CREATININE-BSD FRML MDRD: 51 ML/MIN/1.73M2
GLUCOSE SERPL-MCNC: 140 MG/DL (ref 70–99)
HCT VFR BLD AUTO: 38.4 % (ref 35–47)
HGB BLD-MCNC: 12.1 G/DL (ref 11.7–15.7)
INR BLD: 1.7 (ref 0.9–1.1)
MCH RBC QN AUTO: 31.2 PG (ref 26.5–33)
MCHC RBC AUTO-ENTMCNC: 31.5 G/DL (ref 31.5–36.5)
MCV RBC AUTO: 99 FL (ref 78–100)
NT-PROBNP SERPL-MCNC: 1169 PG/ML (ref 0–1800)
PLATELET # BLD AUTO: 165 10E3/UL (ref 150–450)
POTASSIUM SERPL-SCNC: 4.7 MMOL/L (ref 3.4–5.3)
PROT SERPL-MCNC: 7.7 G/DL (ref 6.4–8.3)
RBC # BLD AUTO: 3.88 10E6/UL (ref 3.8–5.2)
SODIUM SERPL-SCNC: 142 MMOL/L (ref 136–145)
WBC # BLD AUTO: 4.6 10E3/UL (ref 4–11)

## 2023-02-24 PROCEDURE — 82247 BILIRUBIN TOTAL: CPT

## 2023-02-24 PROCEDURE — 84460 ALANINE AMINO (ALT) (SGPT): CPT

## 2023-02-24 PROCEDURE — 84155 ASSAY OF PROTEIN SERUM: CPT

## 2023-02-24 PROCEDURE — 36415 COLL VENOUS BLD VENIPUNCTURE: CPT

## 2023-02-24 PROCEDURE — 85610 PROTHROMBIN TIME: CPT

## 2023-02-24 PROCEDURE — 82040 ASSAY OF SERUM ALBUMIN: CPT

## 2023-02-24 PROCEDURE — 84075 ASSAY ALKALINE PHOSPHATASE: CPT

## 2023-02-24 PROCEDURE — 85027 COMPLETE CBC AUTOMATED: CPT

## 2023-02-24 PROCEDURE — 80048 BASIC METABOLIC PNL TOTAL CA: CPT

## 2023-02-24 PROCEDURE — 83880 ASSAY OF NATRIURETIC PEPTIDE: CPT

## 2023-02-24 NOTE — PROGRESS NOTES
ANTICOAGULATION MANAGEMENT     Karen Anderson 82 year old female is on warfarin with subtherapeutic INR result. (Goal INR 2.0-3.0)    Recent labs: (last 7 days)     02/24/23  1314   INR 1.7*       ASSESSMENT       Source(s): Chart Review and Patient/Caregiver Call       Warfarin doses taken: Warfarin taken as instructed    Diet: No new diet changes identified    New illness, injury, or hospitalization: No    Medication/supplement changes: None noted    Signs or symptoms of bleeding or clotting: No    Previous INR: Therapeutic last visit; previously outside of goal range    Additional findings: None         PLAN     Recommended plan for no diet, medication or health factor changes affecting INR     Dosing Instructions: booster dose then Increase your warfarin dose (3.2% change) with next INR in 2 weeks       Summary  As of 2/24/2023    Full warfarin instructions:  2/24: 5 mg; Otherwise 4 mg every Mon, Wed, Fri; 5 mg all other days   Next INR check:  3/10/2023             Telephone call with  p-t who verbalizes understanding and agrees to plan    Patient to recheck with home meter    Education provided:     Please call back if any changes to your diet, medications or how you've been taking warfarin    Goal range and lab monitoring: goal range and significance of current result, Importance of therapeutic range and Importance of following up at instructed interval    Symptom monitoring: monitoring for clotting signs and symptoms and monitoring for stroke signs and symptoms    Plan made per [facility] anticoagulation protocol    Valentina Ma, RN  Anticoagulation Clinic  2/24/2023    _______________________________________________________________________     Anticoagulation Episode Summary     Current INR goal:  2.0-3.0   TTR:  66.7 % (1 y)   Target end date:  Indefinite   Send INR reminders to:  [facility]    Indications    Pulmonary hypertension (H) [I27.20]  CTEPH (chronic thromboembolic pulmonary  hypertension) (H) [I27.24]  Long term current use of anticoagulant therapy [Z79.01]           Comments:           Anticoagulation Care Providers     Provider Role Specialty Phone number    Karolina Turcios MD Referring Cardiovascular Disease [phone]

## 2023-02-27 ENCOUNTER — VIRTUAL VISIT (OUTPATIENT)
Dept: CARDIOLOGY | Facility: CLINIC | Age: 83
End: 2023-02-27
Payer: COMMERCIAL

## 2023-02-27 VITALS
DIASTOLIC BLOOD PRESSURE: 57 MMHG | HEART RATE: 71 BPM | HEIGHT: 59 IN | WEIGHT: 103 LBS | SYSTOLIC BLOOD PRESSURE: 115 MMHG | BODY MASS INDEX: 20.76 KG/M2

## 2023-02-27 DIAGNOSIS — R06.09 DOE (DYSPNEA ON EXERTION): ICD-10-CM

## 2023-02-27 DIAGNOSIS — I27.20 PULMONARY HYPERTENSION (H): Primary | ICD-10-CM

## 2023-02-27 PROCEDURE — 99214 OFFICE O/P EST MOD 30 MIN: CPT | Mod: 95 | Performed by: PHYSICIAN ASSISTANT

## 2023-02-27 RX ORDER — PYRITHIONE ZINC 1 G/ML
LOTION/SHAMPOO TOPICAL DAILY PRN
COMMUNITY

## 2023-02-27 NOTE — PROGRESS NOTES
Karen is a 82 year old who is being evaluated via a billable video visit.      How would you like to obtain your AVS? MyChart     If the video visit is dropped, the invitation should be resent by: Text to cell phone: [phone]     Will anyone else be joining your video visit? No     Review Of Systems  Respiratory: NEGATIVE  Cardiovascular:edema better with wraps, fatigue    Vitals - Patient Reported  Systolic (Patient Reported): 115  Diastolic (Patient Reported): 57  Weight (Patient Reported): 46.7 kg (103 lb)  Pulse (Patient Reported): 71    Telephone number of patient: [phone]          CARDIOLOGY PH CLINIC TELEPHONE VISIT  (converted from video visit due to pt difficulty with video link)    Date of telephone visit: 02/27/23      Karen Anderson is a 82 year old female who is being evaluated via a billable virtual visit.      I have reviewed and updated the patient's Past Medical History, Social History, Family History and Medication List.      MEDICATIONS:  Current Outpatient Medications   Medication Sig Dispense Refill     acetaminophen (TYLENOL) 325 MG tablet Take 325 mg by mouth every 6 hours as needed for mild pain       albuterol (PROAIR HFA/PROVENTIL HFA/VENTOLIN HFA) 108 (90 Base) MCG/ACT inhaler Inhale 2 puffs into the lungs every 4 hours as needed for shortness of breath / dyspnea or wheezing        alendronate (FOSAMAX) 70 MG tablet Take 70 mg by mouth once a week On Mondays       Ascorbic Acid (VITAMIN C) 500 MG CAPS Take 500 mg by mouth every morning        aspirin (ASA) 81 MG tablet Take 81 mg by mouth daily        beclomethasone HFA (QVAR REDIHALER) 80 MCG/ACT inhaler Inhale 2 puffs into the lungs 2 times daily       bumetanide (BUMEX) 2 MG tablet Take 2 tablets (4 mg) by mouth 3 times daily 540 tablet 3     calcium citrate-vitamin D (CALCIUM CITRATE + D3) 315-250 MG-UNIT TABS per tablet Take 1 tablet by mouth 2 times daily        digoxin (LANOXIN) 125 MCG tablet Take 0.5 tablets (62.5  mcg) by mouth daily 13 tablet      gabapentin (NEURONTIN) 300 MG capsule Take 300 mg by mouth 3 times daily Can take an additional 300mg at bedtime as needed       hydroxychloroquine (PLAQUENIL) 200 MG tablet Take 200 mg by mouth daily        ipratropium (ATROVENT) 0.06 % nasal spray Spray 2 sprays into both nostrils 3 times daily        ketotifen (ZADITOR) 0.025 % ophthalmic solution Place 1 drop into both eyes daily       macitentan (OPSUMIT) 10 MG tablet Take 1 tablet (10 mg) by mouth daily 30 tablet 11     Metamucil Fiber CHEW Take by mouth daily as needed 2 chewables       potassium chloride ER (KLOR-CON M) 20 MEQ CR tablet Take 2 tablets (40 mEq) by mouth 3 times daily 540 tablet 3     sodium chloride (OCEAN) 0.65 % nasal spray Spray 1 spray into both nostrils daily as needed for congestion       treprostinil (REMODULIN) 60 mcg/mL in glycine diluent 50 mL infusion Inject 45.5 ng/kg/min into the vein continuous Goal Dose: 45.5 ng/kg/min 1368 mL 0     warfarin ANTICOAGULANT (COUMADIN) 1 MG tablet Take 1-2 tablets daily or as directed 180 tablet 1     warfarin ANTICOAGULANT (COUMADIN) 1 MG tablet Take 1 tablet (1 mg) by mouth daily 90 tablet 3     warfarin ANTICOAGULANT (COUMADIN) 3 MG tablet TAKE 3 MG BY MOUTH ON MONDAY, WEDNESDAY, FRIDAY, AND SATURDAY, AND 4.5 MG ON ALL OTHER DAYS OF THE WEEK 170 tablet 3       ALLERGIES  Sulfa drugs    Primary PH cardiologist: Dr. Turcios      HPI:  Ms. Anderson is a pleasant 82 year old female with a PMHx including rheumatoid arthritis, hypertension, scoliosis, asthma, pulmonary MAC, breast cancer s/p chemotherapy with Adriamycin, Cytoxan, Taxol, and tamoxifen and status post left mastectomy. She also has a history of extensive bilateral pulmonary emboli and DVT in 1998 thought to be secondary to tamoxifen along with chemotherapy induced cardiomyopathy. 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 Although she had proximal disease, she was  too high risk for surgical PTE. She had a BPA in Jan 2021 which was complicated by hemoptysis, requiring FFP. She has required frequent diuretic adjustments, and we have been working on medical management. She is currently on combination therapy with IV Remodulin therapy along with Opsumit, and has failed Adempas due to dizziness and hypotension.      When I met with Karen ponce last in mid October, she was doing well and weight was stable. She requested a reduction in her diuretics to see if it would help her dry, flaking skin as she felt dehydrated. We tried a slow taper but ultimately he weight climbed slightly so she self increased back to 4mg TID.     I met with her last in mid December virtually after she had an ER Visit after her Remodulin pump started leaking. Goldberg was replaced successfully and she was discharged shortly after. As she was doing well, I made no further changes at that time. Most recently we met virtually in mid January, and overall things were stable, with weights around 102-103#. Swelling was under good control when she wrapped her legs. We discussed slight reduction again in bumex ( per her request to 4/4/3mg daily.    Today, we are following up virtually again as she has requested to limit in person visits to clinic. She tells me that things are overall about the same. Home weight is still stable around 102-103#, and as long as she wraps her legs swelling is under good control. She opted to keep her bumex at 4mg TID. Breathing at times seems worse, but she feels overall she is doing ok. BP has been under reasonable control at home, and no new dizziness/presyncope.      She had some labs performed last week in preparation for our visit today which were reviewed as below.          CURRENT PULMONARY HYPERTENSION REGIMEN:     PAH Rx: IV Remodulin 45.5ng/kg/min, Opsumit 10mg daily  (failed Adempas due to dizziness)     Diuretics: Bumex 4mg TID       Oxygen: NC 1.5-2LPM     Anticoagulation: warfarin   Indication: CTEPH     Immunosuppression: Yes (RA)     Pulm: [facility] Abernathy ([facility])        Assessment/Plan:         1. Chronic thromboembolic pulmonary hypertension.              --Ms. Anderson has CTEPH with RV dysfunction. Although she had proximal disease, she was felt to be too high risk for surgical PTE both here and at [facility]. She was offered potential PTE at [place], but still felt to be very high risk and ultimately deferred. She did have BPA here in January 2021 but this was complicated by hemoptysis. Thus, we have since been focusing on medical management.              --She remains a stable dose of IV Remodulin at 45.5ng/kg/min.  Continue this along with Opsumit daily as she is doing. She is not able to tolerate Adempas due to dizziness. From a cardiopulmonary standpoint, she sounds to be stable.    --Bumex is at 4mg TID; we have tried transient reductions at her request, but ultimately weight seems to settle out best at 102-103# at this dose. She reports swelling under good control with leg wraps. Renal function is currently stable.              --Continue digoxin for RV support.                --Continue anticoagulation with warfarin for her CTEPH,  She has equipment to do home checks, and follows with the INR clinic.              --Continue oxygen at 1-2L with exertion as she is doing. She also follows with pulmonary locally.        Follow up plan: She declines coming back in person unless necessary and would like to continue virtual visits for now as she is feeling well. Thus, will do another video visit in 2 months, and have her see Dr Turcios for an annual check in.  We are happy to see the patient back sooner with any new concerns.       Testing/labs:      Most recent labs:    Latest Reference Range & Units 01/12/23 10:38 02/24/23 13:14   Sodium 136 - 145 mmol/L 141 142   Potassium 3.4 - 5.3 mmol/L 4.7 4.7   Chloride 98 - 107 mmol/L 99 100    Carbon Dioxide (CO2) 22 - 29 mmol/L 31 (H) 28   Urea Nitrogen 8.0 - 23.0 mg/dL 33.5 (H) 25.3 (H)   Creatinine 0.51 - 0.95 mg/dL 1.06 (H) 1.09 (H)   GFR Estimate >60 mL/min/1.73m2 53 (L) 51 (L)   Calcium 8.8 - 10.2 mg/dL 10.0 10.4 (H)   Anion Gap 7 - 15 mmol/L 11 14   Albumin 3.5 - 5.2 g/dL  4.1   Protein Total 6.4 - 8.3 g/dL  7.7   Alkaline Phosphatase 35 - 104 U/L  100   ALT 10 - 35 U/L  15   AST   See Comment   (H): Data is abnormally high  (L): Data is abnormally low     Latest Reference Range & Units 12/14/22 11:00 02/24/23 13:14   WBC 4.0 - 11.0 10e3/uL 3.6 (L) 4.6   Hemoglobin 11.7 - 15.7 g/dL 11.2 (L) 12.1   Hematocrit 35.0 - 47.0 % 37.1 38.4   Platelet Count 150 - 450 10e3/uL 237 165   RBC Count 3.80 - 5.20 10e6/uL 3.66 (L) 3.88   MCV 78 - 100 fL 101 (H) 99   MCH 26.5 - 33.0 pg 30.6 31.2   MCHC 31.5 - 36.5 g/dL 30.2 (L) 31.5   RDW 10.0 - 15.0 % 16.2 (H) 14.7   (L): Data is abnormally low  (H): Data is abnormally high    Other recent pertinent testing:    Echo 4/25/22  Interpretation Summary     Left ventricular function is normal.The ejection fraction is 55-60%. Grade II  or moderate diastolic dysfunction. Paradoxical septal motion consistent with  right ventricular pressure overload is present.  The RV is not clearly visualized but the size appears to be moderately dilated  and function appears to be at least mildly reduced.  The estimated PA systolic pressure is 55 mmHg above the RA pressure.  The left atrial pressure is elevated.  No pericardial effusion is present.  This study was compared with the study from 06/03/2021. The estimated PA  systolic pressure is lower. The RV function and size are similar on direct  visual comparison. The pericardial effusion is also no longer evident.    [facility] 3/17/21  Conclusion      Severe pulmonary hypertension    Normal cardiac output by Thermodilution    Elevated left sided filling pressures    No signficant change when comapred to her last hemodynamic assessment          Pressures Phase: Baseline     Time Systolic (mmHg) Diastolic (mmHg) Mean (mmHg) A Wave (mmHg) V Wave (mmHg) EDP (mmHg) Max dp/dt (mmHg/sec) HR (bpm) Content (mL/dL) SAT (%)   RA Pressures  2:31 PM   14    14    22      63        RV Pressures  2:16 PM       912           2:32 PM 87    9       28     62        PA Pressures  2:32 PM 88    22    44        62        PCW Pressures  2:33 PM   14        64              NYHA Functional Class:  3    I have reviewed the note as documented above.  This accurately captures the substance of my conversation with the patient.      Telephone-Visit Details    Start time: 1018  End time: 1044    An additional 15 minutes was spent today performing chart and history review, pre and post visit documentation, and care coordination.    Shawna Marcum PA-C  [facility]  Pager [phone]

## 2023-02-27 NOTE — LETTER
2/27/2023    JEANIE MCKEON MD  [facility] [address]  [address]  [facility] [address]    RE: Karen Anderson       Dear Colleague,     I had the pleasure of seeing Karen Anderson in the [facility].  Karen is a 82 year old who is being evaluated via a billable video visit.      How would you like to obtain your AVS? MyChart     If the video visit is dropped, the invitation should be resent by: Text to cell phone: [phone]     Will anyone else be joining your video visit? No     Review Of Systems  Respiratory: NEGATIVE  Cardiovascular:edema better with wraps, fatigue    Vitals - Patient Reported  Systolic (Patient Reported): 115  Diastolic (Patient Reported): 57  Weight (Patient Reported): 46.7 kg (103 lb)  Pulse (Patient Reported): 71    Telephone number of patient: [phone]          CARDIOLOGY PH CLINIC TELEPHONE VISIT  (converted from video visit due to pt difficulty with video link)    Date of telephone visit: 02/27/23      Karen Anderson is a 82 year old female who is being evaluated via a billable virtual visit.      I have reviewed and updated the patient's Past Medical History, Social History, Family History and Medication List.      MEDICATIONS:  Current Outpatient Medications   Medication Sig Dispense Refill     acetaminophen (TYLENOL) 325 MG tablet Take 325 mg by mouth every 6 hours as needed for mild pain       albuterol (PROAIR HFA/PROVENTIL HFA/VENTOLIN HFA) 108 (90 Base) MCG/ACT inhaler Inhale 2 puffs into the lungs every 4 hours as needed for shortness of breath / dyspnea or wheezing        alendronate (FOSAMAX) 70 MG tablet Take 70 mg by mouth once a week On Mondays       Ascorbic Acid (VITAMIN C) 500 MG CAPS Take 500 mg by mouth every morning        aspirin (ASA) 81 MG tablet Take 81 mg by mouth daily        beclomethasone HFA (QVAR REDIHALER) 80 MCG/ACT inhaler Inhale 2 puffs into the lungs 2 times daily       bumetanide (BUMEX)  2 MG tablet Take 2 tablets (4 mg) by mouth 3 times daily 540 tablet 3     calcium citrate-vitamin D (CALCIUM CITRATE + D3) 315-250 MG-UNIT TABS per tablet Take 1 tablet by mouth 2 times daily        digoxin (LANOXIN) 125 MCG tablet Take 0.5 tablets (62.5 mcg) by mouth daily 13 tablet      gabapentin (NEURONTIN) 300 MG capsule Take 300 mg by mouth 3 times daily Can take an additional 300mg at bedtime as needed       hydroxychloroquine (PLAQUENIL) 200 MG tablet Take 200 mg by mouth daily        ipratropium (ATROVENT) 0.06 % nasal spray Spray 2 sprays into both nostrils 3 times daily        ketotifen (ZADITOR) 0.025 % ophthalmic solution Place 1 drop into both eyes daily       macitentan (OPSUMIT) 10 MG tablet Take 1 tablet (10 mg) by mouth daily 30 tablet 11     Metamucil Fiber CHEW Take by mouth daily as needed 2 chewables       potassium chloride ER (KLOR-CON M) 20 MEQ CR tablet Take 2 tablets (40 mEq) by mouth 3 times daily 540 tablet 3     sodium chloride (OCEAN) 0.65 % nasal spray Spray 1 spray into both nostrils daily as needed for congestion       treprostinil (REMODULIN) 60 mcg/mL in glycine diluent 50 mL infusion Inject 45.5 ng/kg/min into the vein continuous Goal Dose: 45.5 ng/kg/min 1368 mL 0     warfarin ANTICOAGULANT (COUMADIN) 1 MG tablet Take 1-2 tablets daily or as directed 180 tablet 1     warfarin ANTICOAGULANT (COUMADIN) 1 MG tablet Take 1 tablet (1 mg) by mouth daily 90 tablet 3     warfarin ANTICOAGULANT (COUMADIN) 3 MG tablet TAKE 3 MG BY MOUTH ON MONDAY, WEDNESDAY, FRIDAY, AND SATURDAY, AND 4.5 MG ON ALL OTHER DAYS OF THE WEEK 170 tablet 3       ALLERGIES  Sulfa drugs    Primary PH cardiologist: Dr. Turcios      HPI:  Ms. Anderson is a pleasant 82 year old female with a PMHx including rheumatoid arthritis, hypertension, scoliosis, asthma, pulmonary MAC, breast cancer s/p chemotherapy with Adriamycin, Cytoxan, Taxol, and tamoxifen and status post left mastectomy. She also has a history of  extensive bilateral pulmonary emboli and DVT in 1998 thought to be secondary to tamoxifen along with chemotherapy induced cardiomyopathy. 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 Although she had proximal disease, she was too high risk for surgical PTE. She had a BPA in Jan 2021 which was complicated by hemoptysis, requiring FFP. She has required frequent diuretic adjustments, and we have been working on medical management. She is currently on combination therapy with IV Remodulin therapy along with Opsumit, and has failed Adempas due to dizziness and hypotension.      When I met with Karensuly ponce last in mid October, she was doing well and weight was stable. She requested a reduction in her diuretics to see if it would help her dry, flaking skin as she felt dehydrated. We tried a slow taper but ultimately he weight climbed slightly so she self increased back to 4mg TID.     I met with her last in mid December virtually after she had an ER Visit after her Remodulin pump started leaking. Goldberg was replaced successfully and she was discharged shortly after. As she was doing well, I made no further changes at that time. Most recently we met virtually in mid January, and overall things were stable, with weights around 102-103#. Swelling was under good control when she wrapped her legs. We discussed slight reduction again in bumex ( per her request to 4/4/3mg daily.    Today, we are following up virtually again as she has requested to limit in person visits to clinic. She tells me that things are overall about the same. Home weight is still stable around 102-103#, and as long as she wraps her legs swelling is under good control. She opted to keep her bumex at 4mg TID. Breathing at times seems worse, but she feels overall she is doing ok. BP has been under reasonable control at home, and no new dizziness/presyncope.      She had some labs performed last week in preparation for our  visit today which were reviewed as below.          CURRENT PULMONARY HYPERTENSION REGIMEN:     PAH Rx: IV Remodulin 45.5ng/kg/min, Opsumit 10mg daily  (failed Adempas due to dizziness)     Diuretics: Bumex 4mg TID      Oxygen: NC 1.5-2LPM     Anticoagulation: warfarin   Indication: CTEPH     Immunosuppression: Yes (RA)     Pulm: [facility] Abernathy ([facility])        Assessment/Plan:         1. Chronic thromboembolic pulmonary hypertension.              --Ms. Anderson has CTEPH with RV dysfunction. Although she had proximal disease, she was felt to be too high risk for surgical PTE both here and at [facility]. She was offered potential PTE at [place], but still felt to be very high risk and ultimately deferred. She did have BPA here in January 2021 but this was complicated by hemoptysis. Thus, we have since been focusing on medical management.              --She remains a stable dose of IV Remodulin at 45.5ng/kg/min.  Continue this along with Opsumit daily as she is doing. She is not able to tolerate Adempas due to dizziness. From a cardiopulmonary standpoint, she sounds to be stable.    --Bumex is at 4mg TID; we have tried transient reductions at her request, but ultimately weight seems to settle out best at 102-103# at this dose. She reports swelling under good control with leg wraps. Renal function is currently stable.              --Continue digoxin for RV support.                --Continue anticoagulation with warfarin for her CTEPH,  She has equipment to do home checks, and follows with the INR clinic.              --Continue oxygen at 1-2L with exertion as she is doing. She also follows with pulmonary locally.        Follow up plan: She declines coming back in person unless necessary and would like to continue virtual visits for now as she is feeling well. Thus, will do another video visit in 2 months, and have her see Dr Turcios for an annual check in.  We are happy to see the patient back sooner with any new  concerns.       Testing/labs:      Most recent labs:    Latest Reference Range & Units 01/12/23 10:38 02/24/23 13:14   Sodium 136 - 145 mmol/L 141 142   Potassium 3.4 - 5.3 mmol/L 4.7 4.7   Chloride 98 - 107 mmol/L 99 100   Carbon Dioxide (CO2) 22 - 29 mmol/L 31 (H) 28   Urea Nitrogen 8.0 - 23.0 mg/dL 33.5 (H) 25.3 (H)   Creatinine 0.51 - 0.95 mg/dL 1.06 (H) 1.09 (H)   GFR Estimate >60 mL/min/1.73m2 53 (L) 51 (L)   Calcium 8.8 - 10.2 mg/dL 10.0 10.4 (H)   Anion Gap 7 - 15 mmol/L 11 14   Albumin 3.5 - 5.2 g/dL  4.1   Protein Total 6.4 - 8.3 g/dL  7.7   Alkaline Phosphatase 35 - 104 U/L  100   ALT 10 - 35 U/L  15   AST   See Comment   (H): Data is abnormally high  (L): Data is abnormally low     Latest Reference Range & Units 12/14/22 11:00 02/24/23 13:14   WBC 4.0 - 11.0 10e3/uL 3.6 (L) 4.6   Hemoglobin 11.7 - 15.7 g/dL 11.2 (L) 12.1   Hematocrit 35.0 - 47.0 % 37.1 38.4   Platelet Count 150 - 450 10e3/uL 237 165   RBC Count 3.80 - 5.20 10e6/uL 3.66 (L) 3.88   MCV 78 - 100 fL 101 (H) 99   MCH 26.5 - 33.0 pg 30.6 31.2   MCHC 31.5 - 36.5 g/dL 30.2 (L) 31.5   RDW 10.0 - 15.0 % 16.2 (H) 14.7   (L): Data is abnormally low  (H): Data is abnormally high    Other recent pertinent testing:    Echo 4/25/22  Interpretation Summary     Left ventricular function is normal.The ejection fraction is 55-60%. Grade II  or moderate diastolic dysfunction. Paradoxical septal motion consistent with  right ventricular pressure overload is present.  The RV is not clearly visualized but the size appears to be moderately dilated  and function appears to be at least mildly reduced.  The estimated PA systolic pressure is 55 mmHg above the RA pressure.  The left atrial pressure is elevated.  No pericardial effusion is present.  This study was compared with the study from 06/03/2021. The estimated PA  systolic pressure is lower. The RV function and size are similar on direct  visual comparison. The pericardial effusion is also no longer  evident.    [facility] 3/17/21  Conclusion      Severe pulmonary hypertension    Normal cardiac output by Thermodilution    Elevated left sided filling pressures    No signficant change when comapred to her last hemodynamic assessment         Pressures Phase: Baseline     Time Systolic (mmHg) Diastolic (mmHg) Mean (mmHg) A Wave (mmHg) V Wave (mmHg) EDP (mmHg) Max dp/dt (mmHg/sec) HR (bpm) Content (mL/dL) SAT (%)   RA Pressures  2:31 PM   14    14    22      63        RV Pressures  2:16 PM       912           2:32 PM 87    9       28     62        PA Pressures  2:32 PM 88    22    44        62        PCW Pressures  2:33 PM   14        64          NYHA Functional Class:  3    I have reviewed the note as documented above.  This accurately captures the substance of my conversation with the patient.    Telephone-Visit Details    Start time: 1018  End time: 1044    An additional 15 minutes was spent today performing chart and history review, pre and post visit documentation, and care coordination.    Shawna Marcum PA-C  [facility]  Pager [phone]    Thank you for allowing me to participate in the care of your patient.      Sincerely,     MAGEN Saeed     [facility]  cc: No referring provider defined for this encounter.

## 2023-02-27 NOTE — PROGRESS NOTES
ANTICOAGULATION MANAGEMENT     Karen Anderson 82 year old female is on warfarin with therapeutic INR result. (Goal INR 2.0-3.0)    Recent labs: (last 7 days)     10/26/22  1548   INR 2.9*       ASSESSMENT       Source(s): Chart Review and Patient/Caregiver Call       Warfarin doses taken: Warfarin taken as instructed    Diet: No new diet changes identified    New illness, injury, or hospitalization: No    Medication/supplement changes: None noted    Signs or symptoms of bleeding or clotting: No    Previous INR: Therapeutic last 2(+) visits    Additional findings: None       PLAN     Recommended plan for no diet, medication or health factor changes affecting INR     Dosing Instructions: Continue your current warfarin dose with next INR in 2 weeks       Summary  As of 10/26/2022    Full warfarin instructions:  5 mg every day; Starting 10/26/2022   Next INR check:  11/9/2022             Telephone call with Karen who verbalizes understanding and agrees to plan and who agrees to plan and repeated back plan correctly    Patient to recheck with home meter    Education provided:     Symptom monitoring: monitoring for bleeding signs and symptoms    Contact [phone] with any changes, questions or concerns.     Plan made per [facility] anticoagulation protocol    GOLDY PERALTA RN  Anticoagulation Clinic  10/26/2022    _______________________________________________________________________     Anticoagulation Episode Summary     Current INR goal:  2.0-3.0   TTR:  64.8 % (1 y)   Target end date:  Indefinite   Send INR reminders to:  [facility]    Indications    Pulmonary hypertension (H) [I27.20]  CTEPH (chronic thromboembolic pulmonary hypertension) (H) [I27.24]  Long term current use of anticoagulant therapy [Z79.01]           Comments:           Anticoagulation Care Providers     Provider Role Specialty Phone number    Karolina Turcios MD Referring Cardiovascular Disease [phone]          
Female

## 2023-03-10 ENCOUNTER — ANTICOAGULATION THERAPY VISIT (OUTPATIENT)
Dept: ANTICOAGULATION | Facility: CLINIC | Age: 83
End: 2023-03-10
Payer: COMMERCIAL

## 2023-03-10 ENCOUNTER — TRANSFERRED RECORDS (OUTPATIENT)
Dept: HEALTH INFORMATION MANAGEMENT | Facility: CLINIC | Age: 83
End: 2023-03-10
Payer: COMMERCIAL

## 2023-03-10 DIAGNOSIS — I27.20 PULMONARY HYPERTENSION (H): Primary | ICD-10-CM

## 2023-03-10 DIAGNOSIS — I27.24 CTEPH (CHRONIC THROMBOEMBOLIC PULMONARY HYPERTENSION) (H): ICD-10-CM

## 2023-03-10 DIAGNOSIS — Z79.01 LONG TERM CURRENT USE OF ANTICOAGULANT THERAPY: ICD-10-CM

## 2023-03-10 LAB — INR (EXTERNAL): 2.2 (ref 0.9–1.1)

## 2023-03-10 NOTE — PROGRESS NOTES
ANTICOAGULATION MANAGEMENT     Karen Anderson 82 year old female is on warfarin with therapeutic INR result. (Goal INR 2.0-3.0)    Recent labs: (last 7 days)     03/10/23  0000   INR 2.2*       ASSESSMENT       Source(s): Chart Review and Patient/Caregiver Call       Warfarin doses taken: Warfarin taken as instructed    Diet: No new diet changes identified    New illness, injury, or hospitalization: No    Medication/supplement changes: None noted    Signs or symptoms of bleeding or clotting: No    Previous INR: Subtherapeutic    Additional findings: None         PLAN     Recommended plan for no diet, medication or health factor changes affecting INR     Dosing Instructions: Continue your current warfarin dose with next INR in 2 weeks       Summary  As of 3/10/2023    Full warfarin instructions:  4 mg every Mon, Wed, Fri; 5 mg all other days   Next INR check:  3/24/2023             Telephone call with Karen who verbalizes understanding and agrees to plan    Patient to recheck with home meter    Education provided:     Please call back if any changes to your diet, medications or how you've been taking warfarin    Plan made per [facility] anticoagulation protocol    Valentina Ma, RN  Anticoagulation Clinic  3/10/2023    _______________________________________________________________________     Anticoagulation Episode Summary     Current INR goal:  2.0-3.0   TTR:  66.8 % (1 y)   Target end date:  Indefinite   Send INR reminders to:  [facility]    Indications    Pulmonary hypertension (H) [I27.20]  CTEPH (chronic thromboembolic pulmonary hypertension) (H) [I27.24]  Long term current use of anticoagulant therapy [Z79.01]           Comments:           Anticoagulation Care Providers     Provider Role Specialty Phone number    Karolina Turcios MD Referring Cardiovascular Disease [phone]

## 2023-03-24 ENCOUNTER — TRANSFERRED RECORDS (OUTPATIENT)
Dept: HEALTH INFORMATION MANAGEMENT | Facility: CLINIC | Age: 83
End: 2023-03-24
Payer: COMMERCIAL

## 2023-03-24 ENCOUNTER — ANTICOAGULATION THERAPY VISIT (OUTPATIENT)
Dept: ANTICOAGULATION | Facility: CLINIC | Age: 83
End: 2023-03-24
Payer: COMMERCIAL

## 2023-03-24 DIAGNOSIS — I27.24 CTEPH (CHRONIC THROMBOEMBOLIC PULMONARY HYPERTENSION) (H): ICD-10-CM

## 2023-03-24 DIAGNOSIS — Z79.01 LONG TERM CURRENT USE OF ANTICOAGULANT THERAPY: ICD-10-CM

## 2023-03-24 DIAGNOSIS — I27.20 PULMONARY HYPERTENSION (H): Primary | ICD-10-CM

## 2023-03-24 LAB — INR (EXTERNAL): 2 (ref 0.9–1.1)

## 2023-04-07 ENCOUNTER — ANTICOAGULATION THERAPY VISIT (OUTPATIENT)
Dept: ANTICOAGULATION | Facility: CLINIC | Age: 83
End: 2023-04-07
Payer: COMMERCIAL

## 2023-04-07 ENCOUNTER — TRANSFERRED RECORDS (OUTPATIENT)
Dept: HEALTH INFORMATION MANAGEMENT | Facility: CLINIC | Age: 83
End: 2023-04-07
Payer: COMMERCIAL

## 2023-04-07 DIAGNOSIS — I27.24 CTEPH (CHRONIC THROMBOEMBOLIC PULMONARY HYPERTENSION) (H): ICD-10-CM

## 2023-04-07 DIAGNOSIS — Z79.01 LONG TERM CURRENT USE OF ANTICOAGULANT THERAPY: ICD-10-CM

## 2023-04-07 DIAGNOSIS — I27.20 PULMONARY HYPERTENSION (H): Primary | ICD-10-CM

## 2023-04-07 LAB
INR (EXTERNAL): 2.1 (ref 0.9–1.1)
INR HOME MONITORING: 2.1 RATIO (ref 2–2.5)

## 2023-04-07 NOTE — PROGRESS NOTES
ANTICOAGULATION MANAGEMENT     Karen Anderson 82 year old female is on warfarin with therapeutic INR result. (Goal INR 2.0-3.0)    Recent labs: (last 7 days)     04/07/23  1101   INR 2.1*       ASSESSMENT       Source(s): Chart Review and Patient/Caregiver Call       Warfarin doses taken: Warfarin taken as instructed    Diet: No new diet changes identified    New illness, injury, or hospitalization: No    Medication/supplement changes: pt was on Amoxicillin from 3/25 - 4/4 for nifected tear duct.     Signs or symptoms of bleeding or clotting: No    Previous INR: Therapeutic last 2(+) visits    Additional findings: None         PLAN     Recommended plan for ongoing change(s) affecting INR     Dosing Instructions: Continue your current warfarin dose with next INR in 1 week       Summary  As of 4/7/2023    Full warfarin instructions:  4 mg every Mon, Wed, Fri; 5 mg all other days   Next INR check:  4/14/2023             Telephone call with Karen who verbalizes understanding and agrees to plan    Patient to recheck with home meter    Education provided:     Please call back if any changes to your diet, medications or how you've been taking warfarin    Interaction IS anticipated between warfarin and Amox     Plan made per [facility] anticoagulation protocol    Valentina Ma, RN  Anticoagulation Clinic  4/7/2023    _______________________________________________________________________     Anticoagulation Episode Summary     Current INR goal:  2.0-3.0   TTR:  66.9 % (1 y)   Target end date:  Indefinite   Send [facility]R reminders to:  [facility]    Indications    Pulmonary hypertension (H) [I27.20]  CTEPH (chronic thromboembolic pulmonary hypertension) (H) [I27.24]  Long term current use of anticoagulant therapy [Z79.01]           Comments:           Anticoagulation Care Providers     Provider Role Specialty Phone number    Karolina Turcios MD Referring Cardiovascular Disease [phone]

## 2023-04-14 ENCOUNTER — TRANSFERRED RECORDS (OUTPATIENT)
Dept: HEALTH INFORMATION MANAGEMENT | Facility: CLINIC | Age: 83
End: 2023-04-14
Payer: COMMERCIAL

## 2023-04-14 ENCOUNTER — ANTICOAGULATION THERAPY VISIT (OUTPATIENT)
Dept: ANTICOAGULATION | Facility: CLINIC | Age: 83
End: 2023-04-14
Payer: COMMERCIAL

## 2023-04-14 DIAGNOSIS — Z79.01 LONG TERM CURRENT USE OF ANTICOAGULANT THERAPY: ICD-10-CM

## 2023-04-14 DIAGNOSIS — I27.24 CTEPH (CHRONIC THROMBOEMBOLIC PULMONARY HYPERTENSION) (H): ICD-10-CM

## 2023-04-14 DIAGNOSIS — I27.20 PULMONARY HYPERTENSION (H): Primary | ICD-10-CM

## 2023-04-14 LAB
INR (EXTERNAL): 1.9 (ref 0.9–1.1)
INR HOME MONITORING: 1.9 RATIO (ref 2–2.5)

## 2023-04-14 NOTE — PROGRESS NOTES
ANTICOAGULATION MANAGEMENT     Karen RICO Calderonhilariacharbel 82 year old female is on warfarin with subtherapeutic INR result. (Goal INR 2.0-3.0)    Recent labs: (last 7 days)     04/14/23  0000   INR 1.9*       ASSESSMENT       Source(s): Chart Review and Patient/Caregiver Call       Warfarin doses taken: Warfarin taken as instructed    Diet: Increased greens/vitamin K in diet; plans to resume previous intake    Medication/supplement changes: None noted    New illness, injury, or hospitalization: No    Signs or symptoms of bleeding or clotting: No    Previous INR: Therapeutic last 2(+) visits    Additional findings: None         PLAN     Recommended plan for temporary change(s) affecting INR     Dosing Instructions: booster dose then continue your current warfarin dose with next INR in 1 week       Summary  As of 4/14/2023    Full warfarin instructions:  4/14: 5 mg; Otherwise 4 mg every Mon, Wed, Fri; 5 mg all other days   Next INR check:  4/21/2023             Telephone call with Karen who verbalizes understanding and agrees to plan    Patient to recheck with home meter    Education provided:     Goal range and lab monitoring: goal range and significance of current result    Plan made per [facility] anticoagulation protocol    Fabby Ferguson, RN  Anticoagulation Clinic  4/14/2023    _______________________________________________________________________     Anticoagulation Episode Summary     Current INR goal:  2.0-3.0   TTR:  65.9 % (1 y)   Target end date:  Indefinite   Send INR reminders to:  [facility]    Indications    Pulmonary hypertension (H) [I27.20]  CTEPH (chronic thromboembolic pulmonary hypertension) (H) [I27.24]  Long term current use of anticoagulant therapy [Z79.01]           Comments:           Anticoagulation Care Providers     Provider Role Specialty Phone number    Karolina Turcios MD Referring Cardiovascular Disease [phone]

## 2023-04-21 ENCOUNTER — TRANSFERRED RECORDS (OUTPATIENT)
Dept: HEALTH INFORMATION MANAGEMENT | Facility: CLINIC | Age: 83
End: 2023-04-21
Payer: COMMERCIAL

## 2023-04-21 ENCOUNTER — ANTICOAGULATION THERAPY VISIT (OUTPATIENT)
Dept: ANTICOAGULATION | Facility: CLINIC | Age: 83
End: 2023-04-21
Payer: COMMERCIAL

## 2023-04-21 DIAGNOSIS — I27.24 CTEPH (CHRONIC THROMBOEMBOLIC PULMONARY HYPERTENSION) (H): ICD-10-CM

## 2023-04-21 DIAGNOSIS — Z79.01 LONG TERM CURRENT USE OF ANTICOAGULANT THERAPY: ICD-10-CM

## 2023-04-21 DIAGNOSIS — I27.20 PULMONARY HYPERTENSION (H): Primary | ICD-10-CM

## 2023-04-21 LAB
INR (EXTERNAL): 2 (ref 0.9–1.1)
INR HOME MONITORING: 2 RATIO (ref 2–2.5)

## 2023-04-21 NOTE — PROGRESS NOTES
ANTICOAGULATION MANAGEMENT     Karen Anderson 82 year old female is on warfarin with therapeutic INR result. (Goal INR 2.0-3.0)    Recent labs: (last 7 days)     04/21/23  0000   INR 2.0*       ASSESSMENT       Source(s): Patient/Caregiver Call       Warfarin doses taken: Warfarin taken as instructed    Diet: No new diet changes identified    Medication/supplement changes: None noted    New illness, injury, or hospitalization: No    Signs or symptoms of bleeding or clotting: No    Previous INR: Subtherapeutic    Additional findings: None         PLAN     Recommended plan for no diet, medication or health factor changes affecting INR     Dosing Instructions: Continue your current warfarin dose with next INR in 2 weeks       Summary  As of 4/21/2023    Full warfarin instructions:  4 mg every Mon, Wed, Fri; 5 mg all other days   Next INR check:  5/5/2023             Telephone call with Karen who verbalizes understanding and agrees to plan and who agrees to plan and repeated back plan correctly    Patient to recheck with home meter    Education provided:     None required    Plan made per [facility] anticoagulation protocol    Tanisha Kimbrough, RN  Anticoagulation Clinic  4/21/2023    _______________________________________________________________________     Anticoagulation Episode Summary     Current INR goal:  2.0-3.0   TTR:  64.1 % (1 y)   Target end date:  Indefinite   Send INR reminders to:  [facility]    Indications    Pulmonary hypertension (H) [I27.20]  CTEPH (chronic thromboembolic pulmonary hypertension) (H) [I27.24]  Long term current use of anticoagulant therapy [Z79.01]           Comments:           Anticoagulation Care Providers     Provider Role Specialty Phone number    Karolina Turcios MD Referring Cardiovascular Disease [phone]

## 2023-05-01 DIAGNOSIS — I50.810 RVF (RIGHT VENTRICULAR FAILURE) (H): ICD-10-CM

## 2023-05-01 RX ORDER — BUMETANIDE 2 MG/1
4 TABLET ORAL 3 TIMES DAILY
Qty: 540 TABLET | Refills: 3 | Status: SHIPPED | OUTPATIENT
Start: 2023-05-01 | End: 2024-06-06

## 2023-05-05 ENCOUNTER — TRANSFERRED RECORDS (OUTPATIENT)
Dept: HEALTH INFORMATION MANAGEMENT | Facility: CLINIC | Age: 83
End: 2023-05-05
Payer: COMMERCIAL

## 2023-05-05 ENCOUNTER — ANTICOAGULATION THERAPY VISIT (OUTPATIENT)
Dept: ANTICOAGULATION | Facility: CLINIC | Age: 83
End: 2023-05-05
Payer: COMMERCIAL

## 2023-05-05 DIAGNOSIS — I27.20 PULMONARY HYPERTENSION (H): Primary | ICD-10-CM

## 2023-05-05 DIAGNOSIS — I27.24 CTEPH (CHRONIC THROMBOEMBOLIC PULMONARY HYPERTENSION) (H): ICD-10-CM

## 2023-05-05 DIAGNOSIS — Z79.01 LONG TERM CURRENT USE OF ANTICOAGULANT THERAPY: ICD-10-CM

## 2023-05-05 LAB
INR (EXTERNAL): 2.3 (ref 0.9–1.1)
INR HOME MONITORING: 2.3 RATIO (ref 2–2.5)

## 2023-05-05 NOTE — PROGRESS NOTES
ANTICOAGULATION MANAGEMENT      Karen Anderson 82 year old female is on warfarin with therapeutic INR result. (Goal INR 2.0-3.0)    Recent labs: (last 7 days)     05/05/23  0000   INR 2.3*       ASSESSMENT       Source(s): Patient/Caregiver Call       Warfarin doses taken: Warfarin taken as instructed    Diet: No new diet changes identified    Medication/supplement changes: eye dops for a blocked tear duct     New illness, injury, or hospitalization: No    Signs or symptoms of bleeding or clotting: No    Previous result: Therapeutic last 2(+) visits    Additional findings: None         PLAN     Recommended plan for no diet, medication or health factor changes affecting INR     Dosing Instructions: Continue your current warfarin dose with next INR in 2 weeks       Summary  As of 5/5/2023    Full warfarin instructions:  4 mg every Mon, Wed, Fri; 5 mg all other days   Next INR check:  5/19/2023             Telephone call with Karen who verbalizes understanding and agrees to plan and who agrees to plan and repeated back plan correctly    Patient to recheck with home meter    Education provided:     Interaction IS NOT anticipated between warfarin and eye drops     Plan made per [facility] anticoagulation protocol    Tanisha Kimbrough, RN  Anticoagulation Clinic  5/5/2023    _______________________________________________________________________     Anticoagulation Episode Summary     Current INR goal:  2.0-3.0   TTR:  65.2 % (1 y)   Target end date:  Indefinite   Send INR reminders to:  [facility]    Indications    Pulmonary hypertension (H) [I27.20]  CTEPH (chronic thromboembolic pulmonary hypertension) (H) [I27.24]  Long term current use of anticoagulant therapy [Z79.01]           Comments:           Anticoagulation Care Providers     Provider Role Specialty Phone number    Karolina Turcios MD Referring Cardiovascular Disease [phone]

## 2023-05-19 ENCOUNTER — TRANSFERRED RECORDS (OUTPATIENT)
Dept: HEALTH INFORMATION MANAGEMENT | Facility: CLINIC | Age: 83
End: 2023-05-19
Payer: COMMERCIAL

## 2023-05-19 ENCOUNTER — ANTICOAGULATION THERAPY VISIT (OUTPATIENT)
Dept: ANTICOAGULATION | Facility: CLINIC | Age: 83
End: 2023-05-19
Payer: COMMERCIAL

## 2023-05-19 DIAGNOSIS — I27.20 PULMONARY HYPERTENSION (H): Primary | ICD-10-CM

## 2023-05-19 DIAGNOSIS — I27.24 CTEPH (CHRONIC THROMBOEMBOLIC PULMONARY HYPERTENSION) (H): ICD-10-CM

## 2023-05-19 DIAGNOSIS — Z79.01 LONG TERM CURRENT USE OF ANTICOAGULANT THERAPY: ICD-10-CM

## 2023-05-19 LAB
INR (EXTERNAL): 1.9 (ref 0.9–1.1)
INR HOME MONITORING: 1.9 RATIO (ref 2–2.5)

## 2023-05-19 NOTE — PROGRESS NOTES
ANTICOAGULATION MANAGEMENT      Karen Anderson 82 year old female is on warfarin with subtherapeutic INR result. (Goal INR 2.0-3.0)    Recent labs: (last 7 days)     05/19/23  0000   INR 1.9*       ASSESSMENT       Source(s): Patient/Caregiver Call       Warfarin doses taken: Warfarin taken as instructed    Diet: Increased greens/vitamin K in diet; ongoing change. Patient usually eats green leaf salads, but has mixed it up with adding broccoli on the days that she doesn't eat the green leaf salads.     Medication/supplement changes: None noted    New illness, injury, or hospitalization: No    Signs or symptoms of bleeding or clotting: No    Previous result: Therapeutic last 2(+) visits    Additional findings: None         PLAN     Recommended plan for ongoing change(s) affecting INR     Dosing Instructions: Increase your warfarin dose (3.1% change) with next INR in 2 weeks       Summary  As of 5/19/2023    Full warfarin instructions:  4 mg every Mon, Wed; 5 mg all other days   Next INR check:  6/2/2023             Telephone call with Karen who verbalizes understanding and agrees to plan and who agrees to plan and repeated back plan correctly    Patient to recheck with home meter    Education provided:     None required    Plan made per [facility] anticoagulation protocol    Tanisha Kimbrough, RN  Anticoagulation Clinic  5/19/2023    _______________________________________________________________________     Anticoagulation Episode Summary     Current INR goal:  2.0-3.0   TTR:  64.9 % (1 y)   Target end date:  Indefinite   Send INR reminders to:  [facility]    Indications    Pulmonary hypertension (H) [I27.20]  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

## 2023-06-01 ENCOUNTER — HEALTH MAINTENANCE LETTER (OUTPATIENT)
Age: 83
End: 2023-06-01

## 2023-06-02 ENCOUNTER — ANTICOAGULATION THERAPY VISIT (OUTPATIENT)
Dept: ANTICOAGULATION | Facility: CLINIC | Age: 83
End: 2023-06-02
Payer: COMMERCIAL

## 2023-06-02 ENCOUNTER — TRANSFERRED RECORDS (OUTPATIENT)
Dept: HEALTH INFORMATION MANAGEMENT | Facility: CLINIC | Age: 83
End: 2023-06-02
Payer: COMMERCIAL

## 2023-06-02 DIAGNOSIS — I27.20 PULMONARY HYPERTENSION (H): Primary | ICD-10-CM

## 2023-06-02 DIAGNOSIS — Z79.01 LONG TERM CURRENT USE OF ANTICOAGULANT THERAPY: ICD-10-CM

## 2023-06-02 DIAGNOSIS — I27.24 CTEPH (CHRONIC THROMBOEMBOLIC PULMONARY HYPERTENSION) (H): ICD-10-CM

## 2023-06-02 LAB
INR (EXTERNAL): 2.2 (ref 0.9–1.1)
INR HOME MONITORING: 2.2 RATIO (ref 2–2.5)

## 2023-06-02 NOTE — PROGRESS NOTES
ANTICOAGULATION MANAGEMENT     Karen Anderson 82 year old female is on warfarin with therapeutic INR result. (Goal INR 2.0-3.0)    Recent labs: (last 7 days)     06/02/23  0000   INR 2.2*       ASSESSMENT       Source(s): Chart Review and Patient/Caregiver Call       Warfarin doses taken: Warfarin taken as instructed    Diet: No new diet changes identified    Medication/supplement changes: None noted    New illness, injury, or hospitalization: No    Signs or symptoms of bleeding or clotting: No    Previous result: Subtherapeutic    Additional findings: None         PLAN     Recommended plan for no diet, medication or health factor changes affecting INR     Dosing Instructions: Continue your current warfarin dose with next INR in 2 weeks       Summary  As of 6/2/2023    Full warfarin instructions:  4 mg every Mon, Wed; 5 mg all other days   Next INR check:  6/16/2023             Telephone call with Karen who agrees to plan and repeated back plan correctly    Patient to recheck with home meter    Education provided:     Contact [phone] with any changes, questions or concerns.     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

## 2023-06-08 ENCOUNTER — LAB (OUTPATIENT)
Dept: LAB | Facility: CLINIC | Age: 83
End: 2023-06-08
Payer: COMMERCIAL

## 2023-06-08 ENCOUNTER — ANTICOAGULATION THERAPY VISIT (OUTPATIENT)
Dept: ANTICOAGULATION | Facility: CLINIC | Age: 83
End: 2023-06-08

## 2023-06-08 DIAGNOSIS — I27.20 PULMONARY HYPERTENSION (H): Primary | ICD-10-CM

## 2023-06-08 DIAGNOSIS — Z79.01 LONG TERM CURRENT USE OF ANTICOAGULANT THERAPY: ICD-10-CM

## 2023-06-08 DIAGNOSIS — I27.20 PULMONARY HYPERTENSION (H): ICD-10-CM

## 2023-06-08 DIAGNOSIS — I27.24 CTEPH (CHRONIC THROMBOEMBOLIC PULMONARY HYPERTENSION) (H): ICD-10-CM

## 2023-06-08 DIAGNOSIS — R06.09 DOE (DYSPNEA ON EXERTION): ICD-10-CM

## 2023-06-08 LAB
ERYTHROCYTE [DISTWIDTH] IN BLOOD BY AUTOMATED COUNT: 14.5 % (ref 10–15)
HCT VFR BLD AUTO: 37.1 % (ref 35–47)
HGB BLD-MCNC: 11.5 G/DL (ref 11.7–15.7)
INR BLD: 2.2 (ref 0.9–1.1)
MCH RBC QN AUTO: 29.7 PG (ref 26.5–33)
MCHC RBC AUTO-ENTMCNC: 31 G/DL (ref 31.5–36.5)
MCV RBC AUTO: 96 FL (ref 78–100)
PLATELET # BLD AUTO: 187 10E3/UL (ref 150–450)
RBC # BLD AUTO: 3.87 10E6/UL (ref 3.8–5.2)
WBC # BLD AUTO: 5.3 10E3/UL (ref 4–11)

## 2023-06-08 PROCEDURE — 80053 COMPREHEN METABOLIC PANEL: CPT

## 2023-06-08 PROCEDURE — 36415 COLL VENOUS BLD VENIPUNCTURE: CPT

## 2023-06-08 PROCEDURE — 85610 PROTHROMBIN TIME: CPT

## 2023-06-08 PROCEDURE — 85027 COMPLETE CBC AUTOMATED: CPT

## 2023-06-08 PROCEDURE — 83880 ASSAY OF NATRIURETIC PEPTIDE: CPT

## 2023-06-08 NOTE — PROGRESS NOTES
ANTICOAGULATION MANAGEMENT     Karen Anderson 82 year old female is on warfarin with therapeutic INR result. (Goal INR 2.0-3.0)    Recent labs: (last 7 days)     06/08/23  1020   INR 2.2*       ASSESSMENT       Source(s): Chart Review and Patient/Caregiver Call       Warfarin doses taken: Warfarin taken as instructed    Diet: No new diet changes identified    Medication/supplement changes: None noted    New illness, injury, or hospitalization: No    Signs or symptoms of bleeding or clotting: No    Previous result: Therapeutic last visit; previously outside of goal range    Additional findings: None         PLAN     Recommended plan for no diet, medication or health factor changes affecting INR     Dosing Instructions: Continue your current warfarin dose with next INR in 1 week       Summary  As of 6/8/2023    Full warfarin instructions:  4 mg every Mon, Wed; 5 mg all other days   Next INR check:  6/16/2023             Telephone call with Karen who agrees to plan and repeated back plan correctly    Patient to recheck with home meter    Education provided:     Contact [phone] with any changes, questions or concerns.     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

## 2023-06-09 LAB
ALBUMIN SERPL BCG-MCNC: 4.1 G/DL (ref 3.5–5.2)
ALP SERPL-CCNC: 89 U/L (ref 35–104)
ALT SERPL W P-5'-P-CCNC: 16 U/L (ref 10–35)
ANION GAP SERPL CALCULATED.3IONS-SCNC: 13 MMOL/L (ref 7–15)
AST SERPL W P-5'-P-CCNC: 34 U/L (ref 10–35)
BILIRUB SERPL-MCNC: 0.3 MG/DL
BUN SERPL-MCNC: 24.7 MG/DL (ref 8–23)
CALCIUM SERPL-MCNC: 10.2 MG/DL (ref 8.8–10.2)
CHLORIDE SERPL-SCNC: 101 MMOL/L (ref 98–107)
CREAT SERPL-MCNC: 0.98 MG/DL (ref 0.51–0.95)
DEPRECATED HCO3 PLAS-SCNC: 30 MMOL/L (ref 22–29)
GFR SERPL CREATININE-BSD FRML MDRD: 58 ML/MIN/1.73M2
GLUCOSE SERPL-MCNC: 73 MG/DL (ref 70–99)
NT-PROBNP SERPL-MCNC: 1325 PG/ML (ref 0–1800)
POTASSIUM SERPL-SCNC: 4.9 MMOL/L (ref 3.4–5.3)
PROT SERPL-MCNC: 7.4 G/DL (ref 6.4–8.3)
SODIUM SERPL-SCNC: 144 MMOL/L (ref 136–145)

## 2023-06-13 ENCOUNTER — VIRTUAL VISIT (OUTPATIENT)
Dept: CARDIOLOGY | Facility: CLINIC | Age: 83
End: 2023-06-13
Attending: PHYSICIAN ASSISTANT
Payer: COMMERCIAL

## 2023-06-13 VITALS
SYSTOLIC BLOOD PRESSURE: 112 MMHG | WEIGHT: 103 LBS | HEART RATE: 69 BPM | DIASTOLIC BLOOD PRESSURE: 59 MMHG | BODY MASS INDEX: 20.8 KG/M2

## 2023-06-13 DIAGNOSIS — I27.20 PULMONARY HYPERTENSION (H): ICD-10-CM

## 2023-06-13 DIAGNOSIS — R06.09 DOE (DYSPNEA ON EXERTION): ICD-10-CM

## 2023-06-13 PROCEDURE — 99215 OFFICE O/P EST HI 40 MIN: CPT | Mod: VID | Performed by: INTERNAL MEDICINE

## 2023-06-13 NOTE — PROGRESS NOTES
June 13th, 2023     Dear Dr. Main:     Karen Anderson is a 82 year old female we saw in the [facility] for follow up.  As you know, she has a past medical history that includes asthma, pulmonary MAC, breast cancer s/p chemotherapy with Adriamycin, Cytoxan, Taxol and tamoxifen and s/p left mastectomy, extensive b/l pulmonary emboli and DVT in 1998 though to be secondary to tamoxifen, chemotherapy induced cardiomyopathy, rheumatoid arthritis, HTN, chronic hypoxic respiratory failure on O2 via NC 1.5lpm, multiple falls, and scoliosis.  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  She is currently on IV treprostinil at 46 ng/kg/min and macitentan 10 mg daily.  She did not tolerate riociguat due to hypotension.    She returns today for follow-up.  Overall she has been relatively stable in the last year.  She has not had any hospitalizations or ER visit due to right heart failure.  Her weight has been stable at home with minimal fluctuation.  Her oxygen requirements are stable at 2 L/min.  She has not noticed any worsening exertional shortness of breath.  She is able to do most of her activities of daily living.  She does not go out much except for doctor's appointment on and off.  No exertional presyncope or syncope.  No exertional lightheadedness or dizziness.  No exertional chest pain or chest pressure.      She is tolerating the Remodulin with no significant side effects.  No catheter related side effects.  She had a Goldberg catheter replaced in November 2022 due to high pressure.  No further recurrence of this.  No bleeding on anticoagulation.    Her current medical regimen includes:    Current Outpatient Medications   Medication Sig     acetaminophen (TYLENOL) 325 MG tablet Take 325 mg by mouth every 6 hours as needed for mild pain     albuterol (PROAIR HFA/PROVENTIL HFA/VENTOLIN HFA) 108 (90 Base) MCG/ACT inhaler Inhale 2  puffs into the lungs every 4 hours as needed for shortness of breath / dyspnea or wheezing      alendronate (FOSAMAX) 70 MG tablet Take 70 mg by mouth once a week On Mondays     Ascorbic Acid (VITAMIN C) 500 MG CAPS Take 500 mg by mouth every morning      aspirin (ASA) 81 MG tablet Take 81 mg by mouth daily      beclomethasone HFA (QVAR REDIHALER) 80 MCG/ACT inhaler Inhale 2 puffs into the lungs 2 times daily     bumetanide (BUMEX) 2 MG tablet Take 2 tablets (4 mg) by mouth 3 times daily     calcium citrate-vitamin D (CALCIUM CITRATE + D3) 315-250 MG-UNIT TABS per tablet Take 1 tablet by mouth 2 times daily      digoxin (LANOXIN) 125 MCG tablet Take 0.5 tablets (62.5 mcg) by mouth daily     gabapentin (NEURONTIN) 300 MG capsule Take 300 mg by mouth 3 times daily Can take an additional 300mg at bedtime as needed     hydroxychloroquine (PLAQUENIL) 200 MG tablet Take 200 mg by mouth daily      ipratropium (ATROVENT) 0.06 % nasal spray Spray 2 sprays into both nostrils 3 times daily      ketotifen (ZADITOR) 0.025 % ophthalmic solution Place 1 drop into both eyes daily     macitentan (OPSUMIT) 10 MG tablet Take 1 tablet (10 mg) by mouth daily     Metamucil Fiber CHEW Take by mouth daily as needed 2 chewables     potassium chloride ER (KLOR-CON M) 20 MEQ CR tablet Take 2 tablets (40 mEq) by mouth 3 times daily     sodium chloride (OCEAN) 0.65 % nasal spray Spray 1 spray into both nostrils daily as needed for congestion     treprostinil (REMODULIN) 60 mcg/mL in glycine diluent 50 mL infusion Inject 45.5 ng/kg/min into the vein continuous Goal Dose: 45.5 ng/kg/min     warfarin ANTICOAGULANT (COUMADIN) 1 MG tablet Take 1-2 tablets daily or as directed     warfarin ANTICOAGULANT (COUMADIN) 1 MG tablet Take 1 tablet (1 mg) by mouth daily     warfarin ANTICOAGULANT (COUMADIN) 3 MG tablet TAKE 3 MG BY MOUTH ON MONDAY, WEDNESDAY, FRIDAY, AND SATURDAY, AND 4.5 MG ON ALL OTHER DAYS OF THE WEEK     No current facility-administered  medications for this visit.     Treprostinil 45.5 ng/kg/min  Macitentan 10mg daily  Digoxin 62.5mcg daily  Bumetanide 4mg tid  Aspirin 81mg daily  Warfarin   .       ROS: Complete ROS per HPI otherwise negative           EXAM:  Vital signs:  /59 (BP Location: Right arm, Patient Position: Sitting, Cuff Size: Adult Regular)   Pulse 69   Wt 46.7 kg (103 lb)   BMI 20.80 kg/m     Exam deferred 2/2 virtual encounter     Labs and studies reviewed:     Recent Results (from the past 1008 hour(s))   INR (External Result)    Collection Time: 05/05/23 12:00 AM   Result Value Ref Range    INR (External) 2.3 (A) 0.9 - 1.1   INR (External Result)    Collection Time: 05/19/23 12:00 AM   Result Value Ref Range    INR (External) 1.9 (A) 0.9 - 1.1   INR (External Result)    Collection Time: 06/02/23 12:00 AM   Result Value Ref Range    INR (External) 2.2 (A) 0.9 - 1.1   INR point of care    Collection Time: 06/08/23 10:20 AM   Result Value Ref Range    INR 2.2 (H) 0.9 - 1.1   Comprehensive metabolic panel    Collection Time: 06/08/23 10:20 AM   Result Value Ref Range    Sodium 144 136 - 145 mmol/L    Potassium 4.9 3.4 - 5.3 mmol/L    Chloride 101 98 - 107 mmol/L    Carbon Dioxide (CO2) 30 (H) 22 - 29 mmol/L    Anion Gap 13 7 - 15 mmol/L    Urea Nitrogen 24.7 (H) 8.0 - 23.0 mg/dL    Creatinine 0.98 (H) 0.51 - 0.95 mg/dL    Calcium 10.2 8.8 - 10.2 mg/dL    Glucose 73 70 - 99 mg/dL    Alkaline Phosphatase 89 35 - 104 U/L    AST 34 10 - 35 U/L    ALT 16 10 - 35 U/L    Protein Total 7.4 6.4 - 8.3 g/dL    Albumin 4.1 3.5 - 5.2 g/dL    Bilirubin Total 0.3 <=1.2 mg/dL    GFR Estimate 58 (L) >60 mL/min/1.73m2   CBC with platelets    Collection Time: 06/08/23 10:20 AM   Result Value Ref Range    WBC Count 5.3 4.0 - 11.0 10e3/uL    RBC Count 3.87 3.80 - 5.20 10e6/uL    Hemoglobin 11.5 (L) 11.7 - 15.7 g/dL    Hematocrit 37.1 35.0 - 47.0 %    MCV 96 78 - 100 fL    MCH 29.7 26.5 - 33.0 pg    MCHC 31.0 (L) 31.5 - 36.5 g/dL    RDW 14.5 10.0  - 15.0 %    Platelet Count 187 150 - 450 10e3/uL   N terminal pro BNP outpatient    Collection Time: 06/08/23 10:20 AM   Result Value Ref Range    N Terminal Pro BNP Outpatient 1,325 0 - 1,800 pg/mL     Echocardiogram:     TTE (04/2022)  Left ventricular function is normal.The ejection fraction is 55-60%. Grade II  or moderate diastolic dysfunction. Paradoxical septal motion consistent with  right ventricular pressure overload is present.  The RV is not clearly visualized but the size appears to be moderately dilated  and function appears to be at least mildly reduced.  The estimated PA systolic pressure is 55 mmHg above the RA pressure.  The left atrial pressure is elevated.  No pericardial effusion is present.  This study was compared with the study from 06/03/2021. The estimated PA  systolic pressure is lower. The RV function and size are similar on direct  visual comparison. The pericardial effusion is also no longer evident.      3/17/21 RHC:  RA: 14  PA: 88/22, mPA44  PCWP: 14  TD CO/CI: 3.97/2.8  Concepcion CO/CI: 2.95/2.1  PVR: 10.2     Assessment and Plan:      Mrs. Karen Anderson is a 82 year old female with proximal chronic thromboembolic disease but not a candidate for surgical thromboendarterectomy.  She is currently on medical therapy with IV treprostinil 46 ng/kg/min and macitentan 10 mg daily.  She did not tolerate BPA.  She had hemoptysis.    I am delighted to say that she is overall stable and had a good year.  She has not had any recent hospitalizations related to heart failure.  Her weight has been stable.  Her NT proBNP is overall stable.  She is tolerating the treprostinil and macitentan without any significant side effects.    Thus, I did not make any changes.  I have recommended her to continue the current regimen of IV treprostinil 45 ng/kg/min and macitentan 10 mg daily.  We will continue her on bumetanide 4 mg 3 times a day, digoxin 62.5 mcg daily, and Coumadin for long-term anticoagulation  with an INR of 2-3.    She states that her oxygen and saturation during supine position has been on and off below 90%.  We will do an overnight oximetry to make sure that she is not desaturating.  She is currently using 2 L during nighttime.  We will increase it as needed.      She will return to see us in 3 months at [facility] in person with a repeat echocardiogram and labs.  She will call us in the interim should any further worsening symptoms.  She is very hesitant to come to the [facility] because of her prior prolonged admissions.       Video-Visit Details     Type of service:  Video Visit     Video Start Time: 3.10 pm  Video End Time: 3.40 pm    Patient gave verbal consent for Videovisit    Originating Location (pt. Location): Home     Distant Location (provider location):  [facility]      Platform used for Video Visit: Home [employer] Service (H[employer]) video call    Total time today was 44 minutes reviewing notes, imaging, labs, patient visit, orders and documentation     Sincerely,  Karolina Turcios MD   [facility]  Heart Failure, Transplant, and Mechanical Circulatory Support Cardiology   Cardiovascular Division  [facility]   [phone]

## 2023-06-13 NOTE — LETTER
6/13/2023    Raquel Main MD  [facility] [address]  [facility] [address]    RE: Karen Manceravale       Dear Colleague,     I had the pleasure of seeing Karen Anderson in the [facility].      June 13th, 2023     Dear Dr. Main:     Karen Anderson is a 82 year old female we saw in the [facility] for follow up.  As you know, she has a past medical history that includes asthma, pulmonary MAC, breast cancer s/p chemotherapy with Adriamycin, Cytoxan, Taxol and tamoxifen and s/p left mastectomy, extensive b/l pulmonary emboli and DVT in 1998 though to be secondary to tamoxifen, chemotherapy induced cardiomyopathy, rheumatoid arthritis, HTN, chronic hypoxic respiratory failure on O2 via NC 1.5lpm, multiple falls, and scoliosis.  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  She is currently on IV treprostinil at 46 ng/kg/min and macitentan 10 mg daily.  She did not tolerate riociguat due to hypotension.    She returns today for follow-up.  Overall she has been relatively stable in the last year.  She has not had any hospitalizations or ER visit due to right heart failure.  Her weight has been stable at home with minimal fluctuation.  Her oxygen requirements are stable at 2 L/min.  She has not noticed any worsening exertional shortness of breath.  She is able to do most of her activities of daily living.  She does not go out much except for doctor's appointment on and off.  No exertional presyncope or syncope.  No exertional lightheadedness or dizziness.  No exertional chest pain or chest pressure.      She is tolerating the Remodulin with no significant side effects.  No catheter related side effects.  She had a Goldberg catheter replaced in November 2022 due to high pressure.  No further recurrence of this.  No bleeding on anticoagulation.    Her current medical regimen includes:    Current  Outpatient Medications   Medication Sig    acetaminophen (TYLENOL) 325 MG tablet Take 325 mg by mouth every 6 hours as needed for mild pain    albuterol (PROAIR HFA/PROVENTIL HFA/VENTOLIN HFA) 108 (90 Base) MCG/ACT inhaler Inhale 2 puffs into the lungs every 4 hours as needed for shortness of breath / dyspnea or wheezing     alendronate (FOSAMAX) 70 MG tablet Take 70 mg by mouth once a week On Mondays    Ascorbic Acid (VITAMIN C) 500 MG CAPS Take 500 mg by mouth every morning     aspirin (ASA) 81 MG tablet Take 81 mg by mouth daily     beclomethasone HFA (QVAR REDIHALER) 80 MCG/ACT inhaler Inhale 2 puffs into the lungs 2 times daily    bumetanide (BUMEX) 2 MG tablet Take 2 tablets (4 mg) by mouth 3 times daily    calcium citrate-vitamin D (CALCIUM CITRATE + D3) 315-250 MG-UNIT TABS per tablet Take 1 tablet by mouth 2 times daily     digoxin (LANOXIN) 125 MCG tablet Take 0.5 tablets (62.5 mcg) by mouth daily    gabapentin (NEURONTIN) 300 MG capsule Take 300 mg by mouth 3 times daily Can take an additional 300mg at bedtime as needed    hydroxychloroquine (PLAQUENIL) 200 MG tablet Take 200 mg by mouth daily     ipratropium (ATROVENT) 0.06 % nasal spray Spray 2 sprays into both nostrils 3 times daily     ketotifen (ZADITOR) 0.025 % ophthalmic solution Place 1 drop into both eyes daily    macitentan (OPSUMIT) 10 MG tablet Take 1 tablet (10 mg) by mouth daily    Metamucil Fiber CHEW Take by mouth daily as needed 2 chewables    potassium chloride ER (KLOR-CON M) 20 MEQ CR tablet Take 2 tablets (40 mEq) by mouth 3 times daily    sodium chloride (OCEAN) 0.65 % nasal spray Spray 1 spray into both nostrils daily as needed for congestion    treprostinil (REMODULIN) 60 mcg/mL in glycine diluent 50 mL infusion Inject 45.5 ng/kg/min into the vein continuous Goal Dose: 45.5 ng/kg/min    warfarin ANTICOAGULANT (COUMADIN) 1 MG tablet Take 1-2 tablets daily or as directed    warfarin ANTICOAGULANT (COUMADIN) 1 MG tablet Take 1 tablet  (1 mg) by mouth daily    warfarin ANTICOAGULANT (COUMADIN) 3 MG tablet TAKE 3 MG BY MOUTH ON MONDAY, WEDNESDAY, FRIDAY, AND SATURDAY, AND 4.5 MG ON ALL OTHER DAYS OF THE WEEK     No current facility-administered medications for this visit.     Treprostinil 45.5 ng/kg/min  Macitentan 10mg daily  Digoxin 62.5mcg daily  Bumetanide 4mg tid  Aspirin 81mg daily  Warfarin   .       ROS: Complete ROS per HPI otherwise negative           EXAM:  Vital signs:  /59 (BP Location: Right arm, Patient Position: Sitting, Cuff Size: Adult Regular)   Pulse 69   Wt 46.7 kg (103 lb)   BMI 20.80 kg/m     Exam deferred 2/2 virtual encounter     Labs and studies reviewed:     Recent Results (from the past 1008 hour(s))   INR (External Result)    Collection Time: 05/05/23 12:00 AM   Result Value Ref Range    INR (External) 2.3 (A) 0.9 - 1.1   INR (External Result)    Collection Time: 05/19/23 12:00 AM   Result Value Ref Range    INR (External) 1.9 (A) 0.9 - 1.1   INR (External Result)    Collection Time: 06/02/23 12:00 AM   Result Value Ref Range    INR (External) 2.2 (A) 0.9 - 1.1   INR point of care    Collection Time: 06/08/23 10:20 AM   Result Value Ref Range    INR 2.2 (H) 0.9 - 1.1   Comprehensive metabolic panel    Collection Time: 06/08/23 10:20 AM   Result Value Ref Range    Sodium 144 136 - 145 mmol/L    Potassium 4.9 3.4 - 5.3 mmol/L    Chloride 101 98 - 107 mmol/L    Carbon Dioxide (CO2) 30 (H) 22 - 29 mmol/L    Anion Gap 13 7 - 15 mmol/L    Urea Nitrogen 24.7 (H) 8.0 - 23.0 mg/dL    Creatinine 0.98 (H) 0.51 - 0.95 mg/dL    Calcium 10.2 8.8 - 10.2 mg/dL    Glucose 73 70 - 99 mg/dL    Alkaline Phosphatase 89 35 - 104 U/L    AST 34 10 - 35 U/L    ALT 16 10 - 35 U/L    Protein Total 7.4 6.4 - 8.3 g/dL    Albumin 4.1 3.5 - 5.2 g/dL    Bilirubin Total 0.3 <=1.2 mg/dL    GFR Estimate 58 (L) >60 mL/min/1.73m2   CBC with platelets    Collection Time: 06/08/23 10:20 AM   Result Value Ref Range    WBC Count 5.3 4.0 - 11.0 10e3/uL     RBC Count 3.87 3.80 - 5.20 10e6/uL    Hemoglobin 11.5 (L) 11.7 - 15.7 g/dL    Hematocrit 37.1 35.0 - 47.0 %    MCV 96 78 - 100 fL    MCH 29.7 26.5 - 33.0 pg    MCHC 31.0 (L) 31.5 - 36.5 g/dL    RDW 14.5 10.0 - 15.0 %    Platelet Count 187 150 - 450 10e3/uL   N terminal pro BNP outpatient    Collection Time: 06/08/23 10:20 AM   Result Value Ref Range    N Terminal Pro BNP Outpatient 1,325 0 - 1,800 pg/mL     Echocardiogram:     TTE (04/2022)  Left ventricular function is normal.The ejection fraction is 55-60%. Grade II  or moderate diastolic dysfunction. Paradoxical septal motion consistent with  right ventricular pressure overload is present.  The RV is not clearly visualized but the size appears to be moderately dilated  and function appears to be at least mildly reduced.  The estimated PA systolic pressure is 55 mmHg above the RA pressure.  The left atrial pressure is elevated.  No pericardial effusion is present.  This study was compared with the study from 06/03/2021. The estimated PA  systolic pressure is lower. The RV function and size are similar on direct  visual comparison. The pericardial effusion is also no longer evident.      3/17/21 RHC:  RA: 14  PA: 88/22, mPA44  PCWP: 14  TD CO/CI: 3.97/2.8  Concepcion CO/CI: 2.95/2.1  PVR: 10.2     Assessment and Plan:      Mrs. Karen Anderson is a 82 year old female with proximal chronic thromboembolic disease but not a candidate for surgical thromboendarterectomy.  She is currently on medical therapy with IV treprostinil 46 ng/kg/min and macitentan 10 mg daily.  She did not tolerate BPA.  She had hemoptysis.    I am delighted to say that she is overall stable and had a good year.  She has not had any recent hospitalizations related to heart failure.  Her weight has been stable.  Her NT proBNP is overall stable.  She is tolerating the treprostinil and macitentan without any significant side effects.    Thus, I did not make any changes.  I have recommended her to  continue the current regimen of IV treprostinil 45 ng/kg/min and macitentan 10 mg daily.  We will continue her on bumetanide 4 mg 3 times a day, digoxin 62.5 mcg daily, and Coumadin for long-term anticoagulation with an INR of 2-3.    She states that her oxygen and saturation during supine position has been on and off below 90%.  We will do an overnight oximetry to make sure that she is not desaturating.  She is currently using 2 L during nighttime.  We will increase it as needed.      She will return to see us in 3 months at [facility] in person with a repeat echocardiogram and labs.  She will call us in the interim should any further worsening symptoms.  She is very hesitant to come to the [facility] because of her prior prolonged admissions.       Video-Visit Details     Type of service:  Video Visit     Video Start Time: 3.10 pm  Video End Time: 3.40 pm    Patient gave verbal consent for Videovisit    Originating Location (pt. Location): Home     Distant Location (provider location):  [facility]      Platform used for Video Visit: orderbolt video call    Total time today was 44 minutes reviewing notes, imaging, labs, patient visit, orders and documentation     Sincerely,  Karolina Turcios MD   [facility]  Heart Failure, Transplant, and Mechanical Circulatory Support Cardiology   Cardiovascular Division  [facility]     [phone]  cc:   MAGEN Saeed  [facility]  [address] [place] [id number]

## 2023-06-13 NOTE — PATIENT INSTRUCTIONS
Medication Changes:   No changes    Follow up Appointment Information:  Follow-up in 3 months with echocardiogram prior at [facility]. Labs will be obtained during the clinic visit        Thank you for allowing us to be a part of your care here at the [facility]    If you have questions or concerns please contact us at:      Rebecca Ngo RN, BSN  Rosa Moya ()  Nurse Coordinator     Clinic   (Phone)[phone]    (Phone) [phone]  (Main Number) [phone]  Yessica Rivera (Prior Authorizations)                   Phone: [phone]                                      On Call Cardiologist for after hours or on weekends: [phone]    option #4

## 2023-06-15 DIAGNOSIS — I27.20 PULMONARY HYPERTENSION (H): ICD-10-CM

## 2023-06-15 DIAGNOSIS — R06.09 DYSPNEA ON EXERTION: ICD-10-CM

## 2023-06-16 ENCOUNTER — ANTICOAGULATION THERAPY VISIT (OUTPATIENT)
Dept: ANTICOAGULATION | Facility: CLINIC | Age: 83
End: 2023-06-16
Payer: COMMERCIAL

## 2023-06-16 ENCOUNTER — TRANSFERRED RECORDS (OUTPATIENT)
Dept: HEALTH INFORMATION MANAGEMENT | Facility: CLINIC | Age: 83
End: 2023-06-16
Payer: COMMERCIAL

## 2023-06-16 DIAGNOSIS — I27.20 PULMONARY HYPERTENSION (H): Primary | ICD-10-CM

## 2023-06-16 DIAGNOSIS — I27.24 CTEPH (CHRONIC THROMBOEMBOLIC PULMONARY HYPERTENSION) (H): ICD-10-CM

## 2023-06-16 DIAGNOSIS — Z79.01 LONG TERM CURRENT USE OF ANTICOAGULANT THERAPY: ICD-10-CM

## 2023-06-16 LAB
INR (EXTERNAL): 2.4 (ref 0.9–1.1)
INR HOME MONITORING: 2.4 RATIO (ref 2–2.5)

## 2023-06-16 NOTE — PROGRESS NOTES
ANTICOAGULATION MANAGEMENT     Karen Anderson 82 year old female is on warfarin with therapeutic INR result. (Goal INR 2.0-3.0)    Recent labs: (last 7 days)     06/16/23  1258   INR 2.4*       ASSESSMENT       Source(s): Chart Review and Patient/Caregiver Call       Warfarin doses taken: Warfarin taken as instructed    Diet: No new diet changes identified    Medication/supplement changes: None noted    New illness, injury, or hospitalization: No    Signs or symptoms of bleeding or clotting: No    Previous result: Therapeutic last 2(+) visits    Additional findings: None         PLAN     Recommended plan for no diet, medication or health factor changes affecting INR     Dosing Instructions: Continue your current warfarin dose with next INR in 2 weeks       Summary  As of 6/16/2023    Full warfarin instructions:  4 mg every Mon, Wed; 5 mg all other days   Next INR check:  6/30/2023             Telephone call with Karen who verbalizes understanding and agrees to plan    Patient to recheck with home meter    Education provided:     Please call back if any changes to your diet, medications or how you've been taking warfarin    Plan made per [facility] anticoagulation protocol    Valentina Ma, RN  Anticoagulation Clinic  6/16/2023    _______________________________________________________________________     Anticoagulation Episode Summary     Current INR goal:  2.0-3.0   TTR:  67.4 % (1 y)   Target end date:  Indefinite   Send INR reminders to:  [facility]    Indications    Pulmonary hypertension (H) [I27.20]  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

## 2023-06-19 RX ORDER — BUMETANIDE 1 MG/1
1 TABLET ORAL 3 TIMES DAILY
Qty: 540 TABLET | Refills: 3 | OUTPATIENT
Start: 2023-06-19

## 2023-06-30 ENCOUNTER — ANTICOAGULATION THERAPY VISIT (OUTPATIENT)
Dept: ANTICOAGULATION | Facility: CLINIC | Age: 83
End: 2023-06-30
Payer: COMMERCIAL

## 2023-06-30 ENCOUNTER — TRANSFERRED RECORDS (OUTPATIENT)
Dept: HEALTH INFORMATION MANAGEMENT | Facility: CLINIC | Age: 83
End: 2023-06-30
Payer: COMMERCIAL

## 2023-06-30 DIAGNOSIS — I27.20 PULMONARY HYPERTENSION (H): Primary | ICD-10-CM

## 2023-06-30 DIAGNOSIS — Z79.01 LONG TERM CURRENT USE OF ANTICOAGULANT THERAPY: ICD-10-CM

## 2023-06-30 DIAGNOSIS — I27.24 CTEPH (CHRONIC THROMBOEMBOLIC PULMONARY HYPERTENSION) (H): ICD-10-CM

## 2023-06-30 LAB — INR HOME MONITORING: 2.4 RATIO (ref 2–2.5)

## 2023-06-30 NOTE — PROGRESS NOTES
ANTICOAGULATION MANAGEMENT     Karen Anderson 82 year old female is on warfarin with therapeutic INR result. (Goal INR 2.0-3.0)    Recent labs: (last 7 days)     06/30/23  1248   INR 2.4       ASSESSMENT       Source(s): Chart Review and Patient/Caregiver Call       Warfarin doses taken: Warfarin taken as instructed    Diet: No new diet changes identified    Medication/supplement changes: None noted    New illness, injury, or hospitalization: No    Signs or symptoms of bleeding or clotting: No    Previous result: Therapeutic last 2(+) visits    Additional findings: None         PLAN     Recommended plan for no diet, medication or health factor changes affecting INR     Dosing Instructions: Continue your current warfarin dose with next INR in 2 weeks       Summary  As of 6/30/2023    Full warfarin instructions:  4 mg every Mon, Wed; 5 mg all other days   Next INR check:  7/14/2023             Telephone call with Karen who verbalizes understanding and agrees to plan and who agrees to plan and repeated back plan correctly    Patient to recheck with home meter    Education provided:     Goal range and lab monitoring: goal range and significance of current result    Plan made per [facility] anticoagulation protocol    Fabby Ferguson RN  Anticoagulation Clinic  6/30/2023    _______________________________________________________________________     Anticoagulation Episode Summary     Current INR goal:  2.0-3.0   TTR:  68.0 % (1 y)   Target end date:  Indefinite   Send INR reminders to:  [facility]    Indications    Pulmonary hypertension (H) [I27.20]  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

## 2023-07-14 ENCOUNTER — ANTICOAGULATION THERAPY VISIT (OUTPATIENT)
Dept: ANTICOAGULATION | Facility: CLINIC | Age: 83
End: 2023-07-14
Payer: COMMERCIAL

## 2023-07-14 ENCOUNTER — TRANSFERRED RECORDS (OUTPATIENT)
Dept: HEALTH INFORMATION MANAGEMENT | Facility: CLINIC | Age: 83
End: 2023-07-14
Payer: COMMERCIAL

## 2023-07-14 LAB — INR HOME MONITORING: 2.6 RATIO (ref 2–2.5)

## 2023-07-14 NOTE — PROGRESS NOTES
ANTICOAGULATION MANAGEMENT     Karen Anderson 82 year old female is on warfarin with therapeutic INR result. (Goal INR 2.0-3.0)    Recent labs: (last 7 days)     07/14/23  1200   INR 2.6*       ASSESSMENT       Source(s): Chart Review and Patient/Caregiver Call       Warfarin doses taken: Warfarin taken as instructed    Diet: No new diet changes identified    Medication/supplement changes: None noted    New illness, injury, or hospitalization: No    Signs or symptoms of bleeding or clotting: No    Previous result: Therapeutic last 2(+) visits    Additional findings: None         PLAN     Recommended plan for no diet, medication or health factor changes affecting INR     Dosing Instructions: Continue your current warfarin dose with next INR in 2 weeks       Summary  As of 7/14/2023    Full warfarin instructions:  4 mg every Mon, Wed; 5 mg all other days   Next INR check:  7/28/2023             Telephone call with Karen who verbalizes understanding and agrees to plan    Patient to recheck with home meter    Education provided:     Contact [phone] with any changes, questions or concerns.     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

## 2023-07-28 ENCOUNTER — TRANSFERRED RECORDS (OUTPATIENT)
Dept: HEALTH INFORMATION MANAGEMENT | Facility: CLINIC | Age: 83
End: 2023-07-28
Payer: COMMERCIAL

## 2023-07-28 ENCOUNTER — ANTICOAGULATION THERAPY VISIT (OUTPATIENT)
Dept: ANTICOAGULATION | Facility: CLINIC | Age: 83
End: 2023-07-28
Payer: COMMERCIAL

## 2023-07-28 DIAGNOSIS — I27.24 CTEPH (CHRONIC THROMBOEMBOLIC PULMONARY HYPERTENSION) (H): ICD-10-CM

## 2023-07-28 DIAGNOSIS — Z79.01 LONG TERM CURRENT USE OF ANTICOAGULANT THERAPY: ICD-10-CM

## 2023-07-28 DIAGNOSIS — I27.20 PULMONARY HYPERTENSION (H): Primary | ICD-10-CM

## 2023-07-28 LAB — INR HOME MONITORING: 1.9 RATIO (ref 2–2.5)

## 2023-07-28 NOTE — TELEPHONE ENCOUNTER
F/U appt scheduled. Asked patient to schedule labs locally prior to video visit. Kezia Arnett RN on 2/25/2022 at 2:04 PM    
ASP - Renal dose adjustment

## 2023-07-28 NOTE — PROGRESS NOTES
ANTICOAGULATION MANAGEMENT     Karen Anderson 82 year old female is on warfarin with subtherapeutic INR result. (Goal INR 2.0-3.0)    Recent labs: (last 7 days)     07/28/23  1429   INR 1.9*       ASSESSMENT     Source(s): Patient/Caregiver Call     Warfarin doses taken: Warfarin taken as instructed  Diet: Increased greens/vitamin K in diet; plans to resume previous intake  Medication/supplement changes: None noted  New illness, injury, or hospitalization: No  Signs or symptoms of bleeding or clotting: No  Previous result: Therapeutic last 2(+) visits  Additional findings: None       PLAN     Recommended plan for temporary change(s) affecting INR     Dosing Instructions: Continue your current warfarin dose with next INR in 2 weeks       Summary  As of 7/28/2023      Full warfarin instructions:  4 mg every Mon, Wed; 5 mg all other days   Next INR check:  8/11/2023               Telephone call with Karen who verbalizes understanding and agrees to plan and who agrees to plan and repeated back plan correctly    Patient to recheck with home meter    Education provided:   None required    Plan made per [facility] anticoagulation protocol    Tanisha Kimbrough RN  Anticoagulation Clinic  7/28/2023    _______________________________________________________________________     Anticoagulation Episode Summary       Current INR goal:  2.0-3.0   TTR:  75.2 % (1 y)   Target end date:  Indefinite   Send INR reminders to:  [facility]    Indications    Pulmonary hypertension (H) [I27.20]  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

## 2023-08-04 DIAGNOSIS — I27.20 PULMONARY HYPERTENSION (H): ICD-10-CM

## 2023-08-04 DIAGNOSIS — I27.24 CTEPH (CHRONIC THROMBOEMBOLIC PULMONARY HYPERTENSION) (H): ICD-10-CM

## 2023-08-04 DIAGNOSIS — Z79.01 LONG TERM CURRENT USE OF ANTICOAGULANT THERAPY: ICD-10-CM

## 2023-08-07 ENCOUNTER — DOCUMENTATION ONLY (OUTPATIENT)
Dept: ANTICOAGULATION | Facility: CLINIC | Age: 83
End: 2023-08-07
Payer: COMMERCIAL

## 2023-08-07 RX ORDER — WARFARIN SODIUM 1 MG/1
TABLET ORAL
Qty: 180 TABLET | Refills: 1 | Status: SHIPPED | OUTPATIENT
Start: 2023-08-07 | End: 2024-01-15

## 2023-08-07 NOTE — PROGRESS NOTES
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 Rx instructions and quantity supplied updated to match patient's current dosing plan.  180 day supply with 1 refills granted per [facility] protocol     Tanisha Kimbrough RN  Anticoagulation Clinic

## 2023-08-11 ENCOUNTER — ANTICOAGULATION THERAPY VISIT (OUTPATIENT)
Dept: ANTICOAGULATION | Facility: CLINIC | Age: 83
End: 2023-08-11
Payer: COMMERCIAL

## 2023-08-11 DIAGNOSIS — I27.20 PULMONARY HYPERTENSION (H): Primary | ICD-10-CM

## 2023-08-11 DIAGNOSIS — I27.24 CTEPH (CHRONIC THROMBOEMBOLIC PULMONARY HYPERTENSION) (H): ICD-10-CM

## 2023-08-11 DIAGNOSIS — Z79.01 LONG TERM CURRENT USE OF ANTICOAGULANT THERAPY: ICD-10-CM

## 2023-08-11 LAB — INR HOME MONITORING: 1.9 RATIO (ref 2–2.5)

## 2023-08-11 NOTE — PROGRESS NOTES
ANTICOAGULATION MANAGEMENT     Karen Anderson 82 year old female is on warfarin with subtherapeutic INR result. (Goal INR 2.0-3.0)    Recent labs: (last 7 days)     08/11/23  1403   INR 1.9*       ASSESSMENT     Source(s): Chart Review and Patient/Caregiver Call     Warfarin doses taken: Warfarin taken as instructed  Diet: No new diet changes identified  Medication/supplement changes: None noted  New illness, injury, or hospitalization: No  Signs or symptoms of bleeding or clotting: Yes: had trace of blood on tissue after having BM.  Karen not sure if from BM or urine.  Happened x 1 a few days ago; nothing since.  She will monitor this and be seen if it occurs again.  Previous result: Subtherapeutic  Additional findings: None       PLAN     Recommended plan for no diet, medication or health factor changes affecting INR     Dosing Instructions: Increase your warfarin dose (6% change) with next INR in 1 week       Summary  As of 8/11/2023      Full warfarin instructions:  5 mg every day   Next INR check:  8/18/2023               Telephone call with Karen who agrees to plan and repeated back plan correctly    Patient to recheck with home meter    Education provided:   Symptom monitoring: monitoring for bleeding signs and symptoms and when to seek medical attention/emergency care    Plan made per [facility] anticoagulation protocol    Laure Enriquez RN  Anticoagulation Clinic  8/11/2023    _______________________________________________________________________     Anticoagulation Episode Summary       Current INR goal:  2.0-3.0   TTR:  73.3 % (1 y)   Target end date:  Indefinite   Send INR reminders to:  [facility]    Indications    Pulmonary hypertension (H) [I27.20]  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

## 2023-08-18 ENCOUNTER — ANTICOAGULATION THERAPY VISIT (OUTPATIENT)
Dept: ANTICOAGULATION | Facility: CLINIC | Age: 83
End: 2023-08-18
Payer: COMMERCIAL

## 2023-08-18 DIAGNOSIS — I27.24 CTEPH (CHRONIC THROMBOEMBOLIC PULMONARY HYPERTENSION) (H): ICD-10-CM

## 2023-08-18 DIAGNOSIS — Z79.01 LONG TERM CURRENT USE OF ANTICOAGULANT THERAPY: ICD-10-CM

## 2023-08-18 DIAGNOSIS — I27.20 PULMONARY HYPERTENSION (H): Primary | ICD-10-CM

## 2023-08-18 LAB — INR HOME MONITORING: 2.1 RATIO (ref 2–2.5)

## 2023-08-18 NOTE — PROGRESS NOTES
ANTICOAGULATION MANAGEMENT     Karen Anderson 82 year old female is on warfarin with therapeutic INR result. (Goal INR 2.0-3.0)    Recent labs: (last 7 days)     08/18/23  1106   INR 2.1       ASSESSMENT     Source(s): Chart Review and Patient/Caregiver Call     Warfarin doses taken: Warfarin taken as instructed  Diet: No new diet changes identified  Medication/supplement changes: None noted  New illness, injury, or hospitalization: No  Signs or symptoms of bleeding or clotting: No  Previous result: Subtherapeutic  Additional findings: None       PLAN     Recommended plan for no diet, medication or health factor changes affecting INR     Dosing Instructions: Continue your current warfarin dose with next INR in 1-2 weeks       Summary  As of 8/18/2023      Full warfarin instructions:  5 mg every day   Next INR check:  9/1/2023               Telephone call with Karen who verbalizes understanding and agrees to plan    Patient to recheck with home meter    Education provided:   Contact [phone] with any changes, questions or concerns.     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

## 2023-09-01 ENCOUNTER — ANTICOAGULATION THERAPY VISIT (OUTPATIENT)
Dept: ANTICOAGULATION | Facility: CLINIC | Age: 83
End: 2023-09-01
Payer: COMMERCIAL

## 2023-09-01 ENCOUNTER — TRANSFERRED RECORDS (OUTPATIENT)
Dept: HEALTH INFORMATION MANAGEMENT | Facility: CLINIC | Age: 83
End: 2023-09-01
Payer: COMMERCIAL

## 2023-09-01 DIAGNOSIS — I27.20 PULMONARY HYPERTENSION (H): Primary | ICD-10-CM

## 2023-09-01 DIAGNOSIS — I27.24 CTEPH (CHRONIC THROMBOEMBOLIC PULMONARY HYPERTENSION) (H): ICD-10-CM

## 2023-09-01 DIAGNOSIS — Z79.01 LONG TERM CURRENT USE OF ANTICOAGULANT THERAPY: ICD-10-CM

## 2023-09-01 LAB
INR (EXTERNAL): 2.5 (ref 0.9–1.1)
INR HOME MONITORING: 2.5 RATIO (ref 2–2.5)

## 2023-09-01 NOTE — PROGRESS NOTES
ANTICOAGULATION MANAGEMENT     Karen RICO Calderonavle 83 year old female is on warfarin with therapeutic INR result. (Goal INR 2.0-3.0)    Recent labs: (last 7 days)     09/01/23  0000   INR 2.5*       ASSESSMENT     Source(s): Patient/Caregiver Call     Warfarin doses taken: Warfarin taken as instructed  Diet: No new diet changes identified  Medication/supplement changes: None noted  New illness, injury, or hospitalization: No  Signs or symptoms of bleeding or clotting: No  Previous result: Therapeutic last 2(+) visits  Additional findings: None       PLAN     Recommended plan for no diet, medication or health factor changes affecting INR     Dosing Instructions: Continue your current warfarin dose with next INR in 2 weeks       Summary  As of 9/1/2023      Full warfarin instructions:  5 mg every day   Next INR check:  9/15/2023               Telephone call with Karen who verbalizes understanding and agrees to plan and who agrees to plan and repeated back plan correctly    Patient to recheck with home meter    Education provided:   None required    Plan made per [facility] anticoagulation protocol    Tanisha Kimbrough RN  Anticoagulation Clinic  9/1/2023    _______________________________________________________________________     Anticoagulation Episode Summary       Current INR goal:  2.0-3.0   TTR:  72.3 % (1 y)   Target end date:  Indefinite   Send INR reminders to:  [facility]    Indications    Pulmonary hypertension (H) [I27.20]  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

## 2023-09-07 DIAGNOSIS — I27.20 PULMONARY HYPERTENSION (H): ICD-10-CM

## 2023-09-07 RX ORDER — DIGOXIN 125 MCG
62.5 TABLET ORAL DAILY
Qty: 15 TABLET | Status: CANCELLED | OUTPATIENT
Start: 2023-09-07

## 2023-09-07 RX ORDER — DIGOXIN 125 MCG
62.5 TABLET ORAL DAILY
Qty: 45 TABLET | Refills: 3 | Status: SHIPPED | OUTPATIENT
Start: 2023-09-07 | End: 2024-09-11

## 2023-09-14 ENCOUNTER — ANCILLARY PROCEDURE (OUTPATIENT)
Dept: CARDIOLOGY | Facility: CLINIC | Age: 83
End: 2023-09-14
Attending: INTERNAL MEDICINE
Payer: COMMERCIAL

## 2023-09-14 DIAGNOSIS — R06.09 DOE (DYSPNEA ON EXERTION): ICD-10-CM

## 2023-09-14 DIAGNOSIS — I27.20 PULMONARY HYPERTENSION (H): ICD-10-CM

## 2023-09-14 LAB — LVEF ECHO: NORMAL

## 2023-09-14 PROCEDURE — 93306 TTE W/DOPPLER COMPLETE: CPT | Mod: GC | Performed by: INTERNAL MEDICINE

## 2023-09-15 ENCOUNTER — ANTICOAGULATION THERAPY VISIT (OUTPATIENT)
Dept: ANTICOAGULATION | Facility: CLINIC | Age: 83
End: 2023-09-15
Payer: COMMERCIAL

## 2023-09-15 ENCOUNTER — TRANSFERRED RECORDS (OUTPATIENT)
Dept: HEALTH INFORMATION MANAGEMENT | Facility: CLINIC | Age: 83
End: 2023-09-15
Payer: COMMERCIAL

## 2023-09-15 DIAGNOSIS — I27.20 PULMONARY HYPERTENSION (H): Primary | ICD-10-CM

## 2023-09-15 DIAGNOSIS — I27.24 CTEPH (CHRONIC THROMBOEMBOLIC PULMONARY HYPERTENSION) (H): ICD-10-CM

## 2023-09-15 DIAGNOSIS — Z79.01 LONG TERM CURRENT USE OF ANTICOAGULANT THERAPY: ICD-10-CM

## 2023-09-15 LAB — INR HOME MONITORING: 2.4 RATIO (ref 2–2.5)

## 2023-09-15 NOTE — PROGRESS NOTES
ANTICOAGULATION MANAGEMENT     Karen Anderson 83 year old female is on warfarin with therapeutic INR result. (Goal INR 2.0-3.0)    Recent labs: (last 7 days)     09/15/23  1253   INR 2.4       ASSESSMENT     Source(s): Chart Review and Patient/Caregiver Call     Warfarin doses taken: Warfarin taken as instructed  Diet: No new diet changes identified  Medication/supplement changes: None noted  New illness, injury, or hospitalization: No  Signs or symptoms of bleeding or clotting: No  Previous result: Therapeutic last 2(+) visits  Additional findings: None       PLAN     Recommended plan for no diet, medication or health factor changes affecting INR     Dosing Instructions: Continue your current warfarin dose with next INR in 2 weeks       Summary  As of 9/15/2023      Full warfarin instructions:  5 mg every day   Next INR check:  9/29/2023               Telephone call with Karen who verbalizes understanding and agrees to plan    Patient to recheck with home meter    Education provided:   Please call back if any changes to your diet, medications or how you've been taking warfarin    Plan made per [facility] anticoagulation protocol    Valentina Ma RN  Anticoagulation Clinic  9/15/2023    _______________________________________________________________________     Anticoagulation Episode Summary       Current INR goal:  2.0-3.0   TTR:  72.3 % (1 y)   Target end date:  Indefinite   Send INR reminders to:  [facility]    Indications    Pulmonary hypertension (H) [I27.20]  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

## 2023-09-29 ENCOUNTER — TRANSFERRED RECORDS (OUTPATIENT)
Dept: HEALTH INFORMATION MANAGEMENT | Facility: CLINIC | Age: 83
End: 2023-09-29
Payer: COMMERCIAL

## 2023-09-29 ENCOUNTER — ANTICOAGULATION THERAPY VISIT (OUTPATIENT)
Dept: ANTICOAGULATION | Facility: CLINIC | Age: 83
End: 2023-09-29
Payer: COMMERCIAL

## 2023-09-29 DIAGNOSIS — I27.24 CTEPH (CHRONIC THROMBOEMBOLIC PULMONARY HYPERTENSION) (H): ICD-10-CM

## 2023-09-29 DIAGNOSIS — I27.20 PULMONARY HYPERTENSION (H): Primary | ICD-10-CM

## 2023-09-29 DIAGNOSIS — Z79.01 LONG TERM CURRENT USE OF ANTICOAGULANT THERAPY: ICD-10-CM

## 2023-09-29 LAB — INR HOME MONITORING: 2.2 RATIO (ref 2–2.5)

## 2023-09-29 NOTE — PROGRESS NOTES
ANTICOAGULATION MANAGEMENT     Karen RICO Calderonvale 83 year old female is on warfarin with therapeutic INR result. (Goal INR 2.0-3.0)    Recent labs: (last 7 days)     09/29/23  1439   INR 2.2       ASSESSMENT     Source(s): Chart Review and Patient/Caregiver Call     Warfarin doses taken: Warfarin taken as instructed  Diet: No new diet changes identified  Medication/supplement changes: None noted  New illness, injury, or hospitalization: No  Signs or symptoms of bleeding or clotting: No  Previous result: Therapeutic last 2(+) visits  Additional findings: None       PLAN     Recommended plan for no diet, medication or health factor changes affecting INR     Dosing Instructions: Continue your current warfarin dose with next INR in 2 weeks       Summary  As of 9/29/2023      Full warfarin instructions:  5 mg every day   Next INR check:  10/13/2023               Telephone call with Karen who verbalizes understanding and agrees to plan    Patient to recheck with home meter    Education provided:   Goal range and lab monitoring: goal range and significance of current result    Plan made per [facility] anticoagulation protocol    Fabby Ferguson RN  Anticoagulation Clinic  9/29/2023    _______________________________________________________________________     Anticoagulation Episode Summary       Current INR goal:  2.0-3.0   TTR:  72.3 % (1 y)   Target end date:  Indefinite   Send INR reminders to:  [facility]    Indications    Pulmonary hypertension (H) [I27.20]  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

## 2023-10-13 ENCOUNTER — TRANSFERRED RECORDS (OUTPATIENT)
Dept: HEALTH INFORMATION MANAGEMENT | Facility: CLINIC | Age: 83
End: 2023-10-13
Payer: COMMERCIAL

## 2023-10-13 ENCOUNTER — ANTICOAGULATION THERAPY VISIT (OUTPATIENT)
Dept: ANTICOAGULATION | Facility: CLINIC | Age: 83
End: 2023-10-13
Payer: COMMERCIAL

## 2023-10-13 DIAGNOSIS — I27.24 CTEPH (CHRONIC THROMBOEMBOLIC PULMONARY HYPERTENSION) (H): ICD-10-CM

## 2023-10-13 DIAGNOSIS — I27.20 PULMONARY HYPERTENSION (H): Primary | ICD-10-CM

## 2023-10-13 DIAGNOSIS — Z79.01 LONG TERM CURRENT USE OF ANTICOAGULANT THERAPY: ICD-10-CM

## 2023-10-13 LAB — INR HOME MONITORING: 1.4 RATIO (ref 2–2.5)

## 2023-10-13 NOTE — PROGRESS NOTES
ANTICOAGULATION MANAGEMENT     Karen Anderson 83 year old female is on warfarin with subtherapeutic INR result. (Goal INR 2.0-3.0)    Recent labs: (last 7 days)     10/13/23  1228   INR 1.4*       ASSESSMENT     Source(s): Chart Review and Patient/Caregiver Call     Warfarin doses taken: Warfarin taken as instructed  Diet: Increased greens/vitamin K in diet; plans to resume previous intake  Medication/supplement changes: None noted  New illness, injury, or hospitalization: pt reports that her eye is still red from two weeks ago when she bumped it. It is improving. Denies pain, visual disturbances   Signs or symptoms of bleeding or clotting: Yes: pt accidentally cut her arm on a box yesterday. Bled easier than normal, but pt states she usually does bleed easier due to coumadin    Previous result: Therapeutic last 2(+) visits  Additional findings: None       PLAN     Recommended plan for temporary change(s) affecting INR     Dosing Instructions: booster dose then continue your current warfarin dose with next INR in 1 week       Summary  As of 10/13/2023      Full warfarin instructions:  10/13: 9 mg; Otherwise 5 mg every day   Next INR check:  10/20/2023               Telephone call with Karen who verbalizes understanding and agrees to plan    Patient to recheck with home meter    Education provided:   Please call back if any changes to your diet, medications or how you've been taking warfarin  Goal range and lab monitoring: goal range and significance of current result, Importance of therapeutic range, and Importance of following up at instructed interval  Symptom monitoring: monitoring for clotting signs and symptoms and monitoring for stroke signs and symptoms    Plan made per [facility] anticoagulation protocol    Valentina Ma, RN  Anticoagulation Clinic  10/13/2023    _______________________________________________________________________     Anticoagulation Episode Summary       Current INR goal:  2.0-3.0    TTR:  69.5% (1 y)   Target end date:  Indefinite   Send INR reminders to:  [facility]    Indications    Pulmonary hypertension (H) [I27.20]  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

## 2023-10-20 ENCOUNTER — ANTICOAGULATION THERAPY VISIT (OUTPATIENT)
Dept: ANTICOAGULATION | Facility: CLINIC | Age: 83
End: 2023-10-20
Payer: COMMERCIAL

## 2023-10-20 DIAGNOSIS — I27.20 PULMONARY HYPERTENSION (H): Primary | ICD-10-CM

## 2023-10-20 DIAGNOSIS — Z79.01 LONG TERM CURRENT USE OF ANTICOAGULANT THERAPY: ICD-10-CM

## 2023-10-20 DIAGNOSIS — I27.24 CTEPH (CHRONIC THROMBOEMBOLIC PULMONARY HYPERTENSION) (H): ICD-10-CM

## 2023-10-20 LAB — INR HOME MONITORING: 2 RATIO (ref 2–2.5)

## 2023-10-20 NOTE — PROGRESS NOTES
ANTICOAGULATION MANAGEMENT     Karen Anderson 83 year old female is on warfarin with therapeutic INR result. (Goal INR 2.0-3.0)    Recent labs: (last 7 days)     10/20/23  1300   INR 2       ASSESSMENT     Source(s): Chart Review and Patient/Caregiver Call     Warfarin doses taken: Warfarin taken as instructed  Diet: No new diet changes identified  Medication/supplement changes: None noted  New illness, injury, or hospitalization: No  Signs or symptoms of bleeding or clotting: No  Previous result: Subtherapeutic  Additional findings: None       PLAN     Recommended plan for no diet, medication or health factor changes affecting INR     Dosing Instructions: Continue your current warfarin dose with next INR in 2 weeks       Summary  As of 10/20/2023      Full warfarin instructions:  5 mg every day   Next INR check:  11/3/2023               Telephone call with Karen who verbalizes understanding and agrees to plan and who agrees to plan and repeated back plan correctly    Patient to recheck with home meter    Education provided:   None required    Plan made per [facility] anticoagulation protocol    Jaylan Santana, RN  Anticoagulation Clinic  10/20/2023    _______________________________________________________________________     Anticoagulation Episode Summary       Current INR goal:  2.0-3.0   TTR:  67.5% (1 y)   Target end date:  Indefinite   Send INR reminders to:  [facility]    Indications    Pulmonary hypertension (H) [I27.20]  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

## 2023-11-03 ENCOUNTER — ANTICOAGULATION THERAPY VISIT (OUTPATIENT)
Dept: ANTICOAGULATION | Facility: CLINIC | Age: 83
End: 2023-11-03
Payer: COMMERCIAL

## 2023-11-03 DIAGNOSIS — R06.02 SOB (SHORTNESS OF BREATH): ICD-10-CM

## 2023-11-03 DIAGNOSIS — I27.20 PULMONARY HYPERTENSION (H): Primary | ICD-10-CM

## 2023-11-03 DIAGNOSIS — I27.24 CTEPH (CHRONIC THROMBOEMBOLIC PULMONARY HYPERTENSION) (H): ICD-10-CM

## 2023-11-03 DIAGNOSIS — Z79.01 LONG TERM CURRENT USE OF ANTICOAGULANT THERAPY: ICD-10-CM

## 2023-11-03 DIAGNOSIS — I27.20 PULMONARY HYPERTENSION (H): ICD-10-CM

## 2023-11-03 LAB — INR HOME MONITORING: 2.5 RATIO (ref 2–2.5)

## 2023-11-03 NOTE — PROGRESS NOTES
ANTICOAGULATION MANAGEMENT     Karen Anderson 83 year old female is on warfarin with therapeutic INR result. (Goal INR 2.0-3.0)    Recent labs: (last 7 days)     11/03/23  1456   INR 2.5       ASSESSMENT     Source(s): Chart Review and Patient/Caregiver Call     Warfarin doses taken: Warfarin taken as instructed  Diet: No new diet changes identified  Medication/supplement changes: None noted  New illness, injury, or hospitalization: No  Signs or symptoms of bleeding or clotting: No  Previous result: Therapeutic last visit; previously outside of goal range  Additional findings: None       PLAN     Recommended plan for no diet, medication or health factor changes affecting INR     Dosing Instructions: Continue your current warfarin dose with next INR in 2 weeks       Summary  As of 11/3/2023      Full warfarin instructions:  5 mg every day   Next INR check:  11/17/2023               Telephone call with Karen who agrees to plan and repeated back plan correctly    Patient to recheck with home meter    Education provided:   Goal range and lab monitoring: goal range and significance of current result and Importance of following up at instructed interval  Contact [phone] with any changes, questions or concerns.     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

## 2023-11-08 RX ORDER — POTASSIUM CHLORIDE 1500 MG/1
40 TABLET, EXTENDED RELEASE ORAL 3 TIMES DAILY
Qty: 540 TABLET | Refills: 1 | Status: SHIPPED | OUTPATIENT
Start: 2023-11-08 | End: 2023-12-12

## 2023-11-08 NOTE — TELEPHONE ENCOUNTER
Medication Refill double check:    Last virtual visit was on 6/13/23 with .    Follow up was recommended for 3 months. (OVERDUE)    Any additional encounters with changes to requested med? no    Authorizing provider is: Dr. Turcios    Refill was approved.     Additional orders/notes: Staff msg sent to Rosa to help patient schedule follow up appt (in-person).      Fabby Seymour RN on 11/8/2023 at 12:15 PM

## 2023-11-17 ENCOUNTER — ANTICOAGULATION THERAPY VISIT (OUTPATIENT)
Dept: ANTICOAGULATION | Facility: CLINIC | Age: 83
End: 2023-11-17
Payer: COMMERCIAL

## 2023-11-17 ENCOUNTER — TRANSFERRED RECORDS (OUTPATIENT)
Dept: HEALTH INFORMATION MANAGEMENT | Facility: CLINIC | Age: 83
End: 2023-11-17
Payer: COMMERCIAL

## 2023-11-17 DIAGNOSIS — I27.24 CTEPH (CHRONIC THROMBOEMBOLIC PULMONARY HYPERTENSION) (H): ICD-10-CM

## 2023-11-17 DIAGNOSIS — Z79.01 LONG TERM CURRENT USE OF ANTICOAGULANT THERAPY: ICD-10-CM

## 2023-11-17 DIAGNOSIS — I27.20 PULMONARY HYPERTENSION (H): Primary | ICD-10-CM

## 2023-11-17 LAB — INR HOME MONITORING: 2.1 RATIO (ref 2–2.5)

## 2023-11-17 NOTE — PROGRESS NOTES
ANTICOAGULATION MANAGEMENT     Karen JACKY Calderonvale 83 year old female is on warfarin with therapeutic INR result. (Goal INR 2.0-3.0)    Recent labs: (last 7 days)     11/17/23  1432   INR 2.1       ASSESSMENT     Source(s): Chart Review  Previous INR was Therapeutic last 2(+) visits  Medication, diet, health changes since last INR chart reviewed; none identified         PLAN     Recommended plan for no diet, medication or health factor changes affecting INR     Dosing Instructions: Continue your current warfarin dose with next INR in 2 weeks       Summary  As of 11/17/2023      Full warfarin instructions:  5 mg every day   Next INR check:                 Telephone call with Karen who verbalizes understanding and agrees to plan    Patient to recheck with home meter    Education provided:   Please call back if any changes to your diet, medications or how you've been taking warfarin  Goal range and lab monitoring: goal range and significance of current result    Plan made per [facility] anticoagulation protocol    Karoline Barcenas RN  Anticoagulation Clinic  11/17/2023    _______________________________________________________________________     Anticoagulation Episode Summary       Current INR goal:  2.0-3.0   TTR:  72.9% (1 y)   Target end date:  Indefinite   Send INR reminders to:  [facility]    Indications    Pulmonary hypertension (H) [I27.20]  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

## 2023-11-24 ENCOUNTER — TRANSFERRED RECORDS (OUTPATIENT)
Dept: HEALTH INFORMATION MANAGEMENT | Facility: CLINIC | Age: 83
End: 2023-11-24
Payer: COMMERCIAL

## 2023-11-24 ENCOUNTER — ANTICOAGULATION THERAPY VISIT (OUTPATIENT)
Dept: ANTICOAGULATION | Facility: CLINIC | Age: 83
End: 2023-11-24
Payer: COMMERCIAL

## 2023-11-24 DIAGNOSIS — I27.24 CTEPH (CHRONIC THROMBOEMBOLIC PULMONARY HYPERTENSION) (H): ICD-10-CM

## 2023-11-24 DIAGNOSIS — Z79.01 LONG TERM CURRENT USE OF ANTICOAGULANT THERAPY: ICD-10-CM

## 2023-11-24 DIAGNOSIS — I27.20 PULMONARY HYPERTENSION (H): Primary | ICD-10-CM

## 2023-11-24 LAB
INR (EXTERNAL): 2.8 (ref 0.9–1.1)
INR HOME MONITORING: 2.8 RATIO (ref 2–2.5)

## 2023-11-24 NOTE — PROGRESS NOTES
ANTICOAGULATION MANAGEMENT     Karen JACKY Monica 83 year old female is on warfarin with therapeutic INR result. (Goal INR 2.0-3.0)    Recent labs: (last 7 days)     11/24/23  1523   INR 2.8*       ASSESSMENT     Source(s): Chart Review and Patient/Caregiver Call     Warfarin doses taken: Warfarin taken as instructed  Diet: No new diet changes identified  Medication/supplement changes: None noted  New illness, injury, or hospitalization: No  Signs or symptoms of bleeding or clotting: No  Previous result: Therapeutic last 2(+) visits  Additional findings: None       PLAN     Recommended plan for no diet, medication or health factor changes affecting INR     Dosing Instructions: Continue your current warfarin dose with next INR in 2 weeks       Summary  As of 11/24/2023      Full warfarin instructions:  5 mg every day   Next INR check:  12/8/2023               Telephone call with Karen who verbalizes understanding and agrees to plan    Patient to recheck with home meter    Education provided:   Please call back if any changes to your diet, medications or how you've been taking warfarin  Goal range and lab monitoring: goal range and significance of current result    Plan made per [facility] anticoagulation protocol    Karoline Barcenas RN  Anticoagulation Clinic  11/24/2023    _______________________________________________________________________     Anticoagulation Episode Summary       Current INR goal:  2.0-3.0   TTR:  73.9% (1 y)   Target end date:  Indefinite   Send INR reminders to:  [facility]    Indications    Pulmonary hypertension (H) [I27.20]  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

## 2023-12-07 ENCOUNTER — LAB (OUTPATIENT)
Dept: LAB | Facility: CLINIC | Age: 83
End: 2023-12-07
Payer: COMMERCIAL

## 2023-12-07 DIAGNOSIS — Z79.01 LONG TERM CURRENT USE OF ANTICOAGULANT THERAPY: ICD-10-CM

## 2023-12-07 DIAGNOSIS — R06.09 DOE (DYSPNEA ON EXERTION): ICD-10-CM

## 2023-12-07 DIAGNOSIS — I27.20 PULMONARY HYPERTENSION (H): ICD-10-CM

## 2023-12-07 DIAGNOSIS — I27.24 CTEPH (CHRONIC THROMBOEMBOLIC PULMONARY HYPERTENSION) (H): ICD-10-CM

## 2023-12-07 LAB
ALBUMIN SERPL BCG-MCNC: 4 G/DL (ref 3.5–5.2)
ALP SERPL-CCNC: 88 U/L (ref 40–150)
ALT SERPL W P-5'-P-CCNC: 18 U/L (ref 0–50)
ANION GAP SERPL CALCULATED.3IONS-SCNC: 10 MMOL/L (ref 7–15)
AST SERPL W P-5'-P-CCNC: 34 U/L (ref 0–45)
BILIRUB SERPL-MCNC: 0.2 MG/DL
BUN SERPL-MCNC: 33.9 MG/DL (ref 8–23)
CALCIUM SERPL-MCNC: 9.9 MG/DL (ref 8.8–10.2)
CHLORIDE SERPL-SCNC: 100 MMOL/L (ref 98–107)
CREAT SERPL-MCNC: 1.29 MG/DL (ref 0.51–0.95)
DEPRECATED HCO3 PLAS-SCNC: 30 MMOL/L (ref 22–29)
EGFRCR SERPLBLD CKD-EPI 2021: 41 ML/MIN/1.73M2
ERYTHROCYTE [DISTWIDTH] IN BLOOD BY AUTOMATED COUNT: 14.9 % (ref 10–15)
GLUCOSE SERPL-MCNC: 138 MG/DL (ref 70–99)
HCT VFR BLD AUTO: 35 % (ref 35–47)
HGB BLD-MCNC: 10.5 G/DL (ref 11.7–15.7)
INR PPP: 3.23 (ref 0.85–1.15)
MCH RBC QN AUTO: 29.2 PG (ref 26.5–33)
MCHC RBC AUTO-ENTMCNC: 30 G/DL (ref 31.5–36.5)
MCV RBC AUTO: 98 FL (ref 78–100)
NT-PROBNP SERPL-MCNC: 1977 PG/ML (ref 0–1800)
PLATELET # BLD AUTO: 224 10E3/UL (ref 150–450)
POTASSIUM SERPL-SCNC: 5.5 MMOL/L (ref 3.4–5.3)
PROT SERPL-MCNC: 7.1 G/DL (ref 6.4–8.3)
RBC # BLD AUTO: 3.59 10E6/UL (ref 3.8–5.2)
SODIUM SERPL-SCNC: 140 MMOL/L (ref 135–145)
WBC # BLD AUTO: 4.5 10E3/UL (ref 4–11)

## 2023-12-07 PROCEDURE — 85610 PROTHROMBIN TIME: CPT

## 2023-12-07 PROCEDURE — 85027 COMPLETE CBC AUTOMATED: CPT

## 2023-12-07 PROCEDURE — 80053 COMPREHEN METABOLIC PANEL: CPT

## 2023-12-07 PROCEDURE — 36415 COLL VENOUS BLD VENIPUNCTURE: CPT

## 2023-12-07 PROCEDURE — 83880 ASSAY OF NATRIURETIC PEPTIDE: CPT

## 2023-12-08 ENCOUNTER — ANTICOAGULATION THERAPY VISIT (OUTPATIENT)
Dept: ANTICOAGULATION | Facility: CLINIC | Age: 83
End: 2023-12-08
Payer: COMMERCIAL

## 2023-12-08 DIAGNOSIS — I27.24 CTEPH (CHRONIC THROMBOEMBOLIC PULMONARY HYPERTENSION) (H): ICD-10-CM

## 2023-12-08 DIAGNOSIS — I27.20 PULMONARY HYPERTENSION (H): Primary | ICD-10-CM

## 2023-12-08 DIAGNOSIS — Z79.01 LONG TERM CURRENT USE OF ANTICOAGULANT THERAPY: ICD-10-CM

## 2023-12-08 LAB — INR HOME MONITORING: 3.1 RATIO (ref 2–2.5)

## 2023-12-08 NOTE — PROGRESS NOTES
Noted home monitor INR today of 3.1 which correlates well with venous INR done at the lab. No change in management and patient not contacted again.

## 2023-12-08 NOTE — PROGRESS NOTES
ANTICOAGULATION MANAGEMENT     Karen Anderson 83 year old female is on warfarin with supratherapeutic INR result. (Goal INR 2.0-3.0)    Recent labs: (last 7 days)     12/07/23  1042   INR 3.23*       ASSESSMENT     Source(s): Chart Review  Previous INR was Therapeutic last 2(+) visits  Medication, diet, health changes since last INR chart reviewed; none identified         PLAN     Recommended plan for no diet, medication or health factor changes affecting INR     Dosing Instructions: Continue your current warfarin dose with next INR in 2 weeks       Summary  As of 12/8/2023      Full warfarin instructions:  5 mg every day   Next INR check:  12/21/2023               Detailed voice message left for Karen with dosing instructions and follow up date.     Patient to recheck with home meter    Education provided:   Please call back if any changes to your diet, medications or how you've been taking warfarin  Symptom monitoring: monitoring for bleeding signs and symptoms    Plan made per [facility] anticoagulation protocol    Sukhdev GOLDBERG RN  Anticoagulation Clinic  12/8/2023    _______________________________________________________________________     Anticoagulation Episode Summary       Current INR goal:  2.0-3.0   TTR:  71.9% (1 y)   Target end date:  Indefinite   Send INR reminders to:  [facility]    Indications    Pulmonary hypertension (H) [I27.20]  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

## 2023-12-12 ENCOUNTER — VIRTUAL VISIT (OUTPATIENT)
Dept: CARDIOLOGY | Facility: CLINIC | Age: 83
End: 2023-12-12
Attending: INTERNAL MEDICINE
Payer: COMMERCIAL

## 2023-12-12 VITALS — BODY MASS INDEX: 20.6 KG/M2 | WEIGHT: 102 LBS

## 2023-12-12 DIAGNOSIS — I27.20 PULMONARY HYPERTENSION (H): ICD-10-CM

## 2023-12-12 DIAGNOSIS — R06.02 SOB (SHORTNESS OF BREATH): ICD-10-CM

## 2023-12-12 PROCEDURE — 99215 OFFICE O/P EST HI 40 MIN: CPT | Mod: 95 | Performed by: INTERNAL MEDICINE

## 2023-12-12 RX ORDER — POTASSIUM CHLORIDE 1500 MG/1
20 TABLET, EXTENDED RELEASE ORAL 3 TIMES DAILY
Qty: 270 TABLET | Refills: 3 | Status: SHIPPED | OUTPATIENT
Start: 2023-12-12

## 2023-12-12 ASSESSMENT — PAIN SCALES - GENERAL: PAINLEVEL: NO PAIN (0)

## 2023-12-12 NOTE — PATIENT INSTRUCTIONS
You were seen today in the Pulmonary Hypertension Clinic at the [facility].     Cardiology Provider you saw during your visit:    Dr. Turcios    Medication Changes:  DECREASE potassium to 20mg three times daily    Follow-up:   Labs in 1 week to follow-up on your potassium  Virtual follow-up in 2 months with Shawna Marcum with labs locally prior      Please call us immediately if you have any syncope (fainting or passing out), chest pain, edema (swelling or weight gain), or decline in your functional status (general decline in how you are feeling).    If you have emergent concerns after hours or on the weekend, please call our on-call Cardiologist at [phone], option 4. For emergencies call [phone].     Thank you for allowing us to be a part of your care here at the [facility]    If you have questions or concerns please contact us at:    Rebecca Ngo RN (P: [phone])    Nurse Coordinator       Pulmonary Hypertension     [facility]         ALINA Carrillo   (Prior Authorizations)    ()  Clinic   Clinic   Pulmonary Hypertension   Pulmonary Hypertension  [facility]  [facility]  (P)[phone]    (P) [phone]  (F) [phone]

## 2023-12-12 NOTE — NURSING NOTE
Is the patient currently in the state of [place]? YES    Visit mode:VIDEO    If the visit is dropped, the patient can be reconnected by: VIDEO VISIT: Send to e-mail at: [email or web address]    Will anyone else be joining the visit? NO  (If patient encounters technical issues they should call [phone]    How would you like to obtain your AVS? MyChart    Are changes needed to the allergy or medication list? Pt stated no med changes    Reason for visit: RECHECK    No other vitals to report per pt    Anabelle NETTLESF

## 2023-12-12 NOTE — LETTER
12/12/2023      RE: Karen Anderson  [address] [facility] [address]       Dear Colleague,    Thank you for the opportunity to participate in the care of your patient, Karen Anderson, at the [facility] [place] at [facility]. Please see a copy of my visit note below.        June 13th, 2023     Dear Dr. Main:     Karen Anderson is a 82 year old female we saw in the [facility] Pulmonary Hypertension [facility] for follow up.  As you know, she has a past medical history that includes asthma, pulmonary MAC, breast cancer s/p chemotherapy with Adriamycin, Cytoxan, Taxol and tamoxifen and s/p left mastectomy, extensive b/l pulmonary emboli and DVT in 1998 though to be secondary to tamoxifen, chemotherapy induced cardiomyopathy, rheumatoid arthritis, HTN, chronic hypoxic respiratory failure on O2 via NC 1.5lpm, multiple falls, and scoliosis.  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  She is currently on IV treprostinil at 46 ng/kg/min and macitentan 10 mg daily.  She did not tolerate riociguat due to hypotension.    She returns today for follow-up.  She is under a lot of stress as her  was recently diagnosed with Parkinson's disease.  He is having frequent falls due to unsteady gait.  She has to help him.  This is overwhelming for her.  She has been having increasing exertional shortness of breath with more activities.  Her weight has been stable around 102 to 103 pounds.  Her lower extremity swelling is under control with compression stocking on.  She has not had any exertional chest pain or chest pressure no exertional presyncope or syncope.  No recent hospitalization or ER visits.  No catheter related side effects.  She is tolerating the prostacyclin well.  Her  still helps with her dressing once a week.  She changes the cassette every other day by herself.  Her  oxygen requirements are stable at 2 L/min. No bleeding on anticoagulation.    Her current medical regimen includes:    Current Outpatient Medications   Medication Sig    potassium chloride ER (KLOR-CON M) 20 MEQ CR tablet Take 1 tablet (20 mEq) by mouth 3 times daily More refills after office visit.    acetaminophen (TYLENOL) 325 MG tablet Take 325 mg by mouth every 6 hours as needed for mild pain    albuterol (PROAIR HFA/PROVENTIL HFA/VENTOLIN HFA) 108 (90 Base) MCG/ACT inhaler Inhale 2 puffs into the lungs every 4 hours as needed for shortness of breath / dyspnea or wheezing     alendronate (FOSAMAX) 70 MG tablet Take 70 mg by mouth once a week On Mondays    Ascorbic Acid (VITAMIN C) 500 MG CAPS Take 500 mg by mouth every morning     aspirin (ASA) 81 MG tablet Take 81 mg by mouth daily     beclomethasone HFA (QVAR REDIHALER) 80 MCG/ACT inhaler Inhale 2 puffs into the lungs 2 times daily    bumetanide (BUMEX) 2 MG tablet Take 2 tablets (4 mg) by mouth 3 times daily    calcium citrate-vitamin D (CALCIUM CITRATE + D3) 315-250 MG-UNIT TABS per tablet Take 1 tablet by mouth 2 times daily     digoxin (LANOXIN) 125 MCG tablet Take 0.5 tablets (62.5 mcg) by mouth daily    gabapentin (NEURONTIN) 300 MG capsule Take 300 mg by mouth 3 times daily Can take an additional 300mg at bedtime as needed    hydroxychloroquine (PLAQUENIL) 200 MG tablet Take 200 mg by mouth daily     ipratropium (ATROVENT) 0.06 % nasal spray Spray 2 sprays into both nostrils 3 times daily     ketotifen (ZADITOR) 0.025 % ophthalmic solution Place 1 drop into both eyes daily    macitentan (OPSUMIT) 10 MG tablet Take 1 tablet (10 mg) by mouth daily    Metamucil Fiber CHEW Take by mouth daily as needed 2 chewables    sodium chloride (OCEAN) 0.65 % nasal spray Spray 1 spray into both nostrils daily as needed for congestion    treprostinil (REMODULIN) 60 mcg/mL in glycine diluent 50 mL infusion Inject 45.5 ng/kg/min into the vein continuous Goal Dose: 45.5  ng/kg/min    warfarin ANTICOAGULANT (COUMADIN) 1 MG tablet TAKE 1-2 TABLETS DAILY OR AS DIRECTED    warfarin ANTICOAGULANT (COUMADIN) 1 MG tablet Take 1 tablet (1 mg) by mouth daily    warfarin ANTICOAGULANT (COUMADIN) 3 MG tablet TAKE 3 MG BY MOUTH ON MONDAY, WEDNESDAY, FRIDAY, AND SATURDAY, AND 4.5 MG ON ALL OTHER DAYS OF THE WEEK     No current facility-administered medications for this visit.     Treprostinil 45.5 ng/kg/min  Macitentan 10mg daily  Digoxin 62.5mcg daily  Bumetanide 4mg tid  Aspirin 81mg daily  Warfarin   .       ROS: Complete ROS per HPI otherwise negative           EXAM:  Vital signs:  Wt 46.3 kg (102 lb)   BMI 20.60 kg/m     Exam deferred 2/2 virtual encounter     Labs and studies reviewed:     Recent Results (from the past 1008 hour(s))   INR (External Result)    Collection Time: 11/03/23  2:56 PM   Result Value Ref Range    INR HOME MONITORING 2.5 2 - 2.5 RATIO   INR (External Result)    Collection Time: 11/17/23  2:32 PM   Result Value Ref Range    INR HOME MONITORING 2.1 2 - 2.5 RATIO   INR (External Result)    Collection Time: 11/24/23  3:23 PM   Result Value Ref Range    INR (External) 2.8 (A) 0.9 - 1.1   INR (External Result)    Collection Time: 11/24/23  4:26 PM   Result Value Ref Range    INR HOME MONITORING 2.8 (H) 2 - 2.5 RATIO   Comprehensive metabolic panel    Collection Time: 12/07/23 10:42 AM   Result Value Ref Range    Sodium 140 135 - 145 mmol/L    Potassium 5.5 (H) 3.4 - 5.3 mmol/L    Carbon Dioxide (CO2) 30 (H) 22 - 29 mmol/L    Anion Gap 10 7 - 15 mmol/L    Urea Nitrogen 33.9 (H) 8.0 - 23.0 mg/dL    Creatinine 1.29 (H) 0.51 - 0.95 mg/dL    GFR Estimate 41 (L) >60 mL/min/1.73m2    Calcium 9.9 8.8 - 10.2 mg/dL    Chloride 100 98 - 107 mmol/L    Glucose 138 (H) 70 - 99 mg/dL    Alkaline Phosphatase 88 40 - 150 U/L    AST 34 0 - 45 U/L    ALT 18 0 - 50 U/L    Protein Total 7.1 6.4 - 8.3 g/dL    Albumin 4.0 3.5 - 5.2 g/dL    Bilirubin Total 0.2 <=1.2 mg/dL   CBC with platelets     Collection Time: 12/07/23 10:42 AM   Result Value Ref Range    WBC Count 4.5 4.0 - 11.0 10e3/uL    RBC Count 3.59 (L) 3.80 - 5.20 10e6/uL    Hemoglobin 10.5 (L) 11.7 - 15.7 g/dL    Hematocrit 35.0 35.0 - 47.0 %    MCV 98 78 - 100 fL    MCH 29.2 26.5 - 33.0 pg    MCHC 30.0 (L) 31.5 - 36.5 g/dL    RDW 14.9 10.0 - 15.0 %    Platelet Count 224 150 - 450 10e3/uL   N terminal pro BNP outpatient    Collection Time: 12/07/23 10:42 AM   Result Value Ref Range    N Terminal Pro BNP Outpatient 1,977 (H) 0 - 1,800 pg/mL   INR    Collection Time: 12/07/23 10:42 AM   Result Value Ref Range    INR 3.23 (H) 0.85 - 1.15   INR (External Result)    Collection Time: 12/08/23  1:14 PM   Result Value Ref Range    INR HOME MONITORING 3.1 (H) 2 - 2.5 RATIO     Echocardiogram:     TTE (09/2023)  Left ventricular function is normal.The ejection fraction is 55-60%. No  regional wall motion abnormalities are seen. Paradoxical septal motion  consistent with right ventricular pressure and volume overload is present.  Global right ventricular function is mildly reduced. Moderate right  ventricular dilation is present. Estimated PASP 92 mmHg (RVSP + RAP). The PV  acceleration time is 67msec  Severe right atrial enlargement is present.  The inferior vena cava was normal in size with preserved respiratory  variability. No pericardial effusion is present.     This study was compared with the study from 4/25/22 .  The estimated PASP has increased. The RV function appears similar.    3/17/21 RHC:  RA: 14  PA: 88/22, mPA44  PCWP: 14  TD CO/CI: 3.97/2.8  Concepcion CO/CI: 2.95/2.1  PVR: 10.2     Assessment and Plan:      Mrs. Kraen Anderson is a 83 year old female with proximal chronic thromboembolic disease but not a candidate for surgical thromboendarterectomy.  She is currently on medical therapy with IV treprostinil 46 ng/kg/min and macitentan 10 mg daily.  She did not tolerate BPA.  She had hemoptysis.    She is overall stable and had a good year.   She has not had any recent hospitalizations related to heart failure.  Her weight has been stable.  Her NT proBNP is higher than before but not signficantly.  She is tolerating the treprostinil and macitentan without any significant side effects.    Thus, I did not make any changes.  I have recommended her to continue the current regimen of IV treprostinil 46 ng/kg/min and macitentan 10 mg daily.  We will continue her on bumetanide 4 mg 3 times a day, digoxin 62.5 mcg daily, and Coumadin for long-term anticoagulation with an INR of 2-3.  She is hyperkalemic.  I have recommended her to decrease her potassium supplement to 20 mg 3 times a day.  Will recheck a Chem-7 in a week.  #2 oxygen 2 L during night and as needed during daytime.      She and her  are planning to move to assisted living facility.  I discussed with him that intravenous prostacyclin may not be an option if she were to go to a total assisted living care for medical care.  However, intravenous treprostinil would be a possibility if she and her  are moving to a senior living facility.  I have recommended them to reach out to us before making a final decision.  Will guide them with this.      She will return to see us virtually in 6 to 8 weeks.  She will call us in the interim should any further worsening symptoms.  She is very hesitant to come to the [facility] clinic because of her prior prolonged admissions in the past.     Total time today was 40 minutes reviewing notes, imaging, labs, patient visit, orders and documentation     Sincerely,  Karolina Turcios MD   Center for Pulmonary Hypertension  Heart Failure, Transplant, and Mechanical Circulatory Support Cardiology   Cardiovascular Division  [facility]   [phone]

## 2023-12-12 NOTE — PROGRESS NOTES
Virtual Visit Details    Type of service:  Video Visit   Video Start Time:  4.30 pm  Video End Time: 5 pm    Originating Location (pt. Location): Home    Distant Location (provider location):  On-site  Platform used for Video Visit: Darron        June 13th, 2023     Dear Dr. Main:     Karen Anderson is a 82 year old female we saw in the [facility]pert[facility] for follow up.  As you know, she has a past medical history that includes asthma, pulmonary MAC, breast cancer s/p chemotherapy with Adriamycin, Cytoxan, Taxol and tamoxifen and s/p left mastectomy, extensive b/l pulmonary emboli and DVT in 1998 though to be secondary to tamoxifen, chemotherapy induced cardiomyopathy, rheumatoid arthritis, HTN, chronic hypoxic respiratory failure on O2 via NC 1.5lpm, multiple falls, and scoliosis.  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  She is currently on IV treprostinil at 46 ng/kg/min and macitentan 10 mg daily.  She did not tolerate riociguat due to hypotension.    She returns today for follow-up.  She is under a lot of stress as her  was recently diagnosed with Parkinson's disease.  He is having frequent falls due to unsteady gait.  She has to help him.  This is overwhelming for her.  She has been having increasing exertional shortness of breath with more activities.  Her weight has been stable around 102 to 103 pounds.  Her lower extremity swelling is under control with compression stocking on.  She has not had any exertional chest pain or chest pressure no exertional presyncope or syncope.  No recent hospitalization or ER visits.  No catheter related side effects.  She is tolerating the prostacyclin well.  Her  still helps with her dressing once a week.  She changes the cassette every other day by herself.  Her oxygen requirements are stable at 2 L/min. No bleeding on anticoagulation.    Her current medical regimen  includes:    Current Outpatient Medications   Medication Sig    potassium chloride ER (KLOR-CON M) 20 MEQ CR tablet Take 1 tablet (20 mEq) by mouth 3 times daily More refills after office visit.    acetaminophen (TYLENOL) 325 MG tablet Take 325 mg by mouth every 6 hours as needed for mild pain    albuterol (PROAIR HFA/PROVENTIL HFA/VENTOLIN HFA) 108 (90 Base) MCG/ACT inhaler Inhale 2 puffs into the lungs every 4 hours as needed for shortness of breath / dyspnea or wheezing     alendronate (FOSAMAX) 70 MG tablet Take 70 mg by mouth once a week On Mondays    Ascorbic Acid (VITAMIN C) 500 MG CAPS Take 500 mg by mouth every morning     aspirin (ASA) 81 MG tablet Take 81 mg by mouth daily     beclomethasone HFA (QVAR REDIHALER) 80 MCG/ACT inhaler Inhale 2 puffs into the lungs 2 times daily    bumetanide (BUMEX) 2 MG tablet Take 2 tablets (4 mg) by mouth 3 times daily    calcium citrate-vitamin D (CALCIUM CITRATE + D3) 315-250 MG-UNIT TABS per tablet Take 1 tablet by mouth 2 times daily     digoxin (LANOXIN) 125 MCG tablet Take 0.5 tablets (62.5 mcg) by mouth daily    gabapentin (NEURONTIN) 300 MG capsule Take 300 mg by mouth 3 times daily Can take an additional 300mg at bedtime as needed    hydroxychloroquine (PLAQUENIL) 200 MG tablet Take 200 mg by mouth daily     ipratropium (ATROVENT) 0.06 % nasal spray Spray 2 sprays into both nostrils 3 times daily     ketotifen (ZADITOR) 0.025 % ophthalmic solution Place 1 drop into both eyes daily    macitentan (OPSUMIT) 10 MG tablet Take 1 tablet (10 mg) by mouth daily    Metamucil Fiber CHEW Take by mouth daily as needed 2 chewables    sodium chloride (OCEAN) 0.65 % nasal spray Spray 1 spray into both nostrils daily as needed for congestion    treprostinil (REMODULIN) 60 mcg/mL in glycine diluent 50 mL infusion Inject 45.5 ng/kg/min into the vein continuous Goal Dose: 45.5 ng/kg/min    warfarin ANTICOAGULANT (COUMADIN) 1 MG tablet TAKE 1-2 TABLETS DAILY OR AS DIRECTED     warfarin ANTICOAGULANT (COUMADIN) 1 MG tablet Take 1 tablet (1 mg) by mouth daily    warfarin ANTICOAGULANT (COUMADIN) 3 MG tablet TAKE 3 MG BY MOUTH ON MONDAY, WEDNESDAY, FRIDAY, AND SATURDAY, AND 4.5 MG ON ALL OTHER DAYS OF THE WEEK     No current facility-administered medications for this visit.     Treprostinil 45.5 ng/kg/min  Macitentan 10mg daily  Digoxin 62.5mcg daily  Bumetanide 4mg tid  Aspirin 81mg daily  Warfarin   .       ROS: Complete ROS per HPI otherwise negative           EXAM:  Vital signs:  Wt 46.3 kg (102 lb)   BMI 20.60 kg/m     Exam deferred 2/2 virtual encounter     Labs and studies reviewed:     Recent Results (from the past 1008 hour(s))   INR (External Result)    Collection Time: 11/03/23  2:56 PM   Result Value Ref Range    INR HOME MONITORING 2.5 2 - 2.5 RATIO   INR (External Result)    Collection Time: 11/17/23  2:32 PM   Result Value Ref Range    INR HOME MONITORING 2.1 2 - 2.5 RATIO   INR (External Result)    Collection Time: 11/24/23  3:23 PM   Result Value Ref Range    INR (External) 2.8 (A) 0.9 - 1.1   INR (External Result)    Collection Time: 11/24/23  4:26 PM   Result Value Ref Range    INR HOME MONITORING 2.8 (H) 2 - 2.5 RATIO   Comprehensive metabolic panel    Collection Time: 12/07/23 10:42 AM   Result Value Ref Range    Sodium 140 135 - 145 mmol/L    Potassium 5.5 (H) 3.4 - 5.3 mmol/L    Carbon Dioxide (CO2) 30 (H) 22 - 29 mmol/L    Anion Gap 10 7 - 15 mmol/L    Urea Nitrogen 33.9 (H) 8.0 - 23.0 mg/dL    Creatinine 1.29 (H) 0.51 - 0.95 mg/dL    GFR Estimate 41 (L) >60 mL/min/1.73m2    Calcium 9.9 8.8 - 10.2 mg/dL    Chloride 100 98 - 107 mmol/L    Glucose 138 (H) 70 - 99 mg/dL    Alkaline Phosphatase 88 40 - 150 U/L    AST 34 0 - 45 U/L    ALT 18 0 - 50 U/L    Protein Total 7.1 6.4 - 8.3 g/dL    Albumin 4.0 3.5 - 5.2 g/dL    Bilirubin Total 0.2 <=1.2 mg/dL   CBC with platelets    Collection Time: 12/07/23 10:42 AM   Result Value Ref Range    WBC Count 4.5 4.0 - 11.0 10e3/uL     RBC Count 3.59 (L) 3.80 - 5.20 10e6/uL    Hemoglobin 10.5 (L) 11.7 - 15.7 g/dL    Hematocrit 35.0 35.0 - 47.0 %    MCV 98 78 - 100 fL    MCH 29.2 26.5 - 33.0 pg    MCHC 30.0 (L) 31.5 - 36.5 g/dL    RDW 14.9 10.0 - 15.0 %    Platelet Count 224 150 - 450 10e3/uL   N terminal pro BNP outpatient    Collection Time: 12/07/23 10:42 AM   Result Value Ref Range    N Terminal Pro BNP Outpatient 1,977 (H) 0 - 1,800 pg/mL   INR    Collection Time: 12/07/23 10:42 AM   Result Value Ref Range    INR 3.23 (H) 0.85 - 1.15   INR (External Result)    Collection Time: 12/08/23  1:14 PM   Result Value Ref Range    INR HOME MONITORING 3.1 (H) 2 - 2.5 RATIO     Echocardiogram:     TTE (09/2023)  Left ventricular function is normal.The ejection fraction is 55-60%. No  regional wall motion abnormalities are seen. Paradoxical septal motion  consistent with right ventricular pressure and volume overload is present.  Global right ventricular function is mildly reduced. Moderate right  ventricular dilation is present. Estimated PASP 92 mmHg (RVSP + RAP). The PV  acceleration time is 67msec  Severe right atrial enlargement is present.  The inferior vena cava was normal in size with preserved respiratory  variability. No pericardial effusion is present.     This study was compared with the study from 4/25/22 .  The estimated PASP has increased. The RV function appears similar.    3/17/21 RHC:  RA: 14  PA: 88/22, mPA44  PCWP: 14  TD CO/CI: 3.97/2.8  Concepcion CO/CI: 2.95/2.1  PVR: 10.2     Assessment and Plan:      Mrs. Karen Anderson is a 83 year old female with proximal chronic thromboembolic disease but not a candidate for surgical thromboendarterectomy.  She is currently on medical therapy with IV treprostinil 46 ng/kg/min and macitentan 10 mg daily.  She did not tolerate BPA.  She had hemoptysis.    She is overall stable and had a good year.  She has not had any recent hospitalizations related to heart failure.  Her weight has been stable.   Her NT proBNP is higher than before but not signficantly.  She is tolerating the treprostinil and macitentan without any significant side effects.    Thus, I did not make any changes.  I have recommended her to continue the current regimen of IV treprostinil 46 ng/kg/min and macitentan 10 mg daily.  We will continue her on bumetanide 4 mg 3 times a day, digoxin 62.5 mcg daily, and Coumadin for long-term anticoagulation with an INR of 2-3.  She is hyperkalemic.  I have recommended her to decrease her potassium supplement to 20 mg 3 times a day.  Will recheck a Chem-7 in a week.  #2 oxygen 2 L during night and as needed during daytime.      She and her  are planning to move to assisted living facility.  I discussed with him that intravenous prostacyclin may not be an option if she were to go to a total assisted living care for medical care.  However, intravenous treprostinil would be a possibility if she and her  are moving to a senior living facility.  I have recommended them to reach out to us before making a final decision.  Will guide them with this.      She will return to see us virtually in 6 to 8 weeks.  She will call us in the interim should any further worsening symptoms.  She is very hesitant to come to the [facility] clinic because of her prior prolonged admissions in the past.     Total time today was 40 minutes reviewing notes, imaging, labs, patient visit, orders and documentation     Sincerely,  Karolina Turcios MD   Center for Pulmonary Hypertension  Heart Failure, Transplant, and Mechanical Circulatory Support Cardiology   Cardiovascular Division  [facility]   [phone]

## 2023-12-15 ENCOUNTER — MYC MEDICAL ADVICE (OUTPATIENT)
Dept: CARDIOLOGY | Facility: CLINIC | Age: 83
End: 2023-12-15
Payer: COMMERCIAL

## 2023-12-20 NOTE — PHARMACY-PHARMACOTHERAPY NOTE
"The following home medications were NOT continued on inpatient admission per \"Discontinuation of nonessential home medications during hospitalization\" policy: Alendronate    If a therapeutic holiday is deemed inappropriate per the prescriber, please notify the pharmacist regarding the medication order.    The pharmacist is available to answer any questions and/or concerns the patient may have regarding discontinuation of non-essential medications.    Please ensure that these medications are restarted as needed upon discharge via the medication reconciliation discharge process and included on the discharge medication reconciliation report.    Thank you,  Maria Tubbs, Pharmacy Resident    " [FreeTextEntry1] : sutures removed local care f/u as needed.

## 2023-12-21 ENCOUNTER — TELEPHONE (OUTPATIENT)
Dept: CARDIOLOGY | Facility: CLINIC | Age: 83
End: 2023-12-21
Payer: COMMERCIAL

## 2023-12-21 NOTE — TELEPHONE ENCOUNTER
12/21/2023  @ 5:41 PM -  Opsumit 10 mg (30/30) - renew - expires:  12/28/23 (NOT IN PA CALENDAR)     PA Initiation  Medication: Opsumit 10 mg (30/30) - renew - expires:  12/28/23  Insurance Company: [employer] - Phone [phone] Fax [phone]  [facility]illing the Rx[facility] [place] [id number] [facility]  Filling Pharmacy Phone:    Filling Pharmacy Fax:    Start Date: 12/21/2023  via [facility], key [id number], w/ RHC dated : 3/17/21; Dx Code: I27.0 - OOP to CTEPH.      **PA ALERT FROM CM -  ID # IN PA KEY LISTED AS : [id number]    ----------------------------  Insurance: [facility] [employer] /[employer]  BIN: [id number]  PCN: MD  RX GRP#: LAURENCE  ID#: [id number]         Yessica PATEL CMA- Prior Auths  Cardiology/Pulmonary Hypertension

## 2023-12-22 ENCOUNTER — TELEPHONE (OUTPATIENT)
Dept: CARDIOLOGY | Facility: CLINIC | Age: 83
End: 2023-12-22
Payer: COMMERCIAL

## 2023-12-22 ENCOUNTER — ANTICOAGULATION THERAPY VISIT (OUTPATIENT)
Dept: ANTICOAGULATION | Facility: CLINIC | Age: 83
End: 2023-12-22
Payer: COMMERCIAL

## 2023-12-22 DIAGNOSIS — Z79.01 LONG TERM CURRENT USE OF ANTICOAGULANT THERAPY: ICD-10-CM

## 2023-12-22 DIAGNOSIS — I27.20 PULMONARY HYPERTENSION (H): Primary | ICD-10-CM

## 2023-12-22 DIAGNOSIS — I27.24 CTEPH (CHRONIC THROMBOEMBOLIC PULMONARY HYPERTENSION) (H): ICD-10-CM

## 2023-12-22 LAB — INR HOME MONITORING: 3 RATIO (ref 2–2.5)

## 2023-12-22 NOTE — PROGRESS NOTES
ANTICOAGULATION MANAGEMENT     Karen Anderson 83 year old female is on warfarin with therapeutic INR result. (Goal INR 2.0-3.0)    Recent labs: (last 7 days)     12/22/23  1603   INR 3*       ASSESSMENT     Source(s): Chart Review and Patient/Caregiver Call     Warfarin doses taken: Warfarin taken as instructed  Diet: No new diet changes identified  Medication/supplement changes: None noted  New illness, injury, or hospitalization: No  Signs or symptoms of bleeding or clotting: No  Previous result: Supratherapeutic  Additional findings: None       PLAN     Recommended plan for no diet, medication or health factor changes affecting INR     Dosing Instructions: Continue your current warfarin dose with next INR in 1-2 weeks       Summary  As of 12/22/2023      Full warfarin instructions:  5 mg every day   Next INR check:  1/5/2024               Telephone call with Karen who agrees to plan and repeated back plan correctly    Patient to recheck with home meter    Education provided:   Goal range and lab monitoring: goal range and significance of current result and Importance of following up at instructed interval    Plan made per [facility] anticoagulation protocol    Alva Ferguson RN  Anticoagulation Clinic  12/22/2023    _______________________________________________________________________     Anticoagulation Episode Summary       Current INR goal:  2.0-3.0   TTR:  67.8% (1 y)   Target end date:  Indefinite   Send INR reminders to:  [facility]    Indications    Pulmonary hypertension (H) [I27.20]  CTEPH (chronic thromboembolic pulmonary hypertension) (H) [I27.24]  Long term current use of anticoagulant therapy [Z79.01]             Comments:               Anticoagulation Care Providers       Provider Role Specialty Phone number    Karolina Turcios MD Referring Cardiovascular Disease [phone]

## 2023-12-22 NOTE — TELEPHONE ENCOUNTER
Patient Contacted for the patient to call back and schedule the following:    Appointment type: RPP  Provider: LEIGH ANN  Return date: 03/12/24  Specialty phone number: [phone] OPT 1  Additional appointment(s) needed: LABS  Additonal Notes: N/A

## 2023-12-22 NOTE — TELEPHONE ENCOUNTER
12/22/2023  @ 10:19 AM -  Opsumit 10 mg (30/30) - renew - APPROVED    Message from plan: CaseId:[id number];Status:Approved;Review Type:Prior Auth;Coverage Start Date:11/21/2023;Coverage End Date:12/20/2026;    [[No letter received with M approval alert as of this note.]      Prior Authorization Approval    Medication: OPSUMIT 10 MG PO TABS  Authorization Effective Date: 11/21/2023  Authorization Expiration Date: 12/20/2026  Approved Dose/Quantity: 30/30  Reference #: [id number]   Insurance Company: [employer] - Phone [phone] Fax [phone]  Expected CoPay: $    CoPay Card Available:      Financial Assistance Needed: *  Which Pharmacy is filling the prescription: [facility] - [address] [facility]  Pharmacy Notified: Y  Patient Notified: Y    Updated Snapshot, added to [place] Calendar; faxed to : [facility] -[facility]:  [phone]  Yessica PATEL CMA- Prior Auths  Cardiology/Pulmonary Hypertension

## 2023-12-26 NOTE — TELEPHONE ENCOUNTER
Called spouse and patient and reviewed future living situation as spouse has parkinsons. Spouse currently helps with dressing changes to central line. Discussed to look into dual independent/assisted living facility as most will not manage IV medications. If they are able to move into independent living and have add ons for spouse that might be a better solution however they need to be aware that if spouse is not able to manage dressing change for patient this would not be something the care home wound take on management of. Patient verbalized understanding of education/discussion.

## 2023-12-28 ENCOUNTER — LAB (OUTPATIENT)
Dept: LAB | Facility: CLINIC | Age: 83
End: 2023-12-28
Payer: COMMERCIAL

## 2023-12-28 ENCOUNTER — ANTICOAGULATION THERAPY VISIT (OUTPATIENT)
Dept: ANTICOAGULATION | Facility: CLINIC | Age: 83
End: 2023-12-28

## 2023-12-28 DIAGNOSIS — I27.20 PULMONARY HYPERTENSION (H): Primary | ICD-10-CM

## 2023-12-28 DIAGNOSIS — Z79.01 LONG TERM CURRENT USE OF ANTICOAGULANT THERAPY: ICD-10-CM

## 2023-12-28 DIAGNOSIS — I27.24 CTEPH (CHRONIC THROMBOEMBOLIC PULMONARY HYPERTENSION) (H): ICD-10-CM

## 2023-12-28 DIAGNOSIS — I27.20 PULMONARY HYPERTENSION (H): ICD-10-CM

## 2023-12-28 LAB
ANION GAP SERPL CALCULATED.3IONS-SCNC: 11 MMOL/L (ref 7–15)
BUN SERPL-MCNC: 29 MG/DL (ref 8–23)
CALCIUM SERPL-MCNC: 9.4 MG/DL (ref 8.8–10.2)
CHLORIDE SERPL-SCNC: 95 MMOL/L (ref 98–107)
CREAT SERPL-MCNC: 1.2 MG/DL (ref 0.51–0.95)
DEPRECATED HCO3 PLAS-SCNC: 32 MMOL/L (ref 22–29)
EGFRCR SERPLBLD CKD-EPI 2021: 45 ML/MIN/1.73M2
GLUCOSE SERPL-MCNC: 97 MG/DL (ref 70–99)
INR PPP: 3.71 (ref 0.85–1.15)
POTASSIUM SERPL-SCNC: 3.9 MMOL/L (ref 3.4–5.3)
SODIUM SERPL-SCNC: 138 MMOL/L (ref 135–145)

## 2023-12-28 PROCEDURE — 80048 BASIC METABOLIC PNL TOTAL CA: CPT

## 2023-12-28 PROCEDURE — 85610 PROTHROMBIN TIME: CPT

## 2023-12-28 PROCEDURE — 36415 COLL VENOUS BLD VENIPUNCTURE: CPT

## 2023-12-28 NOTE — PROGRESS NOTES
ANTICOAGULATION MANAGEMENT     Karen Anderson 83 year old female is on warfarin with supratherapeutic INR result. (Goal INR 2.0-3.0)    Recent labs: (last 7 days)     12/28/23  0821   INR 3.71*       ASSESSMENT     Source(s): Chart Review and Patient/Caregiver Call     Warfarin doses taken: Warfarin taken as instructed  Diet: No new diet changes identified  Medication/supplement changes: None noted  New illness, injury, or hospitalization: No  Signs or symptoms of bleeding or clotting: No  Previous result: Therapeutic last visit; previously outside of goal range  Additional findings:  Patient is under a fair amount of stress with her  having Parkinson's.  Currently her  is helping with dressing changes for her. Karen and her  are thinking of moving to an assisted living.        PLAN     Recommended plan for ongoing change(s) affecting INR     Dosing Instructions: decrease your warfarin dose (11% change) with next INR in 1 week       Summary  As of 12/28/2023      Full warfarin instructions:  12/28: Hold; Otherwise 3 mg every Mon, Thu; 5 mg all other days   Next INR check:  1/4/2024               Telephone call with Karen who verbalizes understanding and agrees to plan and who agrees to plan and repeated back plan correctly    Patient to recheck with home meter    Education provided:   Taking warfarin: Importance of taking warfarin as instructed  Goal range and lab monitoring: goal range and significance of current result and Importance of therapeutic range    Plan made per [facility] anticoagulation protocol    Brittany Kirk RN  Anticoagulation Clinic  12/28/2023    _______________________________________________________________________     Anticoagulation Episode Summary       Current INR goal:  2.0-3.0   TTR:  66.8% (1 y)   Target end date:  Indefinite   Send INR reminders to:  [facility]    Indications    Pulmonary hypertension (H) [I27.20]  CTEPH (chronic thromboembolic  pulmonary hypertension) (H) [I27.24]  Long term current use of anticoagulant therapy [Z79.01]             Comments:               Anticoagulation Care Providers       Provider Role Specialty Phone number    Karolina Turcios MD Referring Cardiovascular Disease [phone]

## 2024-01-03 ENCOUNTER — TELEPHONE (OUTPATIENT)
Dept: CARDIOLOGY | Facility: CLINIC | Age: 84
End: 2024-01-03
Payer: COMMERCIAL

## 2024-01-04 ENCOUNTER — ANTICOAGULATION THERAPY VISIT (OUTPATIENT)
Dept: ANTICOAGULATION | Facility: CLINIC | Age: 84
End: 2024-01-04
Payer: COMMERCIAL

## 2024-01-04 ENCOUNTER — TRANSFERRED RECORDS (OUTPATIENT)
Dept: HEALTH INFORMATION MANAGEMENT | Facility: CLINIC | Age: 84
End: 2024-01-04
Payer: COMMERCIAL

## 2024-01-04 ENCOUNTER — DOCUMENTATION ONLY (OUTPATIENT)
Dept: ANTICOAGULATION | Facility: CLINIC | Age: 84
End: 2024-01-04
Payer: COMMERCIAL

## 2024-01-04 DIAGNOSIS — I27.24 CTEPH (CHRONIC THROMBOEMBOLIC PULMONARY HYPERTENSION) (H): ICD-10-CM

## 2024-01-04 DIAGNOSIS — I27.20 PULMONARY HYPERTENSION (H): Primary | ICD-10-CM

## 2024-01-04 DIAGNOSIS — O88.211 PULMONARY EMBOLISM AFFECTING PREGNANCY IN FIRST TRIMESTER: ICD-10-CM

## 2024-01-04 DIAGNOSIS — Z79.01 LONG TERM CURRENT USE OF ANTICOAGULANT THERAPY: ICD-10-CM

## 2024-01-04 DIAGNOSIS — I26.99 PULMONARY EMBOLISM, UNSPECIFIED CHRONICITY, UNSPECIFIED PULMONARY EMBOLISM TYPE, UNSPECIFIED WHETHER ACUTE COR PULMONALE PRESENT (H): ICD-10-CM

## 2024-01-04 DIAGNOSIS — I27.24 CTEPH (CHRONIC THROMBOEMBOLIC PULMONARY HYPERTENSION) (H): Primary | ICD-10-CM

## 2024-01-04 LAB — INR HOME MONITORING: 4.5 RATIO (ref 2–2.5)

## 2024-01-04 NOTE — PROGRESS NOTES
ANTICOAGULATION CLINIC REFERRAL RENEWAL REQUEST       An annual renewal order is required for all patients referred to [facility].?  Please review and sign the pended referral order for Karen Anderson.       ANTICOAGULATION SUMMARY      Warfarin indication(s)   CTEPH    Mechanical heart valve present?  NO       Current goal range   INR: 2.0-3.0     Goal appropriate for indication? Goal INR 2-3, standard for indication(s) above     Time in Therapeutic Range (TTR)  (Goal > 60%) 66.7%       Office visit with referring provider's group within last year yes on 12-       Jaimie Santamaria RN  [facility]

## 2024-01-04 NOTE — PROGRESS NOTES
ANTICOAGULATION MANAGEMENT     Karen RICO Calderonhilariacharbel 83 year old female is on warfarin with supratherapeutic INR result. (Goal INR 2.0-3.0)    Recent labs: (last 7 days)     01/04/24  1706   INR 4.5*       ASSESSMENT     Source(s): Chart Review and Patient/Caregiver Call     Warfarin doses taken:  patient took new weekly dosing correctly but did not hold last thurs as instructed  Diet: No new diet changes identified  Medication/supplement changes: None noted  New illness, injury, or hospitalization: No, slightly more edema from too much salt per patient  Signs or symptoms of bleeding or clotting: No  Previous result: Supratherapeutic  Additional findings: None       PLAN     Recommended plan for ongoing change(s) affecting INR     Dosing Instructions: hold dose then decrease your warfarin dose (12.9% change) with next INR in 8 days       Summary  As of 1/4/2024      Full warfarin instructions:  1/4: Hold; Otherwise 5 mg every Sun, Tue, Thu; 3 mg all other days   Next INR check:  1/12/2024               Telephone call with Karen who verbalizes understanding and agrees to plan    Patient to recheck with home meter    Education provided:   Please call back if any changes to your diet, medications or how you've been taking warfarin  Symptom monitoring: monitoring for bleeding signs and symptoms, when to seek medical attention/emergency care, and if you hit your head or have a bad fall seek emergency care  Contact [phone]  with any changes, questions or concerns.     Plan made per [facility] anticoagulation protocol    Jaimie Santamaria RN  Anticoagulation Clinic  1/4/2024    _______________________________________________________________________     Anticoagulation Episode Summary       Current INR goal:  2.0-3.0   TTR:  66.7% (1 y)   Target end date:  Indefinite   Send INR reminders to:   [facility]    Indications    Pulmonary hypertension (H) [I27.20]  CTEPH (chronic thromboembolic pulmonary hypertension) (H)  [I27.24]  Long term current use of anticoagulant therapy [Z79.01]             Comments:               Anticoagulation Care Providers       Provider Role Specialty Phone number    Karolina Turcios MD Referring Cardiovascular Disease [phone]

## 2024-01-09 DIAGNOSIS — I27.24 CTEPH (CHRONIC THROMBOEMBOLIC PULMONARY HYPERTENSION) (H): ICD-10-CM

## 2024-01-09 DIAGNOSIS — I27.20 PULMONARY HYPERTENSION (H): ICD-10-CM

## 2024-01-09 RX ORDER — WARFARIN SODIUM 3 MG/1
TABLET ORAL
Qty: 100 TABLET | Refills: 1 | Status: SHIPPED | OUTPATIENT
Start: 2024-01-09

## 2024-01-09 NOTE — TELEPHONE ENCOUNTER
ANTICOAGULATION MANAGEMENT:  Medication Refill    Anticoagulation Summary  As of 1/4/2024      Warfarin maintenance plan:  5 mg (3 mg x 1 and 1 mg x 2) every Sun, Tue, Thu; 3 mg (3 mg x 1) all other days   Next INR check:  1/12/2024   Target end date:  Indefinite    Indications    Pulmonary hypertension (H) [I27.20]  CTEPH (chronic thromboembolic pulmonary hypertension) (H) [I27.24]  Long term current use of anticoagulant therapy [Z79.01]                 Anticoagulation Care Providers       Provider Role Specialty Phone number    Karolina Turcios MD Referring Cardiovascular Disease [phone]            Refill Criteria    Visit with referring provider/group: Meets criteria: office visit within referring provider group in the last 1 year on 12/12/23    [facility] referral signed last signed: 01/05/2024; within last year: Yes    Lab monitoring not exceeding 2 weeks overdue: Yes    Karen meets all criteria for refill. Rx instructions and quantity supplied updated to match patient's current dosing plan. Warfarin 90 day supply with 1 refill granted per [facility] protocol     Maddie Najera RN  Anticoagulation Clinic     Telephone Encounter by Fozia Rey at 18 02:22 PM     Author:  Fozia Rey Service:  (none) Author Type:  Patient      Filed:  18 02:26 PM Encounter Date:  2018 Status:  Signed     :  Fozia Rey (Patient )            Patient notified.[LK1.1T] Cannot make appointment until after elections as he works for [employer].  Elections are 3/20/18 so scheduled for 3/22/18.  Labs will have  by then. Please re-order labs and patient will complete prior to visit.  Thanks.[LK1.1M]    To clinical.[LK1.1T]      Revision History        User Key Date/Time User Provider Type Action    > LK1.1 18 02:26 PM Fozia Rey Patient  Sign    M - Manual, T - Template

## 2024-01-12 ENCOUNTER — TRANSFERRED RECORDS (OUTPATIENT)
Dept: HEALTH INFORMATION MANAGEMENT | Facility: CLINIC | Age: 84
End: 2024-01-12
Payer: COMMERCIAL

## 2024-01-12 ENCOUNTER — ANTICOAGULATION THERAPY VISIT (OUTPATIENT)
Dept: ANTICOAGULATION | Facility: CLINIC | Age: 84
End: 2024-01-12
Payer: COMMERCIAL

## 2024-01-12 DIAGNOSIS — I27.20 PULMONARY HYPERTENSION (H): Primary | ICD-10-CM

## 2024-01-12 DIAGNOSIS — Z79.01 LONG TERM CURRENT USE OF ANTICOAGULANT THERAPY: ICD-10-CM

## 2024-01-12 DIAGNOSIS — I27.24 CTEPH (CHRONIC THROMBOEMBOLIC PULMONARY HYPERTENSION) (H): ICD-10-CM

## 2024-01-12 LAB — INR HOME MONITORING: 3.2 RATIO (ref 2–2.5)

## 2024-01-12 NOTE — PROGRESS NOTES
ANTICOAGULATION MANAGEMENT     Karen Anderson 83 year old female is on warfarin with supratherapeutic INR result. (Goal INR 2.0-3.0)    Recent labs: (last 7 days)     01/12/24  1426   INR 3.2*       ASSESSMENT     Source(s): Chart Review and Patient/Caregiver Call     Warfarin doses taken: Warfarin taken as instructed-hold with 12.9% maintenance dose decrease last encounter  Diet: No new diet changes identified  Medication/supplement changes: None noted  New illness, injury, or hospitalization: No  Signs or symptoms of bleeding or clotting: Yes: bruising present  Previous result: Supratherapeutic  Additional findings: None       PLAN     Recommended plan for temporary change(s) and ongoing change(s) affecting INR     Dosing Instructions: decrease your warfarin dose (7.4% change) with next INR in 1 week       Summary  As of 1/12/2024      Full warfarin instructions:  5 mg every Sun, Thu; 3 mg all other days   Next INR check:  1/19/2024               Telephone call with Karen who agrees to plan and repeated back plan correctly    Patient to recheck with home meter    Education provided:   Goal range and lab monitoring: goal range and significance of current result and Importance of following up at instructed interval  Symptom monitoring: monitoring for bleeding signs and symptoms and when to seek medical attention/emergency care  Contact [phone] with any changes, questions or concerns.     Plan made per [facility] anticoagulation protocol    GOLDY PERALTA RN  Anticoagulation Clinic  1/12/2024    _______________________________________________________________________     Anticoagulation Episode Summary       Current INR goal:  2.0-3.0   TTR:  66.8% (1 y)   Target end date:  Indefinite   Send INR reminders to:  [facility]    Indications    Pulmonary hypertension (H) [I27.20]  CTEPH (chronic thromboembolic pulmonary hypertension) (H) [I27.24]  Long term current use of anticoagulant therapy [Z79.01]              Comments:               Anticoagulation Care Providers       Provider Role Specialty Phone number    Karolina Turcios MD Referring Cardiovascular Disease [phone]

## 2024-01-13 DIAGNOSIS — I27.24 CTEPH (CHRONIC THROMBOEMBOLIC PULMONARY HYPERTENSION) (H): ICD-10-CM

## 2024-01-13 DIAGNOSIS — Z79.01 LONG TERM CURRENT USE OF ANTICOAGULANT THERAPY: ICD-10-CM

## 2024-01-13 DIAGNOSIS — I27.20 PULMONARY HYPERTENSION (H): ICD-10-CM

## 2024-01-15 DIAGNOSIS — I27.20 PULMONARY HYPERTENSION (H): ICD-10-CM

## 2024-01-15 RX ORDER — WARFARIN SODIUM 1 MG/1
TABLET ORAL
Qty: 180 TABLET | Refills: 1 | Status: SHIPPED | OUTPATIENT
Start: 2024-01-15 | End: 2024-08-23

## 2024-01-15 NOTE — TELEPHONE ENCOUNTER
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 Rx instructions and quantity supplied updated to match patient's current dosing plan. Warfarin 90 day supply with 1 refill granted per [facility] protocol     GOLDY PERALTA RN  Anticoagulation Clinic

## 2024-01-16 NOTE — TELEPHONE ENCOUNTER
Patient has high co-pay 3,297$. Does not qualify for PAN jean-claude that is open right now. Patient had this issue last year and did end up paying her premiums. Follow-up email sent to liaison with Cheryl to see what qualifications for income are and if patient would be able to apply for PAP    Called Lauren and patient would need to meet $5,030. For tier 5 for OOP med spend to reach deductible.       Rebecca Ngo RN on 1/16/2024 at 4:09 PM

## 2024-01-19 ENCOUNTER — ANTICOAGULATION THERAPY VISIT (OUTPATIENT)
Dept: ANTICOAGULATION | Facility: CLINIC | Age: 84
End: 2024-01-19
Payer: COMMERCIAL

## 2024-01-19 DIAGNOSIS — I27.24 CTEPH (CHRONIC THROMBOEMBOLIC PULMONARY HYPERTENSION) (H): ICD-10-CM

## 2024-01-19 DIAGNOSIS — Z79.01 LONG TERM CURRENT USE OF ANTICOAGULANT THERAPY: ICD-10-CM

## 2024-01-19 DIAGNOSIS — I27.20 PULMONARY HYPERTENSION (H): Primary | ICD-10-CM

## 2024-01-19 LAB — INR HOME MONITORING: 2.9 RATIO (ref 2–2.5)

## 2024-01-19 NOTE — PROGRESS NOTES
ANTICOAGULATION MANAGEMENT     Karen Anderson 83 year old female is on warfarin with therapeutic INR result. (Goal INR 2.0-3.0)    Recent labs: (last 7 days)     01/19/24  1552   INR 2.9*       ASSESSMENT     Source(s): Chart Review and Patient/Caregiver Call     Warfarin doses taken: Warfarin taken as instructed  Diet: No new diet changes identified  Medication/supplement changes:  Reported missing Opsumit a few days d/t receiving late but is back on and feeling better.  New illness, injury, or hospitalization: No  Signs or symptoms of bleeding or clotting: No  Previous result: Supratherapeutic - 7.4% maintenance dose decrease last encounter.  Additional findings:  Karen is adamant about checking INR in 2 weeks even though informed that MdIN[facility] usually wants INR checked weekly.       PLAN     Recommended plan for ongoing change(s) affecting INR     Dosing Instructions: Continue your current warfarin dose with next INR in 2 weeks       Summary  As of 1/19/2024      Full warfarin instructions:  5 mg every Sun, Thu; 3 mg all other days   Next INR check:  2/2/2024               Telephone call with Karen who verbalizes understanding and agrees to plan    Patient to recheck with home meter    Education provided:   Goal range and lab monitoring: goal range and significance of current result and Importance of following up at instructed interval  Contact [phone] with any changes, questions or concerns.     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

## 2024-02-02 ENCOUNTER — ANTICOAGULATION THERAPY VISIT (OUTPATIENT)
Dept: ANTICOAGULATION | Facility: CLINIC | Age: 84
End: 2024-02-02
Payer: COMMERCIAL

## 2024-02-02 DIAGNOSIS — I27.20 PULMONARY HYPERTENSION (H): Primary | ICD-10-CM

## 2024-02-02 DIAGNOSIS — I27.24 CTEPH (CHRONIC THROMBOEMBOLIC PULMONARY HYPERTENSION) (H): ICD-10-CM

## 2024-02-02 DIAGNOSIS — Z79.01 LONG TERM CURRENT USE OF ANTICOAGULANT THERAPY: ICD-10-CM

## 2024-02-02 LAB — INR HOME MONITORING: 2.6 RATIO (ref 2–2.5)

## 2024-02-02 NOTE — PROGRESS NOTES
ANTICOAGULATION MANAGEMENT     Karen JACKY Monica 83 year old female is on warfarin with therapeutic INR result. (Goal INR 2.0-3.0)    Recent labs: (last 7 days)     02/02/24  1612   INR 2.6*       ASSESSMENT     Source(s): Chart Review and Patient/Caregiver Call     Warfarin doses taken: Warfarin taken as instructed  Diet: No new diet changes identified  Medication/supplement changes: None noted  New illness, injury, or hospitalization: No  Signs or symptoms of bleeding or clotting: No  Previous result: Therapeutic last visit; previously outside of goal range  Additional findings: None       PLAN     Recommended plan for no diet, medication or health factor changes affecting INR     Dosing Instructions: Continue your current warfarin dose with next INR in 2 weeks       Summary  As of 2/2/2024      Full warfarin instructions:  5 mg every Sun, Thu; 3 mg all other days   Next INR check:  2/16/2024               Telephone call with Karen who verbalizes understanding and agrees to plan and who agrees to plan and repeated back plan correctly    Patient to recheck with home meter    Education provided:   Taking warfarin: Importance of taking warfarin as instructed  Goal range and lab monitoring: goal range and significance of current result and Importance of therapeutic range    Plan made per [facility] anticoagulation protocol    Brittany Kirk RN  Anticoagulation Clinic  2/2/2024    _______________________________________________________________________     Anticoagulation Episode Summary       Current INR goal:  2.0-3.0   TTR:  65.8% (1 y)   Target end date:  Indefinite   Send INR reminders to:  [facility]    Indications    Pulmonary hypertension (H) [I27.20]  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

## 2024-02-12 ENCOUNTER — TELEPHONE (OUTPATIENT)
Dept: CARDIOLOGY | Facility: CLINIC | Age: 84
End: 2024-02-12
Payer: COMMERCIAL

## 2024-02-12 NOTE — TELEPHONE ENCOUNTER
2/12/2024  @ 5:00 PM -  Rcvd provider portion from [employer] for Remodulin IV assistance application.     Faxed via [employer] @ 500 pm -          Yessica PATEL CMA- Prior Auths  Cardiology/Pulmonary Hypertension

## 2024-02-16 ENCOUNTER — ANTICOAGULATION THERAPY VISIT (OUTPATIENT)
Dept: ANTICOAGULATION | Facility: CLINIC | Age: 84
End: 2024-02-16
Payer: COMMERCIAL

## 2024-02-16 DIAGNOSIS — I27.24 CTEPH (CHRONIC THROMBOEMBOLIC PULMONARY HYPERTENSION) (H): ICD-10-CM

## 2024-02-16 DIAGNOSIS — I27.20 PULMONARY HYPERTENSION (H): Primary | ICD-10-CM

## 2024-02-16 DIAGNOSIS — Z79.01 LONG TERM CURRENT USE OF ANTICOAGULANT THERAPY: ICD-10-CM

## 2024-02-16 LAB — INR HOME MONITORING: 3.2 RATIO (ref 2–2.5)

## 2024-02-16 NOTE — PROGRESS NOTES
ANTICOAGULATION MANAGEMENT     Karen R Calderonvale 83 year old female is on warfarin with supratherapeutic INR result. (Goal INR 2.0-3.0)    Recent labs: (last 7 days)     02/16/24  1358   INR 3.2*       ASSESSMENT     Source(s): Chart Review and Patient/Caregiver Call     Warfarin doses taken: Warfarin taken as instructed  Diet: No new diet changes identified  Medication/supplement changes: None noted  New illness, injury, or hospitalization: No  Signs or symptoms of bleeding or clotting: No  Previous result: Therapeutic last 2(+) visits  Additional findings: None       PLAN     Recommended plan for no diet, medication or health factor changes affecting INR     Dosing Instructions: Continue your current warfarin dose with next INR in 1 week       Summary  As of 2/16/2024      Full warfarin instructions:  5 mg every Sun, Thu; 3 mg all other days   Next INR check:  2/23/2024               Telephone call with Karen who verbalizes understanding and agrees to plan    Patient to recheck with home meter    Education provided:   Please call back if any changes to your diet, medications or how you've been taking warfarin    Plan made per [facility] anticoagulation protocol    Maddie Najera RN  Anticoagulation Clinic  2/16/2024    _______________________________________________________________________     Anticoagulation Episode Summary       Current INR goal:  2.0-3.0   TTR:  66.0% (1 y)   Target end date:  Indefinite   Send INR reminders to:  [facility]    Indications    Pulmonary hypertension (H) [I27.20]  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

## 2024-02-23 ENCOUNTER — ANTICOAGULATION THERAPY VISIT (OUTPATIENT)
Dept: ANTICOAGULATION | Facility: CLINIC | Age: 84
End: 2024-02-23
Payer: COMMERCIAL

## 2024-02-23 DIAGNOSIS — I27.20 PULMONARY HYPERTENSION (H): Primary | ICD-10-CM

## 2024-02-23 DIAGNOSIS — Z79.01 LONG TERM CURRENT USE OF ANTICOAGULANT THERAPY: ICD-10-CM

## 2024-02-23 DIAGNOSIS — I27.24 CTEPH (CHRONIC THROMBOEMBOLIC PULMONARY HYPERTENSION) (H): ICD-10-CM

## 2024-02-23 LAB — INR HOME MONITORING: 2.9 RATIO (ref 2–2.5)

## 2024-02-23 NOTE — PROGRESS NOTES
ANTICOAGULATION MANAGEMENT     Karen RICO Calderonhilariacharbel 83 year old female is on warfarin with therapeutic INR result. (Goal INR 2.0-3.0)    Recent labs: (last 7 days)     02/23/24  1139   INR 2.9*       ASSESSMENT     Source(s): Chart Review and Patient/Caregiver Call     Warfarin doses taken: Warfarin taken as instructed  Diet: No new diet changes identified  Medication/supplement changes: None noted  New illness, injury, or hospitalization: No  Signs or symptoms of bleeding or clotting: No  Previous result: Supratherapeutic  Additional findings: None       PLAN     Recommended plan for no diet, medication or health factor changes affecting INR     Dosing Instructions: Continue your current warfarin dose with next INR in 2 weeks       Summary  As of 2/23/2024      Full warfarin instructions:  5 mg every Sun, Thu; 3 mg all other days   Next INR check:  3/8/2024               Telephone call with Karen who verbalizes understanding and agrees to plan    Patient to recheck with home meter    Education provided:   Please call back if any changes to your diet, medications or how you've been taking warfarin    Plan made per [facility] anticoagulation protocol    Maddie Najera RN  Anticoagulation Clinic  2/23/2024    _______________________________________________________________________     Anticoagulation Episode Summary       Current INR goal:  2.0-3.0   TTR:  66.6% (1 y)   Target end date:  Indefinite   Send INR reminders to:  [facility]    Indications    Pulmonary hypertension (H) [I27.20]  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

## 2024-03-04 NOTE — PROGRESS NOTES
Care Management Follow Up    Length of Stay (days): 12    Expected Discharge Date: 06/09/21       Concerns to be Addressed:       Patient plan of care discussed at interdisciplinary rounds: not yet     Anticipated Discharge Disposition:  Home  Anticipated Discharge Services:  [facility] (PT/OT/lymph) [facility] ph: [phone] fx: [phone] (remodulin - resumed).    Anticipated Discharge DME:  [facility] - oxygen PTA.      Additional Information:  Spoke with [facility]. Her current RX is 2LPM. Patient has a concentrator at home that goes up to 5LPM. She has tanks and a regulator to attach to them. The regulator can be turned up to 8LPM. Text paged cards2 NP.  They will write updated orders for 3-4 LPM day of discharge to be faxed.      Tania Arora RN, [place]  Float Care Coordinator  Covering [facility]   Pager: [phone]          Myself Myself Myself Myself/NP Myself

## 2024-03-07 ENCOUNTER — MYC MEDICAL ADVICE (OUTPATIENT)
Dept: CARDIOLOGY | Facility: CLINIC | Age: 84
End: 2024-03-07
Payer: COMMERCIAL

## 2024-03-07 ENCOUNTER — TRANSFERRED RECORDS (OUTPATIENT)
Dept: HEALTH INFORMATION MANAGEMENT | Facility: CLINIC | Age: 84
End: 2024-03-07
Payer: COMMERCIAL

## 2024-03-07 NOTE — TELEPHONE ENCOUNTER
Called patient and scheduled virtual follow-up. She would like labs sent to [facility] for them to be drawn at home    Call placed to [facility] and a voicemail was left for a call back    Rebecca Ngo RN on 3/7/2024 at 11:39 AM

## 2024-03-08 ENCOUNTER — ANTICOAGULATION THERAPY VISIT (OUTPATIENT)
Dept: ANTICOAGULATION | Facility: CLINIC | Age: 84
End: 2024-03-08
Payer: COMMERCIAL

## 2024-03-08 DIAGNOSIS — I27.24 CTEPH (CHRONIC THROMBOEMBOLIC PULMONARY HYPERTENSION) (H): ICD-10-CM

## 2024-03-08 DIAGNOSIS — I27.20 PULMONARY HYPERTENSION (H): Primary | ICD-10-CM

## 2024-03-08 DIAGNOSIS — Z79.01 LONG TERM CURRENT USE OF ANTICOAGULANT THERAPY: ICD-10-CM

## 2024-03-08 LAB — INR HOME MONITORING: 3 RATIO (ref 2–2.5)

## 2024-03-08 NOTE — PROGRESS NOTES
ANTICOAGULATION MANAGEMENT     Karen Anderson 83 year old female is on warfarin with therapeutic INR result. (Goal INR 2.0-3.0)    Recent labs: (last 7 days)     03/08/24  1223   INR 3*       ASSESSMENT     Source(s): Chart Review  Previous INR was Therapeutic last 2(+) visits  Medication, diet, health changes since last INR chart reviewed; none identified         PLAN     Recommended plan for no diet, medication or health factor changes affecting INR     Dosing Instructions: Continue your current warfarin dose with next INR in 2 weeks       Summary  As of 3/8/2024      Full warfarin instructions:  5 mg every Sun, Thu; 3 mg all other days   Next INR check:  3/22/2024               Detailed voice message left for Karen with dosing instructions and follow up date.     Patient to recheck with home meter    Education provided:   Please call back if any changes to your diet, medications or how you've been taking warfarin    Plan made per [facility] anticoagulation protocol    Maddie Najera RN  Anticoagulation Clinic  3/8/2024    _______________________________________________________________________     Anticoagulation Episode Summary       Current INR goal:  2.0-3.0   TTR:  69.3% (1 y)   Target end date:  Indefinite   Send INR reminders to:  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

## 2024-03-08 NOTE — TELEPHONE ENCOUNTER
Lab orders faxed to [phone] for prior to upcoming visit. Called patient to update this was completed.     Rebecca Ngo RN on 3/8/2024 at 3:21 PM

## 2024-03-22 ENCOUNTER — ANTICOAGULATION THERAPY VISIT (OUTPATIENT)
Dept: ANTICOAGULATION | Facility: CLINIC | Age: 84
End: 2024-03-22
Payer: COMMERCIAL

## 2024-03-22 LAB — INR HOME MONITORING: 1.9 RATIO (ref 2–2.5)

## 2024-03-22 NOTE — PROGRESS NOTES
ANTICOAGULATION MANAGEMENT     Karen Anderson 83 year old female is on warfarin with subtherapeutic INR result. (Goal INR 2.0-3.0)    Recent labs: (last 7 days)     03/22/24  1254   INR 1.9*       ASSESSMENT     Source(s): Chart Review and Patient/Caregiver Call     Warfarin doses taken: Warfarin taken as instructed  Diet: No new diet changes identified  Medication/supplement changes: None noted  New illness, injury, or hospitalization: No  Signs or symptoms of bleeding or clotting: No  Previous result: Therapeutic last 2(+) visits  Additional findings: None       PLAN       Dosing Instructions: booster dose then continue your current warfarin dose with next INR in 2 weeks       Summary  As of 3/22/2024      Full warfarin instructions:  3/22: 4.5 mg; Otherwise 5 mg every Sun, Thu; 3 mg all other days   Next INR check:  4/5/2024               Telephone call with Karen who agrees to plan and repeated back plan correctly  Sent [employer] message with dosing and follow up instructions    Patient to recheck with home meter    Education provided:   Contact [phone] with any changes, questions or concerns.     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

## 2024-03-28 ENCOUNTER — VIRTUAL VISIT (OUTPATIENT)
Dept: CARDIOLOGY | Facility: CLINIC | Age: 84
End: 2024-03-28
Attending: PHYSICIAN ASSISTANT
Payer: COMMERCIAL

## 2024-03-28 VITALS — BODY MASS INDEX: 20.4 KG/M2 | WEIGHT: 101 LBS

## 2024-03-28 DIAGNOSIS — R06.09 DOE (DYSPNEA ON EXERTION): ICD-10-CM

## 2024-03-28 DIAGNOSIS — I27.20 PULMONARY HYPERTENSION (H): ICD-10-CM

## 2024-03-28 DIAGNOSIS — E61.1 IRON DEFICIENCY: Primary | ICD-10-CM

## 2024-03-28 PROCEDURE — 99215 OFFICE O/P EST HI 40 MIN: CPT | Mod: 95 | Performed by: PHYSICIAN ASSISTANT

## 2024-03-28 PROCEDURE — G2211 COMPLEX E/M VISIT ADD ON: HCPCS | Mod: 95 | Performed by: PHYSICIAN ASSISTANT

## 2024-03-28 ASSESSMENT — PAIN SCALES - GENERAL: PAINLEVEL: NO PAIN (0)

## 2024-03-28 NOTE — NURSING NOTE
Patient confirms medications and allergies are accurate via patients echeck in completion, and or denies any changes since last reviewed/verified.     Carmelita Lambert, Virtual Facilitator   Is the patient currently in the state of [place]? YES    Visit mode:VIDEO    If the visit is dropped, the patient can be reconnected by: VIDEO VISIT: Text to cell phone:   Telephone Information:   Mobile [phone]       Will anyone else be joining the visit? NO  (If patient encounters technical issues they should call [phone]    How would you like to obtain your AVS? MyChart    Are changes needed to the allergy or medication list? No    Reason for visit: RECHECK    Carmelita Lambert VVF

## 2024-03-28 NOTE — LETTER
"3/28/2024      RE: Karen Anderson  [address] [facility] [address]       Dear Colleague,    Thank you for the opportunity to participate in the care of your patient, Karen Anderson, at the [facility] [place] [facility]. Please see a copy of my visit note below.    Wt 45.8 kg (101 lb)   BMI 20.40 kg/m        12/13/2022     2:05 PM 1/17/2023     9:30 AM 2/27/2023    10:02 AM   Vitals - Patient Reported   Height (Patient Reported)  4' 11\"    Weight (Patient Reported) 103 lb 102 lb 103 lb   BMI (Based on Pt Reported Ht/Wt)  20.6 kg/m2    Systolic (Patient Reported) 117  115   Diastolic (Patient Reported) 57  57   Pulse (Patient Reported) 64  71         Wt Readings from Last 10 Encounters:   03/28/24 45.8 kg (101 lb)   12/12/23 46.3 kg (102 lb)   06/13/23 46.7 kg (103 lb)   02/27/23 46.7 kg (103 lb)   11/25/22 46.3 kg (102 lb)   09/20/21 46.7 kg (103 lb)   09/02/21 47.2 kg (104 lb)   06/24/21 46.5 kg (102 lb 8 oz)   06/10/21 51.8 kg (114 lb 4.8 oz)   03/17/21 49.9 kg (110 lb)           CARDIOLOGY PH CLINIC VIDEO VISIT    Date of video visit: 03/28/24      Karen Anderson is a 83 year old female who is being evaluated via a billable video visit.        MEDICATIONS:  Current Outpatient Medications   Medication Sig Dispense Refill    acetaminophen (TYLENOL) 325 MG tablet Take 325 mg by mouth every 6 hours as needed for mild pain      albuterol (PROAIR HFA/PROVENTIL HFA/VENTOLIN HFA) 108 (90 Base) MCG/ACT inhaler Inhale 2 puffs into the lungs every 4 hours as needed for shortness of breath / dyspnea or wheezing       alendronate (FOSAMAX) 70 MG tablet Take 70 mg by mouth once a week On Mondays      Ascorbic Acid (VITAMIN C) 500 MG CAPS Take 500 mg by mouth every morning       aspirin (ASA) 81 MG tablet Take 81 mg by mouth daily       beclomethasone HFA (QVAR REDIHALER) 80 MCG/ACT inhaler Inhale 2 puffs into the lungs 2 times daily  "     bumetanide (BUMEX) 2 MG tablet Take 2 tablets (4 mg) by mouth 3 times daily 540 tablet 3    calcium citrate-vitamin D (CALCIUM CITRATE + D3) 315-250 MG-UNIT TABS per tablet Take 1 tablet by mouth 2 times daily       digoxin (LANOXIN) 125 MCG tablet Take 0.5 tablets (62.5 mcg) by mouth daily 45 tablet 3    gabapentin (NEURONTIN) 300 MG capsule Take 300 mg by mouth 3 times daily Can take an additional 300mg at bedtime as needed      hydroxychloroquine (PLAQUENIL) 200 MG tablet Take 200 mg by mouth daily       ipratropium (ATROVENT) 0.06 % nasal spray Spray 2 sprays into both nostrils 3 times daily       ketotifen (ZADITOR) 0.025 % ophthalmic solution Place 1 drop into both eyes daily      macitentan (OPSUMIT) 10 MG tablet Take 1 tablet (10 mg) by mouth daily 30 tablet 11    Metamucil Fiber CHEW Take by mouth daily as needed 2 chewables      potassium chloride ER (KLOR-CON M) 20 MEQ CR tablet Take 1 tablet (20 mEq) by mouth 3 times daily More refills after office visit. 270 tablet 3    sodium chloride (OCEAN) 0.65 % nasal spray Spray 1 spray into both nostrils daily as needed for congestion      treprostinil (REMODULIN) 60 mcg/mL in glycine diluent 50 mL infusion Inject 45.5 ng/kg/min into the vein continuous Goal Dose: 45.5 ng/kg/min 1368 mL 0    warfarin ANTICOAGULANT (COUMADIN) 1 MG tablet TAKE 1-2 TABLETS DAILY OR AS DIRECTED 180 tablet 1    warfarin ANTICOAGULANT (COUMADIN) 1 MG tablet Take 1 tablet (1 mg) by mouth daily 90 tablet 3    warfarin ANTICOAGULANT (COUMADIN) 3 MG tablet Take 5 mg (3 mg x 1 and 1 mg x 2) every Sun, Tue, Thu; 3 mg (3 mg x 1) all other days OR AS DIRECTED BY ANTICOAGULATION CLINIC. 100 tablet 1       Primary PH cardiologist: Dr. Turcios        HPI:  Ms. Anderson is a pleasant 83 year old female with a PMHx including rheumatoid arthritis, hypertension, scoliosis, asthma, pulmonary MAC, breast cancer s/p chemotherapy with Adriamycin, Cytoxan, Taxol, and tamoxifen and status post left  mastectomy. She also has a history of extensive bilateral pulmonary emboli and DVT in 1998 thought to be secondary to tamoxifen along with chemotherapy induced cardiomyopathy. 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 Although she had proximal disease, she was too high risk for surgical PTE. She had a BPA in Jan 2021 which was complicated by hemoptysis, requiring FFP. She has required frequent diuretic adjustments, and we have been working on medical management. She is currently on combination therapy with IV Remodulin therapy along with Opsumit, and has failed Adempas due to dizziness and hypotension.     Karen was seen last by Dr. Turcios virtually (as she has requested to limit in person visits) in late December. She had been doing relatively well and fluid status sounded to remain at baseline. The only change that visit was to reduce her potassium slightly as she was hyperkalemic.     Today, we are meeting back virtually once again. Unfortunately, they have a lot going on right now as her  was diagnosed with Parkinson's last fall and sounds to be progressing somewhat rapidly. He has had some falls at home. They had been looking into an SOLO but because of her Remodulin their options are limited and they cannot find somewhere to go that will meet their needs. She reports that she is slowly declining, with less energy. She continues with LE edema but this sounds to be stable and responds well to leg wraps.      She had some labs performed last week in preparation for our visit today which were reviewed as below.          CURRENT PULMONARY HYPERTENSION REGIMEN:     PAH Rx: IV Remodulin 45.5ng/kg/min, Opsumit 10mg daily  (failed Adempas due to dizziness)     Diuretics: Bumex 4mg TID      Oxygen: NC 2.5 LPM currently     Anticoagulation: warfarin   Indication: CTEPH     Immunosuppression: Yes (RA)     Pulm: Dr. Abernathy ([facility])        Assessment/Plan:         1.  Chronic thromboembolic pulmonary hypertension.              --Ms. Anderson has CTEPH with RV dysfunction. Although she had proximal disease, she was felt to be too high risk for surgical PTE both here and at [facility]. She was offered potential PTE at [place], but still felt to be very high risk and ultimately deferred. She did have BPA here in January 2021 but this was complicated by hemoptysis. Thus, we have since been focusing on medical management.              --She remains a stable dose of IV Remodulin at 45.5ng/kg/min.  Continue this along with Opsumit daily as is, and digoxin for RV support. She is not able to tolerate Adempas due to dizziness. From a cardiopulmonary standpoint, things sound to be overall unchanged.              --Bumex is at 4mg TID; we have tried transient reductions but this seems to result in more edema. Home weight is stable. NT-proBNP has unfortunately gone up quite a bit, but edema sounds to be relatively stable as well. We discussed her desire to continue to avoid invasive procedures when possible, so will continue to monitor this. Renal function has fortunately been preserved.               --Continue anticoagulation with warfarin for her CTEPH,  She has equipment to do home checks, and follows with the INR clinic.              --Continue oxygen as is. She currently uses 2.5L. She tells me today that when she is lying flat (she sleeps on the sofa) she notes desaturation and often when she first wakes sats can be in the high 70s. However, she does not wake SOB. As soon as she sits up, sats quickly recover. Upon discussion she sounds to be a mouth breather and has had a stuffy nose with the weather changes. I recommend she try putting her NC in her mouth during the sleeping hours as a trial.    --Hemoglobin is slowly trending downward though she reports no bleeding issues. Will add iron studies to next lab draw.       Follow up plan: She requests virtual visits whenever possible. They  continue to work on finding a suitable place to live to help meet both of their medical needs which we discussed in detail again today. Will have her return in 3 months with labs to see Dr. Turcios. We are happy to see the patient back sooner with any new concerns.     Testing/labs:    Most recent labs:      Latest Reference Range & Units 03/22/24 10:45   Sodium (External) 136 - 145 mmol/L 143 (E)   Potassium (External) 3.5 - 5.1 mmol/L 4.3 (E)   Chloride (External) 98 - 107 mmol/L 100 (E)   CO2 (External) 22 - 29 mmol/L 32 (H) (E)   Urea Nitrogen (External) 8 - 23 mg/dL 24 (H) (E)   Creatinine (External) 0.50 - 0.90 mg/dL 0.98 (H) (E)   GFR Estimated (External) >90 mL/min/1.73m2 58 (L) (E)   Calcium (External) 8.8 - 10.2 mg/dL 9.7 (E)   Anion Gap (External) 5 - 18  11 (E)   BUN/Creatinine Ratio (External) 10 - 20  24 (H) (E)   Glucose (External) 70 - 99 mg/dL 68 (L) (E)   WBC Count (External) 4.5 - 11.0 thou/cu mm 4.3 (L) (E)   Hemoglobin (External) 12.0 - 16.0 g/dL 10.2 (L) (E)   Hematocrit (External) 33.0 - 51.0 % 34.4 (E)   Platelet Count (External) 140 - 440 thou/cu mm 228 (E)   RBC Count (External) 4.00 - 5.20 mil/cu mm 3.85 (L) (E)   MCV (External) 80 - 100 fL 89 (E)   MCH (External) 26.0 - 34.0 % 26.5 (E)   MCHC (External) 32.0 - 36.0 g/dL 29.7 (L) (E)   RDW (External) 11.5 - 15.5 % 17.7 (H) (E)   (H): Data is abnormally high  (L): Data is abnormally low  (E): External lab result   Latest Reference Range & Units 10/11/22 10:30 12/14/22 11:00 02/24/23 13:14 06/08/23 10:20 12/07/23 10:42 03/22/24 10:45   N-Terminal Pro Bnp 0 - 1,800 pg/mL 1,135 (H) 1,227 1,169 1,325 1,977 (H)    N-Terminal Pro BNP (External) <450 pg/mL      4,007 (H) (E)   (H): Data is abnormally high  (E): External lab result      Other most recent pertinent testing:    Echo 9/1/23  Interpretation Summary     Left ventricular function is normal.The ejection fraction is 55-60%. No  regional wall motion abnormalities are seen. Paradoxical  septal motion  consistent with right ventricular pressure and volume overload is present.  Global right ventricular function is mildly reduced. Moderate right  ventricular dilation is present. Estimated PASP 92 mmHg (RVSP + RAP). The PV  acceleration time is 67msec  Severe right atrial enlargement is present.  The inferior vena cava was normal in size with preserved respiratory  variability. No pericardial effusion is present.     This study was compared with the study from 4/25/22 .  The estimated PASP has increased. The RV function appears similar.    [facility] 3/17/21  Conclusion    Severe pulmonary hypertension  Normal cardiac output by Thermodilution  Elevated left sided filling pressures  No signficant change when comapred to her last hemodynamic assessment         Pressures Phase: Baseline       Time Systolic (mmHg) Diastolic (mmHg) Mean (mmHg) A Wave (mmHg) V Wave (mmHg) EDP (mmHg) Max dp/dt (mmHg/sec) HR (bpm) Content (mL/dL) SAT (%)   RA Pressures  2:31 PM   14    14    22      63        RV Pressures  2:16 PM       912           2:32 PM 87    9       28     62        PA Pressures  2:32 PM 88    22    44        62        PCW Pressures  2:33 PM   14        64            NYHA Functional Class:  3      Video-Visit Details    Type of service:  Video Visit    Video Start Time: 1416  Video End Time: 1443    An additional 15 minutes was spent today performing chart and history review, pre and post visit documentation, patient education, and care coordination.    The longitudinal plan of care for the diagnosis(es)/condition(s) as documented were addressed during this visit. Due to the added complexity in care, I will continue to support Karen in the subsequent management and with ongoing continuity of care.        Shawna Marcum PA-C  [facility]  Pager [phone]

## 2024-03-28 NOTE — NURSING NOTE
"Reviewed Med list  Completed AVS  Follow-up orders placed  Marked chart \"Ready for Checkout\"  Sent staff msg to Rhiannon Fernandez to see if she knows of any Assisted Living facilities that would accept patient with IV Remodulin.  Fabby Seymour RN on 3/28/2024 at 2:56 PM    "

## 2024-03-28 NOTE — PROGRESS NOTES
"Wt 45.8 kg (101 lb)   BMI 20.40 kg/m        12/13/2022     2:05 PM 1/17/2023     9:30 AM 2/27/2023    10:02 AM   Vitals - Patient Reported   Height (Patient Reported)  4' 11\"    Weight (Patient Reported) 103 lb 102 lb 103 lb   BMI (Based on Pt Reported Ht/Wt)  20.6 kg/m2    Systolic (Patient Reported) 117  115   Diastolic (Patient Reported) 57  57   Pulse (Patient Reported) 64  71         Wt Readings from Last 10 Encounters:   03/28/24 45.8 kg (101 lb)   12/12/23 46.3 kg (102 lb)   06/13/23 46.7 kg (103 lb)   02/27/23 46.7 kg (103 lb)   11/25/22 46.3 kg (102 lb)   09/20/21 46.7 kg (103 lb)   09/02/21 47.2 kg (104 lb)   06/24/21 46.5 kg (102 lb 8 oz)   06/10/21 51.8 kg (114 lb 4.8 oz)   03/17/21 49.9 kg (110 lb)           CARDIOLOGY PH CLINIC VIDEO VISIT    Date of video visit: 03/28/24      Karen Anderson is a 83 year old female who is being evaluated via a billable video visit.        MEDICATIONS:  Current Outpatient Medications   Medication Sig Dispense Refill    acetaminophen (TYLENOL) 325 MG tablet Take 325 mg by mouth every 6 hours as needed for mild pain      albuterol (PROAIR HFA/PROVENTIL HFA/VENTOLIN HFA) 108 (90 Base) MCG/ACT inhaler Inhale 2 puffs into the lungs every 4 hours as needed for shortness of breath / dyspnea or wheezing       alendronate (FOSAMAX) 70 MG tablet Take 70 mg by mouth once a week On Mondays      Ascorbic Acid (VITAMIN C) 500 MG CAPS Take 500 mg by mouth every morning       aspirin (ASA) 81 MG tablet Take 81 mg by mouth daily       beclomethasone HFA (QVAR REDIHALER) 80 MCG/ACT inhaler Inhale 2 puffs into the lungs 2 times daily      bumetanide (BUMEX) 2 MG tablet Take 2 tablets (4 mg) by mouth 3 times daily 540 tablet 3    calcium citrate-vitamin D (CALCIUM CITRATE + D3) 315-250 MG-UNIT TABS per tablet Take 1 tablet by mouth 2 times daily       digoxin (LANOXIN) 125 MCG tablet Take 0.5 tablets (62.5 mcg) by mouth daily 45 tablet 3    gabapentin (NEURONTIN) 300 MG capsule Take " 300 mg by mouth 3 times daily Can take an additional 300mg at bedtime as needed      hydroxychloroquine (PLAQUENIL) 200 MG tablet Take 200 mg by mouth daily       ipratropium (ATROVENT) 0.06 % nasal spray Spray 2 sprays into both nostrils 3 times daily       ketotifen (ZADITOR) 0.025 % ophthalmic solution Place 1 drop into both eyes daily      macitentan (OPSUMIT) 10 MG tablet Take 1 tablet (10 mg) by mouth daily 30 tablet 11    Metamucil Fiber CHEW Take by mouth daily as needed 2 chewables      potassium chloride ER (KLOR-CON M) 20 MEQ CR tablet Take 1 tablet (20 mEq) by mouth 3 times daily More refills after office visit. 270 tablet 3    sodium chloride (OCEAN) 0.65 % nasal spray Spray 1 spray into both nostrils daily as needed for congestion      treprostinil (REMODULIN) 60 mcg/mL in glycine diluent 50 mL infusion Inject 45.5 ng/kg/min into the vein continuous Goal Dose: 45.5 ng/kg/min 1368 mL 0    warfarin ANTICOAGULANT (COUMADIN) 1 MG tablet TAKE 1-2 TABLETS DAILY OR AS DIRECTED 180 tablet 1    warfarin ANTICOAGULANT (COUMADIN) 1 MG tablet Take 1 tablet (1 mg) by mouth daily 90 tablet 3    warfarin ANTICOAGULANT (COUMADIN) 3 MG tablet Take 5 mg (3 mg x 1 and 1 mg x 2) every Sun, Tue, Thu; 3 mg (3 mg x 1) all other days OR AS DIRECTED BY ANTICOAGULATION CLINIC. 100 tablet 1       Primary PH cardiologist: Dr. Turcios        HPI:  Ms. Anderson is a pleasant 83 year old female with a PMHx including rheumatoid arthritis, hypertension, scoliosis, asthma, pulmonary MAC, breast cancer s/p chemotherapy with Adriamycin, Cytoxan, Taxol, and tamoxifen and status post left mastectomy. She also has a history of extensive bilateral pulmonary emboli and DVT in 1998 thought to be secondary to tamoxifen along with chemotherapy induced cardiomyopathy. 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 Although she had proximal disease, she was too high risk for surgical PTE. She had a BPA in Jan 2021  which was complicated by hemoptysis, requiring FFP. She has required frequent diuretic adjustments, and we have been working on medical management. She is currently on combination therapy with IV Remodulin therapy along with Opsumit, and has failed Adempas due to dizziness and hypotension.     Karen was seen last by Dr. Turcios virtually (as she has requested to limit in person visits) in late December. She had been doing relatively well and fluid status sounded to remain at baseline. The only change that visit was to reduce her potassium slightly as she was hyperkalemic.     Today, we are meeting back virtually once again. Unfortunately, they have a lot going on right now as her  was diagnosed with Parkinson's last fall and sounds to be progressing somewhat rapidly. He has had some falls at home. They had been looking into an retirement but because of her Remodulin their options are limited and they cannot find somewhere to go that will meet their needs. She reports that she is slowly declining, with less energy. She continues with LE edema but this sounds to be stable and responds well to leg wraps.      She had some labs performed last week in preparation for our visit today which were reviewed as below.          CURRENT PULMONARY HYPERTENSION REGIMEN:     PAH Rx: IV Remodulin 45.5ng/kg/min, Opsumit 10mg daily  (failed Adempas due to dizziness)     Diuretics: Bumex 4mg TID      Oxygen: NC 2.5 LPM currently     Anticoagulation: warfarin   Indication: CTEPH     Immunosuppression: Yes (RA)     Pulm: [facility] Abern[facility] ([facility])        Assessment/Plan:         1. Chronic thromboembolic pulmonary hypertension.              --Ms. Anderson has CTEPH with RV dysfunction. Although she had proximal disease, she was felt to be too high risk for surgical PTE both here and at [facility]. She was offered potential PTE at [facility], but still felt to be very high risk and ultimately deferred. She did have BPA here in January 2021 but  this was complicated by hemoptysis. Thus, we have since been focusing on medical management.              --She remains a stable dose of IV Remodulin at 45.5ng/kg/min.  Continue this along with Opsumit daily as is, and digoxin for RV support. She is not able to tolerate Adempas due to dizziness. From a cardiopulmonary standpoint, things sound to be overall unchanged.              --Bumex is at 4mg TID; we have tried transient reductions but this seems to result in more edema. Home weight is stable. NT-proBNP has unfortunately gone up quite a bit, but edema sounds to be relatively stable as well. We discussed her desire to continue to avoid invasive procedures when possible, so will continue to monitor this. Renal function has fortunately been preserved.               --Continue anticoagulation with warfarin for her CTEPH,  She has equipment to do home checks, and follows with the INR clinic.              --Continue oxygen as is. She currently uses 2.5L. She tells me today that when she is lying flat (she sleeps on the sofa) she notes desaturation and often when she first wakes sats can be in the high 70s. However, she does not wake SOB. As soon as she sits up, sats quickly recover. Upon discussion she sounds to be a mouth breather and has had a stuffy nose with the weather changes. I recommend she try putting her NC in her mouth during the sleeping hours as a trial.    --Hemoglobin is slowly trending downward though she reports no bleeding issues. Will add iron studies to next lab draw.       Follow up plan: She requests virtual visits whenever possible. They continue to work on finding a suitable place to live to help meet both of their medical needs which we discussed in detail again today. Will have her return in 3 months with labs to see Dr. Turcios. We are happy to see the patient back sooner with any new concerns.     Testing/labs:    Most recent labs:      Latest Reference Range & Units 03/22/24 10:45    Sodium (External) 136 - 145 mmol/L 143 (E)   Potassium (External) 3.5 - 5.1 mmol/L 4.3 (E)   Chloride (External) 98 - 107 mmol/L 100 (E)   CO2 (External) 22 - 29 mmol/L 32 (H) (E)   Urea Nitrogen (External) 8 - 23 mg/dL 24 (H) (E)   Creatinine (External) 0.50 - 0.90 mg/dL 0.98 (H) (E)   GFR Estimated (External) >90 mL/min/1.73m2 58 (L) (E)   Calcium (External) 8.8 - 10.2 mg/dL 9.7 (E)   Anion Gap (External) 5 - 18  11 (E)   BUN/Creatinine Ratio (External) 10 - 20  24 (H) (E)   Glucose (External) 70 - 99 mg/dL 68 (L) (E)   WBC Count (External) 4.5 - 11.0 thou/cu mm 4.3 (L) (E)   Hemoglobin (External) 12.0 - 16.0 g/dL 10.2 (L) (E)   Hematocrit (External) 33.0 - 51.0 % 34.4 (E)   Platelet Count (External) 140 - 440 thou/cu mm 228 (E)   RBC Count (External) 4.00 - 5.20 mil/cu mm 3.85 (L) (E)   MCV (External) 80 - 100 fL 89 (E)   MCH (External) 26.0 - 34.0 % 26.5 (E)   MCHC (External) 32.0 - 36.0 g/dL 29.7 (L) (E)   RDW (External) 11.5 - 15.5 % 17.7 (H) (E)   (H): Data is abnormally high  (L): Data is abnormally low  (E): External lab result   Latest Reference Range & Units 10/11/22 10:30 12/14/22 11:00 02/24/23 13:14 06/08/23 10:20 12/07/23 10:42 03/22/24 10:45   N-Terminal Pro Bnp 0 - 1,800 pg/mL 1,135 (H) 1,227 1,169 1,325 1,977 (H)    N-Terminal Pro BNP (External) <450 pg/mL      4,007 (H) (E)   (H): Data is abnormally high  (E): External lab result      Other most recent pertinent testing:    Echo 9/1/23  Interpretation Summary     Left ventricular function is normal.The ejection fraction is 55-60%. No  regional wall motion abnormalities are seen. Paradoxical septal motion  consistent with right ventricular pressure and volume overload is present.  Global right ventricular function is mildly reduced. Moderate right  ventricular dilation is present. Estimated PASP 92 mmHg (RVSP + RAP). The PV  acceleration time is 67msec  Severe right atrial enlargement is present.  The inferior vena cava was normal in size with  preserved respiratory  variability. No pericardial effusion is present.     This study was compared with the study from 4/25/22 .  The estimated PASP has increased. The RV function appears similar.    [facility] 3/17/21  Conclusion    Severe pulmonary hypertension  Normal cardiac output by Thermodilution  Elevated left sided filling pressures  No signficant change when comapred to her last hemodynamic assessment         Pressures Phase: Baseline       Time Systolic (mmHg) Diastolic (mmHg) Mean (mmHg) A Wave (mmHg) V Wave (mmHg) EDP (mmHg) Max dp/dt (mmHg/sec) HR (bpm) Content (mL/dL) SAT (%)   RA Pressures  2:31 PM   14    14    22      63        RV Pressures  2:16 PM       912           2:32 PM 87    9       28     62        PA Pressures  2:32 PM 88    22    44        62        PCW Pressures  2:33 PM   14        64            NYHA Functional Class:  3      Video-Visit Details    Type of service:  Video Visit    Video Start Time: 1416  Video End Time: 1443    An additional 15 minutes was spent today performing chart and history review, pre and post visit documentation, patient education, and care coordination.    The longitudinal plan of care for the diagnosis(es)/condition(s) as documented were addressed during this visit. Due to the added complexity in care, I will continue to support Karen in the subsequent management and with ongoing continuity of care.      Originating Location (pt. Location): Home    Distant Location (provider location):  On-site    Platform used for Video Visit: Darron Marcum PA-C  [facility]  Pager [phone]

## 2024-03-28 NOTE — PATIENT INSTRUCTIONS
You were seen today in the Pulmonary Hypertension Clinic at the [facility].     Cardiology Provider you saw during your visit:    MAGEN Saeed    Medication Changes:  -None    Results:   Component      Latest Ref Rng 3/22/2024  10:45 AM   Sodium (External)      136 - 145 mmol/L 143 (E)   Potassium (External)      3.5 - 5.1 mmol/L 4.3 (E)   Chloride (External)      98 - 107 mmol/L 100 (E)   CO2 (External)      22 - 29 mmol/L 32 (H) (E)   Anion Gap (External)      5 - 18  11 (E)   Glucose (External)      70 - 99 mg/dL 68 (L) (E)   Calcium (External)      8.8 - 10.2 mg/dL 9.7 (E)   Urea Nitrogen (External)      8 - 23 mg/dL 24 (H) (E)   Creatinine (External)      0.50 - 0.90 mg/dL 0.98 (H) (E)   BUN/Creatinine Ratio (External)      10 - 20  24 (H) (E)   GFR Estimated (External)      >90 mL/min/1.73m2 58 (L) (E)   WBC Count (External)      4.5 - 11.0 thou/cu mm 4.3 (L) (E)   RBC Count (External)      4.00 - 5.20 mil/cu mm 3.85 (L) (E)   Hemoglobin (External)      12.0 - 16.0 g/dL 10.2 (L) (E)   Hematocrit (External)      33.0 - 51.0 % 34.4 (E)   MCV (External)      80 - 100 fL 89 (E)   MCH (External)      26.0 - 34.0 % 26.5 (E)   MCHC (External)      32.0 - 36.0 g/dL 29.7 (L) (E)   RDW (External)      11.5 - 15.5 % 17.7 (H) (E)   Platelet Count (External)      140 - 440 thou/cu mm 228 (E)   N-Terminal Pro BNP (External)      <450 pg/mL 4,007 (H) (E)      Legend:  (H) High  (L) Low  (E) External lab result      Follow-up:   Follow up in 3 months with Shawna Marcum PA-C virtually with labs prior      Please call us immediately if you have any syncope (fainting or passing out), chest pain, edema (swelling or weight gain), or decline in your functional status (general decline in how you are feeling).    If you have emergent concerns after hours or on the weekend, please call our on-call Cardiologist at [phone], option 4. For emergencies call 911.     Thank you for allowing us to be a part of your care  here at the [facility]    If you have questions or concerns please contact us at:    Rebecca Ngo RN (P: [phone])    Nurse Coordinator       Pulmonary Hypertension     [facility]         ALINA Austin   (Prior Auths & Pt Assistance)   ()  Clinic   Clinic   Pulmonary Hypertension   Pulmonary Hypertension  [facility]  (P)[phone]    (P) [phone]  (F) [phone]

## 2024-03-29 ENCOUNTER — TELEPHONE (OUTPATIENT)
Dept: CARDIOLOGY | Facility: CLINIC | Age: 84
End: 2024-03-29
Payer: COMMERCIAL

## 2024-03-29 NOTE — TELEPHONE ENCOUNTER
3/29 Left Voicemail (1st Attempt) and mychart (1 st attempt) for the patient to call back and schedule the following:    Appointment type: Return Pulmonary Hypertension  Provider:Shawna Marcum   Return date: 6/10/2024  Specialty phone number: [phone] (option 1)  Additional appointment(s) needed: N/A  Additonal Notes: 3 month follow up virtually with labs prior

## 2024-04-05 ENCOUNTER — ANTICOAGULATION THERAPY VISIT (OUTPATIENT)
Dept: ANTICOAGULATION | Facility: CLINIC | Age: 84
End: 2024-04-05
Payer: COMMERCIAL

## 2024-04-05 DIAGNOSIS — I27.20 PULMONARY HYPERTENSION (H): Primary | ICD-10-CM

## 2024-04-05 DIAGNOSIS — I27.24 CTEPH (CHRONIC THROMBOEMBOLIC PULMONARY HYPERTENSION) (H): ICD-10-CM

## 2024-04-05 DIAGNOSIS — Z79.01 LONG TERM CURRENT USE OF ANTICOAGULANT THERAPY: ICD-10-CM

## 2024-04-05 LAB — INR HOME MONITORING: 2.1 RATIO (ref 2–2.5)

## 2024-04-05 NOTE — PROGRESS NOTES
ANTICOAGULATION MANAGEMENT     Karen Anderson 83 year old female is on warfarin with therapeutic INR result. (Goal INR 2.0-3.0)    Recent labs: (last 7 days)     04/05/24  1452   INR 2.1       ASSESSMENT     Source(s): Chart Review and Patient/Caregiver Call     Warfarin doses taken: Warfarin taken as instructed  Diet: No new diet changes identified  Medication/supplement changes: None noted  New illness, injury, or hospitalization: No  Signs or symptoms of bleeding or clotting: No  Previous result: Subtherapeutic  Additional findings:  her meals that are delivered by [employer] will include protein powder packets starting in May       PLAN     Recommended plan for no diet, medication or health factor changes affecting INR     Dosing Instructions: Continue your current warfarin dose with next INR in 2 weeks       Summary  As of 4/5/2024      Full warfarin instructions:  5 mg every Sun, Thu; 3 mg all other days   Next INR check:  4/19/2024               Telephone call with Karen who verbalizes understanding and agrees to plan    Patient to recheck with home meter    Education provided:   Please call back if any changes to your diet, medications or how you've been taking warfarin  Dietary considerations: Impact of protein intake on INR  and importance of notifying [facility] to changes in diet  Contact [phone]  with any changes, questions or concerns.     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

## 2024-04-06 ENCOUNTER — HEALTH MAINTENANCE LETTER (OUTPATIENT)
Age: 84
End: 2024-04-06

## 2024-04-10 ENCOUNTER — TELEPHONE (OUTPATIENT)
Dept: CARDIOLOGY | Facility: CLINIC | Age: 84
End: 2024-04-10
Payer: COMMERCIAL

## 2024-04-10 NOTE — TELEPHONE ENCOUNTER
Health Call Center    Phone Message    May a detailed message be left on voicemail: yes     Reason for Call: Other: Patient called requesting if lab orders that are needed can be sent to [employer], so they can have them completed prior to virtual appt on 06/11. Patient provided phone number to [employer] of [phone]. Please call patient back if anything further to discuss and do not send MyChart messages as patient stated they have been hacked.      Action Taken: Other: Cardiology    Travel Screening: Not Applicable      Thank you!  [facility]

## 2024-04-11 NOTE — TELEPHONE ENCOUNTER
Called and talked with patient, updated that I will fax closer too. Patient verbalized understanding of education/discussion.       Rebecca Ngo RN on 4/11/2024 at 1:18 PM

## 2024-04-19 ENCOUNTER — ANTICOAGULATION THERAPY VISIT (OUTPATIENT)
Dept: ANTICOAGULATION | Facility: CLINIC | Age: 84
End: 2024-04-19
Payer: COMMERCIAL

## 2024-04-19 DIAGNOSIS — I27.24 CTEPH (CHRONIC THROMBOEMBOLIC PULMONARY HYPERTENSION) (H): ICD-10-CM

## 2024-04-19 DIAGNOSIS — I27.20 PULMONARY HYPERTENSION (H): Primary | ICD-10-CM

## 2024-04-19 DIAGNOSIS — Z79.01 LONG TERM CURRENT USE OF ANTICOAGULANT THERAPY: ICD-10-CM

## 2024-04-19 LAB — INR HOME MONITORING: 2.4 RATIO (ref 2–2.5)

## 2024-04-19 NOTE — PROGRESS NOTES
ANTICOAGULATION MANAGEMENT     Karen Anderson 83 year old female is on warfarin with therapeutic INR result. (Goal INR 2.0-3.0)    Recent labs: (last 7 days)     04/19/24  1602   INR 2.4       ASSESSMENT     Source(s): Chart Review and Patient/Caregiver Call     Warfarin doses taken: Warfarin taken as instructed  Diet: No new diet changes identified  Medication/supplement changes: None noted  New illness, injury, or hospitalization: No  Signs or symptoms of bleeding or clotting: No  Previous result: Therapeutic last visit; previously outside of goal range  Additional findings: None       PLAN     Recommended plan for no diet, medication or health factor changes affecting INR     Dosing Instructions: Continue your current warfarin dose with next INR in 2 weeks       Summary  As of 4/19/2024      Full warfarin instructions:  5 mg every Sun, Thu; 3 mg all other days   Next INR check:  5/3/2024               Telephone call with Karen who verbalizes understanding and agrees to plan    Patient to recheck with home meter    Education provided:   Goal range and lab monitoring: goal range and significance of current result    Plan made per [facility] anticoagulation protocol    Alva Ferguson RN  Anticoagulation Clinic  4/19/2024    _______________________________________________________________________     Anticoagulation Episode Summary       Current INR goal:  2.0-3.0   TTR:  69.6% (1 y)   Target end date:  Indefinite   Send INR reminders to:  [facility]    Indications    Pulmonary hypertension (H) [I27.20]  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

## 2024-05-03 ENCOUNTER — ANTICOAGULATION THERAPY VISIT (OUTPATIENT)
Dept: ANTICOAGULATION | Facility: CLINIC | Age: 84
End: 2024-05-03
Payer: COMMERCIAL

## 2024-05-03 DIAGNOSIS — I27.20 PULMONARY HYPERTENSION (H): Primary | ICD-10-CM

## 2024-05-03 DIAGNOSIS — Z79.01 LONG TERM CURRENT USE OF ANTICOAGULANT THERAPY: ICD-10-CM

## 2024-05-03 DIAGNOSIS — I27.24 CTEPH (CHRONIC THROMBOEMBOLIC PULMONARY HYPERTENSION) (H): ICD-10-CM

## 2024-05-03 LAB — INR HOME MONITORING: 2 RATIO (ref 2–2.5)

## 2024-05-03 NOTE — PROGRESS NOTES
ANTICOAGULATION MANAGEMENT     Karen Anderson 83 year old female is on warfarin with therapeutic INR result. (Goal INR 2.0-3.0)    Recent labs: (last 7 days)     05/03/24  1606   INR 2       ASSESSMENT     Source(s): Chart Review and Patient/Caregiver Call     Warfarin doses taken: Warfarin taken as instructed  Diet: No new diet changes identified  Medication/supplement changes: None noted  New illness, injury, or hospitalization: No  Signs or symptoms of bleeding or clotting: No  Previous result: Therapeutic last 2(+) visits  Additional findings: None       PLAN     Recommended plan for no diet, medication or health factor changes affecting INR     Dosing Instructions: Continue your current warfarin dose with next INR in 2 weeks       Summary  As of 5/3/2024      Full warfarin instructions:  5 mg every Sun, Thu; 3 mg all other days   Next INR check:  5/17/2024               Telephone call with Karen who agrees to plan and repeated back plan correctly    Patient to recheck with home meter    Education provided:   Goal range and lab monitoring: goal range and significance of current result and Importance of following up at instructed interval  Contact [phone] with any changes, questions or concerns.     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

## 2024-05-16 ENCOUNTER — CARE COORDINATION (OUTPATIENT)
Dept: CARDIOLOGY | Facility: CLINIC | Age: 84
End: 2024-05-16
Payer: COMMERCIAL

## 2024-05-16 NOTE — PROGRESS NOTES
[facility] Alva Lazaro coordinator at fax: [phone] prior to upcoming appointment    Rebecca Ngo RN on 5/16/2024 at 8:23 AM

## 2024-05-17 ENCOUNTER — ANTICOAGULATION THERAPY VISIT (OUTPATIENT)
Dept: ANTICOAGULATION | Facility: CLINIC | Age: 84
End: 2024-05-17
Payer: COMMERCIAL

## 2024-05-17 DIAGNOSIS — Z79.01 LONG TERM CURRENT USE OF ANTICOAGULANT THERAPY: ICD-10-CM

## 2024-05-17 DIAGNOSIS — I27.24 CTEPH (CHRONIC THROMBOEMBOLIC PULMONARY HYPERTENSION) (H): ICD-10-CM

## 2024-05-17 DIAGNOSIS — I27.20 PULMONARY HYPERTENSION (H): Primary | ICD-10-CM

## 2024-05-17 LAB — INR HOME MONITORING: 1.9 RATIO (ref 2–2.5)

## 2024-05-17 NOTE — PROGRESS NOTES
ANTICOAGULATION MANAGEMENT     Karen Anderson 83 year old female is on warfarin with subtherapeutic INR result. (Goal INR 2.0-3.0)    Recent labs: (last 7 days)     05/17/24  1448   INR 1.9*       ASSESSMENT     Source(s): Chart Review and Patient/Caregiver Call     Warfarin doses taken: Warfarin taken as instructed  Diet: No new diet changes identified  Medication/supplement changes: None noted  New illness, injury, or hospitalization: No  Signs or symptoms of bleeding or clotting: No  Previous result: Therapeutic last 2(+) visits  Additional findings:  maintenance dose trending at low end, Karen would like to increase dosing. With increase not to take affect until Tues, one time booster advised today.        PLAN     Recommended plan for temporary change(s) and ongoing change(s) affecting INR     Dosing Instructions: booster dose then Increase your warfarin dose (8% change) with next INR in 2 weeks       Summary  As of 5/17/2024      Full warfarin instructions:  5/17: 5 mg; Otherwise 5 mg every Sun, Tue, Thu; 3 mg all other days   Next INR check:  5/31/2024               Telephone call with Karen who agrees to plan and repeated back plan correctly    Patient to recheck with home meter    Education provided:   Goal range and lab monitoring: goal range and significance of current result and Importance of following up at instructed interval  Contact [phone] with any changes, questions or concerns.     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

## 2024-05-31 ENCOUNTER — ANTICOAGULATION THERAPY VISIT (OUTPATIENT)
Dept: ANTICOAGULATION | Facility: CLINIC | Age: 84
End: 2024-05-31
Payer: COMMERCIAL

## 2024-05-31 DIAGNOSIS — I27.20 PULMONARY HYPERTENSION (H): Primary | ICD-10-CM

## 2024-05-31 DIAGNOSIS — Z79.01 LONG TERM CURRENT USE OF ANTICOAGULANT THERAPY: ICD-10-CM

## 2024-05-31 DIAGNOSIS — I27.24 CTEPH (CHRONIC THROMBOEMBOLIC PULMONARY HYPERTENSION) (H): ICD-10-CM

## 2024-05-31 LAB — INR HOME MONITORING: 2.2 RATIO (ref 2–2.5)

## 2024-05-31 NOTE — PROGRESS NOTES
ANTICOAGULATION MANAGEMENT     Karen Anderson 83 year old female is on warfarin with therapeutic INR result. (Goal INR 2.0-3.0)    Recent labs: (last 7 days)     05/31/24  1232   INR 2.2       ASSESSMENT     Source(s): Chart Review and Patient/Caregiver Call     Warfarin doses taken: Warfarin taken as instructed  Diet: No new diet changes identified  Medication/supplement changes: None noted  New illness, injury, or hospitalization: No  Signs or symptoms of bleeding or clotting: No  Previous result: Subtherapeutic  Additional findings: None       PLAN     Recommended plan for no diet, medication or health factor changes affecting INR     Dosing Instructions: Continue your current warfarin dose with next INR in 2 weeks       Summary  As of 5/31/2024      Full warfarin instructions:  5 mg every Sun, Tue, Thu; 3 mg all other days   Next INR check:  6/14/2024               Telephone call with Karen who verbalizes understanding and agrees to plan    Patient to recheck with home meter    Education provided:   None required    Plan made per [facility] anticoagulation protocol    Jaylan Santana, RN  Anticoagulation Clinic  5/31/2024    _______________________________________________________________________     Anticoagulation Episode Summary       Current INR goal:  2.0-3.0   TTR:  67.0% (1 y)   Target end date:  Indefinite   Send INR reminders to:  [facility]    Indications    Pulmonary hypertension (H) [I27.20]  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

## 2024-06-04 DIAGNOSIS — I50.810 RVF (RIGHT VENTRICULAR FAILURE) (H): ICD-10-CM

## 2024-06-06 DIAGNOSIS — I50.810 RVF (RIGHT VENTRICULAR FAILURE) (H): ICD-10-CM

## 2024-06-06 RX ORDER — BUMETANIDE 2 MG/1
4 TABLET ORAL 3 TIMES DAILY
Qty: 540 TABLET | Refills: 3 | Status: CANCELLED | OUTPATIENT
Start: 2024-06-06

## 2024-06-06 RX ORDER — BUMETANIDE 2 MG/1
4 TABLET ORAL 3 TIMES DAILY
Qty: 540 TABLET | Refills: 3 | Status: SHIPPED | OUTPATIENT
Start: 2024-06-06

## 2024-06-14 ENCOUNTER — ANTICOAGULATION THERAPY VISIT (OUTPATIENT)
Dept: ANTICOAGULATION | Facility: CLINIC | Age: 84
End: 2024-06-14
Payer: COMMERCIAL

## 2024-06-14 DIAGNOSIS — Z79.01 LONG TERM CURRENT USE OF ANTICOAGULANT THERAPY: ICD-10-CM

## 2024-06-14 DIAGNOSIS — I27.24 CTEPH (CHRONIC THROMBOEMBOLIC PULMONARY HYPERTENSION) (H): ICD-10-CM

## 2024-06-14 DIAGNOSIS — I27.20 PULMONARY HYPERTENSION (H): Primary | ICD-10-CM

## 2024-06-14 LAB — INR HOME MONITORING: 3.1 RATIO (ref 2–2.5)

## 2024-06-14 NOTE — PROGRESS NOTES
ANTICOAGULATION MANAGEMENT     Karen Anderson 83 year old female is on warfarin with supratherapeutic INR result. (Goal INR 2.0-3.0)    Recent labs: (last 7 days)     06/14/24  1115   INR 3.1*       ASSESSMENT     Source(s): Chart Review and Patient/Caregiver Call     Warfarin doses taken: Warfarin taken as instructed  Diet: Increased greens/vitamin K in diet; ongoing change  Medication/supplement changes: None noted  New illness, injury, or hospitalization: No  Signs or symptoms of bleeding or clotting: No  Previous result: Therapeutic last visit; previously outside of goal range  Additional findings: None       PLAN     Recommended plan for ongoing change(s) affecting INR     Dosing Instructions: decrease your warfarin dose (7.4% change) with next INR in 2 weeks       Summary  As of 6/14/2024      Full warfarin instructions:  5 mg every Tue, Thu; 3 mg all other days   Next INR check:  6/28/2024               Telephone call with Karen who agrees to plan and repeated back plan correctly    Patient to recheck with home meter    Education provided:   Goal range and lab monitoring: goal range and significance of current result and Importance of following up at instructed interval  Symptom monitoring: monitoring for bleeding signs and symptoms  Contact [phone] with any changes, questions or concerns.     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

## 2024-06-15 ENCOUNTER — HEALTH MAINTENANCE LETTER (OUTPATIENT)
Age: 84
End: 2024-06-15

## 2024-06-28 ENCOUNTER — ANTICOAGULATION THERAPY VISIT (OUTPATIENT)
Dept: ANTICOAGULATION | Facility: CLINIC | Age: 84
End: 2024-06-28
Payer: COMMERCIAL

## 2024-06-28 DIAGNOSIS — I27.24 CTEPH (CHRONIC THROMBOEMBOLIC PULMONARY HYPERTENSION) (H): ICD-10-CM

## 2024-06-28 DIAGNOSIS — I27.20 PULMONARY HYPERTENSION (H): Primary | ICD-10-CM

## 2024-06-28 DIAGNOSIS — Z79.01 LONG TERM CURRENT USE OF ANTICOAGULANT THERAPY: ICD-10-CM

## 2024-06-28 LAB — INR HOME MONITORING: 2.3 RATIO (ref 2–2.5)

## 2024-06-28 NOTE — PROGRESS NOTES
ANTICOAGULATION MANAGEMENT     Karen Anderson 83 year old female is on warfarin with therapeutic INR result. (Goal INR 2.0-3.0)    Recent labs: (last 7 days)     06/28/24  1256   INR 2.3       ASSESSMENT     Source(s): Chart Review and Patient/Caregiver Call     Warfarin doses taken: Warfarin taken as instructed  Diet: Isnt eating well due to emotional stress  Medication/supplement changes: None noted  New illness, injury, or hospitalization: No  Signs or symptoms of bleeding or clotting: No  Previous result: Supratherapeutic  Additional findings: States her  is about to die and she is having a lot of emotional stress surrounding that.        PLAN     Recommended plan for no diet, medication or health factor changes affecting INR     Dosing Instructions: Continue your current warfarin dose with next INR in 2 weeks       Summary  As of 6/28/2024      Full warfarin instructions:  5 mg every Tue, Thu; 3 mg all other days   Next INR check:  7/12/2024               Telephone call with Karen who verbalizes understanding and agrees to plan and who agrees to plan and repeated back plan correctly    Patient to recheck with home meter    Education provided: Please call back if any changes to your diet, medications or how you've been taking warfarin    Plan made per [facility] anticoagulation protocol    Maddie Najera RN  Anticoagulation Clinic  6/28/2024    _______________________________________________________________________     Anticoagulation Episode Summary       Current INR goal:  2.0-3.0   TTR:  66.1% (1 y)   Target end date:  Indefinite   Send INR reminders to:  [facility]    Indications    Pulmonary hypertension (H) [I27.20]  CTEPH (chronic thromboembolic pulmonary hypertension) (H) [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

## 2024-07-01 ENCOUNTER — VIRTUAL VISIT (OUTPATIENT)
Dept: CARDIOLOGY | Facility: CLINIC | Age: 84
End: 2024-07-01
Attending: PHYSICIAN ASSISTANT
Payer: COMMERCIAL

## 2024-07-01 DIAGNOSIS — I27.20 PULMONARY HYPERTENSION (H): ICD-10-CM

## 2024-07-01 DIAGNOSIS — R06.00 DYSPNEA, UNSPECIFIED: ICD-10-CM

## 2024-07-01 DIAGNOSIS — R06.09 DOE (DYSPNEA ON EXERTION): ICD-10-CM

## 2024-07-01 PROCEDURE — 99442 PR PHYSICIAN TELEPHONE EVALUATION 11-20 MIN: CPT | Mod: 93 | Performed by: PHYSICIAN ASSISTANT

## 2024-07-01 NOTE — PROGRESS NOTES
Karen is a 83 year old who is being evaluated via a billable telephone visit.      How would you like to obtain your AVS? Alayna    Patient reported vitals:  BP:n/a  Heart rate:n/a  Weight:103 lb     Review Of Systems  Respiratory: No shortness of breath, dyspnea on exertion, cough, or hemoptysis  Cardiovascular: negative    Robby Llanes - Clinical Asst. 7/1/24        CARDIOLOGY  CLINIC TELEPHONE VISIT    Date of telephone visit: 07/01/24    Karen Anderson is a 83 year old female who is being evaluated via a billable telephone visit.        MEDICATIONS:  Current Outpatient Medications   Medication Sig Dispense Refill    acetaminophen (TYLENOL) 325 MG tablet Take 325 mg by mouth every 6 hours as needed for mild pain      albuterol (PROAIR HFA/PROVENTIL HFA/VENTOLIN HFA) 108 (90 Base) MCG/ACT inhaler Inhale 2 puffs into the lungs every 4 hours as needed for shortness of breath / dyspnea or wheezing       alendronate (FOSAMAX) 70 MG tablet Take 70 mg by mouth once a week On Mondays      Ascorbic Acid (VITAMIN C) 500 MG CAPS Take 500 mg by mouth every morning       aspirin (ASA) 81 MG tablet Take 81 mg by mouth daily       beclomethasone HFA (QVAR REDIHALER) 80 MCG/ACT inhaler Inhale 2 puffs into the lungs 2 times daily      bumetanide (BUMEX) 2 MG tablet Take 2 tablets (4 mg) by mouth 3 times daily 540 tablet 3    calcium citrate-vitamin D (CALCIUM CITRATE + D3) 315-250 MG-UNIT TABS per tablet Take 1 tablet by mouth 2 times daily       digoxin (LANOXIN) 125 MCG tablet Take 0.5 tablets (62.5 mcg) by mouth daily 45 tablet 3    gabapentin (NEURONTIN) 300 MG capsule Take 300 mg by mouth 3 times daily Can take an additional 300mg at bedtime as needed      hydroxychloroquine (PLAQUENIL) 200 MG tablet Take 200 mg by mouth daily       ipratropium (ATROVENT) 0.06 % nasal spray Spray 2 sprays into both nostrils 3 times daily       ketotifen (ZADITOR) 0.025 % ophthalmic solution Place 1 drop into both eyes daily       macitentan (OPSUMIT) 10 MG tablet Take 1 tablet (10 mg) by mouth daily 30 tablet 11    Metamucil Fiber CHEW Take by mouth daily as needed 2 chewables      potassium chloride ER (KLOR-CON M) 20 MEQ CR tablet Take 1 tablet (20 mEq) by mouth 3 times daily More refills after office visit. 270 tablet 3    sodium chloride (OCEAN) 0.65 % nasal spray Spray 1 spray into both nostrils daily as needed for congestion      treprostinil (REMODULIN) 60 mcg/mL in glycine diluent 50 mL infusion Inject 45.5 ng/kg/min into the vein continuous Goal Dose: 45.5 ng/kg/min 1368 mL 0    warfarin ANTICOAGULANT (COUMADIN) 1 MG tablet TAKE 1-2 TABLETS DAILY OR AS DIRECTED 180 tablet 1    warfarin ANTICOAGULANT (COUMADIN) 1 MG tablet Take 1 tablet (1 mg) by mouth daily 90 tablet 3    warfarin ANTICOAGULANT (COUMADIN) 3 MG tablet Take 5 mg (3 mg x 1 and 1 mg x 2) every Sun, Tue, Thu; 3 mg (3 mg x 1) all other days OR AS DIRECTED BY ANTICOAGULATION CLINIC. 100 tablet 1       Primary PH cardiologist: Dr. Turcios        HPI:  Ms. Anderson is a pleasant 83 year old female with a PMHx including rheumatoid arthritis, hypertension, scoliosis, asthma, pulmonary MAC, breast cancer s/p chemotherapy with Adriamycin, Cytoxan, Taxol, and tamoxifen and status post left mastectomy. She also has a history of extensive bilateral pulmonary emboli and DVT in 1998 thought to be secondary to tamoxifen along with chemotherapy induced cardiomyopathy. 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 Although she had proximal disease, she was too high risk for surgical PTE. She had a BPA in Jan 2021 which was complicated by hemoptysis, requiring FFP. She has required frequent diuretic adjustments, and we have been working on medical management. She is currently on combination therapy with IV Remodulin therapy along with Opsumit, and has failed Adempas due to dizziness and hypotension.      I met with Karen ponce in late March.  Unfortunately, they had a lot going on as her  was diagnosed with Parkinson's last fall and it was progressing somewhat rapidly. They had been looking into an SOLO but because of her Remodulin their options are limited and they could not find somewhere to go that would meet their needs. She felt she had been slowly declining with less energy but overall swelling and breathing sounded to be unchanged. We opted not to make any changes at that time.    Today, we are meeting back virtually once again. Sadly, her 's disease progressed quickly and he passed away last week, so she is actively grieving. She tells me overall she is doing ok and her breathing and edema are no worse than her usual. She does miss some diuretic doses occasionally but wrapping her legs helps. She is not having any new issues with her Remodulin but remains concerned that she cannot find a living facility that will allow her to have Remodulin.      The patient did not have any new labs performed for our visit today, but most recent available labs were reviewed as below.        CURRENT PULMONARY HYPERTENSION REGIMEN:     PAH Rx: IV Remodulin 45.5ng/kg/min, Opsumit 10mg daily  (failed Adempas due to dizziness)     Diuretics: Bumex 4mg TID      Oxygen: NC 2.5 LPM      Anticoagulation: warfarin   Indication: CTEPH     Immunosuppression: Yes (RA)     Pulm: Dr. Abernathy ([facility])        Assessment/Plan:         1. Chronic thromboembolic pulmonary hypertension.              --Ms. Anderson has CTEPH with RV dysfunction. Although she had proximal disease, she was felt to be too high risk for surgical PTE both here and at [facility]. She was offered potential PTE at [place], but still felt to be very high risk and ultimately deferred. She did have BPA here in January 2021 but this was complicated by hemoptysis. Thus, we have since been focusing on medical management. Last visit we discussed her desire to continue to avoid invasive procedures when  possible.              --She remains a stable dose of IV Remodulin at 45.5ng/kg/min.  Continue this along with Opsumit daily as is, and digoxin for RV support. She is not able to tolerate Adempas due to dizziness. From a cardiopulmonary standpoint, things sound to be overall unchanged.              --Bumex is at 4mg TID; we have tried transient reductions but this seems to result in more edema. Home weight is stable. NT-proBNP has actually come down somewhat on her recent labs and renal function appears stable.             --Continue anticoagulation with warfarin for her CTEPH,  She has equipment to do home checks, and follows with the INR clinic.              --Continue oxygen as is. She currently uses 2.5L.               --She had had longstanding anemia, but hemoglobin has trended favorably the last few draws. Continue to monitor.       Follow up plan: She requests virtual visits whenever possible.  Will have her return in 3 months with labs to see Dr. Turcios. We are happy to see the patient back sooner with any new concerns.       Testing/labs:      Most recent labs:                 Other recent pertinent testing:    Echo 9/1/23  Interpretation Summary     Left ventricular function is normal.The ejection fraction is 55-60%. No  regional wall motion abnormalities are seen. Paradoxical septal motion  consistent with right ventricular pressure and volume overload is present.  Global right ventricular function is mildly reduced. Moderate right  ventricular dilation is present. Estimated PASP 92 mmHg (RVSP + RAP). The PV  acceleration time is 67msec  Severe right atrial enlargement is present.  The inferior vena cava was normal in size with preserved respiratory  variability. No pericardial effusion is present.     This study was compared with the study from 4/25/22 .  The estimated PASP has increased. The RV function appears similar.     [facility] 3/17/21  Conclusion     Severe pulmonary hypertension  Normal cardiac  output by Thermodilution  Elevated left sided filling pressures  No signficant change when comapred to her last hemodynamic assessment           Pressures Phase: Baseline          Time Systolic (mmHg) Diastolic (mmHg) Mean (mmHg) A Wave (mmHg) V Wave (mmHg) EDP (mmHg) Max dp/dt (mmHg/sec) HR (bpm) Content (mL/dL) SAT (%)   RA Pressures  2:31 PM     14    14    22        63          RV Pressures  2:16 PM             912               2:32 PM 87    9          28      62          PA Pressures  2:32 PM 88    22    44            62          PCW Pressures  2:33 PM     14            64                 NYHA Functional Class:  3b      I have reviewed the note as documented above.  This accurately captures the substance of my conversation with the patient.      Phone call contact time  Call Started at 1451  Call Ended at 1503    An additional 15 minutes was spent today performing chart and history review, pre and post visit documentation, review of recent testing, patient education, and care coordination.    The longitudinal plan of care for the diagnosis(es)/condition(s) as documented were addressed during this visit. Due to the added complexity in care, I will continue to support Karen in the subsequent management and with ongoing continuity of care.        Shawna Marcum PA-C  [facility]  Pager [phone]

## 2024-07-01 NOTE — LETTER
7/1/2024    Raquel Main MD  [facility] [address] [facility] [address]    RE: Karen Anderson       Dear Colleague,     I had the pleasure of seeing Karen Anderson in the [facility].  Karen is a 83 year old who is being evaluated via a billable telephone visit.      How would you like to obtain your AVS? MyChart    Patient reported vitals:  BP:n/a  Heart rate:n/a  Weight:103 lb     Review Of Systems  Respiratory: No shortness of breath, dyspnea on exertion, cough, or hemoptysis  Cardiovascular: negative    Robby Llanes - Clinical Asst. 7/1/24        CARDIOLOGY  CLINIC TELEPHONE VISIT    Date of telephone visit: 07/01/24    Karen Anderson is a 83 year old female who is being evaluated via a billable telephone visit.        MEDICATIONS:  Current Outpatient Medications   Medication Sig Dispense Refill    acetaminophen (TYLENOL) 325 MG tablet Take 325 mg by mouth every 6 hours as needed for mild pain      albuterol (PROAIR HFA/PROVENTIL HFA/VENTOLIN HFA) 108 (90 Base) MCG/ACT inhaler Inhale 2 puffs into the lungs every 4 hours as needed for shortness of breath / dyspnea or wheezing       alendronate (FOSAMAX) 70 MG tablet Take 70 mg by mouth once a week On Mondays      Ascorbic Acid (VITAMIN C) 500 MG CAPS Take 500 mg by mouth every morning       aspirin (ASA) 81 MG tablet Take 81 mg by mouth daily       beclomethasone HFA (QVAR REDIHALER) 80 MCG/ACT inhaler Inhale 2 puffs into the lungs 2 times daily      bumetanide (BUMEX) 2 MG tablet Take 2 tablets (4 mg) by mouth 3 times daily 540 tablet 3    calcium citrate-vitamin D (CALCIUM CITRATE + D3) 315-250 MG-UNIT TABS per tablet Take 1 tablet by mouth 2 times daily       digoxin (LANOXIN) 125 MCG tablet Take 0.5 tablets (62.5 mcg) by mouth daily 45 tablet 3    gabapentin (NEURONTIN) 300 MG capsule Take 300 mg by mouth 3 times daily Can take an additional 300mg at bedtime as needed      hydroxychloroquine (PLAQUENIL)  200 MG tablet Take 200 mg by mouth daily       ipratropium (ATROVENT) 0.06 % nasal spray Spray 2 sprays into both nostrils 3 times daily       ketotifen (ZADITOR) 0.025 % ophthalmic solution Place 1 drop into both eyes daily      macitentan (OPSUMIT) 10 MG tablet Take 1 tablet (10 mg) by mouth daily 30 tablet 11    Metamucil Fiber CHEW Take by mouth daily as needed 2 chewables      potassium chloride ER (KLOR-CON M) 20 MEQ CR tablet Take 1 tablet (20 mEq) by mouth 3 times daily More refills after office visit. 270 tablet 3    sodium chloride (OCEAN) 0.65 % nasal spray Spray 1 spray into both nostrils daily as needed for congestion      treprostinil (REMODULIN) 60 mcg/mL in glycine diluent 50 mL infusion Inject 45.5 ng/kg/min into the vein continuous Goal Dose: 45.5 ng/kg/min 1368 mL 0    warfarin ANTICOAGULANT (COUMADIN) 1 MG tablet TAKE 1-2 TABLETS DAILY OR AS DIRECTED 180 tablet 1    warfarin ANTICOAGULANT (COUMADIN) 1 MG tablet Take 1 tablet (1 mg) by mouth daily 90 tablet 3    warfarin ANTICOAGULANT (COUMADIN) 3 MG tablet Take 5 mg (3 mg x 1 and 1 mg x 2) every Sun, Tue, Thu; 3 mg (3 mg x 1) all other days OR AS DIRECTED BY ANTICOAGULATION CLINIC. 100 tablet 1       Primary PH cardiologist: Dr. Turcios        HPI:  Ms. Anderson is a pleasant 83 year old female with a PMHx including rheumatoid arthritis, hypertension, scoliosis, asthma, pulmonary MAC, breast cancer s/p chemotherapy with Adriamycin, Cytoxan, Taxol, and tamoxifen and status post left mastectomy. She also has a history of extensive bilateral pulmonary emboli and DVT in 1998 thought to be secondary to tamoxifen along with chemotherapy induced cardiomyopathy. 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 Although she had proximal disease, she was too high risk for surgical PTE. She had a BPA in Jan 2021 which was complicated by hemoptysis, requiring FFP. She has required frequent diuretic adjustments, and we have been  working on medical management. She is currently on combination therapy with IV Remodulin therapy along with Opsumit, and has failed Adempas due to dizziness and hypotension.      I met with Karen last virtually in late March. Unfortunately, they had a lot going on as her  was diagnosed with Parkinson's last fall and it was progressing somewhat rapidly. They had been looking into an USP but because of her Remodulin their options are limited and they could not find somewhere to go that would meet their needs. She felt she had been slowly declining with less energy but overall swelling and breathing sounded to be unchanged. We opted not to make any changes at that time.    Today, we are meeting back virtually once again. Sadly, her 's disease progressed quickly and he passed away last week, so she is actively grieving. She tells me overall she is doing ok and her breathing and edema are no worse than her usual. She does miss some diuretic doses occasionally but wrapping her legs helps. She is not having any new issues with her Remodulin but remains concerned that she cannot find a living facility that will allow her to have Remodulin.      The patient did not have any new labs performed for our visit today, but most recent available labs were reviewed as below.        CURRENT PULMONARY HYPERTENSION REGIMEN:     PAH Rx: IV Remodulin 45.5ng/kg/min, Opsumit 10mg daily  (failed Adempas due to dizziness)     Diuretics: Bumex 4mg TID      Oxygen: NC 2.5 LPM      Anticoagulation: warfarin   Indication: CTEPH     Immunosuppression: Yes (RA)     Pulm: Dr. Abernathy ([facility])        Assessment/Plan:         1. Chronic thromboembolic pulmonary hypertension.              --Ms. Anderson has CTEPH with RV dysfunction. Although she had proximal disease, she was felt to be too high risk for surgical PTE both here and at [facility]. She was offered potential PTE at [place], but still felt to be very high risk and  ultimately deferred. She did have BPA here in January 2021 but this was complicated by hemoptysis. Thus, we have since been focusing on medical management. Last visit we discussed her desire to continue to avoid invasive procedures when possible.              --She remains a stable dose of IV Remodulin at 45.5ng/kg/min.  Continue this along with Opsumit daily as is, and digoxin for RV support. She is not able to tolerate Adempas due to dizziness. From a cardiopulmonary standpoint, things sound to be overall unchanged.              --Bumex is at 4mg TID; we have tried transient reductions but this seems to result in more edema. Home weight is stable. NT-proBNP has actually come down somewhat on her recent labs and renal function appears stable.             --Continue anticoagulation with warfarin for her CTEPH,  She has equipment to do home checks, and follows with the INR clinic.              --Continue oxygen as is. She currently uses 2.5L.               --She had had longstanding anemia, but hemoglobin has trended favorably the last few draws. Continue to monitor.       Follow up plan: She requests virtual visits whenever possible.  Will have her return in 3 months with labs to see Dr. Turcios. We are happy to see the patient back sooner with any new concerns.       Testing/labs:      Most recent labs:                 Other recent pertinent testing:    Echo 9/1/23  Interpretation Summary     Left ventricular function is normal.The ejection fraction is 55-60%. No  regional wall motion abnormalities are seen. Paradoxical septal motion  consistent with right ventricular pressure and volume overload is present.  Global right ventricular function is mildly reduced. Moderate right  ventricular dilation is present. Estimated PASP 92 mmHg (RVSP + RAP). The PV  acceleration time is 67msec  Severe right atrial enlargement is present.  The inferior vena cava was normal in size with preserved respiratory  variability. No  pericardial effusion is present.     This study was compared with the study from 4/25/22 .  The estimated PASP has increased. The RV function appears similar.     [facility] 3/17/21  Conclusion     Severe pulmonary hypertension  Normal cardiac output by Thermodilution  Elevated left sided filling pressures  No signficant change when comapred to her last hemodynamic assessment           Pressures Phase: Baseline          Time Systolic (mmHg) Diastolic (mmHg) Mean (mmHg) A Wave (mmHg) V Wave (mmHg) EDP (mmHg) Max dp/dt (mmHg/sec) HR (bpm) Content (mL/dL) SAT (%)   RA Pressures  2:31 PM     14    14    22        63          RV Pressures  2:16 PM             912               2:32 PM 87    9          28      62          PA Pressures  2:32 PM 88    22    44            62          PCW Pressures  2:33 PM     14            64                 NYHA Functional Class:  3b      I have reviewed the note as documented above.  This accurately captures the substance of my conversation with the patient.      Phone call contact time  Call Started at 1451  Call Ended at 1503    An additional 15 minutes was spent today performing chart and history review, pre and post visit documentation, review of recent testing, patient education, and care coordination.    The longitudinal plan of care for the diagnosis(es)/condition(s) as documented were addressed during this visit. Due to the added complexity in care, I will continue to support Karen in the subsequent management and with ongoing continuity of care.        Shawna Marcum PA-C  [facility]  Pager [phone]      Thank you for allowing me to participate in the care of your patient.      Sincerely,     MAGEN Saeed     [facility]  cc:   MAGEN Saeed  [facility]  [address] [place] [id number]

## 2024-07-02 ENCOUNTER — DOCUMENTATION ONLY (OUTPATIENT)
Dept: OTHER | Facility: CLINIC | Age: 84
End: 2024-07-02
Payer: COMMERCIAL

## 2024-07-02 NOTE — PATIENT INSTRUCTIONS
You were seen today in the Pulmonary Hypertension Clinic at the [facility].     Cardiology Provider you saw during your visit:    Shawna Marcum    Medication Changes:  No changes    Recommendations:       Follow-up:   Follow-up in 3 months virtually with Dr. Turcios with labs prior    Results:       Please call us immediately if you have any syncope (fainting or passing out), chest pain, edema (swelling or weight gain), or decline in your functional status (general decline in how you are feeling).    If you have emergent concerns after hours or on the weekend, please call our on-call Cardiologist at [phone], option 4. For emergencies call 911.     Thank you for allowing us to be a part of your care here at the [facility]    Please visit the pulmonary hypertension website for more information and support. [email or web address]    If you have questions or concerns please contact us at:    Rebecca Ngo RN (P: [phone])    Nurse Coordinator       Pulmonary Hypertension     [facility] [place]         ALINA Austin   (Prior Authorizations)    ()  Clinic   Clinic   Pulmonary Hypertension   Pulmonary Hypertension  [facility]  [facility]  (P)[phone]    (P) [phone]  (F) [phone]

## 2024-07-12 ENCOUNTER — ANTICOAGULATION THERAPY VISIT (OUTPATIENT)
Dept: ANTICOAGULATION | Facility: CLINIC | Age: 84
End: 2024-07-12
Payer: COMMERCIAL

## 2024-07-12 DIAGNOSIS — Z79.01 LONG TERM CURRENT USE OF ANTICOAGULANT THERAPY: ICD-10-CM

## 2024-07-12 DIAGNOSIS — I27.24 CTEPH (CHRONIC THROMBOEMBOLIC PULMONARY HYPERTENSION) (H): ICD-10-CM

## 2024-07-12 DIAGNOSIS — I27.20 PULMONARY HYPERTENSION (H): Primary | ICD-10-CM

## 2024-07-12 LAB — INR HOME MONITORING: 2 RATIO (ref 2–2.5)

## 2024-07-12 NOTE — PROGRESS NOTES
ANTICOAGULATION MANAGEMENT     Karen Anderson 83 year old female is on warfarin with therapeutic INR result. (Goal INR 2.0-3.0)    Recent labs: (last 7 days)     07/12/24  1529   INR 2       ASSESSMENT     Source(s): Chart Review  Previous INR was Therapeutic last visit; previously outside of goal range  Medication, diet, health changes since last INR chart reviewed; none identified         PLAN     Recommended plan for no diet, medication or health factor changes affecting INR     Dosing Instructions: Continue your current warfarin dose with next INR in 2 weeks       Summary  As of 7/12/2024      Full warfarin instructions:  5 mg every Tue, Thu; 3 mg all other days   Next INR check:  7/26/2024               Detailed voice message left for Karen with dosing instructions and follow up date.     Patient to recheck with home meter    Education provided: Please call back if any changes to your diet, medications or how you've been taking warfarin    Plan made per [facility] anticoagulation protocol    Jacey Riley, RN  Anticoagulation Clinic  7/12/2024    _______________________________________________________________________     Anticoagulation Episode Summary       Current INR goal:  2.0-3.0   TTR:  66.1% (1 y)   Target end date:  Indefinite   Send INR reminders to:  [facility]    Indications    Pulmonary hypertension (H) [I27.20]  CTEPH (chronic thromboembolic pulmonary hypertension) (H) [I27.24]  Long term current use of anticoagulant therapy [Z79.01]             Comments:               Anticoagulation Care Providers       Provider Role Specialty Phone number    Karolina Turcios MD Referring Cardiovascular Disease [phone]

## 2024-07-26 ENCOUNTER — ANTICOAGULATION THERAPY VISIT (OUTPATIENT)
Dept: ANTICOAGULATION | Facility: CLINIC | Age: 84
End: 2024-07-26
Payer: COMMERCIAL

## 2024-07-26 DIAGNOSIS — I27.20 PULMONARY HYPERTENSION (H): Primary | ICD-10-CM

## 2024-07-26 DIAGNOSIS — I27.24 CTEPH (CHRONIC THROMBOEMBOLIC PULMONARY HYPERTENSION) (H): ICD-10-CM

## 2024-07-26 DIAGNOSIS — Z79.01 LONG TERM CURRENT USE OF ANTICOAGULANT THERAPY: ICD-10-CM

## 2024-07-26 LAB — INR HOME MONITORING: 2 RATIO (ref 2–2.5)

## 2024-07-26 NOTE — PROGRESS NOTES
ANTICOAGULATION MANAGEMENT     Karen JACKY Calderonhilariacharbel 83 year old female is on warfarin with therapeutic INR result. (Goal INR 2.0-3.0)    Recent labs: (last 7 days)     07/26/24  1235   INR 2       ASSESSMENT     Source(s): Chart Review and Patient/Caregiver Call     Warfarin doses taken: Warfarin taken as instructed  Diet: No new diet changes identified  Medication/supplement changes: None noted  New illness, injury, or hospitalization: No  Signs or symptoms of bleeding or clotting: No  Previous result: Therapeutic last 2(+) visits  Additional findings: None       PLAN     Recommended plan for no diet, medication or health factor changes affecting INR     Dosing Instructions: Continue your current warfarin dose with next INR in 2 weeks       Summary  As of 7/26/2024      Full warfarin instructions:  5 mg every Tue, Thu; 3 mg all other days   Next INR check:  8/9/2024               Telephone call with Karen who verbalizes understanding and agrees to plan and who agrees to plan and repeated back plan correctly    Patient to recheck with home meter    Education provided: Please call back if any changes to your diet, medications or how you've been taking warfarin    Plan made per [facility] anticoagulation protocol    Maddie Najera RN  Anticoagulation Clinic  7/26/2024    _______________________________________________________________________     Anticoagulation Episode Summary       Current INR goal:  2.0-3.0   TTR:  66.2% (1 y)   Target end date:  Indefinite   Send INR reminders to:  [facility]    Indications    Pulmonary hypertension (H) [I27.20]  CTEPH (chronic thromboembolic pulmonary hypertension) (H) [I27.24]  Long term current use of anticoagulant therapy [Z79.01]             Comments:               Anticoagulation Care Providers       Provider Role Specialty Phone number    Karolina Turcios MD Referring Cardiovascular Disease [phone]

## 2024-08-09 ENCOUNTER — ANTICOAGULATION THERAPY VISIT (OUTPATIENT)
Dept: ANTICOAGULATION | Facility: CLINIC | Age: 84
End: 2024-08-09
Payer: COMMERCIAL

## 2024-08-09 DIAGNOSIS — I27.20 PULMONARY HYPERTENSION (H): Primary | ICD-10-CM

## 2024-08-09 DIAGNOSIS — Z79.01 LONG TERM CURRENT USE OF ANTICOAGULANT THERAPY: ICD-10-CM

## 2024-08-09 DIAGNOSIS — I27.24 CTEPH (CHRONIC THROMBOEMBOLIC PULMONARY HYPERTENSION) (H): ICD-10-CM

## 2024-08-09 LAB — INR HOME MONITORING: 1.9 RATIO (ref 2–2.5)

## 2024-08-09 NOTE — PROGRESS NOTES
ANTICOAGULATION MANAGEMENT     Karen Anderson 83 year old female is on warfarin with therapeutic INR result. (Goal INR 2.0-3.0)    Recent labs: (last 7 days)     08/09/24  1226   INR 1.9*       ASSESSMENT     Source(s): Chart Review  Previous INR was Therapeutic last 2(+) visits  Medication, diet, health changes since last INR chart reviewed; none identified    Patient's spouse passed away end of June-meals are not what she is used to and grieving the loss of her .      PLAN     Recommended plan for no diet, medication or health factor changes affecting INR     Dosing Instructions: Continue your current warfarin dose with next INR in 2 weeks       Summary  As of 8/9/2024      Full warfarin instructions:  5 mg every Tue, Thu; 3 mg all other days   Next INR check:  8/23/2024               Telephone call with Karen who agrees to plan and repeated back plan correctly    Patient to recheck with home meter    Education provided: Goal range and lab monitoring: goal range and significance of current result and Importance of following up at instructed interval  Dietary considerations: importance of consistent vitamin K intake  Contact [phone] with any changes, questions or concerns.     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

## 2024-08-12 NOTE — NURSING NOTE
Scheduled admission 3/4/20 at 12PM:   [id number] - direct admission after [facility] to being PH therapies and for fluid volume management Kezia Arnett RN on 3/2/2020 at 12:57 PM     none

## 2024-08-23 ENCOUNTER — ANTICOAGULATION THERAPY VISIT (OUTPATIENT)
Dept: ANTICOAGULATION | Facility: CLINIC | Age: 84
End: 2024-08-23
Payer: COMMERCIAL

## 2024-08-23 DIAGNOSIS — Z79.01 LONG TERM CURRENT USE OF ANTICOAGULANT THERAPY: ICD-10-CM

## 2024-08-23 DIAGNOSIS — I27.20 PULMONARY HYPERTENSION (H): Primary | ICD-10-CM

## 2024-08-23 DIAGNOSIS — I27.24 CTEPH (CHRONIC THROMBOEMBOLIC PULMONARY HYPERTENSION) (H): ICD-10-CM

## 2024-08-23 LAB — INR HOME MONITORING: 1.6 RATIO (ref 2–2.5)

## 2024-08-23 RX ORDER — WARFARIN SODIUM 1 MG/1
TABLET ORAL
Qty: 180 TABLET | Refills: 1 | Status: SHIPPED | OUTPATIENT
Start: 2024-08-23

## 2024-08-23 NOTE — PROGRESS NOTES
ANTICOAGULATION MANAGEMENT     Karen Anderson 83 year old female is on warfarin with subtherapeutic INR result. (Goal INR 2.0-3.0)    Recent labs: (last 7 days)     08/23/24  1452   INR 1.6*       ASSESSMENT     Source(s): Chart Review and Patient/Caregiver Call     Warfarin doses taken: Warfarin taken as instructed  Diet: Increased greens/vitamin K in diet; ongoing change  Medication/supplement changes: None noted  New illness, injury, or hospitalization: No  Signs or symptoms of bleeding or clotting: No  Previous result: Subtherapeutic  Additional findings: Refill needed today. Karen meets all criteria for refill (current [facility] referral, visit with referring provider/group in last 15 months unless directed to return in 2 years in last referring provider visit note, lab monitoring up to date or not exceeding 2 weeks overdue). Rx instructions and quantity supplied updated to match patient's current dosing plan. Warfarin 90 day supply with 1 refill granted per [facility] protocol        PLAN     Recommended plan for ongoing change(s) affecting INR     Dosing Instructions: booster dose then Increase your warfarin dose (8% change) with next INR in 2 weeks       Summary  As of 8/23/2024      Full warfarin instructions:  5 mg every Mon, Wed, Fri; 3 mg all other days   Next INR check:  9/6/2024               Telephone call with Karen who verbalizes understanding and agrees to plan  Sent [employer] message with dosing and follow up instructions    Patient to recheck with home meter    Education provided: Please call back if any changes to your diet, medications or how you've been taking warfarin  Dietary considerations: importance of consistent vitamin K intake, impact of vitamin K foods on INR, and importance of notifying [facility] to changes in diet  Symptom monitoring: monitoring for bleeding signs and symptoms, monitoring for clotting signs and symptoms, monitoring for stroke signs and symptoms, and when to seek medical  attention/emergency care  Importance of notifying anticoagulation clinic for: changes in medications; a sooner lab recheck maybe needed, diarrhea, nausea/vomiting, reduced intake, cold/flu, and/or infections; a sooner lab recheck maybe needed, and if you did not receive dosing instructions on the same day as your labs were checked    Plan made per [facility] anticoagulation protocol    Jaylan Santana, RN  Anticoagulation Clinic  8/23/2024    _______________________________________________________________________     Anticoagulation Episode Summary       Current INR goal:  2.0-3.0   TTR:  63.7% (1 y)   Target end date:  Indefinite   Send INR reminders to:   ANT[facility]AG CLINIC    Indications    Pulmonary hypertension (H) [I27.20]  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

## 2024-09-06 ENCOUNTER — ANTICOAGULATION THERAPY VISIT (OUTPATIENT)
Dept: ANTICOAGULATION | Facility: CLINIC | Age: 84
End: 2024-09-06
Payer: COMMERCIAL

## 2024-09-06 LAB — INR HOME MONITORING: 1.5 RATIO (ref 2–2.5)

## 2024-09-06 NOTE — PROGRESS NOTES
ANTICOAGULATION MANAGEMENT     Karen RICO Calderonhilariacharbel 84 year old female is on warfarin with subtherapeutic INR result. (Goal INR 2.0-3.0)    Recent labs: (last 7 days)     09/06/24  1427   INR 1.5*       ASSESSMENT     Source(s): Chart Review and Patient/Caregiver Call     Warfarin doses taken: Warfarin taken as instructed  Diet: Increased greens/vitamin K in diet; ongoing change (pt will try to just eat greens about 4 times per week)  Medication/supplement changes: None noted  New illness, injury, or hospitalization: No  Signs or symptoms of bleeding or clotting: No  Previous result: Subtherapeutic  Additional findings: Day by day dosing discussed. Pt agreed to a 6 mg boost dose today. Pt was scared to take more than that.       PLAN     Recommended plan for ongoing change(s) affecting INR     Dosing Instructions: booster dose then Increase your warfarin dose (7.4% change) with next INR in 10 days       Summary  As of 9/6/2024      Full warfarin instructions:  9/6: 6 mg; Otherwise 5 mg every Wed; 4 mg all other days   Next INR check:  9/16/2024               Telephone call with Karen who agrees to plan and repeated back plan correctly    Patient to recheck with home meter    Education provided: Please call back if any changes to your diet, medications or how you've been taking warfarin  Contact [phone] with any changes, questions or concerns.     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

## 2024-09-11 DIAGNOSIS — I27.20 PULMONARY HYPERTENSION (H): ICD-10-CM

## 2024-09-11 RX ORDER — DIGOXIN 125 MCG
62.5 TABLET ORAL DAILY
Qty: 45 TABLET | Refills: 3 | Status: SHIPPED | OUTPATIENT
Start: 2024-09-11

## 2024-09-16 ENCOUNTER — ANTICOAGULATION THERAPY VISIT (OUTPATIENT)
Dept: ANTICOAGULATION | Facility: CLINIC | Age: 84
End: 2024-09-16
Payer: COMMERCIAL

## 2024-09-16 DIAGNOSIS — I27.20 PULMONARY HYPERTENSION (H): Primary | ICD-10-CM

## 2024-09-16 DIAGNOSIS — Z79.01 LONG TERM CURRENT USE OF ANTICOAGULANT THERAPY: ICD-10-CM

## 2024-09-16 DIAGNOSIS — I27.24 CTEPH (CHRONIC THROMBOEMBOLIC PULMONARY HYPERTENSION) (H): ICD-10-CM

## 2024-09-16 LAB — INR HOME MONITORING: 2 RATIO (ref 2–2.5)

## 2024-09-16 NOTE — PROGRESS NOTES
ANTICOAGULATION MANAGEMENT     Karen RICO Calderonvale 84 year old female is on warfarin with therapeutic INR result. (Goal INR 2.0-3.0)    Recent labs: (last 7 days)     09/16/24  1450   INR 2       ASSESSMENT     Source(s): Chart Review and Patient/Caregiver Call     Warfarin doses taken: Booster dose(s) recently taken which may be affecting INR + MD previously increased by 7.4%   Diet: Decreased greens/vitamin K in diet; plans to resume previous intake - patient typically eats a small portion daily  Medication/supplement changes: None noted  New illness, injury, or hospitalization: No  Signs or symptoms of bleeding or clotting: No  Previous result: Subtherapeutic  Additional findings: None       PLAN     Recommended plan for temporary change(s) and ongoing change(s) affecting INR     Dosing Instructions: Continue your current warfarin dose with next INR in 1 week (to ensure stability with resumption of normal diet)    Summary  As of 9/16/2024      Full warfarin instructions:  5 mg every Wed; 4 mg all other days   Next INR check:  9/23/2024               Telephone call with Karen who verbalizes understanding and agrees to plan    Patient to recheck with home meter    Education provided: Please call back if any changes to your diet, medications or how you've been taking warfarin  Symptom monitoring: monitoring for bleeding signs and symptoms and monitoring for clotting signs and symptoms    Plan made per [facility] anticoagulation protocol    Bia Sorto RN  9/16/2024  Anticoagulation Clinic  [employer] for routing messages: p [facility] patient phone line: [phone]        _______________________________________________________________________     Anticoagulation Episode Summary       Current INR goal:  2.0-3.0   TTR:  57.1% (1 y)   Target end date:  Indefinite   Send INR reminders to:  [employer]    Indications    Pulmonary hypertension (H) [I27.20]  CTEPH (chronic thromboembolic pulmonary  hypertension) (H) [I27.24]  Long term current use of anticoagulant therapy [Z79.01]             Comments:               Anticoagulation Care Providers       Provider Role Specialty Phone number    Karolina Turcios MD Referring Cardiovascular Disease [phone]

## 2024-09-16 NOTE — PROGRESS NOTES
ANTICOAGULATION MANAGEMENT     Karen Anderson 84 year old female is on warfarin with therapeutic INR result. (Goal INR 2.0-3.0)    Recent labs: (last 7 days)     09/16/24  1450   INR 2       ASSESSMENT     Source(s): Chart Review  Previous INR was Subtherapeutic  Medication, diet, health changes since last INR chart reviewed - MD previously increased by 7.4%          PLAN     Unable to reach Karen today.    Left message to continue current dose of warfarin 4 mg tonight. Request call back for assessment.    Follow up required to confirm warfarin dose taken and assess for changes and discuss dosing instructions and confirm understanding of instructions    Bia Sorto, RN  9/16/2024  Anticoagulation Clinic  [facility] for routing messages: rabia [facility]  [facility] patient phone line: [phone]

## 2024-09-23 ENCOUNTER — ANTICOAGULATION THERAPY VISIT (OUTPATIENT)
Dept: ANTICOAGULATION | Facility: CLINIC | Age: 84
End: 2024-09-23
Payer: COMMERCIAL

## 2024-09-23 DIAGNOSIS — I27.24 CTEPH (CHRONIC THROMBOEMBOLIC PULMONARY HYPERTENSION) (H): ICD-10-CM

## 2024-09-23 DIAGNOSIS — I27.20 PULMONARY HYPERTENSION (H): Primary | ICD-10-CM

## 2024-09-23 DIAGNOSIS — Z79.01 LONG TERM CURRENT USE OF ANTICOAGULANT THERAPY: ICD-10-CM

## 2024-09-23 LAB — INR HOME MONITORING: 1.8 RATIO (ref 2–2.5)

## 2024-09-23 NOTE — PROGRESS NOTES
ANTICOAGULATION MANAGEMENT     Karen RICO Calderonhilariacharbel 84 year old female is on warfarin with subtherapeutic INR result. (Goal INR 2.0-3.0)    Recent labs: (last 7 days)     09/23/24  1446   INR 1.8*       ASSESSMENT     Source(s): Chart Review  Previous INR was Therapeutic last visit; previously outside of goal range  Medication, diet, health changes since last INR chart reviewed; none identified       PLAN     Recommended plan for ongoing change(s) affecting INR     Dosing Instructions: Increase your warfarin dose (3.4% change) with next INR in 2 weeks       Summary  As of 9/23/2024      Full warfarin instructions:  5 mg every Mon, Thu; 4 mg all other days   Next INR check:  10/7/2024               Telephone call with Karen who agrees to plan and repeated back plan correctly    Patient to recheck with home meter    Education provided: Goal range and lab monitoring: goal range and significance of current result and Importance of following up at instructed interval  Dietary considerations: importance of consistent vitamin K intake and impact of vitamin K foods on INR  Contact [phone] with any changes, questions or concerns.     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

## 2024-09-23 NOTE — PROGRESS NOTES
"Return call from Karen-reports she is trying to keep Vitamin K intake as consistent as possible-also reports that just her overall intake of food has changed since her spouse passed away at the end of June. She is hesistant to increase her warfarin maintenance dose, writer and patient agreed to small increase at this time. She will recheck an INR in 2 weeks.     \"Plan\" updated.     GOLDY PERALTA RN  Anticoagulation Clinic  [phone]    "

## 2024-10-07 ENCOUNTER — ANTICOAGULATION THERAPY VISIT (OUTPATIENT)
Dept: ANTICOAGULATION | Facility: CLINIC | Age: 84
End: 2024-10-07
Payer: COMMERCIAL

## 2024-10-07 DIAGNOSIS — I27.24 CTEPH (CHRONIC THROMBOEMBOLIC PULMONARY HYPERTENSION) (H): ICD-10-CM

## 2024-10-07 DIAGNOSIS — Z79.01 LONG TERM CURRENT USE OF ANTICOAGULANT THERAPY: ICD-10-CM

## 2024-10-07 DIAGNOSIS — I27.20 PULMONARY HYPERTENSION (H): Primary | ICD-10-CM

## 2024-10-07 LAB — INR HOME MONITORING: 1.8 RATIO (ref 2–2.5)

## 2024-10-07 NOTE — PROGRESS NOTES
ANTICOAGULATION MANAGEMENT     Karen JACKY Monica 84 year old female is on warfarin with subtherapeutic INR result. (Goal INR 2.0-3.0)    Recent labs: (last 7 days)     10/07/24  1411   INR 1.8*       ASSESSMENT     Source(s): Chart Review and Patient/Caregiver Call     Warfarin doses taken: Warfarin taken as instructed  Diet: No new diet changes identified  Medication/supplement changes: None noted  New illness, injury, or hospitalization: No  Signs or symptoms of bleeding or clotting: No  Previous result: Subtherapeutic  Additional findings: increase in stress       PLAN     Recommended plan for no diet, medication or health factor changes affecting INR     Dosing Instructions: Increase your warfarin dose (6.7% change) with next INR in 2 weeks       Summary  As of 10/7/2024      Full warfarin instructions:  4 mg every Tue, Thu, Sat; 5 mg all other days   Next INR check:  10/21/2024               Telephone call with Karen who verbalizes understanding and agrees to plan and who agrees to plan and repeated back plan correctly    Patient to recheck with home meter    Education provided: Please call back if any changes to your diet, medications or how you've been taking warfarin    Plan made per [facility] anticoagulation protocol    Maddie Najera RN  10/7/2024  Anticoagulation Clinic  [employer] for routing messages: rabia  ANTICOAG CLINIC  [facility] patient phone line: [phone]        _______________________________________________________________________     Anticoagulation Episode Summary       Current INR goal:  2.0-3.0   TTR:  52.7% (1 y)   Target end date:  Indefinite   Send INR reminders to:   [facility]    Indications    Pulmonary hypertension (H) [I27.20]  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

## 2024-10-11 ENCOUNTER — TELEPHONE (OUTPATIENT)
Dept: CARDIOLOGY | Facility: CLINIC | Age: 84
End: 2024-10-11
Payer: COMMERCIAL

## 2024-10-11 NOTE — TELEPHONE ENCOUNTER
----- Message from Rebecca COHEN sent at 10/4/2024  9:02 AM CDT -----  Hi Team    No rush  Can you schedule a telephone follow-up with Dr. Turcios . I will follow-up on local labs. OK to use a [facility] spot as well    thanks

## 2024-10-11 NOTE — TELEPHONE ENCOUNTER
10/11/2024 12:33PM Marianne Green  Patient confirmed scheduled appointment:  Date: 11/26/2024  Time: 4:20PM  Visit type: Return Pulmonary Hypertension (telephone call)  Provider: Dr. Turcios  Location: Telephone Call; [facility], [address] [place] [address] , [address], [facility], [place] [address]   Testing/imaging: Pt completing labs locally prior  Additional notes: 10/11 Scheduled [facility] telephone call w/ Dr. Turcios in [facility] 11/26. Pt completing labs locally prior. IDANIA Green 10/11/2024 12:33PM

## 2024-10-21 ENCOUNTER — ANTICOAGULATION THERAPY VISIT (OUTPATIENT)
Dept: ANTICOAGULATION | Facility: CLINIC | Age: 84
End: 2024-10-21
Payer: COMMERCIAL

## 2024-10-21 DIAGNOSIS — I27.24 CTEPH (CHRONIC THROMBOEMBOLIC PULMONARY HYPERTENSION) (H): ICD-10-CM

## 2024-10-21 DIAGNOSIS — I27.20 PULMONARY HYPERTENSION (H): Primary | ICD-10-CM

## 2024-10-21 DIAGNOSIS — Z79.01 LONG TERM CURRENT USE OF ANTICOAGULANT THERAPY: ICD-10-CM

## 2024-10-21 LAB — INR HOME MONITORING: 3 RATIO (ref 2–2.5)

## 2024-10-21 NOTE — PROGRESS NOTES
ANTICOAGULATION MANAGEMENT     Karen Anderson 84 year old female is on warfarin with therapeutic INR result. (Goal INR 2.0-3.0)    Recent labs: (last 7 days)     10/21/24  1214   INR 3*       ASSESSMENT     Source(s): Chart Review and Patient/Caregiver Call     Warfarin doses taken: Warfarin taken as instructed  Diet: No new diet changes identified  Medication/supplement changes: None noted  New illness, injury, or hospitalization: No  Signs or symptoms of bleeding or clotting: No  Previous result: Subtherapeutic  Additional findings:  is concerned that she is at the top of her range, but is willing to continue dose and check in 2 weeks.        PLAN     Recommended plan for no diet, medication or health factor changes affecting INR     Dosing Instructions: Continue your current warfarin dose with next INR in 2 weeks       Summary  As of 10/21/2024      Full warfarin instructions:  4 mg every Tue, Thu, Sat; 5 mg all other days   Next INR check:  11/4/2024               Telephone call with Karen who verbalizes understanding and agrees to plan    Lab visit scheduled    Education provided: Please call back if any changes to your diet, medications or how you've been taking warfarin  Goal range and lab monitoring: goal range and significance of current result, Importance of therapeutic range, and Importance of following up at instructed interval    Plan made per [facility] anticoagulation protocol    Karoline Barcenas, RN  10/21/2024  Anticoagulation Clinic  [employer] for routing messages: rabia [facility] patient phone line: [phone]        _______________________________________________________________________     Anticoagulation Episode Summary       Current INR goal:  2.0-3.0   TTR:  55.5% (1 y)   Target end date:  Indefinite   Send INR reminders to:  [facility]    Indications    Pulmonary hypertension (H) [I27.20]  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

## 2024-11-04 ENCOUNTER — ANTICOAGULATION THERAPY VISIT (OUTPATIENT)
Dept: ANTICOAGULATION | Facility: CLINIC | Age: 84
End: 2024-11-04
Payer: COMMERCIAL

## 2024-11-04 DIAGNOSIS — I27.20 PULMONARY HYPERTENSION (H): Primary | ICD-10-CM

## 2024-11-04 DIAGNOSIS — I27.24 CTEPH (CHRONIC THROMBOEMBOLIC PULMONARY HYPERTENSION) (H): ICD-10-CM

## 2024-11-04 DIAGNOSIS — Z79.01 LONG TERM CURRENT USE OF ANTICOAGULANT THERAPY: ICD-10-CM

## 2024-11-04 LAB — INR HOME MONITORING: 2.6 RATIO (ref 2–2.5)

## 2024-11-04 NOTE — PROGRESS NOTES
ANTICOAGULATION MANAGEMENT     Karen Anderson 84 year old female is on warfarin with therapeutic INR result. (Goal INR 2.0-3.0)    Recent labs: (last 7 days)     11/04/24  1322   INR 2.6*       ASSESSMENT     Source(s): Chart Review and Patient/Caregiver Call     Warfarin doses taken: Warfarin taken as instructed  Diet: No new diet changes identified  Medication/supplement changes: None noted  New illness, injury, or hospitalization: No  Signs or symptoms of bleeding or clotting: No  Previous result: Therapeutic last visit; previously outside of goal range  Additional findings: None       PLAN     Recommended plan for no diet, medication or health factor changes affecting INR     Dosing Instructions: Continue your current warfarin dose with next INR in 2 weeks       Summary  As of 11/4/2024      Full warfarin instructions:  4 mg every Tue, Thu, Sat; 5 mg all other days   Next INR check:  11/18/2024               Telephone call with Karen who verbalizes understanding and agrees to plan    Patient to recheck with home meter    Education provided: Goal range and lab monitoring: goal range and significance of current result and Importance of therapeutic range    Plan made per [facility] anticoagulation protocol    Cooper Murillo RN  11/4/2024  Anticoagulation Clinic  [employer] for routing messages: [facility]  [facility] patient phone line: [phone]        _______________________________________________________________________     Anticoagulation Episode Summary       Current INR goal:  2.0-3.0   TTR:  55.5% (1 y)   Target end date:  Indefinite   Send INR reminders to:  [facility]    Indications    Pulmonary hypertension (H) [I27.20]  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

## 2024-11-18 ENCOUNTER — ANTICOAGULATION THERAPY VISIT (OUTPATIENT)
Dept: ANTICOAGULATION | Facility: CLINIC | Age: 84
End: 2024-11-18
Payer: COMMERCIAL

## 2024-11-18 DIAGNOSIS — I27.20 PULMONARY HYPERTENSION (H): Primary | ICD-10-CM

## 2024-11-18 DIAGNOSIS — Z79.01 LONG TERM CURRENT USE OF ANTICOAGULANT THERAPY: ICD-10-CM

## 2024-11-18 DIAGNOSIS — I27.24 CTEPH (CHRONIC THROMBOEMBOLIC PULMONARY HYPERTENSION) (H): ICD-10-CM

## 2024-11-18 LAB — INR HOME MONITORING: 3.3 RATIO (ref 2–2.5)

## 2024-11-18 NOTE — PROGRESS NOTES
ANTICOAGULATION MANAGEMENT     Karen R Calderonhilariacharbel 84 year old female is on warfarin with supratherapeutic INR result. (Goal INR 2.0-3.0)    Recent labs: (last 7 days)     11/18/24  1448   INR 3.3*       ASSESSMENT     Source(s): Chart Review and Patient/Caregiver Call     Warfarin doses taken: Warfarin taken as instructed  Diet: No new diet changes identified  Medication/supplement changes: None noted  New illness, injury, or hospitalization: No  Signs or symptoms of bleeding or clotting: No  Previous result: Therapeutic last 2(+) visits, noted upward trend in INR following dose increases over the last 6-8 weeks  Additional findings: None       PLAN     Recommended plan for no diet, medication or health factor changes affecting INR     Dosing Instructions: Continue your current warfarin dose with next INR in 2 weeks       Summary  As of 11/18/2024      Full warfarin instructions:  5 mg every Sun, Wed; 4 mg all other days   Next INR check:  12/2/2024               Telephone call with Karen who verbalizes understanding and agrees to plan    Patient to recheck with home meter    Education provided: Contact [phone] with any changes, questions or concerns.     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

## 2024-11-26 ENCOUNTER — VIRTUAL VISIT (OUTPATIENT)
Dept: CARDIOLOGY | Facility: CLINIC | Age: 84
End: 2024-11-26
Payer: COMMERCIAL

## 2024-11-26 ENCOUNTER — TELEPHONE (OUTPATIENT)
Dept: CARDIOLOGY | Facility: CLINIC | Age: 84
End: 2024-11-26

## 2024-11-26 VITALS — HEART RATE: 72 BPM | WEIGHT: 104 LBS | OXYGEN SATURATION: 94 % | BODY MASS INDEX: 21.01 KG/M2

## 2024-11-26 DIAGNOSIS — I27.20 PULMONARY HYPERTENSION (H): Primary | ICD-10-CM

## 2024-11-26 DIAGNOSIS — I27.20 PULMONARY HYPERTENSION (H): ICD-10-CM

## 2024-11-26 DIAGNOSIS — R06.02 SOB (SHORTNESS OF BREATH): ICD-10-CM

## 2024-11-26 RX ORDER — BUMETANIDE 2 MG/1
4 TABLET ORAL 2 TIMES DAILY
COMMUNITY

## 2024-11-26 NOTE — PROGRESS NOTES
Virtual Visit Details    Type of service:  Video Visit   Video Start Time:  4.30 pm  Video End Time: 5 pm    Originating Location (pt. Location): Home    Distant Location (provider location):  On-site  Platform used for Video Visit: Darron        November 26, 2024     Dear Dr. Main:     Karen Anderson is a 85 year old female we saw in the [facility] for follow up.  As you know, she has a past medical history that includes asthma, pulmonary MAC, breast cancer s/p chemotherapy with Adriamycin, Cytoxan, Taxol and tamoxifen and s/p left mastectomy, extensive b/l pulmonary emboli and DVT in 1998 though to be secondary to tamoxifen, chemotherapy induced cardiomyopathy, rheumatoid arthritis, HTN, chronic hypoxic respiratory failure on O2 via NC 1.5lpm, multiple falls, and scoliosis.  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  She is currently on IV treprostinil at 46 ng/kg/min and macitentan 10 mg daily.  She did not tolerate riociguat due to hypotension.    She returns today for follow-up.  Unfortunately, her  passed away in June of this year.  She is still able to manage by herself at home.  Her daughter lives close by and visits her often.  She also has a home health services who visits as needed as well as once a month.  She has a personal caregiver once a week.  She is able to handle the prostacyclin pump by herself.  She changes the catheter every other day and changes the dressing once a week.    Overall, she has been stable from her health perspective.  She has not noticed any significant worsening exertional shortness of breath.  She is independent for her activities of daily living mostly.  She walks with a rolling rider.  She is able to climb a flight of stairs slowly.   I would currently characterize her as NYHA functional class III.  She denies having any exertional chest pain or chest pressure.  No exertional  presyncope or syncope.  Her lower extremity swelling is under control on the current dose of diuretics and compression stocking.  No recent hospitalization or ER visit.  Her Goldberg catheter site is clean and dry.  No prostacyclin related side effects.      Her current medical regimen includes:    Current Outpatient Medications   Medication Sig Dispense Refill    acetaminophen (TYLENOL) 325 MG tablet Take 325 mg by mouth every 6 hours as needed for mild pain      albuterol (PROAIR HFA/PROVENTIL HFA/VENTOLIN HFA) 108 (90 Base) MCG/ACT inhaler Inhale 2 puffs into the lungs every 4 hours as needed for shortness of breath / dyspnea or wheezing       alendronate (FOSAMAX) 70 MG tablet Take 70 mg by mouth once a week On Mondays      Ascorbic Acid (VITAMIN C) 500 MG CAPS Take 500 mg by mouth every morning       aspirin (ASA) 81 MG tablet Take 81 mg by mouth daily       bumetanide (BUMEX) 2 MG tablet Take 4 mg by mouth 2 times daily.      calcium citrate-vitamin D (CALCIUM CITRATE + D3) 315-250 MG-UNIT TABS per tablet Take 1 tablet by mouth 2 times daily       digoxin (LANOXIN) 125 MCG tablet Take 0.5 tablets (62.5 mcg) by mouth daily. 45 tablet 3    gabapentin (NEURONTIN) 300 MG capsule Take 300 mg by mouth 3 times daily Can take an additional 300mg at bedtime as needed      hydroxychloroquine (PLAQUENIL) 200 MG tablet Take 200 mg by mouth daily       ipratropium (ATROVENT) 0.06 % nasal spray Spray 2 sprays into both nostrils 3 times daily       ketotifen (ZADITOR) 0.025 % ophthalmic solution Place 1 drop into both eyes daily      macitentan (OPSUMIT) 10 MG tablet Take 1 tablet (10 mg) by mouth daily 30 tablet 11    Metamucil Fiber CHEW Take by mouth daily as needed 2 chewables      potassium chloride ER (KLOR-CON M) 20 MEQ CR tablet Take 1 tablet (20 mEq) by mouth 3 times daily More refills after office visit. 270 tablet 3    sodium chloride (OCEAN) 0.65 % nasal spray Spray 1 spray into both nostrils daily as needed for  congestion      treprostinil (REMODULIN) 60 mcg/mL in glycine diluent 50 mL infusion Inject 45.5 ng/kg/min into the vein continuous Goal Dose: 45.5 ng/kg/min 1368 mL 0    warfarin ANTICOAGULANT (COUMADIN) 1 MG tablet Take 1-2 tablets daily or as directed 180 tablet 1    warfarin ANTICOAGULANT (COUMADIN) 1 MG tablet Take 1 tablet (1 mg) by mouth daily 90 tablet 3    warfarin ANTICOAGULANT (COUMADIN) 3 MG tablet Take 5 mg (3 mg x 1 and 1 mg x 2) every Sun, Tue, Thu; 3 mg (3 mg x 1) all other days OR AS DIRECTED BY ANTICOAGULATION CLINIC. 100 tablet 1     No current facility-administered medications for this visit.     Treprostinil 45.5 ng/kg/min  Macitentan 10mg daily  Digoxin 62.5mcg daily  Bumetanide 4mg Bid  Aspirin 81mg daily  Warfarin   .       ROS: Complete ROS per HPI otherwise negative           EXAM:  Vital signs:  Pulse 72   Wt 47.2 kg (104 lb)   SpO2 94%   BMI 21.01 kg/m     Exam deferred 2/2 virtual encounter     Labs and studies reviewed:     Recent Results (from the past 6 weeks)   INR (External Result)    Collection Time: 10/21/24 12:14 PM   Result Value Ref Range    INR HOME MONITORING 3 (H) 2 - 2.5 RATIO   INR (External Result)    Collection Time: 11/04/24  1:22 PM   Result Value Ref Range    INR HOME MONITORING 2.6 (H) 2 - 2.5 RATIO   INR (External Result)    Collection Time: 11/18/24  2:48 PM   Result Value Ref Range    INR HOME MONITORING 3.3 (H) 2 - 2.5 RATIO     Her CBC showed a hemoglobin of 11.1, hematocrit of 36.8, WBC of 4.4 and a platelet count of 244.    Her chemistry showed a sodium of 142, potassium of 4.3, chloride of 98, bicarb of 36, BUN of 23, creatinine of 1.02, glucose of 88, and calcium of 9.9.    Her NT proBNP remains elevated but stable at 1713.    Echocardiogram:     TTE (09/2023)  Left ventricular function is normal.The ejection fraction is 55-60%. No  regional wall motion abnormalities are seen. Paradoxical septal motion  consistent with right ventricular pressure and  volume overload is present.  Global right ventricular function is mildly reduced. Moderate right  ventricular dilation is present. Estimated PASP 92 mmHg (RVSP + RAP). The PV  acceleration time is 67msec  Severe right atrial enlargement is present.  The inferior vena cava was normal in size with preserved respiratory  variability. No pericardial effusion is present.     This study was compared with the study from 4/25/22 .  The estimated PASP has increased. The RV function appears similar.    3/17/21 RHC:  RA: 14  PA: 88/22, mPA44  PCWP: 14  TD CO/CI: 3.97/2.8  Concepcion CO/CI: 2.95/2.1  PVR: 10.2     Assessment and Plan:      Mrs. Karen Anderson is a 85 year old female with proximal chronic thromboembolic disease but not a candidate for surgical thromboendarterectomy.  She is currently on medical therapy with IV treprostinil 46 ng/kg/min and macitentan 10 mg daily.  She did not tolerate BPA.  She had hemoptysis.    She is overall stable.  She has not had any recent hospitalizations related to heart failure.  Her weight has been stable.  Her NT proBNP is elevated but stable.  Unfortunately she is not able to have an echocardiogram done as it is difficult for her to commute.  She is tolerating the treprostinil and macitentan without any significant side effects.    I have recommended her to continue the current regimen of IV treprostinil 46 ng/kg/min and macitentan 10 mg daily.  We will continue her on bumetanide 4 mg 2 times a day, digoxin 62.5 mcg daily, and Coumadin for long-term anticoagulation with an INR of 2-3.  We will check her co-pay for apixaban or rivaroxaban as this would be much more convenient for her without needing to check INR.  She will continue on potassium 20 mg 3 times a day.  She is using 3 L of supplemental oxygen and her oxygen saturations are occasionally in the 80s.  I recommend her to increase it as needed up to 6 L to maintain a saturation more than 90%.    We will have her return to see  us virtually in 3 months.  She will call us in the interim should any further worsening symptoms. She is very hesitant to come to the [facility] clinic because of her prior prolonged admissions in the past.     Total time today was 45 minutes reviewing notes, imaging, labs, patient visit, orders and documentation     Sincerely,  Karolina Turcios MD   [facility]  Heart Failure, Transplant, and Mechanical Circulatory Support Cardiology   Cardiovascular Division  [facility]   [phone]

## 2024-11-26 NOTE — TELEPHONE ENCOUNTER
30 days supply test claims requested for the drugs below:  Xarelto 20mg daily, Eliquis 5mg BID    Xarelto   -$0 copay        Eliquis   -$0 copay

## 2024-11-26 NOTE — LETTER
11/26/2024    Raquel Main MD  [facility] [address] [facility] [address]    RE: Karen Anderson       Dear Colleague,     I had the pleasure of seeing Karen Anderson in the [facility].  Virtual Visit Details    Type of service:  Video Visit   Video Start Time:  4.30 pm  Video End Time: 5 pm    Originating Location (pt. Location): Home    Distant Location (provider location):  On-site  Platform used for Video Visit: Darron        November 26, 2024     Dear Dr. Main:     Karen Anderson is a 85 year old female we saw in the [facility] for follow up.  As you know, she has a past medical history that includes asthma, pulmonary MAC, breast cancer s/p chemotherapy with Adriamycin, Cytoxan, Taxol and tamoxifen and s/p left mastectomy, extensive b/l pulmonary emboli and DVT in 1998 though to be secondary to tamoxifen, chemotherapy induced cardiomyopathy, rheumatoid arthritis, HTN, chronic hypoxic respiratory failure on O2 via NC 1.5lpm, multiple falls, and scoliosis.  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  She is currently on IV treprostinil at 46 ng/kg/min and macitentan 10 mg daily.  She did not tolerate riociguat due to hypotension.    She returns today for follow-up.  Unfortunately, her  passed away in June of this year.  She is still able to manage by herself at home.  Her daughter lives close by and visits her often.  She also has a home health services who visits as needed as well as once a month.  She has a personal caregiver once a week.  She is able to handle the prostacyclin pump by herself.  She changes the catheter every other day and changes the dressing once a week.    Overall, she has been stable from her health perspective.  She has not noticed any significant worsening exertional shortness of breath.  She is independent for her activities of daily living mostly.   She walks with a rolling rider.  She is able to climb a flight of stairs slowly.   I would currently characterize her as NYHA functional class III.  She denies having any exertional chest pain or chest pressure.  No exertional presyncope or syncope.  Her lower extremity swelling is under control on the current dose of diuretics and compression stocking.  No recent hospitalization or ER visit.  Her Goldberg catheter site is clean and dry.  No prostacyclin related side effects.      Her current medical regimen includes:    Current Outpatient Medications   Medication Sig Dispense Refill     acetaminophen (TYLENOL) 325 MG tablet Take 325 mg by mouth every 6 hours as needed for mild pain       albuterol (PROAIR HFA/PROVENTIL HFA/VENTOLIN HFA) 108 (90 Base) MCG/ACT inhaler Inhale 2 puffs into the lungs every 4 hours as needed for shortness of breath / dyspnea or wheezing        alendronate (FOSAMAX) 70 MG tablet Take 70 mg by mouth once a week On Mondays       Ascorbic Acid (VITAMIN C) 500 MG CAPS Take 500 mg by mouth every morning        aspirin (ASA) 81 MG tablet Take 81 mg by mouth daily        bumetanide (BUMEX) 2 MG tablet Take 4 mg by mouth 2 times daily.       calcium citrate-vitamin D (CALCIUM CITRATE + D3) 315-250 MG-UNIT TABS per tablet Take 1 tablet by mouth 2 times daily        digoxin (LANOXIN) 125 MCG tablet Take 0.5 tablets (62.5 mcg) by mouth daily. 45 tablet 3     gabapentin (NEURONTIN) 300 MG capsule Take 300 mg by mouth 3 times daily Can take an additional 300mg at bedtime as needed       hydroxychloroquine (PLAQUENIL) 200 MG tablet Take 200 mg by mouth daily        ipratropium (ATROVENT) 0.06 % nasal spray Spray 2 sprays into both nostrils 3 times daily        ketotifen (ZADITOR) 0.025 % ophthalmic solution Place 1 drop into both eyes daily       macitentan (OPSUMIT) 10 MG tablet Take 1 tablet (10 mg) by mouth daily 30 tablet 11     Metamucil Fiber CHEW Take by mouth daily as needed 2 chewables        potassium chloride ER (KLOR-CON M) 20 MEQ CR tablet Take 1 tablet (20 mEq) by mouth 3 times daily More refills after office visit. 270 tablet 3     sodium chloride (OCEAN) 0.65 % nasal spray Spray 1 spray into both nostrils daily as needed for congestion       treprostinil (REMODULIN) 60 mcg/mL in glycine diluent 50 mL infusion Inject 45.5 ng/kg/min into the vein continuous Goal Dose: 45.5 ng/kg/min 1368 mL 0     warfarin ANTICOAGULANT (COUMADIN) 1 MG tablet Take 1-2 tablets daily or as directed 180 tablet 1     warfarin ANTICOAGULANT (COUMADIN) 1 MG tablet Take 1 tablet (1 mg) by mouth daily 90 tablet 3     warfarin ANTICOAGULANT (COUMADIN) 3 MG tablet Take 5 mg (3 mg x 1 and 1 mg x 2) every Sun, Tue, Thu; 3 mg (3 mg x 1) all other days OR AS DIRECTED BY ANTICOAGULATION CLINIC. 100 tablet 1     No current facility-administered medications for this visit.     Treprostinil 45.5 ng/kg/min  Macitentan 10mg daily  Digoxin 62.5mcg daily  Bumetanide 4mg Bid  Aspirin 81mg daily  Warfarin   .       ROS: Complete ROS per HPI otherwise negative           EXAM:  Vital signs:  Pulse 72   Wt 47.2 kg (104 lb)   SpO2 94%   BMI 21.01 kg/m     Exam deferred 2/2 virtual encounter     Labs and studies reviewed:     Recent Results (from the past 6 weeks)   INR (External Result)    Collection Time: 10/21/24 12:14 PM   Result Value Ref Range    INR HOME MONITORING 3 (H) 2 - 2.5 RATIO   INR (External Result)    Collection Time: 11/04/24  1:22 PM   Result Value Ref Range    INR HOME MONITORING 2.6 (H) 2 - 2.5 RATIO   INR (External Result)    Collection Time: 11/18/24  2:48 PM   Result Value Ref Range    INR HOME MONITORING 3.3 (H) 2 - 2.5 RATIO     Her CBC showed a hemoglobin of 11.1, hematocrit of 36.8, WBC of 4.4 and a platelet count of 244.    Her chemistry showed a sodium of 142, potassium of 4.3, chloride of 98, bicarb of 36, BUN of 23, creatinine of 1.02, glucose of 88, and calcium of 9.9.    Her NT proBNP remains elevated but  stable at 1713.    Echocardiogram:     TTE (09/2023)  Left ventricular function is normal.The ejection fraction is 55-60%. No  regional wall motion abnormalities are seen. Paradoxical septal motion  consistent with right ventricular pressure and volume overload is present.  Global right ventricular function is mildly reduced. Moderate right  ventricular dilation is present. Estimated PASP 92 mmHg (RVSP + RAP). The PV  acceleration time is 67msec  Severe right atrial enlargement is present.  The inferior vena cava was normal in size with preserved respiratory  variability. No pericardial effusion is present.     This study was compared with the study from 4/25/22 .  The estimated PASP has increased. The RV function appears similar.    3/17/21 RHC:  RA: 14  PA: 88/22, mPA44  PCWP: 14  TD CO/CI: 3.97/2.8  Concepcion CO/CI: 2.95/2.1  PVR: 10.2     Assessment and Plan:      Mrs. Karen Anderson is a 85 year old female with proximal chronic thromboembolic disease but not a candidate for surgical thromboendarterectomy.  She is currently on medical therapy with IV treprostinil 46 ng/kg/min and macitentan 10 mg daily.  She did not tolerate BPA.  She had hemoptysis.    She is overall stable.  She has not had any recent hospitalizations related to heart failure.  Her weight has been stable.  Her NT proBNP is elevated but stable.  Unfortunately she is not able to have an echocardiogram done as it is difficult for her to commute.  She is tolerating the treprostinil and macitentan without any significant side effects.    I have recommended her to continue the current regimen of IV treprostinil 46 ng/kg/min and macitentan 10 mg daily.  We will continue her on bumetanide 4 mg 2 times a day, digoxin 62.5 mcg daily, and Coumadin for long-term anticoagulation with an INR of 2-3.  We will check her co-pay for apixaban or rivaroxaban as this would be much more convenient for her without needing to check INR.  She will continue on  potassium 20 mg 3 times a day.  She is using 3 L of supplemental oxygen and her oxygen saturations are occasionally in the 80s.  I recommend her to increase it as needed up to 6 L to maintain a saturation more than 90%.    We will have her return to see us virtually in 3 months.  She will call us in the interim should any further worsening symptoms. She is very hesitant to come to the [facility] clinic because of her prior prolonged admissions in the past.     Total time today was 45 minutes reviewing notes, imaging, labs, patient visit, orders and documentation     Sincerely,  Karolina Turcios MD   [facility]  Heart Failure, Transplant, and Mechanical Circulatory Support Cardiology   Cardiovascular Division  [facility]   [phone]          Thank you for allowing me to participate in the care of your patient.      Sincerely,     Karolina Turcios MD     [facility]  cc:   Karolina Turcios MD  [address] [place] [address]

## 2024-11-26 NOTE — TELEPHONE ENCOUNTER
----- Message from Karolina Turcios sent at 11/26/2024  5:26 PM CST -----  Team,    Just to be thorough, we should have her RTC with Shawna in 3 months with labs and virtual visit.     Thank you    TT

## 2024-11-26 NOTE — PATIENT INSTRUCTIONS
You were seen today in the Pulmonary Hypertension Clinic at the [facility].     Cardiology Provider you saw during your visit:    Dr. Turcios    Medication Changes:  Change Warfarin to Eliquis 5mg twice daily- this medication will add to your costs for next year. Once you meet your max out of pocket (new medicare guidelines 2000$) this will be 0$    Recommendations:   Increase oxygen to 3-6lpm at rest- we will notify your oxygen supply company    Follow-up:   Follow-up with Shawna Marcum in 3 months with labs locally prior  Follow-up in 6 months via telephone with labs prior        Please call us immediately if you have any syncope (fainting or passing out), chest pain, edema (swelling or weight gain), or decline in your functional status (general decline in how you are feeling).    If you have emergent concerns after hours or on the weekend, please call our on-call Cardiologist at [phone], option 4. For emergencies call 911.     Thank you for allowing us to be a part of your care here at the [facility]    Please visit the pulmonary hypertension website for more information and support. [email or web address]    If you have questions or concerns please contact us at:    Rebecca Ngo RN (P: [phone])    Nurse Coordinator       Pulmonary Hypertension     [facility]         ALINA Austin   (Prior Auths & Patient Assistance )             ()  Clinic   Clinic   Pulmonary Hypertension   Pulmonary Hypertension  [facility]  [facility]  (P)[phone]    (P) [phone]  (F) [phone]

## 2024-11-27 ENCOUNTER — TELEPHONE (OUTPATIENT)
Dept: CARDIOLOGY | Facility: CLINIC | Age: 84
End: 2024-11-27
Payer: COMMERCIAL

## 2024-11-27 DIAGNOSIS — I27.24 CTEPH (CHRONIC THROMBOEMBOLIC PULMONARY HYPERTENSION) (H): Primary | ICD-10-CM

## 2024-11-27 NOTE — TELEPHONE ENCOUNTER
11/26/2024 6:18PM Marianne Green  Left Voicemail (1st Attempt) and Sent Mychart (1st Attempt) for the patient to call back and schedule the following:    Appointment type: Return Pulmonary Hypertension (video visit)  Provider: MAGEN Saeed  Return date: 3 month follow-up (2/26/2025)  Specialty phone number: [phone] option 1  Additional appointment(s) needed: labs prior locally  Additonal Notes: 11/26 LVM and MYC for pt to schedule [facility] video visit w/ Shawna Marcum w/ labs prior Feb 2025. IDANIA Green 11/26/2024 6:18PM

## 2024-11-27 NOTE — TELEPHONE ENCOUNTER
----- Message from Wilton ALEGRIA sent at 11/26/2024  6:11 PM CST -----  Regarding: Plan  Team,    3 month Virtual follow-up with Jossue with labs prior.    Thank you!     Karolina Nelson MD  P Cardiology Ph Nurse-  Team,    Just to be thorough, we should have her RTC with Shawna in 3 months with labs and virtual visit.    Thank you    TT

## 2024-11-27 NOTE — TELEPHONE ENCOUNTER
Called patient after visit yesterday. Reminded her to renew her medicare plan. She may need some help with this but will let us know. This would be due 12/7. Discussed switching to warfarin to eliquis for convenience. This may be increased cost oop for next year. Discussed the opt in process for medicare next year might be a good option. She did not qualify for PAP in past dt income with her spouse. Since he is passed she will still not qualify dt past years income, may qualify next year.     She is OK with switching from Warfarin to Eliquis, will send in prescription.     Rebecca Ngo RN on 11/27/2024 at 10:24 AM

## 2024-12-02 ENCOUNTER — ANTICOAGULATION THERAPY VISIT (OUTPATIENT)
Dept: ANTICOAGULATION | Facility: CLINIC | Age: 84
End: 2024-12-02
Payer: COMMERCIAL

## 2024-12-02 ENCOUNTER — TELEPHONE (OUTPATIENT)
Dept: CARDIOLOGY | Facility: CLINIC | Age: 84
End: 2024-12-02
Payer: COMMERCIAL

## 2024-12-02 ENCOUNTER — TELEPHONE (OUTPATIENT)
Dept: ANTICOAGULATION | Facility: CLINIC | Age: 84
End: 2024-12-02
Payer: COMMERCIAL

## 2024-12-02 DIAGNOSIS — Z79.01 LONG TERM CURRENT USE OF ANTICOAGULANT THERAPY: ICD-10-CM

## 2024-12-02 DIAGNOSIS — I27.24 CTEPH (CHRONIC THROMBOEMBOLIC PULMONARY HYPERTENSION) (H): ICD-10-CM

## 2024-12-02 DIAGNOSIS — I27.20 PULMONARY HYPERTENSION (H): Primary | ICD-10-CM

## 2024-12-02 LAB — INR (EXTERNAL): 2.9

## 2024-12-02 NOTE — TELEPHONE ENCOUNTER
Patient Returning Call    Reason for call:  Karen would like to get a call back from INR to discuss her result    Information relayed to patient:  the INR team will give you a call back when they receive this message    Patient has additional questions:  No      Could we send this information to you in [place] or would you prefer to receive a phone call?:   Patient would prefer a phone call   Okay to leave a detailed message?: Yes at Cell number on file:    Telephone Information:   Mobile [phone]

## 2024-12-02 NOTE — TELEPHONE ENCOUNTER
12/2/24:  Called Karen--her daughter is going to  her RX of Eliquis and Karen will get it today or tomorrow.  Karen will check her INR today so a plan can be made for the transition from warfarin to Eliquis.  Laure Enriquez RN

## 2024-12-02 NOTE — TELEPHONE ENCOUNTER
[facility] Call Center    Phone Message    May a detailed message be left on voicemail: yes     Reason for Call: Other: Alva was going over the visit notes from recent visit and noticed a discrepancy with the medication bumetanide (BUMEX) 2 MG tablet. On notes it states pt takes 2-2mg tablets twice a day for a total of 8mg daily. Pt has actually been taking 1-2mg tablet three times a day for a total of 6mg daily for quite some time. Alva would like to know if our records need to be updated or if there should be a change in the pt's dosage. Please review and call Alva back to further discuss and advise. Thank you!      Alva added that pt should be started on Eliquis today as well, it is in process of being picked up.     Action Taken: Message routed to:  [facility] ([facility]ARDIOLOGY [facility] [place][phone]    Travel Screening: Not Applicable     Date of Service:

## 2024-12-02 NOTE — TELEPHONE ENCOUNTER
Patient Contacted for the patient to call back and schedule the following:    Appointment type: RTN PH  Provider: ESME  Return date: 2/25/2025  Specialty phone number: [phone] OPT 1  Additional appointment(s) needed: LABS PRIOR NEEDED  Additonal Notes: PT WILL SCHEDULE LABS ON HER OWN WITH HER HOME CARE PROVIDER     89M w/ PMH of CVA w/ residual L sided hemiparesis (40 yrs ago, walks with walker), DM, CKD4 (baseline Cr 2.3-2.5), gout, hx of BRBPR 2/2 internal hemorrhoids (last c-scope 2012), prostate cancer (dc 1994) s/p 36 rounds of radiation therapy 20 years ago c/b radiation proctitis who was brought in from Nursing home for AMS, found to be in septic shock 2/2 possible UTI vs asp PNA, c/b MIGUEL on CKD IV completing.  Now s/p UTI treatment and per GOC comfort measures, DNR/DNI no HD    Patient seen and examined at bedside, denies pain or discomfort.  Tolerating some PO intake  Comfort feeds as tolerated  MEWs exempt  f/u palliative for hospice    RIJ HD cath removed today

## 2024-12-02 NOTE — PROGRESS NOTES
ANTICOAGULATION MANAGEMENT     Karen Anderson 84 year old female is on warfarin with therapeutic INR result. (Goal INR 2.0-3.0)    Recent labs: (last 7 days)     12/02/24  0000   INR 2.9       ASSESSMENT     Source(s): Chart Review and Patient/Caregiver Call     Warfarin doses taken: Warfarin taken as instructed  Diet: No new diet changes identified  Medication/supplement changes:  Karen will be switching to Eliquis.  She has already taken today's dose of warfarin.  She will hold warfarin the next two days and start Eliquis in the AM on 12/5/24  New illness, injury, or hospitalization: No  Signs or symptoms of bleeding or clotting: No  Previous result: Supratherapeutic  Additional findings:  switching to Eliquis--writer will follow up with Karen on 12/4 to make sure she was able to get the Eliquis.  She states her pharmacy has told her it is ready for pickup; her daughter is going to  tonight.       PLAN     Recommended plan for ongoing change(s) affecting INR     Dosing Instructions:  Stop warfarin 12/3/24 start Eliquis on 12/5/24 in the AM        Summary  As of 12/2/2024      Full warfarin instructions:  12/3: stop warfarin   Next INR check:                 Telephone call with Karen who verbalizes understanding and agrees to plan and who agrees to plan and repeated back plan correctly    She will not need to check anymore INR; will be on Eliquis.  Writer will inactivate her from the [email or web address] after I verify she has Eliquis in her home. (Will call her on 12/4/24)    Education provided:  Stop warfarin and start Eliquis on 12/5/24 in AM    Plan made per [facility] anticoagulation protocol    Laure Enriquez, RN  12/2/2024  Anticoagulation Clinic  [facility] for routing messages: [facility]  [facility] patient phone line: [phone]        _______________________________________________________________________     Anticoagulation Episode Summary       Current INR goal:  2.0-3.0   TTR:  51.7% (1 y)    Target end date:  Indefinite   Send INR reminders to:  [facility]    Indications    Pulmonary hypertension (H) [I27.20]  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

## 2024-12-03 ENCOUNTER — TELEPHONE (OUTPATIENT)
Dept: CARDIOLOGY | Facility: CLINIC | Age: 84
End: 2024-12-03
Payer: COMMERCIAL

## 2024-12-03 NOTE — TELEPHONE ENCOUNTER
12/3/2024  @ 1:49 PM -      -Rebecca Ngo RN spoke to pt - her  passed away last June, 2024, she is asking about copay assistance for her Opsumit come 1/1/2025.       - Submitted application to [facility] today - pt is eligible -         KERVIN APPROVED  Medication:  OPSUMIT 10 MG  Amount: $  3800  Foundation Name:  [facility] Phone:  PH: [phone]  [facility] Fax: FX: [phone]   Member ID: [id number]  BIN: [id number]  PCN: [id number]  Group: **  Foundation Effective Date:  9/3/2024  Foundation Expiration Date:  12/1/2025  Additional Information: *  Patient Notified: Y, sent My[employer] valenciag.     Approval letter faxed to [facility] natasha PATEL CMA- Prior Auths  Cardiology/Pulmonary Hypertension

## 2024-12-03 NOTE — TELEPHONE ENCOUNTER
Patient taking 6mg bumex total daily and has been for some time. Unsure how the decreased dose happened at home. Per our noted 8mg daily.    Updated Dr. Turcios for any further changes on this. Asked patient to continue on her regimen for now    Rebecca Ngo RN on 12/3/2024 at 1:49 PM

## 2024-12-03 NOTE — TELEPHONE ENCOUNTER
Called Alva monk. Left a message with direct number to touch base on diuretic dose    Rebecca Ngo RN on 12/3/2024 at 11:39 AM

## 2024-12-04 ENCOUNTER — TELEPHONE (OUTPATIENT)
Dept: ANTICOAGULATION | Facility: CLINIC | Age: 84
End: 2024-12-04
Payer: COMMERCIAL

## 2024-12-04 ENCOUNTER — TELEPHONE (OUTPATIENT)
Dept: CARDIOLOGY | Facility: CLINIC | Age: 84
End: 2024-12-04
Payer: COMMERCIAL

## 2024-12-04 ENCOUNTER — DOCUMENTATION ONLY (OUTPATIENT)
Dept: ANTICOAGULATION | Facility: CLINIC | Age: 84
End: 2024-12-04
Payer: COMMERCIAL

## 2024-12-04 DIAGNOSIS — I27.24 CTEPH (CHRONIC THROMBOEMBOLIC PULMONARY HYPERTENSION) (H): ICD-10-CM

## 2024-12-04 DIAGNOSIS — Z79.01 LONG TERM CURRENT USE OF ANTICOAGULANT THERAPY: ICD-10-CM

## 2024-12-04 DIAGNOSIS — R60.9 EDEMA: Primary | ICD-10-CM

## 2024-12-04 DIAGNOSIS — I27.20 PULMONARY HYPERTENSION (H): Primary | ICD-10-CM

## 2024-12-04 RX ORDER — BUMETANIDE 2 MG/1
2 TABLET ORAL 3 TIMES DAILY
Qty: 270 TABLET | Refills: 3 | Status: SHIPPED | OUTPATIENT
Start: 2024-12-04

## 2024-12-04 NOTE — PROGRESS NOTES
12/4/24:  Spoke with Karen. She has Eliquis in her home.  She reports she did not take her warfarin on 12/2/24(found them on the floor); so she stopped warfarin on 12/2/24.  She will start Eliquis today, 12/4/24.  Will inactivate from the [facility]. Karen states she takes ASA 81 mg daily and is wondering if she should take with Eliquis--she will speak with her cardiology team regarding this.  Laure Enriquez RN

## 2024-12-04 NOTE — PROGRESS NOTES
12/4/24:  Left Karen a voice message asking her to call the [facility]--need to verify that she has picked up Eliquis.  Laure Enriquez RN

## 2024-12-04 NOTE — PROGRESS NOTES
ANTICOAGULATION  MANAGEMENT    Karen Anderson is being discharged from the [facility] Anticoagulation Management Program ([facility]).    Reason for discharge: warfarin replaced by alternate therapy, Eliquis    Anticoagulation episode resolved, ACC referral closed, Standing order discontinued, and Warfarin is off medication list    If patient needs warfarin management in the future, please send a new referral    Laure Enriquez RN

## 2024-12-04 NOTE — TELEPHONE ENCOUNTER
12/4/24:  Spoke with Karen.  She has Eliquis in her home.  See anticoag visit from 12/2/24.  Laure Enriquez RN

## 2024-12-04 NOTE — TELEPHONE ENCOUNTER
Patient Returning Call    Reason for call:  Patient returned call but was unable to connect with the nurse. Please all    Information relayed to patient:  message placed    Patient has additional questions:  No      Could we send this information to you in [place] or would you prefer to receive a phone call?:   Patient would prefer a phone call   Okay to leave a detailed message?: Yes at Home number on file [phone] (home)

## 2024-12-04 NOTE — TELEPHONE ENCOUNTER
[facility] Call Center    Phone Message    May a detailed message be left on voicemail: yes     Reason for Call: Other: Alva from [facility] would like  a call back as pt is wondering if she should still be taking asprin with her new medication Eliquis     Action Taken: Other: Cardio    Travel Screening: Not Applicable     Date of Service:

## 2024-12-09 DIAGNOSIS — R06.02 SOB (SHORTNESS OF BREATH): ICD-10-CM

## 2024-12-09 DIAGNOSIS — I27.20 PULMONARY HYPERTENSION (H): ICD-10-CM

## 2024-12-09 RX ORDER — POTASSIUM CHLORIDE 1500 MG/1
20 TABLET, EXTENDED RELEASE ORAL 3 TIMES DAILY
Qty: 270 TABLET | Refills: 3 | Status: SHIPPED | OUTPATIENT
Start: 2024-12-09

## 2024-12-10 DIAGNOSIS — R60.9 EDEMA: ICD-10-CM

## 2024-12-10 RX ORDER — BUMETANIDE 2 MG/1
4 TABLET ORAL 3 TIMES DAILY
Qty: 540 TABLET | Refills: 3 | Status: SHIPPED | OUTPATIENT
Start: 2024-12-10

## 2024-12-19 DIAGNOSIS — I27.20 PULMONARY HTN (H): Primary | ICD-10-CM

## 2025-01-03 DIAGNOSIS — I27.20 PULMONARY HYPERTENSION (H): ICD-10-CM

## 2025-01-07 NOTE — TELEPHONE ENCOUNTER
macitentan (OPSUMIT) 10 MG tablet   Last Written Prescription Date:  1/15/2024  Last Fill Quantity: 30,   # refills: 11  Last Office Visit : 11/26/2024    Future Office visit:  2/25/2025  Routing macitentan (OPSUMIT) 10 MG tablet refill request to provider for review/approval because: Medication not on the Cardiology refill protocol.

## 2025-02-06 ENCOUNTER — CARE COORDINATION (OUTPATIENT)
Dept: CARDIOLOGY | Facility: CLINIC | Age: 85
End: 2025-02-06
Payer: COMMERCIAL

## 2025-02-24 NOTE — PROGRESS NOTES
Virtual Visit Details            CARDIOLOGY PH CLINIC VIDEO VISIT    Date of video visit: 02/25/25      Karen Anderson is a 84 year old female who is being evaluated via a billable virtual visit.        Self reported vitals:  Weight: 104#  BP not reported    MEDICATIONS:  Current Outpatient Medications   Medication Sig Dispense Refill    acetaminophen (TYLENOL) 325 MG tablet Take 325 mg by mouth every 6 hours as needed for mild pain      albuterol (PROAIR HFA/PROVENTIL HFA/VENTOLIN HFA) 108 (90 Base) MCG/ACT inhaler Inhale 2 puffs into the lungs every 4 hours as needed for shortness of breath / dyspnea or wheezing       alendronate (FOSAMAX) 70 MG tablet Take 70 mg by mouth once a week On Mondays      apixaban ANTICOAGULANT (ELIQUIS ANTICOAGULANT) 5 MG tablet Take 1 tablet (5 mg) by mouth 2 times daily. 180 tablet 3    Ascorbic Acid (VITAMIN C) 500 MG CAPS Take 500 mg by mouth every morning       bumetanide (BUMEX) 2 MG tablet Take 2 tablets (4 mg) by mouth 3 times daily. 540 tablet 3    calcium citrate-vitamin D (CALCIUM CITRATE + D3) 315-250 MG-UNIT TABS per tablet Take 1 tablet by mouth 2 times daily       cetirizine (ZYRTEC) 10 MG tablet Take 10 mg by mouth daily.      digoxin (LANOXIN) 125 MCG tablet Take 0.5 tablets (62.5 mcg) by mouth daily. 45 tablet 3    gabapentin (NEURONTIN) 300 MG capsule Take 300 mg by mouth 3 times daily Can take an additional 300mg at bedtime as needed      hydroxychloroquine (PLAQUENIL) 200 MG tablet Take 200 mg by mouth daily       ipratropium (ATROVENT) 0.06 % nasal spray Spray 2 sprays into both nostrils 3 times daily       ketotifen (ZADITOR) 0.025 % ophthalmic solution Place 1 drop into both eyes daily      macitentan (OPSUMIT) 10 MG tablet Take 1 tablet (10 mg) by mouth daily. 30 tablet 11    Metamucil Fiber CHEW Take by mouth daily as needed 2 chewables      potassium chloride sadaf ER (KLOR-CON M20) 20 MEQ CR tablet Take 1 tablet (20 mEq) by mouth 3 times daily. More  refills after office visit. 270 tablet 3    sodium chloride (OCEAN) 0.65 % nasal spray Spray 1 spray into both nostrils daily as needed for congestion      treprostinil (REMODULIN) 60 mcg/mL in glycine diluent 50 mL infusion Inject 45.5 ng/kg/min into the vein continuously. Goal Dose: 45.5 ng/kg/min continuous intravenously    1mg/ml vial 80,000ng/ml concentration  46ml/day  Dose weight 56.3kg 1368 mL 0       Primary PH cardiologist: Dr. Turcios        HPI:  Ms. Anderson is a pleasant 84 year old female with a PMHx including rheumatoid arthritis, hypertension, scoliosis, asthma, pulmonary MAC, breast cancer s/p chemotherapy with Adriamycin, Cytoxan, Taxol, and tamoxifen and status post left mastectomy. She also has a history of extensive bilateral pulmonary emboli and DVT in 1998 thought to be secondary to tamoxifen along with chemotherapy induced cardiomyopathy. 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 Although she had proximal disease, she was too high risk for surgical PTE. She had a BPA in Jan 2021 which was complicated by hemoptysis, requiring FFP. She has required frequent diuretic adjustments, and we have been working on medical management. She is currently on combination therapy with IV Remodulin therapy along with Opsumit, and has failed Adempas due to dizziness and hypotension.     Last year Karen's  passed away after a brief sheridan with Parkinson's. Since then, we have been treating Karen somewhat conservatively as she wishes to avoid trips to the [place] and invasive procedures. Most recently, she met with Dr. Turcios via video in June of 2024 at which time she sounded to be at her baseline. No changes were made to her regimen at that time.     Today, I'm meeting back with Karen virtually once again. Overall, things sound to be unchanged from when we met with her last. She denies any significant worsening in her breathing. She notes a bit more  swelling lately as she has been having to eat higher sodium content food. She is still able to go up and down stairs and do her laundry, though does desaturate at times; she will turn her oxygen up to 3.5L with this. She denies any chest pain, palpitations, or dizziness/presyncope.    She had some labs drawn locally yesterday which I reviewed as below.     CURRENT PULMONARY HYPERTENSION REGIMEN:     PAH Rx: IV Remodulin 45.5ng/kg/min, Opsumit 10mg daily  (failed Adempas due to dizziness)     Diuretics: Bumex 4mg TID      Oxygen: NC 2.5-3.5L      Anticoagulation: apixaban  Indication: CTEPH     Immunosuppression: Yes (RA)     Pulm: Dr. Abernathy ([facility])        Assessment/Plan:         1. Chronic thromboembolic pulmonary hypertension.              --Ms. Anderson has CTEPH with RV dysfunction. Although she had proximal disease, she was felt to be too high risk for surgical PTE both here and at [facility]. She was offered potential PTE at [place], but still felt to be very high risk and ultimately deferred. She did have BPA here in January 2021 but this was complicated by hemoptysis. Thus, we have since been focusing on medical management. We have continued to discuss her desire to continue to avoid invasive procedures when possible and have been following conservatively over the last year or two.              --She remains a stable dose of IV Remodulin at 45.5ng/kg/min.  Continue this along with Opsumit daily as is, and digoxin for RV support. She is not able to tolerate Adempas due to dizziness. From a cardiopulmonary standpoint, things sound to be overall unchanged.              --Bumex is at 4mg TID; we have tried transient reductions but this seems to result in more edema. She has had a bit more swelling lately in the setting of higher sodium foods (her [employer] program is on hold due to some federal $ cuts). Her current bumex tabs are 2mg and she does not think she can split them; thus, will see if we can get her  1mg tabs to have on hand so she can take an additional 1mg PRN with swelling. NT-proBNP is chronically elevated but stable, and renal function is stable as well.   --Continue anticoagulation with apixaban. We switched from warfarin due to the inconvenience of INR testing.              --Continue oxygen as is. She currently uses 2.5L-3.5L and monitors her sats at home.               --She had had longstanding anemia, but hemoglobin appears stable as well.      Follow up plan: She requests virtual visits whenever possible, and we have been conservative for her testing given difficulty with commutes.  Will have her return in 6 months with labs to see Dr. Turcios. We are happy to see the patient back sooner with any new concerns.        Testing/labs:    Most recent labs:         Other most recent pertinent testing:    TTE (09/2023)  Left ventricular function is normal.The ejection fraction is 55-60%. No  regional wall motion abnormalities are seen. Paradoxical septal motion  consistent with right ventricular pressure and volume overload is present.  Global right ventricular function is mildly reduced. Moderate right  ventricular dilation is present. Estimated PASP 92 mmHg (RVSP + RAP). The PV  acceleration time is 67msec  Severe right atrial enlargement is present.  The inferior vena cava was normal in size with preserved respiratory  variability. No pericardial effusion is present.     This study was compared with the study from 4/25/22 .  The estimated PASP has increased. The RV function appears similar.     3/17/21 RHC:  RA: 14  PA: 88/22, mPA44  PCWP: 14  TD CO/CI: 3.97/2.8  Concepcion CO/CI: 2.95/2.1  PVR: 10.2      NYHA Functional Class:  3      Video-Visit Details    Type of service:  Video Visit    Video Start Time: 1419  Video End Time: 0353    An additional 25 minutes was spent today performing chart and history review, pre and post visit documentation, patient education, and care coordination.    The longitudinal  plan of care for the diagnosis(es)/condition(s) as documented were addressed during this visit. Due to the added complexity in care, I will continue to support Karen in the subsequent management and with ongoing continuity of care.      Originating Location (pt. Location): Home    Distant Location (provider location):  On-site    Platform used for Video Visit: Darron Marcum PA-C  [facility] Cardiology--Pulmonary Hypertension Clinic

## 2025-02-25 ENCOUNTER — VIRTUAL VISIT (OUTPATIENT)
Dept: CARDIOLOGY | Facility: CLINIC | Age: 85
End: 2025-02-25
Attending: PHYSICIAN ASSISTANT
Payer: COMMERCIAL

## 2025-02-25 VITALS — WEIGHT: 104 LBS | HEIGHT: 58 IN | BODY MASS INDEX: 21.83 KG/M2

## 2025-02-25 DIAGNOSIS — R60.9 EDEMA, UNSPECIFIED TYPE: ICD-10-CM

## 2025-02-25 DIAGNOSIS — R06.02 SOB (SHORTNESS OF BREATH): ICD-10-CM

## 2025-02-25 DIAGNOSIS — R60.9 FLUID RETENTION: Primary | ICD-10-CM

## 2025-02-25 DIAGNOSIS — I27.20 PULMONARY HYPERTENSION (H): ICD-10-CM

## 2025-02-25 PROCEDURE — 1126F AMNT PAIN NOTED NONE PRSNT: CPT | Mod: 95 | Performed by: PHYSICIAN ASSISTANT

## 2025-02-25 PROCEDURE — 98006 SYNCH AUDIO-VIDEO EST MOD 30: CPT | Performed by: PHYSICIAN ASSISTANT

## 2025-02-25 RX ORDER — BUMETANIDE 1 MG/1
1 TABLET ORAL PRN
Qty: 30 TABLET | Refills: 1 | Status: SHIPPED | OUTPATIENT
Start: 2025-02-25

## 2025-02-25 RX ORDER — BUMETANIDE 2 MG/1
4 TABLET ORAL 3 TIMES DAILY
COMMUNITY
Start: 2025-02-25

## 2025-02-25 ASSESSMENT — PAIN SCALES - GENERAL: PAINLEVEL_OUTOF10: NO PAIN (0)

## 2025-02-25 NOTE — NURSING NOTE
Current patient location: [facility] [address] [facility] [address]    Is the patient currently in the state of [place]? YES    Visit mode: VIDEO    If the visit is dropped, the patient can be reconnected by:VIDEO VISIT: Text to cell phone:   Telephone Information:   Mobile [phone]    and VIDEO VISIT: Send to e-mail at: [email or web address]    Will anyone else be joining the visit? Pt's  is with the pt and will be joining the video visit per pt  (If patient encounters technical issues they should call [phone])    Are changes needed to the allergy or medication list? Pt stated no med changes    Are refills needed on medications prescribed by this physician? NO    Rooming Documentation:  Not applicable    Reason for visit: SANDRA GREEN

## 2025-02-25 NOTE — PATIENT INSTRUCTIONS
You were seen today in the Pulmonary Hypertension Clinic at the [facility].     Cardiology Provider you saw during your visit:    MAGEN Saeed    Medication Changes:  - Okay to take Bumex 1 mg as needed for weight gain and/or fluid retention     Follow-up:   - 6 month virtual follow up with Dr. Turcios, labs prior    Please call us immediately if you have any syncope (fainting or passing out), chest pain, edema (swelling or weight gain), or decline in your functional status (general decline in how you are feeling).    If you have emergent concerns after hours or on the weekend, please call our on-call Cardiologist at [phone], option 4. For emergencies call [phone].     Thank you for allowing us to be a part of your care here at the [facility]    If you have questions or concerns please contact us at:    Rebecca Ngo RN (P: [phone])    Nurse Coordinator       Pulmonary Hypertension     [facility]         ALINA Austin   (Prior Auths & Pt Assistance)   ()  Clinic   Clinic   Pulmonary Hypertension   Pulmonary Hypertension  [facility]  [facility]  (P)[phone]    (P) [phone]  (F) [phone]

## 2025-02-25 NOTE — NURSING NOTE
Follow Up Plan:  - 6 mo VV with Dr. Turcios, labs locally prior    OVN and recent labs from [facility] faxed to Dr. Jones's office at (f) [phone]. Kezia Arnett RN on 2/25/2025 at 2:47 PM

## 2025-02-25 NOTE — LETTER
2/25/2025      RE: Karen Anderson  [address] [facility] [address]       Dear Colleague,    Thank you for the opportunity to participate in the care of your patient, Karen Anderson, at the [facility]ART [facility] [place] [facility]. Please see a copy of my visit note below.    Virtual Visit Details            CARDIOLOGY PH CLINIC VIDEO VISIT    Date of video visit: 02/25/25      Karen Anderson is a 84 year old female who is being evaluated via a billable virtual visit.        Self reported vitals:  Weight: 104#  BP not reported    MEDICATIONS:  Current Outpatient Medications   Medication Sig Dispense Refill     acetaminophen (TYLENOL) 325 MG tablet Take 325 mg by mouth every 6 hours as needed for mild pain       albuterol (PROAIR HFA/PROVENTIL HFA/VENTOLIN HFA) 108 (90 Base) MCG/ACT inhaler Inhale 2 puffs into the lungs every 4 hours as needed for shortness of breath / dyspnea or wheezing        alendronate (FOSAMAX) 70 MG tablet Take 70 mg by mouth once a week On Mondays       apixaban ANTICOAGULANT (ELIQUIS ANTICOAGULANT) 5 MG tablet Take 1 tablet (5 mg) by mouth 2 times daily. 180 tablet 3     Ascorbic Acid (VITAMIN C) 500 MG CAPS Take 500 mg by mouth every morning        bumetanide (BUMEX) 2 MG tablet Take 2 tablets (4 mg) by mouth 3 times daily. 540 tablet 3     calcium citrate-vitamin D (CALCIUM CITRATE + D3) 315-250 MG-UNIT TABS per tablet Take 1 tablet by mouth 2 times daily        cetirizine (ZYRTEC) 10 MG tablet Take 10 mg by mouth daily.       digoxin (LANOXIN) 125 MCG tablet Take 0.5 tablets (62.5 mcg) by mouth daily. 45 tablet 3     gabapentin (NEURONTIN) 300 MG capsule Take 300 mg by mouth 3 times daily Can take an additional 300mg at bedtime as needed       hydroxychloroquine (PLAQUENIL) 200 MG tablet Take 200 mg by mouth daily        ipratropium (ATROVENT) 0.06 % nasal spray Spray 2 sprays into both nostrils 3 times  daily        ketotifen (ZADITOR) 0.025 % ophthalmic solution Place 1 drop into both eyes daily       macitentan (OPSUMIT) 10 MG tablet Take 1 tablet (10 mg) by mouth daily. 30 tablet 11     Metamucil Fiber CHEW Take by mouth daily as needed 2 chewables       potassium chloride sadaf ER (KLOR-CON M20) 20 MEQ CR tablet Take 1 tablet (20 mEq) by mouth 3 times daily. More refills after office visit. 270 tablet 3     sodium chloride (OCEAN) 0.65 % nasal spray Spray 1 spray into both nostrils daily as needed for congestion       treprostinil (REMODULIN) 60 mcg/mL in glycine diluent 50 mL infusion Inject 45.5 ng/kg/min into the vein continuously. Goal Dose: 45.5 ng/kg/min continuous intravenously    1mg/ml vial 80,000ng/ml concentration  46ml/day  Dose weight 56.3kg 1368 mL 0       Primary PH cardiologist: Dr. Turcios        HPI:  Ms. Anderson is a pleasant 84 year old female with a PMHx including rheumatoid arthritis, hypertension, scoliosis, asthma, pulmonary MAC, breast cancer s/p chemotherapy with Adriamycin, Cytoxan, Taxol, and tamoxifen and status post left mastectomy. She also has a history of extensive bilateral pulmonary emboli and DVT in 1998 thought to be secondary to tamoxifen along with chemotherapy induced cardiomyopathy. 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 Although she had proximal disease, she was too high risk for surgical PTE. She had a BPA in Jan 2021 which was complicated by hemoptysis, requiring FFP. She has required frequent diuretic adjustments, and we have been working on medical management. She is currently on combination therapy with IV Remodulin therapy along with Opsumit, and has failed Adempas due to dizziness and hypotension.     Last year Karen's  passed away after a brief sheridan with Parkinson's. Since then, we have been treating Karen somewhat conservatively as she wishes to avoid trips to the [place] and invasive procedures. Most  recently, she met with Dr. Turcios via video in June of 2024 at which time she sounded to be at her baseline. No changes were made to her regimen at that time.     Today, I'm meeting back with Karen virtually once again. Overall, things sound to be unchanged from when we met with her last. She denies any significant worsening in her breathing. She notes a bit more swelling lately as she has been having to eat higher sodium content food. She is still able to go up and down stairs and do her laundry, though does desaturate at times; she will turn her oxygen up to 3.5L with this. She denies any chest pain, palpitations, or dizziness/presyncope.    She had some labs drawn locally yesterday which I reviewed as below.     CURRENT PULMONARY HYPERTENSION REGIMEN:     PAH Rx: IV Remodulin 45.5ng/kg/min, Opsumit 10mg daily  (failed Adempas due to dizziness)     Diuretics: Bumex 4mg TID      Oxygen: NC 2.5-3.5L      Anticoagulation: apixaban  Indication: CTEPH     Immunosuppression: Yes (RA)     Pulm: Dr. Abernathy ([facility])        Assessment/Plan:         1. Chronic thromboembolic pulmonary hypertension.              --Ms. Anderson has CTEPH with RV dysfunction. Although she had proximal disease, she was felt to be too high risk for surgical PTE both here and at [facility]. She was offered potential PTE at [place], but still felt to be very high risk and ultimately deferred. She did have BPA here in January 2021 but this was complicated by hemoptysis. Thus, we have since been focusing on medical management. We have continued to discuss her desire to continue to avoid invasive procedures when possible and have been following conservatively over the last year or two.              --She remains a stable dose of IV Remodulin at 45.5ng/kg/min.  Continue this along with Opsumit daily as is, and digoxin for RV support. She is not able to tolerate Adempas due to dizziness. From a cardiopulmonary standpoint, things sound to be  overall unchanged.              --Bumex is at 4mg TID; we have tried transient reductions but this seems to result in more edema. She has had a bit more swelling lately in the setting of higher sodium foods (her [employer] program is on hold due to some federal $ cuts). Her current bumex tabs are 2mg and she does not think she can split them; thus, will see if we can get her 1mg tabs to have on hand so she can take an additional 1mg PRN with swelling. NT-proBNP is chronically elevated but stable, and renal function is stable as well.   --Continue anticoagulation with apixaban. We switched from warfarin due to the inconvenience of INR testing.              --Continue oxygen as is. She currently uses 2.5L-3.5L and monitors her sats at home.               --She had had longstanding anemia, but hemoglobin appears stable as well.      Follow up plan: She requests virtual visits whenever possible, and we have been conservative for her testing given difficulty with commutes.  Will have her return in 6 months with labs to see Dr. Turcios. We are happy to see the patient back sooner with any new concerns.        Testing/labs:    Most recent labs:         Other most recent pertinent testing:    TTE (09/2023)  Left ventricular function is normal.The ejection fraction is 55-60%. No  regional wall motion abnormalities are seen. Paradoxical septal motion  consistent with right ventricular pressure and volume overload is present.  Global right ventricular function is mildly reduced. Moderate right  ventricular dilation is present. Estimated PASP 92 mmHg (RVSP + RAP). The PV  acceleration time is 67msec  Severe right atrial enlargement is present.  The inferior vena cava was normal in size with preserved respiratory  variability. No pericardial effusion is present.     This study was compared with the study from 4/25/22 .  The estimated PASP has increased. The RV function appears similar.     3/17/21 RHC:  RA: 14  PA: 88/22,  mPA44  PCWP: 14  TD CO/CI: 3.97/2.8  Concepcion CO/CI: 2.95/2.1  PVR: 10.2      NYHA Functional Class:  3      Video-Visit Details    Type of service:  Video Visit    Video Start Time: 1419  Video End Time: 1437    An additional 25 minutes was spent today performing chart and history review, pre and post visit documentation, patient education, and care coordination.    The longitudinal plan of care for the diagnosis(es)/condition(s) as documented were addressed during this visit. Due to the added complexity in care, I will continue to support Karen in the subsequent management and with ongoing continuity of care.      Originating Location (pt. Location): Home    Distant Location (provider location):  On-site    Platform used for Video Visit: Darron Marcum PA-C  [facility] Cardiology--Pulmonary Hypertension Clinic       Please do not hesitate to contact me if you have any questions/concerns.     Sincerely,     MAGEN Saeed

## 2025-03-25 DIAGNOSIS — R60.9 EDEMA, UNSPECIFIED TYPE: ICD-10-CM

## 2025-03-25 RX ORDER — BUMETANIDE 2 MG/1
4 TABLET ORAL 3 TIMES DAILY
Qty: 540 TABLET | Refills: 3 | Status: SHIPPED | OUTPATIENT
Start: 2025-03-25

## 2025-04-28 ENCOUNTER — TELEPHONE (OUTPATIENT)
Dept: CARDIOLOGY | Facility: CLINIC | Age: 85
End: 2025-04-28
Payer: COMMERCIAL

## 2025-04-28 NOTE — TELEPHONE ENCOUNTER
Left Voicemail (1st Attempt) for the patient to call back and schedule the following:    Appointment type: Return Pulmonary Hypertension   Provider: Dr. Turcios  Return date: September or next available  Specialty phone number: [phone] opt 1  Additional appointment(s) needed: na  Additonal Notes: Virtual Visit

## 2025-04-30 ENCOUNTER — TELEPHONE (OUTPATIENT)
Dept: CARDIOLOGY | Facility: CLINIC | Age: 85
End: 2025-04-30
Payer: COMMERCIAL

## 2025-04-30 NOTE — TELEPHONE ENCOUNTER
Left Voicemail (2nd Attempt) for the patient to call back and schedule the following:    Appointment type: Return Pulmonary Hypertension   Provider: Dr. Turcios  Return date: September or next available  Specialty phone number: [phone] opt 1  Additional appointment(s) needed: na  Additonal Notes: Virtual Visit

## 2025-05-08 ENCOUNTER — VIRTUAL VISIT (OUTPATIENT)
Dept: ONCOLOGY | Facility: CLINIC | Age: 85
End: 2025-05-08
Attending: STUDENT IN AN ORGANIZED HEALTH CARE EDUCATION/TRAINING PROGRAM
Payer: COMMERCIAL

## 2025-05-08 VITALS — BODY MASS INDEX: 21.53 KG/M2 | WEIGHT: 103 LBS

## 2025-05-08 DIAGNOSIS — Z51.5 ENCOUNTER FOR PALLIATIVE CARE: ICD-10-CM

## 2025-05-08 DIAGNOSIS — I27.20 PULMONARY HTN (H): Primary | ICD-10-CM

## 2025-05-08 DIAGNOSIS — R06.02 SOB (SHORTNESS OF BREATH): ICD-10-CM

## 2025-05-08 DIAGNOSIS — Z71.89 GOALS OF CARE, COUNSELING/DISCUSSION: ICD-10-CM

## 2025-05-08 DIAGNOSIS — R53.83 OTHER FATIGUE: ICD-10-CM

## 2025-05-08 ASSESSMENT — PAIN SCALES - GENERAL: PAINLEVEL_OUTOF10: MODERATE PAIN (5)

## 2025-05-08 NOTE — PATIENT INSTRUCTIONS
Recommendations:  -Consider increasing oxygen flow to 4-5L, especially with bending back in chair.  -See if it is possible to elevate feet but not quite as high.  -See if Lyly can refer to another counseling with grief experience once bereavement benefit ends in July  -Offered Hospice informational meeting at any time.    Follow up: 3 months      *Please do not make any changes to medications that we prescribe, including pain medicines, without talking to our team*    Reasons to Call    If you are having worsening/uncontrolled symptoms we want you to call!    You or your other physicians make any changes to medications we have prescribed.  -Please call for refills 4-5 days before you will run out of medication.    Important Phone Numbers, including: Refills, scheduling, and general questions     Palliative Care RN: Makeda Dunbar : [phone]  *For scheduling needs/follow up visits : [phone]  *After hours or on weekends- Will connect you with on call MD : [phone].

## 2025-05-08 NOTE — NURSING NOTE
Current patient location: [address] [place] [id number]    Is the patient currently in the state of [place]? YES    Visit mode: VIDEO    If the visit is dropped, the patient can be reconnected by:VIDEO VISIT: Send to e-mail at: [email or web address]    Will anyone else be joining the visit? YES: How would they like to receive their invitation? Text to cell phone: [phone] and Send to e-mail: [email or web address]  (If patient encounters technical issues they should call [phone])    Are changes needed to the allergy or medication list? Pt stated no changes to allergies and Pt stated no med changes    Are refills needed on medications prescribed by this physician? NO    Rooming Documentation:  Unable to complete questionnaire(s) due to time    Reason for visit: SANDRA Moody VVF

## 2025-05-08 NOTE — LETTER
"2025      Karen Anderson  [address]  [facility] [id number]      Dear Colleague,    Thank you for referring your patient, Karen Anderson, to the [facility] [place]. Please see a copy of my visit note below.    Palliative Care Clinic Progress Note    Patient Name: Karen Anderson  Primary Provider: Brittany Fonseca MD    Chief Complaint/Patient ID:   She has a PMHx of chronic hypoxic respiratory failure, pulm HTN, asthma, and pulmonary MAC.  She additionally has a distant history of breast cancer s/p chemotherapy with Adriamycin, Cytoxan, Taxol and tamoxifen and s/p left mastectomy, extensive b/l pulmonary emboli and DVT in  though to be secondary to tamoxifen.  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    Last Palliative Care Appt: 25 with me.      Reviewed: Yes    Social History:  2024.  Lives in her home. She is independent for her activities of daily living mostly. She walks with a rolling rider. Does her own laundry. Daughter lives nearby, and she has a personal caregiver once a week.    Advanced Care Planning: HCD+ on file, names her  ( as of 2024) as her primary agent followed by her daughter.      History of Present Illness:  Karen Anderson is seen for a follow up video visit today with Palliative Care. Her daughter Aissatou is also present for the visit.     Reviewed Cardiology note from .  Remains on a stable dose of Remodulin.  Planning to continue Bumex 4 mg 3 times daily.  Plans to add in 1 mg daily as needed for swelling.    Says she's \"ok\". Had been getting Sats reading in the 80s and even some 70s. Did turn up her oxygen a little bit- currently at 3.5L. Didn't quite get the reaction/response from her body that she thought- thought sats would go \"merly\" up to 95s and that didn't happen.     If on 1st floor of her home, shortness of breath is OK. Very careful with going downstairs to do " "the washing. Pauses on each step.  Recovery from shortness of breath occurs within a couple of minutes. \"I'm not having the distress that would keep me from doing anything\".    She is keeping her feet elevated when she is sitting. When she puts her legs up and she sits back, her sats drop into the 70s. \"Am I endangering myself by elevating legs and bending back?\".    Sitting at rest with legs down sats were 90-91%. Sitting at 88-89% while we\"re talking, then it increased up to 92%.     Has had a few spells of extreme pain in her lower shoulder blade, wrapped around arm, and underneath to where heart is. Describes as a \"stabber\" pain. Felt like it was pressing in on lung, \"grabbing me there in a vice like thing\". Was precipitated by coughing.    Has a lot of discomfort in her feet- having trouble finding the right sized shoe. Most comfortable in a house slipper. Knows it would be better getting something with more support but had prior experience that didn't go well.    Doesn't wear her support stockings as much as she should. Says she is able to get them on but it is a little challenging.     \"Feels like all the bad things are getting worse\"    \"Maybe I'm moving in direction of leaving this place faster than I thought, and that's distressing.    Daughter acknowledges how she's doing well given her circumstances.    Still has Lyly coming for grief counseling through bereavement Hospice benefit through July.      Physical Exam:   Constitutional: Alert, pleasant, no apparent distress. Sitting up in chair.  Eyes: Sclera non-icteric, no eye discharge.  ENT: Nasal cannula in place no nasal discharge. Ears grossly normal.  Respiratory: Mildly labored respirations. Speaking in full sentences.  Musculoskeletal: Extremities appear normal- no gross deformities noted. No edema noted on upper body.   Skin: No suspicious lesions or rashes on visible skin.  Neurologic: Clear speech, no aphasia. No facial droop.  Psychiatric: " Mentation appears normal, appropriate attention. Affect normal/bright. Does not appear anxious or depressed.    Key Data Reviewed:  LABS:   Lab Results   Component Value Date    WBC 4.5 12/07/2023    HGB 10.5 (L) 12/07/2023    HCT 35.0 12/07/2023     12/07/2023     12/28/2023    POTASSIUM 3.9 12/28/2023    CHLORIDE 100 03/22/2024    CO2 32 (H) 12/28/2023    BUN 29.0 (H) 12/28/2023    CR 1.20 (H) 12/28/2023    GLC 97 12/28/2023    NTBNPI 6,431 (H) 05/27/2021    NTBNP 1,977 (H) 12/07/2023    AST 34 12/07/2023    ALT 18 12/07/2023    ALKPHOS 88 12/07/2023    BILITOTAL 0.2 12/07/2023    INR 2.9 12/02/2024       Impression & Recommendations & Counseling:  Karen Anderson has a history of chronic hypoxic respiratory failure, pulm HTN, asthma, and pulmonary MAC.    The pain that she described with breathing wrapping around from her shoulder and under her arm sounds as though it could have been a rib out of place.  Reviewed conservative management for this and how they do tend to slowly improve over time.  She has not really turned up her oxygen very much since we last spoke    Discussed the difficult balance of keeping her feet elevated and keeping her saturations in a good range.  Suggested increasing her oxygen flow a little bit more, particularly when she is sitting back, and perhaps seeing if she can elevate her feet but not quite as high.    Reviewed with her what the progression of PAH can look like from a symptomatic standpoint, including worsening fatigue, shortness of breath, and weight loss/weakness.  We did discuss that while she is generally fairly stable right now, it can be helpful to start planning or at least having some conversations about boundaries and limitations of care as well as things that are important to her, so that when things do change, and time is starting to look shorter, there is more control in that situation.  I brought up the idea of an informational meeting with hospice,  "not a something that needs to be done right now but it is a potential opportunity to discuss what that plan of care could look like for her foot and in a conversation with no obligation or commitment to enroll.    She did get quiet at this point and acknowledged that she processes things over time, and will think about our conversation in pieces later.    Daughter wondered if it might be hard to think about Hospice when she's being told things are generally stable. Daughter wonders if it might feel like \"mixed messages\" and overwhelming. Patient acknowledges being overwhelmed, she appreciates knowing what her medical team members think and wants to be receptive to suggestions.    Ultimately today, we came up with the plan to focus on her physical symptoms and trying to help her feel as good as possible in the time being, and I encouraged her and her daughter to overtime start to have some of the limitations..  She was agreeable to this.    Recommendations:  -Consider increasing oxygen flow to 4-5L, especially with bending back in chair.  -See if it is possible to elevate feet but not quite as high.  -See if Lyly can refer to another counseling with grief experience once bereavement benefit ends in July  -Offered Hospice informational meeting at any time.        Follow up: 3 months      Video-Visit Details  Video Start Time: 3:19PM  Video End Time: 4:29PM    Originating Location (pt. Location): Home     Distant Location (provider location):  Offsite- Personal Home      Platform used for Video Visit: Darron     Total time spent on day of encounter is 87 mins, including reviewing record, review of above studies, above visit with patient, symptomatic discussion as above, including medication adjustments/prescription management, conversation about goals of care as outlined above, and documentation.       The longitudinal plan of care for the diagnosis(es)/condition(s) as documented were addressed during this visit.?Due " to the added complexity in care, I will continue to support Karen Anderson in the subsequent management and with the ongoing continuity of care.    Lorie Miranda, DO  Palliative Medicine       Some chart documentation performed using Dragon Voice recognition Software. Although reviewed after completion, some words and grammatical errors may remain.      Again, thank you for allowing me to participate in the care of your patient.        Sincerely,        Lorie Miranda, DO    Electronically signed

## 2025-05-08 NOTE — PROGRESS NOTES
"Palliative Care Clinic Progress Note    Patient Name: Karen Anderson  Primary Provider: Brittany Fonseca MD    Chief Complaint/Patient ID:   She has a PMHx of chronic hypoxic respiratory failure, pulm HTN, asthma, and pulmonary MAC.  She additionally has a distant history of breast cancer s/p chemotherapy with Adriamycin, Cytoxan, Taxol and tamoxifen and s/p left mastectomy, extensive b/l pulmonary emboli and DVT in  though to be secondary to tamoxifen.  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    Last Palliative Care Appt: 25 with me.      Reviewed: Yes    Social History:  2024.  Lives in her home. She is independent for her activities of daily living mostly. She walks with a rolling rider. Does her own laundry. Daughter lives nearby, and she has a personal caregiver once a week.    Advanced Care Planning: HCD+ on file, names her  ( as of 2024) as her primary agent followed by her daughter.      History of Present Illness:  Karen Anderson is seen for a follow up video visit today with Palliative Care. Her daughter Aissatou is also present for the visit.     Reviewed Cardiology note from .  Remains on a stable dose of Remodulin.  Planning to continue Bumex 4 mg 3 times daily.  Plans to add in 1 mg daily as needed for swelling.    Says she's \"ok\". Had been getting Sats reading in the 80s and even some 70s. Did turn up her oxygen a little bit- currently at 3.5L. Didn't quite get the reaction/response from her body that she thought- thought sats would go \"merly\" up to 95s and that didn't happen.     If on 1st floor of her home, shortness of breath is OK. Very careful with going downstairs to do the washing. Pauses on each step.  Recovery from shortness of breath occurs within a couple of minutes. \"I'm not having the distress that would keep me from doing anything\".    She is keeping her feet elevated when she is sitting. When she puts her legs " "up and she sits back, her sats drop into the 70s. \"Am I endangering myself by elevating legs and bending back?\".    Sitting at rest with legs down sats were 90-91%. Sitting at 88-89% while we\"re talking, then it increased up to 92%.     Has had a few spells of extreme pain in her lower shoulder blade, wrapped around arm, and underneath to where heart is. Describes as a \"stabber\" pain. Felt like it was pressing in on lung, \"grabbing me there in a vice like thing\". Was precipitated by coughing.    Has a lot of discomfort in her feet- having trouble finding the right sized shoe. Most comfortable in a house slipper. Knows it would be better getting something with more support but had prior experience that didn't go well.    Doesn't wear her support stockings as much as she should. Says she is able to get them on but it is a little challenging.     \"Feels like all the bad things are getting worse\"    \"Maybe I'm moving in direction of leaving this place faster than I thought, and that's distressing.    Daughter acknowledges how she's doing well given her circumstances.    Still has Lyly coming for grief counseling through bereavement Hospice benefit through July.      Physical Exam:   Constitutional: Alert, pleasant, no apparent distress. Sitting up in chair.  Eyes: Sclera non-icteric, no eye discharge.  ENT: Nasal cannula in place no nasal discharge. Ears grossly normal.  Respiratory: Mildly labored respirations. Speaking in full sentences.  Musculoskeletal: Extremities appear normal- no gross deformities noted. No edema noted on upper body.   Skin: No suspicious lesions or rashes on visible skin.  Neurologic: Clear speech, no aphasia. No facial droop.  Psychiatric: Mentation appears normal, appropriate attention. Affect normal/bright. Does not appear anxious or depressed.    Key Data Reviewed:  LABS:   Lab Results   Component Value Date    WBC 4.5 12/07/2023    HGB 10.5 (L) 12/07/2023    HCT 35.0 12/07/2023     " 12/07/2023     12/28/2023    POTASSIUM 3.9 12/28/2023    CHLORIDE 100 03/22/2024    CO2 32 (H) 12/28/2023    BUN 29.0 (H) 12/28/2023    CR 1.20 (H) 12/28/2023    GLC 97 12/28/2023    NTBNPI 6,431 (H) 05/27/2021    NTBNP 1,977 (H) 12/07/2023    AST 34 12/07/2023    ALT 18 12/07/2023    ALKPHOS 88 12/07/2023    BILITOTAL 0.2 12/07/2023    INR 2.9 12/02/2024       Impression & Recommendations & Counseling:  Karen Anderson has a history of chronic hypoxic respiratory failure, pulm HTN, asthma, and pulmonary MAC.    The pain that she described with breathing wrapping around from her shoulder and under her arm sounds as though it could have been a rib out of place.  Reviewed conservative management for this and how they do tend to slowly improve over time.  She has not really turned up her oxygen very much since we last spoke    Discussed the difficult balance of keeping her feet elevated and keeping her saturations in a good range.  Suggested increasing her oxygen flow a little bit more, particularly when she is sitting back, and perhaps seeing if she can elevate her feet but not quite as high.    Reviewed with her what the progression of PAH can look like from a symptomatic standpoint, including worsening fatigue, shortness of breath, and weight loss/weakness.  We did discuss that while she is generally fairly stable right now, it can be helpful to start planning or at least having some conversations about boundaries and limitations of care as well as things that are important to her, so that when things do change, and time is starting to look shorter, there is more control in that situation.  I brought up the idea of an informational meeting with hospice, not a something that needs to be done right now but it is a potential opportunity to discuss what that plan of care could look like for her foot and in a conversation with no obligation or commitment to enroll.    She did get quiet at this point and  "acknowledged that she processes things over time, and will think about our conversation in pieces later.    Daughter wondered if it might be hard to think about Hospice when she's being told things are generally stable. Daughter wonders if it might feel like \"mixed messages\" and overwhelming. Patient acknowledges being overwhelmed, she appreciates knowing what her medical team members think and wants to be receptive to suggestions.    Ultimately today, we came up with the plan to focus on her physical symptoms and trying to help her feel as good as possible in the time being, and I encouraged her and her daughter to overtime start to have some of the limitations..  She was agreeable to this.    Recommendations:  -Consider increasing oxygen flow to 4-5L, especially with bending back in chair.  -See if it is possible to elevate feet but not quite as high.  -See if Lyly can refer to another counseling with grief experience once bereavement benefit ends in July  -Offered Hospice informational meeting at any time.        Follow up: 3 months      Video-Visit Details  Video Start Time: 3:19PM  Video End Time: 4:29PM    Originating Location (pt. Location): Home     Distant Location (provider location):  Offsite- Personal Home      Platform used for Video Visit: Darron     Total time spent on day of encounter is 87 mins, including reviewing record, review of above studies, above visit with patient, symptomatic discussion as above, including medication adjustments/prescription management, conversation about goals of care as outlined above, and documentation.       The longitudinal plan of care for the diagnosis(es)/condition(s) as documented were addressed during this visit.?Due to the added complexity in care, I will continue to support Karen Anderson in the subsequent management and with the ongoing continuity of care.    Lorie Miranda, DO  Palliative Medicine       Some chart documentation performed " using Dragon Voice recognition Software. Although reviewed after completion, some words and grammatical errors may remain.

## 2025-05-08 NOTE — LETTER
"2025      Karen Anderson  [address]  [facility] [address]      Dear Colleague,    Thank you for referring your patient, Karen Anderson, to the [facility] [place]. Please see a copy of my visit note below.    Palliative Care Clinic Progress Note    Patient Name: Karen Anderson  Primary Provider: Brittany Fonseca MD    Chief Complaint/Patient ID:   She has a PMHx of chronic hypoxic respiratory failure, pulm HTN, asthma, and pulmonary MAC.  She additionally has a distant history of breast cancer s/p chemotherapy with Adriamycin, Cytoxan, Taxol and tamoxifen and s/p left mastectomy, extensive b/l pulmonary emboli and DVT in  though to be secondary to tamoxifen.  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    Last Palliative Care Appt: 25 with me.      Reviewed: Yes    Social History:  2024.  Lives in her home. She is independent for her activities of daily living mostly. She walks with a rolling rider. Does her own laundry. Daughter lives nearby, and she has a personal caregiver once a week.    Advanced Care Planning: HCD+ on file, names her  ( as of 2024) as her primary agent followed by her daughter.      History of Present Illness:  Karen Anderson is seen for a follow up video visit today with Palliative Care. Her daughter Aissatou is also present for the visit.     Reviewed Cardiology note from .  Remains on a stable dose of Remodulin.  Planning to continue Bumex 4 mg 3 times daily.  Plans to add in 1 mg daily as needed for swelling.    Says she's \"ok\". Had been getting Sats reading in the 80s and even some 70s. Did turn up her oxygen a little bit- currently at 3.5L. Didn't quite get the reaction/response from her body that she thought- thought sats would go \"merly\" up to 95s and that didn't happen.     If on 1st floor of her home, shortness of breath is OK. Very careful with going downstairs to do " "the washing. Pauses on each step.  Recovery from shortness of breath occurs within a couple of minutes. \"I'm not having the distress that would keep me from doing anything\".    She is keeping her feet elevated when she is sitting. When she puts her legs up and she sits back, her sats drop into the 70s. \"Am I endangering myself by elevating legs and bending back?\".    Sitting at rest with legs down sats were 90-91%. Sitting at 88-89% while we\"re talking, then it increased up to 92%.     Has had a few spells of extreme pain in her lower shoulder blade, wrapped around arm, and underneath to where heart is. Describes as a \"stabber\" pain. Felt like it was pressing in on lung, \"grabbing me there in a vice like thing\". Was precipitated by coughing.    Has a lot of discomfort in her feet- having trouble finding the right sized shoe. Most comfortable in a house slipper. Knows it would be better getting something with more support but had prior experience that didn't go well.    Doesn't wear her support stockings as much as she should. Says she is able to get them on but it is a little challenging.     \"Feels like all the bad things are getting worse\"    \"Maybe I'm moving in direction of leaving this place faster than I thought, and that's distressing.    Daughter acknowledges how she's doing well given her circumstances.    Still has Lyly coming for grief counseling through bereavement Hospice benefit through July.      Physical Exam:   Constitutional: Alert, pleasant, no apparent distress. Sitting up in chair.  Eyes: Sclera non-icteric, no eye discharge.  ENT: Nasal cannula in place no nasal discharge. Ears grossly normal.  Respiratory: Mildly labored respirations. Speaking in full sentences.  Musculoskeletal: Extremities appear normal- no gross deformities noted. No edema noted on upper body.   Skin: No suspicious lesions or rashes on visible skin.  Neurologic: Clear speech, no aphasia. No facial droop.  Psychiatric: " Mentation appears normal, appropriate attention. Affect normal/bright. Does not appear anxious or depressed.    Key Data Reviewed:  LABS:   Lab Results   Component Value Date    WBC 4.5 12/07/2023    HGB 10.5 (L) 12/07/2023    HCT 35.0 12/07/2023     12/07/2023     12/28/2023    POTASSIUM 3.9 12/28/2023    CHLORIDE 100 03/22/2024    CO2 32 (H) 12/28/2023    BUN 29.0 (H) 12/28/2023    CR 1.20 (H) 12/28/2023    GLC 97 12/28/2023    NTBNPI 6,431 (H) 05/27/2021    NTBNP 1,977 (H) 12/07/2023    AST 34 12/07/2023    ALT 18 12/07/2023    ALKPHOS 88 12/07/2023    BILITOTAL 0.2 12/07/2023    INR 2.9 12/02/2024       Impression & Recommendations & Counseling:  Karen Anderson has a history of chronic hypoxic respiratory failure, pulm HTN, asthma, and pulmonary MAC.    The pain that she described with breathing wrapping around from her shoulder and under her arm sounds as though it could have been a rib out of place.  Reviewed conservative management for this and how they do tend to slowly improve over time.  She has not really turned up her oxygen very much since we last spoke    Discussed the difficult balance of keeping her feet elevated and keeping her saturations in a good range.  Suggested increasing her oxygen flow a little bit more, particularly when she is sitting back, and perhaps seeing if she can elevate her feet but not quite as high.    Reviewed with her what the progression of PAH can look like from a symptomatic standpoint, including worsening fatigue, shortness of breath, and weight loss/weakness.  We did discuss that while she is generally fairly stable right now, it can be helpful to start planning or at least having some conversations about boundaries and limitations of care as well as things that are important to her, so that when things do change, and time is starting to look shorter, there is more control in that situation.  I brought up the idea of an informational meeting with hospice,  "not a something that needs to be done right now but it is a potential opportunity to discuss what that plan of care could look like for her foot and in a conversation with no obligation or commitment to enroll.    She did get quiet at this point and acknowledged that she processes things over time, and will think about our conversation in pieces later.    Daughter wondered if it might be hard to think about Hospice when she's being told things are generally stable. Daughter wonders if it might feel like \"mixed messages\" and overwhelming. Patient acknowledges being overwhelmed, she appreciates knowing what her medical team members think and wants to be receptive to suggestions.    Ultimately today, we came up with the plan to focus on her physical symptoms and trying to help her feel as good as possible in the time being, and I encouraged her and her daughter to overtime start to have some of the limitations..  She was agreeable to this.    Recommendations:  -Consider increasing oxygen flow to 4-5L, especially with bending back in chair.  -See if it is possible to elevate feet but not quite as high.  -See if Lyly can refer to another counseling with grief experience once bereavement benefit ends in July  -Offered Hospice informational meeting at any time.        Follow up: 3 months      Video-Visit Details  Video Start Time: 3:19PM  Video End Time: 4:29PM    Originating Location (pt. Location): Home     Distant Location (provider location):  Offsite- Personal Home      Platform used for Video Visit: Darron     Total time spent on day of encounter is 87 mins, including reviewing record, review of above studies, above visit with patient, symptomatic discussion as above, including medication adjustments/prescription management, conversation about goals of care as outlined above, and documentation.       The longitudinal plan of care for the diagnosis(es)/condition(s) as documented were addressed during this visit.?Due " to the added complexity in care, I will continue to support Karen Anderson in the subsequent management and with the ongoing continuity of care.    Lorie Miranda, DO  Palliative Medicine       Some chart documentation performed using Dragon Voice recognition Software. Although reviewed after completion, some words and grammatical errors may remain.      Again, thank you for allowing me to participate in the care of your patient.        Sincerely,        Lorie Miranda, DO    Electronically signed

## 2025-07-26 DIAGNOSIS — Z79.899 ENCOUNTER FOR MONITORING DIGOXIN THERAPY: Primary | ICD-10-CM

## 2025-07-26 DIAGNOSIS — I27.20 PULMONARY HYPERTENSION (H): ICD-10-CM

## 2025-07-26 DIAGNOSIS — Z51.81 ENCOUNTER FOR MONITORING DIGOXIN THERAPY: Primary | ICD-10-CM

## 2025-07-29 RX ORDER — DIGOXIN 125 MCG
62.5 TABLET ORAL DAILY
Qty: 45 TABLET | Refills: 0 | Status: SHIPPED | OUTPATIENT
Start: 2025-07-29

## 2025-07-29 NOTE — TELEPHONE ENCOUNTER
Medication Refill double check:    Last virtual visit was on 2/25/25 with Shawna Marcum PA-C.    Follow up was recommended for 6 months.    Any additional encounters with changes to requested med? no    Authorizing provider is: Dr. Karolina Collazo was approved.     Additional orders/notes:       Fabby Seymour RN on 7/29/2025 at 2:25 PM

## 2025-07-29 NOTE — TELEPHONE ENCOUNTER
Last Written Prescription:   Disp Refills Start End VERN   digoxin (LANOXIN) 125 MCG tablet 45 tablet 3 9/11/2024 -- No   Sig - Route: Take 0.5 tablets (62.5 mcg) by mouth daily. - Oral     ----------------------  Last Visit Date:   2/25/2025  [facility]    Future Visit Date: 0  ----------------------      Refill decision:   [x] Medication unable to be refilled by RN due to: Overdue labs/test:  Digoxin         Request from pharmacy:  Requested Prescriptions   Pending Prescriptions Disp Refills    digoxin (LANOXIN) 125 MCG tablet [Pharmacy Med Name: DIGOXIN 125 MCG TABLET] 45 tablet 3     Sig: TAKE 0.5 TABLETS (62.5 MCG) BY MOUTH DAILY.       Cardiac Glycoside Agents Protocol Failed - 7/29/2025 12:27 PM        Failed - Normal digoxin level on file in past 12 mos     Recent Labs   Lab Test 05/29/21  0625   DIGOXIN 0.8             Failed - GFR on file in the past 12 months     Recent Labs   Lab Test 03/22/24  1045 12/28/23  0821 08/19/21  1033 06/24/21  1630   GFRESTIMATED  --  45*   < > 39*   GFRESTBLACK  --   --   --  46*   [id number]*  --   --   --     < > = values in this interval not displayed.             Failed - Medication indicated for associated diagnosis        Passed - Medication is active on med list and the sig matches. RN to manually verify dose and sig if red X/fail.     If the protocol passes (green check), you do not need to verify med dose and sig.    A prescription matches if they are the same clinical intention.    For Example: once daily and every morning are the same.    The protocol can not identify upper and lower case letters as matching and will fail.     For Example: Take 1 tablet (50 mg) by mouth daily     TAKE 1 TABLET (50 MG) BY MOUTH DAILY    For all fails (red x), verify dose and sig.    If the refill does match what is on file, the RN can still proceed to approve the refill request.       If they do not match, route to the appropriate provider.              Passed - Recent (6 month) or future (90 days) visit with the authorizing provider's specialty (provided they have been seen in the past 9 months)     The patient must have completed an in-person or virtual visit within the past 6 months or has a future visit scheduled within the next 90 days with the authorizing provider s specialty.  Urgent care and e-visits do not quality as an office visit for this protocol.          Passed - Patient is 18 years of age or older        Passed - Patient is not pregnant        Passed - No positive pregnancy test on file in past 12 mos

## 2025-08-17 ENCOUNTER — HEALTH MAINTENANCE LETTER (OUTPATIENT)
Age: 85
End: 2025-08-17

## 2025-09-04 ENCOUNTER — TELEPHONE (OUTPATIENT)
Dept: CARDIOLOGY | Facility: CLINIC | Age: 85
End: 2025-09-04
Payer: COMMERCIAL

## (undated) DEVICE — WIRE GUIDE 0.035"X150CM EMERALD J TIP 502521

## (undated) DEVICE — PACK HEART LEFT CUSTOM

## (undated) DEVICE — INTRO SHEATH MICRO PLATINUM TIP 4FRX40CM 7274

## (undated) DEVICE — Device

## (undated) DEVICE — PACK HEART RIGHT CUSTOM SAN32RHF18

## (undated) DEVICE — INTRO SHEATH 7FRX10CM PINNACLE RSS702

## (undated) DEVICE — VALVE HEMOSTASIS .096" COPILOT MECH 1003331

## (undated) DEVICE — KIT HAND CONTROL ACIST 014644 AR-P54

## (undated) DEVICE — SHEATH PNCL 25CM 8FR NO WR

## (undated) DEVICE — CATH BALLOON EMERGE 2.5X12MM H7493918912250

## (undated) DEVICE — KIT ACCESSORY INTRO INFLATION SYS 20/30 PRIORITY 1000186-115

## (undated) DEVICE — WIRE GUIDE 0.025"X150CM EMERALD J TIP 502524

## (undated) DEVICE — CATH DIAG 4FR ANG PIG 538453S

## (undated) DEVICE — GUIDEWIRE VASC 0.014INX180CM RUNTHROUGH 25-1011

## (undated) DEVICE — INTRODUCER SHEATH 4FRX40CM MICROPUNC PED G47946

## (undated) DEVICE — TUBING PRESSURE 30"

## (undated) DEVICE — CATH ANGIO INFINITI PIGTAIL 145 6 SH 6FRX110CM  534-652S

## (undated) DEVICE — SLEEVE REPOSITIONING W/CATH LOCK 60CM 406503

## (undated) DEVICE — 8 FR X .11 IN X 77 CM, MULTIPURP BRAIDED GUIDING SHEATH

## (undated) DEVICE — CATH ANGIO INFINITI 3DRC 6FRX100CM 534676T

## (undated) DEVICE — CATH COR GUIDING 6FR IKARI LEFT 100

## (undated) DEVICE — CATH GUIDING 100CM 6FR .070IN VSTBR

## (undated) DEVICE — WIRE GUIDE 0.035"X260CM AMPTLAZ XSTIFF CVD THSCF-35-260-3-A

## (undated) DEVICE — INTRO SHEATH 7FRX25CM PINNACLE RSS706

## (undated) DEVICE — 0.035IN X 260CM, EMERALD DIAGNOSTIC GUIDEWIRE, FIXED-CORE PTFE COATED, STANDARD, 3MM EXCHANGE J-TIP (EA/1)

## (undated) DEVICE — INTRODUCER SHEATH FAST-CATH CATH-LOCK 7FRX12CM 406702

## (undated) DEVICE — CATH ANGIO INFINITI 3DRC 4FRX100CM 538476

## (undated) DEVICE — MANIFOLD KIT ANGIO AUTOMATED 014613

## (undated) RX ORDER — LIDOCAINE HYDROCHLORIDE 10 MG/ML
INJECTION, SOLUTION EPIDURAL; INFILTRATION; INTRACAUDAL; PERINEURAL
Status: DISPENSED
Start: 2020-03-04

## (undated) RX ORDER — LIDOCAINE 40 MG/G
CREAM TOPICAL
Status: DISPENSED
Start: 2021-03-17

## (undated) RX ORDER — LIDOCAINE 40 MG/G
CREAM TOPICAL
Status: DISPENSED
Start: 2020-03-04

## (undated) RX ORDER — HEPARIN SODIUM,PORCINE 10 UNIT/ML
VIAL (ML) INTRAVENOUS
Status: DISPENSED
Start: 2020-03-09

## (undated) RX ORDER — LIDOCAINE HYDROCHLORIDE 10 MG/ML
INJECTION, SOLUTION EPIDURAL; INFILTRATION; INTRACAUDAL; PERINEURAL
Status: DISPENSED
Start: 2020-03-09

## (undated) RX ORDER — LIDOCAINE HYDROCHLORIDE 10 MG/ML
INJECTION, SOLUTION EPIDURAL; INFILTRATION; INTRACAUDAL; PERINEURAL
Status: DISPENSED
Start: 2022-11-25

## (undated) RX ORDER — HEPARIN SODIUM 1000 [USP'U]/ML
INJECTION, SOLUTION INTRAVENOUS; SUBCUTANEOUS
Status: DISPENSED
Start: 2022-11-25

## (undated) RX ORDER — CEFAZOLIN SODIUM 2 G/100ML
INJECTION, SOLUTION INTRAVENOUS
Status: DISPENSED
Start: 2022-11-25

## (undated) RX ORDER — FENTANYL CITRATE 50 UG/ML
INJECTION, SOLUTION INTRAMUSCULAR; INTRAVENOUS
Status: DISPENSED
Start: 2022-11-25

## (undated) RX ORDER — HEPARIN SODIUM,PORCINE 10 UNIT/ML
VIAL (ML) INTRAVENOUS
Status: DISPENSED
Start: 2022-11-25

## (undated) RX ORDER — FENTANYL CITRATE 50 UG/ML
INJECTION, SOLUTION INTRAMUSCULAR; INTRAVENOUS
Status: DISPENSED
Start: 2020-03-09